# Patient Record
Sex: FEMALE | Race: WHITE | NOT HISPANIC OR LATINO | Employment: OTHER | URBAN - METROPOLITAN AREA
[De-identification: names, ages, dates, MRNs, and addresses within clinical notes are randomized per-mention and may not be internally consistent; named-entity substitution may affect disease eponyms.]

---

## 2017-05-12 ENCOUNTER — APPOINTMENT (OUTPATIENT)
Dept: GENERAL RADIOLOGY | Facility: CLINIC | Age: 82
End: 2017-05-12
Attending: EMERGENCY MEDICINE
Payer: MEDICARE

## 2017-05-12 ENCOUNTER — HOSPITAL ENCOUNTER (EMERGENCY)
Facility: CLINIC | Age: 82
Discharge: HOME | End: 2017-05-12
Attending: EMERGENCY MEDICINE | Admitting: EMERGENCY MEDICINE
Payer: MEDICARE

## 2017-05-12 VITALS
RESPIRATION RATE: 16 BRPM | HEART RATE: 89 BPM | WEIGHT: 162 LBS | DIASTOLIC BLOOD PRESSURE: 76 MMHG | TEMPERATURE: 98 F | BODY MASS INDEX: 27.81 KG/M2 | SYSTOLIC BLOOD PRESSURE: 167 MMHG | OXYGEN SATURATION: 71 %
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
## 2017-05-12 DIAGNOSIS — S80.00XA CONTUSION OF KNEE, UNSPECIFIED LATERALITY, INITIAL ENCOUNTER: ICD-10-CM

## 2017-05-12 PROCEDURE — A9270 NON-COVERED ITEM OR SERVICE: HCPCS | Mod: GY | Performed by: EMERGENCY MEDICINE

## 2017-05-12 PROCEDURE — 99284 EMERGENCY DEPT VISIT MOD MDM: CPT | Performed by: EMERGENCY MEDICINE

## 2017-05-12 PROCEDURE — 25000132 ZZH RX MED GY IP 250 OP 250 PS 637: Mod: GY | Performed by: EMERGENCY MEDICINE

## 2017-05-12 PROCEDURE — 99283 EMERGENCY DEPT VISIT LOW MDM: CPT

## 2017-05-12 PROCEDURE — 73560 X-RAY EXAM OF KNEE 1 OR 2: CPT | Mod: 50

## 2017-05-12 RX ORDER — TRAMADOL HYDROCHLORIDE 50 MG/1
50 TABLET ORAL ONCE
Status: COMPLETED | OUTPATIENT
Start: 2017-05-12 | End: 2017-05-12

## 2017-05-12 RX ORDER — MAGNESIUM 200 MG
1 TABLET ORAL EVERY EVENING
COMMUNITY
End: 2023-06-14

## 2017-05-12 RX ADMIN — TRAMADOL HYDROCHLORIDE 50 MG: 50 TABLET, COATED ORAL at 14:21

## 2017-05-12 ASSESSMENT — ENCOUNTER SYMPTOMS
NEUROLOGICAL NEGATIVE: 1
RESPIRATORY NEGATIVE: 1
GASTROINTESTINAL NEGATIVE: 1
CONSTITUTIONAL NEGATIVE: 1
ENDOCRINE NEGATIVE: 1
PSYCHIATRIC NEGATIVE: 1
ALLERGIC/IMMUNOLOGIC NEGATIVE: 1
CARDIOVASCULAR NEGATIVE: 1
HEMATOLOGIC/LYMPHATIC NEGATIVE: 1
EYES NEGATIVE: 1

## 2017-05-12 NOTE — ED PROVIDER NOTES
History     Chief Complaint   Patient presents with     Knee Pain     fall     HPI  Daniela Brown is a 84 year old female with a history of restless leg syndrome, depression, esophageal reflux, recurrent urinary tract infection, hypertension, B12 deficiency anemia who presents for evaluation for knee pain after a fall.  Patient tells me she was working in her yard at home in her garage when she tripped on a block and fell onto both her knees.  She initially had trouble bearing weight and standing but was able to hernan shimmer into her home to call for help. patient tells me she has a history of right knee replacement in 2014.  She normally takes tramadol for chronic leg pain and back pain.  She did not hit her head she does not report any back pain or hip pain.  Because of knee pain and discomfort she comes in by EMS to be checked out particular because she has pain with weightbearing and ambulation.  She is on a baby aspirin, atorvastatin, losartan, budesonide, and metformin.    Social history: Lives in [place], [place]. Here in ED alone by EMS.    Past Medical history:  Patient Active Problem List   Diagnosis     Degeneration of lumbar or lumbosacral intervertebral disc     Esophageal reflux     Memory loss     Irritable bowel syndrome     Disorder of bone and cartilage     Depressive disorder, not elsewhere classified     RESTLESS LEG SYNDROME     Generalized osteoarthrosis, unspecified site     Allergic rhinitis     Diverticulosis of large intestine     SENSONRL HEAR LOSS,BILAT     Urticaria     Subjective tinnitus     Vitamin D deficiency     Right foot pain     Frequent UTI     Urge incontinence     Obesity     Hyperlipidemia LDL goal <100     HTN (hypertension)     Type 2 diabetes, HbA1c goal < 7% (H)     Allergic rhinitis     Angina pectoris (H)     Pronation of foot     Advanced directives, counseling/discussion     Leukocytosis     S/P total knee replacement     Anemia due to blood loss, acute      "Colitis, Clostridium difficile     B12 deficiency anemia     Medications:  No current facility-administered medications for this encounter.      Current Outpatient Prescriptions   Medication     ClonazePAM (KLONOPIN PO)     Cyanocobalamin (B-12) 1000 MCG SUBL     LOSARTAN POTASSIUM PO     Atorvastatin Calcium (LIPITOR PO)     acetaminophen (TYLENOL) 325 MG tablet     Glucose Blood (ONE TOUCH ULTRA TEST) strip     traMADol (ULTRAM) 50 MG tablet     MetFORmin (GLUCOPHAGE) 500 MG tablet     Multiple Vitamins-Minerals (THERAPEUTIC MULTIVIT/MINERAL) TABS     aspirin 81 MG tablet     nitroglycerin (NITROSTAT) 0.4 MG SL tablet     ORDER FOR DME     ** PATIENT ALERT **     Allergies:     Allergies   Allergen Reactions     Actonel [Bisphosphonates] Itching     Celebrex [Celecoxib] Rash     Cipro [Ciprofloxacin] Rash     Erythromycin Rash     Fosamax Itching and Rash     Keflex [Cephalexin Monohydrate] Rash     Nitrofurantoin Other (See Comments)     \"dizzyness'     Tetanus-Diphtheria Toxoids Swelling     Vicodin [Acetaminophen] Itching     Patient reported - only when used scheduled in high doses.        Vioxx Rash     Sulfa Drugs Itching and Rash     I have reviewed the Medications, Allergies, Past Medical and Surgical History, and Social History in the Epic system.    Review of Systems   Constitutional: Negative.    HENT: Negative.    Eyes: Negative.    Respiratory: Negative.    Cardiovascular: Negative.    Gastrointestinal: Negative.    Endocrine: Negative.    Genitourinary: Negative.    Musculoskeletal:        Bilateral knee pain.    Skin: Negative.    Allergic/Immunologic: Negative.    Neurological: Negative.    Hematological: Negative.    Psychiatric/Behavioral: Negative.        Physical Exam   BP: (!) 193/99  Pulse: 89  Temp: 98  F (36.7  C)  Resp: 16  Weight: 73.5 kg (162 lb)  SpO2: 95 %  Physical Exam   Constitutional: She is oriented to person, place, and time. No distress.   HENT:   Head: Normocephalic and " atraumatic.   Eyes: Conjunctivae and EOM are normal. Pupils are equal, round, and reactive to light. Right eye exhibits no discharge. Left eye exhibits no discharge. No scleral icterus.   Neck: Normal range of motion. Neck supple. No JVD present. No tracheal deviation present. No thyromegaly present.   Cardiovascular: Normal rate.  Exam reveals no gallop and no friction rub.    No murmur heard.  Pulmonary/Chest: Effort normal.   Abdominal: Soft. Bowel sounds are normal.   Musculoskeletal:        Legs:  Lymphadenopathy:     She has no cervical adenopathy.   Neurological: She is alert and oriented to person, place, and time.   Skin: She is not diaphoretic.   Psychiatric: She has a normal mood and affect. Her behavior is normal. Judgment and thought content normal.       ED Course     ED Course     Procedures             Critical Care time:  none               Labs Ordered and Resulted from Time of ED Arrival Up to the Time of Departure from the ED - No data to display    ED medications:  Medications   traMADol (ULTRAM) tablet 50 mg (50 mg Oral Given 5/12/17 1421)        ED labs and imaging:  Results for orders placed or performed during the hospital encounter of 05/12/17 (from the past 24 hour(s))   Knee XR, 2 views, bilateral    Narrative    XR KNEE BILATERAL 1/2 VW 5/12/2017 2:55 PM    HISTORY: Fall. Pain.    COMPARISON: 3/25/2013.    TWO-VIEW RIGHT KNEE: No acute osseous finding is demonstrated. The  right knee arthroplasty is stable in appearance.    TWO-VIEW LEFT KNEE: No acute osseous finding is demonstrated. A small  knee joint effusion is not excluded. Moderate degenerative medial  compartmental joint space narrowing is present and similar to the  prior study. Degenerative change is also present in the patellofemoral  compartmental joint space.    PATY SUTHERLAND MD       ED Vitals:  Vitals:    05/12/17 1355 05/12/17 1400 05/12/17 1415 05/12/17 1428   BP:  (!) 172/91 167/76    Pulse:       Resp:       Temp:    98   F (36.7  C)   TempSrc:    Oral   SpO2: (!) 71%      Weight:         Assessments & Plan (with Medical Decision Making)   Clinical impression:  pleasant 84-year-old female who presents by EMS for bilateral knee pain and discomfort after a mechanical fall that was unwitnessed.  Patient tells me she was working in her backyard when she tripped on a wooden block.  She fell onto both her knees.  She arrived to be assessed due to pain with ambulation and weightbearing on both knees and left worse than the right.  She does not have any hip pain and no back pain. On my exam, she  reports tenderness to palpation about the left knee with some swelling but no bruising or ecchymosis.  Ice is applied to her knee.  She has no ankle pain, no pelvic pain      ED course and Plan:  She requested something for pain.  She tells me she takes tramadol for acute leg and back pain.  She is given a dose of oral tramadol in the ED.  An x-ray of both her knees were obtained to exclude bony abnormality given fall onto her knee,and her concern about her right knee prosthesis.  XR was negative for acute osseous abnormality.  There is no traumatic bony abnormality.  Please see radiologist interpretation in detailed report above.  Patient was reevaluated after x-rays imaging oral tramadol.  Patient was comfortable going home.  She was picked up by her son.  She tells me she has tramadol to use for pain control at home.  We discussed and reviewed reasons to return to the emergency department for care.  Patient did ambulate independently and was observed by nursing staff and had no difficulty with weightbearing and ambulation.  I reviewed care for knee contusion post-mechanical fall from standing height.  Ice to the knee, rest elevate and use tramadol which she have at home for pain and discomfort as needed.  Recheck in clinic in 3-5 days.         Disclaimer: This note consists of symbols derived from keyboarding, dictation and/or voice recognition  software. As a result, there may be errors in the script that have gone undetected. Please consider this when interpreting information found in this chart.  I have reviewed the nursing notes.    I have reviewed the findings, diagnosis, plan and need for follow up with the patient.    New Prescriptions    No medications on file       Final diagnoses:   Contusion of knee, unspecified laterality, initial encounter - bilateral (L>R)   Fall, initial encounter - from standing height onto both knees       5/12/2017   [facility] EMERGENCY DEPARTMENT     Epifanio Vo MD  05/12/17 4210

## 2017-05-12 NOTE — ED AVS SNAPSHOT
[facility] Emergency Department    [address] [address]    Phone:  [phone]    Fax:  [phone]                                       Daniela Brown   MRN: [id number]    Department:  [facility] Emergency Department   Date of Visit:  5/12/2017           Patient Information     Date Of Birth          1/26/1933        Your diagnoses for this visit were:     Contusion of knee, unspecified laterality, initial encounter bilateral (L>R)    Fall, initial encounter from standing height onto both knees       You were seen by Epifanio Vo MD.      Follow-up Information     Follow up with Clinic, [facility] [place]. Schedule an appointment as soon as possible for a visit in 3 days.    Why:  for recheck and follow-up    Contact information:    [facility] [id number] [phone]          Follow up with [facility] Emergency Department.    Specialty:  EMERGENCY MEDICINE    Why:  As needed, If symptoms worsen    Contact information:    [address] [place] [address] [phone]    Additional information:    The medical center is located at   [address] (between [address] in [place], [place] miles north   of [place]).      Discharge References/Attachments     FALL, MECHANICAL (ENGLISH)    BRUISES (CONTUSIONS) (ENGLISH)    FALLS, PREVENTING, MOVING SAFELY OUTSIDE (ENGLISH)      24 Hour Appointment Hotline       To make an appointment at any [facility], call [phone] ([phone]). If you don't have a family doctor or clinic, we will help you find one. [facility] are conveniently located to serve the needs of you and your family.             Review of your medicines      Our records show that you are taking the medicines listed below. If these are incorrect, please call your family doctor or clinic.        Dose / Directions Last dose taken    * ** PATIENT ALERT **        Warning: DO NOT EXCEED  4,000 MG OF ACETAMINOPHEN FROM ALL SOURCES IN 24 HOURS   Refills:  0        acetaminophen 325 MG tablet   Commonly known as:  TYLENOL   Dose:  325 mg   Quantity:  30 tablet        Take 1 tablet by mouth every 6 hours as needed for pain.   Refills:  0        aspirin 81 MG tablet   Dose:  81 mg        Take 81 mg by mouth daily   Refills:  0        B-12 1000 MCG Subl        Place under the tongue daily   Refills:  0        KLONOPIN PO   Dose:  0.5 mg        Take 0.5 mg by mouth 2 times daily as needed for anxiety   Refills:  0        LIPITOR PO   Dose:  40 mg        Take 40 mg by mouth daily   Refills:  0        LOSARTAN POTASSIUM PO   Dose:  50 mg        Take 50 mg by mouth daily   Refills:  0        metFORMIN 500 MG tablet   Commonly known as:  GLUCOPHAGE   Dose:  1 tablet   Quantity:  180 tablet        Take 1 tablet by mouth 2 times daily (with meals).   Refills:  1        nitroglycerin 0.4 MG sublingual tablet   Commonly known as:  NITROSTAT   Dose:  0.4 mg   Quantity:  30 tablet        Place 1 tablet under the tongue every 5 minutes as needed.   Refills:  4        ONE TOUCH ULTRA test strip   Dose:  1 strip   Quantity:  1 Box   Generic drug:  blood glucose monitoring        1 strip daily.   Refills:  6        * order for DME   Quantity:  1 Device        Thigh length teds.   Refills:  2        THERAPEUTIC MULTIVIT/MINERAL Tabs   Dose:  1 tablet        Take 1 tablet by mouth daily.   Refills:  0        traMADol 50 MG tablet   Commonly known as:  ULTRAM   Dose:  50 mg   Quantity:  60 tablet        Take 1 tablet by mouth every 6 hours as needed for pain.   Refills:  5        * Notice:  This list has 2 medication(s) that are the same as other medications prescribed for you. Read the directions carefully, and ask your doctor or other care provider to review them with you.            Procedures and tests performed during your visit     Knee XR, 2 views, bilateral      Orders Needing Specimen Collection     None     "  Pending Results     No orders found from 5/10/2017 to 5/13/2017.            Pending Culture Results     No orders found from 5/10/2017 to 5/13/2017.            Pending Results Instructions     If you had any lab results that were not finalized at the time of your Discharge, you can call the ED Lab Result RN at [phone]. You will be contacted by this team for any positive Lab results or changes in treatment. The nurses are available 7 days a week from 10A to 6:30P.  You can leave a message 24 hours per day and they will return your call.        Test Results From Your Hospital Stay        5/12/2017  3:30 PM      Narrative     XR KNEE BILATERAL 1/2 VW 5/12/2017 2:55 PM    HISTORY: Fall. Pain.    COMPARISON: 3/25/2013.    TWO-VIEW RIGHT KNEE: No acute osseous finding is demonstrated. The  right knee arthroplasty is stable in appearance.    TWO-VIEW LEFT KNEE: No acute osseous finding is demonstrated. A small  knee joint effusion is not excluded. Moderate degenerative medial  compartmental joint space narrowing is present and similar to the  prior study. Degenerative change is also present in the patellofemoral  compartmental joint space.    PATY SUTHERLAND MD                Thank you for choosing [facility]       Thank you for choosing [facility] for your care. Our goal is always to provide you with excellent care. Hearing back from our patients is one way we can continue to improve our services. Please take a few minutes to complete the written survey that you may receive in the mail after you visit with us. Thank you!        [email or web address] Information     [facility] lets you send messages to your doctor, view your test results, renew your prescriptions, schedule appointments and more. To sign up, go to [email or web address] . Click on \"Log in\" on the left side of the screen, which will take you to the Welcome page. Then click on \"Sign up Now\" on the right side of the page.     You will be asked to enter the access code listed " below, as well as some personal information. Please follow the directions to create your username and password.     Your access code is: [email or web address]  Expires: 8/10/2017  4:00 PM     Your access code will  in 90 days. If you need help or a new code, please call your [facility] clinic or [phone].        Care EveryWhere ID     This is your Care EveryWhere ID. This could be used by other organizations to access your [facility] medical records  [id number]        After Visit Summary       This is your record. Keep this with you and show to your community pharmacist(s) and doctor(s) at your next visit.

## 2017-05-12 NOTE — ED AVS SNAPSHOT
[facility] Emergency Department    [address] [address]    Phone:  [phone]    Fax:  [phone]                                       Daniela Brown   MRN: [id number]    Department:  [facility] Emergency Department   Date of Visit:  5/12/2017           After Visit Summary Signature Page     I have received my discharge instructions, and my questions have been answered. I have discussed any challenges I see with this plan with the nurse or doctor.    ..........................................................................................................................................  Patient/Patient Representative Signature      ..........................................................................................................................................  Patient Representative Print Name and Relationship to Patient    ..................................................               ................................................  Date                                            Time    ..........................................................................................................................................  Reviewed by Signature/Title    ...................................................              ..............................................  Date                                                            Time

## 2017-06-17 ENCOUNTER — APPOINTMENT (OUTPATIENT)
Dept: PHYSICAL THERAPY | Facility: CLINIC | Age: 82
End: 2017-06-17
Payer: MEDICARE

## 2017-06-17 ENCOUNTER — HOSPITAL ENCOUNTER (OUTPATIENT)
Facility: CLINIC | Age: 82
Setting detail: OBSERVATION
Discharge: HOME | End: 2017-06-17
Attending: FAMILY MEDICINE | Admitting: FAMILY MEDICINE
Payer: MEDICARE

## 2017-06-17 ENCOUNTER — TELEPHONE (OUTPATIENT)
Dept: FAMILY MEDICINE | Facility: CLINIC | Age: 82
End: 2017-06-17

## 2017-06-17 VITALS
WEIGHT: 161.82 LBS | HEART RATE: 84 BPM | BODY MASS INDEX: 27.63 KG/M2 | HEIGHT: 64 IN | SYSTOLIC BLOOD PRESSURE: 161 MMHG | TEMPERATURE: 98 F | OXYGEN SATURATION: 95 % | RESPIRATION RATE: 18 BRPM | DIASTOLIC BLOOD PRESSURE: 87 MMHG

## 2017-06-17 DIAGNOSIS — K52.9 GASTROENTERITIS: ICD-10-CM

## 2017-06-17 DIAGNOSIS — E86.0 DEHYDRATION: ICD-10-CM

## 2017-06-17 DIAGNOSIS — R55 SYNCOPE, UNSPECIFIED SYNCOPE TYPE: ICD-10-CM

## 2017-06-17 DIAGNOSIS — E11.9 TYPE 2 DIABETES MELLITUS WITHOUT COMPLICATION, WITHOUT LONG-TERM CURRENT USE OF INSULIN (H): Primary | ICD-10-CM

## 2017-06-17 DIAGNOSIS — M25.561 ACUTE PAIN OF RIGHT KNEE: Primary | ICD-10-CM

## 2017-06-17 PROBLEM — E86.1 HYPOVOLEMIA DEHYDRATION: Status: ACTIVE | Noted: 2017-06-17

## 2017-06-17 LAB
ANION GAP SERPL CALCULATED.3IONS-SCNC: 6 MMOL/L (ref 3–14)
ANION GAP SERPL CALCULATED.3IONS-SCNC: 9 MMOL/L (ref 3–14)
BASOPHILS # BLD AUTO: 0 10E9/L (ref 0–0.2)
BASOPHILS NFR BLD AUTO: 0.1 %
BUN SERPL-MCNC: 11 MG/DL (ref 7–30)
BUN SERPL-MCNC: 15 MG/DL (ref 7–30)
CALCIUM SERPL-MCNC: 8.6 MG/DL (ref 8.5–10.1)
CALCIUM SERPL-MCNC: 9.2 MG/DL (ref 8.5–10.1)
CHLORIDE SERPL-SCNC: 106 MMOL/L (ref 94–109)
CHLORIDE SERPL-SCNC: 116 MMOL/L (ref 94–109)
CO2 SERPL-SCNC: 23 MMOL/L (ref 20–32)
CO2 SERPL-SCNC: 24 MMOL/L (ref 20–32)
CREAT SERPL-MCNC: 0.77 MG/DL (ref 0.52–1.04)
CREAT SERPL-MCNC: 0.91 MG/DL (ref 0.52–1.04)
DIFFERENTIAL METHOD BLD: ABNORMAL
EOSINOPHIL # BLD AUTO: 0.1 10E9/L (ref 0–0.7)
EOSINOPHIL NFR BLD AUTO: 0.8 %
ERYTHROCYTE [DISTWIDTH] IN BLOOD BY AUTOMATED COUNT: 12.5 % (ref 10–15)
ERYTHROCYTE [DISTWIDTH] IN BLOOD BY AUTOMATED COUNT: 12.7 % (ref 10–15)
GFR SERPL CREATININE-BSD FRML MDRD: 59 ML/MIN/1.7M2
GFR SERPL CREATININE-BSD FRML MDRD: 71 ML/MIN/1.7M2
GLUCOSE BLDC GLUCOMTR-MCNC: 131 MG/DL (ref 70–99)
GLUCOSE SERPL-MCNC: 122 MG/DL (ref 70–99)
GLUCOSE SERPL-MCNC: 176 MG/DL (ref 70–99)
HCT VFR BLD AUTO: 36.8 % (ref 35–47)
HCT VFR BLD AUTO: 40.1 % (ref 35–47)
HGB BLD-MCNC: 12.4 G/DL (ref 11.7–15.7)
HGB BLD-MCNC: 13.6 G/DL (ref 11.7–15.7)
IMM GRANULOCYTES # BLD: 0 10E9/L (ref 0–0.4)
IMM GRANULOCYTES NFR BLD: 0.2 %
LYMPHOCYTES # BLD AUTO: 1.5 10E9/L (ref 0.8–5.3)
LYMPHOCYTES NFR BLD AUTO: 8.1 %
MCH RBC QN AUTO: 28.3 PG (ref 26.5–33)
MCH RBC QN AUTO: 28.5 PG (ref 26.5–33)
MCHC RBC AUTO-ENTMCNC: 33.7 G/DL (ref 31.5–36.5)
MCHC RBC AUTO-ENTMCNC: 33.9 G/DL (ref 31.5–36.5)
MCV RBC AUTO: 84 FL (ref 78–100)
MCV RBC AUTO: 84 FL (ref 78–100)
MONOCYTES # BLD AUTO: 1.2 10E9/L (ref 0–1.3)
MONOCYTES NFR BLD AUTO: 6.4 %
MRSA DNA SPEC QL NAA+PROBE: NORMAL
NEUTROPHILS # BLD AUTO: 15.3 10E9/L (ref 1.6–8.3)
NEUTROPHILS NFR BLD AUTO: 84.4 %
PLATELET # BLD AUTO: 233 10E9/L (ref 150–450)
PLATELET # BLD AUTO: 267 10E9/L (ref 150–450)
POTASSIUM SERPL-SCNC: 3.4 MMOL/L (ref 3.4–5.3)
POTASSIUM SERPL-SCNC: 3.8 MMOL/L (ref 3.4–5.3)
RBC # BLD AUTO: 4.38 10E12/L (ref 3.8–5.2)
RBC # BLD AUTO: 4.77 10E12/L (ref 3.8–5.2)
SODIUM SERPL-SCNC: 139 MMOL/L (ref 133–144)
SODIUM SERPL-SCNC: 145 MMOL/L (ref 133–144)
SPECIMEN SOURCE: NORMAL
WBC # BLD AUTO: 13.7 10E9/L (ref 4–11)
WBC # BLD AUTO: 18.1 10E9/L (ref 4–11)

## 2017-06-17 PROCEDURE — A9270 NON-COVERED ITEM OR SERVICE: HCPCS | Mod: GY | Performed by: FAMILY MEDICINE

## 2017-06-17 PROCEDURE — 85025 COMPLETE CBC W/AUTO DIFF WBC: CPT | Performed by: FAMILY MEDICINE

## 2017-06-17 PROCEDURE — 99284 EMERGENCY DEPT VISIT MOD MDM: CPT | Performed by: FAMILY MEDICINE

## 2017-06-17 PROCEDURE — 00000146 ZZHCL STATISTIC GLUCOSE BY METER IP

## 2017-06-17 PROCEDURE — 25000132 ZZH RX MED GY IP 250 OP 250 PS 637: Mod: GY | Performed by: FAMILY MEDICINE

## 2017-06-17 PROCEDURE — 99285 EMERGENCY DEPT VISIT HI MDM: CPT | Mod: 25

## 2017-06-17 PROCEDURE — 87640 STAPH A DNA AMP PROBE: CPT | Mod: XU | Performed by: INTERNAL MEDICINE

## 2017-06-17 PROCEDURE — 80048 BASIC METABOLIC PNL TOTAL CA: CPT | Performed by: FAMILY MEDICINE

## 2017-06-17 PROCEDURE — 99219 ZZC INITIAL OBSERVATION CARE,LEVL II: CPT | Performed by: FAMILY MEDICINE

## 2017-06-17 PROCEDURE — 40000193 ZZH STATISTIC PT WARD VISIT: Performed by: PHYSICAL THERAPIST

## 2017-06-17 PROCEDURE — G0378 HOSPITAL OBSERVATION PER HR: HCPCS

## 2017-06-17 PROCEDURE — 96361 HYDRATE IV INFUSION ADD-ON: CPT

## 2017-06-17 PROCEDURE — 97161 PT EVAL LOW COMPLEX 20 MIN: CPT | Mod: GP | Performed by: PHYSICAL THERAPIST

## 2017-06-17 PROCEDURE — A9270 NON-COVERED ITEM OR SERVICE: HCPCS | Mod: GY | Performed by: INTERNAL MEDICINE

## 2017-06-17 PROCEDURE — 25000128 H RX IP 250 OP 636: Performed by: FAMILY MEDICINE

## 2017-06-17 PROCEDURE — 87641 MR-STAPH DNA AMP PROBE: CPT | Performed by: INTERNAL MEDICINE

## 2017-06-17 PROCEDURE — 96360 HYDRATION IV INFUSION INIT: CPT

## 2017-06-17 PROCEDURE — 25000132 ZZH RX MED GY IP 250 OP 250 PS 637: Mod: GY | Performed by: INTERNAL MEDICINE

## 2017-06-17 PROCEDURE — 85027 COMPLETE CBC AUTOMATED: CPT | Performed by: FAMILY MEDICINE

## 2017-06-17 PROCEDURE — 36415 COLL VENOUS BLD VENIPUNCTURE: CPT | Performed by: FAMILY MEDICINE

## 2017-06-17 RX ORDER — ONDANSETRON 2 MG/ML
4 INJECTION INTRAMUSCULAR; INTRAVENOUS EVERY 6 HOURS PRN
Status: DISCONTINUED | OUTPATIENT
Start: 2017-06-17 | End: 2017-06-17 | Stop reason: HOSPADM

## 2017-06-17 RX ORDER — TRAMADOL HYDROCHLORIDE 50 MG/1
50 TABLET ORAL EVERY 6 HOURS PRN
Status: DISCONTINUED | OUTPATIENT
Start: 2017-06-17 | End: 2017-06-17 | Stop reason: HOSPADM

## 2017-06-17 RX ORDER — NITROGLYCERIN 0.4 MG/1
0.4 TABLET SUBLINGUAL EVERY 5 MIN PRN
Status: DISCONTINUED | OUTPATIENT
Start: 2017-06-17 | End: 2017-06-17 | Stop reason: HOSPADM

## 2017-06-17 RX ORDER — NALOXONE HYDROCHLORIDE 0.4 MG/ML
.1-.4 INJECTION, SOLUTION INTRAMUSCULAR; INTRAVENOUS; SUBCUTANEOUS
Status: DISCONTINUED | OUTPATIENT
Start: 2017-06-17 | End: 2017-06-17 | Stop reason: HOSPADM

## 2017-06-17 RX ORDER — ACETAMINOPHEN 325 MG/1
650 TABLET ORAL EVERY 4 HOURS PRN
Status: DISCONTINUED | OUTPATIENT
Start: 2017-06-17 | End: 2017-06-17 | Stop reason: HOSPADM

## 2017-06-17 RX ORDER — METFORMIN HCL 500 MG
500 TABLET, EXTENDED RELEASE 24 HR ORAL
Status: DISCONTINUED | OUTPATIENT
Start: 2017-06-17 | End: 2017-06-17 | Stop reason: HOSPADM

## 2017-06-17 RX ORDER — TRAMADOL HYDROCHLORIDE 50 MG/1
50 TABLET ORAL ONCE
Status: COMPLETED | OUTPATIENT
Start: 2017-06-17 | End: 2017-06-17

## 2017-06-17 RX ORDER — METFORMIN HCL 500 MG
500 TABLET, EXTENDED RELEASE 24 HR ORAL
Qty: 30 TABLET | Refills: 0 | Status: SHIPPED | OUTPATIENT
Start: 2017-06-17 | End: 2018-07-19

## 2017-06-17 RX ORDER — CLONAZEPAM 0.5 MG/1
0.5 TABLET ORAL 2 TIMES DAILY PRN
Status: DISCONTINUED | OUTPATIENT
Start: 2017-06-17 | End: 2017-06-17 | Stop reason: HOSPADM

## 2017-06-17 RX ORDER — SODIUM CHLORIDE 9 MG/ML
1000 INJECTION, SOLUTION INTRAVENOUS CONTINUOUS
Status: DISCONTINUED | OUTPATIENT
Start: 2017-06-17 | End: 2017-06-17 | Stop reason: HOSPADM

## 2017-06-17 RX ORDER — LOSARTAN POTASSIUM 50 MG/1
50 TABLET ORAL DAILY
Status: DISCONTINUED | OUTPATIENT
Start: 2017-06-17 | End: 2017-06-17 | Stop reason: HOSPADM

## 2017-06-17 RX ORDER — ONDANSETRON 4 MG/1
4 TABLET, ORALLY DISINTEGRATING ORAL EVERY 6 HOURS PRN
Status: DISCONTINUED | OUTPATIENT
Start: 2017-06-17 | End: 2017-06-17 | Stop reason: HOSPADM

## 2017-06-17 RX ORDER — ONDANSETRON 2 MG/ML
4 INJECTION INTRAMUSCULAR; INTRAVENOUS
Status: DISCONTINUED | OUTPATIENT
Start: 2017-06-17 | End: 2017-06-17 | Stop reason: DRUGHIGH

## 2017-06-17 RX ADMIN — SODIUM CHLORIDE 1000 ML: 9 INJECTION, SOLUTION INTRAVENOUS at 02:19

## 2017-06-17 RX ADMIN — SODIUM CHLORIDE 1000 ML: 9 INJECTION, SOLUTION INTRAVENOUS at 03:58

## 2017-06-17 RX ADMIN — TRAMADOL HYDROCHLORIDE 50 MG: 50 TABLET, COATED ORAL at 07:12

## 2017-06-17 RX ADMIN — LOSARTAN POTASSIUM 50 MG: 50 TABLET ORAL at 09:10

## 2017-06-17 ASSESSMENT — ACTIVITIES OF DAILY LIVING (ADL)
BATHING: 0-->INDEPENDENT
FALL_HISTORY_WITHIN_LAST_SIX_MONTHS: YES
DRESS: 0-->INDEPENDENT
RETIRED_COMMUNICATION: 0-->UNDERSTANDS/COMMUNICATES WITHOUT DIFFICULTY
SWALLOWING: 0-->SWALLOWS FOODS/LIQUIDS WITHOUT DIFFICULTY
TRANSFERRING: 0-->INDEPENDENT
AMBULATION: 0-->INDEPENDENT
NUMBER_OF_TIMES_PATIENT_HAS_FALLEN_WITHIN_LAST_SIX_MONTHS: 2
TOILETING: 0-->INDEPENDENT
RETIRED_EATING: 0-->INDEPENDENT
COGNITION: 0 - NO COGNITION ISSUES REPORTED

## 2017-06-17 NOTE — LETTER
"Transition Communication Hand-off for Care Transitions to Next Level of Care Provider    Name: Daniela Wetzel  MRN #: [id number]  Primary Care Provider: [facility]     Primary Clinic: [address] [place] [address]  Primary Care Clinic Name:  ([facility])  Reason for Hospitalization:  Dehydration [E86.0]  Gastroenteritis [K52.9]  Syncope, unspecified syncope type [R55]  Admit Date/Time: 6/17/2017  1:50 AM  Discharge Date: ***  Payor Source: Payor: MEDICA / Plan: MEDICA PRIME SOLUTION / Product Type: Indemnity /     Readmission Assessment Measure (ALLI) Risk Score/category: ***    Plan of Care Goals/Milestone Events:   Patient Concerns: ***   Patient Goals:   Short-term ***   Long-term ***   Medical Goals   Short-term ***   Long-term ***         Reason for Communication Hand-off Referral: {CCREASONCTNHANDOFFREFERRALCTS:08940}    Discharge Plan:       Concern for non-adherence with plan of care:   Y/N ***  Discharge Needs Assessment:      Already enrolled in Tele-monitoring program and name of program:  ***  Follow-up specialty is recommended: {YES / NO:24623::\"Yes\"}    Follow-up plan:  Future Appointments  Date Time Provider Department Center   6/17/2017 4:00 PM Ruth Restrepo PT [facility]       Any outstanding tests or procedures:        Referrals     Future Labs/Procedures    HOME CARE NURSING REFERRAL     Comments:    Your provider has referred you to: [facility]: [facility] [phone]   [email or web address]    Extended Emergency Contact Information  Primary Emergency Contact: JOANNA WETZEL  Address: [address] [place] [address] [facility]  Home Phone: [phone]  Work Phone: [phone]  Mobile Phone: [phone]  Relation: Son  Secondary Emergency Contact: npp   [place]  Relation: None    Patient Anticipated Discharge Date: 6/18/17   RN, PT, HHA to begin 24 - " 48 hours after discharge.  PLEASE EVALUATE AND TREAT (Evaluation timeline is 24 - 48 hrs. Please call if there is need for a variance to this timeline).    REASON FOR REFERRAL: Assessment & Treatment: ST    ADDITIONAL SERVICES NEEDED: none    OTHER PERTINENT INFORMATION: Patient was last seen by provider on 6/17/17 for gastroenteritis.    No current outpatient prescriptions on file.    Patient Active Problem List:     Degeneration of lumbar or lumbosacral intervertebral disc     Esophageal reflux     Memory loss     Irritable bowel syndrome     Disorder of bone and cartilage     Depressive disorder, not elsewhere classified     RESTLESS LEG SYNDROME     Generalized osteoarthrosis, unspecified site     Allergic rhinitis     Diverticulosis of large intestine     SENSONRL HEAR LOSS,BILAT     Urticaria     Subjective tinnitus     Vitamin D deficiency     Right foot pain     Frequent UTI     Urge incontinence     Obesity     Hyperlipidemia LDL goal <100     HTN (hypertension)     Type 2 diabetes, HbA1c goal < 7% (H)     Allergic rhinitis     Angina pectoris (H)     Pronation of foot     Advanced directives, counseling/discussion     Leukocytosis     S/P total knee replacement     Anemia due to blood loss, acute     Colitis, Clostridium difficile     B12 deficiency anemia     Gastroenteritis     Hypovolemia dehydration      Documentation of Face to Face and Certification for Home Health Services    I certify that patient, Daniela Brown is under my care and that I, or a Nurse Practitioner or Physician's Assistant working with me, had a face-to-face encounter that meets the physician face-to-face encounter requirements with this patient on: 6/17/2017.    This encounter with the patient was in whole, or in part, for the following medical condition, which is the primary reason for Home Health Care: weakness.    I certify that, based on my findings, the following services are medically necessary Home Health  Services: Nursing    My clinical findings support the need for the above services because: Nurse is needed: To provide assessment and oversight required in the home to assure adherence to the medical plan due to: gastroenteritis..    Further, I certify that my clinical findings support that this patient is homebound (i.e. absences from home require considerable and taxing effort and are for medical reasons or [employer] services or infrequently or of short duration when for other reasons) because: Requires assistance of another person or specialized equipment to access medical services because patient: Is unable to exit home safely on own due to: weakness..    Based on the above findings, I certify that this patient is confined to the home and needs intermittent skilled nursing care, physical therapy and/or speech therapy.  The patient is under my care, and I have initiated the establishment of the plan of care.  This patient will be followed by a physician who will periodically review the plan of care.    Physician/Provider to provide follow up care: Clinic, [facility]    Isaura GRANADO certified Physician at time of discharge: Axel De La Rosa MD    Please be aware that coverage of these services is subject to the terms and limitations of your health insurance plan.  Call member services at your health plan with any benefit or coverage questions.            Key Recommendations:      Angelica Rivera    AVS/Discharge Summary is the source of truth; this is a helpful guide for improved communication of patient story

## 2017-06-17 NOTE — PROGRESS NOTES
"Pt continues to verbalize some knee discomfort after falling on it \"twice\" per pt. States it has \"improved\" since given PRN tramadol this AM and ice applied. Pt sat on the side of the bed for breakfast, stated she didn't have much of an appetite but that this is normal for her. No diarrhea noted this morning. Pt resting in bed, call light within reach. Alarms activated and audible.   "

## 2017-06-17 NOTE — PLAN OF CARE
Problem: Goal Outcome Summary  Goal: Goal Outcome Summary  Outcome: Improving  Pt's diarrhea has improved since admission to the floor, only 1 episode. Pt has voided x2 since admission. Alert & oriented x4. Continues to be slightly dizzy when up to the bathroom. Pt needs to be encouraged to slow down when moving. Remote tele on patient. Call light within reach, bed alarm activated.

## 2017-06-17 NOTE — H&P
"(K52.9) Gastroenteritis  Comment: most likley multifactorial.  Se has \"spastic colon\" for years  Worse with stress. Has lots of stress getting ready for garage sale.  She forgot her metformin so took it in the evening on empty stomach.   bwiull start bowel rest. Iv fluids  Supportive adelaida  Plan: Glucose by meter, HOME CARE NURSING REFERRAL     (E86.0) Dehydration  Comment: 2ndary to acute gi losses.    Has william given fluid bolus in er  With continue high iv rate.   Plan: HOME CARE NURSING REFERRAL         (R55) Syncope, unspecified syncope type  Comment: most likely related to hypovolemic hypotension.  Will obsev=rve over night.  Plan: HOME CARE NURSING REFERRAL          (E11.9) Type 2 diabetes mellitus without complication, without long-term current use of insulin (H)  (primary encounter diagnosis)  Comment: good control she usuallly does not forget her meds, has a pill box.  But she does only take metformin once a day  With that in mind she will switch to er formula, which h may reduce symptoms of \"spastic colon\" int eh future  Plan: metFORMIN (GLUCOPHAGE-XR) 500 MG 24 hr tablet            HPI:    Pt was at home, stressed out getting readu for a grage sale.  Forgot tot take her metformin with supper, and took it a couple of hours later on an empty stomach.  shortly after that she has sudden onset of profuse watery diarrhea  Without fever , chills or cramping.  After 10_ stools she started to feel light headed with getting up,  She then fainted when getting up to stool again and fell on her knees.  911 was called.,      No current facility-administered medications on file prior to encounter.   Current Outpatient Prescriptions on File Prior to Encounter:  ClonazePAM (KLONOPIN PO) Take 0.5 mg by mouth 2 times daily as needed for anxiety   Cyanocobalamin (B-12) 1000 MCG SUBL Place under the tongue daily   LOSARTAN POTASSIUM PO Take 50 mg by mouth daily    Atorvastatin Calcium (LIPITOR PO) Take 40 mg by mouth At " "Bedtime    Glucose Blood (ONE TOUCH ULTRA TEST) strip 1 strip daily.   traMADol (ULTRAM) 50 MG tablet Take 1 tablet by mouth every 6 hours as needed for pain.   Multiple Vitamins-Minerals (THERAPEUTIC MULTIVIT/MINERAL) TABS Take 1 tablet by mouth daily.   acetaminophen (TYLENOL) 325 MG tablet Take 1 tablet by mouth every 6 hours as needed for pain.   nitroglycerin (NITROSTAT) 0.4 MG SL tablet Place 1 tablet under the tongue every 5 minutes as needed.   ORDER FOR DME Thigh length teds.   [DISCONTINUED] MetFORmin (GLUCOPHAGE) 500 MG tablet Take 1 tablet by mouth 2 times daily (with meals). (Patient taking differently: Take 1 tablet by mouth daily )   ** PATIENT ALERT ** Warning: DO NOT EXCEED 4,000 MG OF ACETAMINOPHEN FROM ALL SOURCES IN 24 HOURS       Patient Active Problem List    Diagnosis Date Noted     Gastroenteritis 06/17/2017     Priority: Medium     Hypovolemia dehydration 06/17/2017     Priority: Medium     B12 deficiency anemia 06/20/2012     Priority: Medium     Colitis, Clostridium difficile 04/18/2012     Priority: Medium     Anemia due to blood loss, acute 04/02/2012     Priority: Medium     S/P total knee replacement 03/28/2012     Priority: Medium     Leukocytosis 09/23/2011     Priority: Medium     Advanced directives, counseling/discussion 06/02/2011     Priority: Medium     Patient does not have an Advance/Health Care Directive (HCD), given \"What is Advance Care Planning?\" flyer.    Isamar Suarez  June 2, 2011         Pronation of foot 05/05/2011     Priority: Medium     Bilateral defomity       Angina pectoris (H) 03/17/2011     Priority: Medium     (Problem list name updated by automated process. Provider to review and confirm.)       Allergic rhinitis 01/19/2011     Priority: Medium     Type 2 diabetes, HbA1c goal < 7% (H) 01/05/2011     Priority: Medium     HTN (hypertension) 11/12/2010     Priority: Medium     Hyperlipidemia LDL goal <100 10/31/2010     Priority: Medium     Obesity 07/21/2010 "     Priority: Medium     Frequent UTI 10/20/2009     Priority: Medium     Cysto negative 7/10.        Urge incontinence 10/20/2009     Priority: Medium     Right foot pain 10/16/2009     Priority: Medium     Xray showed djd -follows with podiatry. -arch supports placed may need cam walker        Vitamin D deficiency 09/09/2008     Priority: Medium     Subjective tinnitus 04/22/2008     Priority: Medium     Urticaria 08/22/2006     Priority: Medium     Problem list name updated by automated process. Provider to review       SENSONRL HEAR LOSS,BILAT 05/03/2006     Priority: Medium     Esophageal reflux      Priority: Medium     Memory loss      Priority: Medium     Early cognitive decline. MMSE 27/30, Neno 4.7/ 6.0 2004. B12, TSH, RPR normal 3914-1183.       Depressive disorder, not elsewhere classified      Priority: Medium     Degeneration of lumbar or lumbosacral intervertebral disc      Priority: Medium     MRI 5/04- DDD, L2-3 annular bulge with protrusion into left caudal infra-foraminal area  Uses vicodin sparingly   Pain Agreement signed.        Irritable bowel syndrome      Priority: Low     diahrrea predominant       Disorder of bone and cartilage      Priority: Low     DEXA 2007 -1.4 hip. Dexa 2004 -1 hip and -1 spine  Problem list name updated by automated process. Provider to review       RESTLESS LEG SYNDROME      Priority: Low     Generalized osteoarthrosis, unspecified site      Priority: Low     C-spine, left hip       Allergic rhinitis      Priority: Low     Problem list name updated by automated process. Provider to review       Diverticulosis of large intestine      Priority: Low     flexible sigmoidoscopy with diverticulosis otherwise normal 2001, admit diverticulitis 2009  Problem list name updated by automated process. Provider to review         Past Surgical History:   Procedure Laterality Date     ARTHROPLASTY KNEE  3/26/2012    Procedure:ARTHROPLASTY KNEE; Right Total Knee Arthroplasty;  "Surgeon:BERNADINE ROSAS; Location:[facility] OR     C APPENDECTOMY  1953     C REMOVAL GALLBLADDER       HYSTERECTOMY, PAP NO LONGER INDICATED  1983    TVH/BSO     SURGICAL HISTORY OF -       Hernia Repair     SURGICAL HISTORY OF -   10/08    A & P Repair, Dr. Hannah       Social History   Substance Use Topics     Smoking status: Never Smoker     Smokeless tobacco: Never Used     Alcohol use No          Allergies   Allergen Reactions     Actonel [Bisphosphonates] Itching     Azithromycin Hives     Celebrex [Celecoxib] Rash     Cipro [Ciprofloxacin] Rash     Erythromycin Rash     Escitalopram Diarrhea     Gets very sick and mad feelings     Fosamax Itching and Rash     Keflex [Cephalexin Monohydrate] Rash     Lisinopril Cough     Nitrofurantoin Other (See Comments)     \"dizzyness'     Risedronate Itching     Shellfish-Derived Products Hives     Tetanus-Diphtheria Toxoids Swelling     Vicodin [Acetaminophen] Itching     Patient reported - only when used scheduled in high doses.        Vioxx Rash     Penicillins Rash     Sulfa Drugs Itching and Rash     Sulfasalazine Itching and Rash       Family History   Problem Relation Age of Onset     C.A.D. Mother      DIABETES Mother      Cancer - colorectal Mother      Arthritis Mother      HEART DISEASE Mother      Lipids Brother      Obesity Brother      Hypertension Brother      Allergies Son      Eye Disorder Son      cataract     Thyroid Disease Sister      graves dz     Eye Disorder Son      Alcohol/Drug Father        ROS    CONSTITUTIONAL:Negative  HEENT: NEGATIVE  RESP: NEGATIVE  CV: NEGATIVE  GI: NEGATIVE  : NEGATIVE  MUSCULOSKELATAL: NEGATIVE  INTEGUMENTARY/SKIN: NEGATIVE  NEURO: NEGATIVE  ENDOCRINE: NEGATIVE  PSYCHIATRIC: NEGATIVE     Physical exam     /87 (BP Location: Right arm)  Pulse 84  Temp 98  F (36.7  C)  Resp 18  Ht 5' 4\" (1.626 m)  Wt 161 lb 13.1 oz (73.4 kg)  SpO2 95%  BMI 27.78 kg/m2  General: healthy,alert,no distress  HENT: Normocephalic. " TM's grossly normal, oropharynx without significant findings.  Neck: supple,without thyromegaly or thyroid nodularity,without cervical or jugular adenopathy  Lungs: clear of wheezes or rales  Heart:RRR. No murmurs, clicks gallops or rub}  Abdomen: non tender nondistended, active bowel sounds no organomegally  Extremities:extremities normal- no gross deformities noted, normal range of motion, no edema  Neuro:moves all extremities equally, nonfocal       Results for orders placed or performed during the hospital encounter of 06/17/17 (from the past 24 hour(s))   CBC with platelets, differential   Result Value Ref Range    WBC 18.1 (H) 4.0 - 11.0 10e9/L    RBC Count 4.77 3.8 - 5.2 10e12/L    Hemoglobin 13.6 11.7 - 15.7 g/dL    Hematocrit 40.1 35.0 - 47.0 %    MCV 84 78 - 100 fl    MCH 28.5 26.5 - 33.0 pg    MCHC 33.9 31.5 - 36.5 g/dL    RDW 12.7 10.0 - 15.0 %    Platelet Count 267 150 - 450 10e9/L    Diff Method Automated Method     % Neutrophils 84.4 %    % Lymphocytes 8.1 %    % Monocytes 6.4 %    % Eosinophils 0.8 %    % Basophils 0.1 %    % Immature Granulocytes 0.2 %    Absolute Neutrophil 15.3 (H) 1.6 - 8.3 10e9/L    Absolute Lymphocytes 1.5 0.8 - 5.3 10e9/L    Absolute Monocytes 1.2 0.0 - 1.3 10e9/L    Absolute Eosinophils 0.1 0.0 - 0.7 10e9/L    Absolute Basophils 0.0 0.0 - 0.2 10e9/L    Abs Immature Granulocytes 0.0 0 - 0.4 10e9/L   Basic metabolic panel   Result Value Ref Range    Sodium 139 133 - 144 mmol/L    Potassium 3.4 3.4 - 5.3 mmol/L    Chloride 106 94 - 109 mmol/L    Carbon Dioxide 24 20 - 32 mmol/L    Anion Gap 9 3 - 14 mmol/L    Glucose 176 (H) 70 - 99 mg/dL    Urea Nitrogen 15 7 - 30 mg/dL    Creatinine 0.91 0.52 - 1.04 mg/dL    GFR Estimate 59 (L) >60 mL/min/1.7m2    GFR Estimate If Black 71 >60 mL/min/1.7m2    Calcium 9.2 8.5 - 10.1 mg/dL   Glucose by meter   Result Value Ref Range    Glucose 131 (H) 70 - 99 mg/dL   Basic metabolic panel   Result Value Ref Range    Sodium 145 (H) 133 - 144  mmol/L    Potassium 3.8 3.4 - 5.3 mmol/L    Chloride 116 (H) 94 - 109 mmol/L    Carbon Dioxide 23 20 - 32 mmol/L    Anion Gap 6 3 - 14 mmol/L    Glucose 122 (H) 70 - 99 mg/dL    Urea Nitrogen 11 7 - 30 mg/dL    Creatinine 0.77 0.52 - 1.04 mg/dL    GFR Estimate 71 >60 mL/min/1.7m2    GFR Estimate If Black 86 >60 mL/min/1.7m2    Calcium 8.6 8.5 - 10.1 mg/dL   CBC with platelets   Result Value Ref Range    WBC 13.7 (H) 4.0 - 11.0 10e9/L    RBC Count 4.38 3.8 - 5.2 10e12/L    Hemoglobin 12.4 11.7 - 15.7 g/dL    Hematocrit 36.8 35.0 - 47.0 %    MCV 84 78 - 100 fl    MCH 28.3 26.5 - 33.0 pg    MCHC 33.7 31.5 - 36.5 g/dL    RDW 12.5 10.0 - 15.0 %    Platelet Count 233 150 - 450 10e9/L       Patient Active Problem List    Diagnosis     Gastroenteritis     Hypovolemia dehydration     B12 deficiency anemia     Colitis, Clostridium difficile     Anemia due to blood loss, acute     S/P total knee replacement     Leukocytosis     Advanced directives, counseling/discussion     Pronation of foot     Angina pectoris (H)     Allergic rhinitis     Type 2 diabetes, HbA1c goal < 7% (H)     HTN (hypertension)     Hyperlipidemia LDL goal <100     Obesity     Frequent UTI     Urge incontinence     Right foot pain     Vitamin D deficiency     Subjective tinnitus     Urticaria     SENSONRL HEAR LOSS,BILAT     Esophageal reflux     Memory loss     Depressive disorder, not elsewhere classified     Degeneration of lumbar or lumbosacral intervertebral disc     Irritable bowel syndrome     Disorder of bone and cartilage     RESTLESS LEG SYNDROME     Generalized osteoarthrosis, unspecified site     Allergic rhinitis     Diverticulosis of large intestine

## 2017-06-17 NOTE — ED NOTES
Pt developed sudden onset diarrhea at approximately 2130 last night. Pt reports at least 10 episodes since. Pt was ambulating to bathroom, fell and has no recollection of fall. Pt's son was unable to get pt off of the floor so called EMS.     EMS assisted pt to stretcher. Pt able to bear weight and ambulate. Pt c/o mild left knee pain from fall. Ice pack proved by EMS. 12 EKG, IV and  mg/dl done enroute.

## 2017-06-17 NOTE — DISCHARGE SUMMARY
"Discharge Summary    Daniela Brown MRN# [id number]   YOB: 1933 Age: 84 year old     Date of Admission:  6/17/2017  Date of Discharge:  6/17/2017  1:19 PM  Admitting Physician:  Axel De La Rosa MD  Discharge Physician:  No att. providers found  Discharging Service:  Hospitalist     Primary Provider: [facility], [facility]          Admission Diagnoses:   Gastroenteritis  Dehydration  Syncope  Hypovolumic hypotension   Type 2 diabetes mellitus without complication, without long-term current use of insulin          Discharge Diagnosis:   Gastroenteritis - resolved  Dehydration - improved  Syncope - resolved  Hypovolumic hypotension - improved  Type 2 diabetes mellitus without complication, without long-term current use of insulin         Discharge Disposition:   Discharged to home           Condition on Discharge:   Discharge condition: Stable   Discharge vitals: Blood pressure 161/87, pulse 84, temperature 98  F (36.7  C), resp. rate 18, height 5' 4\" (1.626 m), weight 161 lb 13.1 oz (73.4 kg), SpO2 95 %.     Code status on discharge: Full Code           Procedures / Labs / Imaging:   No procedures performed during this admission          Medications Prior to Admission:     Metformin  Losartan  lipitor           Discharge Medications:     Discharge Medication List as of 6/17/2017 12:58 PM      START taking these medications    Details   metFORMIN (GLUCOPHAGE-XR) 500 MG 24 hr tablet Take 1 tablet (500 mg) by mouth daily (with dinner), Disp-30 tablet, R-0, Local Print      !! order for DME 1 walkerDisp-1 Device, R-0, Local Print       !! - Potential duplicate medications found. Please discuss with provider.      CONTINUE these medications which have NOT CHANGED    Details   ClonazePAM (KLONOPIN PO) Take 0.5 mg by mouth 2 times daily as needed for anxiety, Historical      Cyanocobalamin (B-12) 1000 MCG SUBL Place under the tongue daily, Historical      LOSARTAN POTASSIUM PO Take 50 mg by mouth " "daily , Historical      Atorvastatin Calcium (LIPITOR PO) Take 40 mg by mouth At Bedtime , Historical      acetaminophen (TYLENOL) 325 MG tablet Take 1 tablet by mouth every 6 hours as needed for pain., Disp-30 tablet, R-0, E-Prescribe      nitroglycerin (NITROSTAT) 0.4 MG SL tablet Place 1 tablet under the tongue every 5 minutes as needed., Disp-30 tablet, R-4, E-Prescribe      Glucose Blood (ONE TOUCH ULTRA TEST) strip 1 strip daily., Disp-1 Box, R-6, Local PrintPLEASE SIGN RX AND FAX TO PHARMACY [phone]! THANKS      traMADol (ULTRAM) 50 MG tablet Take 1 tablet by mouth every 6 hours as needed for pain., Disp-60 tablet, R-5, E-Prescribe      !! ORDER FOR DME Thigh length teds.Disp-1 Device, R-2, Normal      Multiple Vitamins-Minerals (THERAPEUTIC MULTIVIT/MINERAL) TABS Take 1 tablet by mouth daily., Historical      ** PATIENT ALERT **        !! - Potential duplicate medications found. Please discuss with provider.      STOP taking these medications       MetFORmin (GLUCOPHAGE) 500 MG tablet Comments:   Reason for Stopping:                     Consultations:   No consultations were requested during this admission             Brief History of Illness:     Pt was at home, stressed out getting readu for a grage sale.  Forgot tot take her metformin with supper, and took it a couple of hours later on an empty stomach.  shortly after that she has sudden onset of profuse watery diarrhea  Without fever , chills or cramping.  After 10_ stools she started to feel light headed with getting up,  She then fainted when getting up to stool again and fell on her knees.  911 was called.,     Gastroenteritis  Comment: most likley multifactorial.  Se has \"spastic colon\" for years  Worse with stress. Has lots of stress getting ready for garage sale.  She forgot her metformin so took it in the evening on empty stomach.   bwiull start bowel rest. Iv fluids  Supportive adelaida  Plan: Glucose by meter, HOME CARE NURSING " "REFERRAL      (E86.0) Dehydration  Comment: 2ndary to acute gi losses.    Has william given fluid bolus in er  With continue high iv rate.   Plan: HOME CARE NURSING REFERRAL      (R55) Syncope, unspecified syncope type  Comment: most likely related to hypovolemic hypotension.  Will obsev=rve over night.  Plan: HOME CARE NURSING REFERRAL      (E11.9) Type 2 diabetes mellitus without complication, without long-term current use of insulin (H)  (primary encounter diagnosis)  Comment: good control she usuallly does not forget her meds, has a pill box.  But she does only take metformin once a day  With that in mind she will switch to er formula, which h may reduce symptoms of \"spastic colon\" int eh future  Plan: metFORMIN (GLUCOPHAGE-XR) 500 MG 24 hr tablet         Hospital Course:   Pt had rapid improvement in symptoms with bowel rest, iv fluid rehydration.  She was able to tolrate diabetic dioet prior to d/c sh had no further diarrhea nor dizzyness.  She had knee pain from landing on her knees whe nausea she fainted.  Exam show no instability  She was evaluated by physical therepy and felt she was ambulating adequalty  She was prescribe a walker to increase stability               Pending Results:   None           Discharge Instructions and Follow-Up:   Discharge diet: Diabetic (1800 ADA)   Discharge activity: Activity as tolerated   Discharge follow-up: Follow up with primary care provider within 1 week   Outpatient therapy: None                            "

## 2017-06-17 NOTE — DISCHARGE INSTRUCTIONS
[facility] Discharge Instructions     Discharge disposition:  Discharged to home       Diet:  Diabetic (1800 ADA)       Activity Activity as tolerated, ambulate withwalker       Follow-up: Follow up with primary care provider within 1 week

## 2017-06-17 NOTE — IP AVS SNAPSHOT
"    [facility] INTENSIVE CARE: [phone]                                              INTERAGENCY TRANSFER FORM - PHYSICIAN ORDERS   2017                    Hospital Admission Date: 2017  RAQUEL WETZEL   : 1933  Sex: Female        Attending Provider: (none)    Allergies:  Actonel [Bisphosphonates], Azithromycin, Celebrex [Celecoxib], Cipro [Ciprofloxacin], Erythromycin, Escitalopram, Fosamax, Keflex [Cephalexin Monohydrate], Lisinopril, Nitrofurantoin, Risedronate, Shellfish-derived Products, Tetanus-diphtheria Toxoids, Vicodin [Acetaminophen], Vioxx, Penicillins, Sulfa Drugs, Sulfasalazine    Infection:  None   Service:  HOSPITALIST    Ht:  1.626 m (5' 4\")   Wt:  73.4 kg (161 lb 13.1 oz)   Admission Wt:  73.5 kg (162 lb)    BMI:  27.78 kg/m 2   BSA:  1.82 m 2            Patient PCP Information     Provider PCP Type    [facility]      ED Clinical Impression     Diagnosis Description Comment Added By Time Added    Gastroenteritis [K52.9] Gastroenteritis [K52.9]  Aly Tucker MD 2017  3:48 AM    Syncope, unspecified syncope type [R55] Syncope, unspecified syncope type [R55]  Aly Tucker MD 2017  3:49 AM    Dehydration [E86.0] Dehydration [E86.0]  Aly Tucker MD 2017  3:49 AM      Hospital Problems as of 2017              Priority Class Noted POA    Gastroenteritis   2017 Yes    Hypovolemia dehydration   2017 Yes      Non-Hospital Problems as of 2017              Priority Class Noted    Degeneration of lumbar or lumbosacral intervertebral disc   Unknown    Esophageal reflux   Unknown    Memory loss   Unknown    Depressive disorder, not elsewhere classified   Unknown    Irritable bowel syndrome   Unknown    Disorder of bone and cartilage   Unknown    RESTLESS LEG SYNDROME   Unknown    Generalized osteoarthrosis, unspecified site   Unknown    Allergic rhinitis   Unknown    Diverticulosis of large intestine   " Unknown    SENSONRL HEAR LOSS,BILAT   5/3/2006    Urticaria   8/22/2006    Subjective tinnitus   4/22/2008    Vitamin D deficiency   9/9/2008    Right foot pain   10/16/2009    Urge incontinence   10/20/2009    Frequent UTI   10/20/2009    Obesity   7/21/2010    Hyperlipidemia LDL goal <100   10/31/2010    HTN (hypertension)   11/12/2010    Type 2 diabetes, HbA1c goal < 7% (H)   1/5/2011    Allergic rhinitis   1/19/2011    Angina pectoris (H)   3/17/2011    Pronation of foot   5/5/2011    Advanced directives, counseling/discussion   6/2/2011    Leukocytosis   9/23/2011    S/P total knee replacement   3/28/2012    Anemia due to blood loss, acute   4/2/2012    Colitis, Clostridium difficile   4/18/2012    B12 deficiency anemia   6/20/2012      Code Status History     Date Active Date Inactive Code Status Order ID Comments User Context    3/26/2012  6:11 PM 3/29/2012  5:31 PM Full Code [id number]  Kaya Hannah RN Inpatient    11/11/2010  9:29 PM 11/12/2010  8:54 PM Full Code [id number]  Tho Sanchez Inpatient         Medication Review      START taking        Dose / Directions Comments    metFORMIN 500 MG 24 hr tablet   Commonly known as:  GLUCOPHAGE-XR   Used for:  Type 2 diabetes mellitus without complication, without long-term current use of insulin (H)   Replaces:  metFORMIN 500 MG tablet        Dose:  500 mg   Take 1 tablet (500 mg) by mouth daily (with dinner)   Quantity:  30 tablet   Refills:  0        order for DME   Used for:  Acute pain of right knee        1 walker   Quantity:  1 Device   Refills:  0          CONTINUE these medications which have NOT CHANGED        Dose / Directions Comments    * ** PATIENT ALERT **        Warning: DO NOT EXCEED 4,000 MG OF ACETAMINOPHEN FROM ALL SOURCES IN 24 HOURS   Refills:  0        acetaminophen 325 MG tablet   Commonly known as:  TYLENOL   Used for:  Degeneration of lumbar or lumbosacral intervertebral disc        Dose:  325 mg   Take 1 tablet by mouth every 6 hours  as needed for pain.   Quantity:  30 tablet   Refills:  0        B-12 1000 MCG Subl        Place under the tongue daily   Refills:  0        KLONOPIN PO        Dose:  0.5 mg   Take 0.5 mg by mouth 2 times daily as needed for anxiety   Refills:  0        LIPITOR PO        Dose:  40 mg   Take 40 mg by mouth At Bedtime   Refills:  0        LOSARTAN POTASSIUM PO        Dose:  50 mg   Take 50 mg by mouth daily   Refills:  0        nitroglycerin 0.4 MG sublingual tablet   Commonly known as:  NITROSTAT   Used for:  Disorder of bone and cartilage, unspecified, Diarrhea, Irritable bowel syndrome, HTN (hypertension)        Dose:  0.4 mg   Place 1 tablet under the tongue every 5 minutes as needed.   Quantity:  30 tablet   Refills:  4        ONE TOUCH ULTRA test strip   Used for:  Type II or unspecified type diabetes mellitus without mention of complication, not stated as uncontrolled   Generic drug:  blood glucose monitoring        Dose:  1 strip   1 strip daily.   Quantity:  1 Box   Refills:  6    PLEASE SIGN RX AND FAX TO PHARMACY [phone]! THANKS       * order for DME   Used for:  Venous insufficiency        Thigh length teds.   Quantity:  1 Device   Refills:  2        THERAPEUTIC MULTIVIT/MINERAL Tabs        Dose:  1 tablet   Take 1 tablet by mouth daily.   Refills:  0        traMADol 50 MG tablet   Commonly known as:  ULTRAM   Used for:  Knee pain        Dose:  50 mg   Take 1 tablet by mouth every 6 hours as needed for pain.   Quantity:  60 tablet   Refills:  5        * Notice:  This list has 2 medication(s) that are the same as other medications prescribed for you. Read the directions carefully, and ask your doctor or other care provider to review them with you.      STOP taking     metFORMIN 500 MG tablet   Commonly known as:  GLUCOPHAGE   Replaced by:  metFORMIN 500 MG 24 hr tablet                     Further instructions from your care team       [facility] Discharge Instructions     Discharge  disposition:  Discharged to home       Diet:  Diabetic (1800 ADA)       Activity Activity as tolerated, ambulate withwalker       Follow-up: Follow up with primary care provider within 1 week                 Referrals     HOME CARE NURSING REFERRAL       Your provider has referred you to: [facility]: [facility] [phone]   [email or web address]    Extended Emergency Contact Information  Primary Emergency Contact: JOANNA WETZEL  Address: [address] [place] [address] [facility]  Home Phone: [phone]  Work Phone: [phone]  Mobile Phone: [phone]  Relation: Son  Secondary Emergency Contact: npp   [place]  Relation: None    Patient Anticipated Discharge Date: 6/18/17   RN, PT, HHA to begin 24 - 48 hours after discharge.  PLEASE EVALUATE AND TREAT (Evaluation timeline is 24 - 48 hrs. Please call if there is need for a variance to this timeline).    REASON FOR REFERRAL: Assessment & Treatment: ST    ADDITIONAL SERVICES NEEDED: none    OTHER PERTINENT INFORMATION: Patient was last seen by provider on 6/17/17 for gastroenteritis.    No current outpatient prescriptions on file.    Patient Active Problem List:     Degeneration of lumbar or lumbosacral intervertebral disc     Esophageal reflux     Memory loss     Irritable bowel syndrome     Disorder of bone and cartilage     Depressive disorder, not elsewhere classified     RESTLESS LEG SYNDROME     Generalized osteoarthrosis, unspecified site     Allergic rhinitis     Diverticulosis of large intestine     SENSONRL HEAR LOSS,BILAT     Urticaria     Subjective tinnitus     Vitamin D deficiency     Right foot pain     Frequent UTI     Urge incontinence     Obesity     Hyperlipidemia LDL goal <100     HTN (hypertension)     Type 2 diabetes, HbA1c goal < 7% (H)     Allergic rhinitis     Angina pectoris (H)     Pronation of foot     Advanced directives,  counseling/discussion     Leukocytosis     S/P total knee replacement     Anemia due to blood loss, acute     Colitis, Clostridium difficile     B12 deficiency anemia     Gastroenteritis     Hypovolemia dehydration      Documentation of Face to Face and Certification for Home Health Services    I certify that patient, Daniela Brown is under my care and that I, or a Nurse Practitioner or Physician's Assistant working with me, had a face-to-face encounter that meets the physician face-to-face encounter requirements with this patient on: 6/17/2017.    This encounter with the patient was in whole, or in part, for the following medical condition, which is the primary reason for Home Health Care: weakness.    I certify that, based on my findings, the following services are medically necessary Home Health Services: Nursing    My clinical findings support the need for the above services because: Nurse is needed: To provide assessment and oversight required in the home to assure adherence to the medical plan due to: gastroenteritis..    Further, I certify that my clinical findings support that this patient is homebound (i.e. absences from home require considerable and taxing effort and are for medical reasons or Mandae[facility] services or infrequently or of short duration when for other reasons) because: Requires assistance of another person or specialized equipment to access medical services because patient: Is unable to exit home safely on own due to: weakness..    Based on the above findings, I certify that this patient is confined to the home and needs intermittent skilled nursing care, physical therapy and/or speech therapy.  The patient is under my care, and I have initiated the establishment of the plan of care.  This patient will be followed by a physician who will periodically review the plan of care.    Physician/Provider to provide follow up care: Talat [facility]    Responsible PECOS certified  Physician at time of discharge: Axel De La Rosa MD    Please be aware that coverage of these services is subject to the terms and limitations of your health insurance plan.  Call member services at your health plan with any benefit or coverage questions.             Statement of Approval     Ordered          06/17/17 1216  I have reviewed and agree with all the recommendations and orders detailed in this document.  EFFECTIVE NOW     Approved and electronically signed by:  Axel De La Rosa MD

## 2017-06-17 NOTE — ED NOTES
"Pt assisted to/from bathroom for episode of diarrhea without incident. Pt reports she feels \"a little bit better\" after the fluids.   "

## 2017-06-17 NOTE — ED PROVIDER NOTES
HPI  Patient is an 84-year-old female presenting by ambulance transport after she fell at home.  She has a known history of hypertension, diabetes, and irritable bowel syndrome.  She's had an appendectomy, hysterectomy, and cholecystectomy.  Notable medications include clonazepam, losartan, metformin, and tramadol.  Not a smoker.  No drugs.  No alcohol.  She lives at home with her son.  She takes care of her son who has cancer.    The patient has had about 10 episodes of diarrhea since 9:30 PM tonight.  She's had nausea but no vomiting.  No abdominal pain reported.  She has been chilled but no objective fever.  No other known sick contacts.  No recent travel.  No recent antibiotics.  She was up to the restroom at home and she fainted.  She was not able to get up on her own.  EMS was called by the patient's son.  They arrived and the patient was on the ground.  They were able to get her up and she was able to bear weight and ambulate at home but had great difficulty.  Here, she complains of generalized weakness and lightheadedness.  No abdominal pain.  She denies hematochezia or melena.  No urinary symptoms.  She denies trauma or injury with her fall.  She denies headache or neck pain currently.  No chest pain.  No shortness of breath.  No extremity pain pain or pelvis pain.    ROS: All other review of systems are negative other than that noted above.   PMH: Reviewed.  SH: Reviewed.  FH: Reviewed.      PHYSICAL  /68  Temp 98.1  F (36.7  C) (Oral)  Resp 16  Wt 73.5 kg (162 lb)  SpO2 96%  BMI 27.81 kg/m2  General: Patient is alert and in moderate distress.  Cooperative.  Neurological: Alert.  Moving upper and lower extremities equally, bilaterally.  Head / Neck: Atraumatic.  Ears: Not done.  Eyes: Pupils are equal, round, and reactive.  Normal conjunctiva.  Nose: Midline.  No epistaxis.  Mouth / Throat: No ulcerations or lesions.  Upper pharynx is not erythematous.  Dry.  Respiratory: No respiratory  distress. CTA B.  Cardiovascular: Regular rhythm.  Peripheral extremities are warm.  No edema.  No calf tenderness.  Abdomen / Pelvis: Not tender.  No distention.  Soft throughout.  Genitalia: Not done.  Musculoskeletal: No tenderness over major muscles and joints.  Skin: No evidence of rash or trauma.        PHYSICIAN  Patient presents here by ambulance with diarrhea and syncope.  Her pulse and blood pressure are unremarkable.  She continues to feel weak and tired and lightheaded after fluid given here in the ED.  Her kidney function is unremarkable.  Electrolytes are unremarkable.  Blood cell count is elevated likely related to an acute reactive phenomena.  She feels quite concerned about going home as she is alone for the most part - she takes care of her son who has cancer.  She would like to stay here the rest of the night and hopefully go home later today.  Fluid will be continued.  Maryjo ordered.  Dr. Cloud has accepted the patient to her service.    Labs Ordered and Resulted from Time of ED Arrival Up to the Time of Departure from the ED   CBC WITH PLATELETS DIFFERENTIAL - Abnormal; Notable for the following:        Result Value    WBC 18.1 (*)     Absolute Neutrophil 15.3 (*)     All other components within normal limits   BASIC METABOLIC PANEL - Abnormal; Notable for the following:     Glucose 176 (*)     GFR Estimate 59 (*)     All other components within normal limits       IMPRESSION    ICD-10-CM    1. Gastroenteritis K52.9    2. Syncope, unspecified syncope type R55    3. Dehydration E86.0            Critical Care time:  none                      Aly Tucker MD  06/17/17 2316

## 2017-06-17 NOTE — ED NOTES
"Patient has  [place] to Observation  order. Patient has been given the Observation brochure -  What does Observation mean to me.\"  Patient has been given the opportunity to ask questions about observation status and their plan of care.      HANNY PEDRAZA    "

## 2017-06-17 NOTE — PROVIDER NOTIFICATION
Dr. Cloud called about pt's right knee pain. Pt admitted with a fall. Pt states she probably landed on her knees because her right knee is painful. Ice applied. Telephone order obtained for tramadol once.

## 2017-06-17 NOTE — PROGRESS NOTES
CARE TRANSITION SOCIAL WORK INITIAL ASSESSMENT:      Met with: Patient.    DATA  Active Problems:    Gastroenteritis    Hypovolemia dehydration       Primary Care Clinic Name:  ([facility])     Contact information and PCP information verified: Yes      ASSESSMENT  Cognitive Status: awake, alert and oriented.       Resources List: Home Care     Lives With: child(tim), adult  Living Arrangements: house     Description of Support System: Supportive   Who is your support system?: Children       Insurance Concerns: No Insurance issues identified                  This writer met with pt introduced self and role. Discussed discharge planning and medicare guidelines in regards to home care, TCU and LTC. Pt reports that she lives at home with her son, her son has cancer so she provides care for him. Discussed home care, pt is in agreement. A referral was made to [facility] ([phone] Fax: [phone]).       PLAN    Home with home care    Discharge Planner   Discharge Plans in progress: home care  Barriers to discharge plan: none  Follow up plan: home care       Entered by: Angelica Rivera 06/17/2017 9:25 AM             Angelica ESQUIVEL, LICSW, [facility] [phone]

## 2017-06-17 NOTE — PROGRESS NOTES
Goal out come summary-    Evaluation completed. Mobility sufficient for return home. Walker  and walker safety handout issued.  Pt able to ambulate  household distances w/ use of walker.     06/17/17 1100   Quick Adds   Type of Visit Initial PT Evaluation   Living Environment   Lives With child(tim), adult  (son)   Living Arrangements house   Home Accessibility stairs to enter home;stairs within home   Number of Stairs to Enter Home 1   Number of Stairs Within Home (Chair lift to the basement)   Functional Level Prior   Ambulation 1-->assistive equipment   Transferring 0-->independent   Toileting 0-->independent   Bathing 0-->independent   Dressing 0-->independent   Eating 0-->independent   Communication 0-->understands/communicates without difficulty   Swallowing 0-->swallows foods/liquids without difficulty   Cognition 0 - no cognition issues reported   Fall history within last six months yes   Number of times patient has fallen within last six months 2   Prior Functional Level Comment PLOF- Pt indep. w/ ambulation  using a SEC prn for longer distances.     General Information   Onset of Illness/Injury or Date of Surgery - Date 06/16/17   Referring Physician Rj   Patient/Family Goals Statement Pt w/ goal of Dc to  Home; feels she can manage at home   Pertinent History of Current Problem (include personal factors and/or comorbidities that impact the POC) 83 yo  With hx of hypertension, diabetes, and irritable bowel syndrome; admitted w/ gastroenteritis, dehydration; s/p fall w/ R knee pain    Precautions/Limitations fall precautions   General Observations Pt reporting bilateral knee pan R> Left, but improved.   xrays negative   General Info Comments Hx of R TKA   3 years ago per pt report    Cognitive Status Examination   Orientation orientation to person, place and time   Level of Consciousness alert   Follows Commands and Answers Questions 100% of the time   Personal Safety and Judgment intact   Pain  "Assessment   Patient Currently in Pain Yes, see Vital Sign flowsheet   Integumentary/Edema   Integumentary/Edema Comments Ecchymosis R knee   Range of Motion (ROM)   ROM Comment R knee AROM 0-9*, L knee AROM 0-110    MMT: Knee, Rehab Eval   Knee Flexion - Left Side (4/5) good, left   Knee Extension - Left Side (4/5) good, left   Knee Flexion - Right Side (4/5) good, right   Knee Extension - Right Side (4/5) good, right   Bed Mobility   Bed Mobility Comments Indep    Transfer Skills   Transfer Comments Indep    Gait   Gait Comments Pt ambulating short distances in her room;  reaching for support and mildly favoring her  right knee.  Pt then amb. 80 feet x1 wit hRW, SBA to indep. Steady gait  pattern. Walker and walker safety handout issued . One step to enter home, reviewed sequence and anticiapte pt will be able to perform w/o difficulty   Balance   Balance Comments good dynamic standing  balance w/ walker use   Sensory Examination   Sensory Perception no deficits were identified   Clinical Impression   Criteria for Skilled Therapeutic Intervention evaluation only   Anticipated Discharge Disposition Home   Risk & Benefits of therapy have been explained Yes   Patient, Family & other staff in agreement with plan of care Yes   Clinical Impression Comments Evaluation completed. Mobility sufficient for return home. Walker  and walker safety handout issued.  Pt able to ambulate  household distances w/ use of walker.  Pt familiar w/ exercises form previous TKA surgery, verbally reviewed ex for home    [facility] AM-PAC  \"6 Clicks\" V.2 Basic Mobility Inpatient Short Form   1. Turning from your back to your side while in a flat bed without using bedrails? 4 - None   2. Moving from lying on your back to sitting on the side of a flat bed without using bedrails? 4 - None   3. Moving to and from a bed to a chair (including a wheelchair)? 4 - None   4. Standing up from a chair using your arms (e.g., wheelchair, or bedside " chair)? 4 - None   5. To walk in hospital room? 4 - None   6. Climbing 3-5 steps with a railing? 3 - A Little   Basic Mobility Raw Score (Score out of 24.Lower scores equate to lower levels of function) 23   Total Evaluation Time   Total Evaluation Time (Minutes) 20

## 2017-06-17 NOTE — IP AVS SNAPSHOT
[facility] Intensive [facility]    [address] [address]    Phone:  [phone]    Fax:  [phone]                                       After Visit Summary   6/17/2017    Daniela Brown    MRN: [id number]           After Visit Summary Signature Page     I have received my discharge instructions, and my questions have been answered. I have discussed any challenges I see with this plan with the nurse or doctor.    ..........................................................................................................................................  Patient/Patient Representative Signature      ..........................................................................................................................................  Patient Representative Print Name and Relationship to Patient    ..................................................               ................................................  Date                                            Time    ..........................................................................................................................................  Reviewed by Signature/Title    ...................................................              ..............................................  Date                                                            Time

## 2017-06-17 NOTE — PROGRESS NOTES
[facility] ADMISSION NOTE    Patient admitted to room 1002 at approximately 0435 via cart from emergency room. Patient was accompanied by transport tech.     Verbal SBAR report received from Scarlett PEARSON prior to patient arrival.     Patient ambulated to bed with stand-by assist. Patient alert and oriented X 4. The patient is not having any pain.  . Admission vital signs: Blood pressure 142/72, temperature 98  F (36.7  C), temperature source Oral, resp. rate 18, weight 73.5 kg (162 lb), SpO2 98 %. Patient was oriented to plan of care, call light, bed controls, tv, telephone, bathroom and visiting hours.     The following safety risks were identified during admission: fall. Yellow risk band applied: YES.     Sandy Jolly RN

## 2017-06-17 NOTE — PROGRESS NOTES
[facility] DISCHARGE NOTE    Patient discharged to home at 1:18 PM via wheel chair. Accompanied by daughter and staff. Discharge instructions reviewed with patient and daughter, opportunity offered to ask questions. Prescriptions - None ordered for discharge. All belongings sent with patient.    Nichole C. Bushweiler

## 2017-06-17 NOTE — ED NOTES
Pt. Report from Hannah PEARSON and assume care of pt.  PT is A&OX4, pink, warm, dry.  Pt. Has even and unlabored respirations.  Pt. Has no complaints at this time.  Side rails X2 up and call light at bedside.

## 2017-06-17 NOTE — IP AVS SNAPSHOT
MRN:[id number]                      After Visit Summary   6/17/2017    Daniela Wetzel    MRN: [id number]           Thank you!     Thank you for choosing [facility] for your care. Our goal is always to provide you with excellent care. Hearing back from our patients is one way we can continue to improve our services. Please take a few minutes to complete the written survey that you may receive in the mail after you visit with us. Thank you!        Patient Information     Date Of Birth          1/26/1933        Designated Caregiver       Most Recent Value    Caregiver    Will someone help with your care after discharge? no      About your hospital stay     You were admitted on:  June 17, 2017 You last received care in the:  [facility]    You were discharged on:  June 17, 2017       Who to Call     For medical emergencies, please call 911.  For non-urgent questions about your medical care, please call your primary care provider or clinic, [phone]          Attending Provider     Provider Specialty    Aly Tucker MD Emergency Medicine    Dashawn Cloud MD Internal Medicine    KnoxvilleAxel MD Family Practice       Primary Care Provider Office Phone # Fax #    [facility] [phone]      Additional Services     HOME CARE NURSING REFERRAL       Your provider has referred you to: [facility]: [facility] [phone]   [email or web address]    Extended Emergency Contact Information  Primary Emergency Contact: JOANNA WETZEL  Address: [id number] [place] [id number] [facility]  Home Phone: [phone]  Work Phone: [phone]  Mobile Phone: [phone]  Relation: Son  Secondary Emergency Contact: npp   [place]  Relation: None    Patient Anticipated Discharge Date: 6/18/17   RN, PT, HHA to begin 24 - 48 hours after discharge.  PLEASE  EVALUATE AND TREAT (Evaluation timeline is 24 - 48 hrs. Please call if there is need for a variance to this timeline).    REASON FOR REFERRAL: Assessment & Treatment: ST    ADDITIONAL SERVICES NEEDED: none    OTHER PERTINENT INFORMATION: Patient was last seen by provider on 6/17/17 for gastroenteritis.    No current outpatient prescriptions on file.    Patient Active Problem List:     Degeneration of lumbar or lumbosacral intervertebral disc     Esophageal reflux     Memory loss     Irritable bowel syndrome     Disorder of bone and cartilage     Depressive disorder, not elsewhere classified     RESTLESS LEG SYNDROME     Generalized osteoarthrosis, unspecified site     Allergic rhinitis     Diverticulosis of large intestine     SENSONRL HEAR LOSS,BILAT     Urticaria     Subjective tinnitus     Vitamin D deficiency     Right foot pain     Frequent UTI     Urge incontinence     Obesity     Hyperlipidemia LDL goal <100     HTN (hypertension)     Type 2 diabetes, HbA1c goal < 7% (H)     Allergic rhinitis     Angina pectoris (H)     Pronation of foot     Advanced directives, counseling/discussion     Leukocytosis     S/P total knee replacement     Anemia due to blood loss, acute     Colitis, Clostridium difficile     B12 deficiency anemia     Gastroenteritis     Hypovolemia dehydration      Documentation of Face to Face and Certification for Home Health Services    I certify that patient, Daniela Brown is under my care and that I, or a Nurse Practitioner or Physician's Assistant working with me, had a face-to-face encounter that meets the physician face-to-face encounter requirements with this patient on: 6/17/2017.    This encounter with the patient was in whole, or in part, for the following medical condition, which is the primary reason for Home Health Care: weakness.    I certify that, based on my findings, the following services are medically necessary Home Health Services: Nursing    My clinical findings  support the need for the above services because: Nurse is needed: To provide assessment and oversight required in the home to assure adherence to the medical plan due to: gastroenteritis..    Further, I certify that my clinical findings support that this patient is homebound (i.e. absences from home require considerable and taxing effort and are for medical reasons or [facility] or infrequently or of short duration when for other reasons) because: Requires assistance of another person or specialized equipment to access medical services because patient: Is unable to exit home safely on own due to: weakness..    Based on the above findings, I certify that this patient is confined to the home and needs intermittent skilled nursing care, physical therapy and/or speech therapy.  The patient is under my care, and I have initiated the establishment of the plan of care.  This patient will be followed by a physician who will periodically review the plan of care.    Physician/Provider to provide follow up care: [facility], [facility] LOY certified Physician at time of discharge: Axel De La Rosa MD    Please be aware that coverage of these services is subject to the terms and limitations of your health insurance plan.  Call member services at your health plan with any benefit or coverage questions.                  Further instructions from your care team       [facility] Discharge Instructions     Discharge disposition:  Discharged to home       Diet:  Diabetic (1800 ADA)       Activity Activity as tolerated, ambulate withwalker       Follow-up: Follow up with primary care provider within 1 week                 Pending Results     Date and Time Order Name Status Description    6/17/2017 0456 Methicillin Resistant Staph Aureus PCR In process             Statement of Approval     Ordered          06/17/17 1216  I have reviewed and agree with all the recommendations and orders  "detailed in this document.  EFFECTIVE NOW     Approved and electronically signed by:  Axel De La Rosa MD             Admission Information     Date & Time Provider Department Dept. Phone    2017 Axel De La Rosa MD [facility] Intensive [facility] [phone]      Your Vitals Were     Blood Pressure Pulse Temperature Respirations Height Weight    161/87 (BP Location: Right arm) 84 98  F (36.7  C) 18 1.626 m (5' 4\") 73.4 kg (161 lb 13.1 oz)    Pulse Oximetry BMI (Body Mass Index)                95% 27.78 kg/m2          MyChart Information     [facility] lets you send messages to your doctor, view your test results, renew your prescriptions, schedule appointments and more. To sign up, go to [email or web address] . Click on \"Log in\" on the left side of the screen, which will take you to the Welcome page. Then click on \"Sign up Now\" on the right side of the page.     You will be asked to enter the access code listed below, as well as some personal information. Please follow the directions to create your username and password.     Your access code is: [email or web address]  Expires: 8/10/2017  4:00 PM     Your access code will  in 90 days. If you need help or a new code, please call your [facility] clinic or [phone].        Care EveryWhere ID     This is your Care EveryWhere ID. This could be used by other organizations to access your [facility] medical records  [id number]           Review of your medicines      START taking        Dose / Directions    metFORMIN 500 MG 24 hr tablet   Commonly known as:  GLUCOPHAGE-XR   Used for:  Type 2 diabetes mellitus without complication, without long-term current use of insulin (H)   Replaces:  metFORMIN 500 MG tablet        Dose:  500 mg   Take 1 tablet (500 mg) by mouth daily (with dinner)   Quantity:  30 tablet   Refills:  0       order for DME   Used for:  Acute pain of right knee        1 walker   Quantity:  1 Device   Refills:  0         CONTINUE these medicines which have " NOT CHANGED        Dose / Directions    * ** PATIENT ALERT **        Warning: DO NOT EXCEED 4,000 MG OF ACETAMINOPHEN FROM ALL SOURCES IN 24 HOURS   Refills:  0       acetaminophen 325 MG tablet   Commonly known as:  TYLENOL   Used for:  Degeneration of lumbar or lumbosacral intervertebral disc        Dose:  325 mg   Take 1 tablet by mouth every 6 hours as needed for pain.   Quantity:  30 tablet   Refills:  0       B-12 1000 MCG Subl        Place under the tongue daily   Refills:  0       KLONOPIN PO        Dose:  0.5 mg   Take 0.5 mg by mouth 2 times daily as needed for anxiety   Refills:  0       LIPITOR PO        Dose:  40 mg   Take 40 mg by mouth At Bedtime   Refills:  0       LOSARTAN POTASSIUM PO        Dose:  50 mg   Take 50 mg by mouth daily   Refills:  0       nitroglycerin 0.4 MG sublingual tablet   Commonly known as:  NITROSTAT   Used for:  Disorder of bone and cartilage, unspecified, Diarrhea, Irritable bowel syndrome, HTN (hypertension)        Dose:  0.4 mg   Place 1 tablet under the tongue every 5 minutes as needed.   Quantity:  30 tablet   Refills:  4       ONE TOUCH ULTRA test strip   Used for:  Type II or unspecified type diabetes mellitus without mention of complication, not stated as uncontrolled   Generic drug:  blood glucose monitoring        Dose:  1 strip   1 strip daily.   Quantity:  1 Box   Refills:  6       * order for DME   Used for:  Venous insufficiency        Thigh length teds.   Quantity:  1 Device   Refills:  2       THERAPEUTIC MULTIVIT/MINERAL Tabs        Dose:  1 tablet   Take 1 tablet by mouth daily.   Refills:  0       traMADol 50 MG tablet   Commonly known as:  ULTRAM   Used for:  Knee pain        Dose:  50 mg   Take 1 tablet by mouth every 6 hours as needed for pain.   Quantity:  60 tablet   Refills:  5       * Notice:  This list has 2 medication(s) that are the same as other medications prescribed for you. Read the directions carefully, and ask your doctor or other care  provider to review them with you.      STOP taking     metFORMIN 500 MG tablet   Commonly known as:  GLUCOPHAGE   Replaced by:  metFORMIN 500 MG 24 hr tablet                Where to get your medicines      Some of these will need a paper prescription and others can be bought over the counter. Ask your nurse if you have questions.     Bring a paper prescription for each of these medications     metFORMIN 500 MG 24 hr tablet    order for DME                Protect others around you: Learn how to safely use, store and throw away your medicines at www.disposemymeds.org.             Medication List: This is a list of all your medications and when to take them. Check marks below indicate your daily home schedule. Keep this list as a reference.      Medications           Morning Afternoon Evening Bedtime As Needed    * ** PATIENT ALERT **   Warning: DO NOT EXCEED 4,000 MG OF ACETAMINOPHEN FROM ALL SOURCES IN 24 HOURS                                acetaminophen 325 MG tablet   Commonly known as:  TYLENOL   Take 1 tablet by mouth every 6 hours as needed for pain.                                   B-12 1000 MCG Subl   Place under the tongue daily            Next dose due  tomorrow                       KLONOPIN PO   Take 0.5 mg by mouth 2 times daily as needed for anxiety                                   LIPITOR PO   Take 40 mg by mouth At Bedtime                        Next dose due  tonight           LOSARTAN POTASSIUM PO   Take 50 mg by mouth daily   Last time this was given:  50 mg on 6/17/2017  9:10 AM            Next dose due  tomorrow                       metFORMIN 500 MG 24 hr tablet   Commonly known as:  GLUCOPHAGE-XR   Take 1 tablet (500 mg) by mouth daily (with dinner)                    Next dose due  tonight               nitroglycerin 0.4 MG sublingual tablet   Commonly known as:  NITROSTAT   Place 1 tablet under the tongue every 5 minutes as needed.                                   ONE TOUCH ULTRA test  strip   1 strip daily.   Generic drug:  blood glucose monitoring                                * order for DME   Thigh length teds.                                order for DME   1 walker                                THERAPEUTIC MULTIVIT/MINERAL Tabs   Take 1 tablet by mouth daily.                                traMADol 50 MG tablet   Commonly known as:  ULTRAM   Take 1 tablet by mouth every 6 hours as needed for pain.   Last time this was given:  50 mg on 6/17/2017  7:12 AM                                   * Notice:  This list has 2 medication(s) that are the same as other medications prescribed for you. Read the directions carefully, and ask your doctor or other care provider to review them with you.

## 2017-06-17 NOTE — ED NOTES
Pt assisted to bathroom in w/c for diarrhea episode. Pt returned bed, labs drawn and fluid bolus initiated. Pt denies nausea presently so Zofran deferred. Rails up x 2. Call light on lap. Lights lowered and blankets provided for comfort.

## 2017-06-21 ENCOUNTER — TELEPHONE (OUTPATIENT)
Dept: FAMILY MEDICINE | Facility: CLINIC | Age: 82
End: 2017-06-21

## 2017-06-21 NOTE — TELEPHONE ENCOUNTER
"S-(situation): The patient declined home care and [facility] notified the PCP.    B-(background): The patient was referred to [facility] [place] [facility].    A-(assessment): This SN called the patient and spoke with her about home care services 6/21/17. The patient did not want home care services, stating \"I don't need them.\" This SN educated her about dietary choices/hydration with diarrhea (reason for hospitalization). She denies any falls since discharge, verbalizes that she uses a cane or walker, states she can safely meet her needs, and declined having a home care assessment. She stated she is taking her medications as ordered but is awaiting a new prescription from the discharging provider. This SN told the patient that [facility] will notify the provider and request a follow up regarding the medication.    R-(recommendations): Please follow up with the patient regarding her Metformin refill. Thank you for this referral!  "

## 2017-06-21 NOTE — TELEPHONE ENCOUNTER
Daniela LEFT MESSAGE:    She states that you saw her in the hospital and were going to change her diabetic medication.  She has gone to the pharmacy x 3 and they still haven't received anything from you.  Please review and advise.  Thank you..Mirian Maradiaga

## 2017-07-27 ENCOUNTER — RECORDS - HEALTHEAST (OUTPATIENT)
Dept: ADMINISTRATIVE | Facility: OTHER | Age: 82
End: 2017-07-27

## 2017-08-28 DIAGNOSIS — R06.02 SHORTNESS OF BREATH: Primary | ICD-10-CM

## 2018-01-15 ENCOUNTER — ALLIED HEALTH/NURSE VISIT (OUTPATIENT)
Dept: FAMILY MEDICINE | Facility: CLINIC | Age: 83
End: 2018-01-15
Payer: COMMERCIAL

## 2018-01-15 DIAGNOSIS — Z23 NEED FOR PROPHYLACTIC VACCINATION AND INOCULATION AGAINST INFLUENZA: Primary | ICD-10-CM

## 2018-01-15 PROCEDURE — G0008 ADMIN INFLUENZA VIRUS VAC: HCPCS

## 2018-01-15 PROCEDURE — 90662 IIV NO PRSV INCREASED AG IM: CPT

## 2018-01-15 PROCEDURE — 99207 ZZC NO CHARGE NURSE ONLY: CPT

## 2018-01-15 NOTE — MR AVS SNAPSHOT
"              After Visit Summary   1/15/2018    Daniela Brown    MRN: [id number]           Patient Information     Date Of Birth          1933        Visit Information        Provider Department      1/15/2018 1:15 PM [facility]        Today's Diagnoses     Need for prophylactic vaccination and inoculation against influenza    -  1       Follow-ups after your visit        Who to contact     If you have questions or need follow up information about today's clinic visit or your schedule please contact [facility] directly at [phone].  Normal or non-critical lab and imaging results will be communicated to you by [employer], letter or phone within 4 business days after the clinic has received the results. If you do not hear from us within 7 days, please contact the clinic through [employer] or phone. If you have a critical or abnormal lab result, we will notify you by phone as soon as possible.  Submit refill requests through [employer] or call your pharmacy and they will forward the refill request to us. Please allow 3 business days for your refill to be completed.          Additional Information About Your Visit        MyChart Information     [facility] lets you send messages to your doctor, view your test results, renew your prescriptions, schedule appointments and more. To sign up, go to [email or web address] . Click on \"Log in\" on the left side of the screen, which will take you to the Welcome page. Then click on \"Sign up Now\" on the right side of the page.     You will be asked to enter the access code listed below, as well as some personal information. Please follow the directions to create your username and password.     Your access code is: [email or web address]  Expires: 4/15/2018  1:30 PM     Your access code will  in 90 days. If you need help or a new code, please call your [facility] or [phone].        Care EveryWhere ID     This is your " Care EveryWhere ID. This could be used by other organizations to access your [facility] medical records  [id number]         Blood Pressure from Last 3 Encounters:   06/17/17 161/87   05/12/17 167/76   07/24/15 148/61    Weight from Last 3 Encounters:   06/17/17 161 lb 13.1 oz (73.4 kg)   05/12/17 162 lb (73.5 kg)   03/21/13 171 lb (77.6 kg)              We Performed the Following     ADMIN INFLUENZA (For MEDICARE Patients ONLY) []     FLU VACCINE, INCREASED ANTIGEN, PRESV FREE, AGE 65+ [11914]        Primary Care Provider Office Phone # Fax #    [facility] [phone]       [facility] [facility] [address]        Equal Access to Services     MANFRED REYES : Trace solorzano Sobrody, waaxda luqadaha, qaybta kaalmada adeegyacandelaria, rasheed ramirez . So [facility] [phone].    ATENCIÓN: Si habla español, tiene a cam disposición servicios gratuitos de asistencia lingüística. Llame al [phone].    We comply with applicable federal civil rights laws and [place] laws. We do not discriminate on the basis of race, color, national origin, age, disability, sex, sexual orientation, or gender identity.            Thank you!     Thank you for choosing [facility]  for your care. Our goal is always to provide you with excellent care. Hearing back from our patients is one way we can continue to improve our services. Please take a few minutes to complete the written survey that you may receive in the mail after your visit with us. Thank you!             Your Updated Medication List - Protect others around you: Learn how to safely use, store and throw away your medicines at www.disposemymeds.org.          This list is accurate as of: 1/15/18  1:30 PM.  Always use your most recent med list.                   Brand Name Dispense Instructions for use Diagnosis    * ** PATIENT ALERT **      Warning: DO NOT EXCEED 4,000 MG OF ACETAMINOPHEN FROM ALL SOURCES IN  24 HOURS        acetaminophen 325 MG tablet    TYLENOL    30 tablet    Take 1 tablet by mouth every 6 hours as needed for pain.    Degeneration of lumbar or lumbosacral intervertebral disc       B-12 1000 MCG Subl      Place under the tongue daily        KLONOPIN PO      Take 0.5 mg by mouth 2 times daily as needed for anxiety        LIPITOR PO      Take 40 mg by mouth At Bedtime        LOSARTAN POTASSIUM PO      Take 50 mg by mouth daily        metFORMIN 500 MG 24 hr tablet    GLUCOPHAGE-XR    30 tablet    Take 1 tablet (500 mg) by mouth daily (with dinner)    Type 2 diabetes mellitus without complication, without long-term current use of insulin (H)       nitroGLYcerin 0.4 MG sublingual tablet    NITROSTAT    30 tablet    Place 1 tablet under the tongue every 5 minutes as needed.    Disorder of bone and cartilage, unspecified, Diarrhea, Irritable bowel syndrome, HTN (hypertension)       ONETOUCH ULTRA test strip   Generic drug:  blood glucose monitoring     1 Box    1 strip daily.    Type II or unspecified type diabetes mellitus without mention of complication, not stated as uncontrolled       * order for DME     1 Device    Thigh length teds.    Venous insufficiency       order for DME     1 Device    1 walker    Acute pain of right knee       THERAPEUTIC MULTIVIT/MINERAL Tabs      Take 1 tablet by mouth daily.        traMADol 50 MG tablet    ULTRAM    60 tablet    Take 1 tablet by mouth every 6 hours as needed for pain.    Knee pain       * Notice:  This list has 2 medication(s) that are the same as other medications prescribed for you. Read the directions carefully, and ask your doctor or other care provider to review them with you.

## 2018-01-15 NOTE — PROGRESS NOTES
Injectable Influenza Immunization Documentation    1.  Is the person to be vaccinated sick today?   No    2. Does the person to be vaccinated have an allergy to a component   of the vaccine?   No  Egg Allergy Algorithm Link    3. Has the person to be vaccinated ever had a serious reaction   to influenza vaccine in the past?   No    4. Has the person to be vaccinated ever had Guillain-Barré syndrome?   No    Form completed by Tahira Villegas CMA ([facility]) 1:29 PM 1/15/2018

## 2018-03-23 ENCOUNTER — ALLIED HEALTH/NURSE VISIT (OUTPATIENT)
Dept: FAMILY MEDICINE | Facility: CLINIC | Age: 83
End: 2018-03-23
Payer: COMMERCIAL

## 2018-03-23 PROCEDURE — 99207 ZZC NO CHARGE NURSE ONLY: CPT

## 2018-03-23 NOTE — MR AVS SNAPSHOT
"              After Visit Summary   3/23/2018    Daniela Brown    MRN: [id number]           Patient Information     Date Of Birth          1933        Visit Information        Provider Department      3/23/2018 2:45 PM ИВАН CÁRDENAS/YUSUF PEARSON [facility]        Today's Diagnoses     Reason for consultation    -  1       Follow-ups after your visit        Who to contact     If you have questions or need follow up information about today's clinic visit or your schedule please contact [facility] directly at [phone].  Normal or non-critical lab and imaging results will be communicated to you by [employer]odo[employer] TV[employer], letter or phone within 4 business days after the clinic has received the results. If you do not hear from us within 7 days, please contact the clinic through [employer] TV[employer] or phone. If you have a critical or abnormal lab result, we will notify you by phone as soon as possible.  Submit refill requests through [email or web address] or call your pharmacy and they will forward the refill request to us. Please allow 3 business days for your refill to be completed.          Additional Information About Your Visit        MyChart Information     [email or web address] lets you send messages to your doctor, view your test results, renew your prescriptions, schedule appointments and more. To sign up, go to [email or web address] . Click on \"Log in\" on the left side of the screen, which will take you to the Welcome page. Then click on \"Sign up Now\" on the right side of the page.     You will be asked to enter the access code listed below, as well as some personal information. Please follow the directions to create your username and password.     Your access code is: [email or web address]  Expires: 4/15/2018  2:30 PM     Your access code will  in 90 days. If you need help or a new code, please call your [facility] or [phone].        Care EveryWhere ID     This is your Care EveryWhere ID. This could be used by other " organizations to access your [facility] medical records  [id number]         Blood Pressure from Last 3 Encounters:   06/17/17 161/87   05/12/17 167/76   07/24/15 148/61    Weight from Last 3 Encounters:   06/17/17 161 lb 13.1 oz (73.4 kg)   05/12/17 162 lb (73.5 kg)   03/21/13 171 lb (77.6 kg)              Today, you had the following     No orders found for display       Primary Care Provider Office Phone # Fax #    [facility] [phone] [address] [facility] [address]        Equal Access to Services     MANFRED REYES : Hadii nish Burden, wadiane masterson, karlie kaalmada sree, rasheed meade. So [facility] [phone].    ATENCIÓN: Si habla español, tiene a cam disposición servicios gratuitos de asistencia lingüística. [facility] [phone].    We comply with applicable federal civil rights laws and [place] laws. We do not discriminate on the basis of race, color, national origin, age, disability, sex, sexual orientation, or gender identity.            Thank you!     Thank you for choosing [facility]  for your care. Our goal is always to provide you with excellent care. Hearing back from our patients is one way we can continue to improve our services. Please take a few minutes to complete the written survey that you may receive in the mail after your visit with us. Thank you!             Your Updated Medication List - Protect others around you: Learn how to safely use, store and throw away your medicines at www.disposemymeds.org.          This list is accurate as of 3/23/18  2:48 PM.  Always use your most recent med list.                   Brand Name Dispense Instructions for use Diagnosis    * ** PATIENT ALERT **      Warning: DO NOT EXCEED 4,000 MG OF ACETAMINOPHEN FROM ALL SOURCES IN 24 HOURS        acetaminophen 325 MG tablet    TYLENOL    30 tablet    Take 1 tablet by mouth every 6 hours as needed for pain.     Degeneration of lumbar or lumbosacral intervertebral disc       B-12 1000 MCG Subl      Place under the tongue daily        KLONOPIN PO      Take 0.5 mg by mouth 2 times daily as needed for anxiety        LIPITOR PO      Take 40 mg by mouth At Bedtime        LOSARTAN POTASSIUM PO      Take 50 mg by mouth daily        metFORMIN 500 MG 24 hr tablet    GLUCOPHAGE-XR    30 tablet    Take 1 tablet (500 mg) by mouth daily (with dinner)    Type 2 diabetes mellitus without complication, without long-term current use of insulin (H)       nitroGLYcerin 0.4 MG sublingual tablet    NITROSTAT    30 tablet    Place 1 tablet under the tongue every 5 minutes as needed.    Disorder of bone and cartilage, unspecified, Diarrhea, Irritable bowel syndrome, HTN (hypertension)       ONETOUCH ULTRA test strip   Generic drug:  blood glucose monitoring     1 Box    1 strip daily.    Type II or unspecified type diabetes mellitus without mention of complication, not stated as uncontrolled       * order for DME     1 Device    Thigh length teds.    Venous insufficiency       order for DME     1 Device    1 walker    Acute pain of right knee       THERAPEUTIC MULTIVIT/MINERAL Tabs      Take 1 tablet by mouth daily.        traMADol 50 MG tablet    ULTRAM    60 tablet    Take 1 tablet by mouth every 6 hours as needed for pain.    Knee pain       * Notice:  This list has 2 medication(s) that are the same as other medications prescribed for you. Read the directions carefully, and ask your doctor or other care provider to review them with you.

## 2018-03-23 NOTE — NURSING NOTE
Mother of pt, Javier Brown, Consent to share PHI in pt's chart, here with questions regarding a rash, restless legs and smoking cessation for pt.  Scheduled an appointment for pt to come see PCP to discuss these concerns next week when he is on-site for a blood transfusion.    Flaquita KAHN RN

## 2018-04-11 ENCOUNTER — OFFICE VISIT - HEALTHEAST (OUTPATIENT)
Dept: RHEUMATOLOGY | Facility: CLINIC | Age: 83
End: 2018-04-11

## 2018-04-11 DIAGNOSIS — M15.0 PRIMARY OSTEOARTHRITIS INVOLVING MULTIPLE JOINTS: ICD-10-CM

## 2018-04-11 DIAGNOSIS — M25.532 BILATERAL WRIST PAIN: ICD-10-CM

## 2018-04-11 DIAGNOSIS — M51.9 LUMBAR DISC DISEASE: ICD-10-CM

## 2018-04-11 DIAGNOSIS — M25.531 BILATERAL WRIST PAIN: ICD-10-CM

## 2018-04-11 ASSESSMENT — MIFFLIN-ST. JEOR: SCORE: 1169.1

## 2018-07-19 ENCOUNTER — APPOINTMENT (OUTPATIENT)
Dept: GENERAL RADIOLOGY | Facility: CLINIC | Age: 83
End: 2018-07-19
Attending: EMERGENCY MEDICINE
Payer: MEDICARE

## 2018-07-19 ENCOUNTER — HOSPITAL ENCOUNTER (EMERGENCY)
Facility: CLINIC | Age: 83
Discharge: HOME | End: 2018-07-19
Attending: EMERGENCY MEDICINE | Admitting: EMERGENCY MEDICINE
Payer: MEDICARE

## 2018-07-19 VITALS
TEMPERATURE: 98.6 F | OXYGEN SATURATION: 96 % | HEIGHT: 64 IN | SYSTOLIC BLOOD PRESSURE: 157 MMHG | BODY MASS INDEX: 26.98 KG/M2 | DIASTOLIC BLOOD PRESSURE: 72 MMHG | RESPIRATION RATE: 15 BRPM | WEIGHT: 158 LBS

## 2018-07-19 DIAGNOSIS — R07.9 ACUTE CHEST PAIN: ICD-10-CM

## 2018-07-19 LAB
ALBUMIN SERPL-MCNC: 3.5 G/DL (ref 3.4–5)
ALP SERPL-CCNC: 87 U/L (ref 40–150)
ALT SERPL W P-5'-P-CCNC: 39 U/L (ref 0–50)
ANION GAP SERPL CALCULATED.3IONS-SCNC: 4 MMOL/L (ref 3–14)
APTT PPP: 28 SEC (ref 22–37)
AST SERPL W P-5'-P-CCNC: 31 U/L (ref 0–45)
BASOPHILS # BLD AUTO: 0 10E9/L (ref 0–0.2)
BASOPHILS NFR BLD AUTO: 0.4 %
BILIRUB SERPL-MCNC: 0.6 MG/DL (ref 0.2–1.3)
BUN SERPL-MCNC: 14 MG/DL (ref 7–30)
CALCIUM SERPL-MCNC: 9 MG/DL (ref 8.5–10.1)
CHLORIDE SERPL-SCNC: 108 MMOL/L (ref 94–109)
CO2 SERPL-SCNC: 30 MMOL/L (ref 20–32)
CREAT SERPL-MCNC: 0.85 MG/DL (ref 0.52–1.04)
DIFFERENTIAL METHOD BLD: NORMAL
EOSINOPHIL # BLD AUTO: 0.2 10E9/L (ref 0–0.7)
EOSINOPHIL NFR BLD AUTO: 1.9 %
ERYTHROCYTE [DISTWIDTH] IN BLOOD BY AUTOMATED COUNT: 12.7 % (ref 10–15)
GFR SERPL CREATININE-BSD FRML MDRD: 63 ML/MIN/1.7M2
GLUCOSE SERPL-MCNC: 153 MG/DL (ref 70–99)
HCT VFR BLD AUTO: 37.7 % (ref 35–47)
HGB BLD-MCNC: 12.4 G/DL (ref 11.7–15.7)
IMM GRANULOCYTES # BLD: 0 10E9/L (ref 0–0.4)
IMM GRANULOCYTES NFR BLD: 0.4 %
INR PPP: 1.08 (ref 0.86–1.14)
LYMPHOCYTES # BLD AUTO: 2.1 10E9/L (ref 0.8–5.3)
LYMPHOCYTES NFR BLD AUTO: 25.1 %
MCH RBC QN AUTO: 28.2 PG (ref 26.5–33)
MCHC RBC AUTO-ENTMCNC: 32.9 G/DL (ref 31.5–36.5)
MCV RBC AUTO: 86 FL (ref 78–100)
MONOCYTES # BLD AUTO: 0.6 10E9/L (ref 0–1.3)
MONOCYTES NFR BLD AUTO: 6.5 %
NEUTROPHILS # BLD AUTO: 5.5 10E9/L (ref 1.6–8.3)
NEUTROPHILS NFR BLD AUTO: 65.7 %
NRBC # BLD AUTO: 0 10*3/UL
NRBC BLD AUTO-RTO: 0 /100
NT-PROBNP SERPL-MCNC: 150 PG/ML (ref 0–1800)
PLATELET # BLD AUTO: 229 10E9/L (ref 150–450)
POTASSIUM SERPL-SCNC: 3.9 MMOL/L (ref 3.4–5.3)
PROT SERPL-MCNC: 7.1 G/DL (ref 6.8–8.8)
RBC # BLD AUTO: 4.4 10E12/L (ref 3.8–5.2)
SODIUM SERPL-SCNC: 142 MMOL/L (ref 133–144)
TROPONIN I SERPL-MCNC: <0.015 UG/L (ref 0–0.04)
TROPONIN I SERPL-MCNC: <0.015 UG/L (ref 0–0.04)
WBC # BLD AUTO: 8.4 10E9/L (ref 4–11)

## 2018-07-19 PROCEDURE — 99285 EMERGENCY DEPT VISIT HI MDM: CPT | Mod: 25 | Performed by: EMERGENCY MEDICINE

## 2018-07-19 PROCEDURE — 85730 THROMBOPLASTIN TIME PARTIAL: CPT | Performed by: EMERGENCY MEDICINE

## 2018-07-19 PROCEDURE — 85025 COMPLETE CBC W/AUTO DIFF WBC: CPT | Performed by: EMERGENCY MEDICINE

## 2018-07-19 PROCEDURE — 93005 ELECTROCARDIOGRAM TRACING: CPT | Performed by: EMERGENCY MEDICINE

## 2018-07-19 PROCEDURE — 80053 COMPREHEN METABOLIC PANEL: CPT | Performed by: EMERGENCY MEDICINE

## 2018-07-19 PROCEDURE — 85610 PROTHROMBIN TIME: CPT | Performed by: EMERGENCY MEDICINE

## 2018-07-19 PROCEDURE — 83880 ASSAY OF NATRIURETIC PEPTIDE: CPT | Performed by: EMERGENCY MEDICINE

## 2018-07-19 PROCEDURE — 71046 X-RAY EXAM CHEST 2 VIEWS: CPT

## 2018-07-19 PROCEDURE — 25000132 ZZH RX MED GY IP 250 OP 250 PS 637: Mod: GY | Performed by: EMERGENCY MEDICINE

## 2018-07-19 PROCEDURE — 93010 ELECTROCARDIOGRAM REPORT: CPT | Mod: Z6 | Performed by: EMERGENCY MEDICINE

## 2018-07-19 PROCEDURE — 84484 ASSAY OF TROPONIN QUANT: CPT | Performed by: EMERGENCY MEDICINE

## 2018-07-19 PROCEDURE — A9270 NON-COVERED ITEM OR SERVICE: HCPCS | Mod: GY | Performed by: EMERGENCY MEDICINE

## 2018-07-19 RX ORDER — GABAPENTIN 100 MG/1
200 CAPSULE ORAL AT BEDTIME
COMMUNITY
Start: 2018-04-06 | End: 2018-11-14

## 2018-07-19 RX ORDER — NITROFURANTOIN MACROCRYSTALS 50 MG/1
50 CAPSULE ORAL DAILY
COMMUNITY
Start: 2018-06-24 | End: 2018-09-10 | Stop reason: SINTOL

## 2018-07-19 RX ORDER — CITALOPRAM HYDROBROMIDE 20 MG/1
20 TABLET ORAL DAILY
COMMUNITY
Start: 2018-06-21 | End: 2018-11-14

## 2018-07-19 RX ORDER — ASPIRIN 81 MG/1
324 TABLET, CHEWABLE ORAL ONCE
Status: COMPLETED | OUTPATIENT
Start: 2018-07-19 | End: 2018-07-19

## 2018-07-19 RX ADMIN — ASPIRIN 81 MG 324 MG: 81 TABLET ORAL at 13:03

## 2018-07-19 ASSESSMENT — ENCOUNTER SYMPTOMS
NUMBNESS: 1
SHORTNESS OF BREATH: 0
COUGH: 0

## 2018-07-19 NOTE — DISCHARGE INSTRUCTIONS
Return if symptoms worsen or new symptoms develop.  Follow-up with primary care physician tomorrow to set up stress test..  Drink plenty of fluids.  If any return of chest pain shortness of breath or other symptoms present please return to the emergency department for recheck.  You were offered admission but there are no beds here and you did not want to be transferred.   You understood this was the safest method of monitoring your pain.  Please return to the emergency department if any complications or possibly in the morning to try to set up a stress test if any concerns.  *CHEST PAIN, UNCERTAIN CAUSE    Based on your exam today, the exact cause of your chest pain is not certain. Your condition does not seem serious at this time, and your pain does not appear to be coming from your heart. However, sometimes the signs of a serious problem take more time to appear. Therefore, watch for the warning signs listed below.  HOME CARE:    1. Rest today and avoid strenuous activity.  2. Take any prescribed medicine as directed.  FOLLOW UP with your doctor in 1-3 days.   GET PROMPT MEDICAL ATTENTION if any of the following occur:    A change in the type of pain: if it feels different, becomes more severe, lasts longer, or begins to spread into your shoulder, arm, neck, jaw or back    Shortness of breath or increased pain with breathing    Weakness, dizziness, or fainting    Cough with blood or dark colored sputum (phlegm)    Fever over 101  F (38.3  C)    Swelling, pain or redness in one leg    2013-5551 [employer]. [address] [place] [address], [place] [id number]. All rights reserved. This information is not intended as a substitute for professional medical care. Always follow your healthcare professional's instructions.  This information has been modified by your health care provider with permission from the publisher.

## 2018-07-19 NOTE — ED AVS SNAPSHOT
[facility] Emergency Department    [address] [address]    Phone:  [phone]    Fax:  [phone]                                       Daniela Brown   MRN: [id number]    Department:  [facility] Emergency Department   Date of Visit:  7/19/2018           After Visit Summary Signature Page     I have received my discharge instructions, and my questions have been answered. I have discussed any challenges I see with this plan with the nurse or doctor.    ..........................................................................................................................................  Patient/Patient Representative Signature      ..........................................................................................................................................  Patient Representative Print Name and Relationship to Patient    ..................................................               ................................................  Date                                            Time    ..........................................................................................................................................  Reviewed by Signature/Title    ...................................................              ..............................................  Date                                                            Time

## 2018-07-19 NOTE — ED AVS SNAPSHOT
[facility] Emergency Department    [address] [address]    Phone:  [phone]    Fax:  [phone]                                       Daniela Brown   MRN: [id number]    Department:  [facility] Emergency Department   Date of Visit:  7/19/2018           Patient Information     Date Of Birth          1/26/1933        Your diagnoses for this visit were:     Acute chest pain        You were seen by Abdulaziz Brewer MD.      Follow-up Information     Follow up with Reynaldo Duenas DO.    Specialty:  Family Practice    Why:  Tomorrow to set up a stress test    Contact information:    [facility]  [address] [address]  [phone]          Follow up with [facility] Emergency Department.    Specialty:  EMERGENCY MEDICINE    Why:  If any further chest pain    Contact information:    [address] [place] [id number] [phone]    Additional information:    The medical center is located at   [address] [place] [address]. (between [address]   [address] in [place], [place] north   of [employer]).        Discharge Instructions       Return if symptoms worsen or new symptoms develop.  Follow-up with primary care physician tomorrow to set up stress test..  Drink plenty of fluids.  If any return of chest pain shortness of breath or other symptoms present please return to the emergency department for recheck.  You were offered admission but there are no beds here and you did not want to be transferred.   You understood this was the safest method of monitoring your pain.  Please return to the emergency department if any complications or possibly in the morning to try to set up a stress test if any concerns.  *CHEST PAIN, UNCERTAIN CAUSE    Based on your exam today, the exact cause of your chest pain is not certain. Your condition does not seem serious at this time, and your pain does not appear to be coming from your  heart. However, sometimes the signs of a serious problem take more time to appear. Therefore, watch for the warning signs listed below.  HOME CARE:    1. Rest today and avoid strenuous activity.  2. Take any prescribed medicine as directed.  FOLLOW UP with your doctor in 1-3 days.   GET PROMPT MEDICAL ATTENTION if any of the following occur:    A change in the type of pain: if it feels different, becomes more severe, lasts longer, or begins to spread into your shoulder, arm, neck, jaw or back    Shortness of breath or increased pain with breathing    Weakness, dizziness, or fainting    Cough with blood or dark colored sputum (phlegm)    Fever over 101  F (38.3  C)    Swelling, pain or redness in one leg    1594-8431 [employer]. [address] [place] [address], [place] [address]. All rights reserved. This information is not intended as a substitute for professional medical care. Always follow your healthcare professional's instructions.  This information has been modified by your health care provider with permission from the publisher.      24 Hour Appointment Hotline       To make an appointment at any [facility], call [phone] ([phone]). If you don't have a family doctor or clinic, we will help you find one. [facility] clinics are conveniently located to serve the needs of you and your family.             Review of your medicines      Our records show that you are taking the medicines listed below. If these are incorrect, please call your family doctor or clinic.        Dose / Directions Last dose taken    * ** PATIENT ALERT **        Warning: DO NOT EXCEED 4,000 MG OF ACETAMINOPHEN FROM ALL SOURCES IN 24 HOURS   Refills:  0        acetaminophen 325 MG tablet   Commonly known as:  TYLENOL   Dose:  325 mg   Quantity:  30 tablet        Take 1 tablet by mouth every 6 hours as needed for pain.   Refills:  0        B-12 1000 MCG Subl        Place under the tongue daily   Refills:  0        KLONOPIN  PO   Dose:  0.5 mg        Take 0.5 mg by mouth 2 times daily as needed for anxiety   Refills:  0        LIPITOR PO   Dose:  40 mg        Take 40 mg by mouth At Bedtime   Refills:  0        LOSARTAN POTASSIUM PO   Dose:  50 mg        Take 50 mg by mouth daily   Refills:  0        metFORMIN 500 MG 24 hr tablet   Commonly known as:  GLUCOPHAGE-XR   Dose:  500 mg   Quantity:  30 tablet        Take 1 tablet (500 mg) by mouth daily (with dinner)   Refills:  0        nitroGLYcerin 0.4 MG sublingual tablet   Commonly known as:  NITROSTAT   Dose:  0.4 mg   Quantity:  30 tablet        Place 1 tablet under the tongue every 5 minutes as needed.   Refills:  4        ONETOUCH ULTRA test strip   Dose:  1 strip   Quantity:  1 Box   Generic drug:  blood glucose monitoring        1 strip daily.   Refills:  6        * order for DME   Quantity:  1 Device        Thigh length teds.   Refills:  2        order for DME   Quantity:  1 Device        1 walker   Refills:  0        THERAPEUTIC MULTIVIT/MINERAL Tabs   Dose:  1 tablet        Take 1 tablet by mouth daily.   Refills:  0        traMADol 50 MG tablet   Commonly known as:  ULTRAM   Dose:  50 mg   Quantity:  60 tablet        Take 1 tablet by mouth every 6 hours as needed for pain.   Refills:  5        * Notice:  This list has 2 medication(s) that are the same as other medications prescribed for you. Read the directions carefully, and ask your doctor or other care provider to review them with you.            Procedures and tests performed during your visit     Procedure/Test Number of Times Performed    CBC with platelets differential 1    Chest XR,  PA & LAT 1    Comprehensive metabolic panel 1    EKG 12-lead, tracing only 1    INR 1    NT pro BNP 1    PTT 1    Peripheral IV catheter 1    Troponin I 2      Orders Needing Specimen Collection     None      Pending Results     No orders found from 7/17/2018 to 7/20/2018.            Pending Culture Results     No orders found from 7/17/2018  to 7/20/2018.            Pending Results Instructions     If you had any lab results that were not finalized at the time of your Discharge, you can call the ED Lab Result RN at [phone]. You will be contacted by this team for any positive Lab results or changes in treatment. The nurses are available 7 days a week from 10A to 6:30P.  You can leave a message 24 hours per day and they will return your call.        Test Results From Your Hospital Stay        7/19/2018 12:30 PM      Component Results     Component Value Ref Range & Units Status    WBC 8.4 4.0 - 11.0 10e9/L Final    RBC Count 4.40 3.8 - 5.2 10e12/L Final    Hemoglobin 12.4 11.7 - 15.7 g/dL Final    Hematocrit 37.7 35.0 - 47.0 % Final    MCV 86 78 - 100 fl Final    MCH 28.2 26.5 - 33.0 pg Final    MCHC 32.9 31.5 - 36.5 g/dL Final    RDW 12.7 10.0 - 15.0 % Final    Platelet Count 229 150 - 450 10e9/L Final    Diff Method Automated Method  Final    % Neutrophils 65.7 % Final    % Lymphocytes 25.1 % Final    % Monocytes 6.5 % Final    % Eosinophils 1.9 % Final    % Basophils 0.4 % Final    % Immature Granulocytes 0.4 % Final    Nucleated RBCs 0 0 /100 Final    Absolute Neutrophil 5.5 1.6 - 8.3 10e9/L Final    Absolute Lymphocytes 2.1 0.8 - 5.3 10e9/L Final    Absolute Monocytes 0.6 0.0 - 1.3 10e9/L Final    Absolute Eosinophils 0.2 0.0 - 0.7 10e9/L Final    Absolute Basophils 0.0 0.0 - 0.2 10e9/L Final    Abs Immature Granulocytes 0.0 0 - 0.4 10e9/L Final    Absolute Nucleated RBC 0.0  Final         7/19/2018 12:51 PM      Component Results     Component Value Ref Range & Units Status    Sodium 142 133 - 144 mmol/L Final    Potassium 3.9 3.4 - 5.3 mmol/L Final    Chloride 108 94 - 109 mmol/L Final    Carbon Dioxide 30 20 - 32 mmol/L Final    Anion Gap 4 3 - 14 mmol/L Final    Glucose 153 (H) 70 - 99 mg/dL Final    Urea Nitrogen 14 7 - 30 mg/dL Final    Creatinine 0.85 0.52 - 1.04 mg/dL Final    GFR Estimate 63 >60 mL/min/1.7m2 Final    Non   American GFR Calc    GFR Estimate If Black 77 >60 mL/min/1.7m2 Final    African American GFR Calc    Calcium 9.0 8.5 - 10.1 mg/dL Final    Bilirubin Total 0.6 0.2 - 1.3 mg/dL Final    Albumin 3.5 3.4 - 5.0 g/dL Final    Protein Total 7.1 6.8 - 8.8 g/dL Final    Alkaline Phosphatase 87 40 - 150 U/L Final    ALT 39 0 - 50 U/L Final    AST 31 0 - 45 U/L Final         7/19/2018 12:51 PM      Component Results     Component Value Ref Range & Units Status    Troponin I ES <0.015 0.000 - 0.045 ug/L Final    The 99th percentile for upper reference range is 0.045 ug/L.  Troponin values   in the range of 0.045 - 0.120 ug/L may be associated with risks of adverse   clinical events.           7/19/2018  1:11 PM      Component Results     Component Value Ref Range & Units Status    N-Terminal Pro BNP Inpatient 150 0 - 1800 pg/mL Final       Reference range shown and results flagged as abnormal are suggested inpatient   cut points for confirming diagnosis if CHF in an acute setting. Establishing a   baseline value for each individual patient is useful for follow-up. An   inpatient or emergency department NT-proPBNP <300 pg/mL effectively rules out   acute CHF, with 99% negative predictive value.  The outpatient non-acute reference range for ruling out CHF is:   0-125 pg/mL (age 18 to less than 75)   0-450 pg/mL (age 75 yrs and older)           7/19/2018  1:02 PM      Component Results     Component Value Ref Range & Units Status    PTT 28 22 - 37 sec Final         7/19/2018  1:02 PM      Component Results     Component Value Ref Range & Units Status    INR 1.08 0.86 - 1.14 Final         7/19/2018  2:04 PM      Narrative     CHEST TWO VIEWS  7/19/2018 1:34 PM     HISTORY: Shortness of breath.    COMPARISON: 5/2/2013        Impression     IMPRESSION: The lungs are well inflated and clear without  consolidation, edema, effusion, or pneumothorax. Heart size is normal.    SHARIF YAN MD         7/19/2018  3:49 PM      Component  "Results     Component Value Ref Range & Units Status    Troponin I ES <0.015 0.000 - 0.045 ug/L Final    The 99th percentile for upper reference range is 0.045 ug/L.  Troponin values   in the range of 0.045 - 0.120 ug/L may be associated with risks of adverse   clinical events.                  Thank you for choosing [facility]       Thank you for choosing [facility] for your care. Our goal is always to provide you with excellent care. Hearing back from our patients is one way we can continue to improve our services. Please take a few minutes to complete the written survey that you may receive in the mail after you visit with us. Thank you!        [employer] Information     [employer] lets you send messages to your doctor, view your test results, renew your prescriptions, schedule appointments and more. To sign up, go to [email or web address] . Click on \"Log in\" on the left side of the screen, which will take you to the Welcome page. Then click on \"Sign up Now\" on the right side of the page.     You will be asked to enter the access code listed below, as well as some personal information. Please follow the directions to create your username and password.     Your access code is: [email or web address]  Expires: 10/17/2018  4:28 PM     Your access code will  in 90 days. If you need help or a new code, please call your [facility] clinic or [phone].        Care EveryWhere ID     This is your Care EveryWhere ID. This could be used by other organizations to access your [facility] medical records  [id number]        Equal Access to Services     [facility] Coron[facility] HospitalWALKER : Hadii aad ku hadasho Soomaali, waaxda luqadaha, qaybta kaalmada adeegyada, rasheed ramirez . So [facility] [phone].    ATENCIÓN: Si habla español, tiene a cam disposición servicios gratuitos de asistencia lingüística. Llame al [phone].    We comply with applicable federal civil rights laws and [place] laws. We do not discriminate on the basis of " race, color, national origin, age, disability, sex, sexual orientation, or gender identity.            After Visit Summary       This is your record. Keep this with you and show to your community pharmacist(s) and doctor(s) at your next visit.

## 2018-07-19 NOTE — ED PROVIDER NOTES
"  History     Chief Complaint   Patient presents with     Chest Pain     was seen today at the [facility] was sent her to see ER doc for referral to cardiologist, EKG done in clinic      HPI  Daniela Brown is a 85 year old female with a history of hyperlipidemia, hypertension, and type II diabetes who presents to the emergency department for evaluation of chest pain.  Patient had chest pain that began a couple hours after eating her dinner.  She states it felt like her heart \"flipped over\".  She developed pain down her left arm.  She took nitroglycerin and found relief about 30 minutes afterwards.   She awoke this morning with no chest symptoms but had persistent pain in her left arm.  She went to the [facility] who sent her to the emergency department for evaluation.  In the emergency department she states her arm feels more numb but has no pain.  She  Started taking Tramadol for her back and leg pain recently.  She denies history of heart disease.  She reports she has been under more stress since her sons passing a couple weeks ago.  She denies cough and shortness of breath.    Problem List:    Patient Active Problem List    Diagnosis Date Noted     Gastroenteritis 06/17/2017     Priority: Medium     Hypovolemia dehydration 06/17/2017     Priority: Medium     B12 deficiency anemia 06/20/2012     Priority: Medium     Colitis, Clostridium difficile 04/18/2012     Priority: Medium     Anemia due to blood loss, acute 04/02/2012     Priority: Medium     S/P total knee replacement 03/28/2012     Priority: Medium     Leukocytosis 09/23/2011     Priority: Medium     Advanced directives, counseling/discussion 06/02/2011     Priority: Medium     Patient does not have an Advance/Health Care Directive (HCD), given \"What is Advance Care Planning?\" flyer.    Isamar Suarez  June 2, 2011         Pronation of foot 05/05/2011     Priority: Medium     Bilateral defomity       Angina pectoris (H) 03/17/2011 "     Priority: Medium     (Problem list name updated by automated process. Provider to review and confirm.)       Allergic rhinitis 01/19/2011     Priority: Medium     Type 2 diabetes, HbA1c goal < 7% (H) 01/05/2011     Priority: Medium     HTN (hypertension) 11/12/2010     Priority: Medium     Hyperlipidemia LDL goal <100 10/31/2010     Priority: Medium     Obesity 07/21/2010     Priority: Medium     Frequent UTI 10/20/2009     Priority: Medium     Cysto negative 7/10.        Urge incontinence 10/20/2009     Priority: Medium     Right foot pain 10/16/2009     Priority: Medium     Xray showed djd -follows with podiatry. -arch supports placed may need cam walker        Vitamin D deficiency 09/09/2008     Priority: Medium     Subjective tinnitus 04/22/2008     Priority: Medium     Urticaria 08/22/2006     Priority: Medium     Problem list name updated by automated process. Provider to review       SENSONRL HEAR LOSS,BILAT 05/03/2006     Priority: Medium     Esophageal reflux      Priority: Medium     Memory loss      Priority: Medium     Early cognitive decline. MMSE 27/30, Neno 4.7/ 6.0 2004. B12, TSH, RPR normal 2432-5035.       Depressive disorder, not elsewhere classified      Priority: Medium     Degeneration of lumbar or lumbosacral intervertebral disc      Priority: Medium     MRI 5/04- DDD, L2-3 annular bulge with protrusion into left caudal infra-foraminal area  Uses vicodin sparingly   Pain Agreement signed.        Irritable bowel syndrome      Priority: Low     diahrrea predominant       Disorder of bone and cartilage      Priority: Low     DEXA 2007 -1.4 hip. Dexa 2004 -1 hip and -1 spine  Problem list name updated by automated process. Provider to review       RESTLESS LEG SYNDROME      Priority: Low     Generalized osteoarthrosis, unspecified site      Priority: Low     C-spine, left hip       Allergic rhinitis      Priority: Low     Problem list name updated by automated process. Provider to review        Diverticulosis of large intestine      Priority: Low     flexible sigmoidoscopy with diverticulosis otherwise normal 2001, admit diverticulitis 2009  Problem list name updated by automated process. Provider to review          Past Medical History:    Past Medical History:   Diagnosis Date     Adhesive capsulitis of shoulder      Allergic rhinitis, cause unspecified      Carpal tunnel syndrome      Chest pain, unspecified      Cystocele, midline      Degeneration of lumbar or lumbosacral intervertebral disc      Depressive disorder, not elsewhere classified      Diabetes mellitus (H)      Diverticulosis of colon (without mention of hemorrhage)      Esophageal reflux      Essential hypertension, benign      Generalized osteoarthrosis, unspecified site      Headache(784.0)      Impaired fasting glucose      Irritable bowel syndrome      Memory loss      Mixed hyperlipidemia      OA (OSTEOARTHRITIS) GENERALIZED( Multiple Sites)      OSTEOPENIA      PERS HX ALLERGY OTHER FOODS 8/22/2006     PERS HX ALLERGY TO MILK PRODUCTS 8/22/2006     RESTLESS LEG SYNDROME      Unspecified vitamin D deficiency        Past Surgical History:    Past Surgical History:   Procedure Laterality Date     ARTHROPLASTY KNEE  3/26/2012    Procedure:ARTHROPLASTY KNEE; Right Total Knee Arthroplasty; Surgeon:BERNADINE ROSAS; Location:[place] OR     C APPENDECTOMY  1953     HC REMOVAL GALLBLADDER       HYSTERECTOMY, PAP NO LONGER INDICATED  1983    TVH/BSO     SURGICAL HISTORY OF -       Hernia Repair     SURGICAL HISTORY OF -   10/08    A & P Repair, Dr. Hannah       Family History:    Family History   Problem Relation Age of Onset     C.A.D. Mother      Diabetes Mother      Cancer - colorectal Mother      Arthritis Mother      HEART DISEASE Mother      Lipids Brother      Obesity Brother      Hypertension Brother      Allergies Son      Eye Disorder Son      cataract     Thyroid Disease Sister      graves dz     Eye Disorder Son       "Alcohol/Drug Father        Social History:  Marital Status:   [5]  Social History   Substance Use Topics     Smoking status: Never Smoker     Smokeless tobacco: Never Used     Alcohol use No        Medications:      ** PATIENT ALERT **   acetaminophen (TYLENOL) 325 MG tablet   Atorvastatin Calcium (LIPITOR PO)   ClonazePAM (KLONOPIN PO)   Cyanocobalamin (B-12) 1000 MCG SUBL   Glucose Blood (ONE TOUCH ULTRA TEST) strip   LOSARTAN POTASSIUM PO   metFORMIN (GLUCOPHAGE-XR) 500 MG 24 hr tablet   Multiple Vitamins-Minerals (THERAPEUTIC MULTIVIT/MINERAL) TABS   nitroglycerin (NITROSTAT) 0.4 MG SL tablet   order for DME   ORDER FOR DME   traMADol (ULTRAM) 50 MG tablet         Review of Systems   Constitutional: Negative for appetite change and fever.   HENT: Negative for congestion.    Eyes: Negative for visual disturbance.   Respiratory: Positive for chest tightness. Negative for cough and shortness of breath.    Cardiovascular: Positive for chest pain. Negative for leg swelling.   Gastrointestinal: Negative for abdominal pain, nausea and vomiting.   Genitourinary: Negative for decreased urine volume and dysuria.   Musculoskeletal: Negative for back pain.   Skin: Negative for rash.   Neurological: Positive for numbness. Negative for dizziness, syncope, speech difficulty and weakness.   Hematological: Does not bruise/bleed easily.   Psychiatric/Behavioral: Negative for confusion.   All other systems reviewed and are negative.      Physical Exam   BP: 175/81  Heart Rate: 77  Temp: 98.6  F (37  C)  Resp: 17  Height: 162.6 cm (5' 4\")  Weight: 71.7 kg (158 lb)  SpO2: 98 %      Physical Exam   Constitutional: She appears well-developed and well-nourished. No distress.   HENT:   Head: Normocephalic.   Mouth/Throat: Oropharynx is clear and moist.   Eyes: Conjunctivae are normal.   Neck: Normal range of motion. Neck supple. No JVD present.   Cardiovascular: Normal rate, regular rhythm, normal heart sounds and intact distal " pulses.    No murmur heard.  Pulmonary/Chest: Effort normal and breath sounds normal. She has no wheezes. She has no rales.   Mild anterior left chest wall tenderness to palpation which somewhat reproduces the pain.   Abdominal: Soft. Bowel sounds are normal. There is no tenderness. There is no rebound.   Musculoskeletal: Normal range of motion. She exhibits no tenderness.   Neurological: She is alert. She exhibits normal muscle tone.   Skin: Skin is warm and dry. No rash noted.   Psychiatric: She has a normal mood and affect.   Nursing note and vitals reviewed.    ED Course     ED Course     Procedures               EKG Interpretation:      Interpreted by Abdulaziz Brewer  Rhythm: normal sinus   Rate: Normal  Axis: Normal  Ectopy: none  Conduction: nonspecific interventricular conduction block  ST Segments/ T Waves: Non-specific ST-T wave changes  Q Waves: none  Comparison to prior: Unchanged from 723/15    Clinical Impression: NSR with non-specific st- twave abnormality.                Critical Care time:  none        Results for orders placed or performed during the hospital encounter of 07/19/18   Chest XR,  PA & LAT    Narrative    CHEST TWO VIEWS  7/19/2018 1:34 PM     HISTORY: Shortness of breath.    COMPARISON: 5/2/2013      Impression    IMPRESSION: The lungs are well inflated and clear without  consolidation, edema, effusion, or pneumothorax. Heart size is normal.    SHARIF YAN MD          Labs Ordered and Resulted from Time of ED Arrival Up to the Time of Departure from the ED   COMPREHENSIVE METABOLIC PANEL - Abnormal; Notable for the following:        Result Value    Glucose 153 (*)     All other components within normal limits   CBC WITH PLATELETS DIFFERENTIAL   TROPONIN I   NT PROBNP INPATIENT   PARTIAL THROMBOPLASTIN TIME   INR   TROPONIN I   PERIPHERAL IV CATHETER       Medications   aspirin chewable tablet 324 mg (324 mg Oral Given 7/19/18 1303)     12:37 PM Patient assessed.  Assessments &  Plan (with Medical Decision Making) records were reviewed labs were obtained.  EKG revealed no obvious acute abnormality.  Patient was given an aspirin.  She denies any chest pain at this time.  Patient's white count was 8.4 hemoglobin 12.4 platelet count was 229.  Comprehensive metabolic panel significant for glucose 153.  Troponin was less than 0.015.  BNP was 150 PTT 28 INR 1.08.  Chest x-ray revealed no acute abnormality.  Lungs were clear well-inflated with normal heart size without fluid or pneumothorax.  Patient was observed for some time without any chest pain throughout this time.  A repeat troponin III hours from previous troponin was also negative.  Findings were discussed with patient.  She does have occasional angina and does have nitro at home.  She does have some cardiac risk factors and I discussed admission with the patient.  I felt this would be the safest is I would like to possibly set up a stress test after ruling her out.  Unfortunately we do not have beds at our hospital at this time and informed her of this and planned to transfer to a hospital of her choice but patient felt that this chest pain was more than likely due to her anxiety over her her son dying recently.  She does not want to be transferred to another hospital.  I gave her the option of checking back in on Friday and possibly sending the stress test then or having her follow-up with her primary care to set up a stress test.  She understands the safest thing to do would be admit but she does not want to do this at this time.  She also understands that if she has any further chest pain she should immediately return for further assessment and care.     I have reviewed the nursing notes.    I have reviewed the findings, diagnosis, plan and need for follow up with the patient.       Discharge Medication List as of 7/19/2018  4:28 PM          Final diagnoses:   Acute chest pain     This document serves as a record of the services and  decisions personally performed and made by Abdulaziz Brewer MD. It was created on HIS/HER behalf by Rose Painter, a trained medical scribe. The creation of this document is based the provider's statements to the medical scribe.  Rose Painter 12:09 PM 7/19/2018    Provider:   The information in this document, created by the medical scribe for me, accurately reflects the services I personally performed and the decisions made by me. I have reviewed and approved this document for accuracy prior to leaving the patient care area.  Abdulaziz Brewer MD 12:09 PM 7/19/2018 7/19/2018   [facility] EMERGENCY DEPARTMENT     Abdulaziz Brewer MD  07/21/18 0720

## 2018-07-19 NOTE — ED NOTES
Pt sent from [facility] for a cardiac workup for chest pain. Pt had pain last night that radiated down her left arm she took a nitroglycerin and the pain improved. Pt also had chest pain this morning did not take nitroglycerin because she had to drive to the clinic and the pain was not as bad, just a feeling down her left arm.

## 2018-07-21 ASSESSMENT — ENCOUNTER SYMPTOMS
VOMITING: 0
CONFUSION: 0
SPEECH DIFFICULTY: 0
DYSURIA: 0
ABDOMINAL PAIN: 0
DIZZINESS: 0
APPETITE CHANGE: 0
FEVER: 0
WEAKNESS: 0
CHEST TIGHTNESS: 1
BACK PAIN: 0
BRUISES/BLEEDS EASILY: 0
NAUSEA: 0

## 2018-08-16 ENCOUNTER — TELEPHONE (OUTPATIENT)
Dept: PALLIATIVE MEDICINE | Facility: CLINIC | Age: 83
End: 2018-08-16

## 2018-09-10 ENCOUNTER — HOSPITAL ENCOUNTER (OUTPATIENT)
Facility: CLINIC | Age: 83
Setting detail: OBSERVATION
Discharge: HOME | End: 2018-09-11
Attending: STUDENT IN AN ORGANIZED HEALTH CARE EDUCATION/TRAINING PROGRAM | Admitting: FAMILY MEDICINE
Payer: MEDICARE

## 2018-09-10 ENCOUNTER — APPOINTMENT (OUTPATIENT)
Dept: CT IMAGING | Facility: CLINIC | Age: 83
End: 2018-09-10
Attending: STUDENT IN AN ORGANIZED HEALTH CARE EDUCATION/TRAINING PROGRAM
Payer: MEDICARE

## 2018-09-10 ENCOUNTER — HOSPITAL ENCOUNTER (OUTPATIENT)
Dept: NUCLEAR MEDICINE | Facility: CLINIC | Age: 83
Setting detail: NUCLEAR MEDICINE
Discharge: HOME | End: 2018-09-10
Attending: FAMILY MEDICINE | Admitting: FAMILY MEDICINE
Payer: MEDICARE

## 2018-09-10 DIAGNOSIS — R55 SYNCOPE, UNSPECIFIED SYNCOPE TYPE: ICD-10-CM

## 2018-09-10 DIAGNOSIS — S80.01XA CONTUSION OF RIGHT KNEE, INITIAL ENCOUNTER: ICD-10-CM

## 2018-09-10 DIAGNOSIS — S09.90XA INJURY OF HEAD, INITIAL ENCOUNTER: ICD-10-CM

## 2018-09-10 DIAGNOSIS — R07.9 CHEST PAIN ON EXERTION: ICD-10-CM
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
## 2018-09-10 DIAGNOSIS — S09.90XA CLOSED HEAD INJURY, INITIAL ENCOUNTER: ICD-10-CM

## 2018-09-10 PROBLEM — K52.9 GASTROENTERITIS: Status: RESOLVED | Noted: 2017-06-17 | Resolved: 2018-09-10

## 2018-09-10 PROBLEM — E86.1 HYPOVOLEMIA DEHYDRATION: Status: RESOLVED | Noted: 2017-06-17 | Resolved: 2018-09-10

## 2018-09-10 LAB
ALBUMIN UR-MCNC: NEGATIVE MG/DL
ANION GAP SERPL CALCULATED.3IONS-SCNC: 7 MMOL/L (ref 3–14)
APPEARANCE UR: CLEAR
BASOPHILS # BLD AUTO: 0 10E9/L (ref 0–0.2)
BASOPHILS NFR BLD AUTO: 0.4 %
BILIRUB UR QL STRIP: NEGATIVE
BUN SERPL-MCNC: 14 MG/DL (ref 7–30)
CALCIUM SERPL-MCNC: 9.1 MG/DL (ref 8.5–10.1)
CHLORIDE SERPL-SCNC: 109 MMOL/L (ref 94–109)
CO2 SERPL-SCNC: 26 MMOL/L (ref 20–32)
COLOR UR AUTO: ABNORMAL
CREAT SERPL-MCNC: 0.85 MG/DL (ref 0.52–1.04)
DIFFERENTIAL METHOD BLD: NORMAL
EOSINOPHIL # BLD AUTO: 0.2 10E9/L (ref 0–0.7)
EOSINOPHIL NFR BLD AUTO: 1.6 %
ERYTHROCYTE [DISTWIDTH] IN BLOOD BY AUTOMATED COUNT: 12.8 % (ref 10–15)
GFR SERPL CREATININE-BSD FRML MDRD: 63 ML/MIN/1.7M2
GLUCOSE BLDC GLUCOMTR-MCNC: 116 MG/DL (ref 70–99)
GLUCOSE SERPL-MCNC: 164 MG/DL (ref 70–99)
GLUCOSE UR STRIP-MCNC: NEGATIVE MG/DL
HBA1C MFR BLD: 6.5 % (ref 0–5.6)
HCT VFR BLD AUTO: 38.1 % (ref 35–47)
HGB BLD-MCNC: 12.7 G/DL (ref 11.7–15.7)
HGB UR QL STRIP: ABNORMAL
IMM GRANULOCYTES # BLD: 0 10E9/L (ref 0–0.4)
IMM GRANULOCYTES NFR BLD: 0.4 %
KETONES UR STRIP-MCNC: NEGATIVE MG/DL
LEUKOCYTE ESTERASE UR QL STRIP: NEGATIVE
LYMPHOCYTES # BLD AUTO: 2.5 10E9/L (ref 0.8–5.3)
LYMPHOCYTES NFR BLD AUTO: 24.6 %
MCH RBC QN AUTO: 28.7 PG (ref 26.5–33)
MCHC RBC AUTO-ENTMCNC: 33.3 G/DL (ref 31.5–36.5)
MCV RBC AUTO: 86 FL (ref 78–100)
MONOCYTES # BLD AUTO: 0.6 10E9/L (ref 0–1.3)
MONOCYTES NFR BLD AUTO: 6.2 %
NEUTROPHILS # BLD AUTO: 6.8 10E9/L (ref 1.6–8.3)
NEUTROPHILS NFR BLD AUTO: 66.8 %
NITRATE UR QL: NEGATIVE
NRBC # BLD AUTO: 0 10*3/UL
NRBC BLD AUTO-RTO: 0 /100
PH UR STRIP: 7 PH (ref 5–7)
PLATELET # BLD AUTO: 235 10E9/L (ref 150–450)
POTASSIUM SERPL-SCNC: 3.4 MMOL/L (ref 3.4–5.3)
RBC # BLD AUTO: 4.43 10E12/L (ref 3.8–5.2)
RBC #/AREA URNS AUTO: <1 /HPF (ref 0–2)
SODIUM SERPL-SCNC: 142 MMOL/L (ref 133–144)
SOURCE: ABNORMAL
SP GR UR STRIP: 1 (ref 1–1.03)
TROPONIN I SERPL-MCNC: <0.015 UG/L (ref 0–0.04)
TROPONIN I SERPL-MCNC: <0.015 UG/L (ref 0–0.04)
UROBILINOGEN UR STRIP-MCNC: 0 MG/DL (ref 0–2)
WBC # BLD AUTO: 10.2 10E9/L (ref 4–11)
WBC #/AREA URNS AUTO: <1 /HPF (ref 0–5)

## 2018-09-10 PROCEDURE — 00000146 ZZHCL STATISTIC GLUCOSE BY METER IP

## 2018-09-10 PROCEDURE — 36415 COLL VENOUS BLD VENIPUNCTURE: CPT | Performed by: PHYSICIAN ASSISTANT

## 2018-09-10 PROCEDURE — 34300033 ZZH RX 343: Performed by: INTERNAL MEDICINE

## 2018-09-10 PROCEDURE — 85379 FIBRIN DEGRADATION QUANT: CPT | Performed by: PHYSICIAN ASSISTANT

## 2018-09-10 PROCEDURE — 78452 HT MUSCLE IMAGE SPECT MULT: CPT | Mod: 26 | Performed by: INTERNAL MEDICINE

## 2018-09-10 PROCEDURE — 78452 HT MUSCLE IMAGE SPECT MULT: CPT

## 2018-09-10 PROCEDURE — 84484 ASSAY OF TROPONIN QUANT: CPT | Performed by: PHYSICIAN ASSISTANT

## 2018-09-10 PROCEDURE — 72125 CT NECK SPINE W/O DYE: CPT

## 2018-09-10 PROCEDURE — A9502 TC99M TETROFOSMIN: HCPCS | Performed by: INTERNAL MEDICINE

## 2018-09-10 PROCEDURE — 93010 ELECTROCARDIOGRAM REPORT: CPT | Mod: Z6 | Performed by: STUDENT IN AN ORGANIZED HEALTH CARE EDUCATION/TRAINING PROGRAM

## 2018-09-10 PROCEDURE — 93016 CV STRESS TEST SUPVJ ONLY: CPT | Performed by: INTERNAL MEDICINE

## 2018-09-10 PROCEDURE — G0378 HOSPITAL OBSERVATION PER HR: HCPCS

## 2018-09-10 PROCEDURE — 93005 ELECTROCARDIOGRAM TRACING: CPT | Mod: XU | Performed by: STUDENT IN AN ORGANIZED HEALTH CARE EDUCATION/TRAINING PROGRAM

## 2018-09-10 PROCEDURE — A9270 NON-COVERED ITEM OR SERVICE: HCPCS | Mod: GY | Performed by: PHYSICIAN ASSISTANT

## 2018-09-10 PROCEDURE — 93017 CV STRESS TEST TRACING ONLY: CPT

## 2018-09-10 PROCEDURE — 81001 URINALYSIS AUTO W/SCOPE: CPT | Performed by: STUDENT IN AN ORGANIZED HEALTH CARE EDUCATION/TRAINING PROGRAM

## 2018-09-10 PROCEDURE — 99220 ZZC INITIAL OBSERVATION CARE,LEVL III: CPT | Performed by: PHYSICIAN ASSISTANT

## 2018-09-10 PROCEDURE — 84484 ASSAY OF TROPONIN QUANT: CPT | Performed by: STUDENT IN AN ORGANIZED HEALTH CARE EDUCATION/TRAINING PROGRAM

## 2018-09-10 PROCEDURE — 99285 EMERGENCY DEPT VISIT HI MDM: CPT | Mod: 25 | Performed by: STUDENT IN AN ORGANIZED HEALTH CARE EDUCATION/TRAINING PROGRAM

## 2018-09-10 PROCEDURE — 70450 CT HEAD/BRAIN W/O DYE: CPT

## 2018-09-10 PROCEDURE — 80048 BASIC METABOLIC PNL TOTAL CA: CPT | Performed by: STUDENT IN AN ORGANIZED HEALTH CARE EDUCATION/TRAINING PROGRAM

## 2018-09-10 PROCEDURE — 85025 COMPLETE CBC W/AUTO DIFF WBC: CPT | Performed by: STUDENT IN AN ORGANIZED HEALTH CARE EDUCATION/TRAINING PROGRAM

## 2018-09-10 PROCEDURE — 25000132 ZZH RX MED GY IP 250 OP 250 PS 637: Mod: GY | Performed by: PHYSICIAN ASSISTANT

## 2018-09-10 PROCEDURE — 93018 CV STRESS TEST I&R ONLY: CPT | Performed by: INTERNAL MEDICINE

## 2018-09-10 PROCEDURE — 99207 ZZC CDG-CODE CATEGORY CHANGED: CPT | Performed by: PHYSICIAN ASSISTANT

## 2018-09-10 PROCEDURE — 83036 HEMOGLOBIN GLYCOSYLATED A1C: CPT | Performed by: PHYSICIAN ASSISTANT

## 2018-09-10 RX ORDER — CITALOPRAM HYDROBROMIDE 20 MG/1
20 TABLET ORAL AT BEDTIME
Status: DISCONTINUED | OUTPATIENT
Start: 2018-09-10 | End: 2018-09-11 | Stop reason: HOSPADM

## 2018-09-10 RX ORDER — LIDOCAINE 40 MG/G
CREAM TOPICAL
Status: DISCONTINUED | OUTPATIENT
Start: 2018-09-10 | End: 2018-09-11 | Stop reason: HOSPADM

## 2018-09-10 RX ORDER — NALOXONE HYDROCHLORIDE 0.4 MG/ML
.1-.4 INJECTION, SOLUTION INTRAMUSCULAR; INTRAVENOUS; SUBCUTANEOUS
Status: DISCONTINUED | OUTPATIENT
Start: 2018-09-10 | End: 2018-09-11 | Stop reason: HOSPADM

## 2018-09-10 RX ORDER — ONDANSETRON 4 MG/1
4 TABLET, ORALLY DISINTEGRATING ORAL EVERY 6 HOURS PRN
Status: DISCONTINUED | OUTPATIENT
Start: 2018-09-10 | End: 2018-09-11 | Stop reason: HOSPADM

## 2018-09-10 RX ORDER — NITROGLYCERIN 0.4 MG/1
0.4 TABLET SUBLINGUAL EVERY 5 MIN PRN
Status: DISCONTINUED | OUTPATIENT
Start: 2018-09-10 | End: 2018-09-11 | Stop reason: HOSPADM

## 2018-09-10 RX ORDER — DEXTROSE MONOHYDRATE 25 G/50ML
25-50 INJECTION, SOLUTION INTRAVENOUS
Status: DISCONTINUED | OUTPATIENT
Start: 2018-09-10 | End: 2018-09-11 | Stop reason: HOSPADM

## 2018-09-10 RX ORDER — REGADENOSON 0.08 MG/ML
0.4 INJECTION, SOLUTION INTRAVENOUS ONCE
Status: COMPLETED | OUTPATIENT
Start: 2018-09-10 | End: 2018-09-10

## 2018-09-10 RX ORDER — AMLODIPINE BESYLATE 5 MG/1
1 TABLET ORAL EVERY EVENING
Refills: 1 | COMMUNITY
Start: 2018-07-27 | End: 2018-11-14

## 2018-09-10 RX ORDER — LOSARTAN POTASSIUM 50 MG/1
50 TABLET ORAL DAILY
Status: DISCONTINUED | OUTPATIENT
Start: 2018-09-11 | End: 2018-09-11 | Stop reason: HOSPADM

## 2018-09-10 RX ORDER — AMLODIPINE BESYLATE 5 MG/1
5 TABLET ORAL DAILY
Status: DISCONTINUED | OUTPATIENT
Start: 2018-09-11 | End: 2018-09-11 | Stop reason: HOSPADM

## 2018-09-10 RX ORDER — NICOTINE POLACRILEX 4 MG
15-30 LOZENGE BUCCAL
Status: DISCONTINUED | OUTPATIENT
Start: 2018-09-10 | End: 2018-09-11 | Stop reason: HOSPADM

## 2018-09-10 RX ORDER — TRIMETHOPRIM 100 MG/1
100 TABLET ORAL EVERY EVENING
Status: DISCONTINUED | OUTPATIENT
Start: 2018-09-10 | End: 2018-09-11 | Stop reason: HOSPADM

## 2018-09-10 RX ORDER — ACETAMINOPHEN 650 MG/1
650 SUPPOSITORY RECTAL EVERY 4 HOURS PRN
Status: DISCONTINUED | OUTPATIENT
Start: 2018-09-10 | End: 2018-09-11 | Stop reason: HOSPADM

## 2018-09-10 RX ORDER — ACETAMINOPHEN 325 MG/1
650 TABLET ORAL EVERY 4 HOURS PRN
Status: DISCONTINUED | OUTPATIENT
Start: 2018-09-10 | End: 2018-09-11 | Stop reason: HOSPADM

## 2018-09-10 RX ORDER — PROCHLORPERAZINE MALEATE 5 MG
5 TABLET ORAL EVERY 6 HOURS PRN
Status: DISCONTINUED | OUTPATIENT
Start: 2018-09-10 | End: 2018-09-11 | Stop reason: HOSPADM

## 2018-09-10 RX ORDER — GABAPENTIN 100 MG/1
200 CAPSULE ORAL AT BEDTIME
Status: DISCONTINUED | OUTPATIENT
Start: 2018-09-10 | End: 2018-09-11 | Stop reason: HOSPADM

## 2018-09-10 RX ORDER — ATORVASTATIN CALCIUM 20 MG/1
40 TABLET, FILM COATED ORAL AT BEDTIME
Status: DISCONTINUED | OUTPATIENT
Start: 2018-09-10 | End: 2018-09-11 | Stop reason: HOSPADM

## 2018-09-10 RX ORDER — TRAMADOL HYDROCHLORIDE 50 MG/1
50 TABLET ORAL EVERY 6 HOURS PRN
Status: DISCONTINUED | OUTPATIENT
Start: 2018-09-10 | End: 2018-09-11 | Stop reason: HOSPADM

## 2018-09-10 RX ORDER — ACETAMINOPHEN 325 MG/1
325 TABLET ORAL EVERY 6 HOURS PRN
Status: DISCONTINUED | OUTPATIENT
Start: 2018-09-10 | End: 2018-09-11 | Stop reason: HOSPADM

## 2018-09-10 RX ORDER — PROCHLORPERAZINE 25 MG
12.5 SUPPOSITORY, RECTAL RECTAL EVERY 12 HOURS PRN
Status: DISCONTINUED | OUTPATIENT
Start: 2018-09-10 | End: 2018-09-11 | Stop reason: HOSPADM

## 2018-09-10 RX ORDER — HYDRALAZINE HYDROCHLORIDE 20 MG/ML
10 INJECTION INTRAMUSCULAR; INTRAVENOUS EVERY 6 HOURS PRN
Status: DISCONTINUED | OUTPATIENT
Start: 2018-09-10 | End: 2018-09-11 | Stop reason: HOSPADM

## 2018-09-10 RX ORDER — TRIMETHOPRIM 100 MG/1
100 TABLET ORAL EVERY EVENING
COMMUNITY
Start: 2018-09-06 | End: 2018-11-14

## 2018-09-10 RX ORDER — ONDANSETRON 2 MG/ML
4 INJECTION INTRAMUSCULAR; INTRAVENOUS EVERY 6 HOURS PRN
Status: DISCONTINUED | OUTPATIENT
Start: 2018-09-10 | End: 2018-09-11 | Stop reason: HOSPADM

## 2018-09-10 RX ADMIN — CITALOPRAM HYDROBROMIDE 20 MG: 20 TABLET ORAL at 21:20

## 2018-09-10 RX ADMIN — REGADENOSON 0.4 MG: 0.08 INJECTION, SOLUTION INTRAVENOUS at 14:13

## 2018-09-10 RX ADMIN — GABAPENTIN 200 MG: 100 CAPSULE ORAL at 21:20

## 2018-09-10 RX ADMIN — TETROFOSMIN 31.6 MCI.: 1.38 INJECTION, POWDER, LYOPHILIZED, FOR SOLUTION INTRAVENOUS at 14:19

## 2018-09-10 RX ADMIN — TRIMETHOPRIM 100 MG: 100 TABLET ORAL at 21:19

## 2018-09-10 RX ADMIN — ATORVASTATIN CALCIUM 40 MG: 20 TABLET, FILM COATED ORAL at 21:20

## 2018-09-10 RX ADMIN — TETROFOSMIN 9.7 MCI.: 1.38 INJECTION, POWDER, LYOPHILIZED, FOR SOLUTION INTRAVENOUS at 13:30

## 2018-09-10 NOTE — ED NOTES
Pt was having exercise stress test today and just finished with testing and walking out to go home,  Pt fell hitting right side of head on ground with right knee pain.   Pt has unknown loc and does not recall event.   Pt was found by a tech on the ground and brought to ER.  Pt alert and oriented x 4 upon arrival to ER.

## 2018-09-10 NOTE — PROGRESS NOTES
"[place] [facility] ADMISSION NOTE    Patient admitted to room 2213 at approximately 1815 via wheel chair from emergency room. Patient was accompanied by transport tech.     Verbal SBAR report received from [employer] prior to patient arrival.     Patient ambulated to bed with stand-by assist. Patient alert and oriented X 3. The patient is not having any pain. 0-10 Pain Scale: 5. Admission vital signs: Blood pressure 183/70, pulse 88, temperature 97.2  F (36.2  C), temperature source Oral, resp. rate 18, height 1.626 m (5' 4\"), weight 72 kg (158 lb 11.7 oz), SpO2 98 %. Patient was oriented to plan of care, call light, bed controls, tv, telephone, bathroom and visiting hours.     Risk Assessment    The following safety risks were identified during admission: fall. Yellow risk band applied: YES.     Skin Initial Assessment    This writer admitted this patient and completed a full skin assessment and Serge score in the Adult PCS flowsheet. Appropriate interventions initiated as needed.     Secondary skin check completed by Toyin Hannah    "

## 2018-09-10 NOTE — ED PROVIDER NOTES
History     Chief Complaint   Patient presents with     Fall     coming from cardiac clinic and fell in mixon-struck head and rt knee   rt side of face and nose hurts-states that she does not remember falling-brought to triage by ERT     MICHAEL  Daniela Brown is a 85 year old female with past medical history which includes GERD, anxiety, depression, hypertension, hyperlipidemia, and type 2 diabetes mellitus who presents for evaluation after unwitnessed loss of consciousness.  Patient explains that she arrived at the hospital shortly after 12 PM today for a scheduled cardiac stress test.  After she performed the stress test she was walking out of the department and into the hospital hallway when she apparently lost consciousness.  By nursing report, another patient walking in front of her heard her fall to the ground and she was apparently unconscious for only a few seconds before awakening.  Patient complains of mild right-sided headache and right knee pain from the fall.  She does not remember specifics of the fall but does remember the stress test which she believes went well.  In the department she has no active complaints.  Patient denies recent fever, chills, chest pain, palpitations, cough, shortness of breath, abdominal pain, nausea/vomiting, or other associated symptoms.  No known exacerbating or alleviating factors.  Cardiac stress test staff was contacted and they confirm that patient had a Fabiana scan performed at 14:25 today which she seemed to tolerate well complaining only of fatigue.    Problem List:    Patient Active Problem List    Diagnosis Date Noted     Gastroenteritis 06/17/2017     Priority: Medium     Hypovolemia dehydration 06/17/2017     Priority: Medium     B12 deficiency anemia 06/20/2012     Priority: Medium     Colitis, Clostridium difficile 04/18/2012     Priority: Medium     Anemia due to blood loss, acute 04/02/2012     Priority: Medium     S/P total knee replacement 03/28/2012  "    Priority: Medium     Leukocytosis 09/23/2011     Priority: Medium     Advanced directives, counseling/discussion 06/02/2011     Priority: Medium     Patient does not have an Advance/Health Care Directive (HCD), given \"What is Advance Care Planning?\" flyer.    Isamar Suarez  June 2, 2011         Pronation of foot 05/05/2011     Priority: Medium     Bilateral defomity       Angina pectoris (H) 03/17/2011     Priority: Medium     (Problem list name updated by automated process. Provider to review and confirm.)       Allergic rhinitis 01/19/2011     Priority: Medium     Type 2 diabetes, HbA1c goal < 7% (H) 01/05/2011     Priority: Medium     HTN (hypertension) 11/12/2010     Priority: Medium     Hyperlipidemia LDL goal <100 10/31/2010     Priority: Medium     Obesity 07/21/2010     Priority: Medium     Frequent UTI 10/20/2009     Priority: Medium     Cysto negative 7/10.        Urge incontinence 10/20/2009     Priority: Medium     Right foot pain 10/16/2009     Priority: Medium     Xray showed djd -follows with podiatry. -arch supports placed may need cam walker        Vitamin D deficiency 09/09/2008     Priority: Medium     Subjective tinnitus 04/22/2008     Priority: Medium     Urticaria 08/22/2006     Priority: Medium     Problem list name updated by automated process. Provider to review       SENSONRL HEAR LOSS,BILAT 05/03/2006     Priority: Medium     Esophageal reflux      Priority: Medium     Memory loss      Priority: Medium     Early cognitive decline. MMSE 27/30, Neno 4.7/ 6.0 2004. B12, TSH, RPR normal 1940-0393.       Depressive disorder, not elsewhere classified      Priority: Medium     Degeneration of lumbar or lumbosacral intervertebral disc      Priority: Medium     MRI 5/04- DDD, L2-3 annular bulge with protrusion into left caudal infra-foraminal area  Uses vicodin sparingly   Pain Agreement signed.        Irritable bowel syndrome      Priority: Low     diahrrea predominant       Disorder of bone " and cartilage      Priority: Low     DEXA 2007 -1.4 hip. Dexa 2004 -1 hip and -1 spine  Problem list name updated by automated process. Provider to review       RESTLESS LEG SYNDROME      Priority: Low     Generalized osteoarthrosis, unspecified site      Priority: Low     C-spine, left hip       Allergic rhinitis      Priority: Low     Problem list name updated by automated process. Provider to review       Diverticulosis of large intestine      Priority: Low     flexible sigmoidoscopy with diverticulosis otherwise normal 2001, admit diverticulitis 2009  Problem list name updated by automated process. Provider to review          Past Medical History:    Past Medical History:   Diagnosis Date     Adhesive capsulitis of shoulder      Allergic rhinitis, cause unspecified      Carpal tunnel syndrome      Chest pain, unspecified      Cystocele, midline      Degeneration of lumbar or lumbosacral intervertebral disc      Depressive disorder, not elsewhere classified      Diabetes mellitus (H)      Diverticulosis of colon (without mention of hemorrhage)      Esophageal reflux      Essential hypertension, benign      Generalized osteoarthrosis, unspecified site      Headache(784.0)      Impaired fasting glucose      Irritable bowel syndrome      Memory loss      Mixed hyperlipidemia      OA (OSTEOARTHRITIS) GENERALIZED( Multiple Sites)      OSTEOPENIA      PERS HX ALLERGY OTHER FOODS 8/22/2006     PERS HX ALLERGY TO MILK PRODUCTS 8/22/2006     RESTLESS LEG SYNDROME      Unspecified vitamin D deficiency        Past Surgical History:    Past Surgical History:   Procedure Laterality Date     ARTHROPLASTY KNEE  3/26/2012    Procedure:ARTHROPLASTY KNEE; Right Total Knee Arthroplasty; Surgeon:BERNADINE ROSAS; Location:[facility] OR     C APPENDECTOMY  1953     HC REMOVAL GALLBLADDER       HYSTERECTOMY, PAP NO LONGER INDICATED  1983    TVH/BSO     SURGICAL HISTORY OF -       Hernia Repair     SURGICAL HISTORY OF -   10/08    [facility]  Dr. Camden Joe       Family History:    Family History   Problem Relation Age of Onset     C.A.D. Mother      Diabetes Mother      Cancer - colorectal Mother      Arthritis Mother      HEART DISEASE Mother      Lipids Brother      Obesity Brother      Hypertension Brother      Allergies Son      Eye Disorder Son      cataract     Thyroid Disease Sister      graves dz     Eye Disorder Son      Alcohol/Drug Father        Social History:  Marital Status:   [5]  Social History   Substance Use Topics     Smoking status: Never Smoker     Smokeless tobacco: Never Used     Alcohol use No        Medications:      acetaminophen (TYLENOL) 325 MG tablet   Atorvastatin Calcium (LIPITOR PO)   citalopram (CELEXA) 20 MG tablet   Cyanocobalamin (B-12) 1000 MCG SUBL   LOSARTAN POTASSIUM PO   Multiple Vitamins-Minerals (THERAPEUTIC MULTIVIT/MINERAL) TABS   nitroglycerin (NITROSTAT) 0.4 MG SL tablet   OMEPRAZOLE PO   traMADol (ULTRAM) 50 MG tablet   trimethoprim (TRIMPEX) 100 MG tablet   VITAMIN D, CHOLECALCIFEROL, PO   ** PATIENT ALERT **   ClonazePAM (KLONOPIN PO)   gabapentin (NEURONTIN) 100 MG capsule   Glucose Blood (ONE TOUCH ULTRA TEST) strip   order for DME   ORDER FOR DME         Review of Systems  Constitutional:  Negative for fever or recent illness.  HENT:  Negative oral or dental pain.  Cardiovascular:  Negative for chest pain or palpitations.  Respiratory:  Negative for cough or shortness of breath.  Gastrointestinal:  Negative for abdominal pain, nausea or vomiting.  Denies blood with bowel movements.   Genitourinary:  Negative for dysuria.  Musculoskeletal: Positive for right knee pain.  Negative for neck pain, back pain, hip pain, or other extremity injuries.  Neurological: Positive for report of loss of consciousness.  Denies headache.  Negative for weakness or sensory deficits.  Skin:  Negative for bleeding skin tissue wound.    All others reviewed and are negative.      Physical Exam   BP: 195/82  Heart  Rate: 99  Resp: 16  SpO2: 97 %      Physical Exam  Constitutional:  Well developed, well nourished.  Appears stable and in no acute distress.  Head:  Atraumatic appearance of face.  Negative for Raccoon eyes and Osborn sign.  No tenderness to palpation of facial bones or skull circumferentially.  Eye:  No obvious proptosis or subconjunctival hemorrhage.  Eyelids appear symmetrical.  PERRLA.  Ears:  Typical appearance of the external auditory canal bilaterally, tympanic membranes visualized and without hemotympanum.  No mastoid region tenderness.  Neck:  No tenderness to palpation of midline cervical vertebra.  Ranging freely and without pain.  Not held in self-protecting position.    Cardiovascular:  No cyanosis.  RRR.  No audible murmurs noted.  2/4 bilateral radial pulses and dorsalis pedis pulses.  Respiratory/chest:  Effort normal, no respiratory distress.  CTAB without diminished regions bilaterally.  Gastrointestinal:  Soft, nondistended abdomen.  Nontender and without guarding.  No rigidity or rebound tenderness.  Negative Mathew's sign.  Negative McBurney's point.  No palpable pulsatile mass.   Genitourinary:  Noncontributory.   Musculoskeletal:  Moves extremities spontaneously.  Neurological:  Patient is alert and oriented in the department.  Skin:  Skin is warm and dry.  Psychiatric:  Normal mood and affect.    Christofer Coma Scale - GCS (calculator)    Motor 6=Obeys commands   Verbal 5=Oriented   Eye Opening 4=Spontaneous   Total: 15       ED Course     ED Course     Procedures                 EKG Interpretation:      Interpreted by: Prabhu Hansen  Time reviewed: Upon arrival     Symptoms at time of EKG: Asymptomatic, previous syncopal episode  Rhythm: Sinus  Rate: Normal  Axis: Left  Conduction: None atypical   ST Segments/ T Waves: No pathologic ST-elevations or T-wave abnormalities.  Q Waves: None  Comparison to prior: Similar morphology to previous     Clinical Impression: No sign of ischemia or  arrhythmia        Critical Care time:  none               Results for orders placed or performed during the hospital encounter of 09/10/18 (from the past 24 hour(s))   CBC with platelets, differential   Result Value Ref Range    WBC 10.2 4.0 - 11.0 10e9/L    RBC Count 4.43 3.8 - 5.2 10e12/L    Hemoglobin 12.7 11.7 - 15.7 g/dL    Hematocrit 38.1 35.0 - 47.0 %    MCV 86 78 - 100 fl    MCH 28.7 26.5 - 33.0 pg    MCHC 33.3 31.5 - 36.5 g/dL    RDW 12.8 10.0 - 15.0 %    Platelet Count 235 150 - 450 10e9/L    Diff Method Automated Method     % Neutrophils 66.8 %    % Lymphocytes 24.6 %    % Monocytes 6.2 %    % Eosinophils 1.6 %    % Basophils 0.4 %    % Immature Granulocytes 0.4 %    Nucleated RBCs 0 0 /100    Absolute Neutrophil 6.8 1.6 - 8.3 10e9/L    Absolute Lymphocytes 2.5 0.8 - 5.3 10e9/L    Absolute Monocytes 0.6 0.0 - 1.3 10e9/L    Absolute Eosinophils 0.2 0.0 - 0.7 10e9/L    Absolute Basophils 0.0 0.0 - 0.2 10e9/L    Abs Immature Granulocytes 0.0 0 - 0.4 10e9/L    Absolute Nucleated RBC 0.0    Basic metabolic panel   Result Value Ref Range    Sodium 142 133 - 144 mmol/L    Potassium 3.4 3.4 - 5.3 mmol/L    Chloride 109 94 - 109 mmol/L    Carbon Dioxide 26 20 - 32 mmol/L    Anion Gap 7 3 - 14 mmol/L    Glucose 164 (H) 70 - 99 mg/dL    Urea Nitrogen 14 7 - 30 mg/dL    Creatinine 0.85 0.52 - 1.04 mg/dL    GFR Estimate 63 >60 mL/min/1.7m2    GFR Estimate If Black 76 >60 mL/min/1.7m2    Calcium 9.1 8.5 - 10.1 mg/dL   Troponin I   Result Value Ref Range    Troponin I ES <0.015 0.000 - 0.045 ug/L   UA reflex to Microscopic   Result Value Ref Range    Color Urine Straw     Appearance Urine Clear     Glucose Urine Negative NEG^Negative mg/dL    Bilirubin Urine Negative NEG^Negative    Ketones Urine Negative NEG^Negative mg/dL    Specific Gravity Urine 1.005 1.003 - 1.035    Blood Urine Small (A) NEG^Negative    pH Urine 7.0 5.0 - 7.0 pH    Protein Albumin Urine Negative NEG^Negative mg/dL    Urobilinogen mg/dL 0.0 0.0 -  2.0 mg/dL    Nitrite Urine Negative NEG^Negative    Leukocyte Esterase Urine Negative NEG^Negative    Source Midstream Urine     RBC Urine <1 0 - 2 /HPF    WBC Urine <1 0 - 5 /HPF   Head CT w/o contrast    Narrative    CT SCAN OF THE HEAD WITHOUT CONTRAST   9/10/2018 4:11 PM     HISTORY: unwitnessed fall;     TECHNIQUE:  Axial images of the head and coronal reformations without  IV contrast material. Radiation dose for this scan was reduced using  automated exposure control, adjustment of the mA and/or kV according  to patient size, or iterative reconstruction technique.    COMPARISON: None.    FINDINGS:  There is generalized atrophy of the brain.  White matter  changes are present in the cerebral hemispheres that are consistent  with small vessel ischemic disease in this age patient. There is no  evidence of intracranial hemorrhage, mass, acute infarct or anomaly.  The visualized portions of the sinuses and mastoids appear normal.  There is no evidence of trauma. Severe degenerative changes seen in  the temporomandibular joints.      Impression    IMPRESSION: No acute pathology. No bleed. No fractures.      KRISTIAN SAUNDERS MD   Cervical spine CT w/o contrast    Narrative    CT CERVICAL SPINE WITHOUT CONTRAST   9/10/2018 4:12 PM     HISTORY: unwitnessed fall, .;      TECHNIQUE: Axial images of the cervical spine were obtained without  intravenous contrast. Multiplanar reformations were performed.   Radiation dose for this scan was reduced using automated exposure  control, adjustment of the mA and/or kV according to patient size, or  iterative reconstruction technique.    COMPARISON: None.    FINDINGS: There is no evidence of fracture. There is curvature of the  spine convex towards the left.      Craniocervical junction: Normal.     C1-C2:  Normal.     C2-C3:  Normal disc, facet joints, spinal canal and neural foramina.     C3-C4:  There are severe degenerative changes in the right facet  joint. The central canal and  neural foramen are normal.     C4-C5:  Mild annular disc bulge. Moderate-severe degenerative change  in the right facet joint.     C5-C6:  Mild annular bulge. Central canal and neural foramen are  patent.      C6-C7:  Loss of disc space height. Mild annular bulge. Central canal  and neural foramen are patent.      C7-T1:   Central canal and neural foramen are patent.      Impression    IMPRESSION:    1. No fractures are identified.  2. Multilevel degenerative change.    KRISTIAN SAUNDERS MD       Medications - No data to display    Assessments & Plan (with Medical Decision Making)   Daniela Brown is a 85 year old female who presented to the department for evaluation after report of unwitnessed syncopal episode resulting in right-sided head injury.  The injury occurred as the patient was leaving the hospital after having had a Lexiscan cardiac stress test performed.  She says that she has felt relatively well today, takes medications at night and did not miss any of her evening medications last night.  She had no chest pain or significant discomfort during the stress test, only describes feeling tired and fatigued.  Differential diagnosis included cardiac arrhythmia and vasovagal syncope.  Low suspicion for SAH or PE.  CT imaging of head/brain independently reviewed, radiologist notes no acute pathology.  CBC is without leukocytosis or anemia.  Troponin within reference range and EKG ischemia.  She remained comfortable during her stay in the department without recurring symptoms or identifiable arrhythmia on telemetry.  Consulted [facility] cardiologist Dr. Loving who believes the stress test read is reassuring but recommends either observation admission for telemetry monitoring or outpatient Holter monitoring.  Patient lives alone and remains concerned since the abrupt syncopal episode, would prefer observation admission which seems reasonable.  Hospitalist MARY Hitchcock has accepted ongoing care for the  patient at this time.  No further recommendations.  Temporary transition orders were placed per hospital protocol to prevent any potential delay in patient care.    Patient has been informed of her results and the recommendation for admission.  They have verbalized an understanding, all questions answered, and they are in agreement with the plan at this time.      Disclaimer:  This note consists of symbols derived from keyboarding, dictation, and/or voice recognition software.  As a result, there may be errors in the script that have gone undetected.  Please consider this when interpreting information found in the chart.        I have reviewed the nursing notes.    I have reviewed the findings, diagnosis, plan and need for follow up with the patient.       New Prescriptions    No medications on file       Final diagnoses:   Syncope, unspecified syncope type   Closed head injury, initial encounter   Contusion of right knee, initial encounter       9/10/2018   [facility] EMERGENCY DEPARTMENT     Prabhu Hansen,   09/10/18 1724

## 2018-09-10 NOTE — IP AVS SNAPSHOT
[facility]    [address] [address]    Phone:  [phone]    Fax:  [phone]                                       After Visit Summary   9/10/2018    Dnaiela Brown    MRN: [id number]           After Visit Summary Signature Page     I have received my discharge instructions, and my questions have been answered. I have discussed any challenges I see with this plan with the nurse or doctor.    ..........................................................................................................................................  Patient/Patient Representative Signature      ..........................................................................................................................................  Patient Representative Print Name and Relationship to Patient    ..................................................               ................................................  Date                                   Time    ..........................................................................................................................................  Reviewed by Signature/Title    ...................................................              ..............................................  Date                                               Time          22EPIC Rev 08/18

## 2018-09-10 NOTE — ED NOTES
"Patient has  [place] to Observation  order. Patient has been given the Observation brochure -  What does Observation mean to me.\"  Patient has been given the opportunity to ask questions about observation status and their plan of care.      Dixie Gallegos  "

## 2018-09-10 NOTE — ED NOTES
Walked with patient in hallway, patient did very well. Patient when asked stated that legs felt a little weak other wise was fine, she also stated that having a little weakness in legs in her normal, and having back issues.  Patient did very well at turning at end of mixon as well. Suggestion was made to patient that maybe a walker would be safer for her with the leg weakness, and patient stated does have one at home from her son and maybe that would be good.  Reported info to

## 2018-09-10 NOTE — IP AVS SNAPSHOT
MRN:[id number]                      After Visit Summary   9/10/2018    Daniela Brown    MRN: [id number]           Thank you!     Thank you for choosing [facility] for your care. Our goal is always to provide you with excellent care. Hearing back from our patients is one way we can continue to improve our services. Please take a few minutes to complete the written survey that you may receive in the mail after you visit with us. Thank you!        Patient Information     Date Of Birth          1/26/1933        About your hospital stay     You were admitted on:  September 10, 2018 You last received care in the:  [facility]    You were discharged on:  September 11, 2018       Who to Call     For medical emergencies, please call 911.  For non-urgent questions about your medical care, please call your primary care provider or clinic, [phone]          Attending Provider     Provider Specialty    Prabhu Hansen DO Emergency Medicine    Leandro Malcolm MD [facility] Internal Medicine       Primary Care Provider Office Phone # Fax #    Reynaldo Duenas [phone]      After Care Instructions     Activity       Your activity upon discharge: activity as tolerated            Diet       Follow this diet upon discharge: Orders Placed This Encounter      Consistent Carbohydrate Diet 6912-3766 Calories: Moderate Consistent CHO (4-6 CHO units/meal); No Caffeine for 24 hours (once tests completed, may have caffeine)                  Follow-up Appointments     Follow-up and recommended labs and tests        Follow up with primary care provider, Reynaldo Duenas as scheduled for hospital follow- up and to follow up on results.  Needs to f/u with cards for abnormal stress test.                  Your next 10 appointments already scheduled     Sep 11, 2018  4:00 PM CDT   ZIOPATCH MONITOR with [facility]  "  [facility] ([facility])    [address] [address]   [phone]              Further instructions from your care team       You are scheduled to follow up with your Primary Care Provider, Dr. Duenas on Thursday, Sept 13th at 9:15am.     Follow directions for your ziopatch as instructed by cards RN. Return the monitor to the cardiology department here as instructed.    Pending Results     No orders found for last 3 day(s).            Statement of Approval     Ordered          09/11/18 1510  I have reviewed and agree with all the recommendations and orders detailed in this document.  EFFECTIVE NOW     Approved and electronically signed by:  Dashawn Cloud MD             Admission Information     Date & Time Provider Department Dept. Phone    9/10/2018 Dashawn Cloud MD [facility] [phone]      Your Vitals Were     Blood Pressure Pulse Temperature Respirations Height Weight    121/51 90 98.3  F (36.8  C) (Oral) 16 1.626 m (5' 4\") 72 kg (158 lb 11.7 oz)    Pulse Oximetry BMI (Body Mass Index)                96% 27.25 kg/m2          Care EveryWhere ID     This is your Care EveryWhere ID. This could be used by other organizations to access your [facility] medical records  [id number]        Equal Access to Services     MANFRED REYES AH: Hadii nish ku hadasho Soomaali, waaxda luqadaha, qaybta kaalmada adeegyada, waxay nasirin romina meade. So [facility] [phone].    ATENCIÓN: Si habla español, tiene a cam disposición servicios gratuitos de asistencia lingüística. Llame al [phone].    We comply with applicable federal civil rights laws and [place] laws. We do not discriminate on the basis of race, color, national origin, age, disability, sex, sexual orientation, or gender identity.               Review of your medicines      CONTINUE these medicines which have NOT CHANGED        Dose / Directions    * ** PATIENT ALERT **        " Warning: DO NOT EXCEED 4,000 MG OF ACETAMINOPHEN FROM ALL SOURCES IN 24 HOURS   Refills:  0       acetaminophen 325 MG tablet   Commonly known as:  TYLENOL   Used for:  Degeneration of lumbar or lumbosacral intervertebral disc   Notes to Patient:  This is as needed for pain.        Dose:  325 mg   Take 1 tablet by mouth every 6 hours as needed for pain.   Quantity:  30 tablet   Refills:  0       amLODIPine 5 MG tablet   Commonly known as:  NORVASC        Dose:  1 tablet   Take 1 tablet by mouth every evening   Refills:  1       B-12 1000 MCG Subl   Notes to Patient:  You did not receive this here during you stay, Resume as per your home routine.        Dose:  1 tablet   Place 1 tablet under the tongue daily   Refills:  0       citalopram 20 MG tablet   Commonly known as:  celeXA   Notes to Patient:  Your next dose is due this evening at bedtime.        Dose:  20 mg   Take 20 mg by mouth daily   Refills:  0       gabapentin 100 MG capsule   Commonly known as:  NEURONTIN   Indication:  Neuropathic Pain   Notes to Patient:  Your next dose is due this evening at bedtime.        Dose:  200 mg   Take 200 mg by mouth At Bedtime   Refills:  0       KLONOPIN PO   Notes to Patient:  You did not receive any here during your stay. This is as needed for anxiety.        Dose:  0.5 mg   Take 0.5 mg by mouth 2 times daily as needed for anxiety   Refills:  0       LIPITOR PO   Notes to Patient:  Your next dose is due this evening at bedtime.        Dose:  40 mg   Take 40 mg by mouth At Bedtime   Refills:  0       LOSARTAN POTASSIUM PO   Notes to Patient:  You have received a dose today.Your next dose is due tomorrow evening.        Dose:  50 mg   Take 50 mg by mouth daily   Refills:  0       nitroGLYcerin 0.4 MG sublingual tablet   Commonly known as:  NITROSTAT   Used for:  Disorder of bone and cartilage, unspecified, Diarrhea, Irritable bowel syndrome, HTN (hypertension)   Notes to Patient:  You did not receive any of this during  your stay. This is as needed for chest pain.          Dose:  0.4 mg   Place 1 tablet under the tongue every 5 minutes as needed.   Quantity:  30 tablet   Refills:  4       OMEPRAZOLE PO        Dose:  20 mg   Take 20 mg by mouth daily as needed   Refills:  0       ONETOUCH ULTRA test strip   Used for:  Type II or unspecified type diabetes mellitus without mention of complication, not stated as uncontrolled   Generic drug:  blood glucose monitoring        Dose:  1 strip   1 strip daily.   Quantity:  1 Box   Refills:  6       * order for DME   Used for:  Venous insufficiency        Thigh length teds.   Quantity:  1 Device   Refills:  2       order for DME   Used for:  Acute pain of right knee        1 walker   Quantity:  1 Device   Refills:  0       THERAPEUTIC MULTIVIT/MINERAL Tabs   Notes to Patient:  You did not receive any during this stay. Resume as per your home routine.        Dose:  1 tablet   Take 1 tablet by mouth daily.   Refills:  0       traMADol 50 MG tablet   Commonly known as:  ULTRAM   Used for:  Knee pain   Notes to Patient:  This is as needed for pain. You have not received any of this medication today.        Dose:  50 mg   Take 1 tablet by mouth every 6 hours as needed for pain.   Quantity:  60 tablet   Refills:  5       trimethoprim 100 MG tablet   Commonly known as:  TRIMPEX   Notes to Patient:  Your next dose is due this evening at bedtime.        Dose:  100 mg   Take 100 mg by mouth every evening   Refills:  0       VITAMIN D (CHOLECALCIFEROL) PO   Notes to Patient:  You did not receive any of this during your stay. Resume this as per your home routine        Dose:  1000 Units   Take 1,000 Units by mouth daily   Refills:  0       * Notice:  This list has 2 medication(s) that are the same as other medications prescribed for you. Read the directions carefully, and ask your doctor or other care provider to review them with you.             Protect others around you: Learn how to safely use, store  and throw away your medicines at www.disposemymeds.org.        ANTIBIOTIC INSTRUCTION     You've Been Prescribed an Antibiotic - Now What?  Your healthcare team thinks that you or your loved one might have an infection. Some infections can be treated with antibiotics, which are powerful, life-saving drugs. Like all medications, antibiotics have side effects and should only be used when necessary. There are some important things you should know about your antibiotic treatment.      Your healthcare team may run tests before you start taking an antibiotic.    Your team may take samples (e.g., from your blood, urine or other areas) to run tests to look for bacteria. These test can be important to determine if you need an antibiotic at all and, if you do, which antibiotic will work best.      Within a few days, your healthcare team might change or even stop your antibiotic.    Your team may start you on an antibiotic while they are working to find out what is making you sick.    Your team might change your antibiotic because test results show that a different antibiotic would be better to treat your infection.    In some cases, once your team has more information, they learn that you do not need an antibiotic at all. They may find out that you don't have an infection, or that the antibiotic you're taking won't work against your infection. For example, an infection caused by a virus can't be treated with antibiotics. Staying on an antibiotic when you don't need it is more likely to be harmful than helpful.      You may experience side effects from your antibiotic.    Like all medications, antibiotics have side effects. Some of these can be serious.    Let you healthcare team know if you have any known allergies when you are admitted to the hospital.    One significant side effect of nearly all antibiotics is the risk of severe and sometimes deadly diarrhea caused by Clostridium difficile (C. Difficile). This occurs when a  person takes antibiotics because some good germs are destroyed. Antibiotic use allows C. diificile to take over, putting patients at high risk for this serious infection.    As a patient or caregiver, it is important to understand your or your loved one's antibiotic treatment. It is especially important for caregivers to speak up when patients can't speak for themselves. Here are some important questions to ask your healthcare team.    What infection is this antibiotic treating and how do you know I have that infection?    What side effects might occur from this antibiotic?    How long will I need to take this antibiotic?    Is it safe to take this antibiotic with other medications or supplements (e.g., vitamins) that I am taking?     Are there any special directions I need to know about taking this antibiotic? For example, should I take it with food?    How will I be monitored to know whether my infection is responding to the antibiotic?    What tests may help to make sure the right antibiotic is prescribed for me?      Information provided by:  www.cdc.gov/getsmart  U.S. Department of Health and Human Services  Centers for disease Control and Prevention  National Center for Emerging and Zoonotic Infectious Diseases  Division of Healthcare Quality Promotion             Medication List: This is a list of all your medications and when to take them. Check marks below indicate your daily home schedule. Keep this list as a reference.      Medications           Morning Afternoon Evening Bedtime As Needed    * ** PATIENT ALERT **   Warning: DO NOT EXCEED 4,000 MG OF ACETAMINOPHEN FROM ALL SOURCES IN 24 HOURS                                   acetaminophen 325 MG tablet   Commonly known as:  TYLENOL   Take 1 tablet by mouth every 6 hours as needed for pain.   Notes to Patient:  This is as needed for pain.                                   amLODIPine 5 MG tablet   Commonly known as:  NORVASC   Take 1 tablet by mouth every  evening   Last time this was given:  5 mg on 9/11/2018  8:26 AM   Next Dose Due:  You have received a  dose today. Your next dose is due tomorrow evening.                                   B-12 1000 MCG Subl   Place 1 tablet under the tongue daily   Notes to Patient:  You did not receive this here during you stay, Resume as per your home routine.                                citalopram 20 MG tablet   Commonly known as:  celeXA   Take 20 mg by mouth daily   Last time this was given:  20 mg on 9/10/2018  9:20 PM   Notes to Patient:  Your next dose is due this evening at bedtime.                                   gabapentin 100 MG capsule   Commonly known as:  NEURONTIN   Take 200 mg by mouth At Bedtime   Last time this was given:  200 mg on 9/10/2018  9:20 PM   Notes to Patient:  Your next dose is due this evening at bedtime.                                   KLONOPIN PO   Take 0.5 mg by mouth 2 times daily as needed for anxiety   Notes to Patient:  You did not receive any here during your stay. This is as needed for anxiety.                                   LIPITOR PO   Take 40 mg by mouth At Bedtime   Last time this was given:  40 mg on 9/10/2018  9:20 PM   Notes to Patient:  Your next dose is due this evening at bedtime.                                   LOSARTAN POTASSIUM PO   Take 50 mg by mouth daily   Last time this was given:  50 mg on 9/11/2018  8:26 AM   Notes to Patient:  You have received a dose today.Your next dose is due tomorrow evening.                                   nitroGLYcerin 0.4 MG sublingual tablet   Commonly known as:  NITROSTAT   Place 1 tablet under the tongue every 5 minutes as needed.   Notes to Patient:  You did not receive any of this during your stay. This is as needed for chest pain.                                     OMEPRAZOLE PO   Take 20 mg by mouth daily as needed                                   ONETOUCH ULTRA test strip   1 strip daily.   Generic drug:  blood glucose  monitoring                                * order for DME   Thigh length teds.                                order for DME   1 walker                                THERAPEUTIC MULTIVIT/MINERAL Tabs   Take 1 tablet by mouth daily.   Notes to Patient:  You did not receive any during this stay. Resume as per your home routine.                                   traMADol 50 MG tablet   Commonly known as:  ULTRAM   Take 1 tablet by mouth every 6 hours as needed for pain.   Notes to Patient:  This is as needed for pain. You have not received any of this medication today.                                   trimethoprim 100 MG tablet   Commonly known as:  TRIMPEX   Take 100 mg by mouth every evening   Last time this was given:  100 mg on 9/10/2018  9:19 PM   Notes to Patient:  Your next dose is due this evening at bedtime.                                   VITAMIN D (CHOLECALCIFEROL) PO   Take 1,000 Units by mouth daily   Notes to Patient:  You did not receive any of this during your stay. Resume this as per your home routine                                * Notice:  This list has 2 medication(s) that are the same as other medications prescribed for you. Read the directions carefully, and ask your doctor or other care provider to review them with you.

## 2018-09-11 ENCOUNTER — HOSPITAL ENCOUNTER (OUTPATIENT)
Dept: CARDIOLOGY | Facility: CLINIC | Age: 83
End: 2018-09-11
Attending: INTERNAL MEDICINE
Payer: MEDICARE

## 2018-09-11 ENCOUNTER — APPOINTMENT (OUTPATIENT)
Dept: CARDIOLOGY | Facility: CLINIC | Age: 83
End: 2018-09-11
Attending: PHYSICIAN ASSISTANT
Payer: MEDICARE

## 2018-09-11 ENCOUNTER — APPOINTMENT (OUTPATIENT)
Dept: PHYSICAL THERAPY | Facility: CLINIC | Age: 83
End: 2018-09-11
Attending: INTERNAL MEDICINE
Payer: MEDICARE

## 2018-09-11 VITALS
HEIGHT: 64 IN | OXYGEN SATURATION: 96 % | RESPIRATION RATE: 16 BRPM | TEMPERATURE: 98.3 F | HEART RATE: 90 BPM | SYSTOLIC BLOOD PRESSURE: 121 MMHG | WEIGHT: 158.73 LBS | DIASTOLIC BLOOD PRESSURE: 51 MMHG | BODY MASS INDEX: 27.1 KG/M2

## 2018-09-11 DIAGNOSIS — R55 SYNCOPE, UNSPECIFIED SYNCOPE TYPE: ICD-10-CM

## 2018-09-11 LAB
ANION GAP SERPL CALCULATED.3IONS-SCNC: 5 MMOL/L (ref 3–14)
BUN SERPL-MCNC: 14 MG/DL (ref 7–30)
CALCIUM SERPL-MCNC: 9 MG/DL (ref 8.5–10.1)
CHLORIDE SERPL-SCNC: 111 MMOL/L (ref 94–109)
CO2 SERPL-SCNC: 28 MMOL/L (ref 20–32)
CREAT SERPL-MCNC: 0.9 MG/DL (ref 0.52–1.04)
D DIMER PPP FEU-MCNC: 1.2 UG/ML FEU (ref 0–0.5)
ERYTHROCYTE [DISTWIDTH] IN BLOOD BY AUTOMATED COUNT: 12.9 % (ref 10–15)
GFR SERPL CREATININE-BSD FRML MDRD: 59 ML/MIN/1.7M2
GLUCOSE BLDC GLUCOMTR-MCNC: 116 MG/DL (ref 70–99)
GLUCOSE BLDC GLUCOMTR-MCNC: 122 MG/DL (ref 70–99)
GLUCOSE BLDC GLUCOMTR-MCNC: 130 MG/DL (ref 70–99)
GLUCOSE SERPL-MCNC: 124 MG/DL (ref 70–99)
HCT VFR BLD AUTO: 35.4 % (ref 35–47)
HGB BLD-MCNC: 12 G/DL (ref 11.7–15.7)
MCH RBC QN AUTO: 28.9 PG (ref 26.5–33)
MCHC RBC AUTO-ENTMCNC: 33.9 G/DL (ref 31.5–36.5)
MCV RBC AUTO: 85 FL (ref 78–100)
PLATELET # BLD AUTO: 253 10E9/L (ref 150–450)
POTASSIUM SERPL-SCNC: 3.8 MMOL/L (ref 3.4–5.3)
RBC # BLD AUTO: 4.15 10E12/L (ref 3.8–5.2)
SODIUM SERPL-SCNC: 144 MMOL/L (ref 133–144)
TROPONIN I SERPL-MCNC: <0.015 UG/L (ref 0–0.04)
WBC # BLD AUTO: 9.9 10E9/L (ref 4–11)

## 2018-09-11 PROCEDURE — 93306 TTE W/DOPPLER COMPLETE: CPT

## 2018-09-11 PROCEDURE — 36415 COLL VENOUS BLD VENIPUNCTURE: CPT | Performed by: PHYSICIAN ASSISTANT

## 2018-09-11 PROCEDURE — 25500064 ZZH RX 255 OP 636: Performed by: INTERNAL MEDICINE

## 2018-09-11 PROCEDURE — 0298T ZZC EXT ECG > 48HR TO 21 DAY REVIEW AND INTERPRETATN: CPT | Performed by: INTERNAL MEDICINE

## 2018-09-11 PROCEDURE — 99217 ZZC OBSERVATION CARE DISCHARGE: CPT | Performed by: INTERNAL MEDICINE

## 2018-09-11 PROCEDURE — G0378 HOSPITAL OBSERVATION PER HR: HCPCS

## 2018-09-11 PROCEDURE — 85027 COMPLETE CBC AUTOMATED: CPT | Performed by: PHYSICIAN ASSISTANT

## 2018-09-11 PROCEDURE — 99207 ZZC CDG-CODE CATEGORY CHANGED: CPT | Performed by: INTERNAL MEDICINE

## 2018-09-11 PROCEDURE — A9270 NON-COVERED ITEM OR SERVICE: HCPCS | Mod: GY | Performed by: PHYSICIAN ASSISTANT

## 2018-09-11 PROCEDURE — 25000132 ZZH RX MED GY IP 250 OP 250 PS 637: Mod: GY | Performed by: PHYSICIAN ASSISTANT

## 2018-09-11 PROCEDURE — 25000128 H RX IP 250 OP 636: Performed by: PHYSICIAN ASSISTANT

## 2018-09-11 PROCEDURE — 96374 THER/PROPH/DIAG INJ IV PUSH: CPT | Mod: 59

## 2018-09-11 PROCEDURE — 00000146 ZZHCL STATISTIC GLUCOSE BY METER IP

## 2018-09-11 PROCEDURE — 80048 BASIC METABOLIC PNL TOTAL CA: CPT | Performed by: PHYSICIAN ASSISTANT

## 2018-09-11 PROCEDURE — 97161 PT EVAL LOW COMPLEX 20 MIN: CPT | Mod: GP | Performed by: PHYSICAL THERAPIST

## 2018-09-11 PROCEDURE — 93306 TTE W/DOPPLER COMPLETE: CPT | Mod: 26 | Performed by: INTERNAL MEDICINE

## 2018-09-11 PROCEDURE — 0296T ZIO PATCH HOLTER: CPT | Performed by: INTERNAL MEDICINE

## 2018-09-11 PROCEDURE — 96375 TX/PRO/DX INJ NEW DRUG ADDON: CPT

## 2018-09-11 PROCEDURE — 40000193 ZZH STATISTIC PT WARD VISIT: Performed by: PHYSICAL THERAPIST

## 2018-09-11 PROCEDURE — 84484 ASSAY OF TROPONIN QUANT: CPT | Performed by: PHYSICIAN ASSISTANT

## 2018-09-11 RX ADMIN — AMLODIPINE BESYLATE 5 MG: 5 TABLET ORAL at 08:26

## 2018-09-11 RX ADMIN — HUMAN ALBUMIN MICROSPHERES AND PERFLUTREN 2 ML: 10; .22 INJECTION, SOLUTION INTRAVENOUS at 10:38

## 2018-09-11 RX ADMIN — ONDANSETRON 4 MG: 2 INJECTION INTRAMUSCULAR; INTRAVENOUS at 01:21

## 2018-09-11 RX ADMIN — HYDRALAZINE HYDROCHLORIDE 10 MG: 20 INJECTION INTRAMUSCULAR; INTRAVENOUS at 01:30

## 2018-09-11 RX ADMIN — LOSARTAN POTASSIUM 50 MG: 50 TABLET ORAL at 08:26

## 2018-09-11 NOTE — PROGRESS NOTES
09/11/18 1200   Quick Adds   Type of Visit Initial PT Evaluation   Living Environment   Lives With alone   Living Arrangements house   Home Accessibility stairs to enter home   Number of Stairs to Enter Home 1   Number of Stairs Within Home (chair lift to the basement)   Stair Railings at Home outside, present on right side   Self-Care   Activity/Exercise/Self-Care Comment Pt reports unable to stand if sustains a fall, has LifeLine at home   Functional Level Prior   Ambulation 1-->assistive equipment   Transferring 1-->assistive equipment   Toileting 0-->independent   Bathing 0-->independent   Dressing 0-->independent   Eating 0-->independent   Communication 0-->understands/communicates without difficulty   Swallowing 0-->swallows foods/liquids without difficulty   Cognition 0 - no cognition issues reported   Fall history within last six months yes   Number of times patient has fallen within last six months 1   Prior Functional Level Comment PLOF= Pt indep. with ambulation using a SEC at all   times, able to ambulate  household distances and short community distances   Fall due to syncopal episode   General Information   Onset of Illness/Injury or Date of Surgery - Date 09/10/18   Referring Physician Ai   Patient/Family Goals Statement Pt unsure re- DC plans, may Dc to her son's home for a few days    Pertinent History of Current Problem (include personal factors and/or comorbidities that impact the POC) 85 year old female with a past medical history significant for hypertension, dyslipidemia, type 2 diabetes mellitus, angina pectoris, GERD, degenerative disc disease, anxiety and depression, peripheral neuropathy, and IBS who presents on 9/10/2018 after a syncopal episode with collapse  Hxof R ght TKA 2014   Precautions/Limitations fall precautions   General Observations Pt alert, very pleasant. Unsure re- mobility as she sustained a R knee injury, also c/o cervical pain - no increase in Right knee pain w/ WB  "  Cognitive Status Examination   Orientation orientation to person, place and time   Level of Consciousness alert   Follows Commands and Answers Questions 100% of the time   Personal Safety and Judgment intact   Pain Assessment   Patient Currently in Pain Yes, see Vital Sign flowsheet   Integumentary/Edema   Integumentary/Edema Comments small area of bruisng Right lateral  knee   Posture    Posture Kyphosis   Range of Motion (ROM)   ROM Comment AROM/ PROM R knee WFL    Strength   Manual Muscle Testing Quick Adds MMT: Knee   MMT: Knee, Rehab Eval   Knee Flexion - Right Side (4/5) good, right   Knee Extension - Right Side (4/5) good, right   Bed Mobility   Bed Mobility Comments Nt   Transfer Skills   Transfer Comments SBA to indep.    Gait   Gait Comments Pt ambulated 50 feet x1 with RW, SBA, able to amb.up/ down steps w/ use of railing. SEC and SBA.  Pt then amb 75 feet x1 with SEC, SBA. Steady w/ SEC use., cane adjusted to correct ht    Balance   Balance Comments good dynamic standing balance w/ RW use   Sensory Examination   Sensory Perception no deficits were identified   Clinical Impression   Criteria for Skilled Therapeutic Intervention evaluation only   Anticipated Equipment Needs at Discharge (Pt has a SEC, RW for home use)   Anticipated Discharge Disposition Home   Risk & Benefits of therapy have been explained Yes   Patient, Family & other staff in agreement with plan of care Yes   Clinical Impression Comments One time session. Mobility appears sufficient for return  Home.  Pt able to ambulate household distances w/ use of SEC, including up/ down steps    [facility] AM-PAC  \"6 Clicks\" V.2 Basic Mobility Inpatient Short Form   1. Turning from your back to your side while in a flat bed without using bedrails? 4 - None   2. Moving from lying on your back to sitting on the side of a flat bed without using bedrails? 4 - None   3. Moving to and from a bed to a chair (including a wheelchair)? 4 - None   4. " Standing up from a chair using your arms (e.g., wheelchair, or bedside chair)? 4 - None   5. To walk in hospital room? 4 - None   6. Climbing 3-5 steps with a railing? 4 - None   Basic Mobility Raw Score (Score out of 24.Lower scores equate to lower levels of function) 24

## 2018-09-11 NOTE — CONSULTS
Care Transition Initial Assessment - RN  Reason For Consult: discharge planning   Met with: Patient.    DATA   Principal Problem:    Syncope and collapse  Active Problems:    Degeneration of lumbar or lumbosacral intervertebral disc    Esophageal reflux    Irritable bowel syndrome    Anxiety and depression    Frequent UTI    Hyperlipidemia LDL goal <100    HTN (hypertension)    Type 2 diabetes, HbA1c goal < 7% (H)    Angina pectoris (H)    S/P total knee replacement    Peripheral neuropathy       [facility]: [facility]  Primary Care MD Name: Sarbjit Duenas DO  Contact information and PCP information verified: Yes    ASSESSMENT  Cognitive Status: awake and alert.       Resources List: Home Care     Lives With: alone  Living Arrangements: house     Description of Support System: Supportive, Involved   Who is your support system?: Children, Sibling(s)   Support Assessment: Adequate family and caregiver support   Insurance Concerns: No Insurance issues identified    This writer met with pt, introduced self and role.  This writer discussed discharge planning and Medicare guidelines in regards to home care, TCU and LTC. Pt lives independantly in the community in a house. Pt has no services at home and does have family support. Patients goal is to return home upon discharge, patient stated that her sister is coming to stay with her on 9/13/2018. Patient is in agreement with receiving home care in the event that these services are needed.  Pt was provided with Medicare certified home care list. Pt chooses to use [facility] Phone: [phone] if needed. Patient stated that she and her family are in the process of planning where she will live, either with her son or her sister. Patient was provided with the senior linkage brochure. Patient declined clinical care coordination because she has an [facility] PCP. Per patient Transportation will be  provided by her family upon discharge.     PLAN  Home with family assist vs [facility]    Discharge Planner   Discharge Plans in progress: Home with family assist vs [facility]  Barriers to discharge plan: Medical stability   Follow up plan: CTS to follow for Home care needs       Entered by: Kristina Farrar 09/11/2018 12:55 PM         Kristina Farrar RN Care Coordinator  [facility] [phone]  [facility] [phone]

## 2018-09-11 NOTE — PLAN OF CARE
"Problem: Patient Care Overview  Goal: Plan of Care/Patient Progress Review  [facility] [facility] DISCHARGE NOTE    Patient discharged to home at 4:15 PM via wheel chair. Accompanied by other:daughter-in-law and staff. Discharge instructions reviewed with patient, opportunity offered to ask questions. Prescriptions - None ordered for discharge. All belongings sent with patient. Ziopatch placed prior to dc and pt and family state understanding of received instructions.  Planning to stay with son at his home for \"a few days\".    Patsy Wallace      "

## 2018-09-11 NOTE — DISCHARGE INSTRUCTIONS
You are scheduled to follow up with your Primary Care Provider, Dr. Duenas on Thursday, Sept 13th at 9:15am.     Follow directions for your ziopatch as instructed by cards RN. Return the monitor to the cardiology department here as instructed.

## 2018-09-11 NOTE — H&P
"[facility]    History and Physical  Hospital Medicine       Date of Admission:  9/10/2018  Date of Service: 9/10/2018     CC: syncope and collapse    Assessment & Plan   Daniela Brown is a 85 year old female with a past medical history significant for hypertension, dyslipidemia, type 2 diabetes mellitus, angina pectoris, GERD, degenerative disc disease, anxiety and depression, peripheral neuropathy, and IBS who presents on 9/10/2018 after a syncopal episode with collapse.      Syncope and collapse  Occurred shortly after completing a Lexiscan stress test (relatively normal results). No preceding or prodromal symptoms. Admit EKG showing normal sinus rhythm without ST changes. Admit UA negative. No hypotension or hypoglycemia upon presentation, VSS other than hypertension. Head CT without acute abnormality. Possible heart murmur heard on exam. Unclear cause. Case discussed between emergency department provider and on-call cardiologist; recommending monitoring on telemetry and Holter monitoring upon discharge.  - Monitor on telemetry  - Will need Holter upon discharge  - Echo in morning  - D-dimer to evaluate for PE      Fall / collapse  Patient landed on her right side, hit her right knee and face. Not on anticoagulation. CT head without any evidence of acute bleed or fracture. Cervical CT negative for fracture, but with evidence of degenerative changes. Denies pain other than knee and face. No other obvious traumatic injury.  - No further imaging at this time unless patient develops specific pain  - Prn acetaminophen and home tramadol for pain      Heart murmur  Possible 2/6 pansystolic heart murmur on exam with radiation to carotids. No known history of murmur. Most recent echo from 2013 without evidence for valvular abnormality.  - Echo tomorrow      Angina pectoris (H)  Denies any current chest pain. Patient reports prior diagnosis of angina \"many years ago,\" records " "unavailable. No history of PCI or CABG per report and chart review. Had Lexiscan stress test just prior to syncopal episode with \"small perfusion defect of mild severity involving the basal inferior wall which is mostly reversible and may be consistent with mild ischemia in the RCA distribution. In addition, transient ischemic dilatations is noted with a TID ratio of 1.3.\" Admit EKG with normal sinus rhythm without ST changes. Initial troponin negative.  - Takes prn nitroglycerin prior to admission, continue  - Echo tomorrow  - Trend troponins      Shortness of breath  Patient had stress test on day of admission due to complaints of shortness of breath / dyspnea on exertion. Outpatient stress test ordered by patient's PCP. Denies any current or recent prior chest pain. No tachycardia or hypoxia to suggest PE. However, in light of syncope and collapse, will get chest CT to rule out PE.  - D-dimer as above      HTN (hypertension)  Pressures reviewed, elevated during emergency department course. Denies headache. No evidence of end organ damage. Manages with amlodipine and losartan prior to admission.  - Continue prior to admission amlodipine and losartan  - Prn hydralazine for SBP > 180      Type 2 diabetes, HbA1c goal < 7% (H)  Diet controlled, no longer on any antihyperglycemic medications prior to admission. Recent HgbA1c = 6.8. Admit glucose = 164.   - Consistent carbohydrate diet  - Medium dose sliding scale insulin available  - Hyper/hypoglycemia protocol  - Repeat HgbA1c pending      Hyperlipidemia LDL goal <100  Taking Lipitor prior to admission, continue.      Frequent UTI  Per patient report, gets a UTI about every 2 months. Was formerly on Macrobid for prophylaxis, developed hives. Was recently changed to trimethoprim for prophylaxis treatment, has only had one dose thus far. Admit UA unremarkable. Afebrile, no leukocytosis upon admission.  - Continue prior to admission trimethoprim      Degeneration of " lumbar or lumbosacral intervertebral disc  Chronic back pain  Chronic problem, denies any change or worsening. Gets occasional epidural injections. Manages pain with prn acetaminophen and Tramadol.  - Continue prior to admission acetaminophen and Tramadol.      Esophageal reflux  Stable. Manages with prn omeprazole prior to admission, continue.      Anxiety and depression  Worse in the past few months due to recent death of her son from leukemia. Manages with daily Celexa and prn Klonopin, continue.      Peripheral neuropathy  Takes gabapentin prior to admission, continue.      Irritable bowel syndrome  Stable. Takes prn Imodium, not mentioned on med list. Denies recent diarrhea, Imodium not ordered.      Fluids: Oral  Electrolytes: Monitor  Nutrition: consistent carbohydrate diet    DVT Prophylaxis: Low Risk/Ambulatory with no VTE prophylaxis indicated  Code Status: DNR / DNI - discussed directly with patient    Lines: Peripheral  Collazo catheter: Not indicated    Disposition: Anticipate discharge in 1-2 day(s). Appropriate for observation care.    Discussion: Assessment & plan discussed with Dr. Mc Valdes. Emergency department sign out discussed with Dr. Hansen.    Senia Hitchcock PA-C  [facility]  Pager: [phone]          Primary Care Physician   Reynaldo Duenas [phone]    History is obtained from the patient and review of old records via the EMR.    Past Medical History      Past Medical History:   Diagnosis Date     Adhesive capsulitis of shoulder     left      Allergic rhinitis, cause unspecified      Carpal tunnel syndrome     right>left by emg 2004     Chest pain, unspecified     Chronic right sided/axillary discomfort (musculoskeletal) Atypical substernal (possibly GERD). Negative cardiolite 2004.     Cystocele, midline     grade III     Degeneration of lumbar or lumbosacral intervertebral disc     MRI 5/04- DDD, L2-3 annular bulge with protrusion into left  caudal infra-foraminal area     Depressive disorder, not elsewhere classified      Diabetes mellitus (H)      Diverticulosis of colon (without mention of hemorrhage)     flexible sigmoidoscopy otherwise normal 2001     Esophageal reflux      Essential hypertension, benign      Generalized osteoarthrosis, unspecified site     C-spine, left hip     Headache(784.0)     Tension type. MRI 2001 normal.     Impaired fasting glucose     GTT 2/05 115-209     Irritable bowel syndrome     diahrrea predominant     Memory loss     Early cognitive decline. MMSE 27/30, Neno 4.7/ 6.0 2004. B12, TSH, RPR normal 9188-3511.     Mixed hyperlipidemia      OA (OSTEOARTHRITIS) GENERALIZED( Multiple Sites)     Facets, left hip, c-spine. 09/14/06 - bone loss, stable.     OSTEOPENIA     DEXA 2001 -1.4 hip. Dexa 2004 -0.2 hip and -1.5 spine     PERS HX ALLERGY OTHER FOODS 8/22/2006     PERS HX ALLERGY TO MILK PRODUCTS 8/22/2006     RESTLESS LEG SYNDROME      Unspecified vitamin D deficiency          Diagnosis Date Noted     Peripheral neuropathy 09/10/2018     Priority: Medium     B12 deficiency anemia 06/20/2012     Priority: Medium     Colitis, Clostridium difficile 04/18/2012     Priority: Medium     S/P total knee replacement 03/28/2012     Priority: Medium     Pronation of foot 05/05/2011     Priority: Medium     Bilateral defomity       Angina pectoris (H) 03/17/2011     Priority: Medium     Allergic rhinitis 01/19/2011     Priority: Medium     Type 2 diabetes, HbA1c goal < 7% (H) 01/05/2011     Priority: Medium     HTN (hypertension) 11/12/2010     Priority: Medium     Hyperlipidemia LDL goal <100 10/31/2010     Priority: Medium     Obesity 07/21/2010     Priority: Medium     Frequent UTI 10/20/2009     Priority: Medium     Cysto negative 7/10.        Urge incontinence 10/20/2009     Priority: Medium     Right foot pain 10/16/2009     Priority: Medium     Xray showed djd -follows with podiatry. -arch supports placed may need cam  walker        Vitamin D deficiency 09/09/2008     Priority: Medium     Subjective tinnitus 04/22/2008     Priority: Medium     Urticaria 08/22/2006     Priority: Medium     Problem list name updated by automated process. Provider to review       SENSONRL HEAR LOSS,BILAT 05/03/2006     Priority: Medium     Esophageal reflux      Priority: Medium     Memory loss      Priority: Medium     Early cognitive decline. MMSE 27/30, Neno 4.7/ 6.0 2004. B12, TSH, RPR normal [id number].       Anxiety and depression      Priority: Medium     Degeneration of lumbar or lumbosacral intervertebral disc      Priority: Medium     MRI 5/04- DDD, L2-3 annular bulge with protrusion into left caudal infra-foraminal area  Uses vicodin sparingly   Pain Agreement signed.        Irritable bowel syndrome      Priority: Low     diahrrea predominant       Disorder of bone and cartilage      Priority: Low     DEXA 2007 -1.4 hip. Dexa 2004 -1 hip and -1 spine  Problem list name updated by automated process. Provider to review       RESTLESS LEG SYNDROME      Priority: Low     Generalized osteoarthrosis, unspecified site      Priority: Low     C-spine, left hip       Allergic rhinitis      Priority: Low     Problem list name updated by automated process. Provider to review       Diverticulosis of large intestine      Priority: Low     flexible sigmoidoscopy with diverticulosis otherwise normal 2001, admit diverticulitis 2009  Problem list name updated by automated process. Provider to review          Past Surgical History     Past Surgical History:   Procedure Laterality Date     ARTHROPLASTY KNEE  3/26/2012    Procedure:ARTHROPLASTY KNEE; Right Total Knee Arthroplasty; Surgeon:BERNADINE ROSAS; Location:[facility] OR     C APPENDECTOMY  1953     HC REMOVAL GALLBLADDER       HYSTERECTOMY, PAP NO LONGER INDICATED  1983    TVH/BSO     SURGICAL HISTORY OF -       Hernia Repair     SURGICAL HISTORY OF -   10/08    A & P Repair, Dr. Hannah        History  "of Present Illness   Daniela Brown is a 85 year old female with the above past medical history now presents on 9/10/2018 after an syncope and collapse episode.     Patient had been having recent ongoing dyspnea on exertion and shortness of breath, but denies chest pain. Her PCP referred her for an outpatient stress test. She underwent a successful Lexiscan stress test on day of admission. She denies any development of chest pain during or after the stress test. She did develop a headache during the test which resolved with rest.    She was feeling well after the test and was dismissed to return home. As she was walking out of the hospital building she had a syncopal episode and collapsed to the ground. Patient denies any prodromal or preceding symptoms including dizziness, lightheadedness, vision changes, weakness, chest pain, palpitations, shortness of breath, or other unusual symptoms. States \"I was walking and the next think I knew, I was on the ground with people around me.\"    Witnesses state she was out briefly for a few seconds. No apparent traumatic injury other than a right knee abrasion and bruise, but she is not sure if she hit her head. Denies headache or vision changes. Had minor facial pain and right knee pain after fall, otherwise denies pain other than her chronic low back pain which is unchanged from baseline.    On review of systems she mentions having a general loss of appetite over the past few months since her son  of leukemia. She has noticed a decreased ability to taste foods over the past few months as well. She has alternating constipation and diarrhea and occasionally takes Imodium. She has noticed increased gas and bloating recently which is worse than usual. The remainder review of systems is negative.      Prior to Admission Medications   Prior to Admission Medications   Prescriptions Last Dose Informant Patient Reported? Taking?   ** PATIENT ALERT **  Self Yes No   Sig: " Warning: DO NOT EXCEED 4,000 MG OF ACETAMINOPHEN FROM ALL SOURCES IN 24 HOURS   Atorvastatin Calcium (LIPITOR PO) 2018 at hs Self Yes Yes   Sig: Take 40 mg by mouth At Bedtime    ClonazePAM (KLONOPIN PO) More than a month at Unknown time Self Yes No   Sig: Take 0.5 mg by mouth 2 times daily as needed for anxiety   Cyanocobalamin (B-12) 1000 MCG SUBL 2018 at pm Self Yes Yes   Sig: Place 1 tablet under the tongue daily    Glucose Blood (ONE TOUCH ULTRA TEST) strip  Self No No   Si strip daily.   LOSARTAN POTASSIUM PO 2018 at pm Self Yes Yes   Sig: Take 50 mg by mouth daily    Multiple Vitamins-Minerals (THERAPEUTIC MULTIVIT/MINERAL) TABS 2018 at pm Self Yes Yes   Sig: Take 1 tablet by mouth daily.   OMEPRAZOLE PO Past Week at pm Self Yes Yes   Sig: Take 20 mg by mouth daily as needed   ORDER FOR DME  Self No No   Sig: Thigh length teds.   VITAMIN D, CHOLECALCIFEROL, PO 2018 at pm Self Yes Yes   Sig: Take 1,000 Units by mouth daily   acetaminophen (TYLENOL) 325 MG tablet 9/10/2018 at am Self No Yes   Sig: Take 1 tablet by mouth every 6 hours as needed for pain.   amLODIPine (NORVASC) 5 MG tablet 2018 at pm Self Yes Yes   Sig: Take 1 tablet by mouth every evening   citalopram (CELEXA) 20 MG tablet 2018 at hs Self Yes Yes   Sig: Take 20 mg by mouth daily   gabapentin (NEURONTIN) 100 MG capsule More than a month at Unknown time Self Yes No   Sig: Take 200 mg by mouth At Bedtime    nitroglycerin (NITROSTAT) 0.4 MG SL tablet 2018 at pm Self No Yes   Sig: Place 1 tablet under the tongue every 5 minutes as needed.   order for DME  Self No No   Si walker   traMADol (ULTRAM) 50 MG tablet 2018 at pm Self No Yes   Sig: Take 1 tablet by mouth every 6 hours as needed for pain.   trimethoprim (TRIMPEX) 100 MG tablet 2018 at pm Self Yes Yes   Sig: Take 100 mg by mouth every evening      Facility-Administered Medications: None     Allergies   Allergies   Allergen Reactions     Actonel  "[Bisphosphonates] Itching     Azithromycin Hives     Celebrex [Celecoxib] Rash     Cipro [Ciprofloxacin] Rash     Erythromycin Rash     Escitalopram Diarrhea     Gets very sick and mad feelings     Fosamax Itching and Rash     Keflex [Cephalexin Monohydrate] Rash     Lisinopril Cough     Nitrofurantoin Other (See Comments)     \"dizzyness'     Risedronate Itching     Shellfish-Derived Products Hives     Tetanus-Diphtheria Toxoids Swelling     Vicodin [Acetaminophen] Itching     Patient reported - only when used scheduled in high doses.        Vioxx Rash     Penicillins Rash     Sulfa Drugs Itching and Rash     Sulfasalazine Itching and Rash       Family History    Family History   Problem Relation Age of Onset     C.A.D. Mother      Diabetes Mother      Cancer - colorectal Mother      Arthritis Mother      HEART DISEASE Mother      Lipids Brother      Obesity Brother      Hypertension Brother      Allergies Son      Eye Disorder Son      cataract     Thyroid Disease Sister      graves dz     Eye Disorder Son      Leukemia Son      Alcohol/Drug Father        Social History   Social History     Social History     Marital status:      Spouse name: N/A     Number of children: N/A     Years of education: N/A     Occupational History     Not on file.     Social History Main Topics     Smoking status: Never Smoker     Smokeless tobacco: Never Used     Alcohol use No     Drug use: No     Sexual activity: No     Other Topics Concern     Parent/Sibling W/ Cabg, Mi Or Angioplasty Before 65f 55m? No     Social History Narrative       Review of Systems     A complete 10 point review of systems was negative except for items noted in the HPI/subjective.      Physical Exam   /70  Pulse 88  Temp 97.2  F (36.2  C) (Oral)  Resp 18  Ht 1.626 m (5' 4\")  Wt 72 kg (158 lb 11.7 oz)  SpO2 98%  BMI 27.25 kg/m2     Weight: 158 lbs 11.7 oz Body mass index is 27.25 kg/(m^2).     Constitutional: Alert, oriented, cooperative, " no apparent distress, appears nontoxic, speaking in full sentences.     Eyes: Eyes are clear, pupils are reactive. No scleral icterus. Extroccular movements intact.     HEENT: Oropharynx is clear and moist, no lesions. Normocephalic, no evidence of cranial trauma.      Cardiovascular: Regular rhythm and rate, normal S1 and S2. Faint 2/6 pansystolic murmur noted with radiation into the carotids. No rubs or gallops. Peripheral pulses in tact bilaterally. No lower extremity edema.    Respiratory: Lung sounds are clear to auscultation bilaterally without wheezes, rhonchi, or crackles.    GI: Soft, mildly distended. Non-tender, no rebound or guarding. No hepatosplenomegaly or masses appreciated. Normal bowel sounds.     Musculoskeletal: Without obvious deformity, normal range of motion. Normal muscle bulk and tone. Right knee slightly tender to palpation with mild effusion.    Skin: Warm and dry, no rashes. No mottling of skin. Mild ecchymoses present over right knee.     Neurologic: Patient moves all extremities. Cranial nerves are grossly intact.  is symmetric. Gross strength and sensation are equal bilaterally.    Genitourinary: Deferred    Data   Data reviewed today:     Recent Labs  Lab 09/10/18  1545   WBC 10.2   HGB 12.7   MCV 86         POTASSIUM 3.4   CHLORIDE 109   CO2 26   BUN 14   CR 0.85   ANIONGAP 7   NARESH 9.1   *   TROPI <0.015       Recent Results (from the past 24 hour(s))   NM Lexiscan stress test    Narrative    GATED MYOCARDIAL PERFUSION SCINTIGRAPHY WITH INTRAVENOUS PHARMACOLOGIC  VASODILATATION LEXISCAN -ONE DAY STUDY     9/10/2018 2:51 PM  RAQUEL UBALDO WETZEL  85 years  Female   1/26/1933.    Indication/Clinical History: Chest pain    Impression  1.  Myocardial perfusion imaging using single isotope technique  demonstrated a small perfusion defect of mild severity involving the  basal inferior wall which is mostly reversible and may be consistent  with mild ischemia in  the right coronary artery distribution. In  addition, transient ischemic dilatation is noted with a TID ratio of  1.3.   2. Gated images demonstrated normal left ventricular wall motion.  The  left ventricular systolic function is 80% at rest and 82% on the post  stress images.  3. Compared to the prior study from 10/5/2009, there is no evidence of  possible mild ischemia in the right coronary artery distribution..    Procedure  Pharmacologic stress testing was performed with Lexiscan at a rate of  0.08 mg/ml rapid bolus injection, for 15 seconds, 0.4 mg/5ml  intravenously. Low-level exercise was performed along with the  vasodilator infusion.  The heart rate was 99 at baseline and margarita to  136 beats per minute during the Lexiscan infusion. The rest blood  pressure was 172/78 mmHg and was 170/68 mm Hg during Lexiscan  infusion. The patient experienced weakness  during the test.    Myocardial perfusion imaging was performed at rest, approximately 45  minutes after the injection intravenously of 9.70 mCi of Tc-99m  Myoview. At peak pharmacologic effect, 10-20 seconds after Lexiscan,   the patient was injected intravenously with 31.6 mCi of  Tc-99m  Myoview. The post-stress tomographic imaging was performed  approximately 60 minutes after stress.    EKG Findings  The resting EKG demonstrated normal sinus rhythm. The stress EKG  demonstrated no significant ST-T wave changes from baseline.    Tomographic Findings  Overall, the study quality is good . On the stress images, a small  perfusion defect of mild severity involving the basal inferior wall is  noted. On the rest images, perfusion is within normal limits . Gated  images demonstrated normal left ventricular wall motion. The left  ventricular ejection fraction was calculated to be 88% at rest and 82%  on the post stress images. TID was present with a ratio of 1.3.    BOBBY PEREZ MD   Head CT w/o contrast    Narrative    CT SCAN OF THE HEAD WITHOUT CONTRAST    9/10/2018 4:11 PM     HISTORY: unwitnessed fall;     TECHNIQUE:  Axial images of the head and coronal reformations without  IV contrast material. Radiation dose for this scan was reduced using  automated exposure control, adjustment of the mA and/or kV according  to patient size, or iterative reconstruction technique.    COMPARISON: None.    FINDINGS:  There is generalized atrophy of the brain.  White matter  changes are present in the cerebral hemispheres that are consistent  with small vessel ischemic disease in this age patient. There is no  evidence of intracranial hemorrhage, mass, acute infarct or anomaly.  The visualized portions of the sinuses and mastoids appear normal.  There is no evidence of trauma. Severe degenerative changes seen in  the temporomandibular joints.      Impression    IMPRESSION: No acute pathology. No bleed. No fractures.      KRISTIAN SAUNDERS MD   Cervical spine CT w/o contrast    Narrative    CT CERVICAL SPINE WITHOUT CONTRAST   9/10/2018 4:12 PM     HISTORY: unwitnessed fall, .;      TECHNIQUE: Axial images of the cervical spine were obtained without  intravenous contrast. Multiplanar reformations were performed.   Radiation dose for this scan was reduced using automated exposure  control, adjustment of the mA and/or kV according to patient size, or  iterative reconstruction technique.    COMPARISON: None.    FINDINGS: There is no evidence of fracture. There is curvature of the  spine convex towards the left.      Craniocervical junction: Normal.     C1-C2:  Normal.     C2-C3:  Normal disc, facet joints, spinal canal and neural foramina.     C3-C4:  There are severe degenerative changes in the right facet  joint. The central canal and neural foramen are normal.     C4-C5:  Mild annular disc bulge. Moderate-severe degenerative change  in the right facet joint.     C5-C6:  Mild annular bulge. Central canal and neural foramen are  patent.      C6-C7:  Loss of disc space height. Mild annular  bulge. Central canal  and neural foramen are patent.      C7-T1:   Central canal and neural foramen are patent.      Impression    IMPRESSION:    1. No fractures are identified.  2. Multilevel degenerative change.    KRISTIAN SAUNDERS MD       I personally reviewed the EKG tracing showing normal sinus rhythm without ST changes. Head CT without acute intracranial abnormality.    Senia Hitchcock PA-C  [facility]  Pager: [phone]

## 2018-09-11 NOTE — DISCHARGE SUMMARY
"[facility]ist Discharge Summary    Daniela Brown MRN# [id number]   Age: 85 year old YOB: 1933     Date of Admission:  9/10/2018  Date of Discharge::  9/11/2018  Admitting Physician:  Leandro Malcolm MD  Discharge Physician:  Dashawn Cloud MD  Primary Physician: Reynaldo Duenas  Transferring Facility: N/A     Home clinic: [facility]          Admission Diagnoses:   Contusion of right knee, initial encounter [S80.01XA]  Closed head injury, initial encounter [S09.90XA]  Syncope, unspecified syncope type [R55]          Discharge Diagnosis:   Principle diagnosis: syncope  Secondary diagnoses:  Principal Problem:    Syncope and collapse  Active Problems:    Degeneration of lumbar or lumbosacral intervertebral disc    Esophageal reflux    Anxiety and depression    Frequent UTI    Hyperlipidemia LDL goal <100    HTN (hypertension)    Type 2 diabetes, HbA1c goal < 7% (H)    Angina pectoris (H)    S/P total knee replacement    Peripheral neuropathy    Irritable bowel syndrome         Brief History of Presenting Illness:   As per admit hx  Patient had been having recent ongoing dyspnea on exertion and shortness of breath, but denies chest pain. Her PCP referred her for an outpatient stress test. She underwent a successful Lexiscan stress test on day of admission. She denies any development of chest pain during or after the stress test. She did develop a headache during the test which resolved with rest.     She was feeling well after the test and was dismissed to return home. As she was walking out of the hospital building she had a syncopal episode and collapsed to the ground. Patient denies any prodromal or preceding symptoms including dizziness, lightheadedness, vision changes, weakness, chest pain, palpitations, shortness of breath, or other unusual symptoms. States \"I was walking and the next think I knew, I was on the ground with people around me.\"     Witnesses state she was out " briefly for a few seconds. No apparent traumatic injury other than a right knee abrasion and bruise, but she is not sure if she hit her head. Denies headache or vision changes. Had minor facial pain and right knee pain after fall, otherwise denies pain other than her chronic low back pain which is unchanged from baseline    Recent Results (from the past 24 hour(s))   NM Lexiscan stress test    Narrative    GATED MYOCARDIAL PERFUSION SCINTIGRAPHY WITH INTRAVENOUS PHARMACOLOGIC  VASODILATATION LEXISCAN -ONE DAY STUDY     9/10/2018 2:51 PM  RAQUEL WETZEL  85 years  Female   1/26/1933.    Indication/Clinical History: Chest pain    Impression  1.  Myocardial perfusion imaging using single isotope technique  demonstrated a small perfusion defect of mild severity involving the  basal inferior wall which is mostly reversible and may be consistent  with mild ischemia in the right coronary artery distribution. In  addition, transient ischemic dilatation is noted with a TID ratio of  1.3.   2. Gated images demonstrated normal left ventricular wall motion.  The  left ventricular systolic function is 80% at rest and 82% on the post  stress images.  3. Compared to the prior study from 10/5/2009, there is no evidence of  possible mild ischemia in the right coronary artery distribution..    Procedure  Pharmacologic stress testing was performed with Lexiscan at a rate of  0.08 mg/ml rapid bolus injection, for 15 seconds, 0.4 mg/5ml  intravenously. Low-level exercise was performed along with the  vasodilator infusion.  The heart rate was 99 at baseline and margarita to  136 beats per minute during the Lexiscan infusion. The rest blood  pressure was 172/78 mmHg and was 170/68 mm Hg during Lexiscan  infusion. The patient experienced weakness  during the test.    Myocardial perfusion imaging was performed at rest, approximately 45  minutes after the injection intravenously of 9.70 mCi of Tc-99m  Myoview. At peak pharmacologic  effect, 10-20 seconds after Lexiscan,   the patient was injected intravenously with 31.6 mCi of  Tc-99m  Myoview. The post-stress tomographic imaging was performed  approximately 60 minutes after stress.    EKG Findings  The resting EKG demonstrated normal sinus rhythm. The stress EKG  demonstrated no significant ST-T wave changes from baseline.    Tomographic Findings  Overall, the study quality is good . On the stress images, a small  perfusion defect of mild severity involving the basal inferior wall is  noted. On the rest images, perfusion is within normal limits . Gated  images demonstrated normal left ventricular wall motion. The left  ventricular ejection fraction was calculated to be 88% at rest and 82%  on the post stress images. TID was present with a ratio of 1.3.    BOBBY PEREZ MD   Head CT w/o contrast    Narrative    CT SCAN OF THE HEAD WITHOUT CONTRAST   9/10/2018 4:11 PM     HISTORY: unwitnessed fall;     TECHNIQUE:  Axial images of the head and coronal reformations without  IV contrast material. Radiation dose for this scan was reduced using  automated exposure control, adjustment of the mA and/or kV according  to patient size, or iterative reconstruction technique.    COMPARISON: None.    FINDINGS:  There is generalized atrophy of the brain.  White matter  changes are present in the cerebral hemispheres that are consistent  with small vessel ischemic disease in this age patient. There is no  evidence of intracranial hemorrhage, mass, acute infarct or anomaly.  The visualized portions of the sinuses and mastoids appear normal.  There is no evidence of trauma. Severe degenerative changes seen in  the temporomandibular joints.      Impression    IMPRESSION: No acute pathology. No bleed. No fractures.      KRISTIAN SAUNDERS MD   Cervical spine CT w/o contrast    Narrative    CT CERVICAL SPINE WITHOUT CONTRAST   9/10/2018 4:12 PM     HISTORY: unwitnessed fall, .;      TECHNIQUE: Axial images of the cervical  "spine were obtained without  intravenous contrast. Multiplanar reformations were performed.   Radiation dose for this scan was reduced using automated exposure  control, adjustment of the mA and/or kV according to patient size, or  iterative reconstruction technique.    COMPARISON: None.    FINDINGS: There is no evidence of fracture. There is curvature of the  spine convex towards the left.      Craniocervical junction: Normal.     C1-C2:  Normal.     C2-C3:  Normal disc, facet joints, spinal canal and neural foramina.     C3-C4:  There are severe degenerative changes in the right facet  joint. The central canal and neural foramen are normal.     C4-C5:  Mild annular disc bulge. Moderate-severe degenerative change  in the right facet joint.     C5-C6:  Mild annular bulge. Central canal and neural foramen are  patent.      C6-C7:  Loss of disc space height. Mild annular bulge. Central canal  and neural foramen are patent.      C7-T1:   Central canal and neural foramen are patent.      Impression    IMPRESSION:    1. No fractures are identified.  2. Multilevel degenerative change.    KRISTIAN SAUNDERS MD            Hospital Course:   Syncope/ fall  Occurred shortly after completing a Lexiscan stress test (relatively normal results). No preceding or prodromal symptoms. Admit EKG showing normal sinus rhythm without ST changes. Admit UA negative. No hypotension or hypoglycemia upon presentation, VSS other than hypertension. Head CT without acute abnormality. Possible heart murmur heard on exam. Unclear cause. Case discussed between emergency department provider and on-call cardiologist; recommending monitoring on telemetry and Holter monitoring upon discharge.  Was stable on tele overnight. ECHO as above. Will discharge on zio patch holter. Scheduled apmnt with PMD/ cards on 9/13 at [facility].    CAD  Denies any current chest pain. Patient reports prior diagnosis of angina \"many years ago,\" records unavailable. No " "history of PCI or CABG per report and chart review. Had Lexiscan stress test just prior to syncopal episode with \"small perfusion defect of mild severity involving the basal inferior wall which is mostly reversible and may be consistent with mild ischemia in the RCA distribution  Has had no complaints of CP in hospital. Remained stable.   -has apmnt to f/u PMD 9/13 and will see cards same Friday if possible. Will be discharged on zio patch     HTN (hypertension)  - Continue prior to admission amlodipine and losartan      Type 2 diabetes, HbA1c goal < 7% (H)  Diet controlled, no longer on any antihyperglycemic medications prior to admission. Recent HgbA1c = 6.8. Admit glucose = 164.     Hyperlipidemia LDL goal <100  Taking Lipitor prior to admission, continue.     Frequent UTI  Per patient report, gets a UTI about every 2 months. Was formerly on Macrobid for prophylaxis, developed hives. Was recently changed to trimethoprim for prophylaxis treatment, has only had one dose thus far. Admit UA unremarkable. Afebrile, no leukocytosis upon admission.  - Continue prior to admission trimethoprim      Degeneration of lumbar or lumbosacral intervertebral disc  Chronic back pain  Chronic problem, denies any change or worsening. Gets occasional epidural injections. Manages pain with prn acetaminophen and Tramadol.  - Continue prior to admission acetaminophen and Tramadol.      Esophageal reflux  Stable. Manages with prn omeprazole prior to admission, continue.        Anxiety and depression  Worse in the past few months due to recent death of her son from leukemia. Manages with daily Celexa and prn Klonopin, continue.        Peripheral neuropathy  Takes gabapentin prior to admission, continue.        Irritable bowel syndrome  Stable. Takes prn Imodium, not mentioned on med list. Denies recent diarrhea, Imodium not ordered.            Procedures:   No procedures performed during this admission         Allergies:      Allergies " "  Allergen Reactions     Actonel [Bisphosphonates] Itching     Azithromycin Hives     Celebrex [Celecoxib] Rash     Cipro [Ciprofloxacin] Rash     Erythromycin Rash     Escitalopram Diarrhea     Gets very sick and mad feelings     Fosamax Itching and Rash     Keflex [Cephalexin Monohydrate] Rash     Lisinopril Cough     Nitrofurantoin Other (See Comments)     \"dizzyness'     Risedronate Itching     Shellfish-Derived Products Hives     Tetanus-Diphtheria Toxoids Swelling     Vicodin [Acetaminophen] Itching     Patient reported - only when used scheduled in high doses.        Vioxx Rash     Penicillins Rash     Sulfa Drugs Itching and Rash     Sulfasalazine Itching and Rash             Medications Prior to Admission:     Prescriptions Prior to Admission   Medication Sig Dispense Refill Last Dose     acetaminophen (TYLENOL) 325 MG tablet Take 1 tablet by mouth every 6 hours as needed for pain. 30 tablet 0 9/10/2018 at am     amLODIPine (NORVASC) 5 MG tablet Take 1 tablet by mouth every evening  1 9/9/2018 at pm     Atorvastatin Calcium (LIPITOR PO) Take 40 mg by mouth At Bedtime    9/9/2018 at hs     citalopram (CELEXA) 20 MG tablet Take 20 mg by mouth daily   9/9/2018 at hs     Cyanocobalamin (B-12) 1000 MCG SUBL Place 1 tablet under the tongue daily    9/9/2018 at pm     LOSARTAN POTASSIUM PO Take 50 mg by mouth daily    9/9/2018 at pm     Multiple Vitamins-Minerals (THERAPEUTIC MULTIVIT/MINERAL) TABS Take 1 tablet by mouth daily.   9/9/2018 at pm     nitroglycerin (NITROSTAT) 0.4 MG SL tablet Place 1 tablet under the tongue every 5 minutes as needed. 30 tablet 4 9/6/2018 at pm     OMEPRAZOLE PO Take 20 mg by mouth daily as needed   Past Week at pm     traMADol (ULTRAM) 50 MG tablet Take 1 tablet by mouth every 6 hours as needed for pain. 60 tablet 5 9/8/2018 at pm     trimethoprim (TRIMPEX) 100 MG tablet Take 100 mg by mouth every evening   9/9/2018 at pm     VITAMIN D, CHOLECALCIFEROL, PO Take 1,000 Units by mouth " daily   9/9/2018 at pm     ** PATIENT ALERT ** Warning: DO NOT EXCEED 4,000 MG OF ACETAMINOPHEN FROM ALL SOURCES IN 24 HOURS        ClonazePAM (KLONOPIN PO) Take 0.5 mg by mouth 2 times daily as needed for anxiety   More than a month at Unknown time     gabapentin (NEURONTIN) 100 MG capsule Take 200 mg by mouth At Bedtime    More than a month at Unknown time     Glucose Blood (ONE TOUCH ULTRA TEST) strip 1 strip daily. 1 Box 6 6/16/2017 at am #131     order for DME 1 walker 1 Device 0      ORDER FOR DME Thigh length teds. 1 Device 2 4/2/2012 at Unknown             Discharge Medications:     Current Discharge Medication List      CONTINUE these medications which have NOT CHANGED    Details   acetaminophen (TYLENOL) 325 MG tablet Take 1 tablet by mouth every 6 hours as needed for pain.  Qty: 30 tablet, Refills: 0    Associated Diagnoses: Degeneration of lumbar or lumbosacral intervertebral disc      amLODIPine (NORVASC) 5 MG tablet Take 1 tablet by mouth every evening  Refills: 1      Atorvastatin Calcium (LIPITOR PO) Take 40 mg by mouth At Bedtime       citalopram (CELEXA) 20 MG tablet Take 20 mg by mouth daily      Cyanocobalamin (B-12) 1000 MCG SUBL Place 1 tablet under the tongue daily       LOSARTAN POTASSIUM PO Take 50 mg by mouth daily       Multiple Vitamins-Minerals (THERAPEUTIC MULTIVIT/MINERAL) TABS Take 1 tablet by mouth daily.      nitroglycerin (NITROSTAT) 0.4 MG SL tablet Place 1 tablet under the tongue every 5 minutes as needed.  Qty: 30 tablet, Refills: 4    Associated Diagnoses: Disorder of bone and cartilage, unspecified; Diarrhea; Irritable bowel syndrome; HTN (hypertension)      OMEPRAZOLE PO Take 20 mg by mouth daily as needed      traMADol (ULTRAM) 50 MG tablet Take 1 tablet by mouth every 6 hours as needed for pain.  Qty: 60 tablet, Refills: 5    Associated Diagnoses: Knee pain      trimethoprim (TRIMPEX) 100 MG tablet Take 100 mg by mouth every evening      VITAMIN D, CHOLECALCIFEROL, PO Take  "1,000 Units by mouth daily      ** PATIENT ALERT ** Warning: DO NOT EXCEED 4,000 MG OF ACETAMINOPHEN FROM ALL SOURCES IN 24 HOURS      ClonazePAM (KLONOPIN PO) Take 0.5 mg by mouth 2 times daily as needed for anxiety      gabapentin (NEURONTIN) 100 MG capsule Take 200 mg by mouth At Bedtime       Glucose Blood (ONE TOUCH ULTRA TEST) strip 1 strip daily.  Qty: 1 Box, Refills: 6    Comments: PLEASE SIGN RX AND FAX TO PHARMACY [phone]! THANKS  Associated Diagnoses: Type II or unspecified type diabetes mellitus without mention of complication, not stated as uncontrolled      !! order for DME 1 walker  Qty: 1 Device, Refills: 0    Associated Diagnoses: Acute pain of right knee      !! ORDER FOR DME Thigh length teds.  Qty: 1 Device, Refills: 2    Associated Diagnoses: Venous insufficiency       !! - Potential duplicate medications found. Please discuss with provider.                Consultations:   No consultations were requested during this admission            Discharge Exam:   Blood pressure 121/51, pulse 90, temperature 98.3  F (36.8  C), temperature source Oral, resp. rate 16, height 1.626 m (5' 4\"), weight 72 kg (158 lb 11.7 oz), SpO2 96 %.  GENERAL APPEARANCE: healthy, alert and no distress  EYES: conjunctiva clear, eyes grossly normal  HENT: external ears and nose normal   NECK: supple, no masses or adenopathy  RESP: lungs clear to auscultation - no rales, rhonchi or wheezes  CV: regular rate and rhythm, normal S1 S2, no S3 or S4 and no murmur, click or rub   ABDOMEN: soft, nontender, no HSM or masses and bowel sounds normal  MS: no clubbing, cyanosis; no edema  SKIN: clear without significant rashes or lesions  NEURO: Normal strength and tone, sensory exam grossly normal, mentation intact and speech normal    Unresulted Labs Ordered in the Past 30 Days of this Admission     No orders found for last 61 day(s).          Recent Results (from the past 24 hour(s))   NM Lexiscan stress test    Narrative    " GATED MYOCARDIAL PERFUSION SCINTIGRAPHY WITH INTRAVENOUS PHARMACOLOGIC  VASODILATATION LEXISCAN -ONE DAY STUDY     9/10/2018 2:51 PM  RAQUEL WETZEL  85 years  Female   1/26/1933.    Indication/Clinical History: Chest pain    Impression  1.  Myocardial perfusion imaging using single isotope technique  demonstrated a small perfusion defect of mild severity involving the  basal inferior wall which is mostly reversible and may be consistent  with mild ischemia in the right coronary artery distribution. In  addition, transient ischemic dilatation is noted with a TID ratio of  1.3.   2. Gated images demonstrated normal left ventricular wall motion.  The  left ventricular systolic function is 80% at rest and 82% on the post  stress images.  3. Compared to the prior study from 10/5/2009, there is no evidence of  possible mild ischemia in the right coronary artery distribution..    Procedure  Pharmacologic stress testing was performed with Lexiscan at a rate of  0.08 mg/ml rapid bolus injection, for 15 seconds, 0.4 mg/5ml  intravenously. Low-level exercise was performed along with the  vasodilator infusion.  The heart rate was 99 at baseline and margarita to  136 beats per minute during the Lexiscan infusion. The rest blood  pressure was 172/78 mmHg and was 170/68 mm Hg during Lexiscan  infusion. The patient experienced weakness  during the test.    Myocardial perfusion imaging was performed at rest, approximately 45  minutes after the injection intravenously of 9.70 mCi of Tc-99m  Myoview. At peak pharmacologic effect, 10-20 seconds after Lexiscan,   the patient was injected intravenously with 31.6 mCi of  Tc-99m  Myoview. The post-stress tomographic imaging was performed  approximately 60 minutes after stress.    EKG Findings  The resting EKG demonstrated normal sinus rhythm. The stress EKG  demonstrated no significant ST-T wave changes from baseline.    Tomographic Findings  Overall, the study quality is good . On  the stress images, a small  perfusion defect of mild severity involving the basal inferior wall is  noted. On the rest images, perfusion is within normal limits . Gated  images demonstrated normal left ventricular wall motion. The left  ventricular ejection fraction was calculated to be 88% at rest and 82%  on the post stress images. TID was present with a ratio of 1.3.    BOBBY PEREZ MD   Head CT w/o contrast    Narrative    CT SCAN OF THE HEAD WITHOUT CONTRAST   9/10/2018 4:11 PM     HISTORY: unwitnessed fall;     TECHNIQUE:  Axial images of the head and coronal reformations without  IV contrast material. Radiation dose for this scan was reduced using  automated exposure control, adjustment of the mA and/or kV according  to patient size, or iterative reconstruction technique.    COMPARISON: None.    FINDINGS:  There is generalized atrophy of the brain.  White matter  changes are present in the cerebral hemispheres that are consistent  with small vessel ischemic disease in this age patient. There is no  evidence of intracranial hemorrhage, mass, acute infarct or anomaly.  The visualized portions of the sinuses and mastoids appear normal.  There is no evidence of trauma. Severe degenerative changes seen in  the temporomandibular joints.      Impression    IMPRESSION: No acute pathology. No bleed. No fractures.      KRISTIAN SAUNDERS MD   Cervical spine CT w/o contrast    Narrative    CT CERVICAL SPINE WITHOUT CONTRAST   9/10/2018 4:12 PM     HISTORY: unwitnessed fall, .;      TECHNIQUE: Axial images of the cervical spine were obtained without  intravenous contrast. Multiplanar reformations were performed.   Radiation dose for this scan was reduced using automated exposure  control, adjustment of the mA and/or kV according to patient size, or  iterative reconstruction technique.    COMPARISON: None.    FINDINGS: There is no evidence of fracture. There is curvature of the  spine convex towards the left.      Craniocervical  junction: Normal.     C1-C2:  Normal.     C2-C3:  Normal disc, facet joints, spinal canal and neural foramina.     C3-C4:  There are severe degenerative changes in the right facet  joint. The central canal and neural foramen are normal.     C4-C5:  Mild annular disc bulge. Moderate-severe degenerative change  in the right facet joint.     C5-C6:  Mild annular bulge. Central canal and neural foramen are  patent.      C6-C7:  Loss of disc space height. Mild annular bulge. Central canal  and neural foramen are patent.      C7-T1:   Central canal and neural foramen are patent.      Impression    IMPRESSION:    1. No fractures are identified.  2. Multilevel degenerative change.    KRISTIAN SAUNDERS MD            Pending Tests at Discharge:   None         Discharge Instructions and Follow-Up:   Discharge diet: Regular   Discharge activity: Activity as tolerated   Discharge follow-up: Follow up with primary care provider on 9/13 as scheduled. F/u cards same Friday as discussed with MICHEL Abdullahi           Discharge Disposition:   Discharged to home      Attestation:  I have reviewed today's vital signs, notes, medications, labs and imaging.    Time Spent on this Encounter   I, Dashawn Cloud, personally saw the patient today and spent greater than 30 minutes discharging this patient.    Dashawn Cloud MD

## 2018-09-11 NOTE — PLAN OF CARE
Problem: Patient Care Overview  Goal: Plan of Care/Patient Progress Review  Outcome: Improving  Denies pain or light headedness, instructed r/t no caffeine for Echo today. BA on for safety. Alert and oriented.

## 2018-10-09 ENCOUNTER — RADIANT APPOINTMENT (OUTPATIENT)
Dept: GENERAL RADIOLOGY | Facility: CLINIC | Age: 83
End: 2018-10-09
Attending: INTERNAL MEDICINE
Payer: COMMERCIAL

## 2018-10-09 ENCOUNTER — OFFICE VISIT (OUTPATIENT)
Dept: FAMILY MEDICINE | Facility: CLINIC | Age: 83
End: 2018-10-09
Payer: COMMERCIAL

## 2018-10-09 VITALS
BODY MASS INDEX: 27.98 KG/M2 | DIASTOLIC BLOOD PRESSURE: 58 MMHG | HEART RATE: 87 BPM | SYSTOLIC BLOOD PRESSURE: 130 MMHG | RESPIRATION RATE: 14 BRPM | TEMPERATURE: 98.2 F | OXYGEN SATURATION: 97 % | WEIGHT: 163 LBS

## 2018-10-09 DIAGNOSIS — J20.9 ACUTE BRONCHITIS, UNSPECIFIED ORGANISM: Primary | ICD-10-CM

## 2018-10-09 LAB
ANION GAP SERPL CALCULATED.3IONS-SCNC: 6 MMOL/L (ref 3–14)
BASOPHILS # BLD AUTO: 0 10E9/L (ref 0–0.2)
BASOPHILS NFR BLD AUTO: 0.4 %
BUN SERPL-MCNC: 15 MG/DL (ref 7–30)
CALCIUM SERPL-MCNC: 9 MG/DL (ref 8.5–10.1)
CHLORIDE SERPL-SCNC: 106 MMOL/L (ref 94–109)
CO2 SERPL-SCNC: 27 MMOL/L (ref 20–32)
CREAT SERPL-MCNC: 0.87 MG/DL (ref 0.52–1.04)
DIFFERENTIAL METHOD BLD: NORMAL
EOSINOPHIL # BLD AUTO: 0.3 10E9/L (ref 0–0.7)
EOSINOPHIL NFR BLD AUTO: 2.9 %
ERYTHROCYTE [DISTWIDTH] IN BLOOD BY AUTOMATED COUNT: 13.3 % (ref 10–15)
GFR SERPL CREATININE-BSD FRML MDRD: 62 ML/MIN/1.7M2
GLUCOSE SERPL-MCNC: 120 MG/DL (ref 70–99)
HCT VFR BLD AUTO: 38.4 % (ref 35–47)
HGB BLD-MCNC: 12.9 G/DL (ref 11.7–15.7)
LYMPHOCYTES # BLD AUTO: 2.9 10E9/L (ref 0.8–5.3)
LYMPHOCYTES NFR BLD AUTO: 28.8 %
MCH RBC QN AUTO: 29 PG (ref 26.5–33)
MCHC RBC AUTO-ENTMCNC: 33.6 G/DL (ref 31.5–36.5)
MCV RBC AUTO: 86 FL (ref 78–100)
MONOCYTES # BLD AUTO: 1 10E9/L (ref 0–1.3)
MONOCYTES NFR BLD AUTO: 10.1 %
NEUTROPHILS # BLD AUTO: 5.8 10E9/L (ref 1.6–8.3)
NEUTROPHILS NFR BLD AUTO: 57.8 %
PLATELET # BLD AUTO: 280 10E9/L (ref 150–450)
POTASSIUM SERPL-SCNC: 3.9 MMOL/L (ref 3.4–5.3)
PROCALCITONIN SERPL-MCNC: <0.05 NG/ML
RBC # BLD AUTO: 4.45 10E12/L (ref 3.8–5.2)
SODIUM SERPL-SCNC: 139 MMOL/L (ref 133–144)
WBC # BLD AUTO: 10 10E9/L (ref 4–11)

## 2018-10-09 PROCEDURE — 85025 COMPLETE CBC W/AUTO DIFF WBC: CPT | Performed by: INTERNAL MEDICINE

## 2018-10-09 PROCEDURE — 99213 OFFICE O/P EST LOW 20 MIN: CPT | Performed by: INTERNAL MEDICINE

## 2018-10-09 PROCEDURE — 84145 PROCALCITONIN (PCT): CPT | Performed by: INTERNAL MEDICINE

## 2018-10-09 PROCEDURE — 36415 COLL VENOUS BLD VENIPUNCTURE: CPT | Performed by: INTERNAL MEDICINE

## 2018-10-09 PROCEDURE — 71046 X-RAY EXAM CHEST 2 VIEWS: CPT | Mod: FY

## 2018-10-09 PROCEDURE — 80048 BASIC METABOLIC PNL TOTAL CA: CPT | Performed by: INTERNAL MEDICINE

## 2018-10-09 RX ORDER — BENZONATATE 200 MG/1
200 CAPSULE ORAL 3 TIMES DAILY PRN
Qty: 21 CAPSULE | Refills: 1 | Status: SHIPPED | OUTPATIENT
Start: 2018-10-09 | End: 2018-11-14

## 2018-10-09 RX ORDER — BACLOFEN 10 MG/1
10 TABLET ORAL 3 TIMES DAILY
COMMUNITY
End: 2018-11-14

## 2018-10-09 RX ORDER — BLOOD-GLUCOSE METER
EACH MISCELLANEOUS
Refills: 0 | COMMUNITY
Start: 2018-09-13 | End: 2018-11-14

## 2018-10-09 RX ORDER — DICYCLOMINE HCL 20 MG
20 TABLET ORAL EVERY 6 HOURS
COMMUNITY
End: 2018-11-14

## 2018-10-09 NOTE — PROGRESS NOTES
SUBJECTIVE:   Daniela Brown is a 85 year old female who presents to clinic today for the following health issues:    New to .  She is here today because she calls her clinic and no one answers.  Dm-2, hypertension, diastolic dysfunction (HFpEF), chronic pain, dep/anxiety, IBS    She is here with daughter in law    Acute Illness   Acute illness concerns: cough  Onset: 6 days    Fever: no    Chills/Sweats: YES- Chills    Headache (location?): YES    Sinus Pressure:no    Conjunctivitis:  no    Ear Pain: YES: right    Rhinorrhea: no     Congestion: no    Sore Throat: no     Cough: YES    Wheeze: no    Decreased Appetite: YES    Nausea: YES    Vomiting: no    Diarrhea:  YES- today    Dysuria/Freq.: no    Fatigue/Achiness: YES    Sick/Strep Exposure: no     Therapies Tried and outcome: na    Reports feeling very weak and has very low appetite.  The low appetite has been ongoing for 3 months per chart review.  Weight was 162 in mid-Sept  Has post-nasal drip and coughing fits.  Her family is offered that she can stay with him while she is feeling ill for a few days, but she is declined, worried she will get everyone sick.  She had her daughter-in-law drive her to the appointment today.    She is up to date on flu shot    Current Outpatient Prescriptions   Medication Sig Dispense Refill     ** PATIENT ALERT ** Warning: DO NOT EXCEED 4,000 MG OF ACETAMINOPHEN FROM ALL SOURCES IN 24 HOURS       acetaminophen (TYLENOL) 325 MG tablet Take 1 tablet by mouth every 6 hours as needed for pain. 30 tablet 0     Atorvastatin Calcium (LIPITOR PO) Take 40 mg by mouth At Bedtime        baclofen (LIORESAL) 10 MG tablet Take 10 mg by mouth 3 times daily       citalopram (CELEXA) 20 MG tablet Take 20 mg by mouth daily       ClonazePAM (KLONOPIN PO) Take 0.5 mg by mouth 2 times daily as needed for anxiety       CONTOUR NEXT EZ (CONTOUR NEXT EZ W/DEVICE KIT) w/Device KIT Dispense meter, test strips, lancets covered by pt ins.  E11.9 NIDDM type II - Test 1 time/day  0     Cyanocobalamin (B-12) 1000 MCG SUBL Place 1 tablet under the tongue daily        dicyclomine (BENTYL) 20 MG tablet Take 20 mg by mouth every 6 hours       Fluticasone Propionate (FLONASE NA)        gabapentin (NEURONTIN) 100 MG capsule Take 200 mg by mouth At Bedtime        Glucose Blood (ONE TOUCH ULTRA TEST) strip 1 strip daily. 1 Box 6     LOSARTAN POTASSIUM PO Take 50 mg by mouth daily        metFORMIN (GLUCOPHAGE) 500 MG tablet Take 500 mg by mouth 2 times daily (with meals)       Multiple Vitamins-Minerals (THERAPEUTIC MULTIVIT/MINERAL) TABS Take 1 tablet by mouth daily.       OMEPRAZOLE PO Take 20 mg by mouth daily as needed       order for DME 1 walker 1 Device 0     ORDER FOR DME Thigh length teds. 1 Device 2     traMADol (ULTRAM) 50 MG tablet Take 1 tablet by mouth every 6 hours as needed for pain. 60 tablet 5     VITAMIN D, CHOLECALCIFEROL, PO Take 1,000 Units by mouth daily       amLODIPine (NORVASC) 5 MG tablet Take 1 tablet by mouth every evening  1     nitroglycerin (NITROSTAT) 0.4 MG SL tablet Place 1 tablet under the tongue every 5 minutes as needed. (Patient not taking: Reported on 10/9/2018) 30 tablet 4     trimethoprim (TRIMPEX) 100 MG tablet Take 100 mg by mouth every evening         Reviewed and updated as needed this visit by clinical staff  Tobacco  Allergies  Meds  Med Hx  Surg Hx  Fam Hx  Soc Hx      Reviewed and updated as needed this visit by Provider  Tobacco  Med Hx  Surg Hx  Fam Hx  Soc Hx        ROS:   ROS: 10 point ROS neg other than the symptoms noted above in the HPI.      OBJECTIVE:     /58  Pulse 87  Temp 98.2  F (36.8  C) (Tympanic)  Resp 14  Wt 163 lb (73.9 kg)  SpO2 97%  Breastfeeding? No  BMI 27.98 kg/m2  Body mass index is 27.98 kg/(m^2).  GENERAL APPEARANCE: alert, no distress and fatigued  EYES: Eyes grossly normal to inspection, PERRL and conjunctivae and sclerae normal  HENT: ear canals and TM's normal  and nose and mouth without ulcers or lesions  NECK: no adenopathy, no asymmetry, masses, or scars and thyroid normal to palpation  RESP: lungs clear to auscultation - no rales, rhonchi or wheezes  CV: regular rates and rhythm, normal S1 S2, no S3 or S4 and no murmur, click or rub  LYMPHATICS: 1+ pitting edema bilaterally  MS: 4/5 strength bilateral arms/legs  SKIN: no suspicious lesions or rashes  PSYCH: mentation appears normal and affect normal/bright    Diagnostic Test Results:  Results for orders placed or performed in visit on 10/09/18 (from the past 24 hour(s))   CBC with platelets and differential   Result Value Ref Range    WBC 10.0 4.0 - 11.0 10e9/L    RBC Count 4.45 3.8 - 5.2 10e12/L    Hemoglobin 12.9 11.7 - 15.7 g/dL    Hematocrit 38.4 35.0 - 47.0 %    MCV 86 78 - 100 fl    MCH 29.0 26.5 - 33.0 pg    MCHC 33.6 31.5 - 36.5 g/dL    RDW 13.3 10.0 - 15.0 %    Platelet Count 280 150 - 450 10e9/L    % Neutrophils 57.8 %    % Lymphocytes 28.8 %    % Monocytes 10.1 %    % Eosinophils 2.9 %    % Basophils 0.4 %    Absolute Neutrophil 5.8 1.6 - 8.3 10e9/L    Absolute Lymphocytes 2.9 0.8 - 5.3 10e9/L    Absolute Monocytes 1.0 0.0 - 1.3 10e9/L    Absolute Eosinophils 0.3 0.0 - 0.7 10e9/L    Absolute Basophils 0.0 0.0 - 0.2 10e9/L    Diff Method Automated Method        ASSESSMENT/PLAN:     1. Acute bronchitis, unspecified organism -likely viral.  She does feel weak, but is still able to perform self cares.  No indication for antibiotics at this time.  Tessalon has worked well for her in the past.  - CBC with platelets and differential  - Basic metabolic panel  - XR Chest 2 Views  - Procalcitonin  - benzonatate (TESSALON) 200 MG capsule; Take 1 capsule (200 mg) by mouth 3 times daily as needed for cough  Dispense: 21 capsule; Refill: 1    Patient Instructions   1. Labs and xray today  2. Stay with family.  Work on staying hydrated and eating small frequent meals  3. Rest, and don't drive until you feel better  4. Try  Tessalon dru      You have bronchitis which is a virus. Antibiotics treat bacterial infections and not viral infections.  They will not cure or shorten a viral illness.  The main way to feel better is rest, hydration, good nutrition    What is bronchitis? -- Bronchitis is an infection that causes a cough. It happens when the tubes that carry air into the lungs, called the  bronchi,  get infected.  Usually, bronchitis happens when a person gets a cold or the flu. The viruses that cause the cold or flu infect the bronchi and irritate them.   What are the symptoms of bronchitis? -- The most common symptoms of bronchitis are:  ?A nagging cough that can last up to a few weeks. Average duration of cough with bronchitis is 24 days  ?Coughing up mucus that is clear, yellow, or green  ?People with bronchitis do not usually get a fever or may have a low grade fever.  Is there a test for bronchitis? -- People do not usually need a test. But we might do a test, such as a chest X-ray, if the cause of your cough isn t clear.   How is bronchitis treated? -- Doctors do not usually treat bronchitis with antibiotic medicines. That s because bronchitis is usually caused by a virus, and antibiotics kill bacteria--not viruses.   To feel better, you can treat your cold and flu symptoms. Different treatments you can try include:  ?Taking a pain-relieving medicine  ?Taking over-the-counter cough and cold medicines  --For cough - try Robitussin DM or Mucinex DM (Acitve ingredients Guaifenesin and dextromethorphan)  --For nasal congestion, try saline nasal spray (Ocean brand or similar.  NOT Afrin)  --For sore throat and cough, try Chloraseptic spray, cough drops, warm salt water gargles or tea with honey.  ?Breathing in warm, moist air, such as in the shower, over a kettle, or from a humidifier  How can I keep from getting bronchitis again? -- You can reduce your chance of getting bronchitis again by keeping the germs that cause  bronchitis out of your body. One of the best ways to do this is to wash your hands often with soap and water. If there is no sink nearby, you can use a hand gel with alcohol in it to clean your hands.   How can I keep from spreading my germs? -- In addition to washing your hands often, you should cover your mouth with your elbow when you sneeze or cough. Using your elbow keeps you from getting germs on your hands. If you use a tissue, throw the tissue away and wash your hands.   Return to clinic if:  ?A fever higher than 100.4 F (38 C)  ?Chest pain when you cough, trouble breathing, or coughing up blood  ?A barking cough that makes it hard to talk  ?A cough and weight loss that you cannot explain                Cynthia Maddox, DO  [facility]

## 2018-10-09 NOTE — PATIENT INSTRUCTIONS
1. Labs and xray today  2. Stay with family.  Work on staying hydrated and eating small frequent meals  3. Rest, and don't drive until you feel better  4. Try Tessalon dru      You have bronchitis which is a virus. Antibiotics treat bacterial infections and not viral infections.  They will not cure or shorten a viral illness.  The main way to feel better is rest, hydration, good nutrition    What is bronchitis? -- Bronchitis is an infection that causes a cough. It happens when the tubes that carry air into the lungs, called the  bronchi,  get infected.  Usually, bronchitis happens when a person gets a cold or the flu. The viruses that cause the cold or flu infect the bronchi and irritate them.   What are the symptoms of bronchitis? -- The most common symptoms of bronchitis are:  ?A nagging cough that can last up to a few weeks. Average duration of cough with bronchitis is 24 days  ?Coughing up mucus that is clear, yellow, or green  ?People with bronchitis do not usually get a fever or may have a low grade fever.  Is there a test for bronchitis? -- People do not usually need a test. But we might do a test, such as a chest X-ray, if the cause of your cough isn t clear.   How is bronchitis treated? -- Doctors do not usually treat bronchitis with antibiotic medicines. That s because bronchitis is usually caused by a virus, and antibiotics kill bacteria--not viruses.   To feel better, you can treat your cold and flu symptoms. Different treatments you can try include:  ?Taking a pain-relieving medicine  ?Taking over-the-counter cough and cold medicines  --For cough - try Robitussin DM or Mucinex DM (Acitve ingredients Guaifenesin and dextromethorphan)  --For nasal congestion, try saline nasal spray (Ocean brand or similar.  NOT Afrin)  --For sore throat and cough, try Chloraseptic spray, cough drops, warm salt water gargles or tea with honey.  ?Breathing in warm, moist air, such as in the shower, over a kettle, or from  a humidifier  How can I keep from getting bronchitis again? -- You can reduce your chance of getting bronchitis again by keeping the germs that cause bronchitis out of your body. One of the best ways to do this is to wash your hands often with soap and water. If there is no sink nearby, you can use a hand gel with alcohol in it to clean your hands.   How can I keep from spreading my germs? -- In addition to washing your hands often, you should cover your mouth with your elbow when you sneeze or cough. Using your elbow keeps you from getting germs on your hands. If you use a tissue, throw the tissue away and wash your hands.   Return to clinic if:  ?A fever higher than 100.4 F (38 C)  ?Chest pain when you cough, trouble breathing, or coughing up blood  ?A barking cough that makes it hard to talk  ?A cough and weight loss that you cannot explain

## 2018-10-09 NOTE — MR AVS SNAPSHOT
After Visit Summary   10/9/2018    Daniela Brown    MRN: [id number]           Patient Information     Date Of Birth          1/26/1933        Visit Information        Provider Department      10/9/2018 2:20 PM Cynthia Maddox, DO [facility]        Today's Diagnoses     Acute bronchitis, unspecified organism    -  1      Care Instructions    1. Labs and xray today  2. Stay with family.  Work on staying hydrated and eating small frequent meals  3. Rest, and don't drive until you feel better  4. Try Tessalon pear      You have bronchitis which is a virus. Antibiotics treat bacterial infections and not viral infections.  They will not cure or shorten a viral illness.  The main way to feel better is rest, hydration, good nutrition    What is bronchitis? -- Bronchitis is an infection that causes a cough. It happens when the tubes that carry air into the lungs, called the  bronchi,  get infected.  Usually, bronchitis happens when a person gets a cold or the flu. The viruses that cause the cold or flu infect the bronchi and irritate them.   What are the symptoms of bronchitis? -- The most common symptoms of bronchitis are:  ?A nagging cough that can last up to a few weeks. Average duration of cough with bronchitis is 24 days  ?Coughing up mucus that is clear, yellow, or green  ?People with bronchitis do not usually get a fever or may have a low grade fever.  Is there a test for bronchitis? -- People do not usually need a test. But we might do a test, such as a chest X-ray, if the cause of your cough isn t clear.   How is bronchitis treated? -- Doctors do not usually treat bronchitis with antibiotic medicines. That s because bronchitis is usually caused by a virus, and antibiotics kill bacteria--not viruses.   To feel better, you can treat your cold and flu symptoms. Different treatments you can try include:  ?Taking a pain-relieving medicine  ?Taking over-the-counter cough and  cold medicines  --For cough - try Robitussin DM or Mucinex DM (Acitve ingredients Guaifenesin and dextromethorphan)  --For nasal congestion, try saline nasal spray (Ocean brand or similar.  NOT Afrin)  --For sore throat and cough, try Chloraseptic spray, cough drops, warm salt water gargles or tea with honey.  ?Breathing in warm, moist air, such as in the shower, over a kettle, or from a humidifier  How can I keep from getting bronchitis again? -- You can reduce your chance of getting bronchitis again by keeping the germs that cause bronchitis out of your body. One of the best ways to do this is to wash your hands often with soap and water. If there is no sink nearby, you can use a hand gel with alcohol in it to clean your hands.   How can I keep from spreading my germs? -- In addition to washing your hands often, you should cover your mouth with your elbow when you sneeze or cough. Using your elbow keeps you from getting germs on your hands. If you use a tissue, throw the tissue away and wash your hands.   Return to clinic if:  ?A fever higher than 100.4 F (38 C)  ?Chest pain when you cough, trouble breathing, or coughing up blood  ?A barking cough that makes it hard to talk  ?A cough and weight loss that you cannot explain                    Follow-ups after your visit        Who to contact     If you have questions or need follow up information about today's clinic visit or your schedule please contact [facility] directly at [phone].  Normal or non-critical lab and imaging results will be communicated to you by [employer], letter or phone within 4 business days after the clinic has received the results. If you do not hear from us within 7 days, please contact the clinic through [employer] or phone. If you have a critical or abnormal lab result, we will notify you by phone as soon as possible.  Submit refill requests through [employer] or call your pharmacy and they will forward the refill request to us.  Please allow 3 business days for your refill to be completed.          Additional Information About Your Visit        Care EveryWhere ID     This is your Care EveryWhere ID. This could be used by other organizations to access your [facility] medical records  [id number]        Your Vitals Were     Pulse Temperature Respirations Pulse Oximetry Breastfeeding? BMI (Body Mass Index)    87 98.2  F (36.8  C) (Tympanic) 14 97% No 27.98 kg/m2       Blood Pressure from Last 3 Encounters:   10/09/18 130/58   09/11/18 121/51   07/19/18 157/72    Weight from Last 3 Encounters:   10/09/18 163 lb (73.9 kg)   09/10/18 158 lb 11.7 oz (72 kg)   07/19/18 158 lb (71.7 kg)              We Performed the Following     Basic metabolic panel     CBC with platelets and differential     Procalcitonin     XR Chest 2 Views          Today's Medication Changes          These changes are accurate as of 10/9/18  2:52 PM.  If you have any questions, ask your nurse or doctor.               Start taking these medicines.        Dose/Directions    benzonatate 200 MG capsule   Commonly known as:  TESSALON   Used for:  Acute bronchitis, unspecified organism   Started by:  Cynthia Maddox DO        Dose:  200 mg   Take 1 capsule (200 mg) by mouth 3 times daily as needed for cough   Quantity:  21 capsule   Refills:  1            Where to get your medicines      These medications were sent to [facility] [place], [place] - [address]  [address] [address]     Phone:  [phone]     benzonatate 200 MG capsule                Primary Care Provider Office Phone # Fax #    Reynaldo Caro Duenas [phone]       [facility] [address] [address]        Equal Access to Services     MANFRED REYES : Trace Burden, lacie masterson, rasheed reza. So [facility] [place] [facility] [phone].    ATENCIÓN: Makenna monreal,  tiene a cam disposición servicios gratuitos de asistencia lingüística. Romana olivares [phone].    We comply with applicable federal civil rights laws and [place] laws. We do not discriminate on the basis of race, color, national origin, age, disability, sex, sexual orientation, or gender identity.            Thank you!     Thank you for choosing [facility]  for your care. Our goal is always to provide you with excellent care. Hearing back from our patients is one way we can continue to improve our services. Please take a few minutes to complete the written survey that you may receive in the mail after your visit with us. Thank you!             Your Updated Medication List - Protect others around you: Learn how to safely use, store and throw away your medicines at www.disposemymeds.org.          This list is accurate as of 10/9/18  2:52 PM.  Always use your most recent med list.                   Brand Name Dispense Instructions for use Diagnosis    * ** PATIENT ALERT **      Warning: DO NOT EXCEED 4,000 MG OF ACETAMINOPHEN FROM ALL SOURCES IN 24 HOURS        acetaminophen 325 MG tablet    TYLENOL    30 tablet    Take 1 tablet by mouth every 6 hours as needed for pain.    Degeneration of lumbar or lumbosacral intervertebral disc       amLODIPine 5 MG tablet    NORVASC     Take 1 tablet by mouth every evening        B-12 1000 MCG Subl      Place 1 tablet under the tongue daily        baclofen 10 MG tablet    LIORESAL     Take 10 mg by mouth 3 times daily        benzonatate 200 MG capsule    TESSALON    21 capsule    Take 1 capsule (200 mg) by mouth 3 times daily as needed for cough    Acute bronchitis, unspecified organism       citalopram 20 MG tablet    celeXA     Take 20 mg by mouth daily        CONTOUR NEXT EZ w/Device Kit      Dispense meter, test strips, lancets covered by pt ins. E11.9 NIDDM type II - Test 1 time/day        dicyclomine 20 MG tablet    BENTYL     Take 20 mg by mouth every 6 hours         FLONASE NA           gabapentin 100 MG capsule    NEURONTIN     Take 200 mg by mouth At Bedtime        KLONOPIN PO      Take 0.5 mg by mouth 2 times daily as needed for anxiety        LIPITOR PO      Take 40 mg by mouth At Bedtime        LOSARTAN POTASSIUM PO      Take 50 mg by mouth daily        metFORMIN 500 MG tablet    GLUCOPHAGE     Take 500 mg by mouth 2 times daily (with meals)        nitroGLYcerin 0.4 MG sublingual tablet    NITROSTAT    30 tablet    Place 1 tablet under the tongue every 5 minutes as needed.    Disorder of bone and cartilage, unspecified, Diarrhea, Irritable bowel syndrome, HTN (hypertension)       OMEPRAZOLE PO      Take 20 mg by mouth daily as needed        ONETOUCH ULTRA test strip   Generic drug:  blood glucose monitoring     1 Box    1 strip daily.    Type II or unspecified type diabetes mellitus without mention of complication, not stated as uncontrolled       * order for DME     1 Device    Thigh length teds.    Venous insufficiency       order for DME     1 Device    1 walker    Acute pain of right knee       THERAPEUTIC MULTIVIT/MINERAL Tabs      Take 1 tablet by mouth daily.        traMADol 50 MG tablet    ULTRAM    60 tablet    Take 1 tablet by mouth every 6 hours as needed for pain.    Knee pain       trimethoprim 100 MG tablet    TRIMPEX     Take 100 mg by mouth every evening        VITAMIN D (CHOLECALCIFEROL) PO      Take 1,000 Units by mouth daily        * Notice:  This list has 2 medication(s) that are the same as other medications prescribed for you. Read the directions carefully, and ask your doctor or other care provider to review them with you.

## 2018-10-09 NOTE — LETTER
October 12, 2018      Daniela Betty Brown  [address] [id number]        Dear ,    We are writing to inform you of your test results.    The marker for bacterial infection is low which is good.  Kidney function and electrolytes are normal.  Blood counts are normal.  Chest x-ray does not show any evidence of pneumonia or fluid in the lungs    Resulted Orders   XR Chest 2 Views    Narrative    CHEST TWO VIEWS 10/9/2018 3:04 PM     HISTORY: Evaluate for pulmonary congestion or pneumonia. Cough and  weakness. Acute bronchitis, unspecified organism.    COMPARISON: July 19, 2018     FINDINGS: There are no acute infiltrates. The cardiac silhouette is  not enlarged. Pulmonary vasculature is unremarkable.      Impression    IMPRESSION: No acute disease.    ABHIJEET LUX MD       If you have any questions or concerns, please call the clinic at the number listed above.       Sincerely,        Cynthia Maddox, DO

## 2018-10-10 ASSESSMENT — PATIENT HEALTH QUESTIONNAIRE - PHQ9: SUM OF ALL RESPONSES TO PHQ QUESTIONS 1-9: 3

## 2018-10-10 NOTE — PROGRESS NOTES
The marker for bacterial infection is low which is good.  Kidney function and electrolytes are normal.  Blood counts are normal.  Chest x-ray does not show any evidence of pneumonia or fluid in the lungs

## 2018-10-19 ENCOUNTER — TELEPHONE (OUTPATIENT)
Dept: PALLIATIVE MEDICINE | Facility: CLINIC | Age: 83
End: 2018-10-19

## 2018-10-19 NOTE — TELEPHONE ENCOUNTER
Received 10-    [facility] L4-5 interlaminar LASHAE for acute low back pain to be scheduled at [facility].  Routing to scheduling coordinators.    Ifrah Tracy  Patient Representative  [facility]

## 2018-10-20 DIAGNOSIS — M54.50 CHRONIC BILATERAL LOW BACK PAIN WITHOUT SCIATICA: Primary | ICD-10-CM

## 2018-10-20 DIAGNOSIS — G89.29 CHRONIC BILATERAL LOW BACK PAIN WITHOUT SCIATICA: Primary | ICD-10-CM

## 2018-10-20 DIAGNOSIS — M43.10 DEGENERATIVE SPONDYLOLISTHESIS: ICD-10-CM

## 2018-10-22 NOTE — TELEPHONE ENCOUNTER
Pt wants to go to [facility]. She will contact the Dr. Edgardo WALLACE    [facility] [place]

## 2018-10-26 ENCOUNTER — TELEPHONE (OUTPATIENT)
Dept: PALLIATIVE MEDICINE | Facility: CLINIC | Age: 83
End: 2018-10-26

## 2018-10-26 NOTE — TELEPHONE ENCOUNTER
Referral received from Dr. Carter for evaluation and management. Our clinic is open to internal referrals only from [facility] or [facility]  primary care clinics. Patient's PCP is outside of [facility] and Dr. Carter is with [facility]. Unable to accept referral.      Bharti May    [facility]

## 2018-11-14 ENCOUNTER — OFFICE VISIT (OUTPATIENT)
Dept: FAMILY MEDICINE | Facility: CLINIC | Age: 83
End: 2018-11-14
Payer: COMMERCIAL

## 2018-11-14 VITALS
SYSTOLIC BLOOD PRESSURE: 120 MMHG | HEART RATE: 78 BPM | BODY MASS INDEX: 28.61 KG/M2 | WEIGHT: 167.6 LBS | DIASTOLIC BLOOD PRESSURE: 62 MMHG | OXYGEN SATURATION: 98 % | TEMPERATURE: 98.7 F | HEIGHT: 64 IN

## 2018-11-14 DIAGNOSIS — M51.379 DEGENERATION OF LUMBAR OR LUMBOSACRAL INTERVERTEBRAL DISC: ICD-10-CM

## 2018-11-14 DIAGNOSIS — I10 BENIGN ESSENTIAL HYPERTENSION: ICD-10-CM

## 2018-11-14 DIAGNOSIS — E11.42 TYPE 2 DIABETES MELLITUS WITH DIABETIC POLYNEUROPATHY, WITHOUT LONG-TERM CURRENT USE OF INSULIN (H): ICD-10-CM

## 2018-11-14 DIAGNOSIS — F43.23 ADJUSTMENT DISORDER WITH MIXED ANXIETY AND DEPRESSED MOOD: ICD-10-CM

## 2018-11-14 DIAGNOSIS — Z87.440 HISTORY OF RECURRENT URINARY TRACT INFECTION: ICD-10-CM

## 2018-11-14 DIAGNOSIS — N18.30 CKD (CHRONIC KIDNEY DISEASE) STAGE 3, GFR 30-59 ML/MIN (H): ICD-10-CM

## 2018-11-14 DIAGNOSIS — I20.9 ANGINA PECTORIS (H): ICD-10-CM

## 2018-11-14 DIAGNOSIS — K58.0 IRRITABLE BOWEL SYNDROME WITH DIARRHEA: ICD-10-CM

## 2018-11-14 DIAGNOSIS — G89.4 CHRONIC PAIN SYNDROME: Primary | ICD-10-CM

## 2018-11-14 PROBLEM — R55 SYNCOPE AND COLLAPSE: Status: RESOLVED | Noted: 2018-09-10 | Resolved: 2018-11-14

## 2018-11-14 PROCEDURE — 99214 OFFICE O/P EST MOD 30 MIN: CPT | Performed by: INTERNAL MEDICINE

## 2018-11-14 RX ORDER — NITROGLYCERIN 0.4 MG/1
0.4 TABLET SUBLINGUAL EVERY 5 MIN PRN
Qty: 30 TABLET | Refills: 4 | Status: SHIPPED | OUTPATIENT
Start: 2018-11-14 | End: 2020-11-11

## 2018-11-14 RX ORDER — CARVEDILOL 3.12 MG/1
3.12 TABLET ORAL 2 TIMES DAILY WITH MEALS
Qty: 180 TABLET | Refills: 3 | Status: SHIPPED | OUTPATIENT
Start: 2018-11-14 | End: 2019-02-07

## 2018-11-14 RX ORDER — CARVEDILOL 3.12 MG/1
3.12 TABLET ORAL 2 TIMES DAILY WITH MEALS
COMMUNITY
End: 2018-11-14

## 2018-11-14 RX ORDER — GABAPENTIN 100 MG/1
200 CAPSULE ORAL AT BEDTIME
Qty: 180 CAPSULE | Refills: 3 | Status: SHIPPED | OUTPATIENT
Start: 2018-11-14 | End: 2018-12-14

## 2018-11-14 RX ORDER — AMLODIPINE BESYLATE 5 MG/1
5 TABLET ORAL EVERY EVENING
Qty: 90 TABLET | Refills: 3 | Status: SHIPPED | OUTPATIENT
Start: 2018-11-14 | End: 2019-11-25

## 2018-11-14 RX ORDER — DICYCLOMINE HCL 20 MG
20 TABLET ORAL 4 TIMES DAILY PRN
Qty: 60 TABLET | Refills: 11 | Status: ON HOLD | OUTPATIENT
Start: 2018-11-14 | End: 2019-10-11

## 2018-11-14 RX ORDER — CITALOPRAM HYDROBROMIDE 20 MG/1
20 TABLET ORAL DAILY
Qty: 90 TABLET | Refills: 3 | Status: SHIPPED | OUTPATIENT
Start: 2018-11-14 | End: 2019-05-30 | Stop reason: ALTCHOICE

## 2018-11-14 RX ORDER — LOSARTAN POTASSIUM 50 MG/1
50 TABLET ORAL DAILY
Qty: 90 TABLET | Refills: 3 | Status: ON HOLD | OUTPATIENT
Start: 2018-11-14 | End: 2019-10-11

## 2018-11-14 RX ORDER — ATORVASTATIN CALCIUM 40 MG/1
40 TABLET, FILM COATED ORAL AT BEDTIME
Qty: 90 TABLET | Refills: 3 | Status: SHIPPED | OUTPATIENT
Start: 2018-11-14 | End: 2019-12-26

## 2018-11-14 NOTE — PROGRESS NOTES
SUBJECTIVE:   Daniela Brown is a 85 year old female who presents to clinic today for the following health issues:    Chief Complaint   Patient presents with     Referral     Would like a referral to pain clinic     Establish Care     Has been going to [facility] in [place]      She has [employer] insurance and isn't sure if [facility] is in her network. She would like to proceed to transfer her care      Chronic pain:  --checked [place] , [place] tramadol 50 mg #90 on , has 2 refills remaining.  --she estimates she uses tramadol 0-2 x day.  She would like to be off this.  It is very helpful, improves her functionality and is tolerating well w/out side effects.    MRI 2018   L1-L2: No significant spinal canal or neural foraminal stenosis.  L2-L3: Moderately severe left lateral recess stenosis due to small disc protrusion in combination with facet hypertrophy.  L3-L4: No significant spinal canal or neural foraminal stenosis.  L4-L5: Moderately severe bilateral lateral recess stenosis due to a combination of 5 mm degenerative anterior subluxation of L4 on L5 with facet hypertrophy and disc bulge. Moderate, probably insignificant, bilateral neural foraminal stenosis.  L5-S1: No significant spinal canal or neural foraminal stenosis.    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  She has guilt over his death because he fell while in her care and broke his hip, then  a few days later at the [facility]CU.    --she reports intermittent symptoms, but improved from several months ago.  Anxiety is worse if she doesn't use the clonazepam.    --she does have psychologist she was following with.  Would like to establish w/provider here.    Angina:  She saw cardiology in Sept, through .  Had stress test then syncopal episde shortly after.  She has carvedilol as anti-anginal therapy, and nitroglycerin.  Uses a ~1 x week.   She is due to follow-up with cards in Dec.  Their note is not available.    Recurrent UTI:  On trimethoprim to help suppress infections.  Was getting diarrhea due to this and the diarrhea has improved.   Has many drug allergies or intolerances.  Reports infection Q 2-3 months.      IBS: with diarrhea.  Dicyclomine helps.    Dm-2:  She is no longer on medications, was previously on metformin.  She is unsure why it was stopped.  Has neuropathy and CKD-3.    HCM: -   Influenza Virus, Unspecified 03/27/2012, 12/02/2010, 11/11/2010, 11/18/2009, 12/05/2007, 12/14/2006, 10/05/1999, 12/16/1997, 10/01/1996     Influenza, High-dose Inactivated 01/15/2018, 09/01/2016, 01/28/2016     Influenza, IIV3 (Age >=3 years) 10/30/2012, 11/01/2001, 11/20/2000     Influenza, Inactivated IIV3 (Age 65+ Years) Preserv Free 09/13/2018     Pneumococcal Poly,23-Valent (Pneumovax) 11/17/2008, 10/01/1996     Td (Age >=7 Years) 01/01/1988      Had Urine drug screen 11/5/18      Current Outpatient Prescriptions   Medication Sig Dispense Refill     acetaminophen (TYLENOL) 325 MG tablet Take 1 tablet by mouth every 6 hours as needed for pain. 30 tablet 0     amLODIPine (NORVASC) 5 MG tablet Take 1 tablet by mouth every evening  1     aspirin 81 MG tablet Take 81 mg by mouth daily       Atorvastatin Calcium (LIPITOR PO) Take 40 mg by mouth At Bedtime        carvedilol (COREG) 3.125 MG tablet Take 3.125 mg by mouth 2 times daily (with meals)       citalopram (CELEXA) 20 MG tablet Take 20 mg by mouth daily       ClonazePAM (KLONOPIN PO) Take 0.5 mg by mouth 2 times daily as needed for anxiety       Cyanocobalamin (B-12) 1000 MCG SUBL Place 1 tablet under the tongue daily        dicyclomine (BENTYL) 20 MG tablet Take 20 mg by mouth every 6 hours       gabapentin (NEURONTIN) 100 MG capsule Take 200 mg by mouth At Bedtime        LOSARTAN POTASSIUM PO Take 50 mg by mouth daily        Multiple Vitamins-Minerals (THERAPEUTIC MULTIVIT/MINERAL) TABS Take 1  "tablet by mouth daily.       nitroglycerin (NITROSTAT) 0.4 MG SL tablet Place 1 tablet under the tongue every 5 minutes as needed. 30 tablet 4     traMADol (ULTRAM) 50 MG tablet Take 1 tablet by mouth every 6 hours as needed for pain. 60 tablet 5     VITAMIN D, CHOLECALCIFEROL, PO Take 1,000 Units by mouth daily       order for DME 1 walker 1 Device 0     ORDER FOR DME Thigh length teds. 1 Device 2       Reviewed and updated as needed this visit by clinical staff  Tobacco  Allergies  Meds  Problems  Med Hx  Surg Hx  Fam Hx  Soc Hx        Reviewed and updated as needed this visit by Provider  Allergies  Meds  Problems         ROS:  Constitutional, HEENT, cardiovascular, pulmonary, GI, , musculoskeletal, neuro, skin, endocrine and psych systems are negative, except as otherwise noted.    OBJECTIVE:     /62  Pulse 78  Temp 98.7  F (37.1  C) (Tympanic)  Ht 5' 4\" (1.626 m)  Wt 167 lb 9.6 oz (76 kg)  SpO2 98%  BMI 28.77 kg/m2  Body mass index is 28.77 kg/(m^2).  GENERAL APPEARANCE: alert and no distress      ASSESSMENT/PLAN:     1. Chronic pain syndrome -referral to  Dr. Catia Pascual from Dr. Carter, for consideration of injections.  Patient should be seen when she is due for her next refill of tramadol.  - PAIN MANAGEMENT REFERRAL  - gabapentin (NEURONTIN) 100 MG capsule; Take 2 capsules (200 mg) by mouth At Bedtime  Dispense: 180 capsule; Refill: 3    2. Degeneration of lumbar or lumbosacral intervertebral disc    3. Benign essential hypertension -stable, refill provided  - amLODIPine (NORVASC) 5 MG tablet; Take 1 tablet (5 mg) by mouth every evening  Dispense: 90 tablet; Refill: 3  - carvedilol (COREG) 3.125 MG tablet; Take 1 tablet (3.125 mg) by mouth 2 times daily (with meals)  Dispense: 180 tablet; Refill: 3  - losartan (COZAAR) 50 MG tablet; Take 1 tablet (50 mg) by mouth daily  Dispense: 90 tablet; Refill: 3    4. Type 2 diabetes mellitus with diabetic polyneuropathy, without long-term " current use of insulin (H) -taken off metformin for unclear reason, but suspect due to very well controlled A1c.  Recheck diabetes in 3 months.  She reports her glucometer is not working well, advised her to meet with clinic RN to help troubleshoot.  If it is beyond salvage, I can order new glucometer.  - Basic metabolic panel; Future  - Hemoglobin A1c; Future  - Lipid panel reflex to direct LDL Fasting; Future  - Albumin Random Urine Quantitative with Creat Ratio; Future  - **TSH with free T4 reflex FUTURE anytime; Future  - atorvastatin (LIPITOR) 40 MG tablet; Take 1 tablet (40 mg) by mouth At Bedtime  Dispense: 90 tablet; Refill: 3  - gabapentin (NEURONTIN) 100 MG capsule; Take 2 capsules (200 mg) by mouth At Bedtime  Dispense: 180 capsule; Refill: 3    5. History of recurrent urinary tract infection trimethoprim potentially cause diarrhea.-She has allergies or intolerances to many antibiotics    6. CKD (chronic kidney disease) stage 3, GFR 30-59 ml/min (H) -chronic and stable    7. Angina pectoris (H) -saw cardiology through [facility] in September, however note is not available.  She also had an abnormal stress test, which I am not able to see.  She reports occasional use of nitroglycerin.  She is due to follow-up with cardiology in December, would like to stick with the [facility]  - CARDIOLOGY EVAL ADULT REFERRAL  - atorvastatin (LIPITOR) 40 MG tablet; Take 1 tablet (40 mg) by mouth At Bedtime  Dispense: 90 tablet; Refill: 3  - carvedilol (COREG) 3.125 MG tablet; Take 1 tablet (3.125 mg) by mouth 2 times daily (with meals)  Dispense: 180 tablet; Refill: 3  - nitroGLYcerin (NITROSTAT) 0.4 MG sublingual tablet; Place 1 tablet (0.4 mg) under the tongue every 5 minutes as needed for chest pain  Dispense: 30 tablet; Refill: 4    8. Adjustment disorder with mixed anxiety and depressed mood -improved after recent stressors.  Patient would like to establish with a counselor in the [facility]  - MENTAL HEALTH  REFERRAL  - Adult; Outpatient Treatment; Individual/Couples/Family/Group Therapy/Health Psychology; [facility]: [facility] [phone]; We will contact you to schedule the appointment or please call with any questions  - citalopram (CELEXA) 20 MG tablet; Take 1 tablet (20 mg) by mouth daily  Dispense: 90 tablet; Refill: 3    9. Irritable bowel syndrome with diarrhea chronic and stable-   - dicyclomine (BENTYL) 20 MG tablet; Take 1 tablet (20 mg) by mouth 4 times daily as needed (diarrhea)  Dispense: 60 tablet; Refill: 11      Patient Instructions   1. Return to clinic 3 months for diabetes check, labs prior to visit.  Bring glucometer.  2. Referral to Cardiology and Psychologist.   3. Medications renewed, contact pharmacy to fill  4. Return to clinic for fill on tramadol and clonazepam - these are controlled substances and require a face to face office visit  5. Double check our medication list with yours and contact dr. Maddox's nurse with any errors.  6. Dr. Maddox is not in on Mondays.      Cynthia Maddox, DO  [facility]

## 2018-11-14 NOTE — PATIENT INSTRUCTIONS
1. Return to clinic 3 months for diabetes check, labs prior to visit.  Bring glucometer.  2. Referral to Cardiology and Psychologist.   3. Medications renewed, contact pharmacy to fill  4. Return to clinic for fill on tramadol and clonazepam - these are controlled substances and require a face to face office visit  5. Double check our medication list with yours and contact dr. Maddox's nurse with any errors.  6. Dr. Maddox is not in on Mondays.

## 2018-11-14 NOTE — MR AVS SNAPSHOT
After Visit Summary   11/14/2018    Daniela Brown    MRN: [id number]           Patient Information     Date Of Birth          1/26/1933        Visit Information        Provider Department      11/14/2018 1:00 PM Cynthia Maddox,  [facility]        Today's Diagnoses     Chronic pain syndrome    -  1    Benign essential hypertension        Type 2 diabetes mellitus with diabetic polyneuropathy, without long-term current use of insulin (H)        History of recurrent urinary tract infection        CKD (chronic kidney disease) stage 3, GFR 30-59 ml/min (H)        Degeneration of lumbar or lumbosacral intervertebral disc        Angina pectoris (H)        Adjustment disorder with mixed anxiety and depressed mood        Irritable bowel syndrome with diarrhea          Care Instructions    1. Return to clinic 3 months for diabetes check, labs prior to visit.  Bring glucometer.  2. Referral to Cardiology and Psychologist.   3. Medications renewed, contact pharmacy to fill  4. Return to clinic for fill on tramadol and clonazepam - these are controlled substances and require a face to face office visit  5. Double check our medication list with yours and contact dr. Maddox's nurse with any errors.  6. Dr. Maddox is not in on Mondays.          Follow-ups after your visit        Additional Services     CARDIOLOGY EVAL ADULT REFERRAL       Preferred location:  [facility] [phone]   [email or web address]    Please be aware that coverage of these services is subject to the terms and limitations of your health insurance plan.  Call member services at your health plan with any benefit or coverage questions.      Please bring the following to your appointment:  Any x-rays, CTs or MRIs which have been performed. Contact the facility where they were done to arrange for  prior to your scheduled appointment.     List of current medications  This referral request   Any documents/labs given to you for this referral            MENTAL HEALTH REFERRAL  - Adult; Outpatient Treatment; Individual/Couples/Family/Group Therapy/Health Psychology; [facility]: [facility] [phone]; We will contact you to schedule the appointment or please call with any questions       All scheduling is subject to the client's specific insurance plan & benefits, provider/location availability, and provider clinical specialities.  Please arrive 15 minutes early for your first appointment and bring your completed paperwork.    Please be aware that coverage of these services is subject to the terms and limitations of your health insurance plan.  Call member services at your health plan with any benefit or coverage questions.                      PAIN MANAGEMENT REFERRAL       Your provider has referred you to: [facility]: [facility] -    Reason for Referral: Evaluation for procedure- clinic evaluation to determine if procedure is appropriate.  Procedure would be done at later date.     Please complete the following questions:    Do you have any specific questions for the pain specialist? No    Are there any red flags that may impact the assessment or management of the patient? None      What is your diagnosis for the patient's pain? Low back pain      For any questions, contact the [facility] at [phone].     **ANY DIAGNOSTIC TESTS THAT ARE NOT IN EPIC SHOULD BE SENT TO THE PAIN CENTER**    REGARDING OPIOID MEDICATIONS:  The discussion of opioids management, appropriateness of therapy, and dosing will be discussed in patients being seen for evaluation.  The pain management clinics are not long-term prescribing clinics, with transition of prescribing of medications ultimately going back to the referring provider/PCP.  If prescribing is taken over at the pain clinic, it is in actively involved patients  whom are appropriate for opioids, urine drug screening is completed, and long-term prescribing plan has been determined.  Therefore, we will not be automatically taking over prescribing at the patient's first visit.  Is this agreeable to you? agrees.     Please be aware that coverage of these services is subject to the terms and limitations of your health insurance plan.  Call member services at your health plan with any benefit or coverage questions.      Please bring the following with you to your appointment:    (1) Any X-Rays, CTs or MRIs which have been performed.  Contact the facility where they were done to arrange for  prior to your scheduled appointment.    (2) List of current medications   (3) This referral request   (4) Any documents/labs given to you for this referral                  Follow-up notes from your care team     Return in about 3 months (around 2/14/2019) for Diabetes Check with lab prior.      Future tests that were ordered for you today     Open Future Orders        Priority Expected Expires Ordered    Basic metabolic panel Routine 2/13/2019 5/15/2019 11/14/2018    Hemoglobin A1c Routine 2/13/2019 5/15/2019 11/14/2018    Lipid panel reflex to direct LDL Fasting Routine 2/13/2019 5/15/2019 11/14/2018    Albumin Random Urine Quantitative with Creat Ratio Routine 1/13/2019 3/14/2019 11/14/2018    **TSH with free T4 reflex FUTURE anytime Routine 11/14/2018 11/14/2019 11/14/2018            Who to contact     If you have questions or need follow up information about today's clinic visit or your schedule please contact [facility] directly at [phone].  Normal or non-critical lab and imaging results will be communicated to you by MyChart, letter or phone within 4 business days after the clinic has received the results. If you do not hear from us within 7 days, please contact the clinic through MyChart or phone. If you have a critical or abnormal lab result, we will notify  "you by phone as soon as possible.  Submit refill requests through [employer] or call your pharmacy and they will forward the refill request to us. Please allow 3 business days for your refill to be completed.          Additional Information About Your Visit        Care EveryWhere ID     This is your Care EveryWhere ID. This could be used by other organizations to access your [facility] medical records  [id number]        Your Vitals Were     Pulse Temperature Height Pulse Oximetry BMI (Body Mass Index)       78 98.7  F (37.1  C) (Tympanic) 5' 4\" (1.626 m) 98% 28.77 kg/m2        Blood Pressure from Last 3 Encounters:   11/14/18 120/62   10/09/18 130/58   09/11/18 121/51    Weight from Last 3 Encounters:   11/14/18 167 lb 9.6 oz (76 kg)   10/09/18 163 lb (73.9 kg)   09/10/18 158 lb 11.7 oz (72 kg)              We Performed the Following     CARDIOLOGY EVAL ADULT REFERRAL     MENTAL HEALTH REFERRAL  - Adult; Outpatient Treatment; Individual/Couples/Family/Group Therapy/Health Psychology; [facility]: [facility] [phone]; We will contact you to schedule the appointment or please call with any questions     PAIN MANAGEMENT REFERRAL          Today's Medication Changes          These changes are accurate as of 11/14/18  1:44 PM.  If you have any questions, ask your nurse or doctor.               These medicines have changed or have updated prescriptions.        Dose/Directions    atorvastatin 40 MG tablet   Commonly known as:  LIPITOR   This may have changed:  medication strength   Used for:  Angina pectoris (H), Type 2 diabetes mellitus with diabetic polyneuropathy, without long-term current use of insulin (H)   Changed by:  Cynthia Maddox DO        Dose:  40 mg   Take 1 tablet (40 mg) by mouth At Bedtime   Quantity:  90 tablet   Refills:  3       dicyclomine 20 MG tablet   Commonly known as:  BENTYL   This may have changed:    - when to take this  - reasons to take this   Used for:  Irritable bowel " syndrome with diarrhea   Changed by:  Cynthia Maddox DO        Dose:  20 mg   Take 1 tablet (20 mg) by mouth 4 times daily as needed (diarrhea)   Quantity:  60 tablet   Refills:  11       losartan 50 MG tablet   Commonly known as:  COZAAR   This may have changed:  medication strength   Used for:  Benign essential hypertension   Changed by:  Cynthia Maddox DO        Dose:  50 mg   Take 1 tablet (50 mg) by mouth daily   Quantity:  90 tablet   Refills:  3       nitroGLYcerin 0.4 MG sublingual tablet   Commonly known as:  NITROSTAT   This may have changed:  reasons to take this   Used for:  Irritable bowel syndrome with diarrhea   Changed by:  Cynthia Maddox DO        Dose:  0.4 mg   Place 1 tablet (0.4 mg) under the tongue every 5 minutes as needed for chest pain   Quantity:  30 tablet   Refills:  4       order for DME   This may have changed:  Another medication with the same name was removed. Continue taking this medication, and follow the directions you see here.   Used for:  Venous insufficiency   Changed by:  Cynthia Maddox DO        Thigh length teds.   Quantity:  1 Device   Refills:  2         Stop taking these medicines if you haven't already. Please contact your care team if you have questions.     baclofen 10 MG tablet   Commonly known as:  LIORESAL   Stopped by:  Cynthia Maddox DO           benzonatate 200 MG capsule   Commonly known as:  TESSALON   Stopped by:  Cynthia Maddox DO           CONTOUR NEXT EZ w/Device Kit   Stopped by:  Cynthia Maddox DO           FLONASE NA   Stopped by:  Cynthia Maddox DO           metFORMIN 500 MG tablet   Commonly known as:  GLUCOPHAGE   Stopped by:  Cynthia Maddox DO           OMEPRAZOLE PO   Stopped by:  Cynthia Maddox DO           ONETOUCH ULTRA test strip   Generic drug:  blood glucose monitoring   Stopped by:  Cynthia Maddox DO           trimethoprim 100 MG tablet   Commonly known as:  TRIMPEX    Stopped by:  Cynthia Maddox,                 Where to get your medicines      These medications were sent to [facility] [place] - [address] [facility]  [address] [facility] [place] [facility] [address]     Phone:  [phone]     amLODIPine 5 MG tablet    atorvastatin 40 MG tablet    carvedilol 3.125 MG tablet    citalopram 20 MG tablet    dicyclomine 20 MG tablet    gabapentin 100 MG capsule    losartan 50 MG tablet    nitroGLYcerin 0.4 MG sublingual tablet                Primary Care Provider Office Phone # Fax #    Cynthia Maddox [phone] [address] [facility] [address]        Equal Access to Services     MANFRED REYES : Hadii nish lazo hadasho Soomaali, waaxda luqadaha, qaybta kaalmada adeegyada, rasheed ramirez . So [facility] [phone].    ATENCIÓN: Si habla español, tiene a cam disposición servicios gratuitos de asistencia lingüística. Llame al [phone].    We comply with applicable federal civil rights laws and [place] laws. We do not discriminate on the basis of race, color, national origin, age, disability, sex, sexual orientation, or gender identity.            Thank you!     Thank you for choosing [facility]  for your care. Our goal is always to provide you with excellent care. Hearing back from our patients is one way we can continue to improve our services. Please take a few minutes to complete the written survey that you may receive in the mail after your visit with us. Thank you!             Your Updated Medication List - Protect others around you: Learn how to safely use, store and throw away your medicines at www.disposemymeds.org.          This list is accurate as of 11/14/18  1:44 PM.  Always use your most recent med list.                   Brand Name Dispense Instructions for use Diagnosis    acetaminophen 325 MG tablet    TYLENOL    30 tablet    Take 1 tablet by mouth every 6 hours as needed for pain.     Degeneration of lumbar or lumbosacral intervertebral disc       amLODIPine 5 MG tablet    NORVASC    90 tablet    Take 1 tablet (5 mg) by mouth every evening    Benign essential hypertension       aspirin 81 MG tablet      Take 81 mg by mouth daily        atorvastatin 40 MG tablet    LIPITOR    90 tablet    Take 1 tablet (40 mg) by mouth At Bedtime    Angina pectoris (H), Type 2 diabetes mellitus with diabetic polyneuropathy, without long-term current use of insulin (H)       B-12 1000 MCG Subl      Place 1 tablet under the tongue daily        carvedilol 3.125 MG tablet    COREG    180 tablet    Take 1 tablet (3.125 mg) by mouth 2 times daily (with meals)    Benign essential hypertension, Angina pectoris (H)       citalopram 20 MG tablet    celeXA    90 tablet    Take 1 tablet (20 mg) by mouth daily    Adjustment disorder with mixed anxiety and depressed mood       dicyclomine 20 MG tablet    BENTYL    60 tablet    Take 1 tablet (20 mg) by mouth 4 times daily as needed (diarrhea)    Irritable bowel syndrome with diarrhea       gabapentin 100 MG capsule    NEURONTIN    180 capsule    Take 2 capsules (200 mg) by mouth At Bedtime    Type 2 diabetes mellitus with diabetic polyneuropathy, without long-term current use of insulin (H), Chronic pain syndrome       KLONOPIN PO      Take 0.5 mg by mouth 2 times daily as needed for anxiety        losartan 50 MG tablet    COZAAR    90 tablet    Take 1 tablet (50 mg) by mouth daily    Benign essential hypertension       nitroGLYcerin 0.4 MG sublingual tablet    NITROSTAT    30 tablet    Place 1 tablet (0.4 mg) under the tongue every 5 minutes as needed for chest pain    Irritable bowel syndrome with diarrhea       order for DME     1 Device    Thigh length teds.    Venous insufficiency       order for DME     1 Device    1 walker    Acute pain of right knee       THERAPEUTIC MULTIVIT/MINERAL Tabs      Take 1 tablet by mouth daily.        traMADol 50 MG tablet    ULTRAM    60  tablet    Take 1 tablet by mouth every 6 hours as needed for pain.    Knee pain       VITAMIN D (CHOLECALCIFEROL) PO      Take 1,000 Units by mouth daily

## 2018-11-15 ENCOUNTER — TELEPHONE (OUTPATIENT)
Dept: PALLIATIVE MEDICINE | Facility: CLINIC | Age: 83
End: 2018-11-15

## 2018-11-15 NOTE — TELEPHONE ENCOUNTER
LM for patient to schedule Interventional Eval        Bharti May    [facility]

## 2018-11-20 ENCOUNTER — OFFICE VISIT (OUTPATIENT)
Dept: FAMILY MEDICINE | Facility: CLINIC | Age: 83
End: 2018-11-20
Payer: COMMERCIAL

## 2018-11-20 DIAGNOSIS — E11.42 TYPE 2 DIABETES MELLITUS WITH DIABETIC POLYNEUROPATHY, WITHOUT LONG-TERM CURRENT USE OF INSULIN (H): Primary | ICD-10-CM

## 2018-11-20 PROCEDURE — 99207 ZZC NO CHARGE NURSE ONLY: CPT

## 2018-11-20 RX ORDER — LANCETS
EACH MISCELLANEOUS
Qty: 102 EACH | Refills: 3 | Status: SHIPPED | OUTPATIENT
Start: 2018-11-20 | End: 2024-04-11

## 2018-11-20 NOTE — MR AVS SNAPSHOT
After Visit Summary   11/20/2018    Daniela Brown    MRN: [id number]           Patient Information     Date Of Birth          1/26/1933        Visit Information        Provider Department      11/20/2018 1:30 PM ИВАН VANN FP/IM RN [facility]        Today's Diagnoses     Type 2 diabetes mellitus with diabetic polyneuropathy, without long-term current use of insulin (H)    -  1       Follow-ups after your visit        Your next 10 appointments already scheduled     Nov 20, 2018  1:30 PM CST   [facility]    [address] [address]   [phone]            Jan 03, 2019  2:00 PM CST   PROCEDURE with Keo Pascual MD   [facility] ([facility] [place])    [address]  [address]  [place] [address]   [phone]            Jan 16, 2019  2:00 PM CST   New Visit with Viv Lyon MD   [facility] ([facility])    [address] [address]   [phone]              Who to contact     If you have questions or need follow up information about today's clinic visit or your schedule please contact [facility] directly at [phone].  Normal or non-critical lab and imaging results will be communicated to you by MyChart, letter or phone within 4 business days after the clinic has received the results. If you do not hear from us within 7 days, please contact the clinic through MyChart or phone. If you have a critical or abnormal lab result, we will notify you by phone as soon as possible.  Submit refill requests through [employer] or call your pharmacy and they will forward the refill request to us. Please allow 3 business days for your refill to be completed.          Additional Information About Your Visit        Care EveryWhere ID     This is your Care  EveryWhere ID. This could be used by other organizations to access your [place] medical records  [id number]         Blood Pressure from Last 3 Encounters:   11/14/18 120/62   10/09/18 130/58   09/11/18 121/51    Weight from Last 3 Encounters:   11/14/18 167 lb 9.6 oz (76 kg)   10/09/18 163 lb (73.9 kg)   09/10/18 158 lb 11.7 oz (72 kg)              Today, you had the following     No orders found for display         Today's Medication Changes          These changes are accurate as of 11/20/18 11:55 AM.  If you have any questions, ask your nurse or doctor.               Start taking these medicines.        Dose/Directions    blood glucose monitoring lancets   Used for:  Type 2 diabetes mellitus with diabetic polyneuropathy, without long-term current use of insulin (H)        Use to test blood sugar one times daily or as directed.   Quantity:  102 each   Refills:  3       blood glucose monitoring test strip   Commonly known as:  ACCU-CHEK GUIDE   Used for:  Type 2 diabetes mellitus with diabetic polyneuropathy, without long-term current use of insulin (H)        Use to test blood sugar one times daily or as directed.   Quantity:  100 each   Refills:  3            Where to get your medicines      These medications were sent to [facility] [place] - [address] [place] [address]     Phone:  [phone]     blood glucose monitoring lancets    blood glucose monitoring test strip                Primary Care Provider Office Phone # Fax #    Cynthia Swati Maddox [phone] [address] [facility] [address]        Equal Access to Services     MANFRED REYES : Hadii aad ku hadasho Sobrody, waaxda luqadaha, qaybta kaalmada adedavina, rasheed meade. So [facility] [phone].    ATENCIÓN: Si habla español, tiene a cam disposición servicios gratuitos de asistencia lingüística. Llame al [phone].    We comply with applicable  federal civil rights laws and [place] laws. We do not discriminate on the basis of race, color, national origin, age, disability, sex, sexual orientation, or gender identity.            Thank you!     Thank you for choosing [facility]  for your care. Our goal is always to provide you with excellent care. Hearing back from our patients is one way we can continue to improve our services. Please take a few minutes to complete the written survey that you may receive in the mail after your visit with us. Thank you!             Your Updated Medication List - Protect others around you: Learn how to safely use, store and throw away your medicines at www.disposemymeds.org.          This list is accurate as of 11/20/18 11:55 AM.  Always use your most recent med list.                   Brand Name Dispense Instructions for use Diagnosis    acetaminophen 325 MG tablet    TYLENOL    30 tablet    Take 1 tablet by mouth every 6 hours as needed for pain.    Degeneration of lumbar or lumbosacral intervertebral disc       amLODIPine 5 MG tablet    NORVASC    90 tablet    Take 1 tablet (5 mg) by mouth every evening    Benign essential hypertension       aspirin 81 MG tablet      Take 81 mg by mouth daily        atorvastatin 40 MG tablet    LIPITOR    90 tablet    Take 1 tablet (40 mg) by mouth At Bedtime    Angina pectoris (H), Type 2 diabetes mellitus with diabetic polyneuropathy, without long-term current use of insulin (H)       B-12 1000 MCG Subl      Place 1 tablet under the tongue daily        blood glucose monitoring lancets     102 each    Use to test blood sugar one times daily or as directed.    Type 2 diabetes mellitus with diabetic polyneuropathy, without long-term current use of insulin (H)       blood glucose monitoring test strip    ACCU-CHEK GUIDE    100 each    Use to test blood sugar one times daily or as directed.    Type 2 diabetes mellitus with diabetic polyneuropathy, without long-term current use  of insulin (H)       carvedilol 3.125 MG tablet    COREG    180 tablet    Take 1 tablet (3.125 mg) by mouth 2 times daily (with meals)    Benign essential hypertension, Angina pectoris (H)       citalopram 20 MG tablet    celeXA    90 tablet    Take 1 tablet (20 mg) by mouth daily    Adjustment disorder with mixed anxiety and depressed mood       dicyclomine 20 MG tablet    BENTYL    60 tablet    Take 1 tablet (20 mg) by mouth 4 times daily as needed (diarrhea)    Irritable bowel syndrome with diarrhea       gabapentin 100 MG capsule    NEURONTIN    180 capsule    Take 2 capsules (200 mg) by mouth At Bedtime    Type 2 diabetes mellitus with diabetic polyneuropathy, without long-term current use of insulin (H), Chronic pain syndrome       KLONOPIN PO      Take 0.5 mg by mouth 2 times daily as needed for anxiety        losartan 50 MG tablet    COZAAR    90 tablet    Take 1 tablet (50 mg) by mouth daily    Benign essential hypertension       nitroGLYcerin 0.4 MG sublingual tablet    NITROSTAT    30 tablet    Place 1 tablet (0.4 mg) under the tongue every 5 minutes as needed for chest pain    Angina pectoris (H)       order for DME     1 Device    Thigh length teds.    Venous insufficiency       order for DME     1 Device    1 walker    Acute pain of right knee       THERAPEUTIC MULTIVIT/MINERAL tablet      Take 1 tablet by mouth daily.        traMADol 50 MG tablet    ULTRAM    60 tablet    Take 1 tablet by mouth every 6 hours as needed for pain.    Knee pain       VITAMIN D (CHOLECALCIFEROL) PO      Take 1,000 Units by mouth daily

## 2018-11-20 NOTE — NURSING NOTE
Patient came in with her old glucometer and lancing device.   The test strips were  and the lancing device was old and difficult.     I contacted Leah Nixon, our diab educator who ran right over with an AccuChek Guide device and lancet device and we were able to teach her with the new and sent in lancets and strips to [facility] for her.   Leah suggested she keep the old one for back up as needed and to let us know if insurance won't cover these, although they should per Leah.     Patient did her glucose here in clinic and it was 121.     She was happy and feels she will be able to do this new device ok.     Nilsa Jenkins RNC

## 2018-11-27 ENCOUNTER — OFFICE VISIT (OUTPATIENT)
Dept: FAMILY MEDICINE | Facility: CLINIC | Age: 83
End: 2018-11-27
Payer: COMMERCIAL

## 2018-11-27 VITALS
RESPIRATION RATE: 16 BRPM | DIASTOLIC BLOOD PRESSURE: 76 MMHG | SYSTOLIC BLOOD PRESSURE: 136 MMHG | OXYGEN SATURATION: 98 % | TEMPERATURE: 98 F | HEART RATE: 98 BPM | BODY MASS INDEX: 29.01 KG/M2 | WEIGHT: 169 LBS

## 2018-11-27 DIAGNOSIS — L85.3 DRY SKIN: ICD-10-CM

## 2018-11-27 DIAGNOSIS — L30.9 DERMATITIS: Primary | ICD-10-CM

## 2018-11-27 PROCEDURE — 99213 OFFICE O/P EST LOW 20 MIN: CPT | Performed by: INTERNAL MEDICINE

## 2018-11-27 NOTE — MR AVS SNAPSHOT
After Visit Summary   11/27/2018    Daniela Brown    MRN: [id number]           Patient Information     Date Of Birth          1/26/1933        Visit Information        Provider Department      11/27/2018 3:40 PM Cynthia Maddox,  [facility]        Today's Diagnoses     Dermatitis    -  1      Care Instructions    1. For good skin care - use good Emolliant Lotion like Phytoplex lotion or Gold Bond or Eucerin or CeraVe.  Use this regularly on your skin daily.   2. Use Cortizone (or similar Generic) cream if the itching comes back.  3. Stop the coreg.  Restart in 1 week  4. If the itchy rash comes back after restarting Coreg, then let Dr. Maddox know.          Follow-ups after your visit        Your next 10 appointments already scheduled     Jan 03, 2019  2:00 PM CST   PROCEDURE with Keo Pascual MD   [facility] ([facility] [place])    [address]  [address]  [place] [address] [phone]            Jan 16, 2019  2:00 PM CST   New Visit with Viv Lyon MD   [facility] ([facility])    [address] [address] [phone]              Who to contact     If you have questions or need follow up information about today's clinic visit or your schedule please contact [facility] directly at [phone].  Normal or non-critical lab and imaging results will be communicated to you by MyChart, letter or phone within 4 business days after the clinic has received the results. If you do not hear from us within 7 days, please contact the clinic through MyChart or phone. If you have a critical or abnormal lab result, we will notify you by phone as soon as possible.  Submit refill requests through [employer] or call your pharmacy and they will forward the refill request to us. Please allow 3 business days for your refill to be  completed.          Additional Information About Your Visit        Care EveryWhere ID     This is your Care EveryWhere ID. This could be used by other organizations to access your [place] medical records  [id number]        Your Vitals Were     Pulse Temperature Respirations Pulse Oximetry BMI (Body Mass Index)       98 98  F (36.7  C) (Tympanic) 16 98% 29.01 kg/m2        Blood Pressure from Last 3 Encounters:   11/27/18 136/76   11/14/18 120/62   10/09/18 130/58    Weight from Last 3 Encounters:   11/27/18 169 lb (76.7 kg)   11/14/18 167 lb 9.6 oz (76 kg)   10/09/18 163 lb (73.9 kg)              Today, you had the following     No orders found for display       Primary Care Provider Office Phone # Fax #    Cynthia MaddoxDO [phone] [address] [address]        Equal Access to Services     MANFRED REYES : Hadii nish eastmano Sobrody, waaxda luqadaha, qaybta kaalmada adeegyada, rasheed ramirez . So [facility] [phone].    ATENCIÓN: Si habla español, tiene a cam disposición servicios gratuitos de asistencia lingüística. Llame al [phone].    We comply with applicable federal civil rights laws and [place] laws. We do not discriminate on the basis of race, color, national origin, age, disability, sex, sexual orientation, or gender identity.            Thank you!     Thank you for choosing [facility]  for your care. Our goal is always to provide you with excellent care. Hearing back from our patients is one way we can continue to improve our services. Please take a few minutes to complete the written survey that you may receive in the mail after your visit with us. Thank you!             Your Updated Medication List - Protect others around you: Learn how to safely use, store and throw away your medicines at www.disposemymeds.org.          This list is accurate as of 11/27/18  4:16 PM.  Always use your most recent med list.                    Brand Name Dispense Instructions for use Diagnosis    acetaminophen 325 MG tablet    TYLENOL    30 tablet    Take 1 tablet by mouth every 6 hours as needed for pain.    Degeneration of lumbar or lumbosacral intervertebral disc       amLODIPine 5 MG tablet    NORVASC    90 tablet    Take 1 tablet (5 mg) by mouth every evening    Benign essential hypertension       aspirin 81 MG tablet    ASA     Take 81 mg by mouth daily        atorvastatin 40 MG tablet    LIPITOR    90 tablet    Take 1 tablet (40 mg) by mouth At Bedtime    Angina pectoris (H), Type 2 diabetes mellitus with diabetic polyneuropathy, without long-term current use of insulin (H)       B-12 1000 MCG Subl      Place 1 tablet under the tongue daily        blood glucose monitoring lancets     102 each    Use to test blood sugar one times daily or as directed.    Type 2 diabetes mellitus with diabetic polyneuropathy, without long-term current use of insulin (H)       blood glucose monitoring test strip    ACCU-CHEK GUIDE    100 each    Use to test blood sugar one times daily or as directed.    Type 2 diabetes mellitus with diabetic polyneuropathy, without long-term current use of insulin (H)       carvedilol 3.125 MG tablet    COREG    180 tablet    Take 1 tablet (3.125 mg) by mouth 2 times daily (with meals)    Benign essential hypertension, Angina pectoris (H)       citalopram 20 MG tablet    celeXA    90 tablet    Take 1 tablet (20 mg) by mouth daily    Adjustment disorder with mixed anxiety and depressed mood       dicyclomine 20 MG tablet    BENTYL    60 tablet    Take 1 tablet (20 mg) by mouth 4 times daily as needed (diarrhea)    Irritable bowel syndrome with diarrhea       gabapentin 100 MG capsule    NEURONTIN    180 capsule    Take 2 capsules (200 mg) by mouth At Bedtime    Type 2 diabetes mellitus with diabetic polyneuropathy, without long-term current use of insulin (H), Chronic pain syndrome       KLONOPIN PO      Take 0.5 mg by mouth 2  times daily as needed for anxiety        losartan 50 MG tablet    COZAAR    90 tablet    Take 1 tablet (50 mg) by mouth daily    Benign essential hypertension       nitroGLYcerin 0.4 MG sublingual tablet    NITROSTAT    30 tablet    Place 1 tablet (0.4 mg) under the tongue every 5 minutes as needed for chest pain    Angina pectoris (H)       order for DME     1 Device    Thigh length teds.    Venous insufficiency       order for DME     1 Device    1 walker    Acute pain of right knee       THERAPEUTIC MULTIVIT/MINERAL tablet      Take 1 tablet by mouth daily.        traMADol 50 MG tablet    ULTRAM    60 tablet    Take 1 tablet by mouth every 6 hours as needed for pain.    Knee pain       VITAMIN D (CHOLECALCIFEROL) PO      Take 1,000 Units by mouth daily

## 2018-11-27 NOTE — PATIENT INSTRUCTIONS
1. For good skin care - use good Emolliant Lotion like Phytoplex lotion or Gold Bond or Eucerin or CeraVe.  Use this regularly on your skin daily.   2. Use Cortizone (or similar Generic) cream if the itching comes back.  3. Stop the coreg.  Restart in 1 week  4. If the itchy rash comes back after restarting Coreg, then let Dr. Maddox know.

## 2018-11-27 NOTE — PROGRESS NOTES
SUBJECTIVE:   Daniela Brown is a 85 year old female who presents to clinic today for the following health issues:    Chief Complaint   Patient presents with     Derm Problem     red rash on right side of neck and on middle of chest for about 4 days now, extremely itchy, was up all night last night due to itching, she is questionioning a possible allergy to the coreg       She has allergy to metoprolol - itchy rash.  Coreg was started by [facility].  She stopped coreg x 1 night and rash was not as itchy.  She did switch laundry soap recently as well    Dental issue:  Dentist told her she has tooth infection and needs zpak.      Current Outpatient Prescriptions   Medication Sig Dispense Refill     acetaminophen (TYLENOL) 325 MG tablet Take 1 tablet by mouth every 6 hours as needed for pain. 30 tablet 0     amLODIPine (NORVASC) 5 MG tablet Take 1 tablet (5 mg) by mouth every evening 90 tablet 3     aspirin 81 MG tablet Take 81 mg by mouth daily       atorvastatin (LIPITOR) 40 MG tablet Take 1 tablet (40 mg) by mouth At Bedtime 90 tablet 3     carvedilol (COREG) 3.125 MG tablet Take 1 tablet (3.125 mg) by mouth 2 times daily (with meals) 180 tablet 3     citalopram (CELEXA) 20 MG tablet Take 1 tablet (20 mg) by mouth daily 90 tablet 3     ClonazePAM (KLONOPIN PO) Take 0.5 mg by mouth 2 times daily as needed for anxiety       Cyanocobalamin (B-12) 1000 MCG SUBL Place 1 tablet under the tongue daily        dicyclomine (BENTYL) 20 MG tablet Take 1 tablet (20 mg) by mouth 4 times daily as needed (diarrhea) 60 tablet 11     gabapentin (NEURONTIN) 100 MG capsule Take 2 capsules (200 mg) by mouth At Bedtime 180 capsule 3     losartan (COZAAR) 50 MG tablet Take 1 tablet (50 mg) by mouth daily 90 tablet 3     Multiple Vitamins-Minerals (THERAPEUTIC MULTIVIT/MINERAL) TABS Take 1 tablet by mouth daily.       nitroGLYcerin (NITROSTAT) 0.4 MG sublingual tablet Place 1 tablet (0.4 mg) under the tongue every 5  minutes as needed for chest pain 30 tablet 4     traMADol (ULTRAM) 50 MG tablet Take 1 tablet by mouth every 6 hours as needed for pain. 60 tablet 5     VITAMIN D, CHOLECALCIFEROL, PO Take 1,000 Units by mouth daily       blood glucose monitoring (ACCU-CHEK FASTCLIX) lancets Use to test blood sugar one times daily or as directed. 102 each 3     blood glucose monitoring (ACCU-CHEK GUIDE) test strip Use to test blood sugar one times daily or as directed. 100 each 3     order for DME 1 walker 1 Device 0     ORDER FOR DME Thigh length teds. 1 Device 2       Reviewed and updated as needed this visit by clinical staff  Tobacco  Allergies  Meds  Problems  Med Hx  Surg Hx  Fam Hx  Soc Hx        Reviewed and updated as needed this visit by Provider  Allergies  Meds  Problems         ROS:  Constitutional, HEENT, cardiovascular, pulmonary, gi and gu systems are negative, except as otherwise noted.    OBJECTIVE:     /76 (BP Location: Right arm, Patient Position: Sitting, Cuff Size: Adult Regular)  Pulse 98  Temp 98  F (36.7  C) (Tympanic)  Resp 16  Wt 169 lb (76.7 kg)  SpO2 98%  BMI 29.01 kg/m2  Body mass index is 29.01 kg/(m^2).  GENERAL APPEARANCE: healthy, alert and no distress  SKIN: The anterior chest wall from sternal notch to xiphoid and right more than left side there is a very faint maculopapular sandpaper type rash that extends up to the right trap area.      ASSESSMENT/PLAN:       ICD-10-CM    1. Dermatitis L30.9    2. Dry skin L85.3      Rash is nearly resolved today.  It does not appear to be shingles.  Differential includes contact dermatitis (did have a new laundry detergent) versus dry skin versus drug reaction from the Coreg.  Discussed skin hygiene.  Discussed cortisone use for itching.  Stay off the Coreg = for 1 week and then restart.  If the rash returns, then the Coreg is the definite culprit.      Cynthia Maddox,   [facility]

## 2018-12-11 ENCOUNTER — TELEPHONE (OUTPATIENT)
Dept: FAMILY MEDICINE | Facility: CLINIC | Age: 83
End: 2018-12-11

## 2018-12-11 NOTE — TELEPHONE ENCOUNTER
Left non-detailed message for patient to return a call to the clinic RN.     I secured an appt on Friday, 12/14/18, at 3:40 for back pain exacerbation.  MARK Turner RN

## 2018-12-11 NOTE — TELEPHONE ENCOUNTER
Patient informed of appointment, patient would like to come to that appointment . Advised patient if new or worsening symptoms to seek emergency care. Patient verbalizes understanding, agrees to plan of care, and has no further questions.

## 2018-12-11 NOTE — TELEPHONE ENCOUNTER
Reason for call:  Patient reporting a symptom    Symptom or request: Pt's back has been really bad recently and pt wants to see Dr. aMddox this week.  Please call patient and advise.      Duration (how long have symptoms been present): ongoing    Have you been treated for this before? Yes    Additional comments:     Phone Number patient can be reached at:  Home number on file [phone] (home)    Best Time:  any    Can we leave a detailed message on this number:  YES    Call taken on 12/11/2018 at 9:08 AM by Betty Solomon

## 2018-12-14 ENCOUNTER — OFFICE VISIT (OUTPATIENT)
Dept: FAMILY MEDICINE | Facility: CLINIC | Age: 83
End: 2018-12-14
Payer: COMMERCIAL

## 2018-12-14 VITALS
TEMPERATURE: 99.6 F | WEIGHT: 169.2 LBS | RESPIRATION RATE: 10 BRPM | OXYGEN SATURATION: 98 % | BODY MASS INDEX: 28.89 KG/M2 | HEART RATE: 90 BPM | HEIGHT: 64 IN | DIASTOLIC BLOOD PRESSURE: 68 MMHG | SYSTOLIC BLOOD PRESSURE: 138 MMHG

## 2018-12-14 DIAGNOSIS — G89.4 CHRONIC PAIN SYNDROME: Primary | ICD-10-CM

## 2018-12-14 DIAGNOSIS — E11.42 TYPE 2 DIABETES MELLITUS WITH DIABETIC POLYNEUROPATHY, WITHOUT LONG-TERM CURRENT USE OF INSULIN (H): ICD-10-CM

## 2018-12-14 PROCEDURE — 99214 OFFICE O/P EST MOD 30 MIN: CPT | Performed by: INTERNAL MEDICINE

## 2018-12-14 RX ORDER — GABAPENTIN 300 MG/1
300 CAPSULE ORAL AT BEDTIME
Qty: 90 CAPSULE | Refills: 3 | Status: SHIPPED | OUTPATIENT
Start: 2018-12-14 | End: 2019-01-08

## 2018-12-14 RX ORDER — TRAMADOL HYDROCHLORIDE 50 MG/1
50 TABLET ORAL 3 TIMES DAILY PRN
Qty: 90 TABLET | Refills: 5 | Status: SHIPPED | OUTPATIENT
Start: 2018-12-14 | End: 2019-06-20

## 2018-12-14 ASSESSMENT — MIFFLIN-ST. JEOR: SCORE: 1197.49

## 2018-12-14 NOTE — PATIENT INSTRUCTIONS
1. Try taking the tramadol more frequently - up to 3 x day.  Watch for constipation, drowsiness  2. Try taking Tylenol 500-1000 mg up to 3 x day.  Can space apart from tramadol or can try to take with the Tramadol  3. Increase gabapentin to 300 mg at bedtime.  Once you run out of 100 mg pills, then call pharmacy to fill 300 mg pills  4. Keep appointment with Dr. Catia Pascual

## 2018-12-14 NOTE — NURSING NOTE
"Chief Complaint   Patient presents with     Back Pain     worsening back pain. [facility]. Little to no relief with tramadol or tylenol.        Initial /68 (BP Location: Right arm, Patient Position: Chair, Cuff Size: Adult Large)   Pulse 90   Temp 99.6  F (37.6  C) (Tympanic)   Resp 10   Ht 1.626 m (5' 4\")   Wt 76.7 kg (169 lb 3.2 oz)   SpO2 98%   BMI 29.04 kg/m   Estimated body mass index is 29.04 kg/m  as calculated from the following:    Height as of this encounter: 1.626 m (5' 4\").    Weight as of this encounter: 76.7 kg (169 lb 3.2 oz).    Medication Reconciliation: complete  Rylee Flaherty MA    "

## 2018-12-14 NOTE — PROGRESS NOTES
SUBJECTIVE:   Daniela Brown is a 85 year old female who presents to clinic today for the following health issues:    Chief Complaint   Patient presents with     Back Pain     worsening back pain. Hx of injections through [facility]. Little to no relief with tramadol or tylenol.      Back Pain Follow Up    Description:   Location of pain:  left  Character of pain: dull ache and deep constant.  Pain radiation: Does not radiate  Since last visit, pain is:  worsened  New numbness or weakness in legs, not attributed to pain:  YES- weakness in legs. No pain in legs    Intensity: Currently 9/10    History:   Pain interferes with job: Not applicable  Therapies tried without relief: injections, activity, cold, heat and massage, icy hot      --she reports she called DR. Carter about her back pain and the office recommended she see her PCP for her back pain. She then reports she has never actually seen Dr. Carter.  She then states she saw a different provider at [facility] in [place] but he could no longer see her.  Patient is unsure why  --she wonders about 'laser surgery' for her back.  --pain is severe, interfering with shopping, walking  --had LASHAE in Sept which was helped.  [facility] in [place].  --she has appointment with Dr. Catia Pascual 1/3.  --using tramadol 1 BID. Wonders if she can take 2 due to severity of pain.  Using Tylenol 500-1000 mg AM, Aspirin 325 once daily PRN.  The tramadol lasts 2-4 hours.  When she combined the tramadol with the Tylenol she felt dizzy.   --gabapentin 200 mg HS  --tried ice, heat, icy hot, other topicals  --pain is left lower back, just above buttock, no radiation. Pain is constant.  --has leg weakness that improves with tramadol and Tylenol.  --denies numbness/tingling  --occ constipation, stool softener helps some, but sometimes causes diarrhea.  No other side effects of tramadol  --she heard if fish oil might help her pain.      MRI 7/2018 -  Findings: No fracture.  Unremarkable alignment.    L1-L2: No significant spinal canal or neural foraminal stenosis.    L2-L3: Moderately severe left lateral recess stenosis due to small   disc protrusion in combination with facet hypertrophy.    L3-L4: No significant spinal canal or neural foraminal stenosis.    L4-L5: Moderately severe bilateral lateral recess stenosis due to a   combination of 5 mm degenerative anterior subluxation of L4 on L5   with facet hypertrophy and disc bulge. Moderate, probably   insignificant, bilateral neural foraminal stenosis.    L5-S1: No significant spinal canal or neural foraminal stenosis.        Fatigue:  Reports she feels tired all the time and doesn't sleep well due to pain.  Was on b12 injection which she thinks helps her energy more than oral.      Current Outpatient Medications   Medication Sig Dispense Refill     acetaminophen (TYLENOL) 325 MG tablet Take 1 tablet by mouth every 6 hours as needed for pain. 30 tablet 0     amLODIPine (NORVASC) 5 MG tablet Take 1 tablet (5 mg) by mouth every evening 90 tablet 3     aspirin 81 MG tablet Take 81 mg by mouth daily       atorvastatin (LIPITOR) 40 MG tablet Take 1 tablet (40 mg) by mouth At Bedtime 90 tablet 3     blood glucose monitoring (ACCU-CHEK FASTCLIX) lancets Use to test blood sugar one times daily or as directed. 102 each 3     blood glucose monitoring (ACCU-CHEK GUIDE) test strip Use to test blood sugar one times daily or as directed. 100 each 3     carvedilol (COREG) 3.125 MG tablet Take 1 tablet (3.125 mg) by mouth 2 times daily (with meals) 180 tablet 3     citalopram (CELEXA) 20 MG tablet Take 1 tablet (20 mg) by mouth daily 90 tablet 3     ClonazePAM (KLONOPIN PO) Take 0.5 mg by mouth 2 times daily as needed for anxiety       Cyanocobalamin (B-12) 1000 MCG SUBL Place 1 tablet under the tongue daily        dicyclomine (BENTYL) 20 MG tablet Take 1 tablet (20 mg) by mouth 4 times daily as needed (diarrhea) 60 tablet 11  "    gabapentin (NEURONTIN) 100 MG capsule Take 2 capsules (200 mg) by mouth At Bedtime 180 capsule 3     losartan (COZAAR) 50 MG tablet Take 1 tablet (50 mg) by mouth daily 90 tablet 3     Multiple Vitamins-Minerals (THERAPEUTIC MULTIVIT/MINERAL) TABS Take 1 tablet by mouth daily.       order for DME 1 walker 1 Device 0     ORDER FOR DME Thigh length teds. 1 Device 2     traMADol (ULTRAM) 50 MG tablet Take 1 tablet by mouth every 6 hours as needed for pain. 60 tablet 5     VITAMIN D, CHOLECALCIFEROL, PO Take 1,000 Units by mouth daily       nitroGLYcerin (NITROSTAT) 0.4 MG sublingual tablet Place 1 tablet (0.4 mg) under the tongue every 5 minutes as needed for chest pain (Patient not taking: Reported on 12/14/2018) 30 tablet 4       Reviewed and updated as needed this visit by clinical staff  Tobacco  Allergies  Med Hx  Surg Hx  Fam Hx  Soc Hx      Reviewed and updated as needed this visit by Provider         ROS:  Constitutional, HEENT, cardiovascular, pulmonary, gi and gu systems are negative, except as otherwise noted.    OBJECTIVE:     /68 (BP Location: Right arm, Patient Position: Chair, Cuff Size: Adult Large)   Pulse 90   Temp 99.6  F (37.6  C) (Tympanic)   Resp 10   Ht 1.626 m (5' 4\")   Wt 76.7 kg (169 lb 3.2 oz)   SpO2 98%   BMI 29.04 kg/m    Body mass index is 29.04 kg/m .  GENERAL APPEARANCE: healthy, alert and no distress  Comprehensive back pain exam:  Tenderness of left paraspinal muscles, midline L3-5, Pain limits the following motions: getting on/off exam table, laying flat, lumb ext/flex, Lower extremity strength functional and equal on both sides, absent knee and ankle reflexes and Lower extremity sensation normal and equal on both sides      ASSESSMENT/PLAN:   1. Chronic pain syndrome - increase tramadol from 1-2 x day to TID.  Increase Tylenol and gabapentin as below.  Keep appointment with Dr. Catia Pascual.   - traMADol (ULTRAM) 50 MG tablet; Take 1 tablet (50 mg) by mouth 3 " times daily as needed for pain Max 3 per day  Dispense: 90 tablet; Refill: 5  - gabapentin (NEURONTIN) 300 MG capsule; Take 1 capsule (300 mg) by mouth At Bedtime  Dispense: 90 capsule; Refill: 3    2. Type 2 diabetes mellitus with diabetic polyneuropathy, without long-term current use of insulin (H)  - gabapentin (NEURONTIN) 300 MG capsule; Take 1 capsule (300 mg) by mouth At Bedtime  Dispense: 90 capsule; Refill: 3    Patient Instructions   1. Try taking the tramadol more frequently - up to 3 x day.  Watch for constipation, drowsiness  2. Try taking Tylenol 500-1000 mg up to 3 x day.  Can space apart from tramadol or can try to take with the Tramadol  3. Increase gabapentin to 300 mg at bedtime.  Once you run out of 100 mg pills, then call pharmacy to fill 300 mg pills  4. Keep appointment with Dr. Catia Maddox, [facility]

## 2018-12-19 ENCOUNTER — TELEPHONE (OUTPATIENT)
Dept: FAMILY MEDICINE | Facility: CLINIC | Age: 83
End: 2018-12-19

## 2018-12-19 DIAGNOSIS — G89.4 CHRONIC PAIN SYNDROME: Primary | ICD-10-CM

## 2018-12-19 RX ORDER — OXYCODONE HYDROCHLORIDE 5 MG/1
2.5-5 TABLET ORAL 3 TIMES DAILY PRN
Qty: 20 TABLET | Refills: 0 | Status: ON HOLD | OUTPATIENT
Start: 2018-12-19 | End: 2019-02-03

## 2018-12-19 NOTE — TELEPHONE ENCOUNTER
Patient sent me a handwritten note stating that she is still having severe back pain, request that I call Dr. Carter [facility] so that he can give her an injection.  I did contact [facility] group, and Dr. Carter does not perform injections.  Patient has an appointment with [place] interventional pain doctor in early January for consideration of an injection.  I called and spoke with patient.  She reports she is using the tramadol 3 times a day as I directed her last visit, but it is not helpful at all.  She reports she is having severe disabling pain.  She plans to keep her appointment with the pain doctor in January.  Advised to start oxycodone 2.5-5 mg 3 times daily as needed.  Warned about drowsiness, constipation.  Advised her to stop the tramadol.  Advised okay to continue Tylenol if needed.  Patient in agreement.  She would like the prescription sent to the [facility] pharmacy.

## 2019-01-03 ENCOUNTER — OFFICE VISIT (OUTPATIENT)
Dept: PALLIATIVE MEDICINE | Facility: CLINIC | Age: 84
End: 2019-01-03
Attending: INTERNAL MEDICINE
Payer: MEDICARE

## 2019-01-03 VITALS — SYSTOLIC BLOOD PRESSURE: 157 MMHG | DIASTOLIC BLOOD PRESSURE: 72 MMHG | HEART RATE: 85 BPM

## 2019-01-03 DIAGNOSIS — M70.62 TROCHANTERIC BURSITIS OF BOTH HIPS: ICD-10-CM

## 2019-01-03 DIAGNOSIS — M53.3 SI (SACROILIAC) JOINT DYSFUNCTION: ICD-10-CM

## 2019-01-03 DIAGNOSIS — M70.61 TROCHANTERIC BURSITIS OF BOTH HIPS: ICD-10-CM

## 2019-01-03 DIAGNOSIS — M51.369 DDD (DEGENERATIVE DISC DISEASE), LUMBAR: ICD-10-CM

## 2019-01-03 DIAGNOSIS — M47.817 LUMBOSACRAL SPONDYLOSIS WITHOUT MYELOPATHY: Primary | ICD-10-CM

## 2019-01-03 DIAGNOSIS — M54.16 LUMBAR RADICULOPATHY: ICD-10-CM

## 2019-01-03 DIAGNOSIS — M43.16 SPONDYLOLISTHESIS OF LUMBAR REGION: ICD-10-CM

## 2019-01-03 DIAGNOSIS — M47.817 LUMBOSACRAL FACET JOINT SYNDROME: ICD-10-CM

## 2019-01-03 PROCEDURE — 99205 OFFICE O/P NEW HI 60 MIN: CPT | Performed by: ANESTHESIOLOGY

## 2019-01-03 ASSESSMENT — PAIN SCALES - GENERAL: PAINLEVEL: WORST PAIN (10)

## 2019-01-03 NOTE — PROGRESS NOTES
[facility] Consultation    Date of visit: 1/3/2019    Reason for consultation:    Daniela Brown is a 85 year old female who is seen in consultation today at the request of her provider, Cynthia Maddox DO for evaluation for appropriate procedures for chronic lower back pain.    Primary Care Provider is Cynthai Maddox.  Pain medications are being prescribed by:  PCP.    Please see the [facility] health questionnaire which the patient completed and reviewed with me in detail.    Chief Complaint:    Chief Complaint   Patient presents with     Pain       Pain history:  Daniela Brown is a 85 year old female who first started having problems with pain in the lower back, worse on the left, many years ago without known injury.     She has been receiving injections for her back pain at [facility] for many years that she states help with her pain for a few months each time.  She tells me there has been changes in how the procedures are being ordered.    Her back pain is worse on the left.  She states that she has recently started having pain radiating down the legs as well that comes and goes.  Her leg pain is usually there in the morning on first rising.  Her lower back pain is worse in the morning as well.  Her back pain is worse that her lower extremity pain and is more limiting.  Her pain is constant, sharp and miserable in nature.    She complains of pain on the back of the legs to the ankles, worse on the left.  She complains of numbness in her feet but she has diabetic neuropathy.  She complains that her legs feel weak and they tire easily with walking.  She has problems with edema in her legs and states that compression stockings help with her leg discomfort.    Pain rating: intensity ranges from 7/10 to 10/10, and Averages 9/10 on a 0-10 scale.  Aggravating factors include: bending, twisting, lifting activities, vacuuming, initial  rising  Relieving factors include: pain medications, injections, heat, ice,   Any bowel or bladder incontinence: urinary frequency, frequent UTI's, urgency    Current treatments include:  Oxycodone 5 mg - recent Rx  Tramadol 50 mg TID  Gabapentin 300 mg QHS  Celexa 20 mg daily  Tylenol 325 mg Q6H  Clonazepam 0.5 mg BID    [place] Board of Pharmacy Data Base Reviewed:    YES; compliant    Previous medication treatments included:  Tylenol #3  Fentanyl patches  Oxycodone  Tramadol  Gabapentin  Celexa  tylenol    Other treatments have included:  Daniela Brown has not been seen at a pain clinic in the past.  Multiple injections at [facility]  PT: denies PT for lower back  Chiropractic: denies  Acupuncture: denies  TENs Unit: denies    Injections:   - 10/25/18 LESI L4-5 ([facility])  - 8/9/18 LESI L4-5  - 7/17/18 LESI L4-5  - 2/4/18 LESI L4-5  - 11/22/17 LESI L4-5  - 8/11/17 LESI L4-5  - multiple previous lumbar epidural steroid injections at [facility]    Past Medical History:  Past Medical History:   Diagnosis Date     Adhesive capsulitis of shoulder     left      Allergic rhinitis, cause unspecified      Carpal tunnel syndrome     right>left by emg 2004     Chest pain, unspecified     Chronic right sided/axillary discomfort (musculoskeletal) Atypical substernal (possibly GERD). Negative cardiolite 2004.     Colitis, Clostridium difficile 4/18/2012     Cystocele, midline     grade III     Degeneration of lumbar or lumbosacral intervertebral disc     MRI 5/04- DDD, L2-3 annular bulge with protrusion into left caudal infra-foraminal area     Depressive disorder, not elsewhere classified      Diabetes mellitus (H)      Diverticulosis of colon (without mention of hemorrhage)     flexible sigmoidoscopy otherwise normal 2001     Esophageal reflux      Essential hypertension, benign      Generalized osteoarthrosis, unspecified site     C-spine, left hip     Headache(784.0)     Tension type. MRI  2001 normal.     Impaired fasting glucose     GTT 2/05 115-209     Irritable bowel syndrome     diahrrea predominant     Memory loss     Early cognitive decline. MMSE 27/30, [place] 4.7/ 6.0 2004. B12, TSH, RPR normal [id number].     Mixed hyperlipidemia      OA (OSTEOARTHRITIS) GENERALIZED( Multiple Sites)     Facets, left hip, c-spine. 09/14/06 - bone loss, stable.     OSTEOPENIA     DEXA 2001 -1.4 hip. Dexa 2004 -0.2 hip and -1.5 spine     PERS HX ALLERGY OTHER FOODS 8/22/2006     PERS HX ALLERGY TO MILK PRODUCTS 8/22/2006     RESTLESS LEG SYNDROME      Syncope and collapse 9/10/2018     Unspecified vitamin D deficiency      Past Surgical History:  Past Surgical History:   Procedure Laterality Date     ARTHROPLASTY KNEE  3/26/2012    Procedure:ARTHROPLASTY KNEE; Right Total Knee Arthroplasty; Surgeon:BERNADINE ROSAS; Location:[facility] OR     C APPENDECTOMY  1953     HC REMOVAL GALLBLADDER       HYSTERECTOMY, PAP NO LONGER INDICATED  1983    TVH/BSO     SURGICAL HISTORY OF -       Hernia Repair     SURGICAL HISTORY OF -   10/08    A & P Repair, Dr. Hannah     Medications:  Current Outpatient Medications   Medication Sig Dispense Refill     acetaminophen (TYLENOL) 325 MG tablet Take 1 tablet by mouth every 6 hours as needed for pain. 30 tablet 0     amLODIPine (NORVASC) 5 MG tablet Take 1 tablet (5 mg) by mouth every evening 90 tablet 3     aspirin 81 MG tablet Take 81 mg by mouth daily       atorvastatin (LIPITOR) 40 MG tablet Take 1 tablet (40 mg) by mouth At Bedtime 90 tablet 3     blood glucose monitoring (ACCU-CHEK FASTCLIX) lancets Use to test blood sugar one times daily or as directed. 102 each 3     blood glucose monitoring (ACCU-CHEK GUIDE) test strip Use to test blood sugar one times daily or as directed. 100 each 3     carvedilol (COREG) 3.125 MG tablet Take 1 tablet (3.125 mg) by mouth 2 times daily (with meals) 180 tablet 3     citalopram (CELEXA) 20 MG tablet Take 1 tablet (20 mg) by mouth daily 90  "tablet 3     ClonazePAM (KLONOPIN PO) Take 0.5 mg by mouth 2 times daily as needed for anxiety       Cyanocobalamin (B-12) 1000 MCG SUBL Place 1 tablet under the tongue daily        dicyclomine (BENTYL) 20 MG tablet Take 1 tablet (20 mg) by mouth 4 times daily as needed (diarrhea) 60 tablet 11     gabapentin (NEURONTIN) 300 MG capsule Take 1 capsule (300 mg) by mouth At Bedtime 90 capsule 3     losartan (COZAAR) 50 MG tablet Take 1 tablet (50 mg) by mouth daily 90 tablet 3     Multiple Vitamins-Minerals (THERAPEUTIC MULTIVIT/MINERAL) TABS Take 1 tablet by mouth daily.       nitroGLYcerin (NITROSTAT) 0.4 MG sublingual tablet Place 1 tablet (0.4 mg) under the tongue every 5 minutes as needed for chest pain (Patient not taking: Reported on 12/14/2018) 30 tablet 4     order for DME 1 walker 1 Device 0     ORDER FOR DME Thigh length teds. 1 Device 2     traMADol (ULTRAM) 50 MG tablet Take 1 tablet (50 mg) by mouth 3 times daily as needed for pain Max 3 per day 90 tablet 5     VITAMIN D, CHOLECALCIFEROL, PO Take 1,000 Units by mouth daily       Allergies:     Allergies   Allergen Reactions     Metoprolol Itching and Rash     Cephalexin Rash     Erythromycin Rash     Actonel [Bisphosphonates] Itching     Azithromycin Hives     Celebrex [Celecoxib] Rash     Cipro [Ciprofloxacin] Rash     Erythromycin Rash     Escitalopram Diarrhea     Gets very sick and mad feelings     Fosamax Itching and Rash     Keflex [Cephalexin Monohydrate] Rash     Lisinopril Cough     Nitrofurantoin Other (See Comments)     \"dizzyness'     No Clinical Screening - See Comments Hives     Other reaction(s): Edema     Risedronate Itching     Shellfish-Derived Products Hives     Tetanus-Diphtheria Toxoids Swelling     Vicodin [Acetaminophen] Itching     Patient reported - only when used scheduled in high doses.        Vioxx Rash     Alendronic Acid Rash     Celecoxib Rash     Penicillins Rash     Rofecoxib Rash     Sulfa Drugs Itching and Rash     " Sulfasalazine Itching and Rash     Social History:  Home situation: lives alone,   Occupation/Schooling: retired  Tobacco use: denies  Alcohol use: denies  Drug use: denies  History of chemical dependency treatment: denies    Family history:  Family History   Problem Relation Age of Onset     C.A.D. Mother      Diabetes Mother      Cancer - colorectal Mother      Arthritis Mother      Heart Disease Mother      Lipids Brother      Obesity Brother      Hypertension Brother      Allergies Son      Eye Disorder Son         cataract     Thyroid Disease Sister         graves dz     Eye Disorder Son      Leukemia Son      Alcohol/Drug Father      Family history of headaches: n/a    Review of Systems:  Skin: itching  Eyes: negative  Ears/Nose/Throat: tinnitus  Respiratory: No shortness of breath, dyspnea on exertion, cough, or hemoptysis  Cardiovascular: HTN and lower extremity edema  Gastrointestinal: negative  Genitourinary: frequency, urgency, incontinence and urinary tract infection  Musculoskeletal: back pain and arthritis  Neurologic: local weakness and numbness or tingling of feet  Psychiatric: anxiety  Hematologic/Lymphatic/Immunologic: allergies  Endocrine: diabetes    Physical Exam:  Vitals:    01/03/19 1400   BP: 157/72   Pulse: 85     Exam:  Constitutional: healthy, alert, active, no distress, cooperative, smiling and over weight  Head: normocephalic. Atraumatic.   Eyes: no redness or jaundice noted   ENT: oropharnx normal.  MMM.  Neck supple.    Cardiovascular: bilateral lower extremity edema,  No JVD appreciated, palpable pulses  Respiratory: non-labored, equal expansion, no audible wheezing, speaking in full sentences  Gastrointestinal: deferred  : deferred  Skin: no suspicious lesions or rashes  Psychiatric: mentation appears normal and affect normal/bright    Musculoskeletal exam:  Gait/Station/Posture: mildly forward flexed, cautious, short steps, able to toe and heel walk with assistance for  stability  Thoracic spine:  Non-tender, increased kyphosis    Lumbar spine: tender left lower paraspinals and SI joint, flexes about 90 degrees with pulling pain in back, lateral bending and extension and lateral rotation is mildly positive for facet loading pain, left greater than right,     Myofascial tenderness:  Lumbar paraspinal muscles  Straight leg exam: mildly positive on the right  Danilo's maneuver: mildly positive left    Neurologic exam:  CN:  Cranial nerves 2-12 are grossly intact  Motor:  5/5 LE strength  Reflexes:  Absent bilaterally  Other reflexes:  Plantar response downgoing bilaterally, no clonus   Sensory:  (upper and lower extremities):   Light touch: grossly intact   Vibration: not assessed   Allodynia: absent    Dysethesia: absent    Hyperalgesia: absent     Diagnostic tests:  MRI of the lumbar spine was [facility] 7[facility]17/18 [facility] showing:  Findings: No fracture. Unremarkable alignment.   L1-L2: No significant spinal canal or neural foraminal stenosis.   L2-L3: Moderately severe left lateral recess stenosis due to small disc protrusion in combination with facet hypertrophy.   L3-L4: No significant spinal canal or neural foraminal stenosis.   L4-L5: Moderately severe bilateral lateral recess stenosis due to a combination of 5 mm degenerative anterior subluxation of L4 on L5 with facet hypertrophy and disc bulge. Moderate, probably insignificant, bilateral neural foraminal stenosis.   L5-S1: No significant spinal canal or neural foraminal stenosis.   Impression: Lateral recess and neural foraminal stenosis at multiple levels as detailed above.    Other testing (labs, diagnostics):  Component Value Flag Ref Range Units Status Collected Lab   Sodium 139   133 - 144 mmol/L Final 10/09/2018  3:04 PM 59   Potassium 3.9   3.4 - 5.3 mmol/L Final 10/09/2018  3:04 PM 59   Chloride 106   94 - 109 mmol/L Final 10/09/2018  3:04 PM 59   Carbon Dioxide 27   20 - 32 mmol/L Final 10/09/2018  3:04  PM 59   Anion Gap 6   3 - 14 mmol/L Final 10/09/2018  3:04 PM 59   Glucose 120 Abnormally high   H  70 - 99 mg/dL Final 10/09/2018  3:04 PM 59   Urea Nitrogen 15   7 - 30 mg/dL Final 10/09/2018  3:04 PM 59   Creatinine 0.87   0.52 - 1.04 mg/dL Final 10/09/2018  3:04 PM 59   GFR Estimate 62   >60 mL/min/1.7m2 Final 10/09/2018  3:04 PM 59   Comment:   Non  GFR Calc   GFR Estimate If Black 75   >60 mL/min/1.7m2 Final 10/09/2018  3:04 PM 59   Comment:   African American GFR Calc   Calcium 9.0   8.5 - 10.1 mg/dL Final 10/09/2018  3:04 PM 59     Component Value Flag Ref Range Units Status Collected Lab   WBC 10.0   4.0 - 11.0 10e9/L Final 10/09/2018  3:04    RBC Count 4.45   3.8 - 5.2 10e12/L Final 10/09/2018  3:04    Hemoglobin 12.9   11.7 - 15.7 g/dL Final 10/09/2018  3:04    Hematocrit 38.4   35.0 - 47.0 % Final 10/09/2018  3:04    MCV 86   78 - 100 fl Final 10/09/2018  3:04    MCH 29.0   26.5 - 33.0 pg Final 10/09/2018  3:04    MCHC 33.6   31.5 - 36.5 g/dL Final 10/09/2018  3:04    RDW 13.3   10.0 - 15.0 % Final 10/09/2018  3:04    Platelet Count 280   150 - 450 10e9/L Final 10/09/2018  3:04      Assessment:  1.  Chronic pain syndrome  2.  Chronic lower back pain  3.  Lumbar degenerative disc disease  4.  Lumbosacral facet joint pain  5.  Sacroiliac joint pain  6.  Bilateral hip bursitis  7.  Lumbar radiculopathy  8.  Lumbar stenosis    Daniela Brown is a 85 year old female who presents with the complaints of chronic lower back pain with left greater than right lower extremity pain. She has been receiving lumbar epidural steroid injections at [facility] for many years with pretty good response.  She has not had injections for facet joint pain or SI joint pain from what I see on review.  My recommendations are as outlined below.    Recommendations:  Diagnosis reviewed, treatment option addressed, and risk/benefits discussed.   Self-care instructions given.  I am recommending a multidisciplinary treatment plan to help this patient better manage her pain.      1. Physical Therapy: Physical therapy eval and treat would be beneficial since patient has not had these treatments in the past - may be ordered by primary care provider  2. Clinical Health Psychologist to address issues of relaxation, behavioral change, coping style, and other factors important to improvement: deferred  3. Diagnostic Studies: reviewed diagnostic imaging  4. Medication Management:   1. Continue current medications  2. Consider increasing Gabapentin 300 mg as tolerated to 2 to 3 times per day for nerve pain  5. Further procedures recommended:   1. Bilateral Sacroiliac joint injections would likely be helpful for back and buttock pain  2. Lumbar facet joint procedures such as medial branch block with subsequent radiofrequency ablation would help with mechanical lower back pain  3. Hip bursa injections may be beneficial  4. Lumbar epidural steroid injections may be repeated to help with radiating pain  6. Acupuncture: may be helpful  7. Urine toxicology screen today: deferred   8. Recommendations/follow-up for PCP:  Continue current treatment - refer to recommendations above  9. Release of information: n/a  10. Follow up: with PCP - injection orders may be entered by primary care provider    Total time spent was 60 minutes, and more than 50% of face to face time was spent in counseling and/or coordination of care regarding diagnosis, principles of multidisciplinary care, medication management, and interventional procedures.    Keo Pascual MD  [facility]

## 2019-01-03 NOTE — PATIENT INSTRUCTIONS
Assessment:  1.  Chronic pain syndrome  2.  Chronic lower back pain  3.  Lumbar degenerative disc disease  4.  Lumbosacral facet joint pain  5.  Sacroiliac joint pain  6.  Bilateral hip bursitis  7.  Lumbar radiculopathy  8.  Lumbar stenosis    Plan:  Diagnosis reviewed, treatment option addressed, and risk/benefits discussed.  Self-care instructions given.  I am recommending a multidisciplinary treatment plan to help this patient better manage her pain.      1. Physical Therapy: Physical therapy eval and treat would be beneficial since patient has not had these treatments in the past - may be ordered by primary care provider  2. Clinical Health Psychologist to address issues of relaxation, behavioral change, coping style, and other factors important to improvement: deferred  3. Diagnostic Studies: reviewed diagnostic imaging  4. Medication Management:   1. Continue current medications  2. Consider increasing Gabapentin 300 mg as tolerated to 2 to 3 times per day for nerve pain  5. Further procedures recommended:   1. Bilateral Sacroiliac joint injections would likely be helpful for back and buttock pain  2. Lumbar facet joint procedures such as medial branch block with subsequent radiofrequency ablation would help with mechanical lower back pain  3. Hip bursa injections may be beneficial  4. Lumbar epidural steroid injections may be repeated to help with radiating pain  6. Acupuncture: may be helpful  7. Urine toxicology screen today: deferred   8. Recommendations/follow-up for PCP:  Continue current treatment  9. Release of information: n/a  10. Follow up: with PCP - injection orders may be entered by primary care provider      ----------------------------------------------------------------  Clinic Number:  [phone]   Call this number with any questions about your care and for scheduling assistance. Calls are returned Monday through Friday between 8 AM and 4:30 PM. We usually get back to you within 2 business  days depending on the issue/request.       Medication refills:    For non-narcotic medications, call your pharmacy directly to request a refill. The pharmacy will contact the [facility] for authorization. Please allow 3-4 days for these refills to be processed.     For narcotic refills, call the clinic number or send a Mgv message. Please contact us 7-10 days before your refill is due. The message MUST include the name of the specific medication(s) requested and how you would like to receive the prescription(s). The options are as follows:    Pain Clinic staff can mail the prescription to your pharmacy. Please tell us the name of the pharmacy.    You may pick the prescription up at the Pain Clinic (tell us the location) or during a clinic visit with your pain provider    Pain Clinic staff can deliver the prescription to the [facility] in the clinic building. Please tell us the location.      We believe regular attendance is key to your success in our program.    Any time you are unable to keep your appointment we ask that you call us at least 24 hours in advance to let us know. This will allow us to offer the appointment time to another patient.

## 2019-01-08 ENCOUNTER — TELEPHONE (OUTPATIENT)
Dept: PALLIATIVE MEDICINE | Facility: CLINIC | Age: 84
End: 2019-01-08

## 2019-01-08 ENCOUNTER — TELEPHONE (OUTPATIENT)
Dept: FAMILY MEDICINE | Facility: CLINIC | Age: 84
End: 2019-01-08

## 2019-01-08 DIAGNOSIS — E11.42 TYPE 2 DIABETES MELLITUS WITH DIABETIC POLYNEUROPATHY, WITHOUT LONG-TERM CURRENT USE OF INSULIN (H): ICD-10-CM

## 2019-01-08 DIAGNOSIS — G89.4 CHRONIC PAIN SYNDROME: ICD-10-CM

## 2019-01-08 RX ORDER — GABAPENTIN 300 MG/1
300 CAPSULE ORAL 2 TIMES DAILY
Qty: 180 CAPSULE | Refills: 3 | Status: SHIPPED | OUTPATIENT
Start: 2019-01-08 | End: 2019-02-20

## 2019-01-08 NOTE — TELEPHONE ENCOUNTER
Increase gabapentin to 300 mg BID.  Order placed for lumbar epidural.    I assumed that once evaluated by the pain provider, they would be responsible for ordering injections and follow-up evaluation (IE next step injection, etc).  I have placed another referral for this, perhaps the initial referral was only ordered for the one time consult (pt is new to me).  I am happy to do med management, but would prefer a pain provider to order/manage interventions

## 2019-01-08 NOTE — TELEPHONE ENCOUNTER
Patient reports that she had an OV with Dr. Catia Pascual and she was given a lot of options to help control pain  She is open all recommendations and is looking for PCP advise as to the best plan from the options given to patient for the next steps  Patient is interested in the injections    1/3/19 notes:  Recommendations:  Diagnosis reviewed, treatment option addressed, and risk/benefits discussed.  Self-care instructions given.  I am recommending a multidisciplinary treatment plan to help this patient better manage her pain.       1. Physical Therapy: Physical therapy eval and treat would be beneficial since patient has not had these treatments in the past - may be ordered by primary care provider  2. Clinical Health Psychologist to address issues of relaxation, behavioral change, coping style, and other factors important to improvement: deferred  3. Diagnostic Studies: reviewed diagnostic imaging  4. Medication Management:   1. Continue current medications  2. Consider increasing Gabapentin 300 mg as tolerated to 2 to 3 times per day for nerve pain  5. Further procedures recommended:   1. Bilateral Sacroiliac joint injections would likely be helpful for back and buttock pain  2. Lumbar facet joint procedures such as medial branch block with subsequent radiofrequency ablation would help with mechanical lower back pain  3. Hip bursa injections may be beneficial  4. Lumbar epidural steroid injections may be repeated to help with radiating pain  6. Acupuncture: may be helpful  7. Urine toxicology screen today: deferred   8. Recommendations/follow-up for PCP:  Continue current treatment - refer to recommendations above  9. Release of information: n/a  10. Follow up: with PCP - injection orders may be entered by primary care provider      Please advise    Routing to provider.    Ruthie SALAZAR Rn

## 2019-01-08 NOTE — TELEPHONE ENCOUNTER
Reason for call:  Symptom   Symptom or request: patient called with concerns of back pain see was able to meet with pain specialist on 01/03/2019 who gave her options, she wants to know what is next step.       Additional comments:     Phone number to reach patient:  Home number on file [phone] (home)    Best Time:  This afternoon-- has dental appt with this AM     Can we leave a detailed message on this number?  YES

## 2019-01-08 NOTE — TELEPHONE ENCOUNTER
Patient notified of recommendations from provider regarding next steps for pain  Patient aware of medication sent to pharmacy an epidural order  Patient given phone number for [facility] at [phone].  PCP will manage medications for pain, but any other interventions needs to be followed by pain management  Patient verbalized understanding and reports thank you to PCP.    Ruthie SALAZAR Rn

## 2019-01-10 ENCOUNTER — TELEPHONE (OUTPATIENT)
Dept: FAMILY MEDICINE | Facility: CLINIC | Age: 84
End: 2019-01-10

## 2019-01-10 DIAGNOSIS — G89.29 CHRONIC LOW BACK PAIN: Primary | ICD-10-CM

## 2019-01-10 DIAGNOSIS — M54.50 CHRONIC LOW BACK PAIN: Primary | ICD-10-CM

## 2019-01-10 NOTE — TELEPHONE ENCOUNTER
Reason for Call:  Other referral    Detailed comments: Patient would like a referral to [facility] for an LASHAE.  She states she cannot wait for Dr. Catia Pascual.    Phone Number Patient can be reached at: Home number on file [phone] (home)    Best Time: any    Can we leave a detailed message on this number? YES    Call taken on 1/10/2019 at 9:05 AM by Kimmie Leonard

## 2019-01-10 NOTE — TELEPHONE ENCOUNTER
Attempted to contact patient by home number, however fast busy signal. Other, mobile, number is for her son who states she is not at that number.    Need fax # to [facility] unless she is going to p/u the referral.    Korina MARTIN RN

## 2019-01-10 NOTE — TELEPHONE ENCOUNTER
New order for [facility] printed.    Need fax number to [facility] or is patient picking up?    Korina MARTIN RN

## 2019-01-30 ENCOUNTER — TELEPHONE (OUTPATIENT)
Dept: PALLIATIVE MEDICINE | Facility: CLINIC | Age: 84
End: 2019-01-30

## 2019-02-03 ENCOUNTER — HOSPITAL ENCOUNTER (OUTPATIENT)
Facility: CLINIC | Age: 84
Setting detail: OBSERVATION
Discharge: HOME | End: 2019-02-04
Attending: FAMILY MEDICINE | Admitting: FAMILY MEDICINE
Payer: MEDICARE

## 2019-02-03 ENCOUNTER — APPOINTMENT (OUTPATIENT)
Dept: GENERAL RADIOLOGY | Facility: CLINIC | Age: 84
End: 2019-02-03
Payer: MEDICARE

## 2019-02-03 DIAGNOSIS — E11.42 TYPE 2 DIABETES MELLITUS WITH DIABETIC POLYNEUROPATHY, WITHOUT LONG-TERM CURRENT USE OF INSULIN (H): ICD-10-CM

## 2019-02-03 DIAGNOSIS — E11.65 UNCONTROLLED TYPE 2 DIABETES MELLITUS WITH HYPERGLYCEMIA (H): ICD-10-CM

## 2019-02-03 DIAGNOSIS — R07.9 CHEST PAIN, UNSPECIFIED TYPE: ICD-10-CM

## 2019-02-03 DIAGNOSIS — N18.30 CKD (CHRONIC KIDNEY DISEASE) STAGE 3, GFR 30-59 ML/MIN (H): ICD-10-CM

## 2019-02-03 DIAGNOSIS — I51.89 DIASTOLIC DYSFUNCTION: Primary | ICD-10-CM

## 2019-02-03 DIAGNOSIS — R07.9 CHEST PAIN: ICD-10-CM

## 2019-02-03 DIAGNOSIS — R94.39 ABNORMAL CARDIOVASCULAR STRESS TEST: ICD-10-CM

## 2019-02-03 DIAGNOSIS — I10 BENIGN ESSENTIAL HYPERTENSION: ICD-10-CM

## 2019-02-03 PROBLEM — M51.9 LUMBAR DISC DISEASE: Status: ACTIVE | Noted: 2018-04-11

## 2019-02-03 PROBLEM — G62.9 PERIPHERAL NEUROPATHY: Status: ACTIVE | Noted: 2018-09-10

## 2019-02-03 PROBLEM — M25.531 BILATERAL WRIST PAIN: Status: ACTIVE | Noted: 2018-04-11

## 2019-02-03 PROBLEM — M25.532 BILATERAL WRIST PAIN: Status: ACTIVE | Noted: 2018-04-11

## 2019-02-03 LAB
ALBUMIN SERPL-MCNC: 3.1 G/DL (ref 3.4–5)
ALBUMIN UR-MCNC: 30 MG/DL
ALP SERPL-CCNC: 71 U/L (ref 40–150)
ALT SERPL W P-5'-P-CCNC: 24 U/L (ref 0–50)
ANION GAP SERPL CALCULATED.3IONS-SCNC: 9 MMOL/L (ref 3–14)
APPEARANCE UR: ABNORMAL
AST SERPL W P-5'-P-CCNC: 24 U/L (ref 0–45)
BACTERIA #/AREA URNS HPF: ABNORMAL /HPF
BASOPHILS # BLD AUTO: 0.1 10E9/L (ref 0–0.2)
BASOPHILS NFR BLD AUTO: 0.6 %
BILIRUB DIRECT SERPL-MCNC: 0.2 MG/DL (ref 0–0.2)
BILIRUB SERPL-MCNC: 0.7 MG/DL (ref 0.2–1.3)
BILIRUB UR QL STRIP: NEGATIVE
BUN SERPL-MCNC: 15 MG/DL (ref 7–30)
CALCIUM SERPL-MCNC: 7.9 MG/DL (ref 8.5–10.1)
CHLORIDE SERPL-SCNC: 109 MMOL/L (ref 94–109)
CO2 SERPL-SCNC: 24 MMOL/L (ref 20–32)
COLOR UR AUTO: YELLOW
CREAT SERPL-MCNC: 0.97 MG/DL (ref 0.52–1.04)
DIFFERENTIAL METHOD BLD: ABNORMAL
EOSINOPHIL # BLD AUTO: 0.3 10E9/L (ref 0–0.7)
EOSINOPHIL NFR BLD AUTO: 3.7 %
ERYTHROCYTE [DISTWIDTH] IN BLOOD BY AUTOMATED COUNT: 12.2 % (ref 10–15)
GFR SERPL CREATININE-BSD FRML MDRD: 53 ML/MIN/{1.73_M2}
GLUCOSE BLDC GLUCOMTR-MCNC: 110 MG/DL (ref 70–99)
GLUCOSE BLDC GLUCOMTR-MCNC: 129 MG/DL (ref 70–99)
GLUCOSE SERPL-MCNC: 242 MG/DL (ref 70–99)
GLUCOSE UR STRIP-MCNC: NEGATIVE MG/DL
HCT VFR BLD AUTO: 34.5 % (ref 35–47)
HGB BLD-MCNC: 12 G/DL (ref 11.7–15.7)
HGB UR QL STRIP: NEGATIVE
IMM GRANULOCYTES # BLD: 0 10E9/L (ref 0–0.4)
IMM GRANULOCYTES NFR BLD: 0.3 %
KETONES UR STRIP-MCNC: NEGATIVE MG/DL
LEUKOCYTE ESTERASE UR QL STRIP: ABNORMAL
LIPASE SERPL-CCNC: 179 U/L (ref 73–393)
LYMPHOCYTES # BLD AUTO: 2.3 10E9/L (ref 0.8–5.3)
LYMPHOCYTES NFR BLD AUTO: 25.9 %
MCH RBC QN AUTO: 29.9 PG (ref 26.5–33)
MCHC RBC AUTO-ENTMCNC: 34.8 G/DL (ref 31.5–36.5)
MCV RBC AUTO: 86 FL (ref 78–100)
MONOCYTES # BLD AUTO: 0.6 10E9/L (ref 0–1.3)
MONOCYTES NFR BLD AUTO: 6.7 %
MUCOUS THREADS #/AREA URNS LPF: PRESENT /LPF
NEUTROPHILS # BLD AUTO: 5.7 10E9/L (ref 1.6–8.3)
NEUTROPHILS NFR BLD AUTO: 62.8 %
NITRATE UR QL: NEGATIVE
NRBC # BLD AUTO: 0 10*3/UL
NRBC BLD AUTO-RTO: 0 /100
PH UR STRIP: 5 PH (ref 5–7)
PLATELET # BLD AUTO: 223 10E9/L (ref 150–450)
POTASSIUM SERPL-SCNC: 3.5 MMOL/L (ref 3.4–5.3)
PROT SERPL-MCNC: 6.4 G/DL (ref 6.8–8.8)
RBC # BLD AUTO: 4.01 10E12/L (ref 3.8–5.2)
RBC #/AREA URNS AUTO: 0 /HPF (ref 0–2)
SODIUM SERPL-SCNC: 142 MMOL/L (ref 133–144)
SOURCE: ABNORMAL
SP GR UR STRIP: 1.02 (ref 1–1.03)
TROPONIN I SERPL-MCNC: 0.01 UG/L (ref 0–0.04)
TROPONIN I SERPL-MCNC: <0.015 UG/L (ref 0–0.04)
UROBILINOGEN UR STRIP-MCNC: 2 MG/DL (ref 0–2)
WBC # BLD AUTO: 9 10E9/L (ref 4–11)
WBC #/AREA URNS AUTO: 1 /HPF (ref 0–5)

## 2019-02-03 PROCEDURE — 93010 ELECTROCARDIOGRAM REPORT: CPT | Mod: Z6 | Performed by: FAMILY MEDICINE

## 2019-02-03 PROCEDURE — 36415 COLL VENOUS BLD VENIPUNCTURE: CPT | Performed by: FAMILY MEDICINE

## 2019-02-03 PROCEDURE — 25000128 H RX IP 250 OP 636: Performed by: FAMILY MEDICINE

## 2019-02-03 PROCEDURE — G0378 HOSPITAL OBSERVATION PER HR: HCPCS

## 2019-02-03 PROCEDURE — A9270 NON-COVERED ITEM OR SERVICE: HCPCS | Mod: GY | Performed by: PHYSICIAN ASSISTANT

## 2019-02-03 PROCEDURE — 80076 HEPATIC FUNCTION PANEL: CPT | Performed by: FAMILY MEDICINE

## 2019-02-03 PROCEDURE — 84484 ASSAY OF TROPONIN QUANT: CPT | Performed by: FAMILY MEDICINE

## 2019-02-03 PROCEDURE — 85025 COMPLETE CBC W/AUTO DIFF WBC: CPT | Performed by: FAMILY MEDICINE

## 2019-02-03 PROCEDURE — A9270 NON-COVERED ITEM OR SERVICE: HCPCS | Mod: GY | Performed by: FAMILY MEDICINE

## 2019-02-03 PROCEDURE — 99285 EMERGENCY DEPT VISIT HI MDM: CPT | Mod: 25 | Performed by: FAMILY MEDICINE

## 2019-02-03 PROCEDURE — 99220 ZZC INITIAL OBSERVATION CARE,LEVL III: CPT | Performed by: PHYSICIAN ASSISTANT

## 2019-02-03 PROCEDURE — 00000146 ZZHCL STATISTIC GLUCOSE BY METER IP

## 2019-02-03 PROCEDURE — 83690 ASSAY OF LIPASE: CPT | Performed by: FAMILY MEDICINE

## 2019-02-03 PROCEDURE — 25000132 ZZH RX MED GY IP 250 OP 250 PS 637: Mod: GY | Performed by: PHYSICIAN ASSISTANT

## 2019-02-03 PROCEDURE — 81001 URINALYSIS AUTO W/SCOPE: CPT | Performed by: FAMILY MEDICINE

## 2019-02-03 PROCEDURE — 71046 X-RAY EXAM CHEST 2 VIEWS: CPT

## 2019-02-03 PROCEDURE — 93005 ELECTROCARDIOGRAM TRACING: CPT | Performed by: FAMILY MEDICINE

## 2019-02-03 PROCEDURE — 25000132 ZZH RX MED GY IP 250 OP 250 PS 637: Mod: GY | Performed by: FAMILY MEDICINE

## 2019-02-03 PROCEDURE — 80048 BASIC METABOLIC PNL TOTAL CA: CPT | Performed by: FAMILY MEDICINE

## 2019-02-03 RX ORDER — SODIUM CHLORIDE 9 MG/ML
1000 INJECTION, SOLUTION INTRAVENOUS CONTINUOUS
Status: DISCONTINUED | OUTPATIENT
Start: 2019-02-03 | End: 2019-02-03

## 2019-02-03 RX ORDER — DEXTROSE MONOHYDRATE 25 G/50ML
25-50 INJECTION, SOLUTION INTRAVENOUS
Status: DISCONTINUED | OUTPATIENT
Start: 2019-02-03 | End: 2019-02-04 | Stop reason: HOSPADM

## 2019-02-03 RX ORDER — ACETAMINOPHEN 325 MG/1
650 TABLET ORAL ONCE
Status: COMPLETED | OUTPATIENT
Start: 2019-02-03 | End: 2019-02-03

## 2019-02-03 RX ORDER — ACETAMINOPHEN 325 MG/1
650 TABLET ORAL EVERY 4 HOURS PRN
Status: DISCONTINUED | OUTPATIENT
Start: 2019-02-03 | End: 2019-02-04 | Stop reason: HOSPADM

## 2019-02-03 RX ORDER — GABAPENTIN 300 MG/1
300 CAPSULE ORAL 2 TIMES DAILY
Status: DISCONTINUED | OUTPATIENT
Start: 2019-02-03 | End: 2019-02-04 | Stop reason: HOSPADM

## 2019-02-03 RX ORDER — ASPIRIN 81 MG/1
81 TABLET ORAL DAILY
Status: DISCONTINUED | OUTPATIENT
Start: 2019-02-04 | End: 2019-02-04 | Stop reason: HOSPADM

## 2019-02-03 RX ORDER — NICOTINE POLACRILEX 4 MG
15-30 LOZENGE BUCCAL
Status: DISCONTINUED | OUTPATIENT
Start: 2019-02-03 | End: 2019-02-04 | Stop reason: HOSPADM

## 2019-02-03 RX ORDER — ASPIRIN 81 MG/1
81 TABLET, CHEWABLE ORAL DAILY
Status: DISCONTINUED | OUTPATIENT
Start: 2019-02-04 | End: 2019-02-03 | Stop reason: ALTCHOICE

## 2019-02-03 RX ORDER — CLONAZEPAM 0.5 MG/1
0.5 TABLET ORAL 2 TIMES DAILY PRN
Status: DISCONTINUED | OUTPATIENT
Start: 2019-02-03 | End: 2019-02-04 | Stop reason: HOSPADM

## 2019-02-03 RX ORDER — NITROGLYCERIN 0.4 MG/1
0.4 TABLET SUBLINGUAL EVERY 5 MIN PRN
Status: DISCONTINUED | OUTPATIENT
Start: 2019-02-03 | End: 2019-02-04 | Stop reason: HOSPADM

## 2019-02-03 RX ORDER — ACETAMINOPHEN 650 MG/1
650 SUPPOSITORY RECTAL EVERY 4 HOURS PRN
Status: DISCONTINUED | OUTPATIENT
Start: 2019-02-03 | End: 2019-02-04 | Stop reason: HOSPADM

## 2019-02-03 RX ORDER — LIDOCAINE 40 MG/G
CREAM TOPICAL
Status: DISCONTINUED | OUTPATIENT
Start: 2019-02-03 | End: 2019-02-04 | Stop reason: HOSPADM

## 2019-02-03 RX ORDER — DICYCLOMINE HCL 20 MG
20 TABLET ORAL 4 TIMES DAILY PRN
Status: DISCONTINUED | OUTPATIENT
Start: 2019-02-03 | End: 2019-02-04 | Stop reason: HOSPADM

## 2019-02-03 RX ORDER — NALOXONE HYDROCHLORIDE 0.4 MG/ML
.1-.4 INJECTION, SOLUTION INTRAMUSCULAR; INTRAVENOUS; SUBCUTANEOUS
Status: DISCONTINUED | OUTPATIENT
Start: 2019-02-03 | End: 2019-02-03

## 2019-02-03 RX ORDER — ASPIRIN 81 MG/1
162 TABLET, CHEWABLE ORAL ONCE
Status: DISCONTINUED | OUTPATIENT
Start: 2019-02-03 | End: 2019-02-03

## 2019-02-03 RX ORDER — LOSARTAN POTASSIUM 50 MG/1
50 TABLET ORAL
Status: DISCONTINUED | OUTPATIENT
Start: 2019-02-03 | End: 2019-02-04 | Stop reason: HOSPADM

## 2019-02-03 RX ORDER — NALOXONE HYDROCHLORIDE 0.4 MG/ML
.1-.4 INJECTION, SOLUTION INTRAMUSCULAR; INTRAVENOUS; SUBCUTANEOUS
Status: DISCONTINUED | OUTPATIENT
Start: 2019-02-03 | End: 2019-02-04 | Stop reason: HOSPADM

## 2019-02-03 RX ORDER — CARVEDILOL 3.12 MG/1
3.12 TABLET ORAL 2 TIMES DAILY WITH MEALS
Status: DISCONTINUED | OUTPATIENT
Start: 2019-02-03 | End: 2019-02-04 | Stop reason: HOSPADM

## 2019-02-03 RX ORDER — ATORVASTATIN CALCIUM 20 MG/1
40 TABLET, FILM COATED ORAL AT BEDTIME
Status: DISCONTINUED | OUTPATIENT
Start: 2019-02-03 | End: 2019-02-04 | Stop reason: HOSPADM

## 2019-02-03 RX ORDER — AMLODIPINE BESYLATE 5 MG/1
5 TABLET ORAL
Status: DISCONTINUED | OUTPATIENT
Start: 2019-02-03 | End: 2019-02-04 | Stop reason: HOSPADM

## 2019-02-03 RX ORDER — TRAMADOL HYDROCHLORIDE 50 MG/1
50 TABLET ORAL 3 TIMES DAILY PRN
Status: DISCONTINUED | OUTPATIENT
Start: 2019-02-03 | End: 2019-02-04 | Stop reason: HOSPADM

## 2019-02-03 RX ADMIN — ACETAMINOPHEN 650 MG: 325 TABLET, FILM COATED ORAL at 13:11

## 2019-02-03 RX ADMIN — LOSARTAN POTASSIUM 50 MG: 50 TABLET ORAL at 20:41

## 2019-02-03 RX ADMIN — SODIUM CHLORIDE: 9 INJECTION, SOLUTION INTRAVENOUS at 13:11

## 2019-02-03 RX ADMIN — ATORVASTATIN CALCIUM 40 MG: 20 TABLET, FILM COATED ORAL at 22:01

## 2019-02-03 RX ADMIN — ACETAMINOPHEN 650 MG: 325 TABLET, FILM COATED ORAL at 22:01

## 2019-02-03 RX ADMIN — AMLODIPINE BESYLATE 5 MG: 5 TABLET ORAL at 20:41

## 2019-02-03 RX ADMIN — GABAPENTIN 300 MG: 300 CAPSULE ORAL at 20:41

## 2019-02-03 RX ADMIN — CARVEDILOL 3.12 MG: 3.12 TABLET, FILM COATED ORAL at 20:41

## 2019-02-03 ASSESSMENT — ENCOUNTER SYMPTOMS
FEVER: 0
DIARRHEA: 1
SINUS PRESSURE: 0
CONSTIPATION: 0
WEAKNESS: 1
NAUSEA: 0
VOMITING: 0
LIGHT-HEADEDNESS: 0
CHILLS: 0
DYSURIA: 0
APPETITE CHANGE: 1
SORE THROAT: 0
DIAPHORESIS: 0
FREQUENCY: 0
HEADACHES: 0
WHEEZING: 0
SHORTNESS OF BREATH: 1
COUGH: 0
BLOOD IN STOOL: 0
ABDOMINAL PAIN: 0
PALPITATIONS: 0

## 2019-02-03 ASSESSMENT — MIFFLIN-ST. JEOR
SCORE: 1192
SCORE: 1150.76

## 2019-02-03 NOTE — ED TRIAGE NOTES
"Patient in via EMS.   Started having chest pain at 1130 today while watching tv. EMS reports did take home 'nitro tablet' without relief, medication  in 2016. EMS gave 3 doses of 'nitro spray' with relief prior to arrival. Patient currently rating chest discomfort at '2/10.' Breathing is \"better\" at this time. During chest pain was having a lot of SOB, patient reports \"that's why I called 911, it was really bad.\"  Patient denies recent illness, normal appetite, normal bowel and bladder.   "

## 2019-02-03 NOTE — ED PROVIDER NOTES
History     Chief Complaint   Patient presents with     Chest Pain     started at 1130, here via EMS     HPI  Daniela Brown is a 86 year old female with a history of esophageal reflux, syncopal episode in the fall , stress test at that time demonstrating likely mild ischemic change, type 2 diabetes, diastolic dysfunction and chronic kidney disease   who presents to the emergency department today for evaluation of chest pain. Patient was sitting watching TV and around 10:30 she had sudden onset chest pain. She localizes her pain to her lower chest/ upper abdomen and is non-radiating. pain was not affected by food.  Pressure sensation central chest without radiation and was severe.  Nitroglycerin at home was from 2016 and had no effect.  She called 911. When the paramedics showed up she rated her pain at 8/10. They administered 3 doses of nitroglycerin which has brought her pain down to 3/10. Patient began having a headache after the nitroglycerin and mild anterior chest pressure and dyspnea,   Patient lives at home alone.      She notes that she has had decreased appetite since her son  last September from cancer.     Patient Active Problem List   Diagnosis     Degeneration of lumbar or lumbosacral intervertebral disc     Esophageal reflux     Memory loss     Irritable bowel syndrome with diarrhea     Disorder of bone and cartilage     Anxiety and depression     RESTLESS LEG SYNDROME     Generalized osteoarthrosis, unspecified site     Diverticulosis of large intestine     SENSONRL HEAR LOSS,BILAT     Urticaria     Subjective tinnitus     Vitamin D deficiency     Right foot pain     Urge incontinence     Hyperlipidemia LDL goal <100     Allergic rhinitis     Angina pectoris (H)     Pronation of foot     Advanced directives, counseling/discussion     S/P total knee replacement     B12 deficiency anemia     Peripheral neuropathy     Benign essential hypertension     Carpal tunnel syndrome of left  wrist     Diastolic dysfunction     Type 2 diabetes mellitus with diabetic polyneuropathy, without long-term current use of insulin (H)     History of recurrent urinary tract infection     CKD (chronic kidney disease) stage 3, GFR 30-59 ml/min (H)     Current Outpatient Medications   Medication Sig Dispense Refill     acetaminophen (TYLENOL) 325 MG tablet Take 1 tablet by mouth every 6 hours as needed for pain. 30 tablet 0     amLODIPine (NORVASC) 5 MG tablet Take 1 tablet (5 mg) by mouth every evening 90 tablet 3     aspirin 81 MG tablet Take 81 mg by mouth daily       atorvastatin (LIPITOR) 40 MG tablet Take 1 tablet (40 mg) by mouth At Bedtime 90 tablet 3     blood glucose monitoring (ACCU-CHEK FASTCLIX) lancets Use to test blood sugar one times daily or as directed. 102 each 3     blood glucose monitoring (ACCU-CHEK GUIDE) test strip Use to test blood sugar one times daily or as directed. 100 each 3     carvedilol (COREG) 3.125 MG tablet Take 1 tablet (3.125 mg) by mouth 2 times daily (with meals) 180 tablet 3     citalopram (CELEXA) 20 MG tablet Take 1 tablet (20 mg) by mouth daily 90 tablet 3     ClonazePAM (KLONOPIN PO) Take 0.5 mg by mouth 2 times daily as needed for anxiety       Cyanocobalamin (B-12) 1000 MCG SUBL Place 1 tablet under the tongue daily        dicyclomine (BENTYL) 20 MG tablet Take 1 tablet (20 mg) by mouth 4 times daily as needed (diarrhea) 60 tablet 11     gabapentin (NEURONTIN) 300 MG capsule Take 1 capsule (300 mg) by mouth 2 times daily 180 capsule 3     losartan (COZAAR) 50 MG tablet Take 1 tablet (50 mg) by mouth daily 90 tablet 3     Multiple Vitamins-Minerals (THERAPEUTIC MULTIVIT/MINERAL) TABS Take 1 tablet by mouth daily.       nitroGLYcerin (NITROSTAT) 0.4 MG sublingual tablet Place 1 tablet (0.4 mg) under the tongue every 5 minutes as needed for chest pain 30 tablet 4     order for DME 1 walker 1 Device 0     ORDER FOR DME Thigh length teds. 1 Device 2     traMADol (ULTRAM) 50  "MG tablet Take 1 tablet (50 mg) by mouth 3 times daily as needed for pain Max 3 per day 90 tablet 5     VITAMIN D, CHOLECALCIFEROL, PO Take 1,000 Units by mouth daily       Allergies   Allergen Reactions     Metoprolol Itching and Rash     Cephalexin Rash     Erythromycin Rash     Actonel [Bisphosphonates] Itching     Azithromycin Hives     Celebrex [Celecoxib] Rash     Cipro [Ciprofloxacin] Rash     Erythromycin Rash     Escitalopram Diarrhea     Gets very sick and mad feelings     Fosamax Itching and Rash     Keflex [Cephalexin Monohydrate] Rash     Lisinopril Cough     Nitrofurantoin Other (See Comments)     \"dizzyness'     No Clinical Screening - See Comments Hives     Other reaction(s): Edema     Risedronate Itching     Shellfish-Derived Products Hives     Tetanus-Diphtheria Toxoids Swelling     Vicodin [Acetaminophen] Itching     Patient reported - only when used scheduled in high doses.        Vioxx Rash     Alendronic Acid Rash     Celecoxib Rash     Penicillins Rash     Rofecoxib Rash     Sulfa Drugs Itching and Rash     Sulfasalazine Itching and Rash       Allergies:  Allergies   Allergen Reactions     Metoprolol Itching and Rash     Cephalexin Rash     Erythromycin Rash     Actonel [Bisphosphonates] Itching     Azithromycin Hives     Celebrex [Celecoxib] Rash     Cipro [Ciprofloxacin] Rash     Erythromycin Rash     Escitalopram Diarrhea     Gets very sick and mad feelings     Fosamax Itching and Rash     Keflex [Cephalexin Monohydrate] Rash     Lisinopril Cough     Nitrofurantoin Other (See Comments)     \"dizzyness'     No Clinical Screening - See Comments Hives     Other reaction(s): Edema     Risedronate Itching     Shellfish-Derived Products Hives     Tetanus-Diphtheria Toxoids Swelling     Vicodin [Acetaminophen] Itching     Patient reported - only when used scheduled in high doses.        Vioxx Rash     Alendronic Acid Rash     Celecoxib Rash     Penicillins Rash     Rofecoxib Rash     Sulfa Drugs " "Itching and Rash     Sulfasalazine Itching and Rash       Problem List:    Patient Active Problem List    Diagnosis Date Noted     Benign essential hypertension 11/14/2018     Priority: Medium     Type 2 diabetes mellitus with diabetic polyneuropathy, without long-term current use of insulin (H) 11/14/2018     Priority: Medium     History of recurrent urinary tract infection 11/14/2018     Priority: Medium     On daily trimethoprim       CKD (chronic kidney disease) stage 3, GFR 30-59 ml/min (H) 11/14/2018     Priority: Medium     Peripheral neuropathy 09/10/2018     Priority: Medium     Carpal tunnel syndrome of left wrist 10/21/2014     Priority: Medium     Diastolic dysfunction 03/31/2014     Priority: Medium     B12 deficiency anemia 06/20/2012     Priority: Medium     S/P total knee replacement 03/28/2012     Priority: Medium     Advanced directives, counseling/discussion 06/02/2011     Priority: Medium     Patient does not have an Advance/Health Care Directive (HCD), given \"What is Advance Care Planning?\" flyer.    Isamar Suarez  June 2, 2011         Pronation of foot 05/05/2011     Priority: Medium     Bilateral defomity       Angina pectoris (H) 03/17/2011     Priority: Medium     Allergic rhinitis 01/19/2011     Priority: Medium     Hyperlipidemia LDL goal <100 10/31/2010     Priority: Medium     Urge incontinence 10/20/2009     Priority: Medium     Right foot pain 10/16/2009     Priority: Medium     Xray showed djd -follows with podiatry. -arch supports placed may need cam walker        Vitamin D deficiency 09/09/2008     Priority: Medium     Subjective tinnitus 04/22/2008     Priority: Medium     Urticaria 08/22/2006     Priority: Medium     Problem list name updated by automated process. Provider to review       SENSONRL HEAR LOSS,BILAT 05/03/2006     Priority: Medium     Esophageal reflux      Priority: Medium     Memory loss      Priority: Medium     Early cognitive decline. MMSE 27/30, Neno 4.7/ 6.0 " 2004. B12, TSH, RPR normal 9622-1626.       Anxiety and depression      Priority: Medium     Degeneration of lumbar or lumbosacral intervertebral disc      Priority: Medium     MRI 5/04- DDD, L2-3 annular bulge with protrusion into left caudal infra-foraminal area  MRI 2017 with L4-5 loss disc height with spondylolisthesis.       Irritable bowel syndrome with diarrhea      Priority: Low     diahrrea predominant       Disorder of bone and cartilage      Priority: Low     DEXA 2007 -1.4 hip. Dexa 2004 -1 hip and -1 spine  Problem list name updated by automated process. Provider to review       RESTLESS LEG SYNDROME      Priority: Low     Generalized osteoarthrosis, unspecified site      Priority: Low     C-spine, left hip       Diverticulosis of large intestine      Priority: Low     flexible sigmoidoscopy with diverticulosis otherwise normal 2001, admit diverticulitis 2009  Problem list name updated by automated process. Provider to review          Past Medical History:    Past Medical History:   Diagnosis Date     Adhesive capsulitis of shoulder      Allergic rhinitis, cause unspecified      Carpal tunnel syndrome      Chest pain, unspecified      Colitis, Clostridium difficile 4/18/2012     Cystocele, midline      Degeneration of lumbar or lumbosacral intervertebral disc      Depressive disorder, not elsewhere classified      Diabetes mellitus (H)      Diverticulosis of colon (without mention of hemorrhage)      Esophageal reflux      Essential hypertension, benign      Generalized osteoarthrosis, unspecified site      Headache(784.0)      Impaired fasting glucose      Irritable bowel syndrome      Memory loss      Mixed hyperlipidemia      OA (OSTEOARTHRITIS) GENERALIZED( Multiple Sites)      OSTEOPENIA      PERS HX ALLERGY OTHER FOODS 8/22/2006     PERS HX ALLERGY TO MILK PRODUCTS 8/22/2006     RESTLESS LEG SYNDROME      Syncope and collapse 9/10/2018     Unspecified vitamin D deficiency        Past Surgical  History:    Past Surgical History:   Procedure Laterality Date     ARTHROPLASTY KNEE  3/26/2012    Procedure:ARTHROPLASTY KNEE; Right Total Knee Arthroplasty; Surgeon:BERNADINE ROASS; Location:[facility] OR     C APPENDECTOMY  1953     HC REMOVAL GALLBLADDER       HYSTERECTOMY, PAP NO LONGER INDICATED  1983    TVH/BSO     SURGICAL HISTORY OF -       Hernia Repair     SURGICAL HISTORY OF -   10/08    A & P Repair, Dr. Hannah       Family History:    Family History   Problem Relation Age of Onset     C.A.D. Mother      Diabetes Mother      Cancer - colorectal Mother      Arthritis Mother      Heart Disease Mother      Lipids Brother      Obesity Brother      Hypertension Brother      Allergies Son      Eye Disorder Son         cataract     Thyroid Disease Sister         graves dz     Eye Disorder Son      Leukemia Son      Alcohol/Drug Father        Social History:  Marital Status:   [5]  Social History     Tobacco Use     Smoking status: Never Smoker     Smokeless tobacco: Never Used   Substance Use Topics     Alcohol use: No     Drug use: No        Medications:      acetaminophen (TYLENOL) 325 MG tablet   amLODIPine (NORVASC) 5 MG tablet   aspirin 81 MG tablet   atorvastatin (LIPITOR) 40 MG tablet   blood glucose monitoring (ACCU-CHEK FASTCLIX) lancets   blood glucose monitoring (ACCU-CHEK GUIDE) test strip   carvedilol (COREG) 3.125 MG tablet   citalopram (CELEXA) 20 MG tablet   ClonazePAM (KLONOPIN PO)   Cyanocobalamin (B-12) 1000 MCG SUBL   dicyclomine (BENTYL) 20 MG tablet   gabapentin (NEURONTIN) 300 MG capsule   losartan (COZAAR) 50 MG tablet   Multiple Vitamins-Minerals (THERAPEUTIC MULTIVIT/MINERAL) TABS   nitroGLYcerin (NITROSTAT) 0.4 MG sublingual tablet   order for DME   ORDER FOR DME   traMADol (ULTRAM) 50 MG tablet   VITAMIN D, CHOLECALCIFEROL, PO         Review of Systems   Constitutional: Positive for appetite change. Negative for chills, diaphoresis and fever.   HENT: Negative for congestion,  "ear pain, sinus pressure and sore throat.    Eyes: Negative for visual disturbance.   Respiratory: Positive for shortness of breath. Negative for cough and wheezing.    Cardiovascular: Positive for chest pain. Negative for palpitations.   Gastrointestinal: Positive for diarrhea. Negative for abdominal pain, blood in stool, constipation, nausea and vomiting.   Genitourinary: Negative for dysuria, frequency and urgency.   Skin: Negative for rash.   Neurological: Positive for weakness. Negative for light-headedness and headaches.   All other systems reviewed and are negative.      Physical Exam   BP: 116/61  Heart Rate: 96  Temp: 98.5  F (36.9  C)  Resp: 18  Height: 162.6 cm (5' 4\")  Weight: 72.6 kg (160 lb)  SpO2: 95 %      Physical Exam   Constitutional: She appears distressed.   HENT:   Mouth/Throat: Oropharynx is clear and moist.   Eyes: Conjunctivae are normal.   Neck: Neck supple.   Cardiovascular: Normal rate and regular rhythm. Exam reveals no friction rub.   Murmur heard.   Systolic murmur is present with a grade of 4/6.  Pulmonary/Chest: Breath sounds normal. No stridor. No respiratory distress. She has no wheezes. She has no rales.   Abdominal: Bowel sounds are normal. She exhibits no distension. There is no tenderness. There is no guarding.   Musculoskeletal: She exhibits no edema.   Neurological: She exhibits normal muscle tone.   Skin: No rash noted. She is not diaphoretic.       ED Course   12:54 PM Patient assessed.        Procedures                    EKG Interpretation:      Interpreted by Tho Sanchez MD  EKG done at 1228 hrs. demonstrates a sinus rhythm at 86 bpm with a leftward axis and no ST change.  No T wave changes.  Normal R progression and no Q waves.  Normal intervals.  Normal conduction.  No ectopy.  Impression sinus rhythm 86 bpm and no significant change since EKG done 9/10/2018.         Critical Care time:  none               Results for orders placed or performed during the hospital " encounter of 02/03/19 (from the past 24 hour(s))   CBC with platelets differential   Result Value Ref Range    WBC 9.0 4.0 - 11.0 10e9/L    RBC Count 4.01 3.8 - 5.2 10e12/L    Hemoglobin 12.0 11.7 - 15.7 g/dL    Hematocrit 34.5 (L) 35.0 - 47.0 %    MCV 86 78 - 100 fl    MCH 29.9 26.5 - 33.0 pg    MCHC 34.8 31.5 - 36.5 g/dL    RDW 12.2 10.0 - 15.0 %    Platelet Count 223 150 - 450 10e9/L    Diff Method Automated Method     % Neutrophils 62.8 %    % Lymphocytes 25.9 %    % Monocytes 6.7 %    % Eosinophils 3.7 %    % Basophils 0.6 %    % Immature Granulocytes 0.3 %    Nucleated RBCs 0 0 /100    Absolute Neutrophil 5.7 1.6 - 8.3 10e9/L    Absolute Lymphocytes 2.3 0.8 - 5.3 10e9/L    Absolute Monocytes 0.6 0.0 - 1.3 10e9/L    Absolute Eosinophils 0.3 0.0 - 0.7 10e9/L    Absolute Basophils 0.1 0.0 - 0.2 10e9/L    Abs Immature Granulocytes 0.0 0 - 0.4 10e9/L    Absolute Nucleated RBC 0.0    Basic metabolic panel   Result Value Ref Range    Sodium 142 133 - 144 mmol/L    Potassium 3.5 3.4 - 5.3 mmol/L    Chloride 109 94 - 109 mmol/L    Carbon Dioxide 24 20 - 32 mmol/L    Anion Gap 9 3 - 14 mmol/L    Glucose 242 (H) 70 - 99 mg/dL    Urea Nitrogen 15 7 - 30 mg/dL    Creatinine 0.97 0.52 - 1.04 mg/dL    GFR Estimate 53 (L) >60 mL/min/[1.73_m2]    GFR Estimate If Black 61 >60 mL/min/[1.73_m2]    Calcium 7.9 (L) 8.5 - 10.1 mg/dL   Troponin I   Result Value Ref Range    Troponin I ES <0.015 0.000 - 0.045 ug/L   Hepatic panel   Result Value Ref Range    Bilirubin Direct 0.2 0.0 - 0.2 mg/dL    Bilirubin Total 0.7 0.2 - 1.3 mg/dL    Albumin 3.1 (L) 3.4 - 5.0 g/dL    Protein Total 6.4 (L) 6.8 - 8.8 g/dL    Alkaline Phosphatase 71 40 - 150 U/L    ALT 24 0 - 50 U/L    AST 24 0 - 45 U/L   Lipase   Result Value Ref Range    Lipase 179 73 - 393 U/L   UA with Microscopic   Result Value Ref Range    Color Urine Yellow     Appearance Urine Slightly Cloudy     Glucose Urine Negative NEG^Negative mg/dL    Bilirubin Urine Negative NEG^Negative     Ketones Urine Negative NEG^Negative mg/dL    Specific Gravity Urine 1.017 1.003 - 1.035    Blood Urine Negative NEG^Negative    pH Urine 5.0 5.0 - 7.0 pH    Protein Albumin Urine 30 (A) NEG^Negative mg/dL    Urobilinogen mg/dL 2.0 0.0 - 2.0 mg/dL    Nitrite Urine Negative NEG^Negative    Leukocyte Esterase Urine Trace (A) NEG^Negative    Source Midstream Urine     WBC Urine 1 0 - 5 /HPF    RBC Urine 0 0 - 2 /HPF    Bacteria Urine Few (A) NEG^Negative /HPF    Mucous Urine Present (A) NEG^Negative /LPF   Chest XR,  PA & LAT    Narrative    CHEST TWO VIEWS   2/3/2019 1:25 PM     HISTORY: Chest pain.    COMPARISON: 10/9/2018 chest x-ray.      Impression    IMPRESSION: Heart size is normal. Pulmonary vasculature is normal.  Lungs are clear. No pleural fluid. No acute disease.    BIJU GALICIA MD   Glucose by meter   Result Value Ref Range    Glucose 129 (H) 70 - 99 mg/dL       Medications - No data to display    Assessments & Plan (with Medical Decision Making)     MDM: Daniela Brown is a 86 year old female who presented with a history of  Acid reflux, abnormal stress test, type 2 diabetes, diastolic dysfunction who had onset of chest pain at 10:30 AM and was brought to the emergency department by ambulance after receiving 3 nitroglycerin that brought her pain from an 8 out of 10 down to a 2 out of 10.  This is been anterior chest pain associated with shortness of breath.  She felt improved on her arrival.   She is already on aspirin.  Her EKG demonstrated no ischemia and her troponin was normal.  Initially her systolic blood pressure was low from the nitroglycerin that have been given in the ambulance, and IV fluids were used to bring pressure up.  At this point her chest pain had resolved and no further nitroglycerin was required.  Other testing including chest x-ray is negative.  No VT E risk factors.  No concern for other causes of chest pain such as dissection or pneumonia.     She does have an abnormal  prior stress test and this may have represented an ischemic event.  The patient lives alone at home and is elderly and has multiple comorbidities.  We discussed staying on observation with serial troponins.  May consider repeat low level stress testing for risk stratification.  The patient agrees to stay.      I have reviewed the nursing notes.    I have reviewed the findings, diagnosis, plan and need for follow up with the patient.       ED to Inpatient Handoff:    Discussed with Ora Foster  Patient accepted for Observation Stay  Pending studies include none  Code Status: Not Addressed               Final diagnoses:   Chest pain   Abnormal cardiovascular stress test   Benign essential hypertension   Type 2 diabetes mellitus with diabetic polyneuropathy, without long-term current use of insulin (H)   CKD (chronic kidney disease) stage 3, GFR 30-59 ml/min (H)     This document serves as a record of the services and decisions personally performed and made by Tho Sanchez MD. It was created on HIS/HER behalf by Gianna Sanon, a trained medical scribe. The creation of this document is based on the provider's statements to the medical scribe.  Gianna Sanon 1:06 PM 2/3/2019    Provider:  The information in this document, created by the medical scribe for me, accurately reflects the services I personally performed and the decisions made by me. I have reviewed and approved this document for accuracy prior to leaving the patient care area.  Tho Sanchez MD 1:06 PM 2/3/2019    2/3/2019   [facility] EMERGENCY DEPARTMENT     Tho Sanchez MD  02/03/19 2257

## 2019-02-03 NOTE — H&P
"[facility]    History and Physical - Hospitalist Service       Date of Admission:  2/3/2019    Assessment & Plan   Daniela Brown is a 86 year old female admitted on 2/3/2019. She has history of hypertension, hyperlipidemia, anxiety/depression, CKD, diastolic dysfunction on echo, abnormal stress test and type 2 diabetes. She presents from home with chest pain.    Acute Atypical Chest Pain  Lower anterior chest pain began at 10:30 AM, associated SOB. Resolved partially with nitroglycerin, then gradually resolved in ED without further intervention. History of abnormal stress test as below. Vital signs stable.  Troponin 2 hours following normal, EKG non-ischemic, CXR normal.  Received ASA and nitroglycerin by EMS. Cardiac ischemia possible, though not typical chest pain and now resolved so low suspicion currently for ACS. Differential includes GERD vs esophageal spasm vs musculoskeletal vs other. Plan for admission with serial troponins and monitor for recurrence. Given recent abnormal stress test 9/2018 will not repeat stress testing, would likely need cardiac cath if further work up is needed.  - continuous tele monitoring  - EKG prn chest pain   - serial troponins  - nitroglycerine PRN for pain    - continue home beta blocker, ASA, statin  - patient would like to establish with cardiology at [place], plan to arrange tomorrow    Abnormal Stress Test, likely Coronary Artery Disease  Lexiscan 9/2018 showed \"small perfusion defect of mild severity... May be consistent with mild ischemia in the RCA\". She was seen by cardiology through [facility] following an abnormal stress test, Dr. Deras on 9/2018, and was started on medical management with carvedilol which improved her symptoms of chest pain and shortness of breath. Follow planned for 6 months following, consider cardiac cath if ongoing symptoms.   - continue home ASA, statin, beta blocker, ARB  - plan to help establish with [place] " cardiology for follow up    Hypertension  Chronic. Blood pressures reviewed, stable. Mildly low (SBP high 90s) after nitroglycerin.  - continue home carvedilol, amlodidpine and losartan    Hyperlipidemia  Chronic.  - continue home atorvastatin    Diabetes Mellitus, Type II  Diabetic polyneuropathy  Chronic.  Last a1C 6.5% on 9/2018. Managed at home with diet and lifestyle currently.  -moderate SSI  -hypo/hyperglycemia protocol with blood glucose monitoring  - continue home gabapentin    Chronic Lower Back Pain  Has been seen by pain clinic, Dr. Catia Pascual, managed with PT, oxycodone, tramadol, acetaminophen, gabapentin and injections.   - continue home pain regimen    Anxiety and Depression  Chronic. Appears she is no longer taking citalopram.  - continue home clonazepam    CKD  Creatinine 0.97. GFR 53. Recent baseline 0.8-0.9. Stable.  - AM BMP    Diastolic Dysfunction  Echo 9/10/2018 showed normal EF 65-70%, early diastolic dysfunction and no regional wall motion abnormalities.     Diet: Moderate consistent carbohydrate  DVT Prophylaxis: Low Risk/Ambulatory with no VTE prophylaxis indicated  Collazo Catheter: not present  Code Status: DNR, intubation ok if reversible cause per patient    Disposition Plan   Expected discharge: Tomorrow, recommended to prior living arrangement once chest pain stable with normal troponins and adequate discharge plan established.  Entered: Cynthia Foster PA-C 02/03/2019, 3:25 PM     The patient's care was discussed with the Attending Physician, Dr. Leandro Malcolm and Patient.    Cynthia Foster PA-C  [facility]    ______________________________________________________________________    Chief Complaint   Chest pain    History is obtained from the patient    History of Present Illness   Daniela Brown is a 86 year old female who presents with chest pain.    Around 10:30 AM today she developed a sudden onset of chest pain/pressure. It was  located across the lower portion of her anterior chest. No radiation. She described it as a dull pain. Rated a 9/10. Associated shortness of breath and generalized weakness with this.  She tried a nitroglycerin at home, though it was . Improved from 9/10 to 5/10 after nitroglycerin in the ambulance. The pain eventually resolved in the ED without any further intervention. She has not had pain like this in the past. Pain was not associated with eating, position or palpation.    Patient denies fever, chills, myalgias, lightheadedness, dizziness, cough, congestion, rhinorrhea, sore throat, palpitations, abdominal pain, nausea, vomiting, diarrhea, changes in urination, rash or wounds.    Review of Systems    The 10 point Review of Systems is negative other than noted in the HPI or here.     Past Medical History    I have reviewed this patient's medical history and updated it with pertinent information if needed.   Past Medical History:   Diagnosis Date     Adhesive capsulitis of shoulder     left      Allergic rhinitis, cause unspecified      Carpal tunnel syndrome     right>left by emg      Chest pain, unspecified     Chronic right sided/axillary discomfort (musculoskeletal) Atypical substernal (possibly GERD). Negative cardiolite .     Colitis, Clostridium difficile 2012     Cystocele, midline     grade III     Degeneration of lumbar or lumbosacral intervertebral disc     MRI - DDD, L2-3 annular bulge with protrusion into left caudal infra-foraminal area     Depressive disorder, not elsewhere classified      Diabetes mellitus (H)      Diverticulosis of colon (without mention of hemorrhage)     flexible sigmoidoscopy otherwise normal      Esophageal reflux      Essential hypertension, benign      Generalized osteoarthrosis, unspecified site     C-spine, left hip     Headache(784.0)     Tension type. MRI  normal.     Impaired fasting glucose     GTT  115-209     Irritable bowel  syndrome     diahrrea predominant     Memory loss     Early cognitive decline. MMSE 27/30, [place] 4.7/ 6.0 2004. B12, TSH, RPR normal 5252-8008.     Mixed hyperlipidemia      OA (OSTEOARTHRITIS) GENERALIZED( Multiple Sites)     Facets, left hip, c-spine. 09/14/06 - bone loss, stable.     OSTEOPENIA     DEXA 2001 -1.4 hip. Dexa 2004 -0.2 hip and -1.5 spine     PERS HX ALLERGY OTHER FOODS 8/22/2006     PERS HX ALLERGY TO MILK PRODUCTS 8/22/2006     RESTLESS LEG SYNDROME      Syncope and collapse 9/10/2018     Unspecified vitamin D deficiency      Past Surgical History   I have reviewed this patient's surgical history and updated it with pertinent information if needed.  Past Surgical History:   Procedure Laterality Date     ARTHROPLASTY KNEE  3/26/2012    Procedure:ARTHROPLASTY KNEE; Right Total Knee Arthroplasty; Surgeon:BERNADINE ROSAS; Location:[place] OR     C APPENDECTOMY  1953     HC REMOVAL GALLBLADDER       HYSTERECTOMY, PAP NO LONGER INDICATED  1983    TVH/BSO     SURGICAL HISTORY OF -       Hernia Repair     SURGICAL HISTORY OF -   10/08    A & P Repair, Dr. Hannah     Social History   I have reviewed this patient's social history and updated it with pertinent information if needed.  Social History     Tobacco Use     Smoking status: Never Smoker     Smokeless tobacco: Never Used   Substance Use Topics     Alcohol use: No     Drug use: No     Family History   I have reviewed this patient's family history and updated it with pertinent information if needed.   Family History   Problem Relation Age of Onset     C.A.D. Mother      Diabetes Mother      Cancer - colorectal Mother      Arthritis Mother      Heart Disease Mother      Lipids Brother      Obesity Brother      Hypertension Brother      Allergies Son      Eye Disorder Son         cataract     Thyroid Disease Sister         graves dz     Eye Disorder Son      Leukemia Son      Alcohol/Drug Father      Prior to Admission Medications   Prior to Admission  Medications   Prescriptions Last Dose Informant Patient Reported? Taking?   ClonazePAM (KLONOPIN PO)  Self Yes No   Sig: Take 0.5 mg by mouth 2 times daily as needed for anxiety   Cyanocobalamin (B-12) 1000 MCG SUBL  Self Yes No   Sig: Place 1 tablet under the tongue daily    Multiple Vitamins-Minerals (THERAPEUTIC MULTIVIT/MINERAL) TABS  Self Yes No   Sig: Take 1 tablet by mouth daily.   ORDER FOR DME  Self No No   Sig: Thigh length teds.   VITAMIN D, CHOLECALCIFEROL, PO  Self Yes No   Sig: Take 1,000 Units by mouth daily   acetaminophen (TYLENOL) 325 MG tablet  Self No No   Sig: Take 1 tablet by mouth every 6 hours as needed for pain.   amLODIPine (NORVASC) 5 MG tablet   No No   Sig: Take 1 tablet (5 mg) by mouth every evening   aspirin 81 MG tablet   Yes No   Sig: Take 81 mg by mouth daily   atorvastatin (LIPITOR) 40 MG tablet   No No   Sig: Take 1 tablet (40 mg) by mouth At Bedtime   blood glucose monitoring (ACCU-CHEK FASTCLIX) lancets   No No   Sig: Use to test blood sugar one times daily or as directed.   blood glucose monitoring (ACCU-CHEK GUIDE) test strip   No No   Sig: Use to test blood sugar one times daily or as directed.   carvedilol (COREG) 3.125 MG tablet   No No   Sig: Take 1 tablet (3.125 mg) by mouth 2 times daily (with meals)   citalopram (CELEXA) 20 MG tablet   No No   Sig: Take 1 tablet (20 mg) by mouth daily   dicyclomine (BENTYL) 20 MG tablet   No No   Sig: Take 1 tablet (20 mg) by mouth 4 times daily as needed (diarrhea)   gabapentin (NEURONTIN) 300 MG capsule   No No   Sig: Take 1 capsule (300 mg) by mouth 2 times daily   losartan (COZAAR) 50 MG tablet   No No   Sig: Take 1 tablet (50 mg) by mouth daily   nitroGLYcerin (NITROSTAT) 0.4 MG sublingual tablet   No No   Sig: Place 1 tablet (0.4 mg) under the tongue every 5 minutes as needed for chest pain   order for DME  Self No No   Si walker   oxyCODONE (ROXICODONE) 5 MG tablet   No No   Sig: Take 0.5-1 tablets (2.5-5 mg) by mouth 3 times  "daily as needed for pain   traMADol (ULTRAM) 50 MG tablet   No No   Sig: Take 1 tablet (50 mg) by mouth 3 times daily as needed for pain Max 3 per day      Facility-Administered Medications: None     Allergies   Allergies   Allergen Reactions     Metoprolol Itching and Rash     Cephalexin Rash     Erythromycin Rash     Actonel [Bisphosphonates] Itching     Azithromycin Hives     Celebrex [Celecoxib] Rash     Cipro [Ciprofloxacin] Rash     Erythromycin Rash     Escitalopram Diarrhea     Gets very sick and mad feelings     Fosamax Itching and Rash     Keflex [Cephalexin Monohydrate] Rash     Lisinopril Cough     Nitrofurantoin Other (See Comments)     \"dizzyness'     No Clinical Screening - See Comments Hives     Other reaction(s): Edema     Risedronate Itching     Shellfish-Derived Products Hives     Tetanus-Diphtheria Toxoids Swelling     Vicodin [Acetaminophen] Itching     Patient reported - only when used scheduled in high doses.        Vioxx Rash     Alendronic Acid Rash     Celecoxib Rash     Penicillins Rash     Rofecoxib Rash     Sulfa Drugs Itching and Rash     Sulfasalazine Itching and Rash     Physical Exam   Vital Signs: Temp: 98.5  F (36.9  C) Temp src: Oral BP: 99/77 Pulse: 81 Heart Rate: 84 Resp: 21 SpO2: 95 % O2 Device: None (Room air)    Weight: 160 lbs 0 oz    Constitutional: lying down comfortably in ED, awake, alert, cooperative, no apparent distress, and appears stated age  Eyes: Lids and lashes normal, extra ocular muscles intact, sclera clear, conjunctiva normal  ENT: Normocephalic, without obvious abnormality, atraumatic, external ears without lesions, oral pharynx with moist mucous membranes, tonsils without erythema or exudates, gums normal and good dentition.  Hematologic / Lymphatic: no cervical lymphadenopathy and no supraclavicular lymphadenopathy  Respiratory: No increased work of breathing, good air exchange, clear to auscultation bilaterally, no crackles or wheezing  Cardiovascular: " Normal apical impulse, regular rate and rhythm, normal S1 and S2, no S3 or S4, and 2/6 systolic murmur  GI:  normal bowel sounds, soft, non-distended, non-tender  Genitounirinary: Deferred  Skin: normal skin color, texture, turgor, no rashes and no lesions  Musculoskeletal: Moves all 4 extremities appropriately. Normal bulk and tone.  Neurologic: Awake, alert, oriented to name, place and time.   Neuropsychiatric: normal, calm and normal eye contact    Data   Data reviewed today: I reviewed all medications, new labs and imaging results over the last 24 hours. I personally reviewed the EKG tracing showing NSR without acute ST or T wave changes, similar to prior and the chest x-ray image(s) showing no acute infiltrate.    Recent Labs   Lab 02/03/19  1240   WBC 9.0   HGB 12.0   MCV 86         POTASSIUM 3.5   CHLORIDE 109   CO2 24   BUN 15   CR 0.97   ANIONGAP 9   NARESH 7.9*   *   ALBUMIN 3.1*   PROTTOTAL 6.4*   BILITOTAL 0.7   ALKPHOS 71   ALT 24   AST 24   LIPASE 179   TROPI <0.015     Recent Results (from the past 24 hour(s))   Chest XR,  PA & LAT    Narrative    CHEST TWO VIEWS   2/3/2019 1:25 PM     HISTORY: Chest pain.    COMPARISON: 10/9/2018 chest x-ray.      Impression    IMPRESSION: Heart size is normal. Pulmonary vasculature is normal.  Lungs are clear. No pleural fluid. No acute disease.

## 2019-02-03 NOTE — PROGRESS NOTES
Skin check and Serge scale completed with primary RN Naye.  I agree with all assessments and findings

## 2019-02-03 NOTE — ED NOTES
"Patient has  [place] to Observation  order. Patient has been given the Observation brochure -  What does Observation mean to me.\"  Patient has been given the opportunity to ask questions about observation status and their plan of care.      Dixie Gallegos  "

## 2019-02-04 VITALS
SYSTOLIC BLOOD PRESSURE: 138 MMHG | DIASTOLIC BLOOD PRESSURE: 77 MMHG | HEIGHT: 64 IN | HEART RATE: 78 BPM | BODY MASS INDEX: 28.87 KG/M2 | WEIGHT: 169.09 LBS | OXYGEN SATURATION: 96 % | TEMPERATURE: 98.9 F | RESPIRATION RATE: 18 BRPM

## 2019-02-04 LAB
GLUCOSE BLDC GLUCOMTR-MCNC: 113 MG/DL (ref 70–99)
GLUCOSE BLDC GLUCOMTR-MCNC: 122 MG/DL (ref 70–99)
GLUCOSE BLDC GLUCOMTR-MCNC: 147 MG/DL (ref 70–99)
TROPONIN I SERPL-MCNC: 0.02 UG/L (ref 0–0.04)

## 2019-02-04 PROCEDURE — 00000146 ZZHCL STATISTIC GLUCOSE BY METER IP

## 2019-02-04 PROCEDURE — 99217 ZZC OBSERVATION CARE DISCHARGE: CPT | Performed by: FAMILY MEDICINE

## 2019-02-04 PROCEDURE — G0378 HOSPITAL OBSERVATION PER HR: HCPCS

## 2019-02-04 PROCEDURE — 25000132 ZZH RX MED GY IP 250 OP 250 PS 637: Mod: GY | Performed by: PHYSICIAN ASSISTANT

## 2019-02-04 PROCEDURE — A9270 NON-COVERED ITEM OR SERVICE: HCPCS | Mod: GY | Performed by: PHYSICIAN ASSISTANT

## 2019-02-04 RX ADMIN — ACETAMINOPHEN 650 MG: 325 TABLET, FILM COATED ORAL at 03:14

## 2019-02-04 RX ADMIN — GABAPENTIN 300 MG: 300 CAPSULE ORAL at 09:54

## 2019-02-04 RX ADMIN — CARVEDILOL 3.12 MG: 3.12 TABLET, FILM COATED ORAL at 09:54

## 2019-02-04 RX ADMIN — ASPIRIN 81 MG: 81 TABLET, COATED ORAL at 09:54

## 2019-02-04 NOTE — PLAN OF CARE
"[facility] [place]    Patient admitted to room 2302 at approximately 1630 via cart from emergency room. Patient was accompanied by transport tech.     Verbal SBAR report received from Hannah Almeida RN prior to patient arrival.     Patient ambulated to bed with stand-by assist. Patient alert and oriented X 3. The patient is not having any pain. 0-10 Pain Scale: 2. Admission vital signs: Blood pressure 142/53, pulse 70, temperature 98.1  F (36.7  C), temperature source Oral, resp. rate 16, height 1.626 m (5' 4\"), weight 76.7 kg (169 lb 1.5 oz), SpO2 96 %, not currently breastfeeding. Patient was oriented to plan of care, call light, bed controls, tv, telephone, bathroom and visiting hours.     Risk Assessment    The following safety risks were identified during admission: none. Yellow risk band applied: NO.     Skin Initial Assessment    This writer admitted this patient and completed a full skin assessment and Serge score in the Adult PCS flowsheet. Appropriate interventions initiated as needed.     Secondary skin check completed by Rosario Sandhu RN.    Patient has a pink open crack in crease under left breast.  Also has dry, pink patch behind left ear, upper chest and right neck.     Serge Risk Assessment  Sensory Perception: 4-->no impairment  Moisture: 4-->rarely moist  Activity: 4-->walks frequently  Mobility: 4-->no limitation  Nutrition: 3-->adequate  Friction and Shear: 3-->no apparent problem  Serge Score: 22  Bed Support Surface: Atmos Air mattress  Reassessed using Bed Algorithm: Verito Lin      "

## 2019-02-04 NOTE — PROGRESS NOTES
[facility] DISCHARGE NOTE    Patient discharged to home at 1:00 PM via wheel chair. Accompanied by son and staff. Discharge instructions reviewed with patient and son, opportunity offered to ask questions. Prescriptions - None ordered for discharge. All belongings sent with patient.    Bailey Soares

## 2019-02-04 NOTE — PLAN OF CARE
"Patient denies chest pain. Complains of pain in lower back, Tylenol 650 mg given PRN with some relief. Up with stand by assist. Uses call light appropriately. /56 (BP Location: Right arm)   Pulse 70   Temp 97.8  F (36.6  C) (Oral)   Resp 18   Ht 1.626 m (5' 4\")   Wt 76.7 kg (169 lb 1.5 oz)   SpO2 96%   BMI 29.02 kg/m      "

## 2019-02-04 NOTE — DISCHARGE SUMMARY
[facility]  Hospitalist Discharge Summary       Date of Admission:  2/3/2019  Date of Discharge:  2/4/2019  Discharging Provider: Deepti Hannah MD      Discharge Diagnoses   Acute Atypical Chest Pain  Abnormal Stress test, likely CAD  Hypertension  Hyperlipidemia  Type 2 DM  Chronic lower back pain  Anxiety and depression  CKD  Diastolic Dysfunction    Follow-ups Needed After Discharge   Follow-up Appointments     Follow-up and recommended labs and tests       Follow up with primary care provider, Cynthia Maddox, within 7 days for hospital follow- up.                Unresulted Labs Ordered in the Past 30 Days of this Admission     No orders found for last 61 day(s).      These results will be followed up by PCP    Hospital Course    Acute Atypical Chest Pain  Lower anterior chest pain began at 10:30 AM, associated SOB. Resolved partially with nitroglycerin, then gradually resolved in ED without further intervention. History of abnormal stress test as below. Vital signs stable.  Troponin 2 hours following normal, EKG non-ischemic, CXR normal.  Received ASA and nitroglycerin by EMS. Cardiac ischemia possible, though not typical chest pain and now resolved so low suspicion currently for ACS. Differential includes GERD vs esophageal spasm vs musculoskeletal vs other. Plan for admission with serial troponins and monitor for recurrence. Given recent abnormal stress test 9/2018 will not repeat stress testing, would likely need cardiac cath if further work up is needed.  - continuous tele monitoring  - EKG prn chest pain   - serial troponins  - nitroglycerine PRN for pain    - continue home beta blocker, ASA, statin  - patient would like to establish with cardiology at [facility], plan to arrange tomorrow  2/4/2019 troponins negative overnight. Pain has resolved.   Will refer to cardiology as outpatient.      Abnormal Stress Test, likely Coronary Artery Disease  Lexiscan 9/2018 showed  "\"small perfusion defect of mild severity... May be consistent with mild ischemia in the RCA\". She was seen by cardiology through [facility] following an abnormal stress test, Dr. Deras on 9/2018, and was started on medical management with carvedilol which improved her symptoms of chest pain and shortness of breath. Follow planned for 6 months following, consider cardiac cath if ongoing symptoms.   - continue home ASA, statin, beta blocker, ARB  - plan to help establish with [place] cardiology for follow up     Hypertension  Chronic. Blood pressures reviewed, stable. Mildly low (SBP high 90s) after nitroglycerin.  - continue home carvedilol, amlodidpine and losartan     Hyperlipidemia  Chronic.  - continue home atorvastatin     Diabetes Mellitus, Type II  Diabetic polyneuropathy  Chronic.  Last a1C 6.5% on 9/2018. Managed at home with diet and lifestyle currently.  -moderate SSI  -hypo/hyperglycemia protocol with blood glucose monitoring  - continue home gabapentin     Chronic Lower Back Pain  Has been seen by pain clinic, Dr. Catia Pascual, managed with PT, oxycodone, tramadol, acetaminophen, gabapentin and injections.   - continue home pain regimen     Anxiety and Depression  Chronic. Appears she is no longer taking citalopram.  - continue home clonazepam     CKD  Creatinine 0.97. GFR 53. Recent baseline 0.8-0.9. Stable.  - AM BMP     Diastolic Dysfunction  Echo 9/10/2018 showed normal EF 65-70%, early diastolic dysfunction and no regional wall motion abnormalities.       Diet: Moderate consistent carbohydrate  DVT Prophylaxis: Low Risk/Ambulatory with no VTE prophylaxis indicated  Collazo Catheter: not present  Code Status: DNR, intubation ok if reversible cause per patient             Consultations This Hospital Stay   None    Code Status   DNR    Time Spent on this Encounter   I, Deepti Hannah, personally saw the patient today and spent less than or equal to 30 minutes discharging this patient.       Deepti Hannah, " MD  [facility]  ______________________________________________________________________    ROS: 5 point ROS negative except as noted above in HPI, including Gen., Resp., CV, GI &  system review.      Physical Exam   Vital Signs: Temp: 98.9  F (37.2  C) Temp src: Oral BP: 138/77 Pulse: 78 Heart Rate: 75 Resp: 18 SpO2: 96 % O2 Device: None (Room air)    Weight: 169 lbs 1.49 oz  Constitutional: awake, alert, cooperative, no apparent distress, and appears stated age  Eyes: sclera non-icteric  Respiratory: No increased work of breathing, good air exchange, clear to auscultation bilaterally, no crackles or wheezing  Cardiovascular: normal apical pulses , normal S1 and S2 and murmurs include systolic murmur II/VI located at right upper sternal border without radiation  GI: soft/nt/nd  Skin: no rashes  Musculoskeletal: no lower extremity pitting edema present  Neuropsychiatric: General: normal, calm and normal eye contact  Level of consciousness: alert / normal  Affect: normal       Primary Care Physician   Cynthia Maddox    Discharge Disposition   Discharged to home  Condition at discharge: Stable    Significant Results and Procedures   Most Recent 3 CBC's:  Recent Labs   Lab Test 02/03/19  1240 10/09/18  1504 09/11/18  0742   WBC 9.0 10.0 9.9   HGB 12.0 12.9 12.0   MCV 86 86 85    280 253     Most Recent 3 BMP's:  Recent Labs   Lab Test 02/03/19  1240 10/09/18  1504 09/11/18  0742    139 144   POTASSIUM 3.5 3.9 3.8   CHLORIDE 109 106 111*   CO2 24 27 28   BUN 15 15 14   CR 0.97 0.87 0.90   ANIONGAP 9 6 5   NARESH 7.9* 9.0 9.0   * 120* 124*   ,   Results for orders placed or performed during the hospital encounter of 02/03/19   Chest XR,  PA & LAT    Narrative    CHEST TWO VIEWS   2/3/2019 1:25 PM     HISTORY: Chest pain.    COMPARISON: 10/9/2018 chest x-ray.      Impression    IMPRESSION: Heart size is normal. Pulmonary vasculature is normal.  Lungs are clear. No pleural  fluid. No acute disease.    BIJU GALICIA MD       Discharge Orders      Reason for your hospital stay    Chest pain which has resolved     Follow-up and recommended labs and tests     Follow up with primary care provider, Cynthia Maddox, within 7 days for hospital follow- up.     Activity    Your activity upon discharge: activity as tolerated     DNR (Do Not Resuscitate)     Diet    Follow this diet upon discharge: Orders Placed This Encounter      Consistent Carbohydrate Diet 0733-9069 Calories: Moderate Consistent CHO (4-6 CHO units/meal)     Discharge Medications   Current Discharge Medication List      CONTINUE these medications which have NOT CHANGED    Details   acetaminophen (TYLENOL) 325 MG tablet Take 1 tablet by mouth every 6 hours as needed for pain.  Qty: 30 tablet, Refills: 0    Associated Diagnoses: Degeneration of lumbar or lumbosacral intervertebral disc      amLODIPine (NORVASC) 5 MG tablet Take 1 tablet (5 mg) by mouth every evening  Qty: 90 tablet, Refills: 3    Comments: Patient will call to fill  Associated Diagnoses: Benign essential hypertension      aspirin 81 MG tablet Take 81 mg by mouth daily      atorvastatin (LIPITOR) 40 MG tablet Take 1 tablet (40 mg) by mouth At Bedtime  Qty: 90 tablet, Refills: 3    Comments: Patient will call to fill  Associated Diagnoses: Angina pectoris (H); Type 2 diabetes mellitus with diabetic polyneuropathy, without long-term current use of insulin (H)      carvedilol (COREG) 3.125 MG tablet Take 1 tablet (3.125 mg) by mouth 2 times daily (with meals)  Qty: 180 tablet, Refills: 3    Comments: Patient will call to fill  Associated Diagnoses: Benign essential hypertension; Angina pectoris (H)      citalopram (CELEXA) 20 MG tablet Take 1 tablet (20 mg) by mouth daily  Qty: 90 tablet, Refills: 3    Comments: Patient will call to fill  Associated Diagnoses: Adjustment disorder with mixed anxiety and depressed mood      ClonazePAM (KLONOPIN PO) Take 0.5 mg by  mouth 2 times daily as needed for anxiety      Cyanocobalamin (B-12) 1000 MCG SUBL Place 1 tablet under the tongue daily       dicyclomine (BENTYL) 20 MG tablet Take 1 tablet (20 mg) by mouth 4 times daily as needed (diarrhea)  Qty: 60 tablet, Refills: 11    Comments: Patient will call to fill  Associated Diagnoses: Irritable bowel syndrome with diarrhea      gabapentin (NEURONTIN) 300 MG capsule Take 1 capsule (300 mg) by mouth 2 times daily  Qty: 180 capsule, Refills: 3    Comments: Patient will call to fill  Associated Diagnoses: Type 2 diabetes mellitus with diabetic polyneuropathy, without long-term current use of insulin (H); Chronic pain syndrome      losartan (COZAAR) 50 MG tablet Take 1 tablet (50 mg) by mouth daily  Qty: 90 tablet, Refills: 3    Comments: Patient will call to fill  Associated Diagnoses: Benign essential hypertension      Multiple Vitamins-Minerals (THERAPEUTIC MULTIVIT/MINERAL) TABS Take 1 tablet by mouth daily.      nitroGLYcerin (NITROSTAT) 0.4 MG sublingual tablet Place 1 tablet (0.4 mg) under the tongue every 5 minutes as needed for chest pain  Qty: 30 tablet, Refills: 4    Comments: Patient will call to fill  Associated Diagnoses: Angina pectoris (H)      traMADol (ULTRAM) 50 MG tablet Take 1 tablet (50 mg) by mouth 3 times daily as needed for pain Max 3 per day  Qty: 90 tablet, Refills: 5    Associated Diagnoses: Chronic pain syndrome      VITAMIN D, CHOLECALCIFEROL, PO Take 1,000 Units by mouth daily      blood glucose monitoring (ACCU-CHEK FASTCLIX) lancets Use to test blood sugar one times daily or as directed.  Qty: 102 each, Refills: 3    Associated Diagnoses: Type 2 diabetes mellitus with diabetic polyneuropathy, without long-term current use of insulin (H)      blood glucose monitoring (ACCU-CHEK GUIDE) test strip Use to test blood sugar one times daily or as directed.  Qty: 100 each, Refills: 3    Associated Diagnoses: Type 2 diabetes mellitus with diabetic polyneuropathy,  "without long-term current use of insulin (H)      !! order for DME 1 walker  Qty: 1 Device, Refills: 0    Associated Diagnoses: Acute pain of right knee      !! ORDER FOR DME Thigh length teds.  Qty: 1 Device, Refills: 2    Associated Diagnoses: Venous insufficiency       !! - Potential duplicate medications found. Please discuss with provider.      STOP taking these medications       oxyCODONE (ROXICODONE) 5 MG tablet Comments:   Reason for Stopping:             Allergies   Allergies   Allergen Reactions     Metoprolol Itching and Rash     Cephalexin Rash     Erythromycin Rash     Actonel [Bisphosphonates] Itching     Azithromycin Hives     Celebrex [Celecoxib] Rash     Cipro [Ciprofloxacin] Rash     Erythromycin Rash     Escitalopram Diarrhea     Gets very sick and mad feelings     Fosamax Itching and Rash     Keflex [Cephalexin Monohydrate] Rash     Lisinopril Cough     Nitrofurantoin Other (See Comments)     \"dizzyness'     No Clinical Screening - See Comments Hives     Other reaction(s): Edema     Risedronate Itching     Shellfish-Derived Products Hives     Tetanus-Diphtheria Toxoids Swelling     Vicodin [Acetaminophen] Itching     Patient reported - only when used scheduled in high doses.        Vioxx Rash     Alendronic Acid Rash     Celecoxib Rash     Penicillins Rash     Rofecoxib Rash     Sulfa Drugs Itching and Rash     Sulfasalazine Itching and Rash     "

## 2019-02-05 ENCOUNTER — TELEPHONE (OUTPATIENT)
Dept: FAMILY MEDICINE | Facility: CLINIC | Age: 84
End: 2019-02-05

## 2019-02-05 NOTE — TELEPHONE ENCOUNTER
ED/UC/IP follow up from [facility] phone call: Diastolic Dysfunction, Chest Pain    RN please call to follow up.    Number of ED visits in past 12 months = 0  Aimee Oneill on 2/5/2019 at 8:47 AM

## 2019-02-05 NOTE — TELEPHONE ENCOUNTER
"Hospital/TCU/ED for chronic condition Discharge Protocol    \"Hi, my name is Marisela Waggoner, a registered nurse, and I am calling from [facility].  I am calling to follow up and see how things are going for you after your recent emergency visit/hospital/TCU stay.\"    Tell me how you are doing now that you are home?\" feels ok      Discharge Instructions    \"Let's review your discharge instructions.  What is/are the follow-up recommendations?  Pt. Response: yes    \"Has an appointment with your primary care provider been scheduled?\"   Yes. (confirm)    \"When you see the provider, I would recommend that you bring your medications with you.\"    Medications    \"Tell me what changed about your medicines when you discharged?\"    Changes to chronic meds?    0-1    \"What questions do you have about your medications?\"    None     New diagnoses of heart failure, COPD, diabetes, or MI?    No              Medication reconciliation completed? No, due to   Was MTM referral placed (*Make sure to put transitions as reason for referral)?   No    Call Summary    \"What questions or concerns do you have about your recent visit and your follow-up care?\"     none    \"If you have questions or things don't continue to improve, we encourage you contact us through the main clinic number (give number).  Even if the clinic is not open, triage nurses are available 24/7 to help you.     We would like you to know that our clinic has extended hours (provide information).  We also have urgent care (provide details on closest location and hours/contact info)\"      \"Thank you for your time and take care!\"             "

## 2019-02-06 NOTE — PROGRESS NOTES
SUBJECTIVE:   Daniela Brown is a 86 year old female who presents to clinic today for the following health issues:    Here with son Wally    [facility] Follow-up Visit:  Hospital/Nursing Home/IP Rehab Facility: [facility]  Date of Admission: 2/3/2019  Date of Discharge: 2/4/19  Reason(s) for Admission: Acute Atypical Chest Pain, Abnormal Stress test, likely CAD, Hypertension, Hyperlipidemia,Type 2 DM, Chronic lower back pain, Anxiety and depression, CKD, Diastolic Dysfunction.       Current status: SOB last night -took nitroglycerin which improved symptoms. Denies any SOB/chest pain today. Worsening back pain, she had an appointment with the pain clinic but canceled due to the weather.        Problems taking medications regularly:  None       Medication changes since discharge: None       Problems adhering to non-medication therapy:  None    Summary of hospitalization:  [facility] discharge summary reviewed  Diagnostic Tests/Treatments reviewed.  Follow up needed: cardiology 2/18  Other Healthcare Providers Involved in Patient s Care:         None  Update since discharge: fluctuating course.     Post Discharge Medication Reconciliation: discharge medications reconciled, continue medications without change.  Plan of care communicated with patient and family     Coding guidelines for this visit:  Type of Medical   Decision Making Face-to-Face Visit       within 7 Days of discharge Face-to-Face Visit        within 14 days of discharge   Moderate Complexity [id number]   High Complexity 85165 [id number]          Last night took another nitroglycerin due to shortness of breath.  It did help w/the shortness of breath. The first episode occurred while at rest.  The episode last night also occurred at rest.  The shortness of breath lasted 30 min after taking nitroglycerin.  It cause headache but no dizziness.  She is not very active due to chronic back pain.      9/2018 - 1.  Myocardial perfusion  "imaging using single isotope technique  demonstrated a small perfusion defect of mild severity involving the  basal inferior wall which is mostly reversible and may be consistent  with mild ischemia in the right coronary artery distribution. In  addition, transient ischemic dilatation is noted with a TID ratio of  1.3.   2. Gated images demonstrated normal left ventricular wall motion.  The  left ventricular systolic function is 80% at rest and 82% on the post  stress images.  3. Compared to the prior study from 10/5/2009, there is no evidence of  possible mild ischemia in the right coronary artery distribution..    [facility] [place] which resolved her angina.  Last walked to the mailbox about 3 weeks ago and had to stop [facility] due to breathlessness.    [employer] \"She has a history of an abnormal stress test, heart failure with preserved ejection fraction, reflex syncope after a stress test, hypertension, hyperlipidemia, and an abnormal stress test. Because of her advanced age and the lack of very high risk findings on her stress test, we initiated medical treatment. I initially tried to use metoprolol, but it upset her stomach. We then switched her to carvedilol. Her chest pain has resolved. She has not been using nitroglycerin at all since starting a beta blocker. Her breathlessness has improved significantly. She used to have trouble walking to the mailbox. She is not having that anymore. She denies any cardiopulmonary limitations\"      Chronic pain: she had injection at [facility] in mid-Jan.  They recommend follow-up in 2 weeks if the injection did not help.  She doesn't feel the injection helped.  She would prefer [facility] for the nextinjections but she would like to see pain clinic doctor here at [place]  I don't have records from [facility] imaging.        Current Outpatient Medications   Medication Sig Dispense Refill     acetaminophen " (TYLENOL) 325 MG tablet Take 1 tablet by mouth every 6 hours as needed for pain. 30 tablet 0     amLODIPine (NORVASC) 5 MG tablet Take 1 tablet (5 mg) by mouth every evening 90 tablet 3     aspirin 81 MG tablet Take 81 mg by mouth daily       atorvastatin (LIPITOR) 40 MG tablet Take 1 tablet (40 mg) by mouth At Bedtime 90 tablet 3     blood glucose monitoring (ACCU-CHEK FASTCLIX) lancets Use to test blood sugar one times daily or as directed. 102 each 3     blood glucose monitoring (ACCU-CHEK GUIDE) test strip Use to test blood sugar one times daily or as directed. 100 each 3     carvedilol (COREG) 3.125 MG tablet Take 1 tablet (3.125 mg) by mouth 2 times daily (with meals) 180 tablet 3     citalopram (CELEXA) 20 MG tablet Take 1 tablet (20 mg) by mouth daily 90 tablet 3     ClonazePAM (KLONOPIN PO) Take 0.5 mg by mouth 2 times daily as needed for anxiety       Cyanocobalamin (B-12) 1000 MCG SUBL Place 1 tablet under the tongue daily        dicyclomine (BENTYL) 20 MG tablet Take 1 tablet (20 mg) by mouth 4 times daily as needed (diarrhea) 60 tablet 11     gabapentin (NEURONTIN) 300 MG capsule Take 1 capsule (300 mg) by mouth 2 times daily 180 capsule 3     losartan (COZAAR) 50 MG tablet Take 1 tablet (50 mg) by mouth daily 90 tablet 3     Multiple Vitamins-Minerals (THERAPEUTIC MULTIVIT/MINERAL) TABS Take 1 tablet by mouth daily.       nitroGLYcerin (NITROSTAT) 0.4 MG sublingual tablet Place 1 tablet (0.4 mg) under the tongue every 5 minutes as needed for chest pain 30 tablet 4     order for DME 1 walker 1 Device 0     ORDER FOR DME Thigh length teds. 1 Device 2     traMADol (ULTRAM) 50 MG tablet Take 1 tablet (50 mg) by mouth 3 times daily as needed for pain Max 3 per day 90 tablet 5     VITAMIN D, CHOLECALCIFEROL, PO Take 1,000 Units by mouth daily         Reviewed and updated as needed this visit by clinical staff  Tobacco  Allergies  Meds       Reviewed and updated as needed this visit by Provider      "    ROS:  Constitutional, HEENT, cardiovascular, pulmonary, GI, , musculoskeletal, neuro, skin, endocrine and psych systems are negative, except as otherwise noted.    OBJECTIVE:     /74 (BP Location: Right arm, Patient Position: Chair, Cuff Size: Adult Regular)   Pulse 83   Resp 16   Ht 1.626 m (5' 4\")   Wt 76.2 kg (168 lb)   SpO2 97%   BMI 28.84 kg/m    Body mass index is 28.84 kg/m .  GENERAL APPEARANCE: healthy, alert and no distress  RESP: lungs clear to auscultation - no rales, rhonchi or wheezes  CV: regular rates and rhythm, normal S1 S2, no S3 or S4 and no murmur, click or rub      ASSESSMENT/PLAN:     1. Angina pectoris (H) -increase her beta-blocker for better angina control.  Advised to use nitro as she has been doing.  Advised to go to ER if symptoms do not improve with nitro.  Keep appointment with cardiology.  - carvedilol (COREG) 6.25 MG tablet; Take 1 tablet (6.25 mg) by mouth 2 times daily (with meals)  Dispense: 180 tablet; Refill: 3    2. Benign essential hypertension  - carvedilol (COREG) 6.25 MG tablet; Take 1 tablet (6.25 mg) by mouth 2 times daily (with meals)  Dispense: 180 tablet; Refill: 3    3. Chronic low back pain, unspecified back pain laterality, with sciatica presence unspecified -patient would like one further injection with [facility], and then all her pain follow-up with the [facility] comprehensive  services.  - PAIN MANAGEMENT REFERRAL    Patient Instructions   1. Increase carvedilol to 6.25 twice daily.  Ok to take TWO of 3.125 mg pills twice daily.  2. Keep appointment with Cardiology  3. Call [facility] if you can have the 2nd injection. If they need a referral from , ask what specific injection (location) of the injection, so I can place a referral  4. Referral for comprehensive pain management with Dr. Catia Pascual here at [place] to consolidate your care.      Cynthia Maddox, DO  [facility]    "

## 2019-02-07 ENCOUNTER — OFFICE VISIT (OUTPATIENT)
Dept: FAMILY MEDICINE | Facility: CLINIC | Age: 84
End: 2019-02-07
Payer: MEDICARE

## 2019-02-07 VITALS
RESPIRATION RATE: 16 BRPM | DIASTOLIC BLOOD PRESSURE: 70 MMHG | OXYGEN SATURATION: 97 % | HEIGHT: 64 IN | HEART RATE: 83 BPM | SYSTOLIC BLOOD PRESSURE: 136 MMHG | WEIGHT: 168 LBS | BODY MASS INDEX: 28.68 KG/M2

## 2019-02-07 DIAGNOSIS — M54.5 CHRONIC LOW BACK PAIN, UNSPECIFIED BACK PAIN LATERALITY, WITH SCIATICA PRESENCE UNSPECIFIED: ICD-10-CM

## 2019-02-07 DIAGNOSIS — I10 BENIGN ESSENTIAL HYPERTENSION: ICD-10-CM

## 2019-02-07 DIAGNOSIS — I20.9 ANGINA PECTORIS (H): Primary | ICD-10-CM

## 2019-02-07 DIAGNOSIS — G89.29 CHRONIC LOW BACK PAIN, UNSPECIFIED BACK PAIN LATERALITY, WITH SCIATICA PRESENCE UNSPECIFIED: ICD-10-CM

## 2019-02-07 PROCEDURE — 99495 TRANSJ CARE MGMT MOD F2F 14D: CPT | Performed by: INTERNAL MEDICINE

## 2019-02-07 RX ORDER — CARVEDILOL 6.25 MG/1
6.25 TABLET ORAL 2 TIMES DAILY WITH MEALS
Qty: 180 TABLET | Refills: 3 | Status: SHIPPED | OUTPATIENT
Start: 2019-02-07 | End: 2019-02-18 | Stop reason: ALTCHOICE

## 2019-02-07 ASSESSMENT — PAIN SCALES - GENERAL: PAINLEVEL: NO PAIN (0)

## 2019-02-07 ASSESSMENT — MIFFLIN-ST. JEOR: SCORE: 1187.04

## 2019-02-07 NOTE — PATIENT INSTRUCTIONS
1. Increase carvedilol to 6.25 twice daily.  Ok to take TWO of 3.125 mg pills twice daily.  2. Keep appointment with Cardiology  3. [facility] if you can have the 2nd injection. If they need a referral from , ask what specific injection (location) of the injection, so I can place a referral  4. Referral for comprehensive pain management with Dr. Catia Pascual here at [place] to consolidate your care.

## 2019-02-08 ENCOUNTER — TELEPHONE (OUTPATIENT)
Dept: PALLIATIVE MEDICINE | Facility: CLINIC | Age: 84
End: 2019-02-08

## 2019-02-08 NOTE — TELEPHONE ENCOUNTER
Left msg on vm for pt to schedule New Eval.      Ida WALLACE    [facility] [place]

## 2019-02-08 NOTE — TELEPHONE ENCOUNTER

## 2019-02-08 NOTE — LETTER
February 8, 2019    Daniela Brown  [address]  [facility] [address]    Dear Daniela,                                                                 Welcome to the [facility].  We are located in the [facility], located at [address] [place] [address] Your appointment at the [facility] has been scheduled on Wednesday MAY 1, 2019 at 3:00PM with Keo Pascual MD.    At your first visit, you will meet your team of caregivers who will help you to develop pain management strategies that will last a lifetime. You will meet with our support staff to review your insurance information, and collect your co-payment if required by your insurance company. You will also meet with a medical pain specialist and care coordinator who will assess your pain and develop a plan of care for your successful pain rehabilitation. You should expect to spend 1-2 hours at your first visit with us. Usually, patients work with us for a period of 6-12 months, and eventually return to their primary doctor once their pain management has stabilized.      To help us make your visit go as smoothly as possible, please bring the following items with you on your visit:     Completed Pain Questionnaire enclosed in this packet.  If you do not bring the completed questionnaire, we may have to reschedule your appointment.  List of any medicines that you are currently taking or have been prescribed  Important NON-[place] medical information such as medical records or tests results (X-rays, or laboratory tests)  Your health insurance card  Financial resources to cover your co-payment or balance due at the time of service (cash, personal check, Visa, and MasterCard are acceptable methods of payment)     Due to the demand for new patient evaluations, you must notify the scheduling department 48 hours in advance if you are not able to keep this appointment.  Failure to do so could affect your ability to reschedule with our clinic. Please be aware that we will not prescribe any medications at your first visit.     Please call [phone] with any questions regarding your appointment. We look forward to meeting you and working to address your health care needs.     Sincerely,    [facility]

## 2019-02-11 ENCOUNTER — TELEPHONE (OUTPATIENT)
Dept: FAMILY MEDICINE | Facility: CLINIC | Age: 84
End: 2019-02-11

## 2019-02-11 DIAGNOSIS — M47.819 ARTHRITIS OF SPINE: Primary | ICD-10-CM

## 2019-02-11 DIAGNOSIS — M51.9 LUMBAR DISC DISEASE: ICD-10-CM

## 2019-02-11 NOTE — TELEPHONE ENCOUNTER
"Reason for Call: Request for an order or referral:    Order or referral being requested: Pt's son Leandro calling with info for Dr. Maddox's RN regarding referral for [facility].  He is asking for a referral asap for:  \"L5-S1 - Transforaminal.\"  Please call Leandro @ [phone] and advise.       Date needed: at your convenience    Has the patient been seen by the PCP for this problem? YES    Additional comments:     Phone number Patient's son Leandro can be reached at:  Home number on file [phone]    Best Time:  any    Can we leave a detailed message on this number?  YES    Call taken on 2/11/2019 at 11:45 AM by Betty Solomon    "

## 2019-02-11 NOTE — TELEPHONE ENCOUNTER
Left message for the patient's son to call the clinic.  Looks like she has a referral to the pain clinic. Marisela NICHOLSON RN

## 2019-02-11 NOTE — TELEPHONE ENCOUNTER
Dr. Maddox,    Son of the patient called with the location of the injection L5-S1 transforaminal inj. At [facility].  Fax number [phone]. Marisela NICHOLSON RN

## 2019-02-12 NOTE — TELEPHONE ENCOUNTER
Patient's son is notified of us faxing  Order to [facility] for her. Marisela NICHOLSON RN

## 2019-02-15 ENCOUNTER — TRANSFERRED RECORDS (OUTPATIENT)
Dept: HEALTH INFORMATION MANAGEMENT | Facility: CLINIC | Age: 84
End: 2019-02-15

## 2019-02-18 ENCOUNTER — OFFICE VISIT (OUTPATIENT)
Dept: CARDIOLOGY | Facility: CLINIC | Age: 84
End: 2019-02-18
Payer: MEDICARE

## 2019-02-18 ENCOUNTER — HOSPITAL ENCOUNTER (OUTPATIENT)
Facility: CLINIC | Age: 84
End: 2019-02-18
Payer: MEDICARE

## 2019-02-18 VITALS
BODY MASS INDEX: 28.49 KG/M2 | OXYGEN SATURATION: 97 % | WEIGHT: 166 LBS | SYSTOLIC BLOOD PRESSURE: 138 MMHG | HEART RATE: 95 BPM | DIASTOLIC BLOOD PRESSURE: 84 MMHG

## 2019-02-18 DIAGNOSIS — R09.89 BRUIT OF RIGHT CAROTID ARTERY: ICD-10-CM

## 2019-02-18 DIAGNOSIS — R94.39 ABNORMAL STRESS TEST: ICD-10-CM

## 2019-02-18 DIAGNOSIS — R94.39 ABNORMAL STRESS TEST: Primary | ICD-10-CM

## 2019-02-18 DIAGNOSIS — R07.9 CHEST PAIN ON EXERTION: ICD-10-CM

## 2019-02-18 DIAGNOSIS — E78.5 HYPERLIPIDEMIA LDL GOAL <100: ICD-10-CM

## 2019-02-18 DIAGNOSIS — M51.379 DEGENERATION OF LUMBAR OR LUMBOSACRAL INTERVERTEBRAL DISC: ICD-10-CM

## 2019-02-18 PROCEDURE — 99205 OFFICE O/P NEW HI 60 MIN: CPT | Performed by: INTERNAL MEDICINE

## 2019-02-18 RX ORDER — SODIUM CHLORIDE 9 MG/ML
INJECTION, SOLUTION INTRAVENOUS CONTINUOUS
Status: CANCELLED | OUTPATIENT
Start: 2019-02-18

## 2019-02-18 RX ORDER — LIDOCAINE 40 MG/G
CREAM TOPICAL
Status: CANCELLED | OUTPATIENT
Start: 2019-02-18

## 2019-02-18 RX ORDER — ISOSORBIDE MONONITRATE 30 MG/1
30 TABLET, EXTENDED RELEASE ORAL DAILY
Qty: 90 TABLET | Refills: 3 | Status: SHIPPED | OUTPATIENT
Start: 2019-02-18 | End: 2019-03-20

## 2019-02-18 RX ORDER — ASPIRIN 81 MG/1
81 TABLET ORAL DAILY
Status: CANCELLED | OUTPATIENT
Start: 2019-02-18

## 2019-02-18 RX ORDER — METOPROLOL SUCCINATE 25 MG/1
25 TABLET, EXTENDED RELEASE ORAL DAILY
Qty: 90 TABLET | Refills: 3 | Status: SHIPPED | OUTPATIENT
Start: 2019-02-18 | End: 2019-03-20

## 2019-02-18 NOTE — LETTER
2/18/2019    Cynthia Maddox, DO  [address] [facility] [address]    RE: Daniela Hernández Kevin       Dear Colleague,    I had the pleasure of seeing Daniela Brown in the [facility].        Thank you for HPI and Plan:   Today I had the pleasure of seeing Daniela Brown at [facility] in [place].  She is accompanied by her son.  She is a pleasant 86 year old patient with a past medical history of hypertension, diabetes, hyperlipidemia, degenerative disc disease and lower back pain who presents to the clinic after being referred by her primary care physician for dyspnea on exertion and chest pain.    The patient was seen in cardiology at [facility] by Dr. Justin Deras.  The patient underwent a stress test, and echocardiogram for dyspnea on exertion and chest pain.  The Fabiana scan showed small reversible perfusion defect of mild severity involving the basal inferior wall.  It also showed TID with a ratio of 1.3.  However, as per the note it was decided to start her on medical management which the patient tolerated very well with almost complete resolution of her symptoms.  Of note the patient also had a reflex syncope right after the stress test.  She was seen in the primary care clinic when Coreg was increased from 3.125-6.25.  However, the patient reports that she became extremely lethargic and weak after taking the increased dose of Coreg and hence decided to go back to the previous dose.    Today, the patient reports that she has multiple episodes of  chest tightness throughout the day.  This is mostly at rest but does get worse with exertion.  She also reports severe shortness of breath on walking in the house.  She says she has a long driveway and is extremely short of breath when walking from her door to the mailbox or when walking to the end of the driveway to take the garbage out.  She says this dyspnea on exertion  is bad enough for her to hire someone to take the garbage out.  She says she wants everything done in order for her to feel better.  She just wishes that her shortness of breath improves so that she can go back to living her life in a normal way.  Her son agrees with her and says he has noticed a deterioration in her mother's functional status over the last several months.  He wonders why a catheterization/coronary angiogram has not been performed and why it has not been recommended.      The patient denies lower extremity swelling, orthopnea, PND, presyncope/syncope.     Assessment and plan:     Coronary artery disease with persistent angina  The patient only had evidence of mild inferior wall ischemia  on the Lexiscan.  However given the severity of her symptoms and that the stress test also showed TID I think she may have worse disease than shown by the stress test (balanced ischemia).  Furthermore, She requests a coronary angiogram to further workup her symptoms and fix any blockages in the heart so that she can go back to living her life in a normal way. Her son completely agrees with her.  I explained to them the risks and benefits of performing a coronary angiogram.  I also discussed other noninvasive modalities of assessing coronary artery disease.  However, the patient would like to undergo coronary angiogram for its therapeutic benefits.  The patient has back pain and routinely undergoes lumbar injections for pain relief.  Her last set of injections was   3 days ago.  However, I believe she says she  will be able to lie flat for cardiac catheterization if given pain medications prior to the procedure.  I told her that cardiac catheterization is performed under moderate sedation and includes administration of opioid analgesic (Fentanyl).     Hyperlipidemia  She is on a moderate intensity statin and I will continue that for now.  Her last LDL cholesterol was 58 in 2017.  I will repeat fasting lipid profile  prior to next visit.    Hypertension  Her blood pressure today is 138/84  I would switch her from Coreg which she is unable to tolerate to metoprolol 25 mg p.o. daily.  In addition, I will start her on 30 mg of imdur.she should be able to tolerate both these medications as she has good blood pressure room.  However I discussed with her and her son that in case she becomes hypotensive or develops any other side effects from the medication she can decrease the dose of metoprolol XL to 12.5 and indoor to 15 mg once a day.     Rt Carotid bruit  Will perform angiogram of the carotids at the time of cardiac catheterization  If not, will perform a carotid Doppler ultrasound    Thank you for allowing me to participate in the care of Daniela Hernández Kevin    Gregg Combs MD  Cardiology      Zio Patch 9/25/2018:  7 episodes of SVT maximum 6 beats.    Lexiscan 9/10/2018:   1.  Myocardial perfusion imaging using single isotope technique  demonstrated a small perfusion defect of mild severity involving the  basal inferior wall which is mostly reversible and may be consistent  with mild ischemia in the right coronary artery distribution. In  addition, transient ischemic dilatation is noted with a TID ratio of 1.3.   2. Gated images demonstrated normal left ventricular wall motion.  The  left ventricular systolic function is 80% at rest and 82% on the post  stress images.  3. Compared to the prior study from 10/5/2009, there is no evidence of  possible mild ischemia in the right coronary artery distribution..    TTE 9/11/2018:   The left ventricle is normal in size.  The visual ejection fraction is estimated at 65-70%.  Grade I or early diastolic dysfunction.  No regional wall motion abnormalities noted.  The right ventricle is normal in size and function.  No significant valvular heart disease.    Orders Placed This Encounter   Procedures     US carotid bilateral       Orders Placed This Encounter   Medications      metoprolol succinate ER (TOPROL-XL) 25 MG 24 hr tablet     Sig: Take 1 tablet (25 mg) by mouth daily     Dispense:  90 tablet     Refill:  3     isosorbide mononitrate (IMDUR) 30 MG 24 hr tablet     Sig: Take 1 tablet (30 mg) by mouth daily     Dispense:  90 tablet     Refill:  3       Medications Discontinued During This Encounter   Medication Reason     carvedilol (COREG) 6.25 MG tablet Alternate therapy       Encounter Diagnoses   Name Primary?     Abnormal stress test Yes     Carotid bruit        CURRENT MEDICATIONS:  Current Outpatient Medications   Medication Sig Dispense Refill     acetaminophen (TYLENOL) 325 MG tablet Take 1 tablet by mouth every 6 hours as needed for pain. 30 tablet 0     amLODIPine (NORVASC) 5 MG tablet Take 1 tablet (5 mg) by mouth every evening 90 tablet 3     aspirin 81 MG tablet Take 81 mg by mouth daily       atorvastatin (LIPITOR) 40 MG tablet Take 1 tablet (40 mg) by mouth At Bedtime 90 tablet 3     blood glucose monitoring (ACCU-CHEK FASTCLIX) lancets Use to test blood sugar one times daily or as directed. 102 each 3     blood glucose monitoring (ACCU-CHEK GUIDE) test strip Use to test blood sugar one times daily or as directed. 100 each 3     citalopram (CELEXA) 20 MG tablet Take 1 tablet (20 mg) by mouth daily 90 tablet 3     ClonazePAM (KLONOPIN PO) Take 0.5 mg by mouth 2 times daily as needed for anxiety       Cyanocobalamin (B-12) 1000 MCG SUBL Place 1 tablet under the tongue daily        dicyclomine (BENTYL) 20 MG tablet Take 1 tablet (20 mg) by mouth 4 times daily as needed (diarrhea) 60 tablet 11     gabapentin (NEURONTIN) 300 MG capsule Take 1 capsule (300 mg) by mouth 2 times daily 180 capsule 3     isosorbide mononitrate (IMDUR) 30 MG 24 hr tablet Take 1 tablet (30 mg) by mouth daily 90 tablet 3     losartan (COZAAR) 50 MG tablet Take 1 tablet (50 mg) by mouth daily 90 tablet 3     metoprolol succinate ER (TOPROL-XL) 25 MG 24 hr tablet Take 1 tablet (25 mg) by mouth  "daily 90 tablet 3     Multiple Vitamins-Minerals (THERAPEUTIC MULTIVIT/MINERAL) TABS Take 1 tablet by mouth daily.       nitroGLYcerin (NITROSTAT) 0.4 MG sublingual tablet Place 1 tablet (0.4 mg) under the tongue every 5 minutes as needed for chest pain 30 tablet 4     order for DME 1 walker 1 Device 0     ORDER FOR DME Thigh length teds. 1 Device 2     traMADol (ULTRAM) 50 MG tablet Take 1 tablet (50 mg) by mouth 3 times daily as needed for pain Max 3 per day 90 tablet 5     VITAMIN D, CHOLECALCIFEROL, PO Take 1,000 Units by mouth daily         ALLERGIES     Allergies   Allergen Reactions     Metoprolol Itching and Rash     Cephalexin Rash     Erythromycin Rash     Actonel [Bisphosphonates] Itching     Azithromycin Hives     Celebrex [Celecoxib] Rash     Cipro [Ciprofloxacin] Rash     Erythromycin Rash     Escitalopram Diarrhea     Gets very sick and mad feelings     Fosamax Itching and Rash     Keflex [Cephalexin Monohydrate] Rash     Lisinopril Cough     Nitrofurantoin Other (See Comments)     \"dizzyness'     No Clinical Screening - See Comments Hives     Other reaction(s): Edema     Risedronate Itching     Shellfish-Derived Products Hives     Tetanus-Diphtheria Toxoids Swelling     Vicodin [Acetaminophen] Itching     Patient reported - only when used scheduled in high doses.        Vioxx Rash     Alendronic Acid Rash     Celecoxib Rash     Penicillins Rash     Rofecoxib Rash     Sulfa Drugs Itching and Rash     Sulfasalazine Itching and Rash       PAST MEDICAL HISTORY:  Past Medical History:   Diagnosis Date     Adhesive capsulitis of shoulder     left      Allergic rhinitis, cause unspecified      Carpal tunnel syndrome     right>left by emg 2004     Chest pain, unspecified     Chronic right sided/axillary discomfort (musculoskeletal) Atypical substernal (possibly GERD). Negative cardiolite 2004.     Colitis, Clostridium difficile 4/18/2012     Cystocele, midline     grade III     Degeneration of lumbar or " lumbosacral intervertebral disc     MRI 5/04- DDD, L2-3 annular bulge with protrusion into left caudal infra-foraminal area     Depressive disorder, not elsewhere classified      Diabetes mellitus (H)      Diverticulosis of colon (without mention of hemorrhage)     flexible sigmoidoscopy otherwise normal 2001     Esophageal reflux      Essential hypertension, benign      Generalized osteoarthrosis, unspecified site     C-spine, left hip     Headache(784.0)     Tension type. MRI 2001 normal.     Impaired fasting glucose     GTT 2/05 115-209     Irritable bowel syndrome     diahrrea predominant     Memory loss     Early cognitive decline. MMSE 27/30, Neno 4.7/ 6.0 2004. B12, TSH, RPR normal 0326-2482.     Mixed hyperlipidemia      OA (OSTEOARTHRITIS) GENERALIZED( Multiple Sites)     Facets, left hip, c-spine. 09/14/06 - bone loss, stable.     OSTEOPENIA     DEXA 2001 -1.4 hip. Dexa 2004 -0.2 hip and -1.5 spine     PERS HX ALLERGY OTHER FOODS 8/22/2006     PERS HX ALLERGY TO MILK PRODUCTS 8/22/2006     RESTLESS LEG SYNDROME      Syncope and collapse 9/10/2018     Unspecified vitamin D deficiency        PAST SURGICAL HISTORY:  Past Surgical History:   Procedure Laterality Date     ARTHROPLASTY KNEE  3/26/2012    Procedure:ARTHROPLASTY KNEE; Right Total Knee Arthroplasty; Surgeon:BERNADINE ROSAS; Location:[place] OR     C APPENDECTOMY  1953     HC REMOVAL GALLBLADDER       HYSTERECTOMY, PAP NO LONGER INDICATED  1983    TVH/BSO     SURGICAL HISTORY OF -       Hernia Repair     SURGICAL HISTORY OF -   10/08    A & P Repair, Dr. Hannah       FAMILY HISTORY:  Family History   Problem Relation Age of Onset     C.A.D. Mother      Diabetes Mother      Cancer - colorectal Mother      Arthritis Mother      Heart Disease Mother      Lipids Brother      Obesity Brother      Hypertension Brother      Allergies Son      Eye Disorder Son         cataract     Thyroid Disease Sister         graves dz     Eye Disorder Son       Leukemia Son      Alcohol/Drug Father        SOCIAL HISTORY:  Social History     Socioeconomic History     Marital status:      Spouse name: None     Number of children: None     Years of education: None     Highest education level: None   Social Needs     Financial resource strain: None     Food insecurity - worry: None     Food insecurity - inability: None     Transportation needs - medical: None     Transportation needs - non-medical: None   Occupational History     None   Tobacco Use     Smoking status: Never Smoker     Smokeless tobacco: Never Used   Substance and Sexual Activity     Alcohol use: No     Drug use: No     Sexual activity: No   Other Topics Concern     Parent/sibling w/ CABG, MI or angioplasty before 65F 55M? No   Social History Narrative    Lives in [place] independently. She has son who lives nearby in [place]. Her oldest son passed away in September 2018.        Review of Systems:  Skin:  Negative       Eyes:  Positive for glasses    ENT:  Positive for hearing loss wears hearing aids  Respiratory:  Positive for cough     Cardiovascular:    edema;Positive for;fatigue    Gastroenterology: Negative      Genitourinary:  Positive for   CKD - patient does not see nephrology  Musculoskeletal:  Positive for back pain    Neurologic:  Positive for numbness or tingling of feet;headaches    Psychiatric:  Positive for anxiety;depression    Heme/Lymph/Imm:  Negative      Endocrine:  Positive for diabetes      Physical Exam:  Vitals: /80   Pulse 95   Wt 75.3 kg (166 lb)   SpO2 97%   BMI 28.49 kg/m       Constitutional:       Well-developed well-nourished    Skin:         No ulcers or excoriations    Head:       Normocephalic atraumatic    Eyes:       Normal conjunctiva and sclera    Lymph:  No cervical lymphadenopathy    ENT:       Normal external ears    Neck:       No JVD, right carotid bruit +nt    Respiratory:        Good air entry bilaterally, no accessory muscle use, bronchial  vesicular breath sounds on all auscultated areas, no crackles or wheezes    Cardiac:                Regular rate and rhythm, normal S1-S2, soft 1 out of 6 systolic ejection murmur best heard at the right upper sternal border.                                           GI:       Soft, non-distended    Extremities and Muscular Skeletal:             Normal range of motion of all 4 extremities    Neurological:       No sensory deficits    Psych:     Normal mood and affect    Recent Lab Results:  LIPID RESULTS:  Lab Results   Component Value Date    CHOL 254 (H) 12/06/2012    HDL 69 12/06/2012     (H) 12/06/2012    TRIG 119 12/06/2012    CHOLHDLRATIO 4.0 12/06/2012       LIVER ENZYME RESULTS:  Lab Results   Component Value Date    AST 24 02/03/2019    ALT 24 02/03/2019       CBC RESULTS:  Lab Results   Component Value Date    WBC 9.0 02/03/2019    RBC 4.01 02/03/2019    HGB 12.0 02/03/2019    HCT 34.5 (L) 02/03/2019    MCV 86 02/03/2019    MCH 29.9 02/03/2019    MCHC 34.8 02/03/2019    RDW 12.2 02/03/2019     02/03/2019       BMP RESULTS:  Lab Results   Component Value Date     02/03/2019    POTASSIUM 3.5 02/03/2019    CHLORIDE 109 02/03/2019    CO2 24 02/03/2019    ANIONGAP 9 02/03/2019     (H) 02/03/2019    BUN 15 02/03/2019    CR 0.97 02/03/2019    GFRESTIMATED 53 (L) 02/03/2019    GFRESTBLACK 61 02/03/2019    NARESH 7.9 (L) 02/03/2019        A1C RESULTS:  Lab Results   Component Value Date    A1C 6.5 (H) 09/10/2018       INR RESULTS:  Lab Results   Component Value Date    INR 1.08 07/19/2018    INR 0.94 11/07/2014       CC  Deepti Hannah MD  [address] [place] [address]      All medical records were reviewed in detail and discussed with the patient. Greater than 60 mins were spent with the patient.  50% of this time was spent on counseling and coordination of care.  After visit summary was printed and given to the patient.  allowing me to participate in the care of your  patient.      Sincerely,     Gregg Combs MD     [facility]    cc:   Deepti Hannah MD  [address] [place] [address]

## 2019-02-18 NOTE — PROGRESS NOTES
HPI and Plan:   Today I had the pleasure of seeing Daniela Brown at [facility] in [place].  She is accompanied by her son.  She is a pleasant 86 year old patient with a past medical history of hypertension, diabetes, hyperlipidemia, degenerative disc disease and lower back pain who presents to the clinic after being referred by her primary care physician for dyspnea on exertion and chest pain.    The patient was seen in cardiology at [facility] by Dr. Justin Deras.  The patient underwent a stress test, and echocardiogram for dyspnea on exertion and chest pain.  The Fabiana scan showed small reversible perfusion defect of mild severity involving the basal inferior wall.  It also showed TID with a ratio of 1.3.  However, as per the note it was decided to start her on medical management which the patient tolerated very well with almost complete resolution of her symptoms.  Of note the patient also had a reflex syncope right after the stress test.  She was seen in the primary care clinic when Coreg was increased from 3.125-6.25.  However, the patient reports that she became extremely lethargic and weak after taking the increased dose of Coreg and hence decided to go back to the previous dose.    Today, the patient reports that she has multiple episodes of  chest tightness throughout the day.  This is mostly at rest but does get worse with exertion.  She also reports severe shortness of breath on walking in the house.  She says she has a long driveway and is extremely short of breath when walking from her door to the mailbox or when walking to the end of the driveway to take the garbage out.  She says this dyspnea on exertion is bad enough for her to hire someone to take the garbage out.  She says she wants everything done in order for her to feel better.  She just wishes that her shortness of breath improves so that she can go back to living her life in a normal way.  Her son  agrees with her and says he has noticed a deterioration in her mother's functional status over the last several months.  He wonders why a catheterization/coronary angiogram has not been performed and why it has not been recommended.      The patient denies lower extremity swelling, orthopnea, PND, presyncope/syncope.     Assessment and plan:     Coronary artery disease with persistent angina  The patient only had evidence of mild inferior wall ischemia  on the Lexiscan.  However given the severity of her symptoms and that the stress test also showed TID I think she may have worse disease than shown by the stress test (balanced ischemia).  Furthermore, She requests a coronary angiogram to further workup her symptoms and fix any blockages in the heart so that she can go back to living her life in a normal way. Her son completely agrees with her.  I explained to them the risks and benefits of performing a coronary angiogram.  I also discussed other noninvasive modalities of assessing coronary artery disease.  However, the patient would like to undergo coronary angiogram for its therapeutic benefits.  The patient has back pain and routinely undergoes lumbar injections for pain relief.  Her last set of injections was   3 days ago.  However, I believe she says she  will be able to lie flat for cardiac catheterization if given pain medications prior to the procedure.  I told her that cardiac catheterization is performed under moderate sedation and includes administration of opioid analgesic (Fentanyl).     Hyperlipidemia  She is on a moderate intensity statin and I will continue that for now.  Her last LDL cholesterol was 58 in 2017.  I will repeat fasting lipid profile prior to next visit.    Hypertension  Her blood pressure today is 138/84  I would switch her from Coreg which she is unable to tolerate to metoprolol 25 mg p.o. daily.  In addition, I will start her on 30 mg of imdur.she should be able to tolerate both  these medications as she has good blood pressure room.  However I discussed with her and her son that in case she becomes hypotensive or develops any other side effects from the medication she can decrease the dose of metoprolol XL to 12.5 and indoor to 15 mg once a day.     Rt Carotid bruit  Will perform angiogram of the carotids at the time of cardiac catheterization  If not, will perform a carotid Doppler ultrasound    Thank you for allowing me to participate in the care of Daniela Brown    Gregg Combs MD  Cardiology      Zio Patch 9/25/2018:  7 episodes of SVT maximum 6 beats.    Lexiscan 9/10/2018:   1.  Myocardial perfusion imaging using single isotope technique  demonstrated a small perfusion defect of mild severity involving the  basal inferior wall which is mostly reversible and may be consistent  with mild ischemia in the right coronary artery distribution. In  addition, transient ischemic dilatation is noted with a TID ratio of 1.3.   2. Gated images demonstrated normal left ventricular wall motion.  The  left ventricular systolic function is 80% at rest and 82% on the post  stress images.  3. Compared to the prior study from 10/5/2009, there is no evidence of  possible mild ischemia in the right coronary artery distribution..    TTE 9/11/2018:   The left ventricle is normal in size.  The visual ejection fraction is estimated at 65-70%.  Grade I or early diastolic dysfunction.  No regional wall motion abnormalities noted.  The right ventricle is normal in size and function.  No significant valvular heart disease.    Orders Placed This Encounter   Procedures     US carotid bilateral       Orders Placed This Encounter   Medications     metoprolol succinate ER (TOPROL-XL) 25 MG 24 hr tablet     Sig: Take 1 tablet (25 mg) by mouth daily     Dispense:  90 tablet     Refill:  3     isosorbide mononitrate (IMDUR) 30 MG 24 hr tablet     Sig: Take 1 tablet (30 mg) by mouth daily     Dispense:  90  tablet     Refill:  3       Medications Discontinued During This Encounter   Medication Reason     carvedilol (COREG) 6.25 MG tablet Alternate therapy       Encounter Diagnoses   Name Primary?     Abnormal stress test Yes     Carotid bruit        CURRENT MEDICATIONS:  Current Outpatient Medications   Medication Sig Dispense Refill     acetaminophen (TYLENOL) 325 MG tablet Take 1 tablet by mouth every 6 hours as needed for pain. 30 tablet 0     amLODIPine (NORVASC) 5 MG tablet Take 1 tablet (5 mg) by mouth every evening 90 tablet 3     aspirin 81 MG tablet Take 81 mg by mouth daily       atorvastatin (LIPITOR) 40 MG tablet Take 1 tablet (40 mg) by mouth At Bedtime 90 tablet 3     blood glucose monitoring (ACCU-CHEK FASTCLIX) lancets Use to test blood sugar one times daily or as directed. 102 each 3     blood glucose monitoring (ACCU-CHEK GUIDE) test strip Use to test blood sugar one times daily or as directed. 100 each 3     citalopram (CELEXA) 20 MG tablet Take 1 tablet (20 mg) by mouth daily 90 tablet 3     ClonazePAM (KLONOPIN PO) Take 0.5 mg by mouth 2 times daily as needed for anxiety       Cyanocobalamin (B-12) 1000 MCG SUBL Place 1 tablet under the tongue daily        dicyclomine (BENTYL) 20 MG tablet Take 1 tablet (20 mg) by mouth 4 times daily as needed (diarrhea) 60 tablet 11     gabapentin (NEURONTIN) 300 MG capsule Take 1 capsule (300 mg) by mouth 2 times daily 180 capsule 3     isosorbide mononitrate (IMDUR) 30 MG 24 hr tablet Take 1 tablet (30 mg) by mouth daily 90 tablet 3     losartan (COZAAR) 50 MG tablet Take 1 tablet (50 mg) by mouth daily 90 tablet 3     metoprolol succinate ER (TOPROL-XL) 25 MG 24 hr tablet Take 1 tablet (25 mg) by mouth daily 90 tablet 3     Multiple Vitamins-Minerals (THERAPEUTIC MULTIVIT/MINERAL) TABS Take 1 tablet by mouth daily.       nitroGLYcerin (NITROSTAT) 0.4 MG sublingual tablet Place 1 tablet (0.4 mg) under the tongue every 5 minutes as needed for chest pain 30 tablet  "4     order for DME 1 walker 1 Device 0     ORDER FOR DME Thigh length teds. 1 Device 2     traMADol (ULTRAM) 50 MG tablet Take 1 tablet (50 mg) by mouth 3 times daily as needed for pain Max 3 per day 90 tablet 5     VITAMIN D, CHOLECALCIFEROL, PO Take 1,000 Units by mouth daily         ALLERGIES     Allergies   Allergen Reactions     Metoprolol Itching and Rash     Cephalexin Rash     Erythromycin Rash     Actonel [Bisphosphonates] Itching     Azithromycin Hives     Celebrex [Celecoxib] Rash     Cipro [Ciprofloxacin] Rash     Erythromycin Rash     Escitalopram Diarrhea     Gets very sick and mad feelings     Fosamax Itching and Rash     Keflex [Cephalexin Monohydrate] Rash     Lisinopril Cough     Nitrofurantoin Other (See Comments)     \"dizzyness'     No Clinical Screening - See Comments Hives     Other reaction(s): Edema     Risedronate Itching     Shellfish-Derived Products Hives     Tetanus-Diphtheria Toxoids Swelling     Vicodin [Acetaminophen] Itching     Patient reported - only when used scheduled in high doses.        Vioxx Rash     Alendronic Acid Rash     Celecoxib Rash     Penicillins Rash     Rofecoxib Rash     Sulfa Drugs Itching and Rash     Sulfasalazine Itching and Rash       PAST MEDICAL HISTORY:  Past Medical History:   Diagnosis Date     Adhesive capsulitis of shoulder     left      Allergic rhinitis, cause unspecified      Carpal tunnel syndrome     right>left by emg 2004     Chest pain, unspecified     Chronic right sided/axillary discomfort (musculoskeletal) Atypical substernal (possibly GERD). Negative cardiolite 2004.     Colitis, Clostridium difficile 4/18/2012     Cystocele, midline     grade III     Degeneration of lumbar or lumbosacral intervertebral disc     MRI 5/04- DDD, L2-3 annular bulge with protrusion into left caudal infra-foraminal area     Depressive disorder, not elsewhere classified      Diabetes mellitus (H)      Diverticulosis of colon (without mention of hemorrhage)     " flexible sigmoidoscopy otherwise normal 2001     Esophageal reflux      Essential hypertension, benign      Generalized osteoarthrosis, unspecified site     C-spine, left hip     Headache(784.0)     Tension type. MRI 2001 normal.     Impaired fasting glucose     GTT 2/05 115-209     Irritable bowel syndrome     diahrrea predominant     Memory loss     Early cognitive decline. MMSE 27/30, Neno 4.7/ 6.0 2004. B12, TSH, RPR normal 5289-5275.     Mixed hyperlipidemia      OA (OSTEOARTHRITIS) GENERALIZED( Multiple Sites)     Facets, left hip, c-spine. 09/14/06 - bone loss, stable.     OSTEOPENIA     DEXA 2001 -1.4 hip. Dexa 2004 -0.2 hip and -1.5 spine     PERS HX ALLERGY OTHER FOODS 8/22/2006     PERS HX ALLERGY TO MILK PRODUCTS 8/22/2006     RESTLESS LEG SYNDROME      Syncope and collapse 9/10/2018     Unspecified vitamin D deficiency        PAST SURGICAL HISTORY:  Past Surgical History:   Procedure Laterality Date     ARTHROPLASTY KNEE  3/26/2012    Procedure:ARTHROPLASTY KNEE; Right Total Knee Arthroplasty; Surgeon:BERNADINE ROSAS; Location:[facility] OR     C APPENDECTOMY  1953     HC REMOVAL GALLBLADDER       HYSTERECTOMY, PAP NO LONGER INDICATED  1983    TVH/BSO     SURGICAL HISTORY OF -       Hernia Repair     SURGICAL HISTORY OF -   10/08    A & P Repair, Dr. Hannah       FAMILY HISTORY:  Family History   Problem Relation Age of Onset     C.A.D. Mother      Diabetes Mother      Cancer - colorectal Mother      Arthritis Mother      Heart Disease Mother      Lipids Brother      Obesity Brother      Hypertension Brother      Allergies Son      Eye Disorder Son         cataract     Thyroid Disease Sister         graves dz     Eye Disorder Son      Leukemia Son      Alcohol/Drug Father        SOCIAL HISTORY:  Social History     Socioeconomic History     Marital status:      Spouse name: None     Number of children: None     Years of education: None     Highest education level: None   Social Needs      Financial resource strain: None     Food insecurity - worry: None     Food insecurity - inability: None     Transportation needs - medical: None     Transportation needs - non-medical: None   Occupational History     None   Tobacco Use     Smoking status: Never Smoker     Smokeless tobacco: Never Used   Substance and Sexual Activity     Alcohol use: No     Drug use: No     Sexual activity: No   Other Topics Concern     Parent/sibling w/ CABG, MI or angioplasty before 65F 55M? No   Social History Narrative    Lives in [place] independently. She has son who lives nearby in [place]. Her oldest son passed away in September 2018.        Review of Systems:  Skin:  Negative       Eyes:  Positive for glasses    ENT:  Positive for hearing loss wears hearing aids  Respiratory:  Positive for cough     Cardiovascular:    edema;Positive for;fatigue    Gastroenterology: Negative      Genitourinary:  Positive for   CKD - patient does not see nephrology  Musculoskeletal:  Positive for back pain    Neurologic:  Positive for numbness or tingling of feet;headaches    Psychiatric:  Positive for anxiety;depression    Heme/Lymph/Imm:  Negative      Endocrine:  Positive for diabetes      Physical Exam:  Vitals: /80   Pulse 95   Wt 75.3 kg (166 lb)   SpO2 97%   BMI 28.49 kg/m      Constitutional:       Well-developed well-nourished    Skin:         No ulcers or excoriations    Head:       Normocephalic atraumatic    Eyes:       Normal conjunctiva and sclera    Lymph:  No cervical lymphadenopathy    ENT:       Normal external ears    Neck:       No JVD, right carotid bruit +nt    Respiratory:        Good air entry bilaterally, no accessory muscle use, bronchial vesicular breath sounds on all auscultated areas, no crackles or wheezes    Cardiac:                Regular rate and rhythm, normal S1-S2, soft 1 out of 6 systolic ejection murmur best heard at the right upper sternal border.                                            GI:       Soft, non-distended    Extremities and Muscular Skeletal:             Normal range of motion of all 4 extremities    Neurological:       No sensory deficits    Psych:     Normal mood and affect    Recent Lab Results:  LIPID RESULTS:  Lab Results   Component Value Date    CHOL 254 (H) 12/06/2012    HDL 69 12/06/2012     (H) 12/06/2012    TRIG 119 12/06/2012    CHOLHDLRATIO 4.0 12/06/2012       LIVER ENZYME RESULTS:  Lab Results   Component Value Date    AST 24 02/03/2019    ALT 24 02/03/2019       CBC RESULTS:  Lab Results   Component Value Date    WBC 9.0 02/03/2019    RBC 4.01 02/03/2019    HGB 12.0 02/03/2019    HCT 34.5 (L) 02/03/2019    MCV 86 02/03/2019    MCH 29.9 02/03/2019    MCHC 34.8 02/03/2019    RDW 12.2 02/03/2019     02/03/2019       BMP RESULTS:  Lab Results   Component Value Date     02/03/2019    POTASSIUM 3.5 02/03/2019    CHLORIDE 109 02/03/2019    CO2 24 02/03/2019    ANIONGAP 9 02/03/2019     (H) 02/03/2019    BUN 15 02/03/2019    CR 0.97 02/03/2019    GFRESTIMATED 53 (L) 02/03/2019    GFRESTBLACK 61 02/03/2019    NARESH 7.9 (L) 02/03/2019        A1C RESULTS:  Lab Results   Component Value Date    A1C 6.5 (H) 09/10/2018       INR RESULTS:  Lab Results   Component Value Date    INR 1.08 07/19/2018    INR 0.94 11/07/2014       CC  Deepti Hannah MD  [id number] [place] [address]      All medical records were reviewed in detail and discussed with the patient. Greater than 60 mins were spent with the patient.  50% of this time was spent on counseling and coordination of care.  After visit summary was printed and given to the patient.

## 2019-02-18 NOTE — LETTER
2/18/2019    Cynthia Maddox, DO  [address] [facility] [address]    RE: Daniela Martinezzabeth Kevin       Dear Colleague,    I had the pleasure of seeing Daniela Brown in the [facility].    HPI and Plan:   Today I had the pleasure of seeing Daniela Brown at [facility] in [place].  She is accompanied by her son.  She is a pleasant 86 year old patient with a past medical history of hypertension, diabetes, hyperlipidemia, degenerative disc disease and lower back pain who presents to the clinic after being referred by her primary care physician for dyspnea on exertion and chest pain.    The patient was seen in cardiology at [facility] by Dr. Justin Deras.  The patient underwent a stress test, and echocardiogram for dyspnea on exertion and chest pain.  The Fabiana scan showed small reversible perfusion defect of mild severity involving the basal inferior wall.  It also showed TID with a ratio of 1.3.  However, as per the note it was decided to start her on medical management which the patient tolerated very well with almost complete resolution of her symptoms.  Of note the patient also had a reflex syncope right after the stress test.  She was seen in the primary care clinic when Coreg was increased from 3.125-6.25.  However, the patient reports that she became extremely lethargic and weak after taking the increased dose of Coreg and hence decided to go back to the previous dose.    Today, the patient reports that she has multiple episodes of  chest tightness throughout the day.  This is mostly at rest but does get worse with exertion.  She also reports severe shortness of breath on walking in the house.  She says she has a long driveway and is extremely short of breath when walking from her door to the mailbox or when walking to the end of the driveway to take the garbage out.  She says this dyspnea on exertion is bad enough for  her to hire someone to take the garbage out.  She says she wants everything done in order for her to feel better.  She just wishes that her shortness of breath improves so that she can go back to living her life in a normal way.  Her son agrees with her and says he has noticed a deterioration in her mother's functional status over the last several months.  He wonders why a catheterization/coronary angiogram has not been performed and why it has not been recommended.      The patient denies lower extremity swelling, orthopnea, PND, presyncope/syncope.     Assessment and plan:     Coronary artery disease with persistent angina  The patient only had evidence of mild inferior wall ischemia  on the Lexiscan.  However given the severity of her symptoms and that the stress test also showed TID I think she may have worse disease than shown by the stress test (balanced ischemia).  Furthermore, She requests a coronary angiogram to further workup her symptoms and fix any blockages in the heart so that she can go back to living her life in a normal way. Her son completely agrees with her.  I explained to them the risks and benefits of performing a coronary angiogram.  I also discussed other noninvasive modalities of assessing coronary artery disease.  However, the patient would like to undergo coronary angiogram for its therapeutic benefits.  The patient has back pain and routinely undergoes lumbar injections for pain relief.  Her last set of injections was   3 days ago.  However, I believe she says she  will be able to lie flat for cardiac catheterization if given pain medications prior to the procedure.  I told her that cardiac catheterization is performed under moderate sedation and includes administration of opioid analgesic (Fentanyl).     Hyperlipidemia  She is on a moderate intensity statin and I will continue that for now.  Her last LDL cholesterol was 58 in 2017.  I will repeat fasting lipid profile prior to next  visit.    Hypertension  Her blood pressure today is 138/84  I would switch her from Coreg which she is unable to tolerate to metoprolol 25 mg p.o. daily.  In addition, I will start her on 30 mg of imdur.she should be able to tolerate both these medications as she has good blood pressure room.  However I discussed with her and her son that in case she becomes hypotensive or develops any other side effects from the medication she can decrease the dose of metoprolol XL to 12.5 and indoor to 15 mg once a day.     Rt Carotid bruit  Will perform angiogram of the carotids at the time of cardiac catheterization  If not, will perform a carotid Doppler ultrasound    Thank you for allowing me to participate in the care of Daniela Betty Wood    Gregg Combs MD  Cardiology      Zio Patch 9/25/2018:  7 episodes of SVT maximum 6 beats.    Lexiscan 9/10/2018:   1.  Myocardial perfusion imaging using single isotope technique  demonstrated a small perfusion defect of mild severity involving the  basal inferior wall which is mostly reversible and may be consistent  with mild ischemia in the right coronary artery distribution. In  addition, transient ischemic dilatation is noted with a TID ratio of 1.3.   2. Gated images demonstrated normal left ventricular wall motion.  The  left ventricular systolic function is 80% at rest and 82% on the post  stress images.  3. Compared to the prior study from 10/5/2009, there is no evidence of  possible mild ischemia in the right coronary artery distribution..    TTE 9/11/2018:   The left ventricle is normal in size.  The visual ejection fraction is estimated at 65-70%.  Grade I or early diastolic dysfunction.  No regional wall motion abnormalities noted.  The right ventricle is normal in size and function.  No significant valvular heart disease.    Orders Placed This Encounter   Procedures     US carotid bilateral       Orders Placed This Encounter   Medications     metoprolol succinate ER  (TOPROL-XL) 25 MG 24 hr tablet     Sig: Take 1 tablet (25 mg) by mouth daily     Dispense:  90 tablet     Refill:  3     isosorbide mononitrate (IMDUR) 30 MG 24 hr tablet     Sig: Take 1 tablet (30 mg) by mouth daily     Dispense:  90 tablet     Refill:  3       Medications Discontinued During This Encounter   Medication Reason     carvedilol (COREG) 6.25 MG tablet Alternate therapy       Encounter Diagnoses   Name Primary?     Abnormal stress test Yes     Carotid bruit        CURRENT MEDICATIONS:  Current Outpatient Medications   Medication Sig Dispense Refill     acetaminophen (TYLENOL) 325 MG tablet Take 1 tablet by mouth every 6 hours as needed for pain. 30 tablet 0     amLODIPine (NORVASC) 5 MG tablet Take 1 tablet (5 mg) by mouth every evening 90 tablet 3     aspirin 81 MG tablet Take 81 mg by mouth daily       atorvastatin (LIPITOR) 40 MG tablet Take 1 tablet (40 mg) by mouth At Bedtime 90 tablet 3     blood glucose monitoring (ACCU-CHEK FASTCLIX) lancets Use to test blood sugar one times daily or as directed. 102 each 3     blood glucose monitoring (ACCU-CHEK GUIDE) test strip Use to test blood sugar one times daily or as directed. 100 each 3     citalopram (CELEXA) 20 MG tablet Take 1 tablet (20 mg) by mouth daily 90 tablet 3     ClonazePAM (KLONOPIN PO) Take 0.5 mg by mouth 2 times daily as needed for anxiety       Cyanocobalamin (B-12) 1000 MCG SUBL Place 1 tablet under the tongue daily        dicyclomine (BENTYL) 20 MG tablet Take 1 tablet (20 mg) by mouth 4 times daily as needed (diarrhea) 60 tablet 11     gabapentin (NEURONTIN) 300 MG capsule Take 1 capsule (300 mg) by mouth 2 times daily 180 capsule 3     isosorbide mononitrate (IMDUR) 30 MG 24 hr tablet Take 1 tablet (30 mg) by mouth daily 90 tablet 3     losartan (COZAAR) 50 MG tablet Take 1 tablet (50 mg) by mouth daily 90 tablet 3     metoprolol succinate ER (TOPROL-XL) 25 MG 24 hr tablet Take 1 tablet (25 mg) by mouth daily 90 tablet 3      "Multiple Vitamins-Minerals (THERAPEUTIC MULTIVIT/MINERAL) TABS Take 1 tablet by mouth daily.       nitroGLYcerin (NITROSTAT) 0.4 MG sublingual tablet Place 1 tablet (0.4 mg) under the tongue every 5 minutes as needed for chest pain 30 tablet 4     order for DME 1 walker 1 Device 0     ORDER FOR DME Thigh length teds. 1 Device 2     traMADol (ULTRAM) 50 MG tablet Take 1 tablet (50 mg) by mouth 3 times daily as needed for pain Max 3 per day 90 tablet 5     VITAMIN D, CHOLECALCIFEROL, PO Take 1,000 Units by mouth daily         ALLERGIES     Allergies   Allergen Reactions     Metoprolol Itching and Rash     Cephalexin Rash     Erythromycin Rash     Actonel [Bisphosphonates] Itching     Azithromycin Hives     Celebrex [Celecoxib] Rash     Cipro [Ciprofloxacin] Rash     Erythromycin Rash     Escitalopram Diarrhea     Gets very sick and mad feelings     Fosamax Itching and Rash     Keflex [Cephalexin Monohydrate] Rash     Lisinopril Cough     Nitrofurantoin Other (See Comments)     \"dizzyness'     No Clinical Screening - See Comments Hives     Other reaction(s): Edema     Risedronate Itching     Shellfish-Derived Products Hives     Tetanus-Diphtheria Toxoids Swelling     Vicodin [Acetaminophen] Itching     Patient reported - only when used scheduled in high doses.        Vioxx Rash     Alendronic Acid Rash     Celecoxib Rash     Penicillins Rash     Rofecoxib Rash     Sulfa Drugs Itching and Rash     Sulfasalazine Itching and Rash       PAST MEDICAL HISTORY:  Past Medical History:   Diagnosis Date     Adhesive capsulitis of shoulder     left      Allergic rhinitis, cause unspecified      Carpal tunnel syndrome     right>left by emg 2004     Chest pain, unspecified     Chronic right sided/axillary discomfort (musculoskeletal) Atypical substernal (possibly GERD). Negative cardiolite 2004.     Colitis, Clostridium difficile 4/18/2012     Cystocele, midline     grade III     Degeneration of lumbar or lumbosacral intervertebral " disc     MRI 5/04- DDD, L2-3 annular bulge with protrusion into left caudal infra-foraminal area     Depressive disorder, not elsewhere classified      Diabetes mellitus (H)      Diverticulosis of colon (without mention of hemorrhage)     flexible sigmoidoscopy otherwise normal 2001     Esophageal reflux      Essential hypertension, benign      Generalized osteoarthrosis, unspecified site     C-spine, left hip     Headache(784.0)     Tension type. MRI 2001 normal.     Impaired fasting glucose     GTT 2/05 115-209     Irritable bowel syndrome     diahrrea predominant     Memory loss     Early cognitive decline. MMSE 27/30, Neno 4.7/ 6.0 2004. B12, TSH, RPR normal 7844-3738.     Mixed hyperlipidemia      OA (OSTEOARTHRITIS) GENERALIZED( Multiple Sites)     Facets, left hip, c-spine. 09/14/06 - bone loss, stable.     OSTEOPENIA     DEXA 2001 -1.4 hip. Dexa 2004 -0.2 hip and -1.5 spine     PERS HX ALLERGY OTHER FOODS 8/22/2006     PERS HX ALLERGY TO MILK PRODUCTS 8/22/2006     RESTLESS LEG SYNDROME      Syncope and collapse 9/10/2018     Unspecified vitamin D deficiency        PAST SURGICAL HISTORY:  Past Surgical History:   Procedure Laterality Date     ARTHROPLASTY KNEE  3/26/2012    Procedure:ARTHROPLASTY KNEE; Right Total Knee Arthroplasty; Surgeon:BERNADINE ROSAS; Location:[place] OR     C APPENDECTOMY  1953     HC REMOVAL GALLBLADDER       HYSTERECTOMY, PAP NO LONGER INDICATED  1983    TVH/BSO     SURGICAL HISTORY OF -       Hernia Repair     SURGICAL HISTORY OF -   10/08    A & P Repair, Dr. Hannah       FAMILY HISTORY:  Family History   Problem Relation Age of Onset     C.A.D. Mother      Diabetes Mother      Cancer - colorectal Mother      Arthritis Mother      Heart Disease Mother      Lipids Brother      Obesity Brother      Hypertension Brother      Allergies Son      Eye Disorder Son         cataract     Thyroid Disease Sister         graves dz     Eye Disorder Son      Leukemia Son      Alcohol/Drug  Father        SOCIAL HISTORY:  Social History     Socioeconomic History     Marital status:      Spouse name: None     Number of children: None     Years of education: None     Highest education level: None   Social Needs     Financial resource strain: None     Food insecurity - worry: None     Food insecurity - inability: None     Transportation needs - medical: None     Transportation needs - non-medical: None   Occupational History     None   Tobacco Use     Smoking status: Never Smoker     Smokeless tobacco: Never Used   Substance and Sexual Activity     Alcohol use: No     Drug use: No     Sexual activity: No   Other Topics Concern     Parent/sibling w/ CABG, MI or angioplasty before 65F 55M? No   Social History Narrative    Lives in [place] independently. She has son who lives nearby in [place]. Her oldest son passed away in September 2018.        Review of Systems:  Skin:  Negative       Eyes:  Positive for glasses    ENT:  Positive for hearing loss wears hearing aids  Respiratory:  Positive for cough     Cardiovascular:    edema;Positive for;fatigue    Gastroenterology: Negative      Genitourinary:  Positive for   CKD - patient does not see nephrology  Musculoskeletal:  Positive for back pain    Neurologic:  Positive for numbness or tingling of feet;headaches    Psychiatric:  Positive for anxiety;depression    Heme/Lymph/Imm:  Negative      Endocrine:  Positive for diabetes      Physical Exam:  Vitals: /80   Pulse 95   Wt 75.3 kg (166 lb)   SpO2 97%   BMI 28.49 kg/m       Constitutional:       Well-developed well-nourished    Skin:         No ulcers or excoriations    Head:       Normocephalic atraumatic    Eyes:       Normal conjunctiva and sclera    Lymph:  No cervical lymphadenopathy    ENT:       Normal external ears    Neck:       No JVD, right carotid bruit +nt    Respiratory:        Good air entry bilaterally, no accessory muscle use, bronchial vesicular breath sounds on all  auscultated areas, no crackles or wheezes    Cardiac:                Regular rate and rhythm, normal S1-S2, soft 1 out of 6 systolic ejection murmur best heard at the right upper sternal border.                                           GI:       Soft, non-distended    Extremities and Muscular Skeletal:             Normal range of motion of all 4 extremities    Neurological:       No sensory deficits    Psych:     Normal mood and affect    Recent Lab Results:  LIPID RESULTS:  Lab Results   Component Value Date    CHOL 254 (H) 12/06/2012    HDL 69 12/06/2012     (H) 12/06/2012    TRIG 119 12/06/2012    CHOLHDLRATIO 4.0 12/06/2012       LIVER ENZYME RESULTS:  Lab Results   Component Value Date    AST 24 02/03/2019    ALT 24 02/03/2019       CBC RESULTS:  Lab Results   Component Value Date    WBC 9.0 02/03/2019    RBC 4.01 02/03/2019    HGB 12.0 02/03/2019    HCT 34.5 (L) 02/03/2019    MCV 86 02/03/2019    MCH 29.9 02/03/2019    MCHC 34.8 02/03/2019    RDW 12.2 02/03/2019     02/03/2019       BMP RESULTS:  Lab Results   Component Value Date     02/03/2019    POTASSIUM 3.5 02/03/2019    CHLORIDE 109 02/03/2019    CO2 24 02/03/2019    ANIONGAP 9 02/03/2019     (H) 02/03/2019    BUN 15 02/03/2019    CR 0.97 02/03/2019    GFRESTIMATED 53 (L) 02/03/2019    GFRESTBLACK 61 02/03/2019    NARESH 7.9 (L) 02/03/2019        A1C RESULTS:  Lab Results   Component Value Date    A1C 6.5 (H) 09/10/2018       INR RESULTS:  Lab Results   Component Value Date    INR 1.08 07/19/2018    INR 0.94 11/07/2014       CC  Deepti Hannah MD  [address] [place] [address]      All medical records were reviewed in detail and discussed with the patient. Greater than 60 mins were spent with the patient.  50% of this time was spent on counseling and coordination of care.  After visit summary was printed and given to the patient.          Thank you for allowing me to participate in the care of your  patient.      Sincerely,     Gregg Combs MD     [facility]

## 2019-02-18 NOTE — PATIENT INSTRUCTIONS
"[facility]  [facility]~[address]~[place], [place]~[address]  Thank you for your [facility] visit today. If you have questions regarding your visit, please contact your cardiology RN's, Jalyn Akhtar or Annamarie Newby, at [phone]. Your provider has recommended the following:  Medication Changes:  1. STOP carvedilol  2. START metoprolol XL 25 mg daily  3. START isosorbide 30 mg daily  Recommendations:  1. Coronary angiogram to be done at [facility] on Wednesday February 27, 2019. Please arrive at 8:00am. If you need to contact [facility] for any reason, please call [phone] option #2  2. Carotid ultrasound  Follow-up:  See Dr Combs for cardiology follow up in [place] on Wednesday March 20, 2019 at 3:30pm    To schedule a future appointment, we kindly ask that you call cardiology scheduling at [phone] three months prior to requested revisit date.  [facility] cardiology clinic is staffed with \"Advance Practice Providers\". These are our cardiology Physician Assistants and Nurse Practitioners. Please call cardiology scheduling if you feel you need clinical evaluation with them at any time for any cardiac reason.                 Reminder:    For your safety, we ask that you bring in your current medication(s) or an updated list of your medications with you to EACH office visit. Include the medication name, dose of pill on bottle and how you are taking it. Include over-the-counter medications or supplements. Your provider will review this at each visit and plan your care based on your current information.     ANGIOGRAM  [facility]   [place], [place]   Contact [facility] scheduling if needed at [phone].      1. In preparation for your procedure, we require that you do the following:  a. Nothing to eat or drink after midnight if your procedure is before 12 noon.  b. Take your usual " "morning medications with a small sip of water immediately upon arising on the morning of your procedure unless outlined below:    Aspirin:  o If you currently take Aspirin, continue taking your same dose.  2. You will be unable to drive after your procedure; please arrange to have someone drive you home. Make sure that there is a responsible adult with you for 24 hours after your procedure. Your procedure will be cancelled if you do not have transportation home or someone staying with you for 24 hrs.   3.  Your procedure will be done at [facility]. Please park in the  [address]  on the west side of [facility] on [address]. Take the skyway over [facility] to the hospital. Please check in on the first floor, \"Skyway Welcome Desk\" which is one floor down from the skyway level.   4. If you have any questions about your upcoming procedure, please contact nursing at [facility] at [phone] or at [facility] at [phone].    "

## 2019-02-19 ENCOUNTER — TELEPHONE (OUTPATIENT)
Dept: FAMILY MEDICINE | Facility: CLINIC | Age: 84
End: 2019-02-19

## 2019-02-19 DIAGNOSIS — E11.42 TYPE 2 DIABETES MELLITUS WITH DIABETIC POLYNEUROPATHY, WITHOUT LONG-TERM CURRENT USE OF INSULIN (H): ICD-10-CM

## 2019-02-19 DIAGNOSIS — G89.4 CHRONIC PAIN SYNDROME: ICD-10-CM

## 2019-02-19 DIAGNOSIS — I25.119 CORONARY ARTERY DISEASE INVOLVING NATIVE CORONARY ARTERY OF NATIVE HEART WITH ANGINA PECTORIS (H): Primary | ICD-10-CM

## 2019-02-19 NOTE — TELEPHONE ENCOUNTER
Reason for Call:  Other prescription    Detailed comments: Pt calling stating back pain is terrible, she cannot get into pain clinic until May and does not feel the injection she got helped at all, requesting pain medication, please advise    Phone Number Patient can be reached at: Home number on file [phone] (home)    Best Time: any    Can we leave a detailed message on this number? YES    Call taken on 2/19/2019 at 3:16 PM by Kaya Parish

## 2019-02-19 NOTE — TELEPHONE ENCOUNTER
"S:  Patient is requesting pain medication for her back.    B:  Last office visit 2/7/19:  \"SOB last night -took nitroglycerin which improved symptoms. Denies any SOB/chest pain today. Worsening back pain, she had an appointment with the pain clinic but canceled due to the weather.\"     \"3. [facility] if [facility] can have the 2nd injection. If they need a referral from , ask what specific injection (location) of the injection, so I can place a referral  4. Referral for comprehensive pain management with Dr. Catia Pascual here at [place] to consolidate your care.\"    On 2/11/19 order stating location was faxed to [facility].      Patient reports the shot did not help at all.    Patient reports she will not be able to get in to pain clinic until May.    A:  Patient is looking for pain coverage until May appointment with pain clinic.    R:  Routed to provider for consideration.    Basil Mcelroy RN           "

## 2019-02-20 RX ORDER — GABAPENTIN 300 MG/1
600 CAPSULE ORAL 2 TIMES DAILY
Qty: 360 CAPSULE | Refills: 3 | Status: SHIPPED | OUTPATIENT
Start: 2019-02-20 | End: 2019-04-15

## 2019-02-20 NOTE — TELEPHONE ENCOUNTER
Increase gabapentin to 600 mg BID. Watch for drowsiness, weight gain or leg swelling. Updated Rx.  Continue to use the Tramadol I wrote Rx for on 12/14.  She needs to see me in clinic to discuss alternate medication

## 2019-02-21 ENCOUNTER — HOSPITAL ENCOUNTER (OUTPATIENT)
Dept: ULTRASOUND IMAGING | Facility: CLINIC | Age: 84
Discharge: HOME | End: 2019-02-21
Attending: INTERNAL MEDICINE | Admitting: INTERNAL MEDICINE
Payer: MEDICARE

## 2019-02-21 DIAGNOSIS — R09.89 BRUIT OF RIGHT CAROTID ARTERY: ICD-10-CM

## 2019-02-21 PROCEDURE — 93880 EXTRACRANIAL BILAT STUDY: CPT

## 2019-02-21 NOTE — RESULT ENCOUNTER NOTE
Results noted: <50% stenosis bilateral ICAs. Pt notified of results. To be discussed at OV with Dr Combs on 3/20/19

## 2019-02-21 NOTE — TELEPHONE ENCOUNTER
Pt called back.  Advised below.      Additionally, pt reports that she feels like she may have a UTI.  She reports frequency and that she is urinating every 5-10 minutes.    No other complaints.    Advised appt today.  Appt made.    Sandy Turner RN

## 2019-02-27 ENCOUNTER — HOSPITAL ENCOUNTER (OUTPATIENT)
Facility: CLINIC | Age: 84
Discharge: HOME | End: 2019-02-27
Payer: MEDICARE

## 2019-02-27 ENCOUNTER — SURGERY (OUTPATIENT)
Age: 84
End: 2019-02-27
Payer: MEDICARE

## 2019-02-27 VITALS
HEIGHT: 64 IN | HEART RATE: 74 BPM | TEMPERATURE: 97 F | DIASTOLIC BLOOD PRESSURE: 59 MMHG | BODY MASS INDEX: 28.34 KG/M2 | OXYGEN SATURATION: 94 % | RESPIRATION RATE: 16 BRPM | SYSTOLIC BLOOD PRESSURE: 126 MMHG | WEIGHT: 166 LBS

## 2019-02-27 DIAGNOSIS — I25.119 CORONARY ARTERY DISEASE INVOLVING NATIVE CORONARY ARTERY OF NATIVE HEART WITH ANGINA PECTORIS (H): ICD-10-CM

## 2019-02-27 PROBLEM — Z98.890 STATUS POST CORONARY ANGIOGRAM: Status: ACTIVE | Noted: 2019-02-27

## 2019-02-27 LAB
ANION GAP SERPL CALCULATED.3IONS-SCNC: 8 MMOL/L (ref 3–14)
APTT PPP: 28 SEC (ref 22–37)
BUN SERPL-MCNC: 21 MG/DL (ref 7–30)
CALCIUM SERPL-MCNC: 9.1 MG/DL (ref 8.5–10.1)
CHLORIDE SERPL-SCNC: 109 MMOL/L (ref 94–109)
CO2 SERPL-SCNC: 24 MMOL/L (ref 20–32)
CREAT SERPL-MCNC: 0.9 MG/DL (ref 0.52–1.04)
ERYTHROCYTE [DISTWIDTH] IN BLOOD BY AUTOMATED COUNT: 13 % (ref 10–15)
GFR SERPL CREATININE-BSD FRML MDRD: 58 ML/MIN/{1.73_M2}
GLUCOSE SERPL-MCNC: 137 MG/DL (ref 70–99)
HCT VFR BLD AUTO: 38.5 % (ref 35–47)
HGB BLD-MCNC: 13.5 G/DL (ref 11.7–15.7)
INR PPP: 1.03 (ref 0.86–1.14)
MCH RBC QN AUTO: 29.7 PG (ref 26.5–33)
MCHC RBC AUTO-ENTMCNC: 35.1 G/DL (ref 31.5–36.5)
MCV RBC AUTO: 85 FL (ref 78–100)
PLATELET # BLD AUTO: 294 10E9/L (ref 150–450)
POTASSIUM SERPL-SCNC: 3.9 MMOL/L (ref 3.4–5.3)
RBC # BLD AUTO: 4.55 10E12/L (ref 3.8–5.2)
SODIUM SERPL-SCNC: 141 MMOL/L (ref 133–144)
WBC # BLD AUTO: 15.3 10E9/L (ref 4–11)

## 2019-02-27 PROCEDURE — 27210794 ZZH OR GENERAL SUPPLY STERILE: Performed by: INTERNAL MEDICINE

## 2019-02-27 PROCEDURE — 80048 BASIC METABOLIC PNL TOTAL CA: CPT | Performed by: INTERNAL MEDICINE

## 2019-02-27 PROCEDURE — C1887 CATHETER, GUIDING: HCPCS | Performed by: INTERNAL MEDICINE

## 2019-02-27 PROCEDURE — 99152 MOD SED SAME PHYS/QHP 5/>YRS: CPT | Mod: GC | Performed by: INTERNAL MEDICINE

## 2019-02-27 PROCEDURE — 93458 L HRT ARTERY/VENTRICLE ANGIO: CPT | Mod: 26 | Performed by: INTERNAL MEDICINE

## 2019-02-27 PROCEDURE — 40000235 ZZH STATISTIC TELEMETRY

## 2019-02-27 PROCEDURE — 36415 COLL VENOUS BLD VENIPUNCTURE: CPT | Performed by: INTERNAL MEDICINE

## 2019-02-27 PROCEDURE — 93005 ELECTROCARDIOGRAM TRACING: CPT

## 2019-02-27 PROCEDURE — 25000125 ZZHC RX 250: Performed by: INTERNAL MEDICINE

## 2019-02-27 PROCEDURE — C1894 INTRO/SHEATH, NON-LASER: HCPCS | Performed by: INTERNAL MEDICINE

## 2019-02-27 PROCEDURE — 93010 ELECTROCARDIOGRAM REPORT: CPT | Mod: 59 | Performed by: INTERNAL MEDICINE

## 2019-02-27 PROCEDURE — 93458 L HRT ARTERY/VENTRICLE ANGIO: CPT | Performed by: INTERNAL MEDICINE

## 2019-02-27 PROCEDURE — 85730 THROMBOPLASTIN TIME PARTIAL: CPT | Performed by: INTERNAL MEDICINE

## 2019-02-27 PROCEDURE — 40000852 ZZH STATISTIC HEART CATH LAB OR EP LAB

## 2019-02-27 PROCEDURE — 25000128 H RX IP 250 OP 636: Performed by: INTERNAL MEDICINE

## 2019-02-27 PROCEDURE — C1769 GUIDE WIRE: HCPCS | Performed by: INTERNAL MEDICINE

## 2019-02-27 PROCEDURE — 85610 PROTHROMBIN TIME: CPT | Performed by: INTERNAL MEDICINE

## 2019-02-27 PROCEDURE — 40000065 ZZH STATISTIC EKG NON-CHARGEABLE

## 2019-02-27 PROCEDURE — 85027 COMPLETE CBC AUTOMATED: CPT | Performed by: INTERNAL MEDICINE

## 2019-02-27 PROCEDURE — 25800030 ZZH RX IP 258 OP 636: Performed by: INTERNAL MEDICINE

## 2019-02-27 PROCEDURE — 99152 MOD SED SAME PHYS/QHP 5/>YRS: CPT | Performed by: INTERNAL MEDICINE

## 2019-02-27 RX ORDER — ASPIRIN 81 MG/1
81 TABLET ORAL DAILY
Status: DISCONTINUED | OUTPATIENT
Start: 2019-02-27 | End: 2019-02-27 | Stop reason: HOSPADM

## 2019-02-27 RX ORDER — FENTANYL CITRATE 50 UG/ML
INJECTION, SOLUTION INTRAMUSCULAR; INTRAVENOUS
Status: DISCONTINUED | OUTPATIENT
Start: 2019-02-27 | End: 2019-02-27 | Stop reason: HOSPADM

## 2019-02-27 RX ORDER — EPTIFIBATIDE 2 MG/ML
180 INJECTION, SOLUTION INTRAVENOUS EVERY 10 MIN PRN
Status: DISCONTINUED | OUTPATIENT
Start: 2019-02-27 | End: 2019-02-27 | Stop reason: HOSPADM

## 2019-02-27 RX ORDER — ARGATROBAN 1 MG/ML
150 INJECTION, SOLUTION INTRAVENOUS
Status: DISCONTINUED | OUTPATIENT
Start: 2019-02-27 | End: 2019-02-27 | Stop reason: HOSPADM

## 2019-02-27 RX ORDER — EPTIFIBATIDE 2 MG/ML
2 INJECTION, SOLUTION INTRAVENOUS CONTINUOUS PRN
Status: DISCONTINUED | OUTPATIENT
Start: 2019-02-27 | End: 2019-02-27 | Stop reason: HOSPADM

## 2019-02-27 RX ORDER — DOBUTAMINE HYDROCHLORIDE 200 MG/100ML
2-20 INJECTION INTRAVENOUS CONTINUOUS PRN
Status: DISCONTINUED | OUTPATIENT
Start: 2019-02-27 | End: 2019-02-27 | Stop reason: HOSPADM

## 2019-02-27 RX ORDER — ARGATROBAN 1 MG/ML
350 INJECTION, SOLUTION INTRAVENOUS
Status: DISCONTINUED | OUTPATIENT
Start: 2019-02-27 | End: 2019-02-27 | Stop reason: HOSPADM

## 2019-02-27 RX ORDER — EPTIFIBATIDE 2 MG/ML
1 INJECTION, SOLUTION INTRAVENOUS CONTINUOUS PRN
Status: DISCONTINUED | OUTPATIENT
Start: 2019-02-27 | End: 2019-02-27 | Stop reason: HOSPADM

## 2019-02-27 RX ORDER — NALOXONE HYDROCHLORIDE 0.4 MG/ML
.2-.4 INJECTION, SOLUTION INTRAMUSCULAR; INTRAVENOUS; SUBCUTANEOUS
Status: DISCONTINUED | OUTPATIENT
Start: 2019-02-27 | End: 2019-02-27 | Stop reason: HOSPADM

## 2019-02-27 RX ORDER — LIDOCAINE 40 MG/G
CREAM TOPICAL
Status: DISCONTINUED | OUTPATIENT
Start: 2019-02-27 | End: 2019-02-27 | Stop reason: HOSPADM

## 2019-02-27 RX ORDER — NITROGLYCERIN 5 MG/ML
VIAL (ML) INTRAVENOUS
Status: DISCONTINUED | OUTPATIENT
Start: 2019-02-27 | End: 2019-02-27 | Stop reason: HOSPADM

## 2019-02-27 RX ORDER — DOPAMINE HYDROCHLORIDE 160 MG/100ML
2-20 INJECTION, SOLUTION INTRAVENOUS CONTINUOUS PRN
Status: DISCONTINUED | OUTPATIENT
Start: 2019-02-27 | End: 2019-02-27 | Stop reason: HOSPADM

## 2019-02-27 RX ORDER — NOREPINEPHRINE BITARTRATE/D5W 16MG/250ML
.03-.4 PLASTIC BAG, INJECTION (ML) INTRAVENOUS CONTINUOUS PRN
Status: DISCONTINUED | OUTPATIENT
Start: 2019-02-27 | End: 2019-02-27 | Stop reason: HOSPADM

## 2019-02-27 RX ORDER — DOBUTAMINE HYDROCHLORIDE 200 MG/100ML
5-40 INJECTION INTRAVENOUS CONTINUOUS PRN
Status: DISCONTINUED | OUTPATIENT
Start: 2019-02-27 | End: 2019-02-27 | Stop reason: HOSPADM

## 2019-02-27 RX ORDER — IOPAMIDOL 755 MG/ML
INJECTION, SOLUTION INTRAVASCULAR
Status: DISCONTINUED | OUTPATIENT
Start: 2019-02-27 | End: 2019-02-27 | Stop reason: HOSPADM

## 2019-02-27 RX ORDER — NITROGLYCERIN 20 MG/100ML
.07-2 INJECTION INTRAVENOUS CONTINUOUS PRN
Status: DISCONTINUED | OUTPATIENT
Start: 2019-02-27 | End: 2019-02-27 | Stop reason: HOSPADM

## 2019-02-27 RX ORDER — SODIUM CHLORIDE 9 MG/ML
INJECTION, SOLUTION INTRAVENOUS CONTINUOUS
Status: DISCONTINUED | OUTPATIENT
Start: 2019-02-27 | End: 2019-02-27 | Stop reason: HOSPADM

## 2019-02-27 RX ORDER — FLUMAZENIL 0.1 MG/ML
0.2 INJECTION, SOLUTION INTRAVENOUS
Status: DISCONTINUED | OUTPATIENT
Start: 2019-02-27 | End: 2019-02-27 | Stop reason: HOSPADM

## 2019-02-27 RX ORDER — HEPARIN SODIUM 1000 [USP'U]/ML
INJECTION, SOLUTION INTRAVENOUS; SUBCUTANEOUS
Status: DISCONTINUED | OUTPATIENT
Start: 2019-02-27 | End: 2019-02-27 | Stop reason: HOSPADM

## 2019-02-27 RX ORDER — NALOXONE HYDROCHLORIDE 0.4 MG/ML
.1-.4 INJECTION, SOLUTION INTRAMUSCULAR; INTRAVENOUS; SUBCUTANEOUS
Status: DISCONTINUED | OUTPATIENT
Start: 2019-02-27 | End: 2019-02-27 | Stop reason: HOSPADM

## 2019-02-27 RX ORDER — ACETAMINOPHEN 325 MG/1
325-650 TABLET ORAL EVERY 4 HOURS PRN
Status: DISCONTINUED | OUTPATIENT
Start: 2019-02-27 | End: 2019-02-27 | Stop reason: HOSPADM

## 2019-02-27 RX ORDER — ATROPINE SULFATE 0.1 MG/ML
0.5 INJECTION INTRAVENOUS EVERY 5 MIN PRN
Status: DISCONTINUED | OUTPATIENT
Start: 2019-02-27 | End: 2019-02-27 | Stop reason: HOSPADM

## 2019-02-27 RX ORDER — VERAPAMIL HYDROCHLORIDE 2.5 MG/ML
INJECTION, SOLUTION INTRAVENOUS
Status: DISCONTINUED | OUTPATIENT
Start: 2019-02-27 | End: 2019-02-27 | Stop reason: HOSPADM

## 2019-02-27 RX ORDER — HYDROCODONE BITARTRATE AND ACETAMINOPHEN 5; 325 MG/1; MG/1
1-2 TABLET ORAL EVERY 4 HOURS PRN
Status: DISCONTINUED | OUTPATIENT
Start: 2019-02-27 | End: 2019-02-27 | Stop reason: HOSPADM

## 2019-02-27 RX ORDER — FENTANYL CITRATE 50 UG/ML
25-50 INJECTION, SOLUTION INTRAMUSCULAR; INTRAVENOUS
Status: DISCONTINUED | OUTPATIENT
Start: 2019-02-27 | End: 2019-02-27 | Stop reason: HOSPADM

## 2019-02-27 RX ADMIN — MIDAZOLAM 1 MG: 1 INJECTION INTRAMUSCULAR; INTRAVENOUS at 12:01

## 2019-02-27 RX ADMIN — VERAPAMIL HYDROCHLORIDE 2.5 MG: 2.5 INJECTION, SOLUTION INTRAVENOUS at 12:04

## 2019-02-27 RX ADMIN — LIDOCAINE HYDROCHLORIDE 1 ML: 10 INJECTION, SOLUTION EPIDURAL; INFILTRATION; INTRACAUDAL; PERINEURAL at 12:03

## 2019-02-27 RX ADMIN — HEPARIN SODIUM 5000 UNITS: 1000 INJECTION, SOLUTION INTRAVENOUS; SUBCUTANEOUS at 12:06

## 2019-02-27 RX ADMIN — FENTANYL CITRATE 50 MCG: 50 INJECTION, SOLUTION INTRAMUSCULAR; INTRAVENOUS at 12:02

## 2019-02-27 RX ADMIN — IOPAMIDOL 50 ML: 755 INJECTION, SOLUTION INTRAVENOUS at 12:18

## 2019-02-27 RX ADMIN — NITROGLYCERIN 400 MCG: 5 INJECTION, SOLUTION INTRAVENOUS at 12:05

## 2019-02-27 RX ADMIN — SODIUM CHLORIDE: 9 INJECTION, SOLUTION INTRAVENOUS at 10:23

## 2019-02-27 RX ADMIN — SODIUM CHLORIDE: 9 INJECTION, SOLUTION INTRAVENOUS at 10:35

## 2019-02-27 ASSESSMENT — MIFFLIN-ST. JEOR: SCORE: 1178.22

## 2019-02-27 NOTE — DISCHARGE SUMMARY
Patient discharged to home by wheelchair at 4:03 PM 02/27/19.  Medication regimen discussed with patient and patient verbalizes understanding.  Diet and activity and wound care discussed with patient.  Upcoming appointments reviewed.  No questions at this time. Right wrist dressing CDI, no swelling or hematoma.  Denies chest pain.  Tolerating regular diet.  Voided using bathroom.

## 2019-02-27 NOTE — PROGRESS NOTES
"Care Suites Arrival 1015    Reason for Visit: Heart Cath    A/O. Denies pain. Labs WNL. IV flds infusing. Has a documented allergy to shellfish.  Patient states \"I can eat shrimp now without any problems.\"  Contrast D/C instructions reviewed with pt with verbal understanding received. Copy given to Wally (son). All questions & concerns addressed.     Pre-procedure POC reviewed with patient and son. Verbalized understanding.    Pt resting in bed, denies additional needs at this time, call light within reach. Family at bedside.  Wally (son) will stay with pt overnight.      1058  Surg Grdamienryevich, fellow notified of potassium and WBC results.  No new orders.    1225  Back from cath lab.  No intervention.  Right radial site with TR band at 15 ml pressure.  No hematoma, C/D/I.      1415  AVS/Discharge instructions given and reviewed.  All questions and concerns addressed.  Client verbalizes understanding.        1525  Hand off report given to Rah NICHOLSON RN      "

## 2019-02-27 NOTE — DISCHARGE INSTRUCTIONS
Cardiac Angiogram Discharge Instructions - Right Radial (wrist)    After you go home:      Have an adult stay with you until tomorrow.    Drink extra fluids for 2 days.    You may resume your normal diet.    No smoking       For 24 hours - due to the sedation you received:    Relax and take it easy.    Do NOT make any important or legal decisions.    Do NOT drive or operate machines at home or at work.    Do NOT drink alcohol.    Care of Wrist Puncture Site:      For the first 24 hrs - check the puncture site every 1-2 hours while awake.    It is normal to have soreness at the puncture site and mild tingling in your hand for up to 3 days.    Remove the bandaid after 24 hours. If there is minor oozing, apply another bandaid and remove it after 12 hours.    You may shower tomorrow.  Do NOT take a bath, or use a hot tub or pool for at least 3 days. Do NOT scrub the site. Do not use lotion or powder near the puncture site.           Activity:        For 2 days:     do not use your hand or arm to support your weight (such as rising from a chair)     do not bend your wrist (such as lifting a garage door).    do not lift more than 5 pounds or exercise your arm (such as tennis, golf or bowling).    Do NOT do any heavy activity such as exercise, lifting, or straining.     Bleeding:      If you start bleeding from the site in your wrist, sit down and press firmly on/above the site for 10 minutes.     Once bleeding stops, keep arm still for 2 hours.     Call [facility] as soon as you can.       Call 911 right away if you have heavy bleeding or bleeding that does not stop.      Medicines:        Per Dr. Carpio stop taking Isosorbide Mononitrate (Imdur) and Metoprolol Succinate ER (Toprol XL)    If you have stopped any medicines, check with your provider about when to restart them.    Follow Up Appointments:      Follow up with [facility] Nurse Practitioner at [facility] of patient preference in 7-10 days.    Call the  clinic if:      You have a large or growing hard lump around the site.    The site is red, swollen, hot or tender.    Blood or fluid is draining from the site.    You have chills or a fever greater than 101 F (38 C).    Your arm feels numb, cool or changes color.    You have hives, a rash or unusual itching.    Any questions or concerns.          [facility] [place]:    [phone] UMP (7 days a week)

## 2019-02-27 NOTE — IP AVS SNAPSHOT
MRN:[id number]                      After Visit Summary   2/27/2019    Daniela Brown    MRN: [id number]           Visit Information        Department      2/27/2019 10:02 AM [facility]          Review of your medicines      UNREVIEWED medicines. Ask your doctor about these medicines       Dose / Directions   acetaminophen 325 MG tablet  Commonly known as:  TYLENOL  Used for:  Degeneration of lumbar or lumbosacral intervertebral disc      Dose:  325 mg  Take 1 tablet by mouth every 6 hours as needed for pain.  Quantity:  30 tablet  Refills:  0     amLODIPine 5 MG tablet  Commonly known as:  NORVASC  Used for:  Benign essential hypertension      Dose:  5 mg  Take 1 tablet (5 mg) by mouth every evening  Quantity:  90 tablet  Refills:  3     aspirin 81 MG tablet  Commonly known as:  ASA      Dose:  81 mg  Take 81 mg by mouth daily  Refills:  0     atorvastatin 40 MG tablet  Commonly known as:  LIPITOR  Used for:  Angina pectoris (H), Type 2 diabetes mellitus with diabetic polyneuropathy, without long-term current use of insulin (H)      Dose:  40 mg  Take 1 tablet (40 mg) by mouth At Bedtime  Quantity:  90 tablet  Refills:  3     B-12 1000 MCG Subl      Dose:  1 tablet  Place 1 tablet under the tongue daily  Refills:  0     citalopram 20 MG tablet  Commonly known as:  celeXA  Used for:  Adjustment disorder with mixed anxiety and depressed mood      Dose:  20 mg  Take 1 tablet (20 mg) by mouth daily  Quantity:  90 tablet  Refills:  3     dicyclomine 20 MG tablet  Commonly known as:  BENTYL  Used for:  Irritable bowel syndrome with diarrhea      Dose:  20 mg  Take 1 tablet (20 mg) by mouth 4 times daily as needed (diarrhea)  Quantity:  60 tablet  Refills:  11     gabapentin 300 MG capsule  Commonly known as:  NEURONTIN  Indication:  Neuropathic Pain  Used for:  Type 2 diabetes mellitus with diabetic polyneuropathy, without long-term current use of insulin (H), Chronic pain  syndrome      Dose:  600 mg  Take 2 capsules (600 mg) by mouth 2 times daily  Quantity:  360 capsule  Refills:  3     isosorbide mononitrate 30 MG 24 hr tablet  Commonly known as:  IMDUR  Used for:  Abnormal stress test      Dose:  30 mg  Take 1 tablet (30 mg) by mouth daily  Quantity:  90 tablet  Refills:  3     KLONOPIN PO      Dose:  0.5 mg  Take 0.5 mg by mouth 2 times daily as needed for anxiety  Refills:  0     losartan 50 MG tablet  Commonly known as:  COZAAR  Used for:  Benign essential hypertension      Dose:  50 mg  Take 1 tablet (50 mg) by mouth daily  Quantity:  90 tablet  Refills:  3     metoprolol succinate ER 25 MG 24 hr tablet  Commonly known as:  TOPROL-XL  Used for:  Abnormal stress test      Dose:  25 mg  Take 1 tablet (25 mg) by mouth daily  Quantity:  90 tablet  Refills:  3     nitroGLYcerin 0.4 MG sublingual tablet  Commonly known as:  NITROSTAT  Used for:  Angina pectoris (H)      Dose:  0.4 mg  Place 1 tablet (0.4 mg) under the tongue every 5 minutes as needed for chest pain  Quantity:  30 tablet  Refills:  4     THERAPEUTIC MULTIVIT/MINERAL tablet      Dose:  1 tablet  Take 1 tablet by mouth daily.  Refills:  0     traMADol 50 MG tablet  Commonly known as:  ULTRAM  Used for:  Chronic pain syndrome      Dose:  50 mg  Take 1 tablet (50 mg) by mouth 3 times daily as needed for pain Max 3 per day  Quantity:  90 tablet  Refills:  5     VITAMIN D (CHOLECALCIFEROL) PO      Dose:  1000 Units  Take 1,000 Units by mouth daily  Refills:  0        CONTINUE these medicines which have NOT CHANGED       Dose / Directions   blood glucose monitoring lancets  Used for:  Type 2 diabetes mellitus with diabetic polyneuropathy, without long-term current use of insulin (H)      Use to test blood sugar one times daily or as directed.  Quantity:  102 each  Refills:  3     blood glucose test strip  Commonly known as:  ACCU-CHEK GUIDE  Used for:  Type 2 diabetes mellitus with diabetic polyneuropathy, without long-term  current use of insulin (H)      Use to test blood sugar one times daily or as directed.  Quantity:  100 each  Refills:  3     order for DME  Used for:  Venous insufficiency      Thigh length teds.  Quantity:  1 Device  Refills:  2     order for DME  Used for:  Acute pain of right knee      1 walker  Quantity:  1 Device  Refills:  0              Protect others around you: Learn how to safely use, store and throw away your medicines at www.disposemymeds.org.       Follow-ups after your visit       Your next 10 appointments already scheduled    Mar 20, 2019  3:30 PM CDT  Return Visit with Gregg Combs MD  [facility] ([facility]) [address] [address]  [phone]   May 01, 2019  3:00 PM CDT  New Visit with Keo Pascual MD  [facility] ([facility]) [address]  [address]  [place] [facility] [place] [address]  [phone]      Care Instructions       After Care Instructions     Discharge Instructions - IF on Metformin (Glucophage or Glucovance) or Metformin containing medications      IF on Metformin (Glucophage or Glucovance) or Metformin containing medications , schedule a Basic Metabolic Panel at [facility] in 48 - 72 hours post procedure and PRIOR TO resuming the Metformin or Metformin containing medications.  Hold Metformin (Glucophage or Glucovance) or Metformin containing medications until after the Basic Metabolic Panel on the 2nd or 3rd day following the procedure.  May resume after blood draw is complete.           Further instructions from your care team       Cardiac Angiogram Discharge Instructions - Right Radial (wrist)    After you go home:      Have an adult stay with you until tomorrow.    Drink extra fluids for 2 days.    You may resume your normal diet.    No smoking       For 24 hours - due to the sedation you received:    Relax and take it  easy.    Do NOT make any important or legal decisions.    Do NOT drive or operate machines at home or at work.    Do NOT drink alcohol.    Care of Wrist Puncture Site:      For the first 24 hrs - check the puncture site every 1-2 hours while awake.    It is normal to have soreness at the puncture site and mild tingling in your hand for up to 3 days.    Remove the bandaid after 24 hours. If there is minor oozing, apply another bandaid and remove it after 12 hours.    You may shower tomorrow.  Do NOT take a bath, or use a hot tub or pool for at least 3 days. Do NOT scrub the site. Do not use lotion or powder near the puncture site.           Activity:        For 2 days:     do not use your hand or arm to support your weight (such as rising from a chair)     do not bend your wrist (such as lifting a garage door).    do not lift more than 5 pounds or exercise your arm (such as tennis, golf or bowling).    Do NOT do any heavy activity such as exercise, lifting, or straining.     Bleeding:      If you start bleeding from the site in your wrist, sit down and press firmly on/above the site for 10 minutes.     Once bleeding stops, keep arm still for 2 hours.     Call [facility] as soon as you can.       Call 911 right away if you have heavy bleeding or bleeding that does not stop.      Medicines:        Per Dr. Carpio stop taking Isosorbide Mononitrate (Imdur) and Metoprolol Succinate ER (Toprol XL)    If you have stopped any medicines, check with your provider about when to restart them.    Follow Up Appointments:      Follow up with [facility] Nurse Practitioner at [facility] of patient preference in 7-10 days.    Call the clinic if:      You have a large or growing hard lump around the site.    The site is red, swollen, hot or tender.    Blood or fluid is draining from the site.    You have chills or a fever greater than 101 F (38 C).    Your arm feels numb, cool or changes color.    You have hives, a rash or  "unusual itching.    Any questions or concerns.          [facility] [place]:    [phone] [facility]MP (7 days a week)            Additional Information About Your Visit       Care EveryWhere ID    This is your Care EveryWhere ID. This could be used by other organizations to access your [facility] medical records  [id number]       Your Vitals Were     Blood Pressure   122/95            Pulse   95          Temperature   97  F (36.1  C) (Oral)          Respirations   16          Height   1.626 m (5' 4.02\")             Weight   75.3 kg (166 lb)    Pulse Oximetry   96%    BMI (Body Mass Index)   28.48 kg/m           Primary Care Provider Office Phone # Fax #    Cynthia Maddox, [phone]      Equal Access to Services    MANFRED REYES : Hadii nish lazo hadasho Soomaali, waaxda luqadaha, qaybta kaalmada adeegyada, rasheed bestin romina ramirez . So [facility] [phone].    ATENCIÓN: Si habla español, tiene a cam disposición servicios gratuitos de asistencia lingüística. Llame al [phone].    We comply with applicable federal civil rights laws and [place] laws. We do not discriminate on the basis of race, color, national origin, age, disability, sex, sexual orientation, or gender identity.           Thank you!    Thank you for choosing [facility] for your care. Our goal is always to provide you with excellent care. Hearing back from our patients is one way we can continue to improve our services. Please take a few minutes to complete the written survey that you may receive in the mail after you visit with us. Thank you!            Medication List      Medications          Morning Afternoon Evening Bedtime As Needed    blood glucose monitoring lancets  INSTRUCTIONS:  Use to test blood sugar one times daily or as directed.                     blood glucose test strip  Also known as:  ACCU-CHEK GUIDE  INSTRUCTIONS:  Use to test blood sugar one times daily or as " directed.                     order for DME  INSTRUCTIONS:  Thigh length teds.                     order for DME  INSTRUCTIONS:  1 walker                       ASK your doctor about these medications          Morning Afternoon Evening Bedtime As Needed    acetaminophen 325 MG tablet  Also known as:  TYLENOL  INSTRUCTIONS:  Take 1 tablet by mouth every 6 hours as needed for pain.                     amLODIPine 5 MG tablet  Also known as:  NORVASC  INSTRUCTIONS:  Take 1 tablet (5 mg) by mouth every evening  Doctor's comments:  Patient will call to fill                     aspirin 81 MG tablet  Also known as:  ASA  INSTRUCTIONS:  Take 81 mg by mouth daily                     atorvastatin 40 MG tablet  Also known as:  LIPITOR  INSTRUCTIONS:  Take 1 tablet (40 mg) by mouth At Bedtime  Doctor's comments:  Patient will call to fill                     B-12 1000 MCG Subl  INSTRUCTIONS:  Place 1 tablet under the tongue daily                     citalopram 20 MG tablet  Also known as:  celeXA  INSTRUCTIONS:  Take 1 tablet (20 mg) by mouth daily  Doctor's comments:  Patient will call to fill                     dicyclomine 20 MG tablet  Also known as:  BENTYL  INSTRUCTIONS:  Take 1 tablet (20 mg) by mouth 4 times daily as needed (diarrhea)  Doctor's comments:  Patient will call to fill                     gabapentin 300 MG capsule  Also known as:  NEURONTIN  INSTRUCTIONS:  Take 2 capsules (600 mg) by mouth 2 times daily  Doctor's comments:  Patient will call to fill  Reason for med:  Neuropathic Pain                     isosorbide mononitrate 30 MG 24 hr tablet  Also known as:  IMDUR  INSTRUCTIONS:  Take 1 tablet (30 mg) by mouth daily                     KLONOPIN PO  INSTRUCTIONS:  Take 0.5 mg by mouth 2 times daily as needed for anxiety                     losartan 50 MG tablet  Also known as:  COZAAR  INSTRUCTIONS:  Take 1 tablet (50 mg) by mouth daily  Doctor's comments:  Patient will call to fill                      metoprolol succinate ER 25 MG 24 hr tablet  Also known as:  TOPROL-XL  INSTRUCTIONS:  Take 1 tablet (25 mg) by mouth daily                     nitroGLYcerin 0.4 MG sublingual tablet  Also known as:  NITROSTAT  INSTRUCTIONS:  Place 1 tablet (0.4 mg) under the tongue every 5 minutes as needed for chest pain  Doctor's comments:  Patient will call to fill  LAST TAKEN:  Ask your nurse or doctor                     THERAPEUTIC MULTIVIT/MINERAL tablet  INSTRUCTIONS:  Take 1 tablet by mouth daily.                     traMADol 50 MG tablet  Also known as:  ULTRAM  INSTRUCTIONS:  Take 1 tablet (50 mg) by mouth 3 times daily as needed for pain Max 3 per day                     VITAMIN D (CHOLECALCIFEROL) PO  INSTRUCTIONS:  Take 1,000 Units by mouth daily

## 2019-02-27 NOTE — PRE-PROCEDURE
I have examined the patient, reviewed the history, medications and pre procedural tests. She has disabling recurrent chest tightness in the setting of an abnormal nuclear stress test.  I have explained to the patient the risks of death, MI, stroke, hematoma, possible urgent bypass surgery for failed PCI, use of stents, thienopyridine agents, possible peripheral vascular complications, arrhythmia, the use of FFR in clinical decision-making and alternative of medical therapy alone in regards to left heart catheterization, left ventriculography, coronary angiography, and possible percutaneous coronary intervention. The patient voiced understanding and wishes to proceed. The patient has a good right radial pulse, normal ulnar pulse and a normal Neno's sign.

## 2019-03-02 LAB — INTERPRETATION ECG - MUSE: NORMAL

## 2019-03-20 ENCOUNTER — OFFICE VISIT (OUTPATIENT)
Dept: CARDIOLOGY | Facility: CLINIC | Age: 84
End: 2019-03-20
Payer: MEDICARE

## 2019-03-20 VITALS
DIASTOLIC BLOOD PRESSURE: 65 MMHG | BODY MASS INDEX: 28.48 KG/M2 | SYSTOLIC BLOOD PRESSURE: 120 MMHG | OXYGEN SATURATION: 97 % | HEART RATE: 74 BPM | WEIGHT: 166 LBS

## 2019-03-20 DIAGNOSIS — R09.89 BRUIT OF RIGHT CAROTID ARTERY: ICD-10-CM

## 2019-03-20 DIAGNOSIS — E78.5 HYPERLIPIDEMIA LDL GOAL <100: Primary | ICD-10-CM

## 2019-03-20 DIAGNOSIS — R94.39 ABNORMAL STRESS TEST: ICD-10-CM

## 2019-03-20 PROCEDURE — 99215 OFFICE O/P EST HI 40 MIN: CPT | Performed by: INTERNAL MEDICINE

## 2019-03-20 NOTE — PROGRESS NOTES
HPI and Plan:   Today I had the pleasure of seeing Daniela Brown at [facility] in [place].  She is accompanied by her son.  She is a pleasant 86 year old patient with a past medical history of hypertension, diabetes, hyperlipidemia, degenerative disc disease and lower back pain who presents to the clinic for a follow-up visit.  I saw her last on 2/18/2019.  At that visit I had ordered a coronary angiogram for a positive stress test and anginal symptoms.  The patient underwent coronary angiogram which only showed mild coronary artery disease.  The most significant of these was the 40% lesion in the mid RCA.  Dr. lilly who recommended discontinuing Imdur and metoprolol.  In addition the patient also discontinued aspirin.    The patient reports feeling well.  She still gets some shortness of breath but understands that it is unlikely to be  cardiac in origin.  The patient denies lower extremity swelling, orthopnea, PND, presyncope/syncope.     Assessment and plan:     Chest pain  No significant coronary disease on coronary angiogram.  Currently off beta-blocker, nitrate and aspirin  I discussed with her and her son that her symptoms are most likely due to deconditioning and not cardiac in origin.  I told the patient to increase physical activity slowly and as tolerated.    Hyperlipidemia  She is on a moderate intensity statin and I will continue that for now.    Her last LDL cholesterol was 58 in 2017.     Hypertension  Blood pressure well controlled.  Only on 50 mg of losartan.  We will continue that for now.    Rt Carotid bruit  1. Less than 50% diameter stenosis of the right internal carotid artery relative to the distal internal carotid artery diameter.  2. Less than 50% diameter stenosis of the left internal carotid artery relative to the distal internal carotid artery diameter.   We will monitor for now    Thank you for allowing me to participate in the care of Daniela  Betty Combs MD  Cardiology      Zio Patch 9/25/2018:  7 episodes of SVT maximum 6 beats.    Lexiscan 9/10/2018:   1.  Myocardial perfusion imaging using single isotope technique  demonstrated a small perfusion defect of mild severity involving the  basal inferior wall which is mostly reversible and may be consistent  with mild ischemia in the right coronary artery distribution. In  addition, transient ischemic dilatation is noted with a TID ratio of 1.3.   2. Gated images demonstrated normal left ventricular wall motion.  The  left ventricular systolic function is 80% at rest and 82% on the post  stress images.  3. Compared to the prior study from 10/5/2009, there is no evidence of  possible mild ischemia in the right coronary artery distribution..    TTE 9/11/2018:   The left ventricle is normal in size.  The visual ejection fraction is estimated at 65-70%.  Grade I or early diastolic dysfunction.  No regional wall motion abnormalities noted.  The right ventricle is normal in size and function.  No significant valvular heart disease.  \    Medications Discontinued During This Encounter   Medication Reason     aspirin 81 MG tablet      isosorbide mononitrate (IMDUR) 30 MG 24 hr tablet      metoprolol succinate ER (TOPROL-XL) 25 MG 24 hr tablet      No diagnosis found.    CURRENT MEDICATIONS:  Current Outpatient Medications   Medication Sig Dispense Refill     acetaminophen (TYLENOL) 325 MG tablet Take 1 tablet by mouth every 6 hours as needed for pain. 30 tablet 0     amLODIPine (NORVASC) 5 MG tablet Take 1 tablet (5 mg) by mouth every evening 90 tablet 3     atorvastatin (LIPITOR) 40 MG tablet Take 1 tablet (40 mg) by mouth At Bedtime 90 tablet 3     blood glucose monitoring (ACCU-CHEK FASTCLIX) lancets Use to test blood sugar one times daily or as directed. 102 each 3     blood glucose monitoring (ACCU-CHEK GUIDE) test strip Use to test blood sugar one times daily or as directed. 100 each 3  "    citalopram (CELEXA) 20 MG tablet Take 1 tablet (20 mg) by mouth daily 90 tablet 3     ClonazePAM (KLONOPIN PO) Take 0.5 mg by mouth 2 times daily as needed for anxiety       Cyanocobalamin (B-12) 1000 MCG SUBL Place 1 tablet under the tongue daily        dicyclomine (BENTYL) 20 MG tablet Take 1 tablet (20 mg) by mouth 4 times daily as needed (diarrhea) 60 tablet 11     gabapentin (NEURONTIN) 300 MG capsule Take 2 capsules (600 mg) by mouth 2 times daily 360 capsule 3     losartan (COZAAR) 50 MG tablet Take 1 tablet (50 mg) by mouth daily 90 tablet 3     Multiple Vitamins-Minerals (THERAPEUTIC MULTIVIT/MINERAL) TABS Take 1 tablet by mouth daily.       nitroGLYcerin (NITROSTAT) 0.4 MG sublingual tablet Place 1 tablet (0.4 mg) under the tongue every 5 minutes as needed for chest pain 30 tablet 4     order for DME 1 walker 1 Device 0     ORDER FOR DME Thigh length teds. 1 Device 2     traMADol (ULTRAM) 50 MG tablet Take 1 tablet (50 mg) by mouth 3 times daily as needed for pain Max 3 per day 90 tablet 5     VITAMIN D, CHOLECALCIFEROL, PO Take 1,000 Units by mouth daily         ALLERGIES     Allergies   Allergen Reactions     Metoprolol Itching and Rash     Cephalexin Rash     Erythromycin Rash     Actonel [Bisphosphonates] Itching     Azithromycin Hives     Celebrex [Celecoxib] Rash     Cipro [Ciprofloxacin] Rash     Erythromycin Rash     Escitalopram Diarrhea     Gets very sick and mad feelings     Fosamax Itching and Rash     Keflex [Cephalexin Monohydrate] Rash     Lisinopril Cough     Nitrofurantoin Other (See Comments)     \"dizzyness'     No Clinical Screening - See Comments Hives     Other reaction(s): Edema     Risedronate Itching     Shellfish-Derived Products Hives     Tetanus-Diphtheria Toxoids Swelling     Vicodin [Acetaminophen] Itching     Patient reported - only when used scheduled in high doses.        Vioxx Rash     Alendronic Acid Rash     Celecoxib Rash     Penicillins Rash     Rofecoxib Rash     " Sulfa Drugs Itching and Rash     Sulfasalazine Itching and Rash       PAST MEDICAL HISTORY:  Past Medical History:   Diagnosis Date     Adhesive capsulitis of shoulder     left      Allergic rhinitis, cause unspecified      Carpal tunnel syndrome     right>left by emg 2004     Chest pain, unspecified     Chronic right sided/axillary discomfort (musculoskeletal) Atypical substernal (possibly GERD). Negative cardiolite 2004.     Colitis, Clostridium difficile 4/18/2012     Cystocele, midline     grade III     Degeneration of lumbar or lumbosacral intervertebral disc     MRI 5/04- DDD, L2-3 annular bulge with protrusion into left caudal infra-foraminal area     Depressive disorder, not elsewhere classified      Diabetes mellitus (H)      Diverticulosis of colon (without mention of hemorrhage)     flexible sigmoidoscopy otherwise normal 2001     Esophageal reflux      Essential hypertension, benign      Generalized osteoarthrosis, unspecified site     C-spine, left hip     Headache(784.0)     Tension type. MRI 2001 normal.     Impaired fasting glucose     GTT 2/05 115-209     Irritable bowel syndrome     diahrrea predominant     Memory loss     Early cognitive decline. MMSE 27/30, Neno 4.7/ 6.0 2004. B12, TSH, RPR normal 1526-0145.     Mixed hyperlipidemia      OA (OSTEOARTHRITIS) GENERALIZED( Multiple Sites)     Facets, left hip, c-spine. 09/14/06 - bone loss, stable.     OSTEOPENIA     DEXA 2001 -1.4 hip. Dexa 2004 -0.2 hip and -1.5 spine     PERS HX ALLERGY OTHER FOODS 8/22/2006     PERS HX ALLERGY TO MILK PRODUCTS 8/22/2006     RESTLESS LEG SYNDROME      Syncope and collapse 9/10/2018     Unspecified vitamin D deficiency        PAST SURGICAL HISTORY:  Past Surgical History:   Procedure Laterality Date     ARTHROPLASTY KNEE  3/26/2012    Procedure:ARTHROPLASTY KNEE; Right Total Knee Arthroplasty; Surgeon:BERNADINE ROSAS; Location:[facility]     C APPENDECTOMY  1953     CV HEART CATHETERIZATION WITH POSSIBLE  INTERVENTION N/A 2/27/2019    Procedure: Coronary Angiogram with Left ventriculogram;  Surgeon: Leandro Carpio MD;  Location:  [facility]     HC REMOVAL GALLBLADDER       HYSTERECTOMY, PAP NO LONGER INDICATED  1983    TVH/BSO     SURGICAL HISTORY OF -       Hernia Repair     SURGICAL HISTORY OF -   10/08    A & P Repair, Dr. Hannah       FAMILY HISTORY:  Family History   Problem Relation Age of Onset     C.A.D. Mother      Diabetes Mother      Cancer - colorectal Mother      Arthritis Mother      Heart Disease Mother      Lipids Brother      Obesity Brother      Hypertension Brother      Allergies Son      Eye Disorder Son         cataract     Thyroid Disease Sister         graves dz     Eye Disorder Son      Leukemia Son      Alcohol/Drug Father        SOCIAL HISTORY:  Social History     Socioeconomic History     Marital status:      Spouse name: None     Number of children: None     Years of education: None     Highest education level: None   Occupational History     None   Social Needs     Financial resource strain: None     Food insecurity:     Worry: None     Inability: None     Transportation needs:     Medical: None     Non-medical: None   Tobacco Use     Smoking status: Never Smoker     Smokeless tobacco: Never Used   Substance and Sexual Activity     Alcohol use: No     Drug use: No     Sexual activity: No   Lifestyle     Physical activity:     Days per week: None     Minutes per session: None     Stress: None   Relationships     Social connections:     Talks on phone: None     Gets together: None     Attends [employer] service: None     Active member of club or organization: None     Attends meetings of clubs or organizations: None     Relationship status: None     Intimate partner violence:     Fear of current or ex partner: None     Emotionally abused: None     Physically abused: None     Forced sexual activity: None   Other Topics Concern     Parent/sibling w/ CABG, MI or  angioplasty before 65F 55M? No   Social History Narrative    Lives in [place] independently. She has son who lives nearby in [place]. Her oldest son passed away in September 2018.        Review of Systems:  Skin:  Negative       Eyes:  Positive for glasses    ENT:  Positive for hearing loss wears hearing aids  Respiratory:  Negative       Cardiovascular:    edema;Positive for    Gastroenterology: Negative      Genitourinary:  Positive for   CKD - patient does not see nephrology  Musculoskeletal:  Positive for back pain    Neurologic:  Positive for numbness or tingling of feet;headaches    Psychiatric:  Positive for anxiety;depression    Heme/Lymph/Imm:  Negative      Endocrine:  Positive for diabetes      Physical Exam:  Vitals: /65   Pulse 74   Wt 75.3 kg (166 lb)   SpO2 97%   BMI 28.48 kg/m      Constitutional:       Well-developed well-nourished    Skin:         No ulcers or excoriations    Head:       Normocephalic atraumatic    Eyes:       Normal conjunctiva and sclera    Lymph:  No cervical lymphadenopathy    ENT:       Normal external ears    Neck:       No JVD, right carotid bruit +nt    Respiratory:        Good air entry bilaterally, no accessory muscle use, bronchial vesicular breath sounds on all auscultated areas, no crackles or wheezes    Cardiac:                Regular rate and rhythm, normal S1-S2, soft 1 out of 6 systolic ejection murmur best heard at the right upper sternal border.                                           GI:       Soft, non-distended    Extremities and Muscular Skeletal:             Normal range of motion of all 4 extremities    Neurological:       No sensory deficits    Psych:     Normal mood and affect    Recent Lab Results:  LIPID RESULTS:  Lab Results   Component Value Date    CHOL 254 (H) 12/06/2012    HDL 69 12/06/2012     (H) 12/06/2012    TRIG 119 12/06/2012    CHOLHDLRATIO 4.0 12/06/2012       LIVER ENZYME RESULTS:  Lab Results   Component Value Date     AST 24 02/03/2019    ALT 24 02/03/2019       CBC RESULTS:  Lab Results   Component Value Date    WBC 15.3 (H) 02/27/2019    RBC 4.55 02/27/2019    HGB 13.5 02/27/2019    HCT 38.5 02/27/2019    MCV 85 02/27/2019    MCH 29.7 02/27/2019    MCHC 35.1 02/27/2019    RDW 13.0 02/27/2019     02/27/2019       BMP RESULTS:  Lab Results   Component Value Date     02/27/2019    POTASSIUM 3.9 02/27/2019    CHLORIDE 109 02/27/2019    CO2 24 02/27/2019    ANIONGAP 8 02/27/2019     (H) 02/27/2019    BUN 21 02/27/2019    CR 0.90 02/27/2019    GFRESTIMATED 58 (L) 02/27/2019    GFRESTBLACK 67 02/27/2019    NARESH 9.1 02/27/2019        A1C RESULTS:  Lab Results   Component Value Date    A1C 6.5 (H) 09/10/2018       INR RESULTS:  Lab Results   Component Value Date    INR 1.03 02/27/2019    INR 1.08 07/19/2018       CC  Deepti Hannah MD  [id number] [place] [address]      All medical records were reviewed in detail and discussed with the patient. Greater than 60 mins were spent with the patient.  50% of this time was spent on counseling and coordination of care.  After visit summary was printed and given to the patient.

## 2019-03-20 NOTE — LETTER
3/20/2019    Cynthia Swati Coradoon, DO  [address] [facility] [address]    RE: Daniela Martinezzabeth Kevin       Dear Colleague,    I had the pleasure of seeing Daniela Brown in the [facility].    HPI and Plan:   Today I had the pleasure of seeing Daniela Brown at [facility] in [place].  She is accompanied by her son.  She is a pleasant 86 year old patient with a past medical history of hypertension, diabetes, hyperlipidemia, degenerative disc disease and lower back pain who presents to the clinic for a follow-up visit.  I saw her last on 2/18/2019.  At that visit I had ordered a coronary angiogram for a positive stress test and anginal symptoms.  The patient underwent coronary angiogram which only showed mild coronary artery disease.  The most significant of these was the 40% lesion in the mid RCA.  Dr. lilly who recommended discontinuing Imdur and metoprolol.  In addition the patient also discontinued aspirin.    The patient reports feeling well.  She still gets some shortness of breath but understands that it is unlikely to be  cardiac in origin.  The patient denies lower extremity swelling, orthopnea, PND, presyncope/syncope.     Assessment and plan:     Chest pain  No significant coronary disease on coronary angiogram.  Currently off beta-blocker, nitrate and aspirin  I discussed with her and her son that her symptoms are most likely due to deconditioning and not cardiac in origin.  I told the patient to increase physical activity slowly and as tolerated.    Hyperlipidemia  She is on a moderate intensity statin and I will continue that for now.    Her last LDL cholesterol was 58 in 2017.     Hypertension  Blood pressure well controlled.  Only on 50 mg of losartan.  We will continue that for now.    Rt Carotid bruit  1. Less than 50% diameter stenosis of the right internal carotid artery relative to the distal internal carotid  artery diameter.  2. Less than 50% diameter stenosis of the left internal carotid artery relative to the distal internal carotid artery diameter.   We will monitor for now    Thank you for allowing me to participate in the care of Daniela Betty Wood    Gregg Comsb MD  Cardiology      Zio Patch 9/25/2018:  7 episodes of SVT maximum 6 beats.    Lexiscan 9/10/2018:   1.  Myocardial perfusion imaging using single isotope technique  demonstrated a small perfusion defect of mild severity involving the  basal inferior wall which is mostly reversible and may be consistent  with mild ischemia in the right coronary artery distribution. In  addition, transient ischemic dilatation is noted with a TID ratio of 1.3.   2. Gated images demonstrated normal left ventricular wall motion.  The  left ventricular systolic function is 80% at rest and 82% on the post  stress images.  3. Compared to the prior study from 10/5/2009, there is no evidence of  possible mild ischemia in the right coronary artery distribution..    TTE 9/11/2018:   The left ventricle is normal in size.  The visual ejection fraction is estimated at 65-70%.  Grade I or early diastolic dysfunction.  No regional wall motion abnormalities noted.  The right ventricle is normal in size and function.  No significant valvular heart disease.  \    Medications Discontinued During This Encounter   Medication Reason     aspirin 81 MG tablet      isosorbide mononitrate (IMDUR) 30 MG 24 hr tablet      metoprolol succinate ER (TOPROL-XL) 25 MG 24 hr tablet      No diagnosis found.    CURRENT MEDICATIONS:  Current Outpatient Medications   Medication Sig Dispense Refill     acetaminophen (TYLENOL) 325 MG tablet Take 1 tablet by mouth every 6 hours as needed for pain. 30 tablet 0     amLODIPine (NORVASC) 5 MG tablet Take 1 tablet (5 mg) by mouth every evening 90 tablet 3     atorvastatin (LIPITOR) 40 MG tablet Take 1 tablet (40 mg) by mouth At Bedtime 90 tablet 3     blood  "glucose monitoring (ACCU-CHEK FASTCLIX) lancets Use to test blood sugar one times daily or as directed. 102 each 3     blood glucose monitoring (ACCU-CHEK GUIDE) test strip Use to test blood sugar one times daily or as directed. 100 each 3     citalopram (CELEXA) 20 MG tablet Take 1 tablet (20 mg) by mouth daily 90 tablet 3     ClonazePAM (KLONOPIN PO) Take 0.5 mg by mouth 2 times daily as needed for anxiety       Cyanocobalamin (B-12) 1000 MCG SUBL Place 1 tablet under the tongue daily        dicyclomine (BENTYL) 20 MG tablet Take 1 tablet (20 mg) by mouth 4 times daily as needed (diarrhea) 60 tablet 11     gabapentin (NEURONTIN) 300 MG capsule Take 2 capsules (600 mg) by mouth 2 times daily 360 capsule 3     losartan (COZAAR) 50 MG tablet Take 1 tablet (50 mg) by mouth daily 90 tablet 3     Multiple Vitamins-Minerals (THERAPEUTIC MULTIVIT/MINERAL) TABS Take 1 tablet by mouth daily.       nitroGLYcerin (NITROSTAT) 0.4 MG sublingual tablet Place 1 tablet (0.4 mg) under the tongue every 5 minutes as needed for chest pain 30 tablet 4     order for DME 1 walker 1 Device 0     ORDER FOR DME Thigh length teds. 1 Device 2     traMADol (ULTRAM) 50 MG tablet Take 1 tablet (50 mg) by mouth 3 times daily as needed for pain Max 3 per day 90 tablet 5     VITAMIN D, CHOLECALCIFEROL, PO Take 1,000 Units by mouth daily         ALLERGIES     Allergies   Allergen Reactions     Metoprolol Itching and Rash     Cephalexin Rash     Erythromycin Rash     Actonel [Bisphosphonates] Itching     Azithromycin Hives     Celebrex [Celecoxib] Rash     Cipro [Ciprofloxacin] Rash     Erythromycin Rash     Escitalopram Diarrhea     Gets very sick and mad feelings     Fosamax Itching and Rash     Keflex [Cephalexin Monohydrate] Rash     Lisinopril Cough     Nitrofurantoin Other (See Comments)     \"dizzyness'     No Clinical Screening - See Comments Hives     Other reaction(s): Edema     Risedronate Itching     Shellfish-Derived Products Hives     " Tetanus-Diphtheria Toxoids Swelling     Vicodin [Acetaminophen] Itching     Patient reported - only when used scheduled in high doses.        Vioxx Rash     Alendronic Acid Rash     Celecoxib Rash     Penicillins Rash     Rofecoxib Rash     Sulfa Drugs Itching and Rash     Sulfasalazine Itching and Rash       PAST MEDICAL HISTORY:  Past Medical History:   Diagnosis Date     Adhesive capsulitis of shoulder     left      Allergic rhinitis, cause unspecified      Carpal tunnel syndrome     right>left by emg 2004     Chest pain, unspecified     Chronic right sided/axillary discomfort (musculoskeletal) Atypical substernal (possibly GERD). Negative cardiolite 2004.     Colitis, Clostridium difficile 4/18/2012     Cystocele, midline     grade III     Degeneration of lumbar or lumbosacral intervertebral disc     MRI 5/04- DDD, L2-3 annular bulge with protrusion into left caudal infra-foraminal area     Depressive disorder, not elsewhere classified      Diabetes mellitus (H)      Diverticulosis of colon (without mention of hemorrhage)     flexible sigmoidoscopy otherwise normal 2001     Esophageal reflux      Essential hypertension, benign      Generalized osteoarthrosis, unspecified site     C-spine, left hip     Headache(784.0)     Tension type. MRI 2001 normal.     Impaired fasting glucose     GTT 2/05 115-209     Irritable bowel syndrome     diahrrea predominant     Memory loss     Early cognitive decline. MMSE 27/30, Neno 4.7/ 6.0 2004. B12, TSH, RPR normal 1346-2748.     Mixed hyperlipidemia      OA (OSTEOARTHRITIS) GENERALIZED( Multiple Sites)     Facets, left hip, c-spine. 09/14/06 - bone loss, stable.     OSTEOPENIA     DEXA 2001 -1.4 hip. Dexa 2004 -0.2 hip and -1.5 spine     PERS HX ALLERGY OTHER FOODS 8/22/2006     PERS HX ALLERGY TO MILK PRODUCTS 8/22/2006     RESTLESS LEG SYNDROME      Syncope and collapse 9/10/2018     Unspecified vitamin D deficiency        PAST SURGICAL HISTORY:  Past Surgical History:    Procedure Laterality Date     ARTHROPLASTY KNEE  3/26/2012    Procedure:ARTHROPLASTY KNEE; Right Total Knee Arthroplasty; Surgeon:BERNADINE ROSAS; Location:[place] OR     C APPENDECTOMY  1953     CV HEART CATHETERIZATION WITH POSSIBLE INTERVENTION N/A 2/27/2019    Procedure: Coronary Angiogram with Left ventriculogram;  Surgeon: Leandro aCrpio MD;  Location:  HEART CARDIAC CATH LAB     HC REMOVAL GALLBLADDER       HYSTERECTOMY, PAP NO LONGER INDICATED  1983    TVH/BSO     SURGICAL HISTORY OF -       Hernia Repair     SURGICAL HISTORY OF -   10/08    A & P Repair, Dr. Hannah       FAMILY HISTORY:  Family History   Problem Relation Age of Onset     C.A.D. Mother      Diabetes Mother      Cancer - colorectal Mother      Arthritis Mother      Heart Disease Mother      Lipids Brother      Obesity Brother      Hypertension Brother      Allergies Son      Eye Disorder Son         cataract     Thyroid Disease Sister         graves dz     Eye Disorder Son      Leukemia Son      Alcohol/Drug Father        SOCIAL HISTORY:  Social History     Socioeconomic History     Marital status:      Spouse name: None     Number of children: None     Years of education: None     Highest education level: None   Occupational History     None   Social Needs     Financial resource strain: None     Food insecurity:     Worry: None     Inability: None     Transportation needs:     Medical: None     Non-medical: None   Tobacco Use     Smoking status: Never Smoker     Smokeless tobacco: Never Used   Substance and Sexual Activity     Alcohol use: No     Drug use: No     Sexual activity: No   Lifestyle     Physical activity:     Days per week: None     Minutes per session: None     Stress: None   Relationships     Social connections:     Talks on phone: None     Gets together: None     Attends Yarsanism service: None     Active member of club or organization: None     Attends meetings of clubs or organizations: None      Relationship status: None     Intimate partner violence:     Fear of current or ex partner: None     Emotionally abused: None     Physically abused: None     Forced sexual activity: None   Other Topics Concern     Parent/sibling w/ CABG, MI or angioplasty before 65F 55M? No   Social History Narrative    Lives in [place] independently. She has son who lives nearby in [place]. Her oldest son passed away in September 2018.        Review of Systems:  Skin:  Negative       Eyes:  Positive for glasses    ENT:  Positive for hearing loss wears hearing aids  Respiratory:  Negative       Cardiovascular:    edema;Positive for    Gastroenterology: Negative      Genitourinary:  Positive for   CKD - patient does not see nephrology  Musculoskeletal:  Positive for back pain    Neurologic:  Positive for numbness or tingling of feet;headaches    Psychiatric:  Positive for anxiety;depression    Heme/Lymph/Imm:  Negative      Endocrine:  Positive for diabetes      Physical Exam:  Vitals: /65   Pulse 74   Wt 75.3 kg (166 lb)   SpO2 97%   BMI 28.48 kg/m       Constitutional:       Well-developed well-nourished    Skin:         No ulcers or excoriations    Head:       Normocephalic atraumatic    Eyes:       Normal conjunctiva and sclera    Lymph:  No cervical lymphadenopathy    ENT:       Normal external ears    Neck:       No JVD, right carotid bruit +nt    Respiratory:        Good air entry bilaterally, no accessory muscle use, bronchial vesicular breath sounds on all auscultated areas, no crackles or wheezes    Cardiac:                Regular rate and rhythm, normal S1-S2, soft 1 out of 6 systolic ejection murmur best heard at the right upper sternal border.                                           GI:       Soft, non-distended    Extremities and Muscular Skeletal:             Normal range of motion of all 4 extremities    Neurological:       No sensory deficits    Psych:     Normal mood and affect    Recent Lab  Results:  LIPID RESULTS:  Lab Results   Component Value Date    CHOL 254 (H) 12/06/2012    HDL 69 12/06/2012     (H) 12/06/2012    TRIG 119 12/06/2012    CHOLHDLRATIO 4.0 12/06/2012       LIVER ENZYME RESULTS:  Lab Results   Component Value Date    AST 24 02/03/2019    ALT 24 02/03/2019       CBC RESULTS:  Lab Results   Component Value Date    WBC 15.3 (H) 02/27/2019    RBC 4.55 02/27/2019    HGB 13.5 02/27/2019    HCT 38.5 02/27/2019    MCV 85 02/27/2019    MCH 29.7 02/27/2019    MCHC 35.1 02/27/2019    RDW 13.0 02/27/2019     02/27/2019       BMP RESULTS:  Lab Results   Component Value Date     02/27/2019    POTASSIUM 3.9 02/27/2019    CHLORIDE 109 02/27/2019    CO2 24 02/27/2019    ANIONGAP 8 02/27/2019     (H) 02/27/2019    BUN 21 02/27/2019    CR 0.90 02/27/2019    GFRESTIMATED 58 (L) 02/27/2019    GFRESTBLACK 67 02/27/2019    NARESH 9.1 02/27/2019        A1C RESULTS:  Lab Results   Component Value Date    A1C 6.5 (H) 09/10/2018       INR RESULTS:  Lab Results   Component Value Date    INR 1.03 02/27/2019    INR 1.08 07/19/2018       CC  Deepti Hannah MD  [address] [place] [address]      All medical records were reviewed in detail and discussed with the patient. Greater than 60 mins were spent with the patient.  50% of this time was spent on counseling and coordination of care.  After visit summary was printed and given to the patient.        Thank you for allowing me to participate in the care of your patient.    Sincerely,     Gregg Combs MD     [facility]

## 2019-03-20 NOTE — LETTER
3/20/2019    Cynthia Swati Coradoon, DO  [address] [facility] [address]    RE: Daniela Martinezzabeth Kevin       Dear Colleague,    I had the pleasure of seeing Daniela Brown in the [facility].    HPI and Plan:   Today I had the pleasure of seeing Daniela Brown at [facility] in [place].  She is accompanied by her son.  She is a pleasant 86 year old patient with a past medical history of hypertension, diabetes, hyperlipidemia, degenerative disc disease and lower back pain who presents to the clinic for a follow-up visit.  I saw her last on 2/18/2019.  At that visit I had ordered a coronary angiogram for a positive stress test and anginal symptoms.  The patient underwent coronary angiogram which only showed mild coronary artery disease.  The most significant of these was the 40% lesion in the mid RCA.  Dr. lilly who recommended discontinuing Imdur and metoprolol.  In addition the patient also discontinued aspirin.    The patient reports feeling well.  She still gets some shortness of breath but understands that it is unlikely to be  cardiac in origin.  The patient denies lower extremity swelling, orthopnea, PND, presyncope/syncope.     Assessment and plan:     Chest pain  No significant coronary disease on coronary angiogram.  Currently off beta-blocker, nitrate and aspirin  I discussed with her and her son that her symptoms are most likely due to deconditioning and not cardiac in origin.  I told the patient to increase physical activity slowly and as tolerated.    Hyperlipidemia  She is on a moderate intensity statin and I will continue that for now.    Her last LDL cholesterol was 58 in 2017.     Hypertension  Blood pressure well controlled.  Only on 50 mg of losartan.  We will continue that for now.    Rt Carotid bruit  1. Less than 50% diameter stenosis of the right internal carotid artery relative to the distal internal carotid  artery diameter.  2. Less than 50% diameter stenosis of the left internal carotid artery relative to the distal internal carotid artery diameter.   We will monitor for now    Thank you for allowing me to participate in the care of Daniela Betty Wood    Gregg Combs MD  Cardiology      Zio Patch 9/25/2018:  7 episodes of SVT maximum 6 beats.    Lexiscan 9/10/2018:   1.  Myocardial perfusion imaging using single isotope technique  demonstrated a small perfusion defect of mild severity involving the  basal inferior wall which is mostly reversible and may be consistent  with mild ischemia in the right coronary artery distribution. In  addition, transient ischemic dilatation is noted with a TID ratio of 1.3.   2. Gated images demonstrated normal left ventricular wall motion.  The  left ventricular systolic function is 80% at rest and 82% on the post  stress images.  3. Compared to the prior study from 10/5/2009, there is no evidence of  possible mild ischemia in the right coronary artery distribution..    TTE 9/11/2018:   The left ventricle is normal in size.  The visual ejection fraction is estimated at 65-70%.  Grade I or early diastolic dysfunction.  No regional wall motion abnormalities noted.  The right ventricle is normal in size and function.  No significant valvular heart disease.  \    Medications Discontinued During This Encounter   Medication Reason     aspirin 81 MG tablet      isosorbide mononitrate (IMDUR) 30 MG 24 hr tablet      metoprolol succinate ER (TOPROL-XL) 25 MG 24 hr tablet      No diagnosis found.    CURRENT MEDICATIONS:  Current Outpatient Medications   Medication Sig Dispense Refill     acetaminophen (TYLENOL) 325 MG tablet Take 1 tablet by mouth every 6 hours as needed for pain. 30 tablet 0     amLODIPine (NORVASC) 5 MG tablet Take 1 tablet (5 mg) by mouth every evening 90 tablet 3     atorvastatin (LIPITOR) 40 MG tablet Take 1 tablet (40 mg) by mouth At Bedtime 90 tablet 3     blood  "glucose monitoring (ACCU-CHEK FASTCLIX) lancets Use to test blood sugar one times daily or as directed. 102 each 3     blood glucose monitoring (ACCU-CHEK GUIDE) test strip Use to test blood sugar one times daily or as directed. 100 each 3     citalopram (CELEXA) 20 MG tablet Take 1 tablet (20 mg) by mouth daily 90 tablet 3     ClonazePAM (KLONOPIN PO) Take 0.5 mg by mouth 2 times daily as needed for anxiety       Cyanocobalamin (B-12) 1000 MCG SUBL Place 1 tablet under the tongue daily        dicyclomine (BENTYL) 20 MG tablet Take 1 tablet (20 mg) by mouth 4 times daily as needed (diarrhea) 60 tablet 11     gabapentin (NEURONTIN) 300 MG capsule Take 2 capsules (600 mg) by mouth 2 times daily 360 capsule 3     losartan (COZAAR) 50 MG tablet Take 1 tablet (50 mg) by mouth daily 90 tablet 3     Multiple Vitamins-Minerals (THERAPEUTIC MULTIVIT/MINERAL) TABS Take 1 tablet by mouth daily.       nitroGLYcerin (NITROSTAT) 0.4 MG sublingual tablet Place 1 tablet (0.4 mg) under the tongue every 5 minutes as needed for chest pain 30 tablet 4     order for DME 1 walker 1 Device 0     ORDER FOR DME Thigh length teds. 1 Device 2     traMADol (ULTRAM) 50 MG tablet Take 1 tablet (50 mg) by mouth 3 times daily as needed for pain Max 3 per day 90 tablet 5     VITAMIN D, CHOLECALCIFEROL, PO Take 1,000 Units by mouth daily         ALLERGIES     Allergies   Allergen Reactions     Metoprolol Itching and Rash     Cephalexin Rash     Erythromycin Rash     Actonel [Bisphosphonates] Itching     Azithromycin Hives     Celebrex [Celecoxib] Rash     Cipro [Ciprofloxacin] Rash     Erythromycin Rash     Escitalopram Diarrhea     Gets very sick and mad feelings     Fosamax Itching and Rash     Keflex [Cephalexin Monohydrate] Rash     Lisinopril Cough     Nitrofurantoin Other (See Comments)     \"dizzyness'     No Clinical Screening - See Comments Hives     Other reaction(s): Edema     Risedronate Itching     Shellfish-Derived Products Hives     " Tetanus-Diphtheria Toxoids Swelling     Vicodin [Acetaminophen] Itching     Patient reported - only when used scheduled in high doses.        Vioxx Rash     Alendronic Acid Rash     Celecoxib Rash     Penicillins Rash     Rofecoxib Rash     Sulfa Drugs Itching and Rash     Sulfasalazine Itching and Rash       PAST MEDICAL HISTORY:  Past Medical History:   Diagnosis Date     Adhesive capsulitis of shoulder     left      Allergic rhinitis, cause unspecified      Carpal tunnel syndrome     right>left by emg 2004     Chest pain, unspecified     Chronic right sided/axillary discomfort (musculoskeletal) Atypical substernal (possibly GERD). Negative cardiolite 2004.     Colitis, Clostridium difficile 4/18/2012     Cystocele, midline     grade III     Degeneration of lumbar or lumbosacral intervertebral disc     MRI 5/04- DDD, L2-3 annular bulge with protrusion into left caudal infra-foraminal area     Depressive disorder, not elsewhere classified      Diabetes mellitus (H)      Diverticulosis of colon (without mention of hemorrhage)     flexible sigmoidoscopy otherwise normal 2001     Esophageal reflux      Essential hypertension, benign      Generalized osteoarthrosis, unspecified site     C-spine, left hip     Headache(784.0)     Tension type. MRI 2001 normal.     Impaired fasting glucose     GTT 2/05 115-209     Irritable bowel syndrome     diahrrea predominant     Memory loss     Early cognitive decline. MMSE 27/30, Neno 4.7/ 6.0 2004. B12, TSH, RPR normal [id number].     Mixed hyperlipidemia      OA (OSTEOARTHRITIS) GENERALIZED( Multiple Sites)     Facets, left hip, c-spine. 09/14/06 - bone loss, stable.     OSTEOPENIA     DEXA 2001 -1.4 hip. Dexa 2004 -0.2 hip and -1.5 spine     PERS HX ALLERGY OTHER FOODS 8/22/2006     PERS HX ALLERGY TO MILK PRODUCTS 8/22/2006     RESTLESS LEG SYNDROME      Syncope and collapse 9/10/2018     Unspecified vitamin D deficiency        PAST SURGICAL HISTORY:  Past Surgical History:    Procedure Laterality Date     ARTHROPLASTY KNEE  3/26/2012    Procedure:ARTHROPLASTY KNEE; Right Total Knee Arthroplasty; Surgeon:BERNADINE ROSAS; Location:[place] OR     C APPENDECTOMY  1953     CV HEART CATHETERIZATION WITH POSSIBLE INTERVENTION N/A 2/27/2019    Procedure: Coronary Angiogram with Left ventriculogram;  Surgeon: Leandro Carpio MD;  Location:  HEART CARDIAC CATH LAB     HC REMOVAL GALLBLADDER       HYSTERECTOMY, PAP NO LONGER INDICATED  1983    TVH/BSO     SURGICAL HISTORY OF -       Hernia Repair     SURGICAL HISTORY OF -   10/08    A & P Repair, Dr. Hannah       FAMILY HISTORY:  Family History   Problem Relation Age of Onset     C.A.D. Mother      Diabetes Mother      Cancer - colorectal Mother      Arthritis Mother      Heart Disease Mother      Lipids Brother      Obesity Brother      Hypertension Brother      Allergies Son      Eye Disorder Son         cataract     Thyroid Disease Sister         graves dz     Eye Disorder Son      Leukemia Son      Alcohol/Drug Father        SOCIAL HISTORY:  Social History     Socioeconomic History     Marital status:      Spouse name: None     Number of children: None     Years of education: None     Highest education level: None   Occupational History     None   Social Needs     Financial resource strain: None     Food insecurity:     Worry: None     Inability: None     Transportation needs:     Medical: None     Non-medical: None   Tobacco Use     Smoking status: Never Smoker     Smokeless tobacco: Never Used   Substance and Sexual Activity     Alcohol use: No     Drug use: No     Sexual activity: No   Lifestyle     Physical activity:     Days per week: None     Minutes per session: None     Stress: None   Relationships     Social connections:     Talks on phone: None     Gets together: None     Attends Restorationism service: None     Active member of club or organization: None     Attends meetings of clubs or organizations: None      Relationship status: None     Intimate partner violence:     Fear of current or ex partner: None     Emotionally abused: None     Physically abused: None     Forced sexual activity: None   Other Topics Concern     Parent/sibling w/ CABG, MI or angioplasty before 65F 55M? No   Social History Narrative    Lives in [place] independently. She has son who lives nearby in [place]. Her oldest son passed away in September 2018.        Review of Systems:  Skin:  Negative       Eyes:  Positive for glasses    ENT:  Positive for hearing loss wears hearing aids  Respiratory:  Negative       Cardiovascular:    edema;Positive for    Gastroenterology: Negative      Genitourinary:  Positive for   CKD - patient does not see nephrology  Musculoskeletal:  Positive for back pain    Neurologic:  Positive for numbness or tingling of feet;headaches    Psychiatric:  Positive for anxiety;depression    Heme/Lymph/Imm:  Negative      Endocrine:  Positive for diabetes      Physical Exam:  Vitals: /65   Pulse 74   Wt 75.3 kg (166 lb)   SpO2 97%   BMI 28.48 kg/m       Constitutional:       Well-developed well-nourished    Skin:         No ulcers or excoriations    Head:       Normocephalic atraumatic    Eyes:       Normal conjunctiva and sclera    Lymph:  No cervical lymphadenopathy    ENT:       Normal external ears    Neck:       No JVD, right carotid bruit +nt    Respiratory:        Good air entry bilaterally, no accessory muscle use, bronchial vesicular breath sounds on all auscultated areas, no crackles or wheezes    Cardiac:                Regular rate and rhythm, normal S1-S2, soft 1 out of 6 systolic ejection murmur best heard at the right upper sternal border.                                           GI:       Soft, non-distended    Extremities and Muscular Skeletal:             Normal range of motion of all 4 extremities    Neurological:       No sensory deficits    Psych:     Normal mood and affect    Recent Lab  Results:  LIPID RESULTS:  Lab Results   Component Value Date    CHOL 254 (H) 12/06/2012    HDL 69 12/06/2012     (H) 12/06/2012    TRIG 119 12/06/2012    CHOLHDLRATIO 4.0 12/06/2012       LIVER ENZYME RESULTS:  Lab Results   Component Value Date    AST 24 02/03/2019    ALT 24 02/03/2019       CBC RESULTS:  Lab Results   Component Value Date    WBC 15.3 (H) 02/27/2019    RBC 4.55 02/27/2019    HGB 13.5 02/27/2019    HCT 38.5 02/27/2019    MCV 85 02/27/2019    MCH 29.7 02/27/2019    MCHC 35.1 02/27/2019    RDW 13.0 02/27/2019     02/27/2019       BMP RESULTS:  Lab Results   Component Value Date     02/27/2019    POTASSIUM 3.9 02/27/2019    CHLORIDE 109 02/27/2019    CO2 24 02/27/2019    ANIONGAP 8 02/27/2019     (H) 02/27/2019    BUN 21 02/27/2019    CR 0.90 02/27/2019    GFRESTIMATED 58 (L) 02/27/2019    GFRESTBLACK 67 02/27/2019    NARESH 9.1 02/27/2019        A1C RESULTS:  Lab Results   Component Value Date    A1C 6.5 (H) 09/10/2018       INR RESULTS:  Lab Results   Component Value Date    INR 1.03 02/27/2019    INR 1.08 07/19/2018       CC  Deepti Hannah MD  [address] [place] [address]      All medical records were reviewed in detail and discussed with the patient. Greater than 60 mins were spent with the patient.  50% of this time was spent on counseling and coordination of care.  After visit summary was printed and given to the patient.        Thank you for allowing me to participate in the care of your patient.      Sincerely,     Gregg Combs MD     [facility]    cc:   No referring provider defined for this encounter.

## 2019-04-15 ENCOUNTER — OFFICE VISIT (OUTPATIENT)
Dept: FAMILY MEDICINE | Facility: CLINIC | Age: 84
End: 2019-04-15
Payer: MEDICARE

## 2019-04-15 VITALS
SYSTOLIC BLOOD PRESSURE: 144 MMHG | RESPIRATION RATE: 16 BRPM | HEART RATE: 100 BPM | HEIGHT: 64 IN | TEMPERATURE: 99.3 F | WEIGHT: 169.6 LBS | BODY MASS INDEX: 28.95 KG/M2 | DIASTOLIC BLOOD PRESSURE: 68 MMHG | OXYGEN SATURATION: 98 %

## 2019-04-15 DIAGNOSIS — M54.42 CHRONIC LEFT-SIDED LOW BACK PAIN WITH LEFT-SIDED SCIATICA: Primary | ICD-10-CM

## 2019-04-15 DIAGNOSIS — M51.26 PROTRUSION OF LUMBAR INTERVERTEBRAL DISC: ICD-10-CM

## 2019-04-15 DIAGNOSIS — G89.29 CHRONIC LEFT-SIDED LOW BACK PAIN WITH LEFT-SIDED SCIATICA: Primary | ICD-10-CM

## 2019-04-15 PROCEDURE — 99214 OFFICE O/P EST MOD 30 MIN: CPT | Performed by: FAMILY MEDICINE

## 2019-04-15 RX ORDER — GABAPENTIN 300 MG/1
CAPSULE ORAL
Qty: 360 CAPSULE | Refills: 3 | Status: ON HOLD
Start: 2019-04-15 | End: 2019-10-11

## 2019-04-15 ASSESSMENT — MIFFLIN-ST. JEOR: SCORE: 1194.3

## 2019-04-15 NOTE — PATIENT INSTRUCTIONS
"Take gabapentin 300 mg 2 capsules two times a day as preciously prescribed.    In addition, take gabapentin 1 capsule midday for the next week. If sitl no relief but tolerating medication well with no side effects, may increase midday dose to 2 capsules per dose.    Tramadol as previously prescribed as needed for severe pain.    See pain clinic as scheduled 5/1/2019.    Possible repeat MRI if pain continues to worsen after next 2-3 weeks.  Patient Education       * Sciatica    Sciatica (\"Lumbar Radiculopathy\") causes a pain that spreads from the lower back down into the buttock, hip and leg. Sometimes leg pain can occur without any back pain. Sciatica is due to irritation or pressure on a spinal nerve as it comes out of the spinal canal. This is most often due to pressure on the nerve from a nearby spinal disk (the cartilage cushion between each spinal bone). Other causes include spinal stenosis (narrowing of the spinal canal) and spasm of the piriformis muscle (a muscle in the buttocks that the sciatic nerve passes through).  Sciatica may begin after a sudden twisting/bending force (such as in a car accident), or sometimes after a simple awkward movement. In either case, muscle spasm is commonly present and can add to the pain.  The diagnosis of sciatica is made from the symptoms and physical exam. Unless you had a physical injury (such as a car accident or fall), X-rays are usually not ordered for the initial evaluation of sciatica because the nerves and disks cannot be seen on an X-ray. Most sciatica (80-90%) gets better with time.  What can I do about my low back pain?  There are three main things you can do to ease low back pain and help it go away.    Stay active! Use positions, movements and exercises. Too much rest can make your symptoms worse.    Use heat or cold packs.    Take medicine as directed.  Using positions, movements and exercises  Research tells us that moving your joints and muscles can help you " recover from back pain. Such activity should be simple and gentle.  Use walking to help relieve your discomfort. Try taking a short walk every 3 to 4 hours during the day. Walk for a few minutes inside your home or take longer walks outside, on a treadmill or at a mall. Slowly increase the amount of time you walk. Expect discomfort when you begin, but it should lessen as your back starts to recover.  Finding a position that is comfortable  When your back pain is new, you may find that certain positions will ease your pain. Gently try each of the following positions until you find one that eases your pain. Once you find a position of comfort, use it as often as you like while you recover. Return to your daily routine as soon as possible.     Lie on your back with your legs bent. You can do this by placing a pillow under your knees or lie on the floor and rest your lower legs on the seat of a chair.    Lie on your side with your knees bent and place a pillow between your knees.    Lie on your stomach over pillows.  Using heat or cold packs  Try cold packs or gentle heat to ease your pain. Use whichever gives you the most relief. Apply the cold pack or heat for 15 minutes at a time, as often as needed.  Taking medicine  If taking over-the-counter medicine:    Take ibuprofen (Advil, Motrin) 600 mg. three times a day as needed for pain.  OR    Take Aleve (naproxen sodium) 220 to 440 mg. two times a day as needed for pain.  If your doctor prescribed medicine, follow the instructions. Stop taking the medicine as soon as you can.  When should I call my doctor?  Your back pain should improve over the first couple of weeks. As it improves, you should be able to return to your normal activities. But call your doctor if:    You have a sudden change in your ability to control? your bladder or bowels.    You begin to feel tingling in your groin or legs.    The pain spreads down your leg and into your foot.    Your toes, feet or  leg muscles begin to feel weak.    You feel generally unwell or sick.    Your pain gets worse.    7211-8458 [employer]. [address] [place] [address], [place] [address]. All rights reserved. This information is not intended as a substitute for professional medical care. Always follow your healthcare professional's instructions.  This information has been modified by your health care provider with permission from the publisher.

## 2019-04-15 NOTE — PROGRESS NOTES
SUBJECTIVE:   Daniela Brown is a 86 year old female who presents to clinic today for the following   health issues:  Chief Complaint   Patient presents with     Back Pain     Pt here for left sciatica pain.         Back Pain Follow Up      Description:   Location of pain:  left  Character of pain: sharp and constant  Pain radiation: through buttocks, down le and into foot  Since last visit, pain is:  worsened  New numbness or weakness in legs, not attributed to pain:  YES- leg and foot feel numb    Intensity: Currently 10/10, At its worst 10/10    History:   Pain interferes with job: Not applicable  Therapies tried without relief: tylenol and tramadol yesterday, cold, heat and steroid injection 1 month ago  Therapies tried with relief: nothing is helping   Patient takes gabapentin 600 mg BID. Denies side effects from it. It has been given for diabetic neuropathy.    [facility] MRI in 7/2018:    Findings: No fracture.  Unremarkable alignment.    L1-L2: No significant spinal canal or neural foraminal stenosis.    L2-L3: Moderately severe left lateral recess stenosis due to small   disc protrusion in combination with facet hypertrophy.    L3-L4: No significant spinal canal or neural foraminal stenosis.    L4-L5: Moderately severe bilateral lateral recess stenosis due to a   combination of 5 mm degenerative anterior subluxation of L4 on L5   with facet hypertrophy and disc bulge. Moderate, probably   insignificant, bilateral neural foraminal stenosis.    L5-S1: No significant spinal canal or neural foraminal stenosis.          Accompanying Signs & Symptoms:  Risk of Fracture:  Age >64  Risk of Cauda Equina:  None  Risk of Infection:  None  Risk of Cancer:  None    Patient asked about physical therapy.    Additional history: as documented    Reviewed  and updated as needed this visit by clinical staff  Tobacco  Allergies  Meds  Problems  Med Hx  Surg Hx  Fam Hx  Soc Hx          Reviewed and updated as  needed this visit by Provider  Tobacco  Allergies  Meds  Problems  Med Hx  Surg Hx  Fam Hx         Patient Active Problem List   Diagnosis     Degeneration of lumbar or lumbosacral intervertebral disc     Esophageal reflux     Memory loss     Irritable bowel syndrome with diarrhea     Disorder of bone and cartilage     Anxiety and depression     RESTLESS LEG SYNDROME     Generalized osteoarthrosis, unspecified site     Diverticulosis of large intestine     SENSONRL HEAR LOSS,BILAT     Urticaria     Subjective tinnitus     Vitamin D deficiency     Right foot pain     Urge incontinence     Hyperlipidemia LDL goal <100     Chest pain     Allergic rhinitis     Angina pectoris (H)     Pronation of foot     Advanced directives, counseling/discussion     S/P total knee replacement     B12 deficiency anemia     Peripheral neuropathy     Benign essential hypertension     Carpal tunnel syndrome of left wrist     Diastolic dysfunction     Type 2 diabetes mellitus with diabetic polyneuropathy, without long-term current use of insulin (H)     History of recurrent urinary tract infection     CKD (chronic kidney disease) stage 3, GFR 30-59 ml/min (H)     Abnormal cardiovascular stress test     Adjustment disorder with anxiety     Arthritis of spine     Bilateral wrist pain     Protrusion of lumbar intervertebral disc     Abnormal stress test     Coronary artery disease involving native coronary artery of native heart with angina pectoris (H)     Status post coronary angiogram     Chronic left-sided low back pain with left-sided sciatica     Past Surgical History:   Procedure Laterality Date     ARTHROPLASTY KNEE  3/26/2012    Procedure:ARTHROPLASTY KNEE; Right Total Knee Arthroplasty; Surgeon:BERNADINE ROSAS; Location:[place] [facility]     C APPENDECTOMY  1953     CV HEART CATHETERIZATION WITH POSSIBLE INTERVENTION N/A 2/27/2019    Procedure: Coronary Angiogram with Left ventriculogram;  Surgeon: Leandro Carpio MD;   Location:  [facility]     HC REMOVAL GALLBLADDER       HYSTERECTOMY, PAP NO LONGER INDICATED  1983    TVH/BSO     SURGICAL HISTORY OF -       Hernia Repair     SURGICAL HISTORY OF -   10/08    A & P Repair, Dr. Hannah       Social History     Tobacco Use     Smoking status: Never Smoker     Smokeless tobacco: Never Used   Substance Use Topics     Alcohol use: No     Family History   Problem Relation Age of Onset     C.A.D. Mother      Diabetes Mother      Cancer - colorectal Mother      Arthritis Mother      Heart Disease Mother      Lipids Brother      Obesity Brother      Hypertension Brother      Allergies Son      Eye Disorder Son         cataract     Thyroid Disease Sister         graves dz     Eye Disorder Son      Leukemia Son      Alcohol/Drug Father          Current Outpatient Medications   Medication Sig Dispense Refill     acetaminophen (TYLENOL) 325 MG tablet Take 1 tablet by mouth every 6 hours as needed for pain. 30 tablet 0     amLODIPine (NORVASC) 5 MG tablet Take 1 tablet (5 mg) by mouth every evening 90 tablet 3     atorvastatin (LIPITOR) 40 MG tablet Take 1 tablet (40 mg) by mouth At Bedtime 90 tablet 3     citalopram (CELEXA) 20 MG tablet Take 1 tablet (20 mg) by mouth daily 90 tablet 3     Cyanocobalamin (B-12) 1000 MCG SUBL Place 1 tablet under the tongue daily        dicyclomine (BENTYL) 20 MG tablet Take 1 tablet (20 mg) by mouth 4 times daily as needed (diarrhea) 60 tablet 11     gabapentin (NEURONTIN) 300 MG capsule Take 2 capsules (600 mg) by mouth 2 times daily 360 capsule 3     losartan (COZAAR) 50 MG tablet Take 1 tablet (50 mg) by mouth daily 90 tablet 3     Multiple Vitamins-Minerals (THERAPEUTIC MULTIVIT/MINERAL) TABS Take 1 tablet by mouth daily.       traMADol (ULTRAM) 50 MG tablet Take 1 tablet (50 mg) by mouth 3 times daily as needed for pain Max 3 per day 90 tablet 5     VITAMIN D, CHOLECALCIFEROL, PO Take 1,000 Units by mouth daily       blood glucose monitoring  "(ACCU-CHEK FASTCLIX) lancets Use to test blood sugar one times daily or as directed. 102 each 3     blood glucose monitoring (ACCU-CHEK GUIDE) test strip Use to test blood sugar one times daily or as directed. 100 each 3     ClonazePAM (KLONOPIN PO) Take 0.5 mg by mouth 2 times daily as needed for anxiety       nitroGLYcerin (NITROSTAT) 0.4 MG sublingual tablet Place 1 tablet (0.4 mg) under the tongue every 5 minutes as needed for chest pain 30 tablet 4     order for DME 1 walker 1 Device 0     ORDER FOR DME Thigh length teds. 1 Device 2     Allergies   Allergen Reactions     Metoprolol Itching and Rash     Cephalexin Rash     Erythromycin Rash     Actonel [Bisphosphonates] Itching     Azithromycin Hives     Celebrex [Celecoxib] Rash     Cipro [Ciprofloxacin] Rash     Erythromycin Rash     Escitalopram Diarrhea     Gets very sick and mad feelings     Fosamax Itching and Rash     Keflex [Cephalexin Monohydrate] Rash     Lisinopril Cough     Nitrofurantoin Other (See Comments)     \"dizzyness'     No Clinical Screening - See Comments Hives     Other reaction(s): Edema     Risedronate Itching     Shellfish-Derived Products Hives     Tetanus-Diphtheria Toxoids Swelling     Vicodin [Acetaminophen] Itching     Patient reported - only when used scheduled in high doses.        Vioxx Rash     Alendronic Acid Rash     Celecoxib Rash     Penicillins Rash     Rofecoxib Rash     Sulfa Drugs Itching and Rash     Sulfasalazine Itching and Rash       ROS:  C: NEGATIVE for fever, chills, change in weight  I: NEGATIVE for worrisome rashes, moles or lesions  GI: NEGATIVE for nausea, abdominal pain, heartburn, or change in bowel habits  : NEGATIVE for frequency, dysuria, or hematuria  MUSCULOSKELETAL: see above  N: NEGATIVE for weakness, dizziness or paresthesias  H: NEGATIVE for bleeding problems    OBJECTIVE:                                                    /68   Pulse 100   Temp 99.3  F (37.4  C) (Tympanic)   Resp 16   " " 1.626 m (5' 4\")   Wt 76.9 kg (169 lb 9.6 oz)   SpO2 98%   BMI 29.11 kg/m    Body mass index is 29.11 kg/m .  GENERAL:  alert and no distress  NECK: no tenderness, no adenopathy, no asymmetry, no masses, no stiffness  MS: extremities- no gross deformities noted, no edema  SKIN: no suspicious lesions, no rashes  NEURO: strength and tone- normal, sensory exam- grossly normal, reflexes- symmetric  BACK: normal curvature, no deformity, no skin changes, no tendernes on palpation, range of motion slightly limited by stiffness, SLR positive on left to the lateral ankle    Diagnostic test results:  Diagnostic Test Results:  none      ASSESSMENT/PLAN:                                                        ICD-10-CM    1. Chronic left-sided low back pain with left-sided sciatica M54.42     G89.29    2. Protrusion of lumbar intervertebral disc M51.26      Patient was advised of her options for further managing her back pain.   Discussed with her previous findings on MRI. No red flags but the foraminal stenosis can cause her symptoms.   Advised safe use of tramadol.  Discussed gradually increasing gabapentin to 3x a day over next 2 weeks. She concurred.  May consider LASHAE with pain clinic.  Consider MRI if continues to have uncontrolled pain in spite of increasing gabapentin or if with neuro concerns.  Reasons to go to ER discussed in detail with patient.        Follow up with Provider - 2 weeks with pain clinic   Patient Instructions   Take gabapentin 300 mg 2 capsules two times a day as preciously prescribed.    In addition, take gabapentin 1 capsule midday for the next week. If sitl no relief but tolerating medication well with no side effects, may increase midday dose to 2 capsules per dose.    Tramadol as previously prescribed as needed for severe pain.    See pain clinic as scheduled 5/1/2019.    Possible repeat MRI if pain continues to worsen after next 2-3 weeks.  Patient Education       * Sciatica    Sciatica " "(\"Lumbar Radiculopathy\") causes a pain that spreads from the lower back down into the buttock, hip and leg. Sometimes leg pain can occur without any back pain. Sciatica is due to irritation or pressure on a spinal nerve as it comes out of the spinal canal. This is most often due to pressure on the nerve from a nearby spinal disk (the cartilage cushion between each spinal bone). Other causes include spinal stenosis (narrowing of the spinal canal) and spasm of the piriformis muscle (a muscle in the buttocks that the sciatic nerve passes through).  Sciatica may begin after a sudden twisting/bending force (such as in a car accident), or sometimes after a simple awkward movement. In either case, muscle spasm is commonly present and can add to the pain.  The diagnosis of sciatica is made from the symptoms and physical exam. Unless you had a physical injury (such as a car accident or fall), X-rays are usually not ordered for the initial evaluation of sciatica because the nerves and disks cannot be seen on an X-ray. Most sciatica (80-90%) gets better with time.  What can I do about my low back pain?  There are three main things you can do to ease low back pain and help it go away.    Stay active! Use positions, movements and exercises. Too much rest can make your symptoms worse.    Use heat or cold packs.    Take medicine as directed.  Using positions, movements and exercises  Research tells us that moving your joints and muscles can help you recover from back pain. Such activity should be simple and gentle.  Use walking to help relieve your discomfort. Try taking a short walk every 3 to 4 hours during the day. Walk for a few minutes inside your home or take longer walks outside, on a treadmill or at a mall. Slowly increase the amount of time you walk. Expect discomfort when you begin, but it should lessen as your back starts to recover.  Finding a position that is comfortable  When your back pain is new, you may find that " certain positions will ease your pain. Gently try each of the following positions until you find one that eases your pain. Once you find a position of comfort, use it as often as you like while you recover. Return to your daily routine as soon as possible.     Lie on your back with your legs bent. You can do this by placing a pillow under your knees or lie on the floor and rest your lower legs on the seat of a chair.    Lie on your side with your knees bent and place a pillow between your knees.    Lie on your stomach over pillows.  Using heat or cold packs  Try cold packs or gentle heat to ease your pain. Use whichever gives you the most relief. Apply the cold pack or heat for 15 minutes at a time, as often as needed.  Taking medicine  If taking over-the-counter medicine:    Take ibuprofen (Advil, Motrin) 600 mg. three times a day as needed for pain.  OR    Take Aleve (naproxen sodium) 220 to 440 mg. two times a day as needed for pain.  If your doctor prescribed medicine, follow the instructions. Stop taking the medicine as soon as you can.  When should I call my doctor?  Your back pain should improve over the first couple of weeks. As it improves, you should be able to return to your normal activities. But call your doctor if:    You have a sudden change in your ability to control? your bladder or bowels.    You begin to feel tingling in your groin or legs.    The pain spreads down your leg and into your foot.    Your toes, feet or leg muscles begin to feel weak.    You feel generally unwell or sick.    Your pain gets worse.    5610-6034 [employer]. [address] [place] [address], [place] [address]. All rights reserved. This information is not intended as a substitute for professional medical care. Always follow your healthcare professional's instructions.  This information has been modified by your health care provider with permission from the publisher.               Holden Murry MD  [place]  [facility]

## 2019-04-18 ENCOUNTER — HOSPITAL ENCOUNTER (EMERGENCY)
Facility: CLINIC | Age: 84
Discharge: HOME | End: 2019-04-18
Attending: NURSE PRACTITIONER | Admitting: NURSE PRACTITIONER
Payer: MEDICARE

## 2019-04-18 VITALS
RESPIRATION RATE: 16 BRPM | BODY MASS INDEX: 28.17 KG/M2 | HEIGHT: 64 IN | OXYGEN SATURATION: 98 % | SYSTOLIC BLOOD PRESSURE: 165 MMHG | WEIGHT: 165 LBS | DIASTOLIC BLOOD PRESSURE: 82 MMHG | TEMPERATURE: 97.5 F

## 2019-04-18 DIAGNOSIS — M54.42 CHRONIC LEFT-SIDED LOW BACK PAIN WITH LEFT-SIDED SCIATICA: ICD-10-CM

## 2019-04-18 DIAGNOSIS — G89.29 CHRONIC LEFT-SIDED LOW BACK PAIN WITH LEFT-SIDED SCIATICA: ICD-10-CM

## 2019-04-18 PROCEDURE — 99284 EMERGENCY DEPT VISIT MOD MDM: CPT | Mod: Z6 | Performed by: NURSE PRACTITIONER

## 2019-04-18 PROCEDURE — 25000132 ZZH RX MED GY IP 250 OP 250 PS 637: Mod: GY | Performed by: NURSE PRACTITIONER

## 2019-04-18 PROCEDURE — A9270 NON-COVERED ITEM OR SERVICE: HCPCS | Mod: GY | Performed by: NURSE PRACTITIONER

## 2019-04-18 PROCEDURE — 99283 EMERGENCY DEPT VISIT LOW MDM: CPT | Performed by: NURSE PRACTITIONER

## 2019-04-18 RX ORDER — ACETAMINOPHEN 325 MG/1
975 TABLET ORAL ONCE
Status: COMPLETED | OUTPATIENT
Start: 2019-04-18 | End: 2019-04-18

## 2019-04-18 RX ORDER — LIDOCAINE 50 MG/G
PATCH TOPICAL
Qty: 10 PATCH | Refills: 0 | Status: SHIPPED | OUTPATIENT
Start: 2019-04-18 | End: 2020-05-12

## 2019-04-18 RX ADMIN — ACETAMINOPHEN 975 MG: 325 TABLET, FILM COATED ORAL at 11:11

## 2019-04-18 ASSESSMENT — ENCOUNTER SYMPTOMS
NUMBNESS: 1
FEVER: 0
ACTIVITY CHANGE: 0
COUGH: 0
ABDOMINAL PAIN: 0
BACK PAIN: 1
CHILLS: 0
HEADACHES: 0
WEAKNESS: 0

## 2019-04-18 ASSESSMENT — MIFFLIN-ST. JEOR: SCORE: 1173.44

## 2019-04-18 NOTE — DISCHARGE INSTRUCTIONS
Gabapentin (300 mg capsules)   -----2 capsules at 10:00am,  2 capsules at 3:00pm, and 1 capsule at 9:00pm.  Tylenol (extra strength tablets)  -----2 tablets at 11:00am, 2 tablets at 4:00pm, 2 tablets at 8:00pm.  Tramadol (50 mg tablets)  -----1 tablet every 8 hours, only as needed for severe pain.  Lidoderm patch, place on at bedtime, and remove in the morning.  Use walker at home.  Heating pad to low back 2-3x/day. (do not sleep on heating pad, this may cause a burn).  Follow-up on Monday with Dr. Murry at 3:00pm

## 2019-04-18 NOTE — ED AVS SNAPSHOT
[facility] Emergency Department  [address] [address]  Phone:  [phone]  Fax:  [phone]                                    Daniela Brown   MRN: [id number]    Department:  [facility] Emergency Department   Date of Visit:  4/18/2019           After Visit Summary Signature Page    I have received my discharge instructions, and my questions have been answered. I have discussed any challenges I see with this plan with the nurse or doctor.    ..........................................................................................................................................  Patient/Patient Representative Signature      ..........................................................................................................................................  Patient Representative Print Name and Relationship to Patient    ..................................................               ................................................  Date                                   Time    ..........................................................................................................................................  Reviewed by Signature/Title    ...................................................              ..............................................  Date                                               Time          22EPIC Rev 08/18

## 2019-04-18 NOTE — ED PROVIDER NOTES
"  History     Chief Complaint   Patient presents with     Leg Pain     has had backpain for lang time, now  pain radiating into left leg     Back Pain     HPI  Daniela Brown is a 86 year old female who presents to the emergency permit for evaluation of chronic left low back pain with left sciatica.  Patient reports pain worse over the last 24 hours.  Difficulty sleeping.  She is able to ambulate with a cane and uses a walker at home.  Denies any recent fall or injury.  Patient was evaluated in clinic for this 3 days ago and was instructed to increase her gabapentin.  Patient is not taking the gabapentin as she was instructed, and appears to have misunderstood the instructions.  Patient is taking gabapentin 300 mg twice a day.  Patient was supposed to increase her gabapentin to 600 mg a.m., 600 mg in the afternoon and 300 mg at bedtime.  Patient has not taken any of her medications here today.  She is also taking extra strength Tylenol and tramadol for pain.  Patient has an appointment on 5/1/19 with pain in palliative clinic for her chronic back pain.    Allergies:  Allergies   Allergen Reactions     Metoprolol Itching and Rash     Cephalexin Rash     Erythromycin Rash     Actonel [Bisphosphonates] Itching     Azithromycin Hives     Celebrex [Celecoxib] Rash     Cipro [Ciprofloxacin] Rash     Erythromycin Rash     Escitalopram Diarrhea     Gets very sick and mad feelings     Fosamax Itching and Rash     Keflex [Cephalexin Monohydrate] Rash     Lisinopril Cough     Nitrofurantoin Other (See Comments)     \"dizzyness'     No Clinical Screening - See Comments Hives     Other reaction(s): Edema     Risedronate Itching     Shellfish-Derived Products Hives     Tetanus-Diphtheria Toxoids Swelling     Vicodin [Acetaminophen] Itching     Patient reported - only when used scheduled in high doses.        Vioxx Rash     Alendronic Acid Rash     Celecoxib Rash     Penicillins Rash     Rofecoxib Rash     Sulfa Drugs " "Itching and Rash     Sulfasalazine Itching and Rash       Problem List:    Patient Active Problem List    Diagnosis Date Noted     Chronic left-sided low back pain with left-sided sciatica 04/15/2019     Priority: Medium     Status post coronary angiogram 02/27/2019     Priority: Medium     Coronary artery disease involving native coronary artery of native heart with angina pectoris (H) 02/19/2019     Priority: Medium     Added automatically from request for surgery [id number]       Abnormal stress test 02/18/2019     Priority: Medium     Added automatically from request for surgery [id number]       Abnormal cardiovascular stress test 02/03/2019     Priority: Medium     9/10/2018 Lexiscan: \"Myocardial perfusion imaging using single isotope technique  demonstrated a small perfusion defect of mild severity involving the  basal inferior wall which is mostly reversible and may be consistent  with mild ischemia in the right coronary artery distribution. In  addition, transient ischemic dilatation is noted with a TID ratio of  1.3. \"       Benign essential hypertension 11/14/2018     Priority: Medium     Type 2 diabetes mellitus with diabetic polyneuropathy, without long-term current use of insulin (H) 11/14/2018     Priority: Medium     History of recurrent urinary tract infection 11/14/2018     Priority: Medium     On daily trimethoprim       CKD (chronic kidney disease) stage 3, GFR 30-59 ml/min (H) 11/14/2018     Priority: Medium     Peripheral neuropathy 09/10/2018     Priority: Medium     Bilateral wrist pain 04/11/2018     Priority: Medium     Protrusion of lumbar intervertebral disc 04/11/2018     Priority: Medium     Adjustment disorder with anxiety 03/18/2016     Priority: Medium     Carpal tunnel syndrome of left wrist 10/21/2014     Priority: Medium     Diastolic dysfunction 03/31/2014     Priority: Medium     Arthritis of spine 01/22/2013     Priority: Medium     B12 deficiency anemia 06/20/2012     Priority: Medium " "    S/P total knee replacement 03/28/2012     Priority: Medium     Advanced directives, counseling/discussion 06/02/2011     Priority: Medium     Patient does not have an Advance/Health Care Directive (HCD), given \"What is Advance Care Planning?\" flyer.    Isamar Sanchezmilvia  June 2, 2011         Pronation of foot 05/05/2011     Priority: Medium     Bilateral defomity       Angina pectoris (H) 03/17/2011     Priority: Medium     Allergic rhinitis 01/19/2011     Priority: Medium     Chest pain 11/12/2010     Priority: Medium     Hyperlipidemia LDL goal <100 10/31/2010     Priority: Medium     Urge incontinence 10/20/2009     Priority: Medium     Right foot pain 10/16/2009     Priority: Medium     Xray showed djd -follows with podiatry. -arch supports placed may need cam walker        Vitamin D deficiency 09/09/2008     Priority: Medium     Subjective tinnitus 04/22/2008     Priority: Medium     Urticaria 08/22/2006     Priority: Medium     Problem list name updated by automated process. Provider to review       SENSONRL HEAR LOSS,BILAT 05/03/2006     Priority: Medium     Esophageal reflux      Priority: Medium     Memory loss      Priority: Medium     Early cognitive decline. MMSE 27/30, Neno 4.7/ 6.0 2004. B12, TSH, RPR normal 4579-8600.       Anxiety and depression      Priority: Medium     Degeneration of lumbar or lumbosacral intervertebral disc      Priority: Medium     MRI 5/04- [facility], L2-3 annular bulge with protrusion into left caudal infra-foraminal area  MRI 2017 with L4-5 loss disc height with spondylolisthesis.       Irritable bowel syndrome with diarrhea      Priority: Low     diahrrea predominant       Disorder of bone and cartilage      Priority: Low     DEXA 2007 -1.4 hip. Dexa 2004 -1 hip and -1 spine  Problem list name updated by automated process. Provider to review       RESTLESS LEG SYNDROME      Priority: Low     Generalized osteoarthrosis, unspecified site      Priority: Low     C-spine, left hip   "     Diverticulosis of large intestine      Priority: Low     flexible sigmoidoscopy with diverticulosis otherwise normal 2001, admit diverticulitis 2009  Problem list name updated by automated process. Provider to review          Past Medical History:    Past Medical History:   Diagnosis Date     Adhesive capsulitis of shoulder      Allergic rhinitis, cause unspecified      Carpal tunnel syndrome      Chest pain, unspecified      Colitis, Clostridium difficile 4/18/2012     Cystocele, midline      Degeneration of lumbar or lumbosacral intervertebral disc      Depressive disorder, not elsewhere classified      Diabetes mellitus (H)      Diverticulosis of colon (without mention of hemorrhage)      Esophageal reflux      Essential hypertension, benign      Generalized osteoarthrosis, unspecified site      Headache(784.0)      Impaired fasting glucose      Irritable bowel syndrome      Memory loss      Mixed hyperlipidemia      OA (OSTEOARTHRITIS) GENERALIZED( Multiple Sites)      OSTEOPENIA      PERS HX ALLERGY OTHER FOODS 8/22/2006     PERS HX ALLERGY TO MILK PRODUCTS 8/22/2006     RESTLESS LEG SYNDROME      Syncope and collapse 9/10/2018     Unspecified vitamin D deficiency        Past Surgical History:    Past Surgical History:   Procedure Laterality Date     ARTHROPLASTY KNEE  3/26/2012    Procedure:ARTHROPLASTY KNEE; Right Total Knee Arthroplasty; Surgeon:BERNADINE ROSAS; Location:[place] OR     C APPENDECTOMY  1953     CV HEART CATHETERIZATION WITH POSSIBLE INTERVENTION N/A 2/27/2019    Procedure: Coronary Angiogram with Left ventriculogram;  Surgeon: Leandro Carpio MD;  Location:  HEART CARDIAC CATH LAB     HC REMOVAL GALLBLADDER       HYSTERECTOMY, PAP NO LONGER INDICATED  1983    TVH/BSO     SURGICAL HISTORY OF -       Hernia Repair     SURGICAL HISTORY OF -   10/08    A & P Repair, Dr. Hannah       Family History:    Family History   Problem Relation Age of Onset     C.A.D. Mother      Diabetes  "Mother      Cancer - colorectal Mother      Arthritis Mother      Heart Disease Mother      Lipids Brother      Obesity Brother      Hypertension Brother      Allergies Son      Eye Disorder Son         cataract     Thyroid Disease Sister         graves dz     Eye Disorder Son      Leukemia Son      Alcohol/Drug Father        Social History:  Marital Status:   [5]  Social History     Tobacco Use     Smoking status: Never Smoker     Smokeless tobacco: Never Used   Substance Use Topics     Alcohol use: No     Drug use: No        Medications:      lidocaine (LIDODERM) 5 % patch   acetaminophen (TYLENOL) 325 MG tablet   amLODIPine (NORVASC) 5 MG tablet   atorvastatin (LIPITOR) 40 MG tablet   blood glucose monitoring (ACCU-CHEK FASTCLIX) lancets   blood glucose monitoring (ACCU-CHEK GUIDE) test strip   citalopram (CELEXA) 20 MG tablet   ClonazePAM (KLONOPIN PO)   Cyanocobalamin (B-12) 1000 MCG SUBL   dicyclomine (BENTYL) 20 MG tablet   gabapentin (NEURONTIN) 300 MG capsule   losartan (COZAAR) 50 MG tablet   Multiple Vitamins-Minerals (THERAPEUTIC MULTIVIT/MINERAL) TABS   nitroGLYcerin (NITROSTAT) 0.4 MG sublingual tablet   order for DME   ORDER FOR DME   traMADol (ULTRAM) 50 MG tablet   VITAMIN D, CHOLECALCIFEROL, PO         Review of Systems   Constitutional: Negative for activity change, chills and fever.   HENT: Negative for congestion.    Respiratory: Negative for cough.    Cardiovascular: Negative for chest pain.   Gastrointestinal: Negative for abdominal pain.   Musculoskeletal: Positive for back pain.   Neurological: Positive for numbness. Negative for weakness and headaches.   All other systems reviewed and are negative.      Physical Exam   BP: 165/82  Heart Rate: 93  Temp: 97.5  F (36.4  C)  Resp: 16  Height: 162.6 cm (5' 4\")  Weight: 74.8 kg (165 lb)  SpO2: 98 %      Physical Exam   Constitutional: She is oriented to person, place, and time. She appears well-developed and well-nourished. She appears " distressed.   HENT:   Head: Normocephalic and atraumatic.   Cardiovascular: Normal rate and regular rhythm.   Pulmonary/Chest: Effort normal and breath sounds normal.   Abdominal: Soft. Bowel sounds are normal. There is no tenderness.   Musculoskeletal: Normal range of motion. She exhibits edema (bilateral nonpitting edema).   Tenderness with palpation to the left low back. No rash or overlying skin changes noted to this area. Pt able to lift each leg up off the bed against resistance. Normal pedal pulses. normal sensation.   Neurological: She is alert and oriented to person, place, and time.   No loss of bowel or bladder control.        ED Course        Procedures               No results found for this or any previous visit (from the past 24 hour(s)).    Medications   acetaminophen (TYLENOL) tablet 975 mg (975 mg Oral Given 4/18/19 1111)       Assessments & Plan (with Medical Decision Making)   86-year-old female with chronic low back pain with left-sided sciatica.  Sciatic pain has been ongoing for the last month and worse over the last 24 hours.  No new injury or fall.  Patient was evaluated in clinic 3 days ago with instructions to titrate her gabapentin.  Patient was unclear on the instructions to do that and has not increase her gabapentin dosing.  No worrisome red flags or exam findings to warrant emergent MRI imaging at this time.  I recommend patient proceed to increase her gabapentin and also she was given a prescription for Lidoderm patch.  Patient will have close follow-up an appointment was made for recheck for next week.  Patient was given Tylenol 975 mg p.o. here in the emergency department.  Plan as follows and was written out and highlighted for the patient:  Gabapentin (300 mg capsules)   -----2 capsules at 10:00am,  2 capsules at 3:00pm, and 1 capsule at 9:00pm.  Tylenol (extra strength tablets)  -----2 tablets at 11:00am, 2 tablets at 4:00pm, 2 tablets at 8:00pm.  Tramadol (50 mg  tablets)  -----1 tablet every 8 hours, only as needed for severe pain.  Lidoderm patch, place on at bedtime, and remove in the morning.  Use walker at home.  Heating pad to low back 2-3x/day. (do not sleep on heating pad, this may cause a burn).  Follow-up on Monday with Dr. Murry at 3:00pm    I have reviewed the nursing notes.    I have reviewed the findings, diagnosis, plan and need for follow up with the patient.       Medication List      Started    lidocaine 5 % patch  Commonly known as:  LIDODERM  Apply patch to left low back at bedtime. Remove patch in the morning.            Final diagnoses:   Chronic left-sided low back pain with left-sided sciatica       4/18/2019   [facility] EMERGENCY DEPARTMENT     Jason, CYN Sanchez CNP  04/18/19 9515

## 2019-04-19 ENCOUNTER — PATIENT OUTREACH (OUTPATIENT)
Dept: CARE COORDINATION | Facility: CLINIC | Age: 84
End: 2019-04-19

## 2019-04-19 NOTE — PROGRESS NOTES
Clinic Care Coordination Contact  [facility]/Voicemail    Referral Source: ED Follow-Up  Patient with chronic left low back pain presented to ED on 4/18/19 with report of worsening pain over past 24 hours. Discovered patient had misunderstood gabapentin instructions from clinic 3 days prior. Education was performed and medication regimen was carefully outlined prior to patient discharging home. PCP follow up scheduled for 4/22/19.  Patient also has new Pain clinic consult on 5/1/19.    Clinical Data: Care Coordinator Outreach    Outreach attempted x 1.  Left message on voiceVibrow with call back information and requested return call.    Plan: Care Coordinator will mail out care coordination introduction letter with care coordinator contact information and explanation of care coordination services. Care Coordinator will try to see patient in conjunction with her PCP appt on 4/22 or telephone outreach again in 1-2 business days.    COLETTE Cortez, RN   [facility] - RN Care Coordinator  Phone: [phone]

## 2019-04-19 NOTE — LETTER
Health Care Home - Access Care Plan    About Me:    Patient Name:  Daniela Brown    YOB: 1933  Age:                      86 year old   [place] MRN:     [id number] Telephone Information:   Home Phone [phone]   Mobile [phone]       Address:  [address]  [place] [facility] [place] [facility] [address] Email address:  No e-mail address on record      Emergency Contact(s)   Name Relationship Lgl Grd Work Phone Home Phone Mobile Phone   MYLES JOSEPH Son   [phone]                My Access Plan  Medical Emergency 912   Questions or concerns during clinic hours Primary Clinic Line, I will call the clinic directly: [facility] - [phone]   24 Hour Appointment Line [phone] or  [phone]) (toll free)   24 Hour Nurse Line [phone] (toll free)   Questions or concerns outside clinic hours 24 Hour Appointment Line, I will call the after-hours on-call line:   [facility] [phone] or [phone]) (toll-free)   Preferred Urgent Care [facility] [phone]   Preferred Hospital [place], [place]  [phone]   Preferred Pharmacy [facility] [address]     Behavioral Health Crisis Line The National Suicide Prevention Lifeline at 1-816.906.9556 or 449

## 2019-04-19 NOTE — LETTER
[facility]  Complex Care Plan  About Me:    Patient Name:  Daniela Brown    YOB: 1933  Age:         86 year old   [place] MRN:    [id number] Telephone Information:  Home Phone [phone]   Mobile [phone]       Address:  [address]  [place] [address] Email address:  No e-mail address on record      Emergency Contact(s)    Name Relationship Lgl Grd Work Phone Home Phone Mobile Phone   MYLES JOSEPH Son   [phone]            Primary language:  English     needed? No   [facility]:  [phone] op. 1  Other communication barriers: [place]  Preferred Method of Communication:  Mail  Current living arrangement: I live in a private home  Mobility Status/ Medical Equipment: Independent w/Device      My Access Plan  Medical Emergency 911   Primary Clinic Line [facility] - [phone]   24 Hour Appointment Line [phone] or  [phone]) (toll-free)   24 Hour Nurse Line [phone] (toll-free)   Preferred Urgent Care [facility], [phone]   Preferred Hospital [place], [place]  [phone]   Preferred Pharmacy [facility] [place] - [address]     Behavioral Health Crisis Line The National Suicide Prevention Lifeline at 1-112.387.9691 or 911             My Care Team Members  Patient Care Team       Relationship Specialty Notifications Start End    Cynthia Maddox DO PCP - General Internal Medicine Admissions 11/14/18     Phone: [phone] Pager: [phone] Fax: [phone]        [address] [facility] [address]    Cynthia Maddox DO Assigned PCP   10/21/18     Phone: [phone] Pager: [phone] Fax: [phone] [address] [address]    Vivi Ruggiero, RN Lead Care Coordinator Primary Care - [facility] Admissions 4/19/19     Phone: [phone] Fax:  [phone]                My Care Plans  Self Management and Treatment Plan  Goals and (Comments)  Goals        General    #1 Pain Management (pt-stated)     Notes - Note created  4/19/2019  1:45 PM by Vivi Ruggiero, RN    Goal Statement: I will find pain control regimen that provides adequate relief to my chronic back pain.  Measure of Success: Patient will report stable pain control regimen  Supportive Steps to Achieve: PCP; Care Coordination; Pain clinic referral (appt in May)  Barriers: acute on chronic back pain  Strengths: motivated and engaged in plan of care  Date to Achieve By: 6/1/19  Patient expressed understanding of goal: Yes                 Action Plans on File:                       Advance Care Plans/Directives Type:        My Medical and Care Information  Problem List   Patient Active Problem List   Diagnosis     Degeneration of lumbar or lumbosacral intervertebral disc     Esophageal reflux     Memory loss     Irritable bowel syndrome with diarrhea     Disorder of bone and cartilage     Anxiety and depression     RESTLESS LEG SYNDROME     Generalized osteoarthrosis, unspecified site     Diverticulosis of large intestine     SENSONRL HEAR LOSS,BILAT     Urticaria     Subjective tinnitus     Vitamin D deficiency     Right foot pain     Urge incontinence     Hyperlipidemia LDL goal <100     Chest pain     Allergic rhinitis     Angina pectoris (H)     Pronation of foot     Advanced directives, counseling/discussion     S/P total knee replacement     B12 deficiency anemia     Peripheral neuropathy     Benign essential hypertension     Carpal tunnel syndrome of left wrist     Diastolic dysfunction     Type 2 diabetes mellitus with diabetic polyneuropathy, without long-term current use of insulin (H)     History of recurrent urinary tract infection     CKD (chronic kidney disease) stage 3, GFR 30-59 ml/min (H)     Abnormal cardiovascular stress test     Adjustment disorder with anxiety      Arthritis of spine     Bilateral wrist pain     Protrusion of lumbar intervertebral disc     Abnormal stress test     Coronary 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    Care Coordination Start Date: 4/19/2019   Frequency of Care Coordination: weekly   Form Last Updated: 04/19/2019

## 2019-04-19 NOTE — LETTER
[facility]  [address] [place] [address]      April 19, 2019      Daniela Brown  [address]  [place] [address]        Dear Daniela,    I am a clinic care coordinator who works with Dr. Cynthia Maddox at [facility]. I wanted to introduce myself and provide you with my contact information so that you can call me with questions or concerns about your health care. Below is a description of clinic care coordination and how I can further assist you.     The clinic care coordinator is a registered nurse and/or  who understand the health care system. The goal of clinic care coordination is to help you manage your health and improve access to the [facility] in the most efficient manner. The registered nurse can assist you in meeting your health care goals by providing education, coordinating services, and strengthening the communication among your providers. The  can assist you with financial, behavioral, psychosocial, chemical dependency, counseling, and/or psychiatric resources.    Please feel free to contact me at [phone], with any questions or concerns. We at [facility] are focused on providing you with the highest-quality healthcare experience possible and that all starts with you.     Sincerely,     Vivi Ruggiero, COLETTE, RN   [facility] - RN Care Coordinator     Enclosed: I have enclosed a copy of a 24 Hour Access Plan. This has helpful phone numbers for you to call when needed. Please keep this in an easy to access place to use as needed.

## 2019-04-22 ENCOUNTER — OFFICE VISIT (OUTPATIENT)
Dept: FAMILY MEDICINE | Facility: CLINIC | Age: 84
End: 2019-04-22
Payer: MEDICARE

## 2019-04-22 VITALS
RESPIRATION RATE: 16 BRPM | HEIGHT: 64 IN | HEART RATE: 86 BPM | WEIGHT: 165 LBS | BODY MASS INDEX: 28.17 KG/M2 | OXYGEN SATURATION: 95 % | SYSTOLIC BLOOD PRESSURE: 124 MMHG | DIASTOLIC BLOOD PRESSURE: 60 MMHG | TEMPERATURE: 99.7 F

## 2019-04-22 DIAGNOSIS — G89.29 CHRONIC LEFT-SIDED LOW BACK PAIN WITH LEFT-SIDED SCIATICA: Primary | ICD-10-CM

## 2019-04-22 DIAGNOSIS — M54.42 CHRONIC LEFT-SIDED LOW BACK PAIN WITH LEFT-SIDED SCIATICA: Primary | ICD-10-CM

## 2019-04-22 PROCEDURE — 99213 OFFICE O/P EST LOW 20 MIN: CPT | Performed by: FAMILY MEDICINE

## 2019-04-22 ASSESSMENT — MIFFLIN-ST. JEOR: SCORE: 1173.44

## 2019-04-22 NOTE — PROGRESS NOTES
SUBJECTIVE:   Daniela Brown is a 86 year old female who presents to clinic today for the following   health issues:  Chief Complaint   Patient presents with     ER F/U     Pt here for post e/r f/u.         ED/UC Followup:    Facility:  [facility]  Date of visit: 4/18/19  Reason for visit: left sciatica pain  Current Status: Pt feels her legs are better, got dizzy today after taking gabapentin.     Patient states pain more tolerable since starting lidoderm patch.    Patient is on gabapentin 600 morning and afternoon, and 300 at bedtime.  Also taking tramadol prn - questionable if really relieving pain acutely.    Patient has pain clinic appointment in a week.    Additional history: as documented    Reviewed  and updated as needed this visit by clinical staff  Tobacco  Allergies  Meds  Med Hx  Surg Hx  Fam Hx  Soc Hx        Reviewed and updated as needed this visit by Provider         Patient Active Problem List   Diagnosis     Degeneration of lumbar or lumbosacral intervertebral disc     Esophageal reflux     Memory loss     Irritable bowel syndrome with diarrhea     Disorder of bone and cartilage     Anxiety and depression     RESTLESS LEG SYNDROME     Generalized osteoarthrosis, unspecified site     Diverticulosis of large intestine     SENSONRL HEAR LOSS,BILAT     Urticaria     Subjective tinnitus     Vitamin D deficiency     Right foot pain     Urge incontinence     Hyperlipidemia LDL goal <100     Chest pain     Allergic rhinitis     Angina pectoris (H)     Pronation of foot     Advanced directives, counseling/discussion     S/P total knee replacement     B12 deficiency anemia     Peripheral neuropathy     Benign essential hypertension     Carpal tunnel syndrome of left wrist     Diastolic dysfunction     Type 2 diabetes mellitus with diabetic polyneuropathy, without long-term current use of insulin (H)     History of recurrent urinary tract infection     CKD (chronic kidney disease) stage 3, GFR  30-59 ml/min (H)     Abnormal cardiovascular stress test     Adjustment disorder with anxiety     Arthritis of spine     Bilateral wrist pain     Protrusion of lumbar intervertebral disc     Abnormal stress test     Coronary artery disease involving native coronary artery of native heart with angina pectoris (H)     Status post coronary angiogram     Chronic left-sided low back pain with left-sided sciatica     Past Surgical History:   Procedure Laterality Date     ARTHROPLASTY KNEE  3/26/2012    Procedure:ARTHROPLASTY KNEE; Right Total Knee Arthroplasty; Surgeon:BERNADINE ROSAS; Location:[place] OR     C APPENDECTOMY  1953     CV HEART CATHETERIZATION WITH POSSIBLE INTERVENTION N/A 2/27/2019    Procedure: Coronary Angiogram with Left ventriculogram;  Surgeon: Leandro Carpio MD;  Location:  [facility]     HC REMOVAL GALLBLADDER       HYSTERECTOMY, PAP NO LONGER INDICATED  1983    TVH/BSO     SURGICAL HISTORY OF -       Hernia Repair     SURGICAL HISTORY OF -   10/08    A & P Repair, Dr. Hannah       Social History     Tobacco Use     Smoking status: Never Smoker     Smokeless tobacco: Never Used   Substance Use Topics     Alcohol use: No     Family History   Problem Relation Age of Onset     C.A.D. Mother      Diabetes Mother      Cancer - colorectal Mother      Arthritis Mother      Heart Disease Mother      Lipids Brother      Obesity Brother      Hypertension Brother      Allergies Son      Eye Disorder Son         cataract     Thyroid Disease Sister         graves dz     Eye Disorder Son      Leukemia Son      Alcohol/Drug Father          Current Outpatient Medications   Medication Sig Dispense Refill     acetaminophen (TYLENOL) 325 MG tablet Take 1 tablet by mouth every 6 hours as needed for pain. 30 tablet 0     amLODIPine (NORVASC) 5 MG tablet Take 1 tablet (5 mg) by mouth every evening 90 tablet 3     atorvastatin (LIPITOR) 40 MG tablet Take 1 tablet (40 mg) by mouth At Bedtime 90  tablet 3     citalopram (CELEXA) 20 MG tablet Take 1 tablet (20 mg) by mouth daily 90 tablet 3     ClonazePAM (KLONOPIN PO) Take 0.5 mg by mouth 2 times daily as needed for anxiety       Cyanocobalamin (B-12) 1000 MCG SUBL Place 1 tablet under the tongue daily        dicyclomine (BENTYL) 20 MG tablet Take 1 tablet (20 mg) by mouth 4 times daily as needed (diarrhea) 60 tablet 11     gabapentin (NEURONTIN) 300 MG capsule Take by mouth eight hours apart: 2 capsules in morning, 1-2 capsules midday, 2 capsules at night 360 capsule 3     lidocaine (LIDODERM) 5 % patch Apply patch to left low back at bedtime. Remove patch in the morning. 10 patch 0     losartan (COZAAR) 50 MG tablet Take 1 tablet (50 mg) by mouth daily 90 tablet 3     Multiple Vitamins-Minerals (THERAPEUTIC MULTIVIT/MINERAL) TABS Take 1 tablet by mouth daily.       traMADol (ULTRAM) 50 MG tablet Take 1 tablet (50 mg) by mouth 3 times daily as needed for pain Max 3 per day 90 tablet 5     VITAMIN D, CHOLECALCIFEROL, PO Take 1,000 Units by mouth daily       blood glucose monitoring (ACCU-CHEK FASTCLIX) lancets Use to test blood sugar one times daily or as directed. 102 each 3     blood glucose monitoring (ACCU-CHEK GUIDE) test strip Use to test blood sugar one times daily or as directed. 100 each 3     nitroGLYcerin (NITROSTAT) 0.4 MG sublingual tablet Place 1 tablet (0.4 mg) under the tongue every 5 minutes as needed for chest pain 30 tablet 4     order for DME 1 walker 1 Device 0     ORDER FOR DME Thigh length teds. 1 Device 2     Allergies   Allergen Reactions     Metoprolol Itching and Rash     Cephalexin Rash     Erythromycin Rash     Actonel [Bisphosphonates] Itching     Azithromycin Hives     Celebrex [Celecoxib] Rash     Cipro [Ciprofloxacin] Rash     Erythromycin Rash     Escitalopram Diarrhea     Gets very sick and mad feelings     Fosamax Itching and Rash     Keflex [Cephalexin Monohydrate] Rash     Lisinopril Cough     Nitrofurantoin Other (See  "Comments)     \"dizzyness'     No Clinical Screening - See Comments Hives     Other reaction(s): Edema     Risedronate Itching     Shellfish-Derived Products Hives     Tetanus-Diphtheria Toxoids Swelling     Vicodin [Acetaminophen] Itching     Patient reported - only when used scheduled in high doses.        Vioxx Rash     Alendronic Acid Rash     Celecoxib Rash     Penicillins Rash     Rofecoxib Rash     Sulfa Drugs Itching and Rash     Sulfasalazine Itching and Rash       ROS:  C: NEGATIVE for fever, chills, change in weight  I: NEGATIVE for worrisome rashes, moles or lesions  GI: NEGATIVE for nausea, abdominal pain, heartburn, or change in bowel habits  : NEGATIVE for frequency, dysuria, or hematuria  MUSCULOSKELETAL: see above  N: NEGATIVE for weakness, dizziness or paresthesias  H: NEGATIVE for bleeding problems      OBJECTIVE:                                                    /60   Pulse 86   Temp 99.7  F (37.6  C) (Tympanic)   Resp 16   Ht 1.626 m (5' 4\")   Wt 74.8 kg (165 lb)   SpO2 95%   BMI 28.32 kg/m    Body mass index is 28.32 kg/m .  GEN: alert, oriented x 3, ambulatory with a cane with antalgia  EYES: no icterus; EOMI, PERRL  SKIN: no rash    Diagnostic test results:  Diagnostic Test Results:  none      ASSESSMENT/PLAN:                                                        ICD-10-CM    1. Chronic left-sided low back pain with left-sided sciatica M54.42     G89.29      Improved enough by adding lidocaine patch. Will continue this.  Reinforced use of gabapentin - advised to watch if with more dizziness or headaches.  Safe tramadol use discussed with patient.  Ambulate as tolerated, rest as needed.    See pain clinic next week as scheduled.    Return precautions discussed and given to patient.    Follow up with Provider - 1 week with pain clinic   There are no Patient Instructions on file for this visit.    Holden Murry MD  [facility] [place]      "

## 2019-04-25 ENCOUNTER — PATIENT OUTREACH (OUTPATIENT)
Dept: CARE COORDINATION | Facility: CLINIC | Age: 84
End: 2019-04-25

## 2019-04-25 NOTE — PROGRESS NOTES
Clinic Care Coordination Contact  [facility]       Clinical Data: Care Coordinator Outreach    Outreach attempted x 1. Unable to leave message as no inbox available.     Per chart review, patient had follow up with PCP clinic on 4/22. Reported to provider that pain was improving to tolerable level with use of lidocaine patches. Cautioned to monitor dizziness with use of gabapentin and contact clinic if symptoms worsen.  No telephone encounters since OV noted to date.    Plan: Patient has new patent appt with pain clinic next week on 5/1/19. Care Coordinator will try to reach patient again in 5-7 business days.    COLETTE Cortez, RN   [facility] - RN Care Coordinator  Phone: [phone]

## 2019-05-01 ENCOUNTER — OFFICE VISIT (OUTPATIENT)
Dept: PALLIATIVE MEDICINE | Facility: CLINIC | Age: 84
End: 2019-05-01
Payer: MEDICARE

## 2019-05-01 VITALS — SYSTOLIC BLOOD PRESSURE: 143 MMHG | HEART RATE: 91 BPM | DIASTOLIC BLOOD PRESSURE: 69 MMHG

## 2019-05-01 DIAGNOSIS — G60.9 IDIOPATHIC PERIPHERAL NEUROPATHY: ICD-10-CM

## 2019-05-01 DIAGNOSIS — M54.42 CHRONIC LEFT-SIDED LOW BACK PAIN WITH LEFT-SIDED SCIATICA: Primary | ICD-10-CM

## 2019-05-01 DIAGNOSIS — M47.817 LUMBOSACRAL FACET JOINT SYNDROME: ICD-10-CM

## 2019-05-01 DIAGNOSIS — M99.73 CONNECTIVE TISSUE AND DISC STENOSIS OF INTERVERTEBRAL FORAMINA OF LUMBAR REGION: ICD-10-CM

## 2019-05-01 DIAGNOSIS — M47.817 LUMBOSACRAL SPONDYLOSIS WITHOUT MYELOPATHY: ICD-10-CM

## 2019-05-01 DIAGNOSIS — M51.379 DEGENERATION OF LUMBAR OR LUMBOSACRAL INTERVERTEBRAL DISC: ICD-10-CM

## 2019-05-01 DIAGNOSIS — M70.62 TROCHANTERIC BURSITIS OF BOTH HIPS: ICD-10-CM

## 2019-05-01 DIAGNOSIS — M51.26 PROTRUSION OF LUMBAR INTERVERTEBRAL DISC: ICD-10-CM

## 2019-05-01 DIAGNOSIS — M70.61 TROCHANTERIC BURSITIS OF BOTH HIPS: ICD-10-CM

## 2019-05-01 DIAGNOSIS — F43.22 ADJUSTMENT DISORDER WITH ANXIETY: ICD-10-CM

## 2019-05-01 DIAGNOSIS — E11.42 TYPE 2 DIABETES MELLITUS WITH DIABETIC POLYNEUROPATHY, WITHOUT LONG-TERM CURRENT USE OF INSULIN (H): ICD-10-CM

## 2019-05-01 DIAGNOSIS — G89.29 CHRONIC LEFT-SIDED LOW BACK PAIN WITH LEFT-SIDED SCIATICA: Primary | ICD-10-CM

## 2019-05-01 DIAGNOSIS — M53.3 SI (SACROILIAC) JOINT DYSFUNCTION: ICD-10-CM

## 2019-05-01 PROCEDURE — 99214 OFFICE O/P EST MOD 30 MIN: CPT | Performed by: ANESTHESIOLOGY

## 2019-05-01 PROCEDURE — 99207 ZZC CONSULT E&M CHANGED TO SUBSEQUENT LEVEL: CPT | Performed by: ANESTHESIOLOGY

## 2019-05-01 ASSESSMENT — PAIN SCALES - GENERAL: PAINLEVEL: EXTREME PAIN (8)

## 2019-05-01 NOTE — PROGRESS NOTES
[facility] Consultation    Date of visit: 5/1/2019    Reason for consultation:    Daniela Brown is a 86 year old female who is seen in consultation today at the request of her provider, Cynthia Maddox DO, for evaluation of chronic lower back pain.  She was just evaluated by me on 1/3/19 for evaluation for lumbar procedures.    Primary Care Provider is Cynthia Maddox.  Pain medications are being prescribed by PCP.    Please see the [facility] health questionnaire which the patient completed and reviewed with me in detail.    Chief Complaint:    Chief Complaint   Patient presents with     Pain       Pain history:  Daniela Brown is a 86 year old female who first started having problems with pain in her back years ago without history of injury.     Her pain is worse on the left lower back and buttock with pain that radiates down the left lower extremity posterolaterally and into the side of the left foot.  This pain has been better since she has been using CBD topicals.  She complains of numbness on the bottom of the left foot that she states is from her diabetic peripheral neuropathy.  She denies weakness in the left lower extremity - the leg feels that it gets tired after prolonged activity.    Her pain is constant, aching, and sharp in nature.  She has had multiple lumbar procedures in the past with varying results.    Pain rating: intensity ranges from 6/10 to 10/10, and Averages 8/10 on a 0-10 scale.  Aggravating factors include: prolonged walking, standing, lifting activities  Relieving factors include: Hemp topicals, Gabapentin, ice, pain medications, rest, sitting, lying down, chiropractic treatments  Any bowel or bladder incontinence: urinary frequency    Current treatments include:  Tramadol 50 mg TID prn - states she is not taking currently  Gabapentin 300 mg two capsules TID  Tylenol ES usually one tab daily  Celexa 20 mg daily  Hemp CBD  topical balm - helps a lot    [place] Board of Pharmacy Data Base Reviewed:    YES; compliant    Previous medication treatments included:  Tylenol #3  Fentanyl patches  Oxycodone  Tramadol  Gabapentin  Celexa  Tylenol  Lidocaine patches - no help  Hemp CBD balm - helps a lot    Other treatments have included:  Daniela Brown has been seen at a pain clinic in the past.  For evaluation for procedures and multiple lumbar procedures at [place] [facility]  PT: years ago  Chiropractic: years ago - helped then  Acupuncture: denies  TENs Unit: denies    Injections:   - 2/15/19 lumbar TFESI left L5-S1 at [place] [facility] - helped for a couple of days  - 1/14/19 LESI L5-S1 - may have helped for about a month ([facility])  - 10/25/18 LESI L4-5 at [facility]  - 8/9/18 LESI L4-5  - 2/14/18 LESI L4-5  - 11/22/17 LESI L4-5  - 8/11/17 LESI L4-5  - 1/19/16 LESI L4-5  - multiple other lumbar epidural steroid injections at [place] [facility] with varying responses    Past Medical History:  Past Medical History:   Diagnosis Date     Adhesive capsulitis of shoulder     left      Allergic rhinitis, cause unspecified      Carpal tunnel syndrome     right>left by emg 2004     Chest pain, unspecified     Chronic right sided/axillary discomfort (musculoskeletal) Atypical substernal (possibly GERD). Negative cardiolite 2004.     Colitis, Clostridium difficile 4/18/2012     Cystocele, midline     grade III     Degeneration of lumbar or lumbosacral intervertebral disc     MRI 5/04- [facility], L2-3 annular bulge with protrusion into left caudal infra-foraminal area     Depressive disorder, not elsewhere classified      Diabetes mellitus (H)      Diverticulosis of colon (without mention of hemorrhage)     flexible sigmoidoscopy otherwise normal 2001     Esophageal reflux      Essential hypertension, benign      Generalized osteoarthrosis, unspecified site     C-spine, left hip     Headache(784.0)     Tension type. MRI 2001 normal.      Impaired fasting glucose     GTT 2/05 115-209     Irritable bowel syndrome     diahrrea predominant     Memory loss     Early cognitive decline. MMSE 27/30, Neno 4.7/ 6.0 2004. B12, TSH, RPR normal 1752-4276.     Mixed hyperlipidemia      OA (OSTEOARTHRITIS) GENERALIZED( Multiple Sites)     Facets, left hip, c-spine. 09/14/06 - bone loss, stable.     OSTEOPENIA     DEXA 2001 -1.4 hip. Dexa 2004 -0.2 hip and -1.5 spine     PERS HX ALLERGY OTHER FOODS 8/22/2006     PERS HX ALLERGY TO MILK PRODUCTS 8/22/2006     RESTLESS LEG SYNDROME      Syncope and collapse 9/10/2018     Unspecified vitamin D deficiency      Past Surgical History:  Past Surgical History:   Procedure Laterality Date     ARTHROPLASTY KNEE  3/26/2012    Procedure:ARTHROPLASTY KNEE; Right Total Knee Arthroplasty; Surgeon:BERNADINE ROSAS; Location:[place] OR      APPENDECTOMY  1953     CV HEART CATHETERIZATION WITH POSSIBLE INTERVENTION N/A 2/27/2019    Procedure: Coronary Angiogram with Left ventriculogram;  Surgeon: Leandro Carpio MD;  Location:  HEART CARDIAC CATH LAB     HC REMOVAL GALLBLADDER       HYSTERECTOMY, PAP NO LONGER INDICATED  1983    TVH/BSO     SURGICAL HISTORY OF -       Hernia Repair     SURGICAL HISTORY OF -   10/08    A & P Repair, Dr. Hannah     Medications:  Current Outpatient Medications   Medication Sig Dispense Refill     acetaminophen (TYLENOL) 325 MG tablet Take 1 tablet by mouth every 6 hours as needed for pain. 30 tablet 0     amLODIPine (NORVASC) 5 MG tablet Take 1 tablet (5 mg) by mouth every evening 90 tablet 3     atorvastatin (LIPITOR) 40 MG tablet Take 1 tablet (40 mg) by mouth At Bedtime 90 tablet 3     blood glucose monitoring (ACCU-CHEK FASTCLIX) lancets Use to test blood sugar one times daily or as directed. 102 each 3     blood glucose monitoring (ACCU-CHEK GUIDE) test strip Use to test blood sugar one times daily or as directed. 100 each 3     citalopram (CELEXA) 20 MG tablet Take 1 tablet (20 mg) by  "mouth daily 90 tablet 3     ClonazePAM (KLONOPIN PO) Take 0.5 mg by mouth 2 times daily as needed for anxiety       Cyanocobalamin (B-12) 1000 MCG SUBL Place 1 tablet under the tongue daily        dicyclomine (BENTYL) 20 MG tablet Take 1 tablet (20 mg) by mouth 4 times daily as needed (diarrhea) 60 tablet 11     gabapentin (NEURONTIN) 300 MG capsule Take by mouth eight hours apart: 2 capsules in morning, 1-2 capsules midday, 2 capsules at night 360 capsule 3     lidocaine (LIDODERM) 5 % patch Apply patch to left low back at bedtime. Remove patch in the morning. 10 patch 0     losartan (COZAAR) 50 MG tablet Take 1 tablet (50 mg) by mouth daily 90 tablet 3     Multiple Vitamins-Minerals (THERAPEUTIC MULTIVIT/MINERAL) TABS Take 1 tablet by mouth daily.       traMADol (ULTRAM) 50 MG tablet Take 1 tablet (50 mg) by mouth 3 times daily as needed for pain Max 3 per day 90 tablet 5     VITAMIN D, CHOLECALCIFEROL, PO Take 1,000 Units by mouth daily       nitroGLYcerin (NITROSTAT) 0.4 MG sublingual tablet Place 1 tablet (0.4 mg) under the tongue every 5 minutes as needed for chest pain (Patient not taking: Reported on 5/1/2019) 30 tablet 4     order for DME 1 walker 1 Device 0     ORDER FOR DME Thigh length teds. 1 Device 2     Allergies:     Allergies   Allergen Reactions     Metoprolol Itching and Rash     Cephalexin Rash     Erythromycin Rash     Actonel [Bisphosphonates] Itching     Azithromycin Hives     Celebrex [Celecoxib] Rash     Cipro [Ciprofloxacin] Rash     Erythromycin Rash     Escitalopram Diarrhea     Gets very sick and mad feelings     Fosamax Itching and Rash     Keflex [Cephalexin Monohydrate] Rash     Lisinopril Cough     Nitrofurantoin Other (See Comments)     \"dizzyness'     No Clinical Screening - See Comments Hives     Other reaction(s): Edema     Risedronate Itching     Shellfish-Derived Products Hives     Tetanus-Diphtheria Toxoids Swelling     Vicodin [Acetaminophen] Itching     Patient reported - " only when used scheduled in high doses.        Vioxx Rash     Alendronic Acid Rash     Celecoxib Rash     Penicillins Rash     Rofecoxib Rash     Sulfa Drugs Itching and Rash     Sulfasalazine Itching and Rash     Social History:  Home situation: , lives alone  Occupation/Schooling: retired  Tobacco use: denies  Alcohol use: denies  Drug use: denies  History of chemical dependency treatment: denies    Family history:  Family History   Problem Relation Age of Onset     C.A.D. Mother      Diabetes Mother      Cancer - colorectal Mother      Arthritis Mother      Heart Disease Mother      Lipids Brother      Obesity Brother      Hypertension Brother      Allergies Son      Eye Disorder Son         cataract     Thyroid Disease Sister         graves dz     Eye Disorder Son      Leukemia Son      Alcohol/Drug Father      Family history of headaches: n/a    Review of Systems:  Skin: negative  Eyes: visual blurring  Ears/Nose/Throat: tinnitus  Respiratory: shortness of breath, no dyspnea on exertion, cough, or hemoptysis  Cardiovascular: HTN and lower extremity edema  Gastrointestinal: constipation and diarrhea  Genitourinary: frequency, urgency and incontinence  Musculoskeletal: back pain, neck pain, arthritis and muscular weakness  Neurologic: headaches and numbness or tingling of feet  Psychiatric: excessive stress, anxiety and depression  Hematologic/Lymphatic/Immunologic: negative  Endocrine: diabetes    Physical Exam:  Vitals:    05/01/19 1504   BP: 143/69   Pulse: 91     Exam:  Constitutional: healthy, alert, no distress, cooperative, smiling and obese  Head: normocephalic. Atraumatic.   Eyes: no redness or jaundice noted   ENT: oropharnx normal.  MMM.  Neck supple.    Cardiovascular: mild bilateral lower extremity edema, no JVD appreciated, palpable pulses  Respiratory: non-labored, equal expansion, no audible wheezing, speaking in full sentences  Gastrointestinal: soft, obese  : deferred  Skin: no  suspicious lesions or rashes  Psychiatric: mentation appears normal and affect normal/bright    Musculoskeletal exam:  Gait/Station/Posture: mildly unsteady, intact, toe and heel walk intact  Cervical spine: good ROM, exam limited    Thoracic spine:  Non-tender    Lumbar spine: tender left lower lumbar paraspinals and SI joint, right SI joint mildly tender, flexes minimally with pulling in lower back, lateral bending and extension and lateral rotation is positive for facet loading pain on the left    Myofascial tenderness:  Lumbar paraspinal muscles  Straight leg exam: positive on the left, hamstrings tight bilaterally  Danilo's maneuver: positive on the left    Hip greater trochanteric tenderness bilaterally, hips with full ROM without pain    Neurologic exam:  CN:  Cranial nerves 2-12 are grossly intact  Motor:  5/5 LE strength   Reflexes:  Absent throughout  Other reflexes:  no clonus   Sensory:  (upper and lower extremities):   Light touch: grossly intact   Vibration: not assessed    Diagnostic tests:  MRI of the lumbar spine was completed on 7/17/18 at [facility] showing:  Findings: No fracture.  Unremarkable alignment.    L1-L2: No significant spinal canal or neural foraminal stenosis.    L2-L3: Moderately severe left lateral recess stenosis due to small   disc protrusion in combination with facet hypertrophy.    L3-L4: No significant spinal canal or neural foraminal stenosis.    L4-L5: Moderately severe bilateral lateral recess stenosis due to a   combination of 5 mm degenerative anterior subluxation of L4 on L5   with facet hypertrophy and disc bulge. Moderate, probably   insignificant, bilateral neural foraminal stenosis.    L5-S1: No significant spinal canal or neural foraminal stenosis    [facility]  XR SPINE LUMBAR 3 VIEWS  10/29/2018 11:19 AM    INDICATION: Chronic Left-sided Low Back Pain With Left-sided Sciatica,  Accidental Fall, Initial Encounter  COMPARISON: Plain films  11/22/2016.    FINDINGS: 5 lumbar type vertebral bodies. Minimal right lumbar curvature  unchanged. Generalized osteopenia. Vertebral body heights with stable and  preserved. Stable low-grade degenerative anterior subluxation L4 on L5. Lumbar  disc heights relatively preserved. Mild narrowing of the L5-S1 intervertebral  disc. Advanced facet arthropathy in the lower lumbar facets. Prominent aortic  calcifications. Sacrum and visualized bony pelvis grossly intact. Surgical clips  right upper quadrant again seen.    Other testing (labs, diagnostics):  Component Value Flag Ref Range Units Status Collected Lab   PTT 28   22 - 37 sec Final 02/27/2019 10:30 AM [facility]     Component Value Flag Ref Range Units Status Collected Lab   INR 1.03   0.86 - 1.14  Final 02/27/2019 10:30 AM [facility]     Component Value Flag Ref Range Units Status Collected Lab   WBC 15.3  High   4.0 - 11.0 10e9/L Final 02/27/2019 10:30 AM [facility]   RBC Count 4.55   3.8 - 5.2 10e12/L Final 02/27/2019 10:30 AM [facility]   Hemoglobin 13.5   11.7 - 15.7 g/dL Final 02/27/2019 10:30 AM [facility]   Hematocrit 38.5   35.0 - 47.0 % Final 02/27/2019 10:30 AM [facility]   MCV 85   78 - 100 fl Final 02/27/2019 10:30 AM [facility]   MCH 29.7   26.5 - 33.0 pg Final 02/27/2019 10:30 AM [facility]   MCHC 35.1   31.5 - 36.5 g/dL Final 02/27/2019 10:30 AM [facility]   RDW 13.0   10.0 - 15.0 % Final 02/27/2019 10:30 AM [facility]   Platelet Count 294   150 - 450 10e9/L Final 02/27/2019 10:30 AM [facility]     Component Value Flag Ref Range Units Status Collected Lab   Sodium 141   133 - 144 mmol/L Final 02/27/2019 10:30 AM [facility]   Potassium 3.9   3.4 - 5.3 mmol/L Final 02/27/2019 10:30 AM [facility]   Chloride 109   94 - 109 mmol/L Final 02/27/2019 10:30 AM [facility]   Carbon Dioxide 24   20 - 32 mmol/L Final 02/27/2019 10:30 AM [facility]   Anion Gap 8   3 - 14 mmol/L Final 02/27/2019 10:30 AM [facility]   Glucose 137  High   70 - 99 mg/dL Final 02/27/2019 10:30 AM [facility]   Urea  Nitrogen 21   7 - 30 mg/dL Final 02/27/2019 10:30 AM [facility]   Creatinine 0.90   0.52 - 1.04 mg/dL Final 02/27/2019 10:30 AM [facility]   GFR Estimate 58  Low   >60 mL/min/ Final 02/27/2019 10:30 AM [facility]   Comment:   Non  GFR Calc   Starting 12/18/2018, serum creatinine based estimated GFR (eGFR) will be   calculated using the Chronic Kidney Disease Epidemiology Collaboration   (CKD-EPI) equation.    GFR Estimate If Black 67   >60 mL/min/ Final 02/27/2019 10:30 AM [facility]   Comment:    GFR Calc   Starting 12/18/2018, serum creatinine based estimated GFR (eGFR) will be   calculated using the Chronic Kidney Disease Epidemiology Collaboration   (CKD-EPI) equation.    Calcium 9.1   8.5 - 10.1 mg/dL Final 02/27/2019 10:30 AM [facility]     Screening tools:  DIRE Score for ongoing opioid management is calculated as follows:    Diagnosis = 2    Intractability = 2    Risk: Psych = 2  Chem Hlth = 2  Reliability = 2  Social = 2    Efficacy = 2    Total DIRE Score = 14 (14 or higher predicts good candidate for ongoing opioid management; 13 or lower predicts poor candidate for opioid management)     Assessment:  1.  Chronic pain syndrome  2.  Chronic lower back pain  3.  Lumbar radiculopathy (radiating pain)  4.  Lumbosacral facet joint pain  5.  Lumbosacral spondylosis (arthrititic changes)  6.  Lumbar stenosis (narrowing of the central canal)  7.  Lumbar degenerative disc disease  8.  Left sacroiliac joint pain, left greater than right  9.  Bilateral hip bursitis  10.  deconditioning    Daniela Brown is a 86 year old female who presents with the complaints of chronic lower back pain with pain radiating down the left lower extremity. She has had multiple epidural steroid injections at [facility], most recently this past February, with varied response.  She has lumbar degenerative disc disease, a spondylolisthesis at L4-5, and facet joint arthritis that causes some central  canal stenosis but more severe foraminal stenosis.  It does not appear that facet joint procedure or SI joint injections have been performed in the past and she has had pretty recent lumbar LASHAE without benefit.  She is getting relief with a CBD oil balm and Gabapentin.  Our treatment plan is as outlined below.    Plan:  Diagnosis reviewed, treatment option addressed, and risk/benefits discussed.  Self-care instructions given.  I am recommending a multidisciplinary treatment plan to help this patient better manage her pain.      1. Physical Therapy: [facility] physical therapy eval and treat ordered today  2. Clinical Health Psychologist to address issues of relaxation, behavioral change, coping style, and other factors important to improvement: deferred  3. Diagnostic Studies: reviewed diagnostic imaging  4. Medication Management:   1. Continue Gabapentin 600 mg TID  2. Trial of Ibuprofen (advil) 200 mg 2 tabs three times a day  3. Continue Hemp oils and or topicals as directed  4. Continue Celexa 20 mg daily  5. Continue Tylenol in safe doses  5. Further procedures recommended:   1. Lumbar medial branch blocks with subsequent radiofrequency ablation left L3, L4, L5, sacral ala - order placed today  2. Sacroiliac joint may be helpful as well  3. Hip bursa injections may help with hip pain  4. Repeat lumbar epidural steroid injection when needed (would hold off on steroids for a time due to recent steroid injections) likely target left L4-5 and L5-S1  6. Acupuncture: deferred  7. Urine toxicology screen today: deferred   8. Recommendations/follow-up for PCP:  Continue current treatment  9. Patient to follow up with Primary Care provider regarding elevated blood pressure.  10. Release of information: n/a  11. Follow up: for injection and in 3 months in follow up    Total time spent was 60 minutes, and more than 50% of face to face time was spent in counseling and/or coordination of care regarding diagnosis, principles of  multidisciplinary care, medication management, and interventional procedures.    Keo Pascual MD  [facility]

## 2019-05-01 NOTE — PATIENT INSTRUCTIONS
Assessment:  1.  Chronic pain syndrome  2.  Chronic lower back pain  3.  Lumbar radiculopathy (radiating pain)  4.  Lumbosacral facet joint pain  5.  Lumbosacral spondylosis (arthrititic changes)  6.  Lumbar stenosis (narrowing of the central canal)  7.  Lumbar degenerative disc disease  8.  Left sacroiliac joint pain, left greater than right  9.  Bilateral hip bursitis    Plan:  Diagnosis reviewed, treatment option addressed, and risk/benefits discussed.  Self-care instructions given.  I am recommending a multidisciplinary treatment plan to help this patient better manage her pain.      1. Physical Therapy: [facility] physical therapy eval and treat ordered today  2. Clinical Health Psychologist to address issues of relaxation, behavioral change, coping style, and other factors important to improvement: deferred  3. Diagnostic Studies: reviewed diagnostic imaging  4. Medication Management:   1. Continue Gabapentin 600 mg TID  2. Trial of Ibuprofen (advil) 200 mg 2 tabs three times a day  3. Continue Hemp oils and or topicals as directed  4. Continue Celexa 20 mg daily  5. Continue Tylenol in safe doses  5. Further procedures recommended:   1. Lumbar medial branch blocks with subsequent radiofrequency ablation left L3, L4, L5, sacral ala  2. Sacroiliac joint may be helpful as well  3. Hip bursa injections may help with hip pain  4. Repeat lumbar epidural steroid injection when needed (would hold of on steroids for a time due to recent steroid injections) likely target left L4-5 and L5-S1  6. Acupuncture: deferred  7. Urine toxicology screen today: deferred   8. Recommendations/follow-up for PCP:  Continue current treatment  9. Patient to follow up with Primary Care provider regarding elevated blood pressure.  10. Release of information: n/a  11. Follow up: for injection and in 3 months in follow up      ----------------------------------------------------------------  Clinic Number:  [phone]   Call this number with  any questions about your care and for scheduling assistance. Calls are returned Monday through Friday between 8 AM and 4:30 PM. We usually get back to you within 2 business days depending on the issue/request.       Medication refills:    For non-opioid medications, call your pharmacy directly to request a refill. The pharmacy will contact the [facility] Management Center for authorization. Please allow 3-4 days for these refills to be processed.     For opioid refills, call the clinic number or send a [employer] message. Please contact us 7-10 days before your refill is due. The message MUST include the name of the specific medication(s) requested and how you would like to receive the prescription(s). The options are as follows:    Pain Clinic staff can mail the prescription to your pharmacy. Please tell us the name of the pharmacy.    You may pick the prescription up at the Pain Clinic (tell us the location) or during a clinic visit with your pain provider    Pain Clinic staff can deliver the prescription to the [place] pharmacy in the clinic building. Please tell us the location.      We believe regular attendance is key to your success in our program.    Any time you are unable to keep your appointment we ask that you call us at least 24 hours in advance to let us know. This will allow us to offer the appointment time to another patient.

## 2019-05-02 ENCOUNTER — TELEPHONE (OUTPATIENT)
Dept: PALLIATIVE MEDICINE | Facility: CLINIC | Age: 84
End: 2019-05-02

## 2019-05-02 NOTE — TELEPHONE ENCOUNTER
PT will c/b after checking with Insurance.     Pre-screening questions for MBB Injections:    Injection to be done at which interventional clinic site? [facility]     Instruct patient to arrive as directed prior to the scheduled appointment time:    [place]: 30 minutes before        Procedure ordered by Dr. Catia Pascual    Procedure ordered? Lumbar Medial Branch Block    What insurance would patient like us to bill for this procedure? Medicare/ [employer]      Worker's comp- Any injection DO NOT SCHEDULE and route to Marcella Babar.      [employer] insurance - If scheduling an SI joint injection DO NOT SCHEDULE and route to Marcella Babar.          MBB's must be scheduled at LEAST two weeks apart for insurance purposes       [employer] - Any injection besides hip/shoulder/knee joint DO NOT SCHEDULE and route to Marcella Babar. She will obtain PA and call pt back to schedule procedure or notify pt of denial.       [employer]- PA required for all MBB's    Any chance of pregnancy? NO   If YES, do NOT schedule and route to RN pool    Is an  needed? No     Patient has a drive home? (mandatory) Yes     Is patient taking any blood thinners (plavix, coumadin, jantoven, warfarin, heparin, pradaxa or dabigatran )? No    If hold needed, do NOT schedule, route to RN pool     Is patient taking any aspirin products? No     If more than 325mg/day do NOT schedule; route to RN pool     For CERVICAL procedures, hold all aspirin products for 6 days.      Does the patient have a bleeding or clotting disorder? No    If YES, okay to schedule AND route to RN nurse pool    **For any patients with platelet count <100, must be forwarded to provider**    Is patient diabetic? YES If YES, have them bring their glucometer.    Does patient have an active infection or treated for one within the past week? No    Is patient currently taking any antibiotics?  No    For patients on chronic, preventative, or prophylacti antibiotics,  procedures can be scheduled.     For patients on antibiotics for active or recent infection:    Anu Nath [place], [place], [place]-antibiotic course must have been completed for 4 days     Is patient currently taking any steroid medications? (i.e. Prednisone, Medrol)  No     For patients on steroid medications:    Fany Nath [place], [place]-steroid course must have been completed for 4 days    Review with patient:  If you are started on any steroids or antibiotics between now and your appointment, you must contact us because it may affect our ability to perform your procedure YES    Is patient actively being treated for cancer or immunocompromised? No   If YES, do NOT schedule and route to RN    Are you able to get on and off an exam table with minimal or no assistance? Yes   If NO, do NOT schedule and route to RN    Are you able to roll over and lay on your stomach with minimal or no assistance? Yes   If NO, do NOT schedule and route to RN    Any allergies to contrast dye, iodine, shellfish, or numbing and steroid medications? YES- Shellfish  (If so, inform nursing and note in scheduling comments.)    Allergies: Metoprolol; Cephalexin; Erythromycin; Actonel [bisphosphonates]; Azithromycin; Celebrex [celecoxib]; Cipro [ciprofloxacin]; Erythromycin; Escitalopram; Fosamax; Keflex [cephalexin monohydrate]; Lisinopril; Nitrofurantoin; No clinical screening - see comments; Risedronate; Shellfish-derived products; Tetanus-diphtheria toxoids; Vicodin [acetaminophen]; Vioxx; Alendronic acid; Celecoxib; Penicillins; Rofecoxib; Sulfa drugs; and Sulfasalazine     Has the patient had a flu shot or any other vaccinations within 7 days before or after the procedure.  No     Does patient have an MRI/CT?  Not Applicable  (SI joint, hip injections, lumbar sympathetic blocks, and stellate ganglion blocks do not require an MRI)    Was the MRI done w/in the last 3 years?  NA    Was MRI done at [place]? No       If not, where was it done? N/A       If MRI was not done at [place], [facility] or [facility] do NOT schedule and route to nursing.  If pt has an imaging disc, the injection may be scheduled but pt has to bring disc to appt. If they show up w/out disc the injection cannot be done    **Must be scheduled with elapsed time interval of at least 2 weeks and not more than 6 months between the First MBB and the Second MBB**       Medial Branch Block Pre-Procedure Instructions    It is okay to take long acting pain medications (if you are on them) the day of the procedure but try not to take any short acting medications unless absolutely necessary. ok        Long acting meds would include: Gabapentin (Neurontin), MS Contin, Oxycontin        Short acting meds would include:  Percocet, Oxycodone, Vicodin, Ibuprofen     The day of the procedure, you should try to do things that provoke your pain, since the injection is being done to see if it will relieve your pain . ok    If your pain level is a 4 out of 10 or less on the day of the procedure, please call [phone] to reschedule.  ok  Reminders (please tell patient if applicable):      Instructed pt to arrive 30 minutes early for IV start if this is for a cervical procedure, ALL sympathetic (stellate ganglion, hypogastric, or lumbar sympathetic block) and all sedation procedures (RFA, spinal cord stimulation trials).         -IVs are not routinely placed for Dr. Tejeda cervical cases        -Dr. Mendiola: no IV needed for CMBB       If NPO for sedation, it is okay to take medications with sips of water (except if they are to hold blood thinners).    *DO take blood pressure medication if it is prescribed*      If this is for a cervical MBB aspirin needs to be held for 6 days.           Do not schedule procedures requiring IV placement in the first appointment of the day or first appointment after lunch. Do NOT schedule at 0745, 0815 or 1245.            For patients 85 or  older we recommend having an adult stay w/ them for the remainder of the day.      Does the patient have any questions? YES

## 2019-05-03 ENCOUNTER — PATIENT OUTREACH (OUTPATIENT)
Dept: CARE COORDINATION | Facility: CLINIC | Age: 84
End: 2019-05-03

## 2019-05-03 NOTE — PROGRESS NOTES
Clinic Care Coordination Contact  [facility]/Voicemail    Referral Source: [employer] Outreach    Clinical Data: Care Coordinator Outreach    Outreach attempted x 1.  Left message on voicemail with call back information and requested return call.    Per chart review, patient had office with Dr. Catia Pascual, Pain Clinic, on 5/1/19 and is now scheduled for Lumbar medial branch blocks with Dr. Catia Pascual on 5/15/19.    Plan: Care Coordinator will outreach in 2-3 weeks to review plan of care and patient centered goal(s).    COLETTE Cortez, RN   [facility] - RN Care Coordinator  Phone: [phone]

## 2019-05-08 ENCOUNTER — TELEPHONE (OUTPATIENT)
Dept: PALLIATIVE MEDICINE | Facility: CLINIC | Age: 84
End: 2019-05-08

## 2019-05-13 NOTE — TELEPHONE ENCOUNTER
The injection has been scheduled for 5/15/19.    Called pt left message to call the appointment line and ask to be transferred to a nurse is she has further questions.    Oxana García, RN-BSN  [facility]

## 2019-05-13 NOTE — TELEPHONE ENCOUNTER
Pt called.   Her questions were answered about her upcoming appointment.    Oxana García RN-BSN  [facility]

## 2019-05-15 ENCOUNTER — HOSPITAL ENCOUNTER (OUTPATIENT)
Dept: RADIOLOGY | Facility: CLINIC | Age: 84
Discharge: HOME | End: 2019-05-15
Attending: ANESTHESIOLOGY | Admitting: ANESTHESIOLOGY
Payer: MEDICARE

## 2019-05-15 ENCOUNTER — RADIOLOGY INJECTION OFFICE VISIT (OUTPATIENT)
Dept: PALLIATIVE MEDICINE | Facility: CLINIC | Age: 84
End: 2019-05-15
Payer: MEDICARE

## 2019-05-15 VITALS — RESPIRATION RATE: 16 BRPM | HEART RATE: 90 BPM | DIASTOLIC BLOOD PRESSURE: 82 MMHG | SYSTOLIC BLOOD PRESSURE: 182 MMHG

## 2019-05-15 DIAGNOSIS — M47.817 LUMBOSACRAL SPONDYLOSIS WITHOUT MYELOPATHY: Primary | ICD-10-CM

## 2019-05-15 DIAGNOSIS — M47.816 SPONDYLOSIS WITHOUT MYELOPATHY OR RADICULOPATHY, LUMBAR REGION: ICD-10-CM

## 2019-05-15 DIAGNOSIS — M47.817 LUMBOSACRAL FACET JOINT SYNDROME: ICD-10-CM

## 2019-05-15 PROCEDURE — 64493 INJ PARAVERT F JNT L/S 1 LEV: CPT | Mod: LT | Performed by: ANESTHESIOLOGY

## 2019-05-15 PROCEDURE — 64494 INJ PARAVERT F JNT L/S 2 LEV: CPT | Mod: TC,LT

## 2019-05-15 PROCEDURE — 64494 INJ PARAVERT F JNT L/S 2 LEV: CPT | Mod: LT | Performed by: ANESTHESIOLOGY

## 2019-05-15 PROCEDURE — 25000128 H RX IP 250 OP 636: Performed by: ANESTHESIOLOGY

## 2019-05-15 RX ORDER — BUPIVACAINE HYDROCHLORIDE 5 MG/ML
10 INJECTION, SOLUTION PERINEURAL ONCE
Status: COMPLETED | OUTPATIENT
Start: 2019-05-15 | End: 2019-05-15

## 2019-05-15 RX ORDER — IOPAMIDOL 612 MG/ML
15 INJECTION, SOLUTION INTRATHECAL ONCE
Status: COMPLETED | OUTPATIENT
Start: 2019-05-15 | End: 2019-05-15

## 2019-05-15 RX ORDER — LIDOCAINE HYDROCHLORIDE 10 MG/ML
5 INJECTION, SOLUTION EPIDURAL; INFILTRATION; INTRACAUDAL; PERINEURAL ONCE
Status: COMPLETED | OUTPATIENT
Start: 2019-05-15 | End: 2019-05-15

## 2019-05-15 RX ADMIN — IOPAMIDOL 1 ML: 612 INJECTION, SOLUTION INTRATHECAL at 11:06

## 2019-05-15 RX ADMIN — LIDOCAINE HYDROCHLORIDE 1 ML: 10 INJECTION, SOLUTION EPIDURAL; INFILTRATION; INTRACAUDAL; PERINEURAL at 11:05

## 2019-05-15 RX ADMIN — BUPIVACAINE HYDROCHLORIDE 2 ML: 5 INJECTION, SOLUTION EPIDURAL; INTRACAUDAL; PERINEURAL at 11:07

## 2019-05-15 ASSESSMENT — PAIN SCALES - GENERAL
PAINLEVEL: NO PAIN (0)
PAINLEVEL: MODERATE PAIN (4)
PAINLEVEL: MODERATE PAIN (5)

## 2019-05-15 NOTE — IP AVS SNAPSHOT
MRN:[id number]                      After Visit Summary   5/15/2019    Daniela Brown    MRN: [id number]           Visit Information        Provider Department      5/15/2019 10:45 AM [facility] Pain Managment           Review of your medicines      UNREVIEWED medicines. Ask your doctor about these medicines       Dose / Directions   acetaminophen 325 MG tablet  Commonly known as:  TYLENOL  Used for:  Degeneration of lumbar or lumbosacral intervertebral disc      Dose:  325 mg  Take 1 tablet by mouth every 6 hours as needed for pain.  Quantity:  30 tablet  Refills:  0     amLODIPine 5 MG tablet  Commonly known as:  NORVASC  Used for:  Benign essential hypertension      Dose:  5 mg  Take 1 tablet (5 mg) by mouth every evening  Quantity:  90 tablet  Refills:  3     atorvastatin 40 MG tablet  Commonly known as:  LIPITOR  Used for:  Angina pectoris (H), Type 2 diabetes mellitus with diabetic polyneuropathy, without long-term current use of insulin (H)      Dose:  40 mg  Take 1 tablet (40 mg) by mouth At Bedtime  Quantity:  90 tablet  Refills:  3     B-12 1000 MCG Subl      Dose:  1 tablet  Place 1 tablet under the tongue daily  Refills:  0     citalopram 20 MG tablet  Commonly known as:  celeXA  Used for:  Adjustment disorder with mixed anxiety and depressed mood      Dose:  20 mg  Take 1 tablet (20 mg) by mouth daily  Quantity:  90 tablet  Refills:  3     dicyclomine 20 MG tablet  Commonly known as:  BENTYL  Used for:  Irritable bowel syndrome with diarrhea      Dose:  20 mg  Take 1 tablet (20 mg) by mouth 4 times daily as needed (diarrhea)  Quantity:  60 tablet  Refills:  11     gabapentin 300 MG capsule  Commonly known as:  NEURONTIN  Indication:  Neuropathic Pain  Used for:  Chronic left-sided low back pain with left-sided sciatica      Take by mouth eight hours apart: 2 capsules in morning, 1-2 capsules midday, 2 capsules at night  Quantity:  360 capsule  Refills:  3      KLONOPIN PO      Dose:  0.5 mg  Take 0.5 mg by mouth 2 times daily as needed for anxiety  Refills:  0     lidocaine 5 % patch  Commonly known as:  LIDODERM      Apply patch to left low back at bedtime. Remove patch in the morning.  Quantity:  10 patch  Refills:  0     losartan 50 MG tablet  Commonly known as:  COZAAR  Used for:  Benign essential hypertension      Dose:  50 mg  Take 1 tablet (50 mg) by mouth daily  Quantity:  90 tablet  Refills:  3     nitroGLYcerin 0.4 MG sublingual tablet  Commonly known as:  NITROSTAT  Used for:  Angina pectoris (H)      Dose:  0.4 mg  Place 1 tablet (0.4 mg) under the tongue every 5 minutes as needed for chest pain  Quantity:  30 tablet  Refills:  4     THERAPEUTIC MULTIVIT/MINERAL tablet      Dose:  1 tablet  Take 1 tablet by mouth daily.  Refills:  0     traMADol 50 MG tablet  Commonly known as:  ULTRAM  Used for:  Chronic pain syndrome      Dose:  50 mg  Take 1 tablet (50 mg) by mouth 3 times daily as needed for pain Max 3 per day  Quantity:  90 tablet  Refills:  5     VITAMIN D (CHOLECALCIFEROL) PO      Dose:  1000 Units  Take 1,000 Units by mouth daily  Refills:  0        CONTINUE these medicines which have NOT CHANGED       Dose / Directions   blood glucose monitoring lancets  Used for:  Type 2 diabetes mellitus with diabetic polyneuropathy, without long-term current use of insulin (H)      Use to test blood sugar one times daily or as directed.  Quantity:  102 each  Refills:  3     blood glucose test strip  Commonly known as:  ACCU-CHEK GUIDE  Used for:  Type 2 diabetes mellitus with diabetic polyneuropathy, without long-term current use of insulin (H)      Use to test blood sugar one times daily or as directed.  Quantity:  100 each  Refills:  3     order for DME  Used for:  Venous insufficiency      Thigh length teds.  Quantity:  1 Device  Refills:  2     order for DME  Used for:  Acute pain of right knee      1 walker  Quantity:  1 Device  Refills:  0               Protect others around you: Learn how to safely use, store and throw away your medicines at www.disposemymeds.org.       Follow-ups after your visit       Care Instructions       Further instructions from your care team       [facility]   Medial Branch Block Discharge Instructions      Your procedure was performed by:  Dr. Keo Pascual     Medications used include:  Lidocaine  Bupivicaine  IsoVue    You will need to complete the Pain Scale Log form and return it to us as soon as possible.  Once we have received the form, we will review it and call you to determine the next steps.     The form can be faxed to [phone] or mailed to:   [facility]   [address]   [place] [address]      You may resume your regular medications    You may resume your regular activities    Be cautious with walking as numbness and/or weakness in the lower extremities may occur for up to 6-8 hours due to the effects of the anesthetic.    Avoid driving for 6 hours. The local anesthetic could slow your reflexes.     You may shower, however no swimming or tub baths or hot tubs for 24 hours following your procedure.    Your pain will return after the numbing medications have worn off.  You may use your current pain medications as needed.    Unless you have been directed to avoid the use of anti-inflammatory medications (NSAIDS), you may use medications such as ibuprofen, Aleve or Tylenol for pain control if needed.  Some people find it helpful to alternate ibuprofen and Tylenol every 3 hours for a couple of days.    You may use ice packs 10-15 minutes three to four times a day at the injection site for comfort.     Do not use heat to painful areas for 6 to 8 hours. This will give the local anesthetic time to wear off and prevent you from accidentally burning your skin.     If you experience any of the following, call the Pain Clinic during work hours at [phone] or the  Provider Line after hours at [phone]:  -Fever over 100 degree F  -Swelling, bleeding, redness, drainage, warmth at the injection site  -Progressive weakness or numbness in your legs or arms  -If lumbar, call if you have a loss of bowel or bladder function  -If cervical, call if you have any unusual headache that is not relieved by Tylenol  -Unusual new onset of pain that is not improving      Additional Information About Your Visit       Care EveryWhere ID    This is your Care EveryWhere ID. This could be used by other organizations to access your [facility] medical records  [id number]        Primary Care Provider Office Phone # Fax #    Cynthia Maddox, [phone]      Equal Access to Services    MANFRED REYES : Trace Burden, wadiane masterson, qaybta kaalmada sree, rasheed meade. So [facility] [phone].    ATENCIÓN: Si habla español, tiene a cam disposición servicios gratuitos de asistencia lingüística. Llame al [phone].    We comply with applicable federal civil rights laws and [place] laws. We do not discriminate on the basis of race, color, national origin, age, disability, sex, sexual orientation, or gender identity.           Thank you!    Thank you for choosing [facility] for your care. Our goal is always to provide you with excellent care. Hearing back from our patients is one way we can continue to improve our services. Please take a few minutes to complete the written survey that you may receive in the mail after you visit with us. Thank you!            Medication List      Medications          Morning Afternoon Evening Bedtime As Needed    blood glucose monitoring lancets  INSTRUCTIONS:  Use to test blood sugar one times daily or as directed.                     blood glucose test strip  Also known as:  ACCU-CHEK GUIDE  INSTRUCTIONS:  Use to test blood sugar one times daily or as directed.                     order for  DME  INSTRUCTIONS:  Thigh length teds.                     order for DME  INSTRUCTIONS:  1 walker                       ASK your doctor about these medications          Morning Afternoon Evening Bedtime As Needed    acetaminophen 325 MG tablet  Also known as:  TYLENOL  INSTRUCTIONS:  Take 1 tablet by mouth every 6 hours as needed for pain.                     amLODIPine 5 MG tablet  Also known as:  NORVASC  INSTRUCTIONS:  Take 1 tablet (5 mg) by mouth every evening  Doctor's comments:  Patient will call to fill                     atorvastatin 40 MG tablet  Also known as:  LIPITOR  INSTRUCTIONS:  Take 1 tablet (40 mg) by mouth At Bedtime  Doctor's comments:  Patient will call to fill                     B-12 1000 MCG Subl  INSTRUCTIONS:  Place 1 tablet under the tongue daily                     citalopram 20 MG tablet  Also known as:  celeXA  INSTRUCTIONS:  Take 1 tablet (20 mg) by mouth daily  Doctor's comments:  Patient will call to fill                     dicyclomine 20 MG tablet  Also known as:  BENTYL  INSTRUCTIONS:  Take 1 tablet (20 mg) by mouth 4 times daily as needed (diarrhea)  Doctor's comments:  Patient will call to fill                     gabapentin 300 MG capsule  Also known as:  NEURONTIN  INSTRUCTIONS:  Take by mouth eight hours apart: 2 capsules in morning, 1-2 capsules midday, 2 capsules at night  Reason for med:  Neuropathic Pain                     KLONOPIN PO  INSTRUCTIONS:  Take 0.5 mg by mouth 2 times daily as needed for anxiety                     lidocaine 5 % patch  Also known as:  LIDODERM  INSTRUCTIONS:  Apply patch to left low back at bedtime. Remove patch in the morning.                     losartan 50 MG tablet  Also known as:  COZAAR  INSTRUCTIONS:  Take 1 tablet (50 mg) by mouth daily  Doctor's comments:  Patient will call to fill                     nitroGLYcerin 0.4 MG sublingual tablet  Also known as:  NITROSTAT  INSTRUCTIONS:  Place 1 tablet (0.4 mg) under the tongue every 5  minutes as needed for chest pain  Doctor's comments:  Patient will call to fill                     THERAPEUTIC MULTIVIT/MINERAL tablet  INSTRUCTIONS:  Take 1 tablet by mouth daily.                     traMADol 50 MG tablet  Also known as:  ULTRAM  INSTRUCTIONS:  Take 1 tablet (50 mg) by mouth 3 times daily as needed for pain Max 3 per day                     VITAMIN D (CHOLECALCIFEROL) PO  INSTRUCTIONS:  Take 1,000 Units by mouth daily

## 2019-05-15 NOTE — PROGRESS NOTES
Pre procedure Diagnosis: lumbosacral facet arthropathy, lumbosacral spondylosis   Post procedure Diagnosis: Same  Procedure performed: lumbosacral medial branch block #1 at L3, L4, L5, sacral ala on the left, fluoroscopically guided, contrast controlled  Indication:  Diagnostic   Anesthesia: none  Complications: none  Operators: Keo Pascual MD     Indications:   Daniela Brown is a 86 year old female who is known to me that presents today for lumbar facet procedures.  The patient has a history of chronic left lower back and buttock pain without radiation.  Exam shows lumbar tenderness and increased pain with extension and lateral rotation of the lumbar spine and they have tried conservative treatment including physical therapy, medications, and interventional procedures.    MRI of the lumbar spine was completed on 7/17/18 at [facility] showing:  Findings: No fracture.  Unremarkable alignment.    L1-L2: No significant spinal canal or neural foraminal stenosis.    L2-L3: Moderately severe left lateral recess stenosis due to small   disc protrusion in combination with facet hypertrophy.    L3-L4: No significant spinal canal or neural foraminal stenosis.    L4-L5: Moderately severe bilateral lateral recess stenosis due to a   combination of 5 mm degenerative anterior subluxation of L4 on L5   with facet hypertrophy and disc bulge. Moderate, probably   insignificant, bilateral neural foraminal stenosis.    L5-S1: No significant spinal canal or neural foraminal stenosis    Options/alternatives, benefits and risks were discussed with the patient including but not limited to bleeding, infection, tissue trauma, exposure to radiation, reaction to medications, spinal cord injury, weakness, numbness and paralysis.  Questions were answered to her satisfaction and she agrees to proceed. Voluntary informed consent was obtained and signed.     Vitals were reviewed: Yes  /82   Pulse 90   Resp 16    Allergies were reviewed:  Yes   Medications were reviewed:  Yes   Pre-procedure pain score: 8/10    Procedure:  After obtaining signed informed consent, the patient was brought into the procedure suite and was placed in a prone position on the procedure table.   A Pause for the Cause was performed.  The patient was prepped and draped in the usual sterile fashion.     Under AP fluoroscopic guidance the L3, L4, L5 vertebral bodies were identified. The C-arm was rotated to the oblique view to afford optimal visualization the pedicles.  Lidocaine 1% was used to anesthetize the skin at each level.  Under intermittent fluoroscopy, 25 G 5 inch spinal needles were positioned inferior and lateral to the intersection of the transverse process and pedicle at the Left L3, L4, & L5 levels, as well as the corresponding sacral alar notch on the left. The needle positions were verified and optimized with AP and lateral views.    The anatomic targets for the L2, L3 & L4 medial branch nerves and L5 dorsal ramus (which functionally incorporates the medial branch) were the  L3, L4 & L5 transverse processes and sacral alar notch, with laterality as described above, resulting in blockade of the L3/4, L4/5 and L5/S1 facet joints.    Isovue-300 was injected at each site, and appropriate spread of contrast was noted without vascular uptake.  A total of 1 ml of Isovue was used.  14 ml was wasted. Bupivacaine 0.5% 0.5 ml was injected at each location.  The needles were removed.      Hemostasis was achieved, the area was cleaned, and bandaids were placed when appropriate.  The patient tolerated the procedure well, and was taken to the recovery room.    Images were saved to PACS.     The patient will continue to monitor progress, and they were given a pain diary to complete at home.  They will either fax or mail this back to us or bring it to their next appointment. We will determine the treatment plan after we review the diary.       Post-procedure pain score: 0/10 - equalling a 100% reduction in pain at the time of discharge  Follow-up includes:   -f/u phone call in one week  -will await diary for further planning for LMBB #2.    Keo Pascual MD  [facility]

## 2019-05-15 NOTE — DISCHARGE INSTRUCTIONS
[facility]   Medial Branch Block Discharge Instructions      Your procedure was performed by:  Dr. Keo Pascual     Medications used include:  Lidocaine  Bupivicaine  IsoVue    You will need to complete the Pain Scale Log form and return it to us as soon as possible.  Once we have received the form, we will review it and call you to determine the next steps.     The form can be faxed to [phone] or mailed to:   [facility]   [address]   [place] [address]      You may resume your regular medications    You may resume your regular activities    Be cautious with walking as numbness and/or weakness in the lower extremities may occur for up to 6-8 hours due to the effects of the anesthetic.    Avoid driving for 6 hours. The local anesthetic could slow your reflexes.     You may shower, however no swimming or tub baths or hot tubs for 24 hours following your procedure.    Your pain will return after the numbing medications have worn off.  You may use your current pain medications as needed.    Unless you have been directed to avoid the use of anti-inflammatory medications (NSAIDS), you may use medications such as ibuprofen, Aleve or Tylenol for pain control if needed.  Some people find it helpful to alternate ibuprofen and Tylenol every 3 hours for a couple of days.    You may use ice packs 10-15 minutes three to four times a day at the injection site for comfort.     Do not use heat to painful areas for 6 to 8 hours. This will give the local anesthetic time to wear off and prevent you from accidentally burning your skin.     If you experience any of the following, call the Pain Clinic during work hours at [phone] or the Provider Line after hours at [phone]:  -Fever over 100 degree F  -Swelling, bleeding, redness, drainage, warmth at the injection site  -Progressive weakness or numbness in your legs or arms  -If lumbar, call if you have a loss  of bowel or bladder function  -If cervical, call if you have any unusual headache that is not relieved by Tylenol  -Unusual new onset of pain that is not improving

## 2019-05-16 ENCOUNTER — PATIENT OUTREACH (OUTPATIENT)
Dept: CARE COORDINATION | Facility: CLINIC | Age: 84
End: 2019-05-16

## 2019-05-16 ASSESSMENT — ACTIVITIES OF DAILY LIVING (ADL): DEPENDENT_IADLS:: TRANSPORTATION;SHOPPING

## 2019-05-16 NOTE — PROGRESS NOTES
Clinic Care Coordination Contact    Clinic Care Coordination Contact  OUTREACH    Referral Information:  Referral Source: Pro-Active Outreach    Primary Diagnosis: Chronic Pain    Chief Complaint   Patient presents with     Clinic Care Coordination - Follow-up     RN      [facility] Utilization:   Clinic Utilization  Difficulty keeping appointments:: No  Compliance Concerns: No  No-Show Concerns: No  No PCP office visit in Past Year: No  Utilization    Last refreshed: 5/15/2019  1:13 PM:  Hospital Admissions 2           Last refreshed: 5/15/2019  1:13 PM:  ED Visits 2           Last refreshed: 5/15/2019  1:13 PM:  No Show Count (past year) 0              Current as of: 5/15/2019  1:13 PM            Clinical Concerns:  Current Medical Concerns: Last successful patient outreach 4/25/19. Since that date, patient had pain clinic appt as well as lumbosacral medical branch block injections for chronic pain.  Patient reports her pain has improved significantly since the injection. She is keeping a diary of her pain for at least 1 week after injection and then will return this diary to pain clinic per instructions given.    Patient states having one concern she would like to discuss with PCP. She has noticed slow decrease in her appetite in past 2 months. Denies significant change in weight, nausea or vomiting. Denies abdominal pain. Generally eats 1-2 meals per day. States her son and daughter in law have expressed concern over her appetite and think she should see PCP about it. Patient reports she has not discussed this with her PCP or other medical provider to date.     Current Behavioral Concerns: None reported    Education Provided to patient: reviewed plan of care; strongly encouraged patient to schedule PCP appt regarding concern over reduced appetite - patient would like to check with her son first regarding his schedule/availability to provide a ride before she schedules. We reviewed clinic scheduling  number.    Pain  Pain (GOAL):: Yes  Type: Chronic (>3mo)  Location of chronic pain:: left lower back  Radiating: No  Progression: Improving  Chronic pain severity:: 2  Limitation of routine activities due to chronic pain:: Yes  Description: (depends on day)  Alleviating Factors: Rest, Heat, Pain Medication, Procedures or Injections  Aggravating Factors: Positioning, Activity  Health Maintenance Reviewed: Due/Overdue   Health Maintenance Due   Topic Date Due     URINE DRUG SCREEN Q1 YR  01/26/1948     DTAP/TDAP/TD IMMUNIZATION (1 - Tdap) 01/26/1958     ZOSTER IMMUNIZATION (1 of 2) 01/26/1983     MEDICARE ANNUAL WELLNESS VISIT  01/26/1998     PNEUMOCOCCAL IMMUNIZATION 65+ LOW/MEDIUM RISK (2 of 2 - PCV13) 11/17/2009     EYE EXAM Q1 YEAR  12/08/2012     MICROALBUMIN Q1 YEAR  06/20/2013     ARABELLA QUESTIONNAIRE 1 YEAR  07/17/2013     FOOT EXAM Q1 YEAR  10/30/2013     LIPID MONITORING Q1 YEAR  12/06/2013     TSH W/ FREE T4 REFLEX Q2 YEAR  01/03/2014     ADVANCE DIRECTIVE PLANNING Q5 YRS  06/02/2016     A1C Q6 MO  03/10/2019     PHQ-9 Q6 MONTHS  04/09/2019     Clinical Pathway: None    Medication Management:  Patient is knowledgeable on medications and is adherent.  No financial concerns reported at this time.    Functional Status:  Dependent ADLs:: Ambulation-walker  Dependent IADLs:: Transportation, Shopping  Bed or wheelchair confined:: No  Mobility Status: Independent w/Device  Fallen 2 or more times in the past year?: No  Any fall with injury in the past year?: No    Living Situation:  Current living arrangement:: I live in a private home  Type of residence:: Private home - no stairs    Diet/Exercise/Sleep:  Diet:: Regular  Inadequate nutrition (GOAL):: No  Food Insecurity: No  Tube Feeding: No  Exercise:: Yes  Days per week of moderate to strenuous exercise (like a brisk walk): 5  On average, minutes per day of exercise at this level: 20  How intense was your typical exercise? : Light (like stretching or slow  walking)  Exercise Minutes per Week: 100  Inadequate activity/exercise (GOAL):: No  Significant changes in sleep pattern (GOAL): No    Transportation:  Transportation concerns (GOAL):: No  Transportation means:: Regular car     Psychosocial:  Caodaism or spiritual beliefs that impact treatment:: No  Mental health DX:: No  Mental health management concern (GOAL):: No  Informal Support system:: Family     Financial/Insurance:   Financial/Insurance concerns (GOAL):: No     Resources and Interventions:  Current Resources:   Community Resources: DME  Supplies used at home:: None  Equipment Currently Used at Home: walker, rolling, cane, straight    Advance Care Plan/Directive  Advanced Care Plans/Directives on file:: No  Advanced Care Plan/Directive Status: Considering Options    Referrals Placed: Specialty Providers(Has new patient appt at Pain Clinic in May)     Goals:   Goals        General    #1 Pain Management (pt-stated)     Notes - Note edited  5/16/2019  1:30 PM by Vivi Ruggiero RN    Goal Statement: I will find pain control regimen that provides adequate relief to my chronic back pain.  Measure of Success: Patient will report stable pain control regimen  Supportive Steps to Achieve: PCP; Care Coordination; Pain clinic   Barriers: acute on chronic back pain  Strengths: motivated and engaged in plan of care  Date to Achieve By: 7/1/19  Patient expressed understanding of goal: Yes              Patient/Caregiver understanding: Patient verbalized good understanding of current plan of care    Outreach Frequency: monthly    Plan: 1) Patient will maintain pain diary as advised by her pain specialist. She will call pain clinic with any questions or concerns related to recent injection  2) Patient will consider scheduling PCP appt to further evaluate concern of decreased appetite  3) RN Care Coordinator will continue to follow and outreach monthly. Patient encouraged to call with any questions or concerns as  needed.    JOHNATHON CortezN, RN   [facility] - RN Care Coordinator  Phone: [phone]

## 2019-05-22 ENCOUNTER — TELEPHONE (OUTPATIENT)
Dept: PALLIATIVE MEDICINE | Facility: CLINIC | Age: 84
End: 2019-05-22

## 2019-05-22 NOTE — TELEPHONE ENCOUNTER
Patient had a lumbar  medial branch block # 1 on 5/15/19.   Called patient for an update. Pt reported the following details:  Reports the injection was very helpful until that evening. Does note some increased pain due to the weather today.  If needed, remind pt that this procedure is not supposed to give them long term pain relief, only a few hours.     Has patient sent back the post injection form?Yes, states Daughter in law dropped it off at [place]. Will check with Daughter in law to make sure it was dropped off. Will call back and leave a message.    If this is medial branch block #1 does patient want to schedule medial branch block #2? Yes    If this is medial branch block #2 does patient want to proceed w/ the radiofrequency ablation procedure? .     If yes, inform pt that we will have this information sent to our support staff to  check insurance coverage.  Once we hear from insurance pt will be called.  This can take anywhere from 1 week to 30 days.

## 2019-05-22 NOTE — TELEPHONE ENCOUNTER
Information noted - proceed with MBB #2 when approved if significant improvement from MBB #1.    Keo Pascual MD  [facility] [place]

## 2019-05-22 NOTE — TELEPHONE ENCOUNTER
[phone] Tamika Brown daughter in law stated she dropped off the pain Diary for her mothers MBB #1.       Ida WALLACE    [facility] [place]

## 2019-05-24 NOTE — TELEPHONE ENCOUNTER
Pt called wondering if anyone found her pain diary. She wants to know if she can get the second set on injections.    Lianne Fulton    [facility]

## 2019-05-28 NOTE — TELEPHONE ENCOUNTER
Pt had a leftLumbar  medial branch block # 1 on 5/15/19.  The post medial branch block form was received.    According to pain diary pt would like to proceed with medial branch block #2.    Max relief from block is:    At rest:                 100%  Left fact load:       100%  Right facet load:  NA  Normal activity:    100%    Insurance:  Medicare      Does pt have adequate relief insurance-wise to proceed to medial branch block #2?  Yes:      Physical therapy:      Last done in?:  In process at [place].     How many sessions?:  -.      Where was it  done?  -.      If outside location does a release of information need to be signed? -     If pt has not done PT does it needs to be ordered/started in order to have the radiofrequency ablation? -.    Routed to scheduling coordinators to call pt to schedule Lumbar medial branch block #2 with Dr. Catia Pascual .    Tho Simmons, RN  Care Coordinator   [facility]

## 2019-05-29 NOTE — TELEPHONE ENCOUNTER
Pre-screening questions for MBB Injections:    Injection to be done at which interventional clinic site? [facility]     Instruct patient to arrive as directed prior to the scheduled appointment time:    [place] and [place]: 30 minutes before        Procedure ordered by Dr. Catia Pascual    Procedure ordered? Lumbar Medial Branch Block    What insurance would patient like us to bill for this procedure? Medicare/[employer]      Worker's comp- Any injection DO NOT SCHEDULE and route to Marcella Eckert.      [employer] insurance - If scheduling an SI joint injection DO NOT SCHEDULE and route to Marcella Eckert.          MBB's must be scheduled at LEAST two weeks apart for insurance purposes       [employer] - Any injection besides hip/shoulder/knee joint DO NOT SCHEDULE and route to Marcella Eckert. She will obtain PA and call pt back to schedule procedure or notify pt of denial.       [employer]- PA required for all MBB's    Any chance of pregnancy? NO   If YES, do NOT schedule and route to RN pool    Is an  needed? No     Patient has a drive home? (mandatory) Yes     Is patient taking any blood thinners (plavix, coumadin, jantoven, warfarin, heparin, pradaxa or dabigatran )? No    If hold needed, do NOT schedule, route to RN pool     Is patient taking any aspirin products? No     If more than 325mg/day do NOT schedule; route to RN pool     For CERVICAL procedures, hold all aspirin products for 6 days.      Does the patient have a bleeding or clotting disorder? No    If YES, okay to schedule AND route to RN nurse pool    **For any patients with platelet count <100, must be forwarded to provider**    Is patient diabetic? YES If YES, have them bring their glucometer.    Does patient have an active infection or treated for one within the past week? No    Is patient currently taking any antibiotics?  No    For patients on chronic, preventative, or prophylacti antibiotics, procedures can be scheduled.     For patients on  antibiotics for active or recent infection:    Anu Nath [place], [place], [place]-antibiotic course must have been completed for 4 days     Is patient currently taking any steroid medications? (i.e. Prednisone, Medrol)  No     For patients on steroid medications:    Fany Nath [place], [place]-steroid course must have been completed for 4 days    Review with patient:  If you are started on any steroids or antibiotics between now and your appointment, you must contact us because it may affect our ability to perform your procedure YES    Is patient actively being treated for cancer or immunocompromised? No   If YES, do NOT schedule and route to RN    Are you able to get on and off an exam table with minimal or no assistance? Yes   If NO, do NOT schedule and route to RN    Are you able to roll over and lay on your stomach with minimal or no assistance? Yes   If NO, do NOT schedule and route to RN    Any allergies to contrast dye, iodine, shellfish, or numbing and steroid medications? Yes - OK  (If so, inform nursing and note in scheduling comments.)    Allergies: Metoprolol; Cephalexin; Erythromycin; Actonel [bisphosphonates]; Azithromycin; Celebrex [celecoxib]; Cipro [ciprofloxacin]; Erythromycin; Escitalopram; Fosamax; Keflex [cephalexin monohydrate]; Lisinopril; Nitrofurantoin; No clinical screening - see comments; Risedronate; Shellfish-derived products; Tetanus-diphtheria toxoids; Vicodin [acetaminophen]; Vioxx; Alendronic acid; Celecoxib; Penicillins; Rofecoxib; Sulfa drugs; and Sulfasalazine     Has the patient had a flu shot or any other vaccinations within 7 days before or after the procedure.  No     Does patient have an MRI/CT?  Not Applicable  (SI joint, hip injections, lumbar sympathetic blocks, and stellate ganglion blocks do not require an MRI)    Was the MRI done w/in the last 3 years?  NA    Was MRI done at [place]? No      If not, where was it done? N/A       If MRI was not  done at [place], [facility] or [facility] do NOT schedule and route to nursing.  If pt has an imaging disc, the injection may be scheduled but pt has to bring disc to appt. If they show up w/out disc the injection cannot be done    **Must be scheduled with elapsed time interval of at least 2 weeks and not more than 6 months between the First MBB and the Second MBB**       Medial Branch Block Pre-Procedure Instructions    It is okay to take long acting pain medications (if you are on them) the day of the procedure but try not to take any short acting medications unless absolutely necessary. ok        Long acting meds would include: Gabapentin (Neurontin), MS Contin, Oxycontin        Short acting meds would include:  Percocet, Oxycodone, Vicodin, Ibuprofen     The day of the procedure, you should try to do things that provoke your pain, since the injection is being done to see if it will relieve your pain . ok    If your pain level is a 4 out of 10 or less on the day of the procedure, please call [phone] to reschedule.  ok  Reminders (please tell patient if applicable):      Instructed pt to arrive 30 minutes early for IV start if this is for a cervical procedure, ALL sympathetic (stellate ganglion, hypogastric, or lumbar sympathetic block) and all sedation procedures (RFA, spinal cord stimulation trials).         -IVs are not routinely placed for Dr. Tejeda cervical cases        -Dr. Mendiola: no IV needed for CMBB       If NPO for sedation, it is okay to take medications with sips of water (except if they are to hold blood thinners).    *DO take blood pressure medication if it is prescribed*      If this is for a cervical MBB aspirin needs to be held for 6 days.           Do not schedule procedures requiring IV placement in the first appointment of the day or first appointment after lunch. Do NOT schedule at 0745, 0815 or 1245.            For patients 85 or older we recommend having an adult stay w/ them for the  remainder of the day.      Does the patient have any questions? no

## 2019-05-30 ENCOUNTER — OFFICE VISIT (OUTPATIENT)
Dept: FAMILY MEDICINE | Facility: CLINIC | Age: 84
End: 2019-05-30
Payer: MEDICARE

## 2019-05-30 VITALS
TEMPERATURE: 96.9 F | HEART RATE: 91 BPM | DIASTOLIC BLOOD PRESSURE: 64 MMHG | OXYGEN SATURATION: 96 % | RESPIRATION RATE: 12 BRPM | BODY MASS INDEX: 29.7 KG/M2 | SYSTOLIC BLOOD PRESSURE: 136 MMHG | WEIGHT: 173 LBS

## 2019-05-30 DIAGNOSIS — E11.42 TYPE 2 DIABETES MELLITUS WITH DIABETIC POLYNEUROPATHY, WITHOUT LONG-TERM CURRENT USE OF INSULIN (H): ICD-10-CM

## 2019-05-30 DIAGNOSIS — R43.0 ANOSMIA: ICD-10-CM

## 2019-05-30 DIAGNOSIS — F33.1 MODERATE EPISODE OF RECURRENT MAJOR DEPRESSIVE DISORDER (H): ICD-10-CM

## 2019-05-30 DIAGNOSIS — R63.0 DECREASED APPETITE: Primary | ICD-10-CM

## 2019-05-30 DIAGNOSIS — F41.1 GAD (GENERALIZED ANXIETY DISORDER): ICD-10-CM

## 2019-05-30 LAB
ALBUMIN SERPL-MCNC: 3.7 G/DL (ref 3.4–5)
ALP SERPL-CCNC: 86 U/L (ref 40–150)
ALT SERPL W P-5'-P-CCNC: 29 U/L (ref 0–50)
ANION GAP SERPL CALCULATED.3IONS-SCNC: 5 MMOL/L (ref 3–14)
AST SERPL W P-5'-P-CCNC: 27 U/L (ref 0–45)
BASOPHILS # BLD AUTO: 0 10E9/L (ref 0–0.2)
BASOPHILS NFR BLD AUTO: 0.4 %
BILIRUB SERPL-MCNC: 0.8 MG/DL (ref 0.2–1.3)
BUN SERPL-MCNC: 14 MG/DL (ref 7–30)
CALCIUM SERPL-MCNC: 9.3 MG/DL (ref 8.5–10.1)
CHLORIDE SERPL-SCNC: 108 MMOL/L (ref 94–109)
CO2 SERPL-SCNC: 27 MMOL/L (ref 20–32)
CREAT SERPL-MCNC: 0.87 MG/DL (ref 0.52–1.04)
DIFFERENTIAL METHOD BLD: NORMAL
EOSINOPHIL # BLD AUTO: 0.3 10E9/L (ref 0–0.7)
EOSINOPHIL NFR BLD AUTO: 3 %
ERYTHROCYTE [DISTWIDTH] IN BLOOD BY AUTOMATED COUNT: 12.3 % (ref 10–15)
GFR SERPL CREATININE-BSD FRML MDRD: 60 ML/MIN/{1.73_M2}
GLUCOSE SERPL-MCNC: 161 MG/DL (ref 70–99)
HBA1C MFR BLD: 6.4 % (ref 0–5.6)
HCT VFR BLD AUTO: 37.7 % (ref 35–47)
HGB BLD-MCNC: 12.8 G/DL (ref 11.7–15.7)
LYMPHOCYTES # BLD AUTO: 2.5 10E9/L (ref 0.8–5.3)
LYMPHOCYTES NFR BLD AUTO: 24.9 %
MCH RBC QN AUTO: 29.8 PG (ref 26.5–33)
MCHC RBC AUTO-ENTMCNC: 34 G/DL (ref 31.5–36.5)
MCV RBC AUTO: 88 FL (ref 78–100)
MONOCYTES # BLD AUTO: 0.8 10E9/L (ref 0–1.3)
MONOCYTES NFR BLD AUTO: 8.1 %
NEUTROPHILS # BLD AUTO: 6.4 10E9/L (ref 1.6–8.3)
NEUTROPHILS NFR BLD AUTO: 63.6 %
PLATELET # BLD AUTO: 283 10E9/L (ref 150–450)
POTASSIUM SERPL-SCNC: 3.6 MMOL/L (ref 3.4–5.3)
PROT SERPL-MCNC: 7.3 G/DL (ref 6.8–8.8)
RBC # BLD AUTO: 4.3 10E12/L (ref 3.8–5.2)
SODIUM SERPL-SCNC: 140 MMOL/L (ref 133–144)
T4 FREE SERPL-MCNC: 0.94 NG/DL (ref 0.76–1.46)
TSH SERPL DL<=0.005 MIU/L-ACNC: 5.5 MU/L (ref 0.4–4)
WBC # BLD AUTO: 10.1 10E9/L (ref 4–11)

## 2019-05-30 PROCEDURE — 36415 COLL VENOUS BLD VENIPUNCTURE: CPT | Performed by: INTERNAL MEDICINE

## 2019-05-30 PROCEDURE — 83036 HEMOGLOBIN GLYCOSYLATED A1C: CPT | Performed by: INTERNAL MEDICINE

## 2019-05-30 PROCEDURE — 80053 COMPREHEN METABOLIC PANEL: CPT | Performed by: INTERNAL MEDICINE

## 2019-05-30 PROCEDURE — 99214 OFFICE O/P EST MOD 30 MIN: CPT | Performed by: INTERNAL MEDICINE

## 2019-05-30 PROCEDURE — 85025 COMPLETE CBC W/AUTO DIFF WBC: CPT | Performed by: INTERNAL MEDICINE

## 2019-05-30 PROCEDURE — 84443 ASSAY THYROID STIM HORMONE: CPT | Performed by: INTERNAL MEDICINE

## 2019-05-30 PROCEDURE — 84439 ASSAY OF FREE THYROXINE: CPT | Performed by: INTERNAL MEDICINE

## 2019-05-30 RX ORDER — DULOXETIN HYDROCHLORIDE 30 MG/1
30 CAPSULE, DELAYED RELEASE ORAL DAILY
Qty: 90 CAPSULE | Refills: 3 | Status: SHIPPED | OUTPATIENT
Start: 2019-05-30 | End: 2019-06-28 | Stop reason: SINTOL

## 2019-05-30 ASSESSMENT — ANXIETY QUESTIONNAIRES
2. NOT BEING ABLE TO STOP OR CONTROL WORRYING: NOT AT ALL
3. WORRYING TOO MUCH ABOUT DIFFERENT THINGS: NOT AT ALL
6. BECOMING EASILY ANNOYED OR IRRITABLE: NOT AT ALL
5. BEING SO RESTLESS THAT IT IS HARD TO SIT STILL: NOT AT ALL
7. FEELING AFRAID AS IF SOMETHING AWFUL MIGHT HAPPEN: NOT AT ALL
GAD7 TOTAL SCORE: 0
1. FEELING NERVOUS, ANXIOUS, OR ON EDGE: NOT AT ALL

## 2019-05-30 ASSESSMENT — PATIENT HEALTH QUESTIONNAIRE - PHQ9
SUM OF ALL RESPONSES TO PHQ QUESTIONS 1-9: 15
5. POOR APPETITE OR OVEREATING: NOT AT ALL

## 2019-05-30 NOTE — PROGRESS NOTES
Subjective     Daniela Brown is a 86 year old female who presents to clinic today for the following health issues:    HPI   Rx for seasonal allergies: pt was rx some eye drops and would like refill on that.      Concern - Eating concerns  Onset: 3 to 4 months    Description:   PT verbalize that in the last conversation with an RN,pt was informed to see the PCP because her eating habits. Pt doesn't have an appetitive for the past 3 to 4 months. She doesn't know that because she is alone now and doesn't feel like cooking.   Pt verbalize that she is drinking enough water.   In the morning: never ate breakfast  11 am will have 1 piece of toast and soup  At night (btw 6 to 8pm) half sandwich and milk  If doesn't have a meal will have some ice cream   Pt verbalize that she is gain weight but not sure how or why.  --she reports her sense of smell and taste is abnormal.  --she also thinks her symptoms are due to depression - feeling down about her medical problems.    --favorite food is BBQ ribs which she made 3 days ago, but did not eat much  --used to like to go out to eat but this doesn't interest her anymore.   --she reports history of depression when   16 years ago.  Her son  2018 and she notes her mood worsened around this time.   --she doesn't recall taking daily anti-depressant.  Has never seen counselor in the past.   Celexa is on her medication list.  In [facility] in 2018 she saw Arnoldo Mcmanus. She does note problems with memory - wonders if she can take Prevagen.  --sets her meds up in a pill box.  --hobbies - Bingo, casino.  Has not done these activities in 7+ years because of chronic back pain and leg weakness.  --she visits with son but not as often as she would like because he works FT. She is fearful of driving long distances.  --she has 1 friend but she lives in [place] - too far distance to drive.   --she used to go to [employer] but not since son .    --denies night  sweats, fevers, chest pain, shortness of breath, dysphagia, GERD, abdominal pain, N/V, urinary symptoms, skin changes, new arthralgias/myalgias, headache.  --endorses post-nasal drip, intermittent/alternating diarrhea/constipation from IBS    Wt Readings from Last 2 Encounters:   05/30/19 78.5 kg (173 lb)   04/22/19 74.8 kg (165 lb)         Current Outpatient Medications   Medication Sig Dispense Refill     acetaminophen (TYLENOL) 325 MG tablet Take 1 tablet by mouth every 6 hours as needed for pain. 30 tablet 0     amLODIPine (NORVASC) 5 MG tablet Take 1 tablet (5 mg) by mouth every evening 90 tablet 3     atorvastatin (LIPITOR) 40 MG tablet Take 1 tablet (40 mg) by mouth At Bedtime 90 tablet 3     blood glucose monitoring (ACCU-CHEK FASTCLIX) lancets Use to test blood sugar one times daily or as directed. 102 each 3     blood glucose monitoring (ACCU-CHEK GUIDE) test strip Use to test blood sugar one times daily or as directed. 100 each 3     citalopram (CELEXA) 20 MG tablet Take 1 tablet (20 mg) by mouth daily 90 tablet 3     ClonazePAM (KLONOPIN PO) Take 0.5 mg by mouth 2 times daily as needed for anxiety       Cyanocobalamin (B-12) 1000 MCG SUBL Place 1 tablet under the tongue daily        dicyclomine (BENTYL) 20 MG tablet Take 1 tablet (20 mg) by mouth 4 times daily as needed (diarrhea) 60 tablet 11     gabapentin (NEURONTIN) 300 MG capsule Take by mouth eight hours apart: 2 capsules in morning, 1-2 capsules midday, 2 capsules at night 360 capsule 3     lidocaine (LIDODERM) 5 % patch Apply patch to left low back at bedtime. Remove patch in the morning. 10 patch 0     losartan (COZAAR) 50 MG tablet Take 1 tablet (50 mg) by mouth daily 90 tablet 3     Multiple Vitamins-Minerals (THERAPEUTIC MULTIVIT/MINERAL) TABS Take 1 tablet by mouth daily.       order for DME 1 walker 1 Device 0     ORDER FOR DME Thigh length teds. 1 Device 2     traMADol (ULTRAM) 50 MG tablet Take 1 tablet (50 mg) by mouth 3 times daily as  needed for pain Max 3 per day 90 tablet 5     VITAMIN D, CHOLECALCIFEROL, PO Take 1,000 Units by mouth daily       nitroGLYcerin (NITROSTAT) 0.4 MG sublingual tablet Place 1 tablet (0.4 mg) under the tongue every 5 minutes as needed for chest pain (Patient not taking: Reported on 5/30/2019) 30 tablet 4         Reviewed and updated as needed this visit by Provider         Review of Systems   ROS COMP: Constitutional, HEENT, cardiovascular, pulmonary, GI, , musculoskeletal, neuro, skin, endocrine and psych systems are negative, except as otherwise noted.      Objective    /64   Pulse 91   Temp 96.9  F (36.1  C) (Tympanic)   Resp 12   Wt 78.5 kg (173 lb)   SpO2 96%   Breastfeeding? No   BMI 29.70 kg/m    Body mass index is 29.7 kg/m .  Physical Exam   GENERAL APPEARANCE: healthy, alert and no distress  HENT: ear canals and TM's normal and nose and mouth without ulcers or lesions  NECK: no adenopathy, no asymmetry, masses, or scars and thyroid normal to palpation  RESP: lungs clear to auscultation - no rales, rhonchi or wheezes  CV: regular rates and rhythm, normal S1 S2, no S3 or S4 and systolic murmur.  LYMPHATICS: no cervical adenopathy  No leg edema  ABDOMEN: soft, nontender, without hepatosplenomegaly or masses and bowel sounds normal  MS: extremities normal- no gross deformities noted  PSYCH: mentation appears normal and affect flat    Diagnostic Test Results:  Labs reviewed in Epic  Results for orders placed or performed in visit on 05/30/19 (from the past 24 hour(s))   CBC with platelets and differential   Result Value Ref Range    WBC 10.1 4.0 - 11.0 10e9/L    RBC Count 4.30 3.8 - 5.2 10e12/L    Hemoglobin 12.8 11.7 - 15.7 g/dL    Hematocrit 37.7 35.0 - 47.0 %    MCV 88 78 - 100 fl    MCH 29.8 26.5 - 33.0 pg    MCHC 34.0 31.5 - 36.5 g/dL    RDW 12.3 10.0 - 15.0 %    Platelet Count 283 150 - 450 10e9/L    % Neutrophils 63.6 %    % Lymphocytes 24.9 %    % Monocytes 8.1 %    % Eosinophils 3.0 %    %  Basophils 0.4 %    Absolute Neutrophil 6.4 1.6 - 8.3 10e9/L    Absolute Lymphocytes 2.5 0.8 - 5.3 10e9/L    Absolute Monocytes 0.8 0.0 - 1.3 10e9/L    Absolute Eosinophils 0.3 0.0 - 0.7 10e9/L    Absolute Basophils 0.0 0.0 - 0.2 10e9/L    Diff Method Automated Method    Hemoglobin A1c   Result Value Ref Range    Hemoglobin A1C 6.4 (H) 0 - 5.6 %           Assessment & Plan     1. Decreased appetite -likely related to depression/anxiety and grief reaction.  Check basic labs as below.  Discussed adding more protein and high fat foods to her diet.  - CBC with platelets and differential  - TSH with free T4 reflex  - Comprehensive metabolic panel    2. Anosmia  - CBC with platelets and differential  - TSH with free T4 reflex  - Comprehensive metabolic panel    3. Moderate episode of recurrent major depressive disorder (H) -stop citalopram, start Cymbalta.  Recheck in 1 month.  If the mood is not improved, would increase the dose to 60 mg.  If the appetite and sense of smell or not improved may also consider increasing.  We will monitor the weight closely.  - DULoxetine (CYMBALTA) 30 MG capsule; Take 1 capsule (30 mg) by mouth daily  Dispense: 90 capsule; Refill: 3    4. ARABELLA (generalized anxiety disorder)  - DULoxetine (CYMBALTA) 30 MG capsule; Take 1 capsule (30 mg) by mouth daily  Dispense: 90 capsule; Refill: 3    5. Type 2 diabetes mellitus with diabetic polyneuropathy, without long-term current use of insulin (H)  - Hemoglobin A1c         Patient Instructions   1. Call Dr. Maddox with name of allergy eye drop.  2. Does not appear to be sinus infection - may be virus or allergies or non-allergic post - nasal drip.  3. Stop the Citalopram (Celexa) - this is for depression and anxiety and does not appear to be working well.  4. In place of Citalopram, try Duloxetine (Cymbalta).  5. I am referring you to [facility] at [place] for depression; please make your initial appointment for 1 hour.   The phone number is [phone] or you can schedule at the  on your way out.  6. See DAWSON Maddox in 1 month to follow-up on the Cymbalta.          Ok to try Probiotic. Recommend against the Prevagen      Return in about 1 month (around 6/30/2019).    Dr. Cynthia Maddox, DO  [facility]

## 2019-05-30 NOTE — LETTER
May 31, 2019      Daniela Betty Wood  [address] [facility] [address]        Dear ,    We are writing to inform you of your test results.    The TSH is very mildly elevated, but when adjusted for age is in the normal range.  This does not require treatment with a thyroid medication at this time.  The blood sugar is mildly elevated, but the A1c is stable.  No changes to the diabetes treatment plan at this time.  Electrolytes, kidney function, liver function, blood count are normal.     Resulted Orders   CBC with platelets and differential   Result Value Ref Range    WBC 10.1 4.0 - 11.0 10e9/L    RBC Count 4.30 3.8 - 5.2 10e12/L    Hemoglobin 12.8 11.7 - 15.7 g/dL    Hematocrit 37.7 35.0 - 47.0 %    MCV 88 78 - 100 fl    MCH 29.8 26.5 - 33.0 pg    MCHC 34.0 31.5 - 36.5 g/dL    RDW 12.3 10.0 - 15.0 %    Platelet Count 283 150 - 450 10e9/L    % Neutrophils 63.6 %    % Lymphocytes 24.9 %    % Monocytes 8.1 %    % Eosinophils 3.0 %    % Basophils 0.4 %    Absolute Neutrophil 6.4 1.6 - 8.3 10e9/L    Absolute Lymphocytes 2.5 0.8 - 5.3 10e9/L    Absolute Monocytes 0.8 0.0 - 1.3 10e9/L    Absolute Eosinophils 0.3 0.0 - 0.7 10e9/L    Absolute Basophils 0.0 0.0 - 0.2 10e9/L    Diff Method Automated Method    TSH with free T4 reflex   Result Value Ref Range    TSH 5.50 (H) 0.40 - 4.00 mU/L   Hemoglobin A1c   Result Value Ref Range    Hemoglobin A1C 6.4 (H) 0 - 5.6 %      Comment:      Normal <5.7% Prediabetes 5.7-6.4%  Diabetes 6.5% or higher - adopted from ADA   consensus guidelines.     Comprehensive metabolic panel   Result Value Ref Range    Sodium 140 133 - 144 mmol/L    Potassium 3.6 3.4 - 5.3 mmol/L    Chloride 108 94 - 109 mmol/L    Carbon Dioxide 27 20 - 32 mmol/L    Anion Gap 5 3 - 14 mmol/L    Glucose 161 (H) 70 - 99 mg/dL      Comment:      Non Fasting    Urea Nitrogen 14 7 - 30 mg/dL    Creatinine 0.87 0.52 - 1.04 mg/dL    GFR Estimate 60 (L) >60 mL/min/[1.73_m2]      Comment:      Non   American GFR Calc  Starting 12/18/2018, serum creatinine based estimated GFR (eGFR) will be   calculated using the Chronic Kidney Disease Epidemiology Collaboration   (CKD-EPI) equation.      GFR Estimate If Black 70 >60 mL/min/[1.73_m2]      Comment:       GFR Calc  Starting 12/18/2018, serum creatinine based estimated GFR (eGFR) will be   calculated using the Chronic Kidney Disease Epidemiology Collaboration   (CKD-EPI) equation.      Calcium 9.3 8.5 - 10.1 mg/dL    Bilirubin Total 0.8 0.2 - 1.3 mg/dL    Albumin 3.7 3.4 - 5.0 g/dL    Protein Total 7.3 6.8 - 8.8 g/dL    Alkaline Phosphatase 86 40 - 150 U/L    ALT 29 0 - 50 U/L    AST 27 0 - 45 U/L   T4 free   Result Value Ref Range    T4 Free 0.94 0.76 - 1.46 ng/dL       If you have any questions or concerns, please call the clinic at the number listed above.       Sincerely,      Cynthia Maddox, DO

## 2019-05-30 NOTE — PATIENT INSTRUCTIONS
1. Call Dr. Maddox with name of allergy eye drop.  2. Does not appear to be sinus infection - may be virus or allergies or non-allergic post - nasal drip.  3. Stop the Citalopram (Celexa) - this is for depression and anxiety and does not appear to be working well.  4. In place of Citalopram, try Duloxetine (Cymbalta).  5. I am referring you to [facility] [place] for depression; please make your initial appointment for 1 hour.  The phone number is [phone] or you can schedule at the  on your way out.  6. See DAWSON Maddox in 1 month to follow-up on the Cymbalta.          Ok to try Probiotic. Recommend against the Prevagen

## 2019-05-31 ASSESSMENT — ANXIETY QUESTIONNAIRES: GAD7 TOTAL SCORE: 0

## 2019-06-04 ENCOUNTER — TELEPHONE (OUTPATIENT)
Dept: PALLIATIVE MEDICINE | Facility: CLINIC | Age: 84
End: 2019-06-04

## 2019-06-04 NOTE — TELEPHONE ENCOUNTER
Pt asking for a call back regarding her Therapy and Injection appt's.       Ida WALLACE    [facility]

## 2019-06-04 NOTE — TELEPHONE ENCOUNTER
Pt is scheduled for lumbar medial branch block #2 on 6/11/19.    Called pt. She wondered if it was okay to proceed w/ the upcoming appointment b/c she was sick and had to reschedule her PT eval.  Informed her that she can do both PT and have the injection but that 4 PT visits will need to be done before we check insurance coverage for the radiofrequency ablation.    Oxana García RN-BSN  [facility]

## 2019-06-11 ENCOUNTER — HOSPITAL ENCOUNTER (OUTPATIENT)
Dept: RADIOLOGY | Facility: CLINIC | Age: 84
Discharge: HOME | End: 2019-06-11
Attending: ANESTHESIOLOGY | Admitting: ANESTHESIOLOGY
Payer: MEDICARE

## 2019-06-11 ENCOUNTER — RADIOLOGY INJECTION OFFICE VISIT (OUTPATIENT)
Dept: PALLIATIVE MEDICINE | Facility: CLINIC | Age: 84
End: 2019-06-11
Payer: MEDICARE

## 2019-06-11 VITALS — DIASTOLIC BLOOD PRESSURE: 74 MMHG | HEART RATE: 89 BPM | RESPIRATION RATE: 16 BRPM | SYSTOLIC BLOOD PRESSURE: 154 MMHG

## 2019-06-11 DIAGNOSIS — M47.816 SPONDYLOSIS OF LUMBAR REGION WITHOUT MYELOPATHY OR RADICULOPATHY: ICD-10-CM

## 2019-06-11 DIAGNOSIS — M47.817 LUMBOSACRAL SPONDYLOSIS WITHOUT MYELOPATHY: Primary | ICD-10-CM

## 2019-06-11 DIAGNOSIS — M47.817 LUMBOSACRAL FACET JOINT SYNDROME: ICD-10-CM

## 2019-06-11 PROCEDURE — 64495 INJ PARAVERT F JNT L/S 3 LEV: CPT | Mod: LT | Performed by: ANESTHESIOLOGY

## 2019-06-11 PROCEDURE — 64493 INJ PARAVERT F JNT L/S 1 LEV: CPT | Mod: LT | Performed by: ANESTHESIOLOGY

## 2019-06-11 PROCEDURE — 64494 INJ PARAVERT F JNT L/S 2 LEV: CPT | Mod: LT | Performed by: ANESTHESIOLOGY

## 2019-06-11 PROCEDURE — 25500064 ZZH RX 255 OP 636: Performed by: ANESTHESIOLOGY

## 2019-06-11 PROCEDURE — 27210202 XR MEDIAL BRANCH BLOCK LUMBAR BILATERAL: Mod: TC

## 2019-06-11 PROCEDURE — 25000128 H RX IP 250 OP 636: Performed by: ANESTHESIOLOGY

## 2019-06-11 RX ORDER — BUPIVACAINE HYDROCHLORIDE 5 MG/ML
10 INJECTION, SOLUTION PERINEURAL ONCE
Status: COMPLETED | OUTPATIENT
Start: 2019-06-11 | End: 2019-06-11

## 2019-06-11 RX ADMIN — BUPIVACAINE HYDROCHLORIDE 2 ML: 5 INJECTION, SOLUTION EPIDURAL; INTRACAUDAL; PERINEURAL at 10:45

## 2019-06-11 RX ADMIN — IOHEXOL 1 ML: 300 INJECTION, SOLUTION INTRAVENOUS at 10:45

## 2019-06-11 ASSESSMENT — PAIN SCALES - GENERAL
PAINLEVEL: MODERATE PAIN (5)
PAINLEVEL: MILD PAIN (3)
PAINLEVEL: EXTREME PAIN (9)

## 2019-06-11 NOTE — IP AVS SNAPSHOT
[facility]  [address]  [facility] [address]  Phone:  [phone]  Fax:  [phone]                                    After Visit Summary   6/11/2019    Daniela Brown    MRN: [id number]           After Visit Summary Signature Page    I have received my discharge instructions, and my questions have been answered. I have discussed any challenges I see with this plan with the nurse or doctor.    ..........................................................................................................................................  Patient/Patient Representative Signature      ..........................................................................................................................................  Patient Representative Print Name and Relationship to Patient    ..................................................               ................................................  Date                                   Time    ..........................................................................................................................................  Reviewed by Signature/Title    ...................................................              ..............................................  Date                                               Time          22EPIC Rev 08/18

## 2019-06-11 NOTE — DISCHARGE INSTRUCTIONS
[facility]   Medial Branch Block Discharge Instructions      Your procedure was performed by:  Dr. Keo Pascual      Medications used include:  Lidocaine  Bupivicaine  IsoVue    You will need to complete the Pain Scale Log form and return it to us as soon as possible.  Once we have received the form, we will review it and call you to determine the next steps.     The form can be faxed to [phone] or mailed to:   [facility]   [address] [address]   [place] [address]      You may resume your regular medications    You may resume your regular activities    Be cautious with walking as numbness and/or weakness in the lower extremities may occur for up to 6-8 hours due to the effects of the anesthetic.    Avoid driving for 6 hours. The local anesthetic could slow your reflexes.     You may shower, however no swimming or tub baths or hot tubs for 24 hours following your procedure.    Your pain will return after the numbing medications have worn off.  You may use your current pain medications as needed.    Unless you have been directed to avoid the use of anti-inflammatory medications (NSAIDS), you may use medications such as ibuprofen, Aleve or Tylenol for pain control if needed.  Some people find it helpful to alternate ibuprofen and Tylenol every 3 hours for a couple of days.    You may use ice packs 10-15 minutes three to four times a day at the injection site for comfort.     Do not use heat to painful areas for 6 to 8 hours. This will give the local anesthetic time to wear off and prevent you from accidentally burning your skin.     If you experience any of the following, call the Pain Clinic during work hours at [phone] or the Provider Line after hours at [phone]:  -Fever over 100 degree F  -Swelling, bleeding, redness, drainage, warmth at the injection site  -Progressive weakness or numbness in your legs or arms  -If lumbar, call if you have a loss  of bowel or bladder function  -If cervical, call if you have any unusual headache that is not relieved by Tylenol  -Unusual new onset of pain that is not improving

## 2019-06-11 NOTE — PROGRESS NOTES
Pre procedure Diagnosis: lumbosacral facet arthropathy, lumbosacral spondylosis   Post procedure Diagnosis: Same  Procedure performed: lumbosacral medial branch block #2 at L3, L4, L5, sacral ala on the left, fluoroscopically guided, contrast controlled  Indication:  Diagnostic   Anesthesia: none  Complications: none  Operators: Keo Pascual MD      Indications:   Daniela Brown is a 86 year old female who is known to me that presents today for lumbar facet procedures.  The patient has a history of chronic left lower back and buttock pain without radiation.  Exam shows lumbar tenderness and increased pain with extension and lateral rotation of the lumbar spine and they have tried conservative treatment including physical therapy, medications, and interventional procedures.     MRI of the lumbar spine was completed on 7/17/18 at [facility] showing:  Findings: No fracture.  Unremarkable alignment.    L1-L2: No significant spinal canal or neural foraminal stenosis.    L2-L3: Moderately severe left lateral recess stenosis due to small   disc protrusion in combination with facet hypertrophy.    L3-L4: No significant spinal canal or neural foraminal stenosis.    L4-L5: Moderately severe bilateral lateral recess stenosis due to a   combination of 5 mm degenerative anterior subluxation of L4 on L5   with facet hypertrophy and disc bulge. Moderate, probably   insignificant, bilateral neural foraminal stenosis.    L5-S1: No significant spinal canal or neural foraminal stenosis     Options/alternatives, benefits and risks were discussed with the patient including but not limited to bleeding, infection, tissue trauma, exposure to radiation, reaction to medications, spinal cord injury, weakness, numbness and paralysis.  Questions were answered to her satisfaction and she agrees to proceed. Voluntary informed consent was obtained and signed.      Vitals were reviewed: Yes  /74   Pulse 89   Resp 16    Allergies were reviewed:  Yes   Medications were reviewed:  Yes   Pre-procedure pain score: 9/10     Procedure:  After obtaining signed informed consent, the patient was brought into the procedure suite and was placed in a prone position on the procedure table.   A Pause for the Cause was performed.  The patient was prepped and draped in the usual sterile fashion.      Under AP fluoroscopic guidance the L3, L4, L5 vertebral bodies were identified. The C-arm was rotated to the oblique view to afford optimal visualization the pedicles.  Lidocaine 1% was used to anesthetize the skin at each level.  Under intermittent fluoroscopy, 25 G 5 inch spinal needles were positioned inferior and lateral to the intersection of the transverse process and pedicle at the Left L3, L4, & L5 levels, as well as the corresponding sacral alar notch on the left. The needle positions were verified and optimized with AP and lateral views.     The anatomic targets for the L2, L3 & L4 medial branch nerves and L5 dorsal ramus (which functionally incorporates the medial branch) were the  L3, L4 & L5 transverse processes and sacral alar notch, with laterality as described above, resulting in blockade of the L3/4, L4/5 and L5/S1 facet joints.     Isovue-300 was injected at each site, and appropriate spread of contrast was noted without vascular uptake.  A total of 1 ml of Omnipaque was used.  9 ml was wasted. Bupivacaine 0.5% 0.5 ml was injected at each location.  The needles were removed.       Hemostasis was achieved, the area was cleaned, and bandaids were placed when appropriate.  The patient tolerated the procedure well, and was taken to the recovery room.    Images were saved to PACS.      The patient will continue to monitor progress, and they were given a pain diary to complete at home.  They will either fax or mail this back to us or bring it to their next appointment. We will determine the treatment plan after we review the diary.        Post-procedure pain score: 3/10 - equalling a 67% reduction in pain at the time of discharge  Follow-up includes:   -f/u phone call in one week  -will await diary for further planning for Lumbar MB RFA.     Keo Pascual MD  [facility]

## 2019-06-11 NOTE — IP AVS SNAPSHOT
MRN:[id number]                      After Visit Summary   6/11/2019    Daniela Brown    MRN: [id number]           Visit Information        Provider Department      6/11/2019 10:45 AM [facility]           Review of your medicines      UNREVIEWED medicines. Ask your doctor about these medicines       Dose / Directions   acetaminophen 325 MG tablet  Commonly known as:  TYLENOL  Used for:  Degeneration of lumbar or lumbosacral intervertebral disc      Dose:  325 mg  Take 1 tablet by mouth every 6 hours as needed for pain.  Quantity:  30 tablet  Refills:  0     amLODIPine 5 MG tablet  Commonly known as:  NORVASC  Used for:  Benign essential hypertension      Dose:  5 mg  Take 1 tablet (5 mg) by mouth every evening  Quantity:  90 tablet  Refills:  3     atorvastatin 40 MG tablet  Commonly known as:  LIPITOR  Used for:  Angina pectoris (H), Type 2 diabetes mellitus with diabetic polyneuropathy, without long-term current use of insulin (H)      Dose:  40 mg  Take 1 tablet (40 mg) by mouth At Bedtime  Quantity:  90 tablet  Refills:  3     B-12 1000 MCG Subl      Dose:  1 tablet  Place 1 tablet under the tongue daily  Refills:  0     dicyclomine 20 MG tablet  Commonly known as:  BENTYL  Used for:  Irritable bowel syndrome with diarrhea      Dose:  20 mg  Take 1 tablet (20 mg) by mouth 4 times daily as needed (diarrhea)  Quantity:  60 tablet  Refills:  11     DULoxetine 30 MG capsule  Commonly known as:  CYMBALTA  Used for:  Moderate episode of recurrent major depressive disorder (H), ARABELLA (generalized anxiety disorder)      Dose:  30 mg  Take 1 capsule (30 mg) by mouth daily  Quantity:  90 capsule  Refills:  3     gabapentin 300 MG capsule  Commonly known as:  NEURONTIN  Indication:  Neuropathic Pain  Used for:  Chronic left-sided low back pain with left-sided sciatica      Take by mouth eight hours apart: 2 capsules in morning, 1-2 capsules midday, 2 capsules at  night  Quantity:  360 capsule  Refills:  3     KLONOPIN PO      Dose:  0.5 mg  Take 0.5 mg by mouth 2 times daily as needed for anxiety  Refills:  0     lidocaine 5 % patch  Commonly known as:  LIDODERM      Apply patch to left low back at bedtime. Remove patch in the morning.  Quantity:  10 patch  Refills:  0     losartan 50 MG tablet  Commonly known as:  COZAAR  Used for:  Benign essential hypertension      Dose:  50 mg  Take 1 tablet (50 mg) by mouth daily  Quantity:  90 tablet  Refills:  3     nitroGLYcerin 0.4 MG sublingual tablet  Commonly known as:  NITROSTAT  Used for:  Angina pectoris (H)      Dose:  0.4 mg  Place 1 tablet (0.4 mg) under the tongue every 5 minutes as needed for chest pain  Quantity:  30 tablet  Refills:  4     THERAPEUTIC MULTIVIT/MINERAL tablet      Dose:  1 tablet  Take 1 tablet by mouth daily.  Refills:  0     traMADol 50 MG tablet  Commonly known as:  ULTRAM  Used for:  Chronic pain syndrome      Dose:  50 mg  Take 1 tablet (50 mg) by mouth 3 times daily as needed for pain Max 3 per day  Quantity:  90 tablet  Refills:  5     VITAMIN D (CHOLECALCIFEROL) PO      Dose:  1000 Units  Take 1,000 Units by mouth daily  Refills:  0        CONTINUE these medicines which have NOT CHANGED       Dose / Directions   blood glucose monitoring lancets  Used for:  Type 2 diabetes mellitus with diabetic polyneuropathy, without long-term current use of insulin (H)      Use to test blood sugar one times daily or as directed.  Quantity:  102 each  Refills:  3     blood glucose test strip  Commonly known as:  ACCU-CHEK GUIDE  Used for:  Type 2 diabetes mellitus with diabetic polyneuropathy, without long-term current use of insulin (H)      Use to test blood sugar one times daily or as directed.  Quantity:  100 each  Refills:  3     order for DME  Used for:  Venous insufficiency      Thigh length teds.  Quantity:  1 Device  Refills:  2     order for DME  Used for:  Acute pain of right knee      1  walker  Quantity:  1 Device  Refills:  0              Protect others around you: Learn how to safely use, store and throw away your medicines at www.disposemymeds.org.       Follow-ups after your visit       Your next 10 appointments already scheduled    Jun 28, 2019  1:20 PM SHAHZAD AIKEN with Cynthia Maddox DO  [facility] ([facility] [address] [address] [phone]         Care Instructions       Further instructions from your care team       [facility]   Medial Branch Block Discharge Instructions      Your procedure was performed by:  Dr. Keo Pascual      Medications used include:  Lidocaine  Bupivicaine  IsoVue    You will need to complete the Pain Scale Log form and return it to us as soon as possible.  Once we have received the form, we will review it and call you to determine the next steps.     The form can be faxed to [phone] or mailed to:   [facility]   [id number] [place] [address] [address]   [place] [address]      You may resume your regular medications    You may resume your regular activities    Be cautious with walking as numbness and/or weakness in the lower extremities may occur for up to 6-8 hours due to the effects of the anesthetic.    Avoid driving for 6 hours. The local anesthetic could slow your reflexes.     You may shower, however no swimming or tub baths or hot tubs for 24 hours following your procedure.    Your pain will return after the numbing medications have worn off.  You may use your current pain medications as needed.    Unless you have been directed to avoid the use of anti-inflammatory medications (NSAIDS), you may use medications such as ibuprofen, Aleve or Tylenol for pain control if needed.  Some people find it helpful to alternate ibuprofen and Tylenol every 3 hours for a couple of days.    You may use ice packs 10-15 minutes three to four times a day at  the injection site for comfort.     Do not use heat to painful areas for 6 to 8 hours. This will give the local anesthetic time to wear off and prevent you from accidentally burning your skin.     If you experience any of the following, call the Pain Clinic during work hours at [phone] or the Provider Line after hours at [phone]:  -Fever over 100 degree F  -Swelling, bleeding, redness, drainage, warmth at the injection site  -Progressive weakness or numbness in your legs or arms  -If lumbar, call if you have a loss of bowel or bladder function  -If cervical, call if you have any unusual headache that is not relieved by Tylenol  -Unusual new onset of pain that is not improving      Additional Information About Your Visit       Care EveryWhere ID    This is your Care EveryWhere ID. This could be used by other organizations to access your [facility] medical records  [id number]        Primary Care Provider Office Phone # Fax #    Cynthia Maddox,  [phone]      Equal Access to Services    MANFRED REYES : Hadii aad ku hadasho Sokanuali, waaxda luqadaha, qaybta kaalmada adeegyacandelaria, rasheed ramirez . So [facility] [phone].    ATENCIÓN: Si habla español, tiene a cam disposición servicios gratuitos de asistencia lingüística. Llame al [phone].    We comply with applicable federal civil rights laws and [place] laws. We do not discriminate on the basis of race, color, national origin, age, disability, sex, sexual orientation, or gender identity.           Thank you!    Thank you for choosing [facility] for your care. Our goal is always to provide you with excellent care. Hearing back from our patients is one way we can continue to improve our services. Please take a few minutes to complete the written survey that you may receive in the mail after you visit with us. Thank you!            Medication List      Medications          Morning Afternoon Evening Bedtime As Needed     blood glucose monitoring lancets  INSTRUCTIONS:  Use to test blood sugar one times daily or as directed.                     blood glucose test strip  Also known as:  ACCU-CHEK GUIDE  INSTRUCTIONS:  Use to test blood sugar one times daily or as directed.                     order for DME  INSTRUCTIONS:  Thigh length teds.                     order for DME  INSTRUCTIONS:  1 walker                       ASK your doctor about these medications          Morning Afternoon Evening Bedtime As Needed    acetaminophen 325 MG tablet  Also known as:  TYLENOL  INSTRUCTIONS:  Take 1 tablet by mouth every 6 hours as needed for pain.                     amLODIPine 5 MG tablet  Also known as:  NORVASC  INSTRUCTIONS:  Take 1 tablet (5 mg) by mouth every evening  Doctor's comments:  Patient will call to fill                     atorvastatin 40 MG tablet  Also known as:  LIPITOR  INSTRUCTIONS:  Take 1 tablet (40 mg) by mouth At Bedtime  Doctor's comments:  Patient will call to fill                     B-12 1000 MCG Subl  INSTRUCTIONS:  Place 1 tablet under the tongue daily                     dicyclomine 20 MG tablet  Also known as:  BENTYL  INSTRUCTIONS:  Take 1 tablet (20 mg) by mouth 4 times daily as needed (diarrhea)  Doctor's comments:  Patient will call to fill                     DULoxetine 30 MG capsule  Also known as:  CYMBALTA  INSTRUCTIONS:  Take 1 capsule (30 mg) by mouth daily                     gabapentin 300 MG capsule  Also known as:  NEURONTIN  INSTRUCTIONS:  Take by mouth eight hours apart: 2 capsules in morning, 1-2 capsules midday, 2 capsules at night  Reason for med:  Neuropathic Pain                     KLONOPIN PO  INSTRUCTIONS:  Take 0.5 mg by mouth 2 times daily as needed for anxiety                     lidocaine 5 % patch  Also known as:  LIDODERM  INSTRUCTIONS:  Apply patch to left low back at bedtime. Remove patch in the morning.                     losartan 50 MG tablet  Also known as:   COZAAR  INSTRUCTIONS:  Take 1 tablet (50 mg) by mouth daily  Doctor's comments:  Patient will call to fill                     nitroGLYcerin 0.4 MG sublingual tablet  Also known as:  NITROSTAT  INSTRUCTIONS:  Place 1 tablet (0.4 mg) under the tongue every 5 minutes as needed for chest pain  Doctor's comments:  Patient will call to fill                     THERAPEUTIC MULTIVIT/MINERAL tablet  INSTRUCTIONS:  Take 1 tablet by mouth daily.                     traMADol 50 MG tablet  Also known as:  ULTRAM  INSTRUCTIONS:  Take 1 tablet (50 mg) by mouth 3 times daily as needed for pain Max 3 per day                     VITAMIN D (CHOLECALCIFEROL) PO  INSTRUCTIONS:  Take 1,000 Units by mouth daily

## 2019-06-13 ENCOUNTER — PATIENT OUTREACH (OUTPATIENT)
Dept: CARE COORDINATION | Facility: CLINIC | Age: 84
End: 2019-06-13

## 2019-06-13 ASSESSMENT — ACTIVITIES OF DAILY LIVING (ADL): DEPENDENT_IADLS:: TRANSPORTATION;SHOPPING

## 2019-06-13 NOTE — PROGRESS NOTES
Clinic Care Coordination Contact  [facility]/Voicemail    Referral Source: [employer] Outreach    Clinical Data: Care Coordinator Outreach    Outreach attempted x 1.  Left message on voicemail with call back information and requested return call.    Plan: Patient actively enrolled in clinic care coordination. Care Coordinator will try to reach patient again in 5-7 business days.    COLETTE Cortez, RN   [facility] - RN Care Coordinator  Phone: [phone]

## 2019-06-14 ENCOUNTER — HOSPITAL ENCOUNTER (OUTPATIENT)
Dept: PHYSICAL THERAPY | Facility: CLINIC | Age: 84
Setting detail: THERAPIES SERIES
End: 2019-06-14
Attending: ANESTHESIOLOGY
Payer: MEDICARE

## 2019-06-14 DIAGNOSIS — M54.42 CHRONIC LEFT-SIDED LOW BACK PAIN WITH LEFT-SIDED SCIATICA: ICD-10-CM

## 2019-06-14 DIAGNOSIS — G89.29 CHRONIC LEFT-SIDED LOW BACK PAIN WITH LEFT-SIDED SCIATICA: ICD-10-CM

## 2019-06-14 PROCEDURE — 97110 THERAPEUTIC EXERCISES: CPT | Mod: GP

## 2019-06-14 PROCEDURE — 97161 PT EVAL LOW COMPLEX 20 MIN: CPT | Mod: GP

## 2019-06-14 NOTE — PROGRESS NOTES
Initial PT Evaluation   06/14/19 1100   General Information   Type of Visit Initial OP Ortho PT Evaluation   Start of Care Date 06/14/19   Referring Physician Keo Ferrara MD   Patient/Family Goals Statement To get stronger   Orders Evaluate and Treat   Date of Order 05/01/19   Certification Required? Yes   Medical Diagnosis Chronic left-sided low back pain with left-sided sciatica    Surgical/Medical history reviewed Yes   Precautions/Limitations fall precautions   General Information Comments Deconditioning   Body Part(s)   Body Part(s) Lumbar Spine/SI   Presentation and Etiology   Pertinent history of current problem (include personal factors and/or comorbidities that impact the POC) Patient had injection in L2-L5 on 6/11 to reduce pain, helpped a lot. Patient reporting has had back pain for ~1 year. Patient reporting pain is worst when walking, can only tolerate ~10 minutes at grocery store walking. Patient had fall on 6/13/2019 with some scrapes but no major injuries. Patient had 3-4 falls last year resulting in broken wrists. Patient reports sleeps well at night now since starting on gabapentin. Has to get up several times per night to use the restroom but this has gotten easier since the injection. Patient lives alone but her son lives in the next town over and comes over quite frequently. Has stairs within her home but has stair chair.    Impairments A. Pain;F. Decreased strength and endurance;P. Bowel or bladder problems   Functional Limitations perform activities of daily living;perform desired leisure / sports activities   Symptom Location Low back pain on L-side and extends into L buttock   How/Where did it occur Other;With a fall  (Over time with age )   Onset date of current episode/exacerbation 01/01/18   Chronicity Chronic   Pain rating (0-10 point scale) Best (/10);Worst (/10)   Best (/10) 4   Worst (/10) 9   Pain quality A. Sharp;C. Aching   Frequency of pain/symptoms C. With  activity   Pain/symptoms are: Worse during the day   Pain/symptoms exacerbated by B. Walking;C. Lifting;D. Carrying;H. Overhead reach;I. Bending;J. ADL;K. Home tasks   Pain/symptoms eased by A. Sitting;C. Rest;I. OTC medication(s)   Progression of symptoms since onset: Improved  (Improved since injection on 6/11)   Current / Previous Interventions   Diagnostic Tests: X-ray   X-ray Results Results   X-ray results L4/5: Moderately severe bilateral lateral recess stenosis due to a combination of 5 mm degenerative anterior subluxation of L4 on L5 with facet hypertrophy and disc bulge. Moderate, probably insignificant, bilateral neural foraminal stenosis. L2/L3: Moderately severe left lateral recess stenosis due to small disc protrusion in combination with facet hypertrophy.   Prior Level of Function   Prior Level of Function-Mobility Using SPC today, however has FWW and uses that for longer distances   Prior Level of Function-ADLs Independent   Functional Level Prior Comment Patient needing increasingly more help for household tasks and errands s/t decreased strength, LBP and balance difficulties   Current Level of Function   Current Community Support Family/friend caregiver   Patient role/employment history F. Retired   Living environment [place]   Home/community accessibility Stair chair to basement   Current equipment-Gait/Locomotion Walker;Standard cane   Fall Risk Screen   Fall screen completed by PT   Have you fallen 2 or more times in the past year? Yes   Have you fallen and had an injury in the past year? Yes   Is patient a fall risk? Yes;Department fall risk interventions implemented   Fall screen comments Patient fell on 6/13 with external injuries only. But has h/o falls with broken bones.   Functional Scales   Functional Scales Other   Other Scales  MICHEAL: 51.11%, Ban:    Lumbar Spine/SI Objective Findings   Observation Patient does not have pain today since injection, however when she has pain it is  in her L-spine on the L side near the spine and extends into her L buttock   Posture Khyphotic with decreased lumbar lordosis. Forward flexed.   Gait/Locomotion Slight limp, increased lateral sway bilaterally, usnig a SPC today but has FWW.   Balance/Proprioception (Single Leg Stance) Cannot stand on one leg. Mild sway with NBOS standing, significant sway in NBOS with EC   Flexion ROM ~45 degrees   Extension ROM Can get to neutral   Right Side Bending ROM ~20 degrees   Left Side Bending ROM ~15 degrees, painful   Hip Screen Scour(+) bilaterally for LBP, FADIR- limited IR (hard stop) so unable to fully assume position, DANNA : limited ER bilaterally, unable to fully assume position   Hip Flexion (L2) Strength R: 3/5 ;L: 3+/5   Knee Flexion Strength R: 3+/5, L: 4/5   Knee Extension (L3) Strength R: 3+/5, L:4/5   Ankle Dorsiflexion (L4) Strength 4-/5 bilaterally   Ankle Plantar Flexion (S1) Strength 3-/5 bilaterally   Hamstring Flexibility ~10 degrees passively on L ; ~65 degrees passively on L.   Hip Flexor Flexibility Tight bilaterally   Obers Flexibility Tenderness on R 2/2 bruising from recent fall   Piriformis Flexibility Tight   Lumbar/SI Flexibility Comments IR x<5 degrees bilatearlly, ER ~20 degrees bilaterally   SLR Unable 2/2 weakness   Segmental Mobility Stiff, especially L2-S1.   Palpation Patient denied pain with soft tissue palpation around paraspinals in L-spine, piriformis or SI joints. Tense musculature in L2-L4 region with decreased lordosis.   Planned Therapy Interventions   Planned Therapy Interventions ADL retraining;bed mobility training;gait training;joint mobilization;manual therapy;ROM;strengthening;stretching   Planned Modality Interventions   Planned Modality Interventions Cryotherapy;Hot packs   Planned Modality Interventions Comments PT: Modalities as needed.   Clinical Impression   Criteria for Skilled Therapeutic Interventions Met yes, treatment indicated   PT Diagnosis Low back pain    Influenced by the following impairments Decrease spinal ROM, Decreased LE flexibility, decreased strength globally, but worse in LEs, balance impariments, pain   Functional limitations due to impairments ADLs, transfers, gait, household tasks   Clinical Presentation Stable/Uncomplicated   Clinical Presentation Rationale Chronic (going on ~1 year), pain reduced greatly since injections, clinical judgment   Clinical Decision Making (Complexity) Low complexity   Therapy Frequency 1 time/week   Predicted Duration of Therapy Intervention (days/wks) 6-8 weeks   Risk & Benefits of therapy have been explained Yes   Patient, Family & other staff in agreement with plan of care Yes   Clinical Impression Comments Sivan presents to OP PT with ~1 year of LBP, recently go an injection on 6/11 that greatly reduced her LBP. She also has a h.o. falls, most recently on 6/13 without major injury. Exam revealed decreased lumbar ROM, pain, decreased flexibility and globally decreased strength ,balance deficits. She would greatly benefit from skilled PT to improve strength, flexibility, balance, activity tolerance and ROM.   Education Assessment   Preferred Learning Style Listening;Demonstration;Pictures/video   Barriers to Learning Cognitive  (Appears somewhat forgetful)   ORTHO GOALS   PT Ortho Eval Goals 1;2;3   Ortho Goal 1   Goal Identifier 1   Goal Description Patient will be able to ambulate community distances with less than 2/10 pain in order to demonstrate improvements in function.   Target Date 07/26/19   Ortho Goal 2   Goal Identifier 2   Goal Description Patient will score x>19 on DGI or x<13 seconds on TUGG to demonstrate reduction in falls risk.   Target Date 07/26/19   Ortho Goal 3   Goal Identifier 3   Goal Description Patient will be IND with HEP in order to decrease risk for future disability and injury   Target Date 07/26/19   Total Evaluation Time   PT Eval, Low Complexity Minutes (51069) 15   Therapy Certification    Certification date from 06/14/19   Certification date to 08/09/19   Medical Diagnosis Chronic left-sided low back pain with left-sided sciatica      Scarlett HeardPT, DTP  [employer]  [email or web address]  Pager: [phone]

## 2019-06-14 NOTE — PROGRESS NOTES
[facility]          OUTPATIENT PHYSICAL THERAPY ORTHOPEDIC EVALUATION  PLAN OF TREATMENT FOR OUTPATIENT REHABILITATION  (COMPLETE FOR INITIAL CLAIMS ONLY)  Patient's Last Name, First Name, M.I.  YOB: 1933  Daniela Brown    Provider s Name:  [facility]   Medical Record No.  [id number]   Start of Care Date:  06/14/19   Onset Date:  01/01/18   Type:     _X__PT   ___OT   ___SLP Medical Diagnosis:  Chronic left-sided low back pain with left-sided sciatica      PT Diagnosis:  Low back pain   Visits from SOC:  1      _________________________________________________________________________________  Plan of Treatment/Functional Goals:  ADL retraining, bed mobility training, gait training, joint mobilization, manual therapy, ROM, strengthening, stretching     Cryotherapy, Hot packs  PT: Modalities as needed.  Goals  Goal Identifier: 1  Goal Description: Patient will be able to ambulate community distances with less than 2/10 pain in order to demonstrate improvements in function.  Target Date: 07/26/19    Goal Identifier: 2  Goal Description: Patient will score x>19 on DGI or x<13 seconds on TUGG to demonstrate reduction in falls risk.  Target Date: 07/26/19    Goal Identifier: 3  Goal Description: Patient will be IND with HEP in order to decrease risk for future disability and injury  Target Date: 07/26/19                                                           Therapy Frequency:  1 time/week  Predicted Duration of Therapy Intervention:  6-8 weeks    Scarlett Heard, PT                 I CERTIFY THE NEED FOR THESE SERVICES FURNISHED UNDER        THIS PLAN OF TREATMENT AND WHILE UNDER MY CARE     (Physician co-signature of this document indicates review and certification of the therapy plan).                       Certification Date From:  06/14/19   Certification Date To:  08/09/19    Referring Provider:  Keo Ferrara MD    Initial  Assessment        See Epic Evaluation Start of Care Date: 06/14/19

## 2019-06-18 ENCOUNTER — TELEPHONE (OUTPATIENT)
Dept: PALLIATIVE MEDICINE | Facility: CLINIC | Age: 84
End: 2019-06-18

## 2019-06-18 NOTE — TELEPHONE ENCOUNTER
Patient had a lumbosacral medial branch block #2 at L3, L4, L5, sacral ala on the left on 6/11/19.   Attempted to speak with the patient, phone continuously disconnected x 2. Attempted to reach her at the cell phone number and her son stated he would relay the message.

## 2019-06-20 DIAGNOSIS — G89.4 CHRONIC PAIN SYNDROME: ICD-10-CM

## 2019-06-20 NOTE — TELEPHONE ENCOUNTER
Requested Prescriptions   Pending Prescriptions Disp Refills     traMADol (ULTRAM) 50 MG tablet [Pharmacy Med Name: TRAMADOL HCL 50 MG TABLET] 90 tablet      Sig: Take 1 Tablet BY MOUTH THREE TIMES DAILY AS NEEDED FOR PAIN       There is no refill protocol information for this order        Last Written Prescription Date:  12/14/18  Last Fill Quantity: 90,  # refills: 5   Last office visit: 5/30/2019 with prescribing provider:  Tan   Future Office Visit:   Next 5 appointments (look out 90 days)    Jun 28, 2019  1:20 PM CDT  SHORT with Cynthia Maddox,   [facility] ([facility]) [address] [address] [phone]

## 2019-06-21 ENCOUNTER — HOSPITAL ENCOUNTER (OUTPATIENT)
Dept: PHYSICAL THERAPY | Facility: CLINIC | Age: 84
Setting detail: THERAPIES SERIES
End: 2019-06-21
Attending: ANESTHESIOLOGY
Payer: MEDICARE

## 2019-06-21 PROCEDURE — 97110 THERAPEUTIC EXERCISES: CPT | Mod: GP | Performed by: PHYSICAL THERAPIST

## 2019-06-21 RX ORDER — TRAMADOL HYDROCHLORIDE 50 MG/1
TABLET ORAL
Qty: 90 TABLET | Refills: 0 | Status: SHIPPED | OUTPATIENT
Start: 2019-06-21 | End: 2020-01-03

## 2019-06-21 NOTE — TELEPHONE ENCOUNTER
Routing refill request to provider for review/approval because:  Drug not on the [facility] refill protocol   Associated Diagnoses   Chronic pain syndrome [G89.4]  - Primary         Last Written Prescription Date:  12/14/18  Last Fill Quantity: 90,  # refills: 5   Last office visit: 5/30/2019 with prescribing provider:  Tan   Future Office Visit:   Next 5 appointments (look out 90 days)    Jun 28, 2019  1:20 PM CDT  SHORT with Cynthia Maddox DO  [facility] ([facility]) [address] [place] [address]  [phone]      JOHNATHON WhitmanN, RN

## 2019-06-21 NOTE — PROGRESS NOTES
Clinic Care Coordination Contact  [facility]/Voicemail    Referral Source: [employer] Outreach    Clinical Data: Care Coordinator Outreach    Outreach attempted x 2.  Left message on voicemail with call back information and requested return call.    Plan: Patient has upcoming PCP appt on 6/28/19.  Care Coordinator will try to reach patient in conjunction with PCP appt on 6/28 if no return call prior to this timeframe.    COLETTE Cortez, RN   [facility] - RN Care Coordinator  Phone: [phone]

## 2019-06-22 DIAGNOSIS — G89.4 CHRONIC PAIN SYNDROME: ICD-10-CM

## 2019-06-24 RX ORDER — TRAMADOL HYDROCHLORIDE 50 MG/1
TABLET ORAL
Qty: 90 TABLET | OUTPATIENT
Start: 2019-06-24

## 2019-06-24 NOTE — TELEPHONE ENCOUNTER
Duplicate  Outpatient Medication Detail      Disp Refills Start End FLORY   traMADol (ULTRAM) 50 MG tablet 90 tablet 0 6/21/2019  No   Sig: Take 1 Tablet BY MOUTH THREE TIMES DAILY AS NEEDED FOR PAIN   Class: Local Print   Order: [id number]     Karen LINARES RN

## 2019-06-24 NOTE — TELEPHONE ENCOUNTER
Requested Prescriptions   Pending Prescriptions Disp Refills     traMADol (ULTRAM) 50 MG tablet [Pharmacy Med Name: TRAMADOL HCL 50 MG TABLET] 90 tablet      Sig: Take 1 Tablet BY MOUTH THREE TIMES DAILY AS NEEDED FOR PAIN   Last Written Prescription Date:  6/21/19  Last Fill Quantity: 90 tab,  # refills: 0   Last office visit: 5/30/2019 with prescribing provider:  Cynthia Maddox     Future Office Visit:   Next 5 appointments (look out 90 days)    Jun 28, 2019  1:20 PM CDT  SHORT with Cynthia Maddox DO  [facility] ([facility]) [address] [address]  [phone]             There is no refill protocol information for this order

## 2019-06-24 NOTE — TELEPHONE ENCOUNTER
Received fax from [employer] regarding this. Did not receive faxed order. Verbal order given.      Vida JONES BSN, RN

## 2019-06-28 ENCOUNTER — PATIENT OUTREACH (OUTPATIENT)
Dept: CARE COORDINATION | Facility: CLINIC | Age: 84
End: 2019-06-28

## 2019-06-28 ENCOUNTER — OFFICE VISIT (OUTPATIENT)
Dept: FAMILY MEDICINE | Facility: CLINIC | Age: 84
End: 2019-06-28
Payer: MEDICARE

## 2019-06-28 ENCOUNTER — HOSPITAL ENCOUNTER (OUTPATIENT)
Dept: PHYSICAL THERAPY | Facility: CLINIC | Age: 84
Setting detail: THERAPIES SERIES
End: 2019-06-28
Attending: ANESTHESIOLOGY
Payer: MEDICARE

## 2019-06-28 VITALS
SYSTOLIC BLOOD PRESSURE: 138 MMHG | HEART RATE: 89 BPM | BODY MASS INDEX: 29.35 KG/M2 | WEIGHT: 171 LBS | DIASTOLIC BLOOD PRESSURE: 60 MMHG | RESPIRATION RATE: 12 BRPM | TEMPERATURE: 97.8 F | OXYGEN SATURATION: 97 %

## 2019-06-28 DIAGNOSIS — M54.42 CHRONIC LEFT-SIDED LOW BACK PAIN WITH LEFT-SIDED SCIATICA: Primary | ICD-10-CM

## 2019-06-28 DIAGNOSIS — T88.7XXA MEDICATION SIDE EFFECTS: ICD-10-CM

## 2019-06-28 DIAGNOSIS — F33.1 MODERATE EPISODE OF RECURRENT MAJOR DEPRESSIVE DISORDER (H): ICD-10-CM
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
## 2019-06-28 DIAGNOSIS — G89.29 CHRONIC LEFT-SIDED LOW BACK PAIN WITH LEFT-SIDED SCIATICA: Primary | ICD-10-CM

## 2019-06-28 PROCEDURE — 97110 THERAPEUTIC EXERCISES: CPT | Mod: GP

## 2019-06-28 PROCEDURE — 99214 OFFICE O/P EST MOD 30 MIN: CPT | Performed by: INTERNAL MEDICINE

## 2019-06-28 ASSESSMENT — ACTIVITIES OF DAILY LIVING (ADL): DEPENDENT_IADLS:: TRANSPORTATION;SHOPPING

## 2019-06-28 NOTE — PATIENT INSTRUCTIONS
1. The metformin was stopped because your blood sugar was very well controlled, and to reduce the number of pills you take.   2. It appears the cymbalta was the cause of the dizziness and the fall.  Agree with stopping the cymbalta.  No need to replace it with anything for now.  Continue to stay active and social since this seems to be helping your depression.    3. Drop unused medications at police station.

## 2019-06-28 NOTE — PROGRESS NOTES
Clinic Care Coordination Contact    Clinic Care Coordination Contact  OUTREACH    Referral Information:  Referral Source: [employer] Outreach    Primary Diagnosis: Chronic Pain    Chief Complaint   Patient presents with     Clinic Care Coordination - Face To Face     RN     Clinic Care Coordination - Follow-up     RN        Universal Utilization: follows with Dr. Catia Pascual, Pain Clinic  Clinic Utilization  Difficulty keeping appointments:: No  Compliance Concerns: No  No-Show Concerns: No  No PCP office visit in Past Year: No  Utilization    Last refreshed: 6/28/2019  2:03 AM:  Hospital Admissions 2           Last refreshed: 6/28/2019  2:03 AM:  ED Visits 2           Last refreshed: 6/28/2019  2:03 AM:  No Show Count (past year) 0              Current as of: 6/28/2019  2:03 AM            Clinical Concerns:  RN CC met with patient face to face in conjunction with PCP OV.   Patient had fall one week ago at home; hit her right elbow, right knee, bruises in the right hip. Fall precipitated by dizziness and thought to be caused by Cymbalta which had been prescribed on 5/30 for depression. Patient no longer taking and PCP supportive of this.      Education Provided to patient: reviewed clinic care coordination. Patient had not return most recent outreach calls but states she does want to continue with support. RN CC will happily follow with goal to support patient in improving chronic pain.    Pain  Pain (GOAL):: Yes  Type: Chronic (>3mo)  Location of chronic pain:: left lower back  Radiating: No  Progression: Improving  Chronic pain severity:: 2  Limitation of routine activities due to chronic pain:: Yes  Description: (depends on day)  Alleviating Factors: Rest, Heat, Pain Medication, Procedures or Injections  Aggravating Factors: Positioning, Activity  Health Maintenance Reviewed: Due/Overdue  Health Maintenance Due   Topic Date Due     URINE DRUG SCREEN  01/26/1933     ZOSTER IMMUNIZATION (1 of 2) 01/26/1983      DTAP/TDAP/TD IMMUNIZATION (1 - Tdap) 01/02/1988     MEDICARE ANNUAL WELLNESS VISIT  01/26/1998     PNEUMOCOCCAL IMMUNIZATION 65+ LOW/MEDIUM RISK (2 of 2 - PCV13) 11/17/2009     EYE EXAM  12/08/2012     MICROALBUMIN  06/20/2013     DIABETIC FOOT EXAM  10/30/2013     LIPID  07/27/2019       Clinical Pathway: None    Medication Management:  Patient is knowledgeable on medications and is adherent.  No financial concerns reported at this time.     Functional Status:  Dependent ADLs:: Ambulation-walker  Dependent IADLs:: Transportation, Shopping  Bed or wheelchair confined:: No  Mobility Status: Independent w/Device  Fallen 2 or more times in the past year?: No  Any fall with injury in the past year?: Yes    Living Situation:  Current living arrangement:: I live in a private home  Type of residence:: Private home - no stairs    Diet/Exercise/Sleep:  Diet:: Regular  Inadequate nutrition (GOAL):: No  Food Insecurity: No  Tube Feeding: No  Exercise:: Yes  Inadequate activity/exercise (GOAL):: No  Significant changes in sleep pattern (GOAL): No    Transportation:  Transportation concerns (GOAL):: No  Transportation means:: Regular car     Psychosocial:  Episcopal or spiritual beliefs that impact treatment:: No  Mental health DX:: No  Mental health management concern (GOAL):: No  Informal Support system:: Family     Financial/Insurance:   Financial/Insurance concerns (GOAL):: No     Resources and Interventions:  Current Resources:   Community Resources: DME  Supplies used at home:: None  Equipment Currently Used at Home: walker, rolling, cane, straight    Advance Care Plan/Directive  Advanced Care Plans/Directives on file:: No  Advanced Care Plan/Directive Status: Considering Options    Referrals Placed: Specialty Providers(Has new patient appt at Pain Clinic in May)     Goals:   Goals        General    #1 Pain Management (pt-stated)     Notes - Note edited  6/28/2019  2:14 PM by Vivi Ruggiero, RN    Goal Statement: I  will find pain control regimen that provides adequate relief to my chronic back pain.  Measure of Success: Patient will report stable pain control regimen  Supportive Steps to Achieve: PCP; Care Coordination; Pain clinic   Barriers: acute on chronic back pain  Strengths: motivated and engaged in plan of care  Date to Achieve By: 9/1/19  Patient expressed understanding of goal: Yes              Patient/Caregiver understanding: Patient demonstrates understanding of current plan of care.    Outreach Frequency: monthly    Plan: 1) Patient will continue working with OP PT for chronic low back pain  2) Patient will follow plan of care and contact clinic care team as needed with questions, concerns or to discuss any new symptoms.  3) RN Care Coordinator will outreach monthly and as needed.    COLETTE Cortez, RN   [facility] - RN Care Coordinator  Phone: [phone]

## 2019-06-28 NOTE — LETTER
formerly [facility]  Complex Care Plan  About Me:    Patient Name:  Daniela Brown    YOB: 1933  Age:         86 year old   [facility] MRN:    [id number] Telephone Information:  Home Phone [phone]   Mobile [phone]       Address:  [address]  [facility] [place] [address] Email address:  No e-mail address on record      Emergency Contact(s)    Name Relationship Lgl Grd Work Phone Home Phone Mobile Phone   MYLES JOSEPH Son   [phone]            Primary language:  English     needed? No   [facility] [employer] [facility]:  [phone] op. 1  Other communication barriers: [place] (Hard of hearing)  Preferred Method of Communication:  Mail  Current living arrangement: I live in a private home  Mobility Status/ Medical Equipment: Independent w/Device    My Access Plan  Medical Emergency 911   Primary Clinic Line [facility] - [phone]   24 Hour Appointment Line [phone] or  [phone]) (toll-free)   24 Hour Nurse Line [phone] (toll-free)   Preferred Urgent Care [facility], [phone]   Preferred Hospital [place], [place]  [phone]   [facility] [facility] [place] [address]     Behavioral Health Crisis Line The National Suicide Prevention Lifeline at 1-111.843.4411 or 911             My Care Team Members  Patient Care Team       Relationship Specialty Notifications Start End    Cynthia Maddox DO PCP - General Internal Medicine Admissions 11/14/18     Phone: [phone] Pager: [phone] Fax: [phone] [address] [address]    Vivi Ruggiero, RN Lead Care Coordinator Primary Care - CC Admissions 4/19/19     Phone: [phone] Fax: [phone]        Cynthia Maddox DO Assigned PCP   4/26/19     Phone: [phone] Pager: [phone] Fax: [phone] [address]  [address]            My Care Plans  Self Management and Treatment Plan  Goals and (Comments)  Goals        General    #1 Pain Management (pt-stated)     Notes - Note edited  6/28/2019  2:14 PM by Vivi Ruggiero, RN    Goal Statement: I will find pain control regimen that provides adequate relief to my chronic back pain.  Measure of Success: Patient will report stable pain control regimen  Supportive Steps to Achieve: PCP; Care Coordination; Pain clinic   Barriers: acute on chronic back pain  Strengths: motivated and engaged in plan of care  Date to Achieve By: 9/1/19  Patient expressed understanding of goal: Yes                 Action Plans on File:                       Advance Care Plans/Directives Type:        My Medical and Care Information  Problem List   Patient Active Problem List   Diagnosis     Degeneration of lumbar or lumbosacral intervertebral disc     Esophageal reflux     Memory loss     Irritable bowel syndrome with diarrhea     Disorder of bone and cartilage     Anxiety and depression     RESTLESS LEG SYNDROME     Generalized osteoarthrosis, unspecified site     Diverticulosis of large intestine     SENSONRL HEAR LOSS,BILAT     Urticaria     Subjective tinnitus     Vitamin D deficiency     Right foot pain     Urge incontinence     Hyperlipidemia LDL goal <100     Chest pain     Allergic rhinitis     Angina pectoris (H)     Pronation of foot     Advanced directives, counseling/discussion     S/P total knee replacement     B12 deficiency anemia     Peripheral neuropathy     Benign essential hypertension     Carpal tunnel syndrome of left wrist     Diastolic dysfunction     Type 2 diabetes mellitus with diabetic polyneuropathy, without long-term current use of insulin (H)     History of recurrent urinary tract infection     CKD (chronic kidney disease) stage 3, GFR 30-59 ml/min (H)     Abnormal cardiovascular stress test     Adjustment disorder with anxiety     Arthritis of spine     Bilateral wrist  pain     Protrusion of lumbar intervertebral disc     Abnormal stress test     Coronary 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    Care Coordination Start Date: 4/19/2019   Frequency of Care Coordination: monthly   Form Last Updated: 06/28/2019

## 2019-06-28 NOTE — PROGRESS NOTES
Subjective     Daniela Brown is a 86 year old female who presents to clinic today for the following health issues:    She is here alone.    HPI     Chief Complaint   Patient presents with     Medication Problem     Fall     Pt verbalize that the medications prescribed for depression (but she is unsure the name) caused her to feel dizzy and end up having a fall a week ago.  Feels the fall is due to leg weakness. Patient hit her right elbow, right knee, bruises in the right hip. This pain has resolved.  She did not hit her head. She thinks it is the cymbalta that caused the dizziness because 1 hour after she took it, she felt very dizzy.  cymbalta was started 5/30.      FYI: No depression on problem list.    Joint Pain/Fall    Onset: a week ago    Description:   Location: Right Elbow, knee, hip  Character: Sharp and Dull ache    Intensity: moderate    Progression of Symptoms: same    Accompanying Signs & Symptoms:  Other symptoms: weakness of right leg and discoloration of right hip    History:   Previous similar pain: no       Precipitating factors:   Trauma or overuse: YES- fall    Alleviating factors:Improved by: nothing    Therapies Tried and outcome: na    She pushed her life alert button and EMS came.     She stopped the cymbalta after the fall.      No further episodes of dizziness since she stopped the cymbalta.    She was not having prior dizziness.    She feels like her appetite has improved.  She went shopping w/a friend, and has been getting out of the house more, feels this has improved her mood.    She is doing physical therapy for the back pain and leg weakness    Takes bentyl regularly but this doesn't make her dizzy.  Very rare use of klonopin.          Current Outpatient Medications   Medication Sig Dispense Refill     acetaminophen (TYLENOL) 325 MG tablet Take 1 tablet by mouth every 6 hours as needed for pain. 30 tablet 0     amLODIPine (NORVASC) 5 MG tablet Take 1 tablet (5 mg) by mouth  every evening 90 tablet 3     atorvastatin (LIPITOR) 40 MG tablet Take 1 tablet (40 mg) by mouth At Bedtime 90 tablet 3     blood glucose monitoring (ACCU-CHEK FASTCLIX) lancets Use to test blood sugar one times daily or as directed. 102 each 3     blood glucose monitoring (ACCU-CHEK GUIDE) test strip Use to test blood sugar one times daily or as directed. 100 each 3     ClonazePAM (KLONOPIN PO) Take 0.5 mg by mouth 2 times daily as needed for anxiety       Cyanocobalamin (B-12) 1000 MCG SUBL Place 1 tablet under the tongue daily        dicyclomine (BENTYL) 20 MG tablet Take 1 tablet (20 mg) by mouth 4 times daily as needed (diarrhea) 60 tablet 11     DULoxetine (CYMBALTA) 30 MG capsule Take 1 capsule (30 mg) by mouth daily 90 capsule 3     gabapentin (NEURONTIN) 300 MG capsule Take by mouth eight hours apart: 2 capsules in morning, 1-2 capsules midday, 2 capsules at night 360 capsule 3     lidocaine (LIDODERM) 5 % patch Apply patch to left low back at bedtime. Remove patch in the morning. 10 patch 0     losartan (COZAAR) 50 MG tablet Take 1 tablet (50 mg) by mouth daily 90 tablet 3     Multiple Vitamins-Minerals (THERAPEUTIC MULTIVIT/MINERAL) TABS Take 1 tablet by mouth daily.       nitroGLYcerin (NITROSTAT) 0.4 MG sublingual tablet Place 1 tablet (0.4 mg) under the tongue every 5 minutes as needed for chest pain 30 tablet 4     order for DME 1 walker 1 Device 0     ORDER FOR DME Thigh length teds. 1 Device 2     traMADol (ULTRAM) 50 MG tablet Take 1 Tablet BY MOUTH THREE TIMES DAILY AS NEEDED FOR PAIN 90 tablet 0     VITAMIN D, CHOLECALCIFEROL, PO Take 1,000 Units by mouth daily           Reviewed and updated as needed this visit by Provider         Review of Systems   ROS COMP: Constitutional, HEENT, cardiovascular, pulmonary, gi and gu systems are negative, except as otherwise noted.      Objective    /64   Pulse 89   Temp 97.8  F (36.6  C) (Tympanic)   Resp 12   Wt 77.6 kg (171 lb)   SpO2 97%    Breastfeeding? No   BMI 29.35 kg/m    Body mass index is 29.35 kg/m .  Physical Exam   GENERAL APPEARANCE: alert and no distress  RESP: lungs clear to auscultation - no rales, rhonchi or wheezes  CV: regular rates and rhythm, normal S1 S2, no S3 or S4 and systolic murmur  LYMPHATICS: Compression stockings in place, 1+ pitting edema bilaterally  NEURO: Normal strength and tone, mentation intact, speech normal, cranial nerves 2-12 intact and rapid alternating movements normal  PSYCH: mentation appears normal and affect normal/bright          Assessment & Plan     1. Chronic left-sided low back pain with left-sided sciatica -working with physical therapy and the pain clinic.  Using tramadol which is helpful.  Contributes to her leg weakness.    2. Moderate episode of recurrent major depressive disorder (H) -she feels her mood is improving with getting out of the house more and socializing more.  The citalopram was not much benefit.  She apparently had some dizziness will not replace the Cymbalta for now.  Advised patient to return to clinic if she feels like her mood is worsening, we will consider other alternatives    3. Fall, sequela -she has no further pain from the fall, so did not do any imaging.  Advised that if she has further dizziness and/or fall while off the Cymbalta that she should return to clinic for further evaluation.    4. Medication side effects -        Patient Instructions   1. The metformin was stopped because your blood sugar was very well controlled, and to reduce the number of pills you take.   2. It appears the cymbalta was the cause of the dizziness and the fall.  Agree with stopping the cymbalta.  No need to replace it with anything for now.  Continue to stay active and social since this seems to be helping your depression.    3. Drop unused medications at police station.          Return in about 1 month (around 7/28/2019) for Pain Medication Refill.     Greater than 25 minutes was spent  face-to-face with the patient by the provider.  Greater than 50% was spent in counseling or coordinating care for this patient.      Dr. Cynthia Maddox,   [facility]

## 2019-07-05 ENCOUNTER — HOSPITAL ENCOUNTER (OUTPATIENT)
Dept: PHYSICAL THERAPY | Facility: CLINIC | Age: 84
Setting detail: THERAPIES SERIES
End: 2019-07-05
Attending: ANESTHESIOLOGY
Payer: MEDICARE

## 2019-07-05 PROCEDURE — 97110 THERAPEUTIC EXERCISES: CPT | Mod: GP

## 2019-07-12 ENCOUNTER — HOSPITAL ENCOUNTER (OUTPATIENT)
Dept: PHYSICAL THERAPY | Facility: CLINIC | Age: 84
Setting detail: THERAPIES SERIES
End: 2019-07-12
Attending: ANESTHESIOLOGY
Payer: MEDICARE

## 2019-07-12 PROCEDURE — 97112 NEUROMUSCULAR REEDUCATION: CPT | Mod: GP

## 2019-07-12 PROCEDURE — 97110 THERAPEUTIC EXERCISES: CPT | Mod: GP

## 2019-07-17 NOTE — PROGRESS NOTES
Subjective     Daniela Brown is a 86 year old female who presents to clinic today for the following health issues:    HPI   URINARY TRACT SYMPTOMS  Onset: a month    Description:   Painful urination (Dysuria): YES  Blood in urine (Hematuria): no   Delay in urine (Hesitency): YES    Intensity: moderate    Progression of Symptoms:  worsening and constant    Accompanying Signs & Symptoms:  Fever/chills: no   Flank pain YES- both sides  Nausea and vomiting: no   Any vaginal symptoms: none  Abdominal/Pelvic Pain: no     History:   History of frequent UTI's: YES  History of kidney stones: no   Sexually Active: no   Possibility of pregnancy: No    Precipitating factors: na    Therapies Tried and outcome: na    She has allergies to most antibiotics -rash or itching listed as allergic reaction.  She is definite she had an itchy rash with PCN and Sulfa    Dizziness is listed as a side effect of Macrobid          Current Outpatient Medications   Medication Sig Dispense Refill     acetaminophen (TYLENOL) 325 MG tablet Take 1 tablet by mouth every 6 hours as needed for pain. 30 tablet 0     amLODIPine (NORVASC) 5 MG tablet Take 1 tablet (5 mg) by mouth every evening 90 tablet 3     atorvastatin (LIPITOR) 40 MG tablet Take 1 tablet (40 mg) by mouth At Bedtime 90 tablet 3     blood glucose monitoring (ACCU-CHEK FASTCLIX) lancets Use to test blood sugar one times daily or as directed. 102 each 3     blood glucose monitoring (ACCU-CHEK GUIDE) test strip Use to test blood sugar one times daily or as directed. 100 each 3     ClonazePAM (KLONOPIN PO) Take 0.5 mg by mouth 2 times daily as needed for anxiety       Cyanocobalamin (B-12) 1000 MCG SUBL Place 1 tablet under the tongue daily        dicyclomine (BENTYL) 20 MG tablet Take 1 tablet (20 mg) by mouth 4 times daily as needed (diarrhea) 60 tablet 11     gabapentin (NEURONTIN) 300 MG capsule Take by mouth eight hours apart: 2 capsules in morning, 1-2 capsules midday, 2  capsules at night 360 capsule 3     lidocaine (LIDODERM) 5 % patch Apply patch to left low back at bedtime. Remove patch in the morning. 10 patch 0     losartan (COZAAR) 50 MG tablet Take 1 tablet (50 mg) by mouth daily 90 tablet 3     Multiple Vitamins-Minerals (THERAPEUTIC MULTIVIT/MINERAL) TABS Take 1 tablet by mouth daily.       nitroGLYcerin (NITROSTAT) 0.4 MG sublingual tablet Place 1 tablet (0.4 mg) under the tongue every 5 minutes as needed for chest pain 30 tablet 4     order for DME 1 walker 1 Device 0     ORDER FOR DME Thigh length teds. 1 Device 2     traMADol (ULTRAM) 50 MG tablet Take 1 Tablet BY MOUTH THREE TIMES DAILY AS NEEDED FOR PAIN 90 tablet 0     VITAMIN D, CHOLECALCIFEROL, PO Take 1,000 Units by mouth daily           Reviewed and updated as needed this visit by Provider         Review of Systems   ROS COMP: Constitutional, HEENT, cardiovascular, pulmonary, gi and gu systems are negative, except as otherwise noted.      Objective    /64   Pulse 66   Temp 99.4  F (37.4  C) (Tympanic)   Resp 12   Wt 76.7 kg (169 lb)   SpO2 96%   Breastfeeding? No   BMI 29.01 kg/m    Body mass index is 29.01 kg/m .  Physical Exam   GENERAL APPEARANCE: healthy, alert and no distress    Diagnostic Test Results:  Labs reviewed in Epic  Results for orders placed or performed in visit on 07/18/19 (from the past 24 hour(s))   UA reflex to Microscopic and Culture   Result Value Ref Range    Color Urine Yellow     Appearance Urine Cloudy     Glucose Urine Negative NEG^Negative mg/dL    Bilirubin Urine Negative NEG^Negative    Ketones Urine Negative NEG^Negative mg/dL    Specific Gravity Urine 1.020 1.003 - 1.035    Blood Urine Moderate (A) NEG^Negative    pH Urine 5.5 5.0 - 7.0 pH    Protein Albumin Urine Negative NEG^Negative mg/dL    Urobilinogen Urine 0.2 0.2 - 1.0 EU/dL    Nitrite Urine Negative NEG^Negative    Leukocyte Esterase Urine Large (A) NEG^Negative    Source Midstream Urine    Urine Microscopic    Result Value Ref Range    WBC Urine >100 (A) OTO5^0 - 5 /HPF    RBC Urine 5-10 (A) OTO2^O - 2 /HPF    Bacteria Urine Few (A) NEG^Negative /HPF           Assessment & Plan     1. Acute cystitis without hematuria -with allergies to virtually all antibiotics that we could use to treat a bladder infection.  Clindamycin is typically a poor antibiotic for bladder infections.  Fosfomycin might be another alternative, but is often very expensive.  Your kidney function is borderline to use the Macrobid and she is now absorbed systemically, so doubt that the dizziness was a true side effect.  Feel the benefits of treating with Macrobid outweigh the risks, so will start.  If she has side effects, would then order fosfomycin.  - nitroFURantoin macrocrystal-monohydrate (MACROBID) 100 MG capsule; Take 1 capsule (100 mg) by mouth 2 times daily  Dispense: 14 capsule; Refill: 0    2. Penicillin allergy -because of her allergies to virtually all classes of antibiotics, I recommended she see allergy to consider penicillin desensitization  - ALLERGY/ASTHMA ADULT REFERRAL    3. Dysuria  - UA reflex to Microscopic and Culture  - Urine Microscopic    4. Nonspecific finding on examination of urine  - Urine Culture Aerobic Bacterial     Dr. Cynthia Maddox,   [facility]

## 2019-07-18 ENCOUNTER — OFFICE VISIT (OUTPATIENT)
Dept: FAMILY MEDICINE | Facility: CLINIC | Age: 84
End: 2019-07-18
Payer: MEDICARE

## 2019-07-18 ENCOUNTER — HOSPITAL ENCOUNTER (OUTPATIENT)
Dept: PHYSICAL THERAPY | Facility: CLINIC | Age: 84
Setting detail: THERAPIES SERIES
End: 2019-07-18
Attending: ANESTHESIOLOGY
Payer: MEDICARE

## 2019-07-18 VITALS
BODY MASS INDEX: 29.01 KG/M2 | SYSTOLIC BLOOD PRESSURE: 128 MMHG | WEIGHT: 169 LBS | TEMPERATURE: 99.4 F | OXYGEN SATURATION: 96 % | HEART RATE: 66 BPM | RESPIRATION RATE: 12 BRPM | DIASTOLIC BLOOD PRESSURE: 64 MMHG

## 2019-07-18 DIAGNOSIS — Z88.0 PENICILLIN ALLERGY: ICD-10-CM

## 2019-07-18 DIAGNOSIS — N30.00 ACUTE CYSTITIS WITHOUT HEMATURIA: Primary | ICD-10-CM

## 2019-07-18 DIAGNOSIS — R82.90 NONSPECIFIC FINDING ON EXAMINATION OF URINE: ICD-10-CM

## 2019-07-18 DIAGNOSIS — R30.0 DYSURIA: ICD-10-CM

## 2019-07-18 LAB
ALBUMIN UR-MCNC: NEGATIVE MG/DL
APPEARANCE UR: ABNORMAL
BACTERIA #/AREA URNS HPF: ABNORMAL /HPF
BILIRUB UR QL STRIP: NEGATIVE
COLOR UR AUTO: YELLOW
GLUCOSE UR STRIP-MCNC: NEGATIVE MG/DL
HGB UR QL STRIP: ABNORMAL
KETONES UR STRIP-MCNC: NEGATIVE MG/DL
LEUKOCYTE ESTERASE UR QL STRIP: ABNORMAL
NITRATE UR QL: NEGATIVE
PH UR STRIP: 5.5 PH (ref 5–7)
RBC #/AREA URNS AUTO: ABNORMAL /HPF
SOURCE: ABNORMAL
SP GR UR STRIP: 1.02 (ref 1–1.03)
UROBILINOGEN UR STRIP-ACNC: 0.2 EU/DL (ref 0.2–1)
WBC #/AREA URNS AUTO: >100 /HPF

## 2019-07-18 PROCEDURE — 87088 URINE BACTERIA CULTURE: CPT | Performed by: INTERNAL MEDICINE

## 2019-07-18 PROCEDURE — 87186 SC STD MICRODIL/AGAR DIL: CPT | Performed by: INTERNAL MEDICINE

## 2019-07-18 PROCEDURE — 81001 URINALYSIS AUTO W/SCOPE: CPT | Performed by: INTERNAL MEDICINE

## 2019-07-18 PROCEDURE — 99213 OFFICE O/P EST LOW 20 MIN: CPT | Performed by: INTERNAL MEDICINE

## 2019-07-18 PROCEDURE — 97140 MANUAL THERAPY 1/> REGIONS: CPT | Mod: GP

## 2019-07-18 PROCEDURE — 97110 THERAPEUTIC EXERCISES: CPT | Mod: GP

## 2019-07-18 PROCEDURE — 87086 URINE CULTURE/COLONY COUNT: CPT | Performed by: INTERNAL MEDICINE

## 2019-07-18 RX ORDER — NITROFURANTOIN 25; 75 MG/1; MG/1
100 CAPSULE ORAL 2 TIMES DAILY
Qty: 14 CAPSULE | Refills: 0 | Status: SHIPPED | OUTPATIENT
Start: 2019-07-18 | End: 2019-08-06

## 2019-07-18 NOTE — PATIENT INSTRUCTIONS
1. Start macrobid twice daily x 1 week.  Let dr. Maddox know if side effects  2. Consider seeing Allergist for De-sensitization to penicillin antibiotics

## 2019-07-20 LAB
BACTERIA SPEC CULT: ABNORMAL
Lab: ABNORMAL
SPECIMEN SOURCE: ABNORMAL

## 2019-07-22 ENCOUNTER — TELEPHONE (OUTPATIENT)
Dept: INTERNAL MEDICINE | Facility: CLINIC | Age: 84
End: 2019-07-22

## 2019-07-25 ENCOUNTER — HOSPITAL ENCOUNTER (OUTPATIENT)
Dept: PHYSICAL THERAPY | Facility: CLINIC | Age: 84
Setting detail: THERAPIES SERIES
End: 2019-07-25
Attending: ANESTHESIOLOGY
Payer: MEDICARE

## 2019-07-25 ENCOUNTER — TELEPHONE (OUTPATIENT)
Dept: PALLIATIVE MEDICINE | Facility: CLINIC | Age: 84
End: 2019-07-25

## 2019-07-25 PROCEDURE — 97110 THERAPEUTIC EXERCISES: CPT | Mod: GP

## 2019-07-25 NOTE — TELEPHONE ENCOUNTER
Patient was at the clinic today waiting to be seen for physical therapy and was wondering how many more physical therapy treatments she needed to complete before she can get an injection?    Jaycee Keen MA on 7/25/2019 at 2:06 PM

## 2019-07-25 NOTE — PROGRESS NOTES
Outpatient Physical Therapy Progress Note     Patient: Daniela Brown  : 1933    Beginning/End Dates of Reporting Period:  2019 to 2019    Referring Provider: Keo Ferrara MD    Therapy Diagnosis: Chronic left-sided low back pain with left-sided sciatica      Client Self Report: Patient reporting that her back has been really hurting since  (~5 days) and that this pain has really been limiting her ability to do her HEP.    Objective Measurements:  Objective Measure: TUGG  Details: 18 seconds on 2019  **LARGE FALLS RISK**      Goals:  Goal Identifier 1   Goal Description Patient will be able to ambulate community distances with less than 2/10 pain in order to demonstrate improvements in function.   Target Date 19   Date Met   2019   Progress:     Goal Identifier 2   Goal Description Patient will score x>19 on DGI or x<13 seconds on TUGG to demonstrate reduction in falls risk.   Target Date 19   Date Met   Scored 19 seconds    Progress:     Goal Identifier 3   Goal Description Patient will be IND with HEP in order to decrease risk for future disability and injury   Target Date 19   Date Met  19   Progress:       Progress Toward Goals:   Progress this reporting period: Making good progress toward goals. Needs frequent re-evaluation of exercise performance.    Plan:  Continue therapy per current plan of care.    Discharge:  No    RECERTIFICATION    Daniela Brown  1933    Session Number: 7 Medicare (6-8 visits total), likely will ask for 4 more of r 12 total since start of care.    Reasons for Continuing Treatment:   Patient continuing to have low back pain and presents as a significant falls risk    Frequency/Duration  1 times per week for 4 weeks for a total of 4 visits.    Recertification Period  2019 - 2019    Physician Signature:    Date:    X_______________________________________________________    Physician  Name: Keo Ferrara MD    I certify the need for these services furnished under this plan of treatment and while under my care. Physician co-signature of this document indicates review and certification of the therapy plan.  This signature may be written on paper, or electronically signed within EPIC.

## 2019-07-25 NOTE — TELEPHONE ENCOUNTER
Patient states that she has completed 4 PT visits and is ready to proceed with RFA treatment.  She can be scheduled for lumbar MB RFA left L3, L4, L5, SA at the next available date with me or other provider if her pain diaries show appropriate response to the MBB.    Keo Pascual MD  [facility]

## 2019-07-26 ENCOUNTER — TELEPHONE (OUTPATIENT)
Dept: INTERNAL MEDICINE | Facility: CLINIC | Age: 84
End: 2019-07-26

## 2019-07-26 ENCOUNTER — MEDICAL CORRESPONDENCE (OUTPATIENT)
Dept: HEALTH INFORMATION MANAGEMENT | Facility: CLINIC | Age: 84
End: 2019-07-26

## 2019-07-26 ENCOUNTER — PATIENT OUTREACH (OUTPATIENT)
Dept: CARE COORDINATION | Facility: CLINIC | Age: 84
End: 2019-07-26

## 2019-07-26 NOTE — PROGRESS NOTES
Clinic Care Coordination Contact  [facility]/Voicemail       Clinical Data: Care Coordinator Outreach  Proactive outreach attempt. Per chart review, patient remains in close contact with her pain clinic for treatment of chronic back pain. Patient actively attending outpatient PT related to back pain as well.  Most recent PCP OV 7/18/19 for symptoms of UTI.     Outreach attempted x 1.  Left message on voicemail with call back information and requested return call.    Plan: Patient enrolled in clinic care coordination. Last successful outreach 6/28/19.  Care Coordinator will try to reach patient again within 2 weeks if no return call prior to this timeframe.    COLETTE Cortez, RN   [facility] - RN Care Coordinator  Phone: [phone]

## 2019-07-26 NOTE — TELEPHONE ENCOUNTER
The pain diary for medial branch block #2 is in media.  It was processed by nursing yet.    Oxana García RN-BSN  [facility]

## 2019-07-29 NOTE — TELEPHONE ENCOUNTER
Please see below, patient did not receive enough relief per Medicare guidelines.       Marcella VILLASENOR    [facility]

## 2019-07-29 NOTE — TELEPHONE ENCOUNTER
Not sure if it was or was not processed by nursing, will wait to schedule. If okay to schedule, please route to     Per Medicare medical policy-No PA required        RFA  o Dx of arthropathy, spondylosis, or sprain; failed conservative treatment including 4 weeks of PT or an unfavorable evaluation; 2 successful workups resulting in >80-90% pain relief  o Indications:   - Patient must have history of at least 3 months of moderate to severe pain with functional impairment and pain is inadequately responsive to conservative care such as NSAIDs, acetaminophen, physical therapy (as tolerated).  - Pain is predominantly axial and, with the possible exception of facet joint cysts, not associated with radiculopathy or neurogenic claudication.  - There is no non-facet pathology that could explain the source of the patient s pain, such as fracture, tumor, infection, or significant deformity.Clinical assessment implicates the facet joint as the putative source of pain    Marcella VILLASENOR    [facility]

## 2019-07-29 NOTE — TELEPHONE ENCOUNTER
Pt had a left Lumbar  medial branch block # 2 on 5/15/19.  The post medial branch block form was  received.    Max % of pain relief from blocks:    left Lumbar medial branch block #1:    At rest:                 100%  Left fact load:       100%  Right facet load:  NA  Normal activity:    100%    Max relief from block #2 is:    At rest:                 50%  Left fact load:       63%  Right facet load:  na%  Normal activity:    67%    Insurance:  Medicare      Does pt have adequate relief insurance-wise to proceed to radiofrequency ablation?  TBD     Physical therapy:                  Last done in?:  6/2019.     How many sessions?:  6.      Where was it done?  [facility]     Called pt and informed him/her that insurance would be checked and will be  called once we get a response.  Not Done    The pain diary was faxed to Marcella for insurance review.    Routed to [place] to check insurance coverage.      Oxana García, RN-BSN  [facility]

## 2019-07-29 NOTE — PROGRESS NOTES
Clinic Care Coordination Contact    Follow Up Progress Note      Assessment: Patient returned call to RN CC and reports she is still struggling to achieve adequate relief from back pain. She is actively participating in PT and following closely with her pain provider, Dr. Catia Pascual.   Patient does not have any questions or concerns for PCP or any new questions or acute medical issues to discuss.     Goals addressed this encounter:   Goals Addressed                 This Visit's Progress      #1 Pain Management (pt-stated)   On track     Goal Statement: I will find pain control regimen that provides adequate relief to my chronic back pain.  Measure of Success: Patient will report stable pain control regimen  Supportive Steps to Achieve: PCP; Care Coordination; Pain clinic   Barriers: acute on chronic back pain  Strengths: motivated and engaged in plan of care  Date to Achieve By: 10/1/19  Patient expressed understanding of goal: Yes                 Intervention/Education provided during outreach: reviewed clinic care team contact information     Outreach Frequency: monthly    Plan:   Patient will remain in contact with pain clinic care team with hope of improving chronic back pain.  Care Coordinator will follow up in 1 month.    COLETTE Cortez, RN   [facility] - RN Care Coordinator  Phone: [phone]

## 2019-07-30 ENCOUNTER — HOSPITAL ENCOUNTER (OUTPATIENT)
Dept: PHYSICAL THERAPY | Facility: CLINIC | Age: 84
Setting detail: THERAPIES SERIES
End: 2019-07-30
Attending: ANESTHESIOLOGY
Payer: MEDICARE

## 2019-07-30 PROCEDURE — 97110 THERAPEUTIC EXERCISES: CPT | Mod: GP

## 2019-07-30 NOTE — TELEPHONE ENCOUNTER
Considering the results of MBB #1, I feel that repeating MBB #2 would be appropriate.    Keo Pascual MD  [facility]

## 2019-07-30 NOTE — TELEPHONE ENCOUNTER
Writer called Pt.  Discussed results and insurance requirements.      Pt would like to repeat MBB #2 if it's ok with Dr. Catia Pascual.    Tho Simmons, RN  Care Coordinator   [facility] [place]

## 2019-07-31 ENCOUNTER — TELEPHONE (OUTPATIENT)
Dept: INTERNAL MEDICINE | Facility: CLINIC | Age: 84
End: 2019-07-31

## 2019-07-31 NOTE — TELEPHONE ENCOUNTER
Will forward to  to call Pt and schedule repeat MBB #2.      Tho Simmons, RN  Care Coordinator   [facility] [place]

## 2019-07-31 NOTE — TELEPHONE ENCOUNTER
Pt has appointment for diabetes check on 8/6/19. Please fax forms and signed office note to [phone]    Signed forms placed in Cherri Chancellors basket until appt

## 2019-07-31 NOTE — TELEPHONE ENCOUNTER
Pre-screening questions for MBB Injections:    Injection to be done at which interventional clinic site? [facility]     Instruct patient to arrive as directed prior to the scheduled appointment time:    [place]: 30 minutes before        Procedure ordered by Dr. GARVIN    Procedure ordered? Lumbar Medial Branch Block    What insurance would patient like us to bill for this procedure? MEDICARE      [employer]- Any injection DO NOT SCHEDULE and route to Marcella Eckert.      [employer] insurance - If scheduling an SI joint injection DO NOT SCHEDULE and route to Marcella Eckert.          MBB's must be scheduled at LEAST two weeks apart for insurance purposes       [employer] - Any injection besides hip/shoulder/knee joint DO NOT SCHEDULE and route to Marcella Eckert. She will obtain PA and call pt back to schedule procedure or notify pt of denial.       [employer]- PA required for all MBB's    Any chance of pregnancy? NO   If YES, do NOT schedule and route to RN pool    Is an  needed? No     Patient has a drive home? (mandatory) Yes     Is patient taking any blood thinners (plavix, coumadin, jantoven, warfarin, heparin, pradaxa or dabigatran )? No    If hold needed, do NOT schedule, route to RN pool     Is patient taking any aspirin products? No     If more than 325mg/day do NOT schedule; route to RN pool     For CERVICAL procedures, hold all aspirin products for 6 days.      Does the patient have a bleeding or clotting disorder? No    If YES, okay to schedule AND route to RN nurse pool    **For any patients with platelet count <100, must be forwarded to provider**    Is patient diabetic? YES If YES, have them bring their glucometer.    Does patient have an active infection or treated for one within the past week? No    Is patient currently taking any antibiotics?  No    For patients on chronic, preventative, or prophylacti antibiotics, procedures can be scheduled.     For patients on antibiotics  for active or recent infection:    Anu Nath [place], [place], [place]-antibiotic course must have been completed for 4 days     Is patient currently taking any steroid medications? (i.e. Prednisone, Medrol)  No     For patients on steroid medications:    Fany Nath [place], [place]-steroid course must have been completed for 4 days    Review with patient:  If you are started on any steroids or antibiotics between now and your appointment, you must contact us because it may affect our ability to perform your procedure     Is patient actively being treated for cancer or immunocompromised? No   If YES, do NOT schedule and route to RN    Are you able to get on and off an exam table with minimal or no assistance? Yes   If NO, do NOT schedule and route to RN    Are you able to roll over and lay on your stomach with minimal or no assistance? Yes   If NO, do NOT schedule and route to RN    Any allergies to contrast dye, iodine, shellfish, or numbing and steroid medications? Yes - SHELLFISH  (If so, inform nursing and note in scheduling comments.)    Allergies: Metoprolol; Cephalexin; Erythromycin; Actonel [bisphosphonates]; Azithromycin; Celebrex [celecoxib]; Cipro [ciprofloxacin]; Diatrizoate; Erythromycin; Escitalopram; Fosamax; Keflex [cephalexin monohydrate]; Lisinopril; Nitrofurantoin; No clinical screening - see comments; Risedronate; Seasonal allergies; Shellfish-derived products; Tetanus toxoid, adsorbed; Tetanus-diphtheria toxoids; Vicodin [acetaminophen]; Vioxx; Acetaminophen; Alendronic acid; Celecoxib; Penicillins; Rofecoxib; Sulfa drugs; and Sulfasalazine     Has the patient had a flu shot or any other vaccinations within 7 days before or after the procedure.  No     Does patient have an MRI/CT?  Not Applicable  (SI joint, hip injections, lumbar sympathetic blocks, and stellate ganglion blocks do not require an MRI)    Was the MRI done w/in the last 3 years?  NA    Was MRI done at  [facility]? No      If not, where was it done? N/A       If MRI was not done at [facility], [facility] or [facility] do NOT schedule and route to nursing.  If pt has an imaging disc, the injection may be scheduled but pt has to bring disc to appt. If they show up w/out disc the injection cannot be done    **Must be scheduled with elapsed time interval of at least 2 weeks and not more than 6 months between the First MBB and the Second MBB**       Medial Branch Block Pre-Procedure Instructions    It is okay to take long acting pain medications (if you are on them) the day of the procedure but try not to take any short acting medications unless absolutely necessary. INFORM        Long acting meds would include: Gabapentin (Neurontin), MS Contin, Oxycontin        Short acting meds would include:  Percocet, Oxycodone, Vicodin, Ibuprofen     The day of the procedure, you should try to do things that provoke your pain, since the injection is being done to see if it will relieve your pain . INFOMED    If your pain level is a 4 out of 10 or less on the day of the procedure, please call [phone] to reschedule.  INFORMED  Reminders (please tell patient if applicable):      Instructed pt to arrive 30 minutes early for IV start if this is for a cervical procedure, ALL sympathetic (stellate ganglion, hypogastric, or lumbar sympathetic block) and all sedation procedures (RFA, spinal cord stimulation trials).         -IVs are not routinely placed for Dr. Tejeda cervical cases        -Dr. Mendiola: no IV needed for CMBB       If NPO for sedation, it is okay to take medications with sips of water (except if they are to hold blood thinners).    *DO take blood pressure medication if it is prescribed*      If this is for a cervical MBB aspirin needs to be held for 6 days.           Do not schedule procedures requiring IV placement in the first appointment of the day or first appointment after lunch. Do NOT schedule at 0745, 0815 or 1245.             For patients 85 or older we recommend having an adult stay w/ them for the remainder of the day.      Does the patient have any questions?

## 2019-08-01 DIAGNOSIS — E11.9 DIABETES MELLITUS (H): Primary | ICD-10-CM

## 2019-08-01 NOTE — TELEPHONE ENCOUNTER
Will hold form until office visit completed on 8/6/19 and then route chart note to Dr. Maddox for MD signature.

## 2019-08-06 ENCOUNTER — OFFICE VISIT (OUTPATIENT)
Dept: FAMILY MEDICINE | Facility: CLINIC | Age: 84
End: 2019-08-06
Payer: MEDICARE

## 2019-08-06 VITALS
OXYGEN SATURATION: 97 % | HEIGHT: 64 IN | BODY MASS INDEX: 29.09 KG/M2 | HEART RATE: 91 BPM | WEIGHT: 170.38 LBS | TEMPERATURE: 98.3 F | RESPIRATION RATE: 18 BRPM | SYSTOLIC BLOOD PRESSURE: 128 MMHG | DIASTOLIC BLOOD PRESSURE: 70 MMHG

## 2019-08-06 DIAGNOSIS — E11.42 TYPE 2 DIABETES MELLITUS WITH DIABETIC POLYNEUROPATHY, WITHOUT LONG-TERM CURRENT USE OF INSULIN (H): Primary | ICD-10-CM

## 2019-08-06 DIAGNOSIS — I10 BENIGN ESSENTIAL HYPERTENSION: ICD-10-CM

## 2019-08-06 DIAGNOSIS — E78.5 HYPERLIPIDEMIA LDL GOAL <100: ICD-10-CM

## 2019-08-06 DIAGNOSIS — E11.9 DIABETES MELLITUS (H): ICD-10-CM

## 2019-08-06 DIAGNOSIS — Z23 NEED FOR VACCINATION: ICD-10-CM

## 2019-08-06 LAB
CREAT UR-MCNC: 215 MG/DL
HBA1C MFR BLD: 6.4 % (ref 0–5.6)
MICROALBUMIN UR-MCNC: 20 MG/L
MICROALBUMIN/CREAT UR: 9.3 MG/G CR (ref 0–25)

## 2019-08-06 PROCEDURE — 83036 HEMOGLOBIN GLYCOSYLATED A1C: CPT | Performed by: INTERNAL MEDICINE

## 2019-08-06 PROCEDURE — 36415 COLL VENOUS BLD VENIPUNCTURE: CPT | Performed by: INTERNAL MEDICINE

## 2019-08-06 PROCEDURE — G0009 ADMIN PNEUMOCOCCAL VACCINE: HCPCS | Performed by: INTERNAL MEDICINE

## 2019-08-06 PROCEDURE — 90670 PCV13 VACCINE IM: CPT | Performed by: INTERNAL MEDICINE

## 2019-08-06 PROCEDURE — 82043 UR ALBUMIN QUANTITATIVE: CPT | Performed by: INTERNAL MEDICINE

## 2019-08-06 PROCEDURE — 99214 OFFICE O/P EST MOD 30 MIN: CPT | Mod: 25 | Performed by: INTERNAL MEDICINE

## 2019-08-06 ASSESSMENT — MIFFLIN-ST. JEOR: SCORE: 1197.82

## 2019-08-06 NOTE — PROGRESS NOTES
Subjective     Daniela Brown is a 86 year old female who presents to clinic today for the following health issues:    HPI   Diabetes Follow-up      How often are you checking your blood sugar? One time daily in AM    What time of day are you checking your blood sugars (select all that apply)?  Before meals    Have you had any blood sugars above 200?  No    Have you had any blood sugars below 70?  No    What symptoms do you notice when your blood sugar is low?  None    What concerns do you have today about your diabetes? None     Do you have any of these symptoms? (Select all that apply)  Numbness in feet and Burning in feet     Have you had a diabetic eye exam in the last 12 months? Yes- Date of last eye exam: April 2019     Is diet controlled.      Diabetes Management Resources    Hyperlipidemia Follow-Up      Are you having any of the following symptoms? (Select all that apply)  No complaints of shortness of breath, chest pain or pressure.  No increased sweating or nausea with activity.  No left-sided neck or arm pain.  No complaints of pain in calves when walking 1-2 blocks.    Are you regularly taking any medication or supplement to lower your cholesterol?   No    Are you having muscle aches or other side effects that you think could be caused by your cholesterol lowering medication?  No    Hypertension Follow-up      Do you check your blood pressure regularly outside of the clinic? No     Are you following a low salt diet? Yes    Are your blood pressures ever more than 140 on the top number (systolic) OR more   than 90 on the bottom number (diastolic), for example 140/90? No    BP Readings from Last 2 Encounters:   08/06/19 128/70   07/18/19 128/64     Hemoglobin A1C (%)   Date Value   08/06/2019 6.4 (H)   05/30/2019 6.4 (H)     LDL Cholesterol Calculated (mg/dL)   Date Value   12/06/2012 161 (H)   11/11/2011 139 (H)       Amount of exercise or physical activity: does walk down her driveway with  walker to get mail every day.  Uses cane in home.    Problems taking medications regularly: No    Medication side effects: none    Diet: regular (no restrictions) and low salt.  Patient states she usually only eats once a day. States her appetite has decreased.          Current Outpatient Medications   Medication Sig Dispense Refill     acetaminophen (TYLENOL) 325 MG tablet Take 1 tablet by mouth every 6 hours as needed for pain. 30 tablet 0     amLODIPine (NORVASC) 5 MG tablet Take 1 tablet (5 mg) by mouth every evening 90 tablet 3     atorvastatin (LIPITOR) 40 MG tablet Take 1 tablet (40 mg) by mouth At Bedtime 90 tablet 3     blood glucose monitoring (ACCU-CHEK FASTCLIX) lancets Use to test blood sugar one times daily or as directed. 102 each 3     blood glucose monitoring (ACCU-CHEK GUIDE) test strip Use to test blood sugar one times daily or as directed. 100 each 3     ClonazePAM (KLONOPIN PO) Take 0.5 mg by mouth 2 times daily as needed for anxiety       Cyanocobalamin (B-12) 1000 MCG SUBL Place 1 tablet under the tongue daily        gabapentin (NEURONTIN) 300 MG capsule Take by mouth eight hours apart: 2 capsules in morning, 1-2 capsules midday, 2 capsules at night 360 capsule 3     losartan (COZAAR) 50 MG tablet Take 1 tablet (50 mg) by mouth daily 90 tablet 3     Multiple Vitamins-Minerals (THERAPEUTIC MULTIVIT/MINERAL) TABS Take 1 tablet by mouth daily.       order for DME 1 walker 1 Device 0     ORDER FOR DME Thigh length teds. 1 Device 2     traMADol (ULTRAM) 50 MG tablet Take 1 Tablet BY MOUTH THREE TIMES DAILY AS NEEDED FOR PAIN 90 tablet 0     VITAMIN D, CHOLECALCIFEROL, PO Take 1,000 Units by mouth daily       dicyclomine (BENTYL) 20 MG tablet Take 1 tablet (20 mg) by mouth 4 times daily as needed (diarrhea) (Patient not taking: Reported on 8/6/2019) 60 tablet 11     lidocaine (LIDODERM) 5 % patch Apply patch to left low back at bedtime. Remove patch in the morning. (Patient not taking: Reported on  "8/6/2019) 10 patch 0     nitroGLYcerin (NITROSTAT) 0.4 MG sublingual tablet Place 1 tablet (0.4 mg) under the tongue every 5 minutes as needed for chest pain (Patient not taking: Reported on 8/6/2019) 30 tablet 4         Reviewed and updated as needed this visit by Provider         Review of Systems   ROS COMP: Constitutional, HEENT, cardiovascular, pulmonary, gi and gu systems are negative, except as otherwise noted.      Objective    /70   Pulse 91   Temp 98.3  F (36.8  C) (Tympanic)   Resp 18   Ht 1.626 m (5' 4\")   Wt 77.3 kg (170 lb 6 oz)   SpO2 97%   BMI 29.24 kg/m    Body mass index is 29.24 kg/m .  Physical Exam   GENERAL APPEARANCE: healthy, alert and no distress  DIABETIC FOOT EXAM: normal DP and PT pulses, no trophic changes or ulcerative lesions and reduced sensation at 6/10 spots bilateral.  Evidence of callus formation bilateral     Diagnostic Test Results:  Labs reviewed in Epic  Results for orders placed or performed in visit on 08/06/19 (from the past 24 hour(s))   **A1C FUTURE 1yr   Result Value Ref Range    Hemoglobin A1C 6.4 (H) 0 - 5.6 %           Assessment & Plan     1. Type 2 diabetes mellitus with diabetic polyneuropathy, without long-term current use of insulin (H) - well controlled, diet controlled.  Follows with a podiatrist and gets diabetic shoes regularly yearly.  Discussed checking blood sugar a few times a week, and very the times of checking blood sugar.  Advised to monitor a little closer after steroid shots.  Recheck every 6 to 12 months.  - Lipid panel reflex to direct LDL Non-fasting; Future  - Hemoglobin A1c; Future    2. Benign essential hypertension -well-controlled    3. Hyperlipidemia LDL goal <100 = on statin    4. Need for vaccination  - Pneumococcal vaccine 13 valent PCV13 IM (Prevnar) [83067]  - ADMIN MEDICARE: Pneumococcal Vaccine ()     BMI:   Estimated body mass index is 29.24 kg/m  as calculated from the following:    Height as of this encounter: " "1.626 m (5' 4\").    Weight as of this encounter: 77.3 kg (170 lb 6 oz).   Weight management plan: Discussed healthy diet and exercise guidelines        Patient Instructions   1. Ok to check blood sugar just a few times per week.  Check fasting and also a few hours after a meal.  2. You have neuropathy in the feet.  Check your feet regularly.  3. We should do a diabetes check-up every 6-12 months, sooner if you start seeing blood sugar consistently > 200 for more than 2 weeks      1. Increase dietary fiber. Goal of 25 grams per day for women, 35 grams per day for men. If unable to consume 25-35 grams through diet alone consider OTC supplements. Metamucil is the best choice. It requires the use of plenty of water to be effective. Can take days to weeks to take effect.   2. . Prunes can be just as effective as Metamucil. 10 prunes = 6 grams of fiber.             Dr. Maddox's Tips for a Healthy Diet    1. Add more fresh fruits and vegetables to your diet.  The more colorful with the fruit or vegetable (think berries, spinach, carrots, peppers) the healthier it tends to be.  The least healthy fruits and vegetables are bananas, corn, potatoes -they had a very high sugar/starch content with low fiber.  Juice is not a fruit.  Prepare the vegetables in a healthy way - steam, bake.  Avoid batter/breading, butter/oil to cook.  2. Add more fiber to your diet.  Swap out white bread, white rice, white pasta for whole grain versions.  Reduce the portion size and frequency of carbohydrates/starches.  3. Choose healthier fats such as nuts, olive oil, avocado, etc. Stay away from lard, butter.  4. Decrease the frequency and portion size of 'junk food' -pizza, candy, cookies, potato chips, etc.  5. Watch liquid calories such as coffee drinks, juice, soda, teas.  There tends to be excessive sugar in these beverages.  6. Increase protein in your diet.  Eggs, cheese, yogurt, nuts, lentils, chicken, fish are good healthy choices.  Protein " keeps you hernandez longer, and you are less likely to have blood sugar spikes  7. Eat healthy 80% of the time.  It is ok for a special treat (Mom's spaghetti dinner, cake dessert on your birthday) every once in a while, just not every day.  When are you going to indulge (think [place] time), be sure you are eating extra healthy the day before and after.         Glucose Log    Patient Education     Your Diabetes Foot Care Program    Every day you depend on your feet to keep you moving. But when you have diabetes, your feet need special care. Even a small foot problem can become very serious. So don t take your feet for granted. By working with your diabetes healthcare team, you can learn how to protect your feet and keep them healthy.  Evaluating your feet  An evaluation helps your healthcare provider check the condition of your feet. The evaluation includes a review of your diabetes history and overall health. It may also include a foot exam, X-rays, or other tests. These can help show problems beneath the skin that you can t see or feel.  Medical history  You will be asked about your overall health and any history of foot problems. You ll also discuss your diabetes history, such as whether your blood sugar level has changed over time. It also includes questions about sensations of pain, tingling, pins and needles, or numbness. Your healthcare provider will also want to know if you have high blood pressure and heart disease, or if you smoke. Be sure to mention any medicines (including over-the-counter), supplements, or herbal remedies you take.  Foot exam  A foot exam checks the condition of different parts of your foot. First, your skin and nails are examined for any signs of infection. Blood flow is checked by feeling for the pulses in each foot. You may also have tests to study the nerves in the foot. These include using a small filament (wire) to see how sensitive your feet are. In certain cases, you will be  asked to walk a short distance to check for bone, joint, and muscle problems.  Diagnostic tests  If needed, your healthcare provider will suggest certain tests to learn more about your feet. These include:    Doppler tests to measure blood flow in the feet and lower leg.    X-rays, which can show bone or joint problems.    Other imaging tests, such as an MRI (magnetic resonance imaging), bone scan, and CT (computed tomography) scan. These can help show bone infections.    Other tests, such as vascular tests, which study the blood flow in your feet and legs. You may also have nerve studies to learn how sensitive your feet are.  Creating a foot care program  Based on the evaluation, your healthcare provider will create a foot care program for you. Your program may be as simple as starting a daily self-care routine and changing the types of shoes your wear. It may also involve treating minor foot problems, such as a corn or blister. In some cases, surgery will be needed to treat an infection or mechanical problems, such as hammer toes.  Preventing problems  When you have diabetes, it s easier to prevent problems than to treat them later on. So see your healthcare team for regular checkups and foot care. Your healthcare team can also help you learn more about caring for your feet at home. For example, you may be told to avoid walking barefoot. Or you may be told that special footwear is needed to protect your feet.  Have regular checkups  Foot problems can develop quickly. So be sure to follow your healthcare team s schedule for regular checkups. During office visits, take off your shoes and socks as soon as you get in the exam room. Ask your healthcare provider to examine your feet for problems. This will make it easier to find and treat small skin irritations before they get worse. Regular checkups can also help keep track of the blood flow and feeling in your feet. If you have neuropathy (lack of feeling in your  feet), you will need to have checkups more often.  Learn about self-care  The more you know about diabetes and your feet, the easier it will be to prevent problems. Members of your healthcare team can teach you how to inspect your feet and teach you to look for warning signs. They can also give you other foot care tips. During office visits, be sure to ask any questions you have.  Date Last Reviewed: 7/1/2016 2000-2018 [employer] [address] [place] [address]. All rights reserved. This information is not intended as a substitute for professional medical care. Always follow your healthcare professional's instructions.               No follow-ups on file.    Dr. Cynthia Maddox,   [facility]

## 2019-08-06 NOTE — PATIENT INSTRUCTIONS
1. Ok to check blood sugar just a few times per week.  Check fasting and also a few hours after a meal.  2. You have neuropathy in the feet.  Check your feet regularly.  3. We should do a diabetes check-up every 6-12 months, sooner if you start seeing blood sugar consistently > 200 for more than 2 weeks      1. Increase dietary fiber. Goal of 25 grams per day for women, 35 grams per day for men. If unable to consume 25-35 grams through diet alone consider OTC supplements. Metamucil is the best choice. It requires the use of plenty of water to be effective. Can take days to weeks to take effect.   2. . Prunes can be just as effective as Metamucil. 10 prunes = 6 grams of fiber.             Dr. Maddox's Tips for a Healthy Diet    1. Add more fresh fruits and vegetables to your diet.  The more colorful with the fruit or vegetable (think berries, spinach, carrots, peppers) the healthier it tends to be.  The least healthy fruits and vegetables are bananas, corn, potatoes -they had a very high sugar/starch content with low fiber.  Juice is not a fruit.  Prepare the vegetables in a healthy way - steam, bake.  Avoid batter/breading, butter/oil to cook.  2. Add more fiber to your diet.  Swap out white bread, white rice, white pasta for whole grain versions.  Reduce the portion size and frequency of carbohydrates/starches.  3. Choose healthier fats such as nuts, olive oil, avocado, etc. Stay away from lard, butter.  4. Decrease the frequency and portion size of 'junk food' -pizza, candy, cookies, potato chips, etc.  5. Watch liquid calories such as coffee drinks, juice, soda, teas.  There tends to be excessive sugar in these beverages.  6. Increase protein in your diet.  Eggs, cheese, yogurt, nuts, lentils, chicken, fish are good healthy choices.  Protein keeps you hernandez longer, and you are less likely to have blood sugar spikes  7. Eat healthy 80% of the time.  It is ok for a special treat (Mom's spaghetti dinner, cake  dessert on your birthday) every once in a while, just not every day.  When are you going to indulge (think [place] time), be sure you are eating extra healthy the day before and after.         Glucose Log    Patient Education     Your Diabetes Foot Care Program    Every day you depend on your feet to keep you moving. But when you have diabetes, your feet need special care. Even a small foot problem can become very serious. So don t take your feet for granted. By working with your diabetes healthcare team, you can learn how to protect your feet and keep them healthy.  Evaluating your feet  An evaluation helps your healthcare provider check the condition of your feet. The evaluation includes a review of your diabetes history and overall health. It may also include a foot exam, X-rays, or other tests. These can help show problems beneath the skin that you can t see or feel.  Medical history  You will be asked about your overall health and any history of foot problems. You ll also discuss your diabetes history, such as whether your blood sugar level has changed over time. It also includes questions about sensations of pain, tingling, pins and needles, or numbness. Your healthcare provider will also want to know if you have high blood pressure and heart disease, or if you smoke. Be sure to mention any medicines (including over-the-counter), supplements, or herbal remedies you take.  Foot exam  A foot exam checks the condition of different parts of your foot. First, your skin and nails are examined for any signs of infection. Blood flow is checked by feeling for the pulses in each foot. You may also have tests to study the nerves in the foot. These include using a small filament (wire) to see how sensitive your feet are. In certain cases, you will be asked to walk a short distance to check for bone, joint, and muscle problems.  Diagnostic tests  If needed, your healthcare provider will suggest certain tests to learn  more about your feet. These include:    Doppler tests to measure blood flow in the feet and lower leg.    X-rays, which can show bone or joint problems.    Other imaging tests, such as an MRI (magnetic resonance imaging), bone scan, and CT (computed tomography) scan. These can help show bone infections.    Other tests, such as vascular tests, which study the blood flow in your feet and legs. You may also have nerve studies to learn how sensitive your feet are.  Creating a foot care program  Based on the evaluation, your healthcare provider will create a foot care program for you. Your program may be as simple as starting a daily self-care routine and changing the types of shoes your wear. It may also involve treating minor foot problems, such as a corn or blister. In some cases, surgery will be needed to treat an infection or mechanical problems, such as hammer toes.  Preventing problems  When you have diabetes, it s easier to prevent problems than to treat them later on. So see your healthcare team for regular checkups and foot care. Your healthcare team can also help you learn more about caring for your feet at home. For example, you may be told to avoid walking barefoot. Or you may be told that special footwear is needed to protect your feet.  Have regular checkups  Foot problems can develop quickly. So be sure to follow your healthcare team s schedule for regular checkups. During office visits, take off your shoes and socks as soon as you get in the exam room. Ask your healthcare provider to examine your feet for problems. This will make it easier to find and treat small skin irritations before they get worse. Regular checkups can also help keep track of the blood flow and feeling in your feet. If you have neuropathy (lack of feeling in your feet), you will need to have checkups more often.  Learn about self-care  The more you know about diabetes and your feet, the easier it will be to prevent problems. Members  of your healthcare team can teach you how to inspect your feet and teach you to look for warning signs. They can also give you other foot care tips. During office visits, be sure to ask any questions you have.  Date Last Reviewed: 7/1/2016 2000-2018 [employer]. [address] [place] [address]. All rights reserved. This information is not intended as a substitute for professional medical care. Always follow your healthcare professional's instructions.

## 2019-08-08 ENCOUNTER — RADIOLOGY INJECTION OFFICE VISIT (OUTPATIENT)
Dept: PALLIATIVE MEDICINE | Facility: CLINIC | Age: 84
End: 2019-08-08
Payer: MEDICARE

## 2019-08-08 ENCOUNTER — HOSPITAL ENCOUNTER (OUTPATIENT)
Dept: RADIOLOGY | Facility: CLINIC | Age: 84
Discharge: HOME | End: 2019-08-08
Attending: ANESTHESIOLOGY | Admitting: ANESTHESIOLOGY
Payer: MEDICARE

## 2019-08-08 VITALS — DIASTOLIC BLOOD PRESSURE: 90 MMHG | RESPIRATION RATE: 15 BRPM | SYSTOLIC BLOOD PRESSURE: 165 MMHG | HEART RATE: 99 BPM

## 2019-08-08 DIAGNOSIS — M47.817 LUMBOSACRAL SPONDYLOSIS WITHOUT MYELOPATHY: Primary | ICD-10-CM

## 2019-08-08 DIAGNOSIS — M47.816 SPONDYLOSIS WITHOUT MYELOPATHY OR RADICULOPATHY, LUMBAR REGION: ICD-10-CM

## 2019-08-08 DIAGNOSIS — M47.817 LUMBOSACRAL FACET JOINT SYNDROME: ICD-10-CM

## 2019-08-08 PROCEDURE — 64495 INJ PARAVERT F JNT L/S 3 LEV: CPT | Mod: LT | Performed by: ANESTHESIOLOGY

## 2019-08-08 PROCEDURE — 25500064 ZZH RX 255 OP 636: Performed by: ANESTHESIOLOGY

## 2019-08-08 PROCEDURE — 64493 INJ PARAVERT F JNT L/S 1 LEV: CPT | Mod: LT | Performed by: ANESTHESIOLOGY

## 2019-08-08 PROCEDURE — 27210202 XR MEDIAL BRANCH BLOCK LUMBAR LEFT: Mod: TC

## 2019-08-08 PROCEDURE — 25000128 H RX IP 250 OP 636: Performed by: ANESTHESIOLOGY

## 2019-08-08 PROCEDURE — 64494 INJ PARAVERT F JNT L/S 2 LEV: CPT | Mod: LT | Performed by: ANESTHESIOLOGY

## 2019-08-08 RX ORDER — LIDOCAINE HYDROCHLORIDE 10 MG/ML
5 INJECTION, SOLUTION EPIDURAL; INFILTRATION; INTRACAUDAL; PERINEURAL ONCE
Status: COMPLETED | OUTPATIENT
Start: 2019-08-08 | End: 2019-08-08

## 2019-08-08 RX ORDER — BUPIVACAINE HYDROCHLORIDE 5 MG/ML
10 INJECTION, SOLUTION PERINEURAL ONCE
Status: COMPLETED | OUTPATIENT
Start: 2019-08-08 | End: 2019-08-08

## 2019-08-08 RX ADMIN — LIDOCAINE HYDROCHLORIDE 1 ML: 10 INJECTION, SOLUTION EPIDURAL; INFILTRATION; INTRACAUDAL; PERINEURAL at 11:22

## 2019-08-08 RX ADMIN — BUPIVACAINE HYDROCHLORIDE 2 ML: 5 INJECTION, SOLUTION EPIDURAL; INTRACAUDAL; PERINEURAL at 11:21

## 2019-08-08 RX ADMIN — IOHEXOL 1 ML: 300 INJECTION, SOLUTION INTRAVENOUS at 11:21

## 2019-08-08 ASSESSMENT — PAIN SCALES - GENERAL
PAINLEVEL: EXTREME PAIN (9)
PAINLEVEL: MILD PAIN (3)

## 2019-08-08 NOTE — DISCHARGE INSTRUCTIONS
[facility]   Medial Branch Block Discharge Instructions      Your procedure was performed by:  Dr. Keo Pascual     Medications used include:  Lidocaine  Bupivicaine  Omnipaque      You will need to complete the Pain Scale Log form and return it to us as soon as possible.  Once we have received the form, we will review it and call you to determine the next steps.     The form can be faxed to [phone] or mailed to:   [facility]   [address]   [place] [address]      You may resume your regular medications    You may resume your regular activities    Be cautious; Weakness in the upper extremities may occur for up to 6-8 hours due to the effects of the anesthetic.    Avoid driving for 6 hours. The local anesthetic could slow your reflexes.     You may shower, however no swimming or tub baths or hot tubs for 24 hours following your procedure.    Your pain will return after the numbing medications have worn off.  You may use your current pain medications as needed.    Unless you have been directed to avoid the use of anti-inflammatory medications (NSAIDS), you may use medications such as ibuprofen, Aleve or Tylenol for pain control if needed.  Some people find it helpful to alternate ibuprofen and Tylenol every 3 hours for a couple of days.    You may use ice packs 10-15 minutes three to four times a day at the injection site for comfort.     Do not use heat to painful areas for 6 to 8 hours. This will give the local anesthetic time to wear off and prevent you from accidentally burning your skin.     If you experience any of the following, call the Pain Clinic during work hours at [phone] or the Provider Line after hours at [phone]:  -Fever over 100 degree F  -Swelling, bleeding, redness, drainage, warmth at the injection site  -Progressive weakness or numbness in your legs   -If lumbar, call if you have a loss of bowel or bladder function  -If  cervical, call if you have any unusual headache that is not relieved by Tylenol  -Unusual new onset of pain that is not improving

## 2019-08-08 NOTE — PROGRESS NOTES
Pre procedure Diagnosis: lumbosacral facet arthropathy, lumbosacral spondylosis   Post procedure Diagnosis: Same  Procedure performed: repeat lumbosacral medial branch block #2 at L3, L4, L5, sacral ala on the left, fluoroscopically guided, contrast controlled  Indication:  Diagnostic   Anesthesia: none  Complications: none  Operators: Keo Pascual MD      Indications:   Daniela Brown is a 86 year old female who is known to me that presents today for lumbar facet procedures.  The patient has a history of chronic left lower back and buttock pain without radiation.  Exam shows lumbar tenderness and increased pain on the left with extension and lateral rotation of the lumbar spine and they have tried conservative treatment including physical therapy, medications, and interventional procedures.     MRI of the lumbar spine was completed on 7/17/18 at [facility] showing:  Findings: No fracture.  Unremarkable alignment.    L1-L2: No significant spinal canal or neural foraminal stenosis.    L2-L3: Moderately severe left lateral recess stenosis due to small   disc protrusion in combination with facet hypertrophy.    L3-L4: No significant spinal canal or neural foraminal stenosis.    L4-L5: Moderately severe bilateral lateral recess stenosis due to a   combination of 5 mm degenerative anterior subluxation of L4 on L5   with facet hypertrophy and disc bulge. Moderate, probably   insignificant, bilateral neural foraminal stenosis.    L5-S1: No significant spinal canal or neural foraminal stenosis     Options/alternatives, benefits and risks were discussed with the patient including but not limited to bleeding, infection, tissue trauma, exposure to radiation, reaction to medications, spinal cord injury, weakness, numbness and paralysis.  Questions were answered to her satisfaction and she agrees to proceed. Voluntary informed consent was obtained and signed.      Vitals were reviewed: Yes  BP (!) 165/90    Pulse 99   Resp 15   Allergies were reviewed:  Yes   Medications were reviewed:  Yes   Pre-procedure pain score: 10/10     Procedure:  After obtaining signed informed consent, the patient was brought into the procedure suite and was placed in a prone position on the procedure table.   A Pause for the Cause was performed.  The patient was prepped and draped in the usual sterile fashion.      Under AP fluoroscopic guidance the L3, L4, L5 vertebral bodies were identified. The C-arm was rotated to the oblique view to afford optimal visualization the pedicles.  Lidocaine 1% was used to anesthetize the skin at each level.  Under intermittent fluoroscopy, 25 G 5 inch spinal needles were positioned inferior and lateral to the intersection of the transverse process and pedicle at the Left L3, L4, & L5 levels, as well as the corresponding sacral alar notch on the left. The needle positions were verified and optimized with AP and lateral views.     The anatomic targets for the L2, L3 & L4 medial branch nerves and L5 dorsal ramus (which functionally incorporates the medial branch) were the  L3, L4 & L5 transverse processes and sacral alar notch, with laterality as described above, resulting in blockade of the L3/4, L4/5 and L5/S1 facet joints.     Omnipaque-300 was injected at each site, and appropriate spread of contrast was noted without vascular uptake.  A total of 1 ml of Omnipaque was used.  9 ml was wasted. Bupivacaine 0.5% 0.5 ml was injected at each location.  The needles were removed.       Hemostasis was achieved, the area was cleaned, and bandaids were placed when appropriate.  The patient tolerated the procedure well, and was taken to the recovery room.    Images were saved to PACS.      The patient will continue to monitor progress, and they were given a pain diary to complete at home.  They will either fax or mail this back to us or bring it to their next appointment. We will determine the treatment plan after  we review the diary.       Post-procedure pain score: 1/10 - equalling a 90% reduction in pain at the time of discharge  Follow-up includes:   -f/u phone call in one week  -will await diary for further planning for Lumbar MB RFA.     Keo Pascual MD  [facility]

## 2019-08-08 NOTE — IP AVS SNAPSHOT
MRN:[id number]                      After Visit Summary   8/8/2019    Daniela Brown    MRN: [id number]           Visit Information        Provider Department      8/8/2019 10:45 AM [facility]           Review of your medicines      UNREVIEWED medicines. Ask your doctor about these medicines       Dose / Directions   acetaminophen 325 MG tablet  Commonly known as:  TYLENOL  Used for:  Degeneration of lumbar or lumbosacral intervertebral disc      Dose:  325 mg  Take 1 tablet by mouth every 6 hours as needed for pain.  Quantity:  30 tablet  Refills:  0     amLODIPine 5 MG tablet  Commonly known as:  NORVASC  Used for:  Benign essential hypertension      Dose:  5 mg  Take 1 tablet (5 mg) by mouth every evening  Quantity:  90 tablet  Refills:  3     atorvastatin 40 MG tablet  Commonly known as:  LIPITOR  Used for:  Angina pectoris (H), Type 2 diabetes mellitus with diabetic polyneuropathy, without long-term current use of insulin (H)      Dose:  40 mg  Take 1 tablet (40 mg) by mouth At Bedtime  Quantity:  90 tablet  Refills:  3     B-12 1000 MCG Subl      Dose:  1 tablet  Place 1 tablet under the tongue daily  Refills:  0     dicyclomine 20 MG tablet  Commonly known as:  BENTYL  Used for:  Irritable bowel syndrome with diarrhea      Dose:  20 mg  Take 1 tablet (20 mg) by mouth 4 times daily as needed (diarrhea)  Quantity:  60 tablet  Refills:  11     gabapentin 300 MG capsule  Commonly known as:  NEURONTIN  Indication:  Neuropathic Pain  Used for:  Chronic left-sided low back pain with left-sided sciatica      Take by mouth eight hours apart: 2 capsules in morning, 1-2 capsules midday, 2 capsules at night  Quantity:  360 capsule  Refills:  3     KLONOPIN PO      Dose:  0.5 mg  Take 0.5 mg by mouth 2 times daily as needed for anxiety  Refills:  0     lidocaine 5 % patch  Commonly known as:  LIDODERM      Apply patch to left low back at bedtime. Remove patch in the  morning.  Quantity:  10 patch  Refills:  0     losartan 50 MG tablet  Commonly known as:  COZAAR  Used for:  Benign essential hypertension      Dose:  50 mg  Take 1 tablet (50 mg) by mouth daily  Quantity:  90 tablet  Refills:  3     nitroGLYcerin 0.4 MG sublingual tablet  Commonly known as:  NITROSTAT  Used for:  Angina pectoris (H)      Dose:  0.4 mg  Place 1 tablet (0.4 mg) under the tongue every 5 minutes as needed for chest pain  Quantity:  30 tablet  Refills:  4     THERAPEUTIC MULTIVIT/MINERAL tablet      Dose:  1 tablet  Take 1 tablet by mouth daily.  Refills:  0     traMADol 50 MG tablet  Commonly known as:  ULTRAM  Used for:  Chronic pain syndrome      Take 1 Tablet BY MOUTH THREE TIMES DAILY AS NEEDED FOR PAIN  Quantity:  90 tablet  Refills:  0     VITAMIN D (CHOLECALCIFEROL) PO      Dose:  1000 Units  Take 1,000 Units by mouth daily  Refills:  0        CONTINUE these medicines which have NOT CHANGED       Dose / Directions   blood glucose monitoring lancets  Used for:  Type 2 diabetes mellitus with diabetic polyneuropathy, without long-term current use of insulin (H)      Use to test blood sugar one times daily or as directed.  Quantity:  102 each  Refills:  3     blood glucose test strip  Commonly known as:  ACCU-CHEK GUIDE  Used for:  Type 2 diabetes mellitus with diabetic polyneuropathy, without long-term current use of insulin (H)      Use to test blood sugar one times daily or as directed.  Quantity:  100 each  Refills:  3     order for DME  Used for:  Venous insufficiency      Thigh length teds.  Quantity:  1 Device  Refills:  2     order for DME  Used for:  Acute pain of right knee      1 walker  Quantity:  1 Device  Refills:  0              Protect others around you: Learn how to safely use, store and throw away your medicines at www.disposemymeds.org.       Follow-ups after your visit       Care Instructions       Further instructions from your care team       [facility]    Medial Branch Block Discharge Instructions      Your procedure was performed by:  Dr. Keo Pascual     Medications used include:  Lidocaine  Bupivicaine  Omnipaque      You will need to complete the Pain Scale Log form and return it to us as soon as possible.  Once we have received the form, we will review it and call you to determine the next steps.     The form can be faxed to [phone] or mailed to:   [facility]   [address]   [place] [address]      You may resume your regular medications    You may resume your regular activities    Be cautious; Weakness in the upper extremities may occur for up to 6-8 hours due to the effects of the anesthetic.    Avoid driving for 6 hours. The local anesthetic could slow your reflexes.     You may shower, however no swimming or tub baths or hot tubs for 24 hours following your procedure.    Your pain will return after the numbing medications have worn off.  You may use your current pain medications as needed.    Unless you have been directed to avoid the use of anti-inflammatory medications (NSAIDS), you may use medications such as ibuprofen, Aleve or Tylenol for pain control if needed.  Some people find it helpful to alternate ibuprofen and Tylenol every 3 hours for a couple of days.    You may use ice packs 10-15 minutes three to four times a day at the injection site for comfort.     Do not use heat to painful areas for 6 to 8 hours. This will give the local anesthetic time to wear off and prevent you from accidentally burning your skin.     If you experience any of the following, call the Pain Clinic during work hours at [phone] or the Provider Line after hours at [phone]:  -Fever over 100 degree F  -Swelling, bleeding, redness, drainage, warmth at the injection site  -Progressive weakness or numbness in your legs   -If lumbar, call if you have a loss of bowel or bladder function  -If cervical, call if you have any  unusual headache that is not relieved by Tylenol  -Unusual new onset of pain that is not improving      Additional Information About Your Visit       Care EveryWhere ID    This is your Care EveryWhere ID. This could be used by other organizations to access your [facility] medical records  [id number]        Primary Care Provider Office Phone # Fax #    Cynthia Maddox [phone]      Equal Access to Services    MANFRED REYES : Hadii aad ku hadasho Soomaali, waaxda luqadaha, qaybta kaalmada adeegyada, waxay idiin hayaan adeeg khbalajish laBrockiona . So [facility] [phone].    ATENCIÓN: Si lg monreal, tiene a cam disposición servicios gratuitos de asistencia lingüística. Llame al [phone].    We comply with applicable federal civil rights laws and [place] laws. We do not discriminate on the basis of race, color, national origin, age, disability, sex, sexual orientation, or gender identity.           Thank you!    Thank you for choosing [facility] for your care. Our goal is always to provide you with excellent care. Hearing back from our patients is one way we can continue to improve our services. Please take a few minutes to complete the written survey that you may receive in the mail after you visit with us. Thank you!            Medication List      Medications          Morning Afternoon Evening Bedtime As Needed    blood glucose monitoring lancets  INSTRUCTIONS:  Use to test blood sugar one times daily or as directed.                     blood glucose test strip  Also known as:  ACCU-CHEK GUIDE  INSTRUCTIONS:  Use to test blood sugar one times daily or as directed.                     order for DME  INSTRUCTIONS:  Thigh length teds.                     order for DME  INSTRUCTIONS:  1 walker                       ASK your doctor about these medications          Morning Afternoon Evening Bedtime As Needed    acetaminophen 325 MG tablet  Also known as:  TYLENOL  INSTRUCTIONS:  Take 1 tablet by mouth  every 6 hours as needed for pain.                     amLODIPine 5 MG tablet  Also known as:  NORVASC  INSTRUCTIONS:  Take 1 tablet (5 mg) by mouth every evening  Doctor's comments:  Patient will call to fill                     atorvastatin 40 MG tablet  Also known as:  LIPITOR  INSTRUCTIONS:  Take 1 tablet (40 mg) by mouth At Bedtime  Doctor's comments:  Patient will call to fill                     B-12 1000 MCG Subl  INSTRUCTIONS:  Place 1 tablet under the tongue daily                     dicyclomine 20 MG tablet  Also known as:  BENTYL  INSTRUCTIONS:  Take 1 tablet (20 mg) by mouth 4 times daily as needed (diarrhea)  Doctor's comments:  Patient will call to fill                     gabapentin 300 MG capsule  Also known as:  NEURONTIN  INSTRUCTIONS:  Take by mouth eight hours apart: 2 capsules in morning, 1-2 capsules midday, 2 capsules at night  Reason for med:  Neuropathic Pain                     KLONOPIN PO  INSTRUCTIONS:  Take 0.5 mg by mouth 2 times daily as needed for anxiety                     lidocaine 5 % patch  Also known as:  LIDODERM  INSTRUCTIONS:  Apply patch to left low back at bedtime. Remove patch in the morning.                     losartan 50 MG tablet  Also known as:  COZAAR  INSTRUCTIONS:  Take 1 tablet (50 mg) by mouth daily  Doctor's comments:  Patient will call to fill                     nitroGLYcerin 0.4 MG sublingual tablet  Also known as:  NITROSTAT  INSTRUCTIONS:  Place 1 tablet (0.4 mg) under the tongue every 5 minutes as needed for chest pain  Doctor's comments:  Patient will call to fill                     THERAPEUTIC MULTIVIT/MINERAL tablet  INSTRUCTIONS:  Take 1 tablet by mouth daily.                     traMADol 50 MG tablet  Also known as:  ULTRAM  INSTRUCTIONS:  Take 1 Tablet BY MOUTH THREE TIMES DAILY AS NEEDED FOR PAIN                     VITAMIN D (CHOLECALCIFEROL) PO  INSTRUCTIONS:  Take 1,000 Units by mouth daily

## 2019-08-08 NOTE — IP AVS SNAPSHOT
[facility]  [address]  [place] [place] [address]  Phone:  [phone]  Fax:  [phone]                                    After Visit Summary   8/8/2019    Daniela Brown    MRN: [id number]           After Visit Summary Signature Page    I have received my discharge instructions, and my questions have been answered. I have discussed any challenges I see with this plan with the nurse or doctor.    ..........................................................................................................................................  Patient/Patient Representative Signature      ..........................................................................................................................................  Patient Representative Print Name and Relationship to Patient    ..................................................               ................................................  Date                                   Time    ..........................................................................................................................................  Reviewed by Signature/Title    ...................................................              ..............................................  Date                                               Time          22EPIC Rev 08/18

## 2019-08-13 ENCOUNTER — TELEPHONE (OUTPATIENT)
Dept: PALLIATIVE MEDICINE | Facility: CLINIC | Age: 84
End: 2019-08-13

## 2019-08-13 NOTE — TELEPHONE ENCOUNTER
Pt had a repeat left Lumbar  medial branch block # 2 on 8/7/19.  The post medial branch block form was  received.    Max % of pain relief from blocks:    left Lumbar medial branch block #1:    At rest:                 100%  Left fact load:       100%  Right facet load:  NA  Normal activity:    100%    Max relief from block #2 is:    At rest:                 50%  Left fact load:       63%  Right facet load:  na%  Normal activity:    67%    Max relief from repeat block #2 is:    At rest:                 100%  Left fact load:       90%  Right facet load:  na%  Normal activity:    100%    Insurance:  Medicare  Does pt have adequate relief insurance-wise to proceed to radiofrequency ablation?  Yes:      Physical therapy:                  Last done in?:  6/2019.     How many sessions?:  6.      Where was it done?  [facility]     Called pt and informed him/her that insurance would be checked and will be  called once we get a response.  Not Done    The pain diary was faxed to Mar[facility] for insurance review.    Routed to [facility] to check insurance coverage.      Oxana García RN-BSN  [facility]

## 2019-08-13 NOTE — IP AVS SNAPSHOT
[facility]  [address]  [place] [address]  Phone:  [phone]  Fax:  [phone]                                    After Visit Summary   5/15/2019    Daniela Brown    MRN: [id number]           After Visit Summary Signature Page    I have received my discharge instructions, and my questions have been answered. I have discussed any challenges I see with this plan with the nurse or doctor.    ..........................................................................................................................................  Patient/Patient Representative Signature      ..........................................................................................................................................  Patient Representative Print Name and Relationship to Patient    ..................................................               ................................................  Date                                   Time    ..........................................................................................................................................  Reviewed by Signature/Title    ...................................................              ..............................................  Date                                               Time          22EPIC Rev 08/18        Subjective:   Patient ID: Venus Caldwell is a 69 y.o. female.  Chief Complaint:  Follow-up (6 month)      Patient presents for six-month follow-up on multiple chronic medical conditions.    Medical history for:   Hypertension.  Last visit uncontrolled.  Enrolled in digital hypertension program.  Presently on amlodipine 5 mg daily, atacand 60 mg daily, and Toprol-XL 50 mg twice a day.  Reports compliance.  Denies side effects.  Review of record shows at goal less than 130/80 46% time.  Average blood pressures less than 140/90 and well controlled.  The   Prediabetes.  Metformin 500 mg daily.  Last A1c 6.3%.  Stop metformin due to GI side effects.  Denies symptoms hypo or hyperglycemia.  Needs repeat A1c.  Hyperlipidemia with known coronary artery disease and aortic atherosclerosis.  On Lipitor 40 mg daily.  Last LDL at goal for treatment.  Needs repeat lipid panel and CMP.  GERD.  Stable on low-dose daily PPI.  Chronic allergic rhinitis.  Stable on Zyrtec 10 mg daily  Vitamin-D insufficiency.  Was taking 1000 units daily.  Recently started 5000 units daily.  Needs repeat vitamin-D level.  Osteoarthritis multiple sites with chronic low back pain.  Stable.  Sees pain management.  States overall doing well.  Previous CBC, TSH, and CMP greater than 1 year ago stable.    New complaints of sleep difficulty, positive [place] screening questionnaire.  Recently saw Cardiology for palpitations and some shortness of breath.  Referred to Sleep Medicine. Patient did cancel appointment due to 's illness.    Routine health maintenance shows need for mammogram and shingles vaccine 2.    Review of Systems   Constitutional: Positive for fatigue. Negative for chills, diaphoresis and fever.   Eyes: Negative for visual disturbance.   Respiratory: Negative for cough, chest tightness, shortness of breath and wheezing.    Cardiovascular: Negative for chest pain, palpitations and leg swelling.   Gastrointestinal: Negative for  "abdominal distention, abdominal pain, constipation, diarrhea, nausea and vomiting.   Endocrine: Negative for polydipsia, polyphagia and polyuria.   Genitourinary: Negative for difficulty urinating, dysuria, flank pain, frequency, hematuria and urgency.   Musculoskeletal: Positive for arthralgias, back pain and gait problem. Negative for myalgias.   Skin: Negative for rash.   Neurological: Negative for dizziness, syncope, weakness, light-headedness, numbness and headaches.   Psychiatric/Behavioral: Positive for sleep disturbance. Negative for agitation, behavioral problems, confusion, decreased concentration, dysphoric mood, hallucinations, self-injury and suicidal ideas. The patient is not nervous/anxious and is not hyperactive.      Objective:   /78 (BP Location: Right arm, Patient Position: Sitting, BP Method: Medium (Manual))   Pulse 86   Temp 98.8 °F (37.1 °C) (Tympanic)   Ht 5' 3" (1.6 m)   Wt 79.6 kg (175 lb 7.8 oz)   SpO2 (!) 94%   BMI 31.09 kg/m²     Physical Exam   Constitutional: Vital signs are normal. She appears well-developed and well-nourished. No distress.   Eyes: No scleral icterus.   Neck: No JVD present. Carotid bruit is not present. No thyroid mass and no thyromegaly present.   Cardiovascular: Normal rate, regular rhythm and normal heart sounds. Exam reveals no gallop and no friction rub.   No murmur heard.  Pulmonary/Chest: Effort normal and breath sounds normal. She has no wheezes. She has no rhonchi. She has no rales.   Abdominal: Soft. She exhibits no distension. There is no hepatosplenomegaly. There is no tenderness. There is no rebound and no guarding.   Musculoskeletal: She exhibits no edema.   Neurological: She displays a negative Romberg sign. Gait abnormal. Coordination normal.   Skin: Skin is warm, dry and intact. No rash noted.   Psychiatric: She has a normal mood and affect.   Nursing note and vitals reviewed.    Assessment:       ICD-10-CM ICD-9-CM   1. Chronic fatigue " R53.82 780.79   2. Prediabetes R73.03 790.29   3. Essential hypertension I10 401.9   4. Mixed hyperlipidemia E78.2 272.2   5. (HFpEF) heart failure with preserved ejection fraction I50.30 428.9   6. Coronary artery disease involving native coronary artery of native heart without angina pectoris I25.10 414.01   7. Abdominal aortic atherosclerosis I70.0 440.0   8. Paroxysmal atrial tachycardia I47.1 427.0   9. Chronic non-seasonal allergic rhinitis J30.89 477.9   10. Gastroesophageal reflux disease without esophagitis K21.9 530.81   11. High risk medication use Z79.899 V58.69   12. Primary osteoarthritis involving multiple joints M15.0 715.09   13. Sciatica of right side M54.31 724.3   14. Screening for breast cancer Z12.31 V76.10     Plan:   Chronic fatigue  -     TSH; Future; Expected date: 08/13/2019  -     CBC Without Differential; Future; Expected date: 08/13/2019  Rescheduled previously ordered sleep medicine evaluation.      Prediabetes  -     Hemoglobin A1c; Future; Expected date: 08/13/2019  If greater than 7%, treat for diabetes   If less than 7%, okay to remain off medication or attempt to restart low-dose metformin.     Essential hypertension  (HFpEF) heart failure with preserved ejection fraction    -     Comprehensive metabolic panel; Future; Expected date: 08/13/2019  Control.  BP at goal.    Continue amlodipine 5 mg daily  Continue atacand 16 mg daily  Continue Toprol-XL 50 mg twice a day.         Mixed hyperlipidemia  Coronary artery disease involving native coronary artery of native heart without angina pectoris  Abdominal aortic atherosclerosis  Paroxysmal atrial tachycardia        -     aspirin (ECOTRIN) 81 MG EC tablet; Take 1 tablet (81 mg total)  by mouth once daily.; Refill: 0  -     Lipid panel; Future; Expected date: 08/13/2019  Check lipid panel.    Adjust Lipitor 40 mg daily if indicated.    Follow up Cardiology as scheduled.    Chronic non-seasonal allergic rhinitis  Stable.  Symptoms  controlled.    Continue Zyrtec 10 mg daily.      Gastroesophageal reflux disease without esophagitis  High risk medication use  -     Vitamin D; Future; Expected date: 08/13/2019  Stable.  Symptoms controlled.    Continue daily low-dose PPI.     adjust vitamin-D supplement as indicated.      Primary osteoarthritis involving multiple joints  Sciatica of right side  Continue all present medications.    Follow-up pain management as scheduled.      Screening for breast cancer  -     Mammo Digital Screening Bilat; Future; Expected date: 08/13/2019    Return to clinic 6 months or sooner as needed

## 2019-08-14 NOTE — TELEPHONE ENCOUNTER
Will forward to  to schedule.    Pt meets criteria below.    Tho Simmons, RN  Care Coordinator   [facility] [place]

## 2019-08-14 NOTE — TELEPHONE ENCOUNTER
Per Medicare medical policy-No PA required        RFA  o Dx of arthropathy, spondylosis, or sprain; failed conservative treatment including 4 weeks of PT or an unfavorable evaluation; 2 successful workups resulting in >80-90% pain relief  o Indications:   - Patient must have history of at least 3 months of moderate to severe pain with functional impairment and pain is inadequately responsive to conservative care such as NSAIDs, acetaminophen, physical therapy (as tolerated).  - Pain is predominantly axial and, with the possible exception of facet joint cysts, not associated with radiculopathy or neurogenic claudication.  - There is no non-facet pathology that could explain the source of the patient s pain, such as fracture, tumor, infection, or significant deformity.Clinical assessment implicates the facet joint as the putative source of pain  Routing to nursing to review      Marcella VILLASENOR    [facility]

## 2019-08-19 NOTE — TELEPHONE ENCOUNTER
Pt will have some call back to schedule her LRFA.      Ida WALLACE    [facility] [place]

## 2019-08-20 NOTE — TELEPHONE ENCOUNTER
Pre-screening Questions for RFA Procedure      Procedure ordered? Lumbar RFA    What insurance are we billing for this procedure?  [employer]    Has patient had this injection before? No  Any chance of pregnancy? NO   If YES, do NOT schedule and route to RN pool    Is  Needed?: No  Will patient have a ?  Yes   If pt is given sedation meds, no driving for 24 hours.  Is pt taking a cab or transportation service? NO        If so will need to be accompanied by an adult too (friend/family member) in order for IV sedation to be given.      Per [facility] Policy:  Outpatients are to have responsible adult or family member to accompany them at discharge and drive them home. A service providing medically trained drivers or attendants would be acceptable. Public transportation would not be acceptable unless the patient is accompanied by a responsible adult or family member.    Is patient taking any blood thinners (plavix, coumadin, jantoven, warfarin, heparin, pradaxa or dabigatran )? No   If YES, do NOT schedule, and route to RN pool    Is patient taking any aspirin products? No     If more than 325mg/day do NOT schedule and route to RN pool     For CERVICAL procedures, hold all aspirin products for 6 days.     Does the patient have a bleeding or clotting disorder? No     If YES, it it OKAY to schedule AND route to RN pool    **For any patients with platelet count <100, must be forwarded to provider**    Does patient have an active infection or treated for one within the past week? No   If YES, do NOT schedule and route to RN nurse pool     Is patient currently taking any antibiotics?  No    For patients on chronic, preventative, or prophylactic antibiotics, procedures may be scheduled.     For patients on antibiotics for active or recent infection:    Fany Nath Burton, Snitzer-antibiotic course must have been completed for 4 days    Is patient currently taking any steroid medications? (i.e.  Prednisone, Medrol)  No     For patients on steroid medications:    Joanna Pascual, Fany, Marlena Tejeda-steroid course must have been completed for 4 days    Reviewed with patient:  If you are started on any steroids or antibiotics between now and your appointment, you must contact us because it may affect our ability to perform your procedure.  Yes    Is patient actively being treated for cancer or immunocompromised, including the spleen having been removed? No  If YES, do NOT schedule and route to RN pool     Any history of complications with sedation medications?  NO   If YES, OK to schedule AND route to RN pool     Any history of sleep apnea?  NO   If YES, OK to schedule AND route to RN pool     Any cardiac history?  NO   If YES, OK to schedule AND route to RN pool     Do you have an implanted pacemaker, ICD (implanted cardiac device) or AICD (automatic implanted cardiac device)?  NO    If YES, do NOT schedule AND route to RN pool.     Obtain name of device : N/A      Obtain name of cardiologist: N/A      Do you have an implanted stimulator?  NO    If YES, OK to schedule AND route to nursing.     Instruct patient to bring in the remote to the appointment and it will need to be turned off.  reviewed and not discussed      Does patient have an allergy to contrast dye, iodine or shellfish?  No   If YES, OK to schedule. Route to RN pool AND add allergy information to appointment notes    Are you able to get on and off an exam table with minimal or no assistance? Yes   If NO, do NOT schedule and route to RN pool    Are you able to roll over and lay on your stomach with minimal or no assistance? Yes   If NO, do NOT schedule and route to RN pool    Informed patient that s/he needs to be NPO for 6 hours before procedure?  YES   Informed patient that it is OK to take normal medications with sips of water, especially blood pressure medications, before the procedure and must hold blood thinners as  instructed.  Yes  Informed patient to arrive 30 minutes before procedure time to have an IV inserted.  reviewed   Do NOT schedule at 0745, 0815 or 1245.  reviewed   All radiofrequency ablations are in a 90 minute time slot except genicular radiofrequency ablation those are 60 minute time slot.  Reviewed    Bharti May    [facility]

## 2019-08-27 ENCOUNTER — PATIENT OUTREACH (OUTPATIENT)
Dept: CARE COORDINATION | Facility: CLINIC | Age: 84
End: 2019-08-27

## 2019-08-27 NOTE — PROGRESS NOTES
Clinic Care Coordination Contact  [facility]/Voicemail       Clinical Data: Care Coordinator Outreach  Proactive follow up outreach     Outreach attempted x 1.  Left message on voicemail with call back information and requested return call.    Plan:  Care Coordinator will try to reach patient again in about 2 weeks if no return call prior to this timeframe.    COLETTE Cortez, RN   [facility] - RN Care Coordinator  Phone: [phone]

## 2019-09-03 ENCOUNTER — RADIOLOGY INJECTION OFFICE VISIT (OUTPATIENT)
Dept: PALLIATIVE MEDICINE | Facility: CLINIC | Age: 84
End: 2019-09-03
Payer: MEDICARE

## 2019-09-03 ENCOUNTER — HOSPITAL ENCOUNTER (OUTPATIENT)
Dept: RADIOLOGY | Facility: CLINIC | Age: 84
Discharge: HOME | End: 2019-09-03
Attending: ANESTHESIOLOGY | Admitting: ANESTHESIOLOGY
Payer: MEDICARE

## 2019-09-03 ENCOUNTER — TELEPHONE (OUTPATIENT)
Dept: PALLIATIVE MEDICINE | Facility: CLINIC | Age: 84
End: 2019-09-03

## 2019-09-03 VITALS
OXYGEN SATURATION: 97 % | HEART RATE: 92 BPM | DIASTOLIC BLOOD PRESSURE: 71 MMHG | SYSTOLIC BLOOD PRESSURE: 130 MMHG | RESPIRATION RATE: 16 BRPM

## 2019-09-03 VITALS — SYSTOLIC BLOOD PRESSURE: 157 MMHG | HEART RATE: 92 BPM | DIASTOLIC BLOOD PRESSURE: 76 MMHG | RESPIRATION RATE: 16 BRPM

## 2019-09-03 DIAGNOSIS — M47.816 SPONDYLOSIS WITHOUT MYELOPATHY OR RADICULOPATHY, LUMBAR REGION: ICD-10-CM

## 2019-09-03 DIAGNOSIS — M47.817 LUMBOSACRAL SPONDYLOSIS WITHOUT MYELOPATHY: ICD-10-CM

## 2019-09-03 DIAGNOSIS — G89.18 PAIN FOLLOWING SURGERY OR PROCEDURE: Primary | ICD-10-CM

## 2019-09-03 DIAGNOSIS — M47.817 LUMBOSACRAL FACET JOINT SYNDROME: ICD-10-CM

## 2019-09-03 PROCEDURE — 27210202 XR LUMBAR RADIOFREQ ABLATION BILATERAL: Mod: TC

## 2019-09-03 PROCEDURE — 25000128 H RX IP 250 OP 636: Performed by: ANESTHESIOLOGY

## 2019-09-03 PROCEDURE — 64636 DESTROY L/S FACET JNT ADDL: CPT | Mod: LT | Performed by: ANESTHESIOLOGY

## 2019-09-03 PROCEDURE — 64635 DESTROY LUMB/SAC FACET JNT: CPT | Mod: LT | Performed by: ANESTHESIOLOGY

## 2019-09-03 RX ORDER — OXYCODONE HYDROCHLORIDE 5 MG/1
5 TABLET ORAL EVERY 6 HOURS PRN
Qty: 25 TABLET | Refills: 0 | Status: ON HOLD | OUTPATIENT
Start: 2019-09-03 | End: 2019-10-10

## 2019-09-03 RX ORDER — BUPIVACAINE HYDROCHLORIDE 5 MG/ML
10 INJECTION, SOLUTION PERINEURAL ONCE
Status: COMPLETED | OUTPATIENT
Start: 2019-09-03 | End: 2019-09-03

## 2019-09-03 RX ORDER — FENTANYL CITRATE 50 UG/ML
50 INJECTION, SOLUTION INTRAMUSCULAR; INTRAVENOUS
Status: DISCONTINUED | OUTPATIENT
Start: 2019-09-03 | End: 2019-09-04 | Stop reason: HOSPADM

## 2019-09-03 RX ORDER — FENTANYL CITRATE 50 UG/ML
25 INJECTION, SOLUTION INTRAMUSCULAR; INTRAVENOUS EVERY 5 MIN PRN
Status: DISCONTINUED | OUTPATIENT
Start: 2019-09-03 | End: 2019-09-04 | Stop reason: HOSPADM

## 2019-09-03 RX ADMIN — BUPIVACAINE HYDROCHLORIDE 50 MG: 5 INJECTION, SOLUTION EPIDURAL; INTRACAUDAL; PERINEURAL at 10:53

## 2019-09-03 ASSESSMENT — PAIN SCALES - GENERAL: PAINLEVEL: MILD PAIN (2)

## 2019-09-03 NOTE — IP AVS SNAPSHOT
[facility]  [address]  [place] [address]  Phone:  [phone]  Fax:  [phone]                                    After Visit Summary   9/3/2019    Daniela Brown    MRN: [id number]           After Visit Summary Signature Page    I have received my discharge instructions, and my questions have been answered. I have discussed any challenges I see with this plan with the nurse or doctor.    ..........................................................................................................................................  Patient/Patient Representative Signature      ..........................................................................................................................................  Patient Representative Print Name and Relationship to Patient    ..................................................               ................................................  Date                                   Time    ..........................................................................................................................................  Reviewed by Signature/Title    ...................................................              ..............................................  Date                                               Time          22EPIC Rev 08/18

## 2019-09-03 NOTE — PROGRESS NOTES
Pre-procedure Intake    Have you been fasting? Yes    If yes, for how long? Since last evening    Are you taking a prescribed blood thinner such as coumadin, Plavix, Xarelto?    No    If yes, when did you take your last dose?     Do you take aspirin?  No    If cervical procedure, have you held aspirin for 6 days?   NA    Do you have any allergies to contrast dye, iodine, steroid and/or numbing medications?  NO    Are you currently taking antibiotics or have an active infection?  NO    Have you had a fever/elevated temperature within the past week? NO    Are you currently taking oral steroids? NO    Do you have a ? Yes       Are you pregnant or breastfeeding?  Not Applicable    Are the vital signs normal?  Yes

## 2019-09-03 NOTE — TELEPHONE ENCOUNTER
Pt is to be there today at 0945 for an radiofrequency ablation.   Tried calling mobile. This # is for her son Wally Brown per voicemail.    Will have to have her questions answered at her appointment.    Oxana Garcaí RN-BSN  [facility]

## 2019-09-03 NOTE — IP AVS SNAPSHOT
MRN:[id number]                      After Visit Summary   9/3/2019    Daniela Brown    MRN: [id number]           Visit Information        Provider Department      9/3/2019 10:15 AM [facility]           Review of your medicines      UNREVIEWED medicines. Ask your doctor about these medicines       Dose / Directions   acetaminophen 325 MG tablet  Commonly known as:  TYLENOL  Used for:  Degeneration of lumbar or lumbosacral intervertebral disc      Dose:  325 mg  Take 1 tablet by mouth every 6 hours as needed for pain.  Quantity:  30 tablet  Refills:  0     amLODIPine 5 MG tablet  Commonly known as:  NORVASC  Used for:  Benign essential hypertension      Dose:  5 mg  Take 1 tablet (5 mg) by mouth every evening  Quantity:  90 tablet  Refills:  3     atorvastatin 40 MG tablet  Commonly known as:  LIPITOR  Used for:  Angina pectoris (H), Type 2 diabetes mellitus with diabetic polyneuropathy, without long-term current use of insulin (H)      Dose:  40 mg  Take 1 tablet (40 mg) by mouth At Bedtime  Quantity:  90 tablet  Refills:  3     B-12 1000 MCG Subl      Dose:  1 tablet  Place 1 tablet under the tongue daily  Refills:  0     dicyclomine 20 MG tablet  Commonly known as:  BENTYL  Used for:  Irritable bowel syndrome with diarrhea      Dose:  20 mg  Take 1 tablet (20 mg) by mouth 4 times daily as needed (diarrhea)  Quantity:  60 tablet  Refills:  11     gabapentin 300 MG capsule  Commonly known as:  NEURONTIN  Indication:  Neuropathic Pain  Used for:  Chronic left-sided low back pain with left-sided sciatica      Take by mouth eight hours apart: 2 capsules in morning, 1-2 capsules midday, 2 capsules at night  Quantity:  360 capsule  Refills:  3     KLONOPIN PO      Dose:  0.5 mg  Take 0.5 mg by mouth 2 times daily as needed for anxiety  Refills:  0     lidocaine 5 % patch  Commonly known as:  LIDODERM      Apply patch to left low back at bedtime. Remove patch in the  morning.  Quantity:  10 patch  Refills:  0     losartan 50 MG tablet  Commonly known as:  COZAAR  Used for:  Benign essential hypertension      Dose:  50 mg  Take 1 tablet (50 mg) by mouth daily  Quantity:  90 tablet  Refills:  3     nitroGLYcerin 0.4 MG sublingual tablet  Commonly known as:  NITROSTAT  Used for:  Angina pectoris (H)      Dose:  0.4 mg  Place 1 tablet (0.4 mg) under the tongue every 5 minutes as needed for chest pain  Quantity:  30 tablet  Refills:  4     oxyCODONE 5 MG tablet  Commonly known as:  ROXICODONE  Used for:  Pain following surgery or procedure      Dose:  5 mg  Take 1 tablet (5 mg) by mouth every 6 hours as needed (post procedure pain)  Quantity:  25 tablet  Refills:  0     THERAPEUTIC MULTIVIT/MINERAL tablet      Dose:  1 tablet  Take 1 tablet by mouth daily.  Refills:  0     traMADol 50 MG tablet  Commonly known as:  ULTRAM  Used for:  Chronic pain syndrome      Take 1 Tablet BY MOUTH THREE TIMES DAILY AS NEEDED FOR PAIN  Quantity:  90 tablet  Refills:  0     VITAMIN D (CHOLECALCIFEROL) PO      Dose:  1000 Units  Take 1,000 Units by mouth daily  Refills:  0        CONTINUE these medicines which have NOT CHANGED       Dose / Directions   blood glucose monitoring lancets  Used for:  Type 2 diabetes mellitus with diabetic polyneuropathy, without long-term current use of insulin (H)      Use to test blood sugar one times daily or as directed.  Quantity:  102 each  Refills:  3     blood glucose test strip  Commonly known as:  ACCU-CHEK GUIDE  Used for:  Type 2 diabetes mellitus with diabetic polyneuropathy, without long-term current use of insulin (H)      Use to test blood sugar one times daily or as directed.  Quantity:  100 each  Refills:  3     order for DME  Used for:  Venous insufficiency      Thigh length teds.  Quantity:  1 Device  Refills:  2     order for DME  Used for:  Acute pain of right knee      1 walker  Quantity:  1 Device  Refills:  0           Where to get your medicines       These medications were sent to [facility] - [place] - [address] [facility]  [address] [facility] [place] [address]    Phone:  [phone]   oxyCODONE 5 MG tablet           Protect others around you: Learn how to safely use, store and throw away your medicines at www.disposemymeds.org.       Follow-ups after your visit       Care Instructions       Further instructions from your care team       [facility]   Radiofrequency Ablation (RFA) Discharge Instructions     Today you saw:    Dr. Keo Pascual        You should anticipate procedural pain for up to 2 weeks.     You may receive a prescription for pain medication and you should take this as directed.     It may take up to 8 weeks to receive relief from the RFA     Follow up with your provider in 4 weeks.     If you received sedation before, during or after your procedure, for the next 24 hours you shall NOT:    -Drive    -Operate machinery    -Drink alcohol    -Sign any legal documents     You may resume your normal diet     You may resume your regular medications after the procedure     Be cautious with walking. Numbness and/or weakness in the lower extremities may occur for up to 6-8 hours due to effect of local anesthetic    Avoid strenuous activity for the first 24 hours     You may resume your regular activities after 24 hours     You may shower, however no swimming, tub baths or hot tubs for 24 hours following your procedure     You may use ice packs 10-15 minutes three to four times a day at the injection site for comfort     Do not use heat to painful areas for 6 to 8 hours. This will give the local anesthetic time to wear off and prevent you from accidentally burning your skin.     Unless you have been directed to avoid the use of anti-inflammatory medications (NSAIDS), you may use medications such as ibuprofen, Aleve or Tylenol for pain control if needed.     If you experience any of the following, call the  Pain Clinic during work hours at [phone] or the Provider Line after hours at [phone]:   -Fever over 100 degree F    -Swelling, bleeding, redness, drainage, warmth at the injection site    -Progressive weakness or numbness on your legs or arms    -Loss of bowel or bladder function    -Unusual headache that is not relieved by Tylenol    -Unusual new onset of pain that is not improving        Additional Information About Your Visit       Care EveryWhere ID    This is your Care EveryWhere ID. This could be used by other organizations to access your [facility] medical records  [id number]       Your Vitals Were     Blood Pressure   130/71    Pulse   92    Respirations   16    Pulse Oximetry   97%           Primary Care Provider Office Phone # Fax #    Cynthia Maddox,  [phone]      Equal Access to Services    MANFRED REYES : Hadii aad ku hadasho Sobrody, waaxda luqadaha, qaybta kaalmada adeegyacandelaria, rasheed ramirez . So [facility] [phone].    ATENCIÓN: Si habla español, tiene a cam disposición servicios gratuitos de asistencia lingüística. Llame al [phone].    We comply with applicable federal civil rights laws and [place] laws. We do not discriminate on the basis of race, color, national origin, age, disability, sex, sexual orientation, or gender identity.           Thank you!    Thank you for choosing [facility] for your care. Our goal is always to provide you with excellent care. Hearing back from our patients is one way we can continue to improve our services. Please take a few minutes to complete the written survey that you may receive in the mail after you visit with us. Thank you!            Medication List      Medications          Morning Afternoon Evening Bedtime As Needed    blood glucose monitoring lancets  INSTRUCTIONS:  Use to test blood sugar one times daily or as directed.                     blood glucose test strip  Also known as:  ACCU-CHEK  GUIDE  INSTRUCTIONS:  Use to test blood sugar one times daily or as directed.                     order for DME  INSTRUCTIONS:  Thigh length teds.                     order for DME  INSTRUCTIONS:  1 walker                       ASK your doctor about these medications          Morning Afternoon Evening Bedtime As Needed    acetaminophen 325 MG tablet  Also known as:  TYLENOL  INSTRUCTIONS:  Take 1 tablet by mouth every 6 hours as needed for pain.                     amLODIPine 5 MG tablet  Also known as:  NORVASC  INSTRUCTIONS:  Take 1 tablet (5 mg) by mouth every evening  Doctor's comments:  Patient will call to fill                     atorvastatin 40 MG tablet  Also known as:  LIPITOR  INSTRUCTIONS:  Take 1 tablet (40 mg) by mouth At Bedtime  Doctor's comments:  Patient will call to fill                     B-12 1000 MCG Subl  INSTRUCTIONS:  Place 1 tablet under the tongue daily                     dicyclomine 20 MG tablet  Also known as:  BENTYL  INSTRUCTIONS:  Take 1 tablet (20 mg) by mouth 4 times daily as needed (diarrhea)  Doctor's comments:  Patient will call to fill                     gabapentin 300 MG capsule  Also known as:  NEURONTIN  INSTRUCTIONS:  Take by mouth eight hours apart: 2 capsules in morning, 1-2 capsules midday, 2 capsules at night  Reason for med:  Neuropathic Pain                     KLONOPIN PO  INSTRUCTIONS:  Take 0.5 mg by mouth 2 times daily as needed for anxiety                     lidocaine 5 % patch  Also known as:  LIDODERM  INSTRUCTIONS:  Apply patch to left low back at bedtime. Remove patch in the morning.  LAST TAKEN:  Ask your nurse or doctor                     losartan 50 MG tablet  Also known as:  COZAAR  INSTRUCTIONS:  Take 1 tablet (50 mg) by mouth daily  Doctor's comments:  Patient will call to fill                     nitroGLYcerin 0.4 MG sublingual tablet  Also known as:  NITROSTAT  INSTRUCTIONS:  Place 1 tablet (0.4 mg) under the tongue every 5 minutes as needed for  chest pain  Doctor's comments:  Patient will call to fill                     oxyCODONE 5 MG tablet  Also known as:  ROXICODONE  INSTRUCTIONS:  Take 1 tablet (5 mg) by mouth every 6 hours as needed (post procedure pain)                     THERAPEUTIC MULTIVIT/MINERAL tablet  INSTRUCTIONS:  Take 1 tablet by mouth daily.                     traMADol 50 MG tablet  Also known as:  ULTRAM  INSTRUCTIONS:  Take 1 Tablet BY MOUTH THREE TIMES DAILY AS NEEDED FOR PAIN                     VITAMIN D (CHOLECALCIFEROL) PO  INSTRUCTIONS:  Take 1,000 Units by mouth daily

## 2019-09-03 NOTE — OP NOTE
Pre procedure Diagnosis: lumbosacral facet arthropathy, lumbosacral spondylosis  Post procedure Diagnosis: Same  Procedure performed: lumbosacral radiofrequency ablation at L3, L4, L5, sacral ala on the left, fluoroscopically guided  Anesthesia: local anesthetic  Complications: none  Operators: Keo Pascual MD     Indications:   Daniela Brown is a 86 year old female who is known to me that presents today for lumbar MB RFA.  They have a history of chronic left lower back pain without radiation that is worse with bending and twisting activities.  Exam shows lumbar tenderness and positive facet loading pain on the left and they have tried conservative treatment including physical therapy, medications, and interventional procedures.    Daniela Brown had two diagnostic medial branch blocks showing appropriate pain relief, therefore radiofrequency ablation will be done.     Options/alternatives, benefits and risks were discussed with the patient including bleeding, infection, no pain relief, tissue trauma, exposure to radiation, reaction to medications including seizure, spinal cord injury,increased pain after the procedure, weakness, numbness or sensory changes and headache.   We also discussed risks of sedation, including reaction to medications and cardiovascular collapse.    Questions were answered to her satisfaction and she agrees to proceed. Voluntary informed consent was obtained and signed.     Vitals were reviewed: Yes  Allergies were reviewed:  Yes   Medications were reviewed:  Yes   Pre-procedure pain score: 2/10    Procedure:  After obtaining signed, informed consent, the patient was brought into the procedure suite and was placed in a prone position on the procedure table.   A Pause for the Cause was performed.  Patient was prepped and draped in sterile fashion.     Daniela Brown had an IV line placed prior the procedure.  The C-arm was positioned in the left oblique  view to afford optimal view of the L3, L4, and L5 vertebral bodies. Lidocaine 1% was used to anesthetize the skin at each level.  At each level, a Wallace Venom 10cm, 20G MedPro Venom curved radiofrequency cannula with a 10mm active tip was positioned, overlying the intersection of the transverse process and pedicle at L3, L4, & L5, and was advanced under intermittent fluoroscopy until it contacted the transverse process and notch, and the tip slightly overran that process, just lateral to the mamillary process.  The position of each cannula was verified and optimized in the oblique view and AP views.    In the AP view, another cannula was placed at the sacral alar notch on the left.      Each needle position was tested for motor and sensory stimulation, and was positioned so that stimulation was negative for stimuli outside the immediate area of the desired lesion.  Sensory stimulation was completed at 50 Hz, with max stimulation up to 0.4V.  Motor stimulation was completed at 2Hz, up to 1.5V.   Bupivacaine 0.5 % 2 ml was injected at each level.  Fluoroscopic images were taken to ensure deployment of the electrode forming a V with the Venom needles.  After allowing the local anesthetic to set up, a 90 second, 80 degree Celsius lesion was generated at all three levels.    The needles were then rotated within the pathway of the medial branch, and locations were evaluated with repeat imaging.  A second lesion was then generated at each location.    The needles were flushed with the local anesthetic as they were withdrawn.        Hemostasis was achieved, the area was cleaned, and bandaids were placed when appropriate.  The patient tolerated the procedure well, and was taken to the recovery room.    Images were saved to PACS.    MD time in:  1035  Start sedation time: n/a  MD time out: 1111    Post-procedure pain score: 0/10  Follow-up includes:   -f/u phone call in one week  -post-procedure pain medications: Oxycodone  5 mg one tab Q6H prn post procedure pain, #25  -f/u with PCP and in the pain clinic as scheduled    Keo Pascual MD  [facility]

## 2019-09-03 NOTE — TELEPHONE ENCOUNTER
Patient called, she stated she has questions about her injection today (09/03). Please call      Lianne Fulton    [facility]

## 2019-09-03 NOTE — PROGRESS NOTES
Left Side Sensory Threshold:  Site  L3: 0.3  Site L4: 0.4  Site L5: 0.3  SA Notch: 0.3    Left Side Motor Threshold  Site L3: 1.5  Site L4: 1.5  Site L5: 1.5  SA Notch: 1.5      MD Time IN: 1035    Sedation start time:  none  MD Time OUT:  1111    Medications given: none  Intravenous fluids were administered, none

## 2019-09-03 NOTE — DISCHARGE INSTRUCTIONS
[facility]   Radiofrequency Ablation (RFA) Discharge Instructions     Today you saw:    Dr. Keo Pascual        You should anticipate procedural pain for up to 2 weeks.     You may receive a prescription for pain medication and you should take this as directed.     It may take up to 8 weeks to receive relief from the RFA     Follow up with your provider in 4 weeks.     You may resume your normal diet     You may resume your regular medications after the procedure     Be cautious with walking. Numbness and/or weakness in the lower extremities may occur for up to 6-8 hours due to effect of local anesthetic    Avoid strenuous activity for the first 24 hours     You may resume your regular activities after 24 hours     You may shower, however no swimming, tub baths or hot tubs for 24 hours following your procedure     You may use ice packs 10-15 minutes three to four times a day at the injection site for comfort     Do not use heat to painful areas for 6 to 8 hours. This will give the local anesthetic time to wear off and prevent you from accidentally burning your skin.     Unless you have been directed to avoid the use of anti-inflammatory medications (NSAIDS), you may use medications such as ibuprofen, Aleve or Tylenol for pain control if needed.     If you experience any of the following, call the Pain Clinic during work hours at [phone] or the Provider Line after hours at [phone]:   -Fever over 100 degree F    -Swelling, bleeding, redness, drainage, warmth at the injection site    -Progressive weakness or numbness on your legs or arms    -Loss of bowel or bladder function    -Unusual headache that is not relieved by Tylenol    -Unusual new onset of pain that is not improving

## 2019-09-12 ENCOUNTER — PATIENT OUTREACH (OUTPATIENT)
Dept: CARE COORDINATION | Facility: CLINIC | Age: 84
End: 2019-09-12

## 2019-09-12 NOTE — PROGRESS NOTES
Clinic Care Coordination Contact    Follow Up Progress Note      Assessment: RN CC received return call w/voicemail from patient after proactive follow up outreach. Patient reports that she had pain injections for her chronic back pain last week and they have improved her pain so far. Denies any new questions or concerns. Confirms schedule of PCP follow up on Wed 9/18.     Goals addressed this encounter:   Goals Addressed                 This Visit's Progress      #1 Pain Management (pt-stated)   On track     Goal Statement: I will find pain control regimen that provides adequate relief to my chronic back pain.  Measure of Success: Patient will report stable pain control regimen  Supportive Steps to Achieve: PCP; Care Coordination; Pain clinic   Barriers: acute on chronic back pain  Strengths: motivated and engaged in plan of care  Date to Achieve By: 11/1/19  Patient expressed understanding of goal: Yes                 Intervention/Education provided during outreach: PCP appt scheduled for 9/18; contact information provided on voicemail      Outreach Frequency: monthly    Plan:   Patient will maintain PCP follow up as scheduled and continue with pain management plan with support of Pain Clinic.  Care Coordinator will follow up in 1 month.    COLETTE Cortez, RN   [facility] - RN Care Coordinator  Phone: [phone]

## 2019-09-18 ENCOUNTER — OFFICE VISIT (OUTPATIENT)
Dept: FAMILY MEDICINE | Facility: CLINIC | Age: 84
End: 2019-09-18
Payer: MEDICARE

## 2019-09-18 VITALS
OXYGEN SATURATION: 97 % | WEIGHT: 169 LBS | SYSTOLIC BLOOD PRESSURE: 130 MMHG | TEMPERATURE: 98.6 F | RESPIRATION RATE: 12 BRPM | BODY MASS INDEX: 29.01 KG/M2 | DIASTOLIC BLOOD PRESSURE: 66 MMHG | HEART RATE: 76 BPM

## 2019-09-18 DIAGNOSIS — G89.29 CHRONIC LEFT-SIDED LOW BACK PAIN WITH LEFT-SIDED SCIATICA: ICD-10-CM

## 2019-09-18 DIAGNOSIS — Z23 NEED FOR PROPHYLACTIC VACCINATION AND INOCULATION AGAINST INFLUENZA: Primary | ICD-10-CM

## 2019-09-18 DIAGNOSIS — M54.42 CHRONIC LEFT-SIDED LOW BACK PAIN WITH LEFT-SIDED SCIATICA: ICD-10-CM

## 2019-09-18 DIAGNOSIS — E11.42 TYPE 2 DIABETES MELLITUS WITH DIABETIC POLYNEUROPATHY, WITHOUT LONG-TERM CURRENT USE OF INSULIN (H): ICD-10-CM

## 2019-09-18 DIAGNOSIS — D48.5 NEOPLASM OF UNCERTAIN BEHAVIOR OF SKIN: ICD-10-CM

## 2019-09-18 DIAGNOSIS — Z13.6 CARDIOVASCULAR SCREENING; LDL GOAL LESS THAN 130: ICD-10-CM

## 2019-09-18 PROCEDURE — 99213 OFFICE O/P EST LOW 20 MIN: CPT | Mod: 25 | Performed by: INTERNAL MEDICINE

## 2019-09-18 PROCEDURE — 90662 IIV NO PRSV INCREASED AG IM: CPT | Performed by: INTERNAL MEDICINE

## 2019-09-18 PROCEDURE — G0008 ADMIN INFLUENZA VIRUS VAC: HCPCS | Performed by: INTERNAL MEDICINE

## 2019-09-18 NOTE — PROGRESS NOTES
Subjective     Daniela Brown is a 86 year old female who presents to clinic today for the following health issues:    HPI   Chronic/Recurring Back Pain Follow Up      Where is your back pain located? (Select all that apply) low back both    How would you describe your back pain?  dull ache and sharp    Where does your back pain spread? nowhere    Since your last clinic visit for back pain, how has your pain changed? unchanged    Does your back pain interfere with your job? Not applicable    Since your last visit, have you tried any new treatment? Yes -  Physical Therapy and steroid injection     Overall back pain is better.    Worsens with heavy lifting.    Is not using any oxycodone or tramadol since the injection because the pain has improved.        Concern - Derm problem  Onset: 5 to 6 months    Description:   Pt noticed 5 to 6 months ago a new dark spot in her left side of the face, pt concern about cancer skin.    Intensity: mild    Progression of Symptoms:  same and constant    Accompanying Signs & Symptoms:  See above    Previous history of similar problem:   na    Precipitating factors:   Worsened by: na    Alleviating factors:Improved by: na    Therapies Tried and outcome: na    Strong family history of skin cancer    Current Outpatient Medications   Medication Sig Dispense Refill     acetaminophen (TYLENOL) 325 MG tablet Take 1 tablet by mouth every 6 hours as needed for pain. 30 tablet 0     amLODIPine (NORVASC) 5 MG tablet Take 1 tablet (5 mg) by mouth every evening 90 tablet 3     atorvastatin (LIPITOR) 40 MG tablet Take 1 tablet (40 mg) by mouth At Bedtime 90 tablet 3     blood glucose monitoring (ACCU-CHEK FASTCLIX) lancets Use to test blood sugar one times daily or as directed. 102 each 3     blood glucose monitoring (ACCU-CHEK GUIDE) test strip Use to test blood sugar one times daily or as directed. 100 each 3     ClonazePAM (KLONOPIN PO) Take 0.5 mg by mouth 2 times daily as needed for  anxiety       Cyanocobalamin (B-12) 1000 MCG SUBL Place 1 tablet under the tongue daily        dicyclomine (BENTYL) 20 MG tablet Take 1 tablet (20 mg) by mouth 4 times daily as needed (diarrhea) 60 tablet 11     gabapentin (NEURONTIN) 300 MG capsule Take by mouth eight hours apart: 2 capsules in morning, 1-2 capsules midday, 2 capsules at night 360 capsule 3     lidocaine (LIDODERM) 5 % patch Apply patch to left low back at bedtime. Remove patch in the morning. 10 patch 0     losartan (COZAAR) 50 MG tablet Take 1 tablet (50 mg) by mouth daily 90 tablet 3     Multiple Vitamins-Minerals (THERAPEUTIC MULTIVIT/MINERAL) TABS Take 1 tablet by mouth daily.       nitroGLYcerin (NITROSTAT) 0.4 MG sublingual tablet Place 1 tablet (0.4 mg) under the tongue every 5 minutes as needed for chest pain 30 tablet 4     order for DME 1 walker 1 Device 0     ORDER FOR DME Thigh length teds. 1 Device 2     VITAMIN D, CHOLECALCIFEROL, PO Take 1,000 Units by mouth daily       oxyCODONE (ROXICODONE) 5 MG tablet Take 1 tablet (5 mg) by mouth every 6 hours as needed (post procedure pain) 25 tablet 0     traMADol (ULTRAM) 50 MG tablet Take 1 Tablet BY MOUTH THREE TIMES DAILY AS NEEDED FOR PAIN 90 tablet 0         Reviewed and updated as needed this visit by Provider         Review of Systems   ROS COMP: Constitutional, HEENT, cardiovascular, pulmonary, gi and gu systems are negative, except as otherwise noted.      Objective    /66   Pulse 76   Temp 98.6  F (37  C) (Tympanic)   Resp 12   Wt 76.7 kg (169 lb)   SpO2 97%   Breastfeeding? No   BMI 29.01 kg/m    Body mass index is 29.01 kg/m .  Physical Exam   GENERAL APPEARANCE: alert and no distress  Skin: on the left maxilla area there is an irregular ~ 1 cm hyperpigmented macule with central dense hyperpigmented papule        Assessment & Plan     1. Neoplasm of uncertain behavior of skin - see Derm  - DERMATOLOGY REFERRAL    2. Chronic left-sided low back pain with left-sided  sciatica - improved with recent injections.  Not needing opiates.  Continue physical therapy exercises at home    3. CARDIOVASCULAR SCREENING; LDL GOAL LESS THAN 130    4. Type 2 diabetes mellitus with diabetic polyneuropathy, without long-term current use of insulin (H) fur 6-9 months for diabetes checkup  - Lipid panel reflex to direct LDL Fasting; Future  - Hemoglobin A1c; Future    5. Need for prophylactic vaccination and inoculation against influenza  - INFLUENZA (HIGH DOSE) 3 VALENT VACCINE [22553]  - ADMIN INFLUENZA (For MEDICARE Patients ONLY) []           Patient Instructions   1. [facility] or another [facility] clinic of your choice  2. Referral to Dermatology  3. Keep staying busy and active, but don't overdo it.  Keep doing exercises with physical therapy       Return in about 6 months (around 3/18/2020) for Diabetes Check with lab prior.    Cynthia Maddox, DO  [facility]

## 2019-09-18 NOTE — PATIENT INSTRUCTIONS
1. Options for follow-up pain doctor - [facility] or another pain clinic of your choice  2. Referral to Dermatology  3. Keep staying busy and active, but don't overdo it.  Keep doing exercises with physical therapy

## 2019-10-01 ENCOUNTER — OFFICE VISIT (OUTPATIENT)
Dept: DERMATOLOGY | Facility: CLINIC | Age: 84
End: 2019-10-01
Attending: INTERNAL MEDICINE
Payer: MEDICARE

## 2019-10-01 VITALS
RESPIRATION RATE: 20 BRPM | TEMPERATURE: 99.2 F | DIASTOLIC BLOOD PRESSURE: 71 MMHG | SYSTOLIC BLOOD PRESSURE: 146 MMHG | HEART RATE: 88 BPM

## 2019-10-01 DIAGNOSIS — D48.5 NEOPLASM OF UNCERTAIN BEHAVIOR OF SKIN: Primary | ICD-10-CM

## 2019-10-01 DIAGNOSIS — L82.1 SEBORRHEIC KERATOSIS: ICD-10-CM

## 2019-10-01 PROCEDURE — 11102 TANGNTL BX SKIN SINGLE LES: CPT | Performed by: PHYSICIAN ASSISTANT

## 2019-10-01 PROCEDURE — 88305 TISSUE EXAM BY PATHOLOGIST: CPT | Mod: TC | Performed by: PHYSICIAN ASSISTANT

## 2019-10-01 PROCEDURE — 99214 OFFICE O/P EST MOD 30 MIN: CPT | Mod: 25 | Performed by: PHYSICIAN ASSISTANT

## 2019-10-01 NOTE — PROGRESS NOTES
Chief Complaint   Patient presents with     Skin Discoloration     left cheek       Vitals:    10/01/19 1459   BP: (!) 146/71   BP Location: Left arm   Patient Position: Sitting   Cuff Size: Adult Regular   Pulse: 88   Resp: 20   Temp: 99.2  F (37.3  C)   TempSrc: Tympanic     Wt Readings from Last 1 Encounters:   09/18/19 76.7 kg (169 lb)     Jessika LAMBERT RN   Specialty Clinics

## 2019-10-01 NOTE — LETTER
10/1/2019         RE: Daniela Brown  [address] [facility] [place] [address]        Dear Colleague,    Thank you for referring your patient, Daniela Brown, to the [facility]. Please see a copy of my visit note below.    Chief Complaint   Patient presents with     Skin Discoloration     left cheek       Vitals:    10/01/19 1459   BP: (!) 146/71   BP Location: Left arm   Patient Position: Sitting   Cuff Size: Adult Regular   Pulse: 88   Resp: 20   Temp: 99.2  F (37.3  C)   TempSrc: Tympanic     Wt Readings from Last 1 Encounters:   09/18/19 76.7 kg (169 lb)     Jessika LAMBERT RN   Specialty Clinics       Daniela Brown is a 86 year old year old female patient I was asked to see by Dr. Maddox for spot on left cheek. Believes it has been present for a year. No pain or bleeding. Remainder of the HPI, Meds, PMH, Allergies, FH, and SH was reviewed in chart.    Pertinent Hx:   No personal history of skin cancer.   Past Medical History:   Diagnosis Date     Adhesive capsulitis of shoulder     left      Allergic rhinitis, cause unspecified      Carpal tunnel syndrome     right>left by emg 2004     Chest pain, unspecified     Chronic right sided/axillary discomfort (musculoskeletal) Atypical substernal (possibly GERD). Negative cardiolite 2004.     Colitis, Clostridium difficile 4/18/2012     Cystocele, midline     grade III     Degeneration of lumbar or lumbosacral intervertebral disc     MRI 5/04- DDD, L2-3 annular bulge with protrusion into left caudal infra-foraminal area     Depressive disorder, not elsewhere classified      Diabetes mellitus (H)      Diverticulosis of colon (without mention of hemorrhage)     flexible sigmoidoscopy otherwise normal 2001     Esophageal reflux      Essential hypertension, benign      Generalized osteoarthrosis, unspecified site     C-spine, left hip     Headache(784.0)     Tension type. MRI 2001 normal.     Impaired fasting glucose     GTT 2/05  115-209     Irritable bowel syndrome     diahrrea predominant     Memory loss     Early cognitive decline. MMSE 27/30, Neno 4.7/ 6.0 2004. B12, TSH, RPR normal 6825-4037.     Mixed hyperlipidemia      OA (OSTEOARTHRITIS) GENERALIZED( Multiple Sites)     Facets, left hip, c-spine. 09/14/06 - bone loss, stable.     OSTEOPENIA     DEXA 2001 -1.4 hip. Dexa 2004 -0.2 hip and -1.5 spine     PERS HX ALLERGY OTHER FOODS 8/22/2006     PERS HX ALLERGY TO MILK PRODUCTS 8/22/2006     RESTLESS LEG SYNDROME      Syncope and collapse 9/10/2018     Unspecified vitamin D deficiency        Past Surgical History:   Procedure Laterality Date     ARTHROPLASTY KNEE  3/26/2012    Procedure:ARTHROPLASTY KNEE; Right Total Knee Arthroplasty; Surgeon:BERNADINE ROSAS; Location:[place] OR     C APPENDECTOMY  1953     CV HEART CATHETERIZATION WITH POSSIBLE INTERVENTION N/A 2/27/2019    Procedure: Coronary Angiogram with Left ventriculogram;  Surgeon: Leandro Carpio MD;  Location:  HEART CARDIAC CATH LAB     HC REMOVAL GALLBLADDER       HYSTERECTOMY, PAP NO LONGER INDICATED  1983    TVH/BSO     SURGICAL HISTORY OF -       Hernia Repair     SURGICAL HISTORY OF -   10/08    A & P Repair, Dr. Hannah        Family History   Problem Relation Age of Onset     C.A.D. Mother      Diabetes Mother      Cancer - colorectal Mother      Arthritis Mother      Heart Disease Mother      Lipids Brother      Obesity Brother      Hypertension Brother      Allergies Son      Eye Disorder Son         cataract     Thyroid Disease Sister         graves dz     Eye Disorder Son      Leukemia Son      Alcohol/Drug Father        Social History     Socioeconomic History     Marital status:      Spouse name: Not on file     Number of children: Not on file     Years of education: Not on file     Highest education level: Not on file   Occupational History     Not on file   Social Needs     Financial resource strain: Not on file     Food insecurity:      Worry: Not on file     Inability: Not on file     Transportation needs:     Medical: Not on file     Non-medical: Not on file   Tobacco Use     Smoking status: Never Smoker     Smokeless tobacco: Never Used   Substance and Sexual Activity     Alcohol use: No     Drug use: No     Sexual activity: Never   Lifestyle     Physical activity:     Days per week: Not on file     Minutes per session: Not on file     Stress: Not on file   Relationships     Social connections:     Talks on phone: Not on file     Gets together: Not on file     Attends Evangelical service: Not on file     Active member of club or organization: Not on file     Attends meetings of clubs or organizations: Not on file     Relationship status: Not on file     Intimate partner violence:     Fear of current or ex partner: Not on file     Emotionally abused: Not on file     Physically abused: Not on file     Forced sexual activity: Not on file   Other Topics Concern     Parent/sibling w/ CABG, MI or angioplasty before 65F 55M? No   Social History Narrative    Lives in [place] independently. She has son who lives nearby in [place]. Her oldest son passed away in September 2018.        Outpatient Encounter Medications as of 10/1/2019   Medication Sig Dispense Refill     acetaminophen (TYLENOL) 325 MG tablet Take 1 tablet by mouth every 6 hours as needed for pain. 30 tablet 0     amLODIPine (NORVASC) 5 MG tablet Take 1 tablet (5 mg) by mouth every evening 90 tablet 3     atorvastatin (LIPITOR) 40 MG tablet Take 1 tablet (40 mg) by mouth At Bedtime 90 tablet 3     blood glucose monitoring (ACCU-CHEK FASTCLIX) lancets Use to test blood sugar one times daily or as directed. 102 each 3     blood glucose monitoring (ACCU-CHEK GUIDE) test strip Use to test blood sugar one times daily or as directed. 100 each 3     ClonazePAM (KLONOPIN PO) Take 0.5 mg by mouth 2 times daily as needed for anxiety       Cyanocobalamin (B-12) 1000 MCG SUBL Place 1 tablet under the  tongue daily        dicyclomine (BENTYL) 20 MG tablet Take 1 tablet (20 mg) by mouth 4 times daily as needed (diarrhea) 60 tablet 11     gabapentin (NEURONTIN) 300 MG capsule Take by mouth eight hours apart: 2 capsules in morning, 1-2 capsules midday, 2 capsules at night 360 capsule 3     lidocaine (LIDODERM) 5 % patch Apply patch to left low back at bedtime. Remove patch in the morning. 10 patch 0     losartan (COZAAR) 50 MG tablet Take 1 tablet (50 mg) by mouth daily 90 tablet 3     Multiple Vitamins-Minerals (THERAPEUTIC MULTIVIT/MINERAL) TABS Take 1 tablet by mouth daily.       nitroGLYcerin (NITROSTAT) 0.4 MG sublingual tablet Place 1 tablet (0.4 mg) under the tongue every 5 minutes as needed for chest pain 30 tablet 4     order for DME 1 walker 1 Device 0     ORDER FOR DME Thigh length teds. 1 Device 2     oxyCODONE (ROXICODONE) 5 MG tablet Take 1 tablet (5 mg) by mouth every 6 hours as needed (post procedure pain) 25 tablet 0     traMADol (ULTRAM) 50 MG tablet Take 1 Tablet BY MOUTH THREE TIMES DAILY AS NEEDED FOR PAIN 90 tablet 0     VITAMIN D, CHOLECALCIFEROL, PO Take 1,000 Units by mouth daily       No facility-administered encounter medications on file as of 10/1/2019.              Review Of Systems  Skin: As above  Eyes: negative  Ears/Nose/Throat: negative  Respiratory: No shortness of breath, dyspnea on exertion, cough, or hemoptysis  Cardiovascular: negative  Gastrointestinal: negative  Genitourinary: negative  Musculoskeletal: negative  Neurologic: negative  Psychiatric: negative  Hematologic/Lymphatic/Immunologic: negative  Endocrine: negative      O:   NAD, WDWN, Alert & Oriented, Mood & Affect wnl, Vitals stable   Here today alone   BP (!) 146/71 (BP Location: Left arm, Patient Position: Sitting, Cuff Size: Adult Regular)   Pulse 88   Temp 99.2  F (37.3  C) (Tympanic)   Resp 20    General appearance normal   Vitals stable   Alert, oriented and in no acute distress     0.9 cm brown macule with  darker papule on left medial cheek   Brown stuck on papules on face      Eyes: Conjunctivae/lids:Normal     ENT: Lips: normal    MSK:Normal    Pulm: Breathing Normal    Neuro/Psych: Orientation:Normal; Mood/Affect:Normal  A/P:  1. R/O pigmented sk vs melanoma on left medial cheek  TANGENTIAL BIOPSY SENT OUT:  After consent, anesthesia with LEC and prep, tangential excision performed and specimen sent out for permanent section histology.  No complications and routine wound care. Patient told to call our office in 1-2 weeks for result.      2. Seborrheic keratosis  BENIGN LESIONS DISCUSSED WITH PATIENT:  I discussed the specifics of tumor, prognosis, and genetics of benign lesions.  I explained that treatment of these lesions would be purely cosmetic and not medically neccessary.  I discussed with patient different removal options including excision, cautery and /or laser.      Nature and genetics of benign skin lesions dicussed with patient.  Signs and Symptoms of skin cancer discussed with patient.  ABCDEs of melanoma reviewed with patient.  Patient encouraged to perform monthly skin exams.  UV precautions reviewed with patient.  Cetaphil or cerave discussed with patient.    Risks of non-melanoma skin cancer discussed with patient   Return to clinic pending biopsy results.   Sivan to follow up with Primary Care provider regarding elevated blood pressure.        Again, thank you for allowing me to participate in the care of your patient.        Sincerely,        Rosario Jackson PA-C

## 2019-10-01 NOTE — PROGRESS NOTES
Daniela Brown is a 86 year old year old female patient I was asked to see by Dr. Maddox for spot on left cheek. Believes it has been present for a year. No pain or bleeding. Remainder of the HPI, Meds, PMH, Allergies, FH, and SH was reviewed in chart.    Pertinent Hx:   No personal history of skin cancer.   Past Medical History:   Diagnosis Date     Adhesive capsulitis of shoulder     left      Allergic rhinitis, cause unspecified      Carpal tunnel syndrome     right>left by emg 2004     Chest pain, unspecified     Chronic right sided/axillary discomfort (musculoskeletal) Atypical substernal (possibly GERD). Negative cardiolite 2004.     Colitis, Clostridium difficile 4/18/2012     Cystocele, midline     grade III     Degeneration of lumbar or lumbosacral intervertebral disc     MRI 5/04- DDD, L2-3 annular bulge with protrusion into left caudal infra-foraminal area     Depressive disorder, not elsewhere classified      Diabetes mellitus (H)      Diverticulosis of colon (without mention of hemorrhage)     flexible sigmoidoscopy otherwise normal 2001     Esophageal reflux      Essential hypertension, benign      Generalized osteoarthrosis, unspecified site     C-spine, left hip     Headache(784.0)     Tension type. MRI 2001 normal.     Impaired fasting glucose     GTT 2/05 115-209     Irritable bowel syndrome     diahrrea predominant     Memory loss     Early cognitive decline. MMSE 27/30, Neno 4.7/ 6.0 2004. B12, TSH, RPR normal 2769-5445.     Mixed hyperlipidemia      OA (OSTEOARTHRITIS) GENERALIZED( Multiple Sites)     Facets, left hip, c-spine. 09/14/06 - bone loss, stable.     OSTEOPENIA     DEXA 2001 -1.4 hip. Dexa 2004 -0.2 hip and -1.5 spine     PERS HX ALLERGY OTHER FOODS 8/22/2006     PERS HX ALLERGY TO MILK PRODUCTS 8/22/2006     RESTLESS LEG SYNDROME      Syncope and collapse 9/10/2018     Unspecified vitamin D deficiency        Past Surgical History:   Procedure Laterality Date      ARTHROPLASTY KNEE  3/26/2012    Procedure:ARTHROPLASTY KNEE; Right Total Knee Arthroplasty; Surgeon:BERNADINE ROSAS; Location:[place] OR     C APPENDECTOMY  1953     CV HEART CATHETERIZATION WITH POSSIBLE INTERVENTION N/A 2/27/2019    Procedure: Coronary Angiogram with Left ventriculogram;  Surgeon: Leandro Carpio MD;  Location:  HEART CARDIAC CATH LAB     HC REMOVAL GALLBLADDER       HYSTERECTOMY, PAP NO LONGER INDICATED  1983    TVH/BSO     SURGICAL HISTORY OF -       Hernia Repair     SURGICAL HISTORY OF -   10/08    A & P Repair, Dr. Hannah        Family History   Problem Relation Age of Onset     C.A.D. Mother      Diabetes Mother      Cancer - colorectal Mother      Arthritis Mother      Heart Disease Mother      Lipids Brother      Obesity Brother      Hypertension Brother      Allergies Son      Eye Disorder Son         cataract     Thyroid Disease Sister         graves dz     Eye Disorder Son      Leukemia Son      Alcohol/Drug Father        Social History     Socioeconomic History     Marital status:      Spouse name: Not on file     Number of children: Not on file     Years of education: Not on file     Highest education level: Not on file   Occupational History     Not on file   Social Needs     Financial resource strain: Not on file     Food insecurity:     Worry: Not on file     Inability: Not on file     Transportation needs:     Medical: Not on file     Non-medical: Not on file   Tobacco Use     Smoking status: Never Smoker     Smokeless tobacco: Never Used   Substance and Sexual Activity     Alcohol use: No     Drug use: No     Sexual activity: Never   Lifestyle     Physical activity:     Days per week: Not on file     Minutes per session: Not on file     Stress: Not on file   Relationships     Social connections:     Talks on phone: Not on file     Gets together: Not on file     Attends Holiness service: Not on file     Active member of club or organization: Not on file      Attends meetings of clubs or organizations: Not on file     Relationship status: Not on file     Intimate partner violence:     Fear of current or ex partner: Not on file     Emotionally abused: Not on file     Physically abused: Not on file     Forced sexual activity: Not on file   Other Topics Concern     Parent/sibling w/ CABG, MI or angioplasty before 65F 55M? No   Social History Narrative    Lives in [place] independently. She has son who lives nearby in [place]. Her oldest son passed away in September 2018.        Outpatient Encounter Medications as of 10/1/2019   Medication Sig Dispense Refill     acetaminophen (TYLENOL) 325 MG tablet Take 1 tablet by mouth every 6 hours as needed for pain. 30 tablet 0     amLODIPine (NORVASC) 5 MG tablet Take 1 tablet (5 mg) by mouth every evening 90 tablet 3     atorvastatin (LIPITOR) 40 MG tablet Take 1 tablet (40 mg) by mouth At Bedtime 90 tablet 3     blood glucose monitoring (ACCU-CHEK FASTCLIX) lancets Use to test blood sugar one times daily or as directed. 102 each 3     blood glucose monitoring (ACCU-CHEK GUIDE) test strip Use to test blood sugar one times daily or as directed. 100 each 3     ClonazePAM (KLONOPIN PO) Take 0.5 mg by mouth 2 times daily as needed for anxiety       Cyanocobalamin (B-12) 1000 MCG SUBL Place 1 tablet under the tongue daily        dicyclomine (BENTYL) 20 MG tablet Take 1 tablet (20 mg) by mouth 4 times daily as needed (diarrhea) 60 tablet 11     gabapentin (NEURONTIN) 300 MG capsule Take by mouth eight hours apart: 2 capsules in morning, 1-2 capsules midday, 2 capsules at night 360 capsule 3     lidocaine (LIDODERM) 5 % patch Apply patch to left low back at bedtime. Remove patch in the morning. 10 patch 0     losartan (COZAAR) 50 MG tablet Take 1 tablet (50 mg) by mouth daily 90 tablet 3     Multiple Vitamins-Minerals (THERAPEUTIC MULTIVIT/MINERAL) TABS Take 1 tablet by mouth daily.       nitroGLYcerin (NITROSTAT) 0.4 MG sublingual  tablet Place 1 tablet (0.4 mg) under the tongue every 5 minutes as needed for chest pain 30 tablet 4     order for DME 1 walker 1 Device 0     ORDER FOR DME Thigh length teds. 1 Device 2     oxyCODONE (ROXICODONE) 5 MG tablet Take 1 tablet (5 mg) by mouth every 6 hours as needed (post procedure pain) 25 tablet 0     traMADol (ULTRAM) 50 MG tablet Take 1 Tablet BY MOUTH THREE TIMES DAILY AS NEEDED FOR PAIN 90 tablet 0     VITAMIN D, CHOLECALCIFEROL, PO Take 1,000 Units by mouth daily       No facility-administered encounter medications on file as of 10/1/2019.              Review Of Systems  Skin: As above  Eyes: negative  Ears/Nose/Throat: negative  Respiratory: No shortness of breath, dyspnea on exertion, cough, or hemoptysis  Cardiovascular: negative  Gastrointestinal: negative  Genitourinary: negative  Musculoskeletal: negative  Neurologic: negative  Psychiatric: negative  Hematologic/Lymphatic/Immunologic: negative  Endocrine: negative      O:   NAD, WDWN, Alert & Oriented, Mood & Affect wnl, Vitals stable   Here today alone   BP (!) 146/71 (BP Location: Left arm, Patient Position: Sitting, Cuff Size: Adult Regular)   Pulse 88   Temp 99.2  F (37.3  C) (Tympanic)   Resp 20    General appearance normal   Vitals stable   Alert, oriented and in no acute distress     0.9 cm brown macule with darker papule on left medial cheek   Brown stuck on papules on face      Eyes: Conjunctivae/lids:Normal     ENT: Lips: normal    MSK:Normal    Pulm: Breathing Normal    Neuro/Psych: Orientation:Normal; Mood/Affect:Normal  A/P:  1. R/O pigmented sk vs melanoma on left medial cheek  TANGENTIAL BIOPSY SENT OUT:  After consent, anesthesia with LEC and prep, tangential excision performed and specimen sent out for permanent section histology.  No complications and routine wound care. Patient told to call our office in 1-2 weeks for result.      2. Seborrheic keratosis  BENIGN LESIONS DISCUSSED WITH PATIENT:  I discussed the  specifics of tumor, prognosis, and genetics of benign lesions.  I explained that treatment of these lesions would be purely cosmetic and not medically neccessary.  I discussed with patient different removal options including excision, cautery and /or laser.      Nature and genetics of benign skin lesions dicussed with patient.  Signs and Symptoms of skin cancer discussed with patient.  ABCDEs of melanoma reviewed with patient.  Patient encouraged to perform monthly skin exams.  UV precautions reviewed with patient.  Cetaphil or cerave discussed with patient.    Risks of non-melanoma skin cancer discussed with patient   Return to clinic pending biopsy results.   Sivan to follow up with Primary Care provider regarding elevated blood pressure.

## 2019-10-01 NOTE — PATIENT INSTRUCTIONS
Wound Care Instructions     FOR SUPERFICIAL WOUNDS     AFTER 24 HOURS YOU SHOULD REMOVE THE BANDAGE AND BEGIN DAILY DRESSING CHANGES AS FOLLOWS:     1) Remove Dressing.     2) Clean and dry the area with tap water using a Q-tip or sterile gauze pad.     3) Apply Polysporin ointment or Bacitracin ointment over entire wound.  Do NOT use Neosporin ointment.     4) Cover the wound with a band-aid, or a sterile non-stick gauze pad and micropore paper tape      REPEAT THESE INSTRUCTIONS AT LEAST ONCE A DAY UNTIL THE WOUND HAS COMPLETELY HEALED.    It is an old wives tale that a wound heals better when it is exposed to air and allowed to dry out. The wound will heal faster with a better cosmetic result if it is kept moist with ointment and covered with a bandage.    **Do not let the wound dry out.**      Supplies Needed:      *Cotton tipped applicators (Q-tips)    *Polysporin Ointment or Bacitracin Ointment (NOT NEOSPORIN)    *Band-aids or non-stick gauze pads and micropore paper tape.    PATIENT INFORMATION:    During the healing process you will notice a number of changes. All wounds develop a small halo of redness surrounding the wound.  This means healing is occurring. Severe itching with extensive redness usually indicates sensitivity to the ointment or bandage tape used to dress the wound.  You should call our office if this develops.      Swelling  and/or discoloration around your surgical site is common, particularly when performed around the eye.    All wounds normally drain.  The larger the wound the more drainage there will be.  After 7-10 days, you will notice the wound beginning to shrink and new skin will begin to grow.  The wound is healed when you can see skin has formed over the entire area.  A healed wound has a healthy, shiny look to the surface and is red to dark pink in color to normalize.  Wounds may take approximately 4-6 weeks to heal.  Larger wounds may take 6-8 weeks.  After the wound is healed  you may discontinue dressing changes.    You may experience a sensation of tightness as your wound heals. This is normal and will gradually subside.    Your healed wound may be sensitive to temperature changes. This sensitivity improves with time, but if you re having a lot of discomfort, try to avoid temperature extremes.    Patients frequently experience itching after their wound appears to have healed because of the continue healing under the skin.  Plain Vaseline will help relieve the itching.    BLEEDIN. Leave the bandage in place.  2. Use tightly rolled up gauze or a cloth to apply direct pressure over the bandage for 20 minutes.  3. Reapply pressure for an additional 20 minutes if necessary  4. Call the office or go to the nearest emergency room if pressure fails to stop the bleeding.  5. Use additional gauze and tape to maintain pressure once the bleeding has stopped.  6. If pressure alone does not stop the bleeding, call the Dermatology Clinic at [phone] or go to the nearest Emergency room.

## 2019-10-05 LAB — COPATH REPORT: NORMAL

## 2019-10-09 ENCOUNTER — NURSE TRIAGE (OUTPATIENT)
Dept: INTERNAL MEDICINE | Facility: CLINIC | Age: 84
End: 2019-10-09

## 2019-10-09 ENCOUNTER — APPOINTMENT (OUTPATIENT)
Dept: CT IMAGING | Facility: CLINIC | Age: 84
End: 2019-10-09
Attending: EMERGENCY MEDICINE
Payer: MEDICARE

## 2019-10-09 ENCOUNTER — HOSPITAL ENCOUNTER (OUTPATIENT)
Facility: CLINIC | Age: 84
Setting detail: OBSERVATION
Discharge: HOME | End: 2019-10-11
Attending: EMERGENCY MEDICINE | Admitting: FAMILY MEDICINE
Payer: MEDICARE

## 2019-10-09 DIAGNOSIS — G89.29 CHRONIC LEFT-SIDED LOW BACK PAIN WITH LEFT-SIDED SCIATICA: ICD-10-CM

## 2019-10-09 DIAGNOSIS — K58.0 IRRITABLE BOWEL SYNDROME WITH DIARRHEA: ICD-10-CM

## 2019-10-09 DIAGNOSIS — M62.81 GENERALIZED MUSCLE WEAKNESS: ICD-10-CM

## 2019-10-09 DIAGNOSIS — K86.2 PANCREATIC CYST: ICD-10-CM

## 2019-10-09 DIAGNOSIS — R19.7 DIARRHEA, UNSPECIFIED TYPE: ICD-10-CM

## 2019-10-09 DIAGNOSIS — R00.0 TACHYCARDIA, UNSPECIFIED: ICD-10-CM

## 2019-10-09 DIAGNOSIS — M54.42 CHRONIC LEFT-SIDED LOW BACK PAIN WITH LEFT-SIDED SCIATICA: ICD-10-CM

## 2019-10-09 DIAGNOSIS — I10 BENIGN ESSENTIAL HYPERTENSION: ICD-10-CM

## 2019-10-09 PROBLEM — R53.1 GENERALIZED WEAKNESS: Status: ACTIVE | Noted: 2019-10-09

## 2019-10-09 LAB
ALBUMIN SERPL-MCNC: 3.9 G/DL (ref 3.4–5)
ALBUMIN UR-MCNC: NEGATIVE MG/DL
ALP SERPL-CCNC: 94 U/L (ref 40–150)
ALT SERPL W P-5'-P-CCNC: 35 U/L (ref 0–50)
ANION GAP SERPL CALCULATED.3IONS-SCNC: 9 MMOL/L (ref 3–14)
APPEARANCE UR: CLEAR
AST SERPL W P-5'-P-CCNC: 28 U/L (ref 0–45)
BASOPHILS # BLD AUTO: 0 10E9/L (ref 0–0.2)
BASOPHILS NFR BLD AUTO: 0.4 %
BILIRUB SERPL-MCNC: 0.9 MG/DL (ref 0.2–1.3)
BILIRUB UR QL STRIP: NEGATIVE
BUN SERPL-MCNC: 12 MG/DL (ref 7–30)
CALCIUM SERPL-MCNC: 9.6 MG/DL (ref 8.5–10.1)
CHLORIDE SERPL-SCNC: 108 MMOL/L (ref 94–109)
CO2 SERPL-SCNC: 24 MMOL/L (ref 20–32)
COLOR UR AUTO: YELLOW
CREAT SERPL-MCNC: 0.93 MG/DL (ref 0.52–1.04)
DIFFERENTIAL METHOD BLD: NORMAL
EOSINOPHIL # BLD AUTO: 0.1 10E9/L (ref 0–0.7)
EOSINOPHIL NFR BLD AUTO: 1.2 %
ERYTHROCYTE [DISTWIDTH] IN BLOOD BY AUTOMATED COUNT: 12.9 % (ref 10–15)
GFR SERPL CREATININE-BSD FRML MDRD: 55 ML/MIN/{1.73_M2}
GLUCOSE BLDC GLUCOMTR-MCNC: 126 MG/DL (ref 70–99)
GLUCOSE SERPL-MCNC: 126 MG/DL (ref 70–99)
GLUCOSE UR STRIP-MCNC: NEGATIVE MG/DL
HCT VFR BLD AUTO: 40.4 % (ref 35–47)
HGB BLD-MCNC: 13.5 G/DL (ref 11.7–15.7)
HGB UR QL STRIP: ABNORMAL
HYALINE CASTS #/AREA URNS LPF: 1 /LPF (ref 0–2)
IMM GRANULOCYTES # BLD: 0 10E9/L (ref 0–0.4)
IMM GRANULOCYTES NFR BLD: 0.3 %
KETONES UR STRIP-MCNC: 5 MG/DL
LACTATE BLD-SCNC: 1.5 MMOL/L (ref 0.7–2)
LEUKOCYTE ESTERASE UR QL STRIP: NEGATIVE
LIPASE SERPL-CCNC: 163 U/L (ref 73–393)
LYMPHOCYTES # BLD AUTO: 3.4 10E9/L (ref 0.8–5.3)
LYMPHOCYTES NFR BLD AUTO: 31 %
MCH RBC QN AUTO: 28 PG (ref 26.5–33)
MCHC RBC AUTO-ENTMCNC: 33.4 G/DL (ref 31.5–36.5)
MCV RBC AUTO: 84 FL (ref 78–100)
MONOCYTES # BLD AUTO: 0.7 10E9/L (ref 0–1.3)
MONOCYTES NFR BLD AUTO: 6.3 %
MUCOUS THREADS #/AREA URNS LPF: PRESENT /LPF
NEUTROPHILS # BLD AUTO: 6.7 10E9/L (ref 1.6–8.3)
NEUTROPHILS NFR BLD AUTO: 60.8 %
NITRATE UR QL: NEGATIVE
NRBC # BLD AUTO: 0 10*3/UL
NRBC BLD AUTO-RTO: 0 /100
PH UR STRIP: 6 PH (ref 5–7)
PLATELET # BLD AUTO: 302 10E9/L (ref 150–450)
POTASSIUM SERPL-SCNC: 3.6 MMOL/L (ref 3.4–5.3)
PROT SERPL-MCNC: 8.3 G/DL (ref 6.8–8.8)
RBC # BLD AUTO: 4.83 10E12/L (ref 3.8–5.2)
RBC #/AREA URNS AUTO: 2 /HPF (ref 0–2)
SODIUM SERPL-SCNC: 141 MMOL/L (ref 133–144)
SOURCE: ABNORMAL
SP GR UR STRIP: 1.01 (ref 1–1.03)
TROPONIN I SERPL-MCNC: <0.015 UG/L (ref 0–0.04)
UROBILINOGEN UR STRIP-MCNC: 0 MG/DL (ref 0–2)
WBC # BLD AUTO: 11 10E9/L (ref 4–11)
WBC #/AREA URNS AUTO: 1 /HPF (ref 0–5)

## 2019-10-09 PROCEDURE — 96361 HYDRATE IV INFUSION ADD-ON: CPT | Performed by: EMERGENCY MEDICINE

## 2019-10-09 PROCEDURE — 99207 ZZC CDG-CODE CATEGORY CHANGED: CPT | Performed by: FAMILY MEDICINE

## 2019-10-09 PROCEDURE — 25800030 ZZH RX IP 258 OP 636: Performed by: FAMILY MEDICINE

## 2019-10-09 PROCEDURE — 25000128 H RX IP 250 OP 636: Performed by: EMERGENCY MEDICINE

## 2019-10-09 PROCEDURE — 25000132 ZZH RX MED GY IP 250 OP 250 PS 637: Mod: GY | Performed by: FAMILY MEDICINE

## 2019-10-09 PROCEDURE — G0378 HOSPITAL OBSERVATION PER HR: HCPCS

## 2019-10-09 PROCEDURE — 00000146 ZZHCL STATISTIC GLUCOSE BY METER IP

## 2019-10-09 PROCEDURE — 85025 COMPLETE CBC W/AUTO DIFF WBC: CPT | Performed by: EMERGENCY MEDICINE

## 2019-10-09 PROCEDURE — 96360 HYDRATION IV INFUSION INIT: CPT | Performed by: EMERGENCY MEDICINE

## 2019-10-09 PROCEDURE — 99220 ZZC INITIAL OBSERVATION CARE,LEVL III: CPT | Performed by: FAMILY MEDICINE

## 2019-10-09 PROCEDURE — 25800030 ZZH RX IP 258 OP 636: Performed by: EMERGENCY MEDICINE

## 2019-10-09 PROCEDURE — 81001 URINALYSIS AUTO W/SCOPE: CPT | Performed by: EMERGENCY MEDICINE

## 2019-10-09 PROCEDURE — 83605 ASSAY OF LACTIC ACID: CPT | Performed by: EMERGENCY MEDICINE

## 2019-10-09 PROCEDURE — 84484 ASSAY OF TROPONIN QUANT: CPT | Performed by: EMERGENCY MEDICINE

## 2019-10-09 PROCEDURE — 74176 CT ABD & PELVIS W/O CONTRAST: CPT

## 2019-10-09 PROCEDURE — 80053 COMPREHEN METABOLIC PANEL: CPT | Performed by: EMERGENCY MEDICINE

## 2019-10-09 PROCEDURE — 99285 EMERGENCY DEPT VISIT HI MDM: CPT | Mod: Z6 | Performed by: EMERGENCY MEDICINE

## 2019-10-09 PROCEDURE — 99285 EMERGENCY DEPT VISIT HI MDM: CPT | Mod: 25 | Performed by: EMERGENCY MEDICINE

## 2019-10-09 PROCEDURE — 87506 IADNA-DNA/RNA PROBE TQ 6-11: CPT | Performed by: EMERGENCY MEDICINE

## 2019-10-09 PROCEDURE — 83690 ASSAY OF LIPASE: CPT | Performed by: EMERGENCY MEDICINE

## 2019-10-09 RX ORDER — UREA 10 %
1000 LOTION (ML) TOPICAL DAILY
Status: DISCONTINUED | OUTPATIENT
Start: 2019-10-10 | End: 2019-10-11 | Stop reason: HOSPADM

## 2019-10-09 RX ORDER — NALOXONE HYDROCHLORIDE 0.4 MG/ML
.1-.4 INJECTION, SOLUTION INTRAMUSCULAR; INTRAVENOUS; SUBCUTANEOUS
Status: DISCONTINUED | OUTPATIENT
Start: 2019-10-09 | End: 2019-10-09

## 2019-10-09 RX ORDER — AMLODIPINE BESYLATE 5 MG/1
5 TABLET ORAL EVERY EVENING
Status: DISCONTINUED | OUTPATIENT
Start: 2019-10-09 | End: 2019-10-11 | Stop reason: HOSPADM

## 2019-10-09 RX ORDER — ACETAMINOPHEN 325 MG/1
650 TABLET ORAL EVERY 6 HOURS PRN
Status: DISCONTINUED | OUTPATIENT
Start: 2019-10-09 | End: 2019-10-09

## 2019-10-09 RX ORDER — OXYCODONE HYDROCHLORIDE 5 MG/1
5 TABLET ORAL EVERY 6 HOURS PRN
Status: DISCONTINUED | OUTPATIENT
Start: 2019-10-09 | End: 2019-10-09

## 2019-10-09 RX ORDER — NITROGLYCERIN 0.4 MG/1
0.4 TABLET SUBLINGUAL EVERY 5 MIN PRN
Status: DISCONTINUED | OUTPATIENT
Start: 2019-10-09 | End: 2019-10-11 | Stop reason: HOSPADM

## 2019-10-09 RX ORDER — DEXTROSE MONOHYDRATE 25 G/50ML
25-50 INJECTION, SOLUTION INTRAVENOUS
Status: DISCONTINUED | OUTPATIENT
Start: 2019-10-09 | End: 2019-10-11 | Stop reason: HOSPADM

## 2019-10-09 RX ORDER — SODIUM CHLORIDE 9 MG/ML
INJECTION, SOLUTION INTRAVENOUS CONTINUOUS
Status: DISCONTINUED | OUTPATIENT
Start: 2019-10-09 | End: 2019-10-10

## 2019-10-09 RX ORDER — ACETAMINOPHEN 650 MG/1
650 SUPPOSITORY RECTAL EVERY 4 HOURS PRN
Status: DISCONTINUED | OUTPATIENT
Start: 2019-10-09 | End: 2019-10-11 | Stop reason: HOSPADM

## 2019-10-09 RX ORDER — GABAPENTIN 300 MG/1
600 CAPSULE ORAL 3 TIMES DAILY
Status: DISCONTINUED | OUTPATIENT
Start: 2019-10-09 | End: 2019-10-11 | Stop reason: HOSPADM

## 2019-10-09 RX ORDER — ACETAMINOPHEN 325 MG/1
325 TABLET ORAL EVERY 6 HOURS PRN
Status: DISCONTINUED | OUTPATIENT
Start: 2019-10-09 | End: 2019-10-09

## 2019-10-09 RX ORDER — CLONAZEPAM 0.5 MG/1
0.5 TABLET ORAL 2 TIMES DAILY PRN
Status: DISCONTINUED | OUTPATIENT
Start: 2019-10-09 | End: 2019-10-11 | Stop reason: HOSPADM

## 2019-10-09 RX ORDER — NICOTINE POLACRILEX 4 MG
15-30 LOZENGE BUCCAL
Status: DISCONTINUED | OUTPATIENT
Start: 2019-10-09 | End: 2019-10-11 | Stop reason: HOSPADM

## 2019-10-09 RX ORDER — ONDANSETRON 2 MG/ML
4 INJECTION INTRAMUSCULAR; INTRAVENOUS EVERY 6 HOURS PRN
Status: DISCONTINUED | OUTPATIENT
Start: 2019-10-09 | End: 2019-10-09

## 2019-10-09 RX ORDER — ONDANSETRON 4 MG/1
4 TABLET, ORALLY DISINTEGRATING ORAL EVERY 6 HOURS PRN
Status: DISCONTINUED | OUTPATIENT
Start: 2019-10-09 | End: 2019-10-09

## 2019-10-09 RX ORDER — ACETAMINOPHEN 325 MG/1
650 TABLET ORAL EVERY 4 HOURS PRN
Status: DISCONTINUED | OUTPATIENT
Start: 2019-10-09 | End: 2019-10-11 | Stop reason: HOSPADM

## 2019-10-09 RX ORDER — SODIUM CHLORIDE 9 MG/ML
INJECTION, SOLUTION INTRAVENOUS CONTINUOUS
Status: DISCONTINUED | OUTPATIENT
Start: 2019-10-09 | End: 2019-10-11 | Stop reason: HOSPADM

## 2019-10-09 RX ORDER — ONDANSETRON 2 MG/ML
4 INJECTION INTRAMUSCULAR; INTRAVENOUS EVERY 6 HOURS PRN
Status: DISCONTINUED | OUTPATIENT
Start: 2019-10-09 | End: 2019-10-11 | Stop reason: HOSPADM

## 2019-10-09 RX ORDER — NALOXONE HYDROCHLORIDE 0.4 MG/ML
.1-.4 INJECTION, SOLUTION INTRAMUSCULAR; INTRAVENOUS; SUBCUTANEOUS
Status: DISCONTINUED | OUTPATIENT
Start: 2019-10-09 | End: 2019-10-11 | Stop reason: HOSPADM

## 2019-10-09 RX ORDER — ONDANSETRON 4 MG/1
4 TABLET, ORALLY DISINTEGRATING ORAL EVERY 6 HOURS PRN
Status: DISCONTINUED | OUTPATIENT
Start: 2019-10-09 | End: 2019-10-11 | Stop reason: HOSPADM

## 2019-10-09 RX ORDER — ATORVASTATIN CALCIUM 20 MG/1
40 TABLET, FILM COATED ORAL AT BEDTIME
Status: DISCONTINUED | OUTPATIENT
Start: 2019-10-09 | End: 2019-10-11 | Stop reason: HOSPADM

## 2019-10-09 RX ORDER — OXYCODONE HYDROCHLORIDE 5 MG/1
5 TABLET ORAL EVERY 6 HOURS PRN
Status: DISCONTINUED | OUTPATIENT
Start: 2019-10-09 | End: 2019-10-11 | Stop reason: HOSPADM

## 2019-10-09 RX ORDER — LOSARTAN POTASSIUM 50 MG/1
50 TABLET ORAL EVERY EVENING
Status: DISCONTINUED | OUTPATIENT
Start: 2019-10-10 | End: 2019-10-11 | Stop reason: HOSPADM

## 2019-10-09 RX ORDER — LIDOCAINE 40 MG/G
CREAM TOPICAL
Status: DISCONTINUED | OUTPATIENT
Start: 2019-10-09 | End: 2019-10-11 | Stop reason: HOSPADM

## 2019-10-09 RX ADMIN — SODIUM CHLORIDE: 9 INJECTION, SOLUTION INTRAVENOUS at 20:29

## 2019-10-09 RX ADMIN — ATORVASTATIN CALCIUM 40 MG: 20 TABLET, FILM COATED ORAL at 22:04

## 2019-10-09 RX ADMIN — SODIUM CHLORIDE 500 ML: 9 INJECTION, SOLUTION INTRAVENOUS at 14:49

## 2019-10-09 RX ADMIN — AMLODIPINE BESYLATE 5 MG: 5 TABLET ORAL at 22:04

## 2019-10-09 RX ADMIN — GABAPENTIN 600 MG: 300 CAPSULE ORAL at 22:04

## 2019-10-09 ASSESSMENT — ENCOUNTER SYMPTOMS
DIARRHEA: 1
WEAKNESS: 1
SHORTNESS OF BREATH: 0
HEADACHES: 0
CHILLS: 0
FREQUENCY: 1
SORE THROAT: 0
FEVER: 0
ABDOMINAL PAIN: 0
COUGH: 0
RHINORRHEA: 0
NAUSEA: 0
VOMITING: 0
DYSURIA: 0
MYALGIAS: 0

## 2019-10-09 ASSESSMENT — MIFFLIN-ST. JEOR
SCORE: 1150.76
SCORE: 1190

## 2019-10-09 NOTE — LETTER
Transition Communication Hand-off for Care Transitions to Next Level of Care Provider    Name: Daniela Brown  : 1933  MRN #: [id number]  Primary Care Provider: Cynthia Maddox  Primary Care MD Name: (Tan)  Primary Clinic: [address] [facility] [place] [address]  Primary Care Clinic Name: ([facility])  Reason for Hospitalization:  Pancreatic cyst [K86.2]  Generalized muscle weakness [M62.81]  Diarrhea, unspecified type [R19.7]  Admit Date/Time: 10/9/2019  2:12 PM  Discharge Date: 10/10/19  Payor Source: Payor: MEDICARE / Plan: MEDICARE / Product Type: Medicare /     Readmission Assessment Measure (ALLI) Risk Score/category: none         Reason for Communication Hand-off Referral: Fragility    Discharge Plan:home care       Concern for non-adherence with plan of care:   Y/N no  Follow-up specialty is recommended: No    Follow-up plan:  No future appointments.    Key Recommendations:  Pt lives at home alone, reports that she has support from children. Discharging home with [facility] ([phone] Fax: [phone]).     Angelica Rivera MSW, LICSW, [phone]      AVS/Discharge Summary is the source of truth; this is a helpful guide for improved communication of patient story

## 2019-10-09 NOTE — TELEPHONE ENCOUNTER
Reason for call:  Patient reporting a symptom    Symptom or request: Pt has had diarrhea since last night and she is feeling weak. She wants to know if it's from her flu shot she go 2 weeks ago?  Please call patient and advise.      Duration (how long have symptoms been present): 2 days    Have you been treated for this before? No    Additional comments:     Phone Number patient can be reached at:  Home number on file [phone] (home)    Best Time:  any    Can we leave a detailed message on this number:  YES    Call taken on 10/9/2019 at 12:37 PM by Betty Solomon

## 2019-10-09 NOTE — ED PROVIDER NOTES
"  History     Chief Complaint   Patient presents with     Generalized Weakness     with lower extremity pain     Diarrhea     frequent diarrhea last night and this morning     HPI  Daniela Brown is a 86 year old female with a history of CAD, chronic low back pain, IBS with diarrhea, restless leg syndrome, CKD stage III, type II diabetes, hypertension and hyperlipidemia who presents to the Emergency Department with concern for diarrhea. Symptoms began last night around 1900 and persisted this morning. Patient had multiple episodes prior to midnight, slept for several hours and then had multiple episodes this morning. She estimates 10-12 loose, watery, non-bloody stools. She denies abdominal pain, nausea or vomiting. She notes decreased appetite over the last several months. States she has had little to eat or drink in the last few days as food does not feel good. She has urinary frequency and history of urinary tract infections. She reports lower extremity weakness for months, notably worse today. No leg pain or swelling. Denies URI symptoms. No recent medication changes. No concern for spoiled food intake. Patient lives alone in her own home.       Allergies:  Allergies   Allergen Reactions     Metoprolol Itching and Rash     Cephalexin Rash     Erythromycin Rash     Actonel [Bisphosphonates] Itching     Azithromycin Hives     Celebrex [Celecoxib] Rash     Cipro [Ciprofloxacin] Rash     Contrast Dye Hives     Diatrizoate Hives     Erythromycin Rash     Escitalopram Diarrhea     Gets very sick and mad feelings     Fosamax Itching and Rash     Keflex [Cephalexin Monohydrate] Rash     Lisinopril Cough     Nitrofurantoin Other (See Comments)     \"dizzyness'     No Clinical Screening - See Comments Hives     Other reaction(s): Edema     Risedronate Itching     Seasonal Allergies      Shellfish-Derived Products Hives     Tetanus Toxoid, Adsorbed Swelling     Tetanus-Diphtheria Toxoids Swelling     Vicodin " "[Acetaminophen] Itching     Patient reported - only when used scheduled in high doses.        Vioxx Rash     Acetaminophen Itching     In high doses  Patient reported - only when used scheduled in high doses.        Alendronic Acid Rash     Celecoxib Rash     Penicillins Rash     Rofecoxib Rash     Sulfa Drugs Itching and Rash     Sulfasalazine Itching and Rash       Problem List:    Patient Active Problem List    Diagnosis Date Noted     Chronic left-sided low back pain with left-sided sciatica 04/15/2019     Priority: Medium     Status post coronary angiogram 02/27/2019     Priority: Medium     Coronary artery disease involving native coronary artery of native heart with angina pectoris (H) 02/19/2019     Priority: Medium     Added automatically from request for surgery [id number]       Abnormal stress test 02/18/2019     Priority: Medium     Added automatically from request for surgery [id number]       Abnormal cardiovascular stress test 02/03/2019     Priority: Medium     9/10/2018 Lexiscan: \"Myocardial perfusion imaging using single isotope technique  demonstrated a small perfusion defect of mild severity involving the  basal inferior wall which is mostly reversible and may be consistent  with mild ischemia in the right coronary artery distribution. In  addition, transient ischemic dilatation is noted with a TID ratio of  1.3. \"       Benign essential hypertension 11/14/2018     Priority: Medium     Type 2 diabetes mellitus with diabetic polyneuropathy, without long-term current use of insulin (H) 11/14/2018     Priority: Medium     Diet controlled.  Diagnosed 6/2016, has never been on medications.       History of recurrent urinary tract infection 11/14/2018     Priority: Medium     On daily trimethoprim       CKD (chronic kidney disease) stage 3, GFR 30-59 ml/min (H) 11/14/2018     Priority: Medium     Peripheral neuropathy 09/10/2018     Priority: Medium     Bilateral wrist pain 04/11/2018     Priority: Medium     " "Protrusion of lumbar intervertebral disc 04/11/2018     Priority: Medium     Adjustment disorder with anxiety 03/18/2016     Priority: Medium     Carpal tunnel syndrome of left wrist 10/21/2014     Priority: Medium     Diastolic dysfunction 03/31/2014     Priority: Medium     Arthritis of spine 01/22/2013     Priority: Medium     B12 deficiency anemia 06/20/2012     Priority: Medium     S/P total knee replacement 03/28/2012     Priority: Medium     Advanced directives, counseling/discussion 06/02/2011     Priority: Medium     Patient does not have an Advance/Health Care Directive (HCD), given \"What is Advance Care Planning?\" flyer.    Isamar Sjmilvia  June 2, 2011         Pronation of foot 05/05/2011     Priority: Medium     Bilateral defomity       Angina pectoris (H) 03/17/2011     Priority: Medium     Allergic rhinitis 01/19/2011     Priority: Medium     Chest pain 11/12/2010     Priority: Medium     Hyperlipidemia LDL goal <100 10/31/2010     Priority: Medium     Urge incontinence 10/20/2009     Priority: Medium     Right foot pain 10/16/2009     Priority: Medium     Xray showed djd -follows with podiatry. -arch supports placed may need cam walker        Vitamin D deficiency 09/09/2008     Priority: Medium     Subjective tinnitus 04/22/2008     Priority: Medium     Urticaria 08/22/2006     Priority: Medium     Problem list name updated by automated process. Provider to review       SENSONRL HEAR LOSS,BILAT 05/03/2006     Priority: Medium     Esophageal reflux      Priority: Medium     Memory loss      Priority: Medium     Early cognitive decline. MMSE 27/30, Neno 4.7/ 6.0 2004. B12, TSH, RPR normal [id number].       Anxiety and depression      Priority: Medium     Degeneration of lumbar or lumbosacral intervertebral disc      Priority: Medium     MRI 5/04- DDD, L2-3 annular bulge with protrusion into left caudal infra-foraminal area  MRI 2017 with L4-5 loss disc height with spondylolisthesis.       Irritable bowel " syndrome with diarrhea      Priority: Low     diahrrea predominant       Disorder of bone and cartilage      Priority: Low     DEXA 2007 -1.4 hip. Dexa 2004 -1 hip and -1 spine  Problem list name updated by automated process. Provider to review       RESTLESS LEG SYNDROME      Priority: Low     Generalized osteoarthrosis, unspecified site      Priority: Low     C-spine, left hip       Diverticulosis of large intestine      Priority: Low     flexible sigmoidoscopy with diverticulosis otherwise normal 2001, admit diverticulitis 2009  Problem list name updated by automated process. Provider to review          Past Medical History:    Past Medical History:   Diagnosis Date     Adhesive capsulitis of shoulder      Allergic rhinitis, cause unspecified      Carpal tunnel syndrome      Chest pain, unspecified      Colitis, Clostridium difficile 4/18/2012     Cystocele, midline      Degeneration of lumbar or lumbosacral intervertebral disc      Depressive disorder, not elsewhere classified      Diabetes mellitus (H)      Diverticulosis of colon (without mention of hemorrhage)      Esophageal reflux      Essential hypertension, benign      Generalized osteoarthrosis, unspecified site      Headache(784.0)      Impaired fasting glucose      Irritable bowel syndrome      Memory loss      Mixed hyperlipidemia      OA (OSTEOARTHRITIS) GENERALIZED( Multiple Sites)      OSTEOPENIA      PERS HX ALLERGY OTHER FOODS 8/22/2006     PERS HX ALLERGY TO MILK PRODUCTS 8/22/2006     RESTLESS LEG SYNDROME      Syncope and collapse 9/10/2018     Unspecified vitamin D deficiency        Past Surgical History:    Past Surgical History:   Procedure Laterality Date     ARTHROPLASTY KNEE  3/26/2012    Procedure:ARTHROPLASTY KNEE; Right Total Knee Arthroplasty; Surgeon:BERNADINE ROSAS; Location:[facility]      APPENDECTOMY  1953     CV HEART CATHETERIZATION WITH POSSIBLE INTERVENTION N/A 2/27/2019    Procedure: Coronary Angiogram with Left  ventriculogram;  Surgeon: Leandro Carpio MD;  Location:  [facility]     HC REMOVAL GALLBLADDER       HYSTERECTOMY, PAP NO LONGER INDICATED  1983    TVH/BSO     SURGICAL HISTORY OF -       Hernia Repair     SURGICAL HISTORY OF -   10/08    A & P Repair, Dr. Hannah       Family History:    Family History   Problem Relation Age of Onset     C.A.D. Mother      Diabetes Mother      Cancer - colorectal Mother      Arthritis Mother      Heart Disease Mother      Lipids Brother      Obesity Brother      Hypertension Brother      Allergies Son      Eye Disorder Son         cataract     Thyroid Disease Sister         graves dz     Eye Disorder Son      Leukemia Son      Alcohol/Drug Father        Social History:  Marital Status:   [5]  Social History     Tobacco Use     Smoking status: Never Smoker     Smokeless tobacco: Never Used   Substance Use Topics     Alcohol use: No     Drug use: No        Medications:    acetaminophen (TYLENOL) 325 MG tablet  amLODIPine (NORVASC) 5 MG tablet  atorvastatin (LIPITOR) 40 MG tablet  blood glucose monitoring (ACCU-CHEK FASTCLIX) lancets  blood glucose monitoring (ACCU-CHEK GUIDE) test strip  ClonazePAM (KLONOPIN PO)  Cyanocobalamin (B-12) 1000 MCG SUBL  dicyclomine (BENTYL) 20 MG tablet  gabapentin (NEURONTIN) 300 MG capsule  lidocaine (LIDODERM) 5 % patch  losartan (COZAAR) 50 MG tablet  Multiple Vitamins-Minerals (THERAPEUTIC MULTIVIT/MINERAL) TABS  nitroGLYcerin (NITROSTAT) 0.4 MG sublingual tablet  order for DME  ORDER FOR DME  oxyCODONE (ROXICODONE) 5 MG tablet  traMADol (ULTRAM) 50 MG tablet  VITAMIN D, CHOLECALCIFEROL, PO      Review of Systems   Constitutional: Negative for chills and fever.   HENT: Negative for congestion, rhinorrhea and sore throat.    Respiratory: Negative for cough and shortness of breath.    Cardiovascular: Negative for chest pain.   Gastrointestinal: Positive for diarrhea. Negative for abdominal pain, nausea and vomiting.  "  Genitourinary: Positive for frequency. Negative for dysuria and urgency.   Musculoskeletal: Negative for myalgias.   Skin: Negative for rash.   Neurological: Positive for weakness. Negative for headaches.     Physical Exam   BP: (!) 145/94  Heart Rate: 109  Temp: 98.5  F (36.9  C)  Resp: 18  Height: 162.6 cm (5' 4\")  Weight: 72.6 kg (160 lb)  SpO2: 99 %      Physical Exam  Vitals signs and nursing note reviewed.   Constitutional:       General: She is not in acute distress.     Appearance: Normal appearance. She is obese. She is not ill-appearing, toxic-appearing or diaphoretic.   Eyes:      Conjunctiva/sclera: Conjunctivae normal.   Psychiatric:         Mood and Affect: Mood normal.       HENT: Oral mucosa moist. No lesions.  Neck: Supple  Pulmonary/Chest: Lungs are clear to auscultation bilaterally.slightly decreased at bases.  Cardiovascular: Heart is regular rate and rhythm. No murmur.  Abdomen: Soft, non-distended, minimal tenderness to palpation around the umbilicus.  No guarding no rebound bowel sounds slightly hyperactive.  Musculoskeletal: Moving all extremities well. No peripheral edema.  No calf tenderness.  Neurological: Alert. No focal neurologic deficit.   Skin: No rash.    ED Course        Procedures               EKG Interpretation:      Interpreted by Abdulaziz Brewer MD  Rhythm: sinus tachycardia  Rate: 100-110  Axis: Normal  Ectopy: none  Conduction: normal  ST Segments/ T Waves: Non-specific ST-T wave changes  Q Waves: none  Comparison to prior: Unchanged from 2/27/19    Clinical Impression: sinus tachycardia      Critical Care time:  none               Results for orders placed or performed during the hospital encounter of 10/09/19 (from the past 24 hour(s))   UA reflex to Microscopic   Result Value Ref Range    Color Urine Yellow     Appearance Urine Clear     Glucose Urine Negative NEG^Negative mg/dL    Bilirubin Urine Negative NEG^Negative    Ketones Urine 5 (A) NEG^Negative mg/dL    " Specific Gravity Urine 1.015 1.003 - 1.035    Blood Urine Small (A) NEG^Negative    pH Urine 6.0 5.0 - 7.0 pH    Protein Albumin Urine Negative NEG^Negative mg/dL    Urobilinogen mg/dL 0.0 0.0 - 2.0 mg/dL    Nitrite Urine Negative NEG^Negative    Leukocyte Esterase Urine Negative NEG^Negative    Source Catheterized Urine     RBC Urine 2 0 - 2 /HPF    WBC Urine 1 0 - 5 /HPF    Mucous Urine Present (A) NEG^Negative /LPF    Hyaline Casts 1 0 - 2 /LPF   CBC with platelets differential   Result Value Ref Range    WBC 11.0 4.0 - 11.0 10e9/L    RBC Count 4.83 3.8 - 5.2 10e12/L    Hemoglobin 13.5 11.7 - 15.7 g/dL    Hematocrit 40.4 35.0 - 47.0 %    MCV 84 78 - 100 fl    MCH 28.0 26.5 - 33.0 pg    MCHC 33.4 31.5 - 36.5 g/dL    RDW 12.9 10.0 - 15.0 %    Platelet Count 302 150 - 450 10e9/L    Diff Method Automated Method     % Neutrophils 60.8 %    % Lymphocytes 31.0 %    % Monocytes 6.3 %    % Eosinophils 1.2 %    % Basophils 0.4 %    % Immature Granulocytes 0.3 %    Nucleated RBCs 0 0 /100    Absolute Neutrophil 6.7 1.6 - 8.3 10e9/L    Absolute Lymphocytes 3.4 0.8 - 5.3 10e9/L    Absolute Monocytes 0.7 0.0 - 1.3 10e9/L    Absolute Eosinophils 0.1 0.0 - 0.7 10e9/L    Absolute Basophils 0.0 0.0 - 0.2 10e9/L    Abs Immature Granulocytes 0.0 0 - 0.4 10e9/L    Absolute Nucleated RBC 0.0    Comprehensive metabolic panel   Result Value Ref Range    Sodium 141 133 - 144 mmol/L    Potassium 3.6 3.4 - 5.3 mmol/L    Chloride 108 94 - 109 mmol/L    Carbon Dioxide 24 20 - 32 mmol/L    Anion Gap 9 3 - 14 mmol/L    Glucose 126 (H) 70 - 99 mg/dL    Urea Nitrogen 12 7 - 30 mg/dL    Creatinine 0.93 0.52 - 1.04 mg/dL    GFR Estimate 55 (L) >60 mL/min/[1.73_m2]    GFR Estimate If Black 64 >60 mL/min/[1.73_m2]    Calcium 9.6 8.5 - 10.1 mg/dL    Bilirubin Total 0.9 0.2 - 1.3 mg/dL    Albumin 3.9 3.4 - 5.0 g/dL    Protein Total 8.3 6.8 - 8.8 g/dL    Alkaline Phosphatase 94 40 - 150 U/L    ALT 35 0 - 50 U/L    AST 28 0 - 45 U/L   Troponin I    Result Value Ref Range    Troponin I ES <0.015 0.000 - 0.045 ug/L   Lipase   Result Value Ref Range    Lipase 163 73 - 393 U/L   Lactic acid whole blood   Result Value Ref Range    Lactic Acid 1.5 0.7 - 2.0 mmol/L   Abd/pelvis CT - no contrast - [facility] Protocol    Narrative    CT ABDOMEN AND PELVIS WITHOUT CONTRAST   10/9/2019 4:06 PM     HISTORY: Abdominal cramping. Diarrhea.    TECHNIQUE: Volumetric helical sections were acquired from the lung  bases through the ischial tuberosities without IV contrast. Coronal  images were also reconstructed. Radiation dose for this scan was  reduced using automated exposure control, adjustment of the mA and/or  kV according to patient size, or iterative reconstruction technique.    COMPARISON: CT of the abdomen and pelvis performed 9/20/2011.    FINDINGS:  No bowel obstruction. Colonic diverticulosis, without  convincing evidence for diverticulitis. The appendix is not  visualized, however, there is no significant inflammatory change in  the expected region of the appendix. No free fluid in the pelvis. The  uterus is not seen, and is surgically absent by history. Moderate  atherosclerotic aortoiliac calcification. Prior cholecystectomy. A  cystic lesion along the anterior aspect of the pancreatic body/tail  (series 2 image 27) measures 2 cm, and is new since the previous exam.  The liver, spleen, adrenal glands, pancreas, and kidneys have  otherwise unremarkable noncontrast appearances. No hydronephrosis.  Coronary artery calcifications. The visualized lung bases are clear.       Impression    IMPRESSION:   1. No acute abnormality in the abdomen or pelvis. No cause for  abdominal cramping and diarrhea is identified.  2. Colonic diverticulosis, without convincing evidence for  diverticulitis.  3. A 2 cm pancreatic cystic lesion is new since the previous exam. A  pancreatic MRI with MRCP should be considered for further evaluation.                Medications   0.9% sodium  chloride BOLUS (has no administration in time range)       2:38 PM Patient assessed.     Assessments & Plan (with Medical Decision Making) records were reviewed labs were obtained.  EKG revealed no acute abnormality.  She was given IV fluid bolus.  She is not nauseated at this time.  White count was 11.0.  Hemoglobin 13.5.  Platelet count 302.  Comprehensive metabolic panel significant for glucose 126.  No liver function abnormalities.  Urine analysis with 5 ketones a small blood.  Lipase was within normal limits.  Troponin was within normal limits and lactic acid was 1.5.  Due to patient's diarrhea a stool culture was sent off.  She has some vague abdominal pain which is minimal in nature and it was decided to obtain a CT scan of the abdomen pelvis secondary to this.  This revealed no obvious acute abnormality except a new 2 cm pancreatic cyst.  An MRI with MRCP was recommended for follow-up.  On reexamination patient is not having any.  She still with getting up and moving around feels weak.  She lives on her own at home and states she does not think she will do well.  Family is present but is unable to assist the patient this evening and therefore due to her weakness and diarrhea I felt admission for observation was warranted.  Patient was discussed with Dr. Axel Meza's with hospitalist service and he is agree with admission of the patient.     I have reviewed the nursing notes.    I have reviewed the findings, diagnosis, plan and need for follow up with the patient.       New Prescriptions    No medications on file       Final diagnoses:   Generalized muscle weakness   Diarrhea, unspecified type   Pancreatic cyst     This document serves as a record of the services and decisions personally performed and made by Abdulaziz Brewer MD. It was created on his behalf by Yun Judd, a trained medical scribe. The creation of this document is based the provider's statements to the medical  jessica.  Yun Judd 2:38 PM 10/9/2019    Provider:   The information in this document, created by the medical scribe for me, accurately reflects the services I personally performed and the decisions made by me. I have reviewed and approved this document for accuracy prior to leaving the patient care area.  Abdulaziz Brewer MD 2:38 PM 10/9/2019    10/9/2019   [facility] EMERGENCY DEPARTMENT     Abdulaziz Brewer MD  10/11/19 0911

## 2019-10-09 NOTE — ED NOTES
Pt presents to ED via ambulance from home for complaint of generalized weakness and diarrhea. Pt reports she developed diarrhea last night continuing until this morning and reports she was up to use the bathroom every 10 minutes. She reports she was so weak this afternoon she was unable to stand. She also complains of some discomfort in her lower leg. Has not been drinking fluids very well today.

## 2019-10-09 NOTE — TELEPHONE ENCOUNTER
"S:  Patient is calling with status update    B:  Patient has been having diarrhea since last night  Patient had flu shot two weeks ago    A:  Writer called patient:    Additional Information    Negative: Shock suspected (e.g., cold/pale/clammy skin, too weak to stand, low BP, rapid pulse)    Negative: Difficult to awaken or acting confused (e.g., disoriented, slurred speech)    Negative: Sounds like a life-threatening emergency to the triager    Negative: Vomiting also present and worse than the diarrhea    Negative: Blood in stool and without diarrhea    Negative: SEVERE abdominal pain (e.g., excruciating) and present > 1 hour    Negative: SEVERE abdominal pain and age > 60    Negative: Bloody, black, or tarry bowel movements    SEVERE diarrhea (e.g., 7 or more times / day more than normal) and age > 60 years    Answer Assessment - Initial Assessment Questions  1. DIARRHEA SEVERITY: \"How bad is the diarrhea?\" \"How many extra stools have you had in the past 24 hours than normal?\"     - MILD: Few loose or mushy BMs; increase of 1-3 stools over normal daily number of stools; mild increase in ostomy output.    - MODERATE: Increase of 4-6 stools daily over normal; moderate increase in ostomy output.    - SEVERE (or Worst Possible): Increase of 7 or more stools daily over normal; moderate increase in ostomy output; incontinence.      Severe, 10 or 12 times per pateint since last night at 7pm  2. ONSET: \"When did the diarrhea begin?\"       Began last night 7pm  3. BM CONSISTENCY: \"How loose or watery is the diarrhea?\"       Very watery  4. VOMITING: \"Are you also vomiting?\" If so, ask: \"How many times in the past 24 hours?\"       No  5. ABDOMINAL PAIN: \"Are you having any abdominal pain?\" If yes: \"What does it feel like?\" (e.g., crampy, dull, intermittent, constant)       No abdominal pain  6. ABDOMINAL PAIN SEVERITY: If present, ask: \"How bad is the pain?\"  (e.g., Scale 1-10; mild, moderate, or severe)     - MILD (1-3): " "doesn't interfere with normal activities, abdomen soft and not tender to touch      - MODERATE (4-7): interferes with normal activities or awakens from sleep, tender to touch      - SEVERE (8-10): excruciating pain, doubled over, unable to do any normal activities        NA  7. ORAL INTAKE: If vomiting, \"Have you been able to drink liquids?\" \"How much fluids have you had in the past 24 hours?\"      Patient has not been drinking anything states she does not want to drink anything  8. HYDRATION: \"Any signs of dehydration?\" (e.g., dry mouth [not just dry lips], too weak to stand, dizziness, new weight loss) \"When did you last urinate?\"      Dry mouth, weakness in legs, patient is using cane to stand and walk to bathroom stating she is having more trouble walking today due to weakness  9. EXPOSURE: \"Have you traveled to a foreign country recently?\" \"Have you been exposed to anyone with diarrhea?\" \"Could you have eaten any food that was spoiled?\"      No travel, no spoiled food  10. ANTIBIOTIC USE: \"Are you taking antibiotics now or have you taken antibiotics in the past 2 months?\"        No  11. OTHER SYMPTOMS: \"Do you have any other symptoms?\" (e.g., fever, blood in stool)        No blood in stool, temp was 97.5 11am today  12. PREGNANCY: \"Is there any chance you are pregnant?\" \"When was your last menstrual period?\"        NA    Protocols used: DIARRHEA-A-OH    R:  Due to s/sx of dehydration and severity of diarrhea occurrences and lack of fluid intake writer instructed patient to got o ER.  Patient stated she did not have a ride to ER for hours.  Writer instructed patient to call 911 and ask for a non emergency transport top the ED.  Patient stated that she would do this.    No further action necessary.  Encounter closed.    Basil Mcelroy RN    "

## 2019-10-10 ENCOUNTER — APPOINTMENT (OUTPATIENT)
Dept: PHYSICAL THERAPY | Facility: CLINIC | Age: 84
End: 2019-10-10
Payer: MEDICARE

## 2019-10-10 ENCOUNTER — APPOINTMENT (OUTPATIENT)
Dept: MRI IMAGING | Facility: CLINIC | Age: 84
End: 2019-10-10
Attending: INTERNAL MEDICINE
Payer: MEDICARE

## 2019-10-10 ENCOUNTER — TELEPHONE (OUTPATIENT)
Dept: INTERNAL MEDICINE | Facility: CLINIC | Age: 84
End: 2019-10-10

## 2019-10-10 PROBLEM — I10 BENIGN ESSENTIAL HYPERTENSION: Chronic | Status: ACTIVE | Noted: 2018-11-14

## 2019-10-10 PROBLEM — R93.5 ABNORMAL CT OF THE ABDOMEN: Status: ACTIVE | Noted: 2019-10-10

## 2019-10-10 PROBLEM — I25.119 CORONARY ARTERY DISEASE INVOLVING NATIVE CORONARY ARTERY OF NATIVE HEART WITH ANGINA PECTORIS (H): Chronic | Status: ACTIVE | Noted: 2019-02-19

## 2019-10-10 PROBLEM — N18.30 CKD (CHRONIC KIDNEY DISEASE) STAGE 3, GFR 30-59 ML/MIN (H): Chronic | Status: ACTIVE | Noted: 2018-11-14

## 2019-10-10 PROBLEM — R94.39 ABNORMAL CARDIOVASCULAR STRESS TEST: Status: RESOLVED | Noted: 2019-02-03 | Resolved: 2019-10-10

## 2019-10-10 PROBLEM — E53.8 B12 DEFICIENCY: Status: ACTIVE | Noted: 2019-10-10

## 2019-10-10 PROBLEM — M54.42 CHRONIC LEFT-SIDED LOW BACK PAIN WITH LEFT-SIDED SCIATICA: Chronic | Status: ACTIVE | Noted: 2019-04-15

## 2019-10-10 PROBLEM — G89.29 CHRONIC LEFT-SIDED LOW BACK PAIN WITH LEFT-SIDED SCIATICA: Chronic | Status: ACTIVE | Noted: 2019-04-15

## 2019-10-10 PROBLEM — Z98.890 STATUS POST CORONARY ANGIOGRAM: Status: RESOLVED | Noted: 2019-02-27 | Resolved: 2019-10-10

## 2019-10-10 PROBLEM — E11.42 TYPE 2 DIABETES MELLITUS WITH DIABETIC POLYNEUROPATHY, WITHOUT LONG-TERM CURRENT USE OF INSULIN (H): Chronic | Status: ACTIVE | Noted: 2018-11-14

## 2019-10-10 LAB
ANION GAP SERPL CALCULATED.3IONS-SCNC: 6 MMOL/L (ref 3–14)
BASOPHILS # BLD AUTO: 0.1 10E9/L (ref 0–0.2)
BASOPHILS NFR BLD AUTO: 0.5 %
BUN SERPL-MCNC: 11 MG/DL (ref 7–30)
C COLI+JEJUNI+LARI FUSA STL QL NAA+PROBE: NOT DETECTED
C DIFF TOX B STL QL: NEGATIVE
CALCIUM SERPL-MCNC: 9.1 MG/DL (ref 8.5–10.1)
CHLORIDE SERPL-SCNC: 111 MMOL/L (ref 94–109)
CO2 SERPL-SCNC: 26 MMOL/L (ref 20–32)
CREAT SERPL-MCNC: 0.8 MG/DL (ref 0.52–1.04)
DIFFERENTIAL METHOD BLD: NORMAL
EC STX1 GENE STL QL NAA+PROBE: NOT DETECTED
EC STX2 GENE STL QL NAA+PROBE: NOT DETECTED
ENTERIC PATHOGEN COMMENT: NORMAL
EOSINOPHIL # BLD AUTO: 0.2 10E9/L (ref 0–0.7)
EOSINOPHIL NFR BLD AUTO: 1.8 %
ERYTHROCYTE [DISTWIDTH] IN BLOOD BY AUTOMATED COUNT: 13 % (ref 10–15)
GFR SERPL CREATININE-BSD FRML MDRD: 67 ML/MIN/{1.73_M2}
GLUCOSE BLDC GLUCOMTR-MCNC: 113 MG/DL (ref 70–99)
GLUCOSE BLDC GLUCOMTR-MCNC: 115 MG/DL (ref 70–99)
GLUCOSE BLDC GLUCOMTR-MCNC: 124 MG/DL (ref 70–99)
GLUCOSE BLDC GLUCOMTR-MCNC: 175 MG/DL (ref 70–99)
GLUCOSE SERPL-MCNC: 122 MG/DL (ref 70–99)
HCT VFR BLD AUTO: 38.2 % (ref 35–47)
HGB BLD-MCNC: 12.6 G/DL (ref 11.7–15.7)
IMM GRANULOCYTES # BLD: 0 10E9/L (ref 0–0.4)
IMM GRANULOCYTES NFR BLD: 0.3 %
LYMPHOCYTES # BLD AUTO: 2.3 10E9/L (ref 0.8–5.3)
LYMPHOCYTES NFR BLD AUTO: 22.5 %
MCH RBC QN AUTO: 28.1 PG (ref 26.5–33)
MCHC RBC AUTO-ENTMCNC: 33 G/DL (ref 31.5–36.5)
MCV RBC AUTO: 85 FL (ref 78–100)
MONOCYTES # BLD AUTO: 0.8 10E9/L (ref 0–1.3)
MONOCYTES NFR BLD AUTO: 7.5 %
NEUTROPHILS # BLD AUTO: 6.9 10E9/L (ref 1.6–8.3)
NEUTROPHILS NFR BLD AUTO: 67.4 %
NOROV GI+II ORF1-ORF2 JNC STL QL NAA+PR: NOT DETECTED
NRBC # BLD AUTO: 0 10*3/UL
NRBC BLD AUTO-RTO: 0 /100
PLATELET # BLD AUTO: 275 10E9/L (ref 150–450)
POTASSIUM SERPL-SCNC: 3.7 MMOL/L (ref 3.4–5.3)
RBC # BLD AUTO: 4.49 10E12/L (ref 3.8–5.2)
RVA NSP5 STL QL NAA+PROBE: NOT DETECTED
SALMONELLA SP RPOD STL QL NAA+PROBE: NOT DETECTED
SHIGELLA SP+EIEC IPAH STL QL NAA+PROBE: NOT DETECTED
SODIUM SERPL-SCNC: 143 MMOL/L (ref 133–144)
SPECIMEN SOURCE: NORMAL
V CHOL+PARA RFBL+TRKH+TNAA STL QL NAA+PR: NOT DETECTED
WBC # BLD AUTO: 10.2 10E9/L (ref 4–11)
Y ENTERO RECN STL QL NAA+PROBE: NOT DETECTED

## 2019-10-10 PROCEDURE — 74183 MRI ABD W/O CNTR FLWD CNTR: CPT

## 2019-10-10 PROCEDURE — 97161 PT EVAL LOW COMPLEX 20 MIN: CPT | Mod: GP | Performed by: PHYSICAL THERAPIST

## 2019-10-10 PROCEDURE — 25000132 ZZH RX MED GY IP 250 OP 250 PS 637: Mod: GY | Performed by: FAMILY MEDICINE

## 2019-10-10 PROCEDURE — 25800030 ZZH RX IP 258 OP 636: Performed by: EMERGENCY MEDICINE

## 2019-10-10 PROCEDURE — G0378 HOSPITAL OBSERVATION PER HR: HCPCS

## 2019-10-10 PROCEDURE — 36415 COLL VENOUS BLD VENIPUNCTURE: CPT | Performed by: EMERGENCY MEDICINE

## 2019-10-10 PROCEDURE — 87493 C DIFF AMPLIFIED PROBE: CPT | Performed by: FAMILY MEDICINE

## 2019-10-10 PROCEDURE — 40000894 ZZH STATISTIC OT IP EVAL DEFER

## 2019-10-10 PROCEDURE — 99225 ZZC SUBSEQUENT OBSERVATION CARE,LEVEL II: CPT | Performed by: INTERNAL MEDICINE

## 2019-10-10 PROCEDURE — 00000146 ZZHCL STATISTIC GLUCOSE BY METER IP

## 2019-10-10 PROCEDURE — 96372 THER/PROPH/DIAG INJ SC/IM: CPT

## 2019-10-10 PROCEDURE — 25500064 ZZH RX 255 OP 636: Performed by: INTERNAL MEDICINE

## 2019-10-10 PROCEDURE — 80048 BASIC METABOLIC PNL TOTAL CA: CPT | Performed by: EMERGENCY MEDICINE

## 2019-10-10 PROCEDURE — 85025 COMPLETE CBC W/AUTO DIFF WBC: CPT | Performed by: EMERGENCY MEDICINE

## 2019-10-10 PROCEDURE — A9585 GADOBUTROL INJECTION: HCPCS | Performed by: INTERNAL MEDICINE

## 2019-10-10 PROCEDURE — 25000131 ZZH RX MED GY IP 250 OP 636 PS 637: Mod: GY | Performed by: FAMILY MEDICINE

## 2019-10-10 RX ORDER — GADOBUTROL 604.72 MG/ML
7 INJECTION INTRAVENOUS ONCE
Status: COMPLETED | OUTPATIENT
Start: 2019-10-10 | End: 2019-10-10

## 2019-10-10 RX ORDER — SODIUM CHLORIDE 9 MG/ML
60 INJECTION, SOLUTION INTRAVENOUS ONCE
Status: DISCONTINUED | OUTPATIENT
Start: 2019-10-10 | End: 2019-10-10 | Stop reason: CLARIF

## 2019-10-10 RX ADMIN — GADOBUTROL 7 ML: 604.72 INJECTION INTRAVENOUS at 15:16

## 2019-10-10 RX ADMIN — INSULIN ASPART 1 UNITS: 100 INJECTION, SOLUTION INTRAVENOUS; SUBCUTANEOUS at 12:07

## 2019-10-10 RX ADMIN — LOSARTAN POTASSIUM 50 MG: 50 TABLET ORAL at 21:17

## 2019-10-10 RX ADMIN — SODIUM CHLORIDE: 9 INJECTION, SOLUTION INTRAVENOUS at 19:13

## 2019-10-10 RX ADMIN — GABAPENTIN 600 MG: 300 CAPSULE ORAL at 16:31

## 2019-10-10 RX ADMIN — GABAPENTIN 600 MG: 300 CAPSULE ORAL at 21:17

## 2019-10-10 RX ADMIN — ATORVASTATIN CALCIUM 40 MG: 20 TABLET, FILM COATED ORAL at 21:17

## 2019-10-10 RX ADMIN — Medication 1000 MCG: at 08:37

## 2019-10-10 RX ADMIN — SODIUM CHLORIDE: 9 INJECTION, SOLUTION INTRAVENOUS at 15:16

## 2019-10-10 RX ADMIN — MELATONIN 1000 UNITS: at 08:37

## 2019-10-10 RX ADMIN — GABAPENTIN 600 MG: 300 CAPSULE ORAL at 08:37

## 2019-10-10 RX ADMIN — AMLODIPINE BESYLATE 5 MG: 5 TABLET ORAL at 21:17

## 2019-10-10 ASSESSMENT — MIFFLIN-ST. JEOR: SCORE: 1186

## 2019-10-10 NOTE — PROGRESS NOTES
"[facility] [facility] ADMISSION NOTE    Patient admitted to room 2207 at approximately 2046 via cart from emergency room. Patient was accompanied by transport tech.     Verbal SBAR report received from [employer] prior to patient arrival.     Patient ambulated to bed with stand-by assist. Patient alert and oriented X 3. The patient is not having any pain.  . Admission vital signs: Blood pressure 137/57, pulse 94, temperature 98.3  F (36.8  C), temperature source Oral, resp. rate 16, height 1.626 m (5' 4\"), weight 76.5 kg (168 lb 10.4 oz), SpO2 97 %, not currently breastfeeding. Patient was oriented to plan of care, call light, bed controls, tv, telephone, bathroom and visiting hours.     Risk Assessment    The following safety risks were identified during admission: fall. Yellow risk band applied: YES.     Skin Initial Assessment    This writer admitted this patient and completed a full skin assessment and Serge score in the Adult PCS flowsheet. Appropriate interventions initiated as needed.     Secondary skin check completed by Freda.    Serge Risk Assessment  Sensory Perception: 3-->slightly limited  Moisture: 4-->rarely moist  Activity: 3-->walks occasionally  Mobility: 3-->slightly limited  Nutrition: 3-->adequate  Friction and Shear: 3-->no apparent problem  Serge Score: 19  Bed Support Surface: Atmos Air mattress  Reassessed using Bed Algorithm: Yes    Marcella Sexton RN    "

## 2019-10-10 NOTE — PLAN OF CARE
OT: Per discussion with physical therapy and patient no IP OT needs identified. Discussed ADLs with patient and patient has no concerns- states she is feeling much stronger and close to if not at baseline.

## 2019-10-10 NOTE — TELEPHONE ENCOUNTER
Panel Management Review      Patient has the following on her problem list:     Depression / Dysthymia review    Measure:  Needs PHQ-9 score of 4 or less during index window.  Administer PHQ-9 and if score is 5 or more, send encounter to provider for next steps.    5 - 7 month window range: 10/1/2019 TO 1/29/2020    PHQ-9 SCORE 7/17/2012 10/9/2018 5/30/2019   PHQ-9 Total Score 7 - -   PHQ-9 Total Score - 3 15       If PHQ-9 recheck is 5 or more, route to provider for next steps.    Patient is due for:  PHQ9      Action needed:   Patient needs to do PHQ9.    Type of outreach:    Phone, left message for patient to call back.     Questions for provider review:    None                                                                                                                                    Naomie Rios CMA (AAMA)   (aka: Bridgette Rios)       Chart routed to Care Team .

## 2019-10-10 NOTE — PROGRESS NOTES
10/10/19 1000   Quick Adds   Type of Visit Initial PT Evaluation   Living Environment   Lives With alone   Living Arrangements house   Home Accessibility   (chair lift / steps at home)   Functional Level Prior   Ambulation 1-->assistive equipment   Transferring 1-->assistive equipment   Toileting 1-->assistive equipment   Bathing 1-->assistive equipment   Communication 0-->understands/communicates without difficulty   Swallowing 0-->swallows foods/liquids without difficulty   Cognition 0 - no cognition issues reported   Fall history within last six months no   Prior Functional Level Comment PLOF- Pt reports indep. ambulation with a Quad cane- a RW for longer community distances   General Information   Onset of Illness/Injury or Date of Surgery - Date 10/09/19   Referring Physician Armaan   Patient/Family Goals Statement return home   Pertinent History of Current Problem (include personal factors and/or comorbidities that impact the POC)  86-year-old female with a history of coronary artery disease, low back pain, irritable bowel syndrome, restless leg syndrome, CKD stage III, type 2 diabetes (on no meds), hypertension and hyperlipidemia, and one episode of C. diff in 2012.; admitted w/ acute diarhea, LE weakness   General Observations Pt reports significant improvement- states yesterday her legs were so weak she had a difficult time amb   Cognitive Status Examination   Orientation orientation to person, place and time   Level of Consciousness alert   Follows Commands and Answers Questions able to follow multistep instructions   Personal Safety and Judgment intact   Pain Assessment   Patient Currently in Pain No  (reports recent LBP, struggling, but improved following injection, ablation per pt)   Range of Motion (ROM)   ROM Comment WFL   MMT: Hip, Rehab Eval   Hip Flexion - Left Side (4-/5) good minus, left   Hip Flexion - Right Side (4-/5) good minus, right   MMT: Knee, Rehab Eval   Knee Flexion - Left Side  "(4-/5) good minus, left   Knee Extension - Left Side (4/5) good, left   Knee Flexion - Right Side (4/5) good, right   Knee Extension - Right Side (4-/5) good minus, right   MMT: Ankle, Rehab Eval   Ankle Dorsiflexion - Left Side (4/5) good, left   Ankle Dorsiflexion - Right Side (4/5) good, left   Bed Mobility   Bed Mobility Comments NT- pt reports performing indep   Transfer Skills   Transfer Comments SB A sit> stand    Gait   Gait Comments Amb in room 15 feet x2 with Quad cane. SBA- steady; then an addtional 150 feet x1 with RW. SBA    Balance   Balance Comments Good in standing- stand/ turns w/o UE support, steady    Clinical Impression   Criteria for Skilled Therapeutic Intervention evaluation only   Clinical Presentation Stable/Uncomplicated   Clinical Presentation Rationale clinical judgement   Clinical Decision Making (Complexity) Low complexity   Therapy Frequency Daily   Anticipated Discharge Disposition Home;Home with Home Therapy   Risk & Benefits of therapy have been explained Yes   Patient, Family & other staff in agreement with plan of care Yes   [facility] AM-PAC  \"6 Clicks\" V.2 Basic Mobility Inpatient Short Form   1. Turning from your back to your side while in a flat bed without using bedrails? 3 - A Little   2. Moving from lying on your back to sitting on the side of a flat bed without using bedrails? 3 - A Little   3. Moving to and from a bed to a chair (including a wheelchair)? 3 - A Little   4. Standing up from a chair using your arms (e.g., wheelchair, or bedside chair)? 3 - A Little   5. To walk in hospital room? 3 - A Little   6. Climbing 3-5 steps with a railing? 3 - A Little   Basic Mobility Raw Score (Score out of 24.Lower scores equate to lower levels of function) 18     "

## 2019-10-10 NOTE — H&P
Admitted:     10/09/2019      CHIEF COMPLAINT:  Acute diarrhea since 1900 hours yesterday along with weakness of the legs.      HISTORY OF PRESENT ILLNESS:  Daniela Brown is an 86-year-old female with a history of coronary artery disease, low back pain, irritable bowel syndrome, restless leg syndrome, CKD stage III, type 2 diabetes (on no meds), hypertension and hyperlipidemia, and one episode of C. diff in 2012.  The patient tells me that she had been feeling relatively more normal until 1900 hours yesterday when she developed profuse nonbloody persistent diarrhea associated with some vague abdominal discomfort.  She perhaps has had a little bit of nausea, but has not thrown up.  She noted that she had urinary frequency overnight, but she had no dysuria.  She had no fever or chills.  This morning she felt diffusely weak.  Her legs were weak and she felt like she could not really stand up.      The patient was evaluated in the emergency room.  Her lab workup and CT scan were relatively unremarkable.  She did have a 2 cm pancreatic cyst noted on her CT scan that was new.  An MRCP was suggested.      When I saw the patient, she was feeling better.  She had had some hydration.  She noted that she had 10-12 stools prior to coming to the emergency room and about 2 stools since being in the emergency room.  She was not nauseated at the time of my examination.  She denied fever or chills.  She felt somewhat stronger.  She lives alone in her own home.  She had no one to care for her tonight.  She apparently has 1 son.  Dr. Brewer told me that family members would be available tomorrow, but would not be available tonight to assist her at home.  Review of the records reveals that she had C. diff in 2012.  She cannot give me any details of this.      PAST MEDICAL HISTORY:   1.  Restless legs syndrome.   2.  Osteoarthritis.   3.  Hearing loss.   4.  Tinnitus.   5.  Vitamin D deficiency.   6.  Rectocele and cystocele.   7.  Urge  incontinence.   8.  Diabetes mellitus type 2:  On no meds.  Previously on metformin, but this was discontinued.   9.  Granulation tissue with vaginal vault.     10.  Hypertension.   11.  Hyperlipidemia.   12.  Allergic rhinitis.   13.  Urinary tract infections.   14.  B12 deficiency.   15.  Urticaria.   16.  Abdominal hernia.   17.  Coronary artery disease:  Details somewhat unclear, but it does not appear that the patient has had MI or interventions.   18.  Diverticulosis.   19.  GERD.   20.  Obesity.   21.  Peripheral neuropathy.   22.  Diastolic dysfunction.      PAST SURGICAL HISTORY:  Includes hernia repair, ENT repair, total vaginal hysterectomy with BSO, cholecystectomy, coronary angiography, appendectomy, knee arthroplasty.      HABITS:  She never smoked.  No alcohol use.      SOCIAL HISTORY: She has 1 living son, another child  of leukemia in the last few years.  She is .  She lives locally.      ALLERGIES:  METOPROLOL, ITCHING; CEPHALEXIN; ERYTHROMYCIN; CELEBREX, CIPRO; FOSAMAX; VIOXX; PALINDROMIC ACID; CELEBREX; PENICILLINS; ROFECOXIB; SULFA AND SULFASALAZINE HAVE ALL PRODUCED RASH;  AZITHROMYCIN, HIVES; ACTONEL, ITCHING; CONTRAST DYE, HIVES; ATROZOATE, HIVES, ECITALOPRAM, DIARRHEA; LISINOPRIL, COUGH; NITROFURANTOIN, DIZZINESS; RISEDRONATE, ITCHING;  SHELLFISH, HIVES;  TETANUS TOXOID, SWELLING; TETANUS DIPHTHERIA, TOXOID SWELLING; VICODIN, ITCHING; ACETAMINOPHEN, ITCHING ONLY WHEN USED IN HIGH DOSES.      REVIEW OF SYSTEMS:  No fever or chills, no head or neck symptoms, no shortness of breath or chest pain.  Very, very mild vague abdominal pain, nonbloody diarrhea, very mild nausea.  She has had urinary frequency, no dysuria.  Her legs have been weak and she had a hard time standing this morning, is better now.  No other focal neurological symptoms.  The rest of the 10-point review of systems is negative.      MEDICATIONS:   1.  Acetaminophen 5/325 one every 6 hours p.r.n.   2.  Norvasc 5 mg  daily.   3.  Lipitor 40 mg daily.   4.  Klonopin 0.5 b.i.d. p.r.n.   5.  Vitamin B12 1000 mcg sublingual 1 daily.   6.  Bentyl 20, 4 times daily as needed.   7.  Neurontin 300, two in the morning, 1-2 midday, 2 capsules at night.   8.  Lidoderm to the back at bedtime.   9.  Cozaar 50 mg daily.   10.  Multivite daily.   11.  Nitroglycerin 0.4 p.r.n.   12.  Oxycodone 5 mg q. 6 hours p.r.n.   13.  Tramadol 50 t.i.d. as needed for pain.   14.  Vitamin D 1000 units daily.      PHYSICAL EXAMINATION:   GENERAL:  She is alert, nondistressed.   VITAL SIGNS:  Temperature is 98.5, pulse 92, blood pressure 144/69, respiratory rate 18, sat 98% on room air.   HEENT:  Ears negative.  Oral negative.   NECK:  Supple.   LUNGS:  Clear.   COR:  S1, S2 without click, rub or murmur.   ABDOMEN:  Soft, benign, nontender, without guarding, rebound, rigidity or mass.  Bowel sounds were normal.   GENITALIA:  Grossly normal.   EXTREMITIES:  Without edema.   NEUROLOGIC:  Cranial nerves, motor, reflexes grossly normal.  I did not get her up off the exam cart in the emergency room.  Note that she is feeling stronger than she did earlier.      LABORATORY DATA:  EKG  - sinus tachycardia, otherwise normal.  CT of the abdomen - colonic diverticulosis.  There is a 2 cm pancreatic cyst that was new.  MRCP recommended.  Urinalysis showed ketones, otherwise essentially unremarkable.  Lipase 163.  Troponin less than 0.015.  Comprehensive metabolic panel notable for glucose of 126,  GFR 55, otherwise normal.  CBC:  White count 11,000, hemoglobin 13.5, platelet count 302,000.  Lactic acid 1.5.  Enteric panel and C. diff pending.      ASSESSMENT:   1.  Persistent diarrhea since 1900 hours 10/08/2019.   2.  Lower extremity weakness:  Suspect related to diarrheal illness.   3.  Previous history of Clostridium difficile in 2012.   4.  Pancreatic cyst on imaging which likely needs MRCP followup.   5.  Diabetes mellitus type 2, on no meds.   6.  Hypertension.    7.  Hyperlipidemia.   8.  Gastroesophageal reflux disease.    9.  Coronary artery disease.   10.  Restless legs syndrome.   11.  Osteoarthritis.   12.  Chronic kidney disease, stage III.   13.  Lumbar spine disk disease.   14.  Irritable bowel syndrome.      PLAN:  We will continue to hydrate the patient tonight.  We will await enteric and C. diff panels.  She is admitted to Observation.  We will recheck labs tomorrow.  She will need further workup of the pancreatic cysts probably with MRCP.  This could perhaps be done as an outpatient.  I will have PT and OT see the patient tomorrow.  We will check her strength over time and make sure that this continues to improve.         ABHIJEET KIRAN MD             D: 10/09/2019   T: 10/09/2019   MT:       Name:     RAQUEL WETZEL   MRN:      [id number]        Account:      [id number]   :      1933        Admitted:     10/09/2019                   Document: [id number]       cc: Cynthia Maddox DO

## 2019-10-10 NOTE — PROGRESS NOTES
"PRIMARY DIAGNOSIS: \"GENERIC\" NURSING  OUTPATIENT/OBSERVATION GOALS TO BE MET BEFORE DISCHARGE:  1. ADLs back to baseline: No    2. Activity and level of assistance: Up with standby assistance.    3. Pain status: Pain free.    4. Return to near baseline physical activity: No     Discharge Planner Nurse   Safe discharge environment identified: No  Barriers to discharge: Yes       Entered by: Marcella Sexton 10/10/2019 4:53 AM     Please review provider order for any additional goals.   Nurse to notify provider when observation goals have been met and patient is ready for discharge.  "

## 2019-10-10 NOTE — PROGRESS NOTES
CARE TRANSITION SOCIAL WORK INITIAL ASSESSMENT:      Met with: Patient.    DATA  Principal Problem:    Diarrhea  Active Problems:    Type 2 diabetes mellitus with diabetic polyneuropathy, without long-term current use of insulin (H)    Generalized weakness    Abnormal CT of the abdomen       Primary Care Clinic Name: ([facility])  Primary Care MD Name: (Tan)  Contact information and PCP information verified: Yes      ASSESSMENT  Cognitive Status: awake, alert and oriented.       Resources List: Home Care              Description of Support System: Supportive, Involved   Who is your support system?: Children   Support Assessment: Adequate family and caregiver support, Adequate social supports   Insurance Concerns: No Insurance issues identified  Living Arrangements: house        This writer met with pt introduced self and role. Discussed discharge planning and medicare guidelines in regards to home care and SNF benefits. Pt reports that she lives at home alone and plans on returning there when stable. Discussed home care, pt in agreement. Pt was provided with Medicare certified home care list. Pt chooses to use [facility] ([phone] Fax: [phone]).  Referral placed for RN. Pt will return home when stable.     As a system [facility] wants to ensure that across the care continuum that you have support through care coordination services that include nurses and social workers in the outpatient setting.  Due to your diarrhea I feel it is important you have this support when you discharge.  They will be calling you within 24-48 hours of your discharge.   A brochure describing the services was provided.  If you have questions you can reach out to your clinic directly and ask for the Care Coordinator assigned to your care         PLAN    [facility]        Angelica ESQUIVEL, [facility], [facility] [phone]        HOME CARE HAND OFF  Patient Name: Daniela Brown    MRN: [id number]    : 1933    Patient  Zip Code: [address]    Admit Diagnosis: Pancreatic cyst [K86.2]  Generalized muscle weakness [M62.81]  Diarrhea, unspecified type [R19.7]      Services Pt Needs at Home: RN    Discharge Support: Family/Friend Support    Living Arrangements: Alone     or Address Other Than Pt: No    Wound Care: No    Anticipate DC Date: 10/10/2019

## 2019-10-10 NOTE — PLAN OF CARE
Discharge Planner PT   Patient plan for discharge: home    Current status: Ady completed- full note to follow. Pt reports significant improvement- ambulating in her room w/ Quad cane and SBA, using a RW for longer distances in the hallway- able to amb 150 feet x1 with RW. SBA. Steady gait.    Barriers to return to prior living situation: medical stability     Recommendations for discharge: home  Pt also to amb w/ nursing staff  3x/ daily in preparation for discharge to home    Rationale for recommendations: Above status        Entered by: Ruth Restrepo 10/10/2019 10:43 AM

## 2019-10-10 NOTE — PLAN OF CARE
Patient is alert and oriented.  Reports feeling stronger, ambulating in her room with sba and she is steady on her feet.  Denies pain.  Has had two loose stools and a sample was obtained and sent to lab.  Eating ok, reports she never has much of an appetite.  Vitals are stable, on room air, patient has been afebrile.

## 2019-10-10 NOTE — PROGRESS NOTES
"PRIMARY DIAGNOSIS: \"GENERIC\" NURSING  OUTPATIENT/OBSERVATION GOALS TO BE MET BEFORE DISCHARGE:  1. ADLs back to baseline: No    2. Activity and level of assistance: Up with standby assistance.    3. Pain status: Pain free.    4. Return to near baseline physical activity: No     Discharge Planner Nurse   Safe discharge environment identified: No  Barriers to discharge: Yes       Entered by: Marcella Sexton 10/10/2019 4:54 AM     Please review provider order for any additional goals.   Nurse to notify provider when observation goals have been met and patient is ready for discharge.  "

## 2019-10-10 NOTE — PROGRESS NOTES
[facility]    Medicine Progress Note - Hospitalist Service       Date of Admission:  10/9/2019  Assessment & Plan      Patient is a 86 year old female with a history of coronary artery disease, low back pain, irritable bowel syndrome, restless leg syndrome, CKD stage III, type 2 diabetes (on no meds), hypertension, hyperlipidemia, and one episode of C. diff in 2012 admitted 10/10/2019 with acute onset of multiple loose stools, weakness    Diarrhea   Onset 1900 hours 10/08/2019  Treated overnight with fluids, improved. Stool pathogens negative.   - Follow today given elderly, lives alone  - C diff not collected yet    Lower extremity weakness  Suspect related to diarrheal illness.   - Improved with fluids    Pancreatic cyst on imaging   - needs MRCP follow-up, will order     Diabetes mellitus type 2  On no meds. A1c 6.4 8/6/2019  - Medium insulin sliding scale while here    Hypertension   Bps are good    IV access: peripheral IV        Diet: Consistent Carbohydrate Diet 9056-3430 Calories: Moderate Consistent CHO (4-6 CHO units/meal)    DVT Prophylaxis: Low Risk/Ambulatory with no VTE prophylaxis indicated  Collazo Catheter: not present  Code Status: DNR/DNI      Disposition Plan   Expected discharge: Tomorrow, recommended to prior living arrangement once diarrhea improved/adequate PO established.  Entered: Jamal Aguilar MD 10/10/2019, 8:37 AM       The patient's care was discussed with the Bedside Nurse and Patient.    Jmaal Aguilar MD  Hospitalist Service  [facility]    ______________________________________________________________________    Interval History   Feels better, stronger  One loos stool overnight    Data reviewed today: I reviewed all medications, new labs and imaging results over the last 24 hours. I personally reviewed no images or EKG's today.    Physical Exam   Vital Signs: Temp: 97.9  F (36.6  C) Temp src: Oral BP: 130/54 Pulse: 87 Heart Rate:  86 Resp: 18 SpO2: 96 % O2 Device: None (Room air)    Weight: 167 lbs 12.32 oz  Constitutional: awake, alert, cooperative, no apparent distress, and appears stated age  Respiratory: No increased work of breathing, good air exchange, clear to auscultation bilaterally, no crackles or wheezing  Cardiovascular: regular rate and rhythm  GI: normal bowel sounds, soft, non-distended and non-tender  Musculoskeletal: no lower extremity pitting edema present    Data     CMP  Recent Labs   Lab 10/10/19  0523 10/09/19  1424    141   POTASSIUM 3.7 3.6   CHLORIDE 111* 108   CO2 26 24   ANIONGAP 6 9   * 126*   BUN 11 12   CR 0.80 0.93   GFRESTIMATED 67 55*   GFRESTBLACK 77 64   NARESH 9.1 9.6   PROTTOTAL  --  8.3   ALBUMIN  --  3.9   BILITOTAL  --  0.9   ALKPHOS  --  94   AST  --  28   ALT  --  35     CBC  Recent Labs   Lab 10/10/19  0523 10/09/19  1424   WBC 10.2 11.0   RBC 4.49 4.83   HGB 12.6 13.5   HCT 38.2 40.4   MCV 85 84   MCH 28.1 28.0   MCHC 33.0 33.4   RDW 13.0 12.9    302     INRNo lab results found in last 7 days.  Arterial Blood GasNo lab results found in last 7 days.    Recent Labs   Lab 10/10/19  0722 10/10/19  0523 10/10/19  0223 10/09/19  2208 10/09/19  1424   GLC  --  122*  --   --  126*   *  --  113* 126*  --

## 2019-10-10 NOTE — DISCHARGE INSTRUCTIONS
As a system [facility] wants to ensure that across the care continuum that you have support through care coordination services that include nurses and social workers in the outpatient setting.  Due to your diarrhea I feel it is important you have this support when you discharge.  They will be calling you within 24-48 hours of your discharge.   A brochure describing the services was provided.  If you have questions you can reach out to your clinic directly and ask for the Care Coordinator assigned to your care

## 2019-10-10 NOTE — PROGRESS NOTES
Skin affirmation note    Admitting nurse completed full skin assessment, Serge score and Serge interventions. This writer agrees with the initial skin assessment findings.

## 2019-10-10 NOTE — PROGRESS NOTES
Patient is alert and oriented. Up w/SBA and cane to bathroom. Has frequent urination, and 1 episode of diarrhea last night. NS 50 hr. Generalized weakness. Lives at home alone. VSS, RA 95-97%. Diabetic diet. Had a recent biopsy on left cheek. No c/o pain, nausea, abdominal discomfort. Enteric precautions in place.

## 2019-10-11 ENCOUNTER — PATIENT OUTREACH (OUTPATIENT)
Dept: CARE COORDINATION | Facility: CLINIC | Age: 84
End: 2019-10-11

## 2019-10-11 VITALS
TEMPERATURE: 98 F | DIASTOLIC BLOOD PRESSURE: 60 MMHG | HEIGHT: 64 IN | WEIGHT: 169.09 LBS | SYSTOLIC BLOOD PRESSURE: 132 MMHG | BODY MASS INDEX: 28.87 KG/M2 | OXYGEN SATURATION: 97 % | HEART RATE: 83 BPM | RESPIRATION RATE: 18 BRPM

## 2019-10-11 LAB
ALBUMIN SERPL-MCNC: 3.1 G/DL (ref 3.4–5)
ALP SERPL-CCNC: 69 U/L (ref 40–150)
ALT SERPL W P-5'-P-CCNC: 24 U/L (ref 0–50)
ANION GAP SERPL CALCULATED.3IONS-SCNC: 6 MMOL/L (ref 3–14)
AST SERPL W P-5'-P-CCNC: 22 U/L (ref 0–45)
BASOPHILS # BLD AUTO: 0 10E9/L (ref 0–0.2)
BASOPHILS NFR BLD AUTO: 0.4 %
BILIRUB SERPL-MCNC: 0.5 MG/DL (ref 0.2–1.3)
BUN SERPL-MCNC: 20 MG/DL (ref 7–30)
CALCIUM SERPL-MCNC: 8.5 MG/DL (ref 8.5–10.1)
CHLORIDE SERPL-SCNC: 110 MMOL/L (ref 94–109)
CO2 SERPL-SCNC: 23 MMOL/L (ref 20–32)
CREAT SERPL-MCNC: 0.94 MG/DL (ref 0.52–1.04)
DIFFERENTIAL METHOD BLD: ABNORMAL
EOSINOPHIL # BLD AUTO: 0.3 10E9/L (ref 0–0.7)
EOSINOPHIL NFR BLD AUTO: 2.7 %
ERYTHROCYTE [DISTWIDTH] IN BLOOD BY AUTOMATED COUNT: 13 % (ref 10–15)
GFR SERPL CREATININE-BSD FRML MDRD: 54 ML/MIN/{1.73_M2}
GLUCOSE BLDC GLUCOMTR-MCNC: 118 MG/DL (ref 70–99)
GLUCOSE BLDC GLUCOMTR-MCNC: 122 MG/DL (ref 70–99)
GLUCOSE BLDC GLUCOMTR-MCNC: 156 MG/DL (ref 70–99)
GLUCOSE SERPL-MCNC: 118 MG/DL (ref 70–99)
HCT VFR BLD AUTO: 34 % (ref 35–47)
HGB BLD-MCNC: 11.3 G/DL (ref 11.7–15.7)
IMM GRANULOCYTES # BLD: 0 10E9/L (ref 0–0.4)
IMM GRANULOCYTES NFR BLD: 0.3 %
LYMPHOCYTES # BLD AUTO: 2.6 10E9/L (ref 0.8–5.3)
LYMPHOCYTES NFR BLD AUTO: 26.7 %
MCH RBC QN AUTO: 28.5 PG (ref 26.5–33)
MCHC RBC AUTO-ENTMCNC: 33.2 G/DL (ref 31.5–36.5)
MCV RBC AUTO: 86 FL (ref 78–100)
MONOCYTES # BLD AUTO: 0.7 10E9/L (ref 0–1.3)
MONOCYTES NFR BLD AUTO: 6.9 %
NEUTROPHILS # BLD AUTO: 6.2 10E9/L (ref 1.6–8.3)
NEUTROPHILS NFR BLD AUTO: 63 %
NRBC # BLD AUTO: 0 10*3/UL
NRBC BLD AUTO-RTO: 0 /100
PLATELET # BLD AUTO: 234 10E9/L (ref 150–450)
POTASSIUM SERPL-SCNC: 3.9 MMOL/L (ref 3.4–5.3)
PROT SERPL-MCNC: 6.3 G/DL (ref 6.8–8.8)
RBC # BLD AUTO: 3.97 10E12/L (ref 3.8–5.2)
SODIUM SERPL-SCNC: 139 MMOL/L (ref 133–144)
WBC # BLD AUTO: 9.9 10E9/L (ref 4–11)

## 2019-10-11 PROCEDURE — G0378 HOSPITAL OBSERVATION PER HR: HCPCS

## 2019-10-11 PROCEDURE — 00000146 ZZHCL STATISTIC GLUCOSE BY METER IP

## 2019-10-11 PROCEDURE — 80053 COMPREHEN METABOLIC PANEL: CPT | Performed by: INTERNAL MEDICINE

## 2019-10-11 PROCEDURE — 99217 ZZC OBSERVATION CARE DISCHARGE: CPT | Performed by: INTERNAL MEDICINE

## 2019-10-11 PROCEDURE — 85025 COMPLETE CBC W/AUTO DIFF WBC: CPT | Performed by: INTERNAL MEDICINE

## 2019-10-11 PROCEDURE — 36415 COLL VENOUS BLD VENIPUNCTURE: CPT | Performed by: INTERNAL MEDICINE

## 2019-10-11 PROCEDURE — 25000132 ZZH RX MED GY IP 250 OP 250 PS 637: Mod: GY | Performed by: FAMILY MEDICINE

## 2019-10-11 RX ORDER — DICYCLOMINE HCL 20 MG
20 TABLET ORAL 4 TIMES DAILY PRN
Qty: 60 TABLET | Refills: 0 | Status: SHIPPED | OUTPATIENT
Start: 2019-10-11 | End: 2020-12-29

## 2019-10-11 RX ORDER — LOSARTAN POTASSIUM 50 MG/1
50 TABLET ORAL EVERY EVENING
COMMUNITY
Start: 2019-10-11 | End: 2019-12-19

## 2019-10-11 RX ORDER — GABAPENTIN 300 MG/1
600 CAPSULE ORAL EVERY EVENING
COMMUNITY
Start: 2019-10-11 | End: 2020-03-04

## 2019-10-11 RX ADMIN — GABAPENTIN 600 MG: 300 CAPSULE ORAL at 08:13

## 2019-10-11 RX ADMIN — MELATONIN 1000 UNITS: at 08:13

## 2019-10-11 RX ADMIN — Medication 1000 MCG: at 08:13

## 2019-10-11 ASSESSMENT — MIFFLIN-ST. JEOR: SCORE: 1192

## 2019-10-11 ASSESSMENT — ACTIVITIES OF DAILY LIVING (ADL): DEPENDENT_IADLS:: TRANSPORTATION;SHOPPING

## 2019-10-11 NOTE — PLAN OF CARE
SOO NUÑEZG DISCHARGE NOTE    Patient discharged to home at 12:10 PM via wheel chair. Accompanied by daughter and staff. Discharge instructions reviewed with patient and daughter, opportunity offered to ask questions. Prescriptions sent to patients preferred pharmacy. All belongings sent with patient.    Rosario Sandhu RN

## 2019-10-11 NOTE — PLAN OF CARE
"Pt alert, oriented, SBA. No stools overnight. Denies pain, nausea, SOB. On RA. LS clear. NaCl running at 50 ml/hr. Blood glucose monitoring. /54 (BP Location: Right arm)   Pulse 80   Temp 98.1  F (36.7  C) (Oral)   Resp 18   Ht 1.626 m (5' 4\")   Wt 76.7 kg (169 lb 1.5 oz)   SpO2 95%   BMI 29.02 kg/m      "

## 2019-10-11 NOTE — PROGRESS NOTES
Clinic Care Coordination Contact  [facility]/Voicemail    Referral Source: IP Handoff  Patient was admitted at [facility] from 10/9 to 10/11 for diagnosis of diarrhea. Hospital team initiated referral to [facility] for RN assessment. Patient has PCP clinic follow up scheduled for 10/16/19.    Clinical Data: Care Coordinator Outreach    Outreach attempted x 1.  Left message on patient's voicemail with call back information and requested return call.    Plan: Care Coordinator will try to reach patient again in 2 business days.    COLETTE Cortez, RN   [facility] - RN Care Coordinator  Phone: [phone]

## 2019-10-11 NOTE — DISCHARGE SUMMARY
Discharge Summary    Daniela Brown MRN# [id number]   YOB: 1933 Age: 86 year old     Date of Admission:  10/9/2019  Date of Discharge:  10/11/2019  Admitting Physician:  Axel Crook MD  Discharge Physician:  Jamal Aguilar MD  Discharging Service:  Hospitalist       Primary Provider: Cynthia Maddox            Discharge Diagnosis:     Principal Problem:    Diarrhea  Active Problems:    Type 2 diabetes mellitus with diabetic polyneuropathy, without long-term current use of insulin (H)    Generalized weakness    Abnormal CT of the abdomen               Discharge Disposition:     Discharged to home           Condition on Discharge:     Discharge condition:   Good   Discharge vitals: Most recent weight   10/11/19 76.7 kg (169 lb 1.5 oz)      Code status on discharge: DNR / DNI           Procedures / Labs / Imaging:     Results for orders placed or performed during the hospital encounter of 10/09/19   Abd/pelvis CT - no contrast - [facility] Protocol    Narrative    CT ABDOMEN AND PELVIS WITHOUT CONTRAST   10/9/2019 4:06 PM     HISTORY: Abdominal cramping. Diarrhea.    TECHNIQUE: Volumetric helical sections were acquired from the lung  bases through the ischial tuberosities without IV contrast. Coronal  images were also reconstructed. Radiation dose for this scan was  reduced using automated exposure control, adjustment of the mA and/or  kV according to patient size, or iterative reconstruction technique.    COMPARISON: CT of the abdomen and pelvis performed 9/20/2011.    FINDINGS:  No bowel obstruction. Colonic diverticulosis, without  convincing evidence for diverticulitis. The appendix is not  visualized, however, there is no significant inflammatory change in  the expected region of the appendix. No free fluid in the pelvis. The  uterus is not seen, and is surgically absent by history. Moderate  atherosclerotic aortoiliac calcification. Prior cholecystectomy. A  cystic lesion along  the anterior aspect of the pancreatic body/tail  (series 2 image 27) measures 2 cm, and is new since the previous exam.  The liver, spleen, adrenal glands, pancreas, and kidneys have  otherwise unremarkable noncontrast appearances. No hydronephrosis.  Coronary artery calcifications. The visualized lung bases are clear.       Impression    IMPRESSION:   1. No acute abnormality in the abdomen or pelvis. No cause for  abdominal cramping and diarrhea is identified.  2. Colonic diverticulosis, without convincing evidence for  diverticulitis.  3. A 2 cm pancreatic cystic lesion is new since the previous exam. A  pancreatic MRI with MRCP should be considered for further evaluation.           MYLES MEREDITH MD       MR Abdomen MRCP w/o & w Contrast    Narrative    MR ABDOMEN MRCP WITHOUT AND WITH CONTRAST  10/10/2019 3:50 PM     HISTORY: Pancreatic cystic lesion noted on CT.    TECHNIQUE: Multisequence, multiplanar imaging of the abdomen was  performed without IV gadolinium contrast. A total of 7 mL Gadavist  gadolinium contrast was then administered intravenously. Additional  dynamic postcontrast T1 fat-sat sequences were performed. MRCP imaging  was also performed. Three-dimensional MRCP was performed using maximum  intensity projection reconstruction on the same workstation.     COMPARISON: CT of the abdomen and pelvis performed 10/9/2019.    FINDINGS: Multiple sequences are limited related to patient motion  artifact. There are 2 adjacent cystic lesions in the pancreatic  body/tail, measuring 2 cm and 1.1 cm, respectively. No enhancing  septations or masslike components are identified, although evaluation  of these lesions is limited. These lesions appear to abut the  pancreatic duct, and may represent IPMN.  No other pancreatic  abnormalities are identified. No evidence for pancreatic or biliary  ductal dilatation.    No evidence for fatty infiltration of the liver. Prior  cholecystectomy. The liver, spleen, adrenal  glands, and kidneys are  unremarkable. No hydronephrosis. Visualized loops of small bowel and  colon are of normal caliber. No enlarged lymph nodes are identified in  the abdomen.      Impression    IMPRESSION: There are 2 cystic lesions in the pancreatic body/tail,  with the largest measuring 2 cm. No enhancing septations or solid  masslike components are identified, although evaluation is limited  related to patient motion artifact. These lesions have appearances  suggestive of IPMN, but remain technically indeterminate. A follow-up  MRI in one year should be considered to confirm stability.    MYLES MEREDITH MD             Discharge Medications:     Current Discharge Medication List      CONTINUE these medications which have CHANGED    Details   dicyclomine (BENTYL) 20 MG tablet Take 1 tablet (20 mg) by mouth 4 times daily as needed (diarrhea)  Qty: 60 tablet, Refills: 0    Associated Diagnoses: Irritable bowel syndrome with diarrhea      gabapentin (NEURONTIN) 300 MG capsule Take 2 capsules (600 mg) by mouth every evening    Associated Diagnoses: Chronic left-sided low back pain with left-sided sciatica      losartan (COZAAR) 50 MG tablet Take 1 tablet (50 mg) by mouth every evening    Associated Diagnoses: Benign essential hypertension         CONTINUE these medications which have NOT CHANGED    Details   acetaminophen (TYLENOL) 325 MG tablet Take 1 tablet by mouth every 6 hours as needed for pain.  Qty: 30 tablet, Refills: 0    Associated Diagnoses: Degeneration of lumbar or lumbosacral intervertebral disc      amLODIPine (NORVASC) 5 MG tablet Take 1 tablet (5 mg) by mouth every evening  Qty: 90 tablet, Refills: 3    Comments: Patient will call to fill  Associated Diagnoses: Benign essential hypertension      atorvastatin (LIPITOR) 40 MG tablet Take 1 tablet (40 mg) by mouth At Bedtime  Qty: 90 tablet, Refills: 3    Comments: Patient will call to fill  Associated Diagnoses: Angina pectoris (H); Type 2  diabetes mellitus with diabetic polyneuropathy, without long-term current use of insulin (H)      blood glucose monitoring (ACCU-CHEK FASTCLIX) lancets Use to test blood sugar one times daily or as directed.  Qty: 102 each, Refills: 3    Associated Diagnoses: Type 2 diabetes mellitus with diabetic polyneuropathy, without long-term current use of insulin (H)      ClonazePAM (KLONOPIN PO) Take 0.5 mg by mouth 2 times daily as needed for anxiety      Cyanocobalamin (B-12) 1000 MCG SUBL Place 1 tablet under the tongue every evening       Multiple Vitamins-Minerals (THERAPEUTIC MULTIVIT/MINERAL) TABS Take 1 tablet by mouth every evening       traMADol (ULTRAM) 50 MG tablet Take 1 Tablet BY MOUTH THREE TIMES DAILY AS NEEDED FOR PAIN  Qty: 90 tablet, Refills: 0    Associated Diagnoses: Chronic pain syndrome      VITAMIN D, CHOLECALCIFEROL, PO Take 1,000 Units by mouth daily      blood glucose monitoring (ACCU-CHEK GUIDE) test strip Use to test blood sugar one times daily or as directed.  Qty: 100 each, Refills: 3    Associated Diagnoses: Type 2 diabetes mellitus with diabetic polyneuropathy, without long-term current use of insulin (H)      lidocaine (LIDODERM) 5 % patch Apply patch to left low back at bedtime. Remove patch in the morning.  Qty: 10 patch, Refills: 0      nitroGLYcerin (NITROSTAT) 0.4 MG sublingual tablet Place 1 tablet (0.4 mg) under the tongue every 5 minutes as needed for chest pain  Qty: 30 tablet, Refills: 4    Comments: Patient will call to fill  Associated Diagnoses: Angina pectoris (H)      !! order for DME 1 walker  Qty: 1 Device, Refills: 0    Associated Diagnoses: Acute pain of right knee      !! ORDER FOR DME Thigh length teds.  Qty: 1 Device, Refills: 2    Associated Diagnoses: Venous insufficiency       !! - Potential duplicate medications found. Please discuss with provider.                Consultations:     No consultations were requested during this admission             Brief History of  Illness:     HISTORY OF PRESENT ILLNESS:  Daniela Brown is an 86-year-old female with a history of coronary artery disease, low back pain, irritable bowel syndrome, restless leg syndrome, CKD stage III, type 2 diabetes (on no meds), hypertension and hyperlipidemia, and one episode of C. diff in 2012.  The patient tells me that she had been feeling relatively more normal until 1900 hours yesterday when she developed profuse nonbloody persistent diarrhea associated with some vague abdominal discomfort.  She perhaps has had a little bit of nausea, but has not thrown up.  She noted that she had urinary frequency overnight, but she had no dysuria.  She had no fever or chills.  This morning she felt diffusely weak.  Her legs were weak and she felt like she could not really stand up.      The patient was evaluated in the emergency room.  Her lab workup and CT scan were relatively unremarkable.  She did have a 2 cm pancreatic cyst noted on her CT scan that was new.  An MRCP was suggested.      When I saw the patient, she was feeling better.  She had had some hydration.  She noted that she had 10-12 stools prior to coming to the emergency room and about 2 stools since being in the emergency room.  She was not nauseated at the time of my examination.  She denied fever or chills.  She felt somewhat stronger.  She lives alone in her own home.  She had no one to care for her tonight.  She apparently has 1 son.  Dr. Brewer told me that family members would be available tomorrow, but would not be available tonight to assist her at home.  Review of the records reveals that she had C. diff in 2012.  She cannot give me any details of this.             Hospital Course:     irritable bowel syndrome, restless leg syndrome, CKD stage III, type 2 diabetes (on no meds), hypertension, hyperlipidemia, and one episode of C. diff in 2012 admitted 10/10/2019 with acute onset of multiple loose stools, weakness     Diarrhea   Onset 1900 hours  10/08/2019. Treated overnight with fluids, improved. Stool pathogens negative. C diff negative.   - Has longstanding history of diarrhea predominant irritable bowel syndrome, but had never had a colonoscopy. Consider colonoscopy with random biopsy, stool lactoferrin, GI consultation     Lower extremity weakness  Suspect related to diarrheal illness.   - Improved with fluids     Pancreatic cyst on CT imaging   -MRI/MCP reassuring, consider follow-up MRI in 1 year per radiology     Diabetes mellitus type 2  On no meds. A1c 6.4 8/6/2019  - Medium insulin sliding scale while here     Hypertension   Bps were goodgood               Final Day of Progress before Discharge:       Assessment and Plan:  Stable, as above, discharge          Interval History:  No further dirrhea, strength is good          Physical Exam:  Vitals were reviewed  Constitutional:   awake, alert, cooperative, no apparent distress, and appears stated age          Data:    CMP  Recent Labs   Lab 10/11/19  0524 10/10/19  0523 10/09/19  1424    143 141   POTASSIUM 3.9 3.7 3.6   CHLORIDE 110* 111* 108   CO2 23 26 24   ANIONGAP 6 6 9   * 122* 126*   BUN 20 11 12   CR 0.94 0.80 0.93   GFRESTIMATED 54* 67 55*   GFRESTBLACK 63 77 64   NARESH 8.5 9.1 9.6   PROTTOTAL 6.3*  --  8.3   ALBUMIN 3.1*  --  3.9   BILITOTAL 0.5  --  0.9   ALKPHOS 69  --  94   AST 22  --  28   ALT 24  --  35     CBC  Recent Labs   Lab 10/11/19  0524 10/10/19  0523 10/09/19  1424   WBC 9.9 10.2 11.0   RBC 3.97 4.49 4.83   HGB 11.3* 12.6 13.5   HCT 34.0* 38.2 40.4   MCV 86 85 84   MCH 28.5 28.1 28.0   MCHC 33.2 33.0 33.4   RDW 13.0 13.0 12.9    275 302               Pending Results:       Unresulted Labs Ordered in the Past 30 Days of this Admission     No orders found from 9/9/2019 to 10/10/2019.                 Discharge Instructions and Follow-Up:     Discharge diet:   Diabetic (1800 ADA)   Discharge activity: Activity as tolerated   Discharge follow-up: Follow up with  primary care provider in 7 days  Consider colonoscopy with random bipsies, discuss with Cynthia Maddox   Consider repeat MRI/MRCP in 1 year

## 2019-10-13 ENCOUNTER — TELEPHONE (OUTPATIENT)
Dept: INTERNAL MEDICINE | Facility: CLINIC | Age: 84
End: 2019-10-13

## 2019-10-14 NOTE — TELEPHONE ENCOUNTER
[facility] now requests orders and shares plan of care/discharge summaries for some patients through [employer].  Please REPLY TO THIS MESSAGE OR ROUTE BACK TO THE AUTHOR in order to give authorization for orders when needed.  This is considered a verbal order, you will still receive a faxed copy of orders for signature.  Thank you for your assistance in improving collaboration for our patients.    ORDER    Patient admitted to homecare services 10/13/19, certification period 10/13/19 through 12/11/19. Requesting orders for SN visits 1xwkx1, 2xwkx3, 1xwkx5 and 2 prn for education on dehydration, education on medication management d/t medication changes during hospitalization and education DM2 management. PT eval and treat for strength, endurance, gait and DME. MSW assessment for community resources, support and home delivered meals. Please call with any questions or concerns [phone].    Thank you,  Betty PEARSON  [facility]

## 2019-10-15 NOTE — PROGRESS NOTES
Clinic Care Coordination Contact  [facility]/Voicemail    Referral Source: ED Follow-Up  See initial note below    Clinical Data: Care Coordinator Outreach    Outreach attempted x 2.  Left message on patient's voicemail with call back information and requested return call.    Plan: Patient has PCP clinic follow up scheduled for tomorrow at 9AM. RN CC will try to reach patient again after clinic follow up.    COLETTE Cortez, RN   [facility] - RN Care Coordinator  Phone: [phone]

## 2019-10-15 NOTE — PROGRESS NOTES
Subjective     Daniela Brown is a 86 year old female who presents to clinic today for the following health issues:86 yr old female here for a hospital follow up. Was seen for dehydration , weakness and diarrhea. Diarrhea has slowed down though she mentioned that she had one loose stool today. Her stool studies were negative. C/O weakness  Asked to pace herself .        Hospital Follow-up Visit:    Hospital/Nursing Home/IP Rehab Facility: [facility]  Date of Admission: 10/9/19  Date of Discharge: 10/11  Reason(s) for Admission: generalized weakness diarrhea             Problems taking medications regularly:  None       Medication changes since discharge: None       Problems adhering to non-medication therapy:  None  - still having weakness in her legs. Did have one episode of diarrhea this morning, - loose diarrhea.   Summary of hospitalization:  [facility] discharge summary reviewed  Diagnostic Tests/Treatments reviewed.  Follow up needed: none  Other Healthcare Providers Involved in Patient s Care:         None  Update since discharge: improved.     Post Discharge Medication Reconciliation: discharge medications reconciled, continue medications without change.  Plan of care communicated with patient     Coding guidelines for this visit:  Type of Medical   Decision Making Face-to-Face Visit       within 7 Days of discharge Face-to-Face Visit        within 14 days of discharge   Moderate Complexity [id number]   High Complexity [id number]                Patient Active Problem List   Diagnosis     Degeneration of lumbar or lumbosacral intervertebral disc     Esophageal reflux     Memory loss     Irritable bowel syndrome with diarrhea     Osteopenia of multiple sites     Anxiety and depression     RESTLESS LEG SYNDROME     Generalized osteoarthrosis, unspecified site     Diverticulosis of large intestine     SENSONRL HEAR LOSS,BILAT     Urticaria     Subjective tinnitus     Vitamin D  deficiency     Right foot pain     Urge incontinence     Hyperlipidemia LDL goal <100     Allergic rhinitis     Pronation of foot     Advanced directives, counseling/discussion     Peripheral neuropathy     Benign essential hypertension     Carpal tunnel syndrome of left wrist     Diastolic dysfunction     Type 2 diabetes mellitus with diabetic polyneuropathy, without long-term current use of insulin (H)     History of recurrent urinary tract infection     CKD (chronic kidney disease) stage 3, GFR 30-59 ml/min (H)     Arthritis of spine     Bilateral wrist pain     Protrusion of lumbar intervertebral disc     Coronary artery disease involving native coronary artery of native heart with angina pectoris (H)     Chronic left-sided low back pain with left-sided sciatica     Generalized weakness     Diarrhea     Abnormal CT of the abdomen     Cystocele, midline     B12 deficiency     Past Surgical History:   Procedure Laterality Date     ARTHROPLASTY KNEE  3/26/2012    Procedure:ARTHROPLASTY KNEE; Right Total Knee Arthroplasty; Surgeon:BERNADINE ROSAS; Location:[place] OR     C APPENDECTOMY  1953     CHOLECYSTECTOMY       CV HEART CATHETERIZATION WITH POSSIBLE INTERVENTION N/A 2/27/2019    Procedure: Coronary Angiogram with Left ventriculogram;  Surgeon: Leandro Carpio MD;  Location:  HEART CARDIAC CATH LAB     HERNIA REPAIR      Hernia Repair     HYSTERECTOMY, PAP NO LONGER INDICATED  1983    TVH/BSO     SURGICAL HISTORY OF -   10/08    A & P Repair, Dr. Hannah       Social History     Tobacco Use     Smoking status: Never Smoker     Smokeless tobacco: Never Used   Substance Use Topics     Alcohol use: No     Family History   Problem Relation Age of Onset     C.A.D. Mother      Diabetes Mother      Cancer - colorectal Mother      Arthritis Mother      Heart Disease Mother      Lipids Brother      Obesity Brother      Hypertension Brother      Allergies Son      Eye Disorder Son         cataract     Thyroid  Disease Sister         graves dz     Eye Disorder Son      Leukemia Son      Alcohol/Drug Father          Current Outpatient Medications   Medication Sig Dispense Refill     acetaminophen (TYLENOL) 325 MG tablet Take 1 tablet by mouth every 6 hours as needed for pain. 30 tablet 0     amLODIPine (NORVASC) 5 MG tablet Take 1 tablet (5 mg) by mouth every evening 90 tablet 3     atorvastatin (LIPITOR) 40 MG tablet Take 1 tablet (40 mg) by mouth At Bedtime 90 tablet 3     blood glucose monitoring (ACCU-CHEK FASTCLIX) lancets Use to test blood sugar one times daily or as directed. 102 each 3     blood glucose monitoring (ACCU-CHEK GUIDE) test strip Use to test blood sugar one times daily or as directed. 100 each 3     ClonazePAM (KLONOPIN PO) Take 0.5 mg by mouth 2 times daily as needed for anxiety       Cyanocobalamin (B-12) 1000 MCG SUBL Place 1 tablet under the tongue every evening        dicyclomine (BENTYL) 20 MG tablet Take 1 tablet (20 mg) by mouth 4 times daily as needed (diarrhea) 60 tablet 0     gabapentin (NEURONTIN) 300 MG capsule Take 2 capsules (600 mg) by mouth every evening       lidocaine (LIDODERM) 5 % patch Apply patch to left low back at bedtime. Remove patch in the morning. 10 patch 0     losartan (COZAAR) 50 MG tablet Take 1 tablet (50 mg) by mouth every evening       Multiple Vitamins-Minerals (THERAPEUTIC MULTIVIT/MINERAL) TABS Take 1 tablet by mouth every evening        nitroGLYcerin (NITROSTAT) 0.4 MG sublingual tablet Place 1 tablet (0.4 mg) under the tongue every 5 minutes as needed for chest pain 30 tablet 4     order for DME 1 walker 1 Device 0     ORDER FOR DME Thigh length teds. 1 Device 2     traMADol (ULTRAM) 50 MG tablet Take 1 Tablet BY MOUTH THREE TIMES DAILY AS NEEDED FOR PAIN 90 tablet 0     VITAMIN D, CHOLECALCIFEROL, PO Take 1,000 Units by mouth daily       Allergies   Allergen Reactions     Metoprolol Itching and Rash     Cephalexin Rash     Erythromycin Rash     Actonel  "[Bisphosphonates] Itching     Azithromycin Hives     Celebrex [Celecoxib] Rash     Cipro [Ciprofloxacin] Rash     Contrast Dye Hives     Diatrizoate Hives     Erythromycin Rash     Escitalopram Diarrhea     Gets very sick and mad feelings     Fosamax Itching and Rash     Keflex [Cephalexin Monohydrate] Rash     Lisinopril Cough     Nitrofurantoin Other (See Comments)     \"dizzyness'     No Clinical Screening - See Comments Hives     Other reaction(s): Edema     Risedronate Itching     Seasonal Allergies      Shellfish-Derived Products Hives     Tetanus Toxoid, Adsorbed Swelling     Tetanus-Diphtheria Toxoids Swelling     Vicodin [Acetaminophen] Itching     Patient reported - only when used scheduled in high doses.        Vioxx Rash     Acetaminophen Itching     In high doses  Patient reported - only when used scheduled in high doses.        Alendronic Acid Rash     Celecoxib Rash     Penicillins Rash     Rofecoxib Rash     Sulfa Drugs Itching and Rash     Sulfasalazine Itching and Rash     BP Readings from Last 3 Encounters:   10/16/19 (!) 164/62   10/11/19 132/60   10/01/19 (!) 146/71    Wt Readings from Last 3 Encounters:   10/16/19 75.4 kg (166 lb 3.2 oz)   10/11/19 76.7 kg (169 lb 1.5 oz)   09/18/19 76.7 kg (169 lb)                      Reviewed and updated as needed this visit by Provider  Tobacco  Allergies  Meds  Problems  Med Hx  Surg Hx  Fam Hx         Review of Systems   ROS COMP: Constitutional, HEENT, cardiovascular, pulmonary, gi and gu systems are negative, except as otherwise noted.      Objective    BP (!) 164/62   Pulse 82   Temp 98.8  F (37.1  C) (Tympanic)   Resp 20   Ht 1.626 m (5' 4\")   Wt 75.4 kg (166 lb 3.2 oz)   SpO2 96%   Breastfeeding? No   BMI 28.53 kg/m    Body mass index is 28.53 kg/m .  Physical Exam   GENERAL: healthy, alert and no distress  EYES: Eyes grossly normal to inspection, PERRL and conjunctivae and sclerae normal  HENT: normal cephalic/atraumatic, right ear: " occluded with wax, left ear: normal: no effusions, no erythema, normal landmarks and normal TM mobility on insufflation, nose and mouth without ulcers or lesions, oropharynx clear and oral mucous membranes moist  NECK: no adenopathy, no asymmetry, masses, or scars and thyroid normal to palpation  RESP: lungs clear to auscultation - no rales, rhonchi or wheezes  CV: regular rate and rhythm, normal S1 S2, no S3 or S4, no murmur, click or rub, no peripheral edema and peripheral pulses strong  ABDOMEN: soft, nontender, no hepatosplenomegaly, no masses and bowel sounds normal  MS: no gross musculoskeletal defects noted, no edema    Diagnostic Test Results:  none         Assessment & Plan       ICD-10-CM    1. Hospital discharge follow-up Z09    2. Weakness R53.1    3. Diarrhea, unspecified type R19.7    4. Excessive ear wax, right H61.21 REMOVE IMPACTED CERUMEN   86 yr old female here for hospital follow up. She was admitted for weakness and dehydration from diarrhea. She is doing much better though she still says she is quite weak. Recommended increase in fluids.   Ear wax was removed without incidence. Blood pressure was slightly elevated, recommend rechecking with a nurse only visit in a few weeks.           FUTURE APPOINTMENTS:       - Follow-up visit in two weeks for BP recheck.     Return in about 2 weeks (around 10/30/2019) for BP Recheck.    Vania Roper MD  [facility]

## 2019-10-16 ENCOUNTER — OFFICE VISIT (OUTPATIENT)
Dept: FAMILY MEDICINE | Facility: CLINIC | Age: 84
End: 2019-10-16
Payer: MEDICARE

## 2019-10-16 VITALS
RESPIRATION RATE: 20 BRPM | HEIGHT: 64 IN | WEIGHT: 166.2 LBS | TEMPERATURE: 98.8 F | SYSTOLIC BLOOD PRESSURE: 164 MMHG | DIASTOLIC BLOOD PRESSURE: 62 MMHG | HEART RATE: 82 BPM | BODY MASS INDEX: 28.38 KG/M2 | OXYGEN SATURATION: 96 %

## 2019-10-16 DIAGNOSIS — H61.21 EXCESSIVE EAR WAX, RIGHT: ICD-10-CM

## 2019-10-16 DIAGNOSIS — R19.7 DIARRHEA, UNSPECIFIED TYPE: ICD-10-CM

## 2019-10-16 DIAGNOSIS — Z09 HOSPITAL DISCHARGE FOLLOW-UP: Primary | ICD-10-CM

## 2019-10-16 DIAGNOSIS — R53.1 WEAKNESS: ICD-10-CM

## 2019-10-16 PROCEDURE — 99213 OFFICE O/P EST LOW 20 MIN: CPT | Mod: 25 | Performed by: FAMILY MEDICINE

## 2019-10-16 ASSESSMENT — MIFFLIN-ST. JEOR: SCORE: 1178.88

## 2019-10-16 NOTE — PATIENT INSTRUCTIONS
Thank you for choosing [facility].  You may be receiving an email and/or telephone survey request from [facility] Customer Experience regarding your visit today.  Please take a few minutes to respond to the survey to let us know how we are doing.      If you have questions or concerns, please contact us via [employer] or you can contact your care team at [phone].    Our Clinic hours are:  Monday 6:40 am  to 7:00 pm  Tuesday -Friday 6:40 am to 5:00 pm    The [place] outpatient lab hours are:  Monday - Friday 6:10 am to 4:45 pm  Saturdays 7:00 am to 11:00 am  Appointments are required, call [phone]    If you have clinical questions after hours or would like to schedule an appointment,  call the clinic at [phone].

## 2019-10-17 ENCOUNTER — TELEPHONE (OUTPATIENT)
Dept: INTERNAL MEDICINE | Facility: CLINIC | Age: 84
End: 2019-10-17

## 2019-10-17 NOTE — TELEPHONE ENCOUNTER
[facility] now requests orders and shares plan of care/discharge summaries for some patients through [employer].  Please REPLY TO THIS MESSAGE OR ROUTE BACK TO THE AUTHOR in order to give authorization for orders when needed.  This is considered a verbal order, you will still receive a faxed copy of orders for signature.  Thank you for your assistance in improving collaboration for our patients.    ORDER    MD SUMMARY/PLAN OF CARE    PT 2w2 for exercise.    Christy Mera MPT

## 2019-10-21 ENCOUNTER — PATIENT OUTREACH (OUTPATIENT)
Dept: CARE COORDINATION | Facility: CLINIC | Age: 84
End: 2019-10-21

## 2019-10-21 NOTE — LETTER
[place] CARE COORDINATION  [facility]  [address] [place] [address]      October 21, 2019      Daniela Betty Kevin  [address]  [facility] [address]        Dear Sivan,    I have been attempting to reach you since our last contact. I would like to continue to work with you and provide any additional support you may need on achieving your health care related goals. I would appreciate if you would give me a call at [phone] to let me know if you would like to continue working together. I know that there are many things that can affect our ability to communicate and I hope we can continue to work together.    All of us at the [facility] are invested in your health and are here to assist you in meeting your goals.     Sincerely,    JOHNATHON CortezN, RN   [facility] - RN Care Coordinator  Phone: [phone]

## 2019-10-21 NOTE — PROGRESS NOTES
Clinic Care Coordination Contact  [facility]/Voicemail       Clinical Data: Care Coordinator Outreach  Proactive follow up outreach    Outreach attempted x 3.  Left message on patient's voicemail with call back information and requested return call.    Plan: Care Coordinator will send unable to contact letter with care coordinator contact information via mail. Care Coordinator will try to reach patient again in 1 month.    COLETTE Cortez, RN   [facility] - RN Care Coordinator  Phone: [phone]

## 2019-10-22 ENCOUNTER — TELEPHONE (OUTPATIENT)
Dept: INTERNAL MEDICINE | Facility: CLINIC | Age: 84
End: 2019-10-22

## 2019-10-22 NOTE — TELEPHONE ENCOUNTER
[facility] now requests orders and shares plan of care/discharge summaries for some patients through [employer].  Please REPLY TO THIS MESSAGE OR ROUTE BACK TO THE AUTHOR in order to give authorization for orders when needed.  This is considered a verbal order, you will still receive a faxed copy of orders for signature.  Thank you for your assistance in improving collaboration for our patients.    ORDER    MD SUMMARY/PLAN OF CARE    OT eval and treat for cognititve testing and eval and treat.    Christy Colon

## 2019-10-23 NOTE — PROGRESS NOTES
Subjective     Daniela Brown is a 86 year old female who presents to clinic today for the following health issues:      Here with daughter in law Tamika.  On homecare.  Recovering from recent hospitalization.  Doing home exercises, getting stronger.     HPI     URINARY TRACT SYMPTOMS  Onset: A Couple weeks     Description:   Painful urination (Dysuria): no            Frequency: YES  Blood in urine (Hematuria): no   Delay in urine (Hesitency): Yes, sometimes    Intensity: moderate    Progression of Symptoms:  improving    Accompanying Signs & Symptoms:  Fever/chills: YES- gets hot at night   Flank pain no   Nausea and vomiting: no   Any vaginal symptoms: none  Abdominal/Pelvic Pain: no     History:   History of frequent UTI's: YES  History of kidney stones: no   Sexually Active: no   Possibility of pregnancy: No    Precipitating factors: No     Therapies Tried and outcome: Increase fluid intake and OTC advil or tylenol   --subjective fevers, diffuse generalized abdominal pain. Had flank pain last week but this has resolved.  Having frequency, daron at night, with incontinence.  --home health RN recommended clinic visit.        Current Outpatient Medications   Medication Sig Dispense Refill     acetaminophen (TYLENOL) 325 MG tablet Take 1 tablet by mouth every 6 hours as needed for pain. 30 tablet 0     amLODIPine (NORVASC) 5 MG tablet Take 1 tablet (5 mg) by mouth every evening 90 tablet 3     atorvastatin (LIPITOR) 40 MG tablet Take 1 tablet (40 mg) by mouth At Bedtime 90 tablet 3     ClonazePAM (KLONOPIN PO) Take 0.5 mg by mouth 2 times daily as needed for anxiety       Cyanocobalamin (B-12) 1000 MCG SUBL Place 1 tablet under the tongue every evening        dicyclomine (BENTYL) 20 MG tablet Take 1 tablet (20 mg) by mouth 4 times daily as needed (diarrhea) 60 tablet 0     gabapentin (NEURONTIN) 300 MG capsule Take 2 capsules (600 mg) by mouth every evening       losartan (COZAAR) 50 MG tablet Take 1 tablet  "(50 mg) by mouth every evening       Multiple Vitamins-Minerals (THERAPEUTIC MULTIVIT/MINERAL) TABS Take 1 tablet by mouth every evening        VITAMIN D, CHOLECALCIFEROL, PO Take 1,000 Units by mouth daily       blood glucose monitoring (ACCU-CHEK FASTCLIX) lancets Use to test blood sugar one times daily or as directed. 102 each 3     blood glucose monitoring (ACCU-CHEK GUIDE) test strip Use to test blood sugar one times daily or as directed. 100 each 3     lidocaine (LIDODERM) 5 % patch Apply patch to left low back at bedtime. Remove patch in the morning. (Patient not taking: Reported on 10/25/2019) 10 patch 0     nitroGLYcerin (NITROSTAT) 0.4 MG sublingual tablet Place 1 tablet (0.4 mg) under the tongue every 5 minutes as needed for chest pain (Patient not taking: Reported on 10/25/2019) 30 tablet 4     order for DME 1 walker 1 Device 0     ORDER FOR DME Thigh length teds. 1 Device 2     traMADol (ULTRAM) 50 MG tablet Take 1 Tablet BY MOUTH THREE TIMES DAILY AS NEEDED FOR PAIN (Patient not taking: Reported on 10/25/2019) 90 tablet 0         Reviewed and updated as needed this visit by Provider         Review of Systems   ROS COMP: Constitutional, HEENT, cardiovascular, pulmonary, gi and gu systems are negative, except as otherwise noted.      Objective    BP (!) 150/66   Pulse 86   Temp 98.3  F (36.8  C) (Tympanic)   Resp 16   Ht 1.626 m (5' 4\")   Wt 78.5 kg (173 lb)   SpO2 99%   BMI 29.70 kg/m    Body mass index is 29.7 kg/m .  Physical Exam   GENERAL APPEARANCE: healthy and no distress  ABDOMEN: soft, nontender, without hepatosplenomegaly or masses and bowel sounds normal  MS:  No CVA tenderness    Diagnostic Test Results:  Labs reviewed in Epic  No results found for this or any previous visit (from the past 24 hour(s)).       Color Urine (no units)   Date Value   10/25/2019 Yellow     Appearance Urine (no units)   Date Value   10/25/2019 Slightly Cloudy     Glucose Urine (mg/dL)   Date Value "   10/25/2019 Negative     Bilirubin Urine (no units)   Date Value   10/25/2019 Negative     Ketones Urine (mg/dL)   Date Value   10/25/2019 Negative     Specific Gravity Urine (no units)   Date Value   10/25/2019 1.010     pH Urine (pH)   Date Value   10/25/2019 5.5     Protein Albumin Urine (mg/dL)   Date Value   10/25/2019 Negative     Urobilinogen Urine (EU/dL)   Date Value   10/25/2019 0.2     Nitrite Urine (no units)   Date Value   10/25/2019 Negative     Leukocyte Esterase Urine (no units)   Date Value   10/25/2019 Moderate (A)               Assessment & Plan       ICD-10-CM    1. Acute cystitis without hematuria N30.00 nitroFURantoin macrocrystal-monohydrate (MACROBID) 100 MG capsule   2. Dysuria R30.0 UA reflex to Microscopic and Culture     Urine Microscopic   3. Nonspecific finding on examination of urine R82.90 Urine Culture Aerobic Bacterial      after visit, culture back back resistant to macrobid.  Patient has taken sulfa drugs in the past, although listed as allergy.  Urology note from 2012 - saw an allergist and determined she did not have true allergy to sulfa.  Will Rx for this    Cynthia Maddox,   [facility]

## 2019-10-25 ENCOUNTER — OFFICE VISIT (OUTPATIENT)
Dept: FAMILY MEDICINE | Facility: CLINIC | Age: 84
End: 2019-10-25
Payer: MEDICARE

## 2019-10-25 VITALS
SYSTOLIC BLOOD PRESSURE: 150 MMHG | BODY MASS INDEX: 29.53 KG/M2 | DIASTOLIC BLOOD PRESSURE: 66 MMHG | WEIGHT: 173 LBS | TEMPERATURE: 98.3 F | RESPIRATION RATE: 16 BRPM | OXYGEN SATURATION: 99 % | HEIGHT: 64 IN | HEART RATE: 86 BPM

## 2019-10-25 DIAGNOSIS — N30.00 ACUTE CYSTITIS WITHOUT HEMATURIA: Primary | ICD-10-CM

## 2019-10-25 DIAGNOSIS — R30.0 DYSURIA: ICD-10-CM

## 2019-10-25 DIAGNOSIS — R82.90 NONSPECIFIC FINDING ON EXAMINATION OF URINE: ICD-10-CM

## 2019-10-25 LAB
ALBUMIN UR-MCNC: NEGATIVE MG/DL
APPEARANCE UR: ABNORMAL
BACTERIA #/AREA URNS HPF: ABNORMAL /HPF
BILIRUB UR QL STRIP: NEGATIVE
COLOR UR AUTO: YELLOW
GLUCOSE UR STRIP-MCNC: NEGATIVE MG/DL
HGB UR QL STRIP: ABNORMAL
KETONES UR STRIP-MCNC: NEGATIVE MG/DL
LEUKOCYTE ESTERASE UR QL STRIP: ABNORMAL
NITRATE UR QL: NEGATIVE
NON-SQ EPI CELLS #/AREA URNS LPF: ABNORMAL /LPF
PH UR STRIP: 5.5 PH (ref 5–7)
RBC #/AREA URNS AUTO: ABNORMAL /HPF
SOURCE: ABNORMAL
SP GR UR STRIP: 1.01 (ref 1–1.03)
UROBILINOGEN UR STRIP-ACNC: 0.2 EU/DL (ref 0.2–1)
WBC #/AREA URNS AUTO: ABNORMAL /HPF

## 2019-10-25 PROCEDURE — 99213 OFFICE O/P EST LOW 20 MIN: CPT | Performed by: INTERNAL MEDICINE

## 2019-10-25 PROCEDURE — 87186 SC STD MICRODIL/AGAR DIL: CPT | Performed by: INTERNAL MEDICINE

## 2019-10-25 PROCEDURE — 87086 URINE CULTURE/COLONY COUNT: CPT | Performed by: INTERNAL MEDICINE

## 2019-10-25 PROCEDURE — 87088 URINE BACTERIA CULTURE: CPT | Performed by: INTERNAL MEDICINE

## 2019-10-25 PROCEDURE — 81001 URINALYSIS AUTO W/SCOPE: CPT | Performed by: INTERNAL MEDICINE

## 2019-10-25 RX ORDER — NITROFURANTOIN 25; 75 MG/1; MG/1
100 CAPSULE ORAL 2 TIMES DAILY
Qty: 14 CAPSULE | Refills: 0 | Status: SHIPPED | OUTPATIENT
Start: 2019-10-25 | End: 2019-11-01

## 2019-10-25 ASSESSMENT — MIFFLIN-ST. JEOR: SCORE: 1209.72

## 2019-10-25 ASSESSMENT — ANXIETY QUESTIONNAIRES
5. BEING SO RESTLESS THAT IT IS HARD TO SIT STILL: NOT AT ALL
3. WORRYING TOO MUCH ABOUT DIFFERENT THINGS: NOT AT ALL
6. BECOMING EASILY ANNOYED OR IRRITABLE: NOT AT ALL
2. NOT BEING ABLE TO STOP OR CONTROL WORRYING: NOT AT ALL
7. FEELING AFRAID AS IF SOMETHING AWFUL MIGHT HAPPEN: NOT AT ALL
GAD7 TOTAL SCORE: 0
1. FEELING NERVOUS, ANXIOUS, OR ON EDGE: NOT AT ALL
IF YOU CHECKED OFF ANY PROBLEMS ON THIS QUESTIONNAIRE, HOW DIFFICULT HAVE THESE PROBLEMS MADE IT FOR YOU TO DO YOUR WORK, TAKE CARE OF THINGS AT HOME, OR GET ALONG WITH OTHER PEOPLE: NOT DIFFICULT AT ALL

## 2019-10-25 ASSESSMENT — PATIENT HEALTH QUESTIONNAIRE - PHQ9
SUM OF ALL RESPONSES TO PHQ QUESTIONS 1-9: 1
5. POOR APPETITE OR OVEREATING: NOT AT ALL

## 2019-10-25 NOTE — PATIENT INSTRUCTIONS
1. Start Nitrofurantoin -macrobid twice daily x 1 week  2. Increase water intake  3. Be seen if symptoms worsening.

## 2019-10-26 ASSESSMENT — ANXIETY QUESTIONNAIRES: GAD7 TOTAL SCORE: 0

## 2019-10-27 LAB
BACTERIA SPEC CULT: ABNORMAL
Lab: ABNORMAL
SPECIMEN SOURCE: ABNORMAL

## 2019-10-28 RX ORDER — SULFAMETHOXAZOLE/TRIMETHOPRIM 800-160 MG
1 TABLET ORAL 2 TIMES DAILY
Qty: 10 TABLET | Refills: 0 | Status: SHIPPED | OUTPATIENT
Start: 2019-10-28 | End: 2019-11-02

## 2019-10-28 NOTE — RESULT ENCOUNTER NOTE
Please call to let patient know the antibiotic given at the visit will not work.  Stop the Macrobid (nitrofurantoin)  I sent in Bactrim twice daily x 5 days.  She saw urology in 2012 who had previously prescribed this.  Sulfa drugs are listed as an allergy, but she saw an allergist years ago and it was determined she did not have a true allergy to bactrim.

## 2019-10-29 ENCOUNTER — TELEPHONE (OUTPATIENT)
Dept: INTERNAL MEDICINE | Facility: CLINIC | Age: 84
End: 2019-10-29

## 2019-10-29 NOTE — TELEPHONE ENCOUNTER
Reason for Call:  Medication Allergy Question    Detailed comments: Patient  is calling asking if medication that was prescribed is ok with patients Sulfa allergy.  sulfamethoxazole-trimethoprim (BACTRIM DS/SEPTRA DS) 800-160 MG tablet    Phone Number Patient can be reached at: Please call patient and .  Home number on file [phone] (home)  [place] -  [phone]    Best Time: Any    Can we leave a detailed message on this number? YES    Call taken on 10/29/2019 at 12:27 PM by Nevaeh Cazares

## 2019-10-30 DIAGNOSIS — Z53.9 DIAGNOSIS NOT YET DEFINED: Primary | ICD-10-CM

## 2019-10-30 PROCEDURE — G0180 MD CERTIFICATION HHA PATIENT: HCPCS | Performed by: INTERNAL MEDICINE

## 2019-11-12 ENCOUNTER — AMBULATORY - HEALTHEAST (OUTPATIENT)
Dept: OTHER | Facility: CLINIC | Age: 84
End: 2019-11-12

## 2019-11-12 ENCOUNTER — DOCUMENTATION ONLY (OUTPATIENT)
Dept: OTHER | Facility: CLINIC | Age: 84
End: 2019-11-12

## 2019-11-15 ENCOUNTER — TELEPHONE (OUTPATIENT)
Dept: INTERNAL MEDICINE | Facility: CLINIC | Age: 84
End: 2019-11-15

## 2019-11-15 NOTE — TELEPHONE ENCOUNTER
Patient reports:  She has taken the Macrobid everyday for 5 days.  She is having symptoms of burning with urination and urinary frequency.  He urine is orange in color.  Patient denies fever, abdominal pain, foul smelling urine, urinary urgency, unable to empty bladder.  Patient would like to schedule appt with PCP  Scheduled appt 11/19/19.  Advised patient to drink more water, 8, 8oz glasses a day and see if the color of her urine improves.  If symptoms of fever, abdominal pain, foul smelling urine, urinary urgency, unable empty bladder present patient should go to UC/ED right away.    Patient verbalized understanding and agreed with this plan.

## 2019-11-15 NOTE — TELEPHONE ENCOUNTER
Reason for Call:  Other prescription    Detailed comments: pt is calling and still has an infection (cystitis) and prescribed Bactrim on 10/25.  Wondering if she can get a refill for this.  Pharm. [facility]    Phone Number Patient can be reached at: Home number on file [phone] (home)    Best Time: any    Can we leave a detailed message on this number? YES    Call taken on 11/15/2019 at 2:24 PM by Marisela Peña

## 2019-11-20 ENCOUNTER — TELEPHONE (OUTPATIENT)
Dept: PALLIATIVE MEDICINE | Facility: CLINIC | Age: 84
End: 2019-11-20

## 2019-11-21 ENCOUNTER — PATIENT OUTREACH (OUTPATIENT)
Dept: CARE COORDINATION | Facility: CLINIC | Age: 84
End: 2019-11-21

## 2019-11-21 NOTE — LETTER
[place] CARE COORDINATION  [facility]  [address] [place] [address]      November 21, 2019    Daniela Brown  [address] [address]      Dear Sivan,    I have been unsuccessful in reaching you since our last contact. At this time I will make no further attempts to reach you, however this does not change your ability to continue receiving care from your providers at [facility]. If you are needing additional support from a care coordinator in the future please contact me at [phone].    All of us at [facility] are invested in your health and are here to assist you in meeting your goals.    Sincerely,    COLETTE Cortez, RN   [facility]  - [facility] Care Coordinator  Phone: [phone]

## 2019-11-21 NOTE — PROGRESS NOTES
Clinic Care Coordination Contact  [facility]/Voicemail       Clinical Data: Care Coordinator Outreach  Proactive follow up outreaches    Outreach attempted x 3.  Left message on patient's voicemail with call back information and requested return call.    Plan: Care Coordinator will send disenrollment letter with care coordinator contact information via mail. Care Coordinator will do no further outreaches at this time.    COLETTE Cortez, RN   [facility]  - Clinic Care Coordinator  Phone: [phone]

## 2019-11-25 DIAGNOSIS — I10 BENIGN ESSENTIAL HYPERTENSION: ICD-10-CM

## 2019-11-25 RX ORDER — AMLODIPINE BESYLATE 5 MG/1
5 TABLET ORAL EVERY EVENING
Qty: 90 TABLET | Refills: 3 | Status: SHIPPED | OUTPATIENT
Start: 2019-11-25 | End: 2020-05-12

## 2019-11-25 NOTE — TELEPHONE ENCOUNTER
Please advise on refill of amlodipine for more than one month.  Patient had future lab ordered by PCP, only BMP was completed by ED/hopital provider  Patient has been seen twice since ED/hospital admit.    Please advise on this refill    Routing to provider.    Ruthie SALAZAR Rn

## 2019-12-03 NOTE — TELEPHONE ENCOUNTER
Reviewed chart.  Has not been seen in clinic since 5/1/19  The patient may need follow up down the road- if they call in, please send to nursing to review.  It may be that only repeat procedures are needed, which can be ordered by PCP.    Mickie Soares MD  [facility]

## 2019-12-12 DIAGNOSIS — Z53.9 DIAGNOSIS NOT YET DEFINED: Primary | ICD-10-CM

## 2019-12-12 PROCEDURE — G0180 MD CERTIFICATION HHA PATIENT: HCPCS | Performed by: INTERNAL MEDICINE

## 2019-12-13 DIAGNOSIS — I10 BENIGN ESSENTIAL HYPERTENSION: ICD-10-CM

## 2019-12-13 NOTE — TELEPHONE ENCOUNTER
"Requested Prescriptions   Pending Prescriptions Disp Refills     losartan (COZAAR) 50 MG tablet [Pharmacy Med Name: LOSARTAN POTASSIUM 50 MG TABLET] 90 tablet 3     Sig: Take 1 tablet (50 mg) by mouth daily       Angiotensin-II Receptors Failed - 12/13/2019  8:00 AM        Failed - Last blood pressure under 140/90 in past 12 months     BP Readings from Last 3 Encounters:   10/25/19 (!) 150/66   10/16/19 (!) 164/62   10/11/19 132/60                 Passed - Recent (12 mo) or future (30 days) visit within the authorizing provider's specialty     Patient has had an office visit with the authorizing provider or a provider within the authorizing providers department within the previous 12 mos or has a future within next 30 days. See \"Patient Info\" tab in inbasket, or \"Choose Columns\" in Meds & Orders section of the refill encounter.              Passed - Medication is active on med list        Passed - Patient is age 18 or older        Passed - No active pregnancy on record        Passed - Normal serum creatinine on file in past 12 months     Recent Labs   Lab Test 10/11/19  0524   CR 0.94             Passed - Normal serum potassium on file in past 12 months     Recent Labs   Lab Test 10/11/19  0524   POTASSIUM 3.9                    Passed - No positive pregnancy test in past 12 months        Last Written Prescription Date:  10/11/19  Last Fill Quantity: Historical,  # refills: Historical   Last office visit: 10/25/2019 with prescribing provider:  Tan   Future Office Visit:       "

## 2019-12-13 NOTE — TELEPHONE ENCOUNTER
Routing refill request to provider for review/approval because:  Last sent by   Jamal Aguilar MD [facility]     LOV 10/25/19 with PCP.      Vida JONES BSN, RN

## 2019-12-15 RX ORDER — LOSARTAN POTASSIUM 50 MG/1
50 TABLET ORAL DAILY
Qty: 90 TABLET | Refills: 3 | Status: SHIPPED | OUTPATIENT
Start: 2019-12-15 | End: 2020-01-03

## 2019-12-19 ENCOUNTER — TELEPHONE (OUTPATIENT)
Dept: INTERNAL MEDICINE | Facility: CLINIC | Age: 84
End: 2019-12-19

## 2019-12-19 ENCOUNTER — ALLIED HEALTH/NURSE VISIT (OUTPATIENT)
Dept: FAMILY MEDICINE | Facility: CLINIC | Age: 84
End: 2019-12-19
Payer: MEDICARE

## 2019-12-19 VITALS — HEART RATE: 84 BPM | RESPIRATION RATE: 12 BRPM | SYSTOLIC BLOOD PRESSURE: 138 MMHG | DIASTOLIC BLOOD PRESSURE: 58 MMHG

## 2019-12-19 DIAGNOSIS — I25.119 CORONARY ARTERY DISEASE INVOLVING NATIVE CORONARY ARTERY OF NATIVE HEART WITH ANGINA PECTORIS (H): Primary | Chronic | ICD-10-CM

## 2019-12-19 PROCEDURE — 99207 ZZC NO CHARGE NURSE ONLY: CPT

## 2019-12-19 NOTE — TELEPHONE ENCOUNTER
Daniela Brown is a 86 year old year old patient who comes in today for a Blood Pressure check because of ongoing blood pressure monitoring.  Vital Signs as repeated by RN Twice  Patient is taking medication as prescribed  Patient is tolerating medications well.  Patient is monitoring Blood Pressure at home.    Current complaints: bilateral leg pain about 3 months. Has 1+ non-pitting edema present to left lower extremity today, was not wearing compression stockings today but reports normally wears them about half of the day.   Patient had concerns about lack of desire to do much or anything, go any where, clean the house or eat since the death of her son.   Scheduled Office visit with PCP 1/3/20 at 2:40pm to further discuss.     Vital Signs 12/19/2019 12/19/2019   Systolic 142 138   Diastolic 64 58   Pulse 84 Apical    Temperature     Respirations 12      Disposition:  patient to continue with the same medication and routed to provider.

## 2019-12-19 NOTE — NURSING NOTE
Daniela Brown is a 86 year old year old patient who comes in today for a Blood Pressure check because of ongoing blood pressure monitoring.  Vital Signs as repeated by RN Twice  Patient is taking medication as prescribed  Patient is tolerating medications well.  Patient is monitoring Blood Pressure at home.    Current complaints: bilateral leg pain about 3 months. Has 1+ non-pitting edema present to left lower extremity today, was not wearing compression stockings today but reports normally wears them about half of the day.   Patient had concerns about lack of desire to do much or anything, go any where, clean the house or eat since the death of her son.   Scheduled Office visit with PCP 1/3/20 at 2:40pm to further discuss.     Vital Signs 12/19/2019 12/19/2019   Systolic 142 138   Diastolic 64 58   Pulse 84 Apical    Temperature     Respirations 12      Disposition:  patient to continue with the same medication and routed to provider in telephone encounter. .

## 2019-12-22 DIAGNOSIS — I20.9 ANGINA PECTORIS (H): ICD-10-CM

## 2019-12-22 DIAGNOSIS — E11.42 TYPE 2 DIABETES MELLITUS WITH DIABETIC POLYNEUROPATHY, WITHOUT LONG-TERM CURRENT USE OF INSULIN (H): ICD-10-CM

## 2019-12-23 NOTE — TELEPHONE ENCOUNTER
"Requested Prescriptions   Pending Prescriptions Disp Refills     atorvastatin (LIPITOR) 40 MG tablet [Pharmacy Med Name: ATORVASTATIN CALCIUM 40 MG TABLET] 90 tablet 3     Sig: Take 1 tablet (40 mg) by mouth At Bedtime   Last Written Prescription Date:  11/14/18  Last Fill Quantity: 90 tab,  # refills: 3   Last office visit: 12/19/2019 with prescribing provider:  Nurse   Future Office Visit:   Next 5 appointments (look out 90 days)    Jan 03, 2020  2:40 PM CST  SHORT with Cynthia Maddox DO  [facility]  Arrive at: Clinic [facility] [address] [address]  [phone]             Statins Protocol Failed - 12/22/2019  8:00 AM        Failed - LDL on file in past 12 months     Recent Labs   Lab Test 12/06/12  1141   *             Passed - No abnormal creatine kinase in past 12 months     Recent Labs   Lab Test 11/30/11  1650                   Passed - Recent (12 mo) or future (30 days) visit within the authorizing provider's specialty     Patient has had an office visit with the authorizing provider or a provider within the authorizing providers department within the previous 12 mos or has a future within next 30 days. See \"Patient Info\" tab in inbasket, or \"Choose Columns\" in Meds & Orders section of the refill encounter.              Passed - Medication is active on med list        Passed - Patient is age 18 or older        Passed - No active pregnancy on record        Passed - No positive pregnancy test in past 12 months          "

## 2019-12-26 RX ORDER — ATORVASTATIN CALCIUM 40 MG/1
40 TABLET, FILM COATED ORAL AT BEDTIME
Qty: 30 TABLET | Refills: 0 | Status: SHIPPED | OUTPATIENT
Start: 2019-12-26 | End: 2020-01-03

## 2019-12-26 NOTE — TELEPHONE ENCOUNTER
Routing refill request to provider for review/approval because:  Labs not current:  Last Lipids completed 2012, CKT last completed 2011  Pended 30 days  Pended lipids.

## 2020-01-03 ENCOUNTER — OFFICE VISIT (OUTPATIENT)
Dept: FAMILY MEDICINE | Facility: CLINIC | Age: 85
End: 2020-01-03
Payer: MEDICARE

## 2020-01-03 ENCOUNTER — ANCILLARY PROCEDURE (OUTPATIENT)
Dept: GENERAL RADIOLOGY | Facility: CLINIC | Age: 85
End: 2020-01-03
Attending: INTERNAL MEDICINE
Payer: MEDICARE

## 2020-01-03 VITALS
HEIGHT: 64 IN | SYSTOLIC BLOOD PRESSURE: 130 MMHG | OXYGEN SATURATION: 97 % | BODY MASS INDEX: 29.88 KG/M2 | TEMPERATURE: 97.6 F | RESPIRATION RATE: 12 BRPM | WEIGHT: 175 LBS | DIASTOLIC BLOOD PRESSURE: 62 MMHG | HEART RATE: 90 BPM

## 2020-01-03 DIAGNOSIS — I20.9 ANGINA PECTORIS (H): ICD-10-CM

## 2020-01-03 DIAGNOSIS — E11.42 TYPE 2 DIABETES MELLITUS WITH DIABETIC POLYNEUROPATHY, WITHOUT LONG-TERM CURRENT USE OF INSULIN (H): ICD-10-CM

## 2020-01-03 DIAGNOSIS — M17.12 ARTHRITIS OF LEFT KNEE: ICD-10-CM

## 2020-01-03 DIAGNOSIS — I10 BENIGN ESSENTIAL HYPERTENSION: ICD-10-CM

## 2020-01-03 DIAGNOSIS — M79.672 LEFT FOOT PAIN: Primary | ICD-10-CM

## 2020-01-03 LAB
CHOLEST SERPL-MCNC: 141 MG/DL
HBA1C MFR BLD: 6.4 % (ref 0–5.6)
HDLC SERPL-MCNC: 66 MG/DL
LDLC SERPL CALC-MCNC: 43 MG/DL
NONHDLC SERPL-MCNC: 75 MG/DL
TRIGL SERPL-MCNC: 159 MG/DL

## 2020-01-03 PROCEDURE — 80061 LIPID PANEL: CPT | Performed by: INTERNAL MEDICINE

## 2020-01-03 PROCEDURE — 36415 COLL VENOUS BLD VENIPUNCTURE: CPT | Performed by: INTERNAL MEDICINE

## 2020-01-03 PROCEDURE — 83036 HEMOGLOBIN GLYCOSYLATED A1C: CPT | Performed by: INTERNAL MEDICINE

## 2020-01-03 PROCEDURE — 73630 X-RAY EXAM OF FOOT: CPT | Mod: LT

## 2020-01-03 PROCEDURE — 99214 OFFICE O/P EST MOD 30 MIN: CPT | Performed by: INTERNAL MEDICINE

## 2020-01-03 RX ORDER — ATORVASTATIN CALCIUM 40 MG/1
40 TABLET, FILM COATED ORAL AT BEDTIME
Qty: 90 TABLET | Refills: 3 | Status: SHIPPED | OUTPATIENT
Start: 2020-01-03 | End: 2020-12-29

## 2020-01-03 RX ORDER — LOSARTAN POTASSIUM 50 MG/1
50 TABLET ORAL DAILY
Qty: 90 TABLET | Refills: 3 | Status: SHIPPED | OUTPATIENT
Start: 2020-01-03 | End: 2020-09-04

## 2020-01-03 ASSESSMENT — PATIENT HEALTH QUESTIONNAIRE - PHQ9: SUM OF ALL RESPONSES TO PHQ QUESTIONS 1-9: 3

## 2020-01-03 ASSESSMENT — MIFFLIN-ST. JEOR: SCORE: 1218.79

## 2020-01-03 NOTE — RESULT ENCOUNTER NOTE
The hemoglobin A1c remains very well controlled.  Please hold final result letter for cholesterol results

## 2020-01-03 NOTE — PROGRESS NOTES
Subjective     Daniela Brown is a 86 year old female who presents to clinic today for the following health issues:    HPI     Hyperlipidemia Follow-Up      Are you regularly taking any medication or supplement to lower your cholesterol?   Yes- atorvastatin (LIPITOR) 40 MG tablet    Are you having muscle aches or other side effects that you think could be caused by your cholesterol lowering medication?  Yes- Left leg    Hypertension Follow-up      Do you check your blood pressure regularly outside of the clinic? No     Are you following a low salt diet? Yes    Are your blood pressures ever more than 140 on the top number (systolic) OR more   than 90 on the bottom number (diastolic), for example 140/90? No    Joint Pain    Onset: A month or so    Description:   Location: Left lower leg  Character: Sharp and Dull ache    Intensity: moderate    Progression of Symptoms: worse    Accompanying Signs & Symptoms:  Other symptoms: numbness, tingling and weakness of left leg    History:   Previous similar pain: no       Precipitating factors:   Trauma or overuse: no     Alleviating factors:Improved by: nothing    Therapies Tried and outcome: Tylenol, heat, ice, elevation.    Pain is constant, but significantly worsens with walking.  Pain is mostly in the anterior ankle area    Notes mild swelling of leg    Known arthritis in multiple joints.    Has end stage knee arthritis - recommended to get knee replacement but she declines due to advanced age    Finished physical therapy in July    Depression Followup    How are you doing with your depression since your last visit? Improved     Are you having other symptoms that might be associated with depression? No    Have you had a significant life event?  No     Are you feeling anxious or having panic attacks?   Yes:  na    Do you have any concerns with your use of alcohol or other drugs? No    Social History     Tobacco Use     Smoking status: Never Smoker     Smokeless  tobacco: Never Used   Substance Use Topics     Alcohol use: No     Drug use: No     PHQ 10/9/2018 5/30/2019 10/25/2019   PHQ-9 Total Score 3 15 1   Q9: Thoughts of better off dead/self-harm past 2 weeks Not at all Not at all Not at all     ARABELLA-7 SCORE 7/17/2012 5/30/2019 10/25/2019   Total Score 5 - -   Total Score - 0 0     Last PHQ-9 10/25/2019   1.  Little interest or pleasure in doing things 0   2.  Feeling down, depressed, or hopeless 0   3.  Trouble falling or staying asleep, or sleeping too much 0   4.  Feeling tired or having little energy 0   5.  Poor appetite or overeating 1   6.  Feeling bad about yourself 0   7.  Trouble concentrating 0   8.  Moving slowly or restless 0   Q9: Thoughts of better off dead/self-harm past 2 weeks 0   PHQ-9 Total Score 1   Difficulty at work, home, or with people Not difficult at all     ARABELLA-7  10/25/2019   1. Feeling nervous, anxious, or on edge 0   2. Not being able to stop or control worrying 0   3. Worrying too much about different things 0   4. Trouble relaxing 0   5. Being so restless that it is hard to sit still 0   6. Becoming easily annoyed or irritable 0   7. Feeling afraid, as if something awful might happen 0   ARABELLA-7 Total Score 0   If you checked any problems, how difficult have they made it for you to do your work, take care of things at home, or get along with other people? Not difficult at all         Current Outpatient Medications   Medication Sig Dispense Refill     acetaminophen (TYLENOL) 325 MG tablet Take 1 tablet by mouth every 6 hours as needed for pain. 30 tablet 0     amLODIPine (NORVASC) 5 MG tablet Take 1 tablet (5 mg) by mouth every evening 90 tablet 3     atorvastatin (LIPITOR) 40 MG tablet Take 1 tablet (40 mg) by mouth At Bedtime 90 tablet 3     blood glucose monitoring (ACCU-CHEK FASTCLIX) lancets Use to test blood sugar one times daily or as directed. 102 each 3     blood glucose monitoring (ACCU-CHEK GUIDE) test strip Use to test blood sugar  "one times daily or as directed. 100 each 3     ClonazePAM (KLONOPIN PO) Take 0.5 mg by mouth 2 times daily as needed for anxiety       Cyanocobalamin (B-12) 1000 MCG SUBL Place 1 tablet under the tongue every evening        dicyclomine (BENTYL) 20 MG tablet Take 1 tablet (20 mg) by mouth 4 times daily as needed (diarrhea) 60 tablet 0     gabapentin (NEURONTIN) 300 MG capsule Take 2 capsules (600 mg) by mouth every evening       lidocaine (LIDODERM) 5 % patch Apply patch to left low back at bedtime. Remove patch in the morning. 10 patch 0     losartan (COZAAR) 50 MG tablet Take 1 tablet (50 mg) by mouth daily 90 tablet 3     Multiple Vitamins-Minerals (THERAPEUTIC MULTIVIT/MINERAL) TABS Take 1 tablet by mouth every evening        nitroGLYcerin (NITROSTAT) 0.4 MG sublingual tablet Place 1 tablet (0.4 mg) under the tongue every 5 minutes as needed for chest pain 30 tablet 4     order for DME 1 walker 1 Device 0     ORDER FOR DME Thigh length teds. 1 Device 2     VITAMIN D, CHOLECALCIFEROL, PO Take 1,000 Units by mouth daily         Reviewed and updated as needed this visit by Provider         Review of Systems   ROS COMP: Constitutional, HEENT, cardiovascular, pulmonary, gi and gu systems are negative, except as otherwise noted.      Objective    /62   Pulse 90   Temp 97.6  F (36.4  C) (Tympanic)   Resp 12   Ht 1.626 m (5' 4\")   Wt 79.4 kg (175 lb)   SpO2 97%   Breastfeeding No   BMI 30.04 kg/m    Body mass index is 30.04 kg/m .  Physical Exam   GENERAL APPEARANCE: healthy, alert and no distress  LYMPHATICS: trace bilateral edema  MS: no pain with palpation of bilateral feet, ankles, lower legs, knees.  Full ROM of bilateral ankles/kness  SKIN: no suspicious lesions or rashes  PSYCH: mentation appears normal and affect normal/bright    Diagnostic Test Results:  Labs reviewed in Epic  No results found for this or any previous visit (from the past 24 hour(s)).  Results for orders placed or performed in " visit on 01/03/20   Lipid panel reflex to direct LDL Non-fasting     Status: Abnormal   Result Value Ref Range    Cholesterol 141 <200 mg/dL    Triglycerides 159 (H) <150 mg/dL    HDL Cholesterol 66 >49 mg/dL    LDL Cholesterol Calculated 43 <100 mg/dL    Non HDL Cholesterol 75 <130 mg/dL   Hemoglobin A1c     Status: Abnormal   Result Value Ref Range    Hemoglobin A1C 6.4 (H) 0 - 5.6 %           Assessment & Plan     1. Benign essential hypertension  - stable, refill provided  - losartan (COZAAR) 50 MG tablet; Take 1 tablet (50 mg) by mouth daily  Dispense: 90 tablet; Refill: 3    2. Angina pectoris (H) - no symptoms recently  - atorvastatin (LIPITOR) 40 MG tablet; Take 1 tablet (40 mg) by mouth At Bedtime  Dispense: 90 tablet; Refill: 3    3. Type 2 diabetes mellitus with diabetic polyneuropathy, without long-term current use of insulin (H) - well controlled  - atorvastatin (LIPITOR) 40 MG tablet; Take 1 tablet (40 mg) by mouth At Bedtime  Dispense: 90 tablet; Refill: 3  - Lipid panel reflex to direct LDL Non-fasting  - Hemoglobin A1c    4. Left foot pain probably compensatory from known knee OA.  - XR Foot Left G/E 3 Views  - PHYSICAL THERAPY REFERRAL; Future    5. Arthritis of left knee - start physical therapy, consider injection  - ORTHO  REFERRAL  - PHYSICAL THERAPY REFERRAL; Future       Patient Instructions   1. Cause of the foot/leg pain is likely multi-factorial - chronic knee pain compensation, arthritis, swelling from varicose veins  2. Continue the over the counter treatment you have been doing.  3. See Orthopedics for consideration of injection or knee replacement  4. Labs today.    Treatments for arthritis:  1. Over the counter pain medications   A. Tylenol (Acetaminophen) 500-1000 mg 3 x day as needed   B. Ibuprofen (or other NSAIDs such as Aleve, Aspirin, Advil) 400-600 mg 3 x day as needed - can alternate with Tylenol    C. Supplement such as Glucosamine with Chondroitin   2. Over the  counter topical   A. Aspercream with Lidocaine, Capsaicin, Icy Hot, Biofreeze, Salon Pas, Blue Emu  3. Prescription pain medications (NSAIDS)   A. Celebrex 100-200 mg 2 x day as needed.  Similar to Ibuprofen, cannot take along with Celebrex   B. Prescription Ibuprofen  4. Prescription topical NSAID   A. Voltaren gel (topical anti-inflammatory)  5. Physical therapy   6. Heat, ice, stretching, braces, modified activity  7. Sports Medicine or Orthopedics for Injections (steroids, 'rooster comb')  8. Joint replacement          No follow-ups on file.    Cynthia Maddox, [facility]

## 2020-01-03 NOTE — PATIENT INSTRUCTIONS
1. Cause of the foot/leg pain is likely multi-factorial - chronic knee pain compensation, arthritis, swelling from varicose veins  2. Continue the over the counter treatment you have been doing.  3. See Orthopedics for consideration of injection or knee replacement  4. Labs today.    Treatments for arthritis:  1. Over the counter pain medications   A. Tylenol (Acetaminophen) 500-1000 mg 3 x day as needed   B. Ibuprofen (or other NSAIDs such as Aleve, Aspirin, Advil) 400-600 mg 3 x day as needed - can alternate with Tylenol    C. Supplement such as Glucosamine with Chondroitin   2. Over the counter topical   A. Aspercream with Lidocaine, Capsaicin, Icy Hot, Biofreeze, Salon Pas, Blue Emu  3. Prescription pain medications (NSAIDS)   A. Celebrex 100-200 mg 2 x day as needed.  Similar to Ibuprofen, cannot take along with Celebrex   B. Prescription Ibuprofen  4. Prescription topical NSAID   A. Voltaren gel (topical anti-inflammatory)  5. Physical therapy   6. Heat, ice, stretching, braces, modified activity  7. Sports Medicine or Orthopedics for Injections (steroids, 'rooster comb')  8. Joint replacement

## 2020-01-03 NOTE — LETTER
January 6, 2020      Daniela Betty Brown  [address] [place] [address]        Dear ,    We are writing to inform you of your test results.    The hemoglobin A1c remains very well controlled. Cholesterol is very well controlled   There is mild to moderate arthritis seen in many joints of the foot.  This likely contributes to the leg/foot pain.  Continue with plan of care discussed during office visit     FOOT THREE VIEWS LEFT  1/3/2020 3:32 PM      HISTORY: Left foot pain     COMPARISON: 10/7/2009                                                                      IMPRESSION: No acute fracture or malalignment. Mild to moderate  multifocal degenerative changes throughout the foot. Osteopenia. Small  plantar and Achilles calcaneal enthesophytes. Soft tissue  calcification near the fifth MTP joint.     CHERYL CABRERA MD  Component      Latest Ref Rng & Units 1/3/2020   Cholesterol      <200 mg/dL 141   Triglycerides      <150 mg/dL 159 (H)   HDL Cholesterol      >49 mg/dL 66   LDL Cholesterol Calculated      <100 mg/dL 43   Non HDL Cholesterol      <130 mg/dL 75   Hemoglobin A1C      0 - 5.6 % 6.4 (H)     If you have any questions or concerns, please call the clinic at the number listed above.       Sincerely,        Cynthia Maddox, DO

## 2020-01-06 ENCOUNTER — TELEPHONE (OUTPATIENT)
Dept: INTERNAL MEDICINE | Facility: CLINIC | Age: 85
End: 2020-01-06

## 2020-01-06 DIAGNOSIS — E11.42 TYPE 2 DIABETES MELLITUS WITH DIABETIC POLYNEUROPATHY, WITHOUT LONG-TERM CURRENT USE OF INSULIN (H): ICD-10-CM

## 2020-01-06 NOTE — RESULT ENCOUNTER NOTE
There is mild to moderate arthritis seen in many joints of the foot.  This likely contributes to the leg/foot pain.  Continue with plan of care discussed during office visit

## 2020-03-04 DIAGNOSIS — G89.29 CHRONIC LEFT-SIDED LOW BACK PAIN WITH LEFT-SIDED SCIATICA: ICD-10-CM

## 2020-03-04 DIAGNOSIS — M54.42 CHRONIC LEFT-SIDED LOW BACK PAIN WITH LEFT-SIDED SCIATICA: ICD-10-CM

## 2020-03-04 RX ORDER — GABAPENTIN 300 MG/1
600 CAPSULE ORAL EVERY EVENING
Qty: 60 CAPSULE | Refills: 11 | Status: SHIPPED | OUTPATIENT
Start: 2020-03-04 | End: 2020-08-31

## 2020-03-04 NOTE — TELEPHONE ENCOUNTER
Routing refill request to provider for review/approval because:  Drug not on the [facility] refill protocol   Last noted historically by   Jamal Aguilar MD [facility]   Associated Diagnoses     Chronic left-sided low back pain with left-sided sciatica [M54.42, G89.29]

## 2020-03-04 NOTE — TELEPHONE ENCOUNTER
Reason for Call:  Medication or medication refill:    Do you use a [facility]?  Name of the pharmacy and phone number for the current request:  [facility] [phone]    Name of the medication requested: Gabapentin    Can we leave a detailed message on this number? YES    Phone number patient can be reached at: Home number on file [phone] (home)    Best Time: Any    Call taken on 3/4/2020 at 9:33 AM by Nasrin Encarnacion      Last Written Prescription Date:  10/11/2019  Last Fill Quantity: ?,  # refills: 0   Last office visit: 1/3/2020 with prescribing provider:  Tan Bermeo Office Visit:

## 2020-03-05 ENCOUNTER — TELEPHONE (OUTPATIENT)
Dept: INTERNAL MEDICINE | Facility: CLINIC | Age: 85
End: 2020-03-05

## 2020-03-05 NOTE — TELEPHONE ENCOUNTER
Reason for Call:  itchy rash on arms and also fatigued    Detailed comments: patient is calling and stating that for about a week she has been fatigued and has a very itchy red rash on her arms. Would like to be seen. Please advise.    Phone Number Patient can be reached at: Home number on file [phone] (home)    Best Time: any    Can we leave a detailed message on this number? YES   Nasrin Rosario  [facility] [place]       Call taken on 3/5/2020 at 11:20 AM by Nasrin Clifton

## 2020-03-06 ENCOUNTER — OFFICE VISIT (OUTPATIENT)
Dept: FAMILY MEDICINE | Facility: CLINIC | Age: 85
End: 2020-03-06
Payer: MEDICARE

## 2020-03-06 VITALS
SYSTOLIC BLOOD PRESSURE: 150 MMHG | WEIGHT: 166.2 LBS | HEIGHT: 64 IN | BODY MASS INDEX: 28.38 KG/M2 | OXYGEN SATURATION: 98 % | HEART RATE: 117 BPM | RESPIRATION RATE: 14 BRPM | TEMPERATURE: 98.8 F | DIASTOLIC BLOOD PRESSURE: 90 MMHG

## 2020-03-06 DIAGNOSIS — E11.42 TYPE 2 DIABETES MELLITUS WITH DIABETIC POLYNEUROPATHY, WITHOUT LONG-TERM CURRENT USE OF INSULIN (H): ICD-10-CM

## 2020-03-06 DIAGNOSIS — L29.9 ITCHING: Primary | ICD-10-CM

## 2020-03-06 LAB — HBA1C MFR BLD: 6.8 % (ref 0–5.6)

## 2020-03-06 PROCEDURE — 99214 OFFICE O/P EST MOD 30 MIN: CPT | Performed by: NURSE PRACTITIONER

## 2020-03-06 PROCEDURE — 36415 COLL VENOUS BLD VENIPUNCTURE: CPT | Performed by: INTERNAL MEDICINE

## 2020-03-06 PROCEDURE — 83036 HEMOGLOBIN GLYCOSYLATED A1C: CPT | Performed by: INTERNAL MEDICINE

## 2020-03-06 ASSESSMENT — MIFFLIN-ST. JEOR: SCORE: 1173.88

## 2020-03-06 NOTE — PATIENT INSTRUCTIONS
Can use the allergy medicine if it is helping.  Can keep using the cream if it is itchy.  If it comes back take a picture so I can see it.  Start back your medicines one at a time and let me know if the rash comes back from that.

## 2020-03-06 NOTE — PROGRESS NOTES
Subjective     Daniela Brown is a 87 year old female who presents to clinic today for the following health issues:    HPI   Rash  Onset: 1 week     Description:   Location: Arms   Character: Red   Itching (Pruritis): YES    Progression of Symptoms:  Better today but she did not take certain medications Amlodipine, Atorvastatin, and Losartan    Accompanying Signs & Symptoms:  Fever: no   Body aches or joint pain: no   Sore throat symptoms: no   Recent cold symptoms: no     History:   Previous similar rash: no     Precipitating factors:   Exposure to similar rash: no   New exposures: None   Recent travel: no     Alleviating factors:  Na     Therapies Tried and outcome: Stopped taking 3 medications listed above and itching stopped and rash is lighter     Above HPI reviewed. Additionally, the rash is gone today.  It resolved shortly after she took an allergy medication she purchased at [facility] and applying Cortisone cream. She is wondering what the rash was. She denies the use of any new soaps, detergents, lotions or other products.        Patient Active Problem List   Diagnosis     Degeneration of lumbar or lumbosacral intervertebral disc     Esophageal reflux     Memory loss     Irritable bowel syndrome with diarrhea     Osteopenia of multiple sites     Anxiety and depression     RESTLESS LEG SYNDROME     Generalized osteoarthrosis, unspecified site     Diverticulosis of large intestine     SENSONRL HEAR LOSS,BILAT     Urticaria     Subjective tinnitus     Vitamin D deficiency     Right foot pain     Urge incontinence     Hyperlipidemia LDL goal <100     Allergic rhinitis     Pronation of foot     Peripheral neuropathy     Benign essential hypertension     Carpal tunnel syndrome of left wrist     Diastolic dysfunction     Type 2 diabetes mellitus with diabetic polyneuropathy, without long-term current use of insulin (H)     History of recurrent urinary tract infection     CKD (chronic kidney disease) stage  3, GFR 30-59 ml/min (H)     Arthritis of spine     Bilateral wrist pain     Protrusion of lumbar intervertebral disc     Coronary artery disease involving native coronary artery of native heart with angina pectoris (H)     Chronic left-sided low back pain with left-sided sciatica     Generalized weakness     Diarrhea     Abnormal CT of the abdomen     Cystocele, midline     B12 deficiency     Past Surgical History:   Procedure Laterality Date     ARTHROPLASTY KNEE  3/26/2012    Procedure:ARTHROPLASTY KNEE; Right Total Knee Arthroplasty; Surgeon:BERNADINE ROSAS; Location:[facility]     C APPENDECTOMY  1953     CHOLECYSTECTOMY       CV HEART CATHETERIZATION WITH POSSIBLE INTERVENTION N/A 2/27/2019    Procedure: Coronary Angiogram with Left ventriculogram;  Surgeon: Leandro Carpio MD;  Location:  [facility]ARD[facility] CATH LAB     HERNIA REPAIR      Hernia Repair     HYSTERECTOMY, PAP NO LONGER INDICATED  1983    TVH/BSO     SURGICAL HISTORY OF -   10/08    A & P Repair, Dr. Hannah       Social History     Tobacco Use     Smoking status: Never Smoker     Smokeless tobacco: Never Used   Substance Use Topics     Alcohol use: No     Family History   Problem Relation Age of Onset     C.A.D. Mother      Diabetes Mother      Cancer - colorectal Mother      Arthritis Mother      Heart Disease Mother      Lipids Brother      Obesity Brother      Hypertension Brother      Allergies Son      Eye Disorder Son         cataract     Thyroid Disease Sister         graves dz     Eye Disorder Son      Leukemia Son      Alcohol/Drug Father          Current Outpatient Medications   Medication Sig Dispense Refill     acetaminophen (TYLENOL) 325 MG tablet Take 1 tablet by mouth every 6 hours as needed for pain. 30 tablet 0     amLODIPine (NORVASC) 5 MG tablet Take 1 tablet (5 mg) by mouth every evening 90 tablet 3     atorvastatin (LIPITOR) 40 MG tablet Take 1 tablet (40 mg) by mouth At Bedtime 90 tablet 3     blood glucose (ACCU-CHEK  "GUIDE) test strip Use to test blood sugar one times daily or as directed. 100 each 1     blood glucose monitoring (ACCU-CHEK FASTCLIX) lancets Use to test blood sugar one times daily or as directed. 102 each 3     ClonazePAM (KLONOPIN PO) Take 0.5 mg by mouth 2 times daily as needed for anxiety       Cyanocobalamin (B-12) 1000 MCG SUBL Place 1 tablet under the tongue every evening        dicyclomine (BENTYL) 20 MG tablet Take 1 tablet (20 mg) by mouth 4 times daily as needed (diarrhea) 60 tablet 0     gabapentin (NEURONTIN) 300 MG capsule Take 2 capsules (600 mg) by mouth every evening 60 capsule 11     lidocaine (LIDODERM) 5 % patch Apply patch to left low back at bedtime. Remove patch in the morning. 10 patch 0     losartan (COZAAR) 50 MG tablet Take 1 tablet (50 mg) by mouth daily 90 tablet 3     Multiple Vitamins-Minerals (THERAPEUTIC MULTIVIT/MINERAL) TABS Take 1 tablet by mouth every evening        nitroGLYcerin (NITROSTAT) 0.4 MG sublingual tablet Place 1 tablet (0.4 mg) under the tongue every 5 minutes as needed for chest pain 30 tablet 4     order for DME 1 walker 1 Device 0     ORDER FOR DME Thigh length teds. 1 Device 2     VITAMIN D, CHOLECALCIFEROL, PO Take 1,000 Units by mouth daily         Reviewed and updated as needed this visit by Provider  Tobacco  Allergies  Meds  Problems  Med Hx  Surg Hx  Fam Hx         Review of Systems   ROS COMP: Constitutional, HEENT, cardiovascular, pulmonary, gi and gu systems are negative, except as otherwise noted.      Objective    BP (!) 150/90 (BP Location: Left arm, Patient Position: Sitting, Cuff Size: Adult Large)   Pulse 117   Temp 98.8  F (37.1  C) (Tympanic)   Resp 14   Ht 1.626 m (5' 4\")   Wt 75.4 kg (166 lb 3.2 oz)   SpO2 98%   BMI 28.53 kg/m    Body mass index is 28.53 kg/m .  Physical Exam  Vitals signs and nursing note reviewed.   Constitutional:       Appearance: Normal appearance.   HENT:      Head: Normocephalic and atraumatic.      " "Mouth/Throat:      Mouth: Mucous membranes are moist.   Eyes:      Comments: Non-icteric   Neck:      Musculoskeletal: Neck supple.   Cardiovascular:      Rate and Rhythm: Normal rate and regular rhythm.      Pulses: Normal pulses.      Heart sounds: Normal heart sounds.   Pulmonary:      Effort: Pulmonary effort is normal.      Breath sounds: Normal breath sounds.   Abdominal:      General: Abdomen is flat. Bowel sounds are normal.      Palpations: Abdomen is soft.   Neurological:      General: No focal deficit present.      Mental Status: She is alert and oriented to person, place, and time.   Psychiatric:         Mood and Affect: Mood normal.         Behavior: Behavior normal.         Thought Content: Thought content normal.         Judgment: Judgment normal.            Diagnostic Test Results:  Labs reviewed in Epic  none         Assessment & Plan     1. Itching  Unclear etiology as rash is not present now. Patient has been on losartan, amlodipine and atorvastatin long term, but fears one of these medications is causing the rash. Encouraged to restart the medications one at a time to see if symptoms return. Can continue topical steroid and allergy medication as it was effective.    2. Type 2 diabetes mellitus with diabetic polyneuropathy, without long-term current use of insulin (H)  Last A1C in August, patient would like to have this drawn. Previously ordered by her PCP. She will get this done today as well.  - Hemoglobin A1c     BMI:   Estimated body mass index is 28.53 kg/m  as calculated from the following:    Height as of this encounter: 1.626 m (5' 4\").    Weight as of this encounter: 75.4 kg (166 lb 3.2 oz).         Patient Instructions   Can use the allergy medicine if it is helping.  Can keep using the cream if it is itchy.  If it comes back take a picture so I can see it.  Start back your medicines one at a time and let me know if the rash comes back from that.      Return in about 1 week (around " 3/13/2020) for worsening or continued symptoms.    CYN [facility]

## 2020-03-24 ENCOUNTER — TELEPHONE (OUTPATIENT)
Dept: INTERNAL MEDICINE | Facility: CLINIC | Age: 85
End: 2020-03-24

## 2020-03-24 NOTE — TELEPHONE ENCOUNTER
Patient is called and she only went off her atorvastatin medication and she still itches.  Encouraged her to follow the plan from the CNP she did see and go off all the meds then start back one at a time to see if the rash and itching starts back.  Let us know what it is.  Can try benadryl lotion. Marisela NICHOLSON RN

## 2020-03-24 NOTE — TELEPHONE ENCOUNTER
Reason for call:  Patient reporting a symptom    Symptom or request: Pt has had an itchy shoulder and neck rash x 2 weeks.  OTC cortisone not helping.  Please call patient and advise.      Duration (how long have symptoms been present): 2 weeks    Have you been treated for this before? Yes    Additional comments:     Phone Number patient can be reached at:  Home number on file [phone] (home)    Best Time:  any    Can we leave a detailed message on this number:  YES    Call taken on 3/24/2020 at 9:32 AM by Betty Solomon

## 2020-03-25 NOTE — TELEPHONE ENCOUNTER
Pt has called and has taken benedryl as directed maybe three times and still has itching and not relieved.    Marisela Peña  [facility]

## 2020-03-25 NOTE — TELEPHONE ENCOUNTER
She stopped amlodipine yesterday . She said she has gotten worse since office visit. Left side, under chin, and around to the front. Advised on home care instructions for itching. She will try to get her daughter to take a picture tomorrow and send thru mychart. The allergy medicine isnt helping anymore. Kari Arreola RN

## 2020-03-26 NOTE — TELEPHONE ENCOUNTER
Please ask her to start Zyrtec 10mg daily to see if this helps with itching, can also use hydrocortisone cream. She did not have the rash when I saw her, so if it has reappeared she should be seen again.    Thanks,  Charissa Muñoz, CNP

## 2020-05-04 ENCOUNTER — NURSE TRIAGE (OUTPATIENT)
Dept: INTERNAL MEDICINE | Facility: CLINIC | Age: 85
End: 2020-05-04

## 2020-05-04 NOTE — TELEPHONE ENCOUNTER
Reason for Call:  weak    Detailed comments: patient is calling and stating she is feeling weak a lot in the past week or so. She is wondering if her Atorvistatin and Tylenol are the cause of this. I asked her if she took her BP at home and she said it is up and then down and all over the place. Please advise. No other symptoms.    Phone Number Patient can be reached at: Home number on file [phone] (home)    Best Time: any    Can we leave a detailed message on this number? YES   Nasrin Rosario  [facility]        Call taken on 5/4/2020 at 9:41 AM by Nasrin Clifton

## 2020-05-04 NOTE — TELEPHONE ENCOUNTER
S-(situation): Calling to report leg weakness past 1-2 months.     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 Last OV with PCP Dr. Maddox was 1/3/2020.    A-(assessment):     Additional Information    Negative: Severe difficulty breathing (e.g., struggling for each breath, speaks in single words)    Negative: Shock suspected (e.g., cold/pale/clammy skin, too weak to stand, low BP, rapid pulse)    Negative: Difficult to awaken or acting confused (e.g., disoriented, slurred speech)    Negative: Fainted > 15 minutes ago and still feels too weak or dizzy to stand    Negative: SEVERE weakness (i.e., unable to walk or barely able to walk, requires support) and new onset or worsening    Negative: Sounds like a life-threatening emergency to the triager    Negative: Weakness of the face, arm or leg on one side of the body    Negative: Has diabetes and weakness from low blood sugar (i.e., < 60 mg/dl or 3.5 mmol/l)    Negative: Recent heat exposure, suspected cause of weakness    Negative: Vomiting is the main symptom    Negative: Diarrhea is the main symptom    Negative: Difficulty breathing    Negative: Heart beating < 50 beats per minute OR > 140 beats per minute    Negative: Extra heartbeats OR irregular heart beating (i.e., 'palpitations')    Negative: Follows bleeding (e.g., from vomiting, rectum, vagina)    Negative: Bloody, black, or tarry bowel movements    Negative: MODERATE weakness from poor fluid intake with no improvement after 2 hours of rest and fluids    Negative: Drinking very little and dehydration suspected (e.g., no urine > 12 hours, very dry mouth, very lightheaded)    Negative: Patient sounds very sick or weak to the triager    MODERATE weakness (i.e., interferes with work, school, normal activities) and persists > 3 days    Negative: MODERATE weakness (i.e., interferes with work, school, normal activities) and cause unknown    Negative: Fever > 103 F (39.4 C) and not able to get the  "fever down using CARE ADVICE    Negative: Fever > 100.0 F (37.8 C) and bedridden (e.g., nursing home patient, stroke, chronic illness, recovering from surgery)    Negative: Fever > 101 F (38.3 C) and over 60 years of age    Negative: Fever > 100.0 F (37.8 C) and diabetes mellitus or weak immune system (e.g., HIV positive, cancer chemo, splenectomy, organ transplant, chronic steroids)    Negative: Pale skin (pallor)    Answer Assessment - Initial Assessment Questions  1. DESCRIPTION: \"Describe how you are feeling.\"      \"My legs are real weak and I don't know if I feel like I can move to much.\"  2. SEVERITY: \"How bad is it?\"  \"Can you stand and walk?\"    - MILD - Feels weak or tired, but does not interfere with work, school or normal activities    - MODERATE - Able to stand and walk; weakness interferes with work, school, or normal activities    - SEVERE - Unable to stand or walk      She can get up and walk.  3. ONSET:  \"When did the weakness begin?\"      \"A good month ago.\" Then she said \"a couple months ago.\"  4. CAUSE: \"What do you think is causing the weakness?\"      Pt thinks she thought it could be one of her medications, but she has been on them for 8 months or more per her report.   5. MEDICINES: \"Have you recently started a new medicine or had a change in the amount of a medicine?\"      No  6. OTHER SYMPTOMS: \"Do you have any other symptoms?\" (e.g., chest pain, fever, cough, SOB, vomiting, diarrhea, bleeding, other areas of pain)      Denies any other sx.   7. PREGNANCY: \"Is there any chance you are pregnant?\" \"When was your last menstrual period?\"      88 y/o    Protocols used: WEAKNESS (GENERALIZED) AND FATIGUE-A-OH    Her blood sugars have been in the 140's on average in the morning before meals. She did not check her BG this AM.  She last checked her BP this morning and it was 142/73 after her medications.   158/85 Friday  She takes losartan in the AM and amlodipine at HS  She is taking gabapentin, " atorvastatin and acetaminophen at HS.    R-(recommendations): Pt should have Virtual Visit with Dr. Maddox tomorrow when she is available. This could be due to many things and since she has had this for 1-2 months, it certainly can wait. Pt agreed to schedule a Telephone Visit with PCP; transferred to scheduling to arrange this for tomorrow.    Karen LINARES RN, BSN

## 2020-05-05 ENCOUNTER — VIRTUAL VISIT (OUTPATIENT)
Dept: FAMILY MEDICINE | Facility: CLINIC | Age: 85
End: 2020-05-05
Payer: MEDICARE

## 2020-05-05 DIAGNOSIS — I25.119 CORONARY ARTERY DISEASE INVOLVING NATIVE CORONARY ARTERY OF NATIVE HEART WITH ANGINA PECTORIS (H): Chronic | ICD-10-CM

## 2020-05-05 DIAGNOSIS — E78.5 HYPERLIPIDEMIA LDL GOAL <100: Chronic | ICD-10-CM

## 2020-05-05 DIAGNOSIS — M54.42 CHRONIC LEFT-SIDED LOW BACK PAIN WITH LEFT-SIDED SCIATICA: Chronic | ICD-10-CM

## 2020-05-05 DIAGNOSIS — G89.29 CHRONIC LEFT-SIDED LOW BACK PAIN WITH LEFT-SIDED SCIATICA: Chronic | ICD-10-CM

## 2020-05-05 DIAGNOSIS — R29.898 WEAKNESS OF BOTH LOWER EXTREMITIES: Primary | ICD-10-CM

## 2020-05-05 PROCEDURE — 99441 ZZC PHYSICIAN TELEPHONE EVALUATION 5-10 MIN: CPT | Performed by: INTERNAL MEDICINE

## 2020-05-05 NOTE — PROGRESS NOTES
"Daniela Brown is a 87 year old female who is being evaluated via a billable telephone visit.      The patient has been notified of following:     \"This telephone visit will be conducted via a call between you and your physician/provider. We have found that certain health care needs can be provided without the need for a physical exam.  This service lets us provide the care you need with a short phone conversation.  If a prescription is necessary we can send it directly to your pharmacy.  If lab work is needed we can place an order for that and you can then stop by our lab to have the test done at a later time.    Telephone visits are billed at different rates depending on your insurance coverage. During this emergency period, for some insurers they may be billed the same as an in-person visit.  Please reach out to your insurance provider with any questions.    If during the course of the call the physician/provider feels a telephone visit is not appropriate, you will not be charged for this service.\"    Patient has given verbal consent for Telephone visit?  Yes    What phone number would you like to be contacted at? [phone]    How would you like to obtain your AVS? Mail a copy    Subjective     Daniela Brown is a 87 year old female who presents to clinic today for the following health issues:  Chief Complaint   Patient presents with     Extremity Weakness     x 3 weeks, bilateral leg weakness, today is a little better       HPI  Concern - bilateral leg weakness   Onset: x 3 weeks     Description:   Patient reports having bilateral leg weakness, patient reports no redness, no swelling.  Using a cane to walk around the house. Patient reports symptoms are a bit better today.     Intensity: moderate    Progression of Symptoms:  Improving a bit today and waxing and waning    Accompanying Signs & Symptoms:  Occasional low back pain,      Previous history of similar problem:   Had similar " symptoms August 2019    Precipitating factors:   Worsened by: sitting too long     Alleviating factors:Improved by: walking around    Therapies Tried and outcome:  did not take  or acetaminophen last night and legs feel better today        Current Outpatient Medications   Medication Sig Dispense Refill     atorvastatin (LIPITOR) 40 MG tablet Take 1 tablet (40 mg) by mouth At Bedtime 90 tablet 3     ClonazePAM (KLONOPIN PO) Take 0.5 mg by mouth 2 times daily as needed for anxiety       Cyanocobalamin (B-12) 1000 MCG SUBL Place 1 tablet under the tongue every evening        gabapentin (NEURONTIN) 300 MG capsule Take 2 capsules (600 mg) by mouth every evening 60 capsule 11     losartan (COZAAR) 50 MG tablet Take 1 tablet (50 mg) by mouth daily 90 tablet 3     Multiple Vitamins-Minerals (THERAPEUTIC MULTIVIT/MINERAL) TABS Take 1 tablet by mouth every evening        VITAMIN D, CHOLECALCIFEROL, PO Take 1,000 Units by mouth daily       acetaminophen (TYLENOL) 325 MG tablet Take 1 tablet by mouth every 6 hours as needed for pain. 30 tablet 0     amLODIPine (NORVASC) 5 MG tablet Take 1 tablet (5 mg) by mouth every evening 90 tablet 3     blood glucose (ACCU-CHEK GUIDE) test strip Use to test blood sugar one times daily or as directed. 100 each 1     blood glucose monitoring (ACCU-CHEK FASTCLIX) lancets Use to test blood sugar one times daily or as directed. 102 each 3     dicyclomine (BENTYL) 20 MG tablet Take 1 tablet (20 mg) by mouth 4 times daily as needed (diarrhea) 60 tablet 0     lidocaine (LIDODERM) 5 % patch Apply patch to left low back at bedtime. Remove patch in the morning. 10 patch 0     nitroGLYcerin (NITROSTAT) 0.4 MG sublingual tablet Place 1 tablet (0.4 mg) under the tongue every 5 minutes as needed for chest pain 30 tablet 4     order for DME 1 walker 1 Device 0     ORDER FOR DME Thigh length teds. 1 Device 2       Reviewed and updated as needed this visit by Provider         Review of Systems   ROS COMP:  Constitutional, HEENT, cardiovascular, pulmonary, gi and gu systems are negative, except as otherwise noted.       Objective   Reported vitals:  There were no vitals taken for this visit.   healthy, alert and no distress  PSYCH: Alert and oriented times 3; coherent speech, normal   rate and volume, able to articulate logical thoughts, able   to abstract reason, no tangential thoughts, no hallucinations   or delusions  Her affect is normal  RESP: No cough, no audible wheezing, able to talk in full sentences  Remainder of exam unable to be completed due to telephone visits            Assessment/Plan:  1. Weakness of both lower extremities - multiple possible causes - med side effects vs flare up of back pain leading to subjective leg weakness, deconditioning.  Patient declined clinic evaluation at this time.  Stop statin and update in 2 weeks.  If no improvement, will need clinic visit.  If improved, will need to discuss alternate statin.  Due to advanced age, would be inclined not to restart statin    2. Hyperlipidemia LDL goal <100 - stop statin as above    3. Chronic left-sided low back pain with left-sided sciatica - flare up recently. Discussed physical therapy exercises, Tylenol, heat or ice.    4. Coronary artery disease involving native coronary artery of native heart with angina pectoris (H) - should be on statin, but if has side effects, would lean towards no additional trial of statin due to advanced age.      Return in about 2 weeks (around 5/19/2020) for If symptoms don't improve or if they worsen.      Phone call duration:  8 minutes    Cynthia Maddox DO

## 2020-05-11 ENCOUNTER — TELEPHONE (OUTPATIENT)
Dept: INTERNAL MEDICINE | Facility: CLINIC | Age: 85
End: 2020-05-11

## 2020-05-11 NOTE — TELEPHONE ENCOUNTER
Reason for Call:  Other prescription    Detailed comments: Patient is calling stating that about a week ago when she takes her BP medication that she gets short of breath after taking the medication. She has noticed her BP going up and down also.     Phone Number Patient can be reached at: Home number on file [phone] (home)    Best Time: any    Can we leave a detailed message on this number? YES    Call taken on 5/11/2020 at 2:10 PM by Aimee Oneill

## 2020-05-11 NOTE — TELEPHONE ENCOUNTER
She has been social distancing and staying indoors and has had more anxiety. She takes a half a klonopin every other day. She thinks she gets sob from losartan (klonopin helps) and her blood pressures have been 116/61 138/72 136/68 156/80 128/71  .Kari Arreola RN

## 2020-05-11 NOTE — TELEPHONE ENCOUNTER
Recommend virtual visit for further discussion.  Patient should have blood pressure log ready at time of visit.

## 2020-05-12 ENCOUNTER — TELEPHONE (OUTPATIENT)
Dept: INTERNAL MEDICINE | Facility: CLINIC | Age: 85
End: 2020-05-12

## 2020-05-12 ENCOUNTER — VIRTUAL VISIT (OUTPATIENT)
Dept: FAMILY MEDICINE | Facility: CLINIC | Age: 85
End: 2020-05-12
Payer: MEDICARE

## 2020-05-12 DIAGNOSIS — I10 BENIGN ESSENTIAL HYPERTENSION: Primary | Chronic | ICD-10-CM

## 2020-05-12 DIAGNOSIS — N18.30 CKD (CHRONIC KIDNEY DISEASE) STAGE 3, GFR 30-59 ML/MIN (H): ICD-10-CM

## 2020-05-12 DIAGNOSIS — I25.119 CORONARY ARTERY DISEASE INVOLVING NATIVE CORONARY ARTERY OF NATIVE HEART WITH ANGINA PECTORIS (H): Chronic | ICD-10-CM

## 2020-05-12 PROCEDURE — 99441 ZZC PHYSICIAN TELEPHONE EVALUATION 5-10 MIN GOVT: CPT | Performed by: INTERNAL MEDICINE

## 2020-05-12 RX ORDER — AMLODIPINE BESYLATE 5 MG/1
5 TABLET ORAL 2 TIMES DAILY
Qty: 180 TABLET | Refills: 3 | Status: SHIPPED | OUTPATIENT
Start: 2020-05-12 | End: 2020-12-13

## 2020-05-12 NOTE — PROGRESS NOTES
"Daniela Brown is a 87 year old female who is being evaluated via a billable telephone visit.      The patient has been notified of following:     \"This telephone visit will be conducted via a call between you and your physician/provider. We have found that certain health care needs can be provided without the need for a physical exam.  This service lets us provide the care you need with a short phone conversation.  If a prescription is necessary we can send it directly to your pharmacy.  If lab work is needed we can place an order for that and you can then stop by our lab to have the test done at a later time.    Telephone visits are billed at different rates depending on your insurance coverage. During this emergency period, for some insurers they may be billed the same as an in-person visit.  Please reach out to your insurance provider with any questions.    If during the course of the call the physician/provider feels a telephone visit is not appropriate, you will not be charged for this service.\"    Patient has given verbal consent for Telephone visit?  Yes    What phone number would you like to be contacted at? [phone]    How would you like to obtain your AVS? Mail a copy    Subjective     Daniela Brown is a 87 year old female who presents to clinic today for the following health issues:    HPI  Hypertension Follow-up      Do you check your blood pressure regularly outside of the clinic? Yes     Are you following a low salt diet? Yes    Are your blood pressures ever more than 140 on the top number (systolic) OR more   than 90 on the bottom number (diastolic), for example 140/90? Yes  This morning was 149/65, 168/85, has been elevated the last few days 190/88 last night  Pt notices she gets some sob and leg weakness after taking losartan, takes in the morning. Stopping the statin did not help the leg symptoms.  The shortness of breath and leg weakness lasts 1-2 hours.   --will " sometimes use 1/2 tablet of clonazepam and symptoms seem to go away after using  --noted shortness of breath x 3 weeks.  No cough, no fevers      How many servings of fruits and vegetables do you eat daily?  0-1    On average, how many sweetened beverages do you drink each day (Examples: soda, juice, sweet tea, etc.  Do NOT count diet or artificially sweetened beverages)?   1 tea in the morning    How many days per week do you exercise enough to make your heart beat faster? 3 or less, not very active recently,     How many minutes a day do you exercise enough to make your heart beat faster? 9 or less    How many days per week do you miss taking your medication? 0    Last week she did a virtual visit for 'leg weakness'.  Did not mention these symptoms at the time.    Amlodipine 5 mg daily (on since at least 2018), losartan 50 mg daily,     Previously on carvedilol (rash), lisinopril (cough)             Current Outpatient Medications   Medication Sig Dispense Refill     acetaminophen (TYLENOL) 325 MG tablet Take 1 tablet by mouth every 6 hours as needed for pain. 30 tablet 0     amLODIPine (NORVASC) 5 MG tablet Take 1 tablet (5 mg) by mouth every evening 90 tablet 3     atorvastatin (LIPITOR) 40 MG tablet Take 1 tablet (40 mg) by mouth At Bedtime 90 tablet 3     ClonazePAM (KLONOPIN PO) Take 0.5 mg by mouth 2 times daily as needed for anxiety       Cyanocobalamin (B-12) 1000 MCG SUBL Place 1 tablet under the tongue every evening        dicyclomine (BENTYL) 20 MG tablet Take 1 tablet (20 mg) by mouth 4 times daily as needed (diarrhea) 60 tablet 0     gabapentin (NEURONTIN) 300 MG capsule Take 2 capsules (600 mg) by mouth every evening 60 capsule 11     losartan (COZAAR) 50 MG tablet Take 1 tablet (50 mg) by mouth daily 90 tablet 3     Multiple Vitamins-Minerals (THERAPEUTIC MULTIVIT/MINERAL) TABS Take 1 tablet by mouth every evening        VITAMIN D, CHOLECALCIFEROL, PO Take 1,000 Units by mouth daily       blood  glucose (ACCU-CHEK GUIDE) test strip Use to test blood sugar one times daily or as directed. 100 each 1     blood glucose monitoring (ACCU-CHEK FASTCLIX) lancets Use to test blood sugar one times daily or as directed. 102 each 3     nitroGLYcerin (NITROSTAT) 0.4 MG sublingual tablet Place 1 tablet (0.4 mg) under the tongue every 5 minutes as needed for chest pain 30 tablet 4     order for DME 1 walker 1 Device 0     ORDER FOR DME Thigh length teds. 1 Device 2       Reviewed and updated as needed this visit by Provider         Review of Systems   Constitutional, HEENT, cardiovascular, pulmonary, gi and gu systems are negative, except as otherwise noted.       Objective   Reported vitals:  There were no vitals taken for this visit.   healthy, alert and no distress  PSYCH: Alert and oriented times 3; coherent speech, normal   rate and volume, able to articulate logical thoughts, able   to abstract reason, no tangential thoughts, no hallucinations   or delusions  Her affect is normal  RESP: No cough, no audible wheezing, able to talk in full sentences  Remainder of exam unable to be completed due to telephone visits            Assessment/Plan:  1. Benign essential hypertension - possible med side effects of losartan - shortness of breath and leg weakness.  Stop the losartan and increase amlodipine to 5 mg BID.  Follow-up in clinic for face to face visit if symptoms persist, ok to change to virtual follow-up if symptoms have rsolved  - amLODIPine (NORVASC) 5 MG tablet; Take 1 tablet (5 mg) by mouth 2 times daily  Dispense: 180 tablet; Refill: 3    2. CKD (chronic kidney disease) stage 3, GFR 30-59 ml/min (H) - nearly due for follow-up labs    3. Coronary artery disease involving native coronary artery of native heart with angina pectoris (H) - want to resume statin if able, will discuss at next follow-up visit.      1. Continue to stay off the atorvastatin    2. Stop the losartana  3. Increase amlodipine to 5 mg twice  daily  4. See Dr. Maddox next week.  If you are feeling better, you can cancel      Return in about 1 week (around 5/19/2020) for If symptoms don't improve or if they worsen.      Phone call duration:  10 minutes    Cynthia Maddox, DO

## 2020-05-12 NOTE — TELEPHONE ENCOUNTER
"Pt concerned her B/P is high, 190/68, 168/?.  Pt has Virtual appt in 30 minutes with MD Tyrone.  Pt has no chest pain, SOB is the \"usual\", feels weak in the legs.  Clarified meds and is taking as ordered.  Pt feels it is her med that is causing her weak legs.  Informed her to sit with feet flat on floor for 15-20 minutes prior to taking B/P.  Pt takes her B/P as soon as she sits down.  Recommended sitting @ table waiting for appt and take B/P after sitting for awhile.  Pt agreed.  KpavelRN  "

## 2020-05-12 NOTE — PATIENT INSTRUCTIONS
1. Continue to stay off the atorvastatin    2. Stop the losartana  3. Increase amlodipine to 5 mg twice daily  4. See Dr. Maddox next week.  If you are feeling better, you can cancel

## 2020-05-19 ENCOUNTER — ANCILLARY PROCEDURE (OUTPATIENT)
Dept: GENERAL RADIOLOGY | Facility: CLINIC | Age: 85
End: 2020-05-19
Attending: INTERNAL MEDICINE
Payer: MEDICARE

## 2020-05-19 ENCOUNTER — OFFICE VISIT (OUTPATIENT)
Dept: FAMILY MEDICINE | Facility: CLINIC | Age: 85
End: 2020-05-19
Payer: MEDICARE

## 2020-05-19 VITALS
DIASTOLIC BLOOD PRESSURE: 64 MMHG | OXYGEN SATURATION: 97 % | WEIGHT: 165.8 LBS | BODY MASS INDEX: 28.31 KG/M2 | RESPIRATION RATE: 16 BRPM | HEART RATE: 104 BPM | SYSTOLIC BLOOD PRESSURE: 138 MMHG | TEMPERATURE: 99.2 F | HEIGHT: 64 IN

## 2020-05-19 DIAGNOSIS — E11.42 TYPE 2 DIABETES MELLITUS WITH DIABETIC POLYNEUROPATHY, WITHOUT LONG-TERM CURRENT USE OF INSULIN (H): Chronic | ICD-10-CM

## 2020-05-19 DIAGNOSIS — R29.898 WEAKNESS OF BOTH LOWER EXTREMITIES: ICD-10-CM

## 2020-05-19 DIAGNOSIS — R06.02 SOB (SHORTNESS OF BREATH): Primary | ICD-10-CM

## 2020-05-19 DIAGNOSIS — N18.30 CKD (CHRONIC KIDNEY DISEASE) STAGE 3, GFR 30-59 ML/MIN (H): Chronic | ICD-10-CM

## 2020-05-19 DIAGNOSIS — R00.0 TACHYCARDIA: ICD-10-CM

## 2020-05-19 LAB
ALBUMIN SERPL-MCNC: 3.8 G/DL (ref 3.4–5)
ALP SERPL-CCNC: 78 U/L (ref 40–150)
ALT SERPL W P-5'-P-CCNC: 32 U/L (ref 0–50)
ANION GAP SERPL CALCULATED.3IONS-SCNC: 10 MMOL/L (ref 3–14)
AST SERPL W P-5'-P-CCNC: 23 U/L (ref 0–45)
BILIRUB SERPL-MCNC: 0.5 MG/DL (ref 0.2–1.3)
BUN SERPL-MCNC: 18 MG/DL (ref 7–30)
CALCIUM SERPL-MCNC: 9.1 MG/DL (ref 8.5–10.1)
CHLORIDE SERPL-SCNC: 106 MMOL/L (ref 94–109)
CO2 SERPL-SCNC: 27 MMOL/L (ref 20–32)
CREAT SERPL-MCNC: 1.09 MG/DL (ref 0.52–1.04)
ERYTHROCYTE [DISTWIDTH] IN BLOOD BY AUTOMATED COUNT: 13.1 % (ref 10–15)
GFR SERPL CREATININE-BSD FRML MDRD: 46 ML/MIN/{1.73_M2}
GLUCOSE SERPL-MCNC: 121 MG/DL (ref 70–99)
HCT VFR BLD AUTO: 42.4 % (ref 35–47)
HGB BLD-MCNC: 14.2 G/DL (ref 11.7–15.7)
MCH RBC QN AUTO: 29.2 PG (ref 26.5–33)
MCHC RBC AUTO-ENTMCNC: 33.5 G/DL (ref 31.5–36.5)
MCV RBC AUTO: 87 FL (ref 78–100)
NT-PROBNP SERPL-MCNC: 154 PG/ML (ref 0–450)
PLATELET # BLD AUTO: 331 10E9/L (ref 150–450)
POTASSIUM SERPL-SCNC: 3.4 MMOL/L (ref 3.4–5.3)
PROT SERPL-MCNC: 8.1 G/DL (ref 6.8–8.8)
RBC # BLD AUTO: 4.86 10E12/L (ref 3.8–5.2)
SODIUM SERPL-SCNC: 143 MMOL/L (ref 133–144)
TSH SERPL DL<=0.005 MIU/L-ACNC: 3.01 MU/L (ref 0.4–4)
WBC # BLD AUTO: 12.8 10E9/L (ref 4–11)

## 2020-05-19 PROCEDURE — 36415 COLL VENOUS BLD VENIPUNCTURE: CPT | Performed by: INTERNAL MEDICINE

## 2020-05-19 PROCEDURE — 85027 COMPLETE CBC AUTOMATED: CPT | Performed by: INTERNAL MEDICINE

## 2020-05-19 PROCEDURE — 93000 ELECTROCARDIOGRAM COMPLETE: CPT | Performed by: INTERNAL MEDICINE

## 2020-05-19 PROCEDURE — 71046 X-RAY EXAM CHEST 2 VIEWS: CPT

## 2020-05-19 PROCEDURE — 84443 ASSAY THYROID STIM HORMONE: CPT | Performed by: INTERNAL MEDICINE

## 2020-05-19 PROCEDURE — 80053 COMPREHEN METABOLIC PANEL: CPT | Performed by: INTERNAL MEDICINE

## 2020-05-19 PROCEDURE — 83880 ASSAY OF NATRIURETIC PEPTIDE: CPT | Performed by: INTERNAL MEDICINE

## 2020-05-19 PROCEDURE — 72100 X-RAY EXAM L-S SPINE 2/3 VWS: CPT

## 2020-05-19 PROCEDURE — 99214 OFFICE O/P EST MOD 30 MIN: CPT | Performed by: INTERNAL MEDICINE

## 2020-05-19 ASSESSMENT — MIFFLIN-ST. JEOR: SCORE: 1172.06

## 2020-05-19 NOTE — RESULT ENCOUNTER NOTE
Thyroid, heart failure enzyme, liver enzymes, electrolytes are normal    The kidney function is mildly reduced, Slightly worse than previous values but not significantly so.    The white blood cell count is very mildly elevated, but the remaining blood count is normal    There is no clear cause of the symptoms based on all of the testing.  Continue plan of care to get the heart monitor.

## 2020-05-19 NOTE — LETTER
May 20, 2020      Daniela Betty Brown  [address] [id number]        Dear ,    We are writing to inform you of your test results.  Thyroid, heart failure enzyme, liver enzymes, electrolytes are normal.  The kidney function is mildly reduced, Slightly worse than previous values but not significantly so.  The white blood cell count is very mildly elevated, but the remaining blood count is normal.    There is no clear cause of the symptoms based on all of the testing.  Continue plan of care to get the heart monitor.     Resulted Orders   Comprehensive metabolic panel (BMP + Alb, Alk Phos, ALT, AST, Total. Bili, TP)   Result Value Ref Range    Sodium 143 133 - 144 mmol/L    Potassium 3.4 3.4 - 5.3 mmol/L    Chloride 106 94 - 109 mmol/L    Carbon Dioxide 27 20 - 32 mmol/L    Anion Gap 10 3 - 14 mmol/L    Glucose 121 (H) 70 - 99 mg/dL    Urea Nitrogen 18 7 - 30 mg/dL    Creatinine 1.09 (H) 0.52 - 1.04 mg/dL    GFR Estimate 46 (L) >60 mL/min/[1.73_m2]      Comment:      Non  GFR Calc  Starting 12/18/2018, serum creatinine based estimated GFR (eGFR) will be   calculated using the Chronic Kidney Disease Epidemiology Collaboration   (CKD-EPI) equation.      GFR Estimate If Black 53 (L) >60 mL/min/[1.73_m2]      Comment:       GFR Calc  Starting 12/18/2018, serum creatinine based estimated GFR (eGFR) will be   calculated using the Chronic Kidney Disease Epidemiology Collaboration   (CKD-EPI) equation.      Calcium 9.1 8.5 - 10.1 mg/dL    Bilirubin Total 0.5 0.2 - 1.3 mg/dL    Albumin 3.8 3.4 - 5.0 g/dL    Protein Total 8.1 6.8 - 8.8 g/dL    Alkaline Phosphatase 78 40 - 150 U/L    ALT 32 0 - 50 U/L    AST 23 0 - 45 U/L   CBC with platelets   Result Value Ref Range    WBC 12.8 (H) 4.0 - 11.0 10e9/L    RBC Count 4.86 3.8 - 5.2 10e12/L    Hemoglobin 14.2 11.7 - 15.7 g/dL    Hematocrit 42.4 35.0 - 47.0 %    MCV 87 78 - 100 fl    MCH 29.2 26.5 - 33.0 pg    MCHC 33.5 31.5  - 36.5 g/dL    RDW 13.1 10.0 - 15.0 %    Platelet Count 331 150 - 450 10e9/L   TSH with free T4 reflex   Result Value Ref Range    TSH 3.01 0.40 - 4.00 mU/L   BNP-N terminal pro   Result Value Ref Range    N-Terminal Pro Bnp 154 0 - 450 pg/mL      Comment:         Reference range shown and results flagged as abnormal are for the outpatient,   non acute settings. Establishing a baseline value for each individual patient   is useful for follow-up.  Suggested inpatient cut points for confirming diagnosis of CHF in an acute   setting are:   >450 pg/mL (age 18 to less than 50)   >900 pg/mL (age 50 to less than 75)   >1800 pg/mL (75 yrs and older)  An inpatient or emergency department NT-proPBNP <300 pg/mL effectively rules   out acute CHF, with 99% negative predictive value.          If you have any questions or concerns, please call the clinic at the number listed above.       Sincerely,        Cynthia Maddox, /bmc

## 2020-05-19 NOTE — RESULT ENCOUNTER NOTE
The x-ray shows arthritis and degenerative changes but not much different than previous x-ray.  Please see other result notes [facility]

## 2020-05-19 NOTE — RESULT ENCOUNTER NOTE
The chest x-ray is normal which is good news.  No pneumonia or fluid retention as the cause of the shortness of breath.  Please see other result notes.

## 2020-05-19 NOTE — PROGRESS NOTES
"Subjective     Daniela Brown is a 87 year old female who presents to clinic today for the following health issues:    HPI   Hypertension Follow-up      Do you check your blood pressure regularly outside of the clinic? Yes     Are you following a low salt diet? Yes    Are your blood pressures ever more than 140 on the top number (systolic) OR more   than 90 on the bottom number (diastolic), for example 140/90? Yes      How many servings of fruits and vegetables do you eat daily?  0-1    On average, how many sweetened beverages do you drink each day (Examples: soda, juice, sweet tea, etc.  Do NOT count diet or artificially sweetened beverages)?   1    How many days per week do you exercise enough to make your heart beat faster? Pt. Is doing stretches daily    How many minutes a day do you exercise enough to make your heart beat faster? none    How many days per week do you miss taking your medication? 0    Brings blood pressure log, 190 is peak, most are 130s-140s.  She did call EMS when blood pressure was so high.  She did not go to ER.  They monitored her and blood pressure improved.  This high blood pressure was associated with shortness of breath.  She got very scared.  She had simialar feeling last night.  Called friend who told her to chew an aspirin.    Leg weakness: still ongoing despite changing medication.  She thinks stopping the losartan was somewhat helpful to improve the leg weakness and shortness of breath.  However, did have episode of shortness of breath last night that was not directly associated to taking losartan. Has chronic low back pain and this has been reasonably well controlled in the last few weeks/months.  --no fevers, palpitations, chest pain, abdominal pain, N/V/C, PND, orthopnea, leg swelling  --very rare cough to clear throat  --notes loose stools after every meal.  Uses bentyl and that helps.  Known IBS      From last visit 5/12 \"Pt notices she gets some sob and leg weakness " "after taking losartan, takes in the morning. Stopping the statin did not help the leg symptoms.  The shortness of breath and leg weakness lasts 1-2 hours. . Benign essential hypertension - possible med side effects of losartan - shortness of breath and leg weakness.  Stop the losartan and increase amlodipine to 5 mg BID.  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  Finds the shot gives her more energy.  Previous doctor did the shot.     Anxiety: feeling more isolated due to COVID.  Family is helping her out. When she develops the shortness of breath episodes the klonopin helps a bit.    Current Outpatient Medications   Medication Sig Dispense Refill     acetaminophen (TYLENOL) 325 MG tablet Take 1 tablet by mouth every 6 hours as needed for pain. 30 tablet 0     amLODIPine (NORVASC) 5 MG tablet Take 1 tablet (5 mg) by mouth 2 times daily 180 tablet 3     atorvastatin (LIPITOR) 40 MG tablet Take 1 tablet (40 mg) by mouth At Bedtime 90 tablet 3     ClonazePAM (KLONOPIN PO) Take 0.5 mg by mouth 2 times daily as needed for anxiety       Cyanocobalamin (B-12) 1000 MCG SUBL Place 1 tablet under the tongue every evening        dicyclomine (BENTYL) 20 MG tablet Take 1 tablet (20 mg) by mouth 4 times daily as needed (diarrhea) 60 tablet 0     gabapentin (NEURONTIN) 300 MG capsule Take 2 capsules (600 mg) by mouth every evening 60 capsule 11     Multiple Vitamins-Minerals (THERAPEUTIC MULTIVIT/MINERAL) TABS Take 1 tablet by mouth every evening        VITAMIN D, CHOLECALCIFEROL, PO Take 1,000 Units by mouth daily       blood glucose (ACCU-CHEK GUIDE) test strip Use to test blood sugar one times daily or as directed. 100 each 1     blood glucose monitoring (ACCU-CHEK FASTCLIX) lancets Use to test blood sugar one times daily or as directed. 102 each 3     losartan (COZAAR) 50 MG tablet Take 1 " "tablet (50 mg) by mouth daily (Patient not taking: Reported on 5/19/2020) 90 tablet 3     nitroGLYcerin (NITROSTAT) 0.4 MG sublingual tablet Place 1 tablet (0.4 mg) under the tongue every 5 minutes as needed for chest pain 30 tablet 4     order for DME 1 walker 1 Device 0     ORDER FOR DME Thigh length teds. 1 Device 2         Reviewed and updated as needed this visit by Provider         Review of Systems   Constitutional, HEENT, cardiovascular, pulmonary, gi and gu systems are negative, except as otherwise noted.      Objective    /64   Pulse 104   Temp 99.2  F (37.3  C) (Tympanic)   Resp 16   Ht 1.626 m (5' 4\")   Wt 75.2 kg (165 lb 12.8 oz)   SpO2 97%   BMI 28.46 kg/m    Body mass index is 28.46 kg/m .  Physical Exam   GENERAL APPEARANCE: alert, no distress, fatigued and elderly  HENT: MMM  NECK: no adenopathy, no asymmetry, masses, or scars, thyroid normal to palpation, no bruits and no JVD  RESP: lungs clear to auscultation - no rales, rhonchi or wheezes  CV: regular rates and rhythm, normal S1 S2, no S3 or S4 and systolic murmur heard best over the right sternal border  LYMPHATICS: trace bilateral non-pitting pedal edema  ABDOMEN: soft, nontender, without hepatosplenomegaly or masses and bowel sounds normal  MS: 4/5 strength bilateral LE   SKIN: no suspicious lesions or rashes  NEURO: mentation intact, speech normal, DTR symmetrically normal in lower extremities and gait antalgic, at baseline for her.             Assessment & Plan     1. SOB (shortness of breath) -long differential including anxiety, arrhythmia, inappropriate sinus tachycardia.  Start with work-up as below.  If monitor is negative, may consider more aggressive treatment of anxiety  - Comprehensive metabolic panel (BMP + Alb, Alk Phos, ALT, AST, Total. Bili, TP)  - CBC with platelets  - XR Chest 2 Views  - BNP-N terminal pro  - EKG 12-lead complete w/read - Clinics  - Zio Patch Holter Adult Pediatric Greater than 48 hrs; " "Future    2. Weakness of both lower extremities -differential includes all as above her shortness of breath, but also worsening back pathology.  Exam is nonfocal suspect that as her shortness of breath improves that the weakness in her legs will improve..  Does not seem to be related to either the losartan or the statin.  Okay to stay off both for now  - Comprehensive metabolic panel (BMP + Alb, Alk Phos, ALT, AST, Total. Bili, TP)  - CBC with platelets  - XR Lumbar Spine 2/3 Views    3. Tachycardia -intermittent on home blood pressure readings and today in the clinic.  She endorses that today's heart rate is high because she is anxious about wearing a mask  - Zio Patch Holter Adult Pediatric Greater than 48 hrs; Future    4. CKD (chronic kidney disease) stage 3, GFR 30-59 ml/min (H)  - TSH with free T4 reflex    5. Type 2 diabetes mellitus with diabetic polyneuropathy, without long-term current use of insulin (H)  - TSH with free T4 reflex  - STATIN NOT PRESCRIBED (INTENTIONAL); Please choose reason not prescribed, below     BMI:   Estimated body mass index is 28.46 kg/m  as calculated from the following:    Height as of this encounter: 1.626 m (5' 4\").    Weight as of this encounter: 75.2 kg (165 lb 12.8 oz).   Weight management plan: Discussed healthy diet and exercise guidelines        Patient Instructions   1. Blood work, xrays and EKG today  2. If all testing is negative, get Cardiac Monitor.  You need to have a cardiology test done.  Please call [phone] to schedule.   3. If symptoms get severe again, agree with plan to call 911.  4. Leg weakness is likely related to the shortness of breath       Return in about 2 weeks (around 6/2/2020) for If symptoms don't improve or if they worsen.    Cynthia Maddox, DO  [facility]      "

## 2020-05-19 NOTE — PATIENT INSTRUCTIONS
1. Blood work, xrays and EKG today  2. If all testing is negative, get Cardiac Monitor.  You need to have a cardiology test done.  Please call [phone] to schedule.   3. If symptoms get severe again, agree with plan to call 911.  4. Leg weakness is likely related to the shortness of breath

## 2020-05-27 ENCOUNTER — TELEPHONE (OUTPATIENT)
Dept: INTERNAL MEDICINE | Facility: CLINIC | Age: 85
End: 2020-05-27

## 2020-05-27 NOTE — TELEPHONE ENCOUNTER
Patient states she received a letter that told her to wear a heart monitor. She was notified she will need to contact Cardiac Services for holter placement. Transferred to scheduling.      JOHNATHON WhitmanN, RN

## 2020-05-27 NOTE — TELEPHONE ENCOUNTER
Reason for Call:  Other appointment    Detailed comments: Patient had testing for shortness of breath, told to wear a holter monitor. She doesn't know what she's supposed to do    Phone Number Patient can be reached at: Home number on file [phone] (home)    Best Time: Any    Can we leave a detailed message on this number? YES    Call taken on 5/27/2020 at 2:07 PM by Nasrin Encarnacion

## 2020-05-31 ENCOUNTER — APPOINTMENT (OUTPATIENT)
Dept: GENERAL RADIOLOGY | Facility: CLINIC | Age: 85
End: 2020-05-31
Attending: EMERGENCY MEDICINE
Payer: MEDICARE

## 2020-05-31 ENCOUNTER — HOSPITAL ENCOUNTER (EMERGENCY)
Facility: CLINIC | Age: 85
Discharge: HOME | End: 2020-06-01
Attending: EMERGENCY MEDICINE | Admitting: EMERGENCY MEDICINE
Payer: MEDICARE

## 2020-05-31 DIAGNOSIS — R06.02 SOB (SHORTNESS OF BREATH): ICD-10-CM

## 2020-05-31 DIAGNOSIS — I25.119 CORONARY ARTERY DISEASE INVOLVING NATIVE CORONARY ARTERY OF NATIVE HEART WITH ANGINA PECTORIS (H): Chronic | ICD-10-CM

## 2020-05-31 DIAGNOSIS — N18.30 CKD (CHRONIC KIDNEY DISEASE) STAGE 3, GFR 30-59 ML/MIN (H): Chronic | ICD-10-CM

## 2020-05-31 DIAGNOSIS — I10 BENIGN ESSENTIAL HYPERTENSION: Chronic | ICD-10-CM

## 2020-05-31 LAB
ANION GAP SERPL CALCULATED.3IONS-SCNC: 7 MMOL/L (ref 3–14)
BASOPHILS # BLD AUTO: 0.1 10E9/L (ref 0–0.2)
BASOPHILS NFR BLD AUTO: 0.4 %
BUN SERPL-MCNC: 16 MG/DL (ref 7–30)
CALCIUM SERPL-MCNC: 8.9 MG/DL (ref 8.5–10.1)
CHLORIDE SERPL-SCNC: 109 MMOL/L (ref 94–109)
CO2 SERPL-SCNC: 25 MMOL/L (ref 20–32)
CREAT SERPL-MCNC: 0.93 MG/DL (ref 0.52–1.04)
DIFFERENTIAL METHOD BLD: ABNORMAL
EOSINOPHIL # BLD AUTO: 0.3 10E9/L (ref 0–0.7)
EOSINOPHIL NFR BLD AUTO: 2 %
ERYTHROCYTE [DISTWIDTH] IN BLOOD BY AUTOMATED COUNT: 12.4 % (ref 10–15)
GFR SERPL CREATININE-BSD FRML MDRD: 55 ML/MIN/{1.73_M2}
GLUCOSE SERPL-MCNC: 166 MG/DL (ref 70–99)
HCT VFR BLD AUTO: 40.4 % (ref 35–47)
HGB BLD-MCNC: 13.6 G/DL (ref 11.7–15.7)
IMM GRANULOCYTES # BLD: 0 10E9/L (ref 0–0.4)
IMM GRANULOCYTES NFR BLD: 0.3 %
LYMPHOCYTES # BLD AUTO: 3.1 10E9/L (ref 0.8–5.3)
LYMPHOCYTES NFR BLD AUTO: 24 %
MCH RBC QN AUTO: 29.4 PG (ref 26.5–33)
MCHC RBC AUTO-ENTMCNC: 33.7 G/DL (ref 31.5–36.5)
MCV RBC AUTO: 87 FL (ref 78–100)
MONOCYTES # BLD AUTO: 0.9 10E9/L (ref 0–1.3)
MONOCYTES NFR BLD AUTO: 6.9 %
NEUTROPHILS # BLD AUTO: 8.5 10E9/L (ref 1.6–8.3)
NEUTROPHILS NFR BLD AUTO: 66.4 %
NRBC # BLD AUTO: 0 10*3/UL
NRBC BLD AUTO-RTO: 0 /100
NT-PROBNP SERPL-MCNC: 146 PG/ML (ref 0–1800)
PLATELET # BLD AUTO: 314 10E9/L (ref 150–450)
POTASSIUM SERPL-SCNC: 3.5 MMOL/L (ref 3.4–5.3)
RBC # BLD AUTO: 4.63 10E12/L (ref 3.8–5.2)
SODIUM SERPL-SCNC: 141 MMOL/L (ref 133–144)
TROPONIN I SERPL-MCNC: <0.015 UG/L (ref 0–0.04)
WBC # BLD AUTO: 12.8 10E9/L (ref 4–11)

## 2020-05-31 PROCEDURE — 85025 COMPLETE CBC W/AUTO DIFF WBC: CPT | Performed by: EMERGENCY MEDICINE

## 2020-05-31 PROCEDURE — 93005 ELECTROCARDIOGRAM TRACING: CPT | Performed by: EMERGENCY MEDICINE

## 2020-05-31 PROCEDURE — 93010 ELECTROCARDIOGRAM REPORT: CPT | Mod: Z6 | Performed by: EMERGENCY MEDICINE

## 2020-05-31 PROCEDURE — 83880 ASSAY OF NATRIURETIC PEPTIDE: CPT | Performed by: EMERGENCY MEDICINE

## 2020-05-31 PROCEDURE — 80048 BASIC METABOLIC PNL TOTAL CA: CPT | Performed by: EMERGENCY MEDICINE

## 2020-05-31 PROCEDURE — 99285 EMERGENCY DEPT VISIT HI MDM: CPT | Mod: 25 | Performed by: EMERGENCY MEDICINE

## 2020-05-31 PROCEDURE — 84484 ASSAY OF TROPONIN QUANT: CPT | Performed by: EMERGENCY MEDICINE

## 2020-05-31 PROCEDURE — 71046 X-RAY EXAM CHEST 2 VIEWS: CPT

## 2020-05-31 ASSESSMENT — ENCOUNTER SYMPTOMS
SHORTNESS OF BREATH: 1
ABDOMINAL PAIN: 0
FEVER: 0

## 2020-05-31 NOTE — ED AVS SNAPSHOT
[facility]  [address] [address]  Phone:  [phone]  Fax:  [phone]                                    Daniela Brown   MRN: [id number]    Department:  [facility]   Date of Visit:  5/31/2020           After Visit Summary Signature Page    I have received my discharge instructions, and my questions have been answered. I have discussed any challenges I see with this plan with the nurse or doctor.    ..........................................................................................................................................  Patient/Patient Representative Signature      ..........................................................................................................................................  Patient Representative Print Name and Relationship to Patient    ..................................................               ................................................  Date                                   Time    ..........................................................................................................................................  Reviewed by Signature/Title    ...................................................              ..............................................  Date                                               Time          22EPIC Rev 08/18

## 2020-06-01 VITALS
HEART RATE: 76 BPM | TEMPERATURE: 98.3 F | DIASTOLIC BLOOD PRESSURE: 75 MMHG | OXYGEN SATURATION: 96 % | RESPIRATION RATE: 10 BRPM | SYSTOLIC BLOOD PRESSURE: 117 MMHG

## 2020-06-01 NOTE — ED PROVIDER NOTES
History     Chief Complaint   Patient presents with     Chest Pain     HPI  Daniela Brown is a 87 year old female who has past medical history significant for coronary artery disease, hypertension, diabetes, CKD, presenting to the emergency department with concerns regarding shortness of breath.  Symptoms began approximately 1 to 2 hours prior to emergency department arrival.  Patient attempted to fall asleep when she was laying in bed, however did not feel well.  She subsequently moved to a recliner, where she was with increased amounts of shortness of breath.  She called and spoke to her son, who felt it was perhaps anxiety related.  However, patient's symptoms did not improve, and therefore 911 was called.   encouraged patient to breathe slower.  This helped with her symptoms.  EMS subsequently arrived, and brought patient to the emergency department.  Patient did not experience any chest pains.  No lightheadedness, or dizziness.  No visual changes.  No current symptoms.          I reviewed coronary catheterization results from February 27, 2019:  Coronary Angiogram:   -Both coronary arteries arise from their respective cusps.  -Dominance: Right  -LM is without angiographic evidence of disease.   -LAD: Type 3 [LAD supplies the entire apex]. The LAD gives rise to  septal perforators, D1 and D2. There is minimal 20% proximal and mild  30-40% mid LAD disease, distally there are only minimal luminal  irregularities. Both diagonals have ostial 30% disease and are small  vessels.  .  -LCX gives rise to OM1 and OM2, 2PL branches. The LCx has mild 30% mid  segment disease, and otherwise minimal luminal irregularities in the  rest of its body. The first and second OM have ostial 20% disease.   -RCA gives rise to PL branches and supplies PDA. RCA has a calcified  40% lesion in the mid RCA, the proximal and distal RCA have minimal  luminal irregularities.     Left Heart Catheterization:  LVEDP 5  mm Hg  No gradient across aortic pullback  EF 60-65%, normal wall motion.    Allergies:  Allergies   Allergen Reactions     Metoprolol Itching and Rash     Cephalexin Rash     Erythromycin Rash     Actonel [Bisphosphonates] Itching     Azithromycin Hives     Celebrex [Celecoxib] Rash     Cipro [Ciprofloxacin] Rash     Contrast Dye Hives     Diatrizoate Hives     Erythromycin Rash     Escitalopram Diarrhea     Gets very sick and mad feelings     Fosamax Itching and Rash     Keflex [Cephalexin Monohydrate] Rash     Lisinopril Cough     No Clinical Screening - See Comments Hives     Other reaction(s): Edema     Risedronate Itching     Seasonal Allergies      Shellfish-Derived Products Hives     Tetanus Toxoid, Adsorbed Swelling     Tetanus-Diphtheria Toxoids Swelling     Vicodin [Acetaminophen] Itching     Patient reported - only when used scheduled in high doses.        Vioxx Rash     Acetaminophen Itching     In high doses  Patient reported - only when used scheduled in high doses.        Alendronic Acid Rash     Celecoxib Rash     Penicillins Rash     Rofecoxib Rash     Sulfa Drugs Itching and Rash     Sulfasalazine Itching and Rash       Problem List:    Patient Active Problem List    Diagnosis Date Noted     Abnormal CT of the abdomen 10/10/2019     Priority: Medium     CT 10/9/2019- cystic lesion along the anterior aspect of the pancreatic body/tail (series 2 image 27) measures 2 cm, and is new since the previous exam 2011. Pancreatic MRI with MRCP should be considered for further evaluation.   MRI 10/10/2019- There are 2 cystic lesions in the pancreatic body/tail, with the largest measuring 2 cm. No enhancing septations or solid masslike components are identified, although evaluation is limited related to patient motion artifact. These lesions have appearances suggestive of IPMN, but remain technically indeterminate. A follow-up MRI in one year should be considered to confirm stability.       Cystocele, midline       Priority: Medium     grade III       Generalized weakness 10/09/2019     Priority: Medium     Diarrhea 10/09/2019     Priority: Medium     Chronic left-sided low back pain with left-sided sciatica 04/15/2019     Priority: Medium     Coronary artery disease involving native coronary artery of native heart with angina pectoris (H) 02/19/2019     Priority: Medium     coronary angiogram 2/2019 showed mild coronary artery disease.  The most significant of these was the 40% lesion in the mid RCA.       Benign essential hypertension 11/14/2018     Priority: Medium     Type 2 diabetes mellitus with diabetic polyneuropathy, without long-term current use of insulin (H) 11/14/2018     Priority: Medium     Diet controlled.  Diagnosed 6/2016, has never been on medications.       History of recurrent urinary tract infection 11/14/2018     Priority: Medium     On daily trimethoprim       CKD (chronic kidney disease) stage 3, GFR 30-59 ml/min (H) 11/14/2018     Priority: Medium     Peripheral neuropathy 09/10/2018     Priority: Medium     Bilateral wrist pain 04/11/2018     Priority: Medium     Protrusion of lumbar intervertebral disc 04/11/2018     Priority: Medium     Carpal tunnel syndrome of left wrist 10/21/2014     Priority: Medium     Diastolic dysfunction 03/31/2014     Priority: Medium     Arthritis of spine 01/22/2013     Priority: Medium     Pronation of foot 05/05/2011     Priority: Medium     Bilateral defomity       Allergic rhinitis 01/19/2011     Priority: Medium     Hyperlipidemia LDL goal <100 10/31/2010     Priority: Medium     Urge incontinence 10/20/2009     Priority: Medium     Right foot pain 10/16/2009     Priority: Medium     Xray showed djd -follows with podiatry. -arch supports placed may need cam walker        Vitamin D deficiency 09/09/2008     Priority: Medium     Subjective tinnitus 04/22/2008     Priority: Medium     Urticaria 08/22/2006     Priority: Medium     SENSONRL HEAR LOSS,BILAT  05/03/2006     Priority: Medium     Esophageal reflux      Priority: Medium     Memory loss      Priority: Medium     Early cognitive decline. MMSE 27/30, Neno 4.7/ 6.0 2004. B12, TSH, RPR normal 1508-1748.       Anxiety and depression      Priority: Medium     Degeneration of lumbar or lumbosacral intervertebral disc      Priority: Medium     MRI 5/04- DDD, L2-3 annular bulge with protrusion into left caudal infra-foraminal area  MRI 2017 with L4-5 loss disc height with spondylolisthesis.       B12 deficiency 10/10/2019     Priority: Low     Irritable bowel syndrome with diarrhea      Priority: Low     diahrrea predominant       Osteopenia of multiple sites      Priority: Low     DEXA 2007 -1.4 hip. Dexa 2004 -1 hip and -1 spine       RESTLESS LEG SYNDROME      Priority: Low     Generalized osteoarthrosis, unspecified site      Priority: Low     C-spine, left hip       Diverticulosis of large intestine      Priority: Low     flexible sigmoidoscopy with diverticulosis otherwise normal 2001, admit diverticulitis 2009          Past Medical History:    Past Medical History:   Diagnosis Date     Abnormal cardiovascular stress test 2/3/2019     Adhesive capsulitis of shoulder      Adjustment disorder with anxiety 3/18/2016     B12 deficiency anemia 6/20/2012     Chest pain 2004     Colitis, Clostridium difficile 4/18/2012     Diverticulitis 2009     Headache(784.0) 2001     Impaired fasting glucose 2005     PERS HX ALLERGY OTHER FOODS 8/22/2006     PERS HX ALLERGY TO MILK PRODUCTS 8/22/2006     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     Status post coronary angiogram 2/27/2019     Syncope and collapse 9/10/2018       Past Surgical History:    Past Surgical History:   Procedure Laterality Date     ARTHROPLASTY KNEE  3/26/2012    Procedure:ARTHROPLASTY KNEE; Right Total Knee Arthroplasty; Surgeon:BERNADINE ROSAS; Location:[facility] OR     C APPENDECTOMY  1953     CHOLECYSTECTOMY       CV HEART CATHETERIZATION WITH POSSIBLE  INTERVENTION N/A 2/27/2019    Procedure: Coronary Angiogram with Left ventriculogram;  Surgeon: Leandro Carpio MD;  Location:  [facility]     HERNIA REPAIR      Hernia Repair     HYSTERECTOMY, PAP NO LONGER INDICATED  1983    TVH/BSO     SURGICAL HISTORY OF -   10/08    A & P Repair, Dr. Hannah       Family History:    Family History   Problem Relation Age of Onset     C.A.D. Mother      Diabetes Mother      Cancer - colorectal Mother      Arthritis Mother      Heart Disease Mother      Lipids Brother      Obesity Brother      Hypertension Brother      Allergies Son      Eye Disorder Son         cataract     Thyroid Disease Sister         graves dz     Eye Disorder Son      Leukemia Son      Alcohol/Drug Father        Social History:  Marital Status:   [5]  Social History     Tobacco Use     Smoking status: Never Smoker     Smokeless tobacco: Never Used   Substance Use Topics     Alcohol use: No     Drug use: No        Medications:    acetaminophen (TYLENOL) 325 MG tablet  amLODIPine (NORVASC) 5 MG tablet  atorvastatin (LIPITOR) 40 MG tablet  blood glucose (ACCU-CHEK GUIDE) test strip  blood glucose monitoring (ACCU-CHEK FASTCLIX) lancets  ClonazePAM (KLONOPIN PO)  Cyanocobalamin (B-12) 1000 MCG SUBL  dicyclomine (BENTYL) 20 MG tablet  gabapentin (NEURONTIN) 300 MG capsule  losartan (COZAAR) 50 MG tablet  Multiple Vitamins-Minerals (THERAPEUTIC MULTIVIT/MINERAL) TABS  nitroGLYcerin (NITROSTAT) 0.4 MG sublingual tablet  order for DME  ORDER FOR DME  STATIN NOT PRESCRIBED (INTENTIONAL)  VITAMIN D, CHOLECALCIFEROL, PO          Review of Systems   Constitutional: Negative for fever.   Respiratory: Positive for shortness of breath.    Cardiovascular: Negative for chest pain.   Gastrointestinal: Negative for abdominal pain.   All other systems reviewed and are negative.      Physical Exam   BP: (!) 161/81  Pulse: 84  Heart Rate: 85  Temp: 98.3  F (36.8  C)  Resp: 18  SpO2: 100 %      Physical  Exam  BP (!) 168/82   Pulse 89   Temp 98.3  F (36.8  C) (Oral)   Resp 21   SpO2 96%   General: alert, interactive, in no apparent distress  Head: atraumatic  Nose: no rhinorrhea or epistaxis  Ears: no external auditory canal discharge or bleeding.    Eyes: Sclera nonicteric. Conjunctiva noninjected. PERRL, EOMI  Mouth: no tonsillar erythema, edema, or exudate  Neck: supple, no palp LAD  Lungs: CTAB  CV: RRR, S1/S2; peripheral pulses palpable and symmetric  Abdomen: soft, nt, nd, no guarding or rebound. Positive bowel sounds  Extremities: no cyanosis or edema  Skin: no rash or diaphoresis  Neuro: sensation intact to light touch in UE and LEs bilaterally;       ED Course        Procedures          EKG, reviewed by myself shows sinus rhythm.  Rate 81 bpm.  No acute ischemic appearing changes.  Normal intervals.    Critical Care time:  none               Results for orders placed or performed during the hospital encounter of 05/31/20 (from the past 24 hour(s))   CBC with platelets differential   Result Value Ref Range    WBC 12.8 (H) 4.0 - 11.0 10e9/L    RBC Count 4.63 3.8 - 5.2 10e12/L    Hemoglobin 13.6 11.7 - 15.7 g/dL    Hematocrit 40.4 35.0 - 47.0 %    MCV 87 78 - 100 fl    MCH 29.4 26.5 - 33.0 pg    MCHC 33.7 31.5 - 36.5 g/dL    RDW 12.4 10.0 - 15.0 %    Platelet Count 314 150 - 450 10e9/L    Diff Method Automated Method     % Neutrophils 66.4 %    % Lymphocytes 24.0 %    % Monocytes 6.9 %    % Eosinophils 2.0 %    % Basophils 0.4 %    % Immature Granulocytes 0.3 %    Nucleated RBCs 0 0 /100    Absolute Neutrophil 8.5 (H) 1.6 - 8.3 10e9/L    Absolute Lymphocytes 3.1 0.8 - 5.3 10e9/L    Absolute Monocytes 0.9 0.0 - 1.3 10e9/L    Absolute Eosinophils 0.3 0.0 - 0.7 10e9/L    Absolute Basophils 0.1 0.0 - 0.2 10e9/L    Abs Immature Granulocytes 0.0 0 - 0.4 10e9/L    Absolute Nucleated RBC 0.0    Basic metabolic panel   Result Value Ref Range    Sodium 141 133 - 144 mmol/L    Potassium 3.5 3.4 - 5.3 mmol/L     Chloride 109 94 - 109 mmol/L    Carbon Dioxide 25 20 - 32 mmol/L    Anion Gap 7 3 - 14 mmol/L    Glucose 166 (H) 70 - 99 mg/dL    Urea Nitrogen 16 7 - 30 mg/dL    Creatinine 0.93 0.52 - 1.04 mg/dL    GFR Estimate 55 (L) >60 mL/min/[1.73_m2]    GFR Estimate If Black 64 >60 mL/min/[1.73_m2]    Calcium 8.9 8.5 - 10.1 mg/dL   Troponin I   Result Value Ref Range    Troponin I ES <0.015 0.000 - 0.045 ug/L   Nt probnp inpatient   Result Value Ref Range    N-Terminal Pro BNP Inpatient 146 0 - 1,800 pg/mL   XR Chest 2 Views    Narrative    EXAM: XR CHEST 2 VW  LOCATION: [facility]  DATE/TIME: 5/31/2020 11:20 PM    INDICATION: Shortness of breath.  COMPARISON: 05/19/2020.    FINDINGS: The heart size is normal. Thoracic aorta is calcified. The lungs are clear. No pneumothorax or pleural effusion. Degenerative disease in the spine and left shoulder.      Impression    IMPRESSION: No acute abnormality.       Medications - No data to display    Assessments & Plan (with Medical Decision Making)  87 year old female with past medical history as reviewed above, presenting to the emergency department with concerns regarding shortness of breath.  Symptoms have resolved prior to arrival in the emergency department.  Patient states that she began feeling short of breath this evening as she was getting ready for bed.  Was not exertional in nature.  There was never any pains.  Patient arrives afebrile, slightly hypertensive, otherwise normal vitals.  Normal oxygen saturation.  Do not feel that this represents pulmonary embolism.  X-ray obtained, showing no evidence for pneumonia, pleural effusion, or other acute abnormality on the chest x-ray, which was also personally reviewed.    Laboratory work-up showing no obvious cause of patient's symptoms.  CBC, BMP, BNP, and troponin are all normal.  Patient did have tachypnea per report.  Her shortness of breath improved with deep, shallower inspirations, and therefore I do feel  that anxiety is likely contributing to a significant amount of patient's symptoms.    Plan is for discharge home.  Patient encouraged to follow-up with primary care provider.  Patient scheduled to have Holter monitor, and if that does not show significant abnormality contributing to patient's shortness of breath, would consider anxiety type medication.  I discussed this with patient, who is comfortable with plan.       I have reviewed the nursing notes.    I have reviewed the findings, diagnosis, plan and need for follow up with the patient.       New Prescriptions    No medications on file       Final diagnoses:   SOB (shortness of breath)   CKD (chronic kidney disease) stage 3, GFR 30-59 ml/min (H)   Benign essential hypertension   Coronary artery disease involving native coronary artery of native heart with angina pectoris (H)       5/31/2020   [facility] EMERGENCY DEPARTMENT     Abdulaziz Christian MD  05/31/20 0700

## 2020-06-01 NOTE — ED NOTES
Lives at home alone. Tonight started feeling sob and palpations. Denies pain or other sx. Sx have resolved. Pt tried to get a hold of her son but he could come at present.

## 2020-06-01 NOTE — ED NOTES
Attempting to arrange transport home for patient. Pt son and DIL unable to provide transport according to patient. Pt reports son didn't want her coming in for evaluation and declined to drive her here or back home. [facility] [place] but due to [place], resources are depleted. Will continue to work on plan.

## 2020-06-01 NOTE — ED NOTES
Pt up to BR with SBA, pt able to walk independently using quad cane. O2 sats 96%, RR 24 upon returning to room 7. Pt denies SOA, says she thinks she was having anxiety problem earlier, also says it happened another time within the last 2 weeks.

## 2020-06-02 ENCOUNTER — HOSPITAL ENCOUNTER (OUTPATIENT)
Dept: CARDIOLOGY | Facility: CLINIC | Age: 85
Discharge: HOME | End: 2020-06-02
Attending: INTERNAL MEDICINE | Admitting: INTERNAL MEDICINE
Payer: MEDICARE

## 2020-06-02 DIAGNOSIS — R06.02 SOB (SHORTNESS OF BREATH): ICD-10-CM

## 2020-06-02 DIAGNOSIS — R00.0 TACHYCARDIA: ICD-10-CM

## 2020-06-02 PROCEDURE — 0298T ZIO PATCH HOLTER ADULT PEDIATRIC GREATER THAN 48 HRS: CPT | Performed by: INTERNAL MEDICINE

## 2020-06-02 PROCEDURE — 0296T ZIO PATCH HOLTER ADULT PEDIATRIC GREATER THAN 48 HRS: CPT

## 2020-07-08 ENCOUNTER — TELEPHONE (OUTPATIENT)
Dept: INTERNAL MEDICINE | Facility: CLINIC | Age: 85
End: 2020-07-08

## 2020-07-08 NOTE — LETTER
July 9, 2020      Daniela Brown  [id number]  [facility] [address]          Dear Daniela Brown, [id number]        This is a friendly reminder that you are due for depression follow up with a simple, quick depression questionnaire called a PHQ-9. Please complete the attached questionnaire and return it to us at your earliest convenience.     This questionnaire has two main purposes:     1)  It helps your healthcare provider to determine your response to the care we have provided you and helps us guide further management of your mental well being.    2)  Reassure your healthcare provider that your symptoms are stable, if you are no longer experiencing depression symptoms.      If you have any questions, concerns or have trouble completing this questionnaire, please reach out to us through [employer] or by calling our clinic at [phone].      Sincerely,    Dr. Maddox's care Team/SONIA Akbar (AAMA)

## 2020-07-08 NOTE — TELEPHONE ENCOUNTER
Panel Management Review      Patient has the following on her problem list:     Depression / Dysthymia review    Measure:  Needs PHQ-9 score of 4 or less during index window.  Administer PHQ-9 and if score is 5 or more, send encounter to provider for next steps.    5 - 7 month window range: 3/1/20 to 7/9/20    PHQ-9 SCORE 5/30/2019 10/25/2019 1/3/2020   PHQ-9 Total Score - - -   PHQ-9 Total Score 15 1 3       If PHQ-9 recheck is 5 or more, route to provider for next steps.    Patient is due for:  PHQ9 and DAP    Action needed:   Patient needs to do PHQ9.    Type of outreach:    Sent letter.    Questions for provider review:    None                                                                                                                                    Naomie Rios CMA (AAMA)   (aka: Bridgette Rios)       Chart routed to Care Team .

## 2020-07-17 ASSESSMENT — ANXIETY QUESTIONNAIRES
5. BEING SO RESTLESS THAT IT IS HARD TO SIT STILL: NOT AT ALL
1. FEELING NERVOUS, ANXIOUS, OR ON EDGE: NOT AT ALL
IF YOU CHECKED OFF ANY PROBLEMS ON THIS QUESTIONNAIRE, HOW DIFFICULT HAVE THESE PROBLEMS MADE IT FOR YOU TO DO YOUR WORK, TAKE CARE OF THINGS AT HOME, OR GET ALONG WITH OTHER PEOPLE: NOT DIFFICULT AT ALL
6. BECOMING EASILY ANNOYED OR IRRITABLE: NOT AT ALL
7. FEELING AFRAID AS IF SOMETHING AWFUL MIGHT HAPPEN: NOT AT ALL
GAD7 TOTAL SCORE: 0
2. NOT BEING ABLE TO STOP OR CONTROL WORRYING: NOT AT ALL
3. WORRYING TOO MUCH ABOUT DIFFERENT THINGS: NOT AT ALL

## 2020-07-17 ASSESSMENT — PATIENT HEALTH QUESTIONNAIRE - PHQ9
5. POOR APPETITE OR OVEREATING: NOT AT ALL
SUM OF ALL RESPONSES TO PHQ QUESTIONS 1-9: 3

## 2020-07-17 NOTE — TELEPHONE ENCOUNTER
Pt answer the questionnaires and mail back to us, score PHQ9 = 3 with zero on quesiton 9, GAD7 scoreszero.  Naomie Rios CMA (ROSANA)   (aka: Bridgette Rios)

## 2020-07-18 ASSESSMENT — ANXIETY QUESTIONNAIRES: GAD7 TOTAL SCORE: 0

## 2020-08-31 ENCOUNTER — OFFICE VISIT (OUTPATIENT)
Dept: FAMILY MEDICINE | Facility: CLINIC | Age: 85
End: 2020-08-31
Payer: MEDICARE

## 2020-08-31 ENCOUNTER — TELEPHONE (OUTPATIENT)
Dept: INTERNAL MEDICINE | Facility: CLINIC | Age: 85
End: 2020-08-31

## 2020-08-31 VITALS
RESPIRATION RATE: 14 BRPM | SYSTOLIC BLOOD PRESSURE: 140 MMHG | HEIGHT: 64 IN | DIASTOLIC BLOOD PRESSURE: 68 MMHG | WEIGHT: 168.4 LBS | BODY MASS INDEX: 28.75 KG/M2 | TEMPERATURE: 97.9 F | HEART RATE: 95 BPM | OXYGEN SATURATION: 97 %

## 2020-08-31 DIAGNOSIS — G60.9 IDIOPATHIC PERIPHERAL NEUROPATHY: ICD-10-CM

## 2020-08-31 DIAGNOSIS — I10 BENIGN ESSENTIAL HYPERTENSION: Primary | ICD-10-CM

## 2020-08-31 PROCEDURE — 99214 OFFICE O/P EST MOD 30 MIN: CPT | Performed by: NURSE PRACTITIONER

## 2020-08-31 RX ORDER — GABAPENTIN 300 MG/1
300 CAPSULE ORAL 3 TIMES DAILY
Qty: 90 CAPSULE | Refills: 1 | Status: SHIPPED | OUTPATIENT
Start: 2020-08-31 | End: 2020-11-11

## 2020-08-31 ASSESSMENT — MIFFLIN-ST. JEOR: SCORE: 1183.86

## 2020-08-31 NOTE — PROGRESS NOTES
Subjective     Daniela Brown is a 87 year old female who presents to clinic today for the following health issues:    HPI       Hypertension Follow-up      Do you check your blood pressure regularly outside of the clinic? Yes     Are you following a low salt diet? No    Are your blood pressures ever more than 140 on the top number (systolic) OR more   than 90 on the bottom number (diastolic), for example 140/90? Yes,       How many servings of fruits and vegetables do you eat daily?  2-3    On average, how many sweetened beverages do you drink each day (Examples: soda, juice, sweet tea, etc.  Do NOT count diet or artificially sweetened beverages)?   0    How many days per week do you exercise enough to make your heart beat faster? 3 or less    How many minutes a day do you exercise enough to make your heart beat faster? 9 or less  How many days per week do you miss taking your medication? She currently does not take her atorvastatin and did not refill her gabapentin, last dose was last night.     What makes it hard for you to take your medications?  She does not feel the gabapentin is helping, and she is unsure if she is suppose to be taking the atorvastatin     Above HPI reviewed. Additionally, awoke with a HA this morning, took BP and it was 200 systolic. Did not write down diastolic. Took one ASA, HA resolved. Took BP again, 153/73 an hour after HA resolved then 140/79 an hour after that. Last took amlodipine at 2130 last night. BP in clinic 140/68. No current HA, no visual changes, dizziness, chest pain, palpitations at any point.    Wonders if she is supposed to be taking her atorvastatin. We looked back through OV notes, and it appears this was discontinued in May due to leg pain, and it was going to be revisited at a future appointment. She will stay off this for now until she follows up with her PCP.    Has been taking gabapentin for neuropathy of her bilateral feet. Has been taking 600mg at  bedtime. Not sure if this is helpful as it persists throughout the day. She denies any side effects of the medication.     Patient Active Problem List   Diagnosis     Degeneration of lumbar or lumbosacral intervertebral disc     Esophageal reflux     Memory loss     Irritable bowel syndrome with diarrhea     Osteopenia of multiple sites     Anxiety and depression     RESTLESS LEG SYNDROME     Generalized osteoarthrosis, unspecified site     Diverticulosis of large intestine     SENSONRL HEAR LOSS,BILAT     Urticaria     Subjective tinnitus     Vitamin D deficiency     Right foot pain     Urge incontinence     Hyperlipidemia LDL goal <100     Allergic rhinitis     Pronation of foot     Peripheral neuropathy     Benign essential hypertension     Carpal tunnel syndrome of left wrist     Diastolic dysfunction     Type 2 diabetes mellitus with diabetic polyneuropathy, without long-term current use of insulin (H)     History of recurrent urinary tract infection     CKD (chronic kidney disease) stage 3, GFR 30-59 ml/min (H)     Arthritis of spine     Bilateral wrist pain     Protrusion of lumbar intervertebral disc     Coronary artery disease involving native coronary artery of native heart with angina pectoris (H)     Chronic left-sided low back pain with left-sided sciatica     Generalized weakness     Diarrhea     Abnormal CT of the abdomen     Cystocele, midline     B12 deficiency     Current Outpatient Medications   Medication     acetaminophen (TYLENOL) 325 MG tablet     amLODIPine (NORVASC) 5 MG tablet     blood glucose (ACCU-CHEK GUIDE) test strip     blood glucose monitoring (ACCU-CHEK FASTCLIX) lancets     ClonazePAM (KLONOPIN PO)     Cyanocobalamin (B-12) 1000 MCG SUBL     dicyclomine (BENTYL) 20 MG tablet     gabapentin (NEURONTIN) 300 MG capsule     Multiple Vitamins-Minerals (THERAPEUTIC MULTIVIT/MINERAL) TABS     nitroGLYcerin (NITROSTAT) 0.4 MG sublingual tablet     order for DME     ORDER FOR DME      "STATIN NOT PRESCRIBED (INTENTIONAL)     VITAMIN D, CHOLECALCIFEROL, PO     atorvastatin (LIPITOR) 40 MG tablet     losartan (COZAAR) 50 MG tablet     No current facility-administered medications for this visit.          Review of Systems   Constitutional, HEENT, cardiovascular, pulmonary, gi and gu systems are negative, except as otherwise noted.      Objective    BP (!) 140/68 (BP Location: Left arm, Patient Position: Sitting, Cuff Size: Adult Regular)   Pulse 95   Temp 97.9  F (36.6  C) (Tympanic)   Resp 14   Ht 1.626 m (5' 4\")   Wt 76.4 kg (168 lb 6.4 oz)   SpO2 97%   BMI 28.91 kg/m    Body mass index is 28.91 kg/m .  Physical Exam  Vitals signs and nursing note reviewed.   Constitutional:       General: She is not in acute distress.     Appearance: Normal appearance.   HENT:      Head: Normocephalic and atraumatic.      Mouth/Throat:      Mouth: Mucous membranes are moist.   Neck:      Musculoskeletal: Neck supple.   Cardiovascular:      Rate and Rhythm: Normal rate.   Pulmonary:      Effort: Pulmonary effort is normal.   Feet:      Right foot:      Skin integrity: Skin integrity normal.      Left foot:      Skin integrity: Skin integrity normal.   Skin:     General: Skin is warm and dry.   Neurological:      General: No focal deficit present.      Mental Status: She is alert and oriented to person, place, and time.      Cranial Nerves: Cranial nerves are intact.      Sensory: Sensation is intact.      Coordination: Coordination is intact.   Psychiatric:         Mood and Affect: Mood normal.         Behavior: Behavior normal.                  Assessment & Plan     Benign essential hypertension  BP is nearly at goal during her follow up readings this morning and in the office. She takes her medication at bedtime, so has not had it since last night. I hesitate to adjust her medication without further readings as I don't want to cause hypotension. We will have her record BPs daily and call us with a week of " readings and then we can determine if we need to adjust her medications.    Idiopathic peripheral neuropathy  Instead of taking 600mg at night, we can try 300mg 3 times daily to see if this improves her symptoms. She will follow up with her PCP if her symptoms persist.  - gabapentin (NEURONTIN) 300 MG capsule; Take 1 capsule (300 mg) by mouth 3 times daily       Patient Instructions   Keep track of blood pressures once a day for the next few days, and let us know how they are. We may need to adjust your medicine.  Stay off of your atorvastatin.  Change gabapentin to one capsule 3 times daily.      Return in about 1 week (around 9/7/2020) for worsening or continued symptoms.    CYN [facility]

## 2020-08-31 NOTE — NURSING NOTE
"Initial BP (!) 140/68 (BP Location: Left arm, Patient Position: Sitting, Cuff Size: Adult Regular)   Pulse 95   Temp 97.9  F (36.6  C) (Tympanic)   Resp 14   Ht 1.626 m (5' 4\")   Wt 76.4 kg (168 lb 6.4 oz)   SpO2 97%   BMI 28.91 kg/m   Estimated body mass index is 28.91 kg/m  as calculated from the following:    Height as of this encounter: 1.626 m (5' 4\").    Weight as of this encounter: 76.4 kg (168 lb 6.4 oz). .      "

## 2020-08-31 NOTE — PATIENT INSTRUCTIONS
Keep track of blood pressures once a day for the next few days, and let us know how they are. We may need to adjust your medicine.  Stay off of your atorvastatin.  Change gabapentin to one capsule 3 times daily.

## 2020-08-31 NOTE — TELEPHONE ENCOUNTER
Patient states she has diabetes and the neuropathy in her feet is really bad and the Gabapentin is not working. She is also having left sided back pain that goes down into her foot. She then told me she started having a headache about an hour and a half ago and it is one of the worst headaches with sharpness that she has had. She took an aspirin and put a cold washcloth on her head but that has not helped. She took her blood pressure and it was 153/72. She has been taking her amlodipine as prescribed. She denies any chest pain, nausea, shortness of breath, or jaw/arm pain. Advised she needs to be seen at the ER for her headache. She states she does not have a ride and doesn't feel she should drive. Advised her to call 911 so they can help her get to the ER. She agrees with this plan.    Joaquina Castellanos RN

## 2020-08-31 NOTE — TELEPHONE ENCOUNTER
Reason for call:  Patient reporting a symptom    Symptom or request: **Pain in lower left side of back to leg and ankle; Gabapentin not helping neuropathy in feet.*    Duration (how long have symptoms been present): One month since 7/3-/2020    Have you been treated for this before? No    Additional comments: Gabapentin not helping neuropathy in feet. Would like different med.    Phone Number patient can be reached at:  Home number on file [phone] (home)    Best Time:  Any    Can we leave a detailed message on this number:  YES    Call taken on 8/31/2020 at 10:54 AM by Nasrin Encarnacion

## 2020-09-04 ENCOUNTER — TELEPHONE (OUTPATIENT)
Dept: INTERNAL MEDICINE | Facility: CLINIC | Age: 85
End: 2020-09-04

## 2020-09-04 DIAGNOSIS — I10 BENIGN ESSENTIAL HYPERTENSION: ICD-10-CM

## 2020-09-04 DIAGNOSIS — M54.42 ACUTE LEFT-SIDED LOW BACK PAIN WITH LEFT-SIDED SCIATICA: Primary | ICD-10-CM

## 2020-09-04 RX ORDER — LOSARTAN POTASSIUM 50 MG/1
50 TABLET ORAL 2 TIMES DAILY
Qty: 180 TABLET | Refills: 3 | Status: SHIPPED | OUTPATIENT
Start: 2020-09-04 | End: 2020-09-08 | Stop reason: SINTOL

## 2020-09-04 NOTE — TELEPHONE ENCOUNTER
Reason for Call:  Blood Pressure Readings    Detailed comments: patient is calling and stating that Dr. Maddox wanted her to call in her daily blood pressures today.   Tues: 153/75  Wed: 139/77  Thurs:  138/72   Friday : 142/60    Phone Number Patient can be reached at: Home number on file [phone] (home)    Best Time: any    Can we leave a detailed message on this number? YES   Nasrin Rosario  Clinic [facility] [place]       Call taken on 9/4/2020 at 2:10 PM by Nasrin Clifton

## 2020-09-04 NOTE — TELEPHONE ENCOUNTER
Patient informed of message below from provider.    Patient reports she is taking Only Amlodipine 5 mg ONCE daily and provider discontinued Losartan a while back.   Per Epic review andPer 5/12/20 dictation:  Assessment/Plan:  1. Benign essential hypertension - possible med side effects of losartan - shortness of breath and leg weakness.  Stop the losartan and increase amlodipine to 5 mg BID.  Follow-up in clinic for face to face visit if symptoms persist, ok to change to virtual follow-up if symptoms have rsolved  - amLODIPine (NORVASC) 5 MG tablet; Take 1 tablet (5 mg) by mouth 2 times daily  Dispense: 180 tablet; Refill: 3    What should patient taking?     Provider please review and advise. Thank you.

## 2020-09-08 NOTE — TELEPHONE ENCOUNTER
Dr. Maddox,    Patient is contacted about her amlodipine adjustment to two 5 mg tabs a day.  To stay off the Losartan.  Patient states she is having left lower back pain into her buttock and down her left leg and into her ankle.  She would like a LASHAE.  Rates her pain a 9/10.  Started about 3 weeks ago has been trying home remedies. Marisela NICHOLSON RN

## 2020-09-09 NOTE — TELEPHONE ENCOUNTER
Order placed for epidural.  MRI is required within the last 3 years and I could not find one on file for her, order placed for MRI

## 2020-09-10 ENCOUNTER — TELEPHONE (OUTPATIENT)
Dept: PALLIATIVE MEDICINE | Facility: CLINIC | Age: 85
End: 2020-09-10

## 2020-09-10 ENCOUNTER — TELEPHONE (OUTPATIENT)
Dept: INTERNAL MEDICINE | Facility: CLINIC | Age: 85
End: 2020-09-10

## 2020-09-10 DIAGNOSIS — M54.42 ACUTE LEFT-SIDED LOW BACK PAIN WITH LEFT-SIDED SCIATICA: Primary | ICD-10-CM

## 2020-09-10 NOTE — TELEPHONE ENCOUNTER
Reason for Call:  Severe back pain    Detailed comments: patients son Wally is calling and stating that his mother was suppose to have an MRI tomorrow, but she will not go in the tube. They would need open sided. Patients son does not want her to have it now, as she is in so much pain. She needs an injection in her back at [facility]. Please advise. Son states that he doesn't want his mother to go out with Covid.     Phone Number Patient can be reached at: Other phone number:  [phone]    Best Time: any    Can we leave a detailed message on this number? YES   Nasrin [facility]  Clinic [facility] [place]       Call taken on 9/10/2020 at 10:12 AM by Nasrin Clifton

## 2020-09-10 NOTE — TELEPHONE ENCOUNTER
Dr. Maddox,    I have spoken to the patient's son Wally.  He states his mother had a MRI of her back in 2018 with [facility] and they will do a LASHAE if we send a order.  Main number is [phone].  Our original order is for [facility]. Marisela NICHOLSON RN

## 2020-09-10 NOTE — TELEPHONE ENCOUNTER
Son notified.  He states he does not want MRI for patient, she wont sit still.  Send Pain management referral to [facility] - done by this RN.    Helen SALAZAR RN BSN

## 2020-10-06 ENCOUNTER — NURSE TRIAGE (OUTPATIENT)
Dept: INTERNAL MEDICINE | Facility: CLINIC | Age: 85
End: 2020-10-06

## 2020-10-06 NOTE — TELEPHONE ENCOUNTER
"  Additional Information    Negative: Difficult to awaken or acting confused (e.g., disoriented, slurred speech)    Negative: Sounds like a life-threatening emergency to the triager    Negative: Shingles rash of face and eye pain or blurred vision    Negative: Shingles rash on the eyelid or tip of the nose    Negative: Shingles rash and spots start appearing other places on body    Negative: Patient sounds very sick or weak to the triager    Negative: Localized rash and doesn't match the SYMPTOMS of shingles    Negative: Back pain and doesn't match the SYMPTOMS of shingles    Negative: Shingles Vaccine (Recombinant Zoster Vaccine; RZV; Shingrix), questions about    Negative: Shingles rash (matches SYMPTOMS) and weak immune system (e.g., HIV positive,  cancer chemotherapy, chronic steroid treatment, splenectomy) and NOT taking antiviral medication    Negative: Shingles rash of face and facial weakness    Negative: Shingles rash of face or ear and earache or ringing in the ear    Negative: Fever > 100.5 F (38.1 C)    Negative: SEVERE pain (e.g., excruciating)    Negative: Shingles rash (matches SYMPTOMS) and onset within past 72 hours    Negative: Looks infected (spreading redness, pus) and no fever    Negative: Patient wants to be seen    Answer Assessment - Initial Assessment Questions  1. APPEARANCE of RASH: \"Describe the rash.\"       Little red pimples and it itches like crazy.   2. LOCATION: \"Where is the rash located?\"       Left arm, just below my elbow. Front of my neck, underneath my chin. She said the L side itches but \"I didn't see anything.\"   3. ONSET: \"When did the rash start?\"       About a month ago, on the left side, on her scalp above ear.  4. ITCHING: \"Does the rash itch?\" If so, ask: \"How bad is the itch?\"  (Scale 1-10; or mild, moderate, severe)      Yes  5. PAIN: \"Does the rash hurt?\" If so, ask: \"How bad is the pain?\"  (Scale 1-10; or mild, moderate, severe)      \"It doesn't seem to be.\"   6. " "OTHER SYMPTOMS: \"Do you have any other symptoms?\" (e.g., fever)      No  7. PREGNANCY: \"Is there any chance you are pregnant?\" \"When was your last menstrual period?\"      86 y/o    Protocols used: SHINGLES-A-OH    Pt cannot do a Video Visit (no smart phone) or an E-Visit (no computer), so an Office Visit is needed. Transferred to scheduling to arrange an appt within the next 2-3 days. Discussed she can use Benadryl for itching but discussed that it may make her feel drowsy.       Karen LINARES RN, BSN      "

## 2020-10-06 NOTE — TELEPHONE ENCOUNTER
Reason for call:  Patient reporting a symptom    Symptom or request: Itchy rash on left side,   Duration (how long have symptoms been present): 3 weeks ago    Have you been treated for this before? No    Additional comments: Patient thinks she has Shingles, because her sister just had it and it seems like Shingles to her sister.      Phone Number patient can be reached at:  Home number on file [phone] (home)    Best Time:  Any    Can we leave a detailed message on this number:  YES    Call taken on 10/6/2020 at 10:48 AM by Nasrin Encarnacion

## 2020-10-08 ENCOUNTER — TELEPHONE (OUTPATIENT)
Dept: INTERNAL MEDICINE | Facility: CLINIC | Age: 85
End: 2020-10-08

## 2020-10-08 NOTE — TELEPHONE ENCOUNTER
I spoke with son Wally.    He is concerned that pt needs to be seen but he cannot transport her.  He asks for a nurse to come to the home and evaluate her.    I informed that we are unable to do this but that I would speak with pt directly regarding her concerns.    Pt has appt tomdeirdre at noon.    Sandy Turner RN

## 2020-10-08 NOTE — TELEPHONE ENCOUNTER
Reason for call:  Patient reporting a symptom    Symptom or request: Pt's son Wally calling - ELIZABETH on file. Pt is feeling weak and not well and thinks that she has shingles.  Wally wants a nurse to come to her home.  Please call patient's son and advise.      Duration (how long have symptoms been present): ongoing    Have you been treated for this before? Yes    Additional comments:     Phone Number patient's son can be reached at:  Other phone number:  [phone]    Best Time:  any    Can we leave a detailed message on this number:  YES    Call taken on 10/8/2020 at 1:56 PM by Betty Solomon

## 2020-10-08 NOTE — PROGRESS NOTES
"Subjective     Daniela Brown is a 87 year old female who presents to clinic today for the following health issues:        HPI         Genitourinary - Female  Onset/Duration: 3 weeks   Description:   Painful urination (Dysuria): no           Frequency: YES- has been going every 15 minutes. States her urine has been bright orange. She has been having incontinence at night.   Blood in urine (Hematuria): no  Delay in urine (Hesitency): no  Intensity: mild  Progression of Symptoms:  worsening  Accompanying Signs & Symptoms:  Fever/chills: no  Flank pain: no  Nausea and vomiting: no  Vaginal symptoms: none  Abdominal/Pelvic Pain: no  History:   History of frequent UTI s: YES  History of kidney stones: no  Sexually Active: no  Possibility of pregnancy: No  Precipitating or alleviating factors: None  Therapies tried and outcome:  none    Rash  Onset/Duration:   Description 3 weeks, symptoms started with a itch in her head, this is gone now.   Location: left arm and neck   Character: red bumps   Itching: moderate  Intensity:  moderate  Progression of Symptoms:  Improving   Accompanying signs and symptoms:   Fever: no  Body aches or joint pain: no  Sore throat symptoms: no  Recent cold symptoms: no  History:           Previous episodes of similar rash: None  New exposures:  None  Recent travel: no  Exposure to similar rash: no  Therapies tried and outcome: hydrocortisone cream -  Effective, Benadryl cream     LEG WEAKNESS: She has had weakness in her legs for the past week. States without her cane she cant walk.       Review of Systems   Constitutional, HEENT, cardiovascular, pulmonary, gi and gu systems are negative, except as otherwise noted.      Objective    /70 (BP Location: Right arm, Patient Position: Sitting, Cuff Size: Adult Large)   Pulse 95   Temp 98.3  F (36.8  C) (Tympanic)   Resp 16   Ht 1.626 m (5' 4\")   Wt 75.3 kg (166 lb)   SpO2 96%   BMI 28.49 kg/m    Body mass index is 28.49 " kg/m .  Physical Exam   GENERAL: healthy, alert and no distress  CV: regular rates and rhythm, normal S1 S2, no S3 or S4, no murmur, click or rub, peripheral pulses strong and slight bilateral lower extremity pitting edema to LE    ABDOMEN: soft, nontender  MS: no gross musculoskeletal defects noted, no edema  SKIN: mild erythematous rash with papules on the left side upper arm, alteral side   NEURO: Normal strength and tone, mentation intact and speech normal  PSYCH: mentation appears normal, affect normal/bright    Results for orders placed or performed in visit on 10/09/20   *UA reflex to Microscopic and Culture ([place] and [facility] (except [place] and [place])     Status: Abnormal    Specimen: Midstream Urine   Result Value Ref Range    Color Urine Yellow     Appearance Urine Clear     Glucose Urine Negative NEG^Negative mg/dL    Bilirubin Urine Negative NEG^Negative    Ketones Urine Negative NEG^Negative mg/dL    Specific Gravity Urine 1.015 1.003 - 1.035    Blood Urine Negative NEG^Negative    pH Urine 6.0 5.0 - 7.0 pH    Protein Albumin Urine Negative NEG^Negative mg/dL    Urobilinogen Urine 0.2 0.2 - 1.0 EU/dL    Nitrite Urine Negative NEG^Negative    Leukocyte Esterase Urine Trace (A) NEG^Negative    Source Midstream Urine    Urine Microscopic     Status: Abnormal   Result Value Ref Range    WBC Urine 0 - 5 OTO5^0 - 5 /HPF    RBC Urine O - 2 OTO2^O - 2 /HPF    Squamous Epithelial /LPF Urine Few FEW^Few /LPF    Bacteria Urine Few (A) NEG^Negative /HPF    Mucous Urine Present (A) NEG^Negative /LPF   Basic metabolic panel  (Ca, Cl, CO2, Creat, Gluc, K, Na, BUN)     Status: Abnormal   Result Value Ref Range    Sodium 134 133 - 144 mmol/L    Potassium 3.7 3.4 - 5.3 mmol/L    Chloride 102 94 - 109 mmol/L    Carbon Dioxide 26 20 - 32 mmol/L    Anion Gap 6 3 - 14 mmol/L    Glucose 115 (H) 70 - 99 mg/dL    Urea Nitrogen 21 7 - 30 mg/dL    Creatinine 1.12 (H) 0.52 - 1.04 mg/dL    GFR Estimate 44 (L) >60  mL/min/[1.73_m2]    GFR Estimate If Black 51 (L) >60 mL/min/[1.73_m2]    Calcium 9.1 8.5 - 10.1 mg/dL   CBC with platelets and differential     Status: Abnormal   Result Value Ref Range    WBC 12.1 (H) 4.0 - 11.0 10e9/L    RBC Count 4.39 3.8 - 5.2 10e12/L    Hemoglobin 12.2 11.7 - 15.7 g/dL    Hematocrit 37.2 35.0 - 47.0 %    MCV 85 78 - 100 fl    MCH 27.8 26.5 - 33.0 pg    MCHC 32.8 31.5 - 36.5 g/dL    RDW 13.1 10.0 - 15.0 %    Platelet Count 228 150 - 450 10e9/L    % Neutrophils 70.4 %    % Lymphocytes 20.2 %    % Monocytes 7.0 %    % Eosinophils 2.2 %    % Basophils 0.2 %    Absolute Neutrophil 8.5 (H) 1.6 - 8.3 10e9/L    Absolute Lymphocytes 2.4 0.8 - 5.3 10e9/L    Absolute Monocytes 0.8 0.0 - 1.3 10e9/L    Absolute Eosinophils 0.3 0.0 - 0.7 10e9/L    Absolute Basophils 0.0 0.0 - 0.2 10e9/L    Diff Method Automated Method    Urine Culture Aerobic Bacterial     Status: None    Specimen: Midstream Urine   Result Value Ref Range    Specimen Description Midstream Urine     Special Requests Specimen received in preservative     Culture Micro <10,000 colonies/mL  mixed urogenital melvi              Assessment & Plan     Urinary frequency    - UA reflex to Microscopic and Culture ([place] and [facility] (except [place] and [place])  - Urine Microscopic  - Urine Culture Aerobic Bacterial  - nitroFURantoin macrocrystal-monohydrate (MACROBID) 100 MG capsule; Take 1 capsule (100 mg) by mouth 2 times daily for 3 days  - Basic metabolic panel  (Ca, Cl, CO2, Creat, Gluc, K, Na, BUN)  - CBC with platelets and differential    Dermatitis    - triamcinolone (KENALOG) 0.1 % external cream; Apply topically 2 times daily    Weakness of both lower extremities  -unclear etiology, exam and labs normal, possibly due to mild UTI, if still no improvement will consider physical therapy   - Basic metabolic panel  (Ca, Cl, CO2, Creat, Gluc, K, Na, BUN)  - CBC with platelets and differential  - Vitamin B12 FUTURE 2mo; Future         See Patient Instructions      CYN Pandya [facility]

## 2020-10-08 NOTE — TELEPHONE ENCOUNTER
S-(situation): I spoke with pt.  Pt has 3 complaints:  1.  Pt complains that her legs have been weak for two weeks.  She insists that she feels safe and is not at risk of falling or fainting.  2. Pt reports that she has been experiencing increased urinary frequency and darker urine in the overnight hours for 2-3 months.  Pt says that she is experiencing urinary leakage in general and incontinence overnight.  She wakes up with pads saturated in urine.  Pt reminds that she has history of UTI's  3. Pt reports a skin rash that started 4-5 days ago.  It started on her left arm just above her elbow and has spread up her arm and into the left side of her neck.  Pt says that the rash is VERY itchy but not painful.  The rash is very persistent and bothersome.  Pt suspects it is shingles but states she has never had them before.   4.  Pt and son ask for home care referral?  They believe that the time has come that pt will benefit from some home services if she qualifies.    B-(background): per above    A-(assessment): leg weakness and urinary frequency for 2 + weeks.  Skin rash suspicious for shingles x 4-5 days.  Requests referral to home care.    R-(recommendations): Advised to keep appt tomorrow as planned.    If worsens in the meantime, will need ED eval.    Pt agrees.  Since son is not available to transport, pt is asking a neighbor or friend to give her a ride.  If she cannot get a ride, pt will call 911.    Sandy Turner RN

## 2020-10-09 ENCOUNTER — OFFICE VISIT (OUTPATIENT)
Dept: FAMILY MEDICINE | Facility: CLINIC | Age: 85
End: 2020-10-09
Payer: MEDICARE

## 2020-10-09 VITALS
TEMPERATURE: 98.3 F | SYSTOLIC BLOOD PRESSURE: 138 MMHG | RESPIRATION RATE: 16 BRPM | HEIGHT: 64 IN | BODY MASS INDEX: 28.34 KG/M2 | OXYGEN SATURATION: 96 % | HEART RATE: 95 BPM | WEIGHT: 166 LBS | DIASTOLIC BLOOD PRESSURE: 70 MMHG

## 2020-10-09 DIAGNOSIS — R29.898 WEAKNESS OF BOTH LOWER EXTREMITIES: ICD-10-CM

## 2020-10-09 DIAGNOSIS — L30.9 DERMATITIS: ICD-10-CM

## 2020-10-09 DIAGNOSIS — R35.0 URINARY FREQUENCY: Primary | ICD-10-CM

## 2020-10-09 LAB
ALBUMIN UR-MCNC: NEGATIVE MG/DL
ANION GAP SERPL CALCULATED.3IONS-SCNC: 6 MMOL/L (ref 3–14)
APPEARANCE UR: CLEAR
BACTERIA #/AREA URNS HPF: ABNORMAL /HPF
BASOPHILS # BLD AUTO: 0 10E9/L (ref 0–0.2)
BASOPHILS NFR BLD AUTO: 0.2 %
BILIRUB UR QL STRIP: NEGATIVE
BUN SERPL-MCNC: 21 MG/DL (ref 7–30)
CALCIUM SERPL-MCNC: 9.1 MG/DL (ref 8.5–10.1)
CHLORIDE SERPL-SCNC: 102 MMOL/L (ref 94–109)
CO2 SERPL-SCNC: 26 MMOL/L (ref 20–32)
COLOR UR AUTO: YELLOW
CREAT SERPL-MCNC: 1.12 MG/DL (ref 0.52–1.04)
DIFFERENTIAL METHOD BLD: ABNORMAL
EOSINOPHIL # BLD AUTO: 0.3 10E9/L (ref 0–0.7)
EOSINOPHIL NFR BLD AUTO: 2.2 %
ERYTHROCYTE [DISTWIDTH] IN BLOOD BY AUTOMATED COUNT: 13.1 % (ref 10–15)
GFR SERPL CREATININE-BSD FRML MDRD: 44 ML/MIN/{1.73_M2}
GLUCOSE SERPL-MCNC: 115 MG/DL (ref 70–99)
GLUCOSE UR STRIP-MCNC: NEGATIVE MG/DL
HCT VFR BLD AUTO: 37.2 % (ref 35–47)
HGB BLD-MCNC: 12.2 G/DL (ref 11.7–15.7)
HGB UR QL STRIP: NEGATIVE
KETONES UR STRIP-MCNC: NEGATIVE MG/DL
LEUKOCYTE ESTERASE UR QL STRIP: ABNORMAL
LYMPHOCYTES # BLD AUTO: 2.4 10E9/L (ref 0.8–5.3)
LYMPHOCYTES NFR BLD AUTO: 20.2 %
MCH RBC QN AUTO: 27.8 PG (ref 26.5–33)
MCHC RBC AUTO-ENTMCNC: 32.8 G/DL (ref 31.5–36.5)
MCV RBC AUTO: 85 FL (ref 78–100)
MONOCYTES # BLD AUTO: 0.8 10E9/L (ref 0–1.3)
MONOCYTES NFR BLD AUTO: 7 %
MUCOUS THREADS #/AREA URNS LPF: PRESENT /LPF
NEUTROPHILS # BLD AUTO: 8.5 10E9/L (ref 1.6–8.3)
NEUTROPHILS NFR BLD AUTO: 70.4 %
NITRATE UR QL: NEGATIVE
NON-SQ EPI CELLS #/AREA URNS LPF: ABNORMAL /LPF
PH UR STRIP: 6 PH (ref 5–7)
PLATELET # BLD AUTO: 228 10E9/L (ref 150–450)
POTASSIUM SERPL-SCNC: 3.7 MMOL/L (ref 3.4–5.3)
RBC # BLD AUTO: 4.39 10E12/L (ref 3.8–5.2)
RBC #/AREA URNS AUTO: ABNORMAL /HPF
SODIUM SERPL-SCNC: 134 MMOL/L (ref 133–144)
SOURCE: ABNORMAL
SP GR UR STRIP: 1.01 (ref 1–1.03)
UROBILINOGEN UR STRIP-ACNC: 0.2 EU/DL (ref 0.2–1)
WBC # BLD AUTO: 12.1 10E9/L (ref 4–11)
WBC #/AREA URNS AUTO: ABNORMAL /HPF

## 2020-10-09 PROCEDURE — 99214 OFFICE O/P EST MOD 30 MIN: CPT | Performed by: NURSE PRACTITIONER

## 2020-10-09 PROCEDURE — 85025 COMPLETE CBC W/AUTO DIFF WBC: CPT | Performed by: NURSE PRACTITIONER

## 2020-10-09 PROCEDURE — 87086 URINE CULTURE/COLONY COUNT: CPT | Performed by: NURSE PRACTITIONER

## 2020-10-09 PROCEDURE — 80048 BASIC METABOLIC PNL TOTAL CA: CPT | Performed by: NURSE PRACTITIONER

## 2020-10-09 PROCEDURE — 81001 URINALYSIS AUTO W/SCOPE: CPT | Performed by: NURSE PRACTITIONER

## 2020-10-09 PROCEDURE — 36415 COLL VENOUS BLD VENIPUNCTURE: CPT | Performed by: NURSE PRACTITIONER

## 2020-10-09 RX ORDER — TRIAMCINOLONE ACETONIDE 1 MG/G
CREAM TOPICAL 2 TIMES DAILY
Qty: 30 G | Refills: 0 | Status: SHIPPED | OUTPATIENT
Start: 2020-10-09 | End: 2020-12-29

## 2020-10-09 RX ORDER — NITROFURANTOIN 25; 75 MG/1; MG/1
100 CAPSULE ORAL 2 TIMES DAILY
Qty: 6 CAPSULE | Refills: 0 | Status: SHIPPED | OUTPATIENT
Start: 2020-10-09 | End: 2020-10-12

## 2020-10-09 ASSESSMENT — MIFFLIN-ST. JEOR: SCORE: 1172.97

## 2020-10-09 NOTE — PATIENT INSTRUCTIONS
Urine showed bacteria, but no other changes concerning for UTI    Recommend to start Macrobid 100 mg twice daily for 3 days and will wait for urine culture results.    Continue to drink enough fluids    Labs today    Apply Kenalog cream twice daily for up to 2 weeks on your left arm

## 2020-10-10 LAB
BACTERIA SPEC CULT: NORMAL
Lab: NORMAL
SPECIMEN SOURCE: NORMAL

## 2020-10-13 ENCOUNTER — TELEPHONE (OUTPATIENT)
Dept: INTERNAL MEDICINE | Facility: CLINIC | Age: 85
End: 2020-10-13

## 2020-10-13 DIAGNOSIS — E11.42 TYPE 2 DIABETES MELLITUS WITH DIABETIC POLYNEUROPATHY, WITHOUT LONG-TERM CURRENT USE OF INSULIN (H): Chronic | ICD-10-CM

## 2020-10-13 DIAGNOSIS — F41.9 ANXIETY AND DEPRESSION: ICD-10-CM

## 2020-10-13 DIAGNOSIS — E78.5 HYPERLIPIDEMIA LDL GOAL <100: Chronic | ICD-10-CM

## 2020-10-13 DIAGNOSIS — G60.9 IDIOPATHIC PERIPHERAL NEUROPATHY: ICD-10-CM

## 2020-10-13 DIAGNOSIS — R53.1 GENERALIZED WEAKNESS: ICD-10-CM

## 2020-10-13 DIAGNOSIS — F32.A ANXIETY AND DEPRESSION: ICD-10-CM

## 2020-10-13 DIAGNOSIS — G89.29 CHRONIC LEFT-SIDED LOW BACK PAIN WITH LEFT-SIDED SCIATICA: Chronic | ICD-10-CM

## 2020-10-13 DIAGNOSIS — R41.3 MEMORY LOSS: ICD-10-CM

## 2020-10-13 DIAGNOSIS — I10 BENIGN ESSENTIAL HYPERTENSION: Primary | Chronic | ICD-10-CM

## 2020-10-13 DIAGNOSIS — K58.0 IRRITABLE BOWEL SYNDROME WITH DIARRHEA: Chronic | ICD-10-CM

## 2020-10-13 DIAGNOSIS — M54.42 CHRONIC LEFT-SIDED LOW BACK PAIN WITH LEFT-SIDED SCIATICA: Chronic | ICD-10-CM

## 2020-10-13 DIAGNOSIS — I25.119 CORONARY ARTERY DISEASE INVOLVING NATIVE CORONARY ARTERY OF NATIVE HEART WITH ANGINA PECTORIS (H): Chronic | ICD-10-CM

## 2020-10-13 NOTE — TELEPHONE ENCOUNTER
Cell phone number is [facility] number.   Contacted patient, informed of message below from provider.     Patient states she wants to now if Dr. Maddox will prescribe something for her leg pain and leg weakness.  Advised patient home care would do an assessment to check if she needs Physical therapy for leg strengthening. Advised patient she would need to be seen, patient would like message sent to provider anyways.     Provider please review and advise. Thank you.

## 2020-10-13 NOTE — TELEPHONE ENCOUNTER
Reason for Call:  Other call back    Detailed comments: Patient is calling asking if PCP can put in Homecare orders she is asking for some help at home    Phone Number Patient can be reached at: Home number on file [phone] (home)    Best Time: any    Can we leave a detailed message on this number? YES    Call taken on 10/13/2020 at 9:11 AM by Aimee Oneill

## 2020-10-13 NOTE — TELEPHONE ENCOUNTER
Patient is homebound, her son does not allow her to drive anymore. He gets her groceries. She complains of legs feeling weak and this has gone on some time but worse lately. Had some diarrhea 2-3 times per day with recent antibiotic. She feels depressed at times and not doing exercises like she knows she should. Decreased appetite but trying to push fluids. She lives alone and wants someone to check on her occasionally. Can we get a home care referral? Kari Arreola RN

## 2020-10-15 ENCOUNTER — TELEPHONE (OUTPATIENT)
Dept: INTERNAL MEDICINE | Facility: CLINIC | Age: 85
End: 2020-10-15

## 2020-10-15 NOTE — TELEPHONE ENCOUNTER
[facility] now requests orders and shares plan of care/discharge summaries for some patients through [email or web address].  Please REPLY TO THIS MESSAGE OR ROUTE BACK TO THE AUTHOR in order to give authorization for orders when needed.  This is considered a verbal order, you will still receive a faxed copy of orders for signature.  Thank you for your assistance in improving collaboration for our patients.    OT/PT eval and treat, MSW for community services,  SN 2 week 2, 1 week 2, 2prn

## 2020-10-19 ENCOUNTER — TELEPHONE (OUTPATIENT)
Dept: INTERNAL MEDICINE | Facility: CLINIC | Age: 85
End: 2020-10-19

## 2020-10-19 NOTE — TELEPHONE ENCOUNTER
[facility] now requests orders and shares plan of care/discharge summaries for some patients through [email or web address].  Please REPLY TO THIS MESSAGE OR ROUTE BACK TO THE AUTHOR in order to give authorization for orders when needed.  This is considered a verbal order, you will still receive a faxed copy of orders for signature.  Thank you for your assistance in improving collaboration for our patients.      Pt had a prescription for anxiety from her previous doctor, who has since retired. She is all out of her medication and states this works really well for her and she used her last one on Saturday. She was wondering if you could call her in a prescription for Clonazepam 0.5 mg 1 pill 2 times a day prn.     Please call pt with any questions.     Thank you ,  Cinthya Salinas RN  [phone]  [email or web address]

## 2020-10-19 NOTE — TELEPHONE ENCOUNTER
[facility] now requests orders and shares plan of care/discharge summaries for some patients through [employer].  Please REPLY TO THIS MESSAGE OR ROUTE BACK TO THE AUTHOR in order to give authorization for orders when needed.  This is considered a verbal order, you will still receive a faxed copy of orders for signature.  Thank you for your assistance in improving collaboration for our patients.    FYI...patient reported at home care visit today she is only taking Amlodipine 5mg one time per day instead of 2 times per day as ordered. Pt reports if she takes this in the morning she gets too sleepy during the day. Pt reports she has a BP machine at home and has been monitoring her BP daily. BP at visit today was 140/72.    Thank you,  Cinthya Salinas RN  [phone]  [email or web address]

## 2020-10-19 NOTE — TELEPHONE ENCOUNTER
Covering for PCP (out of clinic today):  Since this is a controlled medication, she should schedule an appointment with her PCP to discuss.  A virtual appointment should be fine.    Thanks,  Long Kemp MD

## 2020-10-19 NOTE — TELEPHONE ENCOUNTER
Covering for PCP (out of clinic today):  If she gets sleepy from the amlodipine in the morning, it would be fine if she takes all 10mg before bedtime.    Thanks,  Long Kemp MD

## 2020-10-20 ENCOUNTER — TELEPHONE (OUTPATIENT)
Dept: INTERNAL MEDICINE | Facility: CLINIC | Age: 85
End: 2020-10-20

## 2020-10-20 NOTE — TELEPHONE ENCOUNTER
[facility] now requests orders and shares plan of care/discharge summaries for some patients through [employer].  Please REPLY TO THIS MESSAGE OR ROUTE BACK TO THE AUTHOR in order to give authorization for orders when needed.  This is considered a verbal order, you will still receive a faxed copy of orders for signature.  Thank you for your assistance in improving collaboration for our patients.    ORDER    PT 1w1, 2w1, 1w1 for balance and safety training.    Larisa France, PT, DPT  [email or web address]  [phone]

## 2020-10-27 ENCOUNTER — VIRTUAL VISIT (OUTPATIENT)
Dept: FAMILY MEDICINE | Facility: CLINIC | Age: 85
End: 2020-10-27
Payer: MEDICARE

## 2020-10-27 DIAGNOSIS — E11.42 TYPE 2 DIABETES MELLITUS WITH DIABETIC POLYNEUROPATHY, WITHOUT LONG-TERM CURRENT USE OF INSULIN (H): ICD-10-CM

## 2020-10-27 DIAGNOSIS — F41.9 ANXIETY AND DEPRESSION: Primary | ICD-10-CM

## 2020-10-27 DIAGNOSIS — F32.A ANXIETY AND DEPRESSION: Primary | ICD-10-CM

## 2020-10-27 DIAGNOSIS — Z53.9 DIAGNOSIS NOT YET DEFINED: Primary | ICD-10-CM

## 2020-10-27 PROCEDURE — G0180 MD CERTIFICATION HHA PATIENT: HCPCS | Performed by: INTERNAL MEDICINE

## 2020-10-27 PROCEDURE — 99442 PR PHYSICIAN TELEPHONE EVALUATION 11-20 MIN: CPT | Mod: 95 | Performed by: NURSE PRACTITIONER

## 2020-10-27 RX ORDER — CLONAZEPAM 0.5 MG/1
0.5 TABLET ORAL DAILY PRN
Qty: 30 TABLET | Refills: 0 | Status: SHIPPED | OUTPATIENT
Start: 2020-10-27 | End: 2020-12-14

## 2020-10-27 ASSESSMENT — ENCOUNTER SYMPTOMS: NERVOUS/ANXIOUS: 1

## 2020-10-27 ASSESSMENT — ANXIETY QUESTIONNAIRES
7. FEELING AFRAID AS IF SOMETHING AWFUL MIGHT HAPPEN: NEARLY EVERY DAY
GAD7 TOTAL SCORE: 14
6. BECOMING EASILY ANNOYED OR IRRITABLE: NOT AT ALL
5. BEING SO RESTLESS THAT IT IS HARD TO SIT STILL: SEVERAL DAYS
1. FEELING NERVOUS, ANXIOUS, OR ON EDGE: NEARLY EVERY DAY
2. NOT BEING ABLE TO STOP OR CONTROL WORRYING: NEARLY EVERY DAY
3. WORRYING TOO MUCH ABOUT DIFFERENT THINGS: NEARLY EVERY DAY
IF YOU CHECKED OFF ANY PROBLEMS ON THIS QUESTIONNAIRE, HOW DIFFICULT HAVE THESE PROBLEMS MADE IT FOR YOU TO DO YOUR WORK, TAKE CARE OF THINGS AT HOME, OR GET ALONG WITH OTHER PEOPLE: NOT DIFFICULT AT ALL

## 2020-10-27 ASSESSMENT — PATIENT HEALTH QUESTIONNAIRE - PHQ9
SUM OF ALL RESPONSES TO PHQ QUESTIONS 1-9: 15
5. POOR APPETITE OR OVEREATING: SEVERAL DAYS

## 2020-10-27 NOTE — PATIENT INSTRUCTIONS
Clonazepam refilled for 1 month. This is not a medication you should be on long term. Please make an appointment with your primary provider before next refill in 1 month to discuss other alternatives. This can be a virtual appointment.     Diabetic referral placed so they can help figure out which continuous meter your insurance will cover and then provide you with the education on how to use the meter. You will receive a call from them to go over that.

## 2020-10-27 NOTE — PROGRESS NOTES
"Daniela Brown is a 87 year old female who is being evaluated via a billable telephone visit.      The patient has been notified of following:     \"This telephone visit will be conducted via a call between you and your physician/provider. We have found that certain health care needs can be provided without the need for a physical exam.  This service lets us provide the care you need with a short phone conversation.  If a prescription is necessary we can send it directly to your pharmacy.  If lab work is needed we can place an order for that and you can then stop by our lab to have the test done at a later time.    Telephone visits are billed at different rates depending on your insurance coverage. During this emergency period, for some insurers they may be billed the same as an in-person visit.  Please reach out to your insurance provider with any questions.    If during the course of the call the physician/provider feels a telephone visit is not appropriate, you will not be charged for this service.\"    Patient has given verbal consent for Telephone visit?  Yes    What phone number would you like to be contacted at? [phone]    How would you like to obtain your AVS? Mail a copy    Subjective     Daniela Brown is a 87 year old female who presents via phone visit today for the following health issues:    HPI     Abnormal Mood Symptoms  Onset/Duration:   Description:   Depression (if yes, do PHQ-9): no  Anxiety (if yes, do ARABELLA-7): YES  Accompanying Signs & Symptoms:  Still participating in activities that you used to enjoy: no  Fatigue: no  Irritability: no  Difficulty concentrating: no  Changes in appetite: YES  Problems with sleep: YES  Heart racing/beating fast: no  Abnormally elevated, expansive, or irritable mood: no  Persistently increased activity or energy: no  Thoughts of hurting yourself or others: YES  History:  Recent stress or major life event: YES- covid, not drving, being home " all the time  Prior depression or anxiety: anxiety  Family history of depression or anxiety: no  Alcohol/drug use: no  Difficulty sleeping: no, a little  Precipitating or alleviating factors: None  Therapies tried and outcome: none  PHQ 1/3/2020 7/17/2020 10/27/2020   PHQ-9 Total Score 3 3 15   Q9: Thoughts of better off dead/self-harm past 2 weeks Not at all Not at all Not at all     ARABELLA-7 SCORE 10/25/2019 7/17/2020 10/27/2020   Total Score - - -   Total Score 0 0 14     Additional provider notes:     DM: Patient has been having hard time getting blood out of fingers lately with finger stick. Home nurse recommended she get a continuous glucose monitor.    Anxiety/depression: Since COVID her anxiety level has increased. She does not drive in the winter time. States she occasionally is having panic attacks. States she was given a script from an [facility] provider for Clonazepam a while back and forgot about it. She started taking it by the recommendation of her home nurse and states it helps immediately. States she has been using it once every other day. Requesting refill.     Review of Systems   Psychiatric/Behavioral: Positive for mood changes (depressed). Negative for suicidal ideas. The patient is nervous/anxious.    All other systems reviewed and are negative.            Objective          Vitals:  No vitals were obtained today due to virtual visit.    healthy, alert, no distress and cooperative  PSYCH: Alert and oriented times 3; coherent speech, normal   rate and volume, able to articulate logical thoughts, able   to abstract reason, no tangential thoughts, no hallucinations   or delusions  Her affect is normal and pleasant  RESP: No cough, no audible wheezing, able to talk in full sentences  Remainder of exam unable to be completed due to telephone visits            Assessment/Plan:    Assessment & Plan     Anxiety and depression  Anxiety increased; only relief has been clonazepam ~every other day. Helps with  panic attacks. Requesting refill. Clonazepam refilled x 1 month. Side effects, risks and benefits of medication were discussed with patient. Discussed how and when to take medication. Discussed risks with taking benzos and that medication is not intended to be taken long term. Given her extensive history and allergy list (including allergies to antidepressants), advised she follow-up with her PCP this month to discuss an alternative medication (long term medication) to start instead of continuing clonazepam. Patient in agreement with plan. Patient prefers a virtual appointment because her son quarantines for 2 weeks from her whenever she comes into the clinic which increases her anxiety/depression.    - clonazePAM (KLONOPIN) 0.5 MG tablet; Take 1 tablet (0.5 mg) by mouth daily as needed for anxiety    Type 2 diabetes mellitus with diabetic polyneuropathy, without long-term current use of insulin (H)  Diabetes educator referral placed to help patient with choosing continuous meter that her insurance will cover and to educate her on its use.     - AMBULATORY ADULT DIABETES EDUCATOR REFERRAL; Future        Patient Instructions   Clonazepam refilled for 1 month. This is not a medication you should be on long term. Please make an appointment with your primary provider before next refill in 1 month to discuss other alternatives. This can be a virtual appointment.     Diabetic referral placed so they can help figure out which continuous meter your insurance will cover and then provide you with the education on how to use the meter. You will receive a call from them to go over that.       Return if symptoms worsen or fail to improve.    CYN Ibarra [facility]    Phone call duration:  13 minutes

## 2020-10-28 ASSESSMENT — ANXIETY QUESTIONNAIRES: GAD7 TOTAL SCORE: 14

## 2020-10-29 ENCOUNTER — TELEPHONE (OUTPATIENT)
Dept: FAMILY MEDICINE | Facility: CLINIC | Age: 85
End: 2020-10-29

## 2020-10-29 NOTE — TELEPHONE ENCOUNTER
Diabetes Education Scheduling Outreach #1:    Call to patient to schedule. Left message with phone number to call to schedule.    Plan for 2nd outreach attempt within 1 week.    Bindu Alves  [facility]  Diabetes and Nutrition Scheduling

## 2020-11-10 ENCOUNTER — MEDICAL CORRESPONDENCE (OUTPATIENT)
Dept: HEALTH INFORMATION MANAGEMENT | Facility: CLINIC | Age: 85
End: 2020-11-10

## 2020-11-11 ENCOUNTER — PATIENT OUTREACH (OUTPATIENT)
Dept: EDUCATION SERVICES | Facility: CLINIC | Age: 85
End: 2020-11-11
Payer: MEDICARE

## 2020-11-11 DIAGNOSIS — E11.42 TYPE 2 DIABETES MELLITUS WITH DIABETIC POLYNEUROPATHY, WITHOUT LONG-TERM CURRENT USE OF INSULIN (H): Primary | Chronic | ICD-10-CM

## 2020-11-11 RX ORDER — FLASH GLUCOSE SENSOR
1 KIT MISCELLANEOUS
Qty: 2 EACH | Refills: 12 | Status: SHIPPED | OUTPATIENT
Start: 2020-11-11 | End: 2021-12-01

## 2020-11-11 RX ORDER — FLASH GLUCOSE SCANNING READER
1 EACH MISCELLANEOUS DAILY
Qty: 1 DEVICE | Refills: 2 | Status: SHIPPED | OUTPATIENT
Start: 2020-11-11 | End: 2021-12-01

## 2020-11-11 NOTE — PROGRESS NOTES
That sounds good. If she is able to get blood now, then continuing with finger sticks is fine.     Thanks!  Mckenna Dubois DNP, NP-C 11/11/2020 4:48 PM

## 2020-11-11 NOTE — PROGRESS NOTES
Diabetes Education     Referral is for helping patient with ordering a continuous glucose monitor.  Unsure if there will be coverage via part D and does not meet part B requirements.  Sent prescription to pharmacy to check coverage.  Patient notes that finger stick have been going fine this week, has been able to get more blood.  Reviewed techniques to get adequate blood.      Called the pharmacy and the [employer] ??? system is over 100$.  Patient only wanted to get it if it was no charge.  Will update patient.  Recommend re-running in 2021 incase insurance covers.     Betty Nixon RD, LD, CDE  Diabetes Education

## 2020-11-12 ENCOUNTER — TELEPHONE (OUTPATIENT)
Dept: INTERNAL MEDICINE | Facility: CLINIC | Age: 85
End: 2020-11-12

## 2020-11-13 NOTE — TELEPHONE ENCOUNTER
Prior Authorization Retail Medication Request    Medication/Dose: Freestyle kingsley sensor  ICD code (if different than what is on RX):    Previously Tried and Failed:    Rationale:      Insurance Name:  Not provided  Insurance ID:  Not provided  [id number]      Pharmacy Information (if different than what is on RX)  Name:    Phone:

## 2020-11-13 NOTE — TELEPHONE ENCOUNTER
Prior Authorization Retail Medication Request    Medication/Dose: Freestyle kingsley reader  ICD code (if different than what is on RX):    Previously Tried and Failed:    Rationale:      Insurance Name:  Not provided  Insurance ID:  Not provided  CMM Key: [id number]      Pharmacy Information (if different than what is on RX)  Name:    Phone:

## 2020-11-13 NOTE — TELEPHONE ENCOUNTER
Central Prior Authorization Team   Phone: [phone]    PA Initiation    Medication: Freestyle kingsley sensor  Insurance Company: [employer] - Phone [phone] Fax [phone]  Pharmacy Filling the Rx: [facility] [place] - [id number] [address]  Filling Pharmacy Phone: [phone]  Filling Pharmacy Fax: [phone]  Start Date: 11/13/2020

## 2020-11-13 NOTE — TELEPHONE ENCOUNTER
Spoke with pharmacy to advise them to process under Medicare part B.  The requirement for Medicare part B is for the patient to be on insulin and testing more than 4 times daily.  Patient does not meet the criteria.

## 2020-11-13 NOTE — TELEPHONE ENCOUNTER
PRIOR AUTHORIZATION DENIED    Medication: Freestyle kingsley reader-DENIED    Denial Date: 11/13/2020    Denial Rational: IF PATIENT HAS MEDICARE PART B, MEDICARE PART D DOES NOT COVER DIABETIC SUPPLIES.        Appeal Information: N/A

## 2020-11-13 NOTE — TELEPHONE ENCOUNTER
Central Prior Authorization Team   Phone: [phone]    PA Initiation    Medication: Freestyle kingsley reader  Insurance Company: [employer] - Phone [phone] Fax [phone]  Pharmacy Filling the Rx: [facility] [place] - [id number] [facility]  Filling Pharmacy Phone: [phone]  Filling Pharmacy Fax: [phone]  Start Date: 11/13/2020

## 2020-11-13 NOTE — TELEPHONE ENCOUNTER
PRIOR AUTHORIZATION DENIED    Medication: Freestyle kingsley sensor-DENIED    Denial Date: 11/13/2020    Denial Rational: IF PATIENT HAS MEDICARE PART B, MEDICARE PART D DOES NOT COVER DIABETIC SUPPLIES.        Appeal Information: N/A

## 2020-11-15 NOTE — TELEPHONE ENCOUNTER
MARY Sheppard for Freestyle Genet device and supplies has been denied.    Spoke with pharmacy to advise them to process under Medicare part B.  The requirement for Medicare part B is for the patient to be on insulin and testing more than 4 times daily.  Patient does not meet the criteria.

## 2020-11-16 NOTE — TELEPHONE ENCOUNTER
Please notify patient.  She can pay out of pocket if she still wants it - can get price from pharmacy directly   Labs  POCT Blood Glucose.: 120 mg/dL (10 Oct 2020 18:40)                      11.3   6.99  )-----------( 203      ( 11 Oct 2020 06:55 )             34.6       10-10    140  |  104  |  17  ----------------------------<  137<H>  4.5   |  22  |  1.5      Calcium, Total Serum: 8.9 mg/dL (10-10-20 @ 18:56)    LFTs:             6.6  | 0.4  | 25       ------------------[89      ( 10 Oct 2020 18:56 )  3.9  | x    | 15          Lipase:x      Amylase:x         Lactate, Blood: 1.8 mmol/L (10-11-20 @ 06:55)  Lactate, Blood: 3.5 mmol/L (10-10-20 @ 22:27)  Blood Gas Venous - Lactate: 4.5 mmoL/L (10-09-20 @ 19:13)      Coags:     16.10  ----< 1.40    ( 10 Oct 2020 19:00 )     29.6        CARDIAC MARKERS ( 11 Oct 2020 07:09 )  x     / <0.01 ng/mL / x     / x     / x      CARDIAC MARKERS ( 10 Oct 2020 18:56 )  x     / <0.01 ng/mL / x     / x     / x          Urinalysis Basic - ( 11 Oct 2020 04:13 )    Color: Light Yellow / Appearance: Clear / S.033 / pH: x  Gluc: x / Ketone: Negative  / Bili: Negative / Urobili: <2 mg/dL   Blood: x / Protein: Trace / Nitrite: Negative   Leuk Esterase: Negative / RBC: x / WBC x   Sq Epi: x / Non Sq Epi: x / Bacteria: x      < from: CT Angio Neck w/ IV Cont (10.11.20 @ 07:51) >    mpression:    1.  Unremarkable CTA of the neck.    2.  C2 fracture, better demonstrated on the CT scan of the cervical spine performed the same day.    ***Please see the addendum at the top of this report. It may contain additional important information or changes.****    < end of copied text >    < from: CT Abdomen and Pelvis w/ IV Cont (10.11.20 @ 01:48) >    IMPRESSION:    Mild vertebral body height loss at the superior endplate of T3, age indeterminate    Otherwise no definite CT evidence for acute trauma injury to the chest, abdomen or pelvis.    < end of copied text >  < from: CT Chest w/ IV Cont (10.11.20 @ 01:46) >  IMPRESSION:  Mild vertebral body height loss at the superior endplate of T3, age indeterminate  Otherwise no definite CT evidence for acute trauma injury to the chest, abdomen or pelvis.  < end of copied text >  < from: CT Cervical Spine No Cont (10.11.20 @ 01:44) >  mpression:    Suspect/questionable nondisplaced fracture through the left lateral aspect of the C2 vertebral body extending to the vertebral foramen. Recommend CTA neck to ensure no vascular injury.    ***Please see the addendum at the top of this report. It may contain additional important information or changes.****    `< from: CT Head No Cont (10.10.20 @ 18:25) >    IMPRESSION:  No evidence of acute intracranial pathology.  Partial opacification of the bilateral ethmoid sinuses. Labs             11.3   6.99  )-----------( 203      ( 11 Oct 2020 06:55 )             34.6       10-10    140  |  104  |  17  ----------------------------<  137<H>  4.5   |  22  |  1.5    Calcium, Total Serum: 8.9 mg/dL (10-10-20 @ 18:56)    LFTs:             6.6  | 0.4  | 25       ------------------[89      ( 10 Oct 2020 18:56 )  3.9  | x    | 15          Lipase:x      Amylase:x        Lactate, Blood: 1.8 mmol/L (10-11-20 @ 06:55)  Lactate, Blood: 3.5 mmol/L (10-10-20 @ 22:27)  Blood Gas Venous - Lactate: 4.5 mmoL/L (10-09-20 @ 19:13)    Coags:     16.10  ----< 1.40    ( 10 Oct 2020 19:00 )     29.6      CARDIAC MARKERS ( 11 Oct 2020 07:09 )  x     / <0.01 ng/mL / x     / x     / x      CARDIAC MARKERS ( 10 Oct 2020 18:56 )  x     / <0.01 ng/mL / x     / x     / x        Urinalysis Basic - ( 11 Oct 2020 04:13 )    Color: Light Yellow / Appearance: Clear / S.033 / pH: x  Gluc: x / Ketone: Negative  / Bili: Negative / Urobili: <2 mg/dL   Blood: x / Protein: Trace / Nitrite: Negative   Leuk Esterase: Negative / RBC: x / WBC x   Sq Epi: x / Non Sq Epi: x / Bacteria: x    CT Angio Neck w/ IV Cont (10.11.20 @ 07:51) >  impression:  1.  Unremarkable CTA of the neck.  2.  C2 fracture, better demonstrated on the CT scan of the cervical spine performed the same day.    ***Please see the addendum at the top of this report. It may contain additional important information or changes.****    CT Abdomen and Pelvis w/ IV Cont (10.11.20 @ 01:48) >    IMPRESSION:  Mild vertebral body height loss at the superior endplate of T3, age indeterminate  Otherwise no definite CT evidence for acute trauma injury to the chest, abdomen or pelvis.    CT Chest w/ IV Cont (10.11.20 @ 01:46) >  IMPRESSION:  Mild vertebral body height loss at the superior endplate of T3, age indeterminate  Otherwise no definite CT evidence for acute trauma injury to the chest, abdomen or pelvis.    CT Cervical Spine No Cont (10.11.20 @ 01:44) >  mpression:  Suspect/questionable nondisplaced fracture through the left lateral aspect of the C2 vertebral body extending to the vertebral foramen. Recommend CTA neck to ensure no vascular injury.    ***Please see the addendum at the top of this report. It may contain additional important information or changes.****    CT Head No Cont (10.10.20 @ 18:25) >  IMPRESSION:  No evidence of acute intracranial pathology.  Partial opacification of the bilateral ethmoid sinuses.

## 2020-11-16 NOTE — TELEPHONE ENCOUNTER
Patient is called and she does not want to pay for this.  She states she will continue to prick her finger. Marisela NICHOLSON RN

## 2020-11-25 DIAGNOSIS — R35.0 URINARY FREQUENCY: ICD-10-CM

## 2020-11-25 RX ORDER — NITROFURANTOIN 25; 75 MG/1; MG/1
100 CAPSULE ORAL 2 TIMES DAILY
Qty: 6 CAPSULE | Refills: 0 | Status: CANCELLED | OUTPATIENT
Start: 2020-11-25

## 2020-11-25 NOTE — TELEPHONE ENCOUNTER
Refill denied.  Her last visit with Iris in early Oct, the urine culture did NOT show infection.  The symptoms of leg weakness is not a typical symptom of UTI.  I would not recommend another round of antibiotic since UTI is not likely based on her symptoms and recent urine culture

## 2020-11-25 NOTE — TELEPHONE ENCOUNTER
Reason for Call:  Medication refill:    Do you use a [facility]?  Name of the pharmacy and phone number for the current request:  [facility] [phone]    Name of the medication requested:Nitrofurant Mono 100 mg capsules    Other request: Needs medication today for recurrance of UTI    Can we leave a detailed message on this number? YES    Phone number patient can be reached at: Home number on file [phone] (home)    Best Time: Any    Call taken on 11/25/2020 at 10:37 AM by Nasrin Encarnacion

## 2020-11-25 NOTE — TELEPHONE ENCOUNTER
"Patient reports she has increase in frequency and urgency with urination.  \"I always know when I have a UTI because I have back pain and my legs hurt and that is what is happening this time.\"  Denies pain with urination, odor, cloudy urine, blood in urine, vaginal discharge, fever or any other symptoms at this time.  Patient does not seem to be confused at all during conversation.  She was treated for UTI back in October with Macrobid, symptoms cleared.  She states she has no way to come to clinic for even a urine sample because she does not drive, son is out of town (her only ), wont have a ride until Monday.    Med and pharm ready (uses [facility] who [facility])    Routing to provider.  Helen SALAZAR MSN, RN          "

## 2020-12-01 ENCOUNTER — TELEPHONE (OUTPATIENT)
Dept: INTERNAL MEDICINE | Facility: CLINIC | Age: 85
End: 2020-12-01

## 2020-12-01 DIAGNOSIS — F32.A ANXIETY AND DEPRESSION: ICD-10-CM

## 2020-12-01 DIAGNOSIS — I10 BENIGN ESSENTIAL HYPERTENSION: Primary | Chronic | ICD-10-CM

## 2020-12-01 DIAGNOSIS — F41.9 ANXIETY AND DEPRESSION: ICD-10-CM

## 2020-12-01 NOTE — TELEPHONE ENCOUNTER
Pt was having body itching 3 days ago so she stopped taking Amlodipine, she felt it was the cause and the itching stopped.  Pt asking what med she can take instead of Amlodipine?  Change med?  Try it again?  Advise.  Anny

## 2020-12-01 NOTE — TELEPHONE ENCOUNTER
Reason for call:  Patient reporting a symptom    Symptom or request: Patient is stating the last week she has been itching. She said it is the Amlodipine but she has been taking this since May and the itching started a week ago. When she stopped it the itching went away. She itches all over her body     Duration (how long have symptoms been present): 1 week    Have you been treated for this before? No      Phone Number patient can be reached at:  Home number on file [phone] (home)    Best Time:  any    Can we leave a detailed message on this number:  YES    Call taken on 12/1/2020 at 9:25 AM by Aimee Oneill

## 2020-12-01 NOTE — TELEPHONE ENCOUNTER
It is hard to say for sure if it is the amlodipine that is because of the itching.  Typically if itching is the drug side effect, it happens within a few days or few weeks after starting the new medication.  Recommend to retry the amlodipine, as it could have been coincidental

## 2020-12-09 NOTE — TELEPHONE ENCOUNTER
She has been off of it for 3-4 days. Readings are 147/75,149/76,135/83. She requests we send in a substitute medication to replace amlodipine 5mg  [facility] and she has not been driving.  Kari Arreola RN

## 2020-12-09 NOTE — TELEPHONE ENCOUNTER
Patient has a lot of medication allergies and intolerances listed.  I recommend that she continue to monitor her blood pressure once daily and keep a log.  Patient should call back if her home BPs are greater than 160/90.  Dr Maddox will review when she returns.  Anh Oswald, CNP

## 2020-12-09 NOTE — TELEPHONE ENCOUNTER
Patient stopped taking Amlodipine and her itching stopped. She would like a nurse to call her back Nasrin Encarnacion on 12/9/2020 at 9:09 AM

## 2020-12-13 RX ORDER — HYDROCHLOROTHIAZIDE 12.5 MG/1
12.5 TABLET ORAL DAILY
Qty: 30 TABLET | Refills: 11 | Status: SHIPPED | OUTPATIENT
Start: 2020-12-13 | End: 2020-12-15

## 2020-12-14 RX ORDER — CLONAZEPAM 0.5 MG/1
0.5 TABLET ORAL DAILY PRN
Qty: 30 TABLET | Refills: 0 | Status: SHIPPED | OUTPATIENT
Start: 2020-12-14 | End: 2020-12-31

## 2020-12-14 NOTE — TELEPHONE ENCOUNTER
Dr. Maddox,    Patient would like a refill on clonazepam.  She has terrible anxiety with the pandemic.  Patient is told of starting hydrochlorothiazide and the need for lab and RN bp ck. Marisela NICHOLSON RN

## 2020-12-15 ENCOUNTER — TELEPHONE (OUTPATIENT)
Dept: INTERNAL MEDICINE | Facility: CLINIC | Age: 85
End: 2020-12-15

## 2020-12-15 DIAGNOSIS — G89.29 CHRONIC LEFT-SIDED LOW BACK PAIN WITH LEFT-SIDED SCIATICA: Primary | Chronic | ICD-10-CM

## 2020-12-15 DIAGNOSIS — M54.42 CHRONIC LEFT-SIDED LOW BACK PAIN WITH LEFT-SIDED SCIATICA: Primary | Chronic | ICD-10-CM

## 2020-12-15 NOTE — TELEPHONE ENCOUNTER
Dr. Maddox,    Patient asking for a order to be faxed to [facility] for her.  I have pended for your approval.  Patient thinks last injection was 3-4 months ago. Marisela NICHOLSON RN     Trilobed Flap Text: The defect edges were debeveled with a #15 scalpel blade.  Given the location of the defect and the proximity to free margins a trilobed flap was deemed most appropriate.  Using a sterile surgical marker, an appropriate trilobed flap drawn around the defect.    The area thus outlined was incised deep to adipose tissue with a #15 scalpel blade.  The skin margins were undermined to an appropriate distance in all directions utilizing iris scissors.

## 2020-12-15 NOTE — TELEPHONE ENCOUNTER
Patient is contacted and told not to start new med hydrochlorothiazide and to keep her upcoming appt. Marisela NICHOLSON RN

## 2020-12-15 NOTE — TELEPHONE ENCOUNTER
She has over 30 allergies listed, so this 1 did not immediately come up as a drug interaction with HCTZ.  Keep virtual visit, do not start the hydrochlorothiazide.

## 2020-12-15 NOTE — TELEPHONE ENCOUNTER
Reason for Call: Request for an order:    Order or referral being requested: Steroid Injection in back for [facility]    Date needed: as soon as possible    Has the patient been seen by the PCP for this problem? YES    Additional comments: Patient not taking her Hydrochlorothiazide because it has salt, patient wants Lisinopril because son takes it.    Phone number Patient can be reached at:  Home number on file [phone] (home)    Best Time:  Any    Can we leave a detailed message on this number?  YES    Call taken on 12/15/2020 at 9:28 AM by Nasrin Encarnacion

## 2020-12-15 NOTE — TELEPHONE ENCOUNTER
Order for epidural.  She has allergy listed to contrast dye.  Has she needed pre-medication before.  She has questions on blood pressure meds.  Recommend virtual visit for follow-up due to multiple questions

## 2020-12-15 NOTE — TELEPHONE ENCOUNTER
Dr. Maddox,    Patient is contacted.  Her question is that the new medication hydrochlorothiazide has sulfa in it and she is allergic to sulfa.  Can she take this hydrochlorothiazide.?  Patient has appt scheduled for 12/31/20. Marisela NICHOLSON RN

## 2020-12-29 ENCOUNTER — VIRTUAL VISIT (OUTPATIENT)
Dept: FAMILY MEDICINE | Facility: CLINIC | Age: 85
End: 2020-12-29
Payer: MEDICARE

## 2020-12-29 DIAGNOSIS — R53.1 GENERALIZED WEAKNESS: ICD-10-CM

## 2020-12-29 DIAGNOSIS — K59.04 CHRONIC IDIOPATHIC CONSTIPATION: Primary | ICD-10-CM

## 2020-12-29 DIAGNOSIS — F34.1 MILD LATE ONSET PERSISTENT DEPRESSIVE DISORDER IN PARTIAL REMISSION WITH ANXIOUS DISTRESS AND INTERMITTENT MAJOR DEPRESSIVE EPISODES WITHOUT CURRENT EPISODE: ICD-10-CM

## 2020-12-29 DIAGNOSIS — I10 BENIGN ESSENTIAL HYPERTENSION: Chronic | ICD-10-CM

## 2020-12-29 DIAGNOSIS — K58.0 IRRITABLE BOWEL SYNDROME WITH DIARRHEA: ICD-10-CM

## 2020-12-29 PROCEDURE — 99442 PR PHYSICIAN TELEPHONE EVALUATION 11-20 MIN: CPT | Mod: 95 | Performed by: FAMILY MEDICINE

## 2020-12-29 RX ORDER — DICYCLOMINE HCL 20 MG
20 TABLET ORAL 4 TIMES DAILY PRN
Qty: 60 TABLET | Refills: 0 | Status: SHIPPED | OUTPATIENT
Start: 2020-12-29 | End: 2022-08-07

## 2020-12-29 RX ORDER — LOSARTAN POTASSIUM 25 MG/1
25 TABLET ORAL DAILY
Qty: 90 TABLET | Refills: 3 | Status: SHIPPED | OUTPATIENT
Start: 2020-12-29 | End: 2021-01-21

## 2020-12-29 NOTE — PROGRESS NOTES
"Daniela Brown is a 87 year old female who is being evaluated via a billable telephone visit.      The patient has been notified of following:     \"This telephone visit will be conducted via a call between you and your physician/provider. We have found that certain health care needs can be provided without the need for a physical exam.  This service lets us provide the care you need with a short phone conversation.  If a prescription is necessary we can send it directly to your pharmacy.  If lab work is needed we can place an order for that and you can then stop by our lab to have the test done at a later time.    Telephone visits are billed at different rates depending on your insurance coverage. During this emergency period, for some insurers they may be billed the same as an in-person visit.  Please reach out to your insurance provider with any questions.    If during the course of the call the physician/provider feels a telephone visit is not appropriate, you will not be charged for this service.\"    Patient has given verbal consent for Telephone visit?  Yes    What phone number would you like to be contacted at? [phone]     How would you like to obtain your AVS? Mail a copy    Subjective     Daniela Brown is a 87 year old female who presents via phone visit today for the following health issues:    HPI        Chief Complaint   Patient presents with     Telephone     [phone]      Fatigue     Legs feel weak, no appetite since last week-declined RN triage       Has had the weakness in the legs for a long time.   Was getting injection in back, but wasn't feeling well and had to reschedule the recent visit.   Thought that the leg pain was due to the back issues, but wondering if anything else is going on.   Has been having issues with constipation, has been taking stool softener and that helps after a few days.   Son picked up some Miralax for her but would like discuss the back of the " "bottle says to to ask PCP if diabetic.   Forcing her self to eat the last 2 months.   Would also like a refill of Bentyl.  Any Oneil CMA    Was told to stop blood pressure medication but she has been taking half tablet of her Losartan 50mg at night.   \"Feels helps her sleep better and afraid if the BP goes up again it wouldn't be good\"  Home Blood Pressure readings  118/81  164/83  147/70  141/82  117/67  150/77    Blood sugar check 99 and 103 off all medications   Had been on metformin and it is now not on it blood sugar great off of this     She has been taking the 1/2 of losartan   These are her blood pressure on that dose  She has leg weakness was given leg exercises   She is going to reschedule this a [facility]  Takes stool softenere an dshe has diarrhea so she stopped . Has not tried the miralax yet    Has no appetite she has just not feel hungry ate toast this morning and she had some PBJ and milk  She has not lost weight   Her sister doesn't eat either   She is not tasting the food this is chronic and   She has not tried enhanced foods either   Has weight the same for the last several years.  She has been doing a lot less activity because of Covid she can only go anywhere or do anything that she thinks that is safe  Review of Systems   Constitutional, HEENT, cardiovascular, pulmonary, gi and gu systems are negative, except as otherwise noted.       Objective          Vitals:  No vitals were obtained today due to virtual visit.    healthy, alert and no distress  PSYCH: Alert and oriented times 3; coherent speech, normal   rate and volume, able to articulate logical thoughts, able   to abstract reason, no tangential thoughts, no hallucinations   or delusions  Her affect is normal  RESP: No cough, no audible wheezing, able to talk in full sentences  Remainder of exam unable to be completed due to telephone visits    Office Visit on 10/09/2020   Component Date Value Ref Range Status     Color Urine " 10/09/2020 Yellow   Final     Appearance Urine 10/09/2020 Clear   Final     Glucose Urine 10/09/2020 Negative  NEG^Negative mg/dL Final     Bilirubin Urine 10/09/2020 Negative  NEG^Negative Final     Ketones Urine 10/09/2020 Negative  NEG^Negative mg/dL Final     Specific Gravity Urine 10/09/2020 1.015  1.003 - 1.035 Final     Blood Urine 10/09/2020 Negative  NEG^Negative Final     pH Urine 10/09/2020 6.0  5.0 - 7.0 pH Final     Protein Albumin Urine 10/09/2020 Negative  NEG^Negative mg/dL Final     Urobilinogen Urine 10/09/2020 0.2  0.2 - 1.0 EU/dL Final     Nitrite Urine 10/09/2020 Negative  NEG^Negative Final     Leukocyte Esterase Urine 10/09/2020 Trace* NEG^Negative Final     Source 10/09/2020 Midstream Urine   Final     WBC Urine 10/09/2020 0 - 5  OTO5^0 - 5 /HPF Final     RBC Urine 10/09/2020 O - 2  OTO2^O - 2 /HPF Final     Squamous Epithelial /LPF Urine 10/09/2020 Few  FEW^Few /LPF Final     Bacteria Urine 10/09/2020 Few* NEG^Negative /HPF Final     Mucous Urine 10/09/2020 Present* NEG^Negative /LPF Final     Specimen Description 10/09/2020 Midstream Urine   Final     Special Requests 10/09/2020 Specimen received in preservative   Final     Culture Micro 10/09/2020    Final                    Value:<10,000 colonies/mL  mixed urogenital melvi       Sodium 10/09/2020 134  133 - 144 mmol/L Final     Potassium 10/09/2020 3.7  3.4 - 5.3 mmol/L Final     Chloride 10/09/2020 102  94 - 109 mmol/L Final     Carbon Dioxide 10/09/2020 26  20 - 32 mmol/L Final     Anion Gap 10/09/2020 6  3 - 14 mmol/L Final     Glucose 10/09/2020 115* 70 - 99 mg/dL Final     Urea Nitrogen 10/09/2020 21  7 - 30 mg/dL Final     Creatinine 10/09/2020 1.12* 0.52 - 1.04 mg/dL Final     GFR Estimate 10/09/2020 44* >60 mL/min/[1.73_m2] Final    Comment: Non  GFR Calc  Starting 12/18/2018, serum creatinine based estimated GFR (eGFR) will be   calculated using the Chronic Kidney Disease Epidemiology Collaboration   (CKD-EPI)  equation.       GFR Estimate If Black 10/09/2020 51* >60 mL/min/[1.73_m2] Final    Comment:  GFR Calc  Starting 12/18/2018, serum creatinine based estimated GFR (eGFR) will be   calculated using the Chronic Kidney Disease Epidemiology Collaboration   (CKD-EPI) equation.       Calcium 10/09/2020 9.1  8.5 - 10.1 mg/dL Final     WBC 10/09/2020 12.1* 4.0 - 11.0 10e9/L Final     RBC Count 10/09/2020 4.39  3.8 - 5.2 10e12/L Final     Hemoglobin 10/09/2020 12.2  11.7 - 15.7 g/dL Final     Hematocrit 10/09/2020 37.2  35.0 - 47.0 % Final     MCV 10/09/2020 85  78 - 100 fl Final     MCH 10/09/2020 27.8  26.5 - 33.0 pg Final     MCHC 10/09/2020 32.8  31.5 - 36.5 g/dL Final     RDW 10/09/2020 13.1  10.0 - 15.0 % Final     Platelet Count 10/09/2020 228  150 - 450 10e9/L Final     % Neutrophils 10/09/2020 70.4  % Final     % Lymphocytes 10/09/2020 20.2  % Final     % Monocytes 10/09/2020 7.0  % Final     % Eosinophils 10/09/2020 2.2  % Final     % Basophils 10/09/2020 0.2  % Final     Absolute Neutrophil 10/09/2020 8.5* 1.6 - 8.3 10e9/L Final     Absolute Lymphocytes 10/09/2020 2.4  0.8 - 5.3 10e9/L Final     Absolute Monocytes 10/09/2020 0.8  0.0 - 1.3 10e9/L Final     Absolute Eosinophils 10/09/2020 0.3  0.0 - 0.7 10e9/L Final     Absolute Basophils 10/09/2020 0.0  0.0 - 0.2 10e9/L Final     Diff Method 10/09/2020 Automated Method   Final           Assessment/Plan:    Assessment & Plan    1. Irritable bowel syndrome with diarrhea  Will refill   - dicyclomine (BENTYL) 20 MG tablet; Take 1 tablet (20 mg) by mouth 4 times daily as needed (diarrhea)  Dispense: 60 tablet; Refill: 0    2. Chronic idiopathic constipation  Try miralax and titrate to best dosage     3. 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 Benign essential hypertension  Well controlled on this dose  -  "losartan (COZAAR) 25 MG tablet; Take 1 tablet (25 mg) by mouth daily  Dispense: 90 tablet; Refill: 3          5. Generalized weakness  Follow up in the office if not improving     BMI:   Estimated body mass index is 28.49 kg/m  as calculated from the following:    Height as of 10/9/20: 1.626 m (5' 4\").    Weight as of 10/9/20: 75.3 kg (166 lb).            Patient Instructions   Stay on the losartan 25 mg every day.  Try taking the miralax. Start with 1/2 capful daily . If this makes your stools too loose then try taking it every other day or so. If it is not enough , take 1 whole capful daily . This is safe to take long term  Please reschedule the back injections it seems that these have helped you in the past so it is likely they will help you now.  Continue to do the leg exercises given to you by the physical therapist.  For the poor appetite I would recommend that you do all get some Mrs. Dash or something that has got some flavors and added to the foods that can you can add it to you to see if that helps makes things a little more tasty and then you may be able to eat more.  If you start losing weight especially very rapidly please make an appointment to be seen in person with Dr. Maddox.      Return in about 3 months (around 3/29/2021) for Lab Work, Routine Visit.    Kimmie Norton MD  [facility]    Phone call duration:  17 minutes              "

## 2020-12-29 NOTE — PATIENT INSTRUCTIONS
Stay on the losartan 25 mg every day.  Try taking the miralax. Start with 1/2 capful daily . If this makes your stools too loose then try taking it every other day or so. If it is not enough , take 1 whole capful daily . This is safe to take long term  Please reschedule the back injections it seems that these have helped you in the past so it is likely they will help you now.  Continue to do the leg exercises given to you by the physical therapist.  For the poor appetite I would recommend that you do all get some Mrs. Dash or something that has got some flavors and added to the foods that can you can add it to you to see if that helps makes things a little more tasty and then you may be able to eat more.  If you start losing weight especially very rapidly please make an appointment to be seen in person with Dr. Maddox.

## 2020-12-31 ENCOUNTER — TELEPHONE (OUTPATIENT)
Dept: INTERNAL MEDICINE | Facility: CLINIC | Age: 85
End: 2020-12-31

## 2020-12-31 ENCOUNTER — VIRTUAL VISIT (OUTPATIENT)
Dept: FAMILY MEDICINE | Facility: CLINIC | Age: 85
End: 2020-12-31
Payer: MEDICARE

## 2020-12-31 DIAGNOSIS — F41.9 ANXIETY AND DEPRESSION: ICD-10-CM

## 2020-12-31 DIAGNOSIS — F32.A ANXIETY AND DEPRESSION: ICD-10-CM

## 2020-12-31 DIAGNOSIS — F32.1 MODERATE MAJOR DEPRESSION (H): ICD-10-CM

## 2020-12-31 DIAGNOSIS — F41.1 GAD (GENERALIZED ANXIETY DISORDER): Primary | ICD-10-CM

## 2020-12-31 DIAGNOSIS — R93.5 ABNORMAL CT OF THE ABDOMEN: ICD-10-CM

## 2020-12-31 PROCEDURE — 99442 PR PHYSICIAN TELEPHONE EVALUATION 11-20 MIN: CPT | Mod: 95 | Performed by: INTERNAL MEDICINE

## 2020-12-31 RX ORDER — CLONAZEPAM 0.5 MG/1
.25-.5 TABLET ORAL DAILY PRN
Qty: 30 TABLET | Refills: 0 | Status: SHIPPED | OUTPATIENT
Start: 2020-12-31 | End: 2021-02-15

## 2020-12-31 ASSESSMENT — ANXIETY QUESTIONNAIRES
7. FEELING AFRAID AS IF SOMETHING AWFUL MIGHT HAPPEN: NOT AT ALL
GAD7 TOTAL SCORE: 3
IF YOU CHECKED OFF ANY PROBLEMS ON THIS QUESTIONNAIRE, HOW DIFFICULT HAVE THESE PROBLEMS MADE IT FOR YOU TO DO YOUR WORK, TAKE CARE OF THINGS AT HOME, OR GET ALONG WITH OTHER PEOPLE: NOT DIFFICULT AT ALL
3. WORRYING TOO MUCH ABOUT DIFFERENT THINGS: NOT AT ALL
1. FEELING NERVOUS, ANXIOUS, OR ON EDGE: NOT AT ALL
5. BEING SO RESTLESS THAT IT IS HARD TO SIT STILL: MORE THAN HALF THE DAYS
2. NOT BEING ABLE TO STOP OR CONTROL WORRYING: NOT AT ALL
6. BECOMING EASILY ANNOYED OR IRRITABLE: NOT AT ALL

## 2020-12-31 ASSESSMENT — PATIENT HEALTH QUESTIONNAIRE - PHQ9
SUM OF ALL RESPONSES TO PHQ QUESTIONS 1-9: 5
5. POOR APPETITE OR OVEREATING: SEVERAL DAYS

## 2020-12-31 NOTE — TELEPHONE ENCOUNTER
Reason for Call: Request for an order or referral:    Order or referral being requested: Son is calling asking  For a referral/ letter for [facility] to have a COVID vaccine he said that they have a Surplus supply of them and needs to be used in so many days. The son already got one from his doctor and she needs one from her doctor they can get one on Monday.    Date needed: as soon as possible    Has the patient been seen by the PCP for this problem? NO    Phone number Patient can be reached at:  Home number on file [place] [phone]    Best Time:  any    Can we leave a detailed message on this number?  YES    Call taken on 12/31/2020 at 1:56 PM by Aimee Oneill

## 2020-12-31 NOTE — PATIENT INSTRUCTIONS
Anxiety:   1. Ok to use the Clonazepam 2 x week, no more than 3 x week.  If you are using it more than 3 x week, we should consider trying a different daily medication to better manage anxiety.  Regular use of Clonazepam cause cause memory problems or increased risk of falls.        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 You are due for follow-up MRI.  They will call you to schedule.    Radiology test was ordered.  You can call [phone] to schedule.  You can use the clonazepam 30-60 min prior to MRI.    COVID Vaccine:   1. You should get the vaccine as soon as it is available.  As of right now, it is only being given to healthcare workers and nursing home residents.  You do not need an order or referral when your time comes to get the vaccine.

## 2020-12-31 NOTE — LETTER
[facility]  [address] [address]  [phone]    2020    Daniela Brown                                                                                                                     [address] [address] [address]    : 1933            To Whom It May Concern,    Patient is medically cleared and eligible to have the Coronavirus/ COVID-19 Vaccine at this time.         Sincerely,         Cynthia Maddox DO

## 2020-12-31 NOTE — TELEPHONE ENCOUNTER
S-(situation): Need referral for COVID-19 vaccine.     B-(background): virtual visit today.     A-(assessment): Form/ referral to obtain the COVID-19 vaccine. [facility] vaccine has been distributed to all appropriate workers, they now have a surplus of vaccine and must be used within a certain number of days but ONLY with a referral from your provider.     R-(recommendations): Not sure how to pend this type of referral.

## 2021-01-01 ASSESSMENT — ANXIETY QUESTIONNAIRES: GAD7 TOTAL SCORE: 3

## 2021-01-21 ENCOUNTER — TELEPHONE (OUTPATIENT)
Dept: INTERNAL MEDICINE | Facility: CLINIC | Age: 86
End: 2021-01-21

## 2021-01-21 DIAGNOSIS — I10 BENIGN ESSENTIAL HYPERTENSION: Chronic | ICD-10-CM

## 2021-01-21 RX ORDER — LOSARTAN POTASSIUM 50 MG/1
25 TABLET ORAL DAILY
Qty: 45 TABLET | Refills: 3 | Status: SHIPPED | OUTPATIENT
Start: 2021-01-21 | End: 2021-04-27 | Stop reason: SINTOL

## 2021-01-21 NOTE — TELEPHONE ENCOUNTER
Reason for call:  Patient reporting a symptom    Symptom or request: Patient said the second batch of the Losartan makes her itch she is asking for something else.     Duration (how long have symptoms been present): since it was changed    Have you been treated for this before? No    Phone Number patient can be reached at:  Home number on file [phone] (home)    Best Time:  any    Can we leave a detailed message on this number:  YES    Call taken on 1/21/2021 at 8:15 AM by Aimee Oneill

## 2021-01-21 NOTE — TELEPHONE ENCOUNTER
Covering for PCP (out of clinic today):  Prescription changed back to take 1/2 of a 50mg pill, which is what she reported she was doing in December.    Thanks,  Long Kemp MD

## 2021-01-21 NOTE — TELEPHONE ENCOUNTER
She thinks the new rx was a different  as the color changed. I called [facility]. He (pharmacist) said it was a different strength. She was on 50 mg previously. She did not take the med last night and the itching stopped. Her bp was 156/89 today. How would you like her to proceed?  Kari Arreola RN

## 2021-02-15 ENCOUNTER — TELEPHONE (OUTPATIENT)
Dept: INTERNAL MEDICINE | Facility: CLINIC | Age: 86
End: 2021-02-15

## 2021-02-15 DIAGNOSIS — F41.9 ANXIETY AND DEPRESSION: ICD-10-CM

## 2021-02-15 DIAGNOSIS — F32.A ANXIETY AND DEPRESSION: ICD-10-CM

## 2021-02-15 RX ORDER — CLONAZEPAM 0.5 MG/1
.25-.5 TABLET ORAL DAILY PRN
Qty: 10 TABLET | Refills: 0 | Status: SHIPPED | OUTPATIENT
Start: 2021-02-15 | End: 2021-02-16

## 2021-02-15 NOTE — TELEPHONE ENCOUNTER
"Forwarding refill request for clonazepam to PCP for review.  Last Rx was 12/31/20.  Virtual visit note from that date is pasted below.  Would you like another virtual visit to discuss alternative?    Gilma Mcelroy RN  [facility]         \"Patient Instructions   Anxiety:   1. Ok to use the Clonazepam 2 x week, no more than 3 x week.  If you are using it more than 3 x week, we should consider trying a different daily medication to better manage anxiety.  Regular use of Clonazepam cause cause memory problems or increased risk of falls.\"    "

## 2021-02-15 NOTE — TELEPHONE ENCOUNTER
Clonazepam   Last Written Prescription Date:  12/31/2020  Last Fill Quantity: 30,  # refills: 0   Last office visit: Visit date not found with prescribing provider:     Future Office Visit:        Patient takes medication, when she has troubles breathing due to anxiety. Takes 1/2 tablet and sob resolves within 30 mins--was seen in ED 2x recently  Reports this happens every other day, but only takes the med 2 x a week.       Lelo FLORES  [facility]

## 2021-02-15 NOTE — TELEPHONE ENCOUNTER
Patient notified and was scheduled for a telephone visit tomorrow afternoon.     Gilma Mcelroy RN  [facility]

## 2021-02-16 ENCOUNTER — VIRTUAL VISIT (OUTPATIENT)
Dept: FAMILY MEDICINE | Facility: CLINIC | Age: 86
End: 2021-02-16
Payer: MEDICARE

## 2021-02-16 DIAGNOSIS — F41.9 ANXIETY AND DEPRESSION: ICD-10-CM

## 2021-02-16 DIAGNOSIS — F32.A ANXIETY AND DEPRESSION: ICD-10-CM

## 2021-02-16 PROCEDURE — 99442 PR PHYSICIAN TELEPHONE EVALUATION 11-20 MIN: CPT | Mod: 95 | Performed by: INTERNAL MEDICINE

## 2021-02-16 RX ORDER — CLONAZEPAM 0.5 MG/1
.25-.5 TABLET ORAL DAILY PRN
Qty: 30 TABLET | Refills: 1 | Status: SHIPPED | OUTPATIENT
Start: 2021-02-16 | End: 2021-06-08

## 2021-02-16 ASSESSMENT — ANXIETY QUESTIONNAIRES
1. FEELING NERVOUS, ANXIOUS, OR ON EDGE: SEVERAL DAYS
7. FEELING AFRAID AS IF SOMETHING AWFUL MIGHT HAPPEN: NOT AT ALL
5. BEING SO RESTLESS THAT IT IS HARD TO SIT STILL: NOT AT ALL
IF YOU CHECKED OFF ANY PROBLEMS ON THIS QUESTIONNAIRE, HOW DIFFICULT HAVE THESE PROBLEMS MADE IT FOR YOU TO DO YOUR WORK, TAKE CARE OF THINGS AT HOME, OR GET ALONG WITH OTHER PEOPLE: SOMEWHAT DIFFICULT
GAD7 TOTAL SCORE: 2
2. NOT BEING ABLE TO STOP OR CONTROL WORRYING: NOT AT ALL
6. BECOMING EASILY ANNOYED OR IRRITABLE: NOT AT ALL
3. WORRYING TOO MUCH ABOUT DIFFERENT THINGS: NOT AT ALL

## 2021-02-16 ASSESSMENT — PATIENT HEALTH QUESTIONNAIRE - PHQ9: 5. POOR APPETITE OR OVEREATING: SEVERAL DAYS
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
## 2021-02-16 NOTE — PATIENT INSTRUCTIONS
1. Refill of clonazepam.  Take no more than 2-3 x week.  If you find you are needing it more often, follow-up with Dr Maddox  2. Daily use of the medication can cause memory problems, dizziness and increase your risk of falls

## 2021-02-17 ASSESSMENT — ANXIETY QUESTIONNAIRES: GAD7 TOTAL SCORE: 2

## 2021-02-22 ENCOUNTER — OFFICE VISIT (OUTPATIENT)
Dept: FAMILY MEDICINE | Facility: CLINIC | Age: 86
End: 2021-02-22
Payer: MEDICARE

## 2021-02-22 VITALS
TEMPERATURE: 99.5 F | BODY MASS INDEX: 25.61 KG/M2 | SYSTOLIC BLOOD PRESSURE: 134 MMHG | WEIGHT: 150 LBS | DIASTOLIC BLOOD PRESSURE: 68 MMHG | HEIGHT: 64 IN | OXYGEN SATURATION: 100 % | HEART RATE: 97 BPM | RESPIRATION RATE: 16 BRPM

## 2021-02-22 DIAGNOSIS — N30.01 ACUTE CYSTITIS WITH HEMATURIA: Primary | ICD-10-CM

## 2021-02-22 DIAGNOSIS — R30.0 DYSURIA: ICD-10-CM

## 2021-02-22 DIAGNOSIS — R82.90 NONSPECIFIC FINDING ON EXAMINATION OF URINE: ICD-10-CM

## 2021-02-22 DIAGNOSIS — E11.42 TYPE 2 DIABETES MELLITUS WITH DIABETIC POLYNEUROPATHY, WITHOUT LONG-TERM CURRENT USE OF INSULIN (H): ICD-10-CM

## 2021-02-22 LAB
ALBUMIN UR-MCNC: 100 MG/DL
APPEARANCE UR: CLEAR
BILIRUB UR QL STRIP: NEGATIVE
COLOR UR AUTO: YELLOW
GLUCOSE SERPL-MCNC: 115 MG/DL (ref 70–99)
GLUCOSE UR STRIP-MCNC: NEGATIVE MG/DL
HBA1C MFR BLD: 5.8 % (ref 0–5.6)
HGB UR QL STRIP: ABNORMAL
KETONES UR STRIP-MCNC: NEGATIVE MG/DL
LEUKOCYTE ESTERASE UR QL STRIP: ABNORMAL
NITRATE UR QL: NEGATIVE
PH UR STRIP: 5.5 PH (ref 5–7)
RBC #/AREA URNS AUTO: ABNORMAL /HPF
SOURCE: ABNORMAL
SP GR UR STRIP: 1.02 (ref 1–1.03)
UROBILINOGEN UR STRIP-ACNC: 0.2 EU/DL (ref 0.2–1)
WBC #/AREA URNS AUTO: ABNORMAL /HPF

## 2021-02-22 PROCEDURE — 87088 URINE BACTERIA CULTURE: CPT | Performed by: NURSE PRACTITIONER

## 2021-02-22 PROCEDURE — 82947 ASSAY GLUCOSE BLOOD QUANT: CPT | Performed by: NURSE PRACTITIONER

## 2021-02-22 PROCEDURE — 83036 HEMOGLOBIN GLYCOSYLATED A1C: CPT | Performed by: NURSE PRACTITIONER

## 2021-02-22 PROCEDURE — 36415 COLL VENOUS BLD VENIPUNCTURE: CPT | Performed by: NURSE PRACTITIONER

## 2021-02-22 PROCEDURE — 87086 URINE CULTURE/COLONY COUNT: CPT | Performed by: NURSE PRACTITIONER

## 2021-02-22 PROCEDURE — 87186 SC STD MICRODIL/AGAR DIL: CPT | Performed by: NURSE PRACTITIONER

## 2021-02-22 PROCEDURE — 99213 OFFICE O/P EST LOW 20 MIN: CPT | Performed by: NURSE PRACTITIONER

## 2021-02-22 PROCEDURE — 81001 URINALYSIS AUTO W/SCOPE: CPT | Performed by: NURSE PRACTITIONER

## 2021-02-22 RX ORDER — NITROFURANTOIN 25; 75 MG/1; MG/1
100 CAPSULE ORAL 2 TIMES DAILY
Qty: 14 CAPSULE | Refills: 0 | Status: SHIPPED | OUTPATIENT
Start: 2021-02-22 | End: 2021-03-01

## 2021-02-22 ASSESSMENT — MIFFLIN-ST. JEOR: SCORE: 1087.46

## 2021-02-22 ASSESSMENT — ENCOUNTER SYMPTOMS
FEVER: 0
FLANK PAIN: 0
FREQUENCY: 1
HEMATURIA: 0
DYSURIA: 1

## 2021-02-22 NOTE — PROGRESS NOTES
"    Assessment & Plan     Acute cystitis with hematuria  Symptoms and UA consistent with UTI. Due to multiple allergies (sulfa, cipro, cephalexin, Erythromycin, azithromycin) will treat with Macrobid. Side effects, risks and benefits of medication were discussed with patient. Discussed how and when to take medication. Advised to follow-up if symptoms do not improve or sooner if symptoms worsen.     - nitroFURantoin macrocrystal-monohydrate (MACROBID) 100 MG capsule; Take 1 capsule (100 mg) by mouth 2 times daily for 7 days    Type 2 diabetes mellitus with diabetic polyneuropathy, without long-term current use of insulin (H)  Labs ordered. Follow-up with PCP pending results.     - Hemoglobin A1c  - Glucose    Dysuria    - *UA reflex to Microscopic and Culture ([place] and [facility] (except [place] and [place])  - Urine Microscopic    Nonspecific finding on examination of urine  Culture sent off. Will contact patient with results. She gave verbal consent to leave detailed VM if she doesn't answer the phone.     - Urine Culture Aerobic Bacterial             BMI:   Estimated body mass index is 26.15 kg/m  as calculated from the following:    Height as of this encounter: 1.613 m (5' 3.5\").    Weight as of this encounter: 68 kg (150 lb).       Patient Instructions   You have a urinary tract infection.  1. Take antibiotics as prescribed.  2. Take a probiotic and/or eat yogurt daily while on antibiotics and 10 days after to prevent GI upset.  3. Increase fluid intake to 6-8 glasses of water a day.  4. To prevent infection avoid douching, avoid bubble baths and perineal sprays. Always urinate before and after having intercourse to flush out any harmful bacteria.  5. Wipe the perineum from front to back after urinating to prevent the spread of bacteria from the rectum.    Diabetic labs ordered.         Return if symptoms worsen or fail to improve.    [facility] " "ANA Finnegan is a 88 year old who presents for the following health issues     HPI       Genitourinary - Female  Onset/Duration: x last week  Description:   Painful urination (Dysuria): YES           Frequency: YES  Blood in urine (Hematuria): no  Delay in urine (Hesitency): YES  Intensity: moderate  Progression of Symptoms:  same and constant  Accompanying Signs & Symptoms:  Fever/chills: no - her legs feel weak when she gets uti's  Flank pain: no  Nausea and vomiting: no  Vaginal symptoms: none  Abdominal/Pelvic Pain: YES  History:   History of frequent UTI s: YES  History of kidney stones: no  Sexually Active: no  Possibility of pregnancy: Yes  Precipitating or alleviating factors: None  Therapies tried and outcome: Increase fluid intake      Additional provider notes: Gets frequent UTI's ~3 times per year. Now with dysuria, frequency, pelvic pain and leg weakness (states this happens with UTI's). Has multiple allergies: sulfa, cipro, cephalexin, Erythromycin, azithromycin. Son drove her here today, but is not in the room with her.     DM: BS ~130-145 in the morning; states she has been craving sweets frequently. Recently taken off metformin per patient. States she's not on any medication now. States she doesn't do blood sugars regularly as it is hard to get blood sometimes.     Review of Systems   Constitutional: Negative for fever.   Genitourinary: Positive for dysuria, frequency and pelvic pain. Negative for flank pain, hematuria, vaginal bleeding, vaginal discharge and vaginal pain.   Musculoskeletal:        Legs feel weak            Objective    /68 (BP Location: Right arm, Patient Position: Sitting, Cuff Size: Adult Large)   Pulse 97   Temp 99.5  F (37.5  C) (Tympanic)   Resp 16   Ht 1.613 m (5' 3.5\")   Wt 68 kg (150 lb)   SpO2 100%   Breastfeeding No   BMI 26.15 kg/m    Body mass index is 26.15 kg/m .  Physical Exam  Vitals signs and nursing note reviewed.   Constitutional:     "   General: She is not in acute distress.     Appearance: Normal appearance. She is not ill-appearing or toxic-appearing.   Cardiovascular:      Rate and Rhythm: Normal rate and regular rhythm.      Pulses: Normal pulses.      Heart sounds: Normal heart sounds.   Pulmonary:      Effort: Pulmonary effort is normal.      Breath sounds: Normal breath sounds.   Abdominal:      General: Bowel sounds are normal.      Palpations: Abdomen is soft.      Tenderness: There is no abdominal tenderness.   Neurological:      Mental Status: She is alert and oriented to person, place, and time.   Psychiatric:         Behavior: Behavior normal.            Results for orders placed or performed in visit on 02/22/21 (from the past 24 hour(s))   *UA reflex to Microscopic and Culture ([place] and [facility] (except [place] and [place])    Specimen: Midstream Urine   Result Value Ref Range    Color Urine Yellow     Appearance Urine Clear     Glucose Urine Negative NEG^Negative mg/dL    Bilirubin Urine Negative NEG^Negative    Ketones Urine Negative NEG^Negative mg/dL    Specific Gravity Urine 1.025 1.003 - 1.035    Blood Urine Large (A) NEG^Negative    pH Urine 5.5 5.0 - 7.0 pH    Protein Albumin Urine 100 (A) NEG^Negative mg/dL    Urobilinogen Urine 0.2 0.2 - 1.0 EU/dL    Nitrite Urine Negative NEG^Negative    Leukocyte Esterase Urine Large (A) NEG^Negative    Source Midstream Urine    Urine Microscopic   Result Value Ref Range    WBC Urine Quantity not sufficient (A) OTO5^0 - 5 /HPF    RBC Urine Quantity not sufficient (A) OTO2^O - 2 /HPF   Hemoglobin A1c   Result Value Ref Range    Hemoglobin A1C 5.8 (H) 0 - 5.6 %

## 2021-02-22 NOTE — PATIENT INSTRUCTIONS
You have a urinary tract infection.  1. Take antibiotics as prescribed.  2. Take a probiotic and/or eat yogurt daily while on antibiotics and 10 days after to prevent GI upset.  3. Increase fluid intake to 6-8 glasses of water a day.  4. To prevent infection avoid douching, avoid bubble baths and perineal sprays. Always urinate before and after having intercourse to flush out any harmful bacteria.  5. Wipe the perineum from front to back after urinating to prevent the spread of bacteria from the rectum.    Diabetic labs ordered.

## 2021-02-24 ENCOUNTER — TELEPHONE (OUTPATIENT)
Dept: FAMILY MEDICINE | Facility: CLINIC | Age: 86
End: 2021-02-24

## 2021-02-24 LAB
BACTERIA SPEC CULT: ABNORMAL
Lab: ABNORMAL
SPECIMEN SOURCE: ABNORMAL

## 2021-02-24 NOTE — TELEPHONE ENCOUNTER
Discussed lab results with patient. Macrobid provides coverage for bacteria. Patient reports her symptoms have improved significantly. Encouraged to continue taking antibiotics until they are gone. Discussed DM labs. A1C is improved since last year. Follow-up with PCP for yearly exam when it is due or sooner with concerns. No further concerns noted at this time.     Mckenna Dubois DNP, NP-C 2/24/2021 9:35 AM

## 2021-02-28 ENCOUNTER — APPOINTMENT (OUTPATIENT)
Dept: CT IMAGING | Facility: CLINIC | Age: 86
End: 2021-02-28
Attending: FAMILY MEDICINE
Payer: MEDICARE

## 2021-02-28 ENCOUNTER — HOSPITAL ENCOUNTER (EMERGENCY)
Facility: CLINIC | Age: 86
Discharge: HOME | End: 2021-02-28
Attending: FAMILY MEDICINE | Admitting: FAMILY MEDICINE
Payer: MEDICARE

## 2021-02-28 ENCOUNTER — ANCILLARY PROCEDURE (OUTPATIENT)
Dept: ULTRASOUND IMAGING | Facility: CLINIC | Age: 86
End: 2021-02-28
Attending: FAMILY MEDICINE
Payer: COMMERCIAL

## 2021-02-28 ENCOUNTER — APPOINTMENT (OUTPATIENT)
Dept: GENERAL RADIOLOGY | Facility: CLINIC | Age: 86
End: 2021-02-28
Attending: FAMILY MEDICINE
Payer: MEDICARE

## 2021-02-28 VITALS
TEMPERATURE: 98.7 F | BODY MASS INDEX: 26.15 KG/M2 | WEIGHT: 150 LBS | SYSTOLIC BLOOD PRESSURE: 174 MMHG | RESPIRATION RATE: 20 BRPM | OXYGEN SATURATION: 96 % | HEART RATE: 85 BPM | DIASTOLIC BLOOD PRESSURE: 84 MMHG

## 2021-02-28 DIAGNOSIS — R06.02 SOB (SHORTNESS OF BREATH): ICD-10-CM

## 2021-02-28 DIAGNOSIS — F41.9 ANXIETY: ICD-10-CM

## 2021-02-28 LAB
ALBUMIN SERPL-MCNC: 3.5 G/DL (ref 3.4–5)
ALP SERPL-CCNC: 79 U/L (ref 40–150)
ALT SERPL W P-5'-P-CCNC: 21 U/L (ref 0–50)
ANION GAP SERPL CALCULATED.3IONS-SCNC: 4 MMOL/L (ref 3–14)
AST SERPL W P-5'-P-CCNC: 21 U/L (ref 0–45)
BASOPHILS # BLD AUTO: 0.1 10E9/L (ref 0–0.2)
BASOPHILS NFR BLD AUTO: 0.6 %
BILIRUB SERPL-MCNC: 0.5 MG/DL (ref 0.2–1.3)
BUN SERPL-MCNC: 11 MG/DL (ref 7–30)
CALCIUM SERPL-MCNC: 8.9 MG/DL (ref 8.5–10.1)
CHLORIDE SERPL-SCNC: 111 MMOL/L (ref 94–109)
CO2 SERPL-SCNC: 26 MMOL/L (ref 20–32)
CREAT SERPL-MCNC: 0.82 MG/DL (ref 0.52–1.04)
DIFFERENTIAL METHOD BLD: NORMAL
EOSINOPHIL # BLD AUTO: 0.2 10E9/L (ref 0–0.7)
EOSINOPHIL NFR BLD AUTO: 2.5 %
ERYTHROCYTE [DISTWIDTH] IN BLOOD BY AUTOMATED COUNT: 12.5 % (ref 10–15)
FLUAV RNA RESP QL NAA+PROBE: NEGATIVE
FLUBV RNA RESP QL NAA+PROBE: NEGATIVE
GFR SERPL CREATININE-BSD FRML MDRD: 64 ML/MIN/{1.73_M2}
GLUCOSE SERPL-MCNC: 125 MG/DL (ref 70–99)
HCT VFR BLD AUTO: 39.4 % (ref 35–47)
HGB BLD-MCNC: 13.2 G/DL (ref 11.7–15.7)
IMM GRANULOCYTES # BLD: 0 10E9/L (ref 0–0.4)
IMM GRANULOCYTES NFR BLD: 0.3 %
LABORATORY COMMENT REPORT: NORMAL
LYMPHOCYTES # BLD AUTO: 1.7 10E9/L (ref 0.8–5.3)
LYMPHOCYTES NFR BLD AUTO: 18.8 %
MCH RBC QN AUTO: 28.6 PG (ref 26.5–33)
MCHC RBC AUTO-ENTMCNC: 33.5 G/DL (ref 31.5–36.5)
MCV RBC AUTO: 85 FL (ref 78–100)
MONOCYTES # BLD AUTO: 0.6 10E9/L (ref 0–1.3)
MONOCYTES NFR BLD AUTO: 6.4 %
NEUTROPHILS # BLD AUTO: 6.6 10E9/L (ref 1.6–8.3)
NEUTROPHILS NFR BLD AUTO: 71.4 %
NRBC # BLD AUTO: 0 10*3/UL
NRBC BLD AUTO-RTO: 0 /100
NT-PROBNP SERPL-MCNC: 230 PG/ML (ref 0–1800)
PLATELET # BLD AUTO: 252 10E9/L (ref 150–450)
POTASSIUM SERPL-SCNC: 3.7 MMOL/L (ref 3.4–5.3)
PROT SERPL-MCNC: 7.6 G/DL (ref 6.8–8.8)
RBC # BLD AUTO: 4.62 10E12/L (ref 3.8–5.2)
RSV RNA SPEC QL NAA+PROBE: NORMAL
SARS-COV-2 RNA RESP QL NAA+PROBE: NEGATIVE
SODIUM SERPL-SCNC: 141 MMOL/L (ref 133–144)
SPECIMEN SOURCE: NORMAL
TROPONIN I SERPL-MCNC: <0.015 UG/L (ref 0–0.04)
WBC # BLD AUTO: 9.3 10E9/L (ref 4–11)

## 2021-02-28 PROCEDURE — 93308 TTE F-UP OR LMTD: CPT | Performed by: FAMILY MEDICINE

## 2021-02-28 PROCEDURE — 93010 ELECTROCARDIOGRAM REPORT: CPT | Performed by: FAMILY MEDICINE

## 2021-02-28 PROCEDURE — 99285 EMERGENCY DEPT VISIT HI MDM: CPT | Mod: 25 | Performed by: FAMILY MEDICINE

## 2021-02-28 PROCEDURE — 250N000011 HC RX IP 250 OP 636: Performed by: FAMILY MEDICINE

## 2021-02-28 PROCEDURE — 96374 THER/PROPH/DIAG INJ IV PUSH: CPT | Mod: 59 | Performed by: FAMILY MEDICINE

## 2021-02-28 PROCEDURE — 71275 CT ANGIOGRAPHY CHEST: CPT | Mod: MG

## 2021-02-28 PROCEDURE — 83880 ASSAY OF NATRIURETIC PEPTIDE: CPT | Performed by: FAMILY MEDICINE

## 2021-02-28 PROCEDURE — 84484 ASSAY OF TROPONIN QUANT: CPT | Performed by: FAMILY MEDICINE

## 2021-02-28 PROCEDURE — 93005 ELECTROCARDIOGRAM TRACING: CPT | Performed by: FAMILY MEDICINE

## 2021-02-28 PROCEDURE — 250N000009 HC RX 250: Performed by: FAMILY MEDICINE

## 2021-02-28 PROCEDURE — 80053 COMPREHEN METABOLIC PANEL: CPT | Performed by: FAMILY MEDICINE

## 2021-02-28 PROCEDURE — C9803 HOPD COVID-19 SPEC COLLECT: HCPCS | Performed by: FAMILY MEDICINE

## 2021-02-28 PROCEDURE — 93308 TTE F-UP OR LMTD: CPT | Mod: 26 | Performed by: FAMILY MEDICINE

## 2021-02-28 PROCEDURE — 85025 COMPLETE CBC W/AUTO DIFF WBC: CPT | Performed by: FAMILY MEDICINE

## 2021-02-28 PROCEDURE — 71045 X-RAY EXAM CHEST 1 VIEW: CPT

## 2021-02-28 PROCEDURE — 87636 SARSCOV2 & INF A&B AMP PRB: CPT | Performed by: FAMILY MEDICINE

## 2021-02-28 RX ORDER — ALBUTEROL SULFATE 90 UG/1
2 AEROSOL, METERED RESPIRATORY (INHALATION) EVERY 4 HOURS PRN
Qty: 1 INHALER | Refills: 1 | Status: SHIPPED | OUTPATIENT
Start: 2021-02-28 | End: 2021-06-08

## 2021-02-28 RX ORDER — DIPHENHYDRAMINE HYDROCHLORIDE 50 MG/ML
12.5 INJECTION INTRAMUSCULAR; INTRAVENOUS ONCE
Status: COMPLETED | OUTPATIENT
Start: 2021-02-28 | End: 2021-02-28

## 2021-02-28 RX ORDER — IOPAMIDOL 755 MG/ML
69 INJECTION, SOLUTION INTRAVASCULAR ONCE
Status: COMPLETED | OUTPATIENT
Start: 2021-02-28 | End: 2021-02-28

## 2021-02-28 RX ADMIN — SODIUM CHLORIDE 96 ML: 9 INJECTION, SOLUTION INTRAVENOUS at 14:39

## 2021-02-28 RX ADMIN — DIPHENHYDRAMINE HYDROCHLORIDE 12.5 MG: 50 INJECTION, SOLUTION INTRAMUSCULAR; INTRAVENOUS at 13:43

## 2021-02-28 RX ADMIN — IOPAMIDOL 69 ML: 755 INJECTION, SOLUTION INTRAVENOUS at 14:38

## 2021-02-28 NOTE — ED NOTES
Pt reports worsening sob that worsened last night, pt reports difficulty sleeping, woke up this morning and felt weakness to bilateral LE. Pt reports she has had sob over the last couple months, has not been evaluated for this time. Pt reports sob is better at this time

## 2021-02-28 NOTE — DISCHARGE INSTRUCTIONS
Return to the Emergency Room if the following occurs:     Severely worsened breathing, new chest pain, fever >101, or for any concern at anytime.    Or, follow-up with the following provider as we discussed:     Return to your primary doctor next week for reevaluation.    Medications discussed:    Consider albuterol for trouble with breathing, as needed.    If you received pain-relieving or sedating medication during your time in the ER, avoid alcohol, driving automobiles, or working with machinery.  Also, a responsible adult must stay with you.        Call the Nurse Advice Line at [phone] or [phone] for any concern at anytime.

## 2021-02-28 NOTE — ED PROVIDER NOTES
HPI   The patient is an 88-year-old female presenting with shortness of breath.  She has a known history of coronary artery disease and diabetes.  She denies having had a myocardial infarction or coronary stenting or CABG.  She denies having history of heart failure.  She does not take a diuretic.  She was recently diagnosed with a urinary tract infection and is on Macrobid.    The patient tells me she has had progressive shortness of breath over the past 2 months.  Last night it worsened to the point that she could not sleep because whenever she lie down she would become dyspneic and work to breathe.  She has not recognized new lower extremity swelling but was told recently that her legs are in fact swollen.  She denies recent chest pain.  She does report a dry, hacking cough over the past week or so.  No fever.  No nausea or vomiting.  No diarrhea.  No headache.  No myalgia.  This morning when she got out of bed she says that both of her legs were so weak they could not support her and she was unable to walk in her apartment.  She denies upper extremity weakness.  She does not report palpitations, lightheadedness, or fainting.        Allergies:  Allergies   Allergen Reactions     Metoprolol Itching and Rash     Cephalexin Rash     Erythromycin Rash     Actonel [Bisphosphonates] Itching     Amlodipine Itching     Azithromycin Hives     Celebrex [Celecoxib] Rash     Cipro [Ciprofloxacin] Rash     Contrast Dye Hives     Diatrizoate Hives     Erythromycin Rash     Escitalopram Diarrhea     Gets very sick and mad feelings     Fosamax Itching and Rash     Keflex [Cephalexin Monohydrate] Rash     Lisinopril Cough     Risedronate Itching     Seasonal Allergies      Shellfish-Derived Products Hives     Tetanus Toxoid, Adsorbed Swelling     Tetanus-Diphtheria Toxoids Swelling     Vicodin [Acetaminophen] Itching     Patient reported - only when used scheduled in high doses.        Vioxx Rash     Acetaminophen Itching      "In high doses  Patient reported - only when used scheduled in high doses.        Alendronic Acid Rash     Celecoxib Rash     Penicillins Rash     Rofecoxib Rash     Sulfa Drugs Itching and Rash     Sulfasalazine Itching and Rash     Problem List:    Patient Active Problem List    Diagnosis Date Noted     Moderate major depression (H) 12/31/2020     Priority: Medium     ARABELLA (generalized anxiety disorder) 12/31/2020     Priority: Medium     Has been on celexa (not effective but no side effects), amitriptyline, cymbalta (dizziness), lexapro (listed as allergy - 'gets very sick\"), zoloft switched to lexapro)       Abnormal CT of the abdomen 10/10/2019     Priority: Medium     CT 10/9/2019- cystic lesion along the anterior aspect of the pancreatic body/tail (series 2 image 27) measures 2 cm, and is new since the previous exam 2011. Pancreatic MRI with MRCP should be considered for further evaluation.   MRI 10/10/2019- There are 2 cystic lesions in the pancreatic body/tail, with the largest measuring 2 cm. No enhancing septations or solid masslike components are identified, although evaluation is limited related to patient motion artifact. These lesions have appearances suggestive of IPMN, but remain technically indeterminate. A follow-up MRI in one year should be considered to confirm stability.       Cystocele, midline      Priority: Medium     grade III       Generalized weakness 10/09/2019     Priority: Medium     Diarrhea 10/09/2019     Priority: Medium     Chronic left-sided low back pain with left-sided sciatica 04/15/2019     Priority: Medium     Coronary artery disease involving native coronary artery of native heart with angina pectoris (H) 02/19/2019     Priority: Medium     coronary angiogram 2/2019 showed mild coronary artery disease.  The most significant of these was the 40% lesion in the mid RCA.       Benign essential hypertension 11/14/2018     Priority: Medium     Type 2 diabetes mellitus with diabetic " polyneuropathy, without long-term current use of insulin (H) 11/14/2018     Priority: Medium     Diet controlled.  Diagnosed 6/2016, has never been on medications.       History of recurrent urinary tract infection 11/14/2018     Priority: Medium     On daily trimethoprim       CKD (chronic kidney disease) stage 3, GFR 30-59 ml/min 11/14/2018     Priority: Medium     Peripheral neuropathy 09/10/2018     Priority: Medium     Bilateral wrist pain 04/11/2018     Priority: Medium     Protrusion of lumbar intervertebral disc 04/11/2018     Priority: Medium     Carpal tunnel syndrome of left wrist 10/21/2014     Priority: Medium     Diastolic dysfunction 03/31/2014     Priority: Medium     Pronation of foot 05/05/2011     Priority: Medium     Bilateral defomity       Allergic rhinitis 01/19/2011     Priority: Medium     Hyperlipidemia LDL goal <100 10/31/2010     Priority: Medium     Urge incontinence 10/20/2009     Priority: Medium     Right foot pain 10/16/2009     Priority: Medium     Xray showed djd -follows with podiatry. -arch supports placed may need cam walker        Vitamin D deficiency 09/09/2008     Priority: Medium     Subjective tinnitus 04/22/2008     Priority: Medium     Urticaria 08/22/2006     Priority: Medium     SENSONRL HEAR LOSS,BILAT 05/03/2006     Priority: Medium     Esophageal reflux      Priority: Medium     Memory loss      Priority: Medium     Early cognitive decline. MMSE 27/30, [place] 4.7/ 6.0 2004. B12, TSH, RPR normal [id number].       Degeneration of lumbar or lumbosacral intervertebral disc      Priority: Medium     MRI 5/04- DDD, L2-3 annular bulge with protrusion into left caudal infra-foraminal area  MRI 2017 with L4-5 loss disc height with spondylolisthesis.       B12 deficiency 10/10/2019     Priority: Low     Irritable bowel syndrome with diarrhea      Priority: Low     diahrrea predominant       Osteopenia of multiple sites      Priority: Low     DEXA 2007 -1.4 hip. Dexa 2004 -1 hip  and -1 spine       RESTLESS LEG SYNDROME      Priority: Low     Generalized osteoarthrosis, unspecified site      Priority: Low     C-spine, left hip       Diverticulosis of large intestine      Priority: Low     flexible sigmoidoscopy with diverticulosis otherwise normal 2001, admit diverticulitis 2009        Past Medical History:    Past Medical History:   Diagnosis Date     Abnormal cardiovascular stress test 2/3/2019     Adhesive capsulitis of shoulder      Adjustment disorder with anxiety 3/18/2016     B12 deficiency anemia 6/20/2012     Chest pain 2004     Colitis, Clostridium difficile 4/18/2012     Diverticulitis 2009     Headache(784.0) 2001     Impaired fasting glucose 2005     PERS HX ALLERGY OTHER FOODS 8/22/2006     PERS HX ALLERGY TO MILK PRODUCTS 8/22/2006     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     Status post coronary angiogram 2/27/2019     Syncope and collapse 9/10/2018     Past Surgical History:    Past Surgical History:   Procedure Laterality Date     ARTHROPLASTY KNEE  3/26/2012    Procedure:ARTHROPLASTY KNEE; Right Total Knee Arthroplasty; Surgeon:BERNADINE ROSAS; Location:[place] OR     C APPENDECTOMY  1953     CHOLECYSTECTOMY       CV HEART CATHETERIZATION WITH POSSIBLE INTERVENTION N/A 2/27/2019    Procedure: Coronary Angiogram with Left ventriculogram;  Surgeon: Leandro Carpio MD;  Location:  HEART CARDIAC CATH LAB     HERNIA REPAIR      Hernia Repair     HYSTERECTOMY, PAP NO LONGER INDICATED  1983    TVH/BSO     SURGICAL HISTORY OF -   10/08    A & P Repair, Dr. Hannah     Family History:    Family History   Problem Relation Age of Onset     C.A.D. Mother      Diabetes Mother      Cancer - colorectal Mother      Arthritis Mother      Heart Disease Mother      Lipids Brother      Obesity Brother      Hypertension Brother      Allergies Son      Eye Disorder Son         cataract     Thyroid Disease Sister         graves dz     Eye Disorder Son      Leukemia Son       Alcohol/Drug Father      Social History:  Marital Status:   [5]  Social History     Tobacco Use     Smoking status: Never Smoker     Smokeless tobacco: Never Used   Substance Use Topics     Alcohol use: No     Drug use: No      Medications:    albuterol (PROAIR HFA/PROVENTIL HFA/VENTOLIN HFA) 108 (90 Base) MCG/ACT inhaler  blood glucose (ACCU-CHEK GUIDE) test strip  blood glucose monitoring (ACCU-CHEK FASTCLIX) lancets  clonazePAM (KLONOPIN) 0.5 MG tablet  Continuous Blood Gluc  (FREESTYLE JERRY 14 DAY READER) PEGGY  Continuous Blood Gluc Sensor (FREESTYLE JERRY 14 DAY SENSOR) MISC  Cyanocobalamin (B-12) 1000 MCG SUBL  dicyclomine (BENTYL) 20 MG tablet  gabapentin (NEURONTIN) 300 MG capsule  losartan (COZAAR) 50 MG tablet  Multiple Vitamins-Minerals (THERAPEUTIC MULTIVIT/MINERAL) TABS  nitroFURantoin macrocrystal-monohydrate (MACROBID) 100 MG capsule  nitroGLYcerin (NITROSTAT) 0.4 MG sublingual tablet  order for DME  ORDER FOR DME  STATIN NOT PRESCRIBED (INTENTIONAL)  VITAMIN D, CHOLECALCIFEROL, PO      Review of Systems   All other systems reviewed and are negative.      PE   BP: (!) 176/81  Pulse: 83  Temp: 98.7  F (37.1  C)  Resp: 20  Weight: 68 kg (150 lb)  SpO2: 93 %  Physical Exam  Vitals signs reviewed.   Constitutional:       General: She is not in acute distress.     Appearance: She is well-developed.      Comments: Sitting up in bed, cooperative.  Conversational.   HENT:      Head: Normocephalic and atraumatic.      Right Ear: External ear normal.      Left Ear: External ear normal.      Nose: Nose normal.      Mouth/Throat:      Mouth: Mucous membranes are moist.      Pharynx: Oropharynx is clear.   Eyes:      Extraocular Movements: Extraocular movements intact.      Conjunctiva/sclera: Conjunctivae normal.      Pupils: Pupils are equal, round, and reactive to light.   Neck:      Musculoskeletal: Normal range of motion.   Cardiovascular:      Rate and Rhythm: Normal rate and regular rhythm.  "  Pulmonary:      Effort: Pulmonary effort is normal.      Comments: Mild crackles at the bases bilaterally.  Abdominal:      Tenderness: There is no abdominal tenderness.   Musculoskeletal: Normal range of motion.      Comments: 1+ edema in her lower extremities bilaterally.   Skin:     General: Skin is warm and dry.   Neurological:      Mental Status: She is alert and oriented to person, place, and time.   Psychiatric:         Behavior: Behavior normal.         ED COURSE and MDM   1040.  The patient has shortness of breath with lying flat.  She has bilateral leg swelling.  She has bilateral leg weakness this morning.  Lab values pending.  EKG pending.  Portable chest x-ray.  Covid swab.    1219.  Thus far, everything has returned is unremarkable.  There is not obvious heart failure supported by BNP results and chest x-ray results.  Clinical history is concerning for this though.  She is agreeable to obtain a CT scan of her chest to look for PE and missed effusion or congestion.  She has an allergy to contrast material, rash.  Benadryl be given.    1542.  The patient has an unremarkable work-up here.  CT scan negative.  No evidence of heart failure.  No evidence of PE.  No evidence of pneumonia or other intrapulmonary pathology.  The patient talks about having shortness of breath while at home which is what her son is fixing the basement.  There is a lot of new dust and sheet rock material.  Albuterol inhaler will be prescribed though she is not giving me a great story for bronchospasm and there certainly is not any here.  Also, the patient admits that she is very frustrated and probably anxious about being kept at her house by her son.  She says, \"he took away my car keys because he thinks if I go outside I will get Covid and die.\"  She says to me, \"I am 88 years old and if I get Covid and die that is okay.  I would like to get out every once in a while.\"  Anxiety is likely a big component of how she feels.  " Follow-up next week for reevaluation.    EKG  (1125)   Interpretation performed by me.  Rate: 70     Rhythm: sinus     Axis: nl  Intervals: WI (12-2) 142, QRS (<12) 100, QTc (>5) 447  P wave: nl     QRS complex: nl  ST segment / T-wave: nl  Conclusion: nl    LABS  Labs Ordered and Resulted from Time of ED Arrival Up to the Time of Departure from the ED   COMPREHENSIVE METABOLIC PANEL - Abnormal; Notable for the following components:       Result Value    Chloride 111 (*)     Glucose 125 (*)     All other components within normal limits   CBC WITH PLATELETS DIFFERENTIAL   TROPONIN I   NT PROBNP INPATIENT   INFLUENZA A/B & SARS-COV2 PCR MULTIPLEX   MAY SALINE LOCK IV       IMAGING  Images reviewed by me.  Radiology report also reviewed.  CT Chest Pulmonary Embolism w Contrast   Final Result   IMPRESSION:   1.  No evidence of pulmonary embolism. Thoracic aorta demonstrates   scattered calcified plaque without aneurysm or dissection. Moderate   coronary artery calcification is present.      2.  Lungs are clear. No enlarged lymph nodes.      3.  Partially imaged indeterminate pancreatic cystic lesion measuring   1.8 cm. Follow-up nonemergent MRI pancreas could be performed for   further assessment if clinically warranted.       KEITH ROY MD      XR Chest Port 1 View   Final Result   IMPRESSION: Portable chest. Lungs are clear. Heart is normal in size.   No pneumothorax. No definite pleural effusions.      KEITH ROY MD      POC US ECHO LIMITED   Final Result   [facility] Procedure Note        Limited Bedside ED Cardiac Ultrasound:      PROCEDURE: PERFORMED BY: Dr. Aly Tucker MD   INDICATIONS/SYMPTOM:  Shortness of Breath   PROBE: cardiac probe   BODY LOCATION: Chest   FINDINGS:    The ultrasound was performed utilizing the subcostal, parasternal long axis and parasternal short axis views.   Cardiac contractility:  Present   Gross estimation of cardiac kinesis: normal   Pericardial Effusion:  None   RV:LV  ratio: LV > RV   IVC:     Diameter:  IVC diameter expiration (IVCe) 2-3 cm                                                    IVC diameter inspiration (IVCi) 2-3 cm                                                        Collapsibility:  IVC collapses < 50% with inspiration   INTERPRETATION:    Chamber size and motion were grossly normal with LV > RV, normal cardiac kinesis.  No pericardial effusion was found.  IVC visualized and findings indicate normovolemia.   IMAGE DOCUMENTATION: Images were archived to hard drive.                  Procedures    Medications   diphenhydrAMINE (BENADRYL) injection 12.5 mg (12.5 mg Intravenous Given 2/28/21 1343)   iopamidol (ISOVUE-370) solution 69 mL (69 mLs Intravenous Given 2/28/21 1438)   sodium chloride 0.9 % bag 500mL for CT scan flush use (96 mLs Intravenous Given 2/28/21 1439)         IMPRESSION       ICD-10-CM    1. SOB (shortness of breath)  R06.02    2. Anxiety  F41.9             Medication List      Started    albuterol 108 (90 Base) MCG/ACT inhaler  Commonly known as: PROAIR HFA/PROVENTIL HFA/VENTOLIN HFA  2 puffs, Inhalation, EVERY 4 HOURS PRN                          Aly Tucker MD  02/28/21 [id number]

## 2021-03-02 ENCOUNTER — TELEPHONE (OUTPATIENT)
Dept: INTERNAL MEDICINE | Facility: CLINIC | Age: 86
End: 2021-03-02

## 2021-03-02 NOTE — TELEPHONE ENCOUNTER
Patient Quality Outreach Summary    From:2/26/21 to 6/26/21  Summary:    Patient is due/failing the following:   PHQ-9 Needed    Type of outreach:    Sent letter.    Questions for provider review:    None                                                                                                                    Naomie Rios CMA (ROSANA)   (aka: Bridgette Rios)       Chart routed to Care Team.

## 2021-03-09 ENCOUNTER — TELEPHONE (OUTPATIENT)
Dept: INTERNAL MEDICINE | Facility: CLINIC | Age: 86
End: 2021-03-09

## 2021-03-09 DIAGNOSIS — R41.3 MEMORY LOSS: Primary | ICD-10-CM

## 2021-03-09 NOTE — TELEPHONE ENCOUNTER
"S-(situation): Cold call came through to RN, no time to review chart, pt just on the line    B-(background): Memory loss on problem list, has anxiety     A-(assessment):   Son is giving her \"such a hard time about things.\"  Despite having an appt tomorrow with Dr. Maddox she is calling now \"to get something set up.\"  Either her daughter in law or her granddaughter will be bringing her to her appt.    R-(recommendations):   Order for Care Coordination to look in to this more in depth? This RN has no familiarity with this pt for any recommendations beyond this. Provider will need to decide next step.    Karen LINARES RN, BSN          "

## 2021-03-09 NOTE — TELEPHONE ENCOUNTER
We have discussed memory loss at previous visits.  I can address the issue more at our upcoming visit tomorrow

## 2021-03-10 ENCOUNTER — OFFICE VISIT (OUTPATIENT)
Dept: FAMILY MEDICINE | Facility: CLINIC | Age: 86
End: 2021-03-10
Payer: MEDICARE

## 2021-03-10 ENCOUNTER — PATIENT OUTREACH (OUTPATIENT)
Dept: CARE COORDINATION | Facility: CLINIC | Age: 86
End: 2021-03-10

## 2021-03-10 VITALS
BODY MASS INDEX: 27.9 KG/M2 | SYSTOLIC BLOOD PRESSURE: 130 MMHG | WEIGHT: 160 LBS | HEART RATE: 91 BPM | OXYGEN SATURATION: 98 % | DIASTOLIC BLOOD PRESSURE: 60 MMHG | RESPIRATION RATE: 16 BRPM | TEMPERATURE: 98.3 F

## 2021-03-10 DIAGNOSIS — R06.02 SOB (SHORTNESS OF BREATH): ICD-10-CM

## 2021-03-10 DIAGNOSIS — F41.1 GAD (GENERALIZED ANXIETY DISORDER): Primary | ICD-10-CM

## 2021-03-10 DIAGNOSIS — E11.42 TYPE 2 DIABETES MELLITUS WITH DIABETIC POLYNEUROPATHY, WITHOUT LONG-TERM CURRENT USE OF INSULIN (H): Chronic | ICD-10-CM

## 2021-03-10 DIAGNOSIS — R93.5 ABNORMAL CT OF THE ABDOMEN: ICD-10-CM

## 2021-03-10 PROCEDURE — 99214 OFFICE O/P EST MOD 30 MIN: CPT | Performed by: INTERNAL MEDICINE

## 2021-03-10 ASSESSMENT — ANXIETY QUESTIONNAIRES
3. WORRYING TOO MUCH ABOUT DIFFERENT THINGS: SEVERAL DAYS
IF YOU CHECKED OFF ANY PROBLEMS ON THIS QUESTIONNAIRE, HOW DIFFICULT HAVE THESE PROBLEMS MADE IT FOR YOU TO DO YOUR WORK, TAKE CARE OF THINGS AT HOME, OR GET ALONG WITH OTHER PEOPLE: SOMEWHAT DIFFICULT
5. BEING SO RESTLESS THAT IT IS HARD TO SIT STILL: SEVERAL DAYS
1. FEELING NERVOUS, ANXIOUS, OR ON EDGE: SEVERAL DAYS
6. BECOMING EASILY ANNOYED OR IRRITABLE: SEVERAL DAYS
GAD7 TOTAL SCORE: 6
7. FEELING AFRAID AS IF SOMETHING AWFUL MIGHT HAPPEN: NOT AT ALL
2. NOT BEING ABLE TO STOP OR CONTROL WORRYING: SEVERAL DAYS

## 2021-03-10 ASSESSMENT — PATIENT HEALTH QUESTIONNAIRE - PHQ9
SUM OF ALL RESPONSES TO PHQ QUESTIONS 1-9: 7
5. POOR APPETITE OR OVEREATING: SEVERAL DAYS

## 2021-03-10 NOTE — PROGRESS NOTES
Assessment & Plan     ARABELLA (generalized anxiety disorder) -not as well-controlled as it could be.  She continues on the clonazepam because she tolerates well, and has had intolerance to multiple medications listed below.  May consider trial of low-dose Prozac or Effexor.  She is hopeful now that she is fully vaccinated she is able to socialize and get out of her house more.  Advised following up with Cristina the behavioral health clinician.    SOB (shortness of breath) -may be related to losartan?  Stop losartan x2 weeks and will follow up with plan visit on 3/24     Abnormal CT of the abdomen -abnormality seen in the pancreas.  Needs further MRI.  - MR Pancreas wo & w Contrast; Future    Type 2 diabetes mellitus with diabetic polyneuropathy, without long-term current use of insulin (H) -we will check at upcoming visit on 3/24  - Albumin Random Urine Quantitative with Creat Ratio; Future  - Lipid panel reflex to direct LDL Non-fasting; Future        Patient Instructions   Shortness of breath:  1. Stop the losartan x 2 weeks. Can sometimes cause shortness of breath.  If no change in the shortness of breath, then we will consider further testing  2. We will follow-up on this in a few weeks.    Anxiety  1. Ok to continue clonazepam  2. May consider another medication like effexor or prozac if anxiety is still uncontrolled  3. Work to get out more, socialize more, now that you have been vaccinated for COVID  4. I am referring you to [facility] at [place] for anxiety; please make your initial appointment for 1 hour.  The phone number is [phone] or you can schedule at the  on your way out.      Pancreas abnormality  1. Consider follow-up MRI - order placed      No follow-ups on file.    Cynthia Maddox, DO  [facility]    Shruti Finnegan is a 88 year old who presents for the following health issues     HPI     On 2//13/2012 - PHI signed on file -  authorizing  Leandro Brown (son), Javier Brown (son), Edna Patel (daughter in law) [phone] - to share medical information.    Cov-19 vaccination: completed  Tdap: Medicare only pays Td -   Medicare Visit: next appointment with DM   Eder: Check insurances  Schedule DM follow up: Schedule for 2 weeks         Diabetic Foot exam      The Clonazepam has helped - specially when window is open     ED/UC Followup:    Facility:  [facility] ED  Date of visit: 2/28/21  Reason for visit: SOB (Shortness of Breath) + Anxiety  Current Status: The medication from the ER has helped   --had angiogram 2/2019 with non-obstructive CAD    Breathing improved with having window open or running a fan    There is remodeling of house.  Wonders if dust is contributing.    Is on losartan which can cause shortness of breath for some    No dyspnea on exertion, just at rest.  No palpitations or racing heart    No chest pain.  Has occasional dry cough    No PND, orthopnea    Albuterol inhaler helps some with shortness of breath.  Denies wheezing    Lifelong non-smoker    Last episode of shortness of breath was 2-3 days ago, lasting 1 hour.   Occurred at rest.       Anxiety Follow-Up    How are you doing with your anxiety since your last visit? Improved  With the medication    Are you having other symptoms that might be associated with anxiety? No    Have you had a significant life event? No     Are you feeling depressed? Yes:  its been in the house so much     Do you have any concerns with your use of alcohol or other drugs? No     Son thinks she is hypochondriac     Clonazepam helps with shortness of breath     Has tried cymbalta, celexa, lexapro, zoloft    She takes 1/2 clonazepam, nearly daily. It is very helpful    Social History     Tobacco Use     Smoking status: Never Smoker     Smokeless tobacco: Never Used   Substance Use Topics     Alcohol use: No     Drug use: No     ARABELLA-7 SCORE 12/31/2020 2/16/2021 3/10/2021    Total Score - - -   Total Score 3 2 6     PHQ 10/27/2020 12/31/2020 3/10/2021   PHQ-9 Total Score 15 5 7   Q9: Thoughts of better off dead/self-harm past 2 weeks Not at all Not at all Not at all     Last PHQ-9 3/10/2021   1.  Little interest or pleasure in doing things 1   2.  Feeling down, depressed, or hopeless 1   3.  Trouble falling or staying asleep, or sleeping too much 1   4.  Feeling tired or having little energy 1   5.  Poor appetite or overeating 1   6.  Feeling bad about yourself 0   7.  Trouble concentrating 1   8.  Moving slowly or restless 1   Q9: Thoughts of better off dead/self-harm past 2 weeks 0   PHQ-9 Total Score 7   Difficulty at work, home, or with people Somewhat difficult     ARABELLA-7  3/10/2021   1. Feeling nervous, anxious, or on edge 1   2. Not being able to stop or control worrying 1   3. Worrying too much about different things 1   4. Trouble relaxing 1   5. Being so restless that it is hard to sit still 1   6. Becoming easily annoyed or irritable 1   7. Feeling afraid, as if something awful might happen 0   ARABELLA-7 Total Score 6   If you checked any problems, how difficult have they made it for you to do your work, take care of things at home, or get along with other people? Somewhat difficult         Review of Systems   Constitutional, HEENT, cardiovascular, pulmonary, gi and gu systems are negative, except as otherwise noted.      Objective    /60   Pulse 91   Temp 98.3  F (36.8  C) (Tympanic)   Resp 16   Wt 72.6 kg (160 lb)   SpO2 98%   Breastfeeding No   BMI 27.90 kg/m    Body mass index is 27.9 kg/m .  Physical Exam   GENERAL APPEARANCE: alert and no distress  RESP: lungs clear to auscultation - no rales, rhonchi or wheezes  CV: regular rates and rhythm, normal S1 S2, no S3 or S4 and no murmur, click or rub  LYMPHATICS: 1+ bilateral pedal edema  ABDOMEN: soft, nontender, without hepatosplenomegaly or masses and bowel sounds normal  PSYCH: mentation appears normal and  worried

## 2021-03-10 NOTE — PATIENT INSTRUCTIONS
Shortness of breath:  1. Stop the losartan x 2 weeks. Can sometimes cause shortness of breath.  If no change in the shortness of breath, then we will consider further testing  2. We will follow-up on this in a few weeks.    Anxiety  1. Ok to continue clonazepam  2. May consider another medication like effexor or prozac if anxiety is still uncontrolled  3. Work to get out more, socialize more, now that you have been vaccinated for COVID  4. I am referring you to [facility] [place] for anxiety; please make your initial appointment for 1 hour.  The phone number is [phone] or you can schedule at the  on your way out.      Pancreas abnormality  1. Consider follow-up MRI - order placed

## 2021-03-10 NOTE — LETTER
[facility] CARE COORDINATION  [facility]  [address] [place] [address]    March 12, 2021    Daniela Brown  [address]  [facility] [address]      Dear Daniela,    I am a clinic community health worker who works with Cynthia Maddox DO at [facility]. I have been trying to reach you recently to introduce Clinic Care Coordination and to ask if there is anything I could provide you with.  Below is a description of clinic care coordination and how we can further assist you.      The clinic care coordination team is made up of a registered nurse,  and community health worker who understand the health care system. The goal of clinic care coordination is to help you manage your health and improve access to the health care system in the most efficient manner. The team can assist you in meeting your health care goals by providing education, coordinating services, strengthening the communication among your providers and supporting you with any resource needs.    Please feel free to contact me with any questions or concerns. We are focused on providing you with the highest-quality healthcare experience possible and that all starts with you.     Sincerely,       Padmini MILLER, Community Health Worker  [facility] Care Coordination  [facility]  Phone: [phone]

## 2021-03-11 ASSESSMENT — ANXIETY QUESTIONNAIRES: GAD7 TOTAL SCORE: 6

## 2021-03-11 NOTE — PROGRESS NOTES
Clinic Care Coordination Contact  [facility]/Voicemail    Clinical Data: Care Coordinator Outreach  Outreach attempted x 1.  Left message on patient's voicemail with call back information and requested return call.  Plan: CHW will try to reach patient again in 1-2 business days.    Padmini MILLER Community Health Worker  [facility] Care Coordination  [facility]  Phone: [phone]

## 2021-03-11 NOTE — TELEPHONE ENCOUNTER
Saw a provider on 3/10/21 and PHQ9/GAD7 done during office visit.  Naomie Rios CMA (ROSANA)   (aka: Bridgette Rios)

## 2021-03-13 NOTE — PROGRESS NOTES
Clinic Care Coordination Contact  [facility]/Voicemail    Clinical Data: Care Coordinator Outreach  Outreach attempted x 2.  Calls go straight to . CHW left message on patient's voicemail with call back information and requested return call.  Plan: CHW will send care coordination introduction letter with contact information and explanation of care coordination services via mail. CHW will do no further outreaches at this time.    Padmini MILLER Community Health Worker  [facility] Care Coordination  [facility]  Phone: [phone]

## 2021-04-20 ENCOUNTER — TELEPHONE (OUTPATIENT)
Dept: INTERNAL MEDICINE | Facility: CLINIC | Age: 86
End: 2021-04-20

## 2021-04-20 NOTE — TELEPHONE ENCOUNTER
"S-(situation): Patient calls us complains of weakness and \"no energy\"    B-(background): She had her 2nd covid vaccine over a month ago.       A-(assessment): Alert and oriented x3  No speech problems on phone.  Complains of all over weakness and feeling very tired.  Denies weakness specific to one extremity or the other  Denies fever  Denies chest pain  Denies shortness of breath  Denies cough.  She has been urinating  She hasn't been eating normally  She did not check her blood sugar this morning.      R-(recommendations): She will check her blood sugar and then have juice and a protein.  If no improvement she will go to the ED.   She will have her family member drive her.  Jennifer Driscoll RN      "

## 2021-04-26 ENCOUNTER — NURSE TRIAGE (OUTPATIENT)
Dept: INTERNAL MEDICINE | Facility: CLINIC | Age: 86
End: 2021-04-26

## 2021-04-26 NOTE — TELEPHONE ENCOUNTER
"S-(situation): The patient has weakness in the legs for the past 2 months.    B-(background): The patient had steroid injection for back pain.     A-(assessment): The patient had weakness in the both legs.  The patient wears the compression stockings.  Does not seem to help. The patient has pain when trying to walk.  The patient has some black and blue on the left leg. The patient has a cane for support but feels she may fall.    R-(recommendations): Recommend for patient to be seen in the ER. The patient does not have a ride. She will call her son.  The patient did not want me to cancel the appt in clinic, she may come to clinic visit instead of ER.  Advised patient again to be evaluated in the ER due to the severity of the weakness. The patient understands but does not want to go there.     Reason for Disposition    SEVERE weakness (i.e., unable to walk or barely able to walk, requires support) and new onset or worsening    Additional Information    Negative: Weakness of the face, arm or leg on one side of the body    Negative: Has diabetes and weakness from low blood sugar (i.e., < 60 mg/dL or 3.5 mmol/L)    Negative: Recent heat exposure, suspected cause of weakness    Negative: Vomiting is the main symptom    Negative: Diarrhea is the main symptom    Negative: MODERATE weakness (i.e., interferes with work, school, normal activities) and cause unknown    Negative: Fainted > 15 minutes ago and still feels too weak or dizzy to stand    Negative: Difficult to awaken or acting confused (e.g., disoriented, slurred speech)    Negative: Shock suspected (e.g., cold/pale/clammy skin, too weak to stand, low BP, rapid pulse)    Negative: Severe difficulty breathing (e.g., struggling for each breath, speaks in single words)    Answer Assessment - Initial Assessment Questions  1. DESCRIPTION: \"Describe how you are feeling.\"      Weakness in both legs  2. SEVERITY: \"How bad is it?\"  \"Can you stand and walk?\"    - MILD - Feels " "weak or tired, but does not interfere with work, school or normal activities    - MODERATE - Able to stand and walk; weakness interferes with work, school, or normal activities    - SEVERE - Unable to stand or walk      moderate  3. ONSET:  \"When did the weakness begin?\"      Started about 2 months   4. CAUSE: \"What do you think is causing the weakness?\"      covid vaccine  5. MEDICINES: \"Have you recently started a new medicine or had a change in the amount of a medicine?\"      Discontinue metformin  6. OTHER SYMPTOMS: \"Do you have any other symptoms?\" (e.g., chest pain, fever, cough, SOB, vomiting, diarrhea, bleeding, other areas of pain)      Diarrhea intermittent and SOB at times.  7. PREGNANCY: \"Is there any chance you are pregnant?\" \"When was your last menstrual period?\"      na    Protocols used: WEAKNESS (GENERALIZED) AND FATIGUE-A-OH      "

## 2021-04-26 NOTE — TELEPHONE ENCOUNTER
Reason for Call:  Other appointment    Detailed comments: Patient has been having weakness in her legs for 2 months. Patient states it is getting increasingly worse. Patient made an appointment today with Charissa Root and stated she thinks she is able to drive there for the appointment. Wanted a message put in to her provider. Please advise. Thank you     Phone Number Patient can be reached at: Home number on file [phone] (home)    Best Time: ASAP    Can we leave a detailed message on this number? YES    Call taken on 4/26/2021 at 9:10 AM by Zulay Talavera

## 2021-04-27 ENCOUNTER — OFFICE VISIT (OUTPATIENT)
Dept: FAMILY MEDICINE | Facility: CLINIC | Age: 86
End: 2021-04-27
Payer: MEDICARE

## 2021-04-27 VITALS
TEMPERATURE: 97.5 F | OXYGEN SATURATION: 97 % | DIASTOLIC BLOOD PRESSURE: 64 MMHG | RESPIRATION RATE: 14 BRPM | SYSTOLIC BLOOD PRESSURE: 136 MMHG | BODY MASS INDEX: 26.68 KG/M2 | WEIGHT: 153 LBS | HEART RATE: 91 BPM

## 2021-04-27 DIAGNOSIS — F41.1 GAD (GENERALIZED ANXIETY DISORDER): Primary | ICD-10-CM

## 2021-04-27 DIAGNOSIS — R35.0 URINARY FREQUENCY: ICD-10-CM

## 2021-04-27 DIAGNOSIS — G89.29 CHRONIC LEFT-SIDED LOW BACK PAIN WITH LEFT-SIDED SCIATICA: Chronic | ICD-10-CM

## 2021-04-27 DIAGNOSIS — M54.42 CHRONIC LEFT-SIDED LOW BACK PAIN WITH LEFT-SIDED SCIATICA: Chronic | ICD-10-CM

## 2021-04-27 DIAGNOSIS — I10 BENIGN ESSENTIAL HYPERTENSION: Chronic | ICD-10-CM

## 2021-04-27 DIAGNOSIS — R29.898 WEAKNESS OF BOTH LOWER EXTREMITIES: ICD-10-CM

## 2021-04-27 LAB

## 2021-04-27 PROCEDURE — 81001 URINALYSIS AUTO W/SCOPE: CPT | Performed by: INTERNAL MEDICINE

## 2021-04-27 PROCEDURE — 87086 URINE CULTURE/COLONY COUNT: CPT | Performed by: INTERNAL MEDICINE

## 2021-04-27 PROCEDURE — 99214 OFFICE O/P EST MOD 30 MIN: CPT | Performed by: INTERNAL MEDICINE

## 2021-04-27 RX ORDER — VENLAFAXINE 37.5 MG/1
37.5 TABLET ORAL 2 TIMES DAILY
Qty: 30 TABLET | Refills: 1 | Status: SHIPPED | OUTPATIENT
Start: 2021-04-27 | End: 2021-05-18 | Stop reason: SINTOL

## 2021-04-27 RX ORDER — AMLODIPINE BESYLATE 5 MG/1
5 TABLET ORAL DAILY
Qty: 90 TABLET | Refills: 3 | Status: SHIPPED | DISCHARGE
Start: 2021-04-27 | End: 2021-10-06

## 2021-04-27 NOTE — PATIENT INSTRUCTIONS
Stress:  1. I am referring you to [facility] [place] for stress; please make your initial appointment for 1 hour.  The phone number is [phone] or you can schedule at the  on your way out.  2. Work to increase socialization  3. Continue clonazepam as you are doing  4. Start Venlafaxine 37.5 mg once daily  5. Follow-up with Dr. Maddox in 1 month.    Urine:  1. No signs of infection  2. We will send urine for culture to evaluate for infection.    Low appetite   1. Work on smaller more frequent meals.    Leg weakness:  1. Appears related to your back.  Exam is normal.  No clear signs of other illness causing the weakness.

## 2021-04-27 NOTE — PROGRESS NOTES
Assessment & Plan     ARABELLA (generalized anxiety disorder) -virtually every visit in the last 12 months has been spent discussing this issue to some degree.  She uses clonazepam and has not tolerated several other medications as below.  However, her anxiety is severe and persisting so we will try another medication.  Start Effexor once daily.  Follow-up in 1 month, sooner if needed.  Have been unsuccessful at attempts to totally taper off the clonazepam  - venlafaxine (EFFEXOR) 37.5 MG tablet; Take 1 tablet (37.5 mg) by mouth 2 times daily    Urinary frequency -no signs of infection  - UA reflex to Microscopic and Culture  - Urine Microscopic  - Urine Culture Aerobic Bacterial    Benign essential hypertension -patient using the amlodipine in place of the losartan.  She felt that the losartan was causing itching.  There is a question if it was causing shortness of breath  - amLODIPine (NORVASC) 5 MG tablet; Take 1 tablet (5 mg) by mouth daily    Weakness of both lower extremities -unclear etiology.  She has episodes of severe leg weakness that seem to spontaneously resolve.  She seems to notice that the leg weakness is worse when her back pain is worse and she is due for an epidural injection.  She reports her spine doctor is aware of her episodic leg weakness.  Her neurologic and musculoskeletal exam is unchanged from previous    Chronic left-sided low back pain with left-sided sciatica -as above          Patient Instructions         Stress:  1. I am referring you to [facility] [place] for stress; please make your initial appointment for 1 hour.  The phone number is [phone] or you can schedule at the  on your way out.  2. Work to increase socialization  3. Continue clonazepam as you are doing  4. Start Venlafaxine 37.5 mg once daily  5. Follow-up with Dr. Maddox in 1 month.    Urine:  1. No signs of infection  2. We will send urine for culture to evaluate for  "infection.    Low appetite   1. Work on smaller more frequent meals.    Leg weakness:  1. Appears related to your back.  Exam is normal.  No clear signs of other illness causing the weakness.          No follow-ups on file.    Cynthia Maddox DO  [facility]    Shruti Finnegan is a 88 year old who presents for the following health issues     HPI     On 2//13/2012 - PHI signed on file - authorizing  Leandro Brown (son), Javier Brown (son), Edna Patel (daughter in law) [phone] - to share medical information.    Triaged yesterday : \" weakness legs for 2 months, had steroid injection for back pain, wearing compression stockings and not helping, pain occurs when pt tries to walk. Legs has some blacks and blue colors\"      URINARY TRACT SYMPTOMS  Onset: Couple weeks   Description:   Painful urination (Dysuria): YES           Frequency: YES- waking up at night 5 times - feels like unable to empty bladder  Blood in urine (Hematuria): no   Delay in urine (Hesitency): YES  Intensity: mild  Progression of Symptoms:  same and constant  Accompanying Signs & Symptoms:  Fever/chills: no   Flank pain YES- left  Nausea and vomiting: no   Any vaginal symptoms: none  Abdominal/Pelvic Pain: no   History:   History of frequent UTI's: YES  History of kidney stones: no   Sexually Active: no   Possibility of pregnancy: No  Precipitating factors: na    Therapies Tried and outcome: na     Worried she has UTI because urine is darker in color    Having unintentional nocturnal incontinence x 1+ month along with daytime frequency x 1 month.           Musculoskeletal problem/pain  Onset/Duration: Leg pain - 2 months - feels a little better today  Description  Location: leg - left and right  Joint Swelling: no  Redness: no  Pain: YES- Current 6/10, worst 8/10 (yesterday)  Warmth: no  Intensity:  moderate  Progression of Symptoms:  constant  Accompanying signs and symptoms:   Fevers: " no  Numbness/tingling/weakness: YES- weakness is bad  History  Trauma to the area: no  Recent illness:  no  Previous similar problem: no  Previous evaluation:  no  Precipitating or alleviating factors:  Aggravating factors include: none  Therapies tried and outcome: nothing  --she frequently has episodes of bilateral leg weakness  --she had shot in her back last week.    --leg weakness is a bit better today  --yesterday weakness was severe, did not feel safe driving to clinic, had to cancel appointment yesterday  --wonders if stress is causing her symptoms.  Son says she needs to find another place to live.  --follows with DR. Maddox at [facility].  The doc is aware of the leg weakness.  --no fevers.  Feels cold all the time  --has very low appetite    We did a visit for leg weakness in May 2020     Leg weakness: still ongoing despite changing medication.  She thinks stopping the losartan was somewhat helpful to improve the leg weakness and shortness of breath.  However, did have episode of shortness of breath last night that was not directly associated to taking losartan. Has chronic low back pain and this has been reasonably well controlled in the last few weeks/months.  --no fevers, palpitations, chest pain, abdominal pain, N/V/C, PND, orthopnea, leg swelling  --very rare cough to clear throat  --notes loose stools after every meal.  Uses bentyl and that helps.  Known IBS    Neuropathy:  --gabapentin is not helping with numbness in feet.  --no side effects to gabapentin  --the feet are not painful, just numb    Stress/anxiety:   --uses clonazepam 1/2 pill every 2-4 days  --Has tried cymbalta, celexa, lexapro, zoloft      Review of Systems   Constitutional, HEENT, cardiovascular, pulmonary, gi and gu systems are negative, except as otherwise noted.      Objective    /64   Pulse 91   Temp 97.5  F (36.4  C) (Tympanic)   Resp 14   Wt 69.4 kg (153 lb)   SpO2 97%   Breastfeeding No   BMI 26.68 kg/m     Body mass index is 26.68 kg/m .  Physical Exam   GENERAL APPEARANCE: alert and no distress  RESP: lungs clear to auscultation - no rales, rhonchi or wheezes  CV: regular rates and rhythm, normal S1 S2, no S3 or S4 and no murmur, click or rub  LYMPHATICS: Trace bilateral pedal edema  ABDOMEN: soft, nontender, without hepatosplenomegaly or masses and bowel sounds normal  MS: 4/5 strength bilateral upper and lower extremities  PSYCH: mentation appears normal, anxious and worried

## 2021-04-28 LAB
BACTERIA SPEC CULT: NORMAL
Lab: NORMAL
SPECIMEN SOURCE: NORMAL

## 2021-05-17 ENCOUNTER — PATIENT OUTREACH (OUTPATIENT)
Dept: NURSING | Facility: CLINIC | Age: 86
End: 2021-05-17
Payer: MEDICARE

## 2021-05-17 DIAGNOSIS — Z71.89 COUNSELING AND COORDINATION OF CARE: Primary | ICD-10-CM
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
## 2021-05-17 NOTE — LETTER
[facility] CARE COORDINATION  [facility]  [address] [place] [address]    May 17, 2021    Daniela Brown  [address] [address] [facility] [address] [address]      Dear Daniela,    I am a clinic care coordinator who works with Cynthia Maddox DO at [facility]. I wanted to thank you for spending the time to talk with me.  Below is a description of clinic care coordination and how I can further assist you.      The clinic care coordination team is made up of a registered nurse,  and community health worker who understand the health care system. The goal of clinic care coordination is to help you manage your health and improve access to the health care system in the most efficient manner. The team can assist you in meeting your health care goals by providing education, coordinating services, strengthening the communication among your providers and supporting you with any resource needs.    Please feel free to contact me at [phone] with any questions or concerns. We are focused on providing you with the highest-quality healthcare experience possible and that all starts with you.     Sincerely,     MANJIT Jackson   Social Work Clinic Care Coordinator   [facility]  [phone]  [email or web address]

## 2021-05-18 ENCOUNTER — TELEPHONE (OUTPATIENT)
Dept: INTERNAL MEDICINE | Facility: CLINIC | Age: 86
End: 2021-05-18

## 2021-05-18 DIAGNOSIS — F41.1 GAD (GENERALIZED ANXIETY DISORDER): ICD-10-CM

## 2021-05-18 DIAGNOSIS — R41.3 MEMORY LOSS: Primary | ICD-10-CM

## 2021-05-18 RX ORDER — DIVALPROEX SODIUM 250 MG/1
250 TABLET, DELAYED RELEASE ORAL 2 TIMES DAILY
Qty: 60 TABLET | Refills: 1 | Status: SHIPPED | OUTPATIENT
Start: 2021-05-18 | End: 2021-11-16

## 2021-05-18 NOTE — TELEPHONE ENCOUNTER
Order placed for Depakote 250 mg twice a day.  Keep upcoming visit with me; see if she can come 15-20 minutes earlier than her scheduled appointment time next week to ensure that we have adequate time of the visit

## 2021-05-18 NOTE — TELEPHONE ENCOUNTER
S-(situation): Pt feels that Effexor is making her sick. She doesn't want to take it.      B-(background): She has been taking 1/2 tablet only, for the last week.       A-(assessment): Sivan says she gets a headache right after taking Effexor.  She can't sleep at night.   Her stomach gets upset and she doesn't have much appetite.    R-(recommendations): Dr Maddox , she is open to trying another anti depressant.

## 2021-05-18 NOTE — TELEPHONE ENCOUNTER
Reason for Call:  Other call back    Detailed comments: Patient states her antidepressant makes her very ill and she cannot take it.     Phone Number Patient can be reached at: Home number on file [phone] (home)    Best Time: any    Can we leave a detailed message on this number? YES    Call taken on 5/18/2021 at 1:28 PM by Teri Viera

## 2021-05-21 RX ORDER — VENLAFAXINE 37.5 MG/1
TABLET ORAL
Qty: 30 TABLET | Refills: 1 | OUTPATIENT
Start: 2021-05-21

## 2021-05-21 NOTE — PROGRESS NOTES
Appears PCP will discuss community paramedic order at PCP OV 5/27/21. CC will chart review and follow up as needed after PCP OV.     MANJIT Jackson   Social Work Clinic Care Coordinator   [facility]  [phone]  [email or web address]

## 2021-05-21 NOTE — TELEPHONE ENCOUNTER
venlafaxine (EFFEXOR) 37.5 MG tablet (Discontinued)  This Order Has Been Discontinued    Order Status Reason By On   Discontinued Side effects Cynhtia Maddox,  5/18/21 5366     Karen LINARES RN, BSN

## 2021-05-27 ENCOUNTER — OFFICE VISIT (OUTPATIENT)
Dept: FAMILY MEDICINE | Facility: CLINIC | Age: 86
End: 2021-05-27
Payer: MEDICARE

## 2021-05-27 VITALS
DIASTOLIC BLOOD PRESSURE: 60 MMHG | WEIGHT: 155 LBS | HEIGHT: 63 IN | SYSTOLIC BLOOD PRESSURE: 126 MMHG | BODY MASS INDEX: 27.46 KG/M2 | TEMPERATURE: 96.8 F | HEART RATE: 86 BPM | RESPIRATION RATE: 14 BRPM | OXYGEN SATURATION: 98 %

## 2021-05-27 DIAGNOSIS — F41.1 GAD (GENERALIZED ANXIETY DISORDER): ICD-10-CM

## 2021-05-27 DIAGNOSIS — R41.3 MEMORY LOSS: Primary | ICD-10-CM

## 2021-05-27 PROCEDURE — 99215 OFFICE O/P EST HI 40 MIN: CPT | Performed by: INTERNAL MEDICINE

## 2021-05-27 ASSESSMENT — ANXIETY QUESTIONNAIRES
3. WORRYING TOO MUCH ABOUT DIFFERENT THINGS: NOT AT ALL
2. NOT BEING ABLE TO STOP OR CONTROL WORRYING: NOT AT ALL
1. FEELING NERVOUS, ANXIOUS, OR ON EDGE: SEVERAL DAYS
IF YOU CHECKED OFF ANY PROBLEMS ON THIS QUESTIONNAIRE, HOW DIFFICULT HAVE THESE PROBLEMS MADE IT FOR YOU TO DO YOUR WORK, TAKE CARE OF THINGS AT HOME, OR GET ALONG WITH OTHER PEOPLE: SOMEWHAT DIFFICULT
GAD7 TOTAL SCORE: 3
5. BEING SO RESTLESS THAT IT IS HARD TO SIT STILL: SEVERAL DAYS
6. BECOMING EASILY ANNOYED OR IRRITABLE: SEVERAL DAYS
7. FEELING AFRAID AS IF SOMETHING AWFUL MIGHT HAPPEN: NOT AT ALL

## 2021-05-27 ASSESSMENT — MIFFLIN-ST. JEOR: SCORE: 1102.21

## 2021-05-27 ASSESSMENT — PATIENT HEALTH QUESTIONNAIRE - PHQ9
SUM OF ALL RESPONSES TO PHQ QUESTIONS 1-9: 6
5. POOR APPETITE OR OVEREATING: NOT AT ALL
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
## 2021-05-27 NOTE — PATIENT INSTRUCTIONS
1. Recommend to start the divalproic acid (Depakote) for anxiety and depression  2. Recommend to get memory testing.  3. Please reach out to our     MANJIT Jackson   Social Work Clinic Care Coordinator   [facility]  [phone]  [email or web address]

## 2021-05-28 ENCOUNTER — PATIENT OUTREACH (OUTPATIENT)
Dept: OTHER | Facility: CLINIC | Age: 86
End: 2021-05-28

## 2021-05-28 ASSESSMENT — ANXIETY QUESTIONNAIRES: GAD7 TOTAL SCORE: 3

## 2021-05-28 NOTE — PROGRESS NOTES
Community Paramedic Program Contact  UTC/Voicemail    CP Community Health Worker Outreach  Outreach attempted x 1.  Left message on patient's voicemail with call back information and requested return call.  Plan: Community Health Worker will try to reach patient again in 1-2 business days.    Referral information:    Order Specific Question Answer Comment   Reason(s) for visit: Home/Safety Assessment     Goals for visit(s): Medication Compliance     How often should patient be seen: Weekly     Preference on when patient should be seen: 3-7 Days

## 2021-06-01 VITALS — WEIGHT: 168 LBS | HEIGHT: 64 IN | BODY MASS INDEX: 28.68 KG/M2

## 2021-06-01 NOTE — PROGRESS NOTES
"Community Paramedic Program Contact  [facility]/Voicemail    CP Community Health Worker Outreach  Outreach attempted x 2.  Left message on patient's voicemail with call back information and requested return call.  Plan: Community Health Worker will try to reach patient again in 1-2 business days.    Called and spoke briefly with pt's son Walyl. He offered to give me pt's cell phone number ([phone]), which I agreed to and wrote down. He said the number is \"my brother's cell who passed away a few years ago, but mom has been using it.\" He said that if she doesn't answer, there is a voicemail box but \"it's still my brother's message.\" He said he wasn't sure if his mom knows how to check the messages and encouraged me not to leave one there. Thanked him for the information and number.    Wally said \"my mom has been creating a lot of problems recently\" and that he's \"stepping back from doing everything from her.\" I asked if he prefers that I continue to reach out directly to pt and he said \"yes, that would be best.\" He noted that pt has been \"very picky about who she answers the phone for these days.\" I let him know that pt did return my call to the [facility] but that pt and I haven't talked yet. He said that \"she could be having telephone problems with the one at home\" and that he was considering going over to check tomorrow.     Thanked Wally for taking the time to speak with me.     "

## 2021-06-03 NOTE — PROGRESS NOTES
Comm paramedic team in process of scheduling with pt. Will wait for updated after scheduling/attempts.     MANJIT Jackson   Social Work Clinic Care Coordinator   [facility]  [phone]  [email or web address]

## 2021-06-03 NOTE — PROGRESS NOTES
Community Paramedic Program Contact  UTC/Voicemail    CP Community Health Worker Outreach  Outreach attempted x 3.  Left message on patient's voicemail with call back information and requested return call.  Plan: Community Health Worker will do no further outreaches at this time.    Routing to pt's PCP and CC BRUCE.

## 2021-06-08 ENCOUNTER — OFFICE VISIT (OUTPATIENT)
Dept: FAMILY MEDICINE | Facility: CLINIC | Age: 86
End: 2021-06-08
Payer: MEDICARE

## 2021-06-08 VITALS
DIASTOLIC BLOOD PRESSURE: 99 MMHG | OXYGEN SATURATION: 97 % | BODY MASS INDEX: 27.46 KG/M2 | TEMPERATURE: 98.9 F | HEART RATE: 85 BPM | SYSTOLIC BLOOD PRESSURE: 152 MMHG | HEIGHT: 63 IN

## 2021-06-08 DIAGNOSIS — R39.89 URINARY PROBLEM: Primary | ICD-10-CM

## 2021-06-08 DIAGNOSIS — R82.90 NONSPECIFIC FINDING ON EXAMINATION OF URINE: ICD-10-CM

## 2021-06-08 DIAGNOSIS — M17.12 PRIMARY OSTEOARTHRITIS OF LEFT KNEE: ICD-10-CM

## 2021-06-08 DIAGNOSIS — G60.9 IDIOPATHIC PERIPHERAL NEUROPATHY: ICD-10-CM

## 2021-06-08 DIAGNOSIS — R53.81 PHYSICAL DECONDITIONING: ICD-10-CM

## 2021-06-08 DIAGNOSIS — N30.00 ACUTE CYSTITIS WITHOUT HEMATURIA: ICD-10-CM

## 2021-06-08 LAB
ALBUMIN UR-MCNC: NEGATIVE MG/DL
APPEARANCE UR: CLEAR
BACTERIA #/AREA URNS HPF: ABNORMAL /HPF
BILIRUB UR QL STRIP: NEGATIVE
COLOR UR AUTO: YELLOW
GLUCOSE UR STRIP-MCNC: NEGATIVE MG/DL
HGB UR QL STRIP: ABNORMAL
KETONES UR STRIP-MCNC: NEGATIVE MG/DL
LEUKOCYTE ESTERASE UR QL STRIP: ABNORMAL
NITRATE UR QL: POSITIVE
NON-SQ EPI CELLS #/AREA URNS LPF: ABNORMAL /LPF
PH UR STRIP: 6 PH (ref 5–7)
RBC #/AREA URNS AUTO: ABNORMAL /HPF
SOURCE: ABNORMAL
SP GR UR STRIP: 1.01 (ref 1–1.03)
URNS CMNT MICRO: ABNORMAL
UROBILINOGEN UR STRIP-ACNC: 0.2 EU/DL (ref 0.2–1)
WBC #/AREA URNS AUTO: ABNORMAL /HPF

## 2021-06-08 PROCEDURE — 87086 URINE CULTURE/COLONY COUNT: CPT | Performed by: NURSE PRACTITIONER

## 2021-06-08 PROCEDURE — 87186 SC STD MICRODIL/AGAR DIL: CPT | Performed by: NURSE PRACTITIONER

## 2021-06-08 PROCEDURE — 87088 URINE BACTERIA CULTURE: CPT | Performed by: NURSE PRACTITIONER

## 2021-06-08 PROCEDURE — 99214 OFFICE O/P EST MOD 30 MIN: CPT | Performed by: NURSE PRACTITIONER

## 2021-06-08 PROCEDURE — 81001 URINALYSIS AUTO W/SCOPE: CPT | Performed by: NURSE PRACTITIONER

## 2021-06-08 RX ORDER — NITROFURANTOIN 25; 75 MG/1; MG/1
100 CAPSULE ORAL 2 TIMES DAILY
Qty: 10 CAPSULE | Refills: 0 | Status: SHIPPED | OUTPATIENT
Start: 2021-06-08 | End: 2021-06-13

## 2021-06-08 NOTE — LETTER
"June 14, 2021      Daniela Brown  [address] [address]        Dear ,        We are writing to inform you of your test results.    \"Your antibiotic will cover your infection.  Follow-up if any persistent symptoms.   Juju Patino NP on 6/11/2021 at 7:54 AM\"    Resulted Orders   UA reflex to Microscopic and Culture   Result Value Ref Range    Color Urine Yellow     Appearance Urine Clear     Glucose Urine Negative NEG^Negative mg/dL    Bilirubin Urine Negative NEG^Negative    Ketones Urine Negative NEG^Negative mg/dL    Specific Gravity Urine 1.015 1.003 - 1.035    Blood Urine Trace (A) NEG^Negative    pH Urine 6.0 5.0 - 7.0 pH    Protein Albumin Urine Negative NEG^Negative mg/dL    Urobilinogen Urine 0.2 0.2 - 1.0 EU/dL    Nitrite Urine Positive (A) NEG^Negative    Leukocyte Esterase Urine Moderate (A) NEG^Negative    Source Midstream Urine    Urine Microscopic   Result Value Ref Range    WBC Urine 10-25 (A) OTO5^0 - 5 /HPF    RBC Urine O - 2 OTO2^O - 2 /HPF    Squamous Epithelial /LPF Urine Few FEW^Few /LPF    Bacteria Urine Moderate (A) NEG^Negative /HPF    Comment Urine       Urine was tested unconcentrated because <10mL was received.   Urine Culture Aerobic Bacterial   Result Value Ref Range    Specimen Description Midstream Urine     Culture Micro >100,000 colonies/mL  Staphylococcus simulans   (A)     Culture Micro       <10,000 colonies/mL  urogenital melvi  Susceptibility testing not routinely done         If you have any questions or concerns, please call the clinic at the number listed above.       Sincerely,      Juju Patino NP/Sandra VAZ CMA            "

## 2021-06-08 NOTE — PATIENT INSTRUCTIONS
1.  Take macrobid antibiotic twice daily for 5 days.  2.  Push fluids daily in the amount of the best you can up to 64 oz.  3.  Take a fiber supplement daily for constipation.  Metamucil or citrucel are beneficial with fluids to keep bowels regular and reduce constipation.  4.  Make an appointment for Physical therapy for weakness in legs and left knee work to keep legs strengtened  5.  Follow-up in clinic if any persistent or worsening symptoms.      Patient Education     Understanding Urinary Tract Infections (UTIs)   Most UTIs are caused by bacteria, but they may also be caused by viruses or fungi. Bacteria from the bowel are the most common source of infection. The infection may start because of any of the following:     Sexual activity.  During sex, bacteria can travel from the penis, vagina, or rectum into the urethra.     Bacteria outside the rectum getting into the urethra.  Bacteria on the skin outside the rectum may travel into the urethra. This is more common in women since the rectum and urethra are closer to each other than in men. Wiping from front to back after using the toilet and keeping the area clean can help prevent germs from getting to the urethra.    Blocked urine flow through the urinary tract. If urine sits too long, germs may start to grow out of control.  Parts of the urinary tract  The infection can occur in any part of the urinary tract.       The kidneys. These organs collect and store urine.    The ureters. These tubes carry urine from the kidneys to the bladder.    The bladder. This holds urine until you are ready to let it out.    The urethra. This tube carries urine from the bladder out of the body. It is shorter in women, so bacteria can move through it more easily. The urethra is longer in men, so a UTI is less likely to reach the bladder or kidneys in men.  [employer] last reviewed this educational content on 1/1/2020 2000-2021 [employer]. All rights reserved.  This information is not intended as a substitute for professional medical care. Always follow your healthcare professional's instructions.           Patient Education     Treating Constipation  Constipation is a common and often uncomfortable problem. Constipation means you have bowel movements fewer than 3 times per week. Or that you strain to pass hard, dry stool. It can last a short time. Or it can be a problem that never seems to go away. The good news is that it can often be treated and controlled.   Eat more fiber  One of the best ways to help treat constipation is to increase your fiber intake. You can do this either through diet or by using fiber supplements. Fiber (in whole grains, fruits, and vegetables) adds bulk and absorbs water to soften the stool. This helps the stool pass through the colon more easily. When you increase your fiber intake, do it slowly to prevent side effects such as bloating. Also increase the amount of water that you drink. Eating more of these foods can add fiber to your diet:     High-fiber cereals    Whole grains, bran, and brown rice    Vegetables such as carrots, broccoli, and greens    Fresh fruits (especially apples, pears, and dried fruits such as raisins and apricots)    Nuts and legumes (especially beans such as lentils, kidney beans, and lima beans)  Get physically active  Exercise helps improve the working of your colon which helps ease constipation. Try to get some physical activity every day. If you haven t been active for a while, talk with your healthcare provider before starting again.     Laxatives  Your healthcare provider may suggest an over-the-counter product to help ease your constipation. He or she may suggest the use of bulk-forming agents or laxatives. Laxatives, if used as directed, are common and safe. Follow directions carefully when using them. See your provider for new-onset constipation, or long-term constipation, to rule out other causes such as  medicines or thyroid disease.   [employer] last reviewed this educational content on 6/1/2019 2000-2021 [employer]. All rights reserved. This information is not intended as a substitute for professional medical care. Always follow your healthcare professional's instructions.

## 2021-06-08 NOTE — LETTER
June 23, 2021      Daniela Betty Brown  [id number] [id number]        Dear ,    We are writing to inform you of your test results.    Your antibiotic will cover your infection. Follow-up if any persistent symptoms.     Resulted Orders   UA reflex to Microscopic and Culture   Result Value Ref Range    Color Urine Yellow     Appearance Urine Clear     Glucose Urine Negative NEG^Negative mg/dL    Bilirubin Urine Negative NEG^Negative    Ketones Urine Negative NEG^Negative mg/dL    Specific Gravity Urine 1.015 1.003 - 1.035    Blood Urine Trace (A) NEG^Negative    pH Urine 6.0 5.0 - 7.0 pH    Protein Albumin Urine Negative NEG^Negative mg/dL    Urobilinogen Urine 0.2 0.2 - 1.0 EU/dL    Nitrite Urine Positive (A) NEG^Negative    Leukocyte Esterase Urine Moderate (A) NEG^Negative    Source Midstream Urine    Urine Microscopic   Result Value Ref Range    WBC Urine 10-25 (A) OTO5^0 - 5 /HPF    RBC Urine O - 2 OTO2^O - 2 /HPF    Squamous Epithelial /LPF Urine Few FEW^Few /LPF    Bacteria Urine Moderate (A) NEG^Negative /HPF    Comment Urine       Urine was tested unconcentrated because <10mL was received.   Urine Culture Aerobic Bacterial   Result Value Ref Range    Specimen Description Midstream Urine     Culture Micro >100,000 colonies/mL  Staphylococcus simulans   (A)     Culture Micro       <10,000 colonies/mL  urogenital melvi  Susceptibility testing not routinely done         If you have any questions or concerns, please call the clinic at the number listed above.       Sincerely,      Juju Patino NP

## 2021-06-08 NOTE — PROGRESS NOTES
Assessment & Plan     Acute cystitis without hematuria  UA positive for UTI.  Culture is pending and patient will be notified of results if medicatino change is needed.  Treating with Macrobid since she has allergies to so many antibiotics.  Recommend pushing fluids and taking fiber for constipation as well daily.  Recommend follow-up in clinic if any persistent or worsening symptoms.  - nitroFURantoin macrocrystal-monohydrate (MACROBID) 100 MG capsule; Take 1 capsule (100 mg) by mouth 2 times daily for 5 days    Urinary problem  - UA reflex to Microscopic and Culture  - Urine Microscopic    Nonspecific finding on examination of urine  - Urine Culture Aerobic Bacterial    Physical deconditioning  Patient has weakness in bilateral legs due to deconditioning since she is not getting up and moving as much any more she also has DJD in left knee which makes it difficult as well.  Her pain in the left knee is not consistently but may contribute to less movement.  Physical Therapy ordered for the left knee strengthening and due to deconditioning with not getting up and moving around.  Recommend follow-up with PCP if symptoms worsen or do not improve with therapy.  - PHYSICAL THERAPY REFERRAL; Future    Primary osteoarthritis of left knee  - PHYSICAL THERAPY REFERRAL; Future    Idiopathic peripheral neuropathy  Pain in feet may be part of her neuropathy but only occurred one night and has not occurred since.  She continues to have numbness in bilateral feet.  Currently taking gabapentin for treatment.  Recommend to continue treatment and follow-up with PCP if symptoms recur or become more persistent.    See Patient Instructions    Return in about 1 week (around 6/15/2021), or if symptoms worsen or fail to improve.    Juju Patino NP  [facility]    Shruti Finnegan is a 88 year old who presents for the following health issues     HPI     Was getting up many times at night 4-5 times per night,  "decreased now to only twice per night. Denies any pain with urination.  No vaginal symptoms.  BM's were constipated for 4 days and she took a stool softener that did not work and yesterday she took a laxative and could not stay away from the bathroom after that and that is when she noticed that she was getting up less at night.    Legs are always weak all the time.  Has been off her feet for awhile and then started to get up more and it is improving a little.  She does have a TKA in right knee and needs one in the left knee but it does not bother her too much and she feels that she is getting too old for surgery.  She was getting injections in the knee and they were helpful.     BP Readings from Last 6 Encounters:   06/08/21 (!) 152/99   05/27/21 126/60   04/27/21 136/64   03/10/21 130/60   02/28/21 (!) 174/84   02/22/21 134/68     Review of Systems   CONSTITUTIONAL: NEGATIVE for fever, chills, change in weight  RESP: NEGATIVE for significant cough or SOB  CV: NEGATIVE for chest pain, palpitations or peripheral edema  GI: POSITIVE for constipation  : nocturia frequency x 4-5 per night with reduction now to 2 times per night since she took laxative for constipation  PSYCHIATRIC: NEGATIVE for changes in mood or affect  ROS otherwise negative      Objective    BP (!) 152/99   Pulse 85   Temp 98.9  F (37.2  C) (Tympanic)   Ht 1.6 m (5' 3\")   SpO2 97%   BMI 27.46 kg/m    Body mass index is 27.46 kg/m .  Physical Exam   GENERAL: healthy, alert and no distress  RESP: lungs clear to auscultation - no rales, rhonchi or wheezes  CV: regular rate and rhythm, normal S1 S2, no S3 or S4, no murmur, click or rub, no peripheral edema and peripheral pulses strong  ABDOMEN: soft, nontender, without hepatosplenomegaly or masses and bowel sounds normal  MS: no gross musculoskeletal defects noted, no edema  PSYCH: mentation appears normal, affect normal/bright    Results for orders placed or performed in visit on 06/08/21 (from " the past 24 hour(s))   UA reflex to Microscopic and Culture    Specimen: Midstream Urine   Result Value Ref Range    Color Urine Yellow     Appearance Urine Clear     Glucose Urine Negative NEG^Negative mg/dL    Bilirubin Urine Negative NEG^Negative    Ketones Urine Negative NEG^Negative mg/dL    Specific Gravity Urine 1.015 1.003 - 1.035    Blood Urine Trace (A) NEG^Negative    pH Urine 6.0 5.0 - 7.0 pH    Protein Albumin Urine Negative NEG^Negative mg/dL    Urobilinogen Urine 0.2 0.2 - 1.0 EU/dL    Nitrite Urine Positive (A) NEG^Negative    Leukocyte Esterase Urine Moderate (A) NEG^Negative    Source Midstream Urine    Urine Microscopic   Result Value Ref Range    WBC Urine 10-25 (A) OTO5^0 - 5 /HPF    RBC Urine O - 2 OTO2^O - 2 /HPF    Squamous Epithelial /LPF Urine Few FEW^Few /LPF    Bacteria Urine Moderate (A) NEG^Negative /HPF    Comment Urine       Urine was tested unconcentrated because <10mL was received.     *Note: Due to a large number of results and/or encounters for the requested time period, some results have not been displayed. A complete set of results can be found in Results Review.

## 2021-06-11 LAB
BACTERIA SPEC CULT: ABNORMAL
BACTERIA SPEC CULT: ABNORMAL
SPECIMEN SOURCE: ABNORMAL

## 2021-06-14 ENCOUNTER — PATIENT OUTREACH (OUTPATIENT)
Dept: NURSING | Facility: CLINIC | Age: 86
End: 2021-06-14
Payer: MEDICARE

## 2021-06-14 NOTE — PROGRESS NOTES
Clinic Care Coordination Contact    Follow Up Progress Note      Assessment: BRUCE MULLIGAN outreach to pt for follow up and check in.     Goals addressed this encounter:   Goals Addressed    None          Intervention/Education provided during outreach: Reviewed recent PCP OV. Pt completed course of medication and reports her symptoms have gone.     Declined community resources or additional needs. Encouraged pt to call back as needed.     Plan: No further outreaches will be made at this time unless a new referral is made or a change in the pt's status occurs.     Patient was provided with this writer's contact information and encouraged to call with any questions or concerns.     MANJIT Jackson   Social Work Clinic Care Coordinator   [facility]  [phone]  [email or web address]

## 2021-06-15 DIAGNOSIS — L30.9 DERMATITIS: ICD-10-CM

## 2021-06-16 RX ORDER — TRIAMCINOLONE ACETONIDE 1 MG/G
CREAM TOPICAL 2 TIMES DAILY
Qty: 80 G | Refills: 0 | OUTPATIENT
Start: 2021-06-16

## 2021-06-17 NOTE — PROGRESS NOTES
ASSESSMENT AND PLAN:  Daniela Brown 85 y.o. female is seen here on 04/11/18 for evaluation of pain in the lower back, with degenerative disc disease.  She has had corticosteroid injections elsewhere.  She is on gabapentin, tramadol, Lexapro.  It is conceivable that the gabapentin which was started only recently may begin to help her.  An option for her to consider is to go to the pain clinic.  She understands that she is not a candidate for surgery.  Given the above, lack of response to corticosteroid injection such as epidurals, she may be a candidate for pain clinic this is being arranged.  For the wrist pain which has features of osteoarthritis, x-rays of the wrist were recommended.  Once these data are reviewed for the course to be charted accordingly.  We will meet her in 3 months.      eDiagnoses and all orders for this visit:    Primary osteoarthritis involving multiple joints  -     XR Wrist Right 3 or More VWS; Future; Expected date: 4/11/18  -     XR Wrist Right 3 or More VWS  -     Cancel: Ambulatory referral to [facility]  -     Ambulatory referral to Pain Clinic    Lumbar disc disease  -     Cancel: Ambulatory referral to [facility]  -     Ambulatory referral to Pain Clinic    Bilateral wrist pain  -     XR Wrist Right 3 or More VWS; Future; Expected date: 4/11/18  -     XR Wrist Right 3 or More VWS      HISTORY OF PRESENTING ILLNESS:  Daniela Brown, 85 y.o., female is here for evaluation of back pain, bilateral wrist pain.  She has noted this going on for the past more than 12 months.  She reports the back pain is in the lumbar area.  This sometimes radiates down the right lower extremity.  Her daughter-in-law who is accompanying her noted that during the past few months she has been continued to be able to walk within her home for quite some time.  The patient describes the pain as moderately severe to severe.  She has been to the spine clinic elsewhere.  She has had local  corticosteroid injections which provided her with minimal relief. By which she means that the injections are helpful for about a week and that is back to the usual self.  She is also been on tramadol, gabapentin.  She was started on gabapentin 3 days ago.  Last year she had falls.  She did that resulted in bilateral distal forearm/wrist fractures.  She continues to hurt there.  She rates overall pain as 9.5/10.  She has difficulty performing a variety of day-to-day activities.  Right knee status post replacement.  There is no family or personal history of psoriasis colitis.  No family history of rheumatoid arthritis or ankylosing spondylitis.  An MRI of the lumbar spine done last summer shows degenerative disc disease most significant at L4-5, with spondylolisthesis.  He also was found to have moderate severity of spinal canal stenosis. Further historical information and ADL limitations as noted in the multidimensional health assessment questionnaire attached in the EMR. Rest of the 13 system ROS is negative.     ALLERGIES:Actonel [risedronate]; Azithromycin; Bisphosphonates; Iodinated contrast- oral and iv dye; Lexapro [escitalopram]; Shellfish containing products; Tetanus toxoid, adsorbed; Vioxx [rofecoxib]; Acetaminophen; Celebrex [celecoxib]; Ciprofloxacin; Erythromycin; Fosamax [alendronate]; Keflex [cephalexin]; Penicillins; Sulfa (sulfonamide antibiotics); and Sulfasalazine    PAST MEDICAL/ACTIVE PROBLEMS/MEDICATION/ FAMILY HISTORY/SOCIAL DATA:  The patient has a family history of  No past medical history on file.  History   Smoking Status     Never Smoker   Smokeless Tobacco     Never Used     Patient Active Problem List   Diagnosis     Adjustment disorder with anxiety     Anxiety     Arthritis of spine     Carpal tunnel syndrome of left wrist     Chronic UTI     Diabetes mellitus, type 2     Diastolic dysfunction     Disorder of bone and cartilage     Diverticulosis of large intestine     Frequent UTI      Generalized osteoarthrosis, unspecified site     HTN (hypertension)     Irritable bowel syndrome     Leukocytosis     Lumbar strain     Memory loss     Pronation of foot     Right foot pain     Vitamin D deficiency     Type 2 diabetes mellitus without complication     Current Outpatient Prescriptions   Medication Sig Dispense Refill     atorvastatin (LIPITOR) 40 MG tablet Take 40 mg by mouth.       baclofen (LIORESAL) 10 MG tablet Take 10 mg by mouth.       blood glucose meter (ONETOUCH ULTRA2)        blood glucose test strips Please dispense One touch ultra 2 test strips and what is covered by pt ins. Diagnosis: DIABETES Use once daily for fasting blood glucose       cholecalciferol, vitamin D3, 1,000 unit capsule Take 1,000 Units by mouth.       citalopram (CELEXA) 20 MG tablet Take 20 mg by mouth.       clonazePAM (KLONOPIN) 0.5 MG tablet Take 0.5 mg by mouth.       cyanocobalamin 500 MCG tablet Patient is unaware of the current dosage. Patient takes one pill a day.       dicyclomine (BENTYL) 20 mg tablet Take 20 mg by mouth.       fluticasone (FLONASE) 50 mcg/actuation nasal spray 1 spray.       gabapentin (NEURONTIN) 100 MG capsule Take 1 tablet (100mg) at bedtime daily. May increase to 2 tabs after 3 days.       gabapentin (NEURONTIN) 300 MG capsule Take 300 mg by mouth.       lancets (ONETOUCH DELICA LANCETS) 33 gauge Misc        losartan (COZAAR) 50 MG tablet Take 50 mg by mouth.       metFORMIN (GLUCOPHAGE-XR) 500 MG 24 hr tablet Take 500 mg by mouth.       multivitamin with minerals tablet Take 1 tablet by mouth.       nitroglycerin (NITROSTAT) 0.4 MG SL tablet Place 0.4 mg under the tongue.       traMADol (ULTRAM) 50 mg tablet Take 50 mg by mouth.       acetaminophen (TYLENOL) 500 MG tablet Take 500 mg by mouth.       omeprazole 20 mg TbEC Take 20 mg by mouth.       No current facility-administered medications for this visit.        COMPREHENSIVE EXAMINATION:  Vitals:    04/11/18 1358   BP: 138/70  "  Pulse: 84   Resp: 10   Weight: 168 lb (76.2 kg)   Height: 5' 3.5\" (1.613 m)     A well appearing alert oriented female. Vital data as noted above. Her eyes without inflammation/scleromalacia. ENTwithout oral mucositis, thrush, nasal deformity, external ear redness, deformity. Her neck is without lymphadenopathy and supple. Lungs normal sounds, no pleural rub. Heart auscultation normal rate, rhythm; no pericardial rub and murmurs. Abdomen soft, non tender, no organomegaly. Skin examined for heliotrope, malar area eruption, lupus pernio, periungual erythema, sclerodactyly, papules, erythema nodosum, purpura, nail pitting, onycholysis, and obvious psoriasis lesion. Neurological examination shows normal alertness, speech, facial symmetry, tone and power in upper and lower extremities, Tinel's and Phalen's at wrist and gait. The joint examination is performed for swelling, tenderness, warmth, erythema, and range of motion in the following joints: DIPs, PIPs, MCPs, wrists, first CMC's, elbows, shoulders, hips, knees, ankles, feet; spine for range of motion and paraspinal muscles for tenderness. The salient normal / abnormal findings are appended.  She has Heberden nodes, tenderness in the joint line of the knees bilaterally TKA on the right side, bilateral paraspinal tenderness, there is no definite synovitis in any of the palpable appendicular joints.  She does not have dactylitis.    LAB / IMAGING DATA:  No results found for: ALT, ALBUMIN, CREATININE    No results found for: WBC, HGB, PLT    No results found for: GERA, RF, SEDRATE       "

## 2021-06-18 ENCOUNTER — NURSE TRIAGE (OUTPATIENT)
Dept: FAMILY MEDICINE | Facility: CLINIC | Age: 86
End: 2021-06-18

## 2021-06-18 NOTE — TELEPHONE ENCOUNTER
"S-(situation): Cold call    B-(background): -    A-(assessment):     Reason for Disposition    Symptoms only or mainly in 1 ear (unilateral tinnitus)    MODERATE-SEVERE tinnitus (i.e., interferes with work, school, or sleep)    Additional Information    Negative: Followed an ear injury    Negative: Hearing loss is main symptom    Negative: Patient sounds very sick or weak to the triager    Negative: Taking aspirin and dosage sounds high (i.e., > 1500 mg/day)     Takes ASA \"once in awhile\"  Last time she took any was three weeks ago    Negative: Hearing loss in one or both ears and sudden onset and present now    Answer Assessment - Initial Assessment Questions  1. DESCRIPTION: \"Describe the sound you are hearing.\" (e.g., hissing, humming, pounding, ringing)      Just a real bad rigning  2. LOCATION: \"One or both ears?\" If one, ask: \"Which ear?\"      Right   3. SEVERITY: \"How bad is it?\"     - MILD - doesn't interfere with normal activities, only can hear in a quiet room     - MODERATE-SEVERE (Bothersome): interferes with work, school, sleep, or other activities       Severe - interrupted sleep last night  4. ONSET: \"When did this begin?\" \"Did it start suddenly or come on gradually?\"      Two days ago   5. PATTERN: \"Does this come and go, or has it been constant since it started?\"      Constant it never quits  6. HEARING LOSS: \"Is your hearing decreased?\" (e.g., normal, decreased)        \"I don't know.\" Wears hearing aids  7. OTHER SYMPTOMS: \"Do you have any other symptoms?\" (e.g., dizziness, earache)      Not dizzy, no pain or discharge  8. PREGNANCY: \"Is there any chance you are pregnant?\" \"When was your last menstrual period?\"      87 y/o    Protocols used: TINNITUS-A-OH    Takes ASA \"once in awhile.\" Last time she took any was three weeks ago.    R-(recommendations):   Triage protocol recommends pt be seen within 3 days. Transferred to scheduling to make an appt for today or Monday.  If she cannot wait for an " appt then she can go to Urgent Care. Pt expressed understanding and agreement with plan.    Karen LINARES RN, BSN

## 2021-06-23 ENCOUNTER — HOSPITAL ENCOUNTER (OUTPATIENT)
Dept: PHYSICAL THERAPY | Facility: CLINIC | Age: 86
Setting detail: THERAPIES SERIES
End: 2021-06-23
Attending: NURSE PRACTITIONER
Payer: MEDICARE

## 2021-06-23 DIAGNOSIS — M17.12 PRIMARY OSTEOARTHRITIS OF LEFT KNEE: ICD-10-CM

## 2021-06-23 DIAGNOSIS — R53.81 PHYSICAL DECONDITIONING: ICD-10-CM

## 2021-06-23 PROCEDURE — 97161 PT EVAL LOW COMPLEX 20 MIN: CPT | Mod: GP | Performed by: PHYSICAL THERAPIST

## 2021-06-23 PROCEDURE — 97110 THERAPEUTIC EXERCISES: CPT | Mod: GP | Performed by: PHYSICAL THERAPIST

## 2021-06-23 NOTE — PROGRESS NOTES
[facility]        OUTPATIENT PHYSICAL THERAPY FUNCTIONAL EVALUATION  PLAN OF TREATMENT FOR OUTPATIENT REHABILITATION  (COMPLETE FOR INITIAL CLAIMS ONLY)  Patient's Last Name, First Name, M.I.  YOB: 1933  Daniela Brown     Provider's Name   [facility]   Medical Record No.  [id number]     Start of Care Date:  06/23/21   Onset Date:  (P) 01/23/21   Type:     _X__PT   ____OT  ____SLP Medical Diagnosis:  (P) physical deconditioning and primary oA of left knee      PT Diagnosis:  (P) generalized weakness nad L knee OA  Visits from SOC:  1                              __________________________________________________________________________________  Plan of Treatment/Functional Goals:  (P) balance training, gait training, ROM, neuromuscular re-education, strengthening, stretching, transfer training, joint mobilization, manual therapy           GOALS  (P) 1  (P) Patient will be able to improve her TUG score to less than 13 seconds in order to show a decreased fall risk   (P) 07/21/21    (P) 2  (P) Patient will be independent in her HEP for strengthening outside of therapy.   (P) 07/21/21    (P) 3  (P) Patient will be able to complete 1 hour of household chores without needing to sit and rest.   (P) 09/01/21    (P) 4  (P) Patient will be able to walk for 20 minutes before needing to sit and rest.   (P) 09/01/21                Therapy Frequency:  (P) 2 times/Week   Predicted Duration of Therapy Intervention:  (P) 10 weeks    Laurie Smith, PT                                    I CERTIFY THE NEED FOR THESE SERVICES FURNISHED UNDER        THIS PLAN OF TREATMENT AND WHILE UNDER MY CARE     (Physician co-signature of this document indicates review and certification of the therapy plan).                Certification Date From:  (P) 06/23/21   Certification Date To:   (P) 09/01/21    Referring Provider:  Juju Patino    Initial Assessment  See Epic Evaluation- Start of Care Date: 06/23/21

## 2021-06-23 NOTE — PROGRESS NOTES
06/23/21 1300   Quick Adds   Quick Adds Certification   Type of Visit Initial OP PT Evaluation   General Information   Start of Care Date 06/23/21   Referring Physician Juju Patino   Orders Evaluate and Treat as Indicated   Order Date 06/08/21   Medical Diagnosis physical deconditioning and primary oA of left knee    Onset of illness/injury or Date of Surgery 01/23/21   Precautions/Limitations no known precautions/limitations   Surgical/Medical history reviewed Yes   Pertinent history of current problem (include personal factors and/or comorbidities that impact the POC) Has been having weakness in her legs for several months. It has been gradually getting worse. She wears compression socks. No buckling or giving out with the legs. Left knee has some OA but it can get pretty sore. Used to get injections in her knee but she hasn't had one since last year. Aching pain that can get up to a 8/10. Pain is made worse with sitting, rest, daily self cares, household task. Uses a quad cane for walking outside    Patient role/Employment history Retired   Living environment House/[facility]   Home/Community Accessibility Comments stairs but has chair lift, 1 step to enter house    Current Assistive Devices Quad Cane   Patient/Family Goals Statement improve strength in leg, improve the ease of walking and house work    General Information Comments PMH: depression, DM, HTN, bladder control, R TKA    Fall Risk Screen   Fall screen completed by PT   Have you fallen 2 or more times in the past year? Yes   Have you fallen and had an injury in the past year? No   Timed Up and Go score (seconds) 18   Is patient a fall risk? Yes   Abuse Screen (yes response referral indicated)   Feels Unsafe at Home or Work/School no   Feels Threatened by Someone no   Does Anyone Try to Keep You From Having Contact with Others or Doing Things Outside Your Home? no   Physical Signs of Abuse Present no   Functional Scales   Functional Scales and  Outcomes LEFS   Integumentary   Integumentary Comments wears compression socks    Palpation   Palpation ttp over medial and lateral knee joint line on th eleft    Range of Motion (ROM)   ROM Comment Left knee AROM: 5-92 , passive knee ROM to 99 flexion    Strength   Strength Comments hip flexion: right 4/5, left 3+,  knee flexion R 4/5, L 3+,  Knee extension R 4/5 L 3+,  ankle DF 4/5 B, hip adduction 4/5 B, hip abduction 3+ B    Musculoskeletal Special Tests   Musculoskeletal Special Tests Joint mobility: left: AP and PA tibiofemoral joint: moderate hypomobility    Bed Mobility   Bed Mobility Comments supine <> sit Independently    Transfer Skills   Transfer Comments sit <> stand can complete with B UE support    Balance   Balance Comments narrow ARJUN eyes open 30 sec, eyes closed 15 seconds with increased sway, modified tandem stance very unsteady    Balance Special Tests   Balance Special Tests Sit to stand reps   Balance Special Tests Sit to Stand Reps in 30 Seconds   Reps in 30 seconds 6   Sensory Examination   Sensory Perception Comments numbness B LE: neuropathy    Planned Therapy Interventions   Planned Therapy Interventions balance training;gait training;ROM;neuromuscular re-education;strengthening;stretching;transfer training;joint mobilization;manual therapy   Clinical Impression   Criteria for Skilled Therapeutic Interventions Met yes, treatment indicated   PT Diagnosis generalized weakness nad L knee OA    Influenced by the following impairments pain, decreased strength, decreased activity tolerance, decreased left knee ROM    Functional limitations due to impairments walking, daily chores, stairs, balance   Clinical Presentation Stable/Uncomplicated   Clinical Presentation Rationale clinical decision making and chart review    Clinical Decision Making (Complexity) Low complexity   Therapy Frequency 2 times/Week   Predicted Duration of Therapy Intervention (days/wks) 10 weeks   Risk & Benefits of therapy  have been explained Yes   Patient, Family & other staff in agreement with plan of care Yes   Clinical Impression Comments Sivan Brown is a 88 year old female who presents to PT with complaints of generalized weakness and left knee OA. Patient would benefit from skilled PT intervention to improve impairments listed above. PT prognosis is very good as patient seems motivated.    Education Assessment   Preferred Learning Style Listening;Demonstration   Barriers to Learning No barriers   GOALS   PT Eval Goals 1;2;3;4   Goal 1   Goal Identifier 1   Goal Description Patient will be able to improve her TUG score to less than 13 seconds in order to show a decreased fall risk    Target Date 07/21/21   Goal 2   Goal Identifier 2   Goal Description Patient will be independent in her HEP for strengthening outside of therapy.    Target Date 07/21/21   Goal 3   Goal Identifier 3   Goal Description Patient will be able to complete 1 hour of household chores without needing to sit and rest.    Target Date 09/01/21   Goal 4   Goal Identifier 4   Goal Description Patient will be able to walk for 20 minutes before needing to sit and rest.    Target Date 09/01/21   Total Evaluation Time   PT Eval, Low Complexity Minutes (36050) 16   Therapy Certification   Certification date from 06/23/21   Certification date to 09/01/21   Medical Diagnosis physical deconditioning and primary oA of left knee        Laurie Smith  PT, DPT       6/23/2021   [address]   [address]  [place] [address]  [email or web address]  Voicemail: [phone]

## 2021-07-06 ENCOUNTER — HOSPITAL ENCOUNTER (OUTPATIENT)
Dept: PHYSICAL THERAPY | Facility: CLINIC | Age: 86
Setting detail: THERAPIES SERIES
End: 2021-07-06
Attending: NURSE PRACTITIONER
Payer: MEDICARE

## 2021-07-06 PROCEDURE — 97110 THERAPEUTIC EXERCISES: CPT | Mod: GP | Performed by: PHYSICAL THERAPIST

## 2021-07-09 ENCOUNTER — HOSPITAL ENCOUNTER (OUTPATIENT)
Dept: PHYSICAL THERAPY | Facility: CLINIC | Age: 86
Setting detail: THERAPIES SERIES
End: 2021-07-09
Attending: NURSE PRACTITIONER
Payer: MEDICARE

## 2021-07-09 PROCEDURE — 97110 THERAPEUTIC EXERCISES: CPT | Mod: GP | Performed by: PHYSICAL THERAPIST

## 2021-07-13 ENCOUNTER — HOSPITAL ENCOUNTER (OUTPATIENT)
Dept: PHYSICAL THERAPY | Facility: CLINIC | Age: 86
Setting detail: THERAPIES SERIES
End: 2021-07-13
Attending: NURSE PRACTITIONER
Payer: MEDICARE

## 2021-07-13 PROCEDURE — 97110 THERAPEUTIC EXERCISES: CPT | Mod: GP | Performed by: PHYSICAL THERAPIST

## 2021-07-16 ENCOUNTER — HOSPITAL ENCOUNTER (OUTPATIENT)
Dept: PHYSICAL THERAPY | Facility: CLINIC | Age: 86
Setting detail: THERAPIES SERIES
End: 2021-07-16
Attending: NURSE PRACTITIONER
Payer: MEDICARE

## 2021-07-16 PROCEDURE — 97110 THERAPEUTIC EXERCISES: CPT | Mod: GP | Performed by: PHYSICAL THERAPIST

## 2021-07-19 ENCOUNTER — HOSPITAL ENCOUNTER (OUTPATIENT)
Dept: PHYSICAL THERAPY | Facility: CLINIC | Age: 86
Setting detail: THERAPIES SERIES
End: 2021-07-19
Attending: NURSE PRACTITIONER
Payer: MEDICARE

## 2021-07-19 PROCEDURE — 97110 THERAPEUTIC EXERCISES: CPT | Mod: GP | Performed by: PHYSICAL THERAPIST

## 2021-07-21 ENCOUNTER — HOSPITAL ENCOUNTER (OUTPATIENT)
Dept: PHYSICAL THERAPY | Facility: CLINIC | Age: 86
Setting detail: THERAPIES SERIES
End: 2021-07-21
Attending: NURSE PRACTITIONER
Payer: MEDICARE

## 2021-07-21 PROCEDURE — 97110 THERAPEUTIC EXERCISES: CPT | Mod: GP | Performed by: PHYSICAL THERAPIST

## 2021-07-21 NOTE — PROGRESS NOTES
Outpatient Physical Therapy Discharge Note     Patient: Daniela Brown  : 1933    Beginning/End Dates of Reporting Period:  21 to 21    Referring Provider: Juju Blake Diagnosis: generalized weakness and L Knee OA     Client Self Report: (P) Feels good about her exercises. Wishes she could keep coming to physical therapy as she enjoys it. Thinking about joining an exercise class or silver sneakers     Objective Measurements:  Objective Measure: (P) sit <> stand  Details: (P) holding 8# x 10 reps minimal fatigue   Objective Measure: (P) gait   Details: (P) walking with no AD and carrying cane mostly while moving, Decreased lateral sway while walking.       Goals:   Goal Identifier 1   Goal Description Patient will be able to improve her TUG score to less than 13 seconds in order to show a decreased fall risk    Target Date 21   Date Met      Progress (detail required for progress note): not formally assessed today but patient did improve in gait speed and improved confidence in walking without her cane.      Goal Identifier 2   Goal Description Patient will be independent in her HEP for strengthening outside of therapy.    Target Date 21   Date Met  21   Progress (detail required for progress note):     Goal Identifier 3   Goal Description Patient will be able to complete 1 hour of household chores without needing to sit and rest.    Target Date 21   Date Met  21   Progress (detail required for progress note):     Goal Identifier 4   Goal Description Patient will be able to walk for 20 minutes before needing to sit and rest.    Target Date 21   Date Met  21 (can walk to/from the mailbox twice )   Progress (detail required for progress note):     At this time, patient is independent with her strengthening program and her knee pain has improved. Recommending patient f/u with some kind of exercise class/silver sneakers to keep up her  motivation for working out and her strength. Patient in agreement that she will look into and consider it. No further therapy indicated as patient is independent in management.       Plan:  Discharge from therapy.    Discharge:    Reason for Discharge: Patient has met all goals.  Independent in self management     Equipment Issued: theraband yellow    Discharge Plan: Patient to continue home program.

## 2021-09-02 ENCOUNTER — TELEPHONE (OUTPATIENT)
Dept: FAMILY MEDICINE | Facility: CLINIC | Age: 86
End: 2021-09-02

## 2021-09-02 ENCOUNTER — OFFICE VISIT (OUTPATIENT)
Dept: FAMILY MEDICINE | Facility: CLINIC | Age: 86
End: 2021-09-02
Payer: MEDICARE

## 2021-09-02 DIAGNOSIS — B02.9 HERPES ZOSTER WITHOUT COMPLICATION: ICD-10-CM

## 2021-09-02 DIAGNOSIS — G60.9 IDIOPATHIC PERIPHERAL NEUROPATHY: ICD-10-CM

## 2021-09-02 DIAGNOSIS — E11.42 TYPE 2 DIABETES MELLITUS WITH DIABETIC POLYNEUROPATHY, WITHOUT LONG-TERM CURRENT USE OF INSULIN (H): Primary | Chronic | ICD-10-CM

## 2021-09-02 DIAGNOSIS — R01.1 HEART MURMUR: ICD-10-CM

## 2021-09-02 DIAGNOSIS — Z23 HIGH PRIORITY FOR 2019-NCOV VACCINE: ICD-10-CM

## 2021-09-02 LAB
ANION GAP SERPL CALCULATED.3IONS-SCNC: 2 MMOL/L (ref 3–14)
BUN SERPL-MCNC: 18 MG/DL (ref 7–30)
CALCIUM SERPL-MCNC: 8.4 MG/DL (ref 8.5–10.1)
CHLORIDE BLD-SCNC: 110 MMOL/L (ref 94–109)
CO2 SERPL-SCNC: 28 MMOL/L (ref 20–32)
CREAT SERPL-MCNC: 1.03 MG/DL (ref 0.52–1.04)
GFR SERPL CREATININE-BSD FRML MDRD: 49 ML/MIN/1.73M2
GLUCOSE BLD-MCNC: 103 MG/DL (ref 70–99)
HBA1C MFR BLD: 5.6 % (ref 0–5.6)
POTASSIUM BLD-SCNC: 3.7 MMOL/L (ref 3.4–5.3)
SODIUM SERPL-SCNC: 140 MMOL/L (ref 133–144)

## 2021-09-02 PROCEDURE — 83036 HEMOGLOBIN GLYCOSYLATED A1C: CPT | Performed by: NURSE PRACTITIONER

## 2021-09-02 PROCEDURE — 99214 OFFICE O/P EST MOD 30 MIN: CPT | Performed by: NURSE PRACTITIONER

## 2021-09-02 PROCEDURE — 36415 COLL VENOUS BLD VENIPUNCTURE: CPT | Performed by: NURSE PRACTITIONER

## 2021-09-02 PROCEDURE — 80048 BASIC METABOLIC PNL TOTAL CA: CPT | Performed by: NURSE PRACTITIONER

## 2021-09-02 RX ORDER — PREGABALIN 50 MG/1
50 CAPSULE ORAL 2 TIMES DAILY
Qty: 60 CAPSULE | Refills: 0 | Status: SHIPPED | OUTPATIENT
Start: 2021-09-02 | End: 2022-07-21

## 2021-09-02 RX ORDER — VALACYCLOVIR HYDROCHLORIDE 1 G/1
1000 TABLET, FILM COATED ORAL 2 TIMES DAILY
Qty: 14 TABLET | Refills: 0 | Status: SHIPPED | OUTPATIENT
Start: 2021-09-02 | End: 2021-11-16

## 2021-09-02 ASSESSMENT — MIFFLIN-ST. JEOR: SCORE: 1103.11

## 2021-09-02 NOTE — LETTER
September 3, 2021      Daniela Pompath Kevin  [address] [facility] [address]        Dear ,    We are writing to inform you of your test results.    Your test results fall within the expected range(s) or remain unchanged from previous results.  Please continue with current treatment plan.  1. Hemoglobin A1C normal, diabetes well controlled   2. Kidney function slightly reduced, but stable   3. Glucose level was normal   4. Electrolytes normal       Resulted Orders   Hemoglobin A1c   Result Value Ref Range    Hemoglobin A1C 5.6 0.0 - 5.6 %      Comment:      Normal <5.7%   Prediabetes 5.7-6.4%    Diabetes 6.5% or higher     Note: Adopted from ADA consensus guidelines.   Basic metabolic panel  (Ca, Cl, CO2, Creat, Gluc, K, Na, BUN)   Result Value Ref Range    Sodium 140 133 - 144 mmol/L    Potassium 3.7 3.4 - 5.3 mmol/L    Chloride 110 (H) 94 - 109 mmol/L    Carbon Dioxide (CO2) 28 20 - 32 mmol/L    Anion Gap 2 (L) 3 - 14 mmol/L    Urea Nitrogen 18 7 - 30 mg/dL    Creatinine 1.03 0.52 - 1.04 mg/dL    Calcium 8.4 (L) 8.5 - 10.1 mg/dL    Glucose 103 (H) 70 - 99 mg/dL    GFR Estimate 49 (L) >60 mL/min/1.73m2      Comment:      As of July 11, 2021, eGFR is calculated by the CKD-EPI creatinine equation, without race adjustment. eGFR can be influenced by muscle mass, exercise, and diet. The reported eGFR is an estimation only and is only applicable if the renal function is stable.       If you have any questions or concerns, please call the clinic at the number listed above.       Sincerely,      CYN Barrios CNP

## 2021-09-02 NOTE — TELEPHONE ENCOUNTER
Central Prior Authorization Team  Phone: [phone]    PA Initiation    Medication: pregabalin (LYRICA) 50 MG capsule  Insurance Company: [employer] - Phone [phone] Fax [phone]  Pharmacy Filling the Rx: [facility] [place] [facility] [id number] [facility]  Filling Pharmacy Phone: [phone]  Filling Pharmacy Fax:    Start Date: 9/2/2021

## 2021-09-02 NOTE — PROGRESS NOTES
Assessment & Plan     Type 2 diabetes mellitus with diabetic polyneuropathy, without long-term current use of insulin (H)  -well controlled   - Hemoglobin A1c; Future  - Albumin Random Urine Quantitative with Creat Ratio; Future  - Basic metabolic panel  (Ca, Cl, CO2, Creat, Gluc, K, Na, BUN); Future  - pregabalin (LYRICA) 50 MG capsule; Take 1 capsule (50 mg) by mouth 2 times daily  - Hemoglobin A1c  - Basic metabolic panel  (Ca, Cl, CO2, Creat, Gluc, K, Na, BUN)  - Albumin Random Urine Quantitative with Creat Ratio; Future    High priority for 2019-nCoV vaccine  -recommended follow up in 2 weeks for Covid vaccine, recommended to wait for 2 weeks since patient is currently having low grade fever and shingles     Idiopathic peripheral neuropathy  -complains of burning pain and tingling pain in her feet, worse at night time, states Gabapentin not effective, recommended to stop Gabapentin and try Lyrica   - pregabalin (LYRICA) 50 MG capsule; Take 1 capsule (50 mg) by mouth 2 times daily    Heart murmur  -mild systolic murmur, reports mild shortness of breath  With activities which patient states is new, denies chest pains and palpitations    - Echocardiogram Complete; Future    Herpes zoster without complication  -several blisters under the left breast area, patient unsure when symptoms started    - valACYclovir (VALTREX) 1000 mg tablet; Take 1 tablet (1,000 mg) by mouth 2 times daily for 7 days      CYN Pandya CNP  [facility]    Shruti Finnegan is a 88 year old who presents for the following health issues     HPI     Diabetes Follow-up    How often are you checking your blood sugar? One time daily in the AM. This morning was 112.   What time of day are you checking your blood sugars (select all that apply)?  Before meals  Have you had any blood sugars above 200?  No  Have you had any blood sugars below 70?  No    What symptoms do you notice when your blood sugar is low?  Not  "applicable    What concerns do you have today about your diabetes? Other: Numbness in her feet, Gabapentin has not been helping.      Do you have any of these symptoms? (Select all that apply)  Numbness in feet and Burning in feet    Have you had a diabetic eye exam in the last 12 months? No    Hyperlipidemia Follow-Up      Are you regularly taking any medication or supplement to lower your cholesterol?   No    Are you having muscle aches or other side effects that you think could be caused by your cholesterol lowering medication?  No    Hypertension Follow-up      Do you check your blood pressure regularly outside of the clinic? Yes     Are you following a low salt diet? Yes    Are your blood pressures ever more than 140 on the top number (systolic) OR more   than 90 on the bottom number (diastolic), for example 140/90? Yes    BP Readings from Last 2 Encounters:   09/02/21 126/78   06/08/21 (!) 152/99     Hemoglobin A1C (%)   Date Value   09/02/2021 5.6   02/22/2021 5.8 (H)   03/06/2020 6.8 (H)     LDL Cholesterol Calculated (mg/dL)   Date Value   01/03/2020 43   12/06/2012 161 (H)       Review of Systems   Constitutional, HEENT, cardiovascular, pulmonary, gi and gu systems are negative, except as otherwise noted.      Objective    /78 (BP Location: Right arm, Patient Position: Sitting, Cuff Size: Adult Large)   Pulse 86   Temp 98.5  F (36.9  C) (Tympanic)   Ht 1.6 m (5' 3\")   Wt 70.4 kg (155 lb 3.2 oz)   SpO2 97%   BMI 27.49 kg/m    Body mass index is 27.49 kg/m .  Physical Exam   GENERAL: healthy, alert and no distress  EYES: Eyes grossly normal to inspection, PERRL and conjunctivae and sclerae normal  NECK: no adenopathy, no asymmetry, masses, or scars and thyroid normal to palpation  RESP: lungs clear to auscultation - no rales, rhonchi or wheezes  CV: regular rates and rhythm, normal S1 S2, no S3 or S4, grade 3/6 systolic murmur, peripheral pulses strong and no peripheral edema  MS: no gross " musculoskeletal defects noted, no edema  SKIN: several clustered blisters with surrounding erythema under the left breast   NEURO: Normal strength and tone, mentation intact and speech normal  PSYCH: mentation appears normal, affect normal/bright  Diabetic foot exam: normal DP and PT pulses, no trophic changes or ulcerative lesions and normal sensory exam    Results for orders placed or performed in visit on 09/02/21   Hemoglobin A1c     Status: Normal   Result Value Ref Range    Hemoglobin A1C 5.6 0.0 - 5.6 %   Basic metabolic panel  (Ca, Cl, CO2, Creat, Gluc, K, Na, BUN)     Status: Abnormal   Result Value Ref Range    Sodium 140 133 - 144 mmol/L    Potassium 3.7 3.4 - 5.3 mmol/L    Chloride 110 (H) 94 - 109 mmol/L    Carbon Dioxide (CO2) 28 20 - 32 mmol/L    Anion Gap 2 (L) 3 - 14 mmol/L    Urea Nitrogen 18 7 - 30 mg/dL    Creatinine 1.03 0.52 - 1.04 mg/dL    Calcium 8.4 (L) 8.5 - 10.1 mg/dL    Glucose 103 (H) 70 - 99 mg/dL    GFR Estimate 49 (L) >60 mL/min/1.73m2

## 2021-09-02 NOTE — PATIENT INSTRUCTIONS
Instead of Gabapentin recommend to try Lyrica 50 mg twice daily     Stop Gabapentin     Start Valtrex 1000 mg twice daily for 7 days for shingles    Apply over the counter Cortisone cream twice daily as needed for itchiness    Follow up in 2-3 weeks for Covid vaccine    Labs today    Schedule heart US

## 2021-09-02 NOTE — TELEPHONE ENCOUNTER
Prior Authorization Retail Medication Request    Medication/Dose: pregabalin (LYRICA) 50 MG capsule  ICD code (if different than what is on RX):    Type 2 diabetes mellitus with diabetic polyneuropathy, without long-term current use of insulin (H) [E11.42]       Idiopathic peripheral neuropathy [G60.9]       Previously Tried and Failed:    Rationale:      Insurance Name:  MEDICARE   Insurance ID:  [id number]

## 2021-09-03 VITALS
SYSTOLIC BLOOD PRESSURE: 126 MMHG | DIASTOLIC BLOOD PRESSURE: 78 MMHG | TEMPERATURE: 99 F | BODY MASS INDEX: 27.5 KG/M2 | HEIGHT: 63 IN | WEIGHT: 155.2 LBS | HEART RATE: 86 BPM | OXYGEN SATURATION: 97 %

## 2021-09-07 ENCOUNTER — TELEPHONE (OUTPATIENT)
Dept: FAMILY MEDICINE | Facility: CLINIC | Age: 86
End: 2021-09-07

## 2021-09-07 DIAGNOSIS — R21 RASH: Primary | ICD-10-CM

## 2021-09-07 NOTE — TELEPHONE ENCOUNTER
Prior Authorization Approval    Authorization Effective Date: 6/15/2021  Authorization Expiration Date: 9/3/2022  Medication: pregabalin (LYRICA) 50 MG capsule- APPROVED   Approved Dose/Quantity:   Reference #:     Insurance Company: [employer] - Phone [phone] Fax [phone]  Expected CoPay:       CoPay Card Available:      Foundation Assistance Needed:    Which Pharmacy is filling the prescription (Not needed for infusion/clinic administered): [facility] - [place] - [address] [facility]  Pharmacy Notified: Yes  Patient Notified:  **Instructed pharmacy to notify patient when script is ready to /ship.**

## 2021-09-07 NOTE — TELEPHONE ENCOUNTER
Reason for call:  Patient reporting a symptom of shingles. Patient stated she was prescribed with Valacyclovir 1000mg and now she felt so weak.  And also she was told she has a heart murmur and thinking the medication might affect this also. She is asking for recommendation.     Symptom or request: Shingles    Duration (how long have symptoms been present): one month    Have you been treated for this before? Yes    Phone Number patient can be reached at:  Home number on file [phone] (home)    Best Time:  any    Can we leave a detailed message on this number:  YES    Call taken on 9/7/2021 at 9:06 AM by Trena Martinez

## 2021-09-08 RX ORDER — TRIAMCINOLONE ACETONIDE 1 MG/G
CREAM TOPICAL 2 TIMES DAILY
Qty: 30 G | Refills: 0 | Status: SHIPPED | OUTPATIENT
Start: 2021-09-08 | End: 2022-03-16

## 2021-09-08 NOTE — TELEPHONE ENCOUNTER
Iris,    Patient is calling as she became weak on Monday.  Says she thinks this is related to the Valtrex medication as she has not had this before and felt weak after taking the medication, says she could not stand yesterday but today she is weak but able to stand, last took the medication on Monday, is wondering if you would prescribe alternative as she is still itchy, patient denies any tingling or numbness, please advise.    MICHEL Robles

## 2021-09-09 NOTE — TELEPHONE ENCOUNTER
Message Results given to patient with good understanding.  Nasrin Encarnacion on 9/9/2021 at 8:43 AM

## 2021-09-24 NOTE — NURSING NOTE
"Chief Complaint   Patient presents with     Diabetes     Follow up.     Hypertension     Follow up.     Hyperlipidemia     Follow up.     Health Maintenance     Due for Prevnar 13 today.       Initial /70   Pulse 91   Temp 98.3  F (36.8  C) (Tympanic)   Resp 18   Ht 1.626 m (5' 4\")   Wt 77.3 kg (170 lb 6 oz)   SpO2 97%   BMI 29.24 kg/m   Estimated body mass index is 29.24 kg/m  as calculated from the following:    Height as of this encounter: 1.626 m (5' 4\").    Weight as of this encounter: 77.3 kg (170 lb 6 oz).    Medication Reconciliation:  complete    Tahira Villegas CMA ([facility])  Screening Questionnaire for Adult Immunization    Are you sick today?   No   Do you have allergies to medications, food, a vaccine component or latex?   No   Have you ever had a serious reaction after receiving a vaccination?   No   Do you have a long-term health problem with heart disease, lung disease, asthma, kidney disease, metabolic disease (e.g. diabetes), anemia, or other blood disorder?   No   Do you have cancer, leukemia, HIV/AIDS, or any other immune system problem?   No   In the past 3 months, have you taken medications that affect  your immune system, such as prednisone, other steroids, or anticancer drugs; drugs for the treatment of rheumatoid arthritis, Crohn s disease, or psoriasis; or have you had radiation treatments?   No   Have you had a seizure, or a brain or other nervous system problem?   No   During the past year, have you received a transfusion of blood or blood     products, or been given immune (gamma) globulin or antiviral drug?   No   For women: Are you pregnant or is there a chance you could become        pregnant during the next month?   No   Have you received any vaccinations in the past 4 weeks?   No     Immunization questionnaire answers were all negative.        Per orders of Dr. Maddox, injection of Prevnar 13 given by Tahira Villegas. Patient instructed to remain in clinic for 15 minutes " [facility]. ETA about 1 hour.    afterwards, and to report any adverse reaction to me immediately.       Screening performed by Tahira Villegas on 8/6/2019 at 2:18 PM.

## 2021-09-27 ENCOUNTER — TRANSFERRED RECORDS (OUTPATIENT)
Dept: HEALTH INFORMATION MANAGEMENT | Facility: CLINIC | Age: 86
End: 2021-09-27
Payer: MEDICARE

## 2021-10-06 ENCOUNTER — OFFICE VISIT (OUTPATIENT)
Dept: FAMILY MEDICINE | Facility: CLINIC | Age: 86
End: 2021-10-06
Payer: MEDICARE

## 2021-10-06 VITALS
HEART RATE: 81 BPM | WEIGHT: 151.8 LBS | OXYGEN SATURATION: 97 % | BODY MASS INDEX: 26.89 KG/M2 | DIASTOLIC BLOOD PRESSURE: 70 MMHG | SYSTOLIC BLOOD PRESSURE: 130 MMHG | TEMPERATURE: 98.6 F

## 2021-10-06 DIAGNOSIS — Z00.00 ANNUAL PHYSICAL EXAM: Primary | ICD-10-CM

## 2021-10-06 DIAGNOSIS — I10 BENIGN ESSENTIAL HYPERTENSION: Chronic | ICD-10-CM

## 2021-10-06 DIAGNOSIS — E11.9 TYPE 2 DIABETES MELLITUS WITHOUT COMPLICATION, WITHOUT LONG-TERM CURRENT USE OF INSULIN (H): ICD-10-CM

## 2021-10-06 DIAGNOSIS — R01.1 HEART MURMUR: ICD-10-CM

## 2021-10-06 LAB
ANION GAP SERPL CALCULATED.3IONS-SCNC: 7 MMOL/L (ref 3–14)
BUN SERPL-MCNC: 17 MG/DL (ref 7–30)
CALCIUM SERPL-MCNC: 8.6 MG/DL (ref 8.5–10.1)
CHLORIDE BLD-SCNC: 107 MMOL/L (ref 94–109)
CHOLEST SERPL-MCNC: 166 MG/DL
CO2 SERPL-SCNC: 26 MMOL/L (ref 20–32)
CREAT SERPL-MCNC: 0.96 MG/DL (ref 0.52–1.04)
CREAT UR-MCNC: 216 MG/DL
FASTING STATUS PATIENT QL REPORTED: NO
GFR SERPL CREATININE-BSD FRML MDRD: 53 ML/MIN/1.73M2
GLUCOSE BLD-MCNC: 116 MG/DL (ref 70–99)
HDLC SERPL-MCNC: 84 MG/DL
LDLC SERPL CALC-MCNC: 52 MG/DL
MICROALBUMIN UR-MCNC: 48 MG/L
MICROALBUMIN/CREAT UR: 22.22 MG/G CR (ref 0–25)
NONHDLC SERPL-MCNC: 82 MG/DL
POTASSIUM BLD-SCNC: 4 MMOL/L (ref 3.4–5.3)
SODIUM SERPL-SCNC: 140 MMOL/L (ref 133–144)
TRIGL SERPL-MCNC: 150 MG/DL

## 2021-10-06 PROCEDURE — 99214 OFFICE O/P EST MOD 30 MIN: CPT | Mod: 25 | Performed by: NURSE PRACTITIONER

## 2021-10-06 PROCEDURE — 80061 LIPID PANEL: CPT | Performed by: NURSE PRACTITIONER

## 2021-10-06 PROCEDURE — 80048 BASIC METABOLIC PNL TOTAL CA: CPT | Performed by: NURSE PRACTITIONER

## 2021-10-06 PROCEDURE — 82043 UR ALBUMIN QUANTITATIVE: CPT | Performed by: NURSE PRACTITIONER

## 2021-10-06 PROCEDURE — 36415 COLL VENOUS BLD VENIPUNCTURE: CPT | Performed by: NURSE PRACTITIONER

## 2021-10-06 PROCEDURE — G0438 PPPS, INITIAL VISIT: HCPCS | Performed by: NURSE PRACTITIONER

## 2021-10-06 RX ORDER — AMLODIPINE BESYLATE 2.5 MG/1
2.5 TABLET ORAL DAILY
Qty: 90 TABLET | Refills: 3 | Status: SHIPPED | OUTPATIENT
Start: 2021-10-06 | End: 2021-11-26

## 2021-10-06 ASSESSMENT — ACTIVITIES OF DAILY LIVING (ADL): CURRENT_FUNCTION: NO ASSISTANCE NEEDED

## 2021-10-06 NOTE — PROGRESS NOTES
"SUBJECTIVE:   Daniela Brown is a 88 year old female who presents for Preventive Visit.    Chief Complaint   Patient presents with     Physical     Diabetes     Refill Request     Amlodipine, has been out of this for two weeks        Patient has been advised of split billing requirements and indicates understanding: Yes   Are you in the first 12 months of your Medicare coverage?  No    Healthy Habits:    In general, how would you rate your overall health?  Fair    Frequency of exercise:  None    Duration of exercise:  Less than 15 minutes    Do you usually eat at least 4 servings of fruit and vegetables a day, include whole grains    & fiber and avoid regularly eating high fat or \"junk\" foods?  No    Taking medications regularly:  No (Ran out of her medication )    Barriers to taking medications:  Other    Medication side effects:  None    Ability to successfully perform activities of daily living:  No assistance needed    Home Safety:  No safety concerns identified    Hearing impairment symptoms: Has hearing aids     In the past 6 months, have you been bothered by leaking of urine? Yes    In general, how would you rate your overall mental or emotional health?  Good      PHQ-2 Total Score:    Additional concerns today:  Yes (She would like to make sure her shingles is gone and would like the covid booster shot today )    Do you feel safe in your environment? Yes    Have you ever done Advance Care Planning? (For example, a Health Directive, POLST, or a discussion with a medical provider or your loved ones about your wishes): Yes, advance care planning is on file.      Fall risk  Fallen 2 or more times in the past year?: No  Any fall with injury in the past year?: No  Mini-CogTM Jeramie Mary. Licensed by the author for use in [facility]; reprinted with permission ([email or web address]). All rights reserved.      Do you have sleep apnea, excessive snoring or daytime drowsiness?: no    Reviewed and " updated as needed this visit by clinical staff   Allergies  Meds              Reviewed and updated as needed this visit by Provider   Allergies              Social History     Tobacco Use     Smoking status: Never Smoker     Smokeless tobacco: Never Used   Substance Use Topics     Alcohol use: No     If you drink alcohol do you typically have >3 drinks per day or >7 drinks per week? Not applicable      Diabetes Follow-up      How often are you checking your blood sugar? Every other day. Has been around 105. What concerns do you have today about your diabetes? None     Do you have any of these symptoms? (Select all that apply)  She has neuropathy     Have you had a diabetic eye exam in the last 12 months? No    Hypertension Follow-up      Do you check your blood pressure regularly outside of the clinic? Yes at home.     Are you following a low salt diet? No    Are your blood pressures ever more than 140 on the top number (systolic) OR more   than 90 on the bottom number (diastolic), for example 140/90? No    BP Readings from Last 2 Encounters:   10/06/21 130/70   09/02/21 126/78     Hemoglobin A1C (%)   Date Value   09/02/2021 5.6   02/22/2021 5.8 (H)   03/06/2020 6.8 (H)     LDL Cholesterol Calculated (mg/dL)   Date Value   10/06/2021 52   01/03/2020 43   12/06/2012 161 (H)     Current providers sharing in care for this patient include:   Patient Care Team:  Cynthia Maddox DO as PCP - General (Internal Medicine)  Cynthia Maddox DO as Assigned PCP    The following health maintenance items are reviewed in Epic and correct as of today:  Health Maintenance Due   Topic Date Due     ZOSTER IMMUNIZATION (1 of 2) Never done     DTAP/TDAP/TD IMMUNIZATION (1 - Tdap) 01/02/1988     EYE EXAM  12/08/2012     INFLUENZA VACCINE (1) 09/01/2021         Mammogram Screening - Patient over age 75, has elected to discontinue screenings.  Pertinent mammograms are reviewed under the imaging tab.    Review of  "Systems  Constitutional, HEENT, cardiovascular, pulmonary, gi and gu systems are negative, except as otherwise noted.    OBJECTIVE:   /70 (BP Location: Right arm, Patient Position: Sitting, Cuff Size: Adult Large)   Pulse 81   Temp 98.6  F (37  C) (Tympanic)   Wt 68.9 kg (151 lb 12.8 oz)   SpO2 97%   BMI 26.89 kg/m   Estimated body mass index is 26.89 kg/m  as calculated from the following:    Height as of 9/2/21: 1.6 m (5' 3\").    Weight as of this encounter: 68.9 kg (151 lb 12.8 oz).  Physical Exam  GENERAL: healthy, alert and no distress  EYES: Eyes grossly normal to inspection, PERRL and conjunctivae and sclerae normal  HENT: ear canals and TM's normal, nose and mouth without ulcers or lesions  NECK: no adenopathy, no asymmetry, masses, or scars and thyroid normal to palpation  RESP: lungs clear to auscultation - no rales, rhonchi or wheezes  BREAST: normal without masses, tenderness or nipple discharge and no palpable axillary masses or adenopathy  CV: regular rates and rhythm, normal S1 S2, no S3 or S4, grade 3/6 systolic murmur, peripheral pulses strong and no peripheral edema  MS: no gross musculoskeletal defects noted, no edema  SKIN: no suspicious lesions or rashes  NEURO: Normal strength and tone, mentation intact and speech normal  PSYCH: mentation appears normal, affect normal/bright    Diagnostic Test Results:  Labs reviewed in Epic  Results for orders placed or performed in visit on 10/06/21   Lipid panel reflex to direct LDL Fasting     Status: Abnormal   Result Value Ref Range    Cholesterol 166 <200 mg/dL    Triglycerides 150 (H) <150 mg/dL    Direct Measure HDL 84 >=50 mg/dL    LDL Cholesterol Calculated 52 <=100 mg/dL    Non HDL Cholesterol 82 <130 mg/dL    Patient Fasting > 8hrs? No     Narrative    Cholesterol  Desirable:  <200 mg/dL    Triglycerides  Normal:  Less than 150 mg/dL  Borderline High:  150-199 mg/dL  High:  200-499 mg/dL  Very High:  Greater than or equal to 500 " mg/dL    Direct Measure HDL  Female:  Greater than or equal to 50 mg/dL   Male:  Greater than or equal to 40 mg/dL    LDL Cholesterol  Desirable:  <100mg/dL  Above Desirable:  100-129 mg/dL   Borderline High:  130-159 mg/dL   High:  160-189 mg/dL   Very High:  >= 190 mg/dL    Non HDL Cholesterol  Desirable:  130 mg/dL  Above Desirable:  130-159 mg/dL  Borderline High:  160-189 mg/dL  High:  190-219 mg/dL  Very High:  Greater than or equal to 220 mg/dL   Albumin Random Urine Quantitative with Creat Ratio     Status: None   Result Value Ref Range    Creatinine Urine mg/dL 216 mg/dL    Albumin Urine mg/L 48 mg/L    Albumin Urine mg/g Cr 22.22 0.00 - 25.00 mg/g Cr   Basic metabolic panel  (Ca, Cl, CO2, Creat, Gluc, K, Na, BUN)     Status: Abnormal   Result Value Ref Range    Sodium 140 133 - 144 mmol/L    Potassium 4.0 3.4 - 5.3 mmol/L    Chloride 107 94 - 109 mmol/L    Carbon Dioxide (CO2) 26 20 - 32 mmol/L    Anion Gap 7 3 - 14 mmol/L    Urea Nitrogen 17 7 - 30 mg/dL    Creatinine 0.96 0.52 - 1.04 mg/dL    Calcium 8.6 8.5 - 10.1 mg/dL    Glucose 116 (H) 70 - 99 mg/dL    GFR Estimate 53 (L) >60 mL/min/1.73m2       ASSESSMENT / PLAN:   (Z00.00) Annual physical exam  (primary encounter diagnosis)  Comment:   Plan: Lipid panel reflex to direct LDL Fasting, Basic        metabolic panel  (Ca, Cl, CO2, Creat, Gluc, K,         Na, BUN)            (I10) Benign essential hypertension  Comment: well controlled   Plan: continue amLODIPine (NORVASC) 2.5 MG tablet, Adult         Cardiology Eval Referral, Basic metabolic panel        (Ca, Cl, CO2, Creat, Gluc, K, Na, BUN)            (R01.1) Heart murmur  Comment: recommended to schedule heart ECHO and follow up with cardiology   Plan: Adult Cardiology Eval Referral            (E11.9) Type 2 diabetes mellitus without complication, without long-term current use of insulin (H)  Comment: well controlled   Plan: Albumin Random Urine Quantitative with Creat         Ratio         "      Patient has been advised of split billing requirements and indicates understanding: Yes  COUNSELING:  Reviewed preventive health counseling, as reflected in patient instructions       Regular exercise       Healthy diet/nutrition    Estimated body mass index is 26.89 kg/m  as calculated from the following:    Height as of 9/2/21: 1.6 m (5' 3\").    Weight as of this encounter: 68.9 kg (151 lb 12.8 oz).        She reports that she has never smoked. She has never used smokeless tobacco.      Appropriate preventive services were discussed with this patient, including applicable screening as appropriate for cardiovascular disease, diabetes, osteopenia/osteoporosis, and glaucoma.  As appropriate for age/gender, discussed screening for colorectal cancer, prostate cancer, breast cancer, and cervical cancer. Checklist reviewing preventive services available has been given to the patient.    Reviewed patients plan of care and provided an AVS. The Basic Care Plan (routine screening as documented in Health Maintenance) for Daniela meets the Care Plan requirement. This Care Plan has been established and reviewed with the Patient.    Counseling Resources:  ATP IV Guidelines  Pooled Cohorts Equation Calculator  Breast Cancer Risk Calculator  Breast Cancer: Medication to Reduce Risk  FRAX Risk Assessment  ICSI Preventive Guidelines  Dietary Guidelines for Americans, 2010  PEX Card's MyPlate  ASA Prophylaxis  Lung CA Screening    CYN Pandya [facility]    Identified Health Risks:  "

## 2021-10-06 NOTE — LETTER
October 7, 2021      Daniela [place]  [address] [address] [id number]        Dear ,    We are writing to inform you of your test results.    Labs results normal, normal cholesterol, kidney function slightly reduced but remains stable.     Iris Amaro, CYN CNP       Resulted Orders   Lipid panel reflex to direct LDL Fasting   Result Value Ref Range    Cholesterol 166 <200 mg/dL    Triglycerides 150 (H) <150 mg/dL    Direct Measure HDL 84 >=50 mg/dL    LDL Cholesterol Calculated 52 <=100 mg/dL    Non HDL Cholesterol 82 <130 mg/dL    Patient Fasting > 8hrs? No     Narrative    Cholesterol  Desirable:  <200 mg/dL    Triglycerides  Normal:  Less than 150 mg/dL  Borderline High:  150-199 mg/dL  High:  200-499 mg/dL  Very High:  Greater than or equal to 500 mg/dL    Direct Measure HDL  Female:  Greater than or equal to 50 mg/dL   Male:  Greater than or equal to 40 mg/dL    LDL Cholesterol  Desirable:  <100mg/dL  Above Desirable:  100-129 mg/dL   Borderline High:  130-159 mg/dL   High:  160-189 mg/dL   Very High:  >= 190 mg/dL    Non HDL Cholesterol  Desirable:  130 mg/dL  Above Desirable:  130-159 mg/dL  Borderline High:  160-189 mg/dL  High:  190-219 mg/dL  Very High:  Greater than or equal to 220 mg/dL   Albumin Random Urine Quantitative with Creat Ratio   Result Value Ref Range    Creatinine Urine mg/dL 216 mg/dL    Albumin Urine mg/L 48 mg/L    Albumin Urine mg/g Cr 22.22 0.00 - 25.00 mg/g Cr   Basic metabolic panel  (Ca, Cl, CO2, Creat, Gluc, K, Na, BUN)   Result Value Ref Range    Sodium 140 133 - 144 mmol/L    Potassium 4.0 3.4 - 5.3 mmol/L    Chloride 107 94 - 109 mmol/L    Carbon Dioxide (CO2) 26 20 - 32 mmol/L    Anion Gap 7 3 - 14 mmol/L    Urea Nitrogen 17 7 - 30 mg/dL    Creatinine 0.96 0.52 - 1.04 mg/dL    Calcium 8.6 8.5 - 10.1 mg/dL    Glucose 116 (H) 70 - 99 mg/dL    GFR Estimate 53 (L) >60 mL/min/1.73m2      Comment:      As of July 11, 2021, eGFR is calculated by the  CKD-EPI creatinine equation, without race adjustment. eGFR can be influenced by muscle mass, exercise, and diet. The reported eGFR is an estimation only and is only applicable if the renal function is stable.       If you have any questions or concerns, please call the clinic at the number listed above.       Sincerely,      CYN Barrios CNP

## 2021-10-19 PROBLEM — F32.9 MAJOR DEPRESSION: Status: ACTIVE | Noted: 2020-12-31

## 2021-11-16 ENCOUNTER — VIRTUAL VISIT (OUTPATIENT)
Dept: FAMILY MEDICINE | Facility: CLINIC | Age: 86
End: 2021-11-16
Payer: MEDICARE

## 2021-11-16 DIAGNOSIS — F41.9 ANXIETY: ICD-10-CM

## 2021-11-16 DIAGNOSIS — R06.00 DYSPNEA, UNSPECIFIED TYPE: Primary | ICD-10-CM

## 2021-11-16 PROCEDURE — 99441 PR PHYSICIAN TELEPHONE EVALUATION 5-10 MIN: CPT | Mod: 95 | Performed by: NURSE PRACTITIONER

## 2021-11-16 NOTE — PROGRESS NOTES
Sivan is a 88 year old who is being evaluated via a billable telephone visit.      What phone number would you like to be contacted at? [phone]  How would you like to obtain your AVS? Mail a copy    Assessment & Plan     Dyspnea, unspecified type  Patient reports a couple months of shortness of breath both at rest and with activity- she has a history of anxiety and depression- ? If shortness of breath is due to anxiety/depression vs respiratory vs cardiac- heart failure- recommend patient have a chest x-ray and echocardiogram that was previously ordered. If those are unremarkable/consider addressing anxiety/depression   - XR Chest 2 Views    Anxiety:  Patient reports increase anxiety due to family situation and loneliness due to covid precautions/isolation.   Uncontrolled- recommend /consider starting medication or therapy       No follow-ups on file.    CYN Alvarez [facility]   Sivan is a 88 year old who presents for the following health issues     HPI       Concern for COVID-19  About how many days ago did these symptoms start? 1-2 months  Is this your first visit for this illness? Yes  In the 14 days before your symptoms started, have you had close contact with someone with COVID-19 (Coronavirus)? No  Do you have a fever or chills? No  Are you having new or worsening difficulty breathing? Yes   Please describe what kind of difficulty you are having breathing:Mild dyspnea (able to do ADLs without difficulty, mild shortness of breath with activities such as climbing one or two flights of stairs or walking briskly)  Do you have new or worsening cough? Yes, it's a dry cough.   Have you had any new or unexplained body aches? No    Have you experienced any of the following NEW symptoms?    Headache: 1-2    Sore throat: No    Loss of taste or smell: reports no taste or smell for years    Chest pain: No    Diarrhea: YES    Rash: No  What treatments have you  "tried? Anti-diarrhea medication  Who do you live with? No one.  Are you, or a household member, a healthcare worker or a ? No  Do you live in a nursing home, group home, or shelter? No  Do you have a way to get food/medications if quarantined? Yes, I have a friend or family member who can help me.    Patient reports that she feels more shortness of breath just sitting or walking- started a few months ago. \" If she takes a regular aspirin it goes away\".Occasional dry cough but \" does not amount to anything\". No fevers. No history of asthma, heart failure or copd. No wheezing. Patient reports history of depression and anxiety- \" oh yeah I have a lot of anxiety; I don't get out much and I feel depressed from being inside so much and not seeing anybody due to covid\". \" I worry about my son moving away, I have a lot of depression\".   Noted when patient had her last physical exam on 10/6/21- recommended to have a ECHO for a history of heart murmur. Patient tells me that she has \" called and called to schedule the ECHO but no one calls her back\".         Review of Systems   Constitutional, HEENT, cardiovascular, pulmonary, GI, , musculoskeletal, neuro, skin, endocrine and psych systems are negative, except as otherwise noted.      Objective           Vitals:  No vitals were obtained today due to virtual visit.    Physical Exam   healthy, alert and no distress  PSYCH: Alert and oriented times 3; coherent speech, normal   rate and volume, able to articulate logical thoughts, able   to abstract reason, no tangential thoughts, no hallucinations   or delusions  Her affect is normal  RESP: No cough, no audible wheezing, able to talk in full sentences  Remainder of exam unable to be completed due to telephone visits                Phone call duration: 9 minutes    "

## 2021-11-26 ENCOUNTER — TELEPHONE (OUTPATIENT)
Dept: FAMILY MEDICINE | Facility: CLINIC | Age: 86
End: 2021-11-26
Payer: MEDICARE

## 2021-11-26 DIAGNOSIS — I10 BENIGN ESSENTIAL HYPERTENSION: Chronic | ICD-10-CM

## 2021-11-26 RX ORDER — AMLODIPINE BESYLATE 5 MG/1
5 TABLET ORAL DAILY
Qty: 90 TABLET | Refills: 0 | Status: SHIPPED | OUTPATIENT
Start: 2021-11-26 | End: 2022-03-23

## 2021-11-26 NOTE — TELEPHONE ENCOUNTER
Reason for call:  Patient reporting a symptom    Symptom or request: blood pressure high  Hard to breath.  Took a BP pill and it seemed to help  157/87     Duration (how long have symptoms been present): doesn't WANT TO BE A BOTHER    Have you been treated for this before? Yes    Additional comments:clare Number patient can be reached at:  Home number on file [phone] (home)    Best Time:  ANY    Can we leave a detailed message on this number:  YES    Call taken on 11/26/2021 at 9:42 AM by Sindy Baez

## 2021-11-26 NOTE — TELEPHONE ENCOUNTER
Patient notified with understanding voiced.  She may just take 2 of her amlodipine 2.5 mg tabs for now, but is aware of new Rx for 5 mg tabs that she'll only need to take 1 of.  She'll f/u next week with PCP as scheduled. Then echo is scheduled for 12/15/21.     Gilma Mcelroy RN  [facility]

## 2021-11-26 NOTE — TELEPHONE ENCOUNTER
She has had shortness of breath on and off for several months.  She has orders in for imaging and echo for this.    Okay to increase amlodipine from 2.5 mg daily to 5 mg daily and keep appointment next week as scheduled

## 2021-11-26 NOTE — TELEPHONE ENCOUNTER
"Patient reports for the past few weeks, she has been feeling dizzy, weak, has a slight headache, and shortness of breath on/off. She denies chest pain, nausea. She checks her BP at home and it has been running higher. This morning it was 153/87. Patient is currently on amlodipine 2.5 mg daily.   Patient did take an extra tablet of amlodipine and 2 nitroglycerin tablets yesterday and it seemed to help a bit. Patient is concerned and is wondering if she should have her amlodipine increased.   Advised patient she should be seen in clinic. There are no available openings in the clinics today. An appointment was scheduled for 12/1/21 in a \"same day\" slot. Would you like to see patient sooner, go to ED or ok to wait until appointment and adjust medication with parameters?  Also talked with patient about worsening symptoms and to be seen in ED if experiencing worsening symptoms such as chest pain/tightness, shortness of breath, worsening headache. Routed to provider.  Gogo Lacey RN    "

## 2021-12-01 ENCOUNTER — OFFICE VISIT (OUTPATIENT)
Dept: FAMILY MEDICINE | Facility: CLINIC | Age: 86
End: 2021-12-01
Payer: MEDICARE

## 2021-12-01 VITALS
SYSTOLIC BLOOD PRESSURE: 140 MMHG | HEART RATE: 82 BPM | DIASTOLIC BLOOD PRESSURE: 68 MMHG | BODY MASS INDEX: 27.28 KG/M2 | RESPIRATION RATE: 16 BRPM | TEMPERATURE: 97.8 F | WEIGHT: 154 LBS | OXYGEN SATURATION: 97 %

## 2021-12-01 DIAGNOSIS — N18.31 STAGE 3A CHRONIC KIDNEY DISEASE (H): ICD-10-CM

## 2021-12-01 DIAGNOSIS — I25.119 CORONARY ARTERY DISEASE INVOLVING NATIVE CORONARY ARTERY OF NATIVE HEART WITH ANGINA PECTORIS (H): ICD-10-CM

## 2021-12-01 DIAGNOSIS — F32.1 MODERATE MAJOR DEPRESSION (H): ICD-10-CM

## 2021-12-01 DIAGNOSIS — F41.1 GAD (GENERALIZED ANXIETY DISORDER): ICD-10-CM

## 2021-12-01 DIAGNOSIS — R06.02 SOB (SHORTNESS OF BREATH): ICD-10-CM

## 2021-12-01 DIAGNOSIS — I10 BENIGN ESSENTIAL HYPERTENSION: Chronic | ICD-10-CM

## 2021-12-01 DIAGNOSIS — M54.42 CHRONIC LEFT-SIDED LOW BACK PAIN WITH LEFT-SIDED SCIATICA: Primary | ICD-10-CM

## 2021-12-01 DIAGNOSIS — G89.29 CHRONIC LEFT-SIDED LOW BACK PAIN WITH LEFT-SIDED SCIATICA: Primary | ICD-10-CM

## 2021-12-01 DIAGNOSIS — Z23 NEED FOR PROPHYLACTIC VACCINATION AND INOCULATION AGAINST INFLUENZA: ICD-10-CM

## 2021-12-01 LAB — HGB BLD-MCNC: 13.8 G/DL (ref 11.7–15.7)

## 2021-12-01 PROCEDURE — 36415 COLL VENOUS BLD VENIPUNCTURE: CPT | Performed by: INTERNAL MEDICINE

## 2021-12-01 PROCEDURE — 99214 OFFICE O/P EST MOD 30 MIN: CPT | Mod: 25 | Performed by: INTERNAL MEDICINE

## 2021-12-01 PROCEDURE — 85018 HEMOGLOBIN: CPT | Performed by: INTERNAL MEDICINE

## 2021-12-01 PROCEDURE — G0008 ADMIN INFLUENZA VIRUS VAC: HCPCS | Performed by: INTERNAL MEDICINE

## 2021-12-01 PROCEDURE — 90662 IIV NO PRSV INCREASED AG IM: CPT | Performed by: INTERNAL MEDICINE

## 2021-12-01 PROCEDURE — 82607 VITAMIN B-12: CPT | Performed by: INTERNAL MEDICINE

## 2021-12-01 NOTE — PROGRESS NOTES
Assessment & Plan     Chronic left-sided low back pain with left-sided sciatica -patient would like another injection at [facility] which has helped significantly with her leg weakness.  Referral placed  - Pain Management Referral; Future    Benign essential hypertension -blood pressure is a bit too high.  She feels that anxiety is driving this.  She declines to escalate treatment at this time.  She will follow up if the blood pressure remains elevated    SOB (shortness of breath) -appears to be related to anxiety.  Less likely to be coronary artery disease since you had only minimal disease on an angiogram in 2019.  Symptoms are not typical angina.  Check hemoglobin  - Hemoglobin; Future    ARABELLA (generalized anxiety disorder) -anxiety is likely the main cause of the shortness of breath and chest tightness.  She has had side effects (or perceived side effects) to multiple medications as below.  Clonazepam seems to have worked the best, but obviously this has its own concerns with memory impairment.  Discussed counseling and she is very interested.  If she continues to have anxiety, would either refer to psychiatry or consider clonazepam #10 pills per month to use on just a sparing basis    Moderate major depression (H)  - Vitamin B12; Future    Coronary artery disease involving native coronary artery of native heart with angina pectoris (H)  - Hemoglobin; Future    Stage 3a chronic kidney disease (H) Known issue that I take into account for their medical decisions, no current exacerbations or new concerns               Patient Instructions   Hypertension:  1. We discussed Increasing amlodipine from 5mg daily to 10 mg daily.you want to monitor blood pressure for now w/out making any changes.    Back pain/Leg weakness  1. Keep doing physical therapy exercises at home  2. Referral for back injection    Shortness of breath:   1. Keep upcoming appointment for echo and cardiology evaluation  2. Blood work  today    Anxiety:   1. You have had side effects to many medications used  2. Recommend counselor because you have had too many side effects to medications.  3. I am referring you to [facility] [place] for anxiety;The phone number is [phone]             No follow-ups on file.    Cynthia Maddox DO  [facility]    Shruti Finnegan is a 88 year old who presents for the following health issues     HPI     Flu shot:  Not sure yet  Tdap: Medicare only pays for Td: Declined  Shingles: Check insurance first    11/20/21 - Saturday 9 am 166/88 - Pulse 87  11/21/21 - Sunday 10 pm - 139/69 - pulse 89  11/22/21 - Monday 11 am - 134/69 - Pulse 93          Monday - night   143/77 - Pulse 82  11/23/21 - Tuesday 131/75 - Pulse 83  11/24/21 - Wednesday - 135/80 - Pulse 86  11/25/21 - Thursday - 161/95 - Pulse 79    [address]    Having problem breathing in the house, wonder if could be something in the house that is causing that. Couple days ago son turn up the humidity and today she feels like she didn't had too much problem breathing.     Triaged yesterday BP was 153/87      BP Readings from Last 6 Encounters:   12/01/21 (!) 140/68   10/06/21 130/70   09/02/21 126/78   06/08/21 (!) 152/99   05/27/21 126/60   04/27/21 136/64     Took 1 nitroglycerin other day because was having hard time breathing, feels better after    Hypertension Follow-up      Do you check your blood pressure regularly outside of the clinic? Yes     Are you following a low salt diet? Yes    Are your blood pressures ever more than 140 on the top number (systolic) OR more   than 90 on the bottom number (diastolic), for example 140/90? Yes        Shortness of breath: had virtual visit on 11/16 for shortness of breath.  --cxr and echo was recommended.  --she reports a squeezing in chest that travels up to throat.  It is occurring daily. It can occur at rest or with exertion; symptoms last 30-60  min  --wonders if due to anxiety.  Son thinks she needs an anxiety medication  --taking aspirin or nitroglycerin seems to help  --has occ cough, non-productive.  --has appointment for echo on 12/15 and cards on 1/4  --she had angiogram 2/2019 for abnormal stress test - only showed mild non-obstructive CAD    Anxiety: has tried multiple medications which succuss - effexor, cymbalta, celexa, lexapro, amitritpyline, zoloft  --clonazepam helped a lot, but caused problems with memory;  Would only use 1/2 pill from time to time  --son is moving to [place]; he wants her to live w/him    Leg weakness: ongoing problem but feels worse.  Wonders if needs another shot in the back because this has helped leg weakness in the past.  Gets at [facility] imaging.  Doing physical therapy exercises at home.      Current Outpatient Medications   Medication Sig Dispense Refill     amLODIPine (NORVASC) 5 MG tablet Take 1 tablet (5 mg) by mouth daily 90 tablet 0     blood glucose (ACCU-CHEK GUIDE) test strip Use to test blood sugar one times daily or as directed. 100 each 1     blood glucose monitoring (ACCU-CHEK FASTCLIX) lancets Use to test blood sugar one times daily or as directed. 102 each 3     Multiple Vitamins-Minerals (THERAPEUTIC MULTIVIT/MINERAL) TABS Take 1 tablet by mouth every evening        nitroGLYcerin (NITROSTAT) 0.4 MG sublingual tablet Place 1 tablet (0.4 mg) under the tongue every 5 minutes as needed for chest pain 25 tablet 4     order for DME 1 walker 1 Device 0     ORDER FOR DME Thigh length teds. 1 Device 2     pregabalin (LYRICA) 50 MG capsule Take 1 capsule (50 mg) by mouth 2 times daily 60 capsule 0     STATIN NOT PRESCRIBED (INTENTIONAL) Please choose reason not prescribed, below       triamcinolone (KENALOG) 0.1 % external cream Apply topically 2 times daily 30 g 0     VITAMIN D, CHOLECALCIFEROL, PO Take 1,000 Units by mouth daily       Continuous Blood Gluc  (FREESTYLE JERRY 14 DAY READER) PEGGY 1 Device  daily (Patient not taking: Reported on 11/16/2021) 1 Device 2     Continuous Blood Gluc Sensor (FREESTYLE JERRY 14 DAY SENSOR) MISC 1 Device every 14 days (Patient not taking: Reported on 11/16/2021) 2 each 12     Cyanocobalamin (B-12) 1000 MCG SUBL Place 1 tablet under the tongue every evening  (Patient not taking: Reported on 11/16/2021)       dicyclomine (BENTYL) 20 MG tablet Take 1 tablet (20 mg) by mouth 4 times daily as needed (diarrhea) (Patient not taking: Reported on 12/1/2021) 60 tablet 0           Review of Systems   Constitutional, HEENT, cardiovascular, pulmonary, gi and gu systems are negative, except as otherwise noted.      Objective    BP (!) 140/68   Pulse 82   Temp 97.8  F (36.6  C) (Tympanic)   Resp 16   Wt 69.9 kg (154 lb)   SpO2 97%   Breastfeeding No   BMI 27.28 kg/m    Body mass index is 27.28 kg/m .  Physical Exam   GENERAL APPEARANCE: healthy, alert and no distress  RESP: lungs clear to auscultation - no rales, rhonchi or wheezes  CV: regular rates and rhythm, normal S1 S2, no S3 or S4, click or rub and systolic murmur  MS: 4/5 strength left leg, 5/5 strength right leg  PSYCH: mentation appears normal, affect normal/bright and worried

## 2021-12-01 NOTE — LETTER
December 2, 2021      Daniela Brown  [id number] [address] [id number]        Dear MsMyrnaKevin,    We are writing to inform you of your test results.    B12, hemoglobin normal.     Resulted Orders   Hemoglobin   Result Value Ref Range    Hemoglobin 13.8 11.7 - 15.7 g/dL   Vitamin B12   Result Value Ref Range    Vitamin B12 516 193 - 986 pg/mL       If you have any questions or concerns, please call the clinic at the number listed above.       Sincerely,      Cynthia Maddox, DO

## 2021-12-01 NOTE — PATIENT INSTRUCTIONS
Hypertension:  1. We discussed Increasing amlodipine from 5mg daily to 10 mg daily.you want to monitor blood pressure for now w/out making any changes.    Back pain/Leg weakness  1. Keep doing physical therapy exercises at home  2. Referral for back injection    Shortness of breath:   1. Keep upcoming appointment for echo and cardiology evaluation  2. Blood work today    Anxiety:   1. You have had side effects to many medications used  2. Recommend counselor because you have had too many side effects to medications.  3. I am referring you to [facility] [place] for anxiety;The phone number is [phone]

## 2021-12-02 ENCOUNTER — TELEPHONE (OUTPATIENT)
Dept: FAMILY MEDICINE | Facility: CLINIC | Age: 86
End: 2021-12-02
Payer: MEDICARE

## 2021-12-02 LAB — VIT B12 SERPL-MCNC: 516 PG/ML (ref 193–986)

## 2021-12-02 NOTE — TELEPHONE ENCOUNTER
Reason for Call: referral fax number     Order or referral being requested: FAX NUMBER FOR [facility] in [place]   [phone]    Date needed: as soon as possible    Has the patient been seen by the PCP for this problem? YES    Additional comments:patient calling back with the fax number to send referral.    Phone number Patient can be reached at:  Home number on file [phone] (home)    Best Time:  any    Can we leave a detailed message on this number?  YES    Call taken on 12/2/2021 at 10:45 AM by Sindy Baez

## 2021-12-09 ENCOUNTER — TRANSFERRED RECORDS (OUTPATIENT)
Dept: HEALTH INFORMATION MANAGEMENT | Facility: CLINIC | Age: 86
End: 2021-12-09
Payer: MEDICARE

## 2021-12-15 ENCOUNTER — HOSPITAL ENCOUNTER (OUTPATIENT)
Dept: CARDIOLOGY | Facility: CLINIC | Age: 86
Discharge: HOME | End: 2021-12-15
Attending: NURSE PRACTITIONER | Admitting: NURSE PRACTITIONER
Payer: MEDICARE

## 2021-12-15 DIAGNOSIS — R01.1 HEART MURMUR: ICD-10-CM

## 2021-12-15 LAB — LVEF ECHO: NORMAL

## 2021-12-15 PROCEDURE — 999N000208 ECHOCARDIOGRAM COMPLETE

## 2021-12-15 PROCEDURE — 255N000002 HC RX 255 OP 636: Performed by: NURSE PRACTITIONER

## 2021-12-15 PROCEDURE — 93306 TTE W/DOPPLER COMPLETE: CPT | Mod: 26 | Performed by: INTERNAL MEDICINE

## 2021-12-15 RX ADMIN — HUMAN ALBUMIN MICROSPHERES AND PERFLUTREN 2 ML: 10; .22 INJECTION, SOLUTION INTRAVENOUS at 14:31

## 2021-12-31 ENCOUNTER — NURSE TRIAGE (OUTPATIENT)
Dept: FAMILY MEDICINE | Facility: CLINIC | Age: 86
End: 2021-12-31

## 2021-12-31 NOTE — TELEPHONE ENCOUNTER
Appointment had also been made for Monday 1/3/22 in clinic, as patient didn't think she'd be able to get in to be seen before then; patient requested to keep that appointment for now, until after talking with [facility] provider. She reports she'll call back to cancel.    Leticia Ahuja RN  [facility]

## 2021-12-31 NOTE — TELEPHONE ENCOUNTER
S-(situation): Writer called patient, who reports that she's been having urinary frequency, incontinence, and low back pain for the last month and a half.     B-(background): Has had similar symptoms before with past UTIs.    A-(assessment): Patient reports frequency, incontinence, and low back pain. Denies fever, abdominal pain, or burning with urination. Reports bright orange, slightly cloudy urine; reports she's unable to tell if foul-smelling, since she's lost her sense of smell. Does report that she's been drinking enough fluids.     R-(recommendations): Per triage protocol, patient was advised to be seen today in office for further assessment; d/t no remaining clinic appointments today, patient was advised to be seen in UC or ED. Patient reports that her car is broke down, and she doesn't have any family or friends nearby that can give her a ride. Writer suggested doing a virtual urgent care visit, patient reports she doesn't have a smart phone or internet. Writer offered to arrange a virtual/telephone visit with [facility], patient agreeable and appointment scheduled for 5 p.m. Patient was instructed to be available and near her phone, she verbalized understanding.    Reason for Disposition    Side (flank) or lower back pain present    Can't control passage of urine (i.e., urinary incontinence) and new onset (< 2 weeks) or worsening    Urinating more frequently than usual (i.e., frequency)    Additional Information    Negative: Shock suspected (e.g., cold/pale/clammy skin, too weak to stand, low BP, rapid pulse)    Negative: Sounds like a life-threatening emergency to the triager    Negative: Followed a genital area injury    Negative: Followed a genital area injury (penis, scrotum)    Negative: Vaginal discharge    Negative: Pus (white, yellow) or bloody discharge from end of penis    Negative: Discomfort (pain, burning or stinging) when passing urine and pregnant    Negative: Discomfort (pain, burning or  "stinging) when passing urine and female    Negative: Discomfort (pain, burning or stinging) when passing urine and male    Negative: Pain or itching in the vulvar area    Negative: Pain in scrotum is main symptom    Negative: Blood in the urine is main symptom    Negative: Symptoms arising from use of a urinary catheter ([place] or Coude)    Negative: Unable to urinate (or only a few drops) > 4 hours and bladder feels very full (e.g., palpable bladder or strong urge to urinate)    Negative: Decreased urination and drinking very little and dehydration suspected (e.g., dark urine, no urine > 12 hours, very dry mouth, very lightheaded)    Negative: Patient sounds very sick or weak to the triager    Negative: Fever > 100.4 F (38.0 C)    Answer Assessment - Initial Assessment Questions  1. SYMPTOM: \"What's the main symptom you're concerned about?\" (e.g., frequency, incontinence)      Frequency and incontinence  2. ONSET: \"When did the symptoms start?\"      For about a month and a half now  3. PAIN: \"Is there any pain?\" If so, ask: \"How bad is it?\" (Scale: 1-10; mild, moderate, severe)      Lower back pain, has had similar pain in the past with UTIs  4. CAUSE: \"What do you think is causing the symptoms?\"      UTI  5. OTHER SYMPTOMS: \"Do you have any other symptoms?\" (e.g., fever, flank pain, blood in urine, pain with urination)      No. Urine is bright orange, a little cloudy. Patient says she lost her sense of smell, unable to tell if foul-smelling  6. PREGNANCY: \"Is there any chance you are pregnant?\" \"When was your last menstrual period?\"      No    Protocols used: URINARY SYMPTOMS-A-MARGUERITE Ahuja RN  [facility]  "

## 2021-12-31 NOTE — TELEPHONE ENCOUNTER
Reason for Call:  UTI symptoms.     Detailed comments: Patient is having symptoms of frequency, burning and orange urine. Has been seen for this several times.  Can she get Rx or advice?      Phone Number Patient can be reached at:   [phone]    Can we leave a detailed message on this number? Yes    Call taken on 12/31/2021 at 9:14 AM by Kimmie Heath

## 2022-01-03 ENCOUNTER — OFFICE VISIT (OUTPATIENT)
Dept: FAMILY MEDICINE | Facility: CLINIC | Age: 87
End: 2022-01-03
Payer: MEDICARE

## 2022-01-03 VITALS
SYSTOLIC BLOOD PRESSURE: 134 MMHG | TEMPERATURE: 97.6 F | RESPIRATION RATE: 16 BRPM | HEART RATE: 94 BPM | DIASTOLIC BLOOD PRESSURE: 72 MMHG | WEIGHT: 153 LBS | HEIGHT: 63 IN | BODY MASS INDEX: 27.11 KG/M2 | OXYGEN SATURATION: 98 %

## 2022-01-03 DIAGNOSIS — N39.46 MIXED INCONTINENCE: ICD-10-CM

## 2022-01-03 DIAGNOSIS — N30.00 ACUTE CYSTITIS WITHOUT HEMATURIA: Primary | ICD-10-CM

## 2022-01-03 LAB
ALBUMIN UR-MCNC: NEGATIVE MG/DL
APPEARANCE UR: ABNORMAL
BACTERIA #/AREA URNS HPF: ABNORMAL /HPF
BILIRUB UR QL STRIP: NEGATIVE
COLOR UR AUTO: YELLOW
GLUCOSE UR STRIP-MCNC: NEGATIVE MG/DL
HGB UR QL STRIP: ABNORMAL
KETONES UR STRIP-MCNC: NEGATIVE MG/DL
LEUKOCYTE ESTERASE UR QL STRIP: ABNORMAL
NITRATE UR QL: NEGATIVE
PH UR STRIP: 6 [PH] (ref 5–7)
RBC #/AREA URNS AUTO: ABNORMAL /HPF
SP GR UR STRIP: 1.02 (ref 1–1.03)
SQUAMOUS #/AREA URNS AUTO: ABNORMAL /LPF
UROBILINOGEN UR STRIP-ACNC: 0.2 E.U./DL
WBC #/AREA URNS AUTO: ABNORMAL /HPF

## 2022-01-03 PROCEDURE — 99213 OFFICE O/P EST LOW 20 MIN: CPT | Performed by: NURSE PRACTITIONER

## 2022-01-03 PROCEDURE — 81001 URINALYSIS AUTO W/SCOPE: CPT | Performed by: NURSE PRACTITIONER

## 2022-01-03 PROCEDURE — 87186 SC STD MICRODIL/AGAR DIL: CPT | Performed by: NURSE PRACTITIONER

## 2022-01-03 PROCEDURE — 87088 URINE BACTERIA CULTURE: CPT | Performed by: NURSE PRACTITIONER

## 2022-01-03 PROCEDURE — 87086 URINE CULTURE/COLONY COUNT: CPT | Performed by: NURSE PRACTITIONER

## 2022-01-03 RX ORDER — NITROFURANTOIN 25; 75 MG/1; MG/1
100 CAPSULE ORAL 2 TIMES DAILY
Qty: 14 CAPSULE | Refills: 0 | Status: SHIPPED | OUTPATIENT
Start: 2022-01-03 | End: 2022-01-10

## 2022-01-03 ASSESSMENT — MIFFLIN-ST. JEOR: SCORE: 1093.13

## 2022-01-03 ASSESSMENT — PAIN SCALES - GENERAL: PAINLEVEL: MODERATE PAIN (5)

## 2022-01-03 NOTE — PROGRESS NOTES
Assessment & Plan     Acute cystitis without hematuria  Micro does appear c/w UTI, culture pending, nothing to suggest urosepsis, pyelonephritis. Given multiple drug allergies, will use nitrofurantoin, last culture was sensitive to this. Last creatinine on 10/6/21 was 0.96.  - UA macro with reflex to Microscopic and Culture - [facility]inc Collect  - Urine Microscopic  - Urine Culture  - nitroFURantoin macrocrystal-monohydrate (MACROBID) 100 MG capsule; Take 1 capsule (100 mg) by mouth 2 times daily for 7 days    Mixed incontinence  May be related to UTI although ongoing for 2 months. Discussed that if not improving with treatment of UTI can let us know, could consider pelvic floor PT.  6}     Patient Instructions   Your urine test shows evidence of a urinary tract infection.    We will treat you with an antibiotic, nitrofurantoin.  I sent this to your pharmacy. Please take as directed for 7 days. Please take a probiotic or eat a daily Greek yogurt while you are on the antibiotic.    A urine culture is still in process, it usually takes about 2 days and identifies the bacteria causing the infection.  It also tests antibiotics to make sure what we use will be effective for your infection.  We will only call you if the results of this test show a need to change your treatment plan.    If developing high fevers, vomiting, abdominal pain, or any other new, concerning symptoms, come back immediately. If no improvement in symptoms by the end of your antibiotic treatment, follow up with your primary care doctor.    Otherwise, simply follow up as needed.        Urinary Tract Infections in Women  Urinary tract infections (UTIs) are most often caused by bacteria (germs). These bacteria enter the urinary tract. The bacteria may come from outside the body. Or they may travel from the skin outside the rectum or vagina into the urethra. Female anatomy makes it easier for bacteria from the bowel to enter a woman's urinary tract, which  is the most common source of UTI. This means women develop UTIs more often than men. Pain in or around the urinary tract is a common UTI symptom. But the only way to know for sure if you have a UTI for the health care provider to test your urine. The two tests that may be done are the urinalysis and urine culture.  Types of UTIs    Cystitis: A bladder infection (cystitis) is the most common UTI in women. You may have urgent or frequent urination. You may also have pain, burning when you urinate, and bloody urine.    Urethritis: This is an inflamed urethra, which is the tube that carries urine from the bladder to outside the body. You may have lower stomach or back pain. You may also have urgent or frequent urination.    Pyelonephritis: This is a kidney infection. If not treated, it can be serious and damage your kidneys. In severe cases, you may be hospitalized. You may have a fever and lower back pain.  Medications to treat a UTI  Most UTIs are treated with antibiotics. These kill the bacteria. The length of time you need to take them depends on the type of infection. It may be as short as 3 days. If you have repeated UTIs, a low-dose antibiotic may be needed for several months. Take antibiotics exactly as directed. Don't stop taking them until all of the medication is gone. If you stop taking the antibiotic too soon, the infection may not go away, and you may develop a resistance to the antibiotic. This can make it much harder to treat.  Lifestyle changes to treat and prevent UTIs  The lifestyle changes below will help get rid of your UTI. They may also help prevent future UTIs.    Drink plenty of fluids. This includes water, juice, or other caffeine-free drinks. Fluids help flush bacteria out of your body.    Empty your bladder. Always empty your bladder when you feel the urge to urinate. And always urinate before going to sleep. Urine that stays in your bladder can lead to infection. Try to urinate before and  after sex as well.    Practice good personal hygiene. Wipe yourself from front to back after using the toilet. This helps keep bacteria from getting into the urethra.    Use condoms during sex. These help prevent UTIs caused by sexually transmitted bacteria. Also, avoid using spermicides during sex. These can increase the risk of UTIs. Choose other forms of birth control instead. For women who tend to get UTIs after sex, a low-dose of a preventive antibiotic may be used. Be sure to discuss this option with your health care provider.    Follow up with your health care provider as directed. He or she may test to make sure the infection has cleared. If necessary, additional treatment may be started.    1348-3838 [employer]. [address] [place] [address], [place] [address]. All rights reserved. This information is not intended as a substitute for professional medical care. Always follow your healthcare professional's instructions.      Patient Education     Treating Incontinence in Women: Nonsurgical Methods     The best treatment for you will depend on the type of incontinence you have. Your symptoms, age, and any underlying problems that are found also affect your treatment. Some types of incontinence may over time require surgery. But nonsurgical treatments may be effective in many cases. Nonsurgical treatments include lifestyle changes, muscle-strengthening exercises, and medicines.   Nonsurgical treatments  Treatment for stress urinary incontinence includes:     Bladder training    Lifestyle changes such as weight loss and increased activity if incontinence is due to being overweight    Medicines, if bladder training has not helped    Pelvic floor muscle exercises  Lifestyle changes    Losing weight. Excess weight puts extra pressure on the pelvic floor muscles. Exercising and eating right can help you lose weight. This helps other treatments work better.    Making certain diet changes. Some foods may make  you need to urinate more, so it may be good not to have them. These include caffeinated drinks and alcohol. Ask your healthcare provider if these or other diet changes might be helpful.    Quitting smoking. Smoking can lead to a long-term (chronic) cough that strains pelvic floor muscles. Smoking may also damage the bladder and urethra.    Pelvic floor muscle exercises  There are exercises you can do to help strengthen your pelvic floor muscles. The pelvic floor muscles act as a sling to help hold the bladder and urethra in place. These muscles also help keep the urethra closed. Weak pelvic floor muscles may allow urine to leak. To strengthen the pelvic floor muscles, do the exercises daily. In a few months, the muscles will be stronger and tighter. This can help prevent urine leakage.   [employer] last reviewed this educational content on 9/1/2019 2000-2021 [employer]. All rights reserved. This information is not intended as a substitute for professional medical care. Always follow your healthcare professional's instructions.               Return in about 1 week (around 1/10/2022) for worsening or continued symptoms.    [facility]    Shruti Finnegan is a 88 year old who presents for the following health issues     HPI     Genitourinary - Female  Onset/Duration: 1 month   Description:   Painful urination (Dysuria): no           Frequency: YES  Blood in urine (Hematuria): no  Delay in urine (Hesitency): YES- some days   Intensity: moderate  Progression of Symptoms:  worsening  Accompanying Signs & Symptoms:  Fever/chills: no  Flank pain: no  Nausea and vomiting: no  Vaginal symptoms: none  Abdominal/Pelvic Pain: no  History:   History of frequent UTI s: YES  History of kidney stones: no  Sexually Active: no  Possibility of pregnancy: No  Precipitating or alleviating factors: None  Therapies tried and outcome: OTC advil or tylenol, increasing  "fluids     Above HPI reviewed. Additionally, no fevers or chills, no abdominal pain or back pain. No nausea or vomiting. Notes she had had incontinence both with urge and stress for the past 2 months, has to wear a pad all the time, has never had this in the past.       Review of Systems   Constitutional, HEENT, cardiovascular, pulmonary, gi and gu systems are negative, except as otherwise noted.      Objective    /72   Pulse 94   Temp 97.6  F (36.4  C) (Tympanic)   Resp 16   Ht 1.6 m (5' 3\")   Wt 69.4 kg (153 lb)   SpO2 98%   BMI 27.10 kg/m    Body mass index is 27.1 kg/m .  Physical Exam  Vitals and nursing note reviewed.   Constitutional:       Appearance: Normal appearance.   HENT:      Head: Normocephalic and atraumatic.      Mouth/Throat:      Mouth: Mucous membranes are moist.   Eyes:      Comments: Non-icteric   Cardiovascular:      Rate and Rhythm: Normal rate and regular rhythm.      Pulses: Normal pulses.      Heart sounds: Normal heart sounds, S1 normal and S2 normal. Heart sounds not distant. No murmur heard.  No friction rub. No gallop.    Pulmonary:      Effort: Pulmonary effort is normal.      Breath sounds: Normal breath sounds.   Abdominal:      General: Abdomen is flat. Bowel sounds are normal.      Palpations: Abdomen is soft.      Tenderness: There is no right CVA tenderness or left CVA tenderness.   Musculoskeletal:      Cervical back: Neck supple.      Right lower leg: No edema.      Left lower leg: No edema.   Skin:     General: Skin is warm and dry.      Capillary Refill: Capillary refill takes less than 2 seconds.   Neurological:      General: No focal deficit present.      Mental Status: She is alert and oriented to person, place, and time.   Psychiatric:         Mood and Affect: Mood normal.         Behavior: Behavior normal.         Thought Content: Thought content normal.         Judgment: Judgment normal.            Results for orders placed or performed in visit on " 01/03/22 (from the past 24 hour(s))   UA macro with reflex to Microscopic and Culture - Clin Collect    Specimen: Urine, Midstream   Result Value Ref Range    Color Urine Yellow Colorless, Straw, Light Yellow, Yellow    Appearance Urine Cloudy (A) Clear    Glucose Urine Negative Negative mg/dL    Bilirubin Urine Negative Negative    Ketones Urine Negative Negative mg/dL    Specific Gravity Urine 1.025 1.003 - 1.035    Blood Urine Trace (A) Negative    pH Urine 6.0 5.0 - 7.0    Protein Albumin Urine Negative Negative mg/dL    Urobilinogen Urine 0.2 0.2, 1.0 E.U./dL    Nitrite Urine Negative Negative    Leukocyte Esterase Urine Small (A) Negative   Urine Microscopic   Result Value Ref Range    Bacteria Urine None Seen None Seen /HPF    RBC Urine 2-5 (A) 0-2 /HPF /HPF    WBC Urine 25-50 (A) 0-5 /HPF /HPF    Squamous Epithelials Urine Few (A) None Seen /LPF     *Note: Due to a large number of results and/or encounters for the requested time period, some results have not been displayed. A complete set of results can be found in Results Review.

## 2022-01-03 NOTE — PATIENT INSTRUCTIONS
Your urine test shows evidence of a urinary tract infection.    We will treat you with an antibiotic, nitrofurantoin.  I sent this to your pharmacy. Please take as directed for 7 days. Please take a probiotic or eat a daily Greek yogurt while you are on the antibiotic.    A urine culture is still in process, it usually takes about 2 days and identifies the bacteria causing the infection.  It also tests antibiotics to make sure what we use will be effective for your infection.  We will only call you if the results of this test show a need to change your treatment plan.    If developing high fevers, vomiting, abdominal pain, or any other new, concerning symptoms, come back immediately. If no improvement in symptoms by the end of your antibiotic treatment, follow up with your primary care doctor.    Otherwise, simply follow up as needed.        Urinary Tract Infections in Women  Urinary tract infections (UTIs) are most often caused by bacteria (germs). These bacteria enter the urinary tract. The bacteria may come from outside the body. Or they may travel from the skin outside the rectum or vagina into the urethra. Female anatomy makes it easier for bacteria from the bowel to enter a woman's urinary tract, which is the most common source of UTI. This means women develop UTIs more often than men. Pain in or around the urinary tract is a common UTI symptom. But the only way to know for sure if you have a UTI for the health care provider to test your urine. The two tests that may be done are the urinalysis and urine culture.  Types of UTIs    Cystitis: A bladder infection (cystitis) is the most common UTI in women. You may have urgent or frequent urination. You may also have pain, burning when you urinate, and bloody urine.    Urethritis: This is an inflamed urethra, which is the tube that carries urine from the bladder to outside the body. You may have lower stomach or back pain. You may also have urgent or frequent  urination.    Pyelonephritis: This is a kidney infection. If not treated, it can be serious and damage your kidneys. In severe cases, you may be hospitalized. You may have a fever and lower back pain.  Medications to treat a UTI  Most UTIs are treated with antibiotics. These kill the bacteria. The length of time you need to take them depends on the type of infection. It may be as short as 3 days. If you have repeated UTIs, a low-dose antibiotic may be needed for several months. Take antibiotics exactly as directed. Don't stop taking them until all of the medication is gone. If you stop taking the antibiotic too soon, the infection may not go away, and you may develop a resistance to the antibiotic. This can make it much harder to treat.  Lifestyle changes to treat and prevent UTIs  The lifestyle changes below will help get rid of your UTI. They may also help prevent future UTIs.    Drink plenty of fluids. This includes water, juice, or other caffeine-free drinks. Fluids help flush bacteria out of your body.    Empty your bladder. Always empty your bladder when you feel the urge to urinate. And always urinate before going to sleep. Urine that stays in your bladder can lead to infection. Try to urinate before and after sex as well.    Practice good personal hygiene. Wipe yourself from front to back after using the toilet. This helps keep bacteria from getting into the urethra.    Use condoms during sex. These help prevent UTIs caused by sexually transmitted bacteria. Also, avoid using spermicides during sex. These can increase the risk of UTIs. Choose other forms of birth control instead. For women who tend to get UTIs after sex, a low-dose of a preventive antibiotic may be used. Be sure to discuss this option with your health care provider.    Follow up with your health care provider as directed. He or she may test to make sure the infection has cleared. If necessary, additional treatment may be started.    [id number]  [employer]. [address] [place] [address], [place] [address]. All rights reserved. This information is not intended as a substitute for professional medical care. Always follow your healthcare professional's instructions.      Patient Education     Treating Incontinence in Women: Nonsurgical Methods     The best treatment for you will depend on the type of incontinence you have. Your symptoms, age, and any underlying problems that are found also affect your treatment. Some types of incontinence may over time require surgery. But nonsurgical treatments may be effective in many cases. Nonsurgical treatments include lifestyle changes, muscle-strengthening exercises, and medicines.   Nonsurgical treatments  Treatment for stress urinary incontinence includes:     Bladder training    Lifestyle changes such as weight loss and increased activity if incontinence is due to being overweight    Medicines, if bladder training has not helped    Pelvic floor muscle exercises  Lifestyle changes    Losing weight. Excess weight puts extra pressure on the pelvic floor muscles. Exercising and eating right can help you lose weight. This helps other treatments work better.    Making certain diet changes. Some foods may make you need to urinate more, so it may be good not to have them. These include caffeinated drinks and alcohol. Ask your healthcare provider if these or other diet changes might be helpful.    Quitting smoking. Smoking can lead to a long-term (chronic) cough that strains pelvic floor muscles. Smoking may also damage the bladder and urethra.    Pelvic floor muscle exercises  There are exercises you can do to help strengthen your pelvic floor muscles. The pelvic floor muscles act as a sling to help hold the bladder and urethra in place. These muscles also help keep the urethra closed. Weak pelvic floor muscles may allow urine to leak. To strengthen the pelvic floor muscles, do the exercises daily. In a few months,  the muscles will be stronger and tighter. This can help prevent urine leakage.   [employer] last reviewed this educational content on 9/1/2019 2000-2021 [employer]. All rights reserved. This information is not intended as a substitute for professional medical care. Always follow your healthcare professional's instructions.

## 2022-01-05 LAB — BACTERIA UR CULT: ABNORMAL

## 2022-02-07 DIAGNOSIS — I25.119 CORONARY ARTERY DISEASE INVOLVING NATIVE CORONARY ARTERY OF NATIVE HEART WITH ANGINA PECTORIS (H): Primary | ICD-10-CM

## 2022-02-16 ENCOUNTER — OFFICE VISIT (OUTPATIENT)
Dept: ORTHOPEDICS | Facility: CLINIC | Age: 87
End: 2022-02-16
Payer: MEDICARE

## 2022-02-16 ENCOUNTER — ANCILLARY PROCEDURE (OUTPATIENT)
Dept: GENERAL RADIOLOGY | Facility: CLINIC | Age: 87
End: 2022-02-16
Attending: PEDIATRICS
Payer: MEDICARE

## 2022-02-16 VITALS
WEIGHT: 153 LBS | SYSTOLIC BLOOD PRESSURE: 154 MMHG | DIASTOLIC BLOOD PRESSURE: 69 MMHG | BODY MASS INDEX: 27.11 KG/M2 | HEIGHT: 63 IN

## 2022-02-16 DIAGNOSIS — M17.12 PRIMARY OSTEOARTHRITIS OF LEFT KNEE: Primary | ICD-10-CM

## 2022-02-16 DIAGNOSIS — M17.12 PRIMARY OSTEOARTHRITIS OF LEFT KNEE: ICD-10-CM

## 2022-02-16 PROCEDURE — 73562 X-RAY EXAM OF KNEE 3: CPT | Performed by: RADIOLOGY

## 2022-02-16 PROCEDURE — 99204 OFFICE O/P NEW MOD 45 MIN: CPT | Performed by: PEDIATRICS

## 2022-02-16 NOTE — LETTER
2/16/2022         RE: Daniela Brown  [id number] [facility] [id number]        Dear Colleague,    Thank you for referring your patient, Daniela Brown, to the [facility] [place]. Please see a copy of my visit note below.    ASSESSMENT & PLAN    Daniela was seen today for pain.    Diagnoses and all orders for this visit:    Primary osteoarthritis of left knee  -     XR Knee Standing AP Bilat [place] Bilat Lat Left; Future  -     (PRE-AUTH REQUEST) 48 mg hylan (SYNVISC ONE) injection 48 mg/6mL-ONCE  -     Orthopedic  Referral; Future      This issue is chronic and Unchanged.    Discussed nature of degenerative arthrosis of the knee. Discussed symptom treatment with over-the-counter medications, ice or heat, topical treatments, and rest if needed. Discussed use of sleeve or wrap for comfort. Discussed benefits of exercise and physical therapy. Discussed injection therapy. Also briefly discussed future consideration of referral to orthopedic surgery for further evaluation and discussion of arthroplasty.  - Given lack of improvement with past physical therapy, OTC medications, corticosteroid injections, patient would like to trial HA injections. Would not want knee replacement surgery.    Plan:  - Today's Plan of Care:  Referral for Hyaluronic Acid Injection under US guidance  Discussed activity considerations and other supportive care including Ice/Heat, OTC and other topical medications as needed. Voltaren Gel    -We also discussed other future treatment options:  Referral back to orthopedic surgery at [facility]  Referral to physical therapy    Follow Up: as needed    Concerning signs and symptoms were reviewed.  The patient expressed understanding of this management plan and all questions were answered at this time.    Bharti Stacy MD [facility]  Sports Medicine Physician  [facility]      -----  Chief Complaint   Patient presents with      "Left Knee - Pain       SUBJECTIVE  Daniela Brown is a/an 89 year old female who is seen as a self referral for evaluation of left knee.   Note; needs explanation for OTC pain control     The patient is seen by themselves.    Onset: many years(s) ago. Reports insidious onset without acute precipitating event.  Location of Pain: left knee pain; anterior knee pain  Worsened by: prolonged walking, sleeping,   Better with: heating, biofreeze, injections  Treatments tried: rest/activity avoidance, elevation, heat, Tylenol, previous imaging (xray 11/13/21) and corticosteroid injection (most recent date: November 2021) that provided  2 month(s) of relief, knee sleeve  Associated symptoms: warmth, redness, weakness of left knee (Better with cane) and feeling of instability    Orthopedic/Surgical history: YES - Date: 2020 right knee TKA  - Reviewed [facility] Notes 1/27/2020 and 9/27/2021 (left knee corticosteroid injections)  - Last injection only lasted ~ 1 month    Social History/Occupation: retired    No family history pertinent to patient's problem today.    REVIEW OF SYSTEMS:  Review of Systems  Skin: no bruising, no swelling  Musculoskeletal: as above  Neurologic: no numbness, paresthesias  Remainder of review of systems is negative including constitutional, CV, pulmonary, GI, except as noted in HPI or medical history.    OBJECTIVE:  BP (!) 154/69   Ht 1.6 m (5' 3\")   Wt 69.4 kg (153 lb)   BMI 27.10 kg/m     General: healthy, alert and in no distress  HEENT: no scleral icterus or conjunctival erythema  Skin: no suspicious lesions or rash. No jaundice.  CV: distal perfusion intact  Resp: normal respiratory effort without conversational dyspnea   Psych: normal mood and affect  Gait: antalgic  Neuro: Normal light sensory exam of lower extremity    Bilateral Knee exam  Inspection:      mild swelling left  - well healed scar right    Patella:      Crepitus noted in the patellofemoral joint bilateral    Tender:      " medial joint line left       lateral joint line left    Non Tender:      remainder of knee area left    Knee ROM:      Range of motion limited in full flexion and full extension left    Strength:      5-/5 with knee extension left    Special Tests:     neg (-) anterior drawer left       neg (-) posterior drawer left       neg (-) varus at 0 deg and 30 deg left       neg (-) valgus at 0 deg and 30 deg left    Gait:     Antalgic with cane    Neurovascular:      2+ peripheral pulses bilaterally and brisk capillary refill       sensation grossly intact    RADIOLOGY:  I independently ordered, visualized and reviewed these images with the patient  AP and sunrise bilateral and left lateral XR views of knees reviewed: right knee arthoplasty, no acute bony abnormality, significant left knee degenerative change  - will follow official read      Review of prior external note(s) from - [facility]  Review of the result(s) of each unique test - XRs             Again, thank you for allowing me to participate in the care of your patient.        Sincerely,        Bharti Stacy MD

## 2022-02-16 NOTE — PROGRESS NOTES
ASSESSMENT & PLAN    Daniela was seen today for pain.    Diagnoses and all orders for this visit:    Primary osteoarthritis of left knee  -     XR Knee Standing AP Bilat [place] Bilat Lat Left; Future  -     (PRE-AUTH REQUEST) 48 mg hylan (SYNVISC ONE) injection 48 mg/6mL-ONCE  -     Orthopedic  Referral; Future      This issue is chronic and Unchanged.    Discussed nature of degenerative arthrosis of the knee. Discussed symptom treatment with over-the-counter medications, ice or heat, topical treatments, and rest if needed. Discussed use of sleeve or wrap for comfort. Discussed benefits of exercise and physical therapy. Discussed injection therapy. Also briefly discussed future consideration of referral to orthopedic surgery for further evaluation and discussion of arthroplasty.  - Given lack of improvement with past physical therapy, OTC medications, corticosteroid injections, patient would like to trial HA injections. Would not want knee replacement surgery.    Plan:  - Today's Plan of Care:  Referral for Hyaluronic Acid Injection under US guidance  Discussed activity considerations and other supportive care including Ice/Heat, OTC and other topical medications as needed. Voltaren Gel    -We also discussed other future treatment options:  Referral back to orthopedic surgery at [facility]  Referral to physical therapy    Follow Up: as needed    Concerning signs and symptoms were reviewed.  The patient expressed understanding of this management plan and all questions were answered at this time.    Bharti Stacy MD [facility]  Sports Medicine Physician  [facility]      -----  Chief Complaint   Patient presents with     Left Knee - Pain       SUBJECTIVE  Daniela Brown is a/an 89 year old female who is seen as a self referral for evaluation of left knee.   Note; needs explanation for OTC pain control     The patient is seen by themselves.    Onset: many years(s) ago. Reports insidious onset  "without acute precipitating event.  Location of Pain: left knee pain; anterior knee pain  Worsened by: prolonged walking, sleeping,   Better with: heating, biofreeze, injections  Treatments tried: rest/activity avoidance, elevation, heat, Tylenol, previous imaging (xray 11/13/21) and corticosteroid injection (most recent date: November 2021) that provided  2 month(s) of relief, knee sleeve  Associated symptoms: warmth, redness, weakness of left knee (Better with cane) and feeling of instability    Orthopedic/Surgical history: YES - Date: 2020 right knee TKA  - Reviewed [facility] Notes 1/27/2020 and 9/27/2021 (left knee corticosteroid injections)  - Last injection only lasted ~ 1 month    Social History/Occupation: retired    No family history pertinent to patient's problem today.    REVIEW OF SYSTEMS:  Review of Systems  Skin: no bruising, no swelling  Musculoskeletal: as above  Neurologic: no numbness, paresthesias  Remainder of review of systems is negative including constitutional, CV, pulmonary, GI, except as noted in HPI or medical history.    OBJECTIVE:  BP (!) 154/69   Ht 1.6 m (5' 3\")   Wt 69.4 kg (153 lb)   BMI 27.10 kg/m     General: healthy, alert and in no distress  HEENT: no scleral icterus or conjunctival erythema  Skin: no suspicious lesions or rash. No jaundice.  CV: distal perfusion intact  Resp: normal respiratory effort without conversational dyspnea   Psych: normal mood and affect  Gait: antalgic  Neuro: Normal light sensory exam of lower extremity    Bilateral Knee exam  Inspection:      mild swelling left  - well healed scar right    Patella:      Crepitus noted in the patellofemoral joint bilateral    Tender:      medial joint line left       lateral joint line left    Non Tender:      remainder of knee area left    Knee ROM:      Range of motion limited in full flexion and full extension left    Strength:      5-/5 with knee extension left    Special Tests:     neg (-) anterior drawer left       " neg (-) posterior drawer left       neg (-) varus at 0 deg and 30 deg left       neg (-) valgus at 0 deg and 30 deg left    Gait:     Antalgic with cane    Neurovascular:      2+ peripheral pulses bilaterally and brisk capillary refill       sensation grossly intact    RADIOLOGY:  I independently ordered, visualized and reviewed these images with the patient  AP and sunrise bilateral and left lateral XR views of knees reviewed: right knee arthoplasty, no acute bony abnormality, significant left knee degenerative change  - will follow official read      Review of prior external note(s) from - [facility]  Review of the result(s) of each unique test - XRs

## 2022-02-16 NOTE — PATIENT INSTRUCTIONS
Discussed nature of degenerative arthrosis of the knee. Discussed symptom treatment with over-the-counter medications, ice or heat, topical treatments, and rest if needed. Discussed use of sleeve or wrap for comfort. Discussed benefits of exercise and physical therapy. Discussed injection therapy. Also briefly discussed future consideration of referral to orthopedic surgery for further evaluation and discussion of arthroplasty.  - Given lack of improvement with past physical therapy, OTC medications, corticosteroid injections, patient would like to trial HA injections. Would not want knee replacement surgery.    Plan:  - Today's Plan of Care:  Referral for Hyaluronic Acid Injection under US guidance  Discussed activity considerations and other supportive care including Ice/Heat, OTC and other topical medications as needed. Voltaren Gel    -We also discussed other future treatment options:  Referral back to orthopedic surgery at [facility]  Referral to physical therapy    Follow Up: as needed    If you have any further questions for your physician or physician s care team you can call [phone] and use option 3 to leave a voice message. Calls received during business hours will be returned same day.

## 2022-03-10 ENCOUNTER — OFFICE VISIT (OUTPATIENT)
Dept: ORTHOPEDICS | Facility: CLINIC | Age: 87
End: 2022-03-10
Payer: MEDICARE

## 2022-03-10 VITALS — WEIGHT: 153 LBS | BODY MASS INDEX: 27.11 KG/M2 | HEIGHT: 63 IN

## 2022-03-10 DIAGNOSIS — M17.12 PRIMARY OSTEOARTHRITIS OF LEFT KNEE: Primary | ICD-10-CM

## 2022-03-10 PROCEDURE — 20611 DRAIN/INJ JOINT/BURSA W/US: CPT | Mod: LT | Performed by: FAMILY MEDICINE

## 2022-03-10 NOTE — PROGRESS NOTES
Daniela Brown  :  1933  DOS: 3/10/2022  MRN: [id number]    Sports Medicine Clinic Procedure    Ultrasound Guided Left Intra-Articular Knee SynviscOne Injection, +/- Aspiration    Clinical History: Patient presents with chronic left knee pain over the past several years.  She has completed multiple steroid injections with decreasing relief.  Patient does reports falling and landing on left knee on 22 with mild increased pain and swelling after consulting with Dr Stacy.    Diagnosis:   1. Primary osteoarthritis of left knee      Referring Physician: Bharti Stacy MD  Large Joint Injection/Arthocentesis: L knee joint    Date/Time: 3/10/2022 2:00 PM  Performed by: Tho Newman DO  Authorized by: Tho Newman DO     Indications:  Osteoarthritis  Needle Size:  21 G  Guidance: ultrasound    Approach:  Superolateral  Location:  Knee      Medications:  48 mg hylan 48 MG/6ML  Aspirate amount (mL):  5  Aspirate:  Serous and yellow  Outcome:  Tolerated well, no immediate complications  Procedure discussed: discussed risks, benefits, and alternatives    Consent Given by:  Patient  Timeout: timeout called immediately prior to procedure    Prep: patient was prepped and draped in usual sterile fashion     Ultrasound images of procedure were permanently stored.        Impression:  Successful Left intra-articular knee SynviscOne injection and aspiration.    Plan:  Follow up as directed by Dr Stacy  Expectations and limitations of synvisc were reviewed in detail  Often 4-6 weeks before full effect may be noticed  Usually covered up to every 6 months by insurance, but does not need to be repeated unless pain returns, at which point we would re-evaluate  Potential use of CSI in future for flares of pain reviewed in detail  Encouraged modified progressive pain-free activity as tolerated  HEP and Supportive care reviewed  All questions were answered today  Contact us with additional  questions or concerns  Signs and sx of concern reviewed    Tho Newman DO, OZZIE  Primary Care Sports Medicine  [facility]

## 2022-03-10 NOTE — LETTER
3/10/2022         RE: Daniela Brown  [id number] [address]        Dear Colleague,    Thank you for referring your patient, Daniela Brown, to the [facility] [place]. Please see a copy of my visit note below.    Daniela Brown  :  1933  DOS: 3/10/2022  MRN: [id number]    Sports Medicine Clinic Procedure    Ultrasound Guided Left Intra-Articular Knee SynviscOne Injection, +/- Aspiration    Clinical History: Patient presents with chronic left knee pain over the past several years.  She has completed multiple steroid injections with decreasing relief.  Patient does reports falling and landing on left knee on 22 with mild increased pain and swelling after consulting with Dr Stacy.    Diagnosis:   1. Primary osteoarthritis of left knee      Referring Physician: Bharti Stacy MD  Large Joint Injection/Arthocentesis: L knee joint    Date/Time: 3/10/2022 2:00 PM  Performed by: Tho Newman DO  Authorized by: Tho Newman DO     Indications:  Osteoarthritis  Needle Size:  21 G  Guidance: ultrasound    Approach:  Superolateral  Location:  Knee      Medications:  48 mg hylan 48 MG/6ML  Aspirate amount (mL):  5  Aspirate:  Serous and yellow  Outcome:  Tolerated well, no immediate complications  Procedure discussed: discussed risks, benefits, and alternatives    Consent Given by:  Patient  Timeout: timeout called immediately prior to procedure    Prep: patient was prepped and draped in usual sterile fashion     Ultrasound images of procedure were permanently stored.        Impression:  Successful Left intra-articular knee SynviscOne injection and aspiration.    Plan:  Follow up as directed by Dr Stacy  Expectations and limitations of synvisc were reviewed in detail  Often 4-6 weeks before full effect may be noticed  Usually covered up to every 6 months by insurance, but does not need to be repeated  unless pain returns, at which point we would re-evaluate  Potential use of CSI in future for flares of pain reviewed in detail  Encouraged modified progressive pain-free activity as tolerated  HEP and Supportive care reviewed  All questions were answered today  Contact us with additional questions or concerns  Signs and sx of concern reviewed    Tho Newman DO, CAQ  Primary Care Sports Medicine  [facility]           Again, thank you for allowing me to participate in the care of your patient.        Sincerely,        Tho Newman DO

## 2022-03-16 ENCOUNTER — OFFICE VISIT (OUTPATIENT)
Dept: FAMILY MEDICINE | Facility: CLINIC | Age: 87
End: 2022-03-16
Payer: MEDICARE

## 2022-03-16 ENCOUNTER — TELEPHONE (OUTPATIENT)
Dept: FAMILY MEDICINE | Facility: CLINIC | Age: 87
End: 2022-03-16
Payer: MEDICARE

## 2022-03-16 VITALS
HEART RATE: 95 BPM | SYSTOLIC BLOOD PRESSURE: 148 MMHG | BODY MASS INDEX: 27 KG/M2 | DIASTOLIC BLOOD PRESSURE: 68 MMHG | HEIGHT: 64 IN | TEMPERATURE: 99 F | WEIGHT: 158.13 LBS | OXYGEN SATURATION: 98 % | RESPIRATION RATE: 16 BRPM

## 2022-03-16 DIAGNOSIS — F32.1 MODERATE MAJOR DEPRESSION (H): ICD-10-CM

## 2022-03-16 DIAGNOSIS — H61.23 BILATERAL IMPACTED CERUMEN: ICD-10-CM

## 2022-03-16 DIAGNOSIS — N18.31 STAGE 3A CHRONIC KIDNEY DISEASE (H): ICD-10-CM

## 2022-03-16 DIAGNOSIS — R39.9 SYMPTOMS INVOLVING URINARY SYSTEM: Primary | ICD-10-CM

## 2022-03-16 DIAGNOSIS — I25.119 CORONARY ARTERY DISEASE INVOLVING NATIVE CORONARY ARTERY OF NATIVE HEART WITH ANGINA PECTORIS (H): ICD-10-CM

## 2022-03-16 DIAGNOSIS — E11.42 TYPE 2 DIABETES MELLITUS WITH DIABETIC POLYNEUROPATHY, WITHOUT LONG-TERM CURRENT USE OF INSULIN (H): ICD-10-CM

## 2022-03-16 LAB
ALBUMIN UR-MCNC: NEGATIVE MG/DL
APPEARANCE UR: CLEAR
BILIRUB UR QL STRIP: NEGATIVE
COLOR UR AUTO: YELLOW
GLUCOSE UR STRIP-MCNC: NEGATIVE MG/DL
HGB UR QL STRIP: NEGATIVE
KETONES UR STRIP-MCNC: NEGATIVE MG/DL
LEUKOCYTE ESTERASE UR QL STRIP: ABNORMAL
NITRATE UR QL: NEGATIVE
PH UR STRIP: 5.5 [PH] (ref 5–7)
RBC #/AREA URNS AUTO: ABNORMAL /HPF
SP GR UR STRIP: 1.02 (ref 1–1.03)
SQUAMOUS #/AREA URNS AUTO: ABNORMAL /LPF
URATE CRY #/AREA URNS HPF: ABNORMAL /HPF
UROBILINOGEN UR STRIP-ACNC: 0.2 E.U./DL
WBC #/AREA URNS AUTO: ABNORMAL /HPF

## 2022-03-16 PROCEDURE — 87086 URINE CULTURE/COLONY COUNT: CPT | Performed by: FAMILY MEDICINE

## 2022-03-16 PROCEDURE — 81001 URINALYSIS AUTO W/SCOPE: CPT | Performed by: FAMILY MEDICINE

## 2022-03-16 PROCEDURE — 99214 OFFICE O/P EST MOD 30 MIN: CPT | Performed by: FAMILY MEDICINE

## 2022-03-16 RX ORDER — PREDNISOLONE ACETATE 10 MG/ML
SUSPENSION/ DROPS OPHTHALMIC
COMMUNITY
Start: 2021-06-21 | End: 2022-08-07

## 2022-03-16 ASSESSMENT — ENCOUNTER SYMPTOMS
ENDOCRINE NEGATIVE: 1
HEMATOLOGIC/LYMPHATIC NEGATIVE: 1
MUSCULOSKELETAL NEGATIVE: 1
ALLERGIC/IMMUNOLOGIC NEGATIVE: 1
EYES NEGATIVE: 1
NEUROLOGICAL NEGATIVE: 1
GASTROINTESTINAL NEGATIVE: 1
CARDIOVASCULAR NEGATIVE: 1
RESPIRATORY NEGATIVE: 1
FREQUENCY: 1
DYSURIA: 1
PSYCHIATRIC NEGATIVE: 1
CONSTITUTIONAL NEGATIVE: 1
DIFFICULTY URINATING: 1

## 2022-03-16 ASSESSMENT — ANXIETY QUESTIONNAIRES
5. BEING SO RESTLESS THAT IT IS HARD TO SIT STILL: SEVERAL DAYS
3. WORRYING TOO MUCH ABOUT DIFFERENT THINGS: NOT AT ALL
1. FEELING NERVOUS, ANXIOUS, OR ON EDGE: SEVERAL DAYS
7. FEELING AFRAID AS IF SOMETHING AWFUL MIGHT HAPPEN: NOT AT ALL
6. BECOMING EASILY ANNOYED OR IRRITABLE: NOT AT ALL
IF YOU CHECKED OFF ANY PROBLEMS ON THIS QUESTIONNAIRE, HOW DIFFICULT HAVE THESE PROBLEMS MADE IT FOR YOU TO DO YOUR WORK, TAKE CARE OF THINGS AT HOME, OR GET ALONG WITH OTHER PEOPLE: NOT DIFFICULT AT ALL
2. NOT BEING ABLE TO STOP OR CONTROL WORRYING: NOT AT ALL
GAD7 TOTAL SCORE: 2

## 2022-03-16 ASSESSMENT — PATIENT HEALTH QUESTIONNAIRE - PHQ9
5. POOR APPETITE OR OVEREATING: NOT AT ALL
SUM OF ALL RESPONSES TO PHQ QUESTIONS 1-9: 8

## 2022-03-16 ASSESSMENT — PAIN SCALES - GENERAL: PAINLEVEL: SEVERE PAIN (7)

## 2022-03-16 NOTE — TELEPHONE ENCOUNTER
Pt calling and would like appt for ear wax removal.  She's having trouble hearing.  She suspects she might have a UTI  NO fever.  In office appt set up for today.

## 2022-03-16 NOTE — PATIENT INSTRUCTIONS
Patient Education     Impacted Earwax     Inner ear structures including ear canal and eardrum.   Impacted earwax is a buildup of the natural wax in the ear. Impacted earwax is very common. It can cause symptoms such as hearing loss. It can also make it hard for a healthcare provider to check your ear.   Understanding earwax  Tiny glands in your ear make substances that combine with dead skin cells to form earwax. Earwax helps protect your ear canal from water, dirt, infection, and injury. Over time, earwax travels from the inner part of your ear canal to the entrance of the canal. Then it falls away naturally. But in some cases, it can t travel to the entrance of the canal. This may be because of a health condition or objects put in the ear. With age, earwax tends to become harder and less fluid. Older adults are more likely to have problems with earwax buildup.   What causes impacted earwax?  Earwax can build up because of many health conditions. Some cause a physical blockage. Others cause too much earwax to be made. Health conditions that can cause earwax buildup include:     Bony blockage in the ear (osteoma or exostoses)    Infections, such as an outer ear infection (external otitis)    Skin disease, such as eczema    Autoimmune diseases, such as lupus    A narrowed ear canal from birth, chronic inflammation, or injury    Too much earwax because of injury    Too much earwax because of  water in the ear canal  Putting objects in the ear again and again can also cause impacted earwax. For example, putting cotton swabs in the ear may push the wax deeper into the ear. Over time, this may cause blockage. Hearing aids, swimming plugs, and swim molds can also cause this problem when used again and again.   In some cases, the cause of impacted earwax is not known.  Symptoms of impacted earwax  Excess earwax often does not cause any symptoms, unless there is a large amount of buildup. Then it may cause symptoms such  as:     Hearing loss    Earache    Sense of ear fullness    Itching in the ear    Odor from the ear    Ear drainage    Dizziness    Ringing in the ears    Cough  Treatment for impacted earwax  If you don t have symptoms, you may not need treatment. Often the earwax goes away on its own with time. If you have symptoms, you may have 1 or more treatments such as:     Ear drops to soften the earwax. This helps it leave the ear over time.    Rinsing the ear canal with water. This is done in a healthcare provider s office.    Removing the earwax with small tools. This is also done in a provider s office.  In rare cases, some treatments for earwax removal may cause complications such as:    Outer ear infection    Earache    Short-term hearing loss    Dizziness    Water trapped in the ear canal    Hole in the eardrum    Ringing in the ears    Bleeding from the ear  Talk with your healthcare provider about which risks apply most to you.  Healthcare providers don't advise using ear candles or ear vacuum kits. These methods are not shown to work and may cause problems.   Preventing impacted earwax  You may not be able to prevent impacted earwax if you have a health condition that causes it, such as eczema. In other cases, you may be able to prevent earwax buildup by:     Using ear drops once a week    Having a regular ear cleaning about every 6 months    Not using cotton swabs in the ear  When to call the healthcare provider  Call your healthcare provider right away if you have:     Symptoms of impacted earwax    Severe symptoms after earwax removal, such as bleeding or severe ear pain    C[employer]on last reviewed this educational content on 9/1/2019 2000-2021 [employer]. All rights reserved. This information is not intended as a substitute for professional medical care. Always follow your healthcare professional's instructions.

## 2022-03-16 NOTE — PROGRESS NOTES
Assessment & Plan     Symptoms involving urinary system  UA suggestive of infection. Will culture before proceeding to treat. Patient will be notified of culture reports.   - UA macro with reflex to Microscopic and Culture - Clinc Collect  - Urine Microscopic  - Urine Culture Aerobic Bacterial - lab collect    Bilateral impacted cerumen  Ear irrigation was done. Partially successful, recommend using debrox over the counter and also coming in for an RN visit for wax removal.    Type 2 diabetes mellitus with diabetic polyneuropathy, without long-term current use of insulin (H)  Well controlled. Last A1C was 5.6.    Coronary artery disease involving native coronary artery of native heart with angina pectoris (H)  Stable. No new events.    Stage 3a chronic kidney disease (H)  Stable.    Moderate major depression (H)  Stable.         FUTURE APPOINTMENTS:       - Follow-up visit in one month or sooner as needed.    Return in about 4 weeks (around 4/13/2022) for Follow up.    Vania Roper MD  [facility]    Shruti Finnegan is a 89 year old who presents for the following health issues     HPI     Patient is a 89-year-old female who presents to the clinic today for urinary symptoms she says this has been ongoing for about a month.  She reports that she gets UTIs but it is often had about 8 episodes last year.  Has never seen a specialist for this.  Reports pain while voiding and frequency.  No blood in her urine.  She reports no fever or chills. her last UTI was January of this year.    Patient also wants her ears checked she says she was seen at audiology clinic a week ago and was told that she had some wax.    Genitourinary - Female  Onset/Duration: one month, approximately  Description:   Painful urination (Dysuria): YES           Frequency: YES  Blood in urine (Hematuria): no  Delay in urine (Hesitency): YES  Intensity: moderate  Progression of Symptoms:  worsening  Accompanying  "Signs & Symptoms:  Fever/chills: slight temperature today - 99  Flank pain: no  Nausea and vomiting: no  Vaginal symptoms: none  Abdominal/Pelvic Pain: no  History:   History of frequent UTI s: YES, at least eight UTI's last year  History of kidney stones: no  Sexually Active: no  Possibility of pregnancy: No  Precipitating or alleviating factors: None  Therapies tried and outcome: Increase fluid intake; drinks about a liter of water daily (flavored)     Chief Complaint   Patient presents with     UTI     Symptoms for about a month; frequency at night (5-7x)     Cerumen Impaction     Saw ENT about three weeks ago and told patient her ears have wax build up and needs cleaning.     Eye Problem     Patient is wondering if provider would prescribe an allergy eye drop for her itchy eyes. Have been this way for about two months.       Review of Systems   Constitutional: Negative.    HENT: Positive for ear pain.    Eyes: Negative.    Respiratory: Negative.    Cardiovascular: Negative.    Gastrointestinal: Negative.    Endocrine: Negative.    Breasts:  negative.    Genitourinary: Positive for difficulty urinating, dysuria, frequency and urgency.   Musculoskeletal: Negative.    Skin: Negative.    Allergic/Immunologic: Negative.    Neurological: Negative.    Hematological: Negative.    Psychiatric/Behavioral: Negative.             Objective    BP (!) 148/68   Pulse 95   Temp 99  F (37.2  C) (Tympanic)   Resp 16   Ht 1.626 m (5' 4\")   Wt 71.7 kg (158 lb 2 oz)   SpO2 98%   Breastfeeding No   BMI 27.14 kg/m    Body mass index is 27.14 kg/m .  Physical Exam   GENERAL: healthy, alert and no distress  HENT: normal cephalic/atraumatic, both ears: occluded with wax, nose and mouth without ulcers or lesions, oropharynx clear and oral mucous membranes moist  NECK: no adenopathy, no asymmetry, masses, or scars and thyroid normal to palpation  RESP: lungs clear to auscultation - no rales, rhonchi or wheezes  CV: regular rate and " rhythm, normal S1 S2, no S3 or S4, no murmur, click or rub, no peripheral edema and peripheral pulses strong  ABDOMEN: soft, nontender, no hepatosplenomegaly, no masses and bowel sounds normal  MS: no gross musculoskeletal defects noted, no edema    Results for orders placed or performed in visit on 03/16/22 (from the past 24 hour(s))   UA macro with reflex to Microscopic and Culture - Clinc Collect    Specimen: Renal Pelvis, Right; Urine   Result Value Ref Range    Color Urine Yellow Colorless, Straw, Light Yellow, Yellow    Appearance Urine Clear Clear    Glucose Urine Negative Negative mg/dL    Bilirubin Urine Negative Negative    Ketones Urine Negative Negative mg/dL    Specific Gravity Urine 1.025 1.003 - 1.035    Blood Urine Negative Negative    pH Urine 5.5 5.0 - 7.0    Protein Albumin Urine Negative Negative mg/dL    Urobilinogen Urine 0.2 0.2, 1.0 E.U./dL    Nitrite Urine Negative Negative    Leukocyte Esterase Urine Trace (A) Negative   Urine Microscopic   Result Value Ref Range    RBC Urine None Seen 0-2 /HPF /HPF    WBC Urine 0-5 0-5 /HPF /HPF    Squamous Epithelials Urine Few (A) None Seen /LPF    Uric Acid Crystals Urine Few (A) None Seen /HPF    Narrative    Urine Culture not indicated     *Note: Due to a large number of results and/or encounters for the requested time period, some results have not been displayed. A complete set of results can be found in Results Review.

## 2022-03-17 ASSESSMENT — ANXIETY QUESTIONNAIRES: GAD7 TOTAL SCORE: 2

## 2022-03-18 LAB — BACTERIA UR CULT: NORMAL

## 2022-03-19 NOTE — RESULT ENCOUNTER NOTE
Please inform patient that test result was within normal parameters. Urine culture was negative.  Thank you.     Vania Roper M.D.

## 2022-03-22 ENCOUNTER — ALLIED HEALTH/NURSE VISIT (OUTPATIENT)
Dept: FAMILY MEDICINE | Facility: CLINIC | Age: 87
End: 2022-03-22
Payer: COMMERCIAL

## 2022-03-22 ENCOUNTER — TELEPHONE (OUTPATIENT)
Dept: FAMILY MEDICINE | Facility: CLINIC | Age: 87
End: 2022-03-22

## 2022-03-22 VITALS — HEART RATE: 80 BPM | SYSTOLIC BLOOD PRESSURE: 130 MMHG | DIASTOLIC BLOOD PRESSURE: 62 MMHG

## 2022-03-22 DIAGNOSIS — H61.23 BILATERAL IMPACTED CERUMEN: Primary | ICD-10-CM

## 2022-03-22 DIAGNOSIS — I10 BENIGN ESSENTIAL HYPERTENSION: Chronic | ICD-10-CM

## 2022-03-22 PROCEDURE — 99207 PR NO CHARGE NURSE ONLY: CPT

## 2022-03-22 NOTE — TELEPHONE ENCOUNTER
"Daniela Brown is a 89 year old year old patient who comes in today for a Blood Pressure check because of ongoing blood pressure monitoring. Patient says that her BP was high at her last appointment, and she just wanted to follow up and see how it is doing now. Currently taking amlodipine 5mg daily. Patient says that she only has 8 pills left, and will need a refill. Preferred pharmacy- [facility].    Vital Signs as repeated by /58, P- 84. Rechecked 5 minutes later: 130/62, P- 80.  Patient is taking medication as prescribed  Patient is tolerating medications well.  Patient is not monitoring Blood Pressure at home. Says that she did check it this morning, and was 145/61.  Current complaints: none    Disposition: Patient is also scheduled for ear irrigation today. Was seen in clinic on 3/16/22 for ear irrigation and office visit, see encounter notes. Returns today for RN irrigation after using debrox ear drops as recommended at office visit. Patient says that she's noticed her hearing has improved in the left ear since irrigation last week. Writer visualized cerumen in both ears, with reddened sore area noted in right ear canal. Patient denies pain to the area, but does report that it's been \"itchy\" in her right ear and says that she thought it was due to the ear drops. Patient does report history of using a toothpick in ears to relieve itching. Writer huddled with Dr. Murry and reported findings; Dr. Murry advised that ears not be irrigated today, and patient be seen in clinic for provider assessment of ears and sore in right ear- is ok to use same day appointment. Appointment scheduled for tomorrow with Iris ADDISON, routed to PCP for review of today's appointment notes.    Leticia Ahuja RN  [facility]  "

## 2022-03-22 NOTE — NURSING NOTE
"Daniela Brown is a 89 year old year old patient who comes in today for a Blood Pressure check because of ongoing blood pressure monitoring. Patient says that her BP was high at her last appointment, and she just wanted to follow up and see how it is doing now. Currently taking amlodipine 5mg daily. Patient says that she only has 8 pills left, and will need a refill. Preferred pharmacy- [facility].    Vital Signs as repeated by /58, P- 84. Rechecked 5 minutes later: 130/62, P- 80.  Patient is taking medication as prescribed  Patient is tolerating medications well.  Patient is not monitoring Blood Pressure at home. Says that she did check it this morning, and was 145/61.  Current complaints: none    Disposition: Patient is also scheduled for ear irrigation today. Was seen in clinic on 3/16/22 for ear irrigation and office visit, see encounter notes. Returns today for RN irrigation after using debrox ear drops as recommended at office visit. Patient says that she's noticed her hearing has improved in the left ear since irrigation last week. Writer visualized cerumen in both ears, with reddened sore area noted in right ear canal. Patient denies pain to the area, but does report that it's been \"itchy\" in her right ear and says that she thought it was due to the ear drops. Patient does report history of using a toothpick in ears to relieve itching. Writer huddled with Dr. Murry and reported findings; Dr. Murry advised that ears not be irrigated today, and patient be seen in clinic for provider assessment of ears and sore in right ear- is ok to use same day appointment. Appointment scheduled for tomorrow with Iris ADDISON, routed to PCP for review of today's appointment notes.    Leticia Ahuja RN  [facility]  "

## 2022-03-23 ENCOUNTER — OFFICE VISIT (OUTPATIENT)
Dept: FAMILY MEDICINE | Facility: CLINIC | Age: 87
End: 2022-03-23
Payer: MEDICARE

## 2022-03-23 VITALS
TEMPERATURE: 95.4 F | OXYGEN SATURATION: 98 % | HEIGHT: 64 IN | DIASTOLIC BLOOD PRESSURE: 64 MMHG | SYSTOLIC BLOOD PRESSURE: 126 MMHG | WEIGHT: 159.5 LBS | BODY MASS INDEX: 27.23 KG/M2 | RESPIRATION RATE: 16 BRPM | HEART RATE: 84 BPM

## 2022-03-23 DIAGNOSIS — F41.9 ANXIETY AND DEPRESSION: ICD-10-CM

## 2022-03-23 DIAGNOSIS — F32.A ANXIETY AND DEPRESSION: ICD-10-CM

## 2022-03-23 DIAGNOSIS — E11.42 TYPE 2 DIABETES MELLITUS WITH DIABETIC POLYNEUROPATHY, WITHOUT LONG-TERM CURRENT USE OF INSULIN (H): Chronic | ICD-10-CM

## 2022-03-23 DIAGNOSIS — H60.391 INFECTIVE OTITIS EXTERNA, RIGHT: Primary | ICD-10-CM

## 2022-03-23 DIAGNOSIS — H61.21 IMPACTED CERUMEN OF RIGHT EAR: ICD-10-CM

## 2022-03-23 LAB
ANION GAP SERPL CALCULATED.3IONS-SCNC: 6 MMOL/L (ref 3–14)
BUN SERPL-MCNC: 15 MG/DL (ref 7–30)
CALCIUM SERPL-MCNC: 8.8 MG/DL (ref 8.5–10.1)
CHLORIDE BLD-SCNC: 107 MMOL/L (ref 94–109)
CO2 SERPL-SCNC: 29 MMOL/L (ref 20–32)
CREAT SERPL-MCNC: 0.87 MG/DL (ref 0.52–1.04)
GFR SERPL CREATININE-BSD FRML MDRD: 63 ML/MIN/1.73M2
GLUCOSE BLD-MCNC: 105 MG/DL (ref 70–99)
HBA1C MFR BLD: 5.9 % (ref 0–5.6)
POTASSIUM BLD-SCNC: 3.4 MMOL/L (ref 3.4–5.3)
SODIUM SERPL-SCNC: 142 MMOL/L (ref 133–144)

## 2022-03-23 PROCEDURE — 80048 BASIC METABOLIC PNL TOTAL CA: CPT | Performed by: NURSE PRACTITIONER

## 2022-03-23 PROCEDURE — 99214 OFFICE O/P EST MOD 30 MIN: CPT | Mod: 25 | Performed by: NURSE PRACTITIONER

## 2022-03-23 PROCEDURE — 83036 HEMOGLOBIN GLYCOSYLATED A1C: CPT | Performed by: NURSE PRACTITIONER

## 2022-03-23 PROCEDURE — 36415 COLL VENOUS BLD VENIPUNCTURE: CPT | Performed by: NURSE PRACTITIONER

## 2022-03-23 PROCEDURE — 69210 REMOVE IMPACTED EAR WAX UNI: CPT | Mod: RT | Performed by: NURSE PRACTITIONER

## 2022-03-23 RX ORDER — NEOMYCIN SULFATE, POLYMYXIN B SULFATE, HYDROCORTISONE 3.5; 10000; 1 MG/ML; [USP'U]/ML; MG/ML
3 SOLUTION/ DROPS AURICULAR (OTIC) 4 TIMES DAILY
Qty: 10 ML | Refills: 0 | Status: SHIPPED | OUTPATIENT
Start: 2022-03-23 | End: 2022-03-30

## 2022-03-23 RX ORDER — LORAZEPAM 0.5 MG/1
0.5 TABLET ORAL DAILY PRN
Qty: 15 TABLET | Refills: 1 | Status: SHIPPED | OUTPATIENT
Start: 2022-03-23 | End: 2022-10-28

## 2022-03-23 RX ORDER — SERTRALINE HYDROCHLORIDE 25 MG/1
25 TABLET, FILM COATED ORAL DAILY
Qty: 30 TABLET | Refills: 3 | Status: SHIPPED | OUTPATIENT
Start: 2022-03-23 | End: 2022-07-21

## 2022-03-23 RX ORDER — AMLODIPINE BESYLATE 5 MG/1
5 TABLET ORAL DAILY
Qty: 90 TABLET | Refills: 3 | Status: SHIPPED | OUTPATIENT
Start: 2022-03-23 | End: 2023-03-28

## 2022-03-23 NOTE — PATIENT INSTRUCTIONS
Labs    Ear drops 2 drops into the right ear 4 times daily for 7 days    Leave your hearing aid out for 7 days from your right ear only     Start Zoloft to help with depression and anxiety   Lorazepam just as needed, one tablet daily as needed for anxiety, only for short period of time

## 2022-03-23 NOTE — PROGRESS NOTES
Assessment & Plan     Infective otitis externa, right    - neomycin-polymyxin-hydrocortisone (CORTISPORIN) 3.5-88198-5 otic solution; Place 3 drops in ear(s) 4 times daily for 7 days    Impacted cerumen of right ear  -Right ear cerumenosis is noted.  Wax is removed by manual debridement. Instructions for home care to prevent wax buildup are given.  - REMOVE IMPACTED CERUMEN    Type 2 diabetes mellitus with diabetic polyneuropathy, without long-term current use of insulin (H)  -well controlled  -continue current treatment plan   Hemoglobin A1C POCT   Date Value Ref Range Status   02/22/2021 5.8 (H) 0 - 5.6 % Final     Comment:     Normal <5.7% Prediabetes 5.7-6.4%  Diabetes 6.5% or higher - adopted from ADA   consensus guidelines.       Hemoglobin A1C   Date Value Ref Range Status   03/23/2022 5.9 (H) 0.0 - 5.6 % Final     Comment:     Normal <5.7%   Prediabetes 5.7-6.4%    Diabetes 6.5% or higher     Note: Adopted from ADA consensus guidelines.     - Hemoglobin A1c; Future  - Basic metabolic panel  (Ca, Cl, CO2, Creat, Gluc, K, Na, BUN); Future  - Hemoglobin A1c  - Basic metabolic panel  (Ca, Cl, CO2, Creat, Gluc, K, Na, BUN)    Anxiety and depression    - sertraline (ZOLOFT) 25 MG tablet; Take 1 tablet (25 mg) by mouth daily  - LORazepam (ATIVAN) 0.5 MG tablet; Take 1 tablet (0.5 mg) by mouth daily as needed for anxiety (for short term only, do not take more than one per day)        Return in about 3 months (around 6/23/2022) for Routine Visit.    CYN Pandya CNP  [facility]    Shruti Finnegan is a 89 year old who presents for the following health issues   HPI     Concern - Ear Problem   Onset: 2 weeks   Description: She got her ears irrigated on 3/16. She came into the clinic yesterday to get them irrigated again but states they weren't able to because she has a sore in her right ear.   Intensity: mild, no pain   Progression of Symptoms:  same  Accompanying Signs & Symptoms:  "none   Previous history of similar problem: no   Precipitating factors:        Worsened by: none   Alleviating factors:        Improved by: no   Therapies tried and outcome:  none         Review of Systems   Constitutional, HEENT, cardiovascular, pulmonary, gi and gu systems are negative, except as otherwise noted.      Objective    /64 (BP Location: Right arm, Patient Position: Sitting, Cuff Size: Adult Large)   Pulse 84   Temp (!) 95.4  F (35.2  C) (Tympanic)   Resp 16   Ht 1.626 m (5' 4\")   Wt 72.3 kg (159 lb 8 oz)   SpO2 98%   BMI 27.38 kg/m    Body mass index is 27.38 kg/m .  Physical Exam   GENERAL: healthy, alert and no distress  EYES: Eyes grossly normal to inspection, PERRL and conjunctivae and sclerae normal  HENT: normal cephalic/atraumatic, right ear: erythematous and occluded with wax and left ear: normal: no effusions, no erythema, normal landmarks  NECK: no adenopathy, no asymmetry, masses, or scars and thyroid normal to palpation  RESP: lungs clear to auscultation - no rales, rhonchi or wheezes  CV: regular rate and rhythm, normal S1 S2, no S3 or S4, no murmur, click or rub, no peripheral edema and peripheral pulses strong  NEURO: Normal strength and tone, mentation intact and speech normal  PSYCH: mentation appears normal, affect normal/bright    Results for orders placed or performed in visit on 03/23/22 (from the past 24 hour(s))   Hemoglobin A1c   Result Value Ref Range    Hemoglobin A1C 5.9 (H) 0.0 - 5.6 %     *Note: Due to a large number of results and/or encounters for the requested time period, some results have not been displayed. A complete set of results can be found in Results Review.               "

## 2022-03-23 NOTE — LETTER
March 23, 2022      Sivan Brown  [address] [facility] [address]        Dear ,    We are writing to inform you of your test results.      Kidney function normal, diabetes well controlled, please inform patient     CYN Pandya CNP     Resulted Orders   Hemoglobin A1c   Result Value Ref Range    Hemoglobin A1C 5.9 (H) 0.0 - 5.6 %      Comment:      Normal <5.7%   Prediabetes 5.7-6.4%    Diabetes 6.5% or higher     Note: Adopted from ADA consensus guidelines.   Basic metabolic panel  (Ca, Cl, CO2, Creat, Gluc, K, Na, BUN)   Result Value Ref Range    Sodium 142 133 - 144 mmol/L    Potassium 3.4 3.4 - 5.3 mmol/L    Chloride 107 94 - 109 mmol/L    Carbon Dioxide (CO2) 29 20 - 32 mmol/L    Anion Gap 6 3 - 14 mmol/L    Urea Nitrogen 15 7 - 30 mg/dL    Creatinine 0.87 0.52 - 1.04 mg/dL    Calcium 8.8 8.5 - 10.1 mg/dL    Glucose 105 (H) 70 - 99 mg/dL    GFR Estimate 63 >60 mL/min/1.73m2      Comment:      Effective December 21, 2021 eGFRcr in adults is calculated using the 2021 CKD-EPI creatinine equation which includes age and gender ( et al., NEJM, DOI: 10.1056/UQQWul5866884)       If you have any questions or concerns, please call the clinic at the number listed above.       Sincerely,      CYN Barrios CNP

## 2022-03-24 NOTE — TELEPHONE ENCOUNTER
Pt was called & read provider message. Verbalized understanding & has picked up medication. Had appt for ears yesterday 3/23.    Tahira Rowe RN

## 2022-04-21 ENCOUNTER — OFFICE VISIT (OUTPATIENT)
Dept: ORTHOPEDICS | Facility: CLINIC | Age: 87
End: 2022-04-21
Payer: MEDICARE

## 2022-04-21 ENCOUNTER — ANCILLARY PROCEDURE (OUTPATIENT)
Dept: GENERAL RADIOLOGY | Facility: CLINIC | Age: 87
End: 2022-04-21
Attending: FAMILY MEDICINE
Payer: MEDICARE

## 2022-04-21 VITALS
DIASTOLIC BLOOD PRESSURE: 81 MMHG | BODY MASS INDEX: 27.14 KG/M2 | SYSTOLIC BLOOD PRESSURE: 155 MMHG | WEIGHT: 159 LBS | HEIGHT: 64 IN

## 2022-04-21 DIAGNOSIS — M17.12 PRIMARY OSTEOARTHRITIS OF LEFT KNEE: Primary | ICD-10-CM

## 2022-04-21 DIAGNOSIS — M17.12 PRIMARY OSTEOARTHRITIS OF LEFT KNEE: ICD-10-CM
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
## 2022-04-21 PROCEDURE — 99214 OFFICE O/P EST MOD 30 MIN: CPT | Mod: 25 | Performed by: FAMILY MEDICINE

## 2022-04-21 PROCEDURE — 73562 X-RAY EXAM OF KNEE 3: CPT | Performed by: RADIOLOGY

## 2022-04-21 PROCEDURE — 20611 DRAIN/INJ JOINT/BURSA W/US: CPT | Mod: LT | Performed by: FAMILY MEDICINE

## 2022-04-21 RX ADMIN — ROPIVACAINE HYDROCHLORIDE 3 ML: 5 INJECTION, SOLUTION EPIDURAL; INFILTRATION; PERINEURAL at 13:50

## 2022-04-21 RX ADMIN — TRIAMCINOLONE ACETONIDE 40 MG: 40 INJECTION, SUSPENSION INTRA-ARTICULAR; INTRAMUSCULAR at 13:50

## 2022-04-21 NOTE — PROGRESS NOTES
"Daniela Brown  :  1933  DOS: 2022  MRN: [id number]    Sports Medicine Clinic Visit    PCP: Cynthia Maddox    Daniela Brown is a 89 year old female who is seen in follow-up presenting with acute on chronic left knee pain.    Interim History - 2022  Since last visit on 3/10/2022 patient has moderate-severe left knee with swelling after falling twice @ home in the past 2 weeks, most recently on 3/31/22 landing directly on her knees.  Left knee SynviscOne injection completed on 3/10/22 was providing good relief until her falls.  She notes increased pain with going from sit to stand and walking.  Moderate joint effusion noted today.  She would like repeat imaging to rule out new injury.    Review of Systems  Musculoskeletal: as above  Remainder of review of systems is negative including constitutional, CV, pulmonary, GI, Skin and Neurologic except as noted in HPI or medical history.    Past Medical History:   Diagnosis Date     Abnormal cardiovascular stress test 2/3/2019    9/10/2018 Lexiscan: \"Myocardial perfusion imaging using single isotope technique demonstrated a small perfusion defect of mild severity involving the basal inferior wall which is mostly reversible and may be consistent with mild ischemia in the right coronary artery distribution. In addition, transient ischemic dilatation is noted with a TID ratio of 1.3. \"     Adhesive capsulitis of shoulder     left      Adjustment disorder with anxiety 3/18/2016     B12 deficiency anemia 2012     Chest pain 2004    Chronic right sided/axillary discomfort (musculoskeletal) Atypical substernal (possibly GERD). Negative cardiolite .     Colitis, Clostridium difficile 2012     Diverticulitis 2009     Headache(784.0) 2001    Tension type. MRI  normal.     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     Status post coronary angiogram 2/27/2019     Syncope and collapse 9/10/2018     Past Surgical History:   Procedure Laterality Date     ARTHROPLASTY KNEE  3/26/2012    Procedure:ARTHROPLASTY KNEE; Right Total Knee Arthroplasty; Surgeon:BERNADINE ROSAS; Location:[place] OR     CHOLECYSTECTOMY       CV HEART CATHETERIZATION WITH POSSIBLE INTERVENTION N/A 2/27/2019    Procedure: Coronary Angiogram with Left ventriculogram;  Surgeon: Leandro Carpio MD;  Location:  HEART CARDIAC CATH LAB     HERNIA REPAIR      Hernia Repair     HYSTERECTOMY, PAP NO LONGER INDICATED  1983    TVH/BSO     SURGICAL HISTORY OF -   10/08    A & P Repair, Dr. Hannah     ZZC APPENDECTOMY  1953     Family History   Problem Relation Age of Onset     C.A.D. Mother      Diabetes Mother      Cancer - colorectal Mother      Arthritis Mother      Heart Disease Mother      Lipids Brother      Obesity Brother      Hypertension Brother      Allergies Son      Eye Disorder Son         cataract     Thyroid Disease Sister         graves dz     Eye Disorder Son      Leukemia Son      Alcohol/Drug Father      Objective  BP (!) 155/81   Ht 1.626 m (5' 4\")   Wt 72.1 kg (159 lb)   BMI 27.29 kg/m        General: healthy, alert and in no distress      HEENT: no scleral icterus or conjunctival erythema     Skin: no suspicious lesions or rash. No jaundice.     CV: regular rhythm by palpation, 2+ distal pulses, no pedal edema      Resp: normal respiratory effort without conversational dyspnea     Psych: normal mood and affect      Gait: antalgic, appropriate coordination and balance     Neuro: normal light touch sensory exam of the extremities. Motor strength as noted below     Left Knee exam    ROM:        Flexion lacks 20 degrees       Extension lacks 5 degrees    Inspection:       no visible ecchymosis        effusion noted small/moderate    Skin:       no visible deformities       well perfused       capillary " refill brisk    Patellar Motion:        Normal patellar tracking noted through range of motion       Crepitus noted in the patellofemoral joint    Tender:        lateral patellar border       medial joint line       lateral joint line    Non Tender:         remainder of knee area    Special Tests:        neg (-) varus at 0 deg and 30 deg       neg (-) valgus at 0 deg and 30 deg    Radiology  Recent Results (from the past 744 hour(s))   XR Knee Standing AP Bilat [place] Bilat Lat Left    Narrative    KNEE STANDING AP BILATERAL [place]RISE BILATERAL LATERAL LEFT  4/21/2022  1:32 PM     HISTORY: Left knee pain. Fall at home.    COMPARISON: 2/16/2022 x-ray.      Impression    IMPRESSION: Right total knee arthroplasty in place. No evidence of  complication. Advanced medial and mild to moderate lateral compartment  joint space narrowing. Mild-to-moderate patellofemoral degenerative  changes with very small joint effusion. No acute fracture. No  significant change.    BIJU GALICIA MD         SYSTEM ID:  [id number]         Large Joint Injection/Arthocentesis: L knee joint    Date/Time: 4/21/2022 1:50 PM  Performed by: Tho Newman DO  Authorized by: Tho Newman DO     Indications:  Pain  Needle Size:  21 G  Guidance: ultrasound    Approach:  Superolateral  Location:  Knee      Medications:  3 mL ropivacaine 5 MG/ML; 40 mg triamcinolone 40 MG/ML  Aspirate amount (mL):  15  Aspirate:  Serous and yellow  Outcome:  Tolerated well, no immediate complications  Procedure discussed: discussed risks, benefits, and alternatives    Consent Given by:  Patient  Timeout: timeout called immediately prior to procedure    Prep: patient was prepped and draped in usual sterile fashion     Ultrasound images of procedure were permanently stored.           Assessment:  1. Primary osteoarthritis of left knee    2. Fall in home, initial encounter        Plan:  Discussed the assessment with the patient.  Follow up: 2 weeks if  still in significant pain  Recent fall and exacerbation of underlying OA, no concerning clinical or radiographic signs of fracture  XR images independently visualized and reviewed with patient today in clinic  Assistive device options reviewed  PT options reviewed  Oral Tylenol and topical Voltaren gel reviewed as safe OTC options, reviewed safe dosing strategies  US guided CSI with aspiration today  RICE and compression options reviewed  Expectations and goals of CSI reviewed  Often 2-3 days for steroid effect, and can take up to two weeks for maximum effect  We discussed modified progressive pain-free activity as tolerated  Do not overuse in first two weeks if feeling better due to concern for vulnerability while steroid is working  Supportive care reviewed  All questions were answered today  Contact us with additional questions or concerns  Signs and sx of concern reviewed      Tho Newman DO, OZZIE  Sports Medicine Physician  [facility]              Disclaimer: This note consists of symbols derived from keyboarding, dictation and/or voice recognition software. As a result, there may be errors in the script that have gone undetected. Please consider this when interpreting information found in this chart.

## 2022-04-21 NOTE — LETTER
"    2022         RE: Daniela Brown  [address] [facility] [address]        Dear Colleague,    Thank you for referring your patient, Daniela Brown, to the [facility] [place]. Please see a copy of my visit note below.    Daniela Brown  :  1933  DOS: 2022  MRN: [id number]    Sports Medicine Clinic Visit    PCP: Cynthia Maddox    Daniela Brown is a 89 year old female who is seen in follow-up presenting with acute on chronic left knee pain.    Interim History - 2022  Since last visit on 3/10/2022 patient has moderate-severe left knee with swelling after falling twice @ home in the past 2 weeks, most recently on 3/31/22 landing directly on her knees.  Left knee SynviscOne injection completed on 3/10/22 was providing good relief until her falls.  She notes increased pain with going from sit to stand and walking.  Moderate joint effusion noted today.  She would like repeat imaging to rule out new injury.    Review of Systems  Musculoskeletal: as above  Remainder of review of systems is negative including constitutional, CV, pulmonary, GI, Skin and Neurologic except as noted in HPI or medical history.    Past Medical History:   Diagnosis Date     Abnormal cardiovascular stress test 2/3/2019    9/10/2018 Lexiscan: \"Myocardial perfusion imaging using single isotope technique demonstrated a small perfusion defect of mild severity involving the basal inferior wall which is mostly reversible and may be consistent with mild ischemia in the right coronary artery distribution. In addition, transient ischemic dilatation is noted with a TID ratio of 1.3. \"     Adhesive capsulitis of shoulder     left      Adjustment disorder with anxiety 3/18/2016     B12 deficiency anemia 2012     Chest pain 2004    Chronic right sided/axillary discomfort (musculoskeletal) Atypical substernal (possibly GERD). Negative cardiolite " "2004.     Colitis, Clostridium difficile 4/18/2012     Diverticulitis 2009     Headache(784.0) 2001    Tension type. MRI 2001 normal.     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     Status post coronary angiogram 2/27/2019     Syncope and collapse 9/10/2018     Past Surgical History:   Procedure Laterality Date     ARTHROPLASTY KNEE  3/26/2012    Procedure:ARTHROPLASTY KNEE; Right Total Knee Arthroplasty; Surgeon:BERNADINE ROSAS; Location:[place] OR     CHOLECYSTECTOMY       CV HEART CATHETERIZATION WITH POSSIBLE INTERVENTION N/A 2/27/2019    Procedure: Coronary Angiogram with Left ventriculogram;  Surgeon: Leandro Carpio MD;  Location:  HEART CARDIAC CATH LAB     HERNIA REPAIR      Hernia Repair     HYSTERECTOMY, PAP NO LONGER INDICATED  1983    TVH/BSO     SURGICAL HISTORY OF -   10/08    A & P Repair, Dr. Hannah     ZZC APPENDECTOMY  1953     Family History   Problem Relation Age of Onset     C.A.D. Mother      Diabetes Mother      Cancer - colorectal Mother      Arthritis Mother      Heart Disease Mother      Lipids Brother      Obesity Brother      Hypertension Brother      Allergies Son      Eye Disorder Son         cataract     Thyroid Disease Sister         graves dz     Eye Disorder Son      Leukemia Son      Alcohol/Drug Father      Objective  BP (!) 155/81   Ht 1.626 m (5' 4\")   Wt 72.1 kg (159 lb)   BMI 27.29 kg/m        General: healthy, alert and in no distress      HEENT: no scleral icterus or conjunctival erythema     Skin: no suspicious lesions or rash. No jaundice.     CV: regular rhythm by palpation, 2+ distal pulses, no pedal edema      Resp: normal respiratory effort without conversational dyspnea     Psych: normal mood and affect      Gait: antalgic, appropriate coordination and balance     Neuro: normal light touch sensory exam of the " extremities. Motor strength as noted below     Left Knee exam    ROM:        Flexion lacks 20 degrees       Extension lacks 5 degrees    Inspection:       no visible ecchymosis        effusion noted small/moderate    Skin:       no visible deformities       well perfused       capillary refill brisk    Patellar Motion:        Normal patellar tracking noted through range of motion       Crepitus noted in the patellofemoral joint    Tender:        lateral patellar border       medial joint line       lateral joint line    Non Tender:         remainder of knee area    Special Tests:        neg (-) varus at 0 deg and 30 deg       neg (-) valgus at 0 deg and 30 deg    Radiology  Recent Results (from the past 744 hour(s))   XR Knee Standing AP Bilat [place] Bilat Lat Left    Narrative    KNEE STANDING AP BILATERAL SUNRISE BILATERAL LATERAL LEFT  4/21/2022  1:32 PM     HISTORY: Left knee pain. Fall at home.    COMPARISON: 2/16/2022 x-ray.      Impression    IMPRESSION: Right total knee arthroplasty in place. No evidence of  complication. Advanced medial and mild to moderate lateral compartment  joint space narrowing. Mild-to-moderate patellofemoral degenerative  changes with very small joint effusion. No acute fracture. No  significant change.    BIJU GALICIA MD         SYSTEM ID:  [id number]         Large Joint Injection/Arthocentesis: L knee joint    Date/Time: 4/21/2022 1:50 PM  Performed by: Tho Newman DO  Authorized by: Tho Newman DO     Indications:  Pain  Needle Size:  21 G  Guidance: ultrasound    Approach:  Superolateral  Location:  Knee      Medications:  3 mL ropivacaine 5 MG/ML; 40 mg triamcinolone 40 MG/ML  Aspirate amount (mL):  15  Aspirate:  Serous and yellow  Outcome:  Tolerated well, no immediate complications  Procedure discussed: discussed risks, benefits, and alternatives    Consent Given by:  Patient  Timeout: timeout called immediately prior to procedure    Prep: patient  was prepped and draped in usual sterile fashion     Ultrasound images of procedure were permanently stored.           Assessment:  1. Primary osteoarthritis of left knee    2. Fall in home, initial encounter        Plan:  Discussed the assessment with the patient.  Follow up: 2 weeks if still in significant pain  Recent fall and exacerbation of underlying OA, no concerning clinical or radiographic signs of fracture  XR images independently visualized and reviewed with patient today in clinic  Assistive device options reviewed  PT options reviewed  Oral Tylenol and topical Voltaren gel reviewed as safe OTC options, reviewed safe dosing strategies  US guided CSI with aspiration today  RICE and compression options reviewed  Expectations and goals of CSI reviewed  Often 2-3 days for steroid effect, and can take up to two weeks for maximum effect  We discussed modified progressive pain-free activity as tolerated  Do not overuse in first two weeks if feeling better due to concern for vulnerability while steroid is working  Supportive care reviewed  All questions were answered today  Contact us with additional questions or concerns  Signs and sx of concern reviewed      Tho Newman DO, CAQ  Sports Medicine Physician  [facility]              Disclaimer: This note consists of symbols derived from keyboarding, dictation and/or voice recognition software. As a result, there may be errors in the script that have gone undetected. Please consider this when interpreting information found in this chart.      Again, thank you for allowing me to participate in the care of your patient.        Sincerely,        Tho Newman DO

## 2022-04-25 RX ORDER — TRIAMCINOLONE ACETONIDE 40 MG/ML
40 INJECTION, SUSPENSION INTRA-ARTICULAR; INTRAMUSCULAR
Status: DISCONTINUED | OUTPATIENT
Start: 2022-04-21 | End: 2022-06-16 | Stop reason: ALTCHOICE

## 2022-04-25 RX ORDER — ROPIVACAINE HYDROCHLORIDE 5 MG/ML
3 INJECTION, SOLUTION EPIDURAL; INFILTRATION; PERINEURAL
Status: DISCONTINUED | OUTPATIENT
Start: 2022-04-21 | End: 2022-06-16 | Stop reason: ALTCHOICE

## 2022-05-09 ENCOUNTER — NURSE TRIAGE (OUTPATIENT)
Dept: FAMILY MEDICINE | Facility: CLINIC | Age: 87
End: 2022-05-09

## 2022-05-09 NOTE — TELEPHONE ENCOUNTER
"Nurse Triage SBAR    Is this a 2nd Level Triage? YES, LICENSED PRACTITIONER REVIEW IS REQUIRED    Situation:   Pt fell today. She thinks it's her numb feet but did not get injured or lose conscriusness    Background:   history of \" bad knees\" and DM.    Assessment:   Fell today and had a headache.  No injury or did not lose consciousness.  No chest pain  No shortness of breath.  Her glucose today was 95.  Her blood pressure is 141/79 today.    Protocol Recommended Disposition:   See Today In Office    Recommendation:   No appointments today. Provider please advise.     Routed to provider    Does the patient meet one of the following criteria for ADS visit consideration? 16+ years old, with an [facility] PCP     TIP  Providers, please consider if this condition is appropriate for management at one of our Acute and Diagnostic Services sites.     If patient is a good candidate, please use dotphrase <dot>triageresponse and select Refer to ADS to document.    Reason for Disposition    Taking a medicine that could cause weakness (e.g., blood pressure medications, diuretics)    Additional Information    Negative: Severe difficulty breathing (e.g., struggling for each breath, speaks in single words)    Negative: Shock suspected (e.g., cold/pale/clammy skin, too weak to stand, low BP, rapid pulse)    Negative: Difficult to awaken or acting confused (e.g., disoriented, slurred speech)    Negative: Fainted > 15 minutes ago and still feels too weak or dizzy to stand    Negative: SEVERE weakness (i.e., unable to walk or barely able to walk, requires support) and new onset or worsening    Negative: Sounds like a life-threatening emergency to the triager    Negative: Weakness of the face, arm or leg on one side of the body    Negative: Has diabetes and weakness from low blood sugar (i.e., < 60 mg/dL or 3.5 mmol/L)    Negative: Recent heat exposure, suspected cause of weakness    Negative: Vomiting is the main symptom    Negative: " "Diarrhea is the main symptom    Negative: Difficulty breathing    Negative: Heart beating < 50 beats per minute OR > 140 beats per minute    Negative: Extra heartbeats OR irregular heart beating (i.e., 'palpitations')    Negative: Follows bleeding (e.g., from vomiting, rectum, vagina) (Exception: small transient weakness from sight of a small amount blood)    Negative: Bloody, black, or tarry bowel movements (Exception: chronic-unchanged  black-grey bowel movements and is taking iron pills or Pepto-bismol)    Negative: MODERATE weakness from poor fluid intake with no improvement after 2 hours of rest and fluids    Negative: Drinking very little and dehydration suspected (e.g., no urine > 12 hours, very dry mouth, very lightheaded)    Negative: Patient sounds very sick or weak to the triager    Negative: MODERATE weakness (i.e., interferes with work, school, normal activities) and cause unknown (Exceptions: weakness with acute minor illness, or weakness from poor fluid intake)    Negative: Fever > 103 F (39.4 C) and not able to get the Fever down using CARE ADVICE    Negative: Fever > 100.0 F (37.8 C) and bedridden (e.g., nursing home patient, stroke, chronic illness, recovering from surgery)    Negative: Fever > 101 F (38.3 C) and over 60 years of age    Negative: Fever > 100.0 F (37.8 C) and diabetes mellitus or weak immune system (e.g., HIV positive, cancer chemo, splenectomy, organ transplant, chronic steroids)    Negative: Pale skin (pallor)    Negative: MODERATE weakness (i.e., interferes with work, school, normal activities) and persists > 3 days    Answer Assessment - Initial Assessment Questions  1. DESCRIPTION: \"Describe how you are feeling.\"      OK  2. SEVERITY: \"How bad is it?\"  \"Can you stand and walk?\"    - MILD - Feels weak or tired, but does not interfere with work, school or normal activities    - MODERATE - Able to stand and walk; weakness interferes with work, school, or normal activities    - " "SEVERE - Unable to stand or walk      I walk with a cane at my baseline today  3. ONSET:  \"When did the weakness begin?\"      Today and I fell in February also.  4.CAUSE: \"What do you think is causing the weakness?\"      I don't know.  5. MEDICINES: \"Have you recently started a new medicine or had a change in the amount of a medicine?\"      No  6. OTHER SYMPTOMS: \"Do you have any other symptoms?\" (e.g., chest pain, fever, cough, SOB, vomiting, diarrhea, bleeding, other areas of pain)     Head ache today  7. PREGNANCY: \"Is there any chance you are pregnant?\" \"When was your last menstrual period?\"      No    Protocols used: WEAKNESS (GENERALIZED) AND FATIGUE-A-OH    "

## 2022-05-17 ENCOUNTER — OFFICE VISIT (OUTPATIENT)
Dept: FAMILY MEDICINE | Facility: CLINIC | Age: 87
End: 2022-05-17
Payer: MEDICARE

## 2022-05-17 VITALS
BODY MASS INDEX: 27.25 KG/M2 | SYSTOLIC BLOOD PRESSURE: 154 MMHG | TEMPERATURE: 98.2 F | OXYGEN SATURATION: 98 % | DIASTOLIC BLOOD PRESSURE: 72 MMHG | WEIGHT: 159.6 LBS | RESPIRATION RATE: 17 BRPM | HEIGHT: 64 IN | HEART RATE: 93 BPM

## 2022-05-17 DIAGNOSIS — R39.9 URINARY SYMPTOM OR SIGN: ICD-10-CM

## 2022-05-17 DIAGNOSIS — R53.81 PHYSICAL DECONDITIONING: ICD-10-CM

## 2022-05-17 DIAGNOSIS — R29.6 RECURRENT FALLS: ICD-10-CM

## 2022-05-17 DIAGNOSIS — N32.81 OVERACTIVE BLADDER: Primary | ICD-10-CM

## 2022-05-17 LAB
ALBUMIN UR-MCNC: NEGATIVE MG/DL
APPEARANCE UR: CLEAR
BILIRUB UR QL STRIP: NEGATIVE
COLOR UR AUTO: YELLOW
GLUCOSE UR STRIP-MCNC: NEGATIVE MG/DL
HGB UR QL STRIP: NEGATIVE
KETONES UR STRIP-MCNC: NEGATIVE MG/DL
LEUKOCYTE ESTERASE UR QL STRIP: NEGATIVE
NITRATE UR QL: NEGATIVE
PH UR STRIP: 6.5 [PH] (ref 5–7)
SP GR UR STRIP: 1.01 (ref 1–1.03)
UROBILINOGEN UR STRIP-ACNC: 0.2 E.U./DL

## 2022-05-17 PROCEDURE — 99213 OFFICE O/P EST LOW 20 MIN: CPT | Performed by: NURSE PRACTITIONER

## 2022-05-17 PROCEDURE — 81003 URINALYSIS AUTO W/O SCOPE: CPT | Performed by: NURSE PRACTITIONER

## 2022-05-17 RX ORDER — OXYBUTYNIN CHLORIDE 5 MG/1
2.5 TABLET ORAL 2 TIMES DAILY
Qty: 30 TABLET | Refills: 1 | Status: SHIPPED | OUTPATIENT
Start: 2022-05-17 | End: 2022-07-21

## 2022-05-17 ASSESSMENT — PAIN SCALES - GENERAL: PAINLEVEL: NO PAIN (0)

## 2022-05-17 NOTE — PATIENT INSTRUCTIONS
Handout given on overactive bladder.  Urine was negative for infection.  Start Ditropan 1/2 tab (2.5 mg) twice daily.  Follow-up in 1 month for recheck.  Continue to push fluids and stop fluids after 5 pm prior to bedtime to reduce night time frequency.  Take Vitamin B Complex to help with energy.  Make a Physical Therapy appointment to work with them on strengthening exercises to help reduce falls.

## 2022-05-17 NOTE — PROGRESS NOTES
Assessment & Plan     Overactive bladder  Patient has normal UA but has symptoms of overactive bladder.  Will start her on Ditropan 2.5 mg twice daily to see if this improves symptoms.  Handout given on overactive bladder.  Recommend follow-up in 1 month with PCP for recheck on symptoms.  - oxybutynin (DITROPAN) 5 MG tablet; Take 0.5 tablets (2.5 mg) by mouth 2 times daily    Urinary symptom or sign  UA is negative for infection or blood in the urine.  - UA macro with reflex to Microscopic and Culture - Clinc Collect    Physical deconditioning  Patient has been having falls and feeling unsteady on her feet but no injury.  She continues to live in her house alone and feels that she has nothing to do but sit.  She is currently using a cane for guidance which has been helpful.  Referral placed for Physical Therapy for strengthening and care coordination to discuss options for senior assisted living.  - Physical Therapy Referral; Future  - Primary Care - Care Coordination Referral; Future    Recurrent falls  See note above.    See Patient Instructions    Return in about 1 month (around 6/17/2022) for Follow up.    Juju Patino NP  [facility]    Shruti Finnegan is a 89 year old who presents for the following health issues;     HPI     Genitourinary - Female  Onset/Duration: 3 weeks   Description:   Painful urination (Dysuria): no           Frequency: YES  Blood in urine (Hematuria): YES- unknown but urine appears gold in color   Delay in urine (Hesitency): YES  Intensity: moderate  Progression of Symptoms:  same and constant  Accompanying Signs & Symptoms:  Fever/chills: no  Flank pain: YES- left lower   Nausea and vomiting: no  Vaginal symptoms: none  Abdominal/Pelvic Pain: YES- once in awhile   History:   History of frequent UTI s: YES  History of kidney stones: no  Sexually Active: no  Possibility of pregnancy: No  Precipitating or alleviating factors: None  Therapies tried and  "outcome: OTC advil or tylenol  and  that does not seem to help      Has frequency more at night getting up 5-6 times at night.  Lately the last week has been about 4 times.  Started stopping fluids after 4 pm about a week ago which reduced this.  Was drinking heavy more soda and now she is drinking flavored water.  She will drink 1 bottle (20 oz) and other fluids that are minimal (tea and regular water).  During the day will wear a pad since this will come by itself.  Hx of hysterectomy.    She does feel like she is emptying her bladder when she goes to the bathroom.    Patient has had hx of falls in the last 3 weeks and is feeling week with diabetic neuropathy.  Does not feel weak but tired.    Review of Systems   CONSTITUTIONAL: NEGATIVE for fever, chills, change in weight  RESP: NEGATIVE for significant cough or SOB  CV: NEGATIVE for chest pain, palpitations or peripheral edema  : frequency more at night and daily urgency incontinence with daily pad use   MUSCULOSKELETAL: POSITIVE  for generalized weakness and deconditioning  PSYCHIATRIC: POSITIVE for depressed mood  ROS otherwise negative      Objective    BP (!) 154/72   Pulse 93   Temp 98.2  F (36.8  C) (Tympanic)   Resp 17   Ht 1.626 m (5' 4\")   Wt 72.4 kg (159 lb 9.6 oz)   LMP  (LMP Unknown)   SpO2 98%   Breastfeeding No   BMI 27.40 kg/m    Body mass index is 27.4 kg/m .  Physical Exam   GENERAL: healthy, alert and no distress  RESP: lungs clear to auscultation - no rales, rhonchi or wheezes  CV: regular rate and rhythm, normal S1 S2, no S3 or S4, no murmur, click or rub, no peripheral edema and peripheral pulses strong  ABDOMEN: soft, nontender, without hepatosplenomegaly or masses  MS: limited flexion and extension in left knee, gait is good with cane use  PSYCH: mentation appears normal and appears mildly deperessed  "

## 2022-05-18 ENCOUNTER — PATIENT OUTREACH (OUTPATIENT)
Dept: CARE COORDINATION | Facility: CLINIC | Age: 87
End: 2022-05-18
Payer: MEDICARE

## 2022-05-18 NOTE — PROGRESS NOTES
Clinic Care Coordination Contact  [facility]/Voicemail    Reason for Referral: Other   My Clinical Question Is: assistance in helping to find assisted living options; recent falls and living alone     Notes:  CHW please sched with SW to assist with AL search     Clinical Data: Care Coordination Outreach  Outreach attempted x 1.  Left message on patient's voicemail with call back information and requested return call.  Plan: CHW will try to reach patient again in 1-2 business days.    Padmini MILLER  Community Health Worker  [facility]  Clinic Care Coordination  [facility]  [email or web address]   Office: [phone]

## 2022-05-19 ENCOUNTER — PATIENT OUTREACH (OUTPATIENT)
Dept: NURSING | Facility: CLINIC | Age: 87
End: 2022-05-19
Payer: MEDICARE

## 2022-05-19 NOTE — PROGRESS NOTES
Clinic Care Coordination Contact  Community Health Worker Initial Outreach    CHW Initial Information Gathering:  Referral Source: PCP (Juju Patino NP)  Preferred Hospital: [facility], [place]  [phone]  Preferred Urgent Care: [facility], [phone]  Current living arrangement:: I live alone  Type of residence:: Private home - stairs (Patient stated she has lived in her home for 37 years)  Community Resources:  (Patient is interested in OP PT, referral was made by Juju Patino NP)  Equipment Currently Used at Home: cane, straight  Informal Support system:: Family  No PCP office visit in Past Year: Yes  CHW Additional Questions  If ED/Hospital discharge, follow-up appointment scheduled as recommended?: N/A  Medication changes made following ED/Hospital discharge?: N/A  MyChart active?: No    Patient accepts CC: No, patient stated she is not looking for assisting living options at this time and that her son is moving to [place] and has asked her to move with him. Patient stated she has not made a final decision, but realizes the mccall are no longer good for her.   Patient declined Care Coordination introduction letter for future reference.      The Clinic Community Health Worker spoke with the patient today to discuss possible Clinic Care Coordination enrollment. The service was described to the patient and immediate needs were discussed to include OP PT referral that was placed.  The patient declined CCC enrollment at this time. The PCP is encouraged to refer in the future if the patient's needs change.      Patient stated she would prefer that Dr. Newman approve PT prior to her calling [facility] back to schedule physical therapy visits.  CHW offered to send Tho Newman DO (Sports Medicine) a message to inform him that a PT referral was made by Juju Patino NP and patient is requesting approval to begin.      CHW to pass  along the following info:   Question Answer   Preferred Location: [facility]   Scheduling Instructions: If you have not heard from the scheduling office within 2 business days, please call [phone] for [facility], [phone] for [place] and [phone] for [place].   Course of Action Evaluation and Treatment   Adult or Pediatrics Adult   Specialty Services: Per Associated Diagnosis       Padmini MILLER  Community Health Worker  [facility]  Clinic Care Coordination  [facility]  [email or web address]   Office: [phone]

## 2022-05-20 NOTE — TELEPHONE ENCOUNTER
Spoke to patient discussed that would recommend starting physical therapy as recommended by PCP.  This would be safe and should help with her overall strength and fall prevention.  Patient should follow up in clinic in ~ 6 weeks after starting physical therapy, if not improving.    Chalino Anderson ATC

## 2022-06-08 ENCOUNTER — HOSPITAL ENCOUNTER (OUTPATIENT)
Dept: PHYSICAL THERAPY | Facility: CLINIC | Age: 87
Setting detail: THERAPIES SERIES
Discharge: HOME | End: 2022-06-08
Attending: NURSE PRACTITIONER
Payer: MEDICARE

## 2022-06-08 DIAGNOSIS — R53.81 PHYSICAL DECONDITIONING: ICD-10-CM

## 2022-06-08 PROCEDURE — 97161 PT EVAL LOW COMPLEX 20 MIN: CPT | Mod: GP | Performed by: PHYSICAL THERAPIST

## 2022-06-08 PROCEDURE — 97110 THERAPEUTIC EXERCISES: CPT | Mod: GP | Performed by: PHYSICAL THERAPIST

## 2022-06-08 NOTE — PROGRESS NOTES
06/08/22 1300   General Information   Type of Visit Initial OP Ortho PT Evaluation   Start of Care Date 06/08/22   Referring Physician Juju Patino NP   Patient/Family Goals Statement strengthen and improve balance   Orders Evaluate and Treat   Date of Order 05/17/22   Certification Required? Yes   Medical Diagnosis Physical deconditioning (R53.81)   Body Part(s)   Body Part(s) Knee   Presentation and Etiology   Pertinent history of current problem (include personal factors and/or comorbidities that impact the POC) b/l polyneuropathy. L knee pain increased with cortisone injection. R TKA. She notes 2 falls at home in April and May without known cause but unable to get up. She noted increased L knee pain when landing on both knees. At this time, the pt lives alone and her son is working to have her move in with them when the house is built in [place]. She notes swelling into the knee when seeing orhtopedics for fluid drainage and has received a cortisone injection in the knee.   Impairments A. Pain;B. Decreased WB tolerance;C. Swelling;F. Decreased strength and endurance;G. Impaired balance;H. Impaired gait;M. Locking or catching;P. Bowel or bladder problems   Functional Limitations perform activities of daily living   Symptom Location knees; legs   How/Where did it occur With a fall   Onset date of current episode/exacerbation 04/15/22  (fall in April and in May; unknown dates)   Chronicity New   Pain rating (0-10 point scale) Best (/10);Worst (/10)   Best (/10) 5   Worst (/10) 10   Pain quality A. Sharp;C. Aching;F. Stabbing   Frequency of pain/symptoms A. Constant   Pain/symptoms are: Worse during the night   Pain/symptoms exacerbated by B. Walking;G. Certain positions;I. Bending   Pain/symptoms eased by D. Nothing;H. Cold;J. Braces/supports  (biofreeze)   Progression of symptoms since onset: Worsened   Prior Level of Function   Functional Level Prior Comment pt was independent with all mobility. She noted  some use of cane   Current Level of Function   Patient role/employment history C. Homemaker   Living environment House/townhome   Home/community accessibility no stairs to enter home; does have basement with chair lift   Fall Risk Screen   Fall screen completed by PT   Have you fallen 2 or more times in the past year? Yes   Have you fallen and had an injury in the past year? Yes   Timed Up and Go score (seconds) 21.39 sec   Is patient a fall risk? Yes;Department fall risk interventions implemented   Abuse Screen (yes response referral indicated)   Feels Unsafe at Home or Work/School no   Feels Threatened by Someone no   Does Anyone Try to Keep You From Having Contact with Others or Doing Things Outside Your Home? no   Physical Signs of Abuse Present no   Knee Objective Findings   Gait/Locomotion quad cane in the R hand, shuffling gait, small steps, L knee stiff/no flexion with gait   Balance/Proprioception (Single Leg Stance) tandem stance- 4 sec both positions   Palpation slight tenderness to the anterior portion   Accessory Motion/Joint Mobility fair patellar mobility   Observation brace over the L kne; compression garment   Posture kyphotic posture   Lachmans Test -   Anterior Drawer Test -   Posterior Drawer Test -   Apprehension Test -   Side (if bilateral, select both right and left) Right;Left   Knee Special Test Comments DGI on file   Right Knee Extension AROM full   Right Knee Flexion AROM 115   Right Knee Flexion Strength 3+   Right Knee Extension Strength 4   Right Hip Abduction Strength 3-   R VMO Strength 3-   Right Gastrocnemius Flexibility -   Right Hamstring Flexibility -   Left Knee Extension AROM full   Left Knee Flexion AROM 108   Left Knee Flexion Strength 3   Left Knee Extension Strength 4-   Left Hip Abduction Strength 2+   L VMO Strength 3-   Left Gastrocnemius Flexibility -   Left Hamstring Flexibility -   Planned Therapy Interventions   Planned Therapy Interventions balance training;gait  training;joint mobilization;manual therapy;neuromuscular re-education;ROM;strengthening;stretching   Planned Modality Interventions   Planned Modality Interventions Electrical stimulation;TENS;Ultrasound   Clinical Impression   Criteria for Skilled Therapeutic Interventions Met yes, treatment indicated   PT Diagnosis balance problem   Influenced by the following impairments b/l knee pain after fall; weakness; gait abnormality   Functional limitations due to impairments ambulation, squatting, rising off the ground   Clinical Presentation Stable/Uncomplicated   Clinical Presentation Rationale current status; pain from when she had fallen; chronic condition   Clinical Decision Making (Complexity) Low complexity   Therapy Frequency 2 times/Week   Predicted Duration of Therapy Intervention (days/wks) 8   Risk & Benefits of therapy have been explained Yes   Patient, Family & other staff in agreement with plan of care Yes   ORTHO GOALS   PT Ortho Eval Goals 1;2;3   Ortho Goal 1   Goal Identifier ST HEP goal   Goal Description Pt will be independent with her HEP in 2 weeks to be able to strengthen at home.   Target Date 06/22/22   Ortho Goal 2   Goal Identifier LT balance goal   Goal Description Pt will have a DGI of >16/24 in 8 weeks to improve her balance and work towards a reduction in falls.   Target Date 08/03/22   Ortho Goal 3   Goal Identifier LT TUG score   Goal Description pt will have a TUG time of <15 sec in 8 weeks to improve her dynamic balance for ambulating aron the house.   Target Date 08/03/22   Total Evaluation Time   PT Eval, Low Complexity Minutes (45683) 35   Therapy Certification   Certification date from 06/08/22   Certification date to 09/06/22   Medical Diagnosis Physical deconditioning (R53.81)     Scarlett Perez, PT, DPT

## 2022-06-08 NOTE — PROGRESS NOTES
[facility]    OUTPATIENT PHYSICAL THERAPY ORTHOPEDIC EVALUATION  PLAN OF TREATMENT FOR OUTPATIENT REHABILITATION  (COMPLETE FOR INITIAL CLAIMS ONLY)  Patient's Last Name, First Name, M.I.  YOB: 1933  Daniela Brown    Provider s Name:  [facility]   Medical Record No.  [id number]   Start of Care Date:  06/08/22   Onset Date:  04/15/22 (fall in April and in May; unknown dates)   Type:     _X__PT   ___OT   ___SLP Medical Diagnosis:  Physical deconditioning (R53.81)     PT Diagnosis:  balance problem   Visits from SOC:  1      _________________________________________________________________________________  Plan of Treatment/Functional Goals:  balance training, gait training, joint mobilization, manual therapy, neuromuscular re-education, ROM, strengthening, stretching     Electrical stimulation, TENS, Ultrasound     Goals  Goal Identifier: ST HEP goal  Goal Description: Pt will be independent with her HEP in 2 weeks to be able to strengthen at home.  Target Date: 06/22/22    Goal Identifier: LT balance goal  Goal Description: Pt will have a DGI of >16/24 in 8 weeks to improve her balance and work towards a reduction in falls.  Target Date: 08/03/22    Goal Identifier: LT TUG score  Goal Description: pt will have a TUG time of <15 sec in 8 weeks to improve her dynamic [facility].  Target Date: 08/03/22                                                           Therapy Frequency:  2 times/Week  Predicted Duration of Therapy Intervention:  8    Scarlett Perez PT                 I CERTIFY THE NEED FOR THESE SERVICES FURNISHED UNDER        THIS PLAN OF TREATMENT AND WHILE UNDER MY CARE     (Physician co-signature of this document indicates review and certification of the therapy plan).                       Certification Date From:   06/08/22   Certification Date To:  09/06/22    Referring Provider:  Juju Patino NP    Initial Assessment        See Epic Evaluation Start of Care Date: 06/08/22

## 2022-06-16 ENCOUNTER — HOSPITAL ENCOUNTER (OUTPATIENT)
Dept: PHYSICAL THERAPY | Facility: CLINIC | Age: 87
Setting detail: THERAPIES SERIES
Discharge: HOME | End: 2022-06-16
Attending: NURSE PRACTITIONER
Payer: MEDICARE

## 2022-06-16 ENCOUNTER — OFFICE VISIT (OUTPATIENT)
Dept: ORTHOPEDICS | Facility: CLINIC | Age: 87
End: 2022-06-16
Payer: MEDICARE

## 2022-06-16 VITALS
SYSTOLIC BLOOD PRESSURE: 166 MMHG | BODY MASS INDEX: 27.14 KG/M2 | DIASTOLIC BLOOD PRESSURE: 76 MMHG | HEIGHT: 64 IN | WEIGHT: 159 LBS

## 2022-06-16 DIAGNOSIS — M25.462 EFFUSION OF LEFT KNEE: ICD-10-CM

## 2022-06-16 DIAGNOSIS — M17.12 PRIMARY OSTEOARTHRITIS OF LEFT KNEE: Primary | ICD-10-CM

## 2022-06-16 PROCEDURE — 99213 OFFICE O/P EST LOW 20 MIN: CPT | Mod: 25 | Performed by: FAMILY MEDICINE

## 2022-06-16 PROCEDURE — 20611 DRAIN/INJ JOINT/BURSA W/US: CPT | Mod: LT | Performed by: FAMILY MEDICINE

## 2022-06-16 PROCEDURE — 97110 THERAPEUTIC EXERCISES: CPT | Mod: GP | Performed by: PHYSICAL THERAPIST

## 2022-06-16 RX ORDER — ROPIVACAINE HYDROCHLORIDE 5 MG/ML
3 INJECTION, SOLUTION EPIDURAL; INFILTRATION; PERINEURAL
Status: DISCONTINUED | OUTPATIENT
Start: 2022-06-16 | End: 2022-08-08

## 2022-06-16 RX ORDER — TRIAMCINOLONE ACETONIDE 40 MG/ML
40 INJECTION, SUSPENSION INTRA-ARTICULAR; INTRAMUSCULAR
Status: DISCONTINUED | OUTPATIENT
Start: 2022-06-16 | End: 2022-08-08

## 2022-06-16 RX ADMIN — TRIAMCINOLONE ACETONIDE 40 MG: 40 INJECTION, SUSPENSION INTRA-ARTICULAR; INTRAMUSCULAR at 14:10

## 2022-06-16 RX ADMIN — ROPIVACAINE HYDROCHLORIDE 3 ML: 5 INJECTION, SOLUTION EPIDURAL; INFILTRATION; PERINEURAL at 14:10

## 2022-06-16 NOTE — LETTER
"    2022         RE: Daniela Brown  [id number] [facility] [id number]        Dear Colleague,    Thank you for referring your patient, Daniela Brown, to the [facility] [place]. Please see a copy of my visit note below.    Daniela Brown  :  1933  DOS: 22  MRN: [id number]    Sports Medicine Clinic Visit    PCP: Cynthia Maddox    Daniela Brown is a 89 year old female who is seen in follow-up presenting with acute on chronic left knee pain.    Interim History - 2022  Since last visit on 3/10/2022 patient has moderate-severe left knee with swelling after falling twice @ home in the past 2 weeks, most recently on 3/31/22 landing directly on her knees.  Left knee SynviscOne injection completed on 3/10/22 was providing good relief until her falls.  She notes increased pain with going from sit to stand and walking.  Moderate joint effusion noted today.  She would like repeat imaging to rule out new injury.    Interim History - 2022  Since last visit on 2022 patient has moderate left knee and swelling over the past ~ 3+ weeks.  Left knee aspiration and steroid injection completed on 22 provided good relief for ~ 4 weeks.  Patient has attended two sessions of physical therapy at this point.  Relying on cane more for ambulation.  No new injury in the interim.      Review of Systems  Musculoskeletal: as above  Remainder of review of systems is negative including constitutional, CV, pulmonary, GI, Skin and Neurologic except as noted in HPI or medical history.    Past Medical History:   Diagnosis Date     Abnormal cardiovascular stress test 2/3/2019    9/10/2018 Lexiscan: \"Myocardial perfusion imaging using single isotope technique demonstrated a small perfusion defect of mild severity involving the basal inferior wall which is mostly reversible and may be consistent with mild ischemia in the right " "coronary artery distribution. In addition, transient ischemic dilatation is noted with a TID ratio of 1.3. \"     Adhesive capsulitis of shoulder     left      Adjustment disorder with anxiety 3/18/2016     B12 deficiency anemia 6/20/2012     Chest pain 2004    Chronic right sided/axillary discomfort (musculoskeletal) Atypical substernal (possibly GERD). Negative cardiolite 2004.     Colitis, Clostridium difficile 4/18/2012     Diverticulitis 2009     Headache(784.0) 2001    Tension type. MRI 2001 normal.     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     Status post coronary angiogram 2/27/2019     Syncope and collapse 9/10/2018     Past Surgical History:   Procedure Laterality Date     ARTHROPLASTY KNEE  3/26/2012    Procedure:ARTHROPLASTY KNEE; Right Total Knee Arthroplasty; Surgeon:BERNADINE ROSAS; Location:[place] OR     CHOLECYSTECTOMY       CV HEART CATHETERIZATION WITH POSSIBLE INTERVENTION N/A 2/27/2019    Procedure: Coronary Angiogram with Left ventriculogram;  Surgeon: Leandro Carpio MD;  Location:  HEART CARDIAC CATH LAB     HERNIA REPAIR      Hernia Repair     HYSTERECTOMY, PAP NO LONGER INDICATED  1983    TVH/BSO     SURGICAL HISTORY OF -   10/08    A & P Repair, Dr. Hannah     ZZC APPENDECTOMY  1953     Family History   Problem Relation Age of Onset     C.A.D. Mother      Diabetes Mother      Cancer - colorectal Mother      Arthritis Mother      Heart Disease Mother      Lipids Brother      Obesity Brother      Hypertension Brother      Allergies Son      Eye Disorder Son         cataract     Thyroid Disease Sister         graves dz     Eye Disorder Son      Leukemia Son      Alcohol/Drug Father      Objective  BP (!) 166/76   Ht 1.626 m (5' 4\")   Wt 72.1 kg (159 lb)   LMP  (LMP Unknown)   BMI 27.29 kg/m        General: healthy, alert and in no " distress      HEENT: no scleral icterus or conjunctival erythema     Skin: no suspicious lesions or rash. No jaundice.     CV: regular rhythm by palpation, 2+ distal pulses, no pedal edema      Resp: normal respiratory effort without conversational dyspnea     Psych: normal mood and affect      Gait: antalgic, appropriate coordination and balance     Neuro: normal light touch sensory exam of the extremities. Motor strength as noted below     Left Knee exam - similar to previous    ROM:        Flexion lacks 20 degrees       Extension lacks 5 degrees    Inspection:       no visible ecchymosis        effusion noted small/moderate by palpation    Skin:       no visible deformities       well perfused       capillary refill brisk    Patellar Motion:        Normal patellar tracking noted through range of motion       Crepitus noted in the patellofemoral joint    Tender:        lateral patellar border       medial joint line       lateral joint line    Non Tender:         remainder of knee area    Special Tests:        neg (-) varus at 0 deg and 30 deg       neg (-) valgus at 0 deg and 30 deg    Radiology  Recent Results (from the past 744 hour(s))   XR Knee Standing AP Bilat [place] Bilat Lat Left    Narrative    KNEE STANDING AP BILATERAL SUNRISE BILATERAL LATERAL LEFT  4/21/2022  1:32 PM     HISTORY: Left knee pain. Fall at home.    COMPARISON: 2/16/2022 x-ray.      Impression    IMPRESSION: Right total knee arthroplasty in place. No evidence of  complication. Advanced medial and mild to moderate lateral compartment  joint space narrowing. Mild-to-moderate patellofemoral degenerative  changes with very small joint effusion. No acute fracture. No  significant change.    BIJU GALICIA MD         SYSTEM ID:  [id number]         Large Joint Injection/Arthocentesis: L knee joint    Date/Time: 6/16/2022 2:10 PM  Performed by: Tho Newman DO  Authorized by: Tho Newman DO     Indications:  Pain,  osteoarthritis and joint swelling  Needle Size:  21 G  Guidance: ultrasound    Location:  Knee      Medications:  3 mL ropivacaine 5 MG/ML; 40 mg triamcinolone 40 MG/ML  Aspirate amount (mL):  19  Aspirate:  Serous and yellow  Outcome:  Tolerated well, no immediate complications  Procedure discussed: discussed risks, benefits, and alternatives    Consent Given by:  Patient  Timeout: timeout called immediately prior to procedure    Prep: patient was prepped and draped in usual sterile fashion     Ultrasound images of procedure were permanently stored.         Assessment:  1. Primary osteoarthritis of left knee    2. Effusion of left knee        Plan:  Discussed the assessment with the patient.  Follow up: 2 weeks if still in significant pain, otherwise as needed  Recent fall and exacerbation of underlying OA, no concerning clinical or radiographic signs of fracture  XR images independently visualized and reviewed with patient today in clinic  Assistive device options reviewed  PT options reviewed  Oral Tylenol and topical Voltaren gel reviewed as safe OTC options, reviewed safe dosing strategies  US guided CSI with aspiration repeated today, good relief for over a month last time  We reviewed that its only been 2 months since her last injection which means we are repeating this injection early, and while making an exception today this should not be a long-term plan  Again reviewed the option today for consulting with orthopedic surgery for consideration of TKA based on clinical progress  RICE and compression options reviewed  Expectations and goals of CSI reviewed  Often 2-3 days for steroid effect, and can take up to two weeks for maximum effect  We discussed modified progressive pain-free activity as tolerated  Do not overuse in first two weeks if feeling better due to concern for vulnerability while steroid is working  Supportive care reviewed  All questions were answered today  Contact us with additional  questions or concerns  Signs and sx of concern reviewed      Tho Newman DO, OZZIE  Sports Medicine Physician  [facility]              Disclaimer: This note consists of symbols derived from keyboarding, dictation and/or voice recognition software. As a result, there may be errors in the script that have gone undetected. Please consider this when interpreting information found in this chart.      Again, thank you for allowing me to participate in the care of your patient.        Sincerely,        Tho Newman DO

## 2022-06-16 NOTE — PROGRESS NOTES
"Daniela Brown  :  1933  DOS: 22  MRN: [id number]    Sports Medicine Clinic Visit    PCP: Cynthia Maddox    Daniela Brown is a 89 year old female who is seen in follow-up presenting with acute on chronic left knee pain.    Interim History - 2022  Since last visit on 3/10/2022 patient has moderate-severe left knee with swelling after falling twice @ home in the past 2 weeks, most recently on 3/31/22 landing directly on her knees.  Left knee SynviscOne injection completed on 3/10/22 was providing good relief until her falls.  She notes increased pain with going from sit to stand and walking.  Moderate joint effusion noted today.  She would like repeat imaging to rule out new injury.    Interim History - 2022  Since last visit on 2022 patient has moderate left knee and swelling over the past ~ 3+ weeks.  Left knee aspiration and steroid injection completed on 22 provided good relief for ~ 4 weeks.  Patient has attended two sessions of physical therapy at this point.  Relying on cane more for ambulation.  No new injury in the interim.      Review of Systems  Musculoskeletal: as above  Remainder of review of systems is negative including constitutional, CV, pulmonary, GI, Skin and Neurologic except as noted in HPI or medical history.    Past Medical History:   Diagnosis Date     Abnormal cardiovascular stress test 2/3/2019    9/10/2018 Lexiscan: \"Myocardial perfusion imaging using single isotope technique demonstrated a small perfusion defect of mild severity involving the basal inferior wall which is mostly reversible and may be consistent with mild ischemia in the right coronary artery distribution. In addition, transient ischemic dilatation is noted with a TID ratio of 1.3. \"     Adhesive capsulitis of shoulder     left      Adjustment disorder with anxiety 3/18/2016     B12 deficiency anemia 2012     Chest pain 2004    Chronic right " "sided/axillary discomfort (musculoskeletal) Atypical substernal (possibly GERD). Negative cardiolite 2004.     Colitis, Clostridium difficile 4/18/2012     Diverticulitis 2009     Headache(784.0) 2001    Tension type. MRI 2001 normal.     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     Status post coronary angiogram 2/27/2019     Syncope and collapse 9/10/2018     Past Surgical History:   Procedure Laterality Date     ARTHROPLASTY KNEE  3/26/2012    Procedure:ARTHROPLASTY KNEE; Right Total Knee Arthroplasty; Surgeon:BERNADINE ROSAS; Location:[place] OR     CHOLECYSTECTOMY       CV HEART CATHETERIZATION WITH POSSIBLE INTERVENTION N/A 2/27/2019    Procedure: Coronary Angiogram with Left ventriculogram;  Surgeon: Leandro Carpio MD;  Location:  HEART CARDIAC CATH LAB     HERNIA REPAIR      Hernia Repair     HYSTERECTOMY, PAP NO LONGER INDICATED  1983    TVH/BSO     SURGICAL HISTORY OF -   10/08    A & P Repair, Dr. Hannah     Z APPENDECTOMY  1953     Family History   Problem Relation Age of Onset     C.A.D. Mother      Diabetes Mother      Cancer - colorectal Mother      Arthritis Mother      Heart Disease Mother      Lipids Brother      Obesity Brother      Hypertension Brother      Allergies Son      Eye Disorder Son         cataract     Thyroid Disease Sister         graves dz     Eye Disorder Son      Leukemia Son      Alcohol/Drug Father      Objective  BP (!) 166/76   Ht 1.626 m (5' 4\")   Wt 72.1 kg (159 lb)   LMP  (LMP Unknown)   BMI 27.29 kg/m        General: healthy, alert and in no distress      HEENT: no scleral icterus or conjunctival erythema     Skin: no suspicious lesions or rash. No jaundice.     CV: regular rhythm by palpation, 2+ distal pulses, no pedal edema      Resp: normal respiratory effort without conversational dyspnea     Psych: normal mood " and affect      Gait: antalgic, appropriate coordination and balance     Neuro: normal light touch sensory exam of the extremities. Motor strength as noted below     Left Knee exam - similar to previous    ROM:        Flexion lacks 20 degrees       Extension lacks 5 degrees    Inspection:       no visible ecchymosis        effusion noted small/moderate by palpation    Skin:       no visible deformities       well perfused       capillary refill brisk    Patellar Motion:        Normal patellar tracking noted through range of motion       Crepitus noted in the patellofemoral joint    Tender:        lateral patellar border       medial joint line       lateral joint line    Non Tender:         remainder of knee area    Special Tests:        neg (-) varus at 0 deg and 30 deg       neg (-) valgus at 0 deg and 30 deg    Radiology  Recent Results (from the past 744 hour(s))   XR Knee Standing AP Bilat [place] Bilat Lat Left    Narrative    KNEE STANDING AP BILATERAL SUNRISE BILATERAL LATERAL LEFT  4/21/2022  1:32 PM     HISTORY: Left knee pain. Fall at home.    COMPARISON: 2/16/2022 x-ray.      Impression    IMPRESSION: Right total knee arthroplasty in place. No evidence of  complication. Advanced medial and mild to moderate lateral compartment  joint space narrowing. Mild-to-moderate patellofemoral degenerative  changes with very small joint effusion. No acute fracture. No  significant change.    BIJU GALICIA MD         SYSTEM ID:  [id number]         Large Joint Injection/Arthocentesis: L knee joint    Date/Time: 6/16/2022 2:10 PM  Performed by: Tho Newman DO  Authorized by: Tho Newman DO     Indications:  Pain, osteoarthritis and joint swelling  Needle Size:  21 G  Guidance: ultrasound    Location:  Knee      Medications:  3 mL ropivacaine 5 MG/ML; 40 mg triamcinolone 40 MG/ML  Aspirate amount (mL):  19  Aspirate:  Serous and yellow  Outcome:  Tolerated well, no immediate  complications  Procedure discussed: discussed risks, benefits, and alternatives    Consent Given by:  Patient  Timeout: timeout called immediately prior to procedure    Prep: patient was prepped and draped in usual sterile fashion     Ultrasound images of procedure were permanently stored.         Assessment:  1. Primary osteoarthritis of left knee    2. Effusion of left knee        Plan:  Discussed the assessment with the patient.  Follow up: 2 weeks if still in significant pain, otherwise as needed  Recent fall and exacerbation of underlying OA, no concerning clinical or radiographic signs of fracture  XR images independently visualized and reviewed with patient today in clinic  Assistive device options reviewed  PT options reviewed  Oral Tylenol and topical Voltaren gel reviewed as safe OTC options, reviewed safe dosing strategies  US guided CSI with aspiration repeated today, good relief for over a month last time  We reviewed that its only been 2 months since her last injection which means we are repeating this injection early, and while making an exception today this should not be a long-term plan  Again reviewed the option today for consulting with orthopedic surgery for consideration of TKA based on clinical progress  RICE and compression options reviewed  Expectations and goals of CSI reviewed  Often 2-3 days for steroid effect, and can take up to two weeks for maximum effect  We discussed modified progressive pain-free activity as tolerated  Do not overuse in first two weeks if feeling better due to concern for vulnerability while steroid is working  Supportive care reviewed  All questions were answered today  Contact us with additional questions or concerns  Signs and sx of concern reviewed      Tho Newman DO, OZZIE  Sports Medicine Physician  [facility]              Disclaimer: This note consists of symbols derived from keyboarding, dictation and/or voice recognition  software. As a result, there may be errors in the script that have gone undetected. Please consider this when interpreting information found in this chart.   99.8

## 2022-06-24 ENCOUNTER — HOSPITAL ENCOUNTER (OUTPATIENT)
Dept: PHYSICAL THERAPY | Facility: CLINIC | Age: 87
Setting detail: THERAPIES SERIES
Discharge: HOME | End: 2022-06-24
Attending: NURSE PRACTITIONER
Payer: MEDICARE

## 2022-06-24 PROCEDURE — 97110 THERAPEUTIC EXERCISES: CPT | Mod: GP | Performed by: PHYSICAL THERAPIST

## 2022-06-24 PROCEDURE — 97112 NEUROMUSCULAR REEDUCATION: CPT | Mod: GP | Performed by: PHYSICAL THERAPIST

## 2022-06-29 ENCOUNTER — HOSPITAL ENCOUNTER (OUTPATIENT)
Dept: PHYSICAL THERAPY | Facility: CLINIC | Age: 87
Setting detail: THERAPIES SERIES
Discharge: HOME | End: 2022-06-29
Attending: NURSE PRACTITIONER
Payer: MEDICARE

## 2022-06-29 ENCOUNTER — TELEPHONE (OUTPATIENT)
Dept: FAMILY MEDICINE | Facility: CLINIC | Age: 87
End: 2022-06-29

## 2022-06-29 PROCEDURE — 97110 THERAPEUTIC EXERCISES: CPT | Mod: GP | Performed by: PHYSICAL THERAPIST

## 2022-06-29 NOTE — TELEPHONE ENCOUNTER
Patient called and has frequency of urination and it hurts to urinate.  NO fever  No blood in urine.  She was treated for over active bladder in May.  I checked and there is no openings in [place] with any provider.  Pt will go to [facility] today after in PT appt at [place].

## 2022-07-01 ENCOUNTER — HOSPITAL ENCOUNTER (OUTPATIENT)
Dept: PHYSICAL THERAPY | Facility: CLINIC | Age: 87
Setting detail: THERAPIES SERIES
Discharge: HOME | End: 2022-07-01
Attending: NURSE PRACTITIONER
Payer: MEDICARE

## 2022-07-01 PROCEDURE — 97110 THERAPEUTIC EXERCISES: CPT | Mod: GP | Performed by: PHYSICAL THERAPIST

## 2022-07-01 PROCEDURE — 97112 NEUROMUSCULAR REEDUCATION: CPT | Mod: GP | Performed by: PHYSICAL THERAPIST

## 2022-07-21 ENCOUNTER — VIRTUAL VISIT (OUTPATIENT)
Dept: FAMILY MEDICINE | Facility: CLINIC | Age: 87
End: 2022-07-21
Payer: MEDICARE

## 2022-07-21 DIAGNOSIS — M25.562 CHRONIC PAIN OF LEFT KNEE: Primary | ICD-10-CM

## 2022-07-21 DIAGNOSIS — N32.81 OAB (OVERACTIVE BLADDER): ICD-10-CM

## 2022-07-21 DIAGNOSIS — G89.29 CHRONIC PAIN OF LEFT KNEE: Primary | ICD-10-CM

## 2022-07-21 PROCEDURE — 99214 OFFICE O/P EST MOD 30 MIN: CPT | Mod: 95 | Performed by: INTERNAL MEDICINE

## 2022-07-21 RX ORDER — MIRABEGRON 25 MG/1
25 TABLET, FILM COATED, EXTENDED RELEASE ORAL DAILY
Qty: 30 TABLET | Refills: 3 | Status: SHIPPED | OUTPATIENT
Start: 2022-07-21 | End: 2022-08-17

## 2022-07-21 NOTE — PROGRESS NOTES
"Sivan is a 89 year old who is being evaluated via a billable telephone visit.      What phone number would you like to be contacted at? [phone]  How would you like to obtain your AVS? MyChart    Assessment & Plan     Chronic pain of left knee - want to avoid opiates due to benzo, frequent falls.  No contraindication for NSAID.  Use concurrently with Tylenol.  Patient wrote down instructions  - diclofenac (VOLTAREN) 50 MG EC tablet; Take 1 tablet (50 mg) by mouth 3 times daily for 10 days    OAB (overactive bladder) - may raise blood pressure.  Patient will monitor at home  - mirabegron (MYRBETRIQ) 25 MG 24 hr tablet; Take 1 tablet (25 mg) by mouth daily             BMI:   Estimated body mass index is 27.29 kg/m  as calculated from the following:    Height as of 6/16/22: 1.626 m (5' 4\").    Weight as of 6/16/22: 72.1 kg (159 lb).       Patient Instructions   Knee Pain:  1. Start Diclofenac 50 mg up to 3 x day for 10 days.  Take with food; do not use any over the counter NSAID (ibuprofen/Advil, naproxen) because of similar side effects   2. Take Tylenol 1000 mg three times day  3. Continue topicals, heat, ice    Bladder:  1. Start mirabegron (Myrbetriq) 25 mg daily;  can increase dose after 1 month if needed  2. Can sometimes raise the blood pressure a bit;  if you are seeing your blood pressure at home frequently > 140, please follow-up with DR. Maddox        No follow-ups on file.    Cynthia Maddox DO  [facility]    Subjective   Sivan is a 89 year old, presenting for the following health issues:  Knee Pain (Would like pain med )      HPI     Pain History:  When did you first notice your pain? - Chronic Pain   Have you seen anyone else for your pain? No  Where in your body do you have pain? Musculoskeletal problem/pain  Onset/Duration: ongoing  Description  Location: knee - left  Joint Swelling: YES  Redness: No  Pain: YES  Warmth: No  Intensity:  moderate, severe  Progression of " Symptoms:  same and constant  Accompanying signs and symptoms:   Fevers: No  Numbness/tingling/weakness: No  History  Trauma to the area: No  Recent illness:  No  Previous similar problem: No  Previous evaluation:  No  Precipitating or alleviating factors:  Aggravating factors include: standing, walking and climbing stairs  Therapies tried and outcome: copper brace, ice, heat and bio freeze, Tylenol 500-1000 mg HS, ibuprofen only occ - doesn't think the Tylenol or Ibuprofen are very helpful but also is not using regularly  --she falls frequently (none in the last few months) has chronic weakness/fatigue. She is on chronic benzos, occ use  --she is currently participating in physical therapy but hasn't been able to attend due to severity of pain  --saw Sports Med 4/21 for effusion, had aspiration and steroid shot in left knee; has follow-up scheduled 8/2  --today she reports worsening of pain in the knee  --pain is interfering with sleep  --the swelling in the knee has returned    OAB:  --has urgency daron at night  --was seen 5/17, given oxybutynin and did not help, so stopped after 3 weeks  --she checks blood pressure time to time, usually  --doesn't think she has a good memory    Current Outpatient Medications   Medication Sig Dispense Refill     amLODIPine (NORVASC) 5 MG tablet Take 1 tablet (5 mg) by mouth daily 90 tablet 3     blood glucose (ACCU-CHEK GUIDE) test strip Use to test blood sugar one times daily or as directed. 100 each 1     blood glucose monitoring (ACCU-CHEK FASTCLIX) lancets Use to test blood sugar one times daily or as directed. 102 each 3     Cyanocobalamin (B-12) 1000 MCG SUBL Place 1 tablet under the tongue every evening        dicyclomine (BENTYL) 20 MG tablet Take 1 tablet (20 mg) by mouth 4 times daily as needed (diarrhea) 60 tablet 0     LORazepam (ATIVAN) 0.5 MG tablet Take 1 tablet (0.5 mg) by mouth daily as needed for anxiety (for short term only, do not take more than one per day) 15  tablet 1     Multiple Vitamins-Minerals (THERAPEUTIC MULTIVIT/MINERAL) TABS Take 1 tablet by mouth every evening        nitroGLYcerin (NITROSTAT) 0.4 MG sublingual tablet Place 1 tablet (0.4 mg) under the tongue every 5 minutes as needed for chest pain 25 tablet 4     order for DME 1 walker 1 Device 0     ORDER FOR DME Thigh length teds. 1 Device 2     oxybutynin (DITROPAN) 5 MG tablet Take 0.5 tablets (2.5 mg) by mouth 2 times daily 30 tablet 1     STATIN NOT PRESCRIBED (INTENTIONAL) Please choose reason not prescribed, below       VITAMIN D, CHOLECALCIFEROL, PO Take 1,000 Units by mouth daily       prednisoLONE acetate (PRED FORTE) 1 % ophthalmic suspension instill 1 Drop by ophthalmic route 2x daily into both eyes for 1-2 weeks, then discontinue (Patient not taking: Reported on 7/21/2022)       pregabalin (LYRICA) 50 MG capsule Take 1 capsule (50 mg) by mouth 2 times daily (Patient not taking: Reported on 7/21/2022) 60 capsule 0     sertraline (ZOLOFT) 25 MG tablet Take 1 tablet (25 mg) by mouth daily (Patient not taking: Reported on 7/21/2022) 30 tablet 3         Review of Systems   Constitutional, HEENT, cardiovascular, pulmonary, gi and gu systems are negative, except as otherwise noted.      Objective           Vitals:  No vitals were obtained today due to virtual visit.    Physical Exam   healthy, alert and no distress  PSYCH: Alert and oriented times 3; coherent speech, normal   rate and volume, able to articulate logical thoughts, able   to abstract reason, no tangential thoughts, no hallucinations   or delusions  Her affect is normal  RESP: No cough, no audible wheezing, able to talk in full sentences  Remainder of exam unable to be completed due to telephone visits                Phone call duration: 22 minutes    .  ..

## 2022-07-21 NOTE — PATIENT INSTRUCTIONS
Knee Pain:  Start Diclofenac 50 mg up to 3 x day for 10 days.  Take with food; do not use any over the counter NSAID (ibuprofen/Advil, naproxen) because of similar side effects   Take Tylenol 1000 mg three times day  Continue topicals, heat, ice    Bladder:  Start mirabegron (Myrbetriq) 25 mg daily;  can increase dose after 1 month if needed  Can sometimes raise the blood pressure a bit;  if you are seeing your blood pressure at home frequently > 140, please follow-up with DR. Maddox

## 2022-08-02 ENCOUNTER — OFFICE VISIT (OUTPATIENT)
Dept: ORTHOPEDICS | Facility: CLINIC | Age: 87
End: 2022-08-02
Payer: MEDICARE

## 2022-08-02 VITALS — SYSTOLIC BLOOD PRESSURE: 157 MMHG | DIASTOLIC BLOOD PRESSURE: 74 MMHG | WEIGHT: 159 LBS | BODY MASS INDEX: 27.29 KG/M2

## 2022-08-02 DIAGNOSIS — M17.12 PRIMARY OSTEOARTHRITIS OF LEFT KNEE: ICD-10-CM

## 2022-08-02 DIAGNOSIS — M25.511 ACUTE PAIN OF RIGHT SHOULDER: Primary | ICD-10-CM

## 2022-08-02 DIAGNOSIS — M25.619 LIMITED RANGE OF MOTION OF SHOULDER: ICD-10-CM

## 2022-08-02 DIAGNOSIS — M25.462 EFFUSION OF LEFT KNEE: ICD-10-CM

## 2022-08-02 PROCEDURE — 99213 OFFICE O/P EST LOW 20 MIN: CPT | Mod: 25 | Performed by: FAMILY MEDICINE

## 2022-08-02 PROCEDURE — 20611 DRAIN/INJ JOINT/BURSA W/US: CPT | Mod: LT | Performed by: FAMILY MEDICINE

## 2022-08-02 PROCEDURE — 20611 DRAIN/INJ JOINT/BURSA W/US: CPT | Mod: RT | Performed by: FAMILY MEDICINE

## 2022-08-02 RX ORDER — TRIAMCINOLONE ACETONIDE 40 MG/ML
40 INJECTION, SUSPENSION INTRA-ARTICULAR; INTRAMUSCULAR
Status: DISCONTINUED | OUTPATIENT
Start: 2022-08-02 | End: 2022-08-08

## 2022-08-02 RX ORDER — ROPIVACAINE HYDROCHLORIDE 5 MG/ML
3 INJECTION, SOLUTION EPIDURAL; INFILTRATION; PERINEURAL
Status: DISCONTINUED | OUTPATIENT
Start: 2022-08-02 | End: 2022-08-08

## 2022-08-02 RX ADMIN — ROPIVACAINE HYDROCHLORIDE 3 ML: 5 INJECTION, SOLUTION EPIDURAL; INFILTRATION; PERINEURAL at 13:50

## 2022-08-02 RX ADMIN — TRIAMCINOLONE ACETONIDE 40 MG: 40 INJECTION, SUSPENSION INTRA-ARTICULAR; INTRAMUSCULAR at 13:50

## 2022-08-02 ASSESSMENT — PAIN SCALES - GENERAL: PAINLEVEL: SEVERE PAIN (6)

## 2022-08-02 NOTE — PROGRESS NOTES
"Daniela Brown  :  1933  DOS: 2022  MRN: [id number]    Sports Medicine Clinic Visit    PCP: Cynthia Maddox    Daniela Brown is a 89 year old Right hand dominant female who is seen in follow-up presenting with left knee pain and a new concern of right shoulder pain.    Injury: About a month ago, started to notice right shoulder pain and lack of ROM.  Pain located over lateral right shoulder pain with radiation into her right wrist. Additional Features:  Positive: lack of ROM and painful.  Symptoms are better with nothing.  Symptoms are worse with: gripping and lifting.  Other evaluation and/or treatments so far consists of: voltaren with little relief.  Recent imaging completed: No recent imaging completed.  Prior History of related problems: no    She also notes her left knee is painful once again. She is currently taking medication for her arthritis which has been helpful for her knees. Pain with walking. Corticosteroid injection of left knee performed on 22 allowed for 2 weeks of relief.     Social History: retired    Review of Systems  Musculoskeletal: as above  Remainder of review of systems is negative including constitutional, CV, pulmonary, GI, Skin and Neurologic except as noted in HPI or medical history.    Past Medical History:   Diagnosis Date     Abnormal cardiovascular stress test 2/3/2019    9/10/2018 Lexiscan: \"Myocardial perfusion imaging using single isotope technique demonstrated a small perfusion defect of mild severity involving the basal inferior wall which is mostly reversible and may be consistent with mild ischemia in the right coronary artery distribution. In addition, transient ischemic dilatation is noted with a TID ratio of 1.3. \"     Adhesive capsulitis of shoulder     left      Adjustment disorder with anxiety 3/18/2016     B12 deficiency anemia 2012     Chest pain 2004    Chronic right sided/axillary discomfort (musculoskeletal) Atypical " substernal (possibly GERD). Negative cardiolite 2004.     Colitis, Clostridium difficile 4/18/2012     Diverticulitis 2009     Headache(784.0) 2001    Tension type. MRI 2001 normal.     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     Status post coronary angiogram 2/27/2019     Syncope and collapse 9/10/2018     Past Surgical History:   Procedure Laterality Date     ARTHROPLASTY KNEE  3/26/2012    Procedure:ARTHROPLASTY KNEE; Right Total Knee Arthroplasty; Surgeon:BERNADINE ROSAS; Location:[place] OR     CHOLECYSTECTOMY       CV HEART CATHETERIZATION WITH POSSIBLE INTERVENTION N/A 2/27/2019    Procedure: Coronary Angiogram with Left ventriculogram;  Surgeon: Leandro Carpio MD;  Location:  HEART CARDIAC CATH LAB     HERNIA REPAIR      Hernia Repair     HYSTERECTOMY, PAP NO LONGER INDICATED  1983    TVH/BSO     SURGICAL HISTORY OF -   10/08    A & P Repair, Dr. Hannah     ZC APPENDECTOMY  1953     Family History   Problem Relation Age of Onset     C.A.D. Mother      Diabetes Mother      Cancer - colorectal Mother      Arthritis Mother      Heart Disease Mother      Lipids Brother      Obesity Brother      Hypertension Brother      Allergies Son      Eye Disorder Son         cataract     Thyroid Disease Sister         graves dz     Eye Disorder Son      Leukemia Son      Alcohol/Drug Father        Objective  BP (!) 157/74   Wt 72.1 kg (159 lb)   LMP  (LMP Unknown)   BMI 27.29 kg/m        General: healthy, alert and in no distress      HEENT: no scleral icterus or conjunctival erythema     Skin: no suspicious lesions or rash. No jaundice.     CV: regular rhythm by palpation, 2+ distal pulses, no pedal edema      Resp: normal respiratory effort without conversational dyspnea     Psych: normal mood and affect      Gait: nonantalgic, appropriate coordination and balance      Neuro: normal light touch sensory exam of the extremities. Motor strength as noted below       Right Shoulder exam    ROM:        forward flexion 90        abduction 90       internal rotation limited       external rotation 20    Tender:        subacromial space mild/moderate       posterior shoulder       Anterior GH joint    Non Tender:       remainder of shoulder       sternoclavicular joint       acromioclavicular joint       periscapular region    Strength:        abduction 4/5       internal rotation 4/5       external rotation 4/5       adduction 4/5    Impingement testing:        neg (-) Neer       Mildly painful Pritchard       Mildly painful empty can       positive (+) crossover       positive (+) crank    Stability testing:       neg (-) anterior glide       neg (-) sulcus sign    Skin:       no visible deformities       well perfused       capillary refill brisk    Sensation:        normal sensation over shoulder and upper extremity       Radiology  Recent Results (from the past 744 hour(s))   XR Shoulder Right G/E 3 Views    Narrative    SHOULDER RIGHT THREE OR MORE VIEWS   8/2/2022 1:38 PM     HISTORY: Acute pain of right shoulder.    COMPARISON: None.      Impression    IMPRESSION: Small amount of calcification just distal to the lateral  tip of the acromion process. This could be within the  supraspinatus/infraspinatus tendon or overlying subacromial/subdeltoid  bursa, so clinical correlation for possible bursitis needed. Mild  osteoarthrosis of the AC joint. Diffuse bone demineralization.  Otherwise negative.    NADINE HARO MD         SYSTEM ID:  [id number]     KNEE STANDING AP BILATERAL SUNRISE BILATERAL LATERAL LEFT  4/21/2022  1:32 PM      HISTORY: Left knee pain. Fall at home.     COMPARISON: 2/16/2022 x-ray.         Impression     IMPRESSION: Right total knee arthroplasty in place. No evidence of  complication. Advanced medial and mild to moderate lateral compartment  joint space  narrowing. Mild-to-moderate patellofemoral degenerative  changes with very small joint effusion. No acute fracture. No  significant change.     BIJU GALICIA MD        Large Joint Injection/Arthocentesis: L knee joint    Date/Time: 8/2/2022 1:50 PM  Performed by: Tho Newman DO  Authorized by: Tho Newman DO     Indications:  Pain  Needle Size:  21 G  Guidance: ultrasound    Approach:  Superolateral  Location:  Knee      Aspirate amount (mL):  17  Aspirate:  Clear and yellow  Outcome:  Tolerated well, no immediate complications  Procedure discussed: discussed risks, benefits, and alternatives    Consent Given by:  Patient  Timeout: timeout called immediately prior to procedure    Prep: patient was prepped and draped in usual sterile fashion     Ultrasound images of procedure were permanently stored.     Large Joint Injection/Arthocentesis: R glenohumeral joint    Date/Time: 8/2/2022 1:50 PM  Performed by: Tho Newman DO  Authorized by: Tho Newman DO     Indications:  Pain  Needle Size:  21 G  Guidance: ultrasound    Approach:  Posterolateral  Location:  Shoulder      Site:  R glenohumeral joint  Medications:  3 mL ropivacaine 5 MG/ML; 40 mg triamcinolone 40 MG/ML  Outcome:  Tolerated well, no immediate complications  Procedure discussed: discussed risks, benefits, and alternatives    Consent Given by:  Patient  Timeout: timeout called immediately prior to procedure    Prep: patient was prepped and draped in usual sterile fashion     Ultrasound images of procedure were permanently stored.           Assessment:  1. Acute pain of right shoulder    2. Limited range of motion of shoulder    3. Primary osteoarthritis of left knee    4. Effusion of left knee        Plan:  Discussed the assessment with the patient.  Follow up: As needed based on short-term clinical progress  Recurrence of left knee pain, combination of viscosupplementation and corticosteroid helped for 2  to 4 weeks, now swelling is returned  Reviewed desire to avoid repeated corticosteroid injections in a short period of time, defer injection for now  Ultrasound-guided aspiration of the left knee joint performed today with immediate relief from pressure and pain, can be repeated in the future if needed  Ultrasound-guided left shoulder glenohumeral joint injection performed today as well  XR images independently visualized and reviewed with patient today in clinic  Chronic calcification about the rotator cuff noted, mild joint space narrowing consistent with osteoarthritis, shoulder symptoms today more concerning for joint irritation than rotator cuff  Can consider alternative injection locations in the future based on progress  Physical therapy options and low impact activity strategies reviewed  Expectations and goals of CSI reviewed  Often 2-3 days for steroid effect, and can take up to two weeks for maximum effect  We discussed modified progressive pain-free activity as tolerated  Do not overuse in first two weeks if feeling better due to concern for vulnerability while steroid is working  Supportive care reviewed  All questions were answered today  Contact us with additional questions or concerns  Signs and sx of concern reviewed      Tho Newman DO, OZZIE  Sports Medicine Physician  [facility]          Disclaimer: This note consists of symbols derived from keyboarding, dictation and/or voice recognition software. As a result, there may be errors in the script that have gone undetected. Please consider this when interpreting information found in this chart.

## 2022-08-02 NOTE — LETTER
"    2022         RE: Daniela Brown  [address] [address] [id number]        Dear Colleague,    Thank you for referring your patient, Daniela Brown, to the [facility] [place]. Please see a copy of my visit note below.    Daniela Brown  :  1933  DOS: 2022  MRN: [id number]    Sports Medicine Clinic Visit    PCP: Cynthia Maddox    Daniela Brown is a 89 year old Right hand dominant female who is seen in follow-up presenting with left knee pain and a new concern of right shoulder pain.    Injury: About a month ago, started to notice right shoulder pain and lack of ROM.  Pain located over lateral right shoulder pain with radiation into her right wrist. Additional Features:  Positive: lack of ROM and painful.  Symptoms are better with nothing.  Symptoms are worse with: gripping and lifting.  Other evaluation and/or treatments so far consists of: voltaren with little relief.  Recent imaging completed: No recent imaging completed.  Prior History of related problems: no    She also notes her left knee is painful once again. She is currently taking medication for her arthritis which has been helpful for her knees. Pain with walking. Corticosteroid injection of left knee performed on 22 allowed for 2 weeks of relief.     Social History: retired    Review of Systems  Musculoskeletal: as above  Remainder of review of systems is negative including constitutional, CV, pulmonary, GI, Skin and Neurologic except as noted in HPI or medical history.    Past Medical History:   Diagnosis Date     Abnormal cardiovascular stress test 2/3/2019    9/10/2018 Lexiscan: \"Myocardial perfusion imaging using single isotope technique demonstrated a small perfusion defect of mild severity involving the basal inferior wall which is mostly reversible and may be consistent with mild ischemia in the right coronary artery distribution. In addition, " "transient ischemic dilatation is noted with a TID ratio of 1.3. \"     Adhesive capsulitis of shoulder     left      Adjustment disorder with anxiety 3/18/2016     B12 deficiency anemia 6/20/2012     Chest pain 2004    Chronic right sided/axillary discomfort (musculoskeletal) Atypical substernal (possibly GERD). Negative cardiolite 2004.     Colitis, Clostridium difficile 4/18/2012     Diverticulitis 2009     Headache(784.0) 2001    Tension type. MRI 2001 normal.     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     Status post coronary angiogram 2/27/2019     Syncope and collapse 9/10/2018     Past Surgical History:   Procedure Laterality Date     ARTHROPLASTY KNEE  3/26/2012    Procedure:ARTHROPLASTY KNEE; Right Total Knee Arthroplasty; Surgeon:BENRADINE ROSAS; Location:[place] OR     CHOLECYSTECTOMY       CV HEART CATHETERIZATION WITH POSSIBLE INTERVENTION N/A 2/27/2019    Procedure: Coronary Angiogram with Left ventriculogram;  Surgeon: Leandro Carpio MD;  Location:  HEART CARDIAC CATH LAB     HERNIA REPAIR      Hernia Repair     HYSTERECTOMY, PAP NO LONGER INDICATED  1983    TVH/BSO     SURGICAL HISTORY OF -   10/08    A & P Repair, Dr. Hannah     ZZC APPENDECTOMY  1953     Family History   Problem Relation Age of Onset     C.A.D. Mother      Diabetes Mother      Cancer - colorectal Mother      Arthritis Mother      Heart Disease Mother      Lipids Brother      Obesity Brother      Hypertension Brother      Allergies Son      Eye Disorder Son         cataract     Thyroid Disease Sister         graves dz     Eye Disorder Son      Leukemia Son      Alcohol/Drug Father        Objective  BP (!) 157/74   Wt 72.1 kg (159 lb)   LMP  (LMP Unknown)   BMI 27.29 kg/m        General: healthy, alert and in no distress      HEENT: no scleral icterus or conjunctival erythema "     Skin: no suspicious lesions or rash. No jaundice.     CV: regular rhythm by palpation, 2+ distal pulses, no pedal edema      Resp: normal respiratory effort without conversational dyspnea     Psych: normal mood and affect      Gait: nonantalgic, appropriate coordination and balance     Neuro: normal light touch sensory exam of the extremities. Motor strength as noted below       Right Shoulder exam    ROM:        forward flexion 90        abduction 90       internal rotation limited       external rotation 20    Tender:        subacromial space mild/moderate       posterior shoulder       Anterior GH joint    Non Tender:       remainder of shoulder       sternoclavicular joint       acromioclavicular joint       periscapular region    Strength:        abduction 4/5       internal rotation 4/5       external rotation 4/5       adduction 4/5    Impingement testing:        neg (-) Neer       Mildly painful Pritchard       Mildly painful empty can       positive (+) crossover       positive (+) crank    Stability testing:       neg (-) anterior glide       neg (-) sulcus sign    Skin:       no visible deformities       well perfused       capillary refill brisk    Sensation:        normal sensation over shoulder and upper extremity       Radiology  Recent Results (from the past 744 hour(s))   XR Shoulder Right G/E 3 Views    Narrative    SHOULDER RIGHT THREE OR MORE VIEWS   8/2/2022 1:38 PM     HISTORY: Acute pain of right shoulder.    COMPARISON: None.      Impression    IMPRESSION: Small amount of calcification just distal to the lateral  tip of the acromion process. This could be within the  supraspinatus/infraspinatus tendon or overlying subacromial/subdeltoid  bursa, so clinical correlation for possible bursitis needed. Mild  osteoarthrosis of the AC joint. Diffuse bone demineralization.  Otherwise negative.    NADINE HARO MD         SYSTEM ID:  [id number]     KNEE STANDING AP BILATERAL SUNRISE BILATERAL  LATERAL LEFT  4/21/2022  1:32 PM      HISTORY: Left knee pain. Fall at home.     COMPARISON: 2/16/2022 x-ray.         Impression     IMPRESSION: Right total knee arthroplasty in place. No evidence of  complication. Advanced medial and mild to moderate lateral compartment  joint space narrowing. Mild-to-moderate patellofemoral degenerative  changes with very small joint effusion. No acute fracture. No  significant change.     BIJU GALICIA MD        Large Joint Injection/Arthocentesis: L knee joint    Date/Time: 8/2/2022 1:50 PM  Performed by: Tho Newman DO  Authorized by: Tho Newman DO     Indications:  Pain  Needle Size:  21 G  Guidance: ultrasound    Approach:  Superolateral  Location:  Knee      Aspirate amount (mL):  17  Aspirate:  Clear and yellow  Outcome:  Tolerated well, no immediate complications  Procedure discussed: discussed risks, benefits, and alternatives    Consent Given by:  Patient  Timeout: timeout called immediately prior to procedure    Prep: patient was prepped and draped in usual sterile fashion     Ultrasound images of procedure were permanently stored.     Large Joint Injection/Arthocentesis: R glenohumeral joint    Date/Time: 8/2/2022 1:50 PM  Performed by: Tho Newman DO  Authorized by: Tho Newman DO     Indications:  Pain  Needle Size:  21 G  Guidance: ultrasound    Approach:  Posterolateral  Location:  Shoulder      Site:  R glenohumeral joint  Medications:  3 mL ropivacaine 5 MG/ML; 40 mg triamcinolone 40 MG/ML  Outcome:  Tolerated well, no immediate complications  Procedure discussed: discussed risks, benefits, and alternatives    Consent Given by:  Patient  Timeout: timeout called immediately prior to procedure    Prep: patient was prepped and draped in usual sterile fashion     Ultrasound images of procedure were permanently stored.           Assessment:  1. Acute pain of right shoulder    2. Limited range of motion of shoulder     3. Primary osteoarthritis of left knee    4. Effusion of left knee        Plan:  Discussed the assessment with the patient.  Follow up: As needed based on short-term clinical progress  Recurrence of left knee pain, combination of viscosupplementation and corticosteroid helped for 2 to 4 weeks, now swelling is returned  Reviewed desire to avoid repeated corticosteroid injections in a short period of time, defer injection for now  Ultrasound-guided aspiration of the left knee joint performed today with immediate relief from pressure and pain, can be repeated in the future if needed  Ultrasound-guided left shoulder glenohumeral joint injection performed today as well  XR images independently visualized and reviewed with patient today in clinic  Chronic calcification about the rotator cuff noted, mild joint space narrowing consistent with osteoarthritis, shoulder symptoms today more concerning for joint irritation than rotator cuff  Can consider alternative injection locations in the future based on progress  Physical therapy options and low impact activity strategies reviewed  Expectations and goals of CSI reviewed  Often 2-3 days for steroid effect, and can take up to two weeks for maximum effect  We discussed modified progressive pain-free activity as tolerated  Do not overuse in first two weeks if feeling better due to concern for vulnerability while steroid is working  Supportive care reviewed  All questions were answered today  Contact us with additional questions or concerns  Signs and sx of concern reviewed      Tho Newman DO, OZZIE  Sports Medicine Physician  [facility]          Disclaimer: This note consists of symbols derived from keyboarding, dictation and/or voice recognition software. As a result, there may be errors in the script that have gone undetected. Please consider this when interpreting information found in this chart.      Again, thank you for allowing me to  participate in the care of your patient.        Sincerely,        Tho Newman, DO

## 2022-08-07 ENCOUNTER — APPOINTMENT (OUTPATIENT)
Dept: ULTRASOUND IMAGING | Facility: HOSPITAL | Age: 87
End: 2022-08-07
Attending: INTERNAL MEDICINE
Payer: MEDICARE

## 2022-08-07 ENCOUNTER — APPOINTMENT (OUTPATIENT)
Dept: RADIOLOGY | Facility: HOSPITAL | Age: 87
End: 2022-08-07
Attending: STUDENT IN AN ORGANIZED HEALTH CARE EDUCATION/TRAINING PROGRAM
Payer: MEDICARE

## 2022-08-07 ENCOUNTER — APPOINTMENT (OUTPATIENT)
Dept: CARDIOLOGY | Facility: HOSPITAL | Age: 87
End: 2022-08-07
Attending: INTERNAL MEDICINE
Payer: MEDICARE

## 2022-08-07 ENCOUNTER — HOSPITAL ENCOUNTER (OUTPATIENT)
Facility: HOSPITAL | Age: 87
Setting detail: OBSERVATION
Discharge: HOME | End: 2022-08-08
Attending: EMERGENCY MEDICINE | Admitting: INTERNAL MEDICINE
Payer: MEDICARE

## 2022-08-07 ENCOUNTER — APPOINTMENT (OUTPATIENT)
Dept: CT IMAGING | Facility: HOSPITAL | Age: 87
End: 2022-08-07
Attending: EMERGENCY MEDICINE
Payer: MEDICARE

## 2022-08-07 DIAGNOSIS — I25.119 CORONARY ARTERY DISEASE INVOLVING NATIVE CORONARY ARTERY OF NATIVE HEART WITH ANGINA PECTORIS (H): Primary | Chronic | ICD-10-CM

## 2022-08-07 DIAGNOSIS — R07.9 CHEST PAIN, UNSPECIFIED TYPE: ICD-10-CM

## 2022-08-07 LAB
ANION GAP SERPL CALCULATED.3IONS-SCNC: 11 MMOL/L (ref 5–18)
BASOPHILS # BLD MANUAL: 0 10E3/UL (ref 0–0.2)
BASOPHILS NFR BLD MANUAL: 0 %
BUN SERPL-MCNC: 22 MG/DL (ref 8–28)
CALCIUM SERPL-MCNC: 9.4 MG/DL (ref 8.5–10.5)
CHLORIDE BLD-SCNC: 108 MMOL/L (ref 98–107)
CO2 SERPL-SCNC: 21 MMOL/L (ref 22–31)
CREAT SERPL-MCNC: 1.01 MG/DL (ref 0.6–1.1)
D DIMER PPP FEU-MCNC: 2.71 UG/ML FEU (ref 0–0.5)
EOSINOPHIL # BLD MANUAL: 0.3 10E3/UL (ref 0–0.7)
EOSINOPHIL NFR BLD MANUAL: 2 %
ERYTHROCYTE [DISTWIDTH] IN BLOOD BY AUTOMATED COUNT: 12.8 % (ref 10–15)
GFR SERPL CREATININE-BSD FRML MDRD: 53 ML/MIN/1.73M2
GLUCOSE BLD-MCNC: 171 MG/DL (ref 70–125)
GLUCOSE BLDC GLUCOMTR-MCNC: 144 MG/DL (ref 70–99)
HCT VFR BLD AUTO: 41.9 % (ref 35–47)
HGB BLD-MCNC: 14.3 G/DL (ref 11.7–15.7)
HOLD SPECIMEN: NORMAL
LACTATE SERPL-SCNC: 1.4 MMOL/L (ref 0.7–2)
LVEF ECHO: NORMAL
LYMPHOCYTES # BLD MANUAL: 3.8 10E3/UL (ref 0.8–5.3)
LYMPHOCYTES NFR BLD MANUAL: 28 %
MCH RBC QN AUTO: 28.8 PG (ref 26.5–33)
MCHC RBC AUTO-ENTMCNC: 34.1 G/DL (ref 31.5–36.5)
MCV RBC AUTO: 85 FL (ref 78–100)
MONOCYTES # BLD MANUAL: 0.5 10E3/UL (ref 0–1.3)
MONOCYTES NFR BLD MANUAL: 4 %
NEUTROPHILS # BLD MANUAL: 8.9 10E3/UL (ref 1.6–8.3)
NEUTROPHILS NFR BLD MANUAL: 66 %
PLAT MORPH BLD: ABNORMAL
PLATELET # BLD AUTO: 328 10E3/UL (ref 150–450)
POTASSIUM BLD-SCNC: 3.5 MMOL/L (ref 3.5–5)
RBC # BLD AUTO: 4.96 10E6/UL (ref 3.8–5.2)
RBC MORPH BLD: ABNORMAL
SARS-COV-2 RNA RESP QL NAA+PROBE: NEGATIVE
SODIUM SERPL-SCNC: 140 MMOL/L (ref 136–145)
TROPONIN I SERPL-MCNC: 0.02 NG/ML (ref 0–0.29)
TROPONIN I SERPL-MCNC: 0.03 NG/ML (ref 0–0.29)
TROPONIN I SERPL-MCNC: 0.03 NG/ML (ref 0–0.29)
WBC # BLD AUTO: 13.5 10E3/UL (ref 4–11)

## 2022-08-07 PROCEDURE — 93306 TTE W/DOPPLER COMPLETE: CPT | Mod: 26 | Performed by: INTERNAL MEDICINE

## 2022-08-07 PROCEDURE — 84484 ASSAY OF TROPONIN QUANT: CPT | Performed by: EMERGENCY MEDICINE

## 2022-08-07 PROCEDURE — 87635 SARS-COV-2 COVID-19 AMP PRB: CPT | Performed by: EMERGENCY MEDICINE

## 2022-08-07 PROCEDURE — 73560 X-RAY EXAM OF KNEE 1 OR 2: CPT | Mod: LT

## 2022-08-07 PROCEDURE — 250N000011 HC RX IP 250 OP 636: Performed by: INTERNAL MEDICINE

## 2022-08-07 PROCEDURE — 85007 BL SMEAR W/DIFF WBC COUNT: CPT | Performed by: STUDENT IN AN ORGANIZED HEALTH CARE EDUCATION/TRAINING PROGRAM

## 2022-08-07 PROCEDURE — 93005 ELECTROCARDIOGRAM TRACING: CPT | Performed by: EMERGENCY MEDICINE

## 2022-08-07 PROCEDURE — 36415 COLL VENOUS BLD VENIPUNCTURE: CPT | Performed by: EMERGENCY MEDICINE

## 2022-08-07 PROCEDURE — 85027 COMPLETE CBC AUTOMATED: CPT | Performed by: STUDENT IN AN ORGANIZED HEALTH CARE EDUCATION/TRAINING PROGRAM

## 2022-08-07 PROCEDURE — G0378 HOSPITAL OBSERVATION PER HR: HCPCS

## 2022-08-07 PROCEDURE — 999N000208 ECHOCARDIOGRAM COMPLETE

## 2022-08-07 PROCEDURE — 96374 THER/PROPH/DIAG INJ IV PUSH: CPT

## 2022-08-07 PROCEDURE — 84484 ASSAY OF TROPONIN QUANT: CPT | Performed by: STUDENT IN AN ORGANIZED HEALTH CARE EDUCATION/TRAINING PROGRAM

## 2022-08-07 PROCEDURE — 99285 EMERGENCY DEPT VISIT HI MDM: CPT | Mod: 25

## 2022-08-07 PROCEDURE — 85379 FIBRIN DEGRADATION QUANT: CPT | Performed by: EMERGENCY MEDICINE

## 2022-08-07 PROCEDURE — 82310 ASSAY OF CALCIUM: CPT | Performed by: EMERGENCY MEDICINE

## 2022-08-07 PROCEDURE — C9803 HOPD COVID-19 SPEC COLLECT: HCPCS

## 2022-08-07 PROCEDURE — 36415 COLL VENOUS BLD VENIPUNCTURE: CPT | Mod: 59 | Performed by: INTERNAL MEDICINE

## 2022-08-07 PROCEDURE — 250N000013 HC RX MED GY IP 250 OP 250 PS 637: Performed by: INTERNAL MEDICINE

## 2022-08-07 PROCEDURE — 83605 ASSAY OF LACTIC ACID: CPT | Performed by: INTERNAL MEDICINE

## 2022-08-07 PROCEDURE — 36415 COLL VENOUS BLD VENIPUNCTURE: CPT | Performed by: STUDENT IN AN ORGANIZED HEALTH CARE EDUCATION/TRAINING PROGRAM

## 2022-08-07 PROCEDURE — 255N000002 HC RX 255 OP 636: Performed by: EMERGENCY MEDICINE

## 2022-08-07 PROCEDURE — 84484 ASSAY OF TROPONIN QUANT: CPT | Performed by: INTERNAL MEDICINE

## 2022-08-07 PROCEDURE — 93970 EXTREMITY STUDY: CPT

## 2022-08-07 PROCEDURE — 99220 PR INITIAL OBSERVATION CARE,LEVEL III: CPT | Performed by: INTERNAL MEDICINE

## 2022-08-07 PROCEDURE — 82962 GLUCOSE BLOOD TEST: CPT

## 2022-08-07 PROCEDURE — 255N000002 HC RX 255 OP 636: Performed by: INTERNAL MEDICINE

## 2022-08-07 PROCEDURE — G1010 CDSM STANSON: HCPCS

## 2022-08-07 RX ORDER — LOPERAMIDE HCL 2 MG
2 CAPSULE ORAL 4 TIMES DAILY PRN
COMMUNITY
End: 2022-08-17

## 2022-08-07 RX ORDER — ACETAMINOPHEN 325 MG/1
650 TABLET ORAL EVERY 4 HOURS PRN
Status: DISCONTINUED | OUTPATIENT
Start: 2022-08-07 | End: 2022-08-08 | Stop reason: HOSPADM

## 2022-08-07 RX ORDER — MIRABEGRON 25 MG/1
25 TABLET, FILM COATED, EXTENDED RELEASE ORAL DAILY
Status: DISCONTINUED | OUTPATIENT
Start: 2022-08-07 | End: 2022-08-08 | Stop reason: HOSPADM

## 2022-08-07 RX ORDER — BISMUTH SUBSALICYLATE 262 MG/1
1 TABLET, CHEWABLE ORAL DAILY PRN
COMMUNITY
End: 2023-06-14

## 2022-08-07 RX ORDER — NITROGLYCERIN 0.4 MG/1
0.4 TABLET SUBLINGUAL EVERY 5 MIN PRN
Status: DISCONTINUED | OUTPATIENT
Start: 2022-08-07 | End: 2022-08-08 | Stop reason: HOSPADM

## 2022-08-07 RX ORDER — AMLODIPINE BESYLATE 5 MG/1
5 TABLET ORAL DAILY
Status: DISCONTINUED | OUTPATIENT
Start: 2022-08-07 | End: 2022-08-08 | Stop reason: HOSPADM

## 2022-08-07 RX ORDER — HYDRALAZINE HYDROCHLORIDE 20 MG/ML
10 INJECTION INTRAMUSCULAR; INTRAVENOUS EVERY 6 HOURS PRN
Status: DISCONTINUED | OUTPATIENT
Start: 2022-08-07 | End: 2022-08-08 | Stop reason: HOSPADM

## 2022-08-07 RX ORDER — MAGNESIUM 200 MG
1 TABLET ORAL EVERY EVENING
Status: DISCONTINUED | OUTPATIENT
Start: 2022-08-07 | End: 2022-08-07

## 2022-08-07 RX ORDER — LANOLIN ALCOHOL/MO/W.PET/CERES
1000 CREAM (GRAM) TOPICAL EVERY EVENING
Status: DISCONTINUED | OUTPATIENT
Start: 2022-08-07 | End: 2022-08-08 | Stop reason: HOSPADM

## 2022-08-07 RX ADMIN — IOHEXOL 75 ML: 350 INJECTION, SOLUTION INTRAVENOUS at 04:05

## 2022-08-07 RX ADMIN — ACETAMINOPHEN 650 MG: 325 TABLET ORAL at 20:13

## 2022-08-07 RX ADMIN — PERFLUTREN 2 ML: 6.52 INJECTION, SUSPENSION INTRAVENOUS at 11:54

## 2022-08-07 RX ADMIN — Medication 1000 MCG: at 20:13

## 2022-08-07 RX ADMIN — MIRABEGRON 25 MG: 25 TABLET, FILM COATED, EXTENDED RELEASE ORAL at 11:53

## 2022-08-07 RX ADMIN — AMLODIPINE BESYLATE 5 MG: 5 TABLET ORAL at 09:22

## 2022-08-07 RX ADMIN — HYDRALAZINE HYDROCHLORIDE 10 MG: 20 INJECTION INTRAMUSCULAR; INTRAVENOUS at 20:15

## 2022-08-07 ASSESSMENT — ACTIVITIES OF DAILY LIVING (ADL)
DESCRIBE_HEARING_LOSS: BILATERAL HEARING LOSS
CONCENTRATING,_REMEMBERING_OR_MAKING_DECISIONS_DIFFICULTY: NO
FALL_HISTORY_WITHIN_LAST_SIX_MONTHS: YES
CHANGE_IN_FUNCTIONAL_STATUS_SINCE_ONSET_OF_CURRENT_ILLNESS/INJURY: YES
PATIENT'S_PREFERRED_MEANS_OF_COMMUNICATION: ENGLISH SPEAKER WITH HEARING LOSS, NO SPEECH PROBLEMS.
DIFFICULTY_EATING/SWALLOWING: NO
NUMBER_OF_TIMES_PATIENT_HAS_FALLEN_WITHIN_LAST_SIX_MONTHS: 2
HEARING_DIFFICULTY_OR_DEAF: YES
DEPENDENT_IADLS:: INDEPENDENT;TRANSPORTATION
WERE_AUXILIARY_AIDS_OFFERED?: NO
DRESSING/BATHING_DIFFICULTY: NO
DOING_ERRANDS_INDEPENDENTLY_DIFFICULTY: OTHER (SEE COMMENTS)
VISION_MANAGEMENT: AT HOME
DIFFICULTY_COMMUNICATING: NO
WALKING_OR_CLIMBING_STAIRS_DIFFICULTY: OTHER (SEE COMMENTS)
WEAR_GLASSES_OR_BLIND: YES
TOILETING_ISSUES: NO
USE_OF_HEARING_ASSISTIVE_DEVICES: RIGHT HEARING AID;LEFT HEARING AID

## 2022-08-07 ASSESSMENT — ENCOUNTER SYMPTOMS
FEVER: 0
NUMBNESS: 1
VOMITING: 0
COUGH: 0
SHORTNESS OF BREATH: 1
NAUSEA: 0
DIAPHORESIS: 0

## 2022-08-07 NOTE — ED TRIAGE NOTES
Pt arrives via [facility] for 10/10 chest pain that radiated to back. Pt received 50 mcg of fentanyl and 1 dose of aspirin and nitroglycerin. Pt was hypertensive for EMS with systolic in the 180's-200's. Pt alert and oriented, calm and cooperative.      Triage Assessment     Row Name 08/07/22 0229       Triage Assessment (Adult)    Airway WDL WDL       Respiratory WDL    Respiratory WDL WDL       Skin Circulation/Temperature WDL    Skin Circulation/Temperature WDL WDL       Cardiac WDL    Cardiac WDL X;chest pain       Chest Pain Assessment    Chest Pain Location midsternal    Chest Pain Radiation shoulder;back    Character pressure    Chest Pain Intervention cardiac monitoring continued;cardiac monitor placed;12-lead ECG obtained       Peripheral/Neurovascular WDL    Peripheral Neurovascular WDL WDL       Cognitive/Neuro/Behavioral WDL    Cognitive/Neuro/Behavioral WDL WDL

## 2022-08-07 NOTE — CONSULTS
Care Management Initial Consult    General Information  Assessment completed with: Sivan Beavers  Type of CM/SW Visit: Initial Assessment    Primary Care Provider verified and updated as needed: Yes   Readmission within the last 30 days:        Reason for Consult: discharge planning  Advance Care Planning:    Polst scanned in     Communication Assessment  Patient's communication style: spoken language (English or Bilingual)       Cognitive  Cognitive/Neuro/Behavioral: WDL                      Living Environment:   People in home: alone     Current living Arrangements: house      Able to return to prior arrangements: yes  Living Arrangement Comments: Can live on one level. Has a chair lift to basement but does not go there 'much'.    Family/Social Support:  Care provided by: self  Provides care for: no one  Marital Status:   Children          Description of Support System: Supportive       Current Resources:   Patient receiving home care services: No     Community Resources: None  Equipment currently used at home:    Supplies currently used at home:      Employment/Financial:  Employment Status: retired        Financial Concerns: No concerns identified   Referral to Financial Worker: No     Lifestyle & Psychosocial Needs:  Social Determinants of Health     Tobacco Use: Low Risk      Smoking Tobacco Use: Never Smoker     Smokeless Tobacco Use: Never Used   Alcohol Use: Not on file   Financial Resource Strain: Not on file   Food Insecurity: Not on file   Transportation Needs: Not on file   Physical Activity: Not on file   Stress: Not on file   Social Connections: Not on file   Intimate Partner Violence: Not on file   Depression: At risk     PHQ-2 Score: 4   Housing Stability: Not on file     Functional Status:  Prior to admission patient needed assistance:   Dependent ADLs:: Independent  Dependent IADLs:: Independent, Transportation    Mental Health Status:  Mental Health Status: No Current Concerns        Chemical Dependency Status:  Chemical Dependency Status: No Current Concerns           Values/Beliefs:  Spiritual, Cultural Beliefs, Protestant Practices, Values that affect care:            Values/Beliefs Comment: [place]    Additional Information:  Writer met with patient to review role of observation services/[facility] brochure (copy provided), discuss goals of care and assess need for any possible services at discharge. Patient alert, oriented and engaged in the conversation. Patient is  and lives alone in a single level living home. Her son (whom she states passed away from cancer about 3 years ago) had put in a chair lift to the basement but patient states that she doesn't 'go down there much'. Family lives near by and provides assistance as needed. CM will continue to monitor progression of care, review team recommendations and provide discharge planning assist as needed.      Bharti Hernandez RN

## 2022-08-07 NOTE — PHARMACY-ADMISSION MEDICATION HISTORY
Pharmacy Note - Admission Medication History    Pertinent Provider Information: patient states they also take 1/2 tablet of clozapine as needed. Patient's pharmacy and  have no history of this.     ______________________________________________________________________    Prior To Admission (PTA) med list completed and updated in EMR.       Current Facility-Administered Medications for the 8/7/22 encounter (Hospital Encounter)   Medication     ropivacaine (NAROPIN) injection 3 mL     ropivacaine (NAROPIN) injection 3 mL     triamcinolone (KENALOG-40) injection 40 mg     triamcinolone (KENALOG-40) injection 40 mg     PTA Med List   Medication Sig Last Dose     amLODIPine (NORVASC) 5 MG tablet Take 1 tablet (5 mg) by mouth daily 8/6/2022 at pm     bismuth subsalicylate (PEPTO BISMOL) 262 MG chewable tablet Take 1 tablet by mouth daily as needed      Cyanocobalamin (B-12) 1000 MCG SUBL Place 1 tablet under the tongue every evening  Past Month at Unknown time     loperamide (IMODIUM) 2 MG capsule Take 2 mg by mouth 4 times daily as needed for diarrhea      Multiple Vitamins-Minerals (THERAPEUTIC MULTIVIT/MINERAL) TABS Take 1 tablet by mouth every evening  Past Week at Unknown time     nitroGLYcerin (NITROSTAT) 0.4 MG sublingual tablet Place 1 tablet (0.4 mg) under the tongue every 5 minutes as needed for chest pain      VITAMIN D, CHOLECALCIFEROL, PO Take 1,000 Units by mouth daily Past Week at Unknown time       Information source(s): Patient and CareEverywhere/[facility]  Method of interview communication: in-person    Summary of Changes to PTA Med List  New: Imodium, pepto bismol  Discontinued: mirabegron, prednisolone eye drops, diclofenac, Ativan  Changed: none    Patient was asked about OTC/herbal products specifically.  PTA med list reflects this.    In the past week, patient estimated taking medication this percent of the time:  50-90% due to other.    Allergies were reviewed, assessed, and updated with the  patient.      Patient does not use any multi-dose medications prior to admission.    The information provided in this note is only as accurate as the sources available at the time of the update(s).    Thank you for the opportunity to participate in the care of this patient.    Sandy Saldivar [facility]  8/7/2022 7:45 AM

## 2022-08-07 NOTE — ED NOTES
Bed: JNED-02  Expected date: 8/7/22  Expected time: 2:20 AM  Means of arrival: Ambulance  Comments:  [facility] 89yo F CP

## 2022-08-07 NOTE — H&P
[facility]    History and Physical - Hospitalist Service       Date of Admission:  8/7/2022    Assessment & Plan      Daniela Brown is a 89 year old female with a medical history of CAD, hypertension, hyperlipidemia, T2DM, CKD3, memory loss and neuropathy who presents to the ED for evaluation of acute chest pain.    Acute chest pain: Acute lower squeezing-like chest pain. Initial troponin was normal. EKG shows no ischemic changes.  Her last cardiac catheterization was in Feb 2019. It shows mild non-obstructive CAD.  - Telemetry  - Monitor troponin  - Echocardiogram    Left knee effusion, primary osteoarthritis: Patient has left knee pain and swollen without erythema. She has a history of left knee effusion due to primary osteoarthritis. She had left knee aspiration and steroid injection in 6/16/22. She had falls two times in the past two weeks due to her left knee pain.   - Orthopedic surgery consult  - PT/OT when appropriate  - Tylenol prn     Elevated D dimer: D dimer 2.71 on admission. CT chest shows no PE.   - US lower extremity to rule out DVT    Essential hypertension: Resume PTA amlodipine.       Diet:  Cardiac diet  DVT Prophylaxis: Low Risk/Ambulatory with no VTE prophylaxis indicated  Collazo Catheter: Not present  Central Lines: None  Cardiac Monitoring: None  Code Status:  DNR/DNI per ACP documents      Disposition Plan      Plan to discharge home tomorrow when all workup are done     The patient's care was discussed with the Bedside Nurse, Care Coordinator/ and Patient.    Trang Ortiz MD  Hospitalist Service  [facility]  Securely message with the Vocera Web Console (learn more here)  Text page via e|tab Paging/Directory         ______________________________________________________________________    Chief Complaint   Acute chest pain    History of Present Illness   Daniela Brown is a 89 year old female with a medical  "history of CAD, hypertension, hyperlipidemia, T2DM, CKD3, memory loss and neuropathy who presents to the ED for evaluation of acute chest pain. Patient reports that last night around 1AM, she got up to drink some milk like she normally does. After that, she went back to bed. She then suddenly developed acute chest pain. The chest pain locates in her lower chest and is squeezing-like. She also felt that she was not able to breath. The chest pain lasted for 15 minutes without relief. She felt that she was going to die. She then pushed her life alert button. EMS arrived and she was given 50 mcg Fentanyl, and one dose of aspirin and nitroglycerin. Her chest pain resolved in ER. Patient reports no fever, cough, nausea, vomiting and abdominal pain. She reports that she lives alone in her own house. She reports that she does not have chest pain in general, even when she goes up and down to her basement. She reports having left knee pain and swollen. Due to her knee pain, she fell two times in the past two weeks. Her left knee was tapped and had steroid injection in the past. At night, she feels that her lower legs are red and hot. In ER, initial troponin was normal. EKG shows no ischemic changes. She had elevated D dimer to 2.7. CT chest shows no PE.       Review of Systems    The 10 point Review of Systems is negative other than noted in the HPI or here.     Past Medical History    I have reviewed this patient's medical history and updated it with pertinent information if needed.   Past Medical History:   Diagnosis Date     Abnormal cardiovascular stress test 2/3/2019    9/10/2018 Lexiscan: \"Myocardial perfusion imaging using single isotope technique demonstrated a small perfusion defect of mild severity involving the basal inferior wall which is mostly reversible and may be consistent with mild ischemia in the right coronary artery distribution. In addition, transient ischemic dilatation is noted with a TID ratio of 1.3. " "\"     Adhesive capsulitis of shoulder     left      Adjustment disorder with anxiety 3/18/2016     B12 deficiency anemia 6/20/2012     Chest pain 2004    Chronic right sided/axillary discomfort (musculoskeletal) Atypical substernal (possibly GERD). Negative cardiolite 2004.     Colitis, Clostridium difficile 4/18/2012     Diverticulitis 2009     Headache(784.0) 2001    Tension type. MRI 2001 normal.     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  Past Surgical History:   Procedure Laterality Date     ARTHROPLASTY KNEE  3/26/2012    Procedure:ARTHROPLASTY KNEE; Right Total Knee Arthroplasty; Surgeon:BERNADINE ROSSA; Location:[place] OR     CHOLECYSTECTOMY       CV HEART CATHETERIZATION WITH POSSIBLE INTERVENTION N/A 2/27/2019    Procedure: Coronary Angiogram with Left ventriculogram;  Surgeon: Leandro Carpio MD;  Location:  HEART CARDIAC CATH LAB     HERNIA REPAIR      Hernia Repair     HYSTERECTOMY, PAP NO LONGER INDICATED  1983    TVH/BSO     SURGICAL HISTORY OF -   10/08    A & P Repair, Dr. Hannah     [facility] APPENDECTOMY  1953       Social History   I have reviewed this patient's social history and updated it with pertinent information if needed.  Social History     Tobacco Use     Smoking status: Never Smoker     Smokeless tobacco: Never Used   Vaping Use     Vaping Use: Never used   Substance Use Topics     Alcohol use: No     Drug use: No       Family History   I have reviewed this patient's family history and updated it with pertinent information if needed.  Family History   Problem Relation Age of Onset     C.A.D. Mother      Diabetes Mother      Cancer - " colorectal Mother      Arthritis Mother      Heart Disease Mother      Lipids Brother      Obesity Brother      Hypertension Brother      Allergies Son      Eye Disorder Son         cataract     Thyroid Disease Sister         graves dz     Eye Disorder Son      Leukemia Son      Alcohol/Drug Father        Prior to Admission Medications   Prior to Admission Medications   Prescriptions Last Dose Informant Patient Reported? Taking?   Cyanocobalamin (B-12) 1000 MCG SUBL  Self Yes No   Sig: Place 1 tablet under the tongue every evening    LORazepam (ATIVAN) 0.5 MG tablet   No No   Sig: Take 1 tablet (0.5 mg) by mouth daily as needed for anxiety (for short term only, do not take more than one per day)   Multiple Vitamins-Minerals (THERAPEUTIC MULTIVIT/MINERAL) TABS  Self Yes No   Sig: Take 1 tablet by mouth every evening    ORDER FOR DME  Self No No   Sig: Thigh length teds.   STATIN NOT PRESCRIBED (INTENTIONAL)   No No   Sig: Please choose reason not prescribed, below   VITAMIN D, CHOLECALCIFEROL, PO  Self Yes No   Sig: Take 1,000 Units by mouth daily   amLODIPine (NORVASC) 5 MG tablet   No No   Sig: Take 1 tablet (5 mg) by mouth daily   blood glucose (ACCU-CHEK GUIDE) test strip   No No   Sig: Use to test blood sugar one times daily or as directed.   blood glucose monitoring (ACCU-CHEK FASTCLIX) lancets  Self No No   Sig: Use to test blood sugar one times daily or as directed.   diclofenac (VOLTAREN) 50 MG EC tablet   No No   Sig: Take 1 tablet (50 mg) by mouth 3 times daily for 10 days   dicyclomine (BENTYL) 20 MG tablet   No No   Sig: Take 1 tablet (20 mg) by mouth 4 times daily as needed (diarrhea)   mirabegron (MYRBETRIQ) 25 MG 24 hr tablet   No No   Sig: Take 1 tablet (25 mg) by mouth daily   nitroGLYcerin (NITROSTAT) 0.4 MG sublingual tablet   No No   Sig: Place 1 tablet (0.4 mg) under the tongue every 5 minutes as needed for chest pain   order for DME  Self No No   Si walker   prednisoLONE acetate (PRED  FORTE) 1 % ophthalmic suspension   Yes No   Sig: instill 1 Drop by ophthalmic route 2x daily into both eyes for 1-2 weeks, then discontinue   Patient not taking: Reported on 7/21/2022      Facility-Administered Medications Last Administration Doses Remaining   ropivacaine (NAROPIN) injection 3 mL 6/16/2022  2:10 PM    ropivacaine (NAROPIN) injection 3 mL 8/2/2022  1:50 PM    triamcinolone (KENALOG-40) injection 40 mg 6/16/2022  2:10 PM    triamcinolone (KENALOG-40) injection 40 mg 8/2/2022  1:50 PM         Allergies   Allergies   Allergen Reactions     Metoprolol Itching and Rash     Cephalexin Rash     Erythromycin Rash     Actonel [Bisphosphonates] Itching     Azithromycin Hives     Celebrex [Celecoxib] Rash     Cipro [Ciprofloxacin] Rash     Contrast Dye Hives     Diatrizoate Hives     Erythromycin Rash     Escitalopram Diarrhea     Gets very sick and mad feelings     Fosamax Itching and Rash     Keflex [Cephalexin Monohydrate] Rash     Lisinopril Cough     Risedronate Itching     Seasonal Allergies      Shellfish-Derived Products Hives     Tetanus Toxoid, Adsorbed Swelling     Tetanus-Diphtheria Toxoids Swelling     Vicodin [Acetaminophen] Itching     Patient reported - only when used scheduled in high doses.        Vioxx Rash     Acetaminophen Itching     In high doses  Patient reported - only when used scheduled in high doses.        Alendronic Acid Rash     Celecoxib Rash     Penicillins Rash     Rofecoxib Rash     Sulfa Drugs Itching and Rash     Sulfasalazine Itching and Rash       Physical Exam   Vital Signs: Temp: 98.4  F (36.9  C) Temp src: Oral BP: (!) 184/81 Pulse: 75   Resp: 30 SpO2: 95 % O2 Device: None (Room air)    Weight: 159 lbs 0 oz    General appearance: not in acute distress  HEENT: PERRL, EOMI  Lungs: Clear breath sounds in bilateral lung fields  Cardiovascular: Regular rate and rhythm, normal S1-S2  Abdomen: Soft, non tender, no distension  Musculoskeletal: Left knee swollen with tenderness  to palpation. Right knee s/p arthoplasty and appears normal.   Skin: No rash. Bilateral lower leg edema  Neurology: AAO ×3.  Cranial nerves II - XII normal.  Normal muscle strength in all four extremities.    Data   Data reviewed today: I reviewed all medications, new labs and imaging results over the last 24 hours.     Recent Labs   Lab 08/07/22  0235   WBC 13.5*   HGB 14.3   MCV 85         POTASSIUM 3.5   CHLORIDE 108*   CO2 21*   BUN 22   CR 1.01   ANIONGAP 11   NARESH 9.4   *       CT chest PE run:    IMPRESSION:  1.  No evidence for pulmonary embolism.   2.  Normal caliber thoracic aorta without dissection.   3.  Severe coronary artery calcification.   4.  Advanced atherosclerotic vascular calcification at the origin of the celiac and SMA. Recommend correlation for intestinal angina.   5.  Remainder as detailed above.

## 2022-08-07 NOTE — PROGRESS NOTES
PRIMARY DIAGNOSIS: CHEST PAIN  OUTPATIENT/OBSERVATION GOALS TO BE MET BEFORE DISCHARGE:  1. Ruled out acute coronary syndrome (negative or stable Troponin):  Yes  2. Pain Status: Pain free.  3. Appropriate provocative testing performed: Yes  - Stress Test Procedure: Not needed  - Interpretation of cardiac rhythm per telemetry tech: Normal Sinus Rhythm      4. Cleared by Consultants (if applicable):No  5. Return to near baseline physical activity: Yes  Discharge Planner Nurse   Safe discharge environment identified: Yes  Barriers to discharge: Yes       Entered by: Zana Mccarthy RN 08/07/2022 5:24 PM     Please review provider order for any additional goals.   Nurse to notify provider when observation goals have been met and patient is ready for discharge.

## 2022-08-07 NOTE — CONSULTS
ORTHOPEDIC CONSULTATION    Consultation  Daniela Brown,  1933, MRN [id number]    [unfilled]  Chest pain, unspecified type [R07.9]    PCP: Cynthia Maddox, [phone]   Code status:  Prior       Extended Emergency Contact Information  Primary Emergency Contact: Wally Brown   [place]  Mobile Phone: [phone]  Relation: Son  Secondary Emergency Contact: Tamika Brown (dtr-in-law)  Mobile Phone: [phone]  Relation: Relative         CHIEF COMPLAINT: <principal problem not specified>      HISTORY OF PRESENT ILLNESS:  The patient is seen in orthopedic consultation at the request of Dr. Trang Ortiz.  The patient is a 89 year old female with mild pain of the left  knee. The patient reports that she has longstanding arthritis in her left knee.  Is been bilateral in the last several months.  She has seen a surgeon at [facility] orthopedics has had a recent aspiration and cortisone injection.  This was performed approximately 2 months ago.  This has helped her.  She notes chronic symptoms in her knee.  She notes she is able to walk it is just sore.  This feels like her chronic baseline status.  She does wear a knee sleeve for this.  No significant new trauma to the knee.  No fevers or chills.  She is admitted for new onset chest pain is being worked up for cardiopulmonary issues..      PAST MEDICAL HISTORY:  Significant for hypertension  Diverticulitis      ALLERGIES:   Review of patient's allergies indicates   Allergies   Allergen Reactions     Metoprolol Itching and Rash     Cephalexin Rash     Erythromycin Rash     Actonel [Bisphosphonates] Itching     Azithromycin Hives     Celebrex [Celecoxib] Rash     Cipro [Ciprofloxacin] Rash     Contrast Dye Hives     Diatrizoate Hives     Erythromycin Rash     Escitalopram Diarrhea     Gets very sick and mad feelings     Fosamax Itching and Rash     Keflex [Cephalexin Monohydrate] Rash     Lisinopril Cough     Risedronate Itching     Seasonal  Allergies      Shellfish-Derived Products Hives     Tetanus Toxoid, Adsorbed Swelling     Tetanus-Diphtheria Toxoids Swelling     Vicodin [Acetaminophen] Itching     Patient reported - only when used scheduled in high doses.        Vioxx Rash     Acetaminophen Itching     In high doses  Patient reported - only when used scheduled in high doses.        Alendronic Acid Rash     Celecoxib Rash     Penicillins Rash     Rofecoxib Rash     Sulfa Drugs Itching and Rash     Sulfasalazine Itching and Rash         MEDICATIONS UPON ADMISSION:  Medications were reviewed.  They include:   (Not in a hospital admission)        SOCIAL HISTORY:   she  reports that she has never smoked. She has never used smokeless tobacco. She reports that she does not drink alcohol and does not use drugs.      FAMILY HISTORY:  family history includes Alcohol/Drug in her father; Allergies in her son; Arthritis in her mother; C.A.D. in her mother; Cancer - colorectal in her mother; Diabetes in her mother; Eye Disorder in her son and son; Heart Disease in her mother; Hypertension in her brother; Leukemia in her son; Lipids in her brother; Obesity in her brother; Thyroid Disease in her sister.      REVIEW OF SYSTEMS:   See HPI, otherwise negative      PHYSICAL EXAMINATION:  Vitals: Temp:  [98.4  F (36.9  C)] 98.4  F (36.9  C)  Pulse:  [65-97] 97  Resp:  [20-30] 21  BP: (147-194)/() 147/103  SpO2:  [95 %-98 %] 95 %  General: On examination, the patient is resting comfortably, NAD, awake and alert and oriented to person, place, time, and and general circumstances   SKIN: There is no evidence of erythema, ecchymosis, abrasions, or and lacerations   Pulses:  brachial, radial, dorsalis pedis and posterior tibial  Sensation: intact and equal bilaterally  Tenderness: mild diffuse tenderness about the knee  ROM: 0-90, crepitus  Motor: Intact dorsiflexion, plantarflexion, EHL  Trace effusion left knee  Skin intact no erythema or concerning infectious  signs  Patient is able to weight-bear  Contralateral side= Full range of motion, Negative joint instability findings, 5/5 motor groups about the joint, Non-tender.       RADIOGRAPHIC EVALUATION:   2 view x-rays left knee were obtained today.  These demonstrate advanced tricompartmental osteoarthritis of the left knee without any acute findings.    Pertinent Labs  Lab Results: personally reviewed.   not applicable    IMPRESSION:  89 year old female with advanced tricompartmental osteoarthritis of the left knee     PLAN:  I discussed the imaging findings with the patient.  She does have advanced tricompartment compartmental osteoarthritis.  She notes no acute changes from her baseline chronic pain.  We did discuss a repeat cortisone injection, however its only been 2 months since her last one and we have to wait 3 months.  Unfortunately, she is not a candidate for repeat injection at this time.  As such, I recommend conservative treatment for her osteoarthritis.  Recommend anti-inflammatory medications if able.  Recommend ice and compressive wrap to the knee while in-house.  She may weight-bear as tolerated and mobilize with physical therapy as able.  She has follow-up arranged with her surgeon as an outpatient.  Ortho surgery will sign off.    BENEDICTO BURROWS MD     Thank you for including [facility] in the care of Daniela Brown. It has been a pleasure participating in their care.    BENEDICTO BURROWS MD      CC1:   Trang Ortiz MD    CC2:   Cynthia Maddox

## 2022-08-07 NOTE — ED PROVIDER NOTES
EMERGENCY DEPARTMENT ENCOUNTER      NAME: Daniela Brown  AGE: 89 year old female  YOB: 1933  MRN: [id number]  EVALUATION DATE & TIME: 8/7/2022  2:25 AM    PCP: Cynthia Maddox    ED PROVIDER: Reynaldo Horton M.D.      Chief Complaint   Patient presents with     Chest Pain         FINAL IMPRESSION:  1. Chest pain, unspecified type          ED COURSE & MEDICAL DECISION MAKING:    Pertinent Labs & Imaging studies reviewed. (See chart for details)  89 year old female presents to the Emergency Department for evaluation of chest pain.  Patient had acute onset of chest pain tonight.  Somewhat concerning.  Does have a history of coronary artery disease.  EKG is normal.  Troponin is negative.  D-dimer is elevated.  Therefore did get a CT scan of the chest.  CT scan of the chest does not show PE.  No pneumonia pneumothorax or other cause of chest pain.  No signs of dissection.  Given her coronary disease patient will be admitted under observation status.  Discussed with hospitalist.  Patient is chest pain-free now.    2:37 AM I met with the patient to gather history and to perform my initial exam. I discussed the plan for care while in the Emergency Department. PPE: eye protection, surgical mask and nitrile gloves.  3:40 AM Patient reassessed.  5:01 AM Care discussed with Dr. Silva, hospitalist who accepts the patient for admission.    5:10 AM Patient reassessed.    At the conclusion of the encounter I discussed the results of all of the tests and the disposition. The questions were answered. The patient or family acknowledged understanding and was agreeable with the care plan.         MEDICATIONS GIVEN IN THE EMERGENCY:  Medications   nitroGLYcerin (NITROSTAT) sublingual tablet 0.4 mg (has no administration in time range)   iohexol (OMNIPAQUE) 350 MG/ML injectable solution 75 mL (75 mLs Intravenous Given 8/7/22 9478)       NEW PRESCRIPTIONS STARTED AT TODAY'S ER VISIT  New Prescriptions    No  "medications on file          =================================================================    HPI    Patient information was obtained from: patient     Use of : N/A         Daniela Betty Brown is a 89 year old female with a pertinent history of CAD, hypertension, hyperlipidemia, T2DM, CKD3, memory loss, neuropathy, who presents to this ED via EMS for evaluation of chest pain.    Patient here with sudden onset of chest pain that lasted 15 minutes making it hard for her to breathe when she woke up at 1AM to use the bathroom. She had then hit the alarm she had which informed EMS and 911. En route, she was given 50 mcg Fentanyl, and one dose of aspirin and nitroglycerin. Currently her symptoms had resolved. She has cardiac history but no stents placed. She is not on any blood thinners. She has been compliant with her blood pressure medications but was taken off medications for her diabetes.     She mentions that her legs has been slightly numb bilaterally and they \"turn red and hurt\". She has been having an issue for the past 2 weeks from her legs hurting which had made her sustain a couple of mechanical falls. Her left knee seems to be the worst to which she has been getting injections at orthopedics. No nausea, vomiting, cold sweats, fever, cough. No other medical complaints at this time.       REVIEW OF SYSTEMS   Review of Systems   Constitutional: Negative for diaphoresis and fever.   Respiratory: Positive for shortness of breath (resolved at present). Negative for cough.    Cardiovascular: Positive for chest pain (resolved at present) and leg swelling.   Gastrointestinal: Negative for nausea and vomiting.   Musculoskeletal:        Positive bilateral leg pain   Neurological: Positive for numbness (bilateral legs).   All other systems reviewed and are negative.       PAST MEDICAL HISTORY:  Past Medical History:   Diagnosis Date     Abnormal cardiovascular stress test 2/3/2019    9/10/2018 " "Lexiscan: \"Myocardial perfusion imaging using single isotope technique demonstrated a small perfusion defect of mild severity involving the basal inferior wall which is mostly reversible and may be consistent with mild ischemia in the right coronary artery distribution. In addition, transient ischemic dilatation is noted with a TID ratio of 1.3. \"     Adhesive capsulitis of shoulder     left      Adjustment disorder with anxiety 3/18/2016     B12 deficiency anemia 6/20/2012     Chest pain 2004    Chronic right sided/axillary discomfort (musculoskeletal) Atypical substernal (possibly GERD). Negative cardiolite 2004.     Colitis, Clostridium difficile 4/18/2012     Diverticulitis 2009     Headache(784.0) 2001    Tension type. MRI 2001 normal.     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     Status post coronary angiogram 2/27/2019     Syncope and collapse 9/10/2018       PAST SURGICAL HISTORY:  Past Surgical History:   Procedure Laterality Date     ARTHROPLASTY KNEE  3/26/2012    Procedure:ARTHROPLASTY KNEE; Right Total Knee Arthroplasty; Surgeon:BERNADINE ROSAS; Location:[place] OR     CHOLECYSTECTOMY       CV HEART CATHETERIZATION WITH POSSIBLE INTERVENTION N/A 2/27/2019    Procedure: Coronary Angiogram with Left ventriculogram;  Surgeon: Leandro Carpio MD;  Location:  HEART CARDIAC CATH LAB     HERNIA REPAIR      Hernia Repair     HYSTERECTOMY, PAP NO LONGER INDICATED  1983    TVH/BSO     SURGICAL HISTORY OF -   10/08    A & P Repair, Dr. Hannah     [facility] APPENDECTOMY  1953           CURRENT MEDICATIONS:    Current Facility-Administered Medications   Medication     nitroGLYcerin (NITROSTAT) sublingual tablet 0.4 mg     ropivacaine (NAROPIN) injection 3 mL     ropivacaine (NAROPIN) injection 3 mL     triamcinolone (KENALOG-40) injection 40 mg     triamcinolone " (KENALOG-40) injection 40 mg     Current Outpatient Medications   Medication     amLODIPine (NORVASC) 5 MG tablet     blood glucose (ACCU-CHEK GUIDE) test strip     blood glucose monitoring (ACCU-CHEK FASTCLIX) lancets     Cyanocobalamin (B-12) 1000 MCG SUBL     diclofenac (VOLTAREN) 50 MG EC tablet     dicyclomine (BENTYL) 20 MG tablet     LORazepam (ATIVAN) 0.5 MG tablet     mirabegron (MYRBETRIQ) 25 MG 24 hr tablet     Multiple Vitamins-Minerals (THERAPEUTIC MULTIVIT/MINERAL) TABS     nitroGLYcerin (NITROSTAT) 0.4 MG sublingual tablet     order for DME     ORDER FOR DME     prednisoLONE acetate (PRED FORTE) 1 % ophthalmic suspension     STATIN NOT PRESCRIBED (INTENTIONAL)     VITAMIN D, CHOLECALCIFEROL, PO         ALLERGIES:  Allergies   Allergen Reactions     Metoprolol Itching and Rash     Cephalexin Rash     Erythromycin Rash     Actonel [Bisphosphonates] Itching     Azithromycin Hives     Celebrex [Celecoxib] Rash     Cipro [Ciprofloxacin] Rash     Contrast Dye Hives     Diatrizoate Hives     Erythromycin Rash     Escitalopram Diarrhea     Gets very sick and mad feelings     Fosamax Itching and Rash     Keflex [Cephalexin Monohydrate] Rash     Lisinopril Cough     Risedronate Itching     Seasonal Allergies      Shellfish-Derived Products Hives     Tetanus Toxoid, Adsorbed Swelling     Tetanus-Diphtheria Toxoids Swelling     Vicodin [Acetaminophen] Itching     Patient reported - only when used scheduled in high doses.        Vioxx Rash     Acetaminophen Itching     In high doses  Patient reported - only when used scheduled in high doses.        Alendronic Acid Rash     Celecoxib Rash     Penicillins Rash     Rofecoxib Rash     Sulfa Drugs Itching and Rash     Sulfasalazine Itching and Rash       FAMILY HISTORY:  Family History   Problem Relation Age of Onset     C.A.D. Mother      Diabetes Mother      Cancer - colorectal Mother      Arthritis Mother      Heart Disease Mother      Lipids Brother      Obesity  "Brother      Hypertension Brother      Allergies Son      Eye Disorder Son         cataract     Thyroid Disease Sister         graves dz     Eye Disorder Son      Leukemia Son      Alcohol/Drug Father        SOCIAL HISTORY:   Social History     Socioeconomic History     Marital status:    Tobacco Use     Smoking status: Never Smoker     Smokeless tobacco: Never Used   Vaping Use     Vaping Use: Never used   Substance and Sexual Activity     Alcohol use: No     Drug use: No     Sexual activity: Not Currently     Partners: Male   Other Topics Concern     Parent/sibling w/ CABG, MI or angioplasty before 65F 55M? No   Social History Narrative    Lives in [place] independently. She has son who lives nearby in [place]. Her oldest son passed away in September 2018.        VITALS:  BP (!) 184/81   Pulse 68   Temp 98.4  F (36.9  C) (Oral)   Resp 20   Ht 1.626 m (5' 4\")   Wt 72.1 kg (159 lb)   LMP  (LMP Unknown)   SpO2 96%   BMI 27.29 kg/m      PHYSICAL EXAM    Physical Exam  Constitutional:       General: She is not in acute distress.     Appearance: She is not diaphoretic.   HENT:      Head: Atraumatic.      Mouth/Throat:      Pharynx: No oropharyngeal exudate.   Eyes:      General: No scleral icterus.     Pupils: Pupils are equal, round, and reactive to light.   Cardiovascular:      Heart sounds: Normal heart sounds.   Pulmonary:      Effort: No respiratory distress.      Breath sounds: Normal breath sounds.   Abdominal:      Palpations: Abdomen is soft.      Tenderness: There is no abdominal tenderness. There is no guarding or rebound.   Musculoskeletal:         General: No tenderness.   Skin:     General: Skin is warm.      Findings: No rash.   Neurological:      General: No focal deficit present.      Mental Status: She is alert.      Comments: 5 out of 5 strength in bilateral upper and lower extremities.  Sensation intact in all 4 extremes.  Cranial nerves intact.  No pronator drift           "   LAB:  All pertinent labs reviewed and interpreted.  Labs Ordered and Resulted from Time of ED Arrival to Time of ED Departure   BASIC METABOLIC PANEL - Abnormal       Result Value    Sodium 140      Potassium 3.5      Chloride 108 (*)     Carbon Dioxide (CO2) 21 (*)     Anion Gap 11      Urea Nitrogen 22      Creatinine 1.01      Calcium 9.4      Glucose 171 (*)     GFR Estimate 53 (*)    D DIMER QUANTITATIVE - Abnormal    D-Dimer Quantitative 2.71 (*)    CBC WITH PLATELETS AND DIFFERENTIAL - Abnormal    WBC Count 13.5 (*)     RBC Count 4.96      Hemoglobin 14.3      Hematocrit 41.9      MCV 85      MCH 28.8      MCHC 34.1      RDW 12.8      Platelet Count 328     TROPONIN I - Normal    Troponin I 0.03     COVID-19 VIRUS (CORONAVIRUS) BY PCR - Normal    SARS CoV2 PCR Negative     TROPONIN I   TROPONIN I       RADIOLOGY:  Reviewed all pertinent imaging. Please see official radiology report.  CT Chest Pulmonary Embolism w Contrast   Final Result   IMPRESSION:   1.  No evidence for pulmonary embolism.      2.  Normal caliber thoracic aorta without dissection.      3.  Severe coronary artery calcification.      4.  Advanced atherosclerotic vascular calcification at the origin of the celiac and SMA. Recommend correlation for intestinal angina.      5.  Remainder as detailed above.             EKG:    Performed at: 229  Impression: Sinus rhythm with no acute abnormalities.  Unchanged to previous dated February 27, 2019.  Sinus rhythm ventricular rate of 77.  .  QRS 90.  QTc 468    I have independently reviewed and interpreted the EKG(s) documented above.    PROCEDURES:   None      I, Stephanie Vuong, am serving as a scribe to document services personally performed by Dr. Reynaldo Horton, based on my observation and the provider's statements to me. I, Reynaldo Horton MD attest that Stephanie Vuong is acting in a scribe capacity, has observed my performance of the services and has documented them in accordance with my  direction.    Reynaldo Horton M.D.  Emergency Medicine  [facility] EMERGENCY DEPARTMENT  [facility] [facility] [id number] [phone]  Dept: [phone]     Reynaldo Horton MD  08/07/22 0612

## 2022-08-08 ENCOUNTER — APPOINTMENT (OUTPATIENT)
Dept: PHYSICAL THERAPY | Facility: HOSPITAL | Age: 87
End: 2022-08-08
Attending: HOSPITALIST
Payer: MEDICARE

## 2022-08-08 VITALS
DIASTOLIC BLOOD PRESSURE: 62 MMHG | SYSTOLIC BLOOD PRESSURE: 134 MMHG | WEIGHT: 156.3 LBS | HEART RATE: 80 BPM | BODY MASS INDEX: 26.69 KG/M2 | RESPIRATION RATE: 16 BRPM | TEMPERATURE: 98 F | HEIGHT: 64 IN | OXYGEN SATURATION: 96 %

## 2022-08-08 LAB
ATRIAL RATE - MUSE: 77 BPM
DIASTOLIC BLOOD PRESSURE - MUSE: NORMAL MMHG
GLUCOSE BLDC GLUCOMTR-MCNC: 114 MG/DL (ref 70–99)
GLUCOSE BLDC GLUCOMTR-MCNC: 116 MG/DL (ref 70–99)
INTERPRETATION ECG - MUSE: NORMAL
P AXIS - MUSE: 55 DEGREES
PR INTERVAL - MUSE: 164 MS
QRS DURATION - MUSE: 90 MS
QT - MUSE: 414 MS
QTC - MUSE: 468 MS
R AXIS - MUSE: -1 DEGREES
SYSTOLIC BLOOD PRESSURE - MUSE: NORMAL MMHG
T AXIS - MUSE: 61 DEGREES
VENTRICULAR RATE- MUSE: 77 BPM

## 2022-08-08 PROCEDURE — G0378 HOSPITAL OBSERVATION PER HR: HCPCS

## 2022-08-08 PROCEDURE — 99217 PR OBSERVATION CARE DISCHARGE: CPT | Performed by: HOSPITALIST

## 2022-08-08 PROCEDURE — 250N000013 HC RX MED GY IP 250 OP 250 PS 637: Performed by: INTERNAL MEDICINE

## 2022-08-08 PROCEDURE — 82962 GLUCOSE BLOOD TEST: CPT

## 2022-08-08 PROCEDURE — 97161 PT EVAL LOW COMPLEX 20 MIN: CPT | Mod: GP

## 2022-08-08 RX ADMIN — AMLODIPINE BESYLATE 5 MG: 5 TABLET ORAL at 09:57

## 2022-08-08 RX ADMIN — MIRABEGRON 25 MG: 25 TABLET, FILM COATED, EXTENDED RELEASE ORAL at 09:57

## 2022-08-08 RX ADMIN — ACETAMINOPHEN 650 MG: 325 TABLET ORAL at 00:12

## 2022-08-08 NOTE — PROGRESS NOTES
"Care Management Follow Up    Length of Stay (days): 0    Expected Discharge Date: 08/08/2022     Concerns to be Addressed:       Patient plan of care discussed at interdisciplinary rounds: Yes    Anticipated Discharge Disposition: TBD      Anticipated Discharge Services:  Awaiting PT/OT recommendations   Anticipated Discharge DME:  TBD     Education Provided on the Discharge Plan: Per Team  Patient/Family in Agreement with the Plan:  Yes    Referrals Placed by CM/SW:  Awaiting PT/OT recommendations  Private pay costs discussed: Not applicable    Additional Information:  Chart reviewed.  Patient is . Lives alone, in a single level home. Does have a chair lift to go to the basement, but does not go down there often. Family lives close by and provide assistance when needed. Family would be able to transport. CM will continue to follow the progression of care, review team recommendations, and provide discharge planning as needed.    Sindy Mclean RN     Care Management Discharge Note    Discharge Date: 08/08/2022       Discharge Disposition:  Home with assist of family    Discharge Services:  none    Discharge DME:  none    Discharge Transportation: family or friend will provide    Private pay costs discussed: Not applicable       Education Provided on the Discharge Plan: Per team   Persons Notified of Discharge Plans: Yes  Patient/Family in Agreement with the Plan:  Yes    Handoff Referral Completed: Yes    Additional Information:  PT saw patient and recommended home PT for strength. Patient declined services stating, \"last time I did home PT, I ended up hurting my knee more, I will be just fine, and my family is close and can help.\"     CM will continue to follow up, review recommendations, and help with any discharge needs anticipated.       Sindy Mclean RN                        "

## 2022-08-08 NOTE — DISCHARGE INSTRUCTIONS
Home care services have been arranged for you.  Agency: [facility]  Services:   Phone: [phone]  Instructions: [facility] will contact you within 24 hours to arrange the first visit.

## 2022-08-08 NOTE — PROGRESS NOTES
Physical Therapy Discharge Summary    Reason for therapy discharge:    All goals and outcomes met, no further needs identified.    Progress towards therapy goal(s). See goals on Care Plan in [facility] electronic health record for goal details.  Goals met    Therapy recommendation(s):    Continued therapy is recommended.  Rationale/Recommendations:  HHPT for evaluation of balance and safety in home environment.

## 2022-08-08 NOTE — PROGRESS NOTES
08/08/22 1200   Quick Adds   Quick Adds Certification   Type of Visit Initial PT Evaluation   Living Environment   People in Home alone   Current Living Arrangements house   Home Accessibility no concerns  (basement with chair lift)   Transportation Anticipated family or friend will provide   Living Environment Comments Pt lives alone, but has family and friends that check on her frequently.   Self-Care   Usual Activity Tolerance good   Current Activity Tolerance moderate   Regular Exercise Yes   Activity/Exercise Type walking   Exercise Amount/Frequency daily   Equipment Currently Used at Home walker, rolling;cane, quad   Fall history within last six months yes   Number of times patient has fallen within last six months 2   Activity/Exercise/Self-Care Comment Pt used to work in a [employer].   General Information   Onset of Illness/Injury or Date of Surgery 08/07/22   Referring Physician Dr. Nakia Masters.   Patient/Family Therapy Goals Statement (PT) To go home.   Pertinent History of Current Problem (include personal factors and/or comorbidities that impact the POC) From chart: Daniela Brown is a 89 year old female with a medical history of CAD, hypertension, hyperlipidemia, T2DM, CKD3, memory loss and neuropathy who presents to the ED for evaluation of acute chest pain.   Existing Precautions/Restrictions cardiac;fall   Weight-Bearing Status - LLE full weight-bearing   Weight-Bearing Status - RLE full weight-bearing   Heart Disease Risk Factors Medical history;Age   General Observations Female sitting in chair.   Cognition   Affect/Mental Status (Cognition) WNL   Orientation Status (Cognition) oriented x 4   Follows Commands (Cognition) WNL   Cognitive Status Comments Pleasant, cooperative.   Pain Assessment   Patient Currently in Pain Yes, see Vital Sign flowsheet  (Mild pain L knee.)   Integumentary/Edema   Integumentary/Edema Comments Not formally assessed.   Posture    Posture Forward head  position   Range of Motion (ROM)   ROM Comment Grossly WFL.   Strength (Manual Muscle Testing)   Strength Comments Grossly WFL.   Bed Mobility   Comment, (Bed Mobility) Not observed but likely IND.   Transfers   Comment, (Transfers) Sit-stand with walker, SBA.   Gait/Stairs (Locomotion)   Comment, (Gait/Stairs) Ambulated x 250 ft with walker, SBA. No symptoms. No LOBs.   Balance   Balance Comments Adequate for ambulation with AD, though would recommend further assessment by HHPT in home environment given falls history.   Sensory Examination   Sensory Perception Comments Reports chronic numbness in B feet.   Coordination   Coordination no deficits were identified   Muscle Tone   Muscle Tone no deficits were identified   Clinical Impression   Criteria for Skilled Therapeutic Intervention Evaluation only   PT Diagnosis (PT) None.   Influenced by the following impairments None.   Functional limitations due to impairments None.   Clinical Presentation (PT Evaluation Complexity) Stable/Uncomplicated   Clinical Presentation Rationale Clinician judgment.   Clinical Decision Making (Complexity) low complexity   Risk & Benefits of therapy have been explained patient   PT Discharge Planning   PT Discharge Recommendation (DC Rec) home;home with assist   PT Rationale for DC Rec Pt mobilizing well, near baseline. Assist available as has family and friends that check on her. HHPT for further evaluation of balance, home safety eval.   PT Brief overview of current status PT eval completed. Pt is SBA for all mobility with walker and is mobilizing safely. OK to d/c from PT perspective.   Therapy Certification   Start of care date 08/08/22   Certification date from 08/08/22   Certification date to 08/08/22   Medical Diagnosis Chest pain.   Total Evaluation Time   Total Evaluation Time (Minutes) 12

## 2022-08-08 NOTE — PROGRESS NOTES
SPIRITUAL HEALTH SERVICES Progress Note      Saw pt Daniela Brown per admission screening.    Illness Narrative - Sivan shared about her hospital course and medical condition, but she shared mostly about her social situation.     Distress - She shared about the losses and loneliness in her life that are distressing. Her  and one of her two sons  several years ago. She has been more isolated since that time.     Coping - Patient comes from Religion jordon background and derives meaning, purpose, and comfort from jordon. She stated that she prays all the time and finds comfort in it. Patient welcomed prayer and expressed appreciation for the visit.      Plan - A  will continue to visit as able or per request by patient/family/staff.      Kev Henley MDiv, [facility]  Lead Staff , [facility]  [phone]

## 2022-08-08 NOTE — PROGRESS NOTES
[facility]      OUTPATIENT PHYSICAL THERAPY EVALUATION  PLAN OF TREATMENT FOR OUTPATIENT REHABILITATION  (COMPLETE FOR INITIAL CLAIMS ONLY)  Patient's Last Name, First Name, M.I.  YOB: 1933  KevinDaniela                        Provider's Name  [facility] Medical Record No.  [id number]                               Onset Date:  08/07/22   Start of Care Date:  08/08/22      Type:     _X_PT   ___OT   ___SLP Medical Diagnosis:  Chest pain.                        PT Diagnosis:  None.   Visits from SOC:  1   _________________________________________________________________________________  Plan of Treatment/Functional Goals    Planned Interventions:       Goals: See Physical Therapy Goals on Care Plan in [facility] electronic health record.    Therapy Frequency:    Predicted Duration of Therapy Intervention:    _________________________________________________________________________________    I CERTIFY THE NEED FOR THESE SERVICES FURNISHED UNDER        THIS PLAN OF TREATMENT AND WHILE UNDER MY CARE     (Physician co-signature of this document indicates review and certification of the therapy plan).                Certification date from: 08/08/22, Certification date to: 08/08/22    Referring Physician: Dr. Nakia Masters.            Initial Assessment        See Physical Therapy evaluation dated 08/08/22 in Epic electronic health record.

## 2022-08-08 NOTE — PLAN OF CARE
Goal Outcome Evaluation:    Vital signs stable. Patient verbalized pain on left knee. Received PRN Tylenol. Pt verbalized feeling better. BP was high during the shift. Pt received PRN Hydralazine. BP showed improvement after administration of med. Pt denied chest pain and SOB throughout the shift.

## 2022-08-08 NOTE — DISCHARGE SUMMARY
[facility] MEDICINE  DISCHARGE SUMMARY     Primary Care Physician: Cynthia Maddox  Admission Date: 8/7/2022   Discharge Provider: Nakia Masters MD Discharge Date: 8/8/2022   Diet:   Active Diet and Nourishment Order   Procedures     Room Service     Low Saturated Fat Na <2400 mg     Diet       Code Status: No CPR- Do NOT Intubate   Activity: DCACTIVITY: Activity as tolerated        Condition at Discharge: Stable     REASON FOR PRESENTATION(See Admission Note for Details)     Chest pain    PRINCIPAL & ACTIVE DISCHARGE DIAGNOSES     Active Problems:    Chest pain, unspecified type      PENDING LABS     Unresulted Labs Ordered in the Past 30 Days of this Admission     No orders found for last 31 day(s).            PROCEDURES ( this hospitalization only)          RECOMMENDATIONS TO OUTPATIENT PROVIDER FOR F/U VISIT     Follow up with PCP in 1 week  Follow up with Cardiology in 2 weeks  Follow up with orthopedic surgeon in 1-2 weeks    DISPOSITION     Home with home cares    SUMMARY OF HOSPITAL COURSE:    Daniela Brown is a 89 year old female with a medical history of CAD, hypertension, hyperlipidemia, T2DM, CKD3, memory loss and neuropathy presented to the ED for evaluation of acute chest pain.     Acute chest pain: Acute lower squeezing-like chest pain.   -Troponin X3 negative,  EKG shows no ischemic changes.  - Her last cardiac catheterization was in Feb 2019. It shows mild non-obstructive CAD.  - Echocardiogram unremarkable, No WMA  -Follow up with cardiology as out-pt in 1-2 weeks.      Left knee effusion, primary osteoarthritis: Patient has left knee pain and swollen without erythema. She has a history of left knee effusion due to primary osteoarthritis. She had left knee aspiration and steroid injection in 6/16/22. She had falls two times in the past two weeks due to her left knee pain.   - Evaluated by orthopedic, recommend conservative measures and  follow up with primary orthopedic surgeon on discharge  -PT/OT     Elevated D dimer: D dimer 2.71 on admission. CT chest shows no PE.   - US lower extremity negative for DVT     Essential hypertension: Resume PTA amlodipine.    .Patient stable to be discharged home today. Please refer to discharge medications and instructions for more details.         Discharge Medications with Med changes:     Current Discharge Medication List      CONTINUE these medications which have NOT CHANGED    Details   amLODIPine (NORVASC) 5 MG tablet Take 1 tablet (5 mg) by mouth daily  Qty: 90 tablet, Refills: 3    Associated Diagnoses: Benign essential hypertension      bismuth subsalicylate (PEPTO BISMOL) 262 MG chewable tablet Take 1 tablet by mouth daily as needed      Cyanocobalamin (B-12) 1000 MCG SUBL Place 1 tablet under the tongue every evening       loperamide (IMODIUM) 2 MG capsule Take 2 mg by mouth 4 times daily as needed for diarrhea      Multiple Vitamins-Minerals (THERAPEUTIC MULTIVIT/MINERAL) TABS Take 1 tablet by mouth every evening       nitroGLYcerin (NITROSTAT) 0.4 MG sublingual tablet Place 1 tablet (0.4 mg) under the tongue every 5 minutes as needed for chest pain  Qty: 25 tablet, Refills: 4    Associated Diagnoses: Angina pectoris (H)      VITAMIN D, CHOLECALCIFEROL, PO Take 1,000 Units by mouth daily      blood glucose (ACCU-CHEK GUIDE) test strip Use to test blood sugar one times daily or as directed.  Qty: 100 each, Refills: 1    Associated Diagnoses: Type 2 diabetes mellitus with diabetic polyneuropathy, without long-term current use of insulin (H)      blood glucose monitoring (ACCU-CHEK FASTCLIX) lancets Use to test blood sugar one times daily or as directed.  Qty: 102 each, Refills: 3    Associated Diagnoses: Type 2 diabetes mellitus with diabetic polyneuropathy, without long-term current use of insulin (H)      LORazepam (ATIVAN) 0.5 MG tablet Take 1 tablet (0.5 mg) by mouth daily as needed for anxiety (for  short term only, do not take more than one per day)  Qty: 15 tablet, Refills: 1    Associated Diagnoses: Anxiety and depression      mirabegron (MYRBETRIQ) 25 MG 24 hr tablet Take 1 tablet (25 mg) by mouth daily  Qty: 30 tablet, Refills: 3    Associated Diagnoses: OAB (overactive bladder)      !! order for DME 1 walker  Qty: 1 Device, Refills: 0    Associated Diagnoses: Acute pain of right knee      !! ORDER FOR DME Thigh length teds.  Qty: 1 Device, Refills: 2    Associated Diagnoses: Venous insufficiency      STATIN NOT PRESCRIBED (INTENTIONAL) Please choose reason not prescribed, below    Associated Diagnoses: Type 2 diabetes mellitus with diabetic polyneuropathy, without long-term current use of insulin (H)       !! - Potential duplicate medications found. Please discuss with provider.                Rationale for medication changes:      See med rec        Consults       ORTHOPEDIC SURGERY IP CONSULT  CARE MANAGEMENT / SOCIAL WORK IP CONSULT  PHYSICAL THERAPY ADULT IP CONSULT  OCCUPATIONAL THERAPY ADULT IP CONSULT    Immunizations given this encounter     Most Recent Immunizations   Administered Date(s) Administered     COVID-19,PF,Pfizer (12+ Yrs) 10/14/2021     FLU 6-35 months 10/30/2012     Flu 65+ Years 09/13/2018     Flu, Unspecified 03/27/2012     Influenza (High Dose) 3 valent vaccine 09/18/2019     Influenza (IIV3) PF 10/30/2012     Influenza, Quad, High Dose, Pf, 65yr+ (Fluzone HD) 12/01/2021     Pneumo Conj 13-V (2010&after) 08/06/2019     Pneumococcal 23 valent 09/24/2020     Td (Adult), Adsorbed 01/01/1988     Vitamin B12 06/03/2014           Anticoagulation Information      Recent INR results: No results for input(s): INR in the last 168 hours.  Warfarin doses (if applicable) or name of other anticoagulant: N/A      SIGNIFICANT IMAGING FINDINGS     Results for orders placed or performed during the hospital encounter of 08/07/22   CT Chest Pulmonary Embolism w Contrast    Impression     IMPRESSION:  1.  No evidence for pulmonary embolism.    2.  Normal caliber thoracic aorta without dissection.    3.  Severe coronary artery calcification.    4.  Advanced atherosclerotic vascular calcification at the origin of the celiac and SMA. Recommend correlation for intestinal angina.    5.  Remainder as detailed above.     US Lower Extremity Venous Duplex Bilateral    Impression    IMPRESSION:  1.  No deep venous thrombosis in the bilateral lower extremities.  2.  Small left Baker's cyst.   XR Knee Left 1/2 Views    Impression    IMPRESSION: Advanced degenerative narrowing of the medial compartment of the left knee. Additional degenerative change patellofemoral. No evidence for fracture. Chronic enthesitis along the superior pole of the patella. Diffuse demineralization.     Echocardiogram Complete   Result Value Ref Range    LVEF  > 65%        SIGNIFICANT LABORATORY FINDINGS       Discharge Orders        Home Care Referral      Reason for your hospital stay    Chest pain, Knee pain     Follow-up and recommended labs and tests     Follow up with primary care provider, Cynthia Maddox, within 7 days for hospital follow- up.  No follow up labs or test are needed.    Follow up with Cardiology in 2 weeks  Follow up with orthopedic surgeon in 1-2 weeks     Activity    Your activity upon discharge: activity as tolerated     Diet    Follow this diet upon discharge: Orders Placed This Encounter      Room Service      Low Saturated Fat Na <2400 mg       Examination   Physical Exam   Temp:  [97.8  F (36.6  C)-98.5  F (36.9  C)] 98  F (36.7  C)  Pulse:  [75-97] 80  Resp:  [16-22] 16  BP: (134-176)/(59-73) 134/62  SpO2:  [95 %-98 %] 96 %  Wt Readings from Last 1 Encounters:   08/08/22 70.9 kg (156 lb 4.8 oz)       General: Pleasant, elderly female in NAD  HEENT:EOMI, AT,NC  CVS:RRR, B/l LE edema+   RS:CTAB  Abd: Soft, NT,ND  Neurology:Grossly normal  Ext: Left knee effusion+, ROM restricted.   Psy:Approrpiate  affect        Please see EMR for more detailed significant labs, imaging, consultant notes etc.    I, Nakia Masters MD, personally saw the patient today and spent greater than 30 minutes discharging this patient.    Nakia Masters MD  [facility]    CC:Cynthia Maddox

## 2022-08-08 NOTE — PROGRESS NOTES
Discharge instructions reviewed with patient and son at bedside and copy sent with. Verbalized understanding. All personal belongings sent with. [place].

## 2022-08-08 NOTE — PLAN OF CARE
PRIMARY DIAGNOSIS: CHEST PAIN  OUTPATIENT/OBSERVATION GOALS TO BE MET BEFORE DISCHARGE:  1. Ruled out acute coronary syndrome (negative or stable Troponin): Troponin been negative.  2. Pain Status: Denied pain.  3. Appropriate provocative testing performed: N/A  - Stress Test Procedure: No  - Interpretation of cardiac rhythm per telemetry tech: NSR    4. Cleared by Consultants (if applicable):N/A  5. Return to near baseline physical activity: Yes  Discharge Planner Nurse   Safe discharge environment identified: No  Barriers to discharge: No. PT/OT to see?       Entered by: Betzaida Villegas RN 08/08/2022 6:21 AM     Please review provider order for any additional goals.   Nurse to notify provider when observation goals have been met and patient is ready for discharge.

## 2022-08-08 NOTE — PLAN OF CARE
PRIMARY DIAGNOSIS: CHEST PAIN  OUTPATIENT/OBSERVATION GOALS TO BE MET BEFORE DISCHARGE:  1. Ruled out acute coronary syndrome (negative or stable Troponin): Troponin negative  2. Pain Status: No chest pain  3. Appropriate provocative testing performed: Yes  - Stress Test Procedure: No  - Interpretation of cardiac rhythm per telemetry tech: NSR    4. Cleared by Consultants (if applicable):N.A.  5. Return to near baseline physical activity: Yes  Discharge Planner Nurse   Safe discharge environment identified: PT/OT to see  Barriers to discharge: PT/OT to see.       Entered by: Betzaida Villegas RN 08/08/2022 3:11 AM     Please review provider order for any additional goals.   Nurse to notify provider when observation goals have been met and patient is ready for discharge.

## 2022-08-08 NOTE — PROGRESS NOTES
PRIMARY DIAGNOSIS: CHEST PAIN  OUTPATIENT/OBSERVATION GOALS TO BE MET BEFORE DISCHARGE:  1. Ruled out acute coronary syndrome (negative or stable Troponin):  Yes  2. Pain Status: Pain free.  3. Appropriate provocative testing performed: N/A  - Stress Test Procedure: [place]  - Interpretation of cardiac rhythm per telemetry tech: Not needed    4. Cleared by Consultants (if applicable):No  5. Return to near baseline physical activity: Yes  Discharge Planner Nurse   Safe discharge environment identified: Yes  Barriers to discharge: Yes, needs clearance       Entered by: Nevaeh Duke RN 08/07/2022 10:47 PM     Please review provider order for any additional goals.   Nurse to notify provider when observation goals have been met and patient is ready for discharge.

## 2022-08-09 ENCOUNTER — PATIENT OUTREACH (OUTPATIENT)
Dept: NURSING | Facility: CLINIC | Age: 87
End: 2022-08-09

## 2022-08-09 ASSESSMENT — ACTIVITIES OF DAILY LIVING (ADL): DEPENDENT_IADLS:: INDEPENDENT

## 2022-08-09 NOTE — LETTER
[facility]  Patient Centered Plan of Care  About Me:        Patient Name:  Daniela Wetzel    YOB: 1933  Age:         89 year old   Bettie MRN:    [id number] Telephone Information:  Home Phone [phone]   Mobile [phone]       Address:  [address]  [place] [address] Email address:  No e-mail address on record      Emergency Contact(s)    Name Relationship Lgl Grd Work Phone Home Phone Mobile Phone   1. EDEN WETZEL Son No   [phone]   2. JACE WETZEL (D* Relative No   [phone]   3. JACE WETZEL Daughter-in-Law    [phone]           Primary language:  English     needed? No   [employer]:  [phone] op. 1  Other communication barriers:Glasses    Preferred Method of Communication:  Mail  Current living arrangement: I live alone; I live in a private home    Mobility Status/ Medical Equipment: Independent w/Device        Health Maintenance  Health Maintenance Reviewed: Due/Overdue   Health Maintenance Due   Topic Date Due     ZOSTER IMMUNIZATION (1 of 2) Never done     DTAP/TDAP/TD IMMUNIZATION (1 - Tdap) 01/02/1988     EYE EXAM  12/08/2012     COVID-19 Vaccine (4 - Booster for Pfizer series) 02/14/2022     DIABETIC FOOT EXAM  09/02/2022         My Access Plan  Medical Emergency 911   Primary Clinic Line [facility] - [phone]   24 Hour Appointment Line [phone] or  [phone]) (toll-free)   24 Hour Nurse Line [phone] (toll-free)   Preferred Urgent Care [facility] [place], [phone]     Preferred Hospital [facility]  [phone]     Preferred Pharmacy [facility] [place] [address] [facility]     Behavioral Health Crisis Line The National Suicide Prevention Lifeline at 1-404.500.8543 or 548       My Care Team Members  Patient Care Team       Relationship Specialty Notifications Start End    Cynthia Maddox,  PCP - General  Internal Medicine Admissions 11/14/18     Phone: [phone] Pager: [phone] Fax: [phone]        [address] [facility] [address]    Cynthia Maddox DO Assigned PCP   10/21/18     Phone: [phone] Pager: [phone] Fax: [phone] [address] [facility] [address]    Tho Newman DO Assigned Musculoskeletal Provider   3/13/22     Phone: [phone] Fax: [phone] [address] [facility] [address]    Leatha Davis, RN Lead Care Coordinator Primary Care -  Admissions 8/9/22     Phone: [phone]                 My Care Plans  Self Management and Treatment Plan  Goals and (Comments)   Goals        General     Functional (pt-stated)      Notes - Note created  8/9/2022 10:07 AM by Leatha Davis, RN     Goal Statement: I will increase my strength in the next 1-2 months and decreased falls   Date Goal set: 8/9/2022  Barriers: Deconditioned   Strengths: Agrees with the plan   Date to Achieve By: 10/9/2022  Patient expressed understanding of goal: Yes  Action steps to achieve this goal:  1. I will start home care physical therapy  2. I will use my walker for safety  3. I will pace my activity throughout the day                Action Plans on File: None    Advance Care Plans/Directives Type:   Advanced Directive - On File; POLST      My Medical and Care Information  Problem List   Patient Active Problem List   Diagnosis     Degeneration of lumbar or lumbosacral intervertebral disc     Esophageal reflux     Memory loss     Irritable bowel syndrome with diarrhea     Osteopenia of multiple sites     RESTLESS LEG SYNDROME     Generalized osteoarthrosis, unspecified site     Diverticulosis of large intestine     SENSONRL HEAR LOSS,BILAT     Urticaria     Subjective tinnitus     Vitamin D deficiency     Right foot pain     Urge incontinence     Hyperlipidemia LDL goal <100     Allergic rhinitis     Pronation of foot     Peripheral neuropathy     Benign essential  hypertension     Carpal tunnel syndrome of left wrist     Diastolic dysfunction     Type 2 diabetes mellitus with diabetic polyneuropathy, without long-term current use of insulin (H)     History of recurrent urinary tract infection     CKD (chronic kidney disease) stage 3, GFR 30-59 ml/min (H)     Bilateral wrist pain     Protrusion of lumbar intervertebral disc     Coronary artery disease involving native coronary artery of native heart with angina pectoris (H)     Chronic left-sided low back pain with left-sided sciatica     Generalized weakness     Diarrhea     Abnormal CT of the abdomen     Cystocele, midline     B12 deficiency     Moderate major depression (H)     ARABELLA (generalized anxiety disorder)     Chest pain, unspecified type      Current Medications and Allergies:    Current Outpatient Medications   Medication     amLODIPine (NORVASC) 5 MG tablet     bismuth subsalicylate (PEPTO BISMOL) 262 MG chewable tablet     blood glucose (ACCU-CHEK GUIDE) test strip     blood glucose monitoring (ACCU-CHEK FASTCLIX) lancets     Cyanocobalamin (B-12) 1000 MCG SUBL     loperamide (IMODIUM) 2 MG capsule     LORazepam (ATIVAN) 0.5 MG tablet     mirabegron (MYRBETRIQ) 25 MG 24 hr tablet     Multiple Vitamins-Minerals (THERAPEUTIC MULTIVIT/MINERAL) TABS     nitroGLYcerin (NITROSTAT) 0.4 MG sublingual tablet     order for DME     ORDER FOR DME     STATIN NOT PRESCRIBED (INTENTIONAL)     VITAMIN D, CHOLECALCIFEROL, PO     No current facility-administered medications for this visit.       Care Coordination Start Date: 8/9/2022   Frequency of Care Coordination: 2 weeks     Form Last Updated: 08/09/2022

## 2022-08-09 NOTE — PROGRESS NOTES
Clinic Care Coordination Contact    Clinic Care Coordination Contact  OUTREACH    Referral Information:  Referral Source: IP Handoff    Primary Diagnosis: Other (include Comment box) (Chest pain left knee pain)    Chief Complaint   Patient presents with     Clinic Care Coordination - [facility]     Clinic Care Coordination RN         Universal Utilization:Hospital admission observation 8/7-8/8/2022 Chest Pain unspecified Left knee pain Clinic Utilization  Difficulty keeping appointments:: No  Compliance Concerns: No  No-Show Concerns: No  No PCP office visit in Past Year: No  Utilization    Hospital Admissions  1             ED Visits  1             No Show Count (past year)  3                Current as of: 8/8/2022  3:10 PM              Clinical Concerns:  Current Medical Concerns:    Patient reports both legs are weak. Patient was instructed to switch from the cane to the walker for safety due to multiple falls   Denies any further episodes of chest pain   Patient waits a call from home care  Patient lives alone and her son is 45 minutes away.  Son is selling his house and moving to [place]. Son would like the patient to move to [place] but she would like to visit first   Patient will call the clinic and make a hospital follow up with PCP  . Patient will request referrals for Orthopedic and Cardiology providers at her hospital visit   Patient admits she has not been checking her blood sugars but agrees to start.  Current Behavioral Concerns: No    Education Provided to patient: CC introductory letter and care plan mailed    Pain  Pain (GOAL):: No  Health Maintenance Reviewed: Due/Overdue   Health Maintenance Due   Topic Date Due     ZOSTER IMMUNIZATION (1 of 2) Never done     DTAP/TDAP/TD IMMUNIZATION (1 - Tdap) 01/02/1988     EYE EXAM  12/08/2012     COVID-19 Vaccine (4 - Booster for Pfizer series) 02/14/2022     DIABETIC FOOT EXAM  09/02/2022     Clinical Pathway: None    Medication Management:  Medication  review status: Medications reviewed and no changes reported per patient.             Functional Status:  Dependent ADLs:: Ambulation-walker, Ambulation-cane  Dependent IADLs:: Independent  Bed or wheelchair confined:: No  Mobility Status: Independent w/Device  Fallen 2 or more times in the past year?: Yes  Any fall with injury in the past year?: Yes    Living Situation:  Current living arrangement:: I live alone, I live in a private home    Lifestyle & Psychosocial Needs:    Social Determinants of Health     Tobacco Use: Low Risk      Smoking Tobacco Use: Never Smoker     Smokeless Tobacco Use: Never Used   Alcohol Use: Not on file   Financial Resource Strain: Not on file   Food Insecurity: Not on file   Transportation Needs: Not on file   Physical Activity: Not on file   Stress: Not on file   Social Connections: Not on file   Intimate Partner Violence: Not on file   Depression: At risk     PHQ-2 Score: 4   Housing Stability: Not on file     Diet:: Diabetic diet  Inadequate nutrition (GOAL):: No  Tube Feeding: No  Inadequate activity/exercise (GOAL):: Yes  Significant changes in sleep pattern (GOAL): No  Transportation means:: Accessible car     Scientology or spiritual beliefs that impact treatment:: No  Mental health DX:: Yes  Mental health DX how managed:: Medication  Mental health management concern (GOAL):: No  Chemical Dependency Status: No Current Concerns  Informal Support system:: Children           Resources and Interventions:  Current Resources:   Skilled Home Care Services: Home Health Aid, Physicial Therapy, Skilled Nursing  Community Resources: Home Care  Supplies Currently Used at Home: Diabetic Supplies  Equipment Currently Used at Home: cane, quad, lift device, grab bar, tub/shower, tub bench, walker, rolling            Advance Care Plan/Directive  Advanced Care Plans/Directives on file:: Yes  Type Advanced Care Plans/Directives: Advanced Directive - On File, POLST    Referrals Placed: None        Goals:    Goals        General     Functional (pt-stated)      Notes - Note created  8/9/2022 10:07 AM by Leatha Davis, RN     Goal Statement: I will increase my strength in the next 1-2 months and decreased falls   Date Goal set: 8/9/2022  Barriers: Deconditioned   Strengths: Agrees with the plan   Date to Achieve By: 10/9/2022  Patient expressed understanding of goal: Yes  Action steps to achieve this goal:  1. I will start home care physical therapy  2. I will use my walker for safety  3. I will pace my activity throughout the day               Patient/Caregiver understanding: Patient expresses understanding of discharge instructions after review     Outreach Frequency: 2 weeks  Future Appointments              In 1 week Cynthia Maddox DO [facility]  Arrive at: Clinic A          Plan:   Patient will make a hospital follow up with PCP  Patient will start home care services   CC RN will follow up in 1-2 weeks     [facility]   Leatha Davis RN, Care Coordinator   [facility]'s   E-mail [email or web address]   [phone]

## 2022-08-09 NOTE — LETTER
[facility] CARE COORDINATION  [address] [facility] [address]    August 9, 2022    Daniela Brown  [address]  [place] [address]      Dear Daniela,        I am a clinic care coordinator who works with Cynthia Maddox DO with the [facility]. I wanted to thank you for spending the time to talk with me.  Below is a description of clinic care coordination and how I can further assist you.       The clinic care coordination team is made up of a registered nurse, , financial resource worker and community health worker who understand the health care system. The goal of clinic care coordination is to help you manage your health and improve access to the health care system. Our team works alongside your provider to assist you in determining your health and social needs. We can help you obtain health care and community resources, providing you with necessary information and education. We can work with you through any barriers and develop a care plan that helps coordinate and strengthen the communication between you and your care team.    Please feel free to contact me with any questions or concerns regarding care coordination and what we can offer.      We are focused on providing you with the highest-quality healthcare experience possible.    Sincerely,     [facility]   Leatha Davis RN, Care Coordinator   [facility]'s   E-mail [email or web address]   [phone]    Enclosed: I have enclosed a copy of the Patient Centered Plan of Care. This has helpful information and goals that we have talked about. Please keep this in an easy to access place to use as needed.

## 2022-08-11 ENCOUNTER — HOSPITAL ENCOUNTER (INPATIENT)
Facility: CLINIC | Age: 87
LOS: 1 days | Discharge: HOME HEALTH | DRG: 880 | End: 2022-08-13
Attending: STUDENT IN AN ORGANIZED HEALTH CARE EDUCATION/TRAINING PROGRAM | Admitting: INTERNAL MEDICINE
Payer: MEDICARE

## 2022-08-11 ENCOUNTER — APPOINTMENT (OUTPATIENT)
Dept: GENERAL RADIOLOGY | Facility: CLINIC | Age: 87
DRG: 880 | End: 2022-08-11
Attending: STUDENT IN AN ORGANIZED HEALTH CARE EDUCATION/TRAINING PROGRAM
Payer: MEDICARE

## 2022-08-11 ENCOUNTER — APPOINTMENT (OUTPATIENT)
Dept: CT IMAGING | Facility: CLINIC | Age: 87
DRG: 880 | End: 2022-08-11
Attending: STUDENT IN AN ORGANIZED HEALTH CARE EDUCATION/TRAINING PROGRAM
Payer: MEDICARE

## 2022-08-11 DIAGNOSIS — R07.9 CHEST PAIN, UNSPECIFIED TYPE: Primary | ICD-10-CM

## 2022-08-11 DIAGNOSIS — R06.02 SHORTNESS OF BREATH: ICD-10-CM

## 2022-08-11 DIAGNOSIS — N30.00 ACUTE CYSTITIS WITHOUT HEMATURIA: ICD-10-CM

## 2022-08-11 DIAGNOSIS — Z11.52 ENCOUNTER FOR SCREENING LABORATORY TESTING FOR COVID-19 VIRUS: ICD-10-CM

## 2022-08-11 DIAGNOSIS — F41.1 GAD (GENERALIZED ANXIETY DISORDER): ICD-10-CM

## 2022-08-11 DIAGNOSIS — N39.0 UTI (URINARY TRACT INFECTION): ICD-10-CM

## 2022-08-11 DIAGNOSIS — R82.90 ABNORMAL URINALYSIS: ICD-10-CM

## 2022-08-11 LAB
ALBUMIN SERPL-MCNC: 3.6 G/DL (ref 3.4–5)
ALBUMIN UR-MCNC: ABNORMAL MG/DL
ALP SERPL-CCNC: 69 U/L (ref 40–150)
ALT SERPL W P-5'-P-CCNC: 36 U/L (ref 0–50)
ANION GAP SERPL CALCULATED.3IONS-SCNC: 10 MMOL/L (ref 3–14)
APPEARANCE UR: ABNORMAL
AST SERPL W P-5'-P-CCNC: 21 U/L (ref 0–45)
BACTERIA #/AREA URNS HPF: ABNORMAL /HPF
BASE EXCESS BLDV CALC-SCNC: 0 MMOL/L (ref -7.7–1.9)
BASOPHILS # BLD AUTO: 0.1 10E3/UL (ref 0–0.2)
BASOPHILS NFR BLD AUTO: 0 %
BILIRUB SERPL-MCNC: 0.5 MG/DL (ref 0.2–1.3)
BILIRUB UR QL STRIP: NEGATIVE
BUN SERPL-MCNC: 21 MG/DL (ref 7–30)
CALCIUM SERPL-MCNC: 8.7 MG/DL (ref 8.5–10.1)
CHLORIDE BLD-SCNC: 110 MMOL/L (ref 94–109)
CO2 SERPL-SCNC: 23 MMOL/L (ref 20–32)
COLOR UR AUTO: YELLOW
CREAT SERPL-MCNC: 1.14 MG/DL (ref 0.52–1.04)
EOSINOPHIL # BLD AUTO: 0.6 10E3/UL (ref 0–0.7)
EOSINOPHIL NFR BLD AUTO: 3 %
ERYTHROCYTE [DISTWIDTH] IN BLOOD BY AUTOMATED COUNT: 12.9 % (ref 10–15)
FLUAV RNA SPEC QL NAA+PROBE: NEGATIVE
FLUBV RNA RESP QL NAA+PROBE: NEGATIVE
GFR SERPL CREATININE-BSD FRML MDRD: 46 ML/MIN/1.73M2
GLUCOSE BLD-MCNC: 166 MG/DL (ref 70–99)
GLUCOSE UR STRIP-MCNC: NEGATIVE MG/DL
HCO3 BLDV-SCNC: 25 MMOL/L (ref 21–28)
HCT VFR BLD AUTO: 43.2 % (ref 35–47)
HGB BLD-MCNC: 14.4 G/DL (ref 11.7–15.7)
HGB UR QL STRIP: NEGATIVE
IMM GRANULOCYTES # BLD: 0.1 10E3/UL
IMM GRANULOCYTES NFR BLD: 1 %
KETONES UR STRIP-MCNC: NEGATIVE MG/DL
LEUKOCYTE ESTERASE UR QL STRIP: ABNORMAL
LYMPHOCYTES # BLD AUTO: 2 10E3/UL (ref 0.8–5.3)
LYMPHOCYTES NFR BLD AUTO: 10 %
MCH RBC QN AUTO: 28.5 PG (ref 26.5–33)
MCHC RBC AUTO-ENTMCNC: 33.3 G/DL (ref 31.5–36.5)
MCV RBC AUTO: 85 FL (ref 78–100)
MONOCYTES # BLD AUTO: 0.7 10E3/UL (ref 0–1.3)
MONOCYTES NFR BLD AUTO: 4 %
MUCOUS THREADS #/AREA URNS LPF: PRESENT /LPF
NEUTROPHILS # BLD AUTO: 15.7 10E3/UL (ref 1.6–8.3)
NEUTROPHILS NFR BLD AUTO: 82 %
NITRATE UR QL: POSITIVE
NRBC # BLD AUTO: 0 10E3/UL
NRBC BLD AUTO-RTO: 0 /100
NT-PROBNP SERPL-MCNC: 342 PG/ML (ref 0–1800)
O2/TOTAL GAS SETTING VFR VENT: 0 %
PCO2 BLDV: 43 MM HG (ref 40–50)
PH BLDV: 7.38 [PH] (ref 7.32–7.43)
PH UR STRIP: 6 [PH] (ref 5–7)
PLATELET # BLD AUTO: 282 10E3/UL (ref 150–450)
PO2 BLDV: 27 MM HG (ref 25–47)
POTASSIUM BLD-SCNC: 3.5 MMOL/L (ref 3.4–5.3)
PROT SERPL-MCNC: 7.1 G/DL (ref 6.8–8.8)
RBC # BLD AUTO: 5.06 10E6/UL (ref 3.8–5.2)
RBC URINE: 1 /HPF
SARS-COV-2 RNA RESP QL NAA+PROBE: NEGATIVE
SODIUM SERPL-SCNC: 143 MMOL/L (ref 133–144)
SP GR UR STRIP: 1.01 (ref 1–1.03)
SQUAMOUS EPITHELIAL: 3 /HPF
TROPONIN I SERPL HS-MCNC: 29 NG/L
UROBILINOGEN UR STRIP-MCNC: NORMAL MG/DL
WBC # BLD AUTO: 19.2 10E3/UL (ref 4–11)
WBC URINE: 67 /HPF

## 2022-08-11 PROCEDURE — 250N000013 HC RX MED GY IP 250 OP 250 PS 637: Performed by: EMERGENCY MEDICINE

## 2022-08-11 PROCEDURE — 71045 X-RAY EXAM CHEST 1 VIEW: CPT

## 2022-08-11 PROCEDURE — 99285 EMERGENCY DEPT VISIT HI MDM: CPT | Mod: 25 | Performed by: STUDENT IN AN ORGANIZED HEALTH CARE EDUCATION/TRAINING PROGRAM

## 2022-08-11 PROCEDURE — 99285 EMERGENCY DEPT VISIT HI MDM: CPT | Mod: CS | Performed by: STUDENT IN AN ORGANIZED HEALTH CARE EDUCATION/TRAINING PROGRAM

## 2022-08-11 PROCEDURE — 250N000011 HC RX IP 250 OP 636: Performed by: EMERGENCY MEDICINE

## 2022-08-11 PROCEDURE — G0378 HOSPITAL OBSERVATION PER HR: HCPCS

## 2022-08-11 PROCEDURE — 96365 THER/PROPH/DIAG IV INF INIT: CPT | Performed by: STUDENT IN AN ORGANIZED HEALTH CARE EDUCATION/TRAINING PROGRAM

## 2022-08-11 PROCEDURE — 93005 ELECTROCARDIOGRAM TRACING: CPT | Performed by: STUDENT IN AN ORGANIZED HEALTH CARE EDUCATION/TRAINING PROGRAM

## 2022-08-11 PROCEDURE — 82040 ASSAY OF SERUM ALBUMIN: CPT | Performed by: STUDENT IN AN ORGANIZED HEALTH CARE EDUCATION/TRAINING PROGRAM

## 2022-08-11 PROCEDURE — 87636 SARSCOV2 & INF A&B AMP PRB: CPT | Performed by: STUDENT IN AN ORGANIZED HEALTH CARE EDUCATION/TRAINING PROGRAM

## 2022-08-11 PROCEDURE — 81001 URINALYSIS AUTO W/SCOPE: CPT | Performed by: STUDENT IN AN ORGANIZED HEALTH CARE EDUCATION/TRAINING PROGRAM

## 2022-08-11 PROCEDURE — 93010 ELECTROCARDIOGRAM REPORT: CPT | Performed by: STUDENT IN AN ORGANIZED HEALTH CARE EDUCATION/TRAINING PROGRAM

## 2022-08-11 PROCEDURE — 84484 ASSAY OF TROPONIN QUANT: CPT | Performed by: STUDENT IN AN ORGANIZED HEALTH CARE EDUCATION/TRAINING PROGRAM

## 2022-08-11 PROCEDURE — 36415 COLL VENOUS BLD VENIPUNCTURE: CPT | Performed by: STUDENT IN AN ORGANIZED HEALTH CARE EDUCATION/TRAINING PROGRAM

## 2022-08-11 PROCEDURE — 87086 URINE CULTURE/COLONY COUNT: CPT | Performed by: STUDENT IN AN ORGANIZED HEALTH CARE EDUCATION/TRAINING PROGRAM

## 2022-08-11 PROCEDURE — 71250 CT THORAX DX C-: CPT | Mod: MG

## 2022-08-11 PROCEDURE — C9803 HOPD COVID-19 SPEC COLLECT: HCPCS | Performed by: STUDENT IN AN ORGANIZED HEALTH CARE EDUCATION/TRAINING PROGRAM

## 2022-08-11 PROCEDURE — 85048 AUTOMATED LEUKOCYTE COUNT: CPT | Performed by: STUDENT IN AN ORGANIZED HEALTH CARE EDUCATION/TRAINING PROGRAM

## 2022-08-11 PROCEDURE — 83880 ASSAY OF NATRIURETIC PEPTIDE: CPT | Performed by: STUDENT IN AN ORGANIZED HEALTH CARE EDUCATION/TRAINING PROGRAM

## 2022-08-11 PROCEDURE — 82803 BLOOD GASES ANY COMBINATION: CPT | Performed by: STUDENT IN AN ORGANIZED HEALTH CARE EDUCATION/TRAINING PROGRAM

## 2022-08-11 PROCEDURE — 96366 THER/PROPH/DIAG IV INF ADDON: CPT | Performed by: STUDENT IN AN ORGANIZED HEALTH CARE EDUCATION/TRAINING PROGRAM

## 2022-08-11 PROCEDURE — 80053 COMPREHEN METABOLIC PANEL: CPT | Performed by: STUDENT IN AN ORGANIZED HEALTH CARE EDUCATION/TRAINING PROGRAM

## 2022-08-11 RX ORDER — CEFTRIAXONE 1 G/1
1 INJECTION, POWDER, FOR SOLUTION INTRAMUSCULAR; INTRAVENOUS EVERY 24 HOURS
Status: DISCONTINUED | OUTPATIENT
Start: 2022-08-11 | End: 2022-08-13 | Stop reason: HOSPADM

## 2022-08-11 RX ORDER — ACETAMINOPHEN 325 MG/1
325 TABLET ORAL ONCE
Status: COMPLETED | OUTPATIENT
Start: 2022-08-11 | End: 2022-08-11

## 2022-08-11 RX ADMIN — CEFTRIAXONE 1 G: 1 INJECTION, POWDER, FOR SOLUTION INTRAMUSCULAR; INTRAVENOUS at 18:57

## 2022-08-11 RX ADMIN — ACETAMINOPHEN 325 MG: 325 TABLET, FILM COATED ORAL at 21:27

## 2022-08-11 ASSESSMENT — ACTIVITIES OF DAILY LIVING (ADL)
ADLS_ACUITY_SCORE: 39

## 2022-08-11 NOTE — ED NOTES
"Pt reports SOB and weakness episodes that \"come out of nowhere\".  Was seen at [facility] yesterday for same  "

## 2022-08-11 NOTE — ED PROVIDER NOTES
History     Chief Complaint   Patient presents with     Shortness of Breath     Generalized Weakness     HPI  Daniela Brown is a 89 year old female with documented past medical history which includes hypertension, CAD, type II DM, stage III CKD, memory loss and neuropathy recently discharged from [facility] 8/8/2022 who presents to the department via EMS for evaluation after episode of shortness of breath.  Patient explains that shortly after noon she ate lunch followed by a sensation of difficulty breathing.  The symptoms lasted only minutes before she called for help, arrived to the department without shortness of breath or any active symptoms.  Since discharge from last hospital stay, patient denies fever, headache, chills, cough, chest pain, back pain, abdominal pain or gastrointestinal symptoms.  Patient did take a clonazepam just before lunch today because she felt extremely anxious.      Allergies:  Allergies   Allergen Reactions     Metoprolol Itching and Rash     Cephalexin Rash     Erythromycin Rash     Actonel [Bisphosphonates] Itching     Azithromycin Hives     Celebrex [Celecoxib] Rash     Cipro [Ciprofloxacin] Rash     Contrast Dye Hives     Diatrizoate Hives     Erythromycin Rash     Escitalopram Diarrhea     Gets very sick and mad feelings     Fosamax Itching and Rash     Keflex [Cephalexin Monohydrate] Rash     Lisinopril Cough     Risedronate Itching     Seasonal Allergies      Shellfish-Derived Products Hives     Tetanus Toxoid, Adsorbed Swelling     Tetanus-Diphtheria Toxoids Swelling     Vicodin [Acetaminophen] Itching     Patient reported - only when used scheduled in high doses.        Vioxx Rash     Acetaminophen Itching     In high doses  Patient reported - only when used scheduled in high doses.        Alendronic Acid Rash     Celecoxib Rash     Penicillins Rash     Rofecoxib Rash     Sulfa Drugs Itching and Rash     Sulfasalazine Itching and Rash       Problem List:   "  Patient Active Problem List    Diagnosis Date Noted     Chest pain, unspecified type 08/07/2022     Priority: Medium     Moderate major depression (H) 12/31/2020     Priority: Medium     ARABELLA (generalized anxiety disorder) 12/31/2020     Priority: Medium     Has been on celexa (not effective but no side effects), amitriptyline, cymbalta (dizziness), lexapro (listed as allergy - 'gets very sick\"), zoloft switched to lexapro)       Abnormal CT of the abdomen 10/10/2019     Priority: Medium     CT 10/9/2019- cystic lesion along the anterior aspect of the pancreatic body/tail (series 2 image 27) measures 2 cm, and is new since the previous exam 2011. Pancreatic MRI with MRCP should be considered for further evaluation.   MRI 10/10/2019- There are 2 cystic lesions in the pancreatic body/tail, with the largest measuring 2 cm. No enhancing septations or solid masslike components are identified, although evaluation is limited related to patient motion artifact. These lesions have appearances suggestive of IPMN, but remain technically indeterminate. A follow-up MRI in one year should be considered to confirm stability.       Cystocele, midline      Priority: Medium     grade III       Generalized weakness 10/09/2019     Priority: Medium     Diarrhea 10/09/2019     Priority: Medium     Chronic left-sided low back pain with left-sided sciatica 04/15/2019     Priority: Medium     Coronary artery disease involving native coronary artery of native heart with angina pectoris (H) 02/19/2019     Priority: Medium     coronary angiogram 2/2019 showed mild coronary artery disease.  The most significant of these was the 40% lesion in the mid RCA.       Benign essential hypertension 11/14/2018     Priority: Medium     Type 2 diabetes mellitus with diabetic polyneuropathy, without long-term current use of insulin (H) 11/14/2018     Priority: Medium     Diet controlled.  Diagnosed 6/2016, has never been on medications.       History of " recurrent urinary tract infection 11/14/2018     Priority: Medium     On daily trimethoprim       CKD (chronic kidney disease) stage 3, GFR 30-59 ml/min (H) 11/14/2018     Priority: Medium     Peripheral neuropathy 09/10/2018     Priority: Medium     Bilateral wrist pain 04/11/2018     Priority: Medium     Protrusion of lumbar intervertebral disc 04/11/2018     Priority: Medium     Carpal tunnel syndrome of left wrist 10/21/2014     Priority: Medium     Diastolic dysfunction 03/31/2014     Priority: Medium     Pronation of foot 05/05/2011     Priority: Medium     Bilateral defomity       Allergic rhinitis 01/19/2011     Priority: Medium     Hyperlipidemia LDL goal <100 10/31/2010     Priority: Medium     Urge incontinence 10/20/2009     Priority: Medium     Right foot pain 10/16/2009     Priority: Medium     Xray showed djd -follows with podiatry. -arch supports placed may need cam walker        Vitamin D deficiency 09/09/2008     Priority: Medium     Subjective tinnitus 04/22/2008     Priority: Medium     Urticaria 08/22/2006     Priority: Medium     SENSONRL HEAR LOSS,BILAT 05/03/2006     Priority: Medium     Esophageal reflux      Priority: Medium     Memory loss      Priority: Medium     Early cognitive decline. MMSE 27/30, Neno 4.7/ 6.0 2004. B12, TSH, RPR normal 5430-1822.       Degeneration of lumbar or lumbosacral intervertebral disc      Priority: Medium     MRI 5/04- DDD, L2-3 annular bulge with protrusion into left caudal infra-foraminal area  MRI 2017 with L4-5 loss disc height with spondylolisthesis.       B12 deficiency 10/10/2019     Priority: Low     Irritable bowel syndrome with diarrhea      Priority: Low     diahrrea predominant       Osteopenia of multiple sites      Priority: Low     DEXA 2007 -1.4 hip. Dexa 2004 -1 hip and -1 spine       RESTLESS LEG SYNDROME      Priority: Low     Generalized osteoarthrosis, unspecified site      Priority: Low     C-spine, left hip       Diverticulosis of  large intestine      Priority: Low     flexible sigmoidoscopy with diverticulosis otherwise normal 2001, admit diverticulitis 2009          Past Medical History:    Past Medical History:   Diagnosis Date     Abnormal cardiovascular stress test 2/3/2019     Adhesive capsulitis of shoulder      Adjustment disorder with anxiety 3/18/2016     B12 deficiency anemia 6/20/2012     Chest pain 2004     Colitis, Clostridium difficile 4/18/2012     Diverticulitis 2009     Headache(784.0) 2001     Impaired fasting glucose 2005     PERS HX ALLERGY OTHER FOODS 8/22/2006     PERS HX ALLERGY TO MILK PRODUCTS 8/22/2006     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     Status post coronary angiogram 2/27/2019     Syncope and collapse 9/10/2018       Past Surgical History:    Past Surgical History:   Procedure Laterality Date     ARTHROPLASTY KNEE  3/26/2012    Procedure:ARTHROPLASTY KNEE; Right Total Knee Arthroplasty; Surgeon:BERNADINE ROSAS; Location:[place] OR     CHOLECYSTECTOMY       CV HEART CATHETERIZATION WITH POSSIBLE INTERVENTION N/A 2/27/2019    Procedure: Coronary Angiogram with Left ventriculogram;  Surgeon: Leandro Carpio MD;  Location:  HE[facility]ARD[facility] CATH LAB     HERNIA REPAIR      Hernia Repair     HYSTERECTOMY, PAP NO LONGER INDICATED  1983    TVH/BSO     SURGICAL HISTORY OF -   10/08    A & P Repair, Dr. Hannah     ZZC APPENDECTOMY  1953       Family History:    Family History   Problem Relation Age of Onset     C.A.D. Mother      Diabetes Mother      Cancer - colorectal Mother      Arthritis Mother      Heart Disease Mother      Lipids Brother      Obesity Brother      Hypertension Brother      Allergies Son      Eye Disorder Son         cataract     Thyroid Disease Sister         graves dz     Eye Disorder Son      Leukemia Son      Alcohol/Drug Father        Social History:  Marital Status:   [5]  Social History     Tobacco Use     Smoking status: Never Smoker     Smokeless tobacco: Never Used    Vaping Use     Vaping Use: Never used   Substance Use Topics     Alcohol use: No     Drug use: No        Medications:    amLODIPine (NORVASC) 5 MG tablet  bismuth subsalicylate (PEPTO BISMOL) 262 MG chewable tablet  blood glucose (ACCU-CHEK GUIDE) test strip  blood glucose monitoring (ACCU-CHEK FASTCLIX) lancets  Cyanocobalamin (B-12) 1000 MCG SUBL  loperamide (IMODIUM) 2 MG capsule  LORazepam (ATIVAN) 0.5 MG tablet  mirabegron (MYRBETRIQ) 25 MG 24 hr tablet  Multiple Vitamins-Minerals (THERAPEUTIC MULTIVIT/MINERAL) TABS  nitroGLYcerin (NITROSTAT) 0.4 MG sublingual tablet  order for DME  ORDER FOR DME  STATIN NOT PRESCRIBED (INTENTIONAL)  VITAMIN D, CHOLECALCIFEROL, PO          Review of Systems  Constitutional:  Negative for fever or chills.  Cardiovascular:  Negative for chest discomfort.  Respiratory: Positive for short episode of shortness of breath, resolved.  Negative for cough.  Gastrointestinal:  Negative for abdominal pain, nausea or vomiting.   Musculoskeletal: Negative for back pain.  Denies recent injury.  Neurological:  Negative for headache or dizziness.    All others reviewed and are negative.      Physical Exam   BP: 129/76  Pulse: 80  SpO2: 98 %      Physical Exam  Constitutional:  Well developed, well nourished.  Appears nontoxic and in no acute distress.  Resting comfortably on the gurney.  HENT:  Normocephalic and atraumatic.  Symmetric in appearance.  Eyes:  Conjunctivae are normal.  Neck:  Neck supple.  Cardiovascular:  No cyanosis.  RRR.  No audible murmurs noted.    Respiratory:  Effort normal without sign of respiratory distress.  No audible wheezing or stridor.  CTAB.   Gastrointestinal:  Soft nondistended abdomen.  Nontender and without guarding.  No rigidity or rebound tenderness.    Musculoskeletal:  Moves extremities spontaneously.  Neurological:  Patient is alert.  Skin:  Skin is warm and dry.  Psychiatric:  Normal mood and affect.      ED Course                 Procedures                 EKG Interpretation:      Interpreted by: Prabhu Hansen  Time reviewed: Upon completion    Symptoms at time of EKG: Asymptomatic   Rhythm: Sinus  Rate: 75 bpm  Axis: Left  Conduction: None atypical   ST Segments/ T Waves: No pathologic ST-elevations or T-wave abnormalities.  Q Waves: None  Comparison to prior: Similar morphology to previous     Clinical Impression: No sign of ischemia         Critical Care time:  none               Results for orders placed or performed during the hospital encounter of 08/11/22 (from the past 24 hour(s))   CBC with platelets differential    Narrative    The following orders were created for panel order CBC with platelets differential.  Procedure                               Abnormality         Status                     ---------                               -----------         ------                     CBC with platelets and d...[[id number]]  Abnormal            Final result                 Please view results for these tests on the individual orders.   Comprehensive metabolic panel   Result Value Ref Range    Sodium 143 133 - 144 mmol/L    Potassium 3.5 3.4 - 5.3 mmol/L    Chloride 110 (H) 94 - 109 mmol/L    Carbon Dioxide (CO2) 23 20 - 32 mmol/L    Anion Gap 10 3 - 14 mmol/L    Urea Nitrogen 21 7 - 30 mg/dL    Creatinine 1.14 (H) 0.52 - 1.04 mg/dL    Calcium 8.7 8.5 - 10.1 mg/dL    Glucose 166 (H) 70 - 99 mg/dL    Alkaline Phosphatase 69 40 - 150 U/L    AST 21 0 - 45 U/L    ALT 36 0 - 50 U/L    Protein Total 7.1 6.8 - 8.8 g/dL    Albumin 3.6 3.4 - 5.0 g/dL    Bilirubin Total 0.5 0.2 - 1.3 mg/dL    GFR Estimate 46 (L) >60 mL/min/1.73m2   Troponin I   Result Value Ref Range    Troponin I High Sensitivity 29 <54 ng/L   Blood gas venous   Result Value Ref Range    pH Venous 7.38 7.32 - 7.43    pCO2 Venous 43 40 - 50 mm Hg    pO2 Venous 27 25 - 47 mm Hg    Bicarbonate Venous 25 21 - 28 mmol/L    Base Excess/Deficit (+/-) 0.0 -7.7 - 1.9 mmol/L    FIO2 0    Nt probnp inpatient  (BNP)   Result Value Ref Range    N terminal Pro BNP Inpatient 342 0 - 1,800 pg/mL   CBC with platelets and differential   Result Value Ref Range    WBC Count 19.2 (H) 4.0 - 11.0 10e3/uL    RBC Count 5.06 3.80 - 5.20 10e6/uL    Hemoglobin 14.4 11.7 - 15.7 g/dL    Hematocrit 43.2 35.0 - 47.0 %    MCV 85 78 - 100 fL    MCH 28.5 26.5 - 33.0 pg    MCHC 33.3 31.5 - 36.5 g/dL    RDW 12.9 10.0 - 15.0 %    Platelet Count 282 150 - 450 10e3/uL    % Neutrophils 82 %    % Lymphocytes 10 %    % Monocytes 4 %    % Eosinophils 3 %    % Basophils 0 %    % Immature Granulocytes 1 %    NRBCs per 100 WBC 0 <1 /100    Absolute Neutrophils 15.7 (H) 1.6 - 8.3 10e3/uL    Absolute Lymphocytes 2.0 0.8 - 5.3 10e3/uL    Absolute Monocytes 0.7 0.0 - 1.3 10e3/uL    Absolute Eosinophils 0.6 0.0 - 0.7 10e3/uL    Absolute Basophils 0.1 0.0 - 0.2 10e3/uL    Absolute Immature Granulocytes 0.1 <=0.4 10e3/uL    Absolute NRBCs 0.0 10e3/uL   Symptomatic; Unknown Influenza A/B & SARS-CoV2 (COVID-19) Virus PCR Multiplex Nose    Specimen: Nose; Swab   Result Value Ref Range    Influenza A PCR Negative Negative    Influenza B PCR Negative Negative    SARS CoV2 PCR Negative Negative    Narrative    Testing was performed using the [employer] SARS-CoV-2 & Influenza A/B Assay on the [employer] System. This test should be ordered for the detection of SARS-CoV-2 and influenza viruses in individuals who meet clinical and/or epidemiological criteria. Test performance is unknown in asymptomatic patients. This test is for in vitro diagnostic use under the FDA EUA for laboratories certified under CLIA to perform moderate and/or high complexity testing. This test has not been FDA cleared or approved. A negative result does not rule out the presence of PCR inhibitors in the specimen or target RNA in concentration below the limit of detection for the assay. If only one viral target is positive but coinfection with multiple targets is suspected, the sample should be re-tested  with another FDA cleared, approved or authorized test, if coinfection would change clinical management. [facility] are certified under the Clinical Laboratory Improvement Amendments of 1988 (CLIA-88) as  qualified to perform moderate and/or high complexity laboratory testing.   XR Chest Port 1 View    Narrative    XR CHEST PORT 1 VIEW   8/11/2022 4:19 PM     HISTORY: sob, elevated wbc    COMPARISON: 80 8/7/2022      Impression    IMPRESSION: Stable cardiomediastinal silhouette. There is a new  opacity in the left costophrenic sulcus, could represent small pleural  effusion and/or focal pneumonia. No pneumothorax. No acute bony  abnormality.    CHERYL CÁRDENAS MD         SYSTEM ID:  [id number]     *Note: Due to a large number of results and/or encounters for the requested time period, some results have not been displayed. A complete set of results can be found in Results Review.       Medications - No data to display    Assessments & Plan (with Medical Decision Making)   Daniela Brown is a 89 year old female who presents to the department for evaluation after episode of shortness of breath which occurred shortly after lunch.  However patient's symptoms resolved prior to arrival to the department, she has been without symptoms and remained hemodynamically stable throughout monitored stay.  Records confirm the patient was discharged 8/8/2022 after evaluation of similar complaint at [facility], reassuring work-up including CTPA and echocardiogram.  She maintains she has been without chest pain, EKG morphology without ischemia and troponin within reference range making ACS unlikely.  Repeat PE study seems likely to be of low utility.  CBC however CBC indicates moderate leukocytosis of unknown significance, chest radiograph independently reviewed and agree with radiologist read that there may be a focal infiltrate prompting noncontrast CT study.  Signed over to oncoming evening  physician Dr. Vo for results review and final disposition.          Disclaimer:  This note consists of symbols derived from keyboarding, dictation, and/or voice recognition software.  As a result, there may be errors in the script that have gone undetected.  Please consider this when interpreting information found in the chart.        I have reviewed the nursing notes.    I have reviewed the findings, diagnosis, plan and need for follow up with the patient.       New Prescriptions    No medications on file       Final diagnoses:   Shortness of breath       8/11/2022   [facility] EMERGENCY DEPT     Prabhu Hansen DO  08/12/22 0902

## 2022-08-11 NOTE — ED TRIAGE NOTES
pt arrived via ems with c/o gen weakness and sob. Pt was dcd recently from [facility]. bp low for ems and pt reports taking extra clonazepam today because her anxiety was high. Pt lives alone.

## 2022-08-11 NOTE — ED PROVIDER NOTES
Emergency Department Patient Sign-out       Brief HPI:  This is a 89 year old female signed out to me by Dr. ANEESH Santiago at 5.10pm . See Dr Santiago's ED note for additional details of care prior to shift change and handoff.  In summary by report at handoff 89-year-old female arrived by EMS with sudden onset of shortness of breath of unclear etiology.  Patient has history of type 2 diabetes, stage III chronic kidney disease, history of coronary disease memory loss and neuropathy who was recently evaluated at [facility] 4 days prior.  During her evaluation a cardiac work-up was completed including an echocardiogram.  Cardiology follow-up was recommended.  Last cath February 2019.  During her work-up as an child she had a mild elevated D-dimer and a CT chest PE protocol was completed which showed no PE.  She arrived and admitted that she was feeling anxious although not certain if her shortness of breath was due to anxiety.  She had taken her home clonazepam.  During her work-up prior to shift change patient was noted to have a leukocytosis and work-up was broadened to evaluate for sources for infection including urinalysis and chest CT.  X-ray chest that revealed a probable passed in the left costophrenic sulcus which could be a small pleural effusion cannot exclude pneumonia.  At shift change patient is awaiting urinalysis and CT.  Final disposition will depend on ED course.  Patient does live alone may require admission       Significant Events after my assuming care:  Patient was seen and examined initially at 5:40 PM.  She was trying to urinate sitting on the commode and reported that she would prefer to provide a spontaneous void urinalysis and have a quick cath. We reviewed hand-off and pending work-up.    Urinalysis is positive for infection with positive nitrite, moderate leukocyte Estrace and 6 7 white cells.  Most recent urine culture from March 16, 2022 was negative.  However urine culture from  January 3, 2022 had grown staph.haemolyticus intermediate resistance to ciprofloxacin.  After reviewing patient's allergy profile and discussion with inpatient pharmacist with renal function- creatinine 1.14 today (baseline 0.8 to 1.0) boderline renal function for macrobid which patient received last positive urine culture from 7 months prior. Urine culture is pending. Patient was given a trial of a high generation cephalosporin with ceftriaxone    CT chest without contrast today did not reveal any evidence of pneumonia and did not correlate with x-ray interpretation-which is now felt to be due to a prominent epicardial fat or result hypoventilation.  See details in radiology interpretation.    With patient living alone, positive urinalysis and multiple allergies and no reliable oral antibiotic biotic option based on last urine culture results I felt that a period of observation may be prudent with plan to admit for UTI likely contributed to her generalized weakness is reported although could not explain her shortness of breath and anxiety is reported.    Spoke with LOCO Hitchcock PA-C- admitting provider at 9.45pm who agreed to accept patient for admission after reviewing rationale for admission including patient lives alone, no ideal oral antibiotics given her allergy profile with abnormal urinalysis and leukocytosis after presenting with feeling anxious short of breath and generalized weakness with recent evaluation at [facility] and hand-off at shift change.       ED to Inpatient Handoff:    Discussed with LOCO Hitchcock PA-C at 9.45pm  Patient accepted for observation Stay  Pending studies include: none  Code Status: Not Addressed             Exam:   Patient Vitals for the past 24 hrs:   BP Pulse SpO2   08/11/22 1839 137/60 76 97 %   08/11/22 1503 -- -- 97 %   08/11/22 1501 -- -- 96 %   08/11/22 1500 -- -- 96 %   08/11/22 1450 129/76 80 98 %           ED RESULTS:   Results for orders placed or performed  during the hospital encounter of 08/11/22 (from the past 24 hour(s))   CBC with platelets differential     Status: Abnormal    Collection Time: 08/11/22  3:25 PM    Narrative    The following orders were created for panel order CBC with platelets differential.  Procedure                               Abnormality         Status                     ---------                               -----------         ------                     CBC with platelets and d...[[id number]]  Abnormal            Final result                 Please view results for these tests on the individual orders.   Comprehensive metabolic panel     Status: Abnormal    Collection Time: 08/11/22  3:25 PM   Result Value Ref Range    Sodium 143 133 - 144 mmol/L    Potassium 3.5 3.4 - 5.3 mmol/L    Chloride 110 (H) 94 - 109 mmol/L    Carbon Dioxide (CO2) 23 20 - 32 mmol/L    Anion Gap 10 3 - 14 mmol/L    Urea Nitrogen 21 7 - 30 mg/dL    Creatinine 1.14 (H) 0.52 - 1.04 mg/dL    Calcium 8.7 8.5 - 10.1 mg/dL    Glucose 166 (H) 70 - 99 mg/dL    Alkaline Phosphatase 69 40 - 150 U/L    AST 21 0 - 45 U/L    ALT 36 0 - 50 U/L    Protein Total 7.1 6.8 - 8.8 g/dL    Albumin 3.6 3.4 - 5.0 g/dL    Bilirubin Total 0.5 0.2 - 1.3 mg/dL    GFR Estimate 46 (L) >60 mL/min/1.73m2   Troponin I     Status: Normal    Collection Time: 08/11/22  3:25 PM   Result Value Ref Range    Troponin I High Sensitivity 29 <54 ng/L   Blood gas venous     Status: None    Collection Time: 08/11/22  3:25 PM   Result Value Ref Range    pH Venous 7.38 7.32 - 7.43    pCO2 Venous 43 40 - 50 mm Hg    pO2 Venous 27 25 - 47 mm Hg    Bicarbonate Venous 25 21 - 28 mmol/L    Base Excess/Deficit (+/-) 0.0 -7.7 - 1.9 mmol/L    FIO2 0    Nt probnp inpatient (BNP)     Status: Normal    Collection Time: 08/11/22  3:25 PM   Result Value Ref Range    N terminal Pro BNP Inpatient 342 0 - 1,800 pg/mL   CBC with platelets and differential     Status: Abnormal    Collection Time: 08/11/22  3:25 PM   Result Value  Ref Range    WBC Count 19.2 (H) 4.0 - 11.0 10e3/uL    RBC Count 5.06 3.80 - 5.20 10e6/uL    Hemoglobin 14.4 11.7 - 15.7 g/dL    Hematocrit 43.2 35.0 - 47.0 %    MCV 85 78 - 100 fL    MCH 28.5 26.5 - 33.0 pg    MCHC 33.3 31.5 - 36.5 g/dL    RDW 12.9 10.0 - 15.0 %    Platelet Count 282 150 - 450 10e3/uL    % Neutrophils 82 %    % Lymphocytes 10 %    % Monocytes 4 %    % Eosinophils 3 %    % Basophils 0 %    % Immature Granulocytes 1 %    NRBCs per 100 WBC 0 <1 /100    Absolute Neutrophils 15.7 (H) 1.6 - 8.3 10e3/uL    Absolute Lymphocytes 2.0 0.8 - 5.3 10e3/uL    Absolute Monocytes 0.7 0.0 - 1.3 10e3/uL    Absolute Eosinophils 0.6 0.0 - 0.7 10e3/uL    Absolute Basophils 0.1 0.0 - 0.2 10e3/uL    Absolute Immature Granulocytes 0.1 <=0.4 10e3/uL    Absolute NRBCs 0.0 10e3/uL   Symptomatic; Unknown Influenza A/B & SARS-CoV2 (COVID-19) Virus PCR Multiplex Nose     Status: Normal    Collection Time: 08/11/22  3:56 PM    Specimen: Nose; Swab   Result Value Ref Range    Influenza A PCR Negative Negative    Influenza B PCR Negative Negative    SARS CoV2 PCR Negative Negative    Narrative    Testing was performed using the [employer] SARS-CoV-2 & Influenza A/B Assay on the [employer] Stephenie System. This test should be ordered for the detection of SARS-CoV-2 and influenza viruses in individuals who meet clinical and/or epidemiological criteria. Test performance is unknown in asymptomatic patients. This test is for in vitro diagnostic use under the FDA EUA for laboratories certified under CLIA to perform moderate and/or high complexity testing. This test has not been FDA cleared or approved. A negative result does not rule out the presence of PCR inhibitors in the specimen or target RNA in concentration below the limit of detection for the assay. If only one viral target is positive but coinfection with multiple targets is suspected, the sample should be re-tested with another FDA cleared, approved or authorized test, if coinfection would  change clinical management. [facility] are certified under the Clinical Laboratory Improvement Amendments of 1988 (CLIA-88) as  qualified to perform moderate and/or high complexity laboratory testing.   XR Chest Port 1 View     Status: None    Collection Time: 08/11/22  4:19 PM    Narrative    XR CHEST PORT 1 VIEW   8/11/2022 4:19 PM     HISTORY: sob, elevated wbc    COMPARISON: 80 8/7/2022      Impression    IMPRESSION: Stable cardiomediastinal silhouette. There is a new  opacity in the left costophrenic sulcus, could represent small pleural  effusion and/or focal pneumonia. No pneumothorax. No acute bony  abnormality.    CHERYL CÁRDENAS MD         SYSTEM ID:  [id number]   UA with Microscopic reflex to Culture     Status: Abnormal    Collection Time: 08/11/22  5:47 PM    Specimen: Urine, Clean Catch   Result Value Ref Range    Color Urine Yellow Colorless, Straw, Light Yellow, Yellow    Appearance Urine Cloudy (A) Clear    Glucose Urine Negative Negative mg/dL    Bilirubin Urine Negative Negative    Ketones Urine Negative Negative mg/dL    Specific Gravity Urine 1.015 1.003 - 1.035    Blood Urine Negative Negative    pH Urine 6.0 5.0 - 7.0    Protein Albumin Urine Trace (A) Negative mg/dL    Urobilinogen Urine Normal Normal, 2.0 mg/dL    Nitrite Urine Positive (A) Negative    Leukocyte Esterase Urine Moderate (A) Negative    Bacteria Urine Many (A) None Seen /HPF    Mucus Urine Present (A) None Seen /LPF    RBC Urine 1 <=2 /HPF    WBC Urine 67 (H) <=5 /HPF    Squamous Epithelials Urine 3 (H) <=1 /HPF    Narrative    Urine Culture ordered based on laboratory criteria   Chest CT w/o contrast     Status: None    Collection Time: 08/11/22  6:02 PM    Narrative    EXAM: CT CHEST W/O CONTRAST  LOCATION: [facility] [place]  DATE/TIME: 8/11/2022 5:53 PM    INDICATION: Shortness of breath with leukocytosis, questionable infiltrate on x ray  COMPARISON: Same day chest radiograph, CT  08/07/2022  TECHNIQUE: CT chest without IV contrast. Multiplanar reformats were obtained. Dose reduction techniques were used.  CONTRAST: None.    FINDINGS:   LUNGS AND PLEURA: Lungs are clear. No pleural effusion. No definite correlate for opacity seen on same day chest radiograph.    MEDIASTINUM/AXILLAE: No lymphadenopathy. No thoracic aortic aneurysm.    CORONARY ARTERY CALCIFICATION: Severe.    UPPER ABDOMEN: No significant finding. Prior cholecystectomy.    MUSCULOSKELETAL: No acute bony abnormality.      Impression    IMPRESSION:   1.  No radiographic evidence of pneumonia. Findings on same day chest radiograph may be due to prominent epicardial fat or resolved hypoventilation.         ED MEDICATIONS:   Medications   cefTRIAXone (ROCEPHIN) 1 g vial to attach to  mL bag for ADULTS or NS 50 mL bag for PEDS (has no administration in time range)       Impression:    ICD-10-CM    1. Shortness of breath  R06.02    2. Acute cystitis without hematuria  N30.00    3. Abnormal urinalysis  R82.90        Plan:    Admit to medicnie with concern for generalized weakness shortness of breath with positive UA prior history of UTI due to staph haamolyticus - 7 month prior.     MD Gilda Beasley Ebenezer Tope, MD  08/12/22 0149

## 2022-08-12 ENCOUNTER — PATIENT OUTREACH (OUTPATIENT)
Dept: CARE COORDINATION | Facility: CLINIC | Age: 87
End: 2022-08-12

## 2022-08-12 PROBLEM — R06.02 SHORTNESS OF BREATH: Status: ACTIVE | Noted: 2022-08-12

## 2022-08-12 PROBLEM — Z11.52 ENCOUNTER FOR SCREENING LABORATORY TESTING FOR COVID-19 VIRUS: Status: ACTIVE | Noted: 2022-08-12

## 2022-08-12 LAB
ANION GAP SERPL CALCULATED.3IONS-SCNC: 8 MMOL/L (ref 3–14)
BUN SERPL-MCNC: 18 MG/DL (ref 7–30)
CALCIUM SERPL-MCNC: 8.1 MG/DL (ref 8.5–10.1)
CHLORIDE BLD-SCNC: 113 MMOL/L (ref 94–109)
CO2 SERPL-SCNC: 24 MMOL/L (ref 20–32)
CREAT SERPL-MCNC: 0.78 MG/DL (ref 0.52–1.04)
ERYTHROCYTE [DISTWIDTH] IN BLOOD BY AUTOMATED COUNT: 12.8 % (ref 10–15)
GFR SERPL CREATININE-BSD FRML MDRD: 72 ML/MIN/1.73M2
GLUCOSE BLD-MCNC: 103 MG/DL (ref 70–99)
HCT VFR BLD AUTO: 35.7 % (ref 35–47)
HGB BLD-MCNC: 12.2 G/DL (ref 11.7–15.7)
MCH RBC QN AUTO: 28.9 PG (ref 26.5–33)
MCHC RBC AUTO-ENTMCNC: 34.2 G/DL (ref 31.5–36.5)
MCV RBC AUTO: 85 FL (ref 78–100)
PLATELET # BLD AUTO: 188 10E3/UL (ref 150–450)
POTASSIUM BLD-SCNC: 3.8 MMOL/L (ref 3.4–5.3)
RBC # BLD AUTO: 4.22 10E6/UL (ref 3.8–5.2)
SODIUM SERPL-SCNC: 145 MMOL/L (ref 133–144)
WBC # BLD AUTO: 10.2 10E3/UL (ref 4–11)

## 2022-08-12 PROCEDURE — 99221 1ST HOSP IP/OBS SF/LOW 40: CPT | Mod: AI | Performed by: INTERNAL MEDICINE

## 2022-08-12 PROCEDURE — 250N000013 HC RX MED GY IP 250 OP 250 PS 637: Performed by: INTERNAL MEDICINE

## 2022-08-12 PROCEDURE — 85041 AUTOMATED RBC COUNT: CPT | Performed by: INTERNAL MEDICINE

## 2022-08-12 PROCEDURE — 250N000011 HC RX IP 250 OP 636: Performed by: INTERNAL MEDICINE

## 2022-08-12 PROCEDURE — 80048 BASIC METABOLIC PNL TOTAL CA: CPT | Performed by: INTERNAL MEDICINE

## 2022-08-12 PROCEDURE — 120N000001 HC R&B MED SURG/OB

## 2022-08-12 PROCEDURE — 250N000013 HC RX MED GY IP 250 OP 250 PS 637

## 2022-08-12 PROCEDURE — 36415 COLL VENOUS BLD VENIPUNCTURE: CPT | Performed by: INTERNAL MEDICINE

## 2022-08-12 PROCEDURE — G0378 HOSPITAL OBSERVATION PER HR: HCPCS

## 2022-08-12 PROCEDURE — 96372 THER/PROPH/DIAG INJ SC/IM: CPT | Performed by: INTERNAL MEDICINE

## 2022-08-12 PROCEDURE — 85027 COMPLETE CBC AUTOMATED: CPT | Performed by: INTERNAL MEDICINE

## 2022-08-12 PROCEDURE — 250N000011 HC RX IP 250 OP 636: Performed by: EMERGENCY MEDICINE

## 2022-08-12 RX ORDER — ONDANSETRON 4 MG/1
4 TABLET, ORALLY DISINTEGRATING ORAL EVERY 6 HOURS PRN
Status: DISCONTINUED | OUTPATIENT
Start: 2022-08-12 | End: 2022-08-12

## 2022-08-12 RX ORDER — ONDANSETRON 2 MG/ML
4 INJECTION INTRAMUSCULAR; INTRAVENOUS EVERY 6 HOURS PRN
Status: DISCONTINUED | OUTPATIENT
Start: 2022-08-12 | End: 2022-08-12

## 2022-08-12 RX ORDER — AMLODIPINE BESYLATE 5 MG/1
5 TABLET ORAL DAILY
Status: DISCONTINUED | OUTPATIENT
Start: 2022-08-12 | End: 2022-08-13 | Stop reason: HOSPADM

## 2022-08-12 RX ORDER — ONDANSETRON 2 MG/ML
4 INJECTION INTRAMUSCULAR; INTRAVENOUS EVERY 6 HOURS PRN
Status: DISCONTINUED | OUTPATIENT
Start: 2022-08-12 | End: 2022-08-13 | Stop reason: HOSPADM

## 2022-08-12 RX ORDER — LOPERAMIDE HCL 2 MG
4 CAPSULE ORAL ONCE
Status: COMPLETED | OUTPATIENT
Start: 2022-08-12 | End: 2022-08-12

## 2022-08-12 RX ORDER — ENOXAPARIN SODIUM 100 MG/ML
40 INJECTION SUBCUTANEOUS EVERY 24 HOURS
Status: DISCONTINUED | OUTPATIENT
Start: 2022-08-12 | End: 2022-08-13 | Stop reason: HOSPADM

## 2022-08-12 RX ORDER — ONDANSETRON 4 MG/1
4 TABLET, ORALLY DISINTEGRATING ORAL EVERY 6 HOURS PRN
Status: DISCONTINUED | OUTPATIENT
Start: 2022-08-12 | End: 2022-08-13 | Stop reason: HOSPADM

## 2022-08-12 RX ORDER — ACETAMINOPHEN 500 MG
500-1000 TABLET ORAL EVERY 6 HOURS PRN
COMMUNITY

## 2022-08-12 RX ADMIN — AMLODIPINE BESYLATE 5 MG: 5 TABLET ORAL at 09:53

## 2022-08-12 RX ADMIN — LOPERAMIDE HYDROCHLORIDE 4 MG: 2 CAPSULE ORAL at 15:11

## 2022-08-12 RX ADMIN — ENOXAPARIN SODIUM 40 MG: 100 INJECTION SUBCUTANEOUS at 09:55

## 2022-08-12 RX ADMIN — CEFTRIAXONE 1 G: 1 INJECTION, POWDER, FOR SOLUTION INTRAMUSCULAR; INTRAVENOUS at 19:49

## 2022-08-12 ASSESSMENT — ACTIVITIES OF DAILY LIVING (ADL)
ADLS_ACUITY_SCORE: 30
ADLS_ACUITY_SCORE: 30
USE_OF_HEARING_ASSISTIVE_DEVICES: BILATERAL HEARING AIDS
ADLS_ACUITY_SCORE: 30
ADLS_ACUITY_SCORE: 30
DRESSING/BATHING_DIFFICULTY: NO
ADLS_ACUITY_SCORE: 30
WERE_AUXILIARY_AIDS_OFFERED?: NO
ADLS_ACUITY_SCORE: 30
DESCRIBE_HEARING_LOSS: BILATERAL HEARING LOSS
HEARING_DIFFICULTY_OR_DEAF: YES
ADLS_ACUITY_SCORE: 30
FALL_HISTORY_WITHIN_LAST_SIX_MONTHS: YES
DEPENDENT_IADLS:: INDEPENDENT
DOING_ERRANDS_INDEPENDENTLY_DIFFICULTY: NO
DIFFICULTY_COMMUNICATING: NO
ADLS_ACUITY_SCORE: 30
WEAR_GLASSES_OR_BLIND: YES
CHANGE_IN_FUNCTIONAL_STATUS_SINCE_ONSET_OF_CURRENT_ILLNESS/INJURY: NO
TOILETING_ISSUES: NO
CONCENTRATING,_REMEMBERING_OR_MAKING_DECISIONS_DIFFICULTY: NO
NUMBER_OF_TIMES_PATIENT_HAS_FALLEN_WITHIN_LAST_SIX_MONTHS: 2
WALKING_OR_CLIMBING_STAIRS_DIFFICULTY: OTHER (SEE COMMENTS)
HEARING_MANAGEMENT: HEARING AIDS
PATIENT'S_PREFERRED_MEANS_OF_COMMUNICATION: ENGLISH SPEAKER WITH HEARING LOSS, NO SPEECH PROBLEMS.
ADLS_ACUITY_SCORE: 30
ADLS_ACUITY_SCORE: 39
DIFFICULTY_EATING/SWALLOWING: NO

## 2022-08-12 NOTE — UTILIZATION REVIEW
Admission Status; Secondary Review Determination     Admission Date: 8/11/2022  2:45 PM       Under the authority of the Utilization Management Committee, the utilization review process indicated a secondary review on the above patient.  The review outcome is based on review of the medical records, discussions with staff, and applying clinical experience noted on the date of the review.        (x)      Inpatient Status Appropriate - This patient's medical care is consistent with medical management for inpatient care and reasonable inpatient medical practice.       RATIONALE FOR DETERMINATION      Brief clinical presentation, information copied from the chart, abbreviated and edited for relevant content:     Paged Dr. Scott to advance to .       Daniela Brown is a 89 year old female with a medical history of CAD, hypertension, hyperlipidemia, T2DM, CKD3, memory loss and neuropathy presented for evaluation of sudden onset weakness and shortness of breath . EMS found her hypotensive, and should could not respond to questions and face felt paralyzed. Admits to anxiety and taking extra clonazepam earlier in the day and not drinking enough. Recent admission at [facility] 8/7-8/8 for chest pain. She had an echocardiogram was unremarkable earlier this week at [facility] as well as 3 negative troponins. On admission here, labs normal except for elevated Cr, CT chest unremarkable. Unclear if TIA. If any recurrent neurological symptoms, would get MRI. Also with UTI, Urinalysis with positive nitrites and positive leukocyte esterase.  Started on IV Ceftriaxone, pending culture results.  Cr improved with IVF.               Currently, more than 2 nights hospital complex care was anticipated. Also, there was a risk of adverse outcome if patient was treated outpatient or observation. High intensity of services anticipated. Inpatient admission appropriate based on Medicare guidelines.       The information on this  document is developed by the utilization review team in order for the business office to ensure compliance.  This only denotes the appropriateness of proper admission status and does not reflect the quality of care rendered.         The definitions of Inpatient Status and Observation Status used in making the determination above are those provided in the CMS Coverage Manual, Chapter 1 and Chapter 6, section 70.4.      Sincerely,      Luz Meilssa MD   Utilization Review/ Case Management  [facility].

## 2022-08-12 NOTE — PROGRESS NOTES
"Patient is [place]; has bilateral hearing aids. Alert and oriented x 3. Up with SBA with walker. No complaints of pain or nausea. BP (!) 156/66 (BP Location: Right arm)   Pulse 79   Temp 98.4  F (36.9  C) (Oral)   Ht 1.626 m (5' 4\")   Wt 72 kg (158 lb 11.7 oz)   LMP  (LMP Unknown)   SpO2 96%   BMI 27.25 kg/m      "

## 2022-08-12 NOTE — PROGRESS NOTES
Skin affirmation note    Admitting nurse completed full skin assessment, Serge score and Serge interventions. This writer agrees with the initial skin assessment findings.    Maria Del Carmen Schmidt RN on 8/12/2022 at 1:56 AM     Gunjan Carcamo DO

## 2022-08-12 NOTE — CONSULTS
Care Management Initial Consult    General Information  Assessment completed with: Patient,    Type of CM/SW Visit: Initial Assessment    Primary Care Provider verified and updated as needed: Yes   Readmission within the last 30 days:        Reason for Consult: discharge planning  Advance Care Planning: Advance Care Planning Reviewed: present on chart        Communication Assessment  Patient's communication style: spoken language (English or Bilingual)    Hearing Difficulty or Deaf: yes   Wear Glasses or Blind: yes    Cognitive  Cognitive/Neuro/Behavioral: WDL                    Living Environment:   People in home: alone     Current living Arrangements: house      Able to return to prior arrangements: yes     Family/Social Support:  Care provided by: self  Provides care for: no one  Marital Status:   Children          Description of Support System: Supportive, Involved    Support Assessment: Adequate family and caregiver support, Adequate social supports    Current Resources:   Patient receiving home care services: No     Community Resources: None  Equipment currently used at home: cane, quad, lift device, grab bar, tub/shower, walker, rolling, tub bench  Supplies currently used at home:      Employment/Financial:  Employment Status: retired        Financial Concerns: No concerns identified      Lifestyle & Psychosocial Needs:  Social Determinants of Health     Tobacco Use: Low Risk      Smoking Tobacco Use: Never Smoker     Smokeless Tobacco Use: Never Used   Alcohol Use: Not on file   Financial Resource Strain: Not on file   Food Insecurity: Not on file   Transportation Needs: Not on file   Physical Activity: Not on file   Stress: Not on file   Social Connections: Not on file   Intimate Partner Violence: Not on file   Depression: At risk     PHQ-2 Score: 4   Housing Stability: Not on file       Functional Status:  Prior to admission patient needed assistance:   Dependent ADLs:: Ambulation-walker  Dependent  IADLs:: Independent     Mental Health Status:  Mental Health Status: No Current Concerns       Chemical Dependency Status:  Chemical Dependency Status: No Current Concerns       Values/Beliefs:  Spiritual, Cultural Beliefs, Hoahaoism Practices, Values that affect care: no             Additional Information:    CM met with patient at bedside to discuss discharge planning. Introduced self and role. Patient lives in a home independently in the community. Her home is one level living, she does have a chair lift to get down to her basement. Patient is independent with ADLs and IADLs. Still drives short distances. Uses a walker to assist with ambulation. She has groceries delivered to her house.     Patient interested in meals on wheels. Provided patient with information and phone number to call.     Discussed home care options at discharge. Patient states that she was supposed to have home care after her last hospitalization but the agency never reached out to her.     Patient requests that a referral be sent to [facility] (Phone: [phone]) PT/RN/HHA.     [facility] accepted patient for PT/RN/HHA services.     Son will transport at discharge    PLAN: Home with home care services      CORKY JOHNSON RN    HOME CARE HAND OFF  Patient Name: Daniela Brown    MRN: [id number]    : 1933    Patient Zip Code: [address]    Admit Diagnosis: Shortness of breath [R06.02]  Abnormal urinalysis [R82.90]  Acute cystitis without hematuria [N30.00]    Services Pt Needs at Home: RN/PT/HHA    Discharge Support: Independent-Alone    Living Arrangements: Alone     or Address Other Than Pt: No    Wound Care: No    Anticipate DC Date: 2022

## 2022-08-12 NOTE — PROGRESS NOTES
[facility] Medicine Progress Note  Date of Service: 08/12/2022    Assessment & Plan          Daniela Brown is a 89 year old female with a medical history of CAD, hypertension, hyperlipidemia, T2DM, CKD3, memory loss and neuropathy presented to the ED for evaluation of weakness and shortness of breath in the setting of recent admission at [facility] 8/7-8/8 for chest pain during which she had an echocardiogram was unremarkable earlier this week at [facility] as well as 3 negative troponins.       Hypotensive episode (?)  Shortness of breath  History reviewed with patient.  She was admitted at [facility] earlier in the week after an episode of chest pain and shortness of breath while at rest in bed.  She was discharged home after reassuring echocardiogram.  Yesterday, after patient ate lunch she had a difficult episode of shortness of breath which came on suddenly.  She then called EMS.  Apparently when EMS arrived she was hypotensive.  Per patient, she reports that she felt overall weak, her face felt paralyzed and she could not respond to the paramedics questions.  She was apparently having some anxiety earlier in the day and took she took an extra clonazepam prior to lunch.  She admits to inadequate oral intake since being discharged from the hospital.  She says she lives alone and has been trying to avoid drinking too much as she does not want out of the bathroom as often.    The etiology of her symptoms remains unclear at this time.  Although she was apparently hypotensive in the field, blood pressures here in the ER were normal.  It is unclear if she received IV fluids en route. BNP WNL.  In the ER this presentation she had a CT of her chest without contrast which did not reveal any evidence of pneumonia.  She is not having difficulty breathing at this time.  He may be that she was dehydrated as evidenced by her CHER with creatinine of 1.14 improving to 0.78 .   MAR does not describe any IV fluids but I question if perhaps she received some in the ambulance.  TIA or stroke could be in the differential given her sensation of facial paralysis however no one else noted this and if she was having concurrent hypotension her symptoms could be explained by that.  I do not think the UTI would explain the symptoms  -- I do not think it would be safe to discharge this patient today given I cannot explain her symptoms fully and she lives alone  - Hold off on stress test, does not seem to be ischemic symptomatology  - If recurrent symptoms would obtain head imaging    UTI  Urinalysis with positive nitrites and positive leukocyte esterase.  Previous urinalysis earlier in the year was much clearer appearing.  She does have multiple allergies.  ER has selected ceftriaxone.  She seems to be tolerating this okay so we will continue for now.  It is possible that this is related to her above issues but would seem unlikely.  - Plan would be to continue to monitor here in the hospital as she does have out allergy to other cephalosporins and if she is doing well could discharge on cefdinir at time    Recent episode of chest pain  Was worked up at [facility]. Troponin X3 negative,  EKG shows no ischemic changes. Her last cardiac catheterization was in Feb 2019. It shows mild non-obstructive CAD. Echocardiogram unremarkable, No WMA  -Continue plan for follow-up with cardiology as outpatient     Recent left knee effusion, primary osteoarthritis: Patient has left knee pain and swollen without erythema. She has a history of left knee effusion due to primary osteoarthritis. She had left knee aspiration and steroid injection in 6/16/22. She had falls two times in the past two weeks due to her left knee pain.  - Orthopedics at [facility] has recommended outpatient follow-up.     Essential hypertension: HOLD PTA amlodipine.    Anxiety/depression  Patient reports she lives alone.  She feels isolated.  Her  son does not visit her as often as he used to. Apparently has benzodiazepines available prn but not listed on medication list.   -We will ask social work to help.    Diet: Regular Diet Adult    DVT Prophylaxis: Enoxaparin (Lovenox) SQ  Collazo Catheter: Not present  Central Lines: None  Code Status: No CPR- Do NOT Intubate           Discussion: unclear etiology for her symptoms    Disposition: Anticipate discharge tomorrow?     Attestation:  I have reviewed today's vital signs, notes, medications, labs and imaging.    Jer Scott MD       Interval History   As above.     Physical Exam   Temp:  [98.4  F (36.9  C)-98.6  F (37  C)] 98.4  F (36.9  C)  Pulse:  [76-87] 79  BP: (119-156)/(55-76) 156/66  SpO2:  [94 %-98 %] 96 %    Weights:   Vitals:    08/12/22 0103   Weight: 72 kg (158 lb 11.7 oz)    Body mass index is 27.25 kg/m .    Constitutional: well appearing   CV: Regular, + systolic murmru  Respiratory: CTA bilaterally  GI: Soft, nontender  Skin: Warm and dry  Musculoskeletal:    Data   Recent Labs   Lab 08/12/22  0557 08/11/22  1525 08/08/22  1207 08/07/22  1706 08/07/22  0235   WBC 10.2 19.2*  --   --  13.5*   HGB 12.2 14.4  --   --  14.3   MCV 85 85  --   --  85    282  --   --  328   * 143  --   --  140   POTASSIUM 3.8 3.5  --   --  3.5   CHLORIDE 113* 110*  --   --  108*   CO2 24 23  --   --  21*   BUN 18 21  --   --  22   CR 0.78 1.14*  --   --  1.01   ANIONGAP 8 10  --   --  11   NARESH 8.1* 8.7  --   --  9.4   * 166* 116*   < > 171*   ALBUMIN  --  3.6  --   --   --    PROTTOTAL  --  7.1  --   --   --    BILITOTAL  --  0.5  --   --   --    ALKPHOS  --  69  --   --   --    ALT  --  36  --   --   --    AST  --  21  --   --   --     < > = values in this interval not displayed.       Recent Labs   Lab 08/12/22  0557 08/11/22  1525 08/08/22  1207 08/08/22  0840 08/07/22  1706 08/07/22  0235   * 166* 116* 114* 144* 171*        Unresulted Labs Ordered in the Past 30 Days of this  Admission     Date and Time Order Name Status Description    8/11/2022  6:31 PM Urine Culture In process            Imaging  Recent Results (from the past 24 hour(s))   XR Chest Port 1 View    Narrative    XR CHEST PORT 1 VIEW   8/11/2022 4:19 PM     HISTORY: sob, elevated wbc    COMPARISON: 80 8/7/2022      Impression    IMPRESSION: Stable cardiomediastinal silhouette. There is a new  opacity in the left costophrenic sulcus, could represent small pleural  effusion and/or focal pneumonia. No pneumothorax. No acute bony  abnormality.    CHERYL CÁRDENAS MD         SYSTEM ID:  [id number]   Chest CT w/o contrast    Narrative    EXAM: CT CHEST W/O CONTRAST  LOCATION: [facility]  DATE/TIME: 8/11/2022 5:53 PM    INDICATION: Shortness of breath with leukocytosis, questionable infiltrate on x ray  COMPARISON: Same day chest radiograph, CT 08/07/2022  TECHNIQUE: CT chest without IV contrast. Multiplanar reformats were obtained. Dose reduction techniques were used.  CONTRAST: None.    FINDINGS:   LUNGS AND PLEURA: Lungs are clear. No pleural effusion. No definite correlate for opacity seen on same day chest radiograph.    MEDIASTINUM/AXILLAE: No lymphadenopathy. No thoracic aortic aneurysm.    CORONARY ARTERY CALCIFICATION: Severe.    UPPER ABDOMEN: No significant finding. Prior cholecystectomy.    MUSCULOSKELETAL: No acute bony abnormality.      Impression    IMPRESSION:   1.  No radiographic evidence of pneumonia. Findings on same day chest radiograph may be due to prominent epicardial fat or resolved hypoventilation.          I reviewed all new labs and imaging results over the last 24 hours. I personally reviewed no images or EKG's today.    Medications       amLODIPine  5 mg Oral Daily     cefTRIAXone  1 g Intravenous Q24H     enoxaparin ANTICOAGULANT  40 mg Subcutaneous Q24H       Jer Scott MD

## 2022-08-12 NOTE — H&P
"[facility]    History and Physical - Hospitalist Service       Date of Admission:  8/11/2022    Assessment & Plan    UTI  -cont ceftriaxone IV daily pending culture result    HTN  -cont amlodipine    CAD    CKD stage 3     Diet:  regular  DVT Prophylaxis:lovenox  Collazo Catheter: Not present  Central Lines: None  Code Status:  DNR/DNI    Clinically Significant Risk Factors Present on Admission     # Overweight: Estimated body mass index is 27.25 kg/m  as calculated from the following:    Height as of this encounter: 1.626 m (5' 4\").    Weight as of this encounter: 72 kg (158 lb 11.7 oz).        Disposition Plan   The patient's care was discussed with the patient.    UBALDO WHEELER MD  [facility]  Securely message with the Vocera Web Console (learn more here)  Text page via [facility] Paging/Directory      Visit/Communication Style   Virtual (Video) communication was used to evaluate Daniela.  Daniela consented to the use of video communication: yes  Video START time: 0422, 8/12/2022  Video STOP time:0432 , 8/12/2022   Patient's location: [facility]   Provider's location during the visit: [facility] Tele-medicine site        ______________________________________________________________________    Chief Complaint    SOB    History of Present Illness    89yoF with HTN, CAD, and CKD presented to the ED with complaint of acute onset of SOB however it seems she feels this was more anxiety-related.  She currently denies SOB. She has not required O2.  Chest imaging was unremarkable.      She was admitted 8/7-8/8 at another facility for chest pain.  ACS and PE were ruled out.    She does report burning with urination and increased urinary frequency for about one month.    Review of Systems    General: negative for fever, chills, sweats, weakness  Eyes: negative for blurred vision, loss of vision  Ear Nose and Throat: negative for " "pharyngitis, speech or swallowing difficulties  Respiratory:  negative for sputum production, wheezing, MONDRAGON, pleuritic pain, or cough  Cardiology:  negative for chest pain, palpitations, orthopnea, PND, edema, syncope   Gastrointestinal: negative for abdominal pain, nausea, vomiting, diarrhea, constipation, hematemesis, melena or hematochezia  Genitourinary: negative for , hematuria   Neurological: negative for focal weakness, paresthesia    Past Medical History    I have reviewed this patient's medical history and updated it with pertinent information if needed.   Past Medical History:   Diagnosis Date     Abnormal cardiovascular stress test 2/3/2019    9/10/2018 Lexiscan: \"Myocardial perfusion imaging using single isotope technique demonstrated a small perfusion defect of mild severity involving the basal inferior wall which is mostly reversible and may be consistent with mild ischemia in the right coronary artery distribution. In addition, transient ischemic dilatation is noted with a TID ratio of 1.3. \"     Adhesive capsulitis of shoulder     left      Adjustment disorder with anxiety 3/18/2016     B12 deficiency anemia 6/20/2012     Chest pain 2004    Chronic right sided/axillary discomfort (musculoskeletal) Atypical substernal (possibly GERD). Negative cardiolite 2004.     Colitis, Clostridium difficile 4/18/2012     Diverticulitis 2009     Headache(784.0) 2001    Tension type. MRI 2001 normal.     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  Past Surgical History:   Procedure Laterality Date     ARTHROPLASTY KNEE  3/26/2012    Procedure:ARTHROPLASTY KNEE; Right Total " Knee Arthroplasty; Surgeon:BERNADINE ROSAS; Location:[place] OR     CHOLECYSTECTOMY       CV HEART CATHETERIZATION WITH POSSIBLE INTERVENTION N/A 2/27/2019    Procedure: Coronary Angiogram with Left ventriculogram;  Surgeon: Leandro Carpio MD;  Location:  HEART CARDIAC CATH LAB     HERNIA REPAIR      Hernia Repair     HYSTERECTOMY, PAP NO LONGER INDICATED  1983    TVH/BSO     SURGICAL HISTORY OF -   10/08    A & P Repair, Dr. Hannah     [facility] APPENDECTOMY  1953       Social History   I have reviewed this patient's social history and updated it with pertinent information if needed.  Social History     Tobacco Use     Smoking status: Never Smoker     Smokeless tobacco: Never Used   Vaping Use     Vaping Use: Never used   Substance Use Topics     Alcohol use: No     Drug use: No       Family History   I have reviewed this patient's family history and updated it with pertinent information if needed.  Family History   Problem Relation Age of Onset     C.A.D. Mother      Diabetes Mother      Cancer - colorectal Mother      Arthritis Mother      Heart Disease Mother      Lipids Brother      Obesity Brother      Hypertension Brother      Allergies Son      Eye Disorder Son         cataract     Thyroid Disease Sister         graves dz     Eye Disorder Son      Leukemia Son      Alcohol/Drug Father        Prior to Admission Medications   Prior to Admission Medications   Prescriptions Last Dose Informant Patient Reported? Taking?   Cyanocobalamin (B-12) 1000 MCG SUBL 8/11/2022 at Unknown time Self Yes Yes   Sig: Place 1 tablet under the tongue every evening    LORazepam (ATIVAN) 0.5 MG tablet   No No   Sig: Take 1 tablet (0.5 mg) by mouth daily as needed for anxiety (for short term only, do not take more than one per day)   Patient not taking: Reported on 8/7/2022   Multiple Vitamins-Minerals (THERAPEUTIC MULTIVIT/MINERAL) TABS 8/11/2022 at Unknown time Self Yes Yes   Sig: Take 1 tablet by mouth every evening     ORDER FOR DME  Self No No   Sig: Thigh length teds.   STATIN NOT PRESCRIBED (INTENTIONAL)   No No   Sig: Please choose reason not prescribed, below   VITAMIN D, CHOLECALCIFEROL, PO 2022 at Unknown time Self Yes Yes   Sig: Take 1,000 Units by mouth daily   acetaminophen (TYLENOL) 500 MG tablet 2022 at Unknown time  Yes Yes   Sig: Take 500-1,000 mg by mouth every 6 hours as needed for mild pain or pain   amLODIPine (NORVASC) 5 MG tablet 2022 at Unknown time  No Yes   Sig: Take 1 tablet (5 mg) by mouth daily   bismuth subsalicylate (PEPTO BISMOL) 262 MG chewable tablet 2022 at Unknown time  Yes Yes   Sig: Take 1 tablet by mouth daily as needed   blood glucose (ACCU-CHEK GUIDE) test strip   No No   Sig: Use to test blood sugar one times daily or as directed.   blood glucose monitoring (ACCU-CHEK FASTCLIX) lancets  Self No No   Sig: Use to test blood sugar one times daily or as directed.   loperamide (IMODIUM) 2 MG capsule Past Week at Unknown time  Yes Yes   Sig: Take 2 mg by mouth 4 times daily as needed for diarrhea   mirabegron (MYRBETRIQ) 25 MG 24 hr tablet   No No   Sig: Take 1 tablet (25 mg) by mouth daily   Patient not taking: Reported on 2022   nitroGLYcerin (NITROSTAT) 0.4 MG sublingual tablet   No No   Sig: Place 1 tablet (0.4 mg) under the tongue every 5 minutes as needed for chest pain   order for DME  Self No No   Si walker      Facility-Administered Medications: None     Allergies   Allergies   Allergen Reactions     Metoprolol Itching and Rash     Cephalexin Rash     Erythromycin Rash     Actonel [Bisphosphonates] Itching     Azithromycin Hives     Celebrex [Celecoxib] Rash     Cipro [Ciprofloxacin] Rash     Contrast Dye Hives     Diatrizoate Hives     Erythromycin Rash     Escitalopram Diarrhea     Gets very sick and mad feelings     Fosamax Itching and Rash     Keflex [Cephalexin Monohydrate] Rash     Lisinopril Cough     Risedronate Itching     Seasonal Allergies       Shellfish-Derived Products Hives     Tetanus Toxoid, Adsorbed Swelling     Tetanus-Diphtheria Toxoids Swelling     Vicodin [Acetaminophen] Itching     Patient reported - only when used scheduled in high doses.        Vioxx Rash     Acetaminophen Itching     In high doses  Patient reported - only when used scheduled in high doses.        Alendronic Acid Rash     Celecoxib Rash     Penicillins Rash     Rofecoxib Rash     Sulfa Drugs Itching and Rash     Sulfasalazine Itching and Rash       Physical Exam   Vital Signs: Temp: 98.4  F (36.9  C) Temp src: Oral BP: (!) 156/66 Pulse: 79     SpO2: 96 % O2 Device: None (Room air)    Weight: 158 lbs 11.7 oz    Gen:  Well-developed, well-nourished, in no acute distress, lying semi-supine in hospital stretcher  HEENT:  Anicteric sclera, PER, hard of hearing  Resp:  No accessory muscle use, breath sounds clear; no wheezes no rales no rhonchi  Card:  No murmur, normal S1, S2   Abd:  Soft per RN exam, no TTP, non-distended, normoactive bowel sounds are present  Musc:  Normal strength and movement of the major muscle groups without obvious deformity  Psych:  Good insight, oriented to person, place and time, not anxious, not agitated    Data     Recent Labs   Lab 08/11/22  1525 08/08/22  1207 08/08/22  0840 08/07/22  1706 08/07/22  0235   WBC 19.2*  --   --   --  13.5*   HGB 14.4  --   --   --  14.3   MCV 85  --   --   --  85     --   --   --  328     --   --   --  140   POTASSIUM 3.5  --   --   --  3.5   CHLORIDE 110*  --   --   --  108*   CO2 23  --   --   --  21*   BUN 21  --   --   --  22   CR 1.14*  --   --   --  1.01   ANIONGAP 10  --   --   --  11   NARESH 8.7  --   --   --  9.4   * 116* 114*   < > 171*   ALBUMIN 3.6  --   --   --   --    PROTTOTAL 7.1  --   --   --   --    BILITOTAL 0.5  --   --   --   --    ALKPHOS 69  --   --   --   --    ALT 36  --   --   --   --    AST 21  --   --   --   --     < > = values in this interval not displayed.         Recent  Results (from the past 24 hour(s))   XR Chest Port 1 View    Narrative    XR CHEST PORT 1 VIEW   8/11/2022 4:19 PM     HISTORY: sob, elevated wbc    COMPARISON: 80 8/7/2022      Impression    IMPRESSION: Stable cardiomediastinal silhouette. There is a new  opacity in the left costophrenic sulcus, could represent small pleural  effusion and/or focal pneumonia. No pneumothorax. No acute bony  abnormality.    CHERYL CÁRDENAS MD         SYSTEM ID:  [id number]   Chest CT w/o contrast    Narrative    EXAM: CT CHEST W/O CONTRAST  LOCATION: [facility]  DATE/TIME: 8/11/2022 5:53 PM    INDICATION: Shortness of breath with leukocytosis, questionable infiltrate on x ray  COMPARISON: Same day chest radiograph, CT 08/07/2022  TECHNIQUE: CT chest without IV contrast. Multiplanar reformats were obtained. Dose reduction techniques were used.  CONTRAST: None.    FINDINGS:   LUNGS AND PLEURA: Lungs are clear. No pleural effusion. No definite correlate for opacity seen on same day chest radiograph.    MEDIASTINUM/AXILLAE: No lymphadenopathy. No thoracic aortic aneurysm.    CORONARY ARTERY CALCIFICATION: Severe.    UPPER ABDOMEN: No significant finding. Prior cholecystectomy.    MUSCULOSKELETAL: No acute bony abnormality.      Impression    IMPRESSION:   1.  No radiographic evidence of pneumonia. Findings on same day chest radiograph may be due to prominent epicardial fat or resolved hypoventilation.

## 2022-08-12 NOTE — PROGRESS NOTES
"[place] [facility] ADMISSION NOTE    Patient admitted to room 2302 at approximately 0100 via cart from emergency room. Patient was accompanied by transport tech.     Verbal SBAR report received from MICHEL Hillman prior to patient arrival.     Patient ambulated to bed with one assist. Patient alert and oriented X 3. The patient is not having any pain.  . Admission vital signs: Blood pressure (!) 156/66, pulse 79, temperature 98.4  F (36.9  C), temperature source Oral, height 1.626 m (5' 4\"), weight 72 kg (158 lb 11.7 oz), SpO2 96 %, not currently breastfeeding. Patient was oriented to plan of care, call light, bed controls, tv, telephone, bathroom and visiting hours.     Risk Assessment    The following safety risks were identified during admission: fall. Yellow risk band applied: YES.     Skin Initial Assessment    This writer admitted this patient and completed a full skin assessment and Serge score in the Adult PCS flowsheet. Appropriate interventions initiated as needed.     Secondary skin check completed by MICHEL Joyce.         Education    Patient has a [facility] to Observation order: Yes  Observation education completed and documented: Yes      Teri Phillips RN    "

## 2022-08-12 NOTE — PROGRESS NOTES
Clinic Care Coordination Contact  Ambulatory Care Coordination to Inpatient Care Management   Hand-In Communication    Date:  August 12, 2022  Name: Daniela Brown is enrolled in Ambulatory Care Coordination program and Leatha Davis the Lead Care Coordinator.  CC Contact Information: Epic InBasket + phone  Payor Source: Payor: MEDICA / Plan: MEDICA SELECT SOLUTION / Product Type: Indemnity /   Current services in place:     Please see the CC Snaphot and Care Management Flowsheets for specific  details of this Daniela Brown care plan.   Additional details/specific concerns r/t this admission:    Goals of Care .    Goals        General       Functional (pt-stated)       Goal Statement: I will increase my strength in the next 1-2 months and decreased falls   Date Goal set: 8/9/2022  Barriers: Deconditioned   Strengths: Agrees with the plan   Date to Achieve By: 10/9/2022  Patient expressed understanding of goal: Yes  Action steps to achieve this goal:  1. I will start home care physical therapy  2. I will use my walker for safety  3. I will pace my activity throughout the day             Leatha MULLIGAN RN will follow this admission in Epic. Please feel free to contact Leatha with questions or for further collaboration in discharge planning.    (writer is covering for Leatha in her absence)  Annmarie Bueno RN, BSN, PHN Care Coordinator  [facility]   Phone: [phone]     
[Negative] : Heme/Lymph

## 2022-08-12 NOTE — ED NOTES
Patient's son Wally Brown called and would like to be updated on patient status or transfer. He can be reached at [phone].

## 2022-08-13 VITALS
DIASTOLIC BLOOD PRESSURE: 49 MMHG | RESPIRATION RATE: 18 BRPM | WEIGHT: 158.73 LBS | SYSTOLIC BLOOD PRESSURE: 141 MMHG | HEART RATE: 77 BPM | HEIGHT: 64 IN | OXYGEN SATURATION: 95 % | BODY MASS INDEX: 27.1 KG/M2 | TEMPERATURE: 97.7 F

## 2022-08-13 LAB
ANION GAP SERPL CALCULATED.3IONS-SCNC: 7 MMOL/L (ref 3–14)
BACTERIA UR CULT: ABNORMAL
BUN SERPL-MCNC: 14 MG/DL (ref 7–30)
CALCIUM SERPL-MCNC: 8.3 MG/DL (ref 8.5–10.1)
CHLORIDE BLD-SCNC: 115 MMOL/L (ref 94–109)
CO2 SERPL-SCNC: 24 MMOL/L (ref 20–32)
CREAT SERPL-MCNC: 0.84 MG/DL (ref 0.52–1.04)
ERYTHROCYTE [DISTWIDTH] IN BLOOD BY AUTOMATED COUNT: 13.1 % (ref 10–15)
GFR SERPL CREATININE-BSD FRML MDRD: 66 ML/MIN/1.73M2
GLUCOSE BLD-MCNC: 100 MG/DL (ref 70–99)
HCT VFR BLD AUTO: 35.9 % (ref 35–47)
HGB BLD-MCNC: 11.8 G/DL (ref 11.7–15.7)
MCH RBC QN AUTO: 28.4 PG (ref 26.5–33)
MCHC RBC AUTO-ENTMCNC: 32.9 G/DL (ref 31.5–36.5)
MCV RBC AUTO: 87 FL (ref 78–100)
PLATELET # BLD AUTO: 207 10E3/UL (ref 150–450)
POTASSIUM BLD-SCNC: 3.6 MMOL/L (ref 3.4–5.3)
RBC # BLD AUTO: 4.15 10E6/UL (ref 3.8–5.2)
SODIUM SERPL-SCNC: 146 MMOL/L (ref 133–144)
WBC # BLD AUTO: 9.3 10E3/UL (ref 4–11)

## 2022-08-13 PROCEDURE — 85027 COMPLETE CBC AUTOMATED: CPT | Performed by: HOSPITALIST

## 2022-08-13 PROCEDURE — 80048 BASIC METABOLIC PNL TOTAL CA: CPT | Performed by: HOSPITALIST

## 2022-08-13 PROCEDURE — 250N000011 HC RX IP 250 OP 636: Performed by: INTERNAL MEDICINE

## 2022-08-13 PROCEDURE — 250N000011 HC RX IP 250 OP 636: Performed by: EMERGENCY MEDICINE

## 2022-08-13 PROCEDURE — 36415 COLL VENOUS BLD VENIPUNCTURE: CPT | Performed by: HOSPITALIST

## 2022-08-13 PROCEDURE — 99239 HOSP IP/OBS DSCHRG MGMT >30: CPT | Performed by: HOSPITALIST

## 2022-08-13 PROCEDURE — 250N000013 HC RX MED GY IP 250 OP 250 PS 637: Performed by: HOSPITALIST

## 2022-08-13 RX ORDER — LOPERAMIDE HCL 2 MG
4 CAPSULE ORAL ONCE
Status: COMPLETED | OUTPATIENT
Start: 2022-08-13 | End: 2022-08-13

## 2022-08-13 RX ADMIN — LOPERAMIDE HYDROCHLORIDE 4 MG: 2 CAPSULE ORAL at 09:08

## 2022-08-13 RX ADMIN — ENOXAPARIN SODIUM 40 MG: 100 INJECTION SUBCUTANEOUS at 07:58

## 2022-08-13 RX ADMIN — CEFTRIAXONE 1 G: 1 INJECTION, POWDER, FOR SOLUTION INTRAMUSCULAR; INTRAVENOUS at 18:21

## 2022-08-13 ASSESSMENT — ACTIVITIES OF DAILY LIVING (ADL)
ADLS_ACUITY_SCORE: 30

## 2022-08-13 NOTE — PLAN OF CARE
Alert and oriented.  Calls with needs.  Up to bathroom with walker and SBA, moves well.  Denies pain.  VSS and afebrile.    IV Ceftriaxone given.

## 2022-08-13 NOTE — PLAN OF CARE
Patient is alert and oriented. Up with stand by assist and walker to bathroom.   Denies any shortness of breath. Denies pain.States she is feeling better. Voiding with no difficulty. Patient reported 1 episode of diarrhea. Vital signs stable.

## 2022-08-13 NOTE — DISCHARGE SUMMARY
[facility]  Discharge Summary - Hospital Medicine  Date of Admission:  8/11/2022; Date of Discharge:  8/13/2022      Primary Care     Cynthia Maddox  [address] [facility] [address]      Hospital Course            Daniela Brown is a 89 year old female with a medical history of CAD, hypertension, hyperlipidemia, T2DM, CKD3, memory loss and neuropathy presented to the ED for evaluation of weakness and shortness of breath in the setting of recent admission at [facility] 8/7-8/8 for chest pain during which she had an echocardiogram was unremarkable earlier this week at [facility] as well as 3 negative troponins.     Hypotensive episode (?)  Shortness of breath  History reviewed with patient.  She was admitted at [facility] earlier in the week after an episode of chest pain and shortness of breath while at rest in bed.  She was discharged home after reassuring echocardiogram.  Yesterday, after patient ate lunch she had a difficult episode of shortness of breath which came on suddenly.  She then called EMS.  Apparently when EMS arrived she was hypotensive.  Per patient, she reports that she felt overall weak, her face felt paralyzed and she could not respond to the paramedics questions.  She was apparently having some anxiety earlier in the day and took she took an extra clonazepam prior to lunch.  She admits to inadequate oral intake since being discharged from the hospital.  She says she lives alone and has been trying to avoid drinking too much as she does not want out of the bathroom as often.    The etiology of her symptoms remains unclear at this time.  Although she was apparently hypotensive in the field, blood pressures here in the ER were normal.  It is unclear if she received IV fluids en route. BNP WNL.  In the ER this presentation she had a CT of her chest without contrast which did not reveal any evidence of pneumonia.  She is not having difficulty breathing  at this time.  He may be that she was dehydrated as evidenced by her CHER with creatinine of 1.14 improving to 0.78 .  MAR does not describe any IV fluids but I question if perhaps she received some in the ambulance.  TIA or stroke could be in the differential given her sensation of facial paralysis however no one else noted this and if she was having concurrent hypotension her symptoms could be explained by that.  I do not think the UTI would explain the symptoms  --suspect this may have been anxiety, see below    UTI vs urinary colonization  Patient reports she has had UTI symptoms for about a month now.  However in review of her chart, I can see that she has had chronic urinary complaints for almost a decade now.  Question if this is actual UTI or just urinary colonization.  Urinalysis with positive nitrites and positive leukocyte esterase.  Previous urinalysis earlier in the year was much clearer appearing.  She does have multiple allergies.  She was placed on ceftriaxone.  She was treated with 3 dose of this here in the hospital.  She did not have any allergies to this.  Urine culture grew out E. coli.  Given that she is completed 3 doses, I do not think she requires any additional antibiotics at discharge.  - Outpatient follow-up with PCP.  She does seem to have some chronic urinary symptoms, question if she would benefit from urology follow-up or at least checking to see if she is emptying her bladder fully.  However in chart review I do see that she has previously in urology a long time ago.    Recent episode of chest pain  Was worked up at [facility]. Troponin X3 negative,  EKG shows no ischemic changes. Her last cardiac catheterization was in Feb 2019. It shows mild non-obstructive CAD. Echocardiogram unremarkable, No WMA  -Continue plan for follow-up with cardiology as outpatient     Recent left knee effusion, primary osteoarthritis: Patient has left knee pain and swollen without erythema. She has a  history of left knee effusion due to primary osteoarthritis. She had left knee aspiration and steroid injection in 6/16/22. She had falls two times in the past two weeks due to her left knee pain.  - Orthopedics at [facility] has recommended outpatient follow-up.     Essential hypertension: can resume PTA amlodipine.    Anxiety/depression  Patient reports she lives alone.  She feels isolated. Discussed case with the patient's son Wally Brown.  He feels that his mom is always anxious and overly Perseverating on her various complaints. He feels that much of her breathing attacks are anxiety attacks related to her watching crime shows at night.  He relates that she had a prior angiogram that was normal.  He feels like she does not drink much oral intake which the patient does confirm.  Previously the patient's other son lives with her for quite some time and the 2 of them were quite close.  However unfortunately that son passed away and since then she has been alone.  The patient's remaining living son Wally has offered for the patient to come live with them but it sounds like that has never worked out for variety of reasons.      Pending Results   Unresulted Labs Ordered in the Past 30 Days of this Admission     No orders found from 7/12/2022 to 8/12/2022.          Discharge Diagnoses     UTI (urinary tract infection)    Abnormal urinalysis    Acute cystitis without hematuria    Shortness of breath    Encounter for screening laboratory testing for COVID-19 virus    * No resolved hospital problems. *      Discharge Disposition   Discharged to home with home cares    Discharge Orders      Home Care Referral      Primary Care - Care Coordination Referral      Reason for your hospital stay    Chest pressure  Anxiety  UTI     Follow-up and recommended labs and tests     Follow up with primary care provider, Cynthia Maddox, within 7 days for hospital follow- up.  No follow up labs or test are needed.     Activity    Your  activity upon discharge: activity as tolerated     Diet    Follow this diet upon discharge: regular         Discharge Medications   Discharge Medication List as of 8/13/2022  7:43 PM      CONTINUE these medications which have NOT CHANGED    Details   acetaminophen (TYLENOL) 500 MG tablet Take 500-1,000 mg by mouth every 6 hours as needed for mild pain or pain, Historical      amLODIPine (NORVASC) 5 MG tablet Take 1 tablet (5 mg) by mouth daily, Disp-90 tablet, R-3, E-Prescribe      bismuth subsalicylate (PEPTO BISMOL) 262 MG chewable tablet Take 1 tablet by mouth daily as needed, Historical      Cyanocobalamin (B-12) 1000 MCG SUBL Place 1 tablet under the tongue every evening , Historical      loperamide (IMODIUM) 2 MG capsule Take 2 mg by mouth 4 times daily as needed for diarrhea, Historical      Multiple Vitamins-Minerals (THERAPEUTIC MULTIVIT/MINERAL) TABS Take 1 tablet by mouth every evening , Historical      VITAMIN D, CHOLECALCIFEROL, PO Take 1,000 Units by mouth daily, Historical      blood glucose (ACCU-CHEK GUIDE) test strip Use to test blood sugar one times daily or as directed., Disp-100 each, R-1, E-Prescribe      blood glucose monitoring (ACCU-CHEK FASTCLIX) lancets Use to test blood sugar one times daily or as directed., Disp-102 each, R-3, E-Prescribe      LORazepam (ATIVAN) 0.5 MG tablet Take 1 tablet (0.5 mg) by mouth daily as needed for anxiety (for short term only, do not take more than one per day), Disp-15 tablet, R-1, E-Prescribe      mirabegron (MYRBETRIQ) 25 MG 24 hr tablet Take 1 tablet (25 mg) by mouth daily, Disp-30 tablet, R-3, E-Prescribe      nitroGLYcerin (NITROSTAT) 0.4 MG sublingual tablet Place 1 tablet (0.4 mg) under the tongue every 5 minutes as needed for chest pain, Disp-25 tablet, R-4, E-Prescribe      !! order for DME 1 walkerDisp-1 Device, R-0, Local Print      !! ORDER FOR DME Thigh length teds.Disp-1 Device, R-2, Normal      STATIN NOT PRESCRIBED (INTENTIONAL) Reason  Statin was Not Prescribed: Intolerance (with supporting documentation of trying a statin at least once within the last 5 years)       !! - Potential duplicate medications found. Please discuss with provider.        Allergies   Allergies   Allergen Reactions     Metoprolol Itching and Rash     Cephalexin Rash     Erythromycin Rash     Actonel [Bisphosphonates] Itching     Azithromycin Hives     Celebrex [Celecoxib] Rash     Cipro [Ciprofloxacin] Rash     Contrast Dye Hives     Diatrizoate Hives     Erythromycin Rash     Escitalopram Diarrhea     Gets very sick and mad feelings     Fosamax Itching and Rash     Keflex [Cephalexin Monohydrate] Rash     Lisinopril Cough     Risedronate Itching     Seasonal Allergies      Shellfish-Derived Products Hives     Tetanus Toxoid, Adsorbed Swelling     Tetanus-Diphtheria Toxoids Swelling     Vicodin [Acetaminophen] Itching     Patient reported - only when used scheduled in high doses.        Vioxx Rash     Acetaminophen Itching     In high doses  Patient reported - only when used scheduled in high doses.        Alendronic Acid Rash     Celecoxib Rash     Penicillins Rash     Rofecoxib Rash     Sulfa Drugs Itching and Rash     Sulfasalazine Itching and Rash       Consultations This Hospital Stay   No consultations were requested during this admission    Significant Results and Procedures   Procedures    None  Most Recent Immunizations   Administered Date(s) Administered     COVID-19,PF,Pfizer (12+ Yrs) 10/14/2021     FLU 6-35 months 10/30/2012     Flu 65+ Years 09/13/2018     Flu, Unspecified 03/27/2012     Influenza (High Dose) 3 valent vaccine 09/18/2019     Influenza (IIV3) PF 10/30/2012     Influenza, Quad, High Dose, Pf, 65yr+ (Fluzone HD) 12/01/2021     Pneumo Conj 13-V (2010&after) 08/06/2019     Pneumococcal 23 valent 09/24/2020     Td (Adult), Adsorbed 01/01/1988     Vitamin B12 06/03/2014          Physical Exam      Vitals:    08/12/22 0103   Weight: 72 kg (158 lb 11.7  oz)       Constitutional: well appearing   CV: Regular  Respiratory: CTA bilaterally  GI: Soft, nontender  Skin: Warm and dry    The discharge plan was discussed with the patient and son Wally HEARD, Jer Scott MD, personally saw the patient today and spent greater than 30 minutes discharging this patient.    Jer Scott MD

## 2022-08-13 NOTE — PLAN OF CARE
Patient is alert and oriented. Up with stand by assist and walker, she uses a cane at home. Using call light appropriately.   Voiding with no difficulty. Encouraged her to drink more water.  Patient has had diarrhea after eating a few times.   Denies pain or shortness of air.

## 2022-08-14 NOTE — PROGRESS NOTES
Care Management Discharge Note    Discharge Date: 08/13/2022       Discharge Disposition: Home Care    Discharge Services: None    Discharge DME: None    Discharge Transportation: family or friend will provide    Private pay costs discussed: Not applicable    Patient/family educated on Medicare website which has current facility and service quality ratings: yes    Education Provided on the Discharge Plan:  yes  Persons Notified of Discharge Plans: Patient  Patient/Family in Agreement with the Plan: yes    Handoff Referral Completed: Yes    Additional Information:    Plan:  Home with [facility] Care RN,PT, HHA.      Theodora Gutierrez, RN

## 2022-08-14 NOTE — PROGRESS NOTES
SOO NUÑEZG DISCHARGE NOTE    Patient discharged to home at 7:47 PM via wheel chair. Accompanied by son and staff. Discharge instructions reviewed with patient, opportunity offered to ask questions. Prescriptions - None ordered for discharge. All belongings sent with patient.    Patient discharged home with home care and Follow-up with Dr. Maddox scheduled. Patient aware of instructions. Son picked up.    Natalia Carey RN

## 2022-08-16 ENCOUNTER — PATIENT OUTREACH (OUTPATIENT)
Dept: NURSING | Facility: CLINIC | Age: 87
End: 2022-08-16

## 2022-08-16 ASSESSMENT — ACTIVITIES OF DAILY LIVING (ADL): DEPENDENT_IADLS:: SHOPPING

## 2022-08-16 NOTE — PROGRESS NOTES
Clinic Care Coordination Contact    Clinic Care Coordination Contact  OUTREACH    Referral Information:  Referral Source: IP Handoff    Primary Diagnosis: Other (include Comment box) (Chest pain left knee pain)    Chief Complaint   Patient presents with     Clinic Care Coordination - Post Hospital     Clinic Care Coordination RN         Universal Utilization: Hospital admission 8/11-8/13/2022  weakness SOB chest pain UTI  and anxiety   Clinic Utilization  Difficulty keeping appointments:: No  Compliance Concerns: No  No-Show Concerns: No  No PCP office visit in Past Year: No  Utilization    Hospital Admissions  2             ED Visits  2             No Show Count (past year)  3                Current as of: 8/16/2022  7:32 AM              Clinical Concerns:  Current Medical Concerns:    Denies any further chest pain episodes  Patient states she is not getting up as much in the middle of the night to urinate  Denies any burning or frequency  Patient states she did have one loose stool this morning and will take an imodium     Current Behavioral Concerns: Denies nay anxiety today     Education Provided to patient: CC role introduced   Pain  Pain (GOAL):: No  Health Maintenance Reviewed: Due/Overdue   Health Maintenance Due   Topic Date Due     ZOSTER IMMUNIZATION (1 of 2) Never done     DTAP/TDAP/TD IMMUNIZATION (1 - Tdap) 01/02/1988     EYE EXAM  12/08/2012     COVID-19 Vaccine (4 - Booster for Pfizer series) 02/14/2022     DIABETIC FOOT EXAM  09/02/2022     PHQ-9  09/16/2022     Clinical Pathway: None    Medication Management:  Medication review status: Medications reviewed and no changes reported per patient.             Functional Status:  Dependent ADLs:: Ambulation-walker  Dependent IADLs:: Shopping  Bed or wheelchair confined:: No  Mobility Status: Independent w/Device  Fallen 2 or more times in the past year?: Yes  Any fall with injury in the past year?: Yes    Living Situation:  Current living arrangement:: I  live alone, I live in a private home    Lifestyle & Psychosocial Needs:    Social Determinants of Health     Tobacco Use: Low Risk      Smoking Tobacco Use: Never Smoker     Smokeless Tobacco Use: Never Used   Alcohol Use: Not on file   Financial Resource Strain: Not on file   Food Insecurity: Not on file   Transportation Needs: Not on file   Physical Activity: Not on file   Stress: Not on file   Social Connections: Not on file   Intimate Partner Violence: Not on file   Depression: At risk     PHQ-2 Score: 4   Housing Stability: Not on file     Diet:: Diabetic diet  Inadequate nutrition (GOAL):: No  Tube Feeding: No  Inadequate activity/exercise (GOAL):: Yes  Significant changes in sleep pattern (GOAL): No  Transportation means:: Accessible car     Sikhism or spiritual beliefs that impact treatment:: No  Mental health DX:: Yes  Mental health DX how managed:: Medication  Mental health management concern (GOAL):: No  Chemical Dependency Status: No Current Concerns  Informal Support system:: Children           Resources and Interventions:  Current Resources:   Skilled Home Care Services: Skilled Nursing, Physicial Therapy, Home Health Aid  Community Resources: Home Care  Supplies Currently Used at Home: Compression Stockings, Diabetic Supplies  Equipment Currently Used at Home: cane, quad, lift device, grab bar, tub/shower, walker, rolling, tub bench  Employment Status: retired         Advance Care Plan/Directive  Advanced Care Plans/Directives on file:: Yes  Type Advanced Care Plans/Directives: Advanced Directive - On File, POLST    Referrals Placed: None       Goals:    Goals        General     Functional (pt-stated)      Notes - Note created  8/9/2022 10:07 AM by Leatha Davis RN     Goal Statement: I will increase my strength in the next 1-2 months and decreased falls   Date Goal set: 8/9/2022  Barriers: Deconditioned   Strengths: Agrees with the plan   Date to Achieve By: 10/9/2022  Patient expressed  understanding of goal: Yes  Action steps to achieve this goal:  1. I will start home care physical therapy  2. I will use my walker for safety  3. I will pace my activity throughout the day          Medical (pt-stated)      Notes - Note created  8/16/2022  9:53 AM by Leatha Davis, RN     Goal Statement: I will complete Health Maintenance items due in the next 1-3 months   Health Maintenance Due   Topic Date Due     ZOSTER IMMUNIZATION (1 of 2) Never done     DTAP/TDAP/TD IMMUNIZATION (1 - Tdap) 01/02/1988     EYE EXAM  12/08/2012     COVID-19 Vaccine (4 - Booster for Pfizer series) 02/14/2022     DIABETIC FOOT EXAM  09/02/2022     PHQ-9  09/16/2022      Date Goal set: 8/16/2022  Barriers: None identified   Strengths: Agrees wit this plan   Date to Achieve By: 11/16/2022  Patient expressed understanding of goal: Yes  Action steps to achieve this goal:  1. I will discuss with my provider at my next appointment                Patient/Caregiver understanding: Patient expresses good understanding after CCC RN review     Outreach Frequency: weekly  Future Appointments              Tomorrow Cynthia Maddox DO [facility]  Arrive at: Clinic A          Plan:   Patient will keep her hospital follow up tomorrow with PCP   CC RN will follow up in 3-5 business days     [facility]   Leatha Davis RN, Care Coordinator   [facility]'s   E-mail [email or web address]   [phone]

## 2022-08-17 ENCOUNTER — OFFICE VISIT (OUTPATIENT)
Dept: FAMILY MEDICINE | Facility: CLINIC | Age: 87
End: 2022-08-17
Payer: MEDICARE

## 2022-08-17 VITALS
WEIGHT: 157.1 LBS | DIASTOLIC BLOOD PRESSURE: 70 MMHG | HEIGHT: 64 IN | HEART RATE: 93 BPM | BODY MASS INDEX: 26.82 KG/M2 | RESPIRATION RATE: 14 BRPM | OXYGEN SATURATION: 97 % | TEMPERATURE: 99 F | SYSTOLIC BLOOD PRESSURE: 134 MMHG

## 2022-08-17 DIAGNOSIS — K22.4 ESOPHAGEAL SPASM: Primary | ICD-10-CM

## 2022-08-17 DIAGNOSIS — F41.1 GAD (GENERALIZED ANXIETY DISORDER): ICD-10-CM

## 2022-08-17 DIAGNOSIS — H93.12 SUBJECTIVE TINNITUS OF LEFT EAR: ICD-10-CM

## 2022-08-17 DIAGNOSIS — K59.1 FUNCTIONAL DIARRHEA: ICD-10-CM

## 2022-08-17 PROCEDURE — 99495 TRANSJ CARE MGMT MOD F2F 14D: CPT | Performed by: INTERNAL MEDICINE

## 2022-08-17 PROCEDURE — 0054A COVID-19,PF,PFIZER (12+ YRS): CPT | Performed by: INTERNAL MEDICINE

## 2022-08-17 PROCEDURE — 91305 COVID-19,PF,PFIZER (12+ YRS): CPT | Performed by: INTERNAL MEDICINE

## 2022-08-17 RX ORDER — OMEPRAZOLE 40 MG/1
40 CAPSULE, DELAYED RELEASE ORAL DAILY
Qty: 30 CAPSULE | Refills: 0 | Status: SHIPPED | OUTPATIENT
Start: 2022-08-17 | End: 2022-09-22

## 2022-08-17 RX ORDER — PAROXETINE 10 MG/1
20 TABLET, FILM COATED ORAL EVERY MORNING
Qty: 30 TABLET | Refills: 3 | Status: SHIPPED | OUTPATIENT
Start: 2022-08-17 | End: 2023-07-26

## 2022-08-17 RX ORDER — LOPERAMIDE HYDROCHLORIDE 2 MG/1
2 TABLET ORAL 2 TIMES DAILY PRN
Qty: 60 TABLET | Refills: 11 | Status: SHIPPED | OUTPATIENT
Start: 2022-08-17 | End: 2023-06-14

## 2022-08-17 ASSESSMENT — PAIN SCALES - GENERAL: PAINLEVEL: MODERATE PAIN (4)

## 2022-08-17 NOTE — PATIENT INSTRUCTIONS
Diarrhea:  Order for imodium, use sparingly - can cause constipation  If diarrhea is not improving, follow-up in clinic    Chest pain:  Not due to your heart  Could be due to esophageal spasm  Start Omeprazole (Prilosec) 40 mg daily x 30 days    Anxiety:  Start Paxil 10 mg daily  If you have side effects, let Dr. Maddox know.  Recommend consultation with Psychiatry, since you have tried several medications     Recurrent bladder infections:  You did not think the Myrbetriq helped, so you stopped it  Discussed that an daily antibiotic would breed resistance in the bacteria; because you have so many drug allergies, this is not ideal  Consider Urology referral, you declined

## 2022-08-17 NOTE — PROGRESS NOTES
"  Assessment & Plan     Esophageal spasm -it is possible that her chest pain and shortness of breath are due to esophageal spasm.  Start PPI x1 month.  If she continues to have episodes, would refer back to cardiology  - omeprazole (PRILOSEC) 40 MG DR capsule; Take 1 capsule (40 mg) by mouth daily    Functional diarrhea -she feels the prescription works better than over-the-counter.  Advised to use sparingly and to avoid constipation  - loperamide (IMODIUM A-D) 2 MG tablet; Take 1 tablet (2 mg) by mouth 2 times daily as needed for diarrhea    ARABELLA (generalized anxiety disorder) -still highly uncontrolled.  Patient minimizes symptoms.  Tried and failed multiple medications.  Start Paxil, not ideal due to its potential anticholinergic effects.  Referral to psychiatry given  - PARoxetine (PAXIL) 10 MG tablet; Take 2 tablets (20 mg) by mouth every morning  - Adult Mental Health  Referral; Future    Subjective tinnitus of left ear -ear exam is normal.  Advised to monitor, likely related to hearing loss               BMI:   Estimated body mass index is 26.97 kg/m  as calculated from the following:    Height as of this encounter: 1.626 m (5' 4\").    Weight as of this encounter: 71.3 kg (157 lb 1.6 oz).       Patient Instructions   Diarrhea:  1. Order for imodium, use sparingly - can cause constipation  2. If diarrhea is not improving, follow-up in clinic    Chest pain:  1. Not due to your heart  2. Could be due to esophageal spasm  3. Start Omeprazole (Prilosec) 40 mg daily x 30 days    Anxiety:  1. Start Paxil 10 mg daily  2. If you have side effects, let Dr. Maddox know.  3. Recommend consultation with Psychiatry, since you have tried several medications     Recurrent bladder infections:  1. You did not think the Myrbetriq helped, so you stopped it  2. Discussed that an daily antibiotic would breed resistance in the bacteria; because you have so many drug allergies, this is not ideal  3. Consider Urology " referral, you declined          No follow-ups on file.    Cynthia Maddox, DO  [facility] is a 89 year old accompanied by her self, presenting for the following health issues:  Hospital F/U and Health Maintenance (Aware has to go to pharmacy for Td and shingles will do covid today )      HPI   Chief Complaint   Patient presents with     Hospital F/U     Health Maintenance     Aware has to go to pharmacy for Td and shingles will do covid today             Hospital Follow-up Visit:    Hospital/Nursing Home/IP Rehab Facility: [facility]  Date of Admission: 8/11/22  Date of Discharge: 8/13/22  Reason(s) for Admission: UTI (urinary tract infection)    Abnormal urinalysis    Acute cystitis without hematuria    Shortness of breath    Was your hospitalization related to COVID-19? No   Problems taking medications regularly:  None  Medication changes since discharge: None  Problems adhering to non-medication therapy:  None    Summary of hospitalization:  [facility] discharge summary reviewed  Diagnostic Tests/Treatments reviewed.  Follow up needed: none  Other Healthcare Providers Involved in Patient s Care:         Care Coordination  Update since discharge: improved.         Post Medication Reconciliation Status:        Plan of care communicated with patient           acute kidney injury  Hypotension  -- Hospital doctor speculated that anxiety was leading to poor oral intake and led to dehydration and hypotension.  --she thinks she had a 'small stroke' that caused the symptoms     Anxiety:  -- I have recommended she wean off the clonazepam but she has been extremely reluctant.  She has chronic complaints of memory disturbance, and we have discussed on multiple occasions how clonazepam can contribute to memory deficits.  -- She has chronic shortness of breath and chest tightness that has been attributed to anxiety  --I had referred  her to therapy  --We discussed referral to psychiatry but she had declined.  --She has had side effects (or perceived side effects) to multiple medications - effexor, cymbalta, celexa, lexapro, amitritpyline, zoloft  --checked [place] < last filled lorazpeam 3/23.  No clonazepam fills on file x 12 months  --she reports her anxiety is not bad, only bad once in a while  --then later states it is getting worse again;  Wonders what else she can do for anxiety    Shortness of breath/ chest pain   --she had angiogram 2/2019 for abnormal stress test - only showed mild non-obstructive CAD  ---Continue plan for follow-up with cardiology as outpatient, no appointment scheduled.  Last visit was 2019    UTI:  -- Not thought to be a cause of her acute kidney injury or dehydration  --Was given 3 doses of ceftriaxone  -- She has overactive bladder and we recently started Myrbetriq; she did not think it is helping so she stopped the medication  --wonders if she needs an antibiotic to prevent UTI    Left ear ringing;  --worse at night  --wears hearing aides    Diarrhea:  --she has loose stools after eating;  In the hospital was given imodium and this helped  --ongoing for months  --no nocturnal bowel movement, no abdominal pain, blood in the stool    Current Outpatient Medications   Medication Sig Dispense Refill     acetaminophen (TYLENOL) 500 MG tablet Take 500-1,000 mg by mouth every 6 hours as needed for mild pain or pain       amLODIPine (NORVASC) 5 MG tablet Take 1 tablet (5 mg) by mouth daily 90 tablet 3     bismuth subsalicylate (PEPTO BISMOL) 262 MG chewable tablet Take 1 tablet by mouth daily as needed       blood glucose (ACCU-CHEK GUIDE) test strip Use to test blood sugar one times daily or as directed. 100 each 1     blood glucose monitoring (ACCU-CHEK FASTCLIX) lancets Use to test blood sugar one times daily or as directed. 102 each 3     Cyanocobalamin (B-12) 1000 MCG SUBL Place 1 tablet under the tongue every evening    "     loperamide (IMODIUM) 2 MG capsule Take 2 mg by mouth 4 times daily as needed for diarrhea       LORazepam (ATIVAN) 0.5 MG tablet Take 1 tablet (0.5 mg) by mouth daily as needed for anxiety (for short term only, do not take more than one per day) 15 tablet 1     mirabegron (MYRBETRIQ) 25 MG 24 hr tablet Take 1 tablet (25 mg) by mouth daily 30 tablet 3     Multiple Vitamins-Minerals (THERAPEUTIC MULTIVIT/MINERAL) TABS Take 1 tablet by mouth every evening        nitroGLYcerin (NITROSTAT) 0.4 MG sublingual tablet Place 1 tablet (0.4 mg) under the tongue every 5 minutes as needed for chest pain 25 tablet 4     order for DME 1 walker 1 Device 0     ORDER FOR DME Thigh length teds. 1 Device 2     STATIN NOT PRESCRIBED (INTENTIONAL) Please choose reason not prescribed, below       VITAMIN D, CHOLECALCIFEROL, PO Take 1,000 Units by mouth daily         Review of Systems   Constitutional, HEENT, cardiovascular, pulmonary, GI, , musculoskeletal, neuro, skin, endocrine and psych systems are negative, except as otherwise noted.      Objective    /70 (BP Location: Right arm, Patient Position: Sitting, Cuff Size: Adult Regular)   Pulse 93   Temp 99  F (37.2  C) (Tympanic)   Resp 14   Ht 1.626 m (5' 4\")   Wt 71.3 kg (157 lb 1.6 oz)   LMP  (LMP Unknown)   SpO2 97%   BMI 26.97 kg/m    Body mass index is 26.97 kg/m .  Physical Exam   GENERAL APPEARANCE: alert and no distress  HENT: ear canals and TM's normal and nose and mouth without ulcers or lesions  RESP: lungs clear to auscultation - no rales, rhonchi or wheezes  CV: regular rates and rhythm, normal S1 S2, no S3 or S4 and no murmur, click or rub  ABDOMEN: soft, nontender, without hepatosplenomegaly or masses and bowel sounds normal  PSYCH: mentation appears normal, affect normal/bright and worried                    .  ..  "

## 2022-08-31 ENCOUNTER — PATIENT OUTREACH (OUTPATIENT)
Dept: CARE COORDINATION | Facility: CLINIC | Age: 87
End: 2022-08-31

## 2022-08-31 ASSESSMENT — ACTIVITIES OF DAILY LIVING (ADL): DEPENDENT_IADLS:: SHOPPING

## 2022-08-31 NOTE — PROGRESS NOTES
Clinic Care Coordination Contact  [facility]/Voicemail    Referral Source: IP Handoff  Clinical Data:   Hospital admission 8/11-8/13/2022  weakness SOB chest pain UTI  and anxiety   Care Coordinator Outreach  Outreach attempted x 1. No answer  Plan: . Care Coordinator will try to reach patient again in 3-5 business days.    [facility]   Leatha Davis RN, Care Coordinator   [facility]'s   E-mail [email or web address]   [phone]

## 2022-09-06 ENCOUNTER — PATIENT OUTREACH (OUTPATIENT)
Dept: CARE COORDINATION | Facility: CLINIC | Age: 87
End: 2022-09-06

## 2022-09-06 ASSESSMENT — ACTIVITIES OF DAILY LIVING (ADL): DEPENDENT_IADLS:: SHOPPING

## 2022-09-06 NOTE — PROGRESS NOTES
Clinic Care Coordination Contact  [facility]/Voicemail    Referral Source: IP Handoff  Clinical Data:   Hospital admission 8/11-8/13/2022  weakness SOB chest pain UTI  and anxiety   Care Coordinator Outreach  Outreach attempted x 2.  Left message on patient's voicemail with call back information and requested return call.  Plan: Care Coordinator will await a return call from the patient   No further outreaches will be made     [facility]   Leatha Davis RN, Care Coordinator   [facility]'s   E-mail [email or web address]   [phone]

## 2022-09-09 NOTE — PROGRESS NOTES
[facility]    Outpatient Physical Therapy Discharge Note  Patient: Daniela Brown  : 1933    Beginning/End Dates of Reporting Period:  22 to 22    Referring Provider: Juju Patino NP    Therapy Diagnosis: balance problem     Client Self Report: Pt notes a decrease in knee pain today compared to Wednesday. She notes no knee pain in knee after her Wednesday appointment. She noted an increase in knee pain last night.    Objective Measurements:  Objective Measure: TUG  Details: 16.15 sec  Objective Measure: DGI       Goals:  Goal Identifier ST HEP goal   Goal Description Pt will be independent with her HEP in 2 weeks to be able to strengthen at home.   Target Date 22   Date Met  22   Progress (detail required for progress note):       Goal Identifier LT balance goal   Goal Description Pt will have a DGI of >16/24 in 8 weeks to improve her balance and work towards a reduction in falls.   Target Date 22   Date Met      Progress (detail required for progress note):       Goal Identifier LT TUG score   Goal Description pt will have a TUG time of <15 sec in 8 weeks to improve her dynamic balance for ambulating aroudn the house.   Target Date 22   Date Met      Progress (detail required for progress note):         Plan:  Discharge from therapy.    Discharge:    Reason for Discharge: Patient chooses to discontinue therapy with not following up. The pt had noted an increase in knee pain which was affecting her mobility and function. She wished to follow-up with orthopedics and her PCP. Status unknown with minimal improvements made in her goals.    Equipment Issued: none    Discharge Plan: Patient to continue home program.

## 2022-09-22 ENCOUNTER — OFFICE VISIT (OUTPATIENT)
Dept: FAMILY MEDICINE | Facility: CLINIC | Age: 87
End: 2022-09-22
Payer: MEDICARE

## 2022-09-22 VITALS
WEIGHT: 158 LBS | SYSTOLIC BLOOD PRESSURE: 148 MMHG | HEIGHT: 64 IN | RESPIRATION RATE: 18 BRPM | DIASTOLIC BLOOD PRESSURE: 72 MMHG | BODY MASS INDEX: 26.98 KG/M2 | HEART RATE: 82 BPM | OXYGEN SATURATION: 98 % | TEMPERATURE: 99.1 F

## 2022-09-22 DIAGNOSIS — G89.29 CHRONIC LEFT-SIDED LOW BACK PAIN WITH LEFT-SIDED SCIATICA: Chronic | ICD-10-CM

## 2022-09-22 DIAGNOSIS — Z79.1 LONG TERM (CURRENT) USE OF NON-STEROIDAL ANTI-INFLAMMATORIES (NSAID): ICD-10-CM

## 2022-09-22 DIAGNOSIS — M54.42 CHRONIC LEFT-SIDED LOW BACK PAIN WITH LEFT-SIDED SCIATICA: Chronic | ICD-10-CM

## 2022-09-22 DIAGNOSIS — E11.42 TYPE 2 DIABETES MELLITUS WITH DIABETIC POLYNEUROPATHY, WITHOUT LONG-TERM CURRENT USE OF INSULIN (H): ICD-10-CM

## 2022-09-22 DIAGNOSIS — M15.0 PRIMARY OSTEOARTHRITIS INVOLVING MULTIPLE JOINTS: Primary | ICD-10-CM

## 2022-09-22 DIAGNOSIS — Z23 NEED FOR PROPHYLACTIC VACCINATION AND INOCULATION AGAINST INFLUENZA: ICD-10-CM

## 2022-09-22 DIAGNOSIS — Z13.220 SCREENING FOR HYPERLIPIDEMIA: ICD-10-CM

## 2022-09-22 DIAGNOSIS — I25.119 CORONARY ARTERY DISEASE INVOLVING NATIVE CORONARY ARTERY OF NATIVE HEART WITH ANGINA PECTORIS (H): ICD-10-CM

## 2022-09-22 PROBLEM — Z11.52 ENCOUNTER FOR SCREENING LABORATORY TESTING FOR COVID-19 VIRUS: Status: RESOLVED | Noted: 2022-08-12 | Resolved: 2022-09-22

## 2022-09-22 PROBLEM — N30.00 ACUTE CYSTITIS WITHOUT HEMATURIA: Status: RESOLVED | Noted: 2022-08-11 | Resolved: 2022-09-22

## 2022-09-22 PROBLEM — R82.90 ABNORMAL URINALYSIS: Status: RESOLVED | Noted: 2022-08-11 | Resolved: 2022-09-22

## 2022-09-22 PROBLEM — R06.02 SHORTNESS OF BREATH: Status: RESOLVED | Noted: 2022-08-12 | Resolved: 2022-09-22

## 2022-09-22 PROBLEM — R07.9 CHEST PAIN, UNSPECIFIED TYPE: Status: RESOLVED | Noted: 2022-08-07 | Resolved: 2022-09-22

## 2022-09-22 PROBLEM — N39.0 UTI (URINARY TRACT INFECTION): Status: RESOLVED | Noted: 2022-08-11 | Resolved: 2022-09-22

## 2022-09-22 PROCEDURE — 90662 IIV NO PRSV INCREASED AG IM: CPT | Performed by: INTERNAL MEDICINE

## 2022-09-22 PROCEDURE — G0008 ADMIN INFLUENZA VIRUS VAC: HCPCS | Performed by: INTERNAL MEDICINE

## 2022-09-22 PROCEDURE — 99214 OFFICE O/P EST MOD 30 MIN: CPT | Mod: 25 | Performed by: INTERNAL MEDICINE

## 2022-09-22 RX ORDER — FAMOTIDINE 20 MG/1
20 TABLET, FILM COATED ORAL 2 TIMES DAILY
Qty: 90 TABLET | Refills: 3 | Status: SHIPPED | OUTPATIENT
Start: 2022-09-22 | End: 2023-06-14

## 2022-09-22 RX ORDER — MELOXICAM 7.5 MG/1
7.5 TABLET ORAL DAILY
Qty: 90 TABLET | Refills: 3 | Status: SHIPPED | OUTPATIENT
Start: 2022-09-22 | End: 2023-10-19

## 2022-09-22 ASSESSMENT — PAIN SCALES - GENERAL: PAINLEVEL: EXTREME PAIN (9)

## 2022-09-22 ASSESSMENT — PATIENT HEALTH QUESTIONNAIRE - PHQ9: SUM OF ALL RESPONSES TO PHQ QUESTIONS 1-9: 4

## 2022-09-22 NOTE — PATIENT INSTRUCTIONS
Arthritis Pain  Discussed main drug class - Tylenol, NSAIDs (like ibuprofen) and Opiates.  NSAIDs are the most effective with the fewest side effects  Side effects to NSAIDs - high blood pressure, bleeding stomach ulcer, reduced kidneys  Recommend to start Meloxicam (Mobic) 7.5 mg once daily; take with a meal  Do NOT take any other over the counter NSAIDs such as Ibuprofen (Advil), Naproxen (Aleve), Aspirin  It IS OK to use Acetaminophen (Tylenol) since this is a different drug class  Consider trying Glucosamine or CBD;  other topicals as below  We can increase the dose of the Meloxicam after 2 weeks if needed  Recommend to stop Omeprazole and start Famotidine 20 mg daily to protect your stomach          Treatments for arthritis:  1. Over the counter pain medications   A. Tylenol (Acetaminophen) 500-1000 mg 3 x day as needed   B. Ibuprofen (or other NSAIDs such as Aleve, Aspirin, Advil) 400-600 mg 3 x day as needed - can alternate with Tylenol    C. Supplement such as Glucosamine with Chondroitin   2. Over the counter topical   A. Aspercream with Lidocaine, Capsaicin, Icy Hot, Biofreeze, Salon Pas, Blue Emu, Voltaren  3. Prescription pain medications (NSAIDS)   A. Celebrex 100-200 mg 2 x day as needed.  Similar to Ibuprofen, cannot take along with Celebrex   B. Prescription Ibuprofen  4. Physical therapy   5. Heat, ice, stretching, braces, modified activity  6. Sports Medicine or Orthopedics for Injections (steroids, 'rooster comb')  7. Joint replacement

## 2022-09-22 NOTE — PROGRESS NOTES
"  Assessment & Plan     Primary osteoarthritis involving multiple joints -Long discussion about different treatment options for osteoarthritis.  NSAIDs carry some risks, but opiates are likely higher risk.  Start meloxicam.  Can increase dose if needed.  Start famotidine to offset GI side effects.  PPI and blood pressure  - meloxicam (MOBIC) 7.5 MG tablet; Take 1 tablet (7.5 mg) by mouth daily    Chronic left-sided low back pain with left-sided sciatica    Type 2 diabetes mellitus with diabetic polyneuropathy, without long-term current use of insulin (H) - due at next follow-up   - HEMOGLOBIN A1C; Future  - Albumin Random Urine Quantitative with Creat Ratio; Future  - Basic metabolic panel  (Ca, Cl, CO2, Creat, Gluc, K, Na, BUN); Future    Coronary artery disease involving native coronary artery of native heart with angina pectoris (H)    Need for prophylactic vaccination and inoculation against influenza  - INFLUENZA, QUAD, HIGH DOSE, PF, 65YR + (FLUZONE HD)    Screening for hyperlipidemia  - Lipid panel reflex to direct LDL Non-fasting; Future    Long term (current) use of non-steroidal anti-inflammatories (nsaid)  - famotidine (PEPCID) 20 MG tablet; Take 1 tablet (20 mg) by mouth 2 times daily             BMI:   Estimated body mass index is 27.12 kg/m  as calculated from the following:    Height as of this encounter: 1.626 m (5' 4\").    Weight as of this encounter: 71.7 kg (158 lb).       Patient Instructions   Arthritis Pain  1. Discussed main drug class - Tylenol, NSAIDs (like ibuprofen) and Opiates.  NSAIDs are the most effective with the fewest side effects  2. Side effects to NSAIDs - high blood pressure, bleeding stomach ulcer, reduced kidneys  3. Recommend to start Meloxicam (Mobic) 7.5 mg once daily; take with a meal  4. Do NOT take any other over the counter NSAIDs such as Ibuprofen (Advil), Naproxen (Aleve), Aspirin  5. It IS OK to use Acetaminophen (Tylenol) since this is a different drug " class  6. Consider trying Glucosamine or CBD;  other topicals as below  7. We can increase the dose of the Meloxicam after 2 weeks if needed  8. Recommend to stop Omeprazole and start Famotidine 20 mg daily to protect your stomach          Treatments for arthritis:  1. Over the counter pain medications   A. Tylenol (Acetaminophen) 500-1000 mg 3 x day as needed   B. Ibuprofen (or other NSAIDs such as Aleve, Aspirin, Advil) 400-600 mg 3 x day as needed - can alternate with Tylenol    C. Supplement such as Glucosamine with Chondroitin   2. Over the counter topical   A. Aspercream with Lidocaine, Capsaicin, Icy Hot, Biofreeze, Salon Pas, Blue Emu, Voltaren  3. Prescription pain medications (NSAIDS)   A. Celebrex 100-200 mg 2 x day as needed.  Similar to Ibuprofen, cannot take along with Celebrex   B. Prescription Ibuprofen  4. Physical therapy   5. Heat, ice, stretching, braces, modified activity  6. Sports Medicine or Orthopedics for Injections (steroids, 'rooster comb')  7. Joint replacement        No follow-ups on file.    Cynthia Maddox, [facility]    Shruti Finnegan is a 89 year old, presenting for the following health issues:  Arthritis and Imm/Inj (Flu Shot)      HPI     Pain History:  When did you first notice your pain? - Acute Pain   Have you seen anyone else for your pain? Yes - Ortho    Where in your body do you have pain? Musculoskeletal problem/pain  Onset/Duration: For last couple months getting worse  Description  Location: shoulders, knees, low back, legs  - bilateral  Joint Swelling: YES- left and right knee, right shoulder, back  Redness: No  Pain: YES- 9-10/10  Warmth: No  Intensity:  severe  Progression of Symptoms:  worsening  Accompanying signs and symptoms:   Fevers: No  Numbness/tingling/weakness: YES- in legs and bottom of feet   History  Trauma to the area: YES- took a fall on knees about 2-3 months ago   Recent illness:  No  Previous similar problem:  No  Previous evaluation:  YES- did get xray at ortho, states pt has arthritis all over body.  Precipitating or alleviating factors:  Aggravating factors include: sitting to standing, overuse   Therapies tried and outcome: heat, ice and acetaminophen (minimally helpful), biofreeze (helpful) ibuprofen (minimally helpful, more helpful than Tylenol, but seems to cause constipation)  --CKD 3a - cannot use NSAIDs; also celebrex caused rash  --duloxetine - side effects of dizziness  --has not tried TCA, glucosamine, cannabis  --gabapentin - not effective at 300 mg TID  --Lyrica - Rx but never filled  --Ortho did injections of knee and shoulder~ 1 month ago            Current Outpatient Medications   Medication Sig Dispense Refill     acetaminophen (TYLENOL) 500 MG tablet Take 500-1,000 mg by mouth every 6 hours as needed for mild pain or pain       amLODIPine (NORVASC) 5 MG tablet Take 1 tablet (5 mg) by mouth daily 90 tablet 3     bismuth subsalicylate (PEPTO BISMOL) 262 MG chewable tablet Take 1 tablet by mouth daily as needed       Cyanocobalamin (B-12) 1000 MCG SUBL Place 1 tablet under the tongue every evening        loperamide (IMODIUM A-D) 2 MG tablet Take 1 tablet (2 mg) by mouth 2 times daily as needed for diarrhea 60 tablet 11     LORazepam (ATIVAN) 0.5 MG tablet Take 1 tablet (0.5 mg) by mouth daily as needed for anxiety (for short term only, do not take more than one per day) 15 tablet 1     Multiple Vitamins-Minerals (THERAPEUTIC MULTIVIT/MINERAL) TABS Take 1 tablet by mouth every evening        nitroGLYcerin (NITROSTAT) 0.4 MG sublingual tablet Place 1 tablet (0.4 mg) under the tongue every 5 minutes as needed for chest pain 25 tablet 4     omeprazole (PRILOSEC) 40 MG DR capsule Take 1 capsule (40 mg) by mouth daily 30 capsule 0     PARoxetine (PAXIL) 10 MG tablet Take 2 tablets (20 mg) by mouth every morning 30 tablet 3     VITAMIN D, CHOLECALCIFEROL, PO Take 1,000 Units by mouth daily       blood glucose  "(ACCU-CHEK GUIDE) test strip Use to test blood sugar one times daily or as directed. 100 each 1     blood glucose monitoring (ACCU-CHEK FASTCLIX) lancets Use to test blood sugar one times daily or as directed. 102 each 3     order for DME 1 walker 1 Device 0     ORDER FOR DME Thigh length teds. 1 Device 2     STATIN NOT PRESCRIBED (INTENTIONAL) Please choose reason not prescribed, below           Review of Systems   Constitutional, HEENT, cardiovascular, pulmonary, gi and gu systems are negative, except as otherwise noted.      Objective    BP (!) 148/72   Pulse 82   Temp 99.1  F (37.3  C) (Tympanic)   Resp 18   Ht 1.626 m (5' 4\")   Wt 71.7 kg (158 lb)   LMP  (LMP Unknown)   SpO2 98%   BMI 27.12 kg/m    Body mass index is 27.12 kg/m .  Physical Exam   GENERAL APPEARANCE: alert and no distress                      "

## 2022-10-28 ENCOUNTER — OFFICE VISIT (OUTPATIENT)
Dept: FAMILY MEDICINE | Facility: CLINIC | Age: 87
End: 2022-10-28
Payer: MEDICARE

## 2022-10-28 VITALS
WEIGHT: 160.2 LBS | HEIGHT: 64 IN | RESPIRATION RATE: 16 BRPM | DIASTOLIC BLOOD PRESSURE: 70 MMHG | TEMPERATURE: 98.6 F | OXYGEN SATURATION: 93 % | BODY MASS INDEX: 27.35 KG/M2 | HEART RATE: 98 BPM | SYSTOLIC BLOOD PRESSURE: 150 MMHG

## 2022-10-28 DIAGNOSIS — F41.1 GAD (GENERALIZED ANXIETY DISORDER): ICD-10-CM

## 2022-10-28 DIAGNOSIS — G63 POLYNEUROPATHY ASSOCIATED WITH UNDERLYING DISEASE (H): ICD-10-CM

## 2022-10-28 DIAGNOSIS — E11.42 TYPE 2 DIABETES MELLITUS WITH DIABETIC POLYNEUROPATHY, WITHOUT LONG-TERM CURRENT USE OF INSULIN (H): ICD-10-CM

## 2022-10-28 DIAGNOSIS — E55.9 VITAMIN D DEFICIENCY: ICD-10-CM

## 2022-10-28 DIAGNOSIS — F32.1 MODERATE MAJOR DEPRESSION (H): ICD-10-CM

## 2022-10-28 DIAGNOSIS — Z00.00 ENCOUNTER FOR MEDICARE ANNUAL WELLNESS EXAM: Primary | ICD-10-CM

## 2022-10-28 DIAGNOSIS — R53.82 CHRONIC FATIGUE: ICD-10-CM

## 2022-10-28 DIAGNOSIS — Z13.220 SCREENING FOR HYPERLIPIDEMIA: ICD-10-CM

## 2022-10-28 LAB
ANION GAP SERPL CALCULATED.3IONS-SCNC: 10 MMOL/L (ref 7–15)
BUN SERPL-MCNC: 11.5 MG/DL (ref 8–23)
CALCIUM SERPL-MCNC: 9.4 MG/DL (ref 8.8–10.2)
CHLORIDE SERPL-SCNC: 104 MMOL/L (ref 98–107)
CHOLEST SERPL-MCNC: 188 MG/DL
CREAT SERPL-MCNC: 0.94 MG/DL (ref 0.51–0.95)
DEPRECATED HCO3 PLAS-SCNC: 26 MMOL/L (ref 22–29)
GFR SERPL CREATININE-BSD FRML MDRD: 58 ML/MIN/1.73M2
GLUCOSE SERPL-MCNC: 132 MG/DL (ref 70–99)
HBA1C MFR BLD: 5.9 % (ref 0–5.6)
HDLC SERPL-MCNC: 62 MG/DL
LDLC SERPL CALC-MCNC: 84 MG/DL
NONHDLC SERPL-MCNC: 126 MG/DL
POTASSIUM SERPL-SCNC: 3.7 MMOL/L (ref 3.4–5.3)
SODIUM SERPL-SCNC: 140 MMOL/L (ref 136–145)
TRIGL SERPL-MCNC: 208 MG/DL
TSH SERPL DL<=0.005 MIU/L-ACNC: 4.19 UIU/ML (ref 0.3–4.2)
VIT B12 SERPL-MCNC: 495 PG/ML (ref 232–1245)

## 2022-10-28 PROCEDURE — G0439 PPPS, SUBSEQ VISIT: HCPCS | Performed by: INTERNAL MEDICINE

## 2022-10-28 PROCEDURE — 84443 ASSAY THYROID STIM HORMONE: CPT | Performed by: INTERNAL MEDICINE

## 2022-10-28 PROCEDURE — 82306 VITAMIN D 25 HYDROXY: CPT | Performed by: INTERNAL MEDICINE

## 2022-10-28 PROCEDURE — 36415 COLL VENOUS BLD VENIPUNCTURE: CPT | Performed by: INTERNAL MEDICINE

## 2022-10-28 PROCEDURE — 99214 OFFICE O/P EST MOD 30 MIN: CPT | Mod: 25 | Performed by: INTERNAL MEDICINE

## 2022-10-28 PROCEDURE — 82607 VITAMIN B-12: CPT | Performed by: INTERNAL MEDICINE

## 2022-10-28 PROCEDURE — 80061 LIPID PANEL: CPT | Performed by: INTERNAL MEDICINE

## 2022-10-28 PROCEDURE — 83036 HEMOGLOBIN GLYCOSYLATED A1C: CPT | Performed by: INTERNAL MEDICINE

## 2022-10-28 PROCEDURE — 80048 BASIC METABOLIC PNL TOTAL CA: CPT | Performed by: INTERNAL MEDICINE

## 2022-10-28 RX ORDER — PREGABALIN 50 MG/1
50 CAPSULE ORAL 2 TIMES DAILY
Qty: 60 CAPSULE | Refills: 3 | Status: SHIPPED | OUTPATIENT
Start: 2022-10-28 | End: 2023-04-19

## 2022-10-28 RX ORDER — BUPROPION HYDROCHLORIDE 150 MG/1
150 TABLET ORAL EVERY MORNING
Qty: 90 TABLET | Refills: 3 | Status: SHIPPED | OUTPATIENT
Start: 2022-10-28 | End: 2024-01-09

## 2022-10-28 ASSESSMENT — PAIN SCALES - GENERAL: PAINLEVEL: EXTREME PAIN (8)

## 2022-10-28 NOTE — LETTER
October 31, 2022      Sivan Brown  [address] [place] [address]        Dear ,    We are writing to inform you of your test results.    Vitamin B23 is normal. Thyroid is normal.  Cholesterol is well controlled and similar to previous values.  The kidney function is very mildly low but similar to previous values.  If the electrolytes are normal.  A1c discussed at the time of visit.  Vitamin D and B12 are still in process.    Resulted Orders   HEMOGLOBIN A1C   Result Value Ref Range    Hemoglobin A1C 5.9 (H) 0.0 - 5.6 %      Comment:      Normal <5.7%   Prediabetes 5.7-6.4%    Diabetes 6.5% or higher     Note: Adopted from ADA consensus guidelines.   Lipid panel reflex to direct LDL Non-fasting   Result Value Ref Range    Cholesterol 188 <200 mg/dL    Triglycerides 208 (H) <150 mg/dL    Direct Measure HDL 62 >=50 mg/dL    LDL Cholesterol Calculated 84 <=100 mg/dL    Non HDL Cholesterol 126 <130 mg/dL    Narrative    Cholesterol  Desirable:  <200 mg/dL    Triglycerides  Normal:  Less than 150 mg/dL  Borderline High:  150-199 mg/dL  High:  200-499 mg/dL  Very High:  Greater than or equal to 500 mg/dL    Direct Measure HDL  Female:  Greater than or equal to 50 mg/dL   Male:  Greater than or equal to 40 mg/dL    LDL Cholesterol  Desirable:  <100mg/dL  Above Desirable:  100-129 mg/dL   Borderline High:  130-159 mg/dL   High:  160-189 mg/dL   Very High:  >= 190 mg/dL    Non HDL Cholesterol  Desirable:  130 mg/dL  Above Desirable:  130-159 mg/dL  Borderline High:  160-189 mg/dL  High:  190-219 mg/dL  Very High:  Greater than or equal to 220 mg/dL   Basic metabolic panel  (Ca, Cl, CO2, Creat, Gluc, K, Na, BUN)   Result Value Ref Range    Sodium 140 136 - 145 mmol/L    Potassium 3.7 3.4 - 5.3 mmol/L    Chloride 104 98 - 107 mmol/L    Carbon Dioxide (CO2) 26 22 - 29 mmol/L    Anion Gap 10 7 - 15 mmol/L    Urea Nitrogen 11.5 8.0 - 23.0 mg/dL    Creatinine 0.94 0.51 - 0.95 mg/dL    Calcium 9.4 8.8 -  10.2 mg/dL    Glucose 132 (H) 70 - 99 mg/dL    GFR Estimate 58 (L) >60 mL/min/1.73m2      Comment:      Effective December 21, 2021 eGFRcr in adults is calculated using the 2021 CKD-EPI creatinine equation which includes age and gender (Nathaniel et al., NEJ, DOI: 10.1056/UONIpt3353018)   TSH with free T4 reflex   Result Value Ref Range    TSH 4.19 0.30 - 4.20 uIU/mL   Vitamin B12   Result Value Ref Range    Vitamin B12 495 232 - 1,245 pg/mL       If you have any questions or concerns, please call the clinic at the number listed above.       Sincerely,      Cynthia Maddox, DO

## 2022-10-28 NOTE — PATIENT INSTRUCTIONS
Neuropathy:  Start Pregabalin (Lyrica) 50 mg twice daily;  can increase dose if needed, follow-up with Dr. Maddox  Legs are red because of swelling, neuropathy and arthritis    Fatigue:  Will add on Vitamin B12, VIt D and thyroid to your blood test    Anxiety:  Recommend to start Bupropion (Wellbutrin) 150 mg once daily.   Take every day, not just as needed        Patient Education   Personalized Prevention Plan  You are due for the preventive services outlined below.  Your care team is available to assist you in scheduling these services.  If you have already completed any of these items, please share that information with your care team to update in your medical record.  Health Maintenance Due   Topic Date Due    Eye Exam  12/08/2012    Diabetic Foot Exam  09/02/2022    A1C Lab  09/23/2022    Cholesterol Lab  10/06/2022    Kidney Microalbumin Urine Test  10/06/2022

## 2022-10-28 NOTE — PROGRESS NOTES
SUBJECTIVE:   Sivan is a 89 year old who presents for Preventive Visit.      Patient has been advised of split billing requirements and indicates understanding: Yes  Are you in the first 12 months of your Medicare coverage?  No    HPI     Chief Complaint   Patient presents with     medicare wellness      Legs are all red every day when get out of bed legs are all red, is pain upper and lower, only  sleep with a sheet, not warm to touch and don't itch      Health Maintenance     Dont want COVID shot today     Diabetes     Hypertension       Do you feel safe in your environment? Yes    Have you ever done Advance Care Planning? (For example, a Health Directive, POLST, or a discussion with a medical provider or your loved ones about your wishes): Yes, patient states has an Advance Care Planning document and will bring a copy to the clinic.       Fall risk       Cognitive Screening   1) Repeat 3 items (Leader, Season, Table)    2) Clock draw: ABNORMAL number not correct spot,   3) 3 item recall: Recalls 1 object   Results: ABNORMAL clock, 1-2 items recalled: PROBABLE COGNITIVE IMPAIRMENT, **INFORM PROVIDER**  --has known mild cognitive decline  Mini-CogTM Copyright YUDELKA Mary. Licensed by the author for use in [facility]; reprinted with permission (jj@[facility]). All rights reserved.      Do you have sleep apnea, excessive snoring or daytime drowsiness?: no    Reviewed and updated as needed this visit by clinical staff   Tobacco  Allergies      Fam Hx  Soc Hx        Reviewed and updated as needed this visit by Provider                 Social History     Tobacco Use     Smoking status: Never     Smokeless tobacco: Never   Substance Use Topics     Alcohol use: No     If you drink alcohol do you typically have >3 drinks per day or >7 drinks per week? No    No flowsheet data found.    Fatigue:  --she reports she has 'no energy'  --has anhedonia and lack of physical energy;  Has chronic leg pain and weakness  which contributes to her fatigue  --she is also worried because legs are red in the AM; no known swelling  --she often has a hard time falling asleep and staying asleep, takes ASA 81 and this helps  --gets up a lot at night to urinate;  Has known OAB;  No naps during the day  --struggles with anxiety, denies depression;  paxil is on her med list but she reports she is not taking  --is bothered by lack of energy; when she has days that she has activities planned, low energy does not bother her    Neuropathy:  --has painful neuropathy daron at night; can be severe at times  --falls frequently at home; ambulates with a cane.  --was on gabapentin until 9/21 - stopped because 'not hepful;  Switched to lyrica    OA: meloxicam is helping a lot    Diabetes Follow-up    How often are you checking your blood sugar? One time daily  What time of day are you checking your blood sugars (select all that apply)?  AM  Have you had any blood sugars above 200?  No  Have you had any blood sugars below 70?  No    What symptoms do you notice when your blood sugar is low?  None    What concerns do you have today about your diabetes? None     Do you have any of these symptoms? (Select all that apply)  Numbness in feet    Have you had a diabetic eye exam in the last 12 months? No        BP Readings from Last 2 Encounters:   10/28/22 (!) 150/70   09/22/22 (!) 148/72     Hemoglobin A1C (%)   Date Value   03/23/2022 5.9 (H)   09/02/2021 5.6   02/22/2021 5.8 (H)   03/06/2020 6.8 (H)     LDL Cholesterol Calculated (mg/dL)   Date Value   10/06/2021 52   01/03/2020 43   12/06/2012 161 (H)               Hypertension Follow-up      Do you check your blood pressure regularly outside of the clinic? Yes     Are you following a low salt diet? No    Are your blood pressures ever more than 140 on the top number (systolic) OR more   than 90 on the bottom number (diastolic), for example 140/90? No      Current providers sharing in care for this patient  include:   Patient Care Team:  Cynthia Maddox DO as PCP - General (Internal Medicine)  Cynthia Maddox DO as Assigned PCP  Tho Newman DO as Assigned Musculoskeletal Provider    The following health maintenance items are reviewed in Epic and correct as of today:  Health Maintenance   Topic Date Due     EYE EXAM  12/08/2012     DIABETIC FOOT EXAM  09/02/2022     A1C  09/23/2022     LIPID  10/06/2022     MICROALBUMIN  10/06/2022     MEDICARE ANNUAL WELLNESS VISIT  10/06/2022     ZOSTER IMMUNIZATION (1 of 2) 12/30/2022 (Originally 1/26/1983)     DTAP/TDAP/TD IMMUNIZATION (1 - Tdap) 12/30/2022 (Originally 1/2/1988)     COVID-19 Vaccine (5 - Booster for Pfizer series) 12/30/2022 (Originally 10/12/2022)     PHQ-9  03/22/2023     BMP  08/13/2023     HEMOGLOBIN  08/13/2023     FALL RISK ASSESSMENT  08/16/2023     ANNUAL REVIEW OF HM ORDERS  08/17/2023     ADVANCE CARE PLANNING  10/28/2027     DEPRESSION ACTION PLAN  Completed     INFLUENZA VACCINE  Completed     Pneumococcal Vaccine: 65+ Years  Completed     URINALYSIS  Completed     IPV IMMUNIZATION  Aged Out     MENINGITIS IMMUNIZATION  Aged Out     URINE DRUG SCREEN  Discontinued     Current Outpatient Medications   Medication Sig Dispense Refill     acetaminophen (TYLENOL) 500 MG tablet Take 500-1,000 mg by mouth every 6 hours as needed for mild pain or pain       amLODIPine (NORVASC) 5 MG tablet Take 1 tablet (5 mg) by mouth daily 90 tablet 3     bismuth subsalicylate (PEPTO BISMOL) 262 MG chewable tablet Take 1 tablet by mouth daily as needed       blood glucose (ACCU-CHEK GUIDE) test strip Use to test blood sugar one times daily or as directed. 100 each 1     blood glucose monitoring (ACCU-CHEK FASTCLIX) lancets Use to test blood sugar one times daily or as directed. 102 each 3     Cyanocobalamin (B-12) 1000 MCG SUBL Place 1 tablet under the tongue every evening        famotidine (PEPCID) 20 MG tablet Take 1 tablet (20 mg) by mouth 2 times  "daily 90 tablet 3     loperamide (IMODIUM A-D) 2 MG tablet Take 1 tablet (2 mg) by mouth 2 times daily as needed for diarrhea 60 tablet 11     LORazepam (ATIVAN) 0.5 MG tablet Take 1 tablet (0.5 mg) by mouth daily as needed for anxiety (for short term only, do not take more than one per day) 15 tablet 1     meloxicam (MOBIC) 7.5 MG tablet Take 1 tablet (7.5 mg) by mouth daily 90 tablet 3     Multiple Vitamins-Minerals (THERAPEUTIC MULTIVIT/MINERAL) TABS Take 1 tablet by mouth every evening        nitroGLYcerin (NITROSTAT) 0.4 MG sublingual tablet Place 1 tablet (0.4 mg) under the tongue every 5 minutes as needed for chest pain 25 tablet 4     order for DME 1 walker 1 Device 0     ORDER FOR DME Thigh length teds. 1 Device 2     PARoxetine (PAXIL) 10 MG tablet Take 2 tablets (20 mg) by mouth every morning 30 tablet 3     STATIN NOT PRESCRIBED (INTENTIONAL) Please choose reason not prescribed, below       VITAMIN D, CHOLECALCIFEROL, PO Take 1,000 Units by mouth daily             Pertinent mammograms are reviewed under the imaging tab.    Review of Systems   Negative except as noted above  Constitutional, HEENT, cardiovascular, pulmonary, GI, , musculoskeletal, neuro, skin, endocrine and psych systems are negative, except as otherwise noted.    OBJECTIVE:   BP (!) 150/70 (BP Location: Right arm, Patient Position: Sitting, Cuff Size: Adult Regular)   Pulse 98   Temp 98.6  F (37  C) (Tympanic)   Resp 16   Ht 1.626 m (5' 4.02\")   Wt 72.7 kg (160 lb 3.2 oz)   LMP  (LMP Unknown)   SpO2 93%   BMI 27.48 kg/m   Estimated body mass index is 27.48 kg/m  as calculated from the following:    Height as of this encounter: 1.626 m (5' 4.02\").    Weight as of this encounter: 72.7 kg (160 lb 3.2 oz).  Physical Exam  GENERAL: alert, no distress, frail and elderly  EYES: Eyes grossly normal to inspection, PERRL and conjunctivae and sclerae normal  HENT: ear canals and TM's normal, nose and mouth without ulcers or lesions  NECK: " no adenopathy, no asymmetry, masses, or scars and thyroid normal to palpation  RESP: lungs clear to auscultation - no rales, rhonchi or wheezes  CV: regular rate and rhythm, normal S1 S2, no S3 or S4, no murmur, click or rub, no peripheral edema and peripheral pulses strong  ABDOMEN: soft, nontender, no hepatosplenomegaly, no masses and bowel sounds normal  MS: no gross musculoskeletal defects noted, no edema  SKIN: mild venous stasis changes bilateral legs  NEURO: Normal strength and tone, mentation intact and speech normal  PSYCH: mentation appears normal, affect normal/bright        ASSESSMENT / PLAN:       ICD-10-CM    1. Encounter for Medicare annual wellness exam  Z00.00       2. Type 2 diabetes mellitus with diabetic polyneuropathy, without long-term current use of insulin (H)  E11.42 HEMOGLOBIN A1C     Basic metabolic panel  (Ca, Cl, CO2, Creat, Gluc, K, Na, BUN)     Albumin Random Urine Quantitative with Creat Ratio     OFFICE/OUTPT VISIT,EST,LEVL IV     CANCELED: Albumin Random Urine Quantitative with Creat Ratio      3. Screening for hyperlipidemia  Z13.220 Lipid panel reflex to direct LDL Non-fasting      4. ARABELLA (generalized anxiety disorder)  F41.1 OFFICE/OUTPT VISIT,EST,LEVL IV     buPROPion (WELLBUTRIN XL) 150 MG 24 hr tablet      5. Chronic fatigue  R53.82 TSH with free T4 reflex     Vitamin D Deficiency     Vitamin B12     OFFICE/OUTPT VISIT,EST,LEVL IV     TSH with free T4 reflex     Vitamin B12     Vitamin D Deficiency      6. Polyneuropathy associated with underlying disease (H)  G63 OFFICE/OUTPT VISIT,EST,LEVL IV     pregabalin (LYRICA) 50 MG capsule      7. Vitamin D deficiency  E55.9 Vitamin D Deficiency     Vitamin D Deficiency      8. Moderate major depression (H)  F32.1 buPROPion (WELLBUTRIN XL) 150 MG 24 hr tablet          Neuropathy:  1. Start Pregabalin (Lyrica) 50 mg twice daily;  can increase dose if needed, follow-up with Dr. Maddox  2. Legs are red because of swelling, neuropathy and  "arthritis    Fatigue:  1. Will add on Vitamin B12, VIt D and thyroid to your blood test    Anxiety:  Recommend to start Bupropion (Wellbutrin) 150 mg once daily.   Take every day, not just as needed    Patient has been advised of split billing requirements and indicates understanding: Yes      COUNSELING:  Reviewed preventive health counseling, as reflected in patient instructions    Estimated body mass index is 27.48 kg/m  as calculated from the following:    Height as of this encounter: 1.626 m (5' 4.02\").    Weight as of this encounter: 72.7 kg (160 lb 3.2 oz).        She reports that she has never smoked. She has never used smokeless tobacco.      Appropriate preventive services were discussed with this patient, including applicable screening as appropriate for cardiovascular disease, diabetes, osteopenia/osteoporosis, and glaucoma.  As appropriate for age/gender, discussed screening for colorectal cancer, prostate cancer, breast cancer, and cervical cancer. Checklist reviewing preventive services available has been given to the patient.    Reviewed patients plan of care and provided an AVS. The Complex Care Plan (for patients with higher acuity and needing more deliberate coordination of services) for Daniela meets the Care Plan requirement. This Care Plan has been established and reviewed with the Patient.    Counseling Resources:  ATP IV Guidelines  Pooled Cohorts Equation Calculator  Breast Cancer Risk Calculator  Breast Cancer: Medication to Reduce Risk  FRAX Risk Assessment  ICSI Preventive Guidelines  Dietary Guidelines for Americans, 2010  USDA's MyPlate  ASA Prophylaxis  Lung CA Screening    DO MADISON Reddy [facility]    Identified Health Risks:  "

## 2022-11-01 LAB — DEPRECATED CALCIDIOL+CALCIFEROL SERPL-MC: 16 UG/L (ref 20–75)

## 2022-11-02 DIAGNOSIS — E55.9 VITAMIN D DEFICIENCY: Primary | ICD-10-CM

## 2022-11-02 RX ORDER — CHOLECALCIFEROL (VITAMIN D3) 50 MCG
1 TABLET ORAL DAILY
Qty: 90 TABLET | Refills: 3 | Status: SHIPPED | OUTPATIENT
Start: 2023-01-02 | End: 2023-06-14

## 2022-11-02 RX ORDER — ERGOCALCIFEROL 1.25 MG/1
50000 CAPSULE, LIQUID FILLED ORAL WEEKLY
Qty: 12 CAPSULE | Refills: 0 | Status: SHIPPED | OUTPATIENT
Start: 2022-11-02 | End: 2023-01-31

## 2022-11-12 ENCOUNTER — HOSPITAL ENCOUNTER (EMERGENCY)
Facility: CLINIC | Age: 87
Discharge: HOME | End: 2022-11-12
Attending: FAMILY MEDICINE | Admitting: FAMILY MEDICINE
Payer: MEDICARE

## 2022-11-12 VITALS
SYSTOLIC BLOOD PRESSURE: 131 MMHG | OXYGEN SATURATION: 97 % | HEIGHT: 64 IN | WEIGHT: 162 LBS | HEART RATE: 93 BPM | RESPIRATION RATE: 18 BRPM | DIASTOLIC BLOOD PRESSURE: 84 MMHG | TEMPERATURE: 98.8 F | BODY MASS INDEX: 27.66 KG/M2

## 2022-11-12 DIAGNOSIS — M54.2 NECK PAIN: ICD-10-CM

## 2022-11-12 DIAGNOSIS — R42 DIZZINESS: ICD-10-CM

## 2022-11-12 DIAGNOSIS — N30.90 BLADDER INFECTION: ICD-10-CM

## 2022-11-12 LAB
ALBUMIN SERPL BCG-MCNC: 4.2 G/DL (ref 3.5–5.2)
ALBUMIN UR-MCNC: NEGATIVE MG/DL
ALP SERPL-CCNC: 79 U/L (ref 35–104)
ALT SERPL W P-5'-P-CCNC: 15 U/L (ref 10–35)
ANION GAP SERPL CALCULATED.3IONS-SCNC: 11 MMOL/L (ref 7–15)
APPEARANCE UR: CLEAR
AST SERPL W P-5'-P-CCNC: 30 U/L (ref 10–35)
BACTERIA #/AREA URNS HPF: ABNORMAL /HPF
BASOPHILS # BLD AUTO: 0.1 10E3/UL (ref 0–0.2)
BASOPHILS NFR BLD AUTO: 1 %
BILIRUB SERPL-MCNC: 0.3 MG/DL
BILIRUB UR QL STRIP: NEGATIVE
BUN SERPL-MCNC: 16.9 MG/DL (ref 8–23)
CALCIUM SERPL-MCNC: 9.5 MG/DL (ref 8.8–10.2)
CHLORIDE SERPL-SCNC: 105 MMOL/L (ref 98–107)
COLOR UR AUTO: ABNORMAL
CREAT SERPL-MCNC: 1.09 MG/DL (ref 0.51–0.95)
DEPRECATED HCO3 PLAS-SCNC: 23 MMOL/L (ref 22–29)
EOSINOPHIL # BLD AUTO: 0.2 10E3/UL (ref 0–0.7)
EOSINOPHIL NFR BLD AUTO: 1 %
ERYTHROCYTE [DISTWIDTH] IN BLOOD BY AUTOMATED COUNT: 12.5 % (ref 10–15)
GFR SERPL CREATININE-BSD FRML MDRD: 48 ML/MIN/1.73M2
GLUCOSE SERPL-MCNC: 129 MG/DL (ref 70–99)
GLUCOSE UR STRIP-MCNC: NEGATIVE MG/DL
HCT VFR BLD AUTO: 39.6 % (ref 35–47)
HGB BLD-MCNC: 13.7 G/DL (ref 11.7–15.7)
HGB UR QL STRIP: ABNORMAL
HYALINE CASTS: 1 /LPF
IMM GRANULOCYTES # BLD: 0 10E3/UL
IMM GRANULOCYTES NFR BLD: 0 %
KETONES UR STRIP-MCNC: NEGATIVE MG/DL
LEUKOCYTE ESTERASE UR QL STRIP: ABNORMAL
LYMPHOCYTES # BLD AUTO: 3 10E3/UL (ref 0.8–5.3)
LYMPHOCYTES NFR BLD AUTO: 26 %
MCH RBC QN AUTO: 28.7 PG (ref 26.5–33)
MCHC RBC AUTO-ENTMCNC: 34.6 G/DL (ref 31.5–36.5)
MCV RBC AUTO: 83 FL (ref 78–100)
MONOCYTES # BLD AUTO: 0.8 10E3/UL (ref 0–1.3)
MONOCYTES NFR BLD AUTO: 7 %
MUCOUS THREADS #/AREA URNS LPF: PRESENT /LPF
NEUTROPHILS # BLD AUTO: 7.5 10E3/UL (ref 1.6–8.3)
NEUTROPHILS NFR BLD AUTO: 65 %
NITRATE UR QL: POSITIVE
NRBC # BLD AUTO: 0 10E3/UL
NRBC BLD AUTO-RTO: 0 /100
PH UR STRIP: 5.5 [PH] (ref 5–7)
PLATELET # BLD AUTO: 329 10E3/UL (ref 150–450)
POTASSIUM SERPL-SCNC: 4.3 MMOL/L (ref 3.4–5.3)
PROT SERPL-MCNC: 7.7 G/DL (ref 6.4–8.3)
RBC # BLD AUTO: 4.78 10E6/UL (ref 3.8–5.2)
RBC URINE: 3 /HPF
SODIUM SERPL-SCNC: 139 MMOL/L (ref 136–145)
SP GR UR STRIP: 1.01 (ref 1–1.03)
SQUAMOUS EPITHELIAL: 3 /HPF
TRANSITIONAL EPI: 2 /HPF
UROBILINOGEN UR STRIP-MCNC: NORMAL MG/DL
WBC # BLD AUTO: 11.5 10E3/UL (ref 4–11)
WBC CLUMPS #/AREA URNS HPF: PRESENT /HPF
WBC URINE: 171 /HPF

## 2022-11-12 PROCEDURE — 99284 EMERGENCY DEPT VISIT MOD MDM: CPT | Performed by: FAMILY MEDICINE

## 2022-11-12 PROCEDURE — 87186 SC STD MICRODIL/AGAR DIL: CPT | Performed by: FAMILY MEDICINE

## 2022-11-12 PROCEDURE — 36415 COLL VENOUS BLD VENIPUNCTURE: CPT | Performed by: FAMILY MEDICINE

## 2022-11-12 PROCEDURE — 93005 ELECTROCARDIOGRAM TRACING: CPT | Performed by: FAMILY MEDICINE

## 2022-11-12 PROCEDURE — 80053 COMPREHEN METABOLIC PANEL: CPT | Performed by: FAMILY MEDICINE

## 2022-11-12 PROCEDURE — 81001 URINALYSIS AUTO W/SCOPE: CPT | Performed by: FAMILY MEDICINE

## 2022-11-12 PROCEDURE — 85025 COMPLETE CBC W/AUTO DIFF WBC: CPT | Performed by: FAMILY MEDICINE

## 2022-11-12 PROCEDURE — 99284 EMERGENCY DEPT VISIT MOD MDM: CPT | Mod: 25 | Performed by: FAMILY MEDICINE

## 2022-11-12 PROCEDURE — 82040 ASSAY OF SERUM ALBUMIN: CPT | Performed by: FAMILY MEDICINE

## 2022-11-12 PROCEDURE — 93010 ELECTROCARDIOGRAM REPORT: CPT | Performed by: FAMILY MEDICINE

## 2022-11-12 RX ORDER — NITROFURANTOIN 25; 75 MG/1; MG/1
100 CAPSULE ORAL 2 TIMES DAILY
Qty: 14 CAPSULE | Refills: 0 | Status: SHIPPED | OUTPATIENT
Start: 2022-11-12 | End: 2022-11-23

## 2022-11-12 RX ORDER — NITROFURANTOIN 25; 75 MG/1; MG/1
100 CAPSULE ORAL 2 TIMES DAILY
Qty: 14 CAPSULE | Refills: 0 | Status: SHIPPED | OUTPATIENT
Start: 2022-11-12 | End: 2023-04-19

## 2022-11-12 ASSESSMENT — ENCOUNTER SYMPTOMS
HEMATOLOGIC/LYMPHATIC NEGATIVE: 1
SORE THROAT: 1
SHORTNESS OF BREATH: 0
DIARRHEA: 0
PSYCHIATRIC NEGATIVE: 1
PALPITATIONS: 0
DIFFICULTY URINATING: 0
COUGH: 0
FEVER: 0
NECK PAIN: 1
NAUSEA: 0
SPEECH DIFFICULTY: 0
VOMITING: 0
DIZZINESS: 1

## 2022-11-12 ASSESSMENT — ACTIVITIES OF DAILY LIVING (ADL)
ADLS_ACUITY_SCORE: 37
ADLS_ACUITY_SCORE: 39

## 2022-11-12 NOTE — ED TRIAGE NOTES
Pt reports high blood pressure this am, pt took another bp pill.   Pt continues to have dizziness and pain radiating up back of head/neck area.      Triage Assessment     Row Name 11/12/22 [id number]       Cognitive/Neuro/Behavioral WDL    Cognitive/Neuro/Behavioral WDL WDL

## 2022-11-12 NOTE — ED NOTES
Pt states last Sunday she lifted a 24 pack of water and strained her neck. The pain on the right side of her neck was bothering her today and she felt dizzy so she checked her BP at home and it was 135/115. She thought this was high so she took an extra BP med and called her granddaughter who brought her in. Denied chest pain. Had c/o of a headache earlier, which is gone. Walked to the bathroom here and was steady on her feet.

## 2022-11-12 NOTE — ED PROVIDER NOTES
History     Chief Complaint   Patient presents with     Dizziness     HPI  Daniela Brown is a 89 year old female who presents with dizziness.      This 89-year-old woman developed neck pain 6 days ago when she lifted a case of water bottles.  She has had right-sided neck pain which radiates proximally.  She has tried heat and cold applications as well as Tylenol ES.  She continued to be bothered by this through this morning.    This morning she felt kind of weak and had diminished appetite.  She then felt lightheaded.  She checked her blood pressure which was elevated and she took her BP med early.  Because of the dizziness, she talked to her granddaughter who drove her to the ER.    She is not having vertigo.  No visual disturbance.  No focal weakness or numbness.    Her neck is feeling a bit better right now and she was able to ambulate over to the bathroom with the use of her cane here in the ER.    Her medical problems include diabetes and CKD and hypertension.  Anxiety.    Patient is 89 years old.  Has lived in her own house since 1985.  Lives by herself.      Allergies:  Allergies   Allergen Reactions     Metoprolol Itching and Rash     Cephalexin Rash     Erythromycin Rash     Actonel [Bisphosphonates] Itching     Azithromycin Hives     Celebrex [Celecoxib] Rash     Cipro [Ciprofloxacin] Rash     Contrast Dye Hives     Diatrizoate Hives     Erythromycin Rash     Escitalopram Diarrhea     Gets very sick and mad feelings     Fosamax Itching and Rash     Keflex [Cephalexin Monohydrate] Rash     Lisinopril Cough     Risedronate Itching     Seasonal Allergies      Shellfish-Derived Products Hives     Tetanus Toxoid, Adsorbed Swelling     Tetanus-Diphtheria Toxoids Swelling     Vicodin [Acetaminophen] Itching     Patient reported - only when used scheduled in high doses.        Vioxx Rash     Acetaminophen Itching     In high doses  Patient reported - only when used scheduled in high doses.         "Alendronic Acid Rash     Celecoxib Rash     Penicillins Rash     Rofecoxib Rash     Sulfa Drugs Itching and Rash     Sulfasalazine Itching and Rash       Problem List:    Patient Active Problem List    Diagnosis Date Noted     Moderate major depression (H) 12/31/2020     Priority: Medium     ARABELLA (generalized anxiety disorder) 12/31/2020     Priority: Medium     Has been on celexa (not effective but no side effects), amitriptyline, cymbalta (dizziness), lexapro (listed as allergy - 'gets very sick\"), zoloft switched to lexapro)       Abnormal CT of the abdomen 10/10/2019     Priority: Medium     CT 10/9/2019- cystic lesion along the anterior aspect of the pancreatic body/tail (series 2 image 27) measures 2 cm, and is new since the previous exam 2011. Pancreatic MRI with MRCP should be considered for further evaluation.   MRI 10/10/2019- There are 2 cystic lesions in the pancreatic body/tail, with the largest measuring 2 cm. No enhancing septations or solid masslike components are identified, although evaluation is limited related to patient motion artifact. These lesions have appearances suggestive of IPMN, but remain technically indeterminate. A follow-up MRI in one year should be considered to confirm stability.       Cystocele, midline      Priority: Medium     grade III       Generalized weakness 10/09/2019     Priority: Medium     Diarrhea 10/09/2019     Priority: Medium     Chronic left-sided low back pain with left-sided sciatica 04/15/2019     Priority: Medium     Coronary artery disease involving native coronary artery of native heart with angina pectoris (H) 02/19/2019     Priority: Medium     coronary angiogram 2/2019 showed mild coronary artery disease.  The most significant of these was the 40% lesion in the mid RCA.       Benign essential hypertension 11/14/2018     Priority: Medium     Type 2 diabetes mellitus with diabetic polyneuropathy, without long-term current use of insulin (H) 11/14/2018     " Priority: Medium     Diet controlled.  Diagnosed 6/2016, has never been on medications.       History of recurrent urinary tract infection 11/14/2018     Priority: Medium     On daily trimethoprim       CKD (chronic kidney disease) stage 3, GFR 30-59 ml/min (H) 11/14/2018     Priority: Medium     Peripheral neuropathy 09/10/2018     Priority: Medium     Bilateral wrist pain 04/11/2018     Priority: Medium     Protrusion of lumbar intervertebral disc 04/11/2018     Priority: Medium     Carpal tunnel syndrome of left wrist 10/21/2014     Priority: Medium     Diastolic dysfunction 03/31/2014     Priority: Medium     Pronation of foot 05/05/2011     Priority: Medium     Bilateral defomity       Allergic rhinitis 01/19/2011     Priority: Medium     Hyperlipidemia LDL goal <100 10/31/2010     Priority: Medium     Urge incontinence 10/20/2009     Priority: Medium     Right foot pain 10/16/2009     Priority: Medium     Xray showed djd -follows with podiatry. -arch supports placed may need cam walker        Vitamin D deficiency 09/09/2008     Priority: Medium     Subjective tinnitus 04/22/2008     Priority: Medium     Urticaria 08/22/2006     Priority: Medium     SENSONRL HEAR LOSS,BILAT 05/03/2006     Priority: Medium     Esophageal reflux      Priority: Medium     Memory loss      Priority: Medium     Early cognitive decline. MMSE 27/30, Neno 4.7/ 6.0 2004. B12, TSH, RPR normal 1910-2664.       Degeneration of lumbar or lumbosacral intervertebral disc      Priority: Medium     MRI 5/04- DDD, L2-3 annular bulge with protrusion into left caudal infra-foraminal area  MRI 2017 with L4-5 loss disc height with spondylolisthesis.       B12 deficiency 10/10/2019     Priority: Low     Irritable bowel syndrome with diarrhea      Priority: Low     diahrrea predominant       Osteopenia of multiple sites      Priority: Low     DEXA 2007 -1.4 hip. Dexa 2004 -1 hip and -1 spine       RESTLESS LEG SYNDROME      Priority: Low     Primary  osteoarthritis involving multiple joints      Priority: Low     C-spine, left hip       Diverticulosis of large intestine      Priority: Low     flexible sigmoidoscopy with diverticulosis otherwise normal 2001, admit diverticulitis 2009          Past Medical History:    Past Medical History:   Diagnosis Date     Abnormal cardiovascular stress test 2/3/2019     Adhesive capsulitis of shoulder      Adjustment disorder with anxiety 3/18/2016     B12 deficiency anemia 6/20/2012     Chest pain 2004     Colitis, Clostridium difficile 4/18/2012     Diverticulitis 2009     Headache(784.0) 2001     Impaired fasting glucose 2005     PERS HX ALLERGY OTHER FOODS 8/22/2006     PERS HX ALLERGY TO MILK PRODUCTS 8/22/2006     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     Status post coronary angiogram 2/27/2019     Syncope and collapse 9/10/2018       Past Surgical History:    Past Surgical History:   Procedure Laterality Date     ARTHROPLASTY KNEE  3/26/2012    Procedure:ARTHROPLASTY KNEE; Right Total Knee Arthroplasty; Surgeon:BERNADINE ROSAS; Location:[facility]     CHOLECYSTECTOMY       CV HEART CATHETERIZATION WITH POSSIBLE INTERVENTION N/A 2/27/2019    Procedure: Coronary Angiogram with Left ventriculogram;  Surgeon: Leandro Carpio MD;  Location:  [facility]     HERNIA REPAIR      Hernia Repair     HYSTERECTOMY, PAP NO LONGER INDICATED  1983    TVH/BSO     SURGICAL HISTORY OF -   10/08    A & P Repair, Dr. Hannah     ZZC APPENDECTOMY  1953       Family History:    Family History   Problem Relation Age of Onset     C.A.D. Mother      Diabetes Mother      Cancer - colorectal Mother      Arthritis Mother      Heart Disease Mother      Lipids Brother      Obesity Brother      Hypertension Brother      Allergies Son      Eye Disorder Son         cataract     Thyroid Disease Sister         graves dz     Eye Disorder Son      Leukemia Son      Alcohol/Drug Father        Social History:  Marital Status:    "[5]  Social History     Tobacco Use     Smoking status: Never     Smokeless tobacco: Never   Vaping Use     Vaping Use: Never used   Substance Use Topics     Alcohol use: No     Drug use: No        Medications:    nitroFURantoin macrocrystal-monohydrate (MACROBID) 100 MG capsule  nitroFURantoin macrocrystal-monohydrate (MACROBID) 100 MG capsule  acetaminophen (TYLENOL) 500 MG tablet  amLODIPine (NORVASC) 5 MG tablet  bismuth subsalicylate (PEPTO BISMOL) 262 MG chewable tablet  blood glucose (ACCU-CHEK GUIDE) test strip  blood glucose monitoring (ACCU-CHEK FASTCLIX) lancets  buPROPion (WELLBUTRIN XL) 150 MG 24 hr tablet  Cyanocobalamin (B-12) 1000 MCG SUBL  famotidine (PEPCID) 20 MG tablet  loperamide (IMODIUM A-D) 2 MG tablet  meloxicam (MOBIC) 7.5 MG tablet  Multiple Vitamins-Minerals (THERAPEUTIC MULTIVIT/MINERAL) TABS  nitroGLYcerin (NITROSTAT) 0.4 MG sublingual tablet  order for DME  ORDER FOR DME  PARoxetine (PAXIL) 10 MG tablet  pregabalin (LYRICA) 50 MG capsule  STATIN NOT PRESCRIBED (INTENTIONAL)  vitamin D2 (ERGOCALCIFEROL) 23429 units (1250 mcg) capsule  [START ON 1/2/2023] vitamin D3 (CHOLECALCIFEROL) 50 mcg (2000 units) tablet          Review of Systems   Constitutional: Negative for fever.   HENT: Positive for sore throat.    Eyes: Negative for visual disturbance.   Respiratory: Negative for cough and shortness of breath.    Cardiovascular: Negative for chest pain and palpitations.   Gastrointestinal: Negative for diarrhea, nausea and vomiting.   Genitourinary: Negative for difficulty urinating.   Musculoskeletal: Positive for neck pain.   Skin: Negative for rash.   Neurological: Positive for dizziness. Negative for speech difficulty.   Hematological: Negative.    Psychiatric/Behavioral: Negative.        Physical Exam   BP: (!) 162/75  Pulse: 96  Temp: 98.8  F (37.1  C)  Resp: 18  Height: 162.6 cm (5' 4\")  Weight: 73.5 kg (162 lb)  SpO2: 99 %      Physical Exam  Constitutional:       General: She is not " in acute distress.  HENT:      Head: Normocephalic and atraumatic.      Mouth/Throat:      Mouth: Mucous membranes are moist.      Pharynx: Oropharynx is clear.   Eyes:      Extraocular Movements: Extraocular movements intact.      Pupils: Pupils are equal, round, and reactive to light.   Neck:      Comments: Diminished extension and sidebending.  Cardiovascular:      Rate and Rhythm: Normal rate and regular rhythm.      Heart sounds: Murmur heard.      Comments: 2-3 / 6 CHRISSY.  Pulmonary:      Breath sounds: Normal breath sounds.   Abdominal:      General: There is no distension.      Tenderness: There is no abdominal tenderness.   Musculoskeletal:         General: No tenderness.      Right lower leg: No edema.      Left lower leg: No edema.   Skin:     General: Skin is warm and dry.   Neurological:      General: No focal deficit present.      Mental Status: She is alert.      Motor: No weakness.   Psychiatric:         Mood and Affect: Mood normal.         ED Course     1610 -patient seen for initial evaluation.  Studies ordered.                  Procedures              Critical Care time:  none     EKG:  RSR, rate 73.  Axis -35.  Conduction normal.  Intervals normal.  ST segments and T waves WNL.  No ectopy.  Possible LVH.  Abnormal EKG with LAD, possible LVH.  Tracing is similar to that of 8-.              Results for orders placed or performed during the hospital encounter of 11/12/22 (from the past 24 hour(s))   Comprehensive metabolic panel   Result Value Ref Range    Sodium 139 136 - 145 mmol/L    Potassium 4.3 3.4 - 5.3 mmol/L    Chloride 105 98 - 107 mmol/L    Carbon Dioxide (CO2) 23 22 - 29 mmol/L    Anion Gap 11 7 - 15 mmol/L    Urea Nitrogen 16.9 8.0 - 23.0 mg/dL    Creatinine 1.09 (H) 0.51 - 0.95 mg/dL    Calcium 9.5 8.8 - 10.2 mg/dL    Glucose 129 (H) 70 - 99 mg/dL    Alkaline Phosphatase 79 35 - 104 U/L    AST 30 10 - 35 U/L    ALT 15 10 - 35 U/L    Protein Total 7.7 6.4 - 8.3 g/dL    Albumin 4.2  3.5 - 5.2 g/dL    Bilirubin Total 0.3 <=1.2 mg/dL    GFR Estimate 48 (L) >60 mL/min/1.73m2   CBC with platelets differential    Narrative    The following orders were created for panel order CBC with platelets differential.  Procedure                               Abnormality         Status                     ---------                               -----------         ------                     CBC with platelets and d...[[id number]]  Abnormal            Final result                 Please view results for these tests on the individual orders.   CBC with platelets and differential   Result Value Ref Range    WBC Count 11.5 (H) 4.0 - 11.0 10e3/uL    RBC Count 4.78 3.80 - 5.20 10e6/uL    Hemoglobin 13.7 11.7 - 15.7 g/dL    Hematocrit 39.6 35.0 - 47.0 %    MCV 83 78 - 100 fL    MCH 28.7 26.5 - 33.0 pg    MCHC 34.6 31.5 - 36.5 g/dL    RDW 12.5 10.0 - 15.0 %    Platelet Count 329 150 - 450 10e3/uL    % Neutrophils 65 %    % Lymphocytes 26 %    % Monocytes 7 %    % Eosinophils 1 %    % Basophils 1 %    % Immature Granulocytes 0 %    NRBCs per 100 WBC 0 <1 /100    Absolute Neutrophils 7.5 1.6 - 8.3 10e3/uL    Absolute Lymphocytes 3.0 0.8 - 5.3 10e3/uL    Absolute Monocytes 0.8 0.0 - 1.3 10e3/uL    Absolute Eosinophils 0.2 0.0 - 0.7 10e3/uL    Absolute Basophils 0.1 0.0 - 0.2 10e3/uL    Absolute Immature Granulocytes 0.0 <=0.4 10e3/uL    Absolute NRBCs 0.0 10e3/uL   UA with Microscopic reflex to Culture    Specimen: Urine, Midstream   Result Value Ref Range    Color Urine Light Yellow Colorless, Straw, Light Yellow, Yellow    Appearance Urine Clear Clear    Glucose Urine Negative Negative mg/dL    Bilirubin Urine Negative Negative    Ketones Urine Negative Negative mg/dL    Specific Gravity Urine 1.015 1.003 - 1.035    Blood Urine Trace (A) Negative    pH Urine 5.5 5.0 - 7.0    Protein Albumin Urine Negative Negative mg/dL    Urobilinogen Urine Normal Normal, 2.0 mg/dL    Nitrite Urine Positive (A) Negative    Leukocyte  Esterase Urine Moderate (A) Negative    Bacteria Urine Many (A) None Seen /HPF    WBC Clumps Urine Present (A) None Seen /HPF    Mucus Urine Present (A) None Seen /LPF    RBC Urine 3 (H) <=2 /HPF    WBC Urine 171 (H) <=5 /HPF    Squamous Epithelials Urine 3 (H) <=1 /HPF    Transitional Epithelials Urine 2 (H) <=1 /HPF    Hyaline Casts Urine 1 <=2 /LPF    Narrative    Urine Culture ordered based on laboratory criteria     *Note: Due to a large number of results and/or encounters for the requested time period, some results have not been displayed. A complete set of results can be found in Results Review.       Medications - No data to display    Assessments & Plan (with Medical Decision Making)     This patient had a week history of neck pain which was bothering her quite a bit.  She did not feel quite right this morning, had some dizziness.  This has not persisted.    Work-up showed regular cardiac rhythm, normal electrolytes, likely bladder infection ongoing.    She ambulated a couple of times while in the ER without difficulty.    At this time it was elected to treat her UTI.  Also have her continue to treat her neck symptomatically.  Have office follow-up if not improving in that situation.    Return to ER if she has worsening dizziness or other concerning symptoms.      I have reviewed the nursing notes.    I have reviewed the findings, diagnosis, plan and need for follow up with the patient.       Discharge Medication List as of 11/12/2022  6:38 PM      START taking these medications    Details   !! nitroFURantoin macrocrystal-monohydrate (MACROBID) 100 MG capsule Take 1 capsule (100 mg) by mouth 2 times daily, Disp-14 capsule, R-0, Local Print      !! nitroFURantoin macrocrystal-monohydrate (MACROBID) 100 MG capsule Take 1 capsule (100 mg) by mouth 2 times daily, Disp-14 capsule, R-0, E-Prescribe       !! - Potential duplicate medications found. Please discuss with provider.          Final diagnoses:    Dizziness   Neck pain   Bladder infection       11/12/2022   [facility] EMERGENCY DEPT     Bert Drew MD  11/12/22 [id number]

## 2022-11-13 LAB — BACTERIA UR CULT: ABNORMAL

## 2022-11-13 NOTE — DISCHARGE INSTRUCTIONS
Take the antibiotic as directed.    If you continue to have neck pain problems or dizziness problems continue, please have follow-up with your doctor.    If you have worsening symptoms, return to the emergency room.

## 2022-11-14 NOTE — RESULT ENCOUNTER NOTE
Final Urine Culture Report on 11/13/22  [facility] Emergency Dept discharge antibiotic prescribed: Nitrofurantoin Macrocrystal-Monohydrate (Macrobid) 100 mg PO capsule, 1 capsule (100 mg) by mouth 2 times daily for 7 days  #1. Bacteria, >100,000 CFU/ML Escherichia coli , is SUSCEPTIBLE to Antibiotic.    No change in treatment per [facility] ED lab result Urine Culture protocol.

## 2022-11-23 ENCOUNTER — TELEPHONE (OUTPATIENT)
Dept: FAMILY MEDICINE | Facility: CLINIC | Age: 87
End: 2022-11-23

## 2022-11-23 ENCOUNTER — OFFICE VISIT (OUTPATIENT)
Dept: FAMILY MEDICINE | Facility: CLINIC | Age: 87
End: 2022-11-23
Payer: MEDICARE

## 2022-11-23 VITALS
OXYGEN SATURATION: 98 % | SYSTOLIC BLOOD PRESSURE: 140 MMHG | RESPIRATION RATE: 16 BRPM | TEMPERATURE: 98.9 F | WEIGHT: 155 LBS | DIASTOLIC BLOOD PRESSURE: 72 MMHG | BODY MASS INDEX: 26.61 KG/M2 | HEART RATE: 83 BPM

## 2022-11-23 DIAGNOSIS — N30.90 BLADDER INFECTION: Primary | ICD-10-CM

## 2022-11-23 DIAGNOSIS — R79.89 ELEVATED SERUM CREATININE: ICD-10-CM

## 2022-11-23 LAB
ALBUMIN UR-MCNC: NEGATIVE MG/DL
ANION GAP SERPL CALCULATED.3IONS-SCNC: 11 MMOL/L (ref 7–15)
APPEARANCE UR: CLEAR
BACTERIA #/AREA URNS HPF: ABNORMAL /HPF
BASOPHILS # BLD AUTO: 0 10E3/UL (ref 0–0.2)
BASOPHILS NFR BLD AUTO: 0 %
BILIRUB UR QL STRIP: NEGATIVE
BUN SERPL-MCNC: 13.9 MG/DL (ref 8–23)
CALCIUM SERPL-MCNC: 9.7 MG/DL (ref 8.8–10.2)
CHLORIDE SERPL-SCNC: 105 MMOL/L (ref 98–107)
COLOR UR AUTO: YELLOW
CREAT SERPL-MCNC: 0.87 MG/DL (ref 0.51–0.95)
DEPRECATED HCO3 PLAS-SCNC: 24 MMOL/L (ref 22–29)
EOSINOPHIL # BLD AUTO: 0.2 10E3/UL (ref 0–0.7)
EOSINOPHIL NFR BLD AUTO: 2 %
ERYTHROCYTE [DISTWIDTH] IN BLOOD BY AUTOMATED COUNT: 12.7 % (ref 10–15)
GFR SERPL CREATININE-BSD FRML MDRD: 63 ML/MIN/1.73M2
GLUCOSE SERPL-MCNC: 102 MG/DL (ref 70–99)
GLUCOSE UR STRIP-MCNC: NEGATIVE MG/DL
HCT VFR BLD AUTO: 40.4 % (ref 35–47)
HGB BLD-MCNC: 13.5 G/DL (ref 11.7–15.7)
HGB UR QL STRIP: ABNORMAL
KETONES UR STRIP-MCNC: NEGATIVE MG/DL
LEUKOCYTE ESTERASE UR QL STRIP: NEGATIVE
LYMPHOCYTES # BLD AUTO: 2.5 10E3/UL (ref 0.8–5.3)
LYMPHOCYTES NFR BLD AUTO: 24 %
MCH RBC QN AUTO: 28.7 PG (ref 26.5–33)
MCHC RBC AUTO-ENTMCNC: 33.4 G/DL (ref 31.5–36.5)
MCV RBC AUTO: 86 FL (ref 78–100)
MONOCYTES # BLD AUTO: 0.8 10E3/UL (ref 0–1.3)
MONOCYTES NFR BLD AUTO: 8 %
NEUTROPHILS # BLD AUTO: 6.7 10E3/UL (ref 1.6–8.3)
NEUTROPHILS NFR BLD AUTO: 66 %
NITRATE UR QL: NEGATIVE
PH UR STRIP: 6 [PH] (ref 5–7)
PLATELET # BLD AUTO: 284 10E3/UL (ref 150–450)
POTASSIUM SERPL-SCNC: 3.8 MMOL/L (ref 3.4–5.3)
RBC # BLD AUTO: 4.7 10E6/UL (ref 3.8–5.2)
RBC #/AREA URNS AUTO: ABNORMAL /HPF
SODIUM SERPL-SCNC: 140 MMOL/L (ref 136–145)
SP GR UR STRIP: 1.01 (ref 1–1.03)
SQUAMOUS #/AREA URNS AUTO: ABNORMAL /LPF
UROBILINOGEN UR STRIP-ACNC: 0.2 E.U./DL
WBC # BLD AUTO: 10.1 10E3/UL (ref 4–11)
WBC #/AREA URNS AUTO: ABNORMAL /HPF

## 2022-11-23 PROCEDURE — 99213 OFFICE O/P EST LOW 20 MIN: CPT | Performed by: NURSE PRACTITIONER

## 2022-11-23 PROCEDURE — 81001 URINALYSIS AUTO W/SCOPE: CPT | Performed by: NURSE PRACTITIONER

## 2022-11-23 PROCEDURE — 80048 BASIC METABOLIC PNL TOTAL CA: CPT | Performed by: NURSE PRACTITIONER

## 2022-11-23 PROCEDURE — 36415 COLL VENOUS BLD VENIPUNCTURE: CPT | Performed by: NURSE PRACTITIONER

## 2022-11-23 PROCEDURE — 85025 COMPLETE CBC W/AUTO DIFF WBC: CPT | Performed by: NURSE PRACTITIONER

## 2022-11-23 ASSESSMENT — PAIN SCALES - GENERAL: PAINLEVEL: NO PAIN (0)

## 2022-11-23 NOTE — PROGRESS NOTES
Assessment & Plan     Bladder infection  Resolved. Discussed increased fluids during the day. Follow up if nocturia not improving.  - UA macro with reflex to Microscopic and Culture - Clinc Collect  - Urine Microscopic    Elevated serum creatinine  Mild elevation in creatinine and WBC while in ER, will recheck today. Follow up with PCP as needed.  - CBC with platelets and differential; Future  - Basic metabolic panel  (Ca, Cl, CO2, Creat, Gluc, K, Na, BUN); Future       MED REC REQUIRED  Post Medication Reconciliation Status:  Discharge medications reconciled, continue medications without change    Patient Instructions   We will repeat your labs today and let you know what those show.  Urine looks better today.      Return in about 1 week (around 11/30/2022) for worsening or continued symptoms.    [facility]    Shruti Finnegan is a 89 year old, presenting for the following health issues:  ER F/U (11/12/22)      [facility]     ED/UC Followup:    Facility:  [facility]  Date of visit: 11/12/22  Reason for visit: Dizziness, UTI  Current Status: improved, decreased neck pain, dizziness is improved, her legs have been feeling mildly weak    Above HPI reviewed. Additionally, overall feels much better since emergency department visit on November 12.  Is not having further UTI symptoms other than nocturia.  She notes that prior to the last several days, she was getting up between 5 and 8 times a night to urinate.  She is currently only getting up 1-2 times per night.  She is not having any dysuria or hematuria.  No back pain or abdominal pain.  She never had fevers.  She notes that the dizziness she experienced prior to the emergency department visit has completely resolved.  She does note that her legs feel slightly weaker than normal, but she has been attributing this to peripheral neuropathy as it feels quite similar.  She is ambulating without difficulty with her  cane.  She lives at home alone, and has had no trouble getting around her home.  She is offered physical therapy which she declines.      Review of Systems   Constitutional, HEENT, cardiovascular, pulmonary, gi and gu systems are negative, except as otherwise noted.      Objective    BP (!) 140/72   Pulse 83   Temp 98.9  F (37.2  C) (Tympanic)   Resp 16   Wt 70.3 kg (155 lb)   LMP  (LMP Unknown)   SpO2 98%   BMI 26.61 kg/m    Body mass index is 26.61 kg/m .  Physical Exam  Vitals and nursing note reviewed.   Constitutional:       Appearance: Normal appearance.   HENT:      Head: Normocephalic and atraumatic.      Mouth/Throat:      Mouth: Mucous membranes are moist.   Eyes:      Comments: Non-icteric   Cardiovascular:      Rate and Rhythm: Normal rate and regular rhythm.      Pulses: Normal pulses.      Heart sounds: Normal heart sounds, S1 normal and S2 normal. Heart sounds not distant. No murmur heard.    No friction rub. No gallop.   Pulmonary:      Effort: Pulmonary effort is normal.      Breath sounds: Normal breath sounds.   Abdominal:      General: Abdomen is flat. Bowel sounds are normal.      Palpations: Abdomen is soft.   Musculoskeletal:      Cervical back: Neck supple.      Right lower leg: No edema.      Left lower leg: No edema.   Skin:     General: Skin is warm and dry.      Capillary Refill: Capillary refill takes less than 2 seconds.   Neurological:      General: No focal deficit present.      Mental Status: She is alert and oriented to person, place, and time.   Psychiatric:         Mood and Affect: Mood normal.         Behavior: Behavior normal.         Thought Content: Thought content normal.         Judgment: Judgment normal.            Results for orders placed or performed in visit on 11/23/22 (from the past 24 hour(s))   UA macro with reflex to Microscopic and Culture - Clinc Collect    Specimen: Urine, Clean Catch   Result Value Ref Range    Color Urine Yellow Colorless, Straw, Light  Yellow, Yellow    Appearance Urine Clear Clear    Glucose Urine Negative Negative mg/dL    Bilirubin Urine Negative Negative    Ketones Urine Negative Negative mg/dL    Specific Gravity Urine 1.015 1.003 - 1.035    Blood Urine Trace (A) Negative    pH Urine 6.0 5.0 - 7.0    Protein Albumin Urine Negative Negative mg/dL    Urobilinogen Urine 0.2 0.2, 1.0 E.U./dL    Nitrite Urine Negative Negative    Leukocyte Esterase Urine Negative Negative   Urine Microscopic   Result Value Ref Range    Bacteria Urine Moderate (A) None Seen /HPF    RBC Urine None Seen 0-2 /HPF /HPF    WBC Urine None Seen 0-5 /HPF /HPF    Squamous Epithelials Urine Few (A) None Seen /LPF    Narrative    Urine Culture not indicated   CBC with platelets and differential    Narrative    The following orders were created for panel order CBC with platelets and differential.  Procedure                               Abnormality         Status                     ---------                               -----------         ------                     CBC with platelets and d...[[id number]]                                                   Please view results for these tests on the individual orders.     *Note: Due to a large number of results and/or encounters for the requested time period, some results have not been displayed. A complete set of results can be found in Results Review.

## 2022-11-23 NOTE — TELEPHONE ENCOUNTER
Patient has a 2:10 clinic appt today and was wondering if she could just  a urine cup to leave a sample and bring it back because her legs are weak and she does not think she can walk that far. Advised to ask for assistance once she enters [facility]. A volunteer should be able to transport her via wheelchair to the clinic. She agrees to this plan.  Sharmila LINARES RN

## 2022-11-23 NOTE — LETTER
November 28, 2022      Sivan Brown  [address] [facility] [address]        Dear ,    We are writing to inform you of your test results.    Blood counts today are normal, and white blood cell count has normalized since emergency department visit.  Kidney function has returned to baseline.    Resulted Orders   UA macro with reflex to Microscopic and Culture - Clinc Collect   Result Value Ref Range    Color Urine Yellow Colorless, Straw, Light Yellow, Yellow    Appearance Urine Clear Clear    Glucose Urine Negative Negative mg/dL    Bilirubin Urine Negative Negative    Ketones Urine Negative Negative mg/dL    Specific Gravity Urine 1.015 1.003 - 1.035    Blood Urine Trace (A) Negative    pH Urine 6.0 5.0 - 7.0    Protein Albumin Urine Negative Negative mg/dL    Urobilinogen Urine 0.2 0.2, 1.0 E.U./dL    Nitrite Urine Negative Negative    Leukocyte Esterase Urine Negative Negative   Urine Microscopic   Result Value Ref Range    Bacteria Urine Moderate (A) None Seen /HPF    RBC Urine None Seen 0-2 /HPF /HPF    WBC Urine None Seen 0-5 /HPF /HPF    Squamous Epithelials Urine Few (A) None Seen /LPF    Narrative    Urine Culture not indicated   CBC with platelets and differential   Result Value Ref Range    WBC Count 10.1 4.0 - 11.0 10e3/uL    RBC Count 4.70 3.80 - 5.20 10e6/uL    Hemoglobin 13.5 11.7 - 15.7 g/dL    Hematocrit 40.4 35.0 - 47.0 %    MCV 86 78 - 100 fL    MCH 28.7 26.5 - 33.0 pg    MCHC 33.4 31.5 - 36.5 g/dL    RDW 12.7 10.0 - 15.0 %    Platelet Count 284 150 - 450 10e3/uL    % Neutrophils 66 %    % Lymphocytes 24 %    % Monocytes 8 %    % Eosinophils 2 %    % Basophils 0 %    Absolute Neutrophils 6.7 1.6 - 8.3 10e3/uL    Absolute Lymphocytes 2.5 0.8 - 5.3 10e3/uL    Absolute Monocytes 0.8 0.0 - 1.3 10e3/uL    Absolute Eosinophils 0.2 0.0 - 0.7 10e3/uL    Absolute Basophils 0.0 0.0 - 0.2 10e3/uL       If you have any questions or concerns, please call the clinic at the number listed  above.       Sincerely,      CYN Rivers CNP

## 2022-12-16 ENCOUNTER — TELEPHONE (OUTPATIENT)
Dept: FAMILY MEDICINE | Facility: CLINIC | Age: 87
End: 2022-12-16

## 2022-12-16 NOTE — TELEPHONE ENCOUNTER
"S-(situation): possible shingles outbreak.  Pt is asking for a telephone visit?    Pt explains that she cannot get out of her driveway; it has not been plowed yet.    Pt says she has painful, itchy blisters that began a couple weeks ago.  Started on her left side of chest and moving up her neck, side of face and head.  Head hurts and is itchy.    Her left eye is \"bothering quite bad.\"  Continuing to get new lesions.    Pt says she is having trouble sleeping due to the pain.    B-(background): per above.    A-(assessment): possible shingles outbreak.    R-(recommendations): Advised needs to be evaluated today.     Pt is not able to do a video visit.   Asks for telephone visit?    Pt says that she has no one to help her.  No helpful neighbors or friends.  Son is in [place].    [facility] would deliver meds if ordered.    Sandy Turner RN     "

## 2022-12-16 NOTE — TELEPHONE ENCOUNTER
Left non-detailed message for patient to return a call to the clinic RN.     Huddled with Dr Maddox, due to eye involvement, pt needs to be evaluated today.  MARK Turner RN

## 2022-12-16 NOTE — TELEPHONE ENCOUNTER
This is an atypical distribution of shingles.  A telephone call would not be helpful to diagnose this.  At minimum, she would need a video visit with pictures sent before the visit;

## 2022-12-16 NOTE — TELEPHONE ENCOUNTER
Reason for call:    Symptom or request:     Patient called asking if she has shingles-- theres a itchy rash along jaw inline that works up to the hairline.     Requesting an appt for next week        Best Time:  any    Can we leave a detailed message on this number?  YES     Lelo FLORES  Station

## 2022-12-19 NOTE — TELEPHONE ENCOUNTER
Patient's home phone is busy signal, mobile phone number there is no mailbox set up. Nasrin Encarnacion on 12/19/2022 at 11:44 AM

## 2022-12-19 NOTE — TELEPHONE ENCOUNTER
Spoke with pt and shared provider's instructions.    Pt updates that the rash is improving now.  She is applying hydrocortisone cream and the rash is improving.     Pt says that she will make an appt to be seen later this week if she fails to improve or if symptoms do not resolve.    Sandy Turner RN

## 2022-12-28 ENCOUNTER — OFFICE VISIT (OUTPATIENT)
Dept: FAMILY MEDICINE | Facility: CLINIC | Age: 87
End: 2022-12-28
Payer: MEDICARE

## 2022-12-28 ENCOUNTER — TRANSFERRED RECORDS (OUTPATIENT)
Dept: HEALTH INFORMATION MANAGEMENT | Facility: CLINIC | Age: 87
End: 2022-12-28

## 2022-12-28 VITALS
TEMPERATURE: 98.6 F | HEART RATE: 87 BPM | WEIGHT: 158.8 LBS | SYSTOLIC BLOOD PRESSURE: 154 MMHG | OXYGEN SATURATION: 97 % | DIASTOLIC BLOOD PRESSURE: 70 MMHG | BODY MASS INDEX: 28.14 KG/M2 | RESPIRATION RATE: 16 BRPM | HEIGHT: 63 IN

## 2022-12-28 DIAGNOSIS — B02.9 HERPES ZOSTER WITHOUT COMPLICATION: Primary | ICD-10-CM

## 2022-12-28 PROCEDURE — 99213 OFFICE O/P EST LOW 20 MIN: CPT | Performed by: INTERNAL MEDICINE

## 2022-12-28 ASSESSMENT — PAIN SCALES - GENERAL: PAINLEVEL: NO PAIN (0)

## 2022-12-28 NOTE — PROGRESS NOTES
Assessment & Plan     Herpes zoster without complication - probable shingles based on description.  Distribution of face and upper torso is not typical (not a single dermatome).  I was able to have [facility] see her same day due to complaints of blurry vision and probable shingles.  She is outside treatment window for anti-viral                   No follow-ups on file.    Cynthia Maddox [facility]    Shruti Finnegan is a 89 year old, presenting for the following health issues:  Derm Problem      HPI     Rash  Onset/Duration: x about a month or so. Thinking it could be shingles. Patient states her left eye has been bothering her. Has has blurry vision, itchy eye, and water eye. Patient states her left ear is also bothering her.  Description  Location: Front of neck and upper chest, spread up on left side of neck behind her ear up into hair line.  Character: round, raised, draining, red  Itching: moderate to severe  Intensity:  Mild right now  Progression of Symptoms:  Intermittent but feels okay right now.  Accompanying signs and symptoms:   Fever: No  Body aches or joint pain: No  Sore throat symptoms: No  Recent cold symptoms: No  History:           Previous episodes of similar rash: poss shingles  New exposures:  None  Recent travel: No  Exposure to similar rash: No  Precipitating or alleviating factors: Gets worse at night. When her left side of neck/ face touches the pillow it makes the rash itch.   Therapies tried and outcome: hydrocortisone cream -  Effective for a little bit.  --patient called on 12/16 asking for telephone visit for this rash.  Myself and RN told patient virtual visit is not appropriate to discuss shingles and recommend UC;  She declined due to transportation  --she is noting blurry vision of left eye, along with itching of the eye; onset of when rash occurred. No eye pain  --there is itching of the area behind the left ear  --there was a similar  rash on the left chest wall  --I showed her pictures of shingles but she reports her rash did not look like this  --the rash has entirely resolved now      Concern - Legs are red and painful at night.  Patient states if she doesn't wear certain pajama pants her legs are very red, swollen and painful. Patient wondering if this is from arthritis?     Patient wondering why she is growing some whisker like hairs on her chin? Patient wondering it is her hormones?        Current Outpatient Medications   Medication Sig Dispense Refill     amLODIPine (NORVASC) 5 MG tablet Take 1 tablet (5 mg) by mouth daily 90 tablet 3     bismuth subsalicylate (PEPTO BISMOL) 262 MG chewable tablet Take 1 tablet by mouth daily as needed       blood glucose (ACCU-CHEK GUIDE) test strip Use to test blood sugar one times daily or as directed. 100 each 1     blood glucose monitoring (ACCU-CHEK FASTCLIX) lancets Use to test blood sugar one times daily or as directed. 102 each 3     Cyanocobalamin (B-12) 1000 MCG SUBL Place 1 tablet under the tongue every evening        famotidine (PEPCID) 20 MG tablet Take 1 tablet (20 mg) by mouth 2 times daily 90 tablet 3     meloxicam (MOBIC) 7.5 MG tablet Take 1 tablet (7.5 mg) by mouth daily 90 tablet 3     Multiple Vitamins-Minerals (THERAPEUTIC MULTIVIT/MINERAL) TABS Take 1 tablet by mouth every evening        nitroGLYcerin (NITROSTAT) 0.4 MG sublingual tablet Place 1 tablet (0.4 mg) under the tongue every 5 minutes as needed for chest pain 25 tablet 4     order for DME 1 walker 1 Device 0     ORDER FOR DME Thigh length teds. 1 Device 2     PARoxetine (PAXIL) 10 MG tablet Take 2 tablets (20 mg) by mouth every morning 30 tablet 3     pregabalin (LYRICA) 50 MG capsule Take 1 capsule (50 mg) by mouth 2 times daily 60 capsule 3     STATIN NOT PRESCRIBED (INTENTIONAL) Please choose reason not prescribed, below       vitamin D2 (ERGOCALCIFEROL) 04395 units (1250 mcg) capsule Take 1 capsule (50,000 Units) by  "mouth once a week for 90 days 12 capsule 0     [START ON 1/2/2023] vitamin D3 (CHOLECALCIFEROL) 50 mcg (2000 units) tablet Take 1 tablet (50 mcg) by mouth daily 90 tablet 3     acetaminophen (TYLENOL) 500 MG tablet Take 500-1,000 mg by mouth every 6 hours as needed for mild pain or pain (Patient not taking: Reported on 12/28/2022)       buPROPion (WELLBUTRIN XL) 150 MG 24 hr tablet Take 1 tablet (150 mg) by mouth every morning (Patient not taking: Reported on 12/28/2022) 90 tablet 3     loperamide (IMODIUM A-D) 2 MG tablet Take 1 tablet (2 mg) by mouth 2 times daily as needed for diarrhea (Patient not taking: Reported on 12/28/2022) 60 tablet 11     nitroFURantoin macrocrystal-monohydrate (MACROBID) 100 MG capsule Take 1 capsule (100 mg) by mouth 2 times daily (Patient not taking: Reported on 12/28/2022) 14 capsule 0         Review of Systems   CONSTITUTIONAL: NEGATIVE for fever, chills, change in weight  ENT/MOUTH: NEGATIVE for ear, mouth and throat problems  RESP: NEGATIVE for significant cough or SOB  CV: NEGATIVE for chest pain, palpitations or peripheral edema      Objective    BP (!) 152/72 (BP Location: Right arm, Patient Position: Sitting, Cuff Size: Adult Large)   Pulse 87   Temp 98.6  F (37  C) (Tympanic)   Resp 16   Ht 1.6 m (5' 3\")   Wt 72 kg (158 lb 12.8 oz)   LMP  (LMP Unknown)   SpO2 97%   BMI 28.13 kg/m    Body mass index is 28.13 kg/m .  Physical Exam   GENERAL APPEARANCE: alert and no distress  EYES: Eyes grossly normal to inspection, PERRL and conjunctivae and sclerae normal  SKIN: no suspicious lesions or rashes                    "

## 2023-01-12 ENCOUNTER — TELEPHONE (OUTPATIENT)
Dept: FAMILY MEDICINE | Facility: CLINIC | Age: 88
End: 2023-01-12
Payer: MEDICARE

## 2023-01-12 DIAGNOSIS — R21 RASH: Primary | ICD-10-CM

## 2023-01-12 NOTE — TELEPHONE ENCOUNTER
Patient called and says her rash she was seen for in December is not getting better and wants to go see dermatology and was told she needs a referral.      Nasrin Pool, JOSE J Schwartz

## 2023-01-13 NOTE — TELEPHONE ENCOUNTER
The patient called to request derm referral. The patient was seen on 12/28/22. The patient reports it has spread on the left side up behind her ear and in her hair. The patient reports it is extremely itchy in her hair and makes it hard for her to sleep.  The patient would like referral and maybe something for the itchiness. The patient is not able to see what it looks like in her hair.    Thank you    Juju METZ RN    Referral and pharmacy pended.

## 2023-01-15 NOTE — TELEPHONE ENCOUNTER
I place a referral since Dr. Maddox is out currently.  Sincerely,  Tej Nava MD  Covering for primary care provider.

## 2023-01-17 DIAGNOSIS — B02.9 HERPES ZOSTER WITHOUT COMPLICATION: ICD-10-CM

## 2023-01-17 DIAGNOSIS — R21 RASH: Primary | ICD-10-CM

## 2023-01-17 RX ORDER — CAPSAICIN 0.025 %
CREAM (GRAM) TOPICAL
Qty: 60 G | Refills: 0 | Status: SHIPPED | OUTPATIENT
Start: 2023-01-17 | End: 2023-06-14

## 2023-01-17 RX ORDER — LIDOCAINE 50 MG/G
1 PATCH TOPICAL EVERY 24 HOURS
Qty: 15 PATCH | Refills: 0 | Status: SHIPPED | OUTPATIENT
Start: 2023-01-17 | End: 2023-06-14

## 2023-01-17 NOTE — TELEPHONE ENCOUNTER
I spoke with pt.    Pt will make appt with derm.  She says they called her and next available is April.  Pt will make appt so that she has it if she eventually needs a derm appt.    In the meantime, pt says that her shingles rash continues to spread up her neck and into her hairline.    Denies rash near her eye.  Rash is itchy but not painful.  Pt saw Dr Maddox 12/28/22 for shingles rash.  Pt did see eye dr too as she was directed.    Routed to provider of day, Dr Dillon, provider of day for further recommendations?  Be seen again?  Same day?    Sandy Turner, RN

## 2023-01-17 NOTE — PROGRESS NOTES
Received a message in my basket stating that the patient has a referral to dermatology and that she is still complaining of pruritus along the location of her rash.  The rash is reportedly now spreading into her hairline.  Given the concern for potential shingles breakout, will avoid steroid medication for antipruritic therapy.     Prescriptions for capsaicin cream and lidocaine patches were sent to the patient's pharmacy on file.     Given the patient's rash is spreading, I requested the nursing staff make an appointment to have her see me sometime on Thursday or Friday afternoon if possible.    If the patient continues to endorse worsening of her rash and itching, per up to date, a provider can trial antiviral treatment >72 hours after the rash presents if it keeps spreading. So that could potentially be another treatment option as well, although given her age and numerous medications, will have to monitor for potential polypharmacy. If there is worsening pain with neuritis like symptoms, can consider increasing her dose of Lyrica assuming tylenol and nsaids did not work.     Zaida Dillon MD

## 2023-01-18 NOTE — TELEPHONE ENCOUNTER
Message from Dr Dillon:    Kitty Delgado,     I sent two prescriptions for topical pruritis treatment: capsaicin cream and lidocaine patches     I don't want to give her steroid creams in case that worsens the rash.     Additionally given the fact that it is growing and its not necessarily in a dermatomal pattern, I do not mind seeing the patient sometime on Thursday or Friday for further evaluation.  I should have some openings slots in the afternoon you can use a blocked off slot if you need to.     Sincerely,     Zaida Dillon MD

## 2023-01-23 ENCOUNTER — TELEPHONE (OUTPATIENT)
Dept: DERMATOLOGY | Facility: CLINIC | Age: 88
End: 2023-01-23
Payer: MEDICARE

## 2023-01-23 NOTE — TELEPHONE ENCOUNTER
Reason for Call:  Other appointment    Detailed comments: Patient has appointment with Rosario on 01/26 and would like to get in sooner, if possible. Patient's rash is worsening. Patient unable to sleep last night. Rash is under chin and goes up her left side up her ear and onto her head. Pt states it is also on her other side of neck now also. Patient states the salve and patches are not working. Patient states the rash is red and terribly itchy. Patient states ibuprofen did she took in middle of the night seemed to settle down the rash a bit. Please advise.     Phone Number Patient can be reached at: Home number on file [phone] (home)    Best Time: any    Can we leave a detailed message on this number? YES    Call taken on 1/23/2023 at 9:25 AM by Martínez Lim

## 2023-01-24 ENCOUNTER — OFFICE VISIT (OUTPATIENT)
Dept: DERMATOLOGY | Facility: CLINIC | Age: 88
End: 2023-01-24
Payer: MEDICARE

## 2023-01-24 ENCOUNTER — TELEPHONE (OUTPATIENT)
Dept: DERMATOLOGY | Facility: CLINIC | Age: 88
End: 2023-01-24

## 2023-01-24 DIAGNOSIS — L30.9 DERMATITIS: Primary | ICD-10-CM

## 2023-01-24 PROCEDURE — 99203 OFFICE O/P NEW LOW 30 MIN: CPT | Performed by: DERMATOLOGY

## 2023-01-24 RX ORDER — FLUOCINONIDE TOPICAL SOLUTION USP, 0.05% 0.5 MG/ML
SOLUTION TOPICAL 2 TIMES DAILY
Qty: 120 ML | Refills: 6 | Status: SHIPPED | OUTPATIENT
Start: 2023-01-24 | End: 2023-06-14

## 2023-01-24 RX ORDER — FLUOCINONIDE 0.5 MG/G
CREAM TOPICAL 2 TIMES DAILY
Qty: 60 G | Refills: 6 | Status: SHIPPED | OUTPATIENT
Start: 2023-01-24 | End: 2023-06-14

## 2023-01-24 ASSESSMENT — PAIN SCALES - GENERAL: PAINLEVEL: NO PAIN (0)

## 2023-01-24 NOTE — TELEPHONE ENCOUNTER
Prior Authorization Retail Medication Request    Medication/Dose: fluocinonide (LIDEX) 0.05 % external cream  ICD code (if different than what is on RX):    Previously Tried and Failed:    Rationale:      Insurance Name:  [employer] Ph: [phone]  Insurance ID:  [id number]      Pharmacy Information (if different than what is on RX)  Name:    Phone:

## 2023-01-24 NOTE — LETTER
"    1/24/2023         RE: Daniela Brown  [address] [facility] [address]        Dear Colleague,    Thank you for referring your patient, Daniela Brown, to the [facility]. Please see a copy of my visit note below.    Daniela Brown , a 89 year old year old female patient, I was asked to see by Dr. Nava for rash on scalp.  Patient states this has been present for weeks.  Patient reports the following symptoms:  itching .  Patient reports the following previous treatments cortisone helps.  .  Patient reports the following modifying factors none.  Associated symptoms: none.  Patient has no other skin complaints today.  Remainder of the HPI, Meds, PMH, Allergies, FH, and SH was reviewed in chart.      Past Medical History:   Diagnosis Date     Abnormal cardiovascular stress test 2/3/2019    9/10/2018 Lexiscan: \"Myocardial perfusion imaging using single isotope technique demonstrated a small perfusion defect of mild severity involving the basal inferior wall which is mostly reversible and may be consistent with mild ischemia in the right coronary artery distribution. In addition, transient ischemic dilatation is noted with a TID ratio of 1.3. \"     Adhesive capsulitis of shoulder     left      Adjustment disorder with anxiety 3/18/2016     B12 deficiency anemia 6/20/2012     Chest pain 2004    Chronic right sided/axillary discomfort (musculoskeletal) Atypical substernal (possibly GERD). Negative cardiolite 2004.     Colitis, Clostridium difficile 4/18/2012     Diverticulitis 2009     Headache(784.0) 2001    Tension type. MRI 2001 normal.     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     Status post coronary angiogram 2/27/2019     Syncope and collapse 9/10/2018       Past Surgical History:   Procedure Laterality Date "     ARTHROPLASTY KNEE  3/26/2012    Procedure:ARTHROPLASTY KNEE; Right Total Knee Arthroplasty; Surgeon:BERNADINE ROSAS; Location:[place] OR     CHOLECYSTECTOMY       CV HEART CATHETERIZATION WITH POSSIBLE INTERVENTION N/A 2/27/2019    Procedure: Coronary Angiogram with Left ventriculogram;  Surgeon: Leandro Carpio MD;  Location:  HEART CARDI[facility] CATH LAB     HERNIA REPAIR      Hernia Repair     HYSTERECTOMY, PAP NO LONGER INDICATED  1983    TVH/BSO     SURGICAL HISTORY OF -   10/08    A & P Repair, Dr. Hannah     ZZC APPENDECTOMY  1953        Family History   Problem Relation Age of Onset     C.A.D. Mother      Diabetes Mother      Cancer - colorectal Mother      Arthritis Mother      Heart Disease Mother      Lipids Brother      Obesity Brother      Hypertension Brother      Allergies Son      Eye Disorder Son         cataract     Thyroid Disease Sister         graves dz     Eye Disorder Son      Leukemia Son      Alcohol/Drug Father        Social History     Socioeconomic History     Marital status:      Spouse name: Not on file     Number of children: Not on file     Years of education: Not on file     Highest education level: Not on file   Occupational History     Not on file   Tobacco Use     Smoking status: Never     Smokeless tobacco: Never   Vaping Use     Vaping Use: Never used   Substance and Sexual Activity     Alcohol use: No     Drug use: No     Sexual activity: Not Currently     Partners: Male   Other Topics Concern     Parent/sibling w/ CABG, MI or angioplasty before 65F 55M? No   Social History Narrative    Lives in [place] independently. She has son who lives nearby in [place]. Her oldest son passed away in September 2018.      Social Determinants of Health     Financial Resource Strain: Not on file   Food Insecurity: Not on file   Transportation Needs: Not on file   Physical Activity: Not on file   Stress: Not on file   Social Connections: Not on file   Intimate Partner  Violence: Not on file   Housing Stability: Not on file       Outpatient Encounter Medications as of 1/24/2023   Medication Sig Dispense Refill     acetaminophen (TYLENOL) 500 MG tablet Take 500-1,000 mg by mouth every 6 hours as needed for mild pain or pain (Patient not taking: Reported on 12/28/2022)       amLODIPine (NORVASC) 5 MG tablet Take 1 tablet (5 mg) by mouth daily 90 tablet 3     bismuth subsalicylate (PEPTO BISMOL) 262 MG chewable tablet Take 1 tablet by mouth daily as needed       blood glucose (ACCU-CHEK GUIDE) test strip Use to test blood sugar one times daily or as directed. 100 each 1     blood glucose monitoring (ACCU-CHEK FASTCLIX) lancets Use to test blood sugar one times daily or as directed. 102 each 3     buPROPion (WELLBUTRIN XL) 150 MG 24 hr tablet Take 1 tablet (150 mg) by mouth every morning (Patient not taking: Reported on 12/28/2022) 90 tablet 3     capsaicin (ZOSTRIX) 0.025 % external cream Place on affected area, you may notice a transient burning sensation 60 g 0     Cyanocobalamin (B-12) 1000 MCG SUBL Place 1 tablet under the tongue every evening        famotidine (PEPCID) 20 MG tablet Take 1 tablet (20 mg) by mouth 2 times daily 90 tablet 3     lidocaine (LIDODERM) 5 % patch Place 1 patch onto the skin every 24 hours To prevent lidocaine toxicity, patient should be patch free for 12 hrs daily. 15 patch 0     loperamide (IMODIUM A-D) 2 MG tablet Take 1 tablet (2 mg) by mouth 2 times daily as needed for diarrhea (Patient not taking: Reported on 12/28/2022) 60 tablet 11     meloxicam (MOBIC) 7.5 MG tablet Take 1 tablet (7.5 mg) by mouth daily 90 tablet 3     Multiple Vitamins-Minerals (THERAPEUTIC MULTIVIT/MINERAL) TABS Take 1 tablet by mouth every evening        nitroFURantoin macrocrystal-monohydrate (MACROBID) 100 MG capsule Take 1 capsule (100 mg) by mouth 2 times daily (Patient not taking: Reported on 12/28/2022) 14 capsule 0     nitroGLYcerin (NITROSTAT) 0.4 MG sublingual tablet  Place 1 tablet (0.4 mg) under the tongue every 5 minutes as needed for chest pain 25 tablet 4     order for DME 1 walker 1 Device 0     ORDER FOR DME Thigh length teds. 1 Device 2     PARoxetine (PAXIL) 10 MG tablet Take 2 tablets (20 mg) by mouth every morning 30 tablet 3     pregabalin (LYRICA) 50 MG capsule Take 1 capsule (50 mg) by mouth 2 times daily 60 capsule 3     STATIN NOT PRESCRIBED (INTENTIONAL) Please choose reason not prescribed, below       vitamin D2 (ERGOCALCIFEROL) 65879 units (1250 mcg) capsule Take 1 capsule (50,000 Units) by mouth once a week for 90 days 12 capsule 0     vitamin D3 (CHOLECALCIFEROL) 50 mcg (2000 units) tablet Take 1 tablet (50 mcg) by mouth daily 90 tablet 3     No facility-administered encounter medications on file as of 1/24/2023.             Review Of Systems  Skin: As above  Eyes: negative  Ears/Nose/Throat: negative  Respiratory: No shortness of breath, dyspnea on exertion, cough, or hemoptysis  Cardiovascular: negative  Gastrointestinal: negative  Genitourinary: negative  Musculoskeletal: negative  Neurologic: negative  Psychiatric: negative  Hematologic/Lymphatic/Immunologic: negative  Endocrine: negative      O:   NAD, WDWN, Alert & Oriented, Mood & Affect wnl, Vitals stable   Here today alone   General appearance arsh ii   Vitals stable   Alert, oriented and in no acute distress   Neck with red eczematous plaque  No visible rash on frontal scalp  Post neck faint erythema and scale      Eyes: Conjunctivae/lids:Normal     ENT: Lips, buccal mucosa, tongue: normal    MSK:Normal    Cardiovascular: peripheral edema none    Pulm: Breathing Normal    Neuro/Psych: Orientation:Normal; Mood/Affect:Normal      A/P:  1. Dermatitis  Lidex to trunk and next   Lidex for scalp  claritin in am  Zyrtec bedtime  Return to clinic 4 weeks  It was a pleasure speaking to Daniela Brown today.  Previous clinic  notes and pertinent laboratory tests were reviewed prior to Daniela  Betty Brown's visit.        Again, thank you for allowing me to participate in the care of your patient.        Sincerely,        Leandro Posada MD

## 2023-01-24 NOTE — PATIENT INSTRUCTIONS
Start taking 2 tablets of over the counter Claritin every morning.    Start taking 2 tablets of cover the counter Zyrtec every evening.

## 2023-01-24 NOTE — PROGRESS NOTES
"Daniela Brown , a 89 year old year old female patient, I was asked to see by Dr. Nava for rash on scalp.  Patient states this has been present for weeks.  Patient reports the following symptoms:  itching .  Patient reports the following previous treatments cortisone helps.  .  Patient reports the following modifying factors none.  Associated symptoms: none.  Patient has no other skin complaints today.  Remainder of the HPI, Meds, PMH, Allergies, FH, and SH was reviewed in chart.      Past Medical History:   Diagnosis Date     Abnormal cardiovascular stress test 2/3/2019    9/10/2018 Lexiscan: \"Myocardial perfusion imaging using single isotope technique demonstrated a small perfusion defect of mild severity involving the basal inferior wall which is mostly reversible and may be consistent with mild ischemia in the right coronary artery distribution. In addition, transient ischemic dilatation is noted with a TID ratio of 1.3. \"     Adhesive capsulitis of shoulder     left      Adjustment disorder with anxiety 3/18/2016     B12 deficiency anemia 6/20/2012     Chest pain 2004    Chronic right sided/axillary discomfort (musculoskeletal) Atypical substernal (possibly GERD). Negative cardiolite 2004.     Colitis, Clostridium difficile 4/18/2012     Diverticulitis 2009     Headache(784.0) 2001    Tension type. MRI 2001 normal.     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     Status post coronary angiogram 2/27/2019     Syncope and collapse 9/10/2018       Past Surgical History:   Procedure Laterality Date     ARTHROPLASTY KNEE  3/26/2012    Procedure:ARTHROPLASTY KNEE; Right Total Knee Arthroplasty; Surgeon:BERNADINE ROSAS; Location:[facility]     CHOLECYSTECTOMY       CV HEART CATHETERIZATION WITH POSSIBLE INTERVENTION N/A 2/27/2019    Procedure: Coronary Angiogram with " Left ventriculogram;  Surgeon: Leandro Carpoi MD;  Location:  [facility]     HERNIA REPAIR      Hernia Repair     HYSTERECTOMY, PAP NO LONGER INDICATED  1983    TVH/BSO     SURGICAL HISTORY OF -   10/08    A & P Repair, Dr. Camden INFANTEC APPENDECTOMY  1953        Family History   Problem Relation Age of Onset     C.A.D. Mother      Diabetes Mother      Cancer - colorectal Mother      Arthritis Mother      Heart Disease Mother      Lipids Brother      Obesity Brother      Hypertension Brother      Allergies Son      Eye Disorder Son         cataract     Thyroid Disease Sister         graves dz     Eye Disorder Son      Leukemia Son      Alcohol/Drug Father        Social History     Socioeconomic History     Marital status:      Spouse name: Not on file     Number of children: Not on file     Years of education: Not on file     Highest education level: Not on file   Occupational History     Not on file   Tobacco Use     Smoking status: Never     Smokeless tobacco: Never   Vaping Use     Vaping Use: Never used   Substance and Sexual Activity     Alcohol use: No     Drug use: No     Sexual activity: Not Currently     Partners: Male   Other Topics Concern     Parent/sibling w/ CABG, MI or angioplasty before 65F 55M? No   Social History Narrative    Lives in [place] independently. She has son who lives nearby in [place]. Her oldest son passed away in September 2018.      Social Determinants of Health     Financial Resource Strain: Not on file   Food Insecurity: Not on file   Transportation Needs: Not on file   Physical Activity: Not on file   Stress: Not on file   Social Connections: Not on file   Intimate Partner Violence: Not on file   Housing Stability: Not on file       Outpatient Encounter Medications as of 1/24/2023   Medication Sig Dispense Refill     acetaminophen (TYLENOL) 500 MG tablet Take 500-1,000 mg by mouth every 6 hours as needed for mild pain or pain (Patient not taking:  Reported on 12/28/2022)       amLODIPine (NORVASC) 5 MG tablet Take 1 tablet (5 mg) by mouth daily 90 tablet 3     bismuth subsalicylate (PEPTO BISMOL) 262 MG chewable tablet Take 1 tablet by mouth daily as needed       blood glucose (ACCU-CHEK GUIDE) test strip Use to test blood sugar one times daily or as directed. 100 each 1     blood glucose monitoring (ACCU-CHEK FASTCLIX) lancets Use to test blood sugar one times daily or as directed. 102 each 3     buPROPion (WELLBUTRIN XL) 150 MG 24 hr tablet Take 1 tablet (150 mg) by mouth every morning (Patient not taking: Reported on 12/28/2022) 90 tablet 3     capsaicin (ZOSTRIX) 0.025 % external cream Place on affected area, you may notice a transient burning sensation 60 g 0     Cyanocobalamin (B-12) 1000 MCG SUBL Place 1 tablet under the tongue every evening        famotidine (PEPCID) 20 MG tablet Take 1 tablet (20 mg) by mouth 2 times daily 90 tablet 3     lidocaine (LIDODERM) 5 % patch Place 1 patch onto the skin every 24 hours To prevent lidocaine toxicity, patient should be patch free for 12 hrs daily. 15 patch 0     loperamide (IMODIUM A-D) 2 MG tablet Take 1 tablet (2 mg) by mouth 2 times daily as needed for diarrhea (Patient not taking: Reported on 12/28/2022) 60 tablet 11     meloxicam (MOBIC) 7.5 MG tablet Take 1 tablet (7.5 mg) by mouth daily 90 tablet 3     Multiple Vitamins-Minerals (THERAPEUTIC MULTIVIT/MINERAL) TABS Take 1 tablet by mouth every evening        nitroFURantoin macrocrystal-monohydrate (MACROBID) 100 MG capsule Take 1 capsule (100 mg) by mouth 2 times daily (Patient not taking: Reported on 12/28/2022) 14 capsule 0     nitroGLYcerin (NITROSTAT) 0.4 MG sublingual tablet Place 1 tablet (0.4 mg) under the tongue every 5 minutes as needed for chest pain 25 tablet 4     order for DME 1 walker 1 Device 0     ORDER FOR DME Thigh length teds. 1 Device 2     PARoxetine (PAXIL) 10 MG tablet Take 2 tablets (20 mg) by mouth every morning 30 tablet 3      pregabalin (LYRICA) 50 MG capsule Take 1 capsule (50 mg) by mouth 2 times daily 60 capsule 3     STATIN NOT PRESCRIBED (INTENTIONAL) Please choose reason not prescribed, below       vitamin D2 (ERGOCALCIFEROL) 89696 units (1250 mcg) capsule Take 1 capsule (50,000 Units) by mouth once a week for 90 days 12 capsule 0     vitamin D3 (CHOLECALCIFEROL) 50 mcg (2000 units) tablet Take 1 tablet (50 mcg) by mouth daily 90 tablet 3     No facility-administered encounter medications on file as of 1/24/2023.             Review Of Systems  Skin: As above  Eyes: negative  Ears/Nose/Throat: negative  Respiratory: No shortness of breath, dyspnea on exertion, cough, or hemoptysis  Cardiovascular: negative  Gastrointestinal: negative  Genitourinary: negative  Musculoskeletal: negative  Neurologic: negative  Psychiatric: negative  Hematologic/Lymphatic/Immunologic: negative  Endocrine: negative      O:   NAD, WDWN, Alert & Oriented, Mood & Affect wnl, Vitals stable   Here today alone   General appearance arsh ii   Vitals stable   Alert, oriented and in no acute distress   Neck with red eczematous plaque  No visible rash on frontal scalp  Post neck faint erythema and scale      Eyes: Conjunctivae/lids:Normal     ENT: Lips, buccal mucosa, tongue: normal    MSK:Normal    Cardiovascular: peripheral edema none    Pulm: Breathing Normal    Neuro/Psych: Orientation:Normal; Mood/Affect:Normal      A/P:  1. Dermatitis  Lidex to trunk and next   Lidex for scalp  claritin in am  Zyrtec bedtime  Return to clinic 4 weeks  It was a pleasure speaking to Daniela Brown today.  Previous clinic  notes and pertinent laboratory tests were reviewed prior to Daniela Brown's visit.

## 2023-01-25 ENCOUNTER — TELEPHONE (OUTPATIENT)
Dept: DERMATOLOGY | Facility: CLINIC | Age: 88
End: 2023-01-25
Payer: MEDICARE

## 2023-01-25 DIAGNOSIS — L30.9 DERMATITIS: Primary | ICD-10-CM

## 2023-01-25 RX ORDER — MOMETASONE FUROATE 1 MG/G
CREAM TOPICAL DAILY
Qty: 50 G | Refills: 6 | Status: SHIPPED | OUTPATIENT
Start: 2023-01-25 | End: 2023-06-14

## 2023-01-25 NOTE — CONFIDENTIAL NOTE
Called patient - she is ok with using [place]  Low cost if that is an option?    Please advise    Thank you,    Annamarie BENOIT RN BSN  [facility]- [phone]

## 2023-01-27 NOTE — TELEPHONE ENCOUNTER
PA Initiation    Medication: fluocinonide (LIDEX) 0.05 % external cream - Initiated  Insurance Company: [employer] Part D - Phone [phone] Fax [phone]  Pharmacy Filling the Rx: [facility] - [place] - [address]  Filling Pharmacy Phone: [phone]  Filling Pharmacy Fax: [phone]  Start Date: 1/27/2023

## 2023-01-31 NOTE — TELEPHONE ENCOUNTER
Prior Authorization Approval    Authorization Effective Date: 1/1/2023  Authorization Expiration Date: 1/27/2024  Medication: fluocinonide (LIDEX) 0.05 % external cream - Approved  Approved Dose/Quantity: 60g/30ds  Reference #: [id number]   Insurance Company: [employer] Part D - Phone [phone] Fax [phone]  Which Pharmacy is filling the prescription (Not needed for infusion/clinic administered): [facility] - [place] - [address] [address] [address] [facility]  Pharmacy Notified: Yes  Patient Notified: Yes

## 2023-02-01 ENCOUNTER — TELEPHONE (OUTPATIENT)
Dept: DERMATOLOGY | Facility: CLINIC | Age: 88
End: 2023-02-01
Payer: MEDICARE

## 2023-02-01 NOTE — TELEPHONE ENCOUNTER
"I called [facility] and asked how much Mometasone was and was told patient did pick it up and it was $40.    PA was approved for the Fluocinonide cream, but call from 1-25-23 stated that was too expensive for patient as well.     Spoke to patient and the \"one was $25 and the 2nd one was around $55..\"     \"I was asked if I wanted to use the low cost pharmacy and I said I did, so they sent me a cream..\"    I did explain she does not need to use both Mometasone and Lidex solutions- just one or the other. She does now have both, so advised Dr. Posada originally ordered the Lidex, but it was not covered, so he ordered Mometasone as a substitute, then the insurance approved a PA for the Lidex and Pt picked both up.     I did explain she is to use the Lidex cream on her chest and her neck as well. Patient verbalized understanding. Ene Cantrell RN              "

## 2023-02-01 NOTE — TELEPHONE ENCOUNTER
Reason for Call:  Other call back    Detailed comments: Patient calling stating all three medications prescribed that she has picked up are to expensive and that her rash is still present. Patient is concerned that she is not going to be able to afford more medication at this rate as she relies on social security. Please advise.     Phone Number Patient can be reached at: Home number on file [phone] (home)    Best Time: any    Can we leave a detailed message on this number? YES    Call taken on 2/1/2023 at 1:51 PM by Martínez Lim

## 2023-03-16 NOTE — NURSING NOTE
"Initial BP (!) 150/90 (BP Location: Left arm, Patient Position: Sitting, Cuff Size: Adult Large)   Pulse 117   Temp 98.8  F (37.1  C) (Tympanic)   Resp 14   Ht 1.626 m (5' 4\")   Wt 75.4 kg (166 lb 3.2 oz)   SpO2 98%   BMI 28.53 kg/m   Estimated body mass index is 28.53 kg/m  as calculated from the following:    Height as of this encounter: 1.626 m (5' 4\").    Weight as of this encounter: 75.4 kg (166 lb 3.2 oz). .      "
supervision/stand-by assist

## 2023-04-04 ENCOUNTER — ANCILLARY PROCEDURE (OUTPATIENT)
Dept: GENERAL RADIOLOGY | Facility: CLINIC | Age: 88
End: 2023-04-04
Attending: FAMILY MEDICINE
Payer: MEDICARE

## 2023-04-04 ENCOUNTER — OFFICE VISIT (OUTPATIENT)
Dept: ORTHOPEDICS | Facility: CLINIC | Age: 88
End: 2023-04-04
Payer: MEDICARE

## 2023-04-04 VITALS
BODY MASS INDEX: 28.7 KG/M2 | HEIGHT: 63 IN | WEIGHT: 162 LBS | SYSTOLIC BLOOD PRESSURE: 168 MMHG | DIASTOLIC BLOOD PRESSURE: 69 MMHG

## 2023-04-04 DIAGNOSIS — M25.512 PAIN IN JOINT OF LEFT SHOULDER: Primary | ICD-10-CM

## 2023-04-04 DIAGNOSIS — M25.512 PAIN IN JOINT OF LEFT SHOULDER: ICD-10-CM

## 2023-04-04 DIAGNOSIS — M25.462 EFFUSION OF LEFT KNEE: ICD-10-CM

## 2023-04-04 DIAGNOSIS — M19.012 OSTEOARTHRITIS OF GLENOHUMERAL JOINT, LEFT: ICD-10-CM

## 2023-04-04 DIAGNOSIS — M17.12 PRIMARY OSTEOARTHRITIS OF LEFT KNEE: ICD-10-CM

## 2023-04-04 PROCEDURE — 20611 DRAIN/INJ JOINT/BURSA W/US: CPT | Mod: LT | Performed by: FAMILY MEDICINE

## 2023-04-04 PROCEDURE — 73030 X-RAY EXAM OF SHOULDER: CPT | Mod: TC | Performed by: STUDENT IN AN ORGANIZED HEALTH CARE EDUCATION/TRAINING PROGRAM

## 2023-04-04 PROCEDURE — 99214 OFFICE O/P EST MOD 30 MIN: CPT | Mod: 25 | Performed by: FAMILY MEDICINE

## 2023-04-04 PROCEDURE — 20611 DRAIN/INJ JOINT/BURSA W/US: CPT | Mod: 51 | Performed by: FAMILY MEDICINE

## 2023-04-04 RX ORDER — ROPIVACAINE HYDROCHLORIDE 5 MG/ML
3 INJECTION, SOLUTION EPIDURAL; INFILTRATION; PERINEURAL
Status: DISCONTINUED | OUTPATIENT
Start: 2023-04-04 | End: 2023-08-18

## 2023-04-04 RX ORDER — TRIAMCINOLONE ACETONIDE 40 MG/ML
40 INJECTION, SUSPENSION INTRA-ARTICULAR; INTRAMUSCULAR
Status: DISCONTINUED | OUTPATIENT
Start: 2023-04-04 | End: 2023-08-18

## 2023-04-04 RX ORDER — ROPIVACAINE HYDROCHLORIDE 5 MG/ML
3 INJECTION, SOLUTION EPIDURAL; INFILTRATION; PERINEURAL
Status: DISCONTINUED | OUTPATIENT
Start: 2023-04-04 | End: 2023-12-14

## 2023-04-04 RX ADMIN — ROPIVACAINE HYDROCHLORIDE 3 ML: 5 INJECTION, SOLUTION EPIDURAL; INFILTRATION; PERINEURAL at 15:00

## 2023-04-04 RX ADMIN — TRIAMCINOLONE ACETONIDE 40 MG: 40 INJECTION, SUSPENSION INTRA-ARTICULAR; INTRAMUSCULAR at 15:00

## 2023-04-04 NOTE — TELEPHONE ENCOUNTER
Patient is doing well after parathyroid exploration.  The patient is eating, pain is controlled.    The labs were checked and the calciums are normal.  The patient's wound looks dry and intact.    In summary the patient has a normal parathyroid exploration course and can be discharged today.  The patient will follow-up with Dr. Kahlil Huang in 1 week.   Pre-screening Questions for Radiology Injections:    Injection to be done at which interventional clinic site? [facility], [place]    Instruct patient to arrive as directed prior to the scheduled appointment time:    [place]: 30 minutes before      Procedure ordered by Tan    Procedure ordered? Lumbar Epidural Steroid Injection    What insurance would patient like us to bill for this procedure? Medicare/[employer]      Worker's comp or MVA (motor vehicle accident) -Any injection DO NOT SCHEDULE and route to Marcella Babar.      [employer] - For SI joint injections, DO NOT SCHEDULE and route Marcella Babar. [employer] FREEDOM NO PA REQUIRED EFFECTIVE 11/1/2017      [employer]- MBB's must be scheduled at LEAST two weeks apart      [employer] - Any injection besides hip/shoulder/knee joint DO NOT SCHEDULE and route to Marcella Babar. She will obtain PA and call pt back to schedule procedure or notify pt of denial.       [employer]-Route to Marcella for review    Any chance of pregnancy? NO   If YES, do NOT schedule and route to RN pool    Is an  needed? No     Patient has a drive home? (mandatory) YES: ok    Is patient taking any blood thinners (plavix, coumadin, jantoven, warfarin, heparin, pradaxa or dabigatran )? No   If hold needed, do NOT schedule, route to RN pool     Is patient taking any aspirin products (includes Excedrin and Fiorinal)? No     If more than 325mg/day do NOT schedule; route to RN pool     For CERVICAL procedures, hold all aspirin products for 6 days.     Tell pt that if aspirin product is not held for 6 days, the procedure WILL BE cancelled.      Does the patient have a bleeding or clotting disorder? No     If YES, okay to schedule AND route to RN nurse pool    For any patients with platelet count <100, must be forwarded to provider    Is patient diabetic?  Yes  If YES, have them bring their glucometer.    Does patient have an active infection or treated for one  within the past week? No     Is patient currently taking any antibiotics?  No     For patients on chronic, preventative, or prophylactic antibiotics, procedures may be scheduled.     For patients on antibiotics for active or recent infection:    Fany Nath Burton, [place]-antibiotic course must have been completed for 4 days    Is patient currently taking any steroid medications? (i.e. Prednisone, Medrol)  No     For patients on steroid medications:    Fany Nath Burton, [place]-steroid course must have been completed for 4 days    Reviewed with patient:  If you are started on any steroids or antibiotics between now and your appointment, you must contact us because the procedure may need to be cancelled.  Yes    Is patient actively being treated for cancer or immunocompromised? No  If YES, do NOT schedule and route to RN pool     Are you able to get on and off an exam table with minimal or no assistance? Yes  If NO, do NOT schedule and route to RN pool    Are you able to roll over and lay on your stomach with minimal or no assistance? Yes  If NO, do NOT schedule and route to RN pool     Any allergies to contrast dye, iodine, shellfish, or numbing and steroid medications? YEs  If YES, route to RN pool AND add allergy information to appointment notes    Allergies: Metoprolol; Cephalexin; Erythromycin; Actonel [bisphosphonates]; Azithromycin; Celebrex [celecoxib]; Cipro [ciprofloxacin]; Erythromycin; Escitalopram; Fosamax; Keflex [cephalexin monohydrate]; Lisinopril; Nitrofurantoin; No clinical screening - see comments; Risedronate; Shellfish-derived products; Tetanus-diphtheria toxoids; Vicodin [acetaminophen]; Vioxx; Alendronic acid; Celecoxib; Penicillins; Rofecoxib; Sulfa drugs; and Sulfasalazine      Has the patient had a flu shot or any other vaccinations within 7 days before or after the procedure.  No     Does patient have an MRI/CT?  YES: mri  (SI joint, hip injections, lumbar  sympathetic blocks, and stellate ganglion blocks do not require an MRI)    Was the MRI done w/in the last 3 years?  Yes    Was MRI done at [place]? No      If not, where was it done? [facility]       If MRI was not done at [place], [facility] or [facility] do NOT schedule and route to nursing.  If pt has an imaging disc, the injection may be scheduled but pt has to bring disc to appt. If they show up w/out disc the injection cannot be done    Reminders (please tell patient if applicable):       Instructed pt to arrive 30 minutes early for IV start if this is for a cervical procedure, ALL sympathetic (stellate ganglion, hypogastric, or lumbar sympathetic block) and all sedation procedures (RFA, spinal cord stimulation trials).  Not Applicable   -IVs are not routinely placed for Dr. Tejeda cervical cases   -Dr. Mendiola: IVs for cervical ESIs and cervical TBDs (not CMBBs/facet inj)      If NPO for sedation, informed patient that it is okay to take medications with sips of water (except if they are to hold blood thinners).  Not Applicable   *DO take blood pressure medication if it is prescribed*      If this is for a cervical LASHAE, informed patient that aspirin needs to be held for 6 days.   NO      For all patients not having spinal cord stimulator (SCS) trials or radiofrequency ablations (RFAs), informed patient:    IV sedation is not provided for this procedure.  If you feel that an oral anti-anxiety medication is needed, you can discuss this further with your referring provider or primary care provider.  The Pain Clinic provider will discuss specifics of what the procedure includes at your appointment.  Most procedures last 10-20 minutes.  We use numbing medications to help with any discomfort during the procedure.  Not Applicable      Do not schedule procedures requiring IV placement in the first appointment of the day or first appointment after lunch. Do NOT schedule at 0745, 0815 or 1245.       For patients  85 or older we recommend having an adult stay w/ them for the remainder of the day.       Does the patient have any questions?  NO  Khushboo Shaw  [facility]

## 2023-04-04 NOTE — LETTER
2023         RE: Daniela Brown  [address] [facility] [id number]        Dear Colleague,    Thank you for referring your patient, Daniela Brown, to the [facility] [place]. Please see a copy of my visit note below.    Daniela Brown  :  1933  DOS: 23  MRN: [id number]    Sports Medicine Clinic Visit    PCP: Cynthia Maddox    Daniela Brown is a 89 year old Right hand dominant female who is seen in follow-up presenting with left knee pain and a new concern of right shoulder pain.    Injury: About a month ago, started to notice right shoulder pain and lack of ROM.  Pain located over lateral right shoulder pain with radiation into her right wrist. Additional Features:  Positive: lack of ROM and painful.  Symptoms are better with nothing.  Symptoms are worse with: gripping and lifting.  Other evaluation and/or treatments so far consists of: voltaren with little relief.  Recent imaging completed: No recent imaging completed.  Prior History of related problems: no    She also notes her left knee is painful once again. She is currently taking medication for her arthritis which has been helpful for her knees. Pain with walking. Corticosteroid injection of left knee performed on 22 allowed for 2 weeks of relief.     Social History: retired     Interim History - 2023  Since last visit on 22 patient has worsening moderate left knee pain with intermittent swelling over the past 2 - 3 months.  Left knee steroid injection completed on 22 provided relief for ~ 4 months.  Patient notes increased pain with going from a sit to stand position.  No new injury in the interim.    Second complaint left anterior lateral shoulder, glenohumeral joint pain over the past ~ 3 months.  Pain is worse with shoulder flexion/abduction, reaching, and lying on left shoulder.  She would like to discuss a steroid injection  "today.    Review of Systems  Musculoskeletal: as above  Remainder of review of systems is negative including constitutional, CV, pulmonary, GI, Skin and Neurologic except as noted in HPI or medical history.    Past Medical History:   Diagnosis Date     Abnormal cardiovascular stress test 2/3/2019    9/10/2018 Lexiscan: \"Myocardial perfusion imaging using single isotope technique demonstrated a small perfusion defect of mild severity involving the basal inferior wall which is mostly reversible and may be consistent with mild ischemia in the right coronary artery distribution. In addition, transient ischemic dilatation is noted with a TID ratio of 1.3. \"     Adhesive capsulitis of shoulder     left      Adjustment disorder with anxiety 3/18/2016     B12 deficiency anemia 6/20/2012     Chest pain 2004    Chronic right sided/axillary discomfort (musculoskeletal) Atypical substernal (possibly GERD). Negative cardiolite 2004.     Colitis, Clostridium difficile 4/18/2012     Diverticulitis 2009     Headache(784.0) 2001    Tension type. MRI 2001 normal.     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     Status post coronary angiogram 2/27/2019     Syncope and collapse 9/10/2018     Past Surgical History:   Procedure Laterality Date     ARTHROPLASTY KNEE  3/26/2012    Procedure:ARTHROPLASTY KNEE; Right Total Knee Arthroplasty; Surgeon:BERNADINE ROSAS; Location:[place] OR     CHOLECYSTECTOMY       CV HEART CATHETERIZATION WITH POSSIBLE INTERVENTION N/A 2/27/2019    Procedure: Coronary Angiogram with Left ventriculogram;  Surgeon: Leandro Carpio MD;  Location:  HEART CARDIAC CATH LAB     HERNIA REPAIR      Hernia Repair     HYSTERECTOMY, PAP NO LONGER INDICATED  1983    TVH/BSO     SURGICAL HISTORY OF -   10/08    A & P Repair, Dr. Hannah     [facility] APPENDECTOMY  1953     Family " "History   Problem Relation Age of Onset     C.A.D. Mother      Diabetes Mother      Cancer - colorectal Mother      Arthritis Mother      Heart Disease Mother      Lipids Brother      Obesity Brother      Hypertension Brother      Allergies Son      Eye Disorder Son         cataract     Thyroid Disease Sister         graves dz     Eye Disorder Son      Leukemia Son      Alcohol/Drug Father        Objective  BP (!) 168/69   Ht 1.6 m (5' 3\")   Wt 73.5 kg (162 lb)   LMP  (LMP Unknown)   BMI 28.70 kg/m        General: healthy, alert and in no distress      HEENT: no scleral icterus or conjunctival erythema     Skin: no suspicious lesions or rash. No jaundice.     CV: regular rhythm by palpation, 2+ distal pulses, no pedal edema      Resp: normal respiratory effort without conversational dyspnea     Psych: normal mood and affect      Gait: nonantalgic, appropriate coordination and balance     Neuro: normal light touch sensory exam of the extremities. Motor strength as noted below       Left Shoulder exam    ROM:        forward flexion 100        abduction 100       internal rotation limited       external rotation 20    Tender:        subacromial space mild       posterior shoulder       Anterior GH joint focal    Non Tender:       remainder of shoulder       sternoclavicular joint       acromioclavicular joint       periscapular region    Strength:        abduction 4/5       internal rotation 4/5       external rotation 4/5       adduction 4/5    Impingement testing:        neg (-) Neer       Mildly painful Pritchard       Mildly painful empty can       positive (+) crossover       positive (+) crank    Stability testing:       neg (-) anterior glide       neg (-) sulcus sign    Skin:       no visible deformities       well perfused       capillary refill brisk    Sensation:        normal sensation over shoulder and upper extremity     Left Knee exam     ROM:        Flexion lacks 20 degrees       Extension lacks 5 " degrees     Inspection:       no visible ecchymosis        effusion noted small/moderate by palpation     Skin:       no visible deformities       well perfused       capillary refill brisk     Patellar Motion:        Normal patellar tracking noted through range of motion       Crepitus noted in the patellofemoral joint     Tender:        medial joint line       lateral joint line     Non Tender:         remainder of knee area     Special Tests:        neg (-) varus at 0 deg and 30 deg       neg (-) valgus at 0 deg and 30 deg    Radiology  Recent Results (from the past 744 hour(s))   XR Shoulder Right G/E 3 Views    Narrative    SHOULDER RIGHT THREE OR MORE VIEWS   8/2/2022 1:38 PM     HISTORY: Acute pain of right shoulder.    COMPARISON: None.      Impression    IMPRESSION: Small amount of calcification just distal to the lateral  tip of the acromion process. This could be within the  supraspinatus/infraspinatus tendon or overlying subacromial/subdeltoid  bursa, so clinical correlation for possible bursitis needed. Mild  osteoarthrosis of the AC joint. Diffuse bone demineralization.  Otherwise negative.    NADINE HARO MD         SYSTEM ID:  [id number]     KNEE STANDING AP BILATERAL SUNRISE BILATERAL LATERAL LEFT  4/21/2022  1:32 PM      HISTORY: Left knee pain. Fall at home.     COMPARISON: 2/16/2022 x-ray.         Impression     IMPRESSION: Right total knee arthroplasty in place. No evidence of  complication. Advanced medial and mild to moderate lateral compartment  joint space narrowing. Mild-to-moderate patellofemoral degenerative  changes with very small joint effusion. No acute fracture. No  significant change.     BIJU GALICIA MD      Recent Results (from the past 744 hour(s))   XR Shoulder Left G/E 3 Views    Narrative    XR SHOULDER LEFT G/E 3 VIEWS 4/4/2023 2:29 PM     HISTORY: Pain in joint of left shoulder    COMPARISON: None.       Impression    IMPRESSION:    Severe glenohumeral and moderate  acromioclavicular joint  osteoarthrosis. Small calcification at the superior aspect of the  greater tuberosity, which could reflect calcific tendinopathy.  Calcifications projecting over the proximal humerus, which are  nonspecific however may be within the biceps tendon sheath.  Osteopenia. No acute fracture or dislocation.    NYA REICH MD         SYSTEM ID:  [id number]         Large Joint Injection/Arthocentesis: L knee joint    Date/Time: 4/4/2023 3:00 PM    Performed by: Tho Newman DO  Authorized by: Tho Newman DO    Indications:  Pain and osteoarthritis  Needle Size:  21 G  Guidance: ultrasound    Approach:  Superolateral  Location:  Knee      Medications:  3 mL ropivacaine 5 MG/ML; 40 mg triamcinolone 40 MG/ML  Aspirate amount (mL):  30  Outcome:  Tolerated well, no immediate complications  Procedure discussed: discussed risks, benefits, and alternatives    Consent Given by:  Patient  Timeout: timeout called immediately prior to procedure    Prep: patient was prepped and draped in usual sterile fashion     Ultrasound images of procedure were permanently stored.     Large Joint Injection/Arthocentesis: L glenohumeral joint    Date/Time: 4/4/2023 3:00 PM    Performed by: Tho Newman DO  Authorized by: Tho Newman DO    Indications:  Pain and osteoarthritis  Needle Size:  21 G  Guidance: ultrasound    Approach:  Posterolateral  Location:  Shoulder      Site:  L glenohumeral joint  Medications:  3 mL ropivacaine 5 MG/ML; 40 mg triamcinolone 40 MG/ML  Outcome:  Tolerated well, no immediate complications  Procedure discussed: discussed risks, benefits, and alternatives    Consent Given by:  Patient  Timeout: timeout called immediately prior to procedure    Prep: patient was prepped and draped in usual sterile fashion     Ultrasound images of procedure were permanently stored.         Assessment:  1. Pain in joint of left shoulder    2. Osteoarthritis of  glenohumeral joint, left    3. Primary osteoarthritis of left knee    4. Effusion of left knee        Plan:  Discussed the assessment with the patient.  Follow up: As needed based on short-term clinical progress  Recurrence of left knee pain, combination of viscosupplementation and corticosteroid helped for 2 to 4 weeks, now swelling is returned  Repeat ultrasound-guided corticosteroid injection to the left knee today, with aspiration  Ultrasound-guided aspiration of the left glenohumeral joint as well for flare of underlying severe osteoarthritis in the glenohumeral joint  XR images independently visualized and reviewed with patient today in clinic  Can consider alternative injection locations in the future based on progress  Physical therapy options and low impact activity strategies reviewed  Expectations and goals of CSI reviewed  Often 2-3 days for steroid effect, and can take up to two weeks for maximum effect  We discussed modified progressive pain-free activity as tolerated  Do not overuse in first two weeks if feeling better due to concern for vulnerability while steroid is working  Supportive care reviewed  All questions were answered today  Contact us with additional questions or concerns  Signs and sx of concern reviewed      Tho Newman DO, CASAMY  Sports Medicine Physician  [facility]      Time spent in chart review, one-on-one evaluation, discussion with patient regarding: nature of problem, clinical course, prior treatments, therapeutic options, shared-decision making, potential procedures and referrals, and charting related to the visit: 32 minutes.  If applicable, time does not include time spent performing any procedure.        Disclaimer: This note consists of symbols derived from keyboarding, dictation and/or voice recognition software. As a result, there may be errors in the script that have gone undetected. Please consider this when interpreting information  found in this chart.      Again, thank you for allowing me to participate in the care of your patient.        Sincerely,        Tho Newman, DO

## 2023-04-04 NOTE — PROGRESS NOTES
Daniela Brown  :  1933  DOS: 23  MRN: [id number]    Sports Medicine Clinic Visit    PCP: Cynthia Maddox    Daniela Borwn is a 89 year old Right hand dominant female who is seen in follow-up presenting with left knee pain and a new concern of right shoulder pain.    Injury: About a month ago, started to notice right shoulder pain and lack of ROM.  Pain located over lateral right shoulder pain with radiation into her right wrist. Additional Features:  Positive: lack of ROM and painful.  Symptoms are better with nothing.  Symptoms are worse with: gripping and lifting.  Other evaluation and/or treatments so far consists of: voltaren with little relief.  Recent imaging completed: No recent imaging completed.  Prior History of related problems: no    She also notes her left knee is painful once again. She is currently taking medication for her arthritis which has been helpful for her knees. Pain with walking. Corticosteroid injection of left knee performed on 22 allowed for 2 weeks of relief.     Social History: retired     Interim History - 2023  Since last visit on 22 patient has worsening moderate left knee pain with intermittent swelling over the past 2 - 3 months.  Left knee steroid injection completed on 22 provided relief for ~ 4 months.  Patient notes increased pain with going from a sit to stand position.  No new injury in the interim.    Second complaint left anterior lateral shoulder, glenohumeral joint pain over the past ~ 3 months.  Pain is worse with shoulder flexion/abduction, reaching, and lying on left shoulder.  She would like to discuss a steroid injection today.    Review of Systems  Musculoskeletal: as above  Remainder of review of systems is negative including constitutional, CV, pulmonary, GI, Skin and Neurologic except as noted in HPI or medical history.    Past Medical History:   Diagnosis Date     Abnormal cardiovascular stress test  "2/3/2019    9/10/2018 Lexiscan: \"Myocardial perfusion imaging using single isotope technique demonstrated a small perfusion defect of mild severity involving the basal inferior wall which is mostly reversible and may be consistent with mild ischemia in the right coronary artery distribution. In addition, transient ischemic dilatation is noted with a TID ratio of 1.3. \"     Adhesive capsulitis of shoulder     left      Adjustment disorder with anxiety 3/18/2016     B12 deficiency anemia 6/20/2012     Chest pain 2004    Chronic right sided/axillary discomfort (musculoskeletal) Atypical substernal (possibly GERD). Negative cardiolite 2004.     Colitis, Clostridium difficile 4/18/2012     Diverticulitis 2009     Headache(784.0) 2001    Tension type. MRI 2001 normal.     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     Status post coronary angiogram 2/27/2019     Syncope and collapse 9/10/2018     Past Surgical History:   Procedure Laterality Date     ARTHROPLASTY KNEE  3/26/2012    Procedure:ARTHROPLASTY KNEE; Right Total Knee Arthroplasty; Surgeon:BERNADINE ROSAS; Location:[place] OR     CHOLECYSTECTOMY       CV HEART CATHETERIZATION WITH POSSIBLE INTERVENTION N/A 2/27/2019    Procedure: Coronary Angiogram with Left ventriculogram;  Surgeon: Leandro Carpio MD;  Location:  HEART CARDIAC CATH LAB     HERNIA REPAIR      Hernia Repair     HYSTERECTOMY, PAP NO LONGER INDICATED  1983    TVH/BSO     SURGICAL HISTORY OF -   10/08    A & P Repair, Dr. Hannah     [facility] APPENDECTOMY  1953     Family History   Problem Relation Age of Onset     C.A.D. Mother      Diabetes Mother      Cancer - colorectal Mother      Arthritis Mother      Heart Disease Mother      Lipids Brother      Obesity Brother      Hypertension Brother      Allergies Son      Eye Disorder Son         cataract     " "Thyroid Disease Sister         graves dz     Eye Disorder Son      Leukemia Son      Alcohol/Drug Father        Objective  BP (!) 168/69   Ht 1.6 m (5' 3\")   Wt 73.5 kg (162 lb)   LMP  (LMP Unknown)   BMI 28.70 kg/m        General: healthy, alert and in no distress      HEENT: no scleral icterus or conjunctival erythema     Skin: no suspicious lesions or rash. No jaundice.     CV: regular rhythm by palpation, 2+ distal pulses, no pedal edema      Resp: normal respiratory effort without conversational dyspnea     Psych: normal mood and affect      Gait: nonantalgic, appropriate coordination and balance     Neuro: normal light touch sensory exam of the extremities. Motor strength as noted below       Left Shoulder exam    ROM:        forward flexion 100        abduction 100       internal rotation limited       external rotation 20    Tender:        subacromial space mild       posterior shoulder       Anterior GH joint focal    Non Tender:       remainder of shoulder       sternoclavicular joint       acromioclavicular joint       periscapular region    Strength:        abduction 4/5       internal rotation 4/5       external rotation 4/5       adduction 4/5    Impingement testing:        neg (-) Neer       Mildly painful Pritchard       Mildly painful empty can       positive (+) crossover       positive (+) crank    Stability testing:       neg (-) anterior glide       neg (-) sulcus sign    Skin:       no visible deformities       well perfused       capillary refill brisk    Sensation:        normal sensation over shoulder and upper extremity     Left Knee exam     ROM:        Flexion lacks 20 degrees       Extension lacks 5 degrees     Inspection:       no visible ecchymosis        effusion noted small/moderate by palpation     Skin:       no visible deformities       well perfused       capillary refill brisk     Patellar Motion:        Normal patellar tracking noted through range of motion       Crepitus " noted in the patellofemoral joint     Tender:        medial joint line       lateral joint line     Non Tender:         remainder of knee area     Special Tests:        neg (-) varus at 0 deg and 30 deg       neg (-) valgus at 0 deg and 30 deg    Radiology  Recent Results (from the past 744 hour(s))   XR Shoulder Right G/E 3 Views    Narrative    SHOULDER RIGHT THREE OR MORE VIEWS   8/2/2022 1:38 PM     HISTORY: Acute pain of right shoulder.    COMPARISON: None.      Impression    IMPRESSION: Small amount of calcification just distal to the lateral  tip of the acromion process. This could be within the  supraspinatus/infraspinatus tendon or overlying subacromial/subdeltoid  bursa, so clinical correlation for possible bursitis needed. Mild  osteoarthrosis of the AC joint. Diffuse bone demineralization.  Otherwise negative.    NADINE HARO MD         SYSTEM ID:  [id number]     KNEE STANDING AP BILATERAL SUNRISE BILATERAL LATERAL LEFT  4/21/2022  1:32 PM      HISTORY: Left knee pain. Fall at home.     COMPARISON: 2/16/2022 x-ray.         Impression     IMPRESSION: Right total knee arthroplasty in place. No evidence of  complication. Advanced medial and mild to moderate lateral compartment  joint space narrowing. Mild-to-moderate patellofemoral degenerative  changes with very small joint effusion. No acute fracture. No  significant change.     BIJU GALICIA MD      Recent Results (from the past 744 hour(s))   XR Shoulder Left G/E 3 Views    Narrative    XR SHOULDER LEFT G/E 3 VIEWS 4/4/2023 2:29 PM     HISTORY: Pain in joint of left shoulder    COMPARISON: None.       Impression    IMPRESSION:    Severe glenohumeral and moderate acromioclavicular joint  osteoarthrosis. Small calcification at the superior aspect of the  greater tuberosity, which could reflect calcific tendinopathy.  Calcifications projecting over the proximal humerus, which are  nonspecific however may be within the biceps tendon  sheath.  Osteopenia. No acute fracture or dislocation.    NYA REICH MD         SYSTEM ID:  [id number]         Large Joint Injection/Arthocentesis: L knee joint    Date/Time: 4/4/2023 3:00 PM    Performed by: Tho Newman DO  Authorized by: Tho Newman DO    Indications:  Pain and osteoarthritis  Needle Size:  21 G  Guidance: ultrasound    Approach:  Superolateral  Location:  Knee      Medications:  3 mL ropivacaine 5 MG/ML; 40 mg triamcinolone 40 MG/ML  Aspirate amount (mL):  30  Outcome:  Tolerated well, no immediate complications  Procedure discussed: discussed risks, benefits, and alternatives    Consent Given by:  Patient  Timeout: timeout called immediately prior to procedure    Prep: patient was prepped and draped in usual sterile fashion     Ultrasound images of procedure were permanently stored.     Large Joint Injection/Arthocentesis: L glenohumeral joint    Date/Time: 4/4/2023 3:00 PM    Performed by: Tho Newman DO  Authorized by: Tho Newman DO    Indications:  Pain and osteoarthritis  Needle Size:  21 G  Guidance: ultrasound    Approach:  Posterolateral  Location:  Shoulder      Site:  L glenohumeral joint  Medications:  3 mL ropivacaine 5 MG/ML; 40 mg triamcinolone 40 MG/ML  Outcome:  Tolerated well, no immediate complications  Procedure discussed: discussed risks, benefits, and alternatives    Consent Given by:  Patient  Timeout: timeout called immediately prior to procedure    Prep: patient was prepped and draped in usual sterile fashion     Ultrasound images of procedure were permanently stored.         Assessment:  1. Pain in joint of left shoulder    2. Osteoarthritis of glenohumeral joint, left    3. Primary osteoarthritis of left knee    4. Effusion of left knee        Plan:  Discussed the assessment with the patient.  Follow up: As needed based on short-term clinical progress  Recurrence of left knee pain, combination of  viscosupplementation and corticosteroid helped for 2 to 4 weeks, now swelling is returned  Repeat ultrasound-guided corticosteroid injection to the left knee today, with aspiration  Ultrasound-guided aspiration of the left glenohumeral joint as well for flare of underlying severe osteoarthritis in the glenohumeral joint  XR images independently visualized and reviewed with patient today in clinic  Can consider alternative injection locations in the future based on progress  Physical therapy options and low impact activity strategies reviewed  Expectations and goals of CSI reviewed  Often 2-3 days for steroid effect, and can take up to two weeks for maximum effect  We discussed modified progressive pain-free activity as tolerated  Do not overuse in first two weeks if feeling better due to concern for vulnerability while steroid is working  Supportive care reviewed  All questions were answered today  Contact us with additional questions or concerns  Signs and sx of concern reviewed      Tho Newman DO, OZZIE  Sports Medicine Physician  [facility]      Time spent in chart review, one-on-one evaluation, discussion with patient regarding: nature of problem, clinical course, prior treatments, therapeutic options, shared-decision making, potential procedures and referrals, and charting related to the visit: 32 minutes.  If applicable, time does not include time spent performing any procedure.        Disclaimer: This note consists of symbols derived from keyboarding, dictation and/or voice recognition software. As a result, there may be errors in the script that have gone undetected. Please consider this when interpreting information found in this chart.

## 2023-04-09 ENCOUNTER — HOSPITAL ENCOUNTER (EMERGENCY)
Facility: CLINIC | Age: 88
Discharge: HOME | End: 2023-04-09
Attending: FAMILY MEDICINE | Admitting: FAMILY MEDICINE
Payer: MEDICARE

## 2023-04-09 VITALS
HEART RATE: 71 BPM | SYSTOLIC BLOOD PRESSURE: 179 MMHG | HEIGHT: 64 IN | DIASTOLIC BLOOD PRESSURE: 70 MMHG | RESPIRATION RATE: 18 BRPM | BODY MASS INDEX: 27.66 KG/M2 | OXYGEN SATURATION: 96 % | WEIGHT: 162 LBS | TEMPERATURE: 97.7 F

## 2023-04-09 DIAGNOSIS — M19.012 PRIMARY OSTEOARTHRITIS OF LEFT SHOULDER: ICD-10-CM

## 2023-04-09 PROCEDURE — 99283 EMERGENCY DEPT VISIT LOW MDM: CPT | Performed by: FAMILY MEDICINE

## 2023-04-09 PROCEDURE — 99284 EMERGENCY DEPT VISIT MOD MDM: CPT | Performed by: FAMILY MEDICINE

## 2023-04-09 PROCEDURE — 250N000013 HC RX MED GY IP 250 OP 250 PS 637: Performed by: FAMILY MEDICINE

## 2023-04-09 RX ORDER — LIDOCAINE 4 G/G
1 PATCH TOPICAL
Status: DISCONTINUED | OUTPATIENT
Start: 2023-04-09 | End: 2023-04-10 | Stop reason: HOSPADM

## 2023-04-09 RX ORDER — OXYCODONE HYDROCHLORIDE 5 MG/1
5 TABLET ORAL EVERY 6 HOURS PRN
Qty: 6 TABLET | Refills: 0 | Status: SHIPPED | OUTPATIENT
Start: 2023-04-09 | End: 2023-06-07

## 2023-04-09 RX ADMIN — LIDOCAINE 1 PATCH: 560 PATCH PERCUTANEOUS; TOPICAL; TRANSDERMAL at 12:10

## 2023-04-09 ASSESSMENT — ACTIVITIES OF DAILY LIVING (ADL)
ADLS_ACUITY_SCORE: 39

## 2023-04-09 NOTE — DISCHARGE INSTRUCTIONS
ICD-10-CM    1. Primary osteoarthritis of left shoulder  M19.012 Primary Care Referral    Suspect this is a post-injection steroid flare as the pain increased within 2 days of the injection and there are no signs of infection.  please take acetaminophen 650 mg orally every 6 hours.  avoid ibuprofen due to ulcer risk - if you do use it, limit to 400 mg twice daily.  use a lidocaine 4% patch OTC to the area on for 12 of every 24 hours.  it is critical to maintain shoulder range of motion with gentle pendulum and wall walking exercises.  call Dr. Newman and let him know about the increased pain and ED visit.  - follow-up with him this week or with your primary provider.  may use oxycodone for breakthrough pain.  ice to the area on for 20 min/hour.

## 2023-04-09 NOTE — ED TRIAGE NOTES
Pt reports pain to left shoulder for the past month, pt reports she had a steroid injection sometime last week. Pt reports she cant take the pain anymore. Pt denies chest pains at this time

## 2023-04-09 NOTE — ED PROVIDER NOTES
History     Chief Complaint   Patient presents with     Shoulder Pain     Pt reports pain to left shoulder for the past month, pt reports she had a steroid injection sometime last week. Pt reports she cant take the pain anymore. Pt denies chest pains at this time      HPI  Daniela Brown is a 89 year old female who arrives with pain in the left shoulder for the last month.  Status post corticosteroid injection last week.  Pain has been incapacitating no associated chest pain as of breath, fever or other associated symptoms.  He has painful range of motion of the shoulder in all directions.  No overlying erythema.  Pain started within 48 hours of the injection.  It is persisted to today which is about 4 to 5 days out.        I reviewed the note from Dr. Newman seen on April 4 and a referral from Dr. Maddox.  She had started with right shoulder pain and decreased range of motion of the shoulder.  It is been painful on motion.  Nothing seems to make this better but gripping and lifting made it worse.  Has been trying topical Voltaren without effect.   she underwent a shoulder x-ray August 2022 showing a small calcification distal to the lateral tip of the acromion.  Could also be in the supraspinatus and for spinatus tendon or the subacromial subdeltoid bursa.  X-ray of the shoulder was repeated   On the left side at the April 4 visits and still had the small calcific finding.  April 4 she underwent injection of both the left knee and also the left glenohumeral joint.  This was with probe Vivacaine and triamcinolone.  She tolerated the procedures well.  Pain is thought to be osteoarthritis related.        Allergies:  Allergies   Allergen Reactions     Metoprolol Itching and Rash     Cephalexin Rash     Erythromycin Rash     Actonel [Bisphosphonates] Itching     Azithromycin Hives     Celebrex [Celecoxib] Rash     Cipro [Ciprofloxacin] Rash     Contrast Dye Hives     Diatrizoate Hives     Erythromycin Rash      "Escitalopram Diarrhea     Gets very sick and mad feelings     Fosamax Itching and Rash     Keflex [Cephalexin Monohydrate] Rash     Lisinopril Cough     Risedronate Itching     Seasonal Allergies      Shellfish-Derived Products Hives     Tetanus Toxoid, Adsorbed Swelling     Tetanus-Diphtheria Toxoids Swelling     Vicodin [Acetaminophen] Itching     Patient reported - only when used scheduled in high doses.        Vioxx Rash     Acetaminophen Itching     In high doses  Patient reported - only when used scheduled in high doses.        Alendronic Acid Rash     Celecoxib Rash     Penicillins Rash     Rofecoxib Rash     Sulfa Drugs Itching and Rash     Sulfasalazine Itching and Rash       Problem List:    Patient Active Problem List    Diagnosis Date Noted     Moderate major depression (H) 12/31/2020     Priority: Medium     ARABELLA (generalized anxiety disorder) 12/31/2020     Priority: Medium     Has been on celexa (not effective but no side effects), amitriptyline, cymbalta (dizziness), lexapro (listed as allergy - 'gets very sick\"), zoloft switched to lexapro)       Abnormal CT of the abdomen 10/10/2019     Priority: Medium     CT 10/9/2019- cystic lesion along the anterior aspect of the pancreatic body/tail (series 2 image 27) measures 2 cm, and is new since the previous exam 2011. Pancreatic MRI with MRCP should be considered for further evaluation.   MRI 10/10/2019- There are 2 cystic lesions in the pancreatic body/tail, with the largest measuring 2 cm. No enhancing septations or solid masslike components are identified, although evaluation is limited related to patient motion artifact. These lesions have appearances suggestive of IPMN, but remain technically indeterminate. A follow-up MRI in one year should be considered to confirm stability.       Cystocele, midline      Priority: Medium     grade III       Generalized weakness 10/09/2019     Priority: Medium     Diarrhea 10/09/2019     Priority: Medium     Chronic " left-sided low back pain with left-sided sciatica 04/15/2019     Priority: Medium     Coronary artery disease involving native coronary artery of native heart with angina pectoris (H) 02/19/2019     Priority: Medium     coronary angiogram 2/2019 showed mild coronary artery disease.  The most significant of these was the 40% lesion in the mid RCA.       Benign essential hypertension 11/14/2018     Priority: Medium     Type 2 diabetes mellitus with diabetic polyneuropathy, without long-term current use of insulin (H) 11/14/2018     Priority: Medium     Diet controlled.  Diagnosed 6/2016, has never been on medications.       History of recurrent urinary tract infection 11/14/2018     Priority: Medium     On daily trimethoprim       CKD (chronic kidney disease) stage 3, GFR 30-59 ml/min (H) 11/14/2018     Priority: Medium     Peripheral neuropathy 09/10/2018     Priority: Medium     Bilateral wrist pain 04/11/2018     Priority: Medium     Protrusion of lumbar intervertebral disc 04/11/2018     Priority: Medium     Carpal tunnel syndrome of left wrist 10/21/2014     Priority: Medium     Diastolic dysfunction 03/31/2014     Priority: Medium     Pronation of foot 05/05/2011     Priority: Medium     Bilateral defomity       Allergic rhinitis 01/19/2011     Priority: Medium     Hyperlipidemia LDL goal <100 10/31/2010     Priority: Medium     Urge incontinence 10/20/2009     Priority: Medium     Right foot pain 10/16/2009     Priority: Medium     Xray showed djd -follows with podiatry. -arch supports placed may need cam walker        Vitamin D deficiency 09/09/2008     Priority: Medium     Subjective tinnitus 04/22/2008     Priority: Medium     Urticaria 08/22/2006     Priority: Medium     SENSONRL HEAR LOSS,BILAT 05/03/2006     Priority: Medium     Esophageal reflux      Priority: Medium     Memory loss      Priority: Medium     Early cognitive decline. MMSE 27/30, Neno 4.7/ 6.0 2004. B12, TSH, RPR normal [id number].        Degeneration of lumbar or lumbosacral intervertebral disc      Priority: Medium     MRI 5/04- DDD, L2-3 annular bulge with protrusion into left caudal infra-foraminal area  MRI 2017 with L4-5 loss disc height with spondylolisthesis.       B12 deficiency 10/10/2019     Priority: Low     Irritable bowel syndrome with diarrhea      Priority: Low     diahrrea predominant       Osteopenia of multiple sites      Priority: Low     DEXA 2007 -1.4 hip. Dexa 2004 -1 hip and -1 spine       RESTLESS LEG SYNDROME      Priority: Low     Primary osteoarthritis involving multiple joints      Priority: Low     C-spine, left hip       Diverticulosis of large intestine      Priority: Low     flexible sigmoidoscopy with diverticulosis otherwise normal 2001, admit diverticulitis 2009          Past Medical History:    Past Medical History:   Diagnosis Date     Abnormal cardiovascular stress test 2/3/2019     Adhesive capsulitis of shoulder      Adjustment disorder with anxiety 3/18/2016     B12 deficiency anemia 6/20/2012     Chest pain 2004     Colitis, Clostridium difficile 4/18/2012     Diverticulitis 2009     Headache(784.0) 2001     Impaired fasting glucose 2005     PERS HX ALLERGY OTHER FOODS 8/22/2006     PERS HX ALLERGY TO MILK PRODUCTS 8/22/2006     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     Status post coronary angiogram 2/27/2019     Syncope and collapse 9/10/2018       Past Surgical History:    Past Surgical History:   Procedure Laterality Date     ARTHROPLASTY KNEE  3/26/2012    Procedure:ARTHROPLASTY KNEE; Right Total Knee Arthroplasty; Surgeon:BERNADINE ROSAS; Location:[place] OR     CHOLECYSTECTOMY       CV HEART CATHETERIZATION WITH POSSIBLE INTERVENTION N/A 2/27/2019    Procedure: Coronary Angiogram with Left ventriculogram;  Surgeon: Leandro Carpio MD;  Location:  HEART CARDIAC CATH LAB     HERNIA REPAIR      Hernia Repair     HYSTERECTOMY, PAP NO LONGER INDICATED  1983    TVH/BSO     SURGICAL HISTORY OF -    10/08    A & P Repair, Dr. Hannah     [facility] APPENDECTOMY  1953       Family History:    Family History   Problem Relation Age of Onset     C.A.D. Mother      Diabetes Mother      Cancer - colorectal Mother      Arthritis Mother      Heart Disease Mother      Lipids Brother      Obesity Brother      Hypertension Brother      Allergies Son      Eye Disorder Son         cataract     Thyroid Disease Sister         graves dz     Eye Disorder Son      Leukemia Son      Alcohol/Drug Father        Social History:  Marital Status:   [5]  Social History     Tobacco Use     Smoking status: Never     Smokeless tobacco: Never   Vaping Use     Vaping status: Never Used   Substance Use Topics     Alcohol use: No     Drug use: No        Medications:    acetaminophen (TYLENOL) 500 MG tablet  amLODIPine (NORVASC) 5 MG tablet  bismuth subsalicylate (PEPTO BISMOL) 262 MG chewable tablet  blood glucose (ACCU-CHEK GUIDE) test strip  blood glucose monitoring (ACCU-CHEK FASTCLIX) lancets  buPROPion (WELLBUTRIN XL) 150 MG 24 hr tablet  capsaicin (ZOSTRIX) 0.025 % external cream  Cyanocobalamin (B-12) 1000 MCG SUBL  famotidine (PEPCID) 20 MG tablet  fluocinonide (LIDEX) 0.05 % external cream  fluocinonide (LIDEX) 0.05 % external solution  lidocaine (LIDODERM) 5 % patch  loperamide (IMODIUM A-D) 2 MG tablet  meloxicam (MOBIC) 7.5 MG tablet  mometasone (ELOCON) 0.1 % external cream  Multiple Vitamins-Minerals (THERAPEUTIC MULTIVIT/MINERAL) TABS  nitroFURantoin macrocrystal-monohydrate (MACROBID) 100 MG capsule  nitroGLYcerin (NITROSTAT) 0.4 MG sublingual tablet  order for DME  ORDER FOR DME  PARoxetine (PAXIL) 10 MG tablet  pregabalin (LYRICA) 50 MG capsule  STATIN NOT PRESCRIBED (INTENTIONAL)  vitamin D3 (CHOLECALCIFEROL) 50 mcg (2000 units) tablet          Review of Systems    ROS:  5 point ROS negative except as noted above in HPI, including Gen., Resp., CV, GI &  system review.    Physical Exam   BP: (!) 179/70  Pulse: 71  Temp:  "97.7  F (36.5  C)  Resp: 18  Height: 162.6 cm (5' 4\")  Weight: 73.5 kg (162 lb)  SpO2: 96 %      Physical Exam  Constitutional:       General: She is in acute distress.      Appearance: She is not diaphoretic.   Eyes:      Conjunctiva/sclera: Conjunctivae normal.   Musculoskeletal:      Cervical back: Neck supple.   Neurological:      Mental Status: She is alert.        The shoulder is without effusion no erythema overlying the region of likely injection.  There is reduced range of motion in all directions forward flexion abduction.  The chest is without tenderness to palpation in the scapular region and cervical neck is without significant tenderness.  Normal lung exam.  No wheezes rales rhonchi or stridor.  Normal radial pulse.  Normal ulnar median radial nerve function motor and sensory.      ED Course                 Procedures              Critical Care time:  none               No results found. However, due to the size of the patient record, not all encounters were searched. Please check Results Review for a complete set of results.    Medications - No data to display    Assessments & Plan (with Medical Decision Making)     MDM: Daniela Brown is a 89 year old female who presents with shoulder pain following corticosteroid injection approximately 4 days ago but started 2 days after this.  Suspect this is a postinjection corticosteroid flare.  I see no overlying signs of optic joint.  She is afebrile here reassuring exam.  Suspect this is acute on chronic pain.  Have discussed with her her use of ibuprofen which is not recommended given that she is 89 years old.  There is high risk for ulcer and kidney injury.  I told her that if she still was going to use this despite that she should limit it to 400 mg twice daily.  This should always be with food or milk if she does take it but again cautioned against it.  I recommended she take Tylenol.  She told me that at some point she used an acetaminophen " containing product but other items in it apparently with a rash.  I did recommend that she trial on this.  She has numerous allergies in the past 28 to be exact.  I am doubtful of a anaphylactic reaction to acetaminophen.  Would recommend she take 650 every 6 hours orally with oxycodone for breakthrough pain lidocaine patch.  She will need to follow-up this week to confirm no signs of infection.  This is expected to clear in the next couple of days if this is a corticosteroid flare.  We did however address that she needs to maintain shoulder range of motion as should she get a frozen shoulder this would be far worse than what she has had before.    I have reviewed the nursing notes.    I have reviewed the findings, diagnosis, plan and need for follow up with the patient.           Medical Decision Making  The patient's presentation was of moderate complexity (a chronic illness mild to moderate exacerbation, progression, or side effect of treatment).    The patient's evaluation involved:  history and exam without other MDM data elements    The patient's management necessitated moderate risk (prescription drug management including medications given in the ED).        New Prescriptions    No medications on file       Final diagnoses:   Primary osteoarthritis of left shoulder - Suspect this is a post-injection steroid flare as the pain increased within 2 days of the injection and there are no signs of infection.  please take acetaminophen 650 mg orally every 6 hours.  avoid ibuprofen due to ulcer risk - if you do use it, limit to 400 mg twice daily.  use a lidocaine 4% patch OTC to the area on for 12 of every 24 hours.  it is critical to maintain shoulder range of motion with gentle pendulum and wall walking exercises.  call Dr. Newman and let him know about the increased pain and ED visit.  - follow-up with him this week or with your primary provider.  may use oxycodone for breakthrough pain.  ice to the area on for 20  min/hour.         4/9/2023   [facility] EMERGENCY DEPT     Tho Sanchez MD  04/09/23 [id number]

## 2023-04-19 ENCOUNTER — OFFICE VISIT (OUTPATIENT)
Dept: FAMILY MEDICINE | Facility: CLINIC | Age: 88
End: 2023-04-19
Payer: MEDICARE

## 2023-04-19 VITALS
OXYGEN SATURATION: 98 % | HEART RATE: 85 BPM | DIASTOLIC BLOOD PRESSURE: 68 MMHG | HEIGHT: 63 IN | RESPIRATION RATE: 16 BRPM | SYSTOLIC BLOOD PRESSURE: 150 MMHG | BODY MASS INDEX: 28.33 KG/M2 | WEIGHT: 159.9 LBS | TEMPERATURE: 97.7 F

## 2023-04-19 DIAGNOSIS — E11.42 TYPE 2 DIABETES MELLITUS WITH DIABETIC POLYNEUROPATHY, WITHOUT LONG-TERM CURRENT USE OF INSULIN (H): ICD-10-CM

## 2023-04-19 DIAGNOSIS — G89.29 CHRONIC LEFT SHOULDER PAIN: Primary | ICD-10-CM

## 2023-04-19 DIAGNOSIS — M25.512 CHRONIC LEFT SHOULDER PAIN: Primary | ICD-10-CM

## 2023-04-19 DIAGNOSIS — L30.9 DERMATITIS: ICD-10-CM

## 2023-04-19 DIAGNOSIS — G63 POLYNEUROPATHY ASSOCIATED WITH UNDERLYING DISEASE (H): ICD-10-CM

## 2023-04-19 LAB
CREAT UR-MCNC: 80.4 MG/DL
HBA1C MFR BLD: 6.4 % (ref 0–5.6)
MICROALBUMIN UR-MCNC: <12 MG/L
MICROALBUMIN/CREAT UR: NORMAL MG/G{CREAT}

## 2023-04-19 PROCEDURE — 99214 OFFICE O/P EST MOD 30 MIN: CPT | Performed by: INTERNAL MEDICINE

## 2023-04-19 PROCEDURE — 82570 ASSAY OF URINE CREATININE: CPT | Performed by: INTERNAL MEDICINE

## 2023-04-19 PROCEDURE — 82043 UR ALBUMIN QUANTITATIVE: CPT | Performed by: INTERNAL MEDICINE

## 2023-04-19 PROCEDURE — 36415 COLL VENOUS BLD VENIPUNCTURE: CPT | Performed by: INTERNAL MEDICINE

## 2023-04-19 PROCEDURE — 83036 HEMOGLOBIN GLYCOSYLATED A1C: CPT | Performed by: INTERNAL MEDICINE

## 2023-04-19 RX ORDER — PREGABALIN 50 MG/1
50 CAPSULE ORAL 2 TIMES DAILY
Qty: 60 CAPSULE | Refills: 5 | Status: ON HOLD | OUTPATIENT
Start: 2023-04-19 | End: 2023-08-18

## 2023-04-19 ASSESSMENT — PATIENT HEALTH QUESTIONNAIRE - PHQ9: SUM OF ALL RESPONSES TO PHQ QUESTIONS 1-9: 8

## 2023-04-19 ASSESSMENT — PAIN SCALES - GENERAL: PAINLEVEL: SEVERE PAIN (7)

## 2023-04-19 NOTE — PROGRESS NOTES
Assessment & Plan   Problem List Items Addressed This Visit     Peripheral neuropathy    Relevant Medications    pregabalin (LYRICA) 50 MG capsule    Type 2 diabetes mellitus with diabetic polyneuropathy, without long-term current use of insulin (H) (Chronic)    Relevant Medications    pregabalin (LYRICA) 50 MG capsule    Other Relevant Orders    HEMOGLOBIN A1C (Completed)   Other Visit Diagnoses     Chronic left shoulder pain    -  Primary    Relevant Orders    Physical Therapy Referral    Dermatitis                 Patient Instructions   Shoulder pain  1. Schedule follow-up with DR. Newman  2. You take meloxicam.  You should NOT use any other over the counter pain medication except aceteminophen (Tyleonol).  Ibuprofen/Advil, Naproxen/Aleve ae similar to Meloxicam and therefore unsafe  3. Ensure you are taking pregabalin - this is for arthritis and neuropathy pain  4. Referral to physical therapy - try to secure rides      Rash:  1. Keep follow-up appointment with Dermatology  next week  2. Start Loratadine (Claritin)10 mg twice daily to help with rash and itch  3. Bring your creams/salve to Derm appointment so the doctor knows which ones you are using  4. Use the cream and shampoo twice daily    Diabetes  1. Blood work today      Cynthia Maddox DO  [facility]    Shruti Finnegan is a 89 year old, presenting for the following health issues:  ER F/U and Health Maintenance (Dont want shots )        4/19/2023     7:25 AM   Additional Questions   Roomed by geovanny RODRIGUEZ     Chief Complaint   Patient presents with     ER F/U     Health Maintenance     Dont want shots          ED/UC Followup:    Facility:  [place]   Date of visit: 4/9/23  Reason for visit: shoulder pain  Current Status: left shoulder pain is 7/10, no redness, no edema, sharpness for pain this will come and go all day long worse at night time when moving around , did get an injection at ER this helped at 1st but pain  "came back.  Is doing ROM exercise every 2-3 hrs 5-6 min long.   \"Primary osteoarthritis of left shoulder - Suspect this is a post-injection steroid flare as the pain increased within 2 days of the injection and there are no signs of infection.  please take acetaminophen 650 mg orally every 6 hours.  avoid ibuprofen due to ulcer risk - if you do use it, limit to 400 mg twice daily.  use a lidocaine 4% patch OTC to the area on for 12 of every 24 hours.  it is critical to maintain shoulder range of motion with gentle pendulum and wall walking exercises.  call Dr. Newman and let him know about the increased pain and ED visit.  - follow-up with him this week or with your primary provider.  may use oxycodone for breakthrough pain.  ice to the area on for 20 min/hour.  \"    --she follows with Sports med DR. Newman for shoulder pain.  --in the ER she was given oxycodone #6 pills;  She has 2 left.  Oxy does help quite a bit  --at our last visit in Oct, I started lyrica for pain and neuropathy.  She doesn't think she is taking this - \"I take too many pills'.  --she is Rx meloxicam to help with pain despite her CKD due to significant benefit  --she is using Tylenol 500 mg 3 x day; somewhat helpful  --also using heat  --no fevers  --overall pain is some improved, perhaps 15% improved    Rash  --on right shoulder/neck area. Sometimes on the face.  She has thought it was shingles, saw me for this in Dec but rash had resolved by then  --rash is itchy and painful at times  --sometimes it occurs on the left shoulder, sometimes right.    --she saw derm in Jan for this - diagnosis was dermatitis;  Was given Lidex to trunk and neck and to the scalp; she reports the visit lasted less than 1 min  --she was also seen 3/3 at [facility] for this rash  --she was told to use Loratadine 10 mg BID but she has not been using.  --she has appointment with Derm in [place] on 4/28    Current Outpatient Medications   Medication Sig Dispense Refill     " acetaminophen (TYLENOL) 500 MG tablet Take 500-1,000 mg by mouth every 6 hours as needed for mild pain or pain       amLODIPine (NORVASC) 5 MG tablet TAKE 1 TABLET BY MOUTH ONCE DAILY 90 tablet 0     bismuth subsalicylate (PEPTO BISMOL) 262 MG chewable tablet Take 1 tablet by mouth daily as needed       blood glucose (ACCU-CHEK GUIDE) test strip Use to test blood sugar one times daily or as directed. 100 each 1     blood glucose monitoring (ACCU-CHEK FASTCLIX) lancets Use to test blood sugar one times daily or as directed. 102 each 3     buPROPion (WELLBUTRIN XL) 150 MG 24 hr tablet Take 1 tablet (150 mg) by mouth every morning 90 tablet 3     capsaicin (ZOSTRIX) 0.025 % external cream Place on affected area, you may notice a transient burning sensation 60 g 0     Cyanocobalamin (B-12) 1000 MCG SUBL Place 1 tablet under the tongue every evening        famotidine (PEPCID) 20 MG tablet Take 1 tablet (20 mg) by mouth 2 times daily 90 tablet 3     fluocinonide (LIDEX) 0.05 % external cream Apply topically 2 times daily To chest and neck 60 g 6     fluocinonide (LIDEX) 0.05 % external solution Apply topically 2 times daily To scalp 120 mL 6     lidocaine (LIDODERM) 5 % patch Place 1 patch onto the skin every 24 hours To prevent lidocaine toxicity, patient should be patch free for 12 hrs daily. 15 patch 0     loperamide (IMODIUM A-D) 2 MG tablet Take 1 tablet (2 mg) by mouth 2 times daily as needed for diarrhea 60 tablet 11     meloxicam (MOBIC) 7.5 MG tablet Take 1 tablet (7.5 mg) by mouth daily 90 tablet 3     mometasone (ELOCON) 0.1 % external cream Apply topically daily 50 g 6     Multiple Vitamins-Minerals (THERAPEUTIC MULTIVIT/MINERAL) TABS Take 1 tablet by mouth every evening        nitroGLYcerin (NITROSTAT) 0.4 MG sublingual tablet Place 1 tablet (0.4 mg) under the tongue every 5 minutes as needed for chest pain 25 tablet 4     order for DME 1 walker 1 Device 0     ORDER FOR DME Thigh length teds. 1 Device 2      "oxyCODONE (ROXICODONE) 5 MG tablet Take 1 tablet (5 mg) by mouth every 6 hours as needed for severe pain 6 tablet 0     PARoxetine (PAXIL) 10 MG tablet Take 2 tablets (20 mg) by mouth every morning 30 tablet 3     pregabalin (LYRICA) 50 MG capsule Take 1 capsule (50 mg) by mouth 2 times daily 60 capsule 3     STATIN NOT PRESCRIBED (INTENTIONAL) Please choose reason not prescribed, below       vitamin D3 (CHOLECALCIFEROL) 50 mcg (2000 units) tablet Take 1 tablet (50 mcg) by mouth daily 90 tablet 3           Review of Systems   Constitutional, HEENT, cardiovascular, pulmonary, gi and gu systems are negative, except as otherwise noted.      Objective    BP (!) 150/68 (BP Location: Right arm, Patient Position: Sitting, Cuff Size: Adult Regular)   Pulse 85   Temp 97.7  F (36.5  C) (Tympanic)   Resp 16   Ht 1.6 m (5' 2.99\")   Wt 72.5 kg (159 lb 14.4 oz)   LMP  (LMP Unknown)   SpO2 98%   BMI 28.33 kg/m    Body mass index is 28.33 kg/m .  Physical Exam   GENERAL APPEARANCE: alert and no distress  SKIN: erythematous maculopapular rash on right trap area        Left trap/neck area                          "

## 2023-04-19 NOTE — PATIENT INSTRUCTIONS
Shoulder pain  Schedule follow-up with DR. Newman  You take meloxicam.  You should NOT use any other over the counter pain medication except aceteminophen (Tyleonol).  Ibuprofen/Advil, Naproxen/Aleve ae similar to Meloxicam and therefore unsafe  Ensure you are taking pregabalin - this is for arthritis and neuropathy pain  Referral to physical therapy - try to secure rides      Rash:  Keep follow-up appointment with Dermatology  next week  Start Loratadine (Claritin)10 mg twice daily to help with rash and itch  Bring your creams/salve to Derm appointment so the doctor knows which ones you are using  Use the cream and shampoo twice daily    Diabetes  Blood work today

## 2023-04-20 NOTE — RESULT ENCOUNTER NOTE
Hemoglobin A1c remains well controlled.  No medications needed.  Urine is negative for protein which is good

## 2023-05-08 ENCOUNTER — HOSPITAL ENCOUNTER (EMERGENCY)
Facility: CLINIC | Age: 88
Discharge: HOME | End: 2023-05-08
Attending: FAMILY MEDICINE | Admitting: FAMILY MEDICINE
Payer: MEDICARE

## 2023-05-08 VITALS
HEART RATE: 105 BPM | RESPIRATION RATE: 14 BRPM | OXYGEN SATURATION: 95 % | SYSTOLIC BLOOD PRESSURE: 146 MMHG | BODY MASS INDEX: 27.31 KG/M2 | DIASTOLIC BLOOD PRESSURE: 107 MMHG | WEIGHT: 160 LBS | HEIGHT: 64 IN | TEMPERATURE: 98.4 F
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
## 2023-05-08 DIAGNOSIS — R26.81 GAIT INSTABILITY: ICD-10-CM

## 2023-05-08 LAB
ANION GAP SERPL CALCULATED.3IONS-SCNC: 14 MMOL/L (ref 7–15)
BASOPHILS # BLD AUTO: 0.1 10E3/UL (ref 0–0.2)
BASOPHILS NFR BLD AUTO: 0 %
BUN SERPL-MCNC: 16.5 MG/DL (ref 8–23)
CALCIUM SERPL-MCNC: 9.8 MG/DL (ref 8.2–9.6)
CHLORIDE SERPL-SCNC: 104 MMOL/L (ref 98–107)
CREAT SERPL-MCNC: 0.95 MG/DL (ref 0.51–0.95)
DEPRECATED HCO3 PLAS-SCNC: 23 MMOL/L (ref 22–29)
EOSINOPHIL # BLD AUTO: 0.2 10E3/UL (ref 0–0.7)
EOSINOPHIL NFR BLD AUTO: 1 %
ERYTHROCYTE [DISTWIDTH] IN BLOOD BY AUTOMATED COUNT: 13.1 % (ref 10–15)
GFR SERPL CREATININE-BSD FRML MDRD: 57 ML/MIN/1.73M2
GLUCOSE SERPL-MCNC: 180 MG/DL (ref 70–99)
HCT VFR BLD AUTO: 42 % (ref 35–47)
HGB BLD-MCNC: 14.3 G/DL (ref 11.7–15.7)
HOLD SPECIMEN: NORMAL
IMM GRANULOCYTES # BLD: 0.1 10E3/UL
IMM GRANULOCYTES NFR BLD: 1 %
LYMPHOCYTES # BLD AUTO: 2.8 10E3/UL (ref 0.8–5.3)
LYMPHOCYTES NFR BLD AUTO: 22 %
MCH RBC QN AUTO: 28.9 PG (ref 26.5–33)
MCHC RBC AUTO-ENTMCNC: 34 G/DL (ref 31.5–36.5)
MCV RBC AUTO: 85 FL (ref 78–100)
MONOCYTES # BLD AUTO: 0.9 10E3/UL (ref 0–1.3)
MONOCYTES NFR BLD AUTO: 7 %
NEUTROPHILS # BLD AUTO: 8.8 10E3/UL (ref 1.6–8.3)
NEUTROPHILS NFR BLD AUTO: 69 %
NRBC # BLD AUTO: 0 10E3/UL
NRBC BLD AUTO-RTO: 0 /100
PLATELET # BLD AUTO: 326 10E3/UL (ref 150–450)
POTASSIUM SERPL-SCNC: 3.8 MMOL/L (ref 3.4–5.3)
RBC # BLD AUTO: 4.95 10E6/UL (ref 3.8–5.2)
SODIUM SERPL-SCNC: 141 MMOL/L (ref 136–145)
WBC # BLD AUTO: 12.8 10E3/UL (ref 4–11)

## 2023-05-08 PROCEDURE — 93010 ELECTROCARDIOGRAM REPORT: CPT | Performed by: FAMILY MEDICINE

## 2023-05-08 PROCEDURE — 36415 COLL VENOUS BLD VENIPUNCTURE: CPT | Performed by: NURSE PRACTITIONER

## 2023-05-08 PROCEDURE — 93005 ELECTROCARDIOGRAM TRACING: CPT | Performed by: FAMILY MEDICINE

## 2023-05-08 PROCEDURE — 85025 COMPLETE CBC W/AUTO DIFF WBC: CPT | Performed by: NURSE PRACTITIONER

## 2023-05-08 PROCEDURE — 82310 ASSAY OF CALCIUM: CPT | Performed by: FAMILY MEDICINE

## 2023-05-08 PROCEDURE — 99283 EMERGENCY DEPT VISIT LOW MDM: CPT | Mod: 25 | Performed by: FAMILY MEDICINE

## 2023-05-08 PROCEDURE — 99284 EMERGENCY DEPT VISIT MOD MDM: CPT | Performed by: FAMILY MEDICINE

## 2023-05-08 PROCEDURE — 85025 COMPLETE CBC W/AUTO DIFF WBC: CPT | Performed by: FAMILY MEDICINE

## 2023-05-08 ASSESSMENT — ACTIVITIES OF DAILY LIVING (ADL): ADLS_ACUITY_SCORE: 39

## 2023-05-08 NOTE — ED TRIAGE NOTES
"Pt comes to ER via EMS after falling when she walked outside to get the mail. She landed on her bottom. No LOC and no blood thinners. Pt arrives in a C-collar- per EMS that is their protocol.  Pt denies neck pain. Pt states she was unable to get up on her own \"because I have bad knees\".  Pt was assisted by neighbors who saw her fall.      Triage Assessment     Row Name 05/08/23 [id number]       Triage Assessment (Adult)    Airway WDL WDL       Cognitive/Neuro/Behavioral WDL    Cognitive/Neuro/Behavioral WDL WDL              "

## 2023-05-08 NOTE — DISCHARGE INSTRUCTIONS
Continue all current meds, follow-up with your primary care provider regarding your left knee concerns.

## 2023-05-08 NOTE — ED PROVIDER NOTES
History     Chief Complaint   Patient presents with     Fall     Dizziness     HPI  Daniela Brown is a 89 year old female, past medical history significant for depression, generalized anxiety disorder, cystocele, generalized weakness chronic left-sided low back pain with left-sided sciatica, ASCVD, hypertension, type 2 diabetes, recurrent UTI, stage III renal disease, peripheral neuropathy, diastolic dysfunction, hyperlipidemia, vitamin D deficiency, GERD, memory loss, diverticulosis, OA, restless leg syndrome, osteopenia, irritable bowel syndrome with diarrhea, presents to the emergency department with concerns of fall in the context of dizziness.  History is obtained from the patient who lives alone and arrives by EMS.  She states that she has had problems with walking for quite some time due to neuropathy in her legs as well as her left knee that she feels really needs to be replaced as the joint injections for pain really do not work for very long.  Today she was walking down her long driveway to the mailbox, she had taken an antianxiety pill about half an hour before doing so and questions whether this might be playing a part in this fall that she had, she got to the mailbox and turned around and her knee gave out on her and she fell down.  Fortunately she did not hurt herself she has no pain in the head neck chest back abdomen arms or legs presently.  She did not strike her head or neck.  She reports no loss of consciousness.  She had her life alert bracelet on and pressed that.  She was surprised that it was very effective and that there were actually multiple people and perhaps neighbors that saw her and came to her assistance.  It was felt because she was 89 and felt that she should be assessed in the emergency department.  The patient reports no concerns acutely at this time.  She states that she has been eating and drinking normally.      Allergies:  Allergies   Allergen Reactions      "Metoprolol Itching and Rash     Cephalexin Rash     Erythromycin Rash     Actonel [Bisphosphonates] Itching     Azithromycin Hives     Celebrex [Ricci-2 Inhibitors] Rash     Cipro [Ciprofloxacin] Rash     Contrast Dye Hives     Diatrizoate Hives     Erythromycin Rash     Escitalopram Diarrhea     Gets very sick and mad feelings     Fosamax Itching and Rash     Keflex [Cephalexin Monohydrate] Rash     Lisinopril Cough     Risedronate Itching     Seasonal Allergies      Shellfish-Derived Products Hives     Tetanus Toxoid, Adsorbed Swelling     Tetanus-Diphtheria Toxoids Td Swelling     Vicodin [Acetaminophen] Itching     Patient reported - only when used scheduled in high doses.        Vioxx Rash     Acetaminophen Itching     In high doses  Patient reported - only when used scheduled in high doses.        Alendronate Rash     Celecoxib Rash     Penicillins Rash     Rofecoxib Rash     Sulfa Antibiotics Itching and Rash     Sulfasalazine Itching and Rash       Problem List:    Patient Active Problem List    Diagnosis Date Noted     Moderate major depression (H) 12/31/2020     Priority: Medium     ARABELLA (generalized anxiety disorder) 12/31/2020     Priority: Medium     Has been on celexa (not effective but no side effects), amitriptyline, cymbalta (dizziness), lexapro (listed as allergy - 'gets very sick\"), zoloft switched to lexapro)       Abnormal CT of the abdomen 10/10/2019     Priority: Medium     CT 10/9/2019- cystic lesion along the anterior aspect of the pancreatic body/tail (series 2 image 27) measures 2 cm, and is new since the previous exam 2011. Pancreatic MRI with MRCP should be considered for further evaluation.   MRI 10/10/2019- There are 2 cystic lesions in the pancreatic body/tail, with the largest measuring 2 cm. No enhancing septations or solid masslike components are identified, although evaluation is limited related to patient motion artifact. These lesions have appearances suggestive of IPMN, but remain " technically indeterminate. A follow-up MRI in one year should be considered to confirm stability.       Cystocele, midline      Priority: Medium     grade III       Generalized weakness 10/09/2019     Priority: Medium     Diarrhea 10/09/2019     Priority: Medium     Chronic left-sided low back pain with left-sided sciatica 04/15/2019     Priority: Medium     Coronary artery disease involving native coronary artery of native heart with angina pectoris (H) 02/19/2019     Priority: Medium     coronary angiogram 2/2019 showed mild coronary artery disease.  The most significant of these was the 40% lesion in the mid RCA.       Benign essential hypertension 11/14/2018     Priority: Medium     Type 2 diabetes mellitus with diabetic polyneuropathy, without long-term current use of insulin (H) 11/14/2018     Priority: Medium     Diet controlled.  Diagnosed 6/2016, has never been on medications.       History of recurrent urinary tract infection 11/14/2018     Priority: Medium     On daily trimethoprim       CKD (chronic kidney disease) stage 3, GFR 30-59 ml/min (H) 11/14/2018     Priority: Medium     Peripheral neuropathy 09/10/2018     Priority: Medium     Bilateral wrist pain 04/11/2018     Priority: Medium     Protrusion of lumbar intervertebral disc 04/11/2018     Priority: Medium     Carpal tunnel syndrome of left wrist 10/21/2014     Priority: Medium     Diastolic dysfunction 03/31/2014     Priority: Medium     Pronation of foot 05/05/2011     Priority: Medium     Bilateral defomity       Allergic rhinitis 01/19/2011     Priority: Medium     Hyperlipidemia LDL goal <100 10/31/2010     Priority: Medium     Urge incontinence 10/20/2009     Priority: Medium     Right foot pain 10/16/2009     Priority: Medium     Xray showed djd -follows with podiatry. -arch supports placed may need cam walker        Vitamin D deficiency 09/09/2008     Priority: Medium     Subjective tinnitus 04/22/2008     Priority: Medium     Urticaria  08/22/2006     Priority: Medium     SENSONRL HEAR LOSS,BILAT 05/03/2006     Priority: Medium     Esophageal reflux      Priority: Medium     Memory loss      Priority: Medium     Early cognitive decline. MMSE 27/30, Neno 4.7/ 6.0 2004. B12, TSH, RPR normal 5408-4527.       Degeneration of lumbar or lumbosacral intervertebral disc      Priority: Medium     MRI 5/04- DDD, L2-3 annular bulge with protrusion into left caudal infra-foraminal area  MRI 2017 with L4-5 loss disc height with spondylolisthesis.       B12 deficiency 10/10/2019     Priority: Low     Irritable bowel syndrome with diarrhea      Priority: Low     diahrrea predominant       Osteopenia of multiple sites      Priority: Low     DEXA 2007 -1.4 hip. Dexa 2004 -1 hip and -1 spine       RESTLESS LEG SYNDROME      Priority: Low     Primary osteoarthritis involving multiple joints      Priority: Low     C-spine, left hip       Diverticulosis of large intestine      Priority: Low     flexible sigmoidoscopy with diverticulosis otherwise normal 2001, admit diverticulitis 2009          Past Medical History:    Past Medical History:   Diagnosis Date     Abnormal cardiovascular stress test 2/3/2019     Adhesive capsulitis of shoulder      Adjustment disorder with anxiety 3/18/2016     B12 deficiency anemia 6/20/2012     Chest pain 2004     Colitis, Clostridium difficile 4/18/2012     Diverticulitis 2009     Headache(784.0) 2001     Impaired fasting glucose 2005     PERS HX ALLERGY OTHER FOODS 8/22/2006     PERS HX ALLERGY TO MILK PRODUCTS 8/22/2006     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     Status post coronary angiogram 2/27/2019     Syncope and collapse 9/10/2018       Past Surgical History:    Past Surgical History:   Procedure Laterality Date     ARTHROPLASTY KNEE  3/26/2012    Procedure:ARTHROPLASTY KNEE; Right Total Knee Arthroplasty; Surgeon:BERNADINE ROSAS; Location:[facility] OR     CHOLECYSTECTOMY       CV HEART CATHETERIZATION WITH POSSIBLE  INTERVENTION N/A 2/27/2019    Procedure: Coronary Angiogram with Left ventriculogram;  Surgeon: Leandro Carpio MD;  Location:  [facility]     HERNIA REPAIR      Hernia Repair     HYSTERECTOMY, PAP NO LONGER INDICATED  1983    TVH/BSO     SURGICAL HISTORY OF -   10/08    A & P Repair, Dr. Hannah     [facility] APPENDECTOMY  1953       Family History:    Family History   Problem Relation Age of Onset     C.A.D. Mother      Diabetes Mother      Cancer - colorectal Mother      Arthritis Mother      Heart Disease Mother      Lipids Brother      Obesity Brother      Hypertension Brother      Allergies Son      Eye Disorder Son         cataract     Thyroid Disease Sister         graves dz     Eye Disorder Son      Leukemia Son      Alcohol/Drug Father        Social History:  Marital Status:   [5]  Social History     Tobacco Use     Smoking status: Never     Smokeless tobacco: Never   Vaping Use     Vaping status: Never Used   Substance Use Topics     Alcohol use: No     Drug use: No        Medications:    acetaminophen (TYLENOL) 500 MG tablet  amLODIPine (NORVASC) 5 MG tablet  bismuth subsalicylate (PEPTO BISMOL) 262 MG chewable tablet  blood glucose (ACCU-CHEK GUIDE) test strip  blood glucose monitoring (ACCU-CHEK FASTCLIX) lancets  buPROPion (WELLBUTRIN XL) 150 MG 24 hr tablet  capsaicin (ZOSTRIX) 0.025 % external cream  Cyanocobalamin (B-12) 1000 MCG SUBL  famotidine (PEPCID) 20 MG tablet  fluocinonide (LIDEX) 0.05 % external cream  fluocinonide (LIDEX) 0.05 % external solution  lidocaine (LIDODERM) 5 % patch  loperamide (IMODIUM A-D) 2 MG tablet  meloxicam (MOBIC) 7.5 MG tablet  mometasone (ELOCON) 0.1 % external cream  Multiple Vitamins-Minerals (THERAPEUTIC MULTIVIT/MINERAL) TABS  nitroGLYcerin (NITROSTAT) 0.4 MG sublingual tablet  order for DME  ORDER FOR DME  oxyCODONE (ROXICODONE) 5 MG tablet  PARoxetine (PAXIL) 10 MG tablet  pregabalin (LYRICA) 50 MG capsule  STATIN NOT PRESCRIBED  "(INTENTIONAL)  vitamin D3 (CHOLECALCIFEROL) 50 mcg (2000 units) tablet          Review of Systems   All other systems reviewed and are negative.      Physical Exam   BP: (!) 197/97  Pulse: 105  Temp: 98.4  F (36.9  C)  Resp: 18  Height: 162.6 cm (5' 4\")  Weight: 72.6 kg (160 lb)  SpO2: 97 %      Physical Exam  Vitals and nursing note reviewed.   Constitutional:       General: She is not in acute distress.     Appearance: Normal appearance. She is normal weight. She is not ill-appearing.   HENT:      Head: Normocephalic and atraumatic.      Right Ear: Tympanic membrane, ear canal and external ear normal.      Left Ear: Tympanic membrane, ear canal and external ear normal.      Nose: Nose normal.      Mouth/Throat:      Mouth: Mucous membranes are dry.      Pharynx: Oropharynx is clear.   Eyes:      Extraocular Movements: Extraocular movements intact.      Conjunctiva/sclera: Conjunctivae normal.      Pupils: Pupils are equal, round, and reactive to light.   Cardiovascular:      Rate and Rhythm: Normal rate.      Pulses: Normal pulses.      Heart sounds: Normal heart sounds.   Pulmonary:      Effort: Pulmonary effort is normal.      Breath sounds: Normal breath sounds.   Abdominal:      General: Bowel sounds are normal.      Palpations: Abdomen is soft.   Musculoskeletal:         General: Normal range of motion.      Cervical back: Normal range of motion and neck supple.   Skin:     General: Skin is warm and dry.      Capillary Refill: Capillary refill takes less than 2 seconds.   Neurological:      General: No focal deficit present.      Mental Status: She is alert and oriented to person, place, and time.   Psychiatric:         Mood and Affect: Mood normal.         Behavior: Behavior normal.         ED Course                 Procedures              EKG Interpretation:      Interpreted by Americo Donaldson MD  Time reviewed: Time obtained 1531 time interpreted same comparison EKG dated 11/15/2022.  95 bpm sinus " rhythm, voltage criteria for LVH, no acute ST-T wave changes.  No distinct changes from comparison EKG.          Results for orders placed or performed during the hospital encounter of 05/08/23 (from the past 24 hour(s))   [facility] Draw    Narrative    The following orders were created for panel order [facility] Draw.  Procedure                               Abnormality         Status                     ---------                               -----------         ------                     Extra Blue Top Tube[[id number]]                              Final result               Extra Red Top Tube[[id number]]                               Final result               Extra Green Top (Lithium...[[id number]]                      Final result               Extra Purple Top Tube[[id number]]                            Final result                 Please view results for these tests on the individual orders.   Extra Blue Top Tube   Result Value Ref Range    Hold Specimen JIC    Extra Red Top Tube   Result Value Ref Range    Hold Specimen JIC    Extra Green Top (Lithium Heparin) Tube   Result Value Ref Range    Hold Specimen JIC    Extra Purple Top Tube   Result Value Ref Range    Hold Specimen JIC    CBC with platelets, differential    Narrative    The following orders were created for panel order CBC with platelets, differential.  Procedure                               Abnormality         Status                     ---------                               -----------         ------                     CBC with platelets and d...[[id number]]  Abnormal            Final result                 Please view results for these tests on the individual orders.   Basic metabolic panel   Result Value Ref Range    Sodium 141 136 - 145 mmol/L    Potassium 3.8 3.4 - 5.3 mmol/L    Chloride 104 98 - 107 mmol/L    Carbon Dioxide (CO2) 23 22 - 29 mmol/L    Anion Gap 14 7 - 15 mmol/L    Urea Nitrogen 16.5 8.0 - 23.0 mg/dL    Creatinine 0.95 0.51 - 0.95  mg/dL    Calcium 9.8 (H) 8.2 - 9.6 mg/dL    Glucose 180 (H) 70 - 99 mg/dL    GFR Estimate 57 (L) >60 mL/min/1.73m2   CBC with platelets and differential   Result Value Ref Range    WBC Count 12.8 (H) 4.0 - 11.0 10e3/uL    RBC Count 4.95 3.80 - 5.20 10e6/uL    Hemoglobin 14.3 11.7 - 15.7 g/dL    Hematocrit 42.0 35.0 - 47.0 %    MCV 85 78 - 100 fL    MCH 28.9 26.5 - 33.0 pg    MCHC 34.0 31.5 - 36.5 g/dL    RDW 13.1 10.0 - 15.0 %    Platelet Count 326 150 - 450 10e3/uL    % Neutrophils 69 %    % Lymphocytes 22 %    % Monocytes 7 %    % Eosinophils 1 %    % Basophils 0 %    % Immature Granulocytes 1 %    NRBCs per 100 WBC 0 <1 /100    Absolute Neutrophils 8.8 (H) 1.6 - 8.3 10e3/uL    Absolute Lymphocytes 2.8 0.8 - 5.3 10e3/uL    Absolute Monocytes 0.9 0.0 - 1.3 10e3/uL    Absolute Eosinophils 0.2 0.0 - 0.7 10e3/uL    Absolute Basophils 0.1 0.0 - 0.2 10e3/uL    Absolute Immature Granulocytes 0.1 <=0.4 10e3/uL    Absolute NRBCs 0.0 10e3/uL     *Note: Due to a large number of results and/or encounters for the requested time period, some results have not been displayed. A complete set of results can be found in Results Review.   4:03 PM  After reviewing EKG and the patient ambulated with assistance of ERT.  She was able to ambulate per her normal with a walker.  Felt comfortable.  She would like to go home and I think this is entirely reasonable.  She has no identified areas of pain and is cognitively intact and gives a cogent history.      Medications - No data to display    Assessments & Plan (with Medical Decision Making)   89-year-old female past medical history reviewed as above who presents to the emergency department after a fall as described in HPI and documented with respect to lack of traumatic findings on exam.  The patient has no pain related to the fall, she did not injure herself.  She did take an antianxiety medication prior to the fall which certainly may be playing a role in addition to her comorbidities.   Review of systems is otherwise negative.  She is very much cognitively intact and gives excellent history, she has pre-existing medical conditions that predispose her to falling and this is not unusual for her.  She is actually fallen 3 times in the last several months under similar circumstance.  She is comfortable with living on her own and does not want to consider placement of an alternate facility.  This relates to an experience that she had with her son who had cancer and went through nursing home placement.  She demonstrated appropriate ability to ambulate for her age and her physical restriction pre-existing in the emergency department before discharge.  Return to the emergency department as needed.      Disclaimer: This note consists of symbols derived from keyboarding, dictation and/or voice recognition software. As a result, there may be errors in the script that have gone undetected. Please consider this when interpreting information found in this chart.      I have reviewed the nursing notes.    I have reviewed the findings, diagnosis, plan and need for follow up with the patient.        New Prescriptions    No medications on file       Final diagnoses:   Fall, initial encounter   Gait instability       5/8/2023   [facility] EMERGENCY DEPT     Americo Donaldson MD  05/08/23 1999

## 2023-05-17 ENCOUNTER — OFFICE VISIT (OUTPATIENT)
Dept: FAMILY MEDICINE | Facility: CLINIC | Age: 88
End: 2023-05-17
Payer: MEDICARE

## 2023-05-17 VITALS
SYSTOLIC BLOOD PRESSURE: 146 MMHG | HEIGHT: 64 IN | WEIGHT: 161.2 LBS | OXYGEN SATURATION: 98 % | TEMPERATURE: 97 F | HEART RATE: 94 BPM | RESPIRATION RATE: 20 BRPM | DIASTOLIC BLOOD PRESSURE: 66 MMHG | BODY MASS INDEX: 27.52 KG/M2

## 2023-05-17 DIAGNOSIS — I25.119 CORONARY ARTERY DISEASE INVOLVING NATIVE CORONARY ARTERY OF NATIVE HEART WITH ANGINA PECTORIS (H): ICD-10-CM

## 2023-05-17 DIAGNOSIS — M48.062 SPINAL STENOSIS OF LUMBAR REGION WITH NEUROGENIC CLAUDICATION: ICD-10-CM

## 2023-05-17 DIAGNOSIS — N18.31 STAGE 3A CHRONIC KIDNEY DISEASE (H): ICD-10-CM

## 2023-05-17 DIAGNOSIS — G63 POLYNEUROPATHY ASSOCIATED WITH UNDERLYING DISEASE (H): ICD-10-CM

## 2023-05-17 DIAGNOSIS — M15.0 PRIMARY OSTEOARTHRITIS INVOLVING MULTIPLE JOINTS: Primary | ICD-10-CM

## 2023-05-17 DIAGNOSIS — F32.1 MODERATE MAJOR DEPRESSION (H): ICD-10-CM

## 2023-05-17 PROCEDURE — 99214 OFFICE O/P EST MOD 30 MIN: CPT | Performed by: INTERNAL MEDICINE

## 2023-05-17 ASSESSMENT — PAIN SCALES - GENERAL: PAINLEVEL: WORST PAIN (10)

## 2023-05-17 NOTE — PATIENT INSTRUCTIONS
Redness of feet and legs   Legs are red because of swelling, neuropathy and arthritis    Leg pain  Recommend physical therapy to strengthen the legs  Referral for spine injection - we will send referral to [facility]  Recommend to see Orthopedic surgeon - you are borderline candidate for knee replacement, but Surgeon can give their opinion   You have leg pain due to arthritis in the hips, knees, low back and neuropathy  The Pregabalin (lyrica) is for neuropathy  The Meloxicam is for arthritis     Medications  I recommend you set your pills up in a pillbox  Referral to [facility] - they will contact you to schedule

## 2023-05-17 NOTE — PROGRESS NOTES
Assessment & Plan   Problem List Items Addressed This Visit     CKD (chronic kidney disease) stage 3, GFR 30-59 ml/min (H) (Chronic)    Relevant Orders    Community Paramedic Referral    Coronary artery disease involving native coronary artery of native heart with angina pectoris (H) (Chronic)    Relevant Orders    Community Paramedic Referral    Moderate major depression (H)    Relevant Orders    Community Paramedic Referral    Polyneuropathy associated with underlying disease (H)    Relevant Orders    Physical Therapy Referral    Community Paramedic Referral    Primary osteoarthritis involving multiple joints - Primary    Relevant Orders    Physical Therapy Referral    Community Paramedic Referral   Other Visit Diagnoses     Spinal stenosis of lumbar region with neurogenic claudication        Relevant Orders    Physical Therapy Referral    Community Paramedic Referral    Pain Management  Referral           Discussed penitentiary, she is not ready  Needs to improve MSK strength.  Multi-factorial leg weakness and falls with swelling, neuropathy, spinal stenosis, OA of multiple joints;  Probably not a great candidate for TKA due to multiple other issues; if the knee was the primary  of falls then TKA is reasonable.  However, I am not confident that TKA would significantly improve her mobility and may set her back      Patient Instructions     Redness of feet and legs   1. Legs are red because of swelling, neuropathy and arthritis    Leg pain  1. Recommend physical therapy to strengthen the legs  2. Referral for spine injection - we will send referral to [facility]  3. Recommend to see Orthopedic surgeon - you are borderline candidate for knee replacement, but Surgeon can give their opinion   4. You have leg pain due to arthritis in the hips, knees, low back and neuropathy  5. The Pregabalin (lyrica) is for neuropathy  6. The Meloxicam is for arthritis     Medications  1. I recommend you set your pills  up in a pillbox  2. Referral to Community Paramedic - they will contact you to schedule        DO MADISON Reddy [facility]    Shruti Finnegan is a 89 year old, presenting for the following health issues:  ER F/U        5/17/2023     7:41 AM   Additional Questions   Roomed by geovanny lewis   Accompanied by none         5/17/2023     7:41 AM   Patient Reported Additional Medications   Patient reports taking the following new medications self     HPI     Chief Complaint   Patient presents with     ER F/U         ED/UC Followup:    Facility:  [facility]  Date of visit: 5/8/23  Reason for visit: fall  Current Status: 10/10 for leg pain, knee pain 10/10, leg are red and feet       Feet/leg redness  --we discussed at visit in Oct.    Fall  --legs gave out while walking to mailbox.  She also took a lorazepam ~30 min prior to fall    Neuropathy  OA:  --has painful neuropathy daron at night; can be severe at times  --falls frequently at home; ambulates with a cane.  --was on gabapentin until 9/21 - stopped because 'not hepful;  Switched to lyrica; she has not started;  She found it in a cabinet and was not sure what it was for;  --meloxicam is helping a lot  --today she reports her legs are 'really bad' - meaning weak and painful  --she was told she needs a knee replacement  --wonders about back injection - it seems to have helped before - done at [facility] and the [facility] radiology      Current Outpatient Medications   Medication Sig Dispense Refill     acetaminophen (TYLENOL) 500 MG tablet Take 500-1,000 mg by mouth every 6 hours as needed for mild pain or pain       amLODIPine (NORVASC) 5 MG tablet TAKE 1 TABLET BY MOUTH ONCE DAILY 90 tablet 0     bismuth subsalicylate (PEPTO BISMOL) 262 MG chewable tablet Take 1 tablet by mouth daily as needed       blood glucose (ACCU-CHEK GUIDE) test strip Use to test blood sugar one times daily or as directed. 100 each 1     blood glucose monitoring (ACCU-CHEK FASTCLIX)  lancets Use to test blood sugar one times daily or as directed. 102 each 3     buPROPion (WELLBUTRIN XL) 150 MG 24 hr tablet Take 1 tablet (150 mg) by mouth every morning 90 tablet 3     capsaicin (ZOSTRIX) 0.025 % external cream Place on affected area, you may notice a transient burning sensation 60 g 0     Cyanocobalamin (B-12) 1000 MCG SUBL Place 1 tablet under the tongue every evening        famotidine (PEPCID) 20 MG tablet Take 1 tablet (20 mg) by mouth 2 times daily 90 tablet 3     fluocinonide (LIDEX) 0.05 % external cream Apply topically 2 times daily To chest and neck 60 g 6     fluocinonide (LIDEX) 0.05 % external solution Apply topically 2 times daily To scalp 120 mL 6     lidocaine (LIDODERM) 5 % patch Place 1 patch onto the skin every 24 hours To prevent lidocaine toxicity, patient should be patch free for 12 hrs daily. 15 patch 0     loperamide (IMODIUM A-D) 2 MG tablet Take 1 tablet (2 mg) by mouth 2 times daily as needed for diarrhea 60 tablet 11     meloxicam (MOBIC) 7.5 MG tablet Take 1 tablet (7.5 mg) by mouth daily 90 tablet 3     mometasone (ELOCON) 0.1 % external cream Apply topically daily 50 g 6     Multiple Vitamins-Minerals (THERAPEUTIC MULTIVIT/MINERAL) TABS Take 1 tablet by mouth every evening        nitroGLYcerin (NITROSTAT) 0.4 MG sublingual tablet Place 1 tablet (0.4 mg) under the tongue every 5 minutes as needed for chest pain 25 tablet 4     order for DME 1 walker 1 Device 0     ORDER FOR DME Thigh length teds. 1 Device 2     oxyCODONE (ROXICODONE) 5 MG tablet Take 1 tablet (5 mg) by mouth every 6 hours as needed for severe pain 6 tablet 0     PARoxetine (PAXIL) 10 MG tablet Take 2 tablets (20 mg) by mouth every morning 30 tablet 3     pregabalin (LYRICA) 50 MG capsule Take 1 capsule (50 mg) by mouth 2 times daily 60 capsule 5     vitamin D3 (CHOLECALCIFEROL) 50 mcg (2000 units) tablet Take 1 tablet (50 mcg) by mouth daily 90 tablet 3     STATIN NOT PRESCRIBED (INTENTIONAL) Please  "choose reason not prescribed, below             Review of Systems   Constitutional, HEENT, cardiovascular, pulmonary, gi and gu systems are negative, except as otherwise noted.      Objective    BP (!) 146/66 (BP Location: Right arm, Patient Position: Sitting, Cuff Size: Adult Regular)   Pulse 94   Temp 97  F (36.1  C) (Tympanic)   Resp 20   Ht 1.626 m (5' 4.02\")   Wt 73.1 kg (161 lb 3.2 oz)   LMP  (LMP Unknown)   SpO2 98%   BMI 27.66 kg/m    Body mass index is 27.66 kg/m .  Physical Exam   GENERAL APPEARANCE: alert, no distress and frail  MS: 4/5 strength bilateral LE at hip, knee, ankle                    "

## 2023-05-22 ENCOUNTER — PATIENT OUTREACH (OUTPATIENT)
Dept: CARE COORDINATION | Facility: CLINIC | Age: 88
End: 2023-05-22
Payer: MEDICARE

## 2023-05-22 NOTE — PROGRESS NOTES
Community Paramedic Program  Community Health Worker Outreach    UTC/Voicemail    Outreach attempted x 1.      Left message on patient's voicemail with call back information and requested return call.    CHW Follow-up Plan: will try to reach patient again in 1-2 business days.    Note: Available with CP3 on 5/30 or CP5 on 5/31?    Referral source: Pt's PCP  Reason(s) for visit: Med Check/Setup   Goals for visit(s): Medication Compliance   How often should patient be seen: Weekly   Preference on when patient should be seen: Within 2 Weeks

## 2023-05-25 NOTE — PROGRESS NOTES
Community Paramedic Program  Community Health Worker Outreach    UTC/Voicemail    Outreach attempted x 2.      Left message on patient's voicemail with call back information and requested return call.    CHW Follow-up Plan: will try to reach patient again in 3-5 business days.    Note: Available with CP5 on 6/5 at 1:30-2 pm?    Referral source: Pt's PCP  Reason(s) for visit: Med Check/Setup   Goals for visit(s): Medication Compliance   How often should patient be seen: Weekly   Preference on when patient should be seen: Within 2 Weeks

## 2023-05-26 DIAGNOSIS — F41.9 ANXIETY AND DEPRESSION: ICD-10-CM

## 2023-05-26 DIAGNOSIS — F32.A ANXIETY AND DEPRESSION: ICD-10-CM

## 2023-05-26 RX ORDER — LORAZEPAM 0.5 MG/1
0.5 TABLET ORAL DAILY PRN
Qty: 8 TABLET | Refills: 0 | Status: SHIPPED | OUTPATIENT
Start: 2023-05-26 | End: 2023-07-04

## 2023-05-26 NOTE — TELEPHONE ENCOUNTER
I sent refill, recommend follow up with Dr Maddox for consideration of future refills    CYN Pandya CNP

## 2023-05-26 NOTE — TELEPHONE ENCOUNTER
Medication Question or Refill        What medication are you calling about (include dose and sig)?: lorazepam (ATIVAN) 0.5 MG TABLET    Preferred Pharmacy:   [facility] - [place] - [address] [address]  [address] [address] [address]  Phone: [phone] Fax: [phone]      Controlled Substance Agreement on file:   CSA -- Patient Level:    CSA: None found at the patient level.       Who prescribed the medication?: ELIZABETH  Do you need a refill? Yes    When did you use the medication last? LAST YEAR    Patient offered an appointment? Yes: SCHEDULED THE SOONEST AVAILABLE 6/15 WITH DR. JOHNSON    Do you have any questions or concerns?  Yes: DEATH in the family and am having a lot of trouble with sleeping right now. Extreme anxiety. The Lorazepam helped me in the past and I would like to try them now as I am having a really hard time.      Okay to leave a detailed message?: Yes at Home number on file [phone] (home) please call when script is ready to be delivered

## 2023-05-26 NOTE — TELEPHONE ENCOUNTER
Dr. Reynolds (provider of the day):    Please see note below regarding death in the family. Please advise refill.      MICHEL Robles

## 2023-05-31 NOTE — PROGRESS NOTES
"Community Paramedic Program  Community Health Worker Initial Outreach    Referral source: Pt's PCP  Reason(s) for visit: Med Check/Setup   Goals for visit(s): Medication Compliance   How often should patient be seen: Weekly   Preference on when patient should be seen: Within 2 Weeks     Initial visit: Monday, June 5th / 2 pm / CP Mehdi (date/time/CP)       Additional information:   Spoke with pt. Explained my role and the reason for my call. Pt said she would be interested to have a CP visit to review her medications at home and \"talk about my blood pressure.\" Pt said she has a blood pressure cuff that she uses and reported that her bp was 130/56 yesterday. She said she'd like \"to know what my blood pressure should be.\" Thanked her for sharing and notified her I will ask the CP to discuss with her during the visit. She agreed. Gave her a brief description of our program and what to expect. Pt said she would like an afternoon visit.    Confirmed pt's home address. She said she'll be alone during the visit and does not have any pets. She said the CP can park on the driveway and come to the front door when he arrives. She asked if he could call when he's on the way, and I agreed to pass this along to the CP. Sivan wrote down the visit details during the call, including my contact information, and agreed to call me with questions or to reschedule if needed.    When I asked pt if there's anything else she wanted us to know about her, she said \"my legs are weak all the time.\" Pt said her PCP recently sent a referral to [facility] for back injections but pt hasn't had the chance to make an appointment yet. She said \"when I've had those before they have helped my legs feel better.\" Thanked pt for sharing and for speaking with me today before ending the call.    Routing to pt's PCP and the CP for awareness.                       "

## 2023-06-01 ENCOUNTER — TELEPHONE (OUTPATIENT)
Dept: FAMILY MEDICINE | Facility: CLINIC | Age: 88
End: 2023-06-01
Payer: MEDICARE

## 2023-06-01 DIAGNOSIS — I10 BENIGN ESSENTIAL HYPERTENSION: Primary | Chronic | ICD-10-CM

## 2023-06-01 RX ORDER — OLMESARTAN MEDOXOMIL 20 MG/1
20 TABLET ORAL DAILY
Qty: 90 TABLET | Refills: 3 | Status: SHIPPED | OUTPATIENT
Start: 2023-06-01 | End: 2024-03-26

## 2023-06-01 NOTE — TELEPHONE ENCOUNTER
"S-(situation):  Pt reports elevated blood pressure readings and headaches over the past 10 days.    Pt say that she checks her blood pressure twice a day.  Over past 10 days, it has been going high and low.  When it goes high, pt reports a bad headache in the back of her head and neck area.  \"It always seems to happen about 11 am every day.\"    Pt reports lowest readings of 113/66, pulse 75, taken today.  Highest readings over recent weekend were 150's/60's.    Pt takes 1/2 acetaminophen and which helps the headache a lot.    Denies feeling weak, light-headed, or dizzy.   Denies palpitations.  Denies chest pain or pressure.  Denies nausea or vomiting.    Is eating and drinking per regular routine.    B-(background): CAD and htn.    A-(assessment): headaches and elevated BP's late morning for past 10 days reduced  with 1/2 dose of acetaminophen.    R-(recommendations):   Routed to provider for further instructions.    Pt last seen 5/17/23; no med changes.  Pt has pending appt 6/15/23.    Sandy Turner RN       "

## 2023-06-01 NOTE — TELEPHONE ENCOUNTER
Continue amlodipine.  Start olmesartan 20 mg once daily    Blood pressure in the 110s is not too low     RN blood pressure and BMP in 1-2 weeks

## 2023-06-02 NOTE — TELEPHONE ENCOUNTER
Patient notified, patient has appointment on 6-15-23 with PCP so patient will have B/P and lab done at that time.      MICHEL Robles

## 2023-06-03 ENCOUNTER — APPOINTMENT (OUTPATIENT)
Dept: RADIOLOGY | Facility: HOSPITAL | Age: 88
End: 2023-06-03
Attending: FAMILY MEDICINE
Payer: MEDICARE

## 2023-06-03 ENCOUNTER — HOSPITAL ENCOUNTER (EMERGENCY)
Facility: HOSPITAL | Age: 88
Discharge: HOME | End: 2023-06-03
Attending: FAMILY MEDICINE | Admitting: FAMILY MEDICINE
Payer: MEDICARE

## 2023-06-03 VITALS
OXYGEN SATURATION: 95 % | HEART RATE: 79 BPM | DIASTOLIC BLOOD PRESSURE: 59 MMHG | RESPIRATION RATE: 20 BRPM | TEMPERATURE: 98 F | SYSTOLIC BLOOD PRESSURE: 133 MMHG

## 2023-06-03 DIAGNOSIS — N39.0 RECURRENT UTI: ICD-10-CM

## 2023-06-03 DIAGNOSIS — R06.00 DYSPNEA, UNSPECIFIED TYPE: ICD-10-CM

## 2023-06-03 LAB
ALBUMIN UR-MCNC: NEGATIVE MG/DL
ANION GAP SERPL CALCULATED.3IONS-SCNC: 13 MMOL/L (ref 7–15)
APPEARANCE UR: ABNORMAL
BACTERIA #/AREA URNS HPF: ABNORMAL /HPF
BASE EXCESS BLDV CALC-SCNC: 3.3 MMOL/L
BASOPHILS # BLD AUTO: 0.1 10E3/UL (ref 0–0.2)
BASOPHILS NFR BLD AUTO: 1 %
BILIRUB UR QL STRIP: NEGATIVE
BUN SERPL-MCNC: 10.9 MG/DL (ref 8–23)
CALCIUM SERPL-MCNC: 9.6 MG/DL (ref 8.2–9.6)
CHLORIDE SERPL-SCNC: 106 MMOL/L (ref 98–107)
COLOR UR AUTO: ABNORMAL
CREAT SERPL-MCNC: 0.88 MG/DL (ref 0.51–0.95)
DEPRECATED HCO3 PLAS-SCNC: 24 MMOL/L (ref 22–29)
EOSINOPHIL # BLD AUTO: 0.3 10E3/UL (ref 0–0.7)
EOSINOPHIL NFR BLD AUTO: 3 %
ERYTHROCYTE [DISTWIDTH] IN BLOOD BY AUTOMATED COUNT: 13 % (ref 10–15)
GFR SERPL CREATININE-BSD FRML MDRD: 62 ML/MIN/1.73M2
GLUCOSE SERPL-MCNC: 135 MG/DL (ref 70–99)
GLUCOSE UR STRIP-MCNC: NEGATIVE MG/DL
HCO3 BLDV-SCNC: 28 MMOL/L (ref 24–30)
HCT VFR BLD AUTO: 41.1 % (ref 35–47)
HGB BLD-MCNC: 14.1 G/DL (ref 11.7–15.7)
HGB UR QL STRIP: NEGATIVE
HOLD SPECIMEN: NORMAL
IMM GRANULOCYTES # BLD: 0.1 10E3/UL
IMM GRANULOCYTES NFR BLD: 1 %
KETONES UR STRIP-MCNC: NEGATIVE MG/DL
LEUKOCYTE ESTERASE UR QL STRIP: ABNORMAL
LYMPHOCYTES # BLD AUTO: 3.2 10E3/UL (ref 0.8–5.3)
LYMPHOCYTES NFR BLD AUTO: 31 %
MAGNESIUM SERPL-MCNC: 2 MG/DL (ref 1.7–2.3)
MCH RBC QN AUTO: 28.7 PG (ref 26.5–33)
MCHC RBC AUTO-ENTMCNC: 34.3 G/DL (ref 31.5–36.5)
MCV RBC AUTO: 84 FL (ref 78–100)
MONOCYTES # BLD AUTO: 0.8 10E3/UL (ref 0–1.3)
MONOCYTES NFR BLD AUTO: 8 %
MUCOUS THREADS #/AREA URNS LPF: PRESENT /LPF
NEUTROPHILS # BLD AUTO: 5.9 10E3/UL (ref 1.6–8.3)
NEUTROPHILS NFR BLD AUTO: 56 %
NITRATE UR QL: NEGATIVE
NRBC # BLD AUTO: 0 10E3/UL
NRBC BLD AUTO-RTO: 0 /100
NT-PROBNP SERPL-MCNC: 176 PG/ML (ref 0–1800)
OXYHGB MFR BLDV: 59.3 % (ref 70–75)
PCO2 BLDV: 44 MM HG (ref 35–50)
PH BLDV: 7.41 [PH] (ref 7.35–7.45)
PH UR STRIP: 6.5 [PH] (ref 5–7)
PLATELET # BLD AUTO: 262 10E3/UL (ref 150–450)
PO2 BLDV: 30 MM HG (ref 25–47)
POTASSIUM SERPL-SCNC: 3.3 MMOL/L (ref 3.4–5.3)
RBC # BLD AUTO: 4.91 10E6/UL (ref 3.8–5.2)
RBC URINE: 3 /HPF
SAO2 % BLDV: 60.2 % (ref 70–75)
SODIUM SERPL-SCNC: 143 MMOL/L (ref 136–145)
SP GR UR STRIP: 1.01 (ref 1–1.03)
SQUAMOUS EPITHELIAL: <1 /HPF
TRANSITIONAL EPI: <1 /HPF
TROPONIN T SERPL HS-MCNC: 12 NG/L
UROBILINOGEN UR STRIP-MCNC: <2 MG/DL
WBC # BLD AUTO: 10.3 10E3/UL (ref 4–11)
WBC URINE: 97 /HPF
YEAST #/AREA URNS HPF: ABNORMAL /HPF

## 2023-06-03 PROCEDURE — 36415 COLL VENOUS BLD VENIPUNCTURE: CPT | Performed by: FAMILY MEDICINE

## 2023-06-03 PROCEDURE — 96365 THER/PROPH/DIAG IV INF INIT: CPT

## 2023-06-03 PROCEDURE — 82310 ASSAY OF CALCIUM: CPT | Performed by: FAMILY MEDICINE

## 2023-06-03 PROCEDURE — 84484 ASSAY OF TROPONIN QUANT: CPT | Performed by: FAMILY MEDICINE

## 2023-06-03 PROCEDURE — 96366 THER/PROPH/DIAG IV INF ADDON: CPT

## 2023-06-03 PROCEDURE — 99285 EMERGENCY DEPT VISIT HI MDM: CPT | Mod: 25

## 2023-06-03 PROCEDURE — 85025 COMPLETE CBC W/AUTO DIFF WBC: CPT | Performed by: FAMILY MEDICINE

## 2023-06-03 PROCEDURE — 71046 X-RAY EXAM CHEST 2 VIEWS: CPT

## 2023-06-03 PROCEDURE — 81001 URINALYSIS AUTO W/SCOPE: CPT | Performed by: FAMILY MEDICINE

## 2023-06-03 PROCEDURE — 250N000011 HC RX IP 250 OP 636: Performed by: FAMILY MEDICINE

## 2023-06-03 PROCEDURE — 93005 ELECTROCARDIOGRAM TRACING: CPT | Performed by: FAMILY MEDICINE

## 2023-06-03 PROCEDURE — 82805 BLOOD GASES W/O2 SATURATION: CPT | Performed by: FAMILY MEDICINE

## 2023-06-03 PROCEDURE — 83735 ASSAY OF MAGNESIUM: CPT | Performed by: FAMILY MEDICINE

## 2023-06-03 PROCEDURE — 83880 ASSAY OF NATRIURETIC PEPTIDE: CPT | Performed by: FAMILY MEDICINE

## 2023-06-03 PROCEDURE — 87086 URINE CULTURE/COLONY COUNT: CPT | Performed by: FAMILY MEDICINE

## 2023-06-03 RX ORDER — CEFTRIAXONE 1 G/1
1 INJECTION, POWDER, FOR SOLUTION INTRAMUSCULAR; INTRAVENOUS ONCE
Status: COMPLETED | OUTPATIENT
Start: 2023-06-03 | End: 2023-06-03

## 2023-06-03 RX ADMIN — CEFTRIAXONE SODIUM 1 G: 1 INJECTION, POWDER, FOR SOLUTION INTRAMUSCULAR; INTRAVENOUS at 06:10

## 2023-06-03 ASSESSMENT — ACTIVITIES OF DAILY LIVING (ADL)
ADLS_ACUITY_SCORE: 40

## 2023-06-03 ASSESSMENT — ENCOUNTER SYMPTOMS
RHINORRHEA: 0
VOMITING: 0
SHORTNESS OF BREATH: 1
COUGH: 0
PALPITATIONS: 0
DIARRHEA: 1

## 2023-06-03 NOTE — ED NOTES
Bed: JNED-20  Expected date: 6/3/23  Expected time: 3:19 AM  Means of arrival: Ambulance  Comments:  [facility]  89 F--gen weak

## 2023-06-03 NOTE — ED NOTES
Per EMS, Pt is using a new glucometer at home and does not know how to use it, has not checked her BG in about a month. EMS tried to help Pt use it, but were unsuccessful in getting the unit to work. PMH: HTN, DM2

## 2023-06-03 NOTE — ED TRIAGE NOTES
"Pt to room 20 via [facility] from home where she lives alone d/t general weakness. Per EMS, PD stated that Pt had chest tightness and SOB. Upon EMS arrival, those symptoms had resolved, but still felt weak and minimally SOB. En route,  and hypertensive to 160s. Here Pt is hypertensive to 200s, repeating statements \"I was seen a while ago at [facility] for the same thing, but they couldn't find out what was wrong\". Pt was also seen at home by EMS for similar Sx, was given supplemental O2, and refused transport and was not seen at that time. Pt is A&O to all spheres.      Triage Assessment     Row Name 06/03/23 6500       Triage Assessment (Adult)    Airway WDL WDL              "

## 2023-06-03 NOTE — ED PROVIDER NOTES
EMERGENCY DEPARTMENT ENCOUNTER      NAME: Daniela Brown  AGE: 89 year old female  YOB: 1933  MRN: [id number]  EVALUATION DATE & TIME: 6/3/2023  3:36 AM    PCP: Cynthia Maddox    ED PROVIDER: Heath Tang M.D.    Chief Complaint   Patient presents with     Generalized Weakness     Shortness of Breath       FINAL IMPRESSION:  1. Recurrent UTI    2. Dyspnea, unspecified type        ED COURSE & MEDICAL DECISION MAKING:    Pertinent Labs & Imaging studies independently interpreted by me. (See chart for details)  3:53 AM Patient seen and examined, reviewed prior emergency department record from May 8 when patient was seen for a fall, at that time labs are reassuring, no urinalysis performed at that time.  Differential diagnosis includes but not limited to COPD, asthma, CHF, pneumonia, bronchitis, pneumothorax, myocardial infarction, pulmonary embolism, anxiety.  Patient woke up with feeling of shortness of breath now resolved, similar episode several days ago.  Denies chest pain or palpitations, no cough, no leg swelling.  No abdominal pain, nausea, vomiting or diarrhea.  Labs and chest x-ray ordered.  5:08 AM labs independently interpreted by me with reassuring venous blood gas, negative troponin given time since onset of symptoms, normal BNP.  CBC is reassuring with no leukocytosis or anemia, basic panel with mild hypokalemia but no other acute findings.  Chest x-ray independently interpreted by me does not demonstrate any acute abnormalities.  Urinalysis is consistent with urinary tract infection.  Review of prior urine culture November 2022 with E. coli resistant to quinolones, similar was seen in August 2022.  Patient will be given Rocephin in the emergency department and plan for discharge on Omnicef.  Remained stable from respiratory standpoint.  5:39 AM I updated the patient with lab and imaging results.  Discussed urinalysis findings with patient.  Symptoms could be from  sleep apnea as well but unusual age of onset.  In any case, patient is [place] to discharge but will have to wait till daytime to find a ride    At the conclusion of the encounter I discussed the results of all of the tests and the disposition. The questions were answered. The patient or family acknowledged understanding and was agreeable with the care plan.     Medical Decision Making    History:    Supplemental history from: Documented in chart, if applicable    External Record(s) reviewed: Documented in chart, if applicable. and Other: [place] ED visit on 5/9/23    Work Up:    Chart documentation includes differential considered and any EKGs or imaging independently interpreted by provider, where specified.    In additional to work up documented, I considered the following work up: Documented in chart, if applicable.    External consultation:    Discussion of management with another provider: Documented in chart, if applicable    Complicating factors:    Care impacted by chronic illness: Chronic Kidney Disease, Diabetes, Hyperlipidemia and Hypertension    Care affected by social determinants of health: N/A    Disposition considerations: Discharge. I prescribed additional prescription strength medication(s) as charted. I considered admission, but discharged the patient after share decision making conversation.    EKG:    Performed at: 3:37 AM  Impression: Prolonged QT otherwise normal  Rate: 80  Rhythm: Sinus  Axis: Normal  MS Interval: 162  QRS Interval: 96  QTc Interval: 495  ST Changes: No acute ischemic changes  Comparison: Prior of August 2022 no change    Dr. Heath Tang reviewed and independently interpreted the EKG(s) documented above.    MEDICATIONS GIVEN IN THE EMERGENCY:  Medications   cefTRIAXone (ROCEPHIN) 1 g vial to attach to  mL bag for ADULTS or NS 50 mL bag for PEDS (1 g Intravenous $New Bag 6/3/23 0610)       NEW PRESCRIPTIONS STARTED AT TODAY'S ER VISIT  New Prescriptions    No  "medications on file       =================================================================    HPI    Patient information was obtained from: Patient       Daniela Brown is a 89 year old female with a pertinent history of CAD, CKD 3, DM type II, HTN, and HLD who presents to this ED EMS for evaluation of shortness of breath     Per chart review: The patient was seen at [facility] ED on 5/8/23 for a fall. The patient reports taking an antianxiety pill and feeling dizzy and falling. The patient's EKG was unchanged from past EKGs. The patient denied any pain and the patient was comfortable with discharge.     The patient reports prior to arrival she awoke from sleep and \"couldn't breathe\". The patient notes this is the second time this has happened on the last two weeks. The patient notes the shortness of breath lasts for a \"half hour or longer\" during these episodes. The patient notes she had some diarrhea recently as well. The patient denies chest pain, palpitations, cough, runny nose, or vomiting.    The patient notes her legs have been swollen for 1.5 years and has been seen for it, but can't figure out why.     REVIEW OF SYSTEMS   Review of Systems   HENT: Negative for rhinorrhea.    Respiratory: Positive for shortness of breath. Negative for cough.    Cardiovascular: Positive for leg swelling. Negative for chest pain and palpitations.   Gastrointestinal: Positive for diarrhea. Negative for vomiting.   All other systems reviewed and are negative.     All other systems reviewed and negative    PAST MEDICAL HISTORY:  Past Medical History:   Diagnosis Date     Abnormal cardiovascular stress test 2/3/2019    9/10/2018 Lexiscan: \"Myocardial perfusion imaging using single isotope technique demonstrated a small perfusion defect of mild severity involving the basal inferior wall which is mostly reversible and may be consistent with mild ischemia in the right coronary artery distribution. In " "addition, transient ischemic dilatation is noted with a TID ratio of 1.3. \"     Adhesive capsulitis of shoulder     left      Adjustment disorder with anxiety 3/18/2016     B12 deficiency anemia 6/20/2012     Chest pain 2004    Chronic right sided/axillary discomfort (musculoskeletal) Atypical substernal (possibly GERD). Negative cardiolite 2004.     Colitis, Clostridium difficile 4/18/2012     Diverticulitis 2009     Headache(784.0) 2001    Tension type. MRI 2001 normal.     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     Status post coronary angiogram 2/27/2019     Syncope and collapse 9/10/2018       PAST SURGICAL HISTORY:  Past Surgical History:   Procedure Laterality Date     ARTHROPLASTY KNEE  3/26/2012    Procedure:ARTHROPLASTY KNEE; Right Total Knee Arthroplasty; Surgeon:BERNADINE ROSAS; Location:[place] OR     CHOLECYSTECTOMY       CV HEART CATHETERIZATION WITH POSSIBLE INTERVENTION N/A 2/27/2019    Procedure: Coronary Angiogram with Left ventriculogram;  Surgeon: Leandro Carpio MD;  Location:  HEART CARDIAC CATH LAB     HERNIA REPAIR      Hernia Repair     HYSTERECTOMY, PAP NO LONGER INDICATED  1983    TVH/BSO     SURGICAL HISTORY OF -   10/08    A & P Repair, Dr. Camden INFANTEC APPENDECTOMY  1953       CURRENT MEDICATIONS:    Current Facility-Administered Medications   Medication     3 mL ropivacaine (NAROPIN) injection 5 mg/mL     3 mL ropivacaine (NAROPIN) injection 5 mg/mL     cefTRIAXone (ROCEPHIN) 1 g vial to attach to  mL bag for ADULTS or NS 50 mL bag for PEDS     triamcinolone (KENALOG-40) injection 40 mg     triamcinolone (KENALOG-40) injection 40 mg     Current Outpatient Medications   Medication     acetaminophen (TYLENOL) 500 MG tablet     amLODIPine (NORVASC) 5 MG tablet     bismuth subsalicylate (PEPTO BISMOL) 262 MG chewable tablet     " blood glucose (ACCU-CHEK GUIDE) test strip     blood glucose monitoring (ACCU-CHEK FASTCLIX) lancets     buPROPion (WELLBUTRIN XL) 150 MG 24 hr tablet     capsaicin (ZOSTRIX) 0.025 % external cream     Cyanocobalamin (B-12) 1000 MCG SUBL     famotidine (PEPCID) 20 MG tablet     fluocinonide (LIDEX) 0.05 % external cream     fluocinonide (LIDEX) 0.05 % external solution     lidocaine (LIDODERM) 5 % patch     loperamide (IMODIUM A-D) 2 MG tablet     LORazepam (ATIVAN) 0.5 MG tablet     meloxicam (MOBIC) 7.5 MG tablet     mometasone (ELOCON) 0.1 % external cream     Multiple Vitamins-Minerals (THERAPEUTIC MULTIVIT/MINERAL) TABS     nitroGLYcerin (NITROSTAT) 0.4 MG sublingual tablet     olmesartan (BENICAR) 20 MG tablet     order for DME     ORDER FOR DME     oxyCODONE (ROXICODONE) 5 MG tablet     PARoxetine (PAXIL) 10 MG tablet     pregabalin (LYRICA) 50 MG capsule     STATIN NOT PRESCRIBED (INTENTIONAL)     vitamin D3 (CHOLECALCIFEROL) 50 mcg (2000 units) tablet       ALLERGIES:  Allergies   Allergen Reactions     Metoprolol Itching and Rash     Cephalexin Rash     Erythromycin Rash     Actonel [Bisphosphonates] Itching     Azithromycin Hives     Celebrex [Ricci-2 Inhibitors] Rash     Cipro [Ciprofloxacin] Rash     Contrast Dye Hives     Diatrizoate Hives     Erythromycin Rash     Escitalopram Diarrhea     Gets very sick and mad feelings     Fosamax Itching and Rash     Keflex [Cephalexin Monohydrate] Rash     Lisinopril Cough     Risedronate Itching     Seasonal Allergies      Shellfish-Derived Products Hives     Tetanus Toxoid, Adsorbed Swelling     Tetanus-Diphtheria Toxoids Td Swelling     Vicodin [Acetaminophen] Itching     Patient reported - only when used scheduled in high doses.        Vioxx Rash     Acetaminophen Itching     In high doses  Patient reported - only when used scheduled in high doses.        Alendronate Rash     Celecoxib Rash     Penicillins Rash     Rofecoxib Rash     Sulfa Antibiotics Itching  and Rash     Sulfasalazine Itching and Rash       FAMILY HISTORY:  Family History   Problem Relation Age of Onset     C.A.D. Mother      Diabetes Mother      Cancer - colorectal Mother      Arthritis Mother      Heart Disease Mother      Lipids Brother      Obesity Brother      Hypertension Brother      Allergies Son      Eye Disorder Son         cataract     Thyroid Disease Sister         graves dz     Eye Disorder Son      Leukemia Son      Alcohol/Drug Father        SOCIAL HISTORY:   Social History     Socioeconomic History     Marital status:    Tobacco Use     Smoking status: Never     Smokeless tobacco: Never   Vaping Use     Vaping status: Never Used   Substance and Sexual Activity     Alcohol use: No     Drug use: No     Sexual activity: Not Currently     Partners: Male   Other Topics Concern     Parent/sibling w/ CABG, MI or angioplasty before 65F 55M? No   Social History Narrative    Lives in [place] independently. She has son who lives nearby in [place]. Her oldest son passed away in September 2018.        VITALS:  BP (!) 170/100   Pulse 68   Temp 98  F (36.7  C) (Oral)   Resp 20   LMP  (LMP Unknown)   SpO2 96%     PHYSICAL EXAM:  Physical Exam  Vitals and nursing note reviewed.   Constitutional:       Appearance: Normal appearance.   HENT:      Head: Normocephalic and atraumatic.      Right Ear: External ear normal.      Left Ear: External ear normal.      Nose: Nose normal.      Mouth/Throat:      Mouth: Mucous membranes are moist.   Eyes:      Extraocular Movements: Extraocular movements intact.      Conjunctiva/sclera: Conjunctivae normal.      Pupils: Pupils are equal, round, and reactive to light.   Cardiovascular:      Rate and Rhythm: Normal rate and regular rhythm.      Heart sounds: Murmur heard.       Systolic murmur is present with a grade of 3/6.     Comments: Trace bilateral lower extremity edema   Pulmonary:      Effort: Pulmonary effort is normal.      Breath sounds: Normal  breath sounds. No wheezing or rales.   Abdominal:      General: Abdomen is flat. There is no distension.      Palpations: Abdomen is soft.      Tenderness: There is no abdominal tenderness. There is no guarding.   Musculoskeletal:         General: Normal range of motion.      Cervical back: Normal range of motion and neck supple.      Right lower leg: Edema present.      Left lower leg: Edema present.   Lymphadenopathy:      Cervical: No cervical adenopathy.   Skin:     General: Skin is warm and dry.   Neurological:      General: No focal deficit present.      Mental Status: She is alert and oriented to person, place, and time. Mental status is at baseline.      Comments: No gross focal neurologic deficits   Psychiatric:         Mood and Affect: Mood normal.         Behavior: Behavior normal.         Thought Content: Thought content normal.          LAB:  All pertinent labs reviewed and interpreted.  Results for orders placed or performed during the hospital encounter of 06/03/23   XR Chest 2 Views    Impression    IMPRESSION: Prominent epicardial fat pad. Left midlung subsegmental atelectasis. No focal pulmonary consolidation or effusion otherwise. Heart size is upper limits normal without pulmonary vascular congestion. Atherosclerotic calcifications of the aortic   arch and descending thoracic and abdominal aorta. No pneumothorax. Multilevel degenerative changes of the thoracic spine. Cholecystectomy clips.    Extra Blue Top Tube   Result Value Ref Range    Hold Specimen JIC    Extra Red Top Tube   Result Value Ref Range    Hold Specimen JIC    Extra Green Top (Lithium Heparin) Tube   Result Value Ref Range    Hold Specimen JIC    Extra Purple Top Tube   Result Value Ref Range    Hold Specimen JIC    Basic metabolic panel   Result Value Ref Range    Sodium 143 136 - 145 mmol/L    Potassium 3.3 (L) 3.4 - 5.3 mmol/L    Chloride 106 98 - 107 mmol/L    Carbon Dioxide (CO2) 24 22 - 29 mmol/L    Anion Gap 13 7 - 15  mmol/L    Urea Nitrogen 10.9 8.0 - 23.0 mg/dL    Creatinine 0.88 0.51 - 0.95 mg/dL    Calcium 9.6 8.2 - 9.6 mg/dL    Glucose 135 (H) 70 - 99 mg/dL    GFR Estimate 62 >60 mL/min/1.73m2   Result Value Ref Range    Troponin T, High Sensitivity 12 <=14 ng/L   Result Value Ref Range    Magnesium 2.0 1.7 - 2.3 mg/dL   Blood gas venous   Result Value Ref Range    pH Venous 7.41 7.35 - 7.45    pCO2 Venous 44 35 - 50 mm Hg    pO2 Venous 30 25 - 47 mm Hg    Bicarbonate Venous 28 24 - 30 mmol/L    Base Excess/Deficit (+/-) 3.3   mmol/L    Oxyhemoglobin Venous 59.3 (L) 70.0 - 75.0 %    O2 Sat, Venous 60.2 (L) 70.0 - 75.0 %   Nt probnp inpatient (BNP)   Result Value Ref Range    N terminal Pro BNP Inpatient 176 0 - 1,800 pg/mL   CBC with platelets and differential   Result Value Ref Range    WBC Count 10.3 4.0 - 11.0 10e3/uL    RBC Count 4.91 3.80 - 5.20 10e6/uL    Hemoglobin 14.1 11.7 - 15.7 g/dL    Hematocrit 41.1 35.0 - 47.0 %    MCV 84 78 - 100 fL    MCH 28.7 26.5 - 33.0 pg    MCHC 34.3 31.5 - 36.5 g/dL    RDW 13.0 10.0 - 15.0 %    Platelet Count 262 150 - 450 10e3/uL    % Neutrophils 56 %    % Lymphocytes 31 %    % Monocytes 8 %    % Eosinophils 3 %    % Basophils 1 %    % Immature Granulocytes 1 %    NRBCs per 100 WBC 0 <1 /100    Absolute Neutrophils 5.9 1.6 - 8.3 10e3/uL    Absolute Lymphocytes 3.2 0.8 - 5.3 10e3/uL    Absolute Monocytes 0.8 0.0 - 1.3 10e3/uL    Absolute Eosinophils 0.3 0.0 - 0.7 10e3/uL    Absolute Basophils 0.1 0.0 - 0.2 10e3/uL    Absolute Immature Granulocytes 0.1 <=0.4 10e3/uL    Absolute NRBCs 0.0 10e3/uL   UA with Microscopic reflex to Culture    Specimen: Urine, Midstream   Result Value Ref Range    Color Urine Light Yellow Colorless, Straw, Light Yellow, Yellow    Appearance Urine Turbid (A) Clear    Glucose Urine Negative Negative mg/dL    Bilirubin Urine Negative Negative    Ketones Urine Negative Negative mg/dL    Specific Gravity Urine 1.007 1.001 - 1.030    Blood Urine Negative Negative     pH Urine 6.5 5.0 - 7.0    Protein Albumin Urine Negative Negative mg/dL    Urobilinogen Urine <2.0 <2.0 mg/dL    Nitrite Urine Negative Negative    Leukocyte Esterase Urine 500 Jef/uL (A) Negative    Bacteria Urine Many (A) None Seen /HPF    Budding Yeast Urine Few (A) None Seen /HPF    Mucus Urine Present (A) None Seen /LPF    RBC Urine 3 (H) <=2 /HPF    WBC Urine 97 (H) <=5 /HPF    Squamous Epithelials Urine <1 <=1 /HPF    Transitional Epithelials Urine <1 <=1 /HPF       RADIOLOGY:  Reviewed all pertinent imaging. Please see official radiology report.  XR Chest 2 Views   Final Result   IMPRESSION: Prominent epicardial fat pad. Left midlung subsegmental atelectasis. No focal pulmonary consolidation or effusion otherwise. Heart size is upper limits normal without pulmonary vascular congestion. Atherosclerotic calcifications of the aortic    arch and descending thoracic and abdominal aorta. No pneumothorax. Multilevel degenerative changes of the thoracic spine. Cholecystectomy clips.           I, Jamila Atwood, am serving as a scribe to document services personally performed by Dr. Tang based on my observation and the provider's statements to me. I, Heath Tang MD attest that Jamila Atwood is acting in a scribe capacity, has observed my performance of the services and has documented them in accordance with my direction.    Heath Tang M.D.  Emergency Medicine  [facility] EMERGENCY DEPARTMENT  [facility] [facility] [address]  [phone]  Dept: [phone]     Heath Tang MD  06/03/23 0614

## 2023-06-04 LAB — BACTERIA UR CULT: ABNORMAL

## 2023-06-04 RX ORDER — CEFDINIR 300 MG/1
300 CAPSULE ORAL 2 TIMES DAILY
Qty: 14 CAPSULE | Refills: 0 | Status: SHIPPED | OUTPATIENT
Start: 2023-06-04 | End: 2023-06-14

## 2023-06-05 ENCOUNTER — ALLIED HEALTH/NURSE VISIT (OUTPATIENT)
Dept: OTHER | Facility: CLINIC | Age: 88
End: 2023-06-05
Payer: MEDICARE

## 2023-06-05 VITALS
OXYGEN SATURATION: 98 % | HEART RATE: 84 BPM | DIASTOLIC BLOOD PRESSURE: 64 MMHG | SYSTOLIC BLOOD PRESSURE: 150 MMHG | TEMPERATURE: 98.8 F

## 2023-06-05 DIAGNOSIS — M48.062 SPINAL STENOSIS OF LUMBAR REGION WITH NEUROGENIC CLAUDICATION: ICD-10-CM

## 2023-06-05 DIAGNOSIS — G63 POLYNEUROPATHY ASSOCIATED WITH UNDERLYING DISEASE (H): ICD-10-CM

## 2023-06-05 DIAGNOSIS — I25.119 CORONARY ARTERY DISEASE INVOLVING NATIVE CORONARY ARTERY OF NATIVE HEART WITH ANGINA PECTORIS (H): ICD-10-CM

## 2023-06-05 DIAGNOSIS — M15.0 PRIMARY OSTEOARTHRITIS INVOLVING MULTIPLE JOINTS: ICD-10-CM

## 2023-06-05 DIAGNOSIS — N18.31 STAGE 3A CHRONIC KIDNEY DISEASE (H): ICD-10-CM

## 2023-06-05 DIAGNOSIS — F32.1 MODERATE MAJOR DEPRESSION (H): ICD-10-CM

## 2023-06-05 PROCEDURE — 99207 PR COMMUNITY PARAMEDIC - PATIENT NOT BILLABLE: CPT

## 2023-06-06 LAB
ATRIAL RATE - MUSE: 80 BPM
DIASTOLIC BLOOD PRESSURE - MUSE: NORMAL MMHG
INTERPRETATION ECG - MUSE: NORMAL
P AXIS - MUSE: 43 DEGREES
PR INTERVAL - MUSE: 162 MS
QRS DURATION - MUSE: 96 MS
QT - MUSE: 430 MS
QTC - MUSE: 495 MS
R AXIS - MUSE: -19 DEGREES
SYSTOLIC BLOOD PRESSURE - MUSE: NORMAL MMHG
T AXIS - MUSE: 47 DEGREES
VENTRICULAR RATE- MUSE: 80 BPM

## 2023-06-07 ENCOUNTER — OFFICE VISIT (OUTPATIENT)
Dept: FAMILY MEDICINE | Facility: CLINIC | Age: 88
End: 2023-06-07
Payer: MEDICARE

## 2023-06-07 ENCOUNTER — TELEPHONE (OUTPATIENT)
Dept: FAMILY MEDICINE | Facility: CLINIC | Age: 88
End: 2023-06-07

## 2023-06-07 VITALS
HEART RATE: 72 BPM | OXYGEN SATURATION: 98 % | DIASTOLIC BLOOD PRESSURE: 56 MMHG | RESPIRATION RATE: 20 BRPM | HEIGHT: 64 IN | TEMPERATURE: 98.7 F | SYSTOLIC BLOOD PRESSURE: 114 MMHG | BODY MASS INDEX: 27.31 KG/M2 | WEIGHT: 160 LBS

## 2023-06-07 DIAGNOSIS — I10 BENIGN ESSENTIAL HYPERTENSION: Chronic | ICD-10-CM

## 2023-06-07 DIAGNOSIS — M15.0 PRIMARY OSTEOARTHRITIS INVOLVING MULTIPLE JOINTS: ICD-10-CM

## 2023-06-07 DIAGNOSIS — Z87.440 HISTORY OF RECURRENT URINARY TRACT INFECTION: Primary | ICD-10-CM

## 2023-06-07 DIAGNOSIS — F41.1 GAD (GENERALIZED ANXIETY DISORDER): ICD-10-CM

## 2023-06-07 DIAGNOSIS — G63 POLYNEUROPATHY ASSOCIATED WITH UNDERLYING DISEASE (H): ICD-10-CM

## 2023-06-07 DIAGNOSIS — R41.3 MEMORY LOSS: ICD-10-CM

## 2023-06-07 PROCEDURE — 99214 OFFICE O/P EST MOD 30 MIN: CPT | Performed by: INTERNAL MEDICINE

## 2023-06-07 PROCEDURE — 96127 BRIEF EMOTIONAL/BEHAV ASSMT: CPT | Mod: 59 | Performed by: INTERNAL MEDICINE

## 2023-06-07 RX ORDER — PAROXETINE 30 MG/1
TABLET, FILM COATED ORAL EVERY MORNING
Status: CANCELLED | OUTPATIENT
Start: 2023-06-07

## 2023-06-07 ASSESSMENT — ANXIETY QUESTIONNAIRES
1. FEELING NERVOUS, ANXIOUS, OR ON EDGE: NEARLY EVERY DAY
IF YOU CHECKED OFF ANY PROBLEMS ON THIS QUESTIONNAIRE, HOW DIFFICULT HAVE THESE PROBLEMS MADE IT FOR YOU TO DO YOUR WORK, TAKE CARE OF THINGS AT HOME, OR GET ALONG WITH OTHER PEOPLE: NOT DIFFICULT AT ALL
6. BECOMING EASILY ANNOYED OR IRRITABLE: NOT AT ALL
GAD7 TOTAL SCORE: 12
5. BEING SO RESTLESS THAT IT IS HARD TO SIT STILL: NEARLY EVERY DAY
7. FEELING AFRAID AS IF SOMETHING AWFUL MIGHT HAPPEN: NOT AT ALL
2. NOT BEING ABLE TO STOP OR CONTROL WORRYING: NEARLY EVERY DAY
3. WORRYING TOO MUCH ABOUT DIFFERENT THINGS: NEARLY EVERY DAY
GAD7 TOTAL SCORE: 12

## 2023-06-07 ASSESSMENT — PATIENT HEALTH QUESTIONNAIRE - PHQ9
SUM OF ALL RESPONSES TO PHQ QUESTIONS 1-9: 12
5. POOR APPETITE OR OVEREATING: NOT AT ALL

## 2023-06-07 ASSESSMENT — PAIN SCALES - GENERAL: PAINLEVEL: NO PAIN (0)

## 2023-06-07 NOTE — PROGRESS NOTES
Assessment & Plan     History of recurrent urinary tract infection -I have recommended seeing urology or infectious disease many times in the past, most recently August/22.  She has declined.  Finish course of antibiotic    Benign essential hypertension -better controlled today.  Wonder if some of her previous hypertension was due to medication noncompliance    Memory loss -probably multifactorial with perhaps early stage dementia versus anxiety as a cause.  I still feel she is safe to live independently.  I have encouraged her to consider assisted living primarily due to functional limitations with mobility historically severe and uncontrolled.  She has poor medication compliance    ARABELLA (generalized anxiety disorder) -due to perceived side effects or poor health literacy about what the medications are for.  She is now using a pillbox as set up by the community paramedic to ensure medication compliance.  Recommend follow-up in 1-2 months and if anxiety continues to be uncontrolled would increase Paxil.  Discouraged regular use of benzo due to recent fall directly under the influence of benzo use and concern for worsening cognitive function.    Primary osteoarthritis involving multiple joints meloxicam is helpful    Polyneuropathy associated with underlying disease (H) -she is now taking the Lyrica regularly.  She often has visits complaining of neuropathy pain, and I have prescribed Lyrica multiple times in the past but she never was taking regularly          Patient Instructions   Medications:  1.     Anxiety  1. Long term use of Lorazepam (Ativan) is not safe -can cause memory problems  2. Continue paroxetine (Paxil) from 20 mg daily and Buproprion (Wellbutrin) daily  3. Lets follow-up in 1 month and see how anxiety is doing with taking the medications regularly    Recurrent UTI  1. I have recommended seeing infectious disease and/or Urology in the past to consider starting suppressive therapy but you have  declined in Aug and again today    From Aug 2022 appointment   Anxiety:  1. Start Paxil 10 mg daily  2. Recommend consultation with Psychiatry, since you have tried several medications      Recurrent bladder infections:  1. You did not think the Myrbetriq helped, so you stopped it  2. Discussed that an daily antibiotic would breed resistance in the bacteria; because you have so many drug allergies, this is not ideal  3. Consider Urology referral, you declined      Cynthia Maddox, [facility]    Shruti Finnegan is a 89 year old, presenting for the following health issues:  ER F/U (Dont know her meds name ), Depression, and Anxiety        6/7/2023     8:44 AM   Additional Questions   Roomed by geovanny lewis   Accompanied by self         6/7/2023     8:44 AM   Patient Reported Additional Medications   Patient reports taking the following new medications none     HPI     Chief Complaint   Patient presents with     ER F/U     Dont know her meds name      Depression     Anxiety         ED/UC Followup:    Facility:  [facility]  Date of visit: 6/3/23  Reason for visit: Generalized Weakness. Shortness of Breath  Current Status: not short of breath , just having a hard time taking meds everyday too many meds, would like to know if need to take all of them, no more weakness      Polypharmacy  --She is frequently concerned about polypharmacy.  She regularly is not familiar with her medications or when she takes them for.  I referred her to the community paramedic who has already met with her.  They set her pills up in the pillbox and explained each medication one by 1.  They plan to do a recheck next week  --She often self starts/stops medications without physician guidance  --before community paramedic set up pills she is not sure which medications she was taking regularly.    UTI  --urinary frequency at night has improved since being on antibiotic on 6/4  --she reports symptoms had been ongoing  x 1 month but she attributed it to age  --she was also seen in ER for shortness of breath and this has resolved    Depression and Anxiety Follow-Up    How are you doing with your depression since your last visit? No change    How are you doing with your anxiety since your last visit?  No change    Are you having other symptoms that might be associated with depression or anxiety? Yes:  dont like to take meds    Have you had a significant life event? No     Do you have any concerns with your use of alcohol or other drugs? No     She agrees w/her son that she is very anxious    Social History     Tobacco Use     Smoking status: Never     Smokeless tobacco: Never   Vaping Use     Vaping status: Never Used   Substance Use Topics     Alcohol use: No     Drug use: No         9/22/2022     3:33 PM 4/19/2023    10:32 AM 6/7/2023     1:16 PM   PHQ   PHQ-9 Total Score 4 8 12   Q9: Thoughts of better off dead/self-harm past 2 weeks Not at all Not at all Not at all         5/27/2021     2:30 PM 3/16/2022     2:40 PM 6/7/2023     1:16 PM   ARABELLA-7 SCORE   Total Score 3 2 12         6/7/2023     1:16 PM   Last PHQ-9   1.  Little interest or pleasure in doing things 2   2.  Feeling down, depressed, or hopeless 2   3.  Trouble falling or staying asleep, or sleeping too much 0   4.  Feeling tired or having little energy 3   5.  Poor appetite or overeating 2   6.  Feeling bad about yourself 0   7.  Trouble concentrating 0   8.  Moving slowly or restless 3   Q9: Thoughts of better off dead/self-harm past 2 weeks 0   PHQ-9 Total Score 12   Difficulty at work, home, or with people Not difficult at all         6/7/2023     1:16 PM   ARABELLA-7    1. Feeling nervous, anxious, or on edge 3   2. Not being able to stop or control worrying 3   3. Worrying too much about different things 3   4. Trouble relaxing 0   5. Being so restless that it is hard to sit still 3   6. Becoming easily annoyed or irritable 0   7. Feeling afraid, as if something awful  "might happen 0   ARABELLA-7 Total Score 12   If you checked any problems, how difficult have they made it for you to do your work, take care of things at home, or get along with other people? Not difficult at all       Suicide Assessment Five-step Evaluation and Treatment (SAFE-T)      How many servings of fruits and vegetables do you eat daily?  2-3    On average, how many sweetened beverages do you drink each day (Examples: soda, juice, sweet tea, etc.  Do NOT count diet or artificially sweetened beverages)?   1 7/up half a can    How many days per week do you exercise enough to make your heart beat faster? 7    How many minutes a day do you exercise enough to make your heart beat faster? 30 - 60    How many days per week do you miss taking your medication? 0        --son called and spoke with RN today \"Every week mom pushes the button to be transported to the ER for different reasons. Mom has hypochondria since I was little.  Mom needs more than 8 tablets of anxiety med's because she then calls the ambulance to take her to the ER when she is out of the medication for anxiety.  Mom needs to be on a continuous antibiotic because she gets UTI all of the time. \"    Anxiety  -- We have discussed anxiety at multiple previous visits.  I have declined to prescribe a larger amount of the benzodiazepines due to safety concerns  --I have recommended seeing psychiatry and behavioral clinician multiple times but she has declined  --I most recently referred her to psychiatry Aug 2022  -- She has been on multiple SSRIs including Celexa, amitriptyline, Cymbalta, Lexapro, Zoloft, wellbutrin  --she is currently on paxil  -- She often self starts and stops medications, hence the community paramedic referral from the last visit  --she reports her son is 'pushing me to go to a nursing home' and she feels strongly she doesn't want to live there; he also thinks she goes to the doctor too much    Recurrent UTI  -- I have recommended seeing " infectious disease and urology in the past but she has declined      Current Outpatient Medications   Medication Sig Dispense Refill     acetaminophen (TYLENOL) 500 MG tablet Take 500-1,000 mg by mouth every 6 hours as needed for mild pain or pain       amLODIPine (NORVASC) 5 MG tablet TAKE 1 TABLET BY MOUTH ONCE DAILY 90 tablet 0     bismuth subsalicylate (PEPTO BISMOL) 262 MG chewable tablet Take 1 tablet by mouth daily as needed       blood glucose (ACCU-CHEK GUIDE) test strip Use to test blood sugar one times daily or as directed. 100 each 1     blood glucose monitoring (ACCU-CHEK FASTCLIX) lancets Use to test blood sugar one times daily or as directed. 102 each 3     buPROPion (WELLBUTRIN XL) 150 MG 24 hr tablet Take 1 tablet (150 mg) by mouth every morning 90 tablet 3     capsaicin (ZOSTRIX) 0.025 % external cream Place on affected area, you may notice a transient burning sensation 60 g 0     cefdinir (OMNICEF) 300 MG capsule Take 1 capsule (300 mg) by mouth 2 times daily 14 capsule 0     Cyanocobalamin (B-12) 1000 MCG SUBL Place 1 tablet under the tongue every evening        famotidine (PEPCID) 20 MG tablet Take 1 tablet (20 mg) by mouth 2 times daily 90 tablet 3     fluocinonide (LIDEX) 0.05 % external cream Apply topically 2 times daily To chest and neck 60 g 6     fluocinonide (LIDEX) 0.05 % external solution Apply topically 2 times daily To scalp 120 mL 6     lidocaine (LIDODERM) 5 % patch Place 1 patch onto the skin every 24 hours To prevent lidocaine toxicity, patient should be patch free for 12 hrs daily. (Patient taking differently: Place onto the skin every 24 hours To prevent lidocaine toxicity, patient should be patch free for 12 hrs daily.) 15 patch 0     loperamide (IMODIUM A-D) 2 MG tablet Take 1 tablet (2 mg) by mouth 2 times daily as needed for diarrhea 60 tablet 11     LORazepam (ATIVAN) 0.5 MG tablet Take 1 tablet (0.5 mg) by mouth daily as needed for anxiety (for short term only, do not  "take more than one per day) 8 tablet 0     meloxicam (MOBIC) 7.5 MG tablet Take 1 tablet (7.5 mg) by mouth daily 90 tablet 3     mometasone (ELOCON) 0.1 % external cream Apply topically daily 50 g 6     Multiple Vitamins-Minerals (THERAPEUTIC MULTIVIT/MINERAL) TABS Take 1 tablet by mouth every evening        nitroGLYcerin (NITROSTAT) 0.4 MG sublingual tablet Place 1 tablet (0.4 mg) under the tongue every 5 minutes as needed for chest pain 25 tablet 4     olmesartan (BENICAR) 20 MG tablet Take 1 tablet (20 mg) by mouth daily 90 tablet 3     order for DME 1 walker 1 Device 0     ORDER FOR DME Thigh length teds. 1 Device 2     oxyCODONE (ROXICODONE) 5 MG tablet Take 1 tablet (5 mg) by mouth every 6 hours as needed for severe pain 6 tablet 0     PARoxetine (PAXIL) 10 MG tablet Take 2 tablets (20 mg) by mouth every morning 30 tablet 3     pregabalin (LYRICA) 50 MG capsule Take 1 capsule (50 mg) by mouth 2 times daily 60 capsule 5     STATIN NOT PRESCRIBED (INTENTIONAL) Please choose reason not prescribed, below       vitamin D3 (CHOLECALCIFEROL) 50 mcg (2000 units) tablet Take 1 tablet (50 mcg) by mouth daily 90 tablet 3       Review of Systems   Constitutional, HEENT, cardiovascular, pulmonary, gi and gu systems are negative, except as otherwise noted.      Objective    /56 (BP Location: Right arm, Patient Position: Sitting, Cuff Size: Adult Regular)   Pulse 72   Temp 98.7  F (37.1  C) (Tympanic)   Resp 20   Ht 1.626 m (5' 4.02\")   Wt 72.6 kg (160 lb)   LMP  (LMP Unknown)   SpO2 98%   BMI 27.45 kg/m    Body mass index is 27.45 kg/m .  Physical Exam   GENERAL APPEARANCE: healthy, alert and no distress  RESP: lungs clear to auscultation - no rales, rhonchi or wheezes  CV: regular rates and rhythm, normal S1 S2, no S3 or S4 and no murmur, click or rub  PSYCH: mentation appears normal, affect normal/bright, anxious and worried                    "

## 2023-06-07 NOTE — TELEPHONE ENCOUNTER
"Wally is calling to inform Dr. Maddox of some on going issues that are needing to be addressed at the office visit today.    \"Every week mom pushes the button to be transported to the ER for different reasons. Mom has hypochondria since I was little.     Mom needs more than 8 tablets of anxiety med's because she then calls the ambulance to take her to the ER when she is out of the medication for anxiety.    Mom needs to be on a continuous antibiotic because she gets UTI all of the time. \"      Routing message to Dr. Maddox for today's appointment.     Jalyn Castillo, RN on 6/7/2023 at 12:44 PM         "

## 2023-06-07 NOTE — TELEPHONE ENCOUNTER
Noted son response.  Patient is already set up with the community paramedic and she had her first visit earlier this week and there is plans for follow-up visit next week.  I do not believe she qualifies for home care at this time.  I have also talked to patient about consideration of assisted living to help with medication set up/management, mobility, etc and she declines.  She has medical decision making capacity to decline.  I am aware that she is often noncompliant with her medications.

## 2023-06-07 NOTE — TELEPHONE ENCOUNTER
Noted.  These are all issues patient and I have discussed multiple multiple times over the years.  I would encourage the son to attend appointments given his concerns.     She does not follow through on my recommendation to see a mental health profession (for anxiety) or Urology (for UTI); she self starts/stops mental health medications (paxil, wellbutrin), which leads to her anxiety being poorly controlled.    I will NOT be Rxing more ativan.  It is not safe for regular use in the elderly - leads to falls, dementia.  Patient had a fall recently in the ER, directly related to ativan use.

## 2023-06-07 NOTE — PATIENT INSTRUCTIONS
Medications:      Anxiety  Long term use of Lorazepam (Ativan) is not safe -can cause memory problems  Continue paroxetine (Paxil) from 20 mg daily and Buproprion (Wellbutrin) daily  Lets follow-up in 1 month and see how anxiety is doing with taking the medications regularly    Recurrent UTI  I have recommended seeing infectious disease and/or Urology in the past to consider starting suppressive therapy but you have declined in Aug and again today    From Aug 2022 appointment   Anxiety:  Start Paxil 10 mg daily  Recommend consultation with Psychiatry, since you have tried several medications      Recurrent bladder infections:  You did not think the Myrbetriq helped, so you stopped it  Discussed that an daily antibiotic would breed resistance in the bacteria; because you have so many drug allergies, this is not ideal  Consider Urology referral, you declined

## 2023-06-07 NOTE — TELEPHONE ENCOUNTER
Left message for the patient's son Wally to call the clinic. We do have permission to speak to her son Wally. Marisela NICHOLSON RN

## 2023-06-07 NOTE — TELEPHONE ENCOUNTER
DR Maddox,    Please see telephone note.  Spoke with son Wally. He asks about home care.     Would a community paramedic be appropriate for this pt?     Tahira Rowe RN

## 2023-06-07 NOTE — TELEPHONE ENCOUNTER
"Son, Wally (C2C on file) calls back.  States he offered to take pt to appt on a different day because he had an eye appt today but pt refused to wait.  States pt refuses to move in with him, refuses to move into an apt & refuses assisted living. Pt only wants to stay in her house.  Wally has removed throw rugs, put hand rails up & ramps in.   He states pt also refuses to have family members come in & help her, states shes \"sick\" so they won't come over.  States its very hard to get pt to take meds. She seems to take meds for few days & then quits. Wally sets up her meds but is not there every day to make sure she takes them. consistently. He has tried different pill containers. He is looking into an automated timed dispenser now.   He feels like pt sometimes \"fakes\" falling.     Wally questions home care & also asks about abx for recurring UTI's.   He said he will call & help make appt for mental health referral. He was given the referral number.    Routing to provider to update.    Tahira Rowe RN    "

## 2023-06-07 NOTE — PROGRESS NOTES
"Community Paramedics Initial Visit  June 5, 2023 TIME:1400    Daniela Brown is a 89 year old female being seen at home for a Community Paramedic Home visit.    Present at appointment: Patient and CP Mehdi Ellis     Pt was met in a well kept home. Pt complained of being tired and having difficulty finding energy to \"just do things.\" she stated that she has to force herself to get going and get things done. Pt stated that she does not take her medications unless she knows what they are for, several of her prescribed medications were marked as \"Not taking\" for this reason. I explained to her what they were and she seemed to understand and even wrote down some of them, Pt also agreed to take them moving forward. Pt was provided with a pill organizer and it was set up for her by the CP. Pt was receptive to following a plan for her medications. CP will follow up in one week to verify medication compliance and continue assisting with medication management.           Chief Complaint   Patient presents with     Outreach     Hypertension     Diabetes     Recheck Medication       Universal Utilization:      Utilization    Hospital Admissions  2             ED Visits  6             No Show Count (past year)  1                Current as of: 6/6/2023  9:59 AM              Clinical Concerns:  Current Medical Concerns:  Not taking medications that are prescribed because she is unsure what they are.    Current Behavioral Concerns: Depression and anxiety    Education Provided to patient: Educated patient on the reason certain medications are prescribed to her, in the cases of some that she said she was not taking because she did not know what they were for.     Vitals: BP (!) 150/64 (BP Location: Right arm, Patient Position: Sitting, Cuff Size: Adult Regular)   Pulse 84   Temp 98.8  F (37.1  C) (Temporal)   LMP  (LMP Unknown)   SpO2 98%   BMI: There is no height or weight on file to calculate BMI.         Clinical " Pathway: None    Review of Symptoms/PE    Skin: negative  Eyes: negative  Ears/Nose/Throat: negative  Respiratory: No shortness of breath, dyspnea on exertion, cough, or hemoptysis  Cardiovascular: negative  Gastrointestinal: negative  Genitourinary: negative  Musculoskeletal: negative  Neurologic: negative  Psychiatric: positive for anxiety and depression    Medication Management:    Medications need to be refilled:none.     Medications set up: Yes  Pill Box issued: Yes  Scale issued: No  Flu Shot given: No  COVID Vaccine Given: No  Lab draw or specimen collection: No  Food resource given: No  Collaborative visit with PCP: No  Wound Care: No  Health Maintenance Addressed No    Functional Status:       Living Situation:       [facility] Home Safety Assessment  Floors:  Are there throw rugs on the floor?: Yes  Are there papers, books, towels, shoes, magazines on the floor?: No  Are there loose wires and cords you need to walk around? : No  Stairs and Steps  Are there papers, books, towels, shoes, magazines on the stairs?: No  Are some steps broken or uneven?: No  Is there a light over the stairway?: Yes  Has the stairway bulb burned out?: No  Is the carpet on the steps loose or torn?: No  Are the handrails loose or broken?: No  Kitchen:  Are the things you use often on high shelves?: No  Is your step stool unsteady?: No  Bathrooms:  Is the tub or shower floor slippery?: No  Do you need support when you get in and out of the tub?: No  Bedrooms:  Is the light near the bed hard to reach?: No  Is the path from your bed to the bathroom dark?: No       Diet/Exercise/Sleep:       Transportation:           Psychosocial:       Core Healthy Days Survey - Healthy Days Survey - (Centers for Disease Control and Prevention - Used with Permission.)  Would you say that in general your health is: : Fair  Now thinking about your physical health, which includes physical illness and injury, for how many days during the past 30  days was your physical health not good?: 15  Now thinking about your mental health, which includes stress, depression, and problems with emotions, for how many days during the past 30 days was your mental health not good?: 30  During the past 30 days, for about how many days did poor physical or mental health keep you from doing your usual activities, such as self-care, work, or recreation?: 15  CHDS SCORE: 45    No  Face to Face in Home / Community    Today's PHQ-2 Score:      Today's PHQ-9 Score:       4/19/2023    10:32 AM   PHQ-9 SCORE   PHQ-9 Total Score 8        Today's GAD7 score:       3/16/2022     2:40 PM   ARABELLA-7 SCORE   Total Score 2                Patient Active Problem List   Diagnosis     Degeneration of lumbar or lumbosacral intervertebral disc     Esophageal reflux     Memory loss     Irritable bowel syndrome with diarrhea     Osteopenia of multiple sites     RESTLESS LEG SYNDROME     Primary osteoarthritis involving multiple joints     Diverticulosis of large intestine     SENSONRL HEAR LOSS,BILAT     Urticaria     Subjective tinnitus     Vitamin D deficiency     Right foot pain     Urge incontinence     Hyperlipidemia LDL goal <100     Allergic rhinitis     Pronation of foot     Peripheral neuropathy     Benign essential hypertension     Carpal tunnel syndrome of left wrist     Diastolic dysfunction     Type 2 diabetes mellitus with diabetic polyneuropathy, without long-term current use of insulin (H)     History of recurrent urinary tract infection     CKD (chronic kidney disease) stage 3, GFR 30-59 ml/min (H)     Bilateral wrist pain     Protrusion of lumbar intervertebral disc     Coronary artery disease involving native coronary artery of native heart with angina pectoris (H)     Chronic left-sided low back pain with left-sided sciatica     Generalized weakness     Diarrhea     Abnormal CT of the abdomen     Cystocele, midline     B12 deficiency     Moderate major depression (H)     ARABELLA  (generalized anxiety disorder)     Polyneuropathy associated with underlying disease (H)         Current Outpatient Medications:      acetaminophen (TYLENOL) 500 MG tablet, Take 500-1,000 mg by mouth every 6 hours as needed for mild pain or pain, Disp: , Rfl:      amLODIPine (NORVASC) 5 MG tablet, TAKE 1 TABLET BY MOUTH ONCE DAILY, Disp: 90 tablet, Rfl: 0     bismuth subsalicylate (PEPTO BISMOL) 262 MG chewable tablet, Take 1 tablet by mouth daily as needed, Disp: , Rfl:      capsaicin (ZOSTRIX) 0.025 % external cream, Place on affected area, you may notice a transient burning sensation, Disp: 60 g, Rfl: 0     cefdinir (OMNICEF) 300 MG capsule, Take 1 capsule (300 mg) by mouth 2 times daily, Disp: 14 capsule, Rfl: 0     Cyanocobalamin (B-12) 1000 MCG SUBL, Place 1 tablet under the tongue every evening , Disp: , Rfl:      LORazepam (ATIVAN) 0.5 MG tablet, Take 1 tablet (0.5 mg) by mouth daily as needed for anxiety (for short term only, do not take more than one per day), Disp: 8 tablet, Rfl: 0     meloxicam (MOBIC) 7.5 MG tablet, Take 1 tablet (7.5 mg) by mouth daily, Disp: 90 tablet, Rfl: 3     olmesartan (BENICAR) 20 MG tablet, Take 1 tablet (20 mg) by mouth daily, Disp: 90 tablet, Rfl: 3     vitamin D3 (CHOLECALCIFEROL) 50 mcg (2000 units) tablet, Take 1 tablet (50 mcg) by mouth daily, Disp: 90 tablet, Rfl: 3     blood glucose (ACCU-CHEK GUIDE) test strip, Use to test blood sugar one times daily or as directed., Disp: 100 each, Rfl: 1     blood glucose monitoring (ACCU-CHEK FASTCLIX) lancets, Use to test blood sugar one times daily or as directed., Disp: 102 each, Rfl: 3     buPROPion (WELLBUTRIN XL) 150 MG 24 hr tablet, Take 1 tablet (150 mg) by mouth every morning (Patient not taking: Reported on 6/5/2023), Disp: 90 tablet, Rfl: 3     famotidine (PEPCID) 20 MG tablet, Take 1 tablet (20 mg) by mouth 2 times daily (Patient not taking: Reported on 6/5/2023), Disp: 90 tablet, Rfl: 3     fluocinonide (LIDEX) 0.05 %  external cream, Apply topically 2 times daily To chest and neck, Disp: 60 g, Rfl: 6     fluocinonide (LIDEX) 0.05 % external solution, Apply topically 2 times daily To scalp, Disp: 120 mL, Rfl: 6     lidocaine (LIDODERM) 5 % patch, Place 1 patch onto the skin every 24 hours To prevent lidocaine toxicity, patient should be patch free for 12 hrs daily. (Patient not taking: Reported on 6/5/2023), Disp: 15 patch, Rfl: 0     loperamide (IMODIUM A-D) 2 MG tablet, Take 1 tablet (2 mg) by mouth 2 times daily as needed for diarrhea (Patient not taking: Reported on 6/5/2023), Disp: 60 tablet, Rfl: 11     mometasone (ELOCON) 0.1 % external cream, Apply topically daily, Disp: 50 g, Rfl: 6     Multiple Vitamins-Minerals (THERAPEUTIC MULTIVIT/MINERAL) TABS, Take 1 tablet by mouth every evening , Disp: , Rfl:      nitroGLYcerin (NITROSTAT) 0.4 MG sublingual tablet, Place 1 tablet (0.4 mg) under the tongue every 5 minutes as needed for chest pain, Disp: 25 tablet, Rfl: 4     order for DME, 1 walker, Disp: 1 Device, Rfl: 0     ORDER FOR DME, Thigh length teds., Disp: 1 Device, Rfl: 2     oxyCODONE (ROXICODONE) 5 MG tablet, Take 1 tablet (5 mg) by mouth every 6 hours as needed for severe pain (Patient not taking: Reported on 6/5/2023), Disp: 6 tablet, Rfl: 0     PARoxetine (PAXIL) 10 MG tablet, Take 2 tablets (20 mg) by mouth every morning (Patient not taking: Reported on 6/5/2023), Disp: 30 tablet, Rfl: 3     pregabalin (LYRICA) 50 MG capsule, Take 1 capsule (50 mg) by mouth 2 times daily (Patient not taking: Reported on 6/5/2023), Disp: 60 capsule, Rfl: 5     STATIN NOT PRESCRIBED (INTENTIONAL), Please choose reason not prescribed, below, Disp: , Rfl:     Current Facility-Administered Medications:      3 mL ropivacaine (NAROPIN) injection 5 mg/mL, 3 mL, , , Tho Newman DO, 3 mL at 04/04/23 1500     3 mL ropivacaine (NAROPIN) injection 5 mg/mL, 3 mL, , , Tho Newman DO, 3 mL at 04/04/23 1500      triamcinolone (KENALOG-40) injection 40 mg, 40 mg, , , Tho Newman, DO, 40 mg at 04/04/23 1500     triamcinolone (KENALOG-40) injection 40 mg, 40 mg, , , Tho Newman, DO, 40 mg at 04/04/23 1500      Time spent with patient: 60    The patient meets one or more of the following criteria:  * Has received hospital emergency department services three or more times in four consecutive months within a twelve month period    Acute concern/Follow-up recommendations: none    Next CP visit scheduled: 6/12 @ 11:00am    Issues for Provider to follow up on: none    Provider follow up visit needed: none

## 2023-06-08 ENCOUNTER — TELEPHONE (OUTPATIENT)
Dept: FAMILY MEDICINE | Facility: CLINIC | Age: 88
End: 2023-06-08
Payer: MEDICARE

## 2023-06-08 NOTE — TELEPHONE ENCOUNTER
"  Patient Returning Call    Reason for call:  Blood sugars this am was 207. Felt really bad. Weak and dizzy. Couldn't hardly stand up. Stayed home today because I was so weak.    Information relayed to patient:  I Am diabetic but I don't have any medication for it.     Patient has additional questions:  Yes    What are your questions/concerns:\"  Don't know what to do about taking something for this. I am on so many medications right now I don't know if this is helping\"    Who does the patient want to speak with:  RN  Okay to leave a detailed message?: Yes at Home number on file [phone] (home)   "

## 2023-06-09 NOTE — TELEPHONE ENCOUNTER
"Pt was called back regarding yesterday's message about her 207 blood sugar yesterday. Pt is diagnosed with diet controlled Type II diabetes. Blood sugar was 180 on 5/8 135 on 6/3. She didn't feel well yesterday morning, she felt very weak and tired so she didn't go out of town with her son. She feels better this afternoon but was unable to check her blood sugar again as the battery was dead. She said she hasn't been taking her home medications as \"I can't take all those pills.\" Pt was advised to take 1 at a time over a few hours but she stated,\"I can't do that.\" Pt is eating, drinking and urinating okay, she was advised to drink more water as she said she hasn't been urinating as much. Her legs are swollen more at night but are back to normal size in the morning, she denies SOA.   BP readings today were 135/66 and 126/65. Pt will ask her son to get batteries for her glucometer. Community paramedic will be out on Monday, pt will call with any further problems. Marisela Bynum RN     "

## 2023-06-12 ENCOUNTER — HOSPITAL ENCOUNTER (EMERGENCY)
Facility: CLINIC | Age: 88
Discharge: HOME | End: 2023-06-12
Attending: FAMILY MEDICINE | Admitting: FAMILY MEDICINE
Payer: MEDICARE

## 2023-06-12 ENCOUNTER — APPOINTMENT (OUTPATIENT)
Dept: GENERAL RADIOLOGY | Facility: CLINIC | Age: 88
End: 2023-06-12
Attending: FAMILY MEDICINE
Payer: MEDICARE

## 2023-06-12 VITALS
TEMPERATURE: 98 F | RESPIRATION RATE: 16 BRPM | HEART RATE: 72 BPM | DIASTOLIC BLOOD PRESSURE: 70 MMHG | WEIGHT: 160 LBS | BODY MASS INDEX: 27.31 KG/M2 | OXYGEN SATURATION: 97 % | SYSTOLIC BLOOD PRESSURE: 174 MMHG | HEIGHT: 64 IN

## 2023-06-12 DIAGNOSIS — R06.00 DYSPNEA, UNSPECIFIED TYPE: ICD-10-CM

## 2023-06-12 LAB
ALBUMIN SERPL BCG-MCNC: 4.3 G/DL (ref 3.5–5.2)
ALBUMIN UR-MCNC: NEGATIVE MG/DL
ALP SERPL-CCNC: 71 U/L (ref 35–104)
ALT SERPL W P-5'-P-CCNC: 24 U/L (ref 10–35)
ANION GAP SERPL CALCULATED.3IONS-SCNC: 14 MMOL/L (ref 7–15)
APPEARANCE UR: CLEAR
AST SERPL W P-5'-P-CCNC: 30 U/L (ref 10–35)
BASOPHILS # BLD AUTO: 0.1 10E3/UL (ref 0–0.2)
BASOPHILS NFR BLD AUTO: 1 %
BILIRUB SERPL-MCNC: 0.5 MG/DL
BILIRUB UR QL STRIP: NEGATIVE
BUN SERPL-MCNC: 10.7 MG/DL (ref 8–23)
CALCIUM SERPL-MCNC: 9.6 MG/DL (ref 8.2–9.6)
CHLORIDE SERPL-SCNC: 106 MMOL/L (ref 98–107)
COLOR UR AUTO: ABNORMAL
CREAT SERPL-MCNC: 0.86 MG/DL (ref 0.51–0.95)
DEPRECATED HCO3 PLAS-SCNC: 22 MMOL/L (ref 22–29)
EOSINOPHIL # BLD AUTO: 0.2 10E3/UL (ref 0–0.7)
EOSINOPHIL NFR BLD AUTO: 2 %
ERYTHROCYTE [DISTWIDTH] IN BLOOD BY AUTOMATED COUNT: 12.8 % (ref 10–15)
GFR SERPL CREATININE-BSD FRML MDRD: 64 ML/MIN/1.73M2
GLUCOSE SERPL-MCNC: 131 MG/DL (ref 70–99)
GLUCOSE UR STRIP-MCNC: NEGATIVE MG/DL
HCT VFR BLD AUTO: 38.3 % (ref 35–47)
HGB BLD-MCNC: 13.3 G/DL (ref 11.7–15.7)
HGB UR QL STRIP: ABNORMAL
IMM GRANULOCYTES # BLD: 0.1 10E3/UL
IMM GRANULOCYTES NFR BLD: 1 %
KETONES UR STRIP-MCNC: NEGATIVE MG/DL
LEUKOCYTE ESTERASE UR QL STRIP: NEGATIVE
LYMPHOCYTES # BLD AUTO: 1.6 10E3/UL (ref 0.8–5.3)
LYMPHOCYTES NFR BLD AUTO: 16 %
MCH RBC QN AUTO: 29.1 PG (ref 26.5–33)
MCHC RBC AUTO-ENTMCNC: 34.7 G/DL (ref 31.5–36.5)
MCV RBC AUTO: 84 FL (ref 78–100)
MONOCYTES # BLD AUTO: 0.6 10E3/UL (ref 0–1.3)
MONOCYTES NFR BLD AUTO: 5 %
NEUTROPHILS # BLD AUTO: 7.8 10E3/UL (ref 1.6–8.3)
NEUTROPHILS NFR BLD AUTO: 75 %
NITRATE UR QL: NEGATIVE
NRBC # BLD AUTO: 0 10E3/UL
NRBC BLD AUTO-RTO: 0 /100
PH UR STRIP: 7 [PH] (ref 5–7)
PLATELET # BLD AUTO: 255 10E3/UL (ref 150–450)
POTASSIUM SERPL-SCNC: 3.9 MMOL/L (ref 3.4–5.3)
PROT SERPL-MCNC: 7.2 G/DL (ref 6.4–8.3)
RBC # BLD AUTO: 4.57 10E6/UL (ref 3.8–5.2)
RBC URINE: 1 /HPF
SODIUM SERPL-SCNC: 142 MMOL/L (ref 136–145)
SP GR UR STRIP: 1 (ref 1–1.03)
SQUAMOUS EPITHELIAL: <1 /HPF
TROPONIN T SERPL HS-MCNC: 13 NG/L
TSH SERPL DL<=0.005 MIU/L-ACNC: 3.31 UIU/ML (ref 0.3–4.2)
UROBILINOGEN UR STRIP-MCNC: NORMAL MG/DL
WBC # BLD AUTO: 10.2 10E3/UL (ref 4–11)
WBC URINE: 2 /HPF

## 2023-06-12 PROCEDURE — 84443 ASSAY THYROID STIM HORMONE: CPT | Performed by: FAMILY MEDICINE

## 2023-06-12 PROCEDURE — 93010 ELECTROCARDIOGRAM REPORT: CPT | Performed by: FAMILY MEDICINE

## 2023-06-12 PROCEDURE — 99285 EMERGENCY DEPT VISIT HI MDM: CPT | Mod: 25 | Performed by: FAMILY MEDICINE

## 2023-06-12 PROCEDURE — 71046 X-RAY EXAM CHEST 2 VIEWS: CPT

## 2023-06-12 PROCEDURE — 80053 COMPREHEN METABOLIC PANEL: CPT | Performed by: FAMILY MEDICINE

## 2023-06-12 PROCEDURE — 93005 ELECTROCARDIOGRAM TRACING: CPT | Performed by: FAMILY MEDICINE

## 2023-06-12 PROCEDURE — 99284 EMERGENCY DEPT VISIT MOD MDM: CPT | Mod: 25 | Performed by: FAMILY MEDICINE

## 2023-06-12 PROCEDURE — 85025 COMPLETE CBC W/AUTO DIFF WBC: CPT | Performed by: FAMILY MEDICINE

## 2023-06-12 PROCEDURE — 36415 COLL VENOUS BLD VENIPUNCTURE: CPT | Performed by: FAMILY MEDICINE

## 2023-06-12 PROCEDURE — 84484 ASSAY OF TROPONIN QUANT: CPT | Performed by: FAMILY MEDICINE

## 2023-06-12 PROCEDURE — 81003 URINALYSIS AUTO W/O SCOPE: CPT | Performed by: FAMILY MEDICINE

## 2023-06-12 ASSESSMENT — ENCOUNTER SYMPTOMS
DYSURIA: 0
CHILLS: 0
ABDOMINAL PAIN: 0
SORE THROAT: 0
DIAPHORESIS: 0
NERVOUS/ANXIOUS: 1
BLOOD IN STOOL: 0
HEADACHES: 0
COUGH: 0
PALPITATIONS: 0
SINUS PRESSURE: 0
CONSTIPATION: 0
WHEEZING: 0
FEVER: 0
VOMITING: 0
FREQUENCY: 0
NAUSEA: 0
SHORTNESS OF BREATH: 1
DIARRHEA: 0

## 2023-06-12 ASSESSMENT — ACTIVITIES OF DAILY LIVING (ADL)
ADLS_ACUITY_SCORE: 39

## 2023-06-12 NOTE — ED NOTES
Call placed to son as he has not arrived to  patient - offered [employer] - states he will be here in 10 min - tech will prepare patient and meet him at the door.

## 2023-06-12 NOTE — ED NOTES
Pt brought to ED by EMS after having an episode of shortness of breath at home. Pt reports the shortness of breath resolved immediately when EMS arrived. EMS reports some concern regarding her not taking her medication. Pt states she has had a similar episode in the past that was associated with a UTI. Pt also states she must have forgot about the antibiotic and that's why it isn't getting better.

## 2023-06-12 NOTE — DISCHARGE INSTRUCTIONS
ICD-10-CM    1. Dyspnea, unspecified type  R06.00 Primary Care Referral     Adult Mental Health  Referral    no serious findings on evaluation.  unclear cause of the episode.  I am asking for close interval follow-up with primary provider and also for mental health consult regarding anxiety component.

## 2023-06-12 NOTE — ED TRIAGE NOTES
Pt brought to ED by EMS after having an episode of shortness of breath at home. Pt reports the shortness of breath resolved immediately when EMS arrived. EMS reports some concern regarding her not taking her medication.      Triage Assessment     Row Name 06/12/23 1046       Triage Assessment (Adult)    Airway WDL WDL       Cardiac WDL    Cardiac WDL WDL       Cognitive/Neuro/Behavioral WDL    Cognitive/Neuro/Behavioral WDL WDL

## 2023-06-12 NOTE — ED PROVIDER NOTES
History     Chief Complaint   Patient presents with     Anxiety     Pt brought to ED by EMS after having an episode of shortness of breath at home. Pt reports the shortness of breath resolved immediately when EMS arrived. EMS reports some concern regarding her not taking her medication.      HPI  Daniela Brown is a 89 year old female who presents with a complicated past history of type 2 diabetes coronary disease, hypertension and hyperlipidemia as well as diastolic dysfunction.  She has had multiple episodes in the past of dyspnea evaluations and superimposed cardiopulmonary disease with a mental health overlying component of more severe anxiety.  Today she had an episode of acute dyspnea at home.  This seemed to resolve soon after EMS had arrived.  She had not yet taken some of her medications this morning and was somewhat anxious about this.  There may have been a brief episode of chest pain with the episode but is primarily a sense of dyspnea.  May have been a sense of throat closure.  No obvious wheezing cough congestion.  No other associated symptoms.  No signs of allergic reaction.  No obvious leg edema.    Recent evaluation for similar episode and was diagnosed with UTI.  This was a couple of weeks ago and she was evaluated at [facility].    No known VTE risk          Wt Readings from Last 5 Encounters:   06/12/23 72.6 kg (160 lb)   06/07/23 72.6 kg (160 lb)   05/17/23 73.1 kg (161 lb 3.2 oz)   05/08/23 72.6 kg (160 lb)   04/19/23 72.5 kg (159 lb 14.4 oz)         Allergies:  Allergies   Allergen Reactions     Metoprolol Itching and Rash     Cephalexin Rash     Erythromycin Rash     Actonel [Bisphosphonates] Itching     Azithromycin Hives     Celebrex [Ricci-2 Inhibitors] Rash     Cipro [Ciprofloxacin] Rash     Contrast Dye Hives     Diatrizoate Hives     Erythromycin Rash     Escitalopram Diarrhea     Gets very sick and mad feelings     Fosamax Itching and Rash     Keflex [Cephalexin  "Monohydrate] Rash     Lisinopril Cough     Risedronate Itching     Seasonal Allergies      Shellfish-Derived Products Hives     Tetanus Toxoid, Adsorbed Swelling     Tetanus-Diphtheria Toxoids Td Swelling     Vicodin [Acetaminophen] Itching     Patient reported - only when used scheduled in high doses.        Vioxx Rash     Acetaminophen Itching     In high doses  Patient reported - only when used scheduled in high doses.        Alendronate Rash     Celecoxib Rash     Penicillins Rash     Rofecoxib Rash     Sulfa Antibiotics Itching and Rash     Sulfasalazine Itching and Rash       Problem List:    Patient Active Problem List    Diagnosis Date Noted     Polyneuropathy associated with underlying disease (H) 05/17/2023     Priority: Medium     Moderate major depression (H) 12/31/2020     Priority: Medium     ARABELLA (generalized anxiety disorder) 12/31/2020     Priority: Medium     Has been on celexa (not effective but no side effects), amitriptyline, cymbalta (dizziness), lexapro (listed as allergy - 'gets very sick\"), zoloft  paxil helping somewhat 6/23       Abnormal CT of the abdomen 10/10/2019     Priority: Medium     CT 10/9/2019- cystic lesion along the anterior aspect of the pancreatic body/tail (series 2 image 27) measures 2 cm, and is new since the previous exam 2011. Pancreatic MRI with MRCP should be considered for further evaluation.   MRI 10/10/2019- There are 2 cystic lesions in the pancreatic body/tail, with the largest measuring 2 cm. No enhancing septations or solid masslike components are identified, although evaluation is limited related to patient motion artifact. These lesions have appearances suggestive of IPMN, but remain technically indeterminate. A follow-up MRI in one year should be considered to confirm stability.       Cystocele, midline      Priority: Medium     grade III       Generalized weakness 10/09/2019     Priority: Medium     Diarrhea 10/09/2019     Priority: Medium     Chronic " left-sided low back pain with left-sided sciatica 04/15/2019     Priority: Medium     Coronary artery disease involving native coronary artery of native heart with angina pectoris (H) 02/19/2019     Priority: Medium     coronary angiogram 2/2019 showed mild coronary artery disease.  The most significant of these was the 40% lesion in the mid RCA.       Benign essential hypertension 11/14/2018     Priority: Medium     Type 2 diabetes mellitus with diabetic polyneuropathy, without long-term current use of insulin (H) 11/14/2018     Priority: Medium     Diet controlled.  Diagnosed 6/2016, has never been on medications.       History of recurrent urinary tract infection 11/14/2018     Priority: Medium     Recommend referral to Urology to discuss resuming daily suppressive therapy but she declines 8/22 and 6/23       CKD (chronic kidney disease) stage 3, GFR 30-59 ml/min (H) 11/14/2018     Priority: Medium     Peripheral neuropathy 09/10/2018     Priority: Medium     Bilateral wrist pain 04/11/2018     Priority: Medium     Protrusion of lumbar intervertebral disc 04/11/2018     Priority: Medium     Carpal tunnel syndrome of left wrist 10/21/2014     Priority: Medium     Diastolic dysfunction 03/31/2014     Priority: Medium     Pronation of foot 05/05/2011     Priority: Medium     Bilateral defomity       Allergic rhinitis 01/19/2011     Priority: Medium     Hyperlipidemia LDL goal <100 10/31/2010     Priority: Medium     Urge incontinence 10/20/2009     Priority: Medium     Right foot pain 10/16/2009     Priority: Medium     Xray showed djd -follows with podiatry. -arch supports placed may need cam walker        Vitamin D deficiency 09/09/2008     Priority: Medium     Subjective tinnitus 04/22/2008     Priority: Medium     Urticaria 08/22/2006     Priority: Medium     SENSONRL HEAR LOSS,BILAT 05/03/2006     Priority: Medium     Esophageal reflux      Priority: Medium     Memory loss      Priority: Medium     Early  cognitive decline. MMSE 27/30, Neno 4.7/ 6.0 2004. B12, TSH, RPR normal 7210-0031.       Degeneration of lumbar or lumbosacral intervertebral disc      Priority: Medium     MRI 5/04- DDD, L2-3 annular bulge with protrusion into left caudal infra-foraminal area  MRI 2017 with L4-5 loss disc height with spondylolisthesis.       B12 deficiency 10/10/2019     Priority: Low     Irritable bowel syndrome with diarrhea      Priority: Low     diahrrea predominant       Osteopenia of multiple sites      Priority: Low     DEXA 2007 -1.4 hip. Dexa 2004 -1 hip and -1 spine       RESTLESS LEG SYNDROME      Priority: Low     Primary osteoarthritis involving multiple joints      Priority: Low     C-spine, left hip       Diverticulosis of large intestine      Priority: Low     flexible sigmoidoscopy with diverticulosis otherwise normal 2001, admit diverticulitis 2009          Past Medical History:    Past Medical History:   Diagnosis Date     Abnormal cardiovascular stress test 2/3/2019     Adhesive capsulitis of shoulder      Adjustment disorder with anxiety 3/18/2016     B12 deficiency anemia 6/20/2012     Chest pain 2004     Colitis, Clostridium difficile 4/18/2012     Diverticulitis 2009     Headache(784.0) 2001     Impaired fasting glucose 2005     PERS HX ALLERGY OTHER FOODS 8/22/2006     PERS HX ALLERGY TO MILK PRODUCTS 8/22/2006     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     Status post coronary angiogram 2/27/2019     Syncope and collapse 9/10/2018       Past Surgical History:    Past Surgical History:   Procedure Laterality Date     ARTHROPLASTY KNEE  3/26/2012    Procedure:ARTHROPLASTY KNEE; Right Total Knee Arthroplasty; Surgeon:BERNADINE ROSAS; Location:[place] OR     CHOLECYSTECTOMY       CV HEART CATHETERIZATION WITH POSSIBLE INTERVENTION N/A 2/27/2019    Procedure: Coronary Angiogram with Left ventriculogram;  Surgeon: Leandro Carpio MD;  Location: [facility] CARDIAC CATH LAB     HERNIA REPAIR      Hernia Repair      HYSTERECTOMY, PAP NO LONGER INDICATED  1983    TVH/BSO     SURGICAL HISTORY OF -   10/08    A & P Repair, Dr. Hannah     [facility] APPENDECTOMY  1953       Family History:    Family History   Problem Relation Age of Onset     C.A.D. Mother      Diabetes Mother      Cancer - colorectal Mother      Arthritis Mother      Heart Disease Mother      Lipids Brother      Obesity Brother      Hypertension Brother      Allergies Son      Eye Disorder Son         cataract     Thyroid Disease Sister         graves dz     Eye Disorder Son      Leukemia Son      Alcohol/Drug Father        Social History:  Marital Status:   [5]  Social History     Tobacco Use     Smoking status: Never     Smokeless tobacco: Never   Vaping Use     Vaping status: Never Used   Substance Use Topics     Alcohol use: No     Drug use: No        Medications:    acetaminophen (TYLENOL) 500 MG tablet  amLODIPine (NORVASC) 5 MG tablet  bismuth subsalicylate (PEPTO BISMOL) 262 MG chewable tablet  blood glucose (ACCU-CHEK GUIDE) test strip  blood glucose monitoring (ACCU-CHEK FASTCLIX) lancets  buPROPion (WELLBUTRIN XL) 150 MG 24 hr tablet  capsaicin (ZOSTRIX) 0.025 % external cream  cefdinir (OMNICEF) 300 MG capsule  Cyanocobalamin (B-12) 1000 MCG SUBL  famotidine (PEPCID) 20 MG tablet  fluocinonide (LIDEX) 0.05 % external cream  fluocinonide (LIDEX) 0.05 % external solution  lidocaine (LIDODERM) 5 % patch  loperamide (IMODIUM A-D) 2 MG tablet  LORazepam (ATIVAN) 0.5 MG tablet  meloxicam (MOBIC) 7.5 MG tablet  mometasone (ELOCON) 0.1 % external cream  Multiple Vitamins-Minerals (THERAPEUTIC MULTIVIT/MINERAL) TABS  nitroGLYcerin (NITROSTAT) 0.4 MG sublingual tablet  olmesartan (BENICAR) 20 MG tablet  order for DME  ORDER FOR DME  PARoxetine (PAXIL) 10 MG tablet  pregabalin (LYRICA) 50 MG capsule  STATIN NOT PRESCRIBED (INTENTIONAL)  vitamin D3 (CHOLECALCIFEROL) 50 mcg (2000 units) tablet          Review of Systems   Constitutional: Negative for chills,  "diaphoresis and fever.   HENT: Negative for ear pain, sinus pressure and sore throat.    Eyes: Negative for visual disturbance.   Respiratory: Positive for shortness of breath. Negative for cough and wheezing.    Cardiovascular: Negative for chest pain and palpitations.   Gastrointestinal: Negative for abdominal pain, blood in stool, constipation, diarrhea, nausea and vomiting.   Genitourinary: Negative for dysuria, frequency and urgency.   Skin: Negative for rash.   Neurological: Negative for headaches.   Psychiatric/Behavioral: The patient is nervous/anxious.    All other systems reviewed and are negative.      Physical Exam   BP: (!) 174/70  Pulse: 72  Temp: 98  F (36.7  C)  Resp: 16  Height: 162.6 cm (5' 4\")  Weight: 72.6 kg (160 lb)  SpO2: 97 %      Physical Exam  Constitutional:       General: She is in acute distress.   Eyes:      Conjunctiva/sclera: Conjunctivae normal.   Cardiovascular:      Rate and Rhythm: Normal rate and regular rhythm.      Heart sounds: No murmur heard.  Pulmonary:      Effort: Respiratory distress present.      Breath sounds: Wheezing present.   Musculoskeletal:      Cervical back: Neck supple.   Neurological:      Mental Status: She is alert.         ED Course                 Procedures                EKG Interpretation:      Interpreted by Tho Sanchez MD  EKG done at 1355 hrs. demonstrates a sinus rhythm at 62 bpm with   A left axis and no ST change.  No T wave changes.  Normal R progression and no Q waves.  Normal intervals.  Normal conduction.  No ectopy.  Impression sinus rhythm 62 bpm and no significant acute changes.        Critical Care time:  none              Imaging Interpretation:      I independently viewed the CXR images and found no infiltrate.  no signs of fluid overlad/chf              Results for orders placed or performed during the hospital encounter of 06/12/23 (from the past 24 hour(s))   UA with Microscopic reflex to Culture    Specimen: Urine, Clean Catch "   Result Value Ref Range    Color Urine Straw Colorless, Straw, Light Yellow, Yellow    Appearance Urine Clear Clear    Glucose Urine Negative Negative mg/dL    Bilirubin Urine Negative Negative    Ketones Urine Negative Negative mg/dL    Specific Gravity Urine 1.004 1.003 - 1.035    Blood Urine Small (A) Negative    pH Urine 7.0 5.0 - 7.0    Protein Albumin Urine Negative Negative mg/dL    Urobilinogen Urine Normal Normal, 2.0 mg/dL    Nitrite Urine Negative Negative    Leukocyte Esterase Urine Negative Negative    RBC Urine 1 <=2 /HPF    WBC Urine 2 <=5 /HPF    Squamous Epithelials Urine <1 <=1 /HPF    Narrative    Urine Culture not indicated   CBC with platelets differential    Narrative    The following orders were created for panel order CBC with platelets differential.  Procedure                               Abnormality         Status                     ---------                               -----------         ------                     CBC with platelets and d...[[id number]]                      Final result                 Please view results for these tests on the individual orders.   Comprehensive metabolic panel   Result Value Ref Range    Sodium 142 136 - 145 mmol/L    Potassium 3.9 3.4 - 5.3 mmol/L    Chloride 106 98 - 107 mmol/L    Carbon Dioxide (CO2) 22 22 - 29 mmol/L    Anion Gap 14 7 - 15 mmol/L    Urea Nitrogen 10.7 8.0 - 23.0 mg/dL    Creatinine 0.86 0.51 - 0.95 mg/dL    Calcium 9.6 8.2 - 9.6 mg/dL    Glucose 131 (H) 70 - 99 mg/dL    Alkaline Phosphatase 71 35 - 104 U/L    AST 30 10 - 35 U/L    ALT 24 10 - 35 U/L    Protein Total 7.2 6.4 - 8.3 g/dL    Albumin 4.3 3.5 - 5.2 g/dL    Bilirubin Total 0.5 <=1.2 mg/dL    GFR Estimate 64 >60 mL/min/1.73m2   Troponin T, High Sensitivity   Result Value Ref Range    Troponin T, High Sensitivity 13 <=14 ng/L   TSH with free T4 reflex   Result Value Ref Range    TSH 3.31 0.30 - 4.20 uIU/mL   CBC with platelets and differential   Result Value Ref Range     WBC Count 10.2 4.0 - 11.0 10e3/uL    RBC Count 4.57 3.80 - 5.20 10e6/uL    Hemoglobin 13.3 11.7 - 15.7 g/dL    Hematocrit 38.3 35.0 - 47.0 %    MCV 84 78 - 100 fL    MCH 29.1 26.5 - 33.0 pg    MCHC 34.7 31.5 - 36.5 g/dL    RDW 12.8 10.0 - 15.0 %    Platelet Count 255 150 - 450 10e3/uL    % Neutrophils 75 %    % Lymphocytes 16 %    % Monocytes 5 %    % Eosinophils 2 %    % Basophils 1 %    % Immature Granulocytes 1 %    NRBCs per 100 WBC 0 <1 /100    Absolute Neutrophils 7.8 1.6 - 8.3 10e3/uL    Absolute Lymphocytes 1.6 0.8 - 5.3 10e3/uL    Absolute Monocytes 0.6 0.0 - 1.3 10e3/uL    Absolute Eosinophils 0.2 0.0 - 0.7 10e3/uL    Absolute Basophils 0.1 0.0 - 0.2 10e3/uL    Absolute Immature Granulocytes 0.1 <=0.4 10e3/uL    Absolute NRBCs 0.0 10e3/uL   Chest XR,  PA & LAT    Narrative    CHEST TWO VIEWS   6/12/2023 12:34 PM     HISTORY: Shortness of breath, evaluate for infectious cause.    COMPARISON: Chest CT on 8/11/2022.      Impression    IMPRESSION: PA and lateral views of the chest were obtained.  Cardiomediastinal silhouette is within normal limits. Left midlung  platelike pulmonary opacity, likely atelectasis. No significant  pleural effusion or pneumothorax. Mild asymmetric elevation of the  right hemidiaphragm as compared to the left.    ULYSSES PANDA MD         SYSTEM ID:  [id number]     *Note: Due to a large number of results and/or encounters for the requested time period, some results have not been displayed. A complete set of results can be found in Results Review.       Medications - No data to display    Assessments & Plan (with Medical Decision Making)     MDM: Daniela Brown is a 89 year old female who presents with episode of dyspnea at home resolved by the time of arrival without acute findings of hypoxia tachypnea acute ischemic changes congestive heart failure other findings to suggest cause.  No known VTE risk.  EKG is nonischemic.  Laboratory testing is reassuring.  Chest x-ray  is without infiltrate.  We discussed lack of findings today but precautions for return.  She is unsure what she will do in the near future as her son is likely moving to [place] and she wonders if she will stay around this region or in [place].  She has been considering assisted living.  She has some excessive worry about the future.  We discussed that this may be impacting these episodes and I did discuss with her possibly meeting with a mental health provider in addition to close interval follow-up with Dr. Maddox so she may also readdress the patient's concerns from a medical standpoint.  She agrees to this overall plan.  She is worried about returning home but encouraged to return for worsening.      I have reviewed the nursing notes.    I have reviewed the findings, diagnosis, plan and need for follow up with the patient.           Medical Decision Making  The patient's presentation was of moderate complexity (an acute illness with systemic symptoms).    The patient's evaluation involved:  review of external note(s) from 1 sources (last ed visit)  ordering and/or review of 3+ test(s) in this encounter (see separate area of note for details)  review of 3+ test result(s) ordered prior to this encounter (last ed visit)    The patient's management necessitated only low risk treatment.        New Prescriptions    No medications on file       Final diagnoses:   Dyspnea, unspecified type - no serious findings on evaluation.  unclear cause of the episode.  I am asking for close interval follow-up with primary provider and also for mental health consult regarding anxiety component.         6/12/2023   [facility] EMERGENCY DEPT     Tho Sanchez MD  06/12/23 0178

## 2023-06-14 ENCOUNTER — VIRTUAL VISIT (OUTPATIENT)
Dept: FAMILY MEDICINE | Facility: CLINIC | Age: 88
End: 2023-06-14
Attending: FAMILY MEDICINE
Payer: MEDICARE

## 2023-06-14 DIAGNOSIS — F41.1 GAD (GENERALIZED ANXIETY DISORDER): ICD-10-CM

## 2023-06-14 DIAGNOSIS — N30.00 ACUTE CYSTITIS WITHOUT HEMATURIA: Primary | ICD-10-CM

## 2023-06-14 DIAGNOSIS — R06.00 DYSPNEA, UNSPECIFIED TYPE: ICD-10-CM

## 2023-06-14 PROCEDURE — 99442 PR PHYSICIAN TELEPHONE EVALUATION 11-20 MIN: CPT | Mod: 95 | Performed by: INTERNAL MEDICINE

## 2023-06-14 RX ORDER — CEFDINIR 300 MG/1
300 CAPSULE ORAL 2 TIMES DAILY
Qty: 14 CAPSULE | Refills: 0 | Status: ON HOLD | OUTPATIENT
Start: 2023-06-14 | End: 2023-08-18

## 2023-06-14 NOTE — PATIENT INSTRUCTIONS
Bladder infection  Start antibiotic - it sounds like you did not start the antibiotic given at [facility] ER 6/3; that could be causing the urine retention and weakness  Start Cefdinir twice daily x 1 week  Recommend to see Urology - referral placed  If you are no better with the antibiotic - follow-up with me - may try adjusting medications or seeing what else could be causing low appetite and weakness    Anxiety  Agree with plan to see Counselor. I am referring you to [facility] [place].  The phone number is [phone]  Follow-up with me in 4 weeks to check on anxiety - after the medications have had time to work

## 2023-06-14 NOTE — PROGRESS NOTES
Sivan is a 89 year old who is being evaluated via a billable telephone visit.      What phone number would you like to be contacted at? [phone]  How would you like to obtain your AVS? Mail a copy  Distant Location (provider location):  On-site    Assessment & Plan     Acute cystitis without hematuria -at her last visit 1 week ago she reported that she was taking the antibiotic, but today she reports she never received any antibiotic.  It sounds like she was under the impression that the 1 dose of the IV antibiotic would have been sufficient to treat the bladder infection.  Due to weakness, low appetite would recommend a course of antibiotic.  See urology for recurrent UTI.  - cefdinir (OMNICEF) 300 MG capsule; Take 1 capsule (300 mg) by mouth 2 times daily  - Adult Urology  Referral; Future    ARABELLA (generalized anxiety disorder) -if the weakness and the low appetite persist, would do a trial off the bupropion since this can cause low appetite.  Anxiety is an underlying tried and virtually all of her visits, so she would benefit from behavioral health clinician  - Adult Mental Health  Referral; Future    Dyspnea, unspecified type -no further episodes per patient.  Anxiety likely contributes  - Primary Care Referral      Patient Instructions       Bladder infection  1. Start antibiotic - it sounds like you did not start the antibiotic given at [facility] ER 6/3; that could be causing the urine retention and weakness  2. Start Cefdinir twice daily x 1 week  3. Recommend to see Urology - referral placed  4. If you are no better with the antibiotic - follow-up with me - may try adjusting medications or seeing what else could be causing low appetite and weakness    Anxiety  1. Agree with plan to see Counselor. I am referring you to [facility] [place].  The phone number is [phone]  2. Follow-up with me in 4 weeks to check on anxiety - after the medications  have had time to work      Cynthia Maddox DO  [facility]    Shruti Finnegan is a 89 year old, presenting for the following health issues:  ER F/U        6/14/2023     1:15 PM   Additional Questions   Roomed by geovanny lewis   Accompanied by debbie         6/14/2023     1:15 PM   Patient Reported Additional Medications   Patient reports taking the following new medications none     HPI       Chief Complaint   Patient presents with     ER F/U       ED/UC Followup:    Facility:  [place]   Date of visit: 6/12/23  Reason for visit: Dyspnea  Current Status: no dyspnea, had some anxiety yesterday  Took something for it and this did helped , is not having any today       Polypharmacy  --She had community paramedic come to the home and go through all of her medications and set them up in pillboxes.  -- Today however she told the CNA she is not taking many of her medications.  She is not taking Tylenol, Pepto-Bismol,  cefdinir, B12, famotidine, Imodium, multivitamin, nitro, vitamin D.  I updated her med list to reflect this  --she has follow-up with community paramedic on 6/21    Weakness  Low energy  Low appetite  --she reports feeling 'terrible' today with above symptoms  --no longer having shortness of breath   --she reports ongoing symptoms for weeks    UTI  --she was given cefdinir antibiotic on 6/3 ER visit;  She doesn't recall filling this or taking it  --she reports decreased frequency, only urinated 1 x this am but large amount  --she does not have nocturia, dysuria  --ER recommended to see Urology    Anxiety:  --still not well controlled.    Current Outpatient Medications   Medication Sig Dispense Refill     acetaminophen (TYLENOL) 500 MG tablet Take 500-1,000 mg by mouth every 6 hours as needed for mild pain or pain       amLODIPine (NORVASC) 5 MG tablet TAKE 1 TABLET BY MOUTH ONCE DAILY 90 tablet 0     blood glucose (ACCU-CHEK GUIDE) test strip Use to test blood sugar one times daily or  as directed. 100 each 1     blood glucose monitoring (ACCU-CHEK FASTCLIX) lancets Use to test blood sugar one times daily or as directed. 102 each 3     buPROPion (WELLBUTRIN XL) 150 MG 24 hr tablet Take 1 tablet (150 mg) by mouth every morning 90 tablet 3     cefdinir (OMNICEF) 300 MG capsule Take 1 capsule (300 mg) by mouth 2 times daily 14 capsule 0     LORazepam (ATIVAN) 0.5 MG tablet Take 1 tablet (0.5 mg) by mouth daily as needed for anxiety (for short term only, do not take more than one per day) 8 tablet 0     meloxicam (MOBIC) 7.5 MG tablet Take 1 tablet (7.5 mg) by mouth daily 90 tablet 3     Multiple Vitamins-Minerals (THERAPEUTIC MULTIVIT/MINERAL) TABS Take 1 tablet by mouth every evening        olmesartan (BENICAR) 20 MG tablet Take 1 tablet (20 mg) by mouth daily 90 tablet 3     PARoxetine (PAXIL) 10 MG tablet Take 2 tablets (20 mg) by mouth every morning (Patient taking differently: Take 20 mg by mouth every morning Taking 20 mg total) 30 tablet 3     pregabalin (LYRICA) 50 MG capsule Take 1 capsule (50 mg) by mouth 2 times daily 60 capsule 5     STATIN NOT PRESCRIBED (INTENTIONAL) Please choose reason not prescribed, below             Review of Systems   Constitutional, HEENT, cardiovascular, pulmonary, gi and gu systems are negative, except as otherwise noted.      Objective    Vitals - Patient Reported  Systolic (Patient Reported): (!) 145  Diastolic (Patient Reported): 62  Pain Score: No Pain (0)      Vitals:  No vitals were obtained today due to virtual visit.    Physical Exam   alert and no distress  PSYCH: Alert and oriented times 3; coherent speech, normal   rate and volume, able to articulate logical thoughts, able   to abstract reason, no tangential thoughts, no hallucinations   or delusions  Her affect is anxious  RESP: No cough, no audible wheezing, able to talk in full sentences  Remainder of exam unable to be completed due to telephone visits                Phone call duration: 20  minutes

## 2023-06-21 ENCOUNTER — TELEPHONE (OUTPATIENT)
Dept: UROLOGY | Facility: CLINIC | Age: 88
End: 2023-06-21

## 2023-06-21 ENCOUNTER — ALLIED HEALTH/NURSE VISIT (OUTPATIENT)
Dept: OTHER | Facility: CLINIC | Age: 88
End: 2023-06-21
Payer: MEDICARE

## 2023-06-21 DIAGNOSIS — F41.9 ANXIETY: ICD-10-CM

## 2023-06-21 DIAGNOSIS — I10 ESSENTIAL HYPERTENSION: Primary | ICD-10-CM

## 2023-06-21 PROCEDURE — 99207 PR NO BILLABLE SERVICE THIS VISIT: CPT

## 2023-06-21 NOTE — TELEPHONE ENCOUNTER
[facility] Call Center    Phone Message    May a detailed message be left on voicemail: yes     Reason for Call: Other: .   Pt son Wally calling to schedule pt to be seen. Pt has referral to be seen for cystitis , per guidelines appt is [facility] only. Wally states pt is unable to meet virtually. Please advise and call Wally, thank you.      Action Taken: Message routed to:  Other: [facility]    Travel Screening: Not Applicable

## 2023-06-27 VITALS — RESPIRATION RATE: 18 BRPM | OXYGEN SATURATION: 98 % | HEART RATE: 65 BPM | TEMPERATURE: 98.8 F

## 2023-06-27 NOTE — PROGRESS NOTES
[facility]ics Follow-up Visit  June 21, 2023  TIME: 1200    Daniela Brown is a 89 year old female being seen at home for a follow-up visit.    Present at appointment:           Chief Complaint   Patient presents with     Recheck Medication     Diabetes     Hypertension       Universal Utilization:      Utilization    Hospital Admissions  2             ED Visits  7             No Show Count (past year)  3                Current as of: 6/22/2023  1:06 PM              Pulse 65   Temp 98.8  F (37.1  C) (Temporal)   Resp 18   LMP  (LMP Unknown)   SpO2 98%     Clinical Concerns:  Current Medical Concerns:    Current Behavioral Concerns: Anxiety    Education Provided to patient:    Medication set up? Yes  Pill Box issued: No  Scale issued: No  Flu Shot given: No  COVID Vaccine Given: No  Lab draw or specimen collection: No  Food resource given: No  Collaborative visit with PCP: No  Wound Care: No  Health Maintenance Addressed No    Clinical Pathway: Clinic Care Coordination Anxiety Assessment    Discharge:    Hospital summary:   Day of hospital discharge: 6/12/23  What recommendations were made for follow up after your recent hospitalization?   Have the follow up appointments been scheduled? No  If not, can I help you set up these appointments? No       Symptoms:        No  Face to Face in Home / Community    Recent blood sugars: 195 taken during today's appointment.    Review of Symptoms/PE    Skin: negative  Eyes: negative  Ears/Nose/Throat: negative  Respiratory: No shortness of breath, dyspnea on exertion, cough, or hemoptysis  Cardiovascular: negative  Gastrointestinal: negative  Genitourinary: negative  Musculoskeletal: negative  Neurologic: negative  Psychiatric: positive for negative and anxiety    Pain Management::                 Plan:     Time spent with patient: 30    The patient meets one or more of the following criteria:  * Has received hospital emergency department services three or  more times in four consecutive months within a twelve month period    Acute concern/Follow-up recommendations: Chronic anxiety attacks.    Next CP visit scheduled:     Issues for Provider to follow up on:     Provider follow up visit needed:

## 2023-06-28 ENCOUNTER — ALLIED HEALTH/NURSE VISIT (OUTPATIENT)
Dept: OTHER | Facility: CLINIC | Age: 88
End: 2023-06-28
Payer: MEDICARE

## 2023-06-28 VITALS
TEMPERATURE: 98.2 F | HEART RATE: 83 BPM | DIASTOLIC BLOOD PRESSURE: 65 MMHG | RESPIRATION RATE: 16 BRPM | OXYGEN SATURATION: 96 % | SYSTOLIC BLOOD PRESSURE: 146 MMHG

## 2023-06-28 DIAGNOSIS — I10 ESSENTIAL HYPERTENSION: Primary | ICD-10-CM

## 2023-06-28 PROCEDURE — 99207 PR NO BILLABLE SERVICE THIS VISIT: CPT | Mod: GC

## 2023-06-29 ENCOUNTER — OFFICE VISIT (OUTPATIENT)
Dept: BEHAVIORAL HEALTH | Facility: CLINIC | Age: 88
End: 2023-06-29
Attending: INTERNAL MEDICINE
Payer: MEDICARE

## 2023-06-29 DIAGNOSIS — F41.1 GAD (GENERALIZED ANXIETY DISORDER): ICD-10-CM

## 2023-06-29 PROCEDURE — 90837 PSYTX W PT 60 MINUTES: CPT

## 2023-06-29 ASSESSMENT — ANXIETY QUESTIONNAIRES
6. BECOMING EASILY ANNOYED OR IRRITABLE: NOT AT ALL
GAD7 TOTAL SCORE: 11
4. TROUBLE RELAXING: NOT AT ALL
7. FEELING AFRAID AS IF SOMETHING AWFUL MIGHT HAPPEN: NOT AT ALL
GAD7 TOTAL SCORE: 11
7. FEELING AFRAID AS IF SOMETHING AWFUL MIGHT HAPPEN: NOT AT ALL
8. IF YOU CHECKED OFF ANY PROBLEMS, HOW DIFFICULT HAVE THESE MADE IT FOR YOU TO DO YOUR WORK, TAKE CARE OF THINGS AT HOME, OR GET ALONG WITH OTHER PEOPLE?: VERY DIFFICULT
5. BEING SO RESTLESS THAT IT IS HARD TO SIT STILL: MORE THAN HALF THE DAYS
3. WORRYING TOO MUCH ABOUT DIFFERENT THINGS: NEARLY EVERY DAY
1. FEELING NERVOUS, ANXIOUS, OR ON EDGE: NEARLY EVERY DAY
2. NOT BEING ABLE TO STOP OR CONTROL WORRYING: NEARLY EVERY DAY
IF YOU CHECKED OFF ANY PROBLEMS ON THIS QUESTIONNAIRE, HOW DIFFICULT HAVE THESE PROBLEMS MADE IT FOR YOU TO DO YOUR WORK, TAKE CARE OF THINGS AT HOME, OR GET ALONG WITH OTHER PEOPLE: VERY DIFFICULT
GAD7 TOTAL SCORE: 11

## 2023-06-29 ASSESSMENT — COLUMBIA-SUICIDE SEVERITY RATING SCALE - C-SSRS
6. IN YOUR LIFETIME, HAVE YOU EVER DONE ANYTHING, STARTED TO DO ANYTHING, OR PREPARED TO DO ANYTHING TO END YOUR LIFE?: NO
1. IN THE PAST MONTH, HAVE YOU WISHED YOU WERE DEAD OR WISHED YOU COULD GO TO SLEEP AND NOT WAKE UP?: YES
2. HAVE YOU ACTUALLY HAD ANY THOUGHTS OF KILLING YOURSELF?: NO

## 2023-06-29 ASSESSMENT — PATIENT HEALTH QUESTIONNAIRE - PHQ9
10. IF YOU CHECKED OFF ANY PROBLEMS, HOW DIFFICULT HAVE THESE PROBLEMS MADE IT FOR YOU TO DO YOUR WORK, TAKE CARE OF THINGS AT HOME, OR GET ALONG WITH OTHER PEOPLE: VERY DIFFICULT
SUM OF ALL RESPONSES TO PHQ QUESTIONS 1-9: 12
SUM OF ALL RESPONSES TO PHQ QUESTIONS 1-9: 12

## 2023-06-30 NOTE — PROGRESS NOTES
[facility]: Integrated Behavioral Health  June 30, 2023    Behavioral Health Clinician Progress Note    Patient Name: Daniela Brown       Service Type:  Individual      Service Location:   Face to Face in Clinic     Session Start Time: 2:45pm  Session End Time: 4:00 pm      Session Length: 53 - 60      Attendees: Patient and son     Service Modality:  In-person    Visit Activities (Refresh list every visit): NEW, [facility] Only, Referral - Mental Health and Same day referral from provider for urgent safety assessment - outpatient plan made    Diagnostic Assessment Date: Will complete in the next few sessions, not completed due to time constraints.   Treatment Plan Review Date: Will complete in the next few sessions, not completed due to time constraints.   See Flowsheets for today's PHQ-9 and ARABELLA-7 results  Previous PHQ-9:     4/19/2023    10:32 AM 6/7/2023     1:16 PM 6/29/2023     2:12 PM   PHQ-9 SCORE   PHQ-9 Total Score MyChart   12 (Moderate depression)   PHQ-9 Total Score 8 12 12     Previous ARABELLA-7:       3/16/2022     2:40 PM 6/7/2023     1:16 PM 6/29/2023     2:14 PM   ARABELLA-7 SCORE   Total Score   11 (moderate anxiety)   Total Score 2 12 11     DATA  Extended Session (60+ minutes): PROLONGED SERVICE IN THE OUTPATIENT SETTING REQUIRING DIRECT (FACE-TO-FACE) PATIENT CONTACT BEYOND THE USUAL SERVICE:    - Longer session due to limited access to mental health appointments and necessity to address patient's distress / complexity    - Patient's presenting concerns require more intensive intervention than could be completed within the usual service  Interactive Complexity: No  Crisis: No  [facility] Patient: No    Treatment Objective(s) Addressed in This Session:  Target Behavior(s): disease management/lifestyle changes ongoing anxiety     Anxiety: will experience a reduction in anxiety, will develop more effective coping skills to manage anxiety symptoms, will develop healthy  cognitive patterns and beliefs and will increase ability to function adaptively  Relationship Problems: will address relationship difficulties in a more adaptive manner    Current Stressors / Issues:  Pt presented with her son for appt.  Discussion surrounded her anxiety and multiple trips to the ER.  She discussed her concerns about going out of the home and difficulties controlling her bowls.  She stated that no one comes to visit her or call her, her son denies this and that she often turns them away and or doesn't answer her phone.  Discussed medication compliance and prompted following directions.  Discuss anxiety coping skills and write out pieces to remember to complete.  Prompt getting out and visiting with others.      Progress on Treatment Objective(s) / Homework:  New Objective established this session - CONTEMPLATION (Considering change and yet undecided); Intervened by assessing the negative and positive thinking (ambivalence) about behavior change    Motivational Interviewing    MI Intervention: Permission to raise concern or advise and Reflections: simple and complex     Change Talk Expressed by the Patient: Desire to change    Provider Response to Change Talk: A - Affirmed patient's thoughts, decisions, or attempts at behavior change    Care Plan review completed: Yes    Medication Review:  No changes to current psychiatric medication(s)    Medication Compliance:  Yes at times    Changes in Health Issues:   None reported    Chemical Use Review:   Substance Use: Chemical use reviewed, no active concerns identified      Tobacco Use: No current tobacco use.      Assessment: Current Emotional / Mental Status (status of significant symptoms):  Risk status (Self / Other harm or suicidal ideation)  Patient denies a history of suicidal ideation, suicide attempts, self-injurious behavior, homicidal ideation, homicidal behavior and and other safety concerns  Patient denies current fears or concerns for  personal safety.  Patient denies current or recent suicidal ideation or behaviors.  Patient denies current or recent homicidal ideation or behaviors.  Patient denies current or recent self injurious behavior or ideation.  Patient denies other safety concerns.  A safety and risk management plan has not been developed at this time, however patient was encouraged to call [place] [phone] should there be a change in any of these risk factors.    Appearance:   Appropriate   Eye Contact:   Fair   Psychomotor Behavior: Normal   Attitude:   Cooperative   Orientation:   All  Speech   Rate / Production: Normal    Volume:  Normal   Mood:    Anxious  Normal  Affect:    Appropriate  Worrisome   Thought Content:  Clear   Thought Form:  Coherent  Logical   Insight:    Poor   Diagnoses:  1. ARABELLA (generalized anxiety disorder)      Collateral Reports Completed:  Not Applicable    Plan: (Homework, other):  Patient was given information about behavioral services and encouraged to schedule a follow up appointment with the clinic [facility] in 1 week.  She was also given information about mental health symptoms and treatment options .  CD Recommendations: No indications of CD issues.     WILBERT Cuevas  June 30, 2023               Answers for HPI/ROS submitted by the patient on 6/29/2023  If you checked off any problems, how difficult have these problems made it for you to do your work, take care of things at home, or get along with other people?: Very difficult  PHQ9 TOTAL SCORE: 12  ARABELLA 7 TOTAL SCORE: 11

## 2023-07-03 DIAGNOSIS — I10 BENIGN ESSENTIAL HYPERTENSION: Chronic | ICD-10-CM

## 2023-07-03 DIAGNOSIS — F41.9 ANXIETY AND DEPRESSION: ICD-10-CM

## 2023-07-03 DIAGNOSIS — F32.A ANXIETY AND DEPRESSION: ICD-10-CM

## 2023-07-03 NOTE — TELEPHONE ENCOUNTER
Patient called stating that she is having a flare in her anxiety, and feels ativan refill is needed.     Last Written Prescription Date:  5/26/23  Last Fill Quantity: 8,  # refills: 0   Last office visit: 6/7/2023 ; last virtual visit: 6/14/2023 with prescribing provider:    Future Office Visit:

## 2023-07-03 NOTE — TELEPHONE ENCOUNTER
"Routing refill request to provider for review/approval because:  BP does not meet RN protocol parameters        Requested Prescriptions   Pending Prescriptions Disp Refills     amLODIPine (NORVASC) 5 MG tablet [Pharmacy Med Name: amlodipine 5 mg tablet] 90 tablet 0     Sig: TAKE 1 TABLET BY MOUTH ONCE DAILY       Calcium Channel Blockers Protocol  Failed - 7/3/2023 12:00 PM        Failed - Blood pressure under 140/90 in past 12 months     BP Readings from Last 3 Encounters:   06/28/23 (!) 146/65   06/12/23 (!) 174/70   06/07/23 114/56                 Passed - Recent (12 mo) or future (30 days) visit within the authorizing provider's specialty     Patient has had an office visit with the authorizing provider or a provider within the authorizing providers department within the previous 12 mos or has a future within next 30 days. See \"Patient Info\" tab in inbasket, or \"Choose Columns\" in Meds & Orders section of the refill encounter.              Passed - Medication is active on med list        Passed - Patient is age 18 or older        Passed - No active pregnancy on record        Passed - Normal serum creatinine on file in past 12 months     Recent Labs   Lab Test 06/12/23  1204   CR 0.86       Ok to refill medication if creatinine is low          Passed - No positive pregnancy test in past 12 months                 Terry Whitt RN 07/03/23 1:39 PM  "

## 2023-07-03 NOTE — TELEPHONE ENCOUNTER
Routing refill request to provider for review/approval because:  Drug not on the [facility] refill protocol       Requested Prescriptions   Pending Prescriptions Disp Refills     LORazepam (ATIVAN) 0.5 MG tablet 8 tablet 0     Sig: Take 1 tablet (0.5 mg) by mouth daily as needed for anxiety (for short term only, do not take more than one per day)       There is no refill protocol information for this order              Terry Whitt RN 07/03/23 1:56 PM

## 2023-07-04 RX ORDER — AMLODIPINE BESYLATE 5 MG/1
TABLET ORAL
Qty: 90 TABLET | Refills: 0 | Status: SHIPPED | OUTPATIENT
Start: 2023-07-04 | End: 2023-09-25

## 2023-07-04 RX ORDER — LORAZEPAM 0.5 MG/1
0.5 TABLET ORAL DAILY PRN
Qty: 4 TABLET | Refills: 0 | Status: ON HOLD | OUTPATIENT
Start: 2023-07-04 | End: 2023-08-18

## 2023-07-05 ENCOUNTER — ALLIED HEALTH/NURSE VISIT (OUTPATIENT)
Dept: OTHER | Facility: CLINIC | Age: 88
End: 2023-07-05
Payer: MEDICARE

## 2023-07-05 VITALS
SYSTOLIC BLOOD PRESSURE: 148 MMHG | HEART RATE: 70 BPM | DIASTOLIC BLOOD PRESSURE: 68 MMHG | TEMPERATURE: 99.2 F | RESPIRATION RATE: 20 BRPM | OXYGEN SATURATION: 98 %

## 2023-07-05 DIAGNOSIS — I10 ESSENTIAL HYPERTENSION: Primary | ICD-10-CM

## 2023-07-05 PROCEDURE — 99207 PR COMMUNITY PARAMEDIC - PATIENT NOT BILLABLE: CPT

## 2023-07-06 ENCOUNTER — TELEPHONE (OUTPATIENT)
Dept: FAMILY MEDICINE | Facility: CLINIC | Age: 88
End: 2023-07-06
Payer: MEDICARE

## 2023-07-06 NOTE — TELEPHONE ENCOUNTER
"  FYI - Status Update    Who is Calling: family member, Wally, Son, calling with concerns about his mom. She says :\"they are putting her in a mental hospital\".      Update: son calling to say her mom has lost her mind completely. She needs and intervention.   Whatever assistance you can find, please help her. Son is washing his hands, she is on her own. He is the only one left of family who was trying to help her.    Does caller want a call/response back: Yes [phone] Wlaly   please let him know if there is someting that can be done.      "

## 2023-07-07 ENCOUNTER — TELEPHONE (OUTPATIENT)
Dept: BEHAVIORAL HEALTH | Facility: CLINIC | Age: 88
End: 2023-07-07
Payer: MEDICARE

## 2023-07-07 ENCOUNTER — VIRTUAL VISIT (OUTPATIENT)
Dept: BEHAVIORAL HEALTH | Facility: CLINIC | Age: 88
End: 2023-07-07
Payer: MEDICARE

## 2023-07-07 DIAGNOSIS — F41.1 GAD (GENERALIZED ANXIETY DISORDER): Primary | ICD-10-CM

## 2023-07-07 PROCEDURE — 90834 PSYTX W PT 45 MINUTES: CPT | Mod: 95
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
## 2023-07-07 NOTE — TELEPHONE ENCOUNTER
Writer huddled with Priya [facility] re: this encounter. She says that she's scheduled to meet with pt this morning, and will address this. She'll notify care team if further action is needed.    Leticia Ahuja RN  [facility]

## 2023-07-07 NOTE — PROGRESS NOTES
[facility]: Integrated Behavioral Health  July 7, 2023    Behavioral Health Clinician Progress Note    Patient Name: Daniela Brown       Service Type:  Individual      Service Location:   Face to Face in Clinic     Session Start Time: 11:45am  Session End Time: 12:20 pm      Session Length: 16 - 37      Attendees: Patient     Service Modality:  In-person    Visit Activities (Refresh list every visit): NEW, [facility] Only, Referral - Mental Health and Same day referral from provider for urgent safety assessment - outpatient plan made    Diagnostic Assessment Date: Will complete in the next few sessions, not completed due to time constraints.   Treatment Plan Review Date: Will complete in the next few sessions, not completed due to time constraints.   See Flowsheets for today's PHQ-9 and ARABELLA-7 results  Previous PHQ-9:       4/19/2023    10:32 AM 6/7/2023     1:16 PM 6/29/2023     2:12 PM   PHQ-9 SCORE   PHQ-9 Total Score MyChart   12 (Moderate depression)   PHQ-9 Total Score 8 12 12     Previous ARABELLA-7:       3/16/2022     2:40 PM 6/7/2023     1:16 PM 6/29/2023     2:14 PM   ARABELLA-7 SCORE   Total Score   11 (moderate anxiety)   Total Score 2 12 11     DATA  Extended Session (60+ minutes): No  Interactive Complexity: No  Crisis: No  [facility] Patient: No    Treatment Objective(s) Addressed in This Session:  Target Behavior(s): disease management/lifestyle changes ongoing anxiety     Anxiety: will experience a reduction in anxiety, will develop more effective coping skills to manage anxiety symptoms, will develop healthy cognitive patterns and beliefs and will increase ability to function adaptively  Relationship Problems: will address relationship difficulties in a more adaptive manner    Current Stressors / Issues:  Pt answered the phone when she didn't attend appt in person.  She stated that her son is no longer speaking with her and says she doesn't know why but then is able to  identify that they continue to argue and not agree with decisions being made.  She stated she has not had any panic attacks in the last week.  She doesn't want to leave her home and refuses to go into an assisted living.  Discuss anxiety coping skills and write out pieces to remember to complete.  Prompt getting out and visiting with others.      *Reached her on the 2nd call: she was agreeable to meeting with me via phone in 2 weeks to check in.     Progress on Treatment Objective(s) / Homework:  New Objective established this session - CONTEMPLATION (Considering change and yet undecided); Intervened by assessing the negative and positive thinking (ambivalence) about behavior change    Motivational Interviewing    MI Intervention: Permission to raise concern or advise and Reflections: simple and complex     Change Talk Expressed by the Patient: Desire to change    Provider Response to Change Talk: A - Affirmed patient's thoughts, decisions, or attempts at behavior change    Care Plan review completed: Yes    Medication Review:  No changes to current psychiatric medication(s)    Medication Compliance:  Yes at times    Changes in Health Issues:   None reported    Chemical Use Review:   Substance Use: Chemical use reviewed, no active concerns identified      Tobacco Use: No current tobacco use.      Assessment: Current Emotional / Mental Status (status of significant symptoms):  Risk status (Self / Other harm or suicidal ideation)  Patient denies a history of suicidal ideation, suicide attempts, self-injurious behavior, homicidal ideation, homicidal behavior and and other safety concerns  Patient denies current fears or concerns for personal safety.  Patient denies current or recent suicidal ideation or behaviors.  Patient denies current or recent homicidal ideation or behaviors.  Patient denies current or recent self injurious behavior or ideation.  Patient denies other safety concerns.  A safety and risk management  plan has not been developed at this time, however patient was encouraged to call [place] [phone] should there be a change in any of these risk factors.    Appearance:   Unable to assess  Eye Contact:   Unable to assess  Psychomotor Behavior: Unable to assess  Attitude:   Cooperative   Orientation:   All  Speech   Rate / Production: Normal    Volume:  Normal   Mood:    Anxious  Normal  Affect:    Appropriate  Worrisome   Thought Content:  Clear   Thought Form:  Coherent  Logical   Insight:    Poor   Diagnoses:  1. ARABELLA (generalized anxiety disorder)      Collateral Reports Completed:  Not Applicable    Plan: (Homework, other):  Patient was given information about behavioral services and encouraged to schedule a follow up appointment with the clinic [facility] in 1 week.  She was also given information about mental health symptoms and treatment options .  CD Recommendations: No indications of CD issues.     WILBERT Cuevas  July 7, 2023               Answers for HPI/ROS submitted by the patient on 6/29/2023  If you checked off any problems, how difficult have these problems made it for you to do your work, take care of things at home, or get along with other people?: Very difficult  PHQ9 TOTAL SCORE: 12  ARABELLA 7 TOTAL SCORE: 11

## 2023-07-10 NOTE — PROGRESS NOTES
[facility]amedics Follow-up Visit  June 28, 2023  TIME:    Daniela Brown is a 89 year old female being seen at home for a follow-up visit.    Present at appointment:           Chief Complaint   Patient presents with     Outreach     Recheck Medication       Universal Utilization:      Utilization    Hospital Admissions  2             ED Visits  7             No Show Count (past year)  5                Current as of: 7/7/2023  3:40 PM              BP (!) 146/65 (BP Location: Right arm, Patient Position: Sitting, Cuff Size: Adult Regular)   Pulse 83   Temp 98.2  F (36.8  C) (Temporal)   Resp 16   LMP  (LMP Unknown)   SpO2 96%     Clinical Concerns:  Current Medical Concerns:      Current Behavioral Concerns:     Education Provided to patient:    Medication set up? Yes  Pill Box issued: No  Scale issued: No  Flu Shot given: No  COVID Vaccine Given: No  Lab draw or specimen collection: No  Food resource given: No  Collaborative visit with PCP: No  Wound Care: No  Health Maintenance Addressed No      No  Face to Face in Home / Community    Recent blood sugars:    Review of Symptoms/PE    Skin: negative  Eyes: negative  Ears/Nose/Throat: negative  Respiratory: No shortness of breath, dyspnea on exertion, cough, or hemoptysis  Cardiovascular: negative  Gastrointestinal: negative  Genitourinary: negative  Musculoskeletal: negative  Neurologic: negative  Psychiatric: negative    Pain Management::                 Plan:     Time spent with patient: 30    The patient meets one or more of the following criteria:  * Has been identified by their primary care provider at risk of nursing home placement    Acute concern/Follow-up recommendations:     Next CP visit scheduled:     Issues for Provider to follow up on:     Provider follow up visit needed:
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
## 2023-07-18 ENCOUNTER — ALLIED HEALTH/NURSE VISIT (OUTPATIENT)
Dept: OTHER | Facility: CLINIC | Age: 88
End: 2023-07-18
Payer: MEDICARE

## 2023-07-18 VITALS
HEART RATE: 62 BPM | RESPIRATION RATE: 18 BRPM | TEMPERATURE: 99.8 F | SYSTOLIC BLOOD PRESSURE: 134 MMHG | DIASTOLIC BLOOD PRESSURE: 70 MMHG | OXYGEN SATURATION: 97 %

## 2023-07-18 DIAGNOSIS — I10 ESSENTIAL HYPERTENSION: Primary | ICD-10-CM

## 2023-07-18 PROCEDURE — 99207 PR COMMUNITY PARAMEDIC - PATIENT NOT BILLABLE: CPT
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
## 2023-07-19 ENCOUNTER — TELEPHONE (OUTPATIENT)
Dept: FAMILY MEDICINE | Facility: CLINIC | Age: 88
End: 2023-07-19
Payer: MEDICARE

## 2023-07-19 DIAGNOSIS — F41.1 GAD (GENERALIZED ANXIETY DISORDER): Primary | ICD-10-CM
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
## 2023-07-20 ENCOUNTER — PATIENT OUTREACH (OUTPATIENT)
Dept: CARE COORDINATION | Facility: CLINIC | Age: 88
End: 2023-07-20
Payer: MEDICARE
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
## 2023-07-24 ENCOUNTER — PATIENT OUTREACH (OUTPATIENT)
Dept: NURSING | Facility: CLINIC | Age: 88
End: 2023-07-24
Payer: MEDICARE

## 2023-07-24 NOTE — LETTER
Toe nail clipping services at home:     [facility]: [phone] // [email or web address]   [facility]: [phone] // [email or web address]  [employer] [facility], [phone]  [facility] [phone]     Meals on Wheels:      [facility] (frozen mail order): [phone]  [employer] (frozen mail order): [phone]  [facility] meals on wheels (daily hot): [phone]    [employer] door step grocery delivery: Nicolasa [phone]     Transportation: [employer] [phone], option 13  See attached flyer

## 2023-07-24 NOTE — LETTER
[facility] CARE COORDINATION  [facility]  [address] [address]    July 24, 2023    Daniela Brown  [address]  [place] [address]      Dear Daniela,    I am a clinic care coordinator who works with Cynthia Maddox DO with the [facility]. I wanted to thank you for spending the time to talk with me.  Below is a description of clinic care coordination and how I can further assist you.       The clinic care coordination team is made up of a registered nurse, , financial resource worker and community health worker who understand the health care system. The goal of clinic care coordination is to help you manage your health and improve access to the health care system. Our team works alongside your provider to assist you in determining your health and social needs. We can help you obtain health care and community resources, providing you with necessary information and education. We can work with you through any barriers and develop a care plan that helps coordinate and strengthen the communication between you and your care team.  Our services are voluntary and are offered without charge to you personally.    Please feel free to contact me with any questions or concerns regarding care coordination and what we can offer.      We are focused on providing you with the highest-quality healthcare experience possible.    Sincerely,     Bharti Riley, [facility]   Social Work Primary Care Clinic Care Coordinator   [facility]  [phone]  [email or web address]     Enclosed: I have enclosed a copy of the Patient Centered Plan of Care. This has helpful information and goals that we have talked about. Please keep this in an easy to access place to use as needed.  Resources

## 2023-07-24 NOTE — LETTER
[facility]  Patient Centered Plan of Care  About Me:        Patient Name:  aDniela Wetzel    YOB: 1933  Age:         89 year old   [place] MRN:    [id number] Telephone Information:  Home Phone [phone]   Mobile [phone]       Address:  [address]  [place] [address] Email address:  No e-mail address on record      Emergency Contact(s)    Name Relationship Lgl Grd Work Phone Home Phone Mobile Phone   1. EDEN WETZEL Son No   [phone]   2. JACE WETZEL (D* Relative No   [phone]   3. JACE WETZEL Daughter-in-Law    [phone]           Primary language:  English     needed? No   [place] [facility]:  [phone] op. 1  Other communication barriers:Glasses  Preferred Method of Communication:  Mail  Current living arrangement: I live alone; I live in a private home  Mobility Status/ Medical Equipment: Independent w/Device    Health Maintenance  Health Maintenance Reviewed: Due/Overdue   Health Maintenance Due   Topic Date Due    EYE EXAM  12/08/2012    DIABETIC FOOT EXAM  09/02/2022     My Access Plan  Medical Emergency 911   Primary Clinic Line [facility] - [phone]   24 Hour Appointment Line [phone] or  [phone]) (toll-free)   24 Hour Nurse Line [phone] (toll-free)   Preferred Urgent Care [facility], [phone]     Preferred Hospital [place] [place]  [phone]     [place] [facility] [address] [facility]     Behavioral Health Crisis Line The National Suicide Prevention Lifeline at 1-281.649.1753 or Text/Call 678       My Care Team Members  Patient Care Team         Relationship Specialty Notifications Start End    Cynthia Maddox DO PCP - General Internal Medicine Admissions 11/14/18     Phone: [phone] Pager: [phone] Fax: [phone] [address] [address]    Tan  Cynthia Longoria DO Assigned PCP   10/21/18     Phone: [phone] Pager: [phone] Fax: [phone]        [address] [address]    Tho Newman DO Assigned Musculoskeletal Provider   3/13/22     Phone: [phone] Fax: [phone]         [address] [facility] [address]    Gunjan Rowe PAShelbyC Physician Assistant Dermatology  1/17/23     Phone: [phone] Fax: [phone]         [address] [address] [facility] [address]    Leandro Posada MD Assigned Surgical Provider   1/28/23     Phone: [phone] Pager: [phone] Fax: [phone]        [address] [facility] [address]    Mehdi Ellis [facility] Paramedic  Admissions 6/6/23     [facility] Paramedic [phone]    Padmini Moran [facility] Health Worker Primary Care - CC Admissions 7/19/23     Phone: [phone]         Bharti Riley LSW Lead Care Coordinator Primary Care - CC Admissions 7/20/23     Phone: [phone]          [address] [facility] [address]              My Care Plans  Self Management and Treatment Plan  Care Plan  Care Plan: General       Problem: HP GENERAL PROBLEM       Goal: Resources       Start Date: 7/24/2023 Expected End Date: 12/1/2023    Priority: Medium    Note:     Barriers: Access  Strengths: Motivated  Patient expressed understanding of goal: Yes    Action steps to achieve this goal:  1. I will look into using [employer].  2. I will look into toe nail home services.   3. I will look into food resources.   4. I will work with care coordination team as needed.                                 Action Plans on File:                       Advance Care Plans/Directives Type:   Advanced Directive - On File; POLST      My Medical and Care Information  Problem List   Patient Active Problem List   Diagnosis    Degeneration of lumbar or lumbosacral intervertebral disc    Esophageal reflux    Memory loss    Irritable bowel syndrome with diarrhea    Osteopenia of multiple sites     RESTLESS LEG SYNDROME    Primary osteoarthritis involving multiple joints    Diverticulosis of large intestine    SENSONRL HEAR LOSS,BILAT    Urticaria    Subjective tinnitus    Vitamin D deficiency    Right foot pain    Urge incontinence    Hyperlipidemia LDL goal <100    Allergic rhinitis    Pronation of foot    Peripheral neuropathy    Benign essential hypertension    Carpal tunnel syndrome of left wrist    Diastolic dysfunction    Type 2 diabetes mellitus with diabetic polyneuropathy, without long-term current use of insulin (H)    History of recurrent urinary tract infection    CKD (chronic kidney disease) stage 3, GFR 30-59 ml/min (H)    Bilateral wrist pain    Protrusion of lumbar intervertebral disc    Coronary artery disease involving native coronary artery of native heart with angina pectoris (H)    Chronic left-sided low back pain with left-sided sciatica    Generalized weakness    Diarrhea    Abnormal CT of the abdomen    Cystocele, midline    B12 deficiency    Moderate major depression (H)    ARABELLA (generalized anxiety disorder)    Polyneuropathy associated with underlying disease (H)      Current Medications and Allergies:    Current Outpatient Medications   Medication Instructions    acetaminophen (TYLENOL) 500-1,000 mg, Oral, EVERY 6 HOURS PRN    amLODIPine (NORVASC) 5 MG tablet TAKE 1 TABLET BY MOUTH ONCE DAILY    blood glucose (ACCU-CHEK GUIDE) test strip Use to test blood sugar one times daily or as directed.    blood glucose monitoring (ACCU-CHEK FASTCLIX) lancets Use to test blood sugar one times daily or as directed.    buPROPion (WELLBUTRIN XL) 150 mg, Oral, EVERY MORNING    cefdinir (OMNICEF) 300 mg, Oral, 2 TIMES DAILY    LORazepam (ATIVAN) 0.5 mg, Oral, DAILY PRN, Further refills to be addressed by primary care provider.    meloxicam (MOBIC) 7.5 mg, Oral, DAILY    Multiple Vitamins-Minerals (THERAPEUTIC MULTIVIT/MINERAL) TABS 1 tablet, Oral, EVERY EVENING    olmesartan (BENICAR) 20 mg, Oral,  DAILY    PARoxetine (PAXIL) 20 mg, Oral, EVERY MORNING    pregabalin (LYRICA) 50 mg, Oral, 2 TIMES DAILY    STATIN NOT PRESCRIBED (INTENTIONAL) Please choose reason not prescribed, below     Care Coordination Start Date: 7/19/2023   Frequency of Care Coordination: monthly     Form Last Updated: 07/24/2023
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
## 2023-07-26 ENCOUNTER — ALLIED HEALTH/NURSE VISIT (OUTPATIENT)
Dept: OTHER | Facility: CLINIC | Age: 88
End: 2023-07-26
Payer: MEDICARE

## 2023-07-26 ENCOUNTER — TELEPHONE (OUTPATIENT)
Dept: FAMILY MEDICINE | Facility: CLINIC | Age: 88
End: 2023-07-26

## 2023-07-26 DIAGNOSIS — F41.1 GAD (GENERALIZED ANXIETY DISORDER): ICD-10-CM

## 2023-07-26 DIAGNOSIS — I10 ESSENTIAL HYPERTENSION: Primary | ICD-10-CM

## 2023-07-26 PROCEDURE — 99207 PR COMMUNITY PARAMEDIC - PATIENT NOT BILLABLE: CPT

## 2023-07-26 RX ORDER — PAROXETINE 20 MG/1
20 TABLET, FILM COATED ORAL EVERY MORNING
Qty: 90 TABLET | Refills: 3 | Status: SHIPPED | OUTPATIENT
Start: 2023-07-26 | End: 2024-04-11

## 2023-07-26 RX ORDER — PAROXETINE 10 MG/1
20 TABLET, FILM COATED ORAL EVERY MORNING
Qty: 60 TABLET | Refills: 3 | Status: SHIPPED | OUTPATIENT
Start: 2023-07-26 | End: 2023-07-26

## 2023-07-26 NOTE — TELEPHONE ENCOUNTER
Dr. Maddox,  I received a note from [employer] indicating that the insurance company will only cover one tablet per day of Paxil. Will you send in a prescription for 20 mg tabs to allow coverage from the insurance?    If not we can pursue PA.    Thank you,  Cherri Robert

## 2023-07-26 NOTE — TELEPHONE ENCOUNTER
Medication Question or Refill        What medication are you calling about (include dose and sig)?: patient is asking if she should continue to take pregabalin( she has refill at pharmacy) and paroxetine 20mg (needs refills). She says that she takes too many medications and it becoming a lot for her.      Preferred Pharmacy:  [facility] [place] - [address] [facility]  [id number] [place] [address]  Phone: [phone] Fax: [phone]      Controlled Substance Agreement on file:   CSA -- Patient Level:    CSA: None found at the patient level.

## 2023-07-26 NOTE — TELEPHONE ENCOUNTER
Prior Authorization Retail Medication Request    Medication/Dose: Paroxetine hcl 10 mg  ICD code (if different than what is on RX):    Previously Tried and Failed:  elavil; amitriptyline; bupropion; celexa; cymbalta; lexapro; paxil; sertraline; venlafaxine;   Rationale:  patient has tried and failed other meds in drug class. Has used since 8/22    Insurance Name:  not provided  Insurance ID:  not provided  [facility] Key: [id number]      Pharmacy Information (if different than what is on RX)  Name:    Phone:

## 2023-07-26 NOTE — TELEPHONE ENCOUNTER
Order changed.  Please notify the patient of the reason for the change.  She has a lot of confusion surrounding her medications

## 2023-07-26 NOTE — TELEPHONE ENCOUNTER
This is what the community paramedic is for - helping to set up medications, checking for drug interactions, etc.  She should continue current medications, refill sent

## 2023-07-28 NOTE — TELEPHONE ENCOUNTER
I notified pt and she read back to me.  She will take Paxil 20 mg tablet , 1 tablet, 1x daily.  She will notify Community Paramedics that set up her pill box for her.

## 2023-07-31 ENCOUNTER — ALLIED HEALTH/NURSE VISIT (OUTPATIENT)
Dept: OTHER | Facility: CLINIC | Age: 88
End: 2023-07-31
Payer: MEDICARE

## 2023-07-31 VITALS
SYSTOLIC BLOOD PRESSURE: 121 MMHG | TEMPERATURE: 98.9 F | DIASTOLIC BLOOD PRESSURE: 88 MMHG | RESPIRATION RATE: 20 BRPM | OXYGEN SATURATION: 99 % | HEART RATE: 71 BPM

## 2023-07-31 VITALS
HEART RATE: 62 BPM | OXYGEN SATURATION: 97 % | DIASTOLIC BLOOD PRESSURE: 70 MMHG | SYSTOLIC BLOOD PRESSURE: 134 MMHG | TEMPERATURE: 99.8 F | RESPIRATION RATE: 18 BRPM

## 2023-07-31 DIAGNOSIS — I10 ESSENTIAL HYPERTENSION: Primary | ICD-10-CM

## 2023-07-31 PROCEDURE — 99207 PR COMMUNITY PARAMEDIC - PATIENT NOT BILLABLE: CPT

## 2023-07-31 NOTE — PROGRESS NOTES
Community Paramedics Follow-up Visit  July 26, 2023  TIME: 0900    Daniela Brown is a 89 year old female being seen at home for a follow-up visit.    Present at appointment:  Patient and CP Mehdi Ellis    Pt was seen at home today to monitor medication compliance and discuss issues regarding her financial situation and a recent bank problem that the Pt was presented with. Pt provided some documentation to the CP that showed a check was written in the amount $18,858.52. [employer] did not honor this check as it was out of sequence however it did cause a momentary insufficient funds in the patient's account. It does not appear that the patient had any charges in regards to this situation.         Chief Complaint   Patient presents with    Recheck Medication    Hypertension       Universal Utilization:      Utilization      Hospital Admissions  2             ED Visits  7             No Show Count (past year)  4                    Current as of: 7/28/2023 12:55 PM                /70 (BP Location: Right arm, Patient Position: Sitting, Cuff Size: Adult Regular)   Pulse 62   Temp 99.8  F (37.7  C) (Temporal)   Resp 18   LMP  (LMP Unknown)   SpO2 97%     Clinical Concerns:  Current Medical Concerns:      Current Behavioral Concerns:     Education Provided to patient:    Medication set up? Yes  Pill Box issued: No  Scale issued: No  Flu Shot given: No  COVID Vaccine Given: No  Lab draw or specimen collection: No  Food resource given: No  Collaborative visit with PCP: No  Wound Care: No  Health Maintenance Addressed No        No  Face to Face in Home / Community      Review of Symptoms/PE    Skin: negative  Eyes: negative  Ears/Nose/Throat: negative  Respiratory: No shortness of breath, dyspnea on exertion, cough, or hemoptysis  Cardiovascular: negative  Gastrointestinal: negative  Genitourinary: negative  Musculoskeletal: negative  Neurologic: negative  Psychiatric: negative        Time spent with  patient: 45    The patient meets one or more of the following criteria:  * Has been identified by their primary care provider at risk of nursing home placement    Acute concern/Follow-up recommendations:     Next CP visit scheduled: 7/31/23    Issues for Provider to follow up on:     Provider follow up visit needed:

## 2023-07-31 NOTE — PROGRESS NOTES
Community Paramedics Follow-up Visit  July 31, 2023  TIME: 09:00    Daniela Brown is a 89 year old female being seen at home for a follow-up visit.    Present at appointment:  Patient and CP Mehdi Ellis    Pt is being seen today in her home for PC follow up. Pt was seen recently and described an issue she was having with her bank, Pt produced a statement from her bank that indicated a duplicate check that she had written for $28.40 was also written for $18,858.52. Because this check was a duplicate number, the bank did not honor the check and it was rejected. This was discussed with patient and I discussed filing an adult protection report with her, she agreed so one will be completed by CP. Pt had no other complaints and was compliant with medications over the past two weeks. Pt's medications were set up for two weeks and will be reassessed then.          Chief Complaint   Patient presents with    Recheck Medication    Hypertension       Universal Utilization:      Utilization      Hospital Admissions  2             ED Visits  7             No Show Count (past year)  4                    Current as of: 7/31/2023 11:07 AM                /88 (BP Location: Left arm, Patient Position: Standing, Cuff Size: Adult Regular)   Pulse 71   Temp 98.9  F (37.2  C) (Temporal)   Resp 20   LMP  (LMP Unknown)   SpO2 99%     Clinical Concerns:  Current Medical Concerns:      Current Behavioral Concerns:     Education Provided to patient:    Medication set up? Yes  Pill Box issued: Yes  Scale issued: No  Flu Shot given: No  COVID Vaccine Given: No  Lab draw or specimen collection: No  Food resource given: No  Collaborative visit with PCP: No  Wound Care: No  Health Maintenance Addressed No      No  Face to Face in Home / Community    Recent blood sugars: 124 POC today    Review of Symptoms/PE    Skin: negative  Eyes: negative  Ears/Nose/Throat: negative  Respiratory: No shortness of breath, dyspnea on  exertion, cough, or hemoptysis  Cardiovascular: negative  Gastrointestinal: negative  Genitourinary: negative  Musculoskeletal: negative  Neurologic: negative  Psychiatric: negative      Time spent with patient: 45    The patient meets one or more of the following criteria:  * Has been identified by their primary care provider at risk of nursing home placement    Acute concern/Follow-up recommendations:     Next CP visit scheduled:     Issues for Provider to follow up on:     Provider follow up visit needed:

## 2023-08-15 ENCOUNTER — PATIENT OUTREACH (OUTPATIENT)
Dept: CARE COORDINATION | Facility: CLINIC | Age: 88
End: 2023-08-15

## 2023-08-15 ENCOUNTER — HOSPITAL ENCOUNTER (INPATIENT)
Facility: CLINIC | Age: 88
LOS: 3 days | Discharge: NURSING, REHAB OR LONG-TERM CARE | DRG: 682 | End: 2023-08-18
Attending: FAMILY MEDICINE | Admitting: HOSPITALIST
Payer: MEDICARE

## 2023-08-15 ENCOUNTER — APPOINTMENT (OUTPATIENT)
Dept: CT IMAGING | Facility: CLINIC | Age: 88
DRG: 682 | End: 2023-08-15
Attending: FAMILY MEDICINE
Payer: MEDICARE

## 2023-08-15 ENCOUNTER — APPOINTMENT (OUTPATIENT)
Dept: GENERAL RADIOLOGY | Facility: CLINIC | Age: 88
DRG: 682 | End: 2023-08-15
Attending: FAMILY MEDICINE
Payer: MEDICARE

## 2023-08-15 DIAGNOSIS — F41.1 GAD (GENERALIZED ANXIETY DISORDER): ICD-10-CM

## 2023-08-15 DIAGNOSIS — F32.1 MODERATE MAJOR DEPRESSION (H): Primary | ICD-10-CM

## 2023-08-15 DIAGNOSIS — F41.9 ANXIETY AND DEPRESSION: ICD-10-CM

## 2023-08-15 DIAGNOSIS — M19.012 OSTEOARTHRITIS OF GLENOHUMERAL JOINT, LEFT: ICD-10-CM

## 2023-08-15 DIAGNOSIS — M17.12 PRIMARY OSTEOARTHRITIS OF LEFT KNEE: ICD-10-CM

## 2023-08-15 DIAGNOSIS — M62.82 NON-TRAUMATIC RHABDOMYOLYSIS: ICD-10-CM

## 2023-08-15 DIAGNOSIS — F32.A ANXIETY AND DEPRESSION: ICD-10-CM

## 2023-08-15 DIAGNOSIS — M25.512 PAIN IN JOINT OF LEFT SHOULDER: ICD-10-CM

## 2023-08-15 DIAGNOSIS — N39.0 URINARY TRACT INFECTION WITHOUT HEMATURIA, SITE UNSPECIFIED: ICD-10-CM

## 2023-08-15 DIAGNOSIS — K59.00 CONSTIPATION, UNSPECIFIED CONSTIPATION TYPE: ICD-10-CM

## 2023-08-15 DIAGNOSIS — S09.90XA CLOSED HEAD INJURY, INITIAL ENCOUNTER: ICD-10-CM
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
## 2023-08-15 DIAGNOSIS — M25.462 EFFUSION OF LEFT KNEE: ICD-10-CM

## 2023-08-15 DIAGNOSIS — G63 POLYNEUROPATHY ASSOCIATED WITH UNDERLYING DISEASE (H): ICD-10-CM

## 2023-08-15 LAB
ALBUMIN SERPL BCG-MCNC: 3.9 G/DL (ref 3.5–5.2)
ALBUMIN UR-MCNC: 30 MG/DL
ALP SERPL-CCNC: 70 U/L (ref 35–104)
ALT SERPL W P-5'-P-CCNC: 27 U/L (ref 0–50)
ANION GAP SERPL CALCULATED.3IONS-SCNC: 13 MMOL/L (ref 7–15)
APPEARANCE UR: ABNORMAL
AST SERPL W P-5'-P-CCNC: 55 U/L (ref 0–45)
BACTERIA #/AREA URNS HPF: ABNORMAL /HPF
BASOPHILS # BLD AUTO: 0.1 10E3/UL (ref 0–0.2)
BASOPHILS NFR BLD AUTO: 0 %
BILIRUB SERPL-MCNC: 1.1 MG/DL
BILIRUB UR QL STRIP: NEGATIVE
BUN SERPL-MCNC: 24.1 MG/DL (ref 8–23)
CALCIUM SERPL-MCNC: 9.5 MG/DL (ref 8.2–9.6)
CHLORIDE SERPL-SCNC: 100 MMOL/L (ref 98–107)
CK SERPL-CCNC: 1447 U/L (ref 26–192)
COLOR UR AUTO: YELLOW
CREAT SERPL-MCNC: 1.31 MG/DL (ref 0.51–0.95)
DEPRECATED HCO3 PLAS-SCNC: 23 MMOL/L (ref 22–29)
EOSINOPHIL # BLD AUTO: 0.2 10E3/UL (ref 0–0.7)
EOSINOPHIL NFR BLD AUTO: 1 %
ERYTHROCYTE [DISTWIDTH] IN BLOOD BY AUTOMATED COUNT: 12.5 % (ref 10–15)
GFR SERPL CREATININE-BSD FRML MDRD: 39 ML/MIN/1.73M2
GLUCOSE SERPL-MCNC: 109 MG/DL (ref 70–99)
GLUCOSE UR STRIP-MCNC: NEGATIVE MG/DL
HCT VFR BLD AUTO: 37.8 % (ref 35–47)
HGB BLD-MCNC: 13 G/DL (ref 11.7–15.7)
HGB UR QL STRIP: ABNORMAL
HYALINE CASTS: 8 /LPF
IMM GRANULOCYTES # BLD: 0.1 10E3/UL
IMM GRANULOCYTES NFR BLD: 1 %
KETONES UR STRIP-MCNC: 20 MG/DL
LEUKOCYTE ESTERASE UR QL STRIP: ABNORMAL
LYMPHOCYTES # BLD AUTO: 1.8 10E3/UL (ref 0.8–5.3)
LYMPHOCYTES NFR BLD AUTO: 9 %
MCH RBC QN AUTO: 28.9 PG (ref 26.5–33)
MCHC RBC AUTO-ENTMCNC: 34.4 G/DL (ref 31.5–36.5)
MCV RBC AUTO: 84 FL (ref 78–100)
MONOCYTES # BLD AUTO: 0.8 10E3/UL (ref 0–1.3)
MONOCYTES NFR BLD AUTO: 4 %
MUCOUS THREADS #/AREA URNS LPF: PRESENT /LPF
NEUTROPHILS # BLD AUTO: 15.9 10E3/UL (ref 1.6–8.3)
NEUTROPHILS NFR BLD AUTO: 85 %
NITRATE UR QL: NEGATIVE
NRBC # BLD AUTO: 0 10E3/UL
NRBC BLD AUTO-RTO: 0 /100
PH UR STRIP: 5 [PH] (ref 5–7)
PLATELET # BLD AUTO: 236 10E3/UL (ref 150–450)
POTASSIUM SERPL-SCNC: 3.6 MMOL/L (ref 3.4–5.3)
PROT SERPL-MCNC: 7 G/DL (ref 6.4–8.3)
RBC # BLD AUTO: 4.5 10E6/UL (ref 3.8–5.2)
RBC URINE: 2 /HPF
SODIUM SERPL-SCNC: 136 MMOL/L (ref 136–145)
SP GR UR STRIP: 1.01 (ref 1–1.03)
UROBILINOGEN UR STRIP-MCNC: NORMAL MG/DL
WBC # BLD AUTO: 18.9 10E3/UL (ref 4–11)
WBC URINE: 25 /HPF

## 2023-08-15 PROCEDURE — 120N000001 HC R&B MED SURG/OB

## 2023-08-15 PROCEDURE — 85025 COMPLETE CBC W/AUTO DIFF WBC: CPT | Performed by: FAMILY MEDICINE

## 2023-08-15 PROCEDURE — 82550 ASSAY OF CK (CPK): CPT | Performed by: FAMILY MEDICINE

## 2023-08-15 PROCEDURE — 258N000003 HC RX IP 258 OP 636: Performed by: FAMILY MEDICINE

## 2023-08-15 PROCEDURE — 250N000011 HC RX IP 250 OP 636: Mod: JZ | Performed by: FAMILY MEDICINE

## 2023-08-15 PROCEDURE — 71046 X-RAY EXAM CHEST 2 VIEWS: CPT

## 2023-08-15 PROCEDURE — 87088 URINE BACTERIA CULTURE: CPT | Performed by: FAMILY MEDICINE

## 2023-08-15 PROCEDURE — 93010 ELECTROCARDIOGRAM REPORT: CPT | Performed by: FAMILY MEDICINE

## 2023-08-15 PROCEDURE — 80053 COMPREHEN METABOLIC PANEL: CPT | Performed by: FAMILY MEDICINE

## 2023-08-15 PROCEDURE — G1010 CDSM STANSON: HCPCS

## 2023-08-15 PROCEDURE — 81001 URINALYSIS AUTO W/SCOPE: CPT | Performed by: FAMILY MEDICINE

## 2023-08-15 PROCEDURE — 73502 X-RAY EXAM HIP UNI 2-3 VIEWS: CPT

## 2023-08-15 PROCEDURE — 99285 EMERGENCY DEPT VISIT HI MDM: CPT | Mod: 25 | Performed by: FAMILY MEDICINE

## 2023-08-15 PROCEDURE — 36415 COLL VENOUS BLD VENIPUNCTURE: CPT | Performed by: FAMILY MEDICINE

## 2023-08-15 PROCEDURE — 93005 ELECTROCARDIOGRAM TRACING: CPT | Performed by: FAMILY MEDICINE

## 2023-08-15 RX ORDER — SODIUM CHLORIDE 9 MG/ML
1000 INJECTION, SOLUTION INTRAVENOUS CONTINUOUS
Status: DISCONTINUED | OUTPATIENT
Start: 2023-08-15 | End: 2023-08-17

## 2023-08-15 RX ORDER — CEFTRIAXONE 1 G/1
1 INJECTION, POWDER, FOR SOLUTION INTRAMUSCULAR; INTRAVENOUS ONCE
Status: COMPLETED | OUTPATIENT
Start: 2023-08-15 | End: 2023-08-16

## 2023-08-15 RX ADMIN — SODIUM CHLORIDE 1000 ML: 9 INJECTION, SOLUTION INTRAVENOUS at 22:31

## 2023-08-15 RX ADMIN — CEFTRIAXONE SODIUM 1 G: 1 INJECTION, POWDER, FOR SOLUTION INTRAMUSCULAR; INTRAVENOUS at 22:35

## 2023-08-15 ASSESSMENT — ENCOUNTER SYMPTOMS
FEVER: 0
VOMITING: 0
DYSURIA: 0
WHEEZING: 0
CHILLS: 0
SINUS PRESSURE: 0
DIARRHEA: 0
CONSTIPATION: 0
PALPITATIONS: 0
COUGH: 0
ABDOMINAL PAIN: 0
SORE THROAT: 0
DIAPHORESIS: 0
NAUSEA: 0
SHORTNESS OF BREATH: 0
FREQUENCY: 0
HEADACHES: 1
BLOOD IN STOOL: 0

## 2023-08-15 ASSESSMENT — ACTIVITIES OF DAILY LIVING (ADL)
ADLS_ACUITY_SCORE: 39

## 2023-08-15 NOTE — ED TRIAGE NOTES
"Pt fell last night around 8:30-9:00 pm.  Pt states she fell on her buttock getting out of bed.  Denies hitting head.  No LOC.  Pt c/o left hip and shoulder pain.  CMS intact in extremities.  Using all extremities.  Pt denies dizziness and just states \"my legs gave out on me.\"  Pt confused on exactly when she fell, how long she was on the floor, or if she fell more than once.       Triage Assessment       Row Name 08/15/23 4300       Triage Assessment (Adult)    Airway WDL WDL       Respiratory WDL    Respiratory WDL WDL       Skin Circulation/Temperature WDL    Skin Circulation/Temperature WDL WDL       Cardiac WDL    Cardiac WDL WDL       Peripheral/Neurovascular WDL    Peripheral Neurovascular WDL WDL       Cognitive/Neuro/Behavioral WDL    Cognitive/Neuro/Behavioral WDL WDL                    "

## 2023-08-15 NOTE — PROGRESS NOTES
Clinic Care Coordination Contact  [facility]     Clinical Data: Care Coordination Outreach  Outreach attempted x 1.  Phone continued to ring, no VM option.   CHW not able to leave a message with call back information.    CHW Plan:   will route encounter with note to Community Paramedic, Mehdi Ellis (see below)     CHW attempted to call patient today for a monthly care coordination check in to go over goal progress and current/future support and/or resource needs.  I was not able to leave a message, phone continued to ring.  This message is FYI only / possible future home visit discussion if time permits:     BRUCE CC outreach to pt for initial assessment 23 and sent the following resources:     Toe nail clipping services at home:        [facility]: [phone] // [email or web address]     [facility]: [phone] // [email or web address]    [employer], [phone]    [facility] [phone]     Meals on Wheels:   (Discussed referral [employer] grocery drop off. Pt needs to call Nicolasa)        [employer] (frozen mail order): [phone]    [employer] (frozen mail order): [phone]    [facility] meals on wheels (daily hot): [phone]     [employer] door step grocery delivery: Nicolasa [phone]      Transportation: [employer] [phone], option 13.  Pt thinks she has taken it before. Sent info.   Pt can use this transportation service to [facility] [place] appt.    will print and mail resources to patient's home address on file  will try to reach patient again in 7-10 business days.    Padmini MILLER  Community Health Worker  [facility]  Clinic Care Coordination  [facility]  [email or web address]   Office: [phone]

## 2023-08-16 ENCOUNTER — PATIENT OUTREACH (OUTPATIENT)
Dept: CARE COORDINATION | Facility: CLINIC | Age: 88
End: 2023-08-16

## 2023-08-16 LAB
ANION GAP SERPL CALCULATED.3IONS-SCNC: 13 MMOL/L (ref 7–15)
BASOPHILS # BLD AUTO: 0 10E3/UL (ref 0–0.2)
BASOPHILS NFR BLD AUTO: 0 %
BUN SERPL-MCNC: 23.8 MG/DL (ref 8–23)
CALCIUM SERPL-MCNC: 8.5 MG/DL (ref 8.2–9.6)
CHLORIDE SERPL-SCNC: 105 MMOL/L (ref 98–107)
CK SERPL-CCNC: 1307 U/L (ref 26–192)
CREAT SERPL-MCNC: 1.08 MG/DL (ref 0.51–0.95)
DEPRECATED HCO3 PLAS-SCNC: 22 MMOL/L (ref 22–29)
EOSINOPHIL # BLD AUTO: 0.3 10E3/UL (ref 0–0.7)
EOSINOPHIL NFR BLD AUTO: 2 %
ERYTHROCYTE [DISTWIDTH] IN BLOOD BY AUTOMATED COUNT: 12.6 % (ref 10–15)
GFR SERPL CREATININE-BSD FRML MDRD: 49 ML/MIN/1.73M2
GLUCOSE BLDC GLUCOMTR-MCNC: 123 MG/DL (ref 70–99)
GLUCOSE BLDC GLUCOMTR-MCNC: 155 MG/DL (ref 70–99)
GLUCOSE BLDC GLUCOMTR-MCNC: 77 MG/DL (ref 70–99)
GLUCOSE SERPL-MCNC: 122 MG/DL (ref 70–99)
HCT VFR BLD AUTO: 34 % (ref 35–47)
HGB BLD-MCNC: 11.6 G/DL (ref 11.7–15.7)
IMM GRANULOCYTES # BLD: 0.1 10E3/UL
IMM GRANULOCYTES NFR BLD: 1 %
LACTATE SERPL-SCNC: 1.5 MMOL/L (ref 0.7–2)
LYMPHOCYTES # BLD AUTO: 1.6 10E3/UL (ref 0.8–5.3)
LYMPHOCYTES NFR BLD AUTO: 10 %
MCH RBC QN AUTO: 29 PG (ref 26.5–33)
MCHC RBC AUTO-ENTMCNC: 34.1 G/DL (ref 31.5–36.5)
MCV RBC AUTO: 85 FL (ref 78–100)
MONOCYTES # BLD AUTO: 0.8 10E3/UL (ref 0–1.3)
MONOCYTES NFR BLD AUTO: 5 %
NEUTROPHILS # BLD AUTO: 12.8 10E3/UL (ref 1.6–8.3)
NEUTROPHILS NFR BLD AUTO: 82 %
NRBC # BLD AUTO: 0 10E3/UL
NRBC BLD AUTO-RTO: 0 /100
PLATELET # BLD AUTO: 206 10E3/UL (ref 150–450)
POTASSIUM SERPL-SCNC: 3.5 MMOL/L (ref 3.4–5.3)
RBC # BLD AUTO: 4 10E6/UL (ref 3.8–5.2)
SODIUM SERPL-SCNC: 140 MMOL/L (ref 136–145)
WBC # BLD AUTO: 15.6 10E3/UL (ref 4–11)

## 2023-08-16 PROCEDURE — 87040 BLOOD CULTURE FOR BACTERIA: CPT | Performed by: INTERNAL MEDICINE

## 2023-08-16 PROCEDURE — 82550 ASSAY OF CK (CPK): CPT | Performed by: INTERNAL MEDICINE

## 2023-08-16 PROCEDURE — 85014 HEMATOCRIT: CPT | Performed by: HOSPITALIST

## 2023-08-16 PROCEDURE — 99221 1ST HOSP IP/OBS SF/LOW 40: CPT | Mod: AI | Performed by: HOSPITALIST

## 2023-08-16 PROCEDURE — 83605 ASSAY OF LACTIC ACID: CPT | Performed by: INTERNAL MEDICINE

## 2023-08-16 PROCEDURE — 36415 COLL VENOUS BLD VENIPUNCTURE: CPT | Performed by: INTERNAL MEDICINE

## 2023-08-16 PROCEDURE — 120N000001 HC R&B MED SURG/OB

## 2023-08-16 PROCEDURE — 258N000003 HC RX IP 258 OP 636: Performed by: HOSPITALIST

## 2023-08-16 PROCEDURE — 99207 PR NOT IN PERSON INPATIENT CONSULT STATISTICAL MARKER: CPT | Performed by: HOSPITALIST

## 2023-08-16 PROCEDURE — 82310 ASSAY OF CALCIUM: CPT | Performed by: HOSPITALIST

## 2023-08-16 PROCEDURE — 250N000013 HC RX MED GY IP 250 OP 250 PS 637: Performed by: HOSPITALIST

## 2023-08-16 PROCEDURE — 250N000011 HC RX IP 250 OP 636: Mod: JZ | Performed by: HOSPITALIST

## 2023-08-16 PROCEDURE — 36415 COLL VENOUS BLD VENIPUNCTURE: CPT | Performed by: HOSPITALIST

## 2023-08-16 PROCEDURE — 99207 PR NO CHARGE LOS: CPT | Performed by: INTERNAL MEDICINE

## 2023-08-16 RX ORDER — ENOXAPARIN SODIUM 100 MG/ML
30 INJECTION SUBCUTANEOUS EVERY 24 HOURS
Status: DISCONTINUED | OUTPATIENT
Start: 2023-08-16 | End: 2023-08-17

## 2023-08-16 RX ORDER — LOSARTAN POTASSIUM 50 MG/1
50 TABLET ORAL DAILY
Status: DISCONTINUED | OUTPATIENT
Start: 2023-08-16 | End: 2023-08-18 | Stop reason: HOSPADM

## 2023-08-16 RX ORDER — PAROXETINE 20 MG/1
20 TABLET, FILM COATED ORAL EVERY MORNING
Status: DISCONTINUED | OUTPATIENT
Start: 2023-08-16 | End: 2023-08-18 | Stop reason: HOSPADM

## 2023-08-16 RX ORDER — ACETAMINOPHEN 500 MG
500-1000 TABLET ORAL EVERY 6 HOURS PRN
Status: DISCONTINUED | OUTPATIENT
Start: 2023-08-16 | End: 2023-08-18 | Stop reason: HOSPADM

## 2023-08-16 RX ORDER — ONDANSETRON 4 MG/1
4 TABLET, ORALLY DISINTEGRATING ORAL EVERY 6 HOURS PRN
Status: DISCONTINUED | OUTPATIENT
Start: 2023-08-16 | End: 2023-08-18 | Stop reason: HOSPADM

## 2023-08-16 RX ORDER — POLYETHYLENE GLYCOL 3350 17 G/17G
17 POWDER, FOR SOLUTION ORAL DAILY
Status: DISCONTINUED | OUTPATIENT
Start: 2023-08-16 | End: 2023-08-18 | Stop reason: HOSPADM

## 2023-08-16 RX ORDER — MULTIPLE VITAMINS W/ MINERALS TAB 9MG-400MCG
1 TAB ORAL EVERY EVENING
Status: DISCONTINUED | OUTPATIENT
Start: 2023-08-16 | End: 2023-08-18 | Stop reason: HOSPADM

## 2023-08-16 RX ORDER — ONDANSETRON 2 MG/ML
4 INJECTION INTRAMUSCULAR; INTRAVENOUS EVERY 6 HOURS PRN
Status: DISCONTINUED | OUTPATIENT
Start: 2023-08-16 | End: 2023-08-18 | Stop reason: HOSPADM

## 2023-08-16 RX ORDER — BUPROPION HYDROCHLORIDE 150 MG/1
150 TABLET ORAL EVERY MORNING
Status: DISCONTINUED | OUTPATIENT
Start: 2023-08-16 | End: 2023-08-18 | Stop reason: HOSPADM

## 2023-08-16 RX ORDER — AMLODIPINE BESYLATE 5 MG/1
5 TABLET ORAL DAILY
Status: DISCONTINUED | OUTPATIENT
Start: 2023-08-16 | End: 2023-08-18 | Stop reason: HOSPADM

## 2023-08-16 RX ORDER — BLOOD-GLUCOSE METER
EACH MISCELLANEOUS
COMMUNITY
Start: 2023-03-06 | End: 2024-04-11

## 2023-08-16 RX ORDER — LORAZEPAM 0.5 MG/1
0.5 TABLET ORAL DAILY PRN
Status: DISCONTINUED | OUTPATIENT
Start: 2023-08-16 | End: 2023-08-18 | Stop reason: HOSPADM

## 2023-08-16 RX ORDER — SODIUM CHLORIDE 9 MG/ML
INJECTION, SOLUTION INTRAVENOUS CONTINUOUS
Status: DISCONTINUED | OUTPATIENT
Start: 2023-08-16 | End: 2023-08-17

## 2023-08-16 RX ORDER — PREGABALIN 50 MG/1
50 CAPSULE ORAL 2 TIMES DAILY
Status: DISCONTINUED | OUTPATIENT
Start: 2023-08-16 | End: 2023-08-18 | Stop reason: HOSPADM

## 2023-08-16 RX ORDER — LIDOCAINE 40 MG/G
CREAM TOPICAL
Status: DISCONTINUED | OUTPATIENT
Start: 2023-08-16 | End: 2023-08-18 | Stop reason: HOSPADM

## 2023-08-16 RX ORDER — CEFTRIAXONE 2 G/1
2 INJECTION, POWDER, FOR SOLUTION INTRAMUSCULAR; INTRAVENOUS EVERY 24 HOURS
Status: DISCONTINUED | OUTPATIENT
Start: 2023-08-16 | End: 2023-08-18 | Stop reason: HOSPADM

## 2023-08-16 RX ORDER — MELOXICAM 7.5 MG/1
7.5 TABLET ORAL DAILY
Status: DISCONTINUED | OUTPATIENT
Start: 2023-08-16 | End: 2023-08-18 | Stop reason: HOSPADM

## 2023-08-16 RX ADMIN — CEFTRIAXONE SODIUM 2 G: 2 INJECTION, POWDER, FOR SOLUTION INTRAMUSCULAR; INTRAVENOUS at 21:48

## 2023-08-16 RX ADMIN — LOSARTAN POTASSIUM 50 MG: 50 TABLET, FILM COATED ORAL at 08:13

## 2023-08-16 RX ADMIN — MULTIPLE VITAMINS W/ MINERALS TAB 1 TABLET: TAB at 18:08

## 2023-08-16 RX ADMIN — SODIUM CHLORIDE: 9 INJECTION, SOLUTION INTRAVENOUS at 19:55

## 2023-08-16 RX ADMIN — PREGABALIN 50 MG: 50 CAPSULE ORAL at 08:13

## 2023-08-16 RX ADMIN — POLYETHYLENE GLYCOL 3350 17 G: 17 POWDER, FOR SOLUTION ORAL at 08:13

## 2023-08-16 RX ADMIN — SODIUM CHLORIDE: 9 INJECTION, SOLUTION INTRAVENOUS at 10:12

## 2023-08-16 RX ADMIN — PAROXETINE HYDROCHLORIDE 20 MG: 20 TABLET, FILM COATED ORAL at 08:13

## 2023-08-16 RX ADMIN — ENOXAPARIN SODIUM 30 MG: 30 INJECTION SUBCUTANEOUS at 03:15

## 2023-08-16 RX ADMIN — FAMOTIDINE 20 MG: 10 INJECTION INTRAVENOUS at 03:16

## 2023-08-16 RX ADMIN — PREGABALIN 50 MG: 50 CAPSULE ORAL at 19:55

## 2023-08-16 RX ADMIN — MELOXICAM 7.5 MG: 7.5 TABLET ORAL at 08:13

## 2023-08-16 RX ADMIN — SODIUM CHLORIDE: 9 INJECTION, SOLUTION INTRAVENOUS at 03:20

## 2023-08-16 RX ADMIN — AMLODIPINE BESYLATE 5 MG: 5 TABLET ORAL at 08:13

## 2023-08-16 RX ADMIN — ACETAMINOPHEN 1000 MG: 500 TABLET, FILM COATED ORAL at 19:55

## 2023-08-16 RX ADMIN — Medication 1 MG: at 19:55

## 2023-08-16 RX ADMIN — BUPROPION HYDROCHLORIDE 150 MG: 150 TABLET, EXTENDED RELEASE ORAL at 08:13

## 2023-08-16 ASSESSMENT — ACTIVITIES OF DAILY LIVING (ADL)
ADLS_ACUITY_SCORE: 32
ADLS_ACUITY_SCORE: 32
ADLS_ACUITY_SCORE: 39
ADLS_ACUITY_SCORE: 32

## 2023-08-16 NOTE — PROGRESS NOTES
"Time: 4959-7805     Reason for Admission: Fall, UTI     Activity: SBA     Neuro: Alert, intermittent confusion      GI/: WNL     Diet: Regular diet     Lines/Drains: PIV infusing      Vitals: BP (!) 101/39 (BP Location: Left arm)   Pulse 78   Temp 98.3  F (36.8  C) (Oral)   Resp 18   Ht 1.626 m (5' 4\")   Wt 70.3 kg (154 lb 15.7 oz)   LMP  (LMP Unknown)   SpO2 96%   BMI 26.60 kg/m        Pain:Denies     Plan:Monitor per plan of care      Labs: CK total- 1307                   "

## 2023-08-16 NOTE — ED NOTES
".[facility]   Admission Handoff    The patient is Daniela Brown, 89 year old who arrived in the ED by AMBULANCE from home with a complaint of Fall (Left hip pain, lives alone, on floor for 12+ hours, no thinners, hit head, no LOC.), Shoulder Pain, and Hip Pain  . The patient's current symptoms are new and during this time the symptoms have remained the same. In the ED, patient was diagnosed with   Final diagnoses:   Non-traumatic rhabdomyolysis   Fall, initial encounter   Closed head injury, initial encounter         Needed?: No    Allergies:    Allergies   Allergen Reactions    Metoprolol Itching and Rash    Cephalexin Rash    Erythromycin Rash    Actonel [Bisphosphonates] Itching    Azithromycin Hives    Celebrex [Ricci-2 Inhibitors] Rash    Cipro [Ciprofloxacin] Rash    Contrast Dye Hives    Diatrizoate Hives    Erythromycin Rash    Escitalopram Diarrhea     Gets very sick and mad feelings    Fosamax Itching and Rash    Keflex [Cephalexin Monohydrate] Rash    Lisinopril Cough    Risedronate Itching    Seasonal Allergies     Shellfish-Derived Products Hives    Tetanus Toxoid, Adsorbed Swelling    Tetanus-Diphtheria Toxoids Td Swelling    Vicodin [Acetaminophen] Itching     Patient reported - only when used scheduled in high doses.       Vioxx Rash    Acetaminophen Itching     In high doses  Patient reported - only when used scheduled in high doses.       Alendronate Rash    Celecoxib Rash    Penicillins Rash    Rofecoxib Rash    Sulfa Antibiotics Itching and Rash    Sulfasalazine Itching and Rash       Past Medical Hx:   Past Medical History:   Diagnosis Date    Abnormal cardiovascular stress test 2/3/2019    9/10/2018 Lexiscan: \"Myocardial perfusion imaging using single isotope technique demonstrated a small perfusion defect of mild severity involving the basal inferior wall which is mostly reversible and may be consistent with mild ischemia in the right coronary " "artery distribution. In addition, transient ischemic dilatation is noted with a TID ratio of 1.3. \"    Adhesive capsulitis of shoulder     left     Adjustment disorder with anxiety 3/18/2016    B12 deficiency anemia 6/20/2012    Chest pain 2004    Chronic right sided/axillary discomfort (musculoskeletal) Atypical substernal (possibly GERD). Negative cardiolite 2004.    Colitis, Clostridium difficile 4/18/2012    Diverticulitis 2009    Headache(784.0) 2001    Tension type. MRI 2001 normal.    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    Status post coronary angiogram 2/27/2019    Syncope and collapse 9/10/2018       Initial vitals were: BP: (!) 161/59  Pulse: 81  Temp: 97.8  F (36.6  C)  Resp: 15  Height: 162.6 cm (5' 4\")  Weight: 72.6 kg (160 lb)  SpO2: 98 %   Recent vital Signs: /57   Pulse 70   Temp 97.8  F (36.6  C) (Oral)   Resp 16   Ht 1.626 m (5' 4\")   Wt 72.6 kg (160 lb)   LMP  (LMP Unknown)   SpO2 96%   BMI 27.46 kg/m      Elimination Status: Continent: wears briefs     Activity Level: SBA w/ walker    Fall Status: Reason for falls risk:  Mobility and Reason for falls risk: Cognition  nonskid shoes/slippers when out of bed, arm band in place, patient and family education, assistive device/personal items within reach, and activity supervised    Baseline Mental status: WDL  Current Mental Status changes: acute confusion    Infection present or suspected this encounter: yes urinary  Sepsis suspected: No    Isolation type:     Bariatric equipment needed?: No    In the ED these meds were given:   Medications   cefTRIAXone (ROCEPHIN) 1 g vial to attach to  mL bag for ADULTS or NS 50 mL bag for PEDS (1 g Intravenous $New Bag 8/15/23 2235)   0.9% sodium chloride BOLUS (1,000 mLs Intravenous $New Bag 8/15/23 2231)   sodium chloride 0.9% infusion (has no " administration in time range)       Drips running?  No    Home pump  No    Current LDAs: Peripheral IV: Site left AC; Gauge 29g  normal saline     Results:   Labs/Imaging  Ordered and Resulted from Time of ED Arrival Up to the Time of Departure from the ED  Results for orders placed or performed during the hospital encounter of 08/15/23 (from the past 24 hour(s))   CBC with platelets differential    Narrative    The following orders were created for panel order CBC with platelets differential.  Procedure                               Abnormality         Status                     ---------                               -----------         ------                     CBC with platelets and d...[[id number]]  Abnormal            Final result                 Please view results for these tests on the individual orders.   Comprehensive metabolic panel   Result Value Ref Range    Sodium 136 136 - 145 mmol/L    Potassium 3.6 3.4 - 5.3 mmol/L    Chloride 100 98 - 107 mmol/L    Carbon Dioxide (CO2) 23 22 - 29 mmol/L    Anion Gap 13 7 - 15 mmol/L    Urea Nitrogen 24.1 (H) 8.0 - 23.0 mg/dL    Creatinine 1.31 (H) 0.51 - 0.95 mg/dL    Calcium 9.5 8.2 - 9.6 mg/dL    Glucose 109 (H) 70 - 99 mg/dL    Alkaline Phosphatase 70 35 - 104 U/L    AST 55 (H) 0 - 45 U/L    ALT 27 0 - 50 U/L    Protein Total 7.0 6.4 - 8.3 g/dL    Albumin 3.9 3.5 - 5.2 g/dL    Bilirubin Total 1.1 <=1.2 mg/dL    GFR Estimate 39 (L) >60 mL/min/1.73m2   CBC with platelets and differential   Result Value Ref Range    WBC Count 18.9 (H) 4.0 - 11.0 10e3/uL    RBC Count 4.50 3.80 - 5.20 10e6/uL    Hemoglobin 13.0 11.7 - 15.7 g/dL    Hematocrit 37.8 35.0 - 47.0 %    MCV 84 78 - 100 fL    MCH 28.9 26.5 - 33.0 pg    MCHC 34.4 31.5 - 36.5 g/dL    RDW 12.5 10.0 - 15.0 %    Platelet Count 236 150 - 450 10e3/uL    % Neutrophils 85 %    % Lymphocytes 9 %    % Monocytes 4 %    % Eosinophils 1 %    % Basophils 0 %    % Immature Granulocytes 1 %    NRBCs per 100 WBC 0 <1 /100     Absolute Neutrophils 15.9 (H) 1.6 - 8.3 10e3/uL    Absolute Lymphocytes 1.8 0.8 - 5.3 10e3/uL    Absolute Monocytes 0.8 0.0 - 1.3 10e3/uL    Absolute Eosinophils 0.2 0.0 - 0.7 10e3/uL    Absolute Basophils 0.1 0.0 - 0.2 10e3/uL    Absolute Immature Granulocytes 0.1 <=0.4 10e3/uL    Absolute NRBCs 0.0 10e3/uL   CK total   Result Value Ref Range    CK 1,447 (HH) 26 - 192 U/L   UA with Microscopic reflex to Culture    Specimen: Urine, Catheter   Result Value Ref Range    Color Urine Yellow Colorless, Straw, Light Yellow, Yellow    Appearance Urine Slightly Cloudy (A) Clear    Glucose Urine Negative Negative mg/dL    Bilirubin Urine Negative Negative    Ketones Urine 20 (A) Negative mg/dL    Specific Gravity Urine 1.015 1.003 - 1.035    Blood Urine Moderate (A) Negative    pH Urine 5.0 5.0 - 7.0    Protein Albumin Urine 30 (A) Negative mg/dL    Urobilinogen Urine Normal Normal, 2.0 mg/dL    Nitrite Urine Negative Negative    Leukocyte Esterase Urine Small (A) Negative    Bacteria Urine Many (A) None Seen /HPF    Mucus Urine Present (A) None Seen /LPF    RBC Urine 2 <=2 /HPF    WBC Urine 25 (H) <=5 /HPF    Hyaline Casts Urine 8 (H) <=2 /LPF    Narrative    Urine Culture ordered based on laboratory criteria   Head CT w/o contrast    Narrative    EXAM: CT HEAD W/O CONTRAST  LOCATION: [facility]  DATE: 8/15/2023    INDICATION: Head injury.  COMPARISON: None.  TECHNIQUE: Routine CT Head without IV contrast. Multiplanar reformats. Dose reduction techniques were used.    FINDINGS:  INTRACRANIAL CONTENTS: No intracranial hemorrhage, extraaxial collection, or mass effect.  No CT evidence of acute infarct. Mild presumed chronic small vessel ischemic changes. Mild to moderate generalized volume loss. No hydrocephalus.     VISUALIZED ORBITS/SINUSES/MASTOIDS: Prior bilateral cataract surgery. Visualized portions of the orbits are otherwise unremarkable. Mild mucosal thickening in the right sphenoid sinus  locule. No middle ear or mastoid effusion.    BONES/SOFT TISSUES: No acute abnormality.      Impression    IMPRESSION:  1.  No CT evidence for acute intracranial process.  2.  Brain atrophy and presumed chronic microvascular ischemic changes as above.   Cervical spine CT w/o contrast    Narrative    EXAM: CT CERVICAL SPINE W/O CONTRAST  LOCATION: [facility]  DATE: 8/15/2023    INDICATION: Neck pain.  COMPARISON: None.  TECHNIQUE: Routine CT Cervical Spine without IV contrast. Multiplanar reformats. Dose reduction techniques were used.    FINDINGS:  VERTEBRA: Osteopenia. Cervical vertebral body heights are maintained. No acute cervical spine fracture.     CANAL/FORAMINA: No canal or neural foraminal stenosis.    PARASPINAL: No extraspinal abnormality.      Impression    IMPRESSION:  1.  No acute cervical spine fracture.   Chest XR,  PA & LAT    Narrative    EXAM: XR CHEST 2 VIEWS  LOCATION: [facility]  DATE: 8/15/2023    INDICATION: syncope  COMPARISON: None.      Impression    IMPRESSION: Heart is normal in size. Lungs are clear.   Pelvis XR w/ unilateral hip left    Narrative    EXAM: XR PELVIS AND HIP LEFT 1 VIEW  LOCATION: [facility]  DATE: 8/15/2023    INDICATION: fall, left hip pain  COMPARISON: None.      Impression    IMPRESSION: Normal joint spaces and alignment. No fracture.     *Note: Due to a large number of results and/or encounters for the requested time period, some results have not been displayed. A complete set of results can be found in Results Review.       For the majority of the shift this patient's behavior was Green     Cardiac Rhythm: Normal Sinus  Pt needs tele? No  Skin/wound Issues: None    Code Status: DNR / DNI    Pain control: good    Nausea control: pt had none    Abnormal labs/tests/findings requiring intervention:     Patient tested for COVID 19 prior to admission: NO     OBS brochure/video  discussed/provided to patient/family: Yes     Family present during ED course? No     Family Comments/Social Situation comments:     Tasks needing completion: None    Lupis Galindo RN

## 2023-08-16 NOTE — PROGRESS NOTES
Clinic Care Coordination Contact    Ambulatory Care Coordination to Inpatient Care Management   Hand-In Communication    Date:  August 16, 2023    Name: Daniela Brown is enrolled in Ambulatory Care Coordination program and I am the Lead Care Coordinator.    CC Contact Information: Epic InMedical Compression Systemset + phone    Payor Source: Payor: MEDICARE / Plan: MEDICARE / Product Type: Medicare /     Current services in place:    Please see the CC Snaphot and Care Management Flowsheets for specific details of this Daniela Brown care plan.     Additional details/specific concerns r/t this admission:   Home Safety   and Lack of Support System      I will follow this admission in Epic. Please feel free to contact me with questions or for further collaboration in discharge planning.    MANJIT Arroyo   Social Work Primary Care Clinic Care Coordinator   [facility]  [phone]  [email or web address]

## 2023-08-16 NOTE — PROGRESS NOTES
Daniela Brown is a 89 year old female admitted on 8/15/2023 with unwitnessed fall, probable UTI and rhabdomyolysis 1400. 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 CT head and cervical spine negative for acute process. Chest xray negative for acute process. Ordered blood cultures and lactate. She is on empiric ceftriaxone 2 G IV daily. Strict I/O's. Negative orthostatic vitals. Fall and delirium precautions.

## 2023-08-16 NOTE — PROGRESS NOTES
"[place] [facility] ADMISSION NOTE    Patient admitted to room 2304 at approximately 0100 via cart from emergency room. Patient was accompanied by transport tech.     Verbal SBAR report received from MICHEL Hollins prior to patient arrival.     Patient ambulated to bed with stand-by assist. Patient alert and oriented X 3. The patient is not having any pain.  . Admission vital signs: Blood pressure 133/49, pulse 73, temperature 98.9  F (37.2  C), temperature source Oral, resp. rate 18, height 1.626 m (5' 4\"), weight 70.3 kg (154 lb 15.7 oz), SpO2 97 %, not currently breastfeeding. Patient was oriented to plan of care, call light, bed controls, tv, telephone, bathroom, and visiting hours.     Risk Assessment    The following safety risks were identified during admission: fall. Yellow risk band applied: YES.     Skin Initial Assessment    This writer admitted this patient and completed a full skin assessment and Serge score in the Adult PCS flowsheet. Appropriate interventions initiated as needed.     Secondary skin check completed by MICHEL Walton.         Education    Patient has a [place] to Observation order: No  Observation education completed and documented: N/A      Karen M Devick, RN      "

## 2023-08-16 NOTE — UTILIZATION REVIEW
Admission Status; Secondary Review Determination    Under the authority of the Utilization Management Committee, the utilization review process indicated a secondary review on the above patient. The review outcome is based on review of the medical records, discussions with staff, and applying clinical experience noted on the date of the review.    (x) Inpatient Status Appropriate - This patient's medical care is consistent with medical management for inpatient care and reasonable inpatient medical practice.    RATIONALE FOR DETERMINATION: 89-year-old female with history of anxiety, depression, peripheral neuropathy, mild CAD, hypertension, type 2 diabetes and history of recurrent urinary tract infection presented to the hospital after a fall with profound weakness unable to get up for approximately 12 hours.  Patient found to have significant new leukocytosis of 19,000 with a left shift along with pyuria concerning for UTI with systemic findings.  Patient also had acute kidney injury with a 65% rise in creatinine level as well as findings of rhabdomyolysis with a CPK of approximately 1500.  Thus an elderly patient with these multiple issues requiring treatment and with noted significant ongoing leukocytosis of 15.5 thousand the morning of hospital day 2 as well as persistent acute kidney injury, patient expected to require greater than 2 nights in the hospital appropriate for inpatient care.    At the time of admission with the information available to the attending physician more than 2 nights Hospital complex care was anticipated, based on patient risk of adverse outcome if treated as outpatient and complex care required. Inpatient admission is appropriate based on the Medicare guidelines.    This document was produced using voice recognition software    The information on this document is developed by the utilization review team in order for the business office to ensure compliance. This only denotes the  appropriateness of proper admission status and does not reflect the quality of care rendered.    The definitions of Inpatient Status and Observation Status used in making the determination above are those provided in the CMS Coverage Manual, Chapter 1 and Chapter 6, section 70.4.    Sincerely,    Bert Villafana MD  Utilization Review  Physician Advisor  [facility].

## 2023-08-16 NOTE — ED PROVIDER NOTES
History     Chief Complaint   Patient presents with     Fall     Left hip pain, lives alone, on floor for 12+ hours, no thinners, hit head, no LOC.     Shoulder Pain     Hip Pain     HPI  Daniela Brown is a 89 year old female who presents after a fall.  Not on anticoagulation.  She has had multiple falls in the past.  Admitted in August 2022.  Today she was in bed and fell out of the bed.  May have hit her head.  Is no loss of consciousness.  No associated focal weakness.  Had been on the floor for possibly as long as 12 hours.  In the past she has been admitted but is refused TCU or long-term care.  Continues to want to live at home alone.  She does have a son who is involved in her care at home.  Multiple medication allergies.  Recent UTI on cefdinir.  Has no recent fever.  There is no chest pain shortness of breath palpitations other systemic symptoms prior to her falling out of bed.  She has no focal weakness.    Allergies:  Allergies   Allergen Reactions     Metoprolol Itching and Rash     Cephalexin Rash     Erythromycin Rash     Actonel [Bisphosphonates] Itching     Azithromycin Hives     Celebrex [Ricci-2 Inhibitors] Rash     Cipro [Ciprofloxacin] Rash     Contrast Dye Hives     Diatrizoate Hives     Erythromycin Rash     Escitalopram Diarrhea     Gets very sick and mad feelings     Fosamax Itching and Rash     Keflex [Cephalexin Monohydrate] Rash     Lisinopril Cough     Risedronate Itching     Seasonal Allergies      Shellfish-Derived Products Hives     Tetanus Toxoid, Adsorbed Swelling     Tetanus-Diphtheria Toxoids Td Swelling     Vicodin [Acetaminophen] Itching     Patient reported - only when used scheduled in high doses.        Vioxx Rash     Acetaminophen Itching     In high doses  Patient reported - only when used scheduled in high doses.        Alendronate Rash     Celecoxib Rash     Penicillins Rash     Rofecoxib Rash     Sulfa Antibiotics Itching and Rash     Sulfasalazine Itching and  "Rash       Problem List:    Patient Active Problem List    Diagnosis Date Noted     Polyneuropathy associated with underlying disease (H) 05/17/2023     Priority: Medium     Moderate major depression (H) 12/31/2020     Priority: Medium     ARABELLA (generalized anxiety disorder) 12/31/2020     Priority: Medium     Has been on celexa (not effective but no side effects), amitriptyline, cymbalta (dizziness), lexapro (listed as allergy - 'gets very sick\"), zoloft  paxil helping somewhat 6/23       Abnormal CT of the abdomen 10/10/2019     Priority: Medium     CT 10/9/2019- cystic lesion along the anterior aspect of the pancreatic body/tail (series 2 image 27) measures 2 cm, and is new since the previous exam 2011. Pancreatic MRI with MRCP should be considered for further evaluation.   MRI 10/10/2019- There are 2 cystic lesions in the pancreatic body/tail, with the largest measuring 2 cm. No enhancing septations or solid masslike components are identified, although evaluation is limited related to patient motion artifact. These lesions have appearances suggestive of IPMN, but remain technically indeterminate. A follow-up MRI in one year should be considered to confirm stability.       Cystocele, midline      Priority: Medium     grade III       Generalized weakness 10/09/2019     Priority: Medium     Diarrhea 10/09/2019     Priority: Medium     Chronic left-sided low back pain with left-sided sciatica 04/15/2019     Priority: Medium     Coronary artery disease involving native coronary artery of native heart with angina pectoris (H) 02/19/2019     Priority: Medium     coronary angiogram 2/2019 showed mild coronary artery disease.  The most significant of these was the 40% lesion in the mid RCA.       Benign essential hypertension 11/14/2018     Priority: Medium     Type 2 diabetes mellitus with diabetic polyneuropathy, without long-term current use of insulin (H) 11/14/2018     Priority: Medium     Diet controlled.  Diagnosed " 6/2016, has never been on medications.       History of recurrent urinary tract infection 11/14/2018     Priority: Medium     Recommend referral to Urology to discuss resuming daily suppressive therapy but she declines 8/22 and 6/23       CKD (chronic kidney disease) stage 3, GFR 30-59 ml/min (H) 11/14/2018     Priority: Medium     Peripheral neuropathy 09/10/2018     Priority: Medium     Bilateral wrist pain 04/11/2018     Priority: Medium     Protrusion of lumbar intervertebral disc 04/11/2018     Priority: Medium     Carpal tunnel syndrome of left wrist 10/21/2014     Priority: Medium     Diastolic dysfunction 03/31/2014     Priority: Medium     Pronation of foot 05/05/2011     Priority: Medium     Bilateral defomity       Allergic rhinitis 01/19/2011     Priority: Medium     Hyperlipidemia LDL goal <100 10/31/2010     Priority: Medium     Urge incontinence 10/20/2009     Priority: Medium     Right foot pain 10/16/2009     Priority: Medium     Xray showed djd -follows with podiatry. -arch supports placed may need cam walker        Vitamin D deficiency 09/09/2008     Priority: Medium     Subjective tinnitus 04/22/2008     Priority: Medium     Urticaria 08/22/2006     Priority: Medium     SENSONRL HEAR LOSS,BILAT 05/03/2006     Priority: Medium     Esophageal reflux      Priority: Medium     Memory loss      Priority: Medium     Early cognitive decline. MMSE 27/30, Neno 4.7/ 6.0 2004. B12, TSH, RPR normal 8728-7815.       Degeneration of lumbar or lumbosacral intervertebral disc      Priority: Medium     MRI 5/04- DDD, L2-3 annular bulge with protrusion into left caudal infra-foraminal area  MRI 2017 with L4-5 loss disc height with spondylolisthesis.       B12 deficiency 10/10/2019     Priority: Low     Irritable bowel syndrome with diarrhea      Priority: Low     diahrrea predominant       Osteopenia of multiple sites      Priority: Low     DEXA 2007 -1.4 hip. Dexa 2004 -1 hip and -1 spine       RESTLESS LEG  SYNDROME      Priority: Low     Primary osteoarthritis involving multiple joints      Priority: Low     C-spine, left hip       Diverticulosis of large intestine      Priority: Low     flexible sigmoidoscopy with diverticulosis otherwise normal 2001, admit diverticulitis 2009          Past Medical History:    Past Medical History:   Diagnosis Date     Abnormal cardiovascular stress test 2/3/2019     Adhesive capsulitis of shoulder      Adjustment disorder with anxiety 3/18/2016     B12 deficiency anemia 6/20/2012     Chest pain 2004     Colitis, Clostridium difficile 4/18/2012     Diverticulitis 2009     Headache(784.0) 2001     Impaired fasting glucose 2005     PERS HX ALLERGY OTHER FOODS 8/22/2006     PERS HX ALLERGY TO MILK PRODUCTS 8/22/2006     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     Status post coronary angiogram 2/27/2019     Syncope and collapse 9/10/2018       Past Surgical History:    Past Surgical History:   Procedure Laterality Date     ARTHROPLASTY KNEE  3/26/2012    Procedure:ARTHROPLASTY KNEE; Right Total Knee Arthroplasty; Surgeon:BERNADINE ROSAS; Location:[place] OR     CHOLECYSTECTOMY       CV HEART CATHETERIZATION WITH POSSIBLE INTERVENTION N/A 2/27/2019    Procedure: Coronary Angiogram with Left ventriculogram;  Surgeon: Leandro Carpio MD;  Location:  [facility]     HERNIA REPAIR      Hernia Repair     HYSTERECTOMY, PAP NO LONGER INDICATED  1983    TVH/BSO     SURGICAL HISTORY OF -   10/08    A & P Repair, Dr. Hannah     ZC APPENDECTOMY  1953       Family History:    Family History   Problem Relation Age of Onset     C.A.D. Mother      Diabetes Mother      Cancer - colorectal Mother      Arthritis Mother      Heart Disease Mother      Lipids Brother      Obesity Brother      Hypertension Brother      Allergies Son      Eye Disorder Son         cataract     Thyroid Disease Sister         graves dz     Eye Disorder Son      Leukemia Son      Alcohol/Drug Father   "      Social History:  Marital Status:   [5]  Social History     Tobacco Use     Smoking status: Never     Smokeless tobacco: Never   Vaping Use     Vaping Use: Never used   Substance Use Topics     Alcohol use: No     Drug use: No        Medications:    acetaminophen (TYLENOL) 500 MG tablet  amLODIPine (NORVASC) 5 MG tablet  blood glucose (ACCU-CHEK GUIDE) test strip  blood glucose monitoring (ACCU-CHEK FASTCLIX) lancets  buPROPion (WELLBUTRIN XL) 150 MG 24 hr tablet  cefdinir (OMNICEF) 300 MG capsule  LORazepam (ATIVAN) 0.5 MG tablet  meloxicam (MOBIC) 7.5 MG tablet  Multiple Vitamins-Minerals (THERAPEUTIC MULTIVIT/MINERAL) TABS  olmesartan (BENICAR) 20 MG tablet  PARoxetine (PAXIL) 20 MG tablet  pregabalin (LYRICA) 50 MG capsule  STATIN NOT PRESCRIBED (INTENTIONAL)          Review of Systems   Constitutional:  Negative for chills, diaphoresis and fever.   HENT:  Negative for ear pain, sinus pressure and sore throat.    Eyes:  Negative for visual disturbance.   Respiratory:  Negative for cough, shortness of breath and wheezing.    Cardiovascular:  Negative for chest pain and palpitations.   Gastrointestinal:  Negative for abdominal pain, blood in stool, constipation, diarrhea, nausea and vomiting.   Genitourinary:  Negative for dysuria, frequency and urgency.   Skin:  Negative for rash.   Neurological:  Positive for headaches.   All other systems reviewed and are negative.      Physical Exam   BP: (!) 161/59  Pulse: 81  Temp: 97.8  F (36.6  C)  Resp: 15  Height: 162.6 cm (5' 4\")  Weight: 72.6 kg (160 lb)  SpO2: 98 %      Physical Exam  Constitutional:       General: She is in acute distress.      Appearance: She is not diaphoretic.   Eyes:      Conjunctiva/sclera: Conjunctivae normal.   Cardiovascular:      Rate and Rhythm: Normal rate and regular rhythm.      Heart sounds: No murmur heard.  Pulmonary:      Effort: Pulmonary effort is normal. No respiratory distress.      Breath sounds: Normal breath " sounds. No stridor. No wheezing or rhonchi.   Abdominal:      General: Abdomen is flat. There is no distension.      Palpations: Abdomen is soft. There is no mass.      Tenderness: There is no abdominal tenderness. There is no guarding.   Musculoskeletal:      Cervical back: Neck supple.      Right lower leg: No edema.      Left lower leg: No edema.   Skin:     Coloration: Skin is not pale.      Findings: No rash.   Neurological:      General: No focal deficit present.      Mental Status: She is alert and oriented to person, place, and time.      Cranial Nerves: No cranial nerve deficit.      Sensory: No sensory deficit.      Motor: No weakness.      Coordination: Coordination normal.       No midline tenderness to palpation over the cervical spine.  Full range of motion.  No raccoon's eyes perdue sign.  No epistaxis.  No focal weakness.  Some tenderness palpation of the left hip.  ED Course                Procedures                EKG Interpretation:      Interpreted by Tho Sanchez MD  EKG done at 2141 hrs. demonstrates a sinus rhythm at 70 bpm with a left axis.  There is no ST change.  No T wave changes.  There is poor R progression V1 through V6.  Possible inferior Q waves.  Normal intervals.  Normal conduction.  No ectopy.  Impression sinus rhythm 70 bpm likely prior anterior septal and inferior MI.    Critical Care time:  none               Results for orders placed or performed during the hospital encounter of 08/15/23 (from the past 24 hour(s))   CBC with platelets differential    Narrative    The following orders were created for panel order CBC with platelets differential.  Procedure                               Abnormality         Status                     ---------                               -----------         ------                     CBC with platelets and d...[[id number]]  Abnormal            Final result                 Please view results for these tests on the individual orders.    Comprehensive metabolic panel   Result Value Ref Range    Sodium 136 136 - 145 mmol/L    Potassium 3.6 3.4 - 5.3 mmol/L    Chloride 100 98 - 107 mmol/L    Carbon Dioxide (CO2) 23 22 - 29 mmol/L    Anion Gap 13 7 - 15 mmol/L    Urea Nitrogen 24.1 (H) 8.0 - 23.0 mg/dL    Creatinine 1.31 (H) 0.51 - 0.95 mg/dL    Calcium 9.5 8.2 - 9.6 mg/dL    Glucose 109 (H) 70 - 99 mg/dL    Alkaline Phosphatase 70 35 - 104 U/L    AST 55 (H) 0 - 45 U/L    ALT 27 0 - 50 U/L    Protein Total 7.0 6.4 - 8.3 g/dL    Albumin 3.9 3.5 - 5.2 g/dL    Bilirubin Total 1.1 <=1.2 mg/dL    GFR Estimate 39 (L) >60 mL/min/1.73m2   CBC with platelets and differential   Result Value Ref Range    WBC Count 18.9 (H) 4.0 - 11.0 10e3/uL    RBC Count 4.50 3.80 - 5.20 10e6/uL    Hemoglobin 13.0 11.7 - 15.7 g/dL    Hematocrit 37.8 35.0 - 47.0 %    MCV 84 78 - 100 fL    MCH 28.9 26.5 - 33.0 pg    MCHC 34.4 31.5 - 36.5 g/dL    RDW 12.5 10.0 - 15.0 %    Platelet Count 236 150 - 450 10e3/uL    % Neutrophils 85 %    % Lymphocytes 9 %    % Monocytes 4 %    % Eosinophils 1 %    % Basophils 0 %    % Immature Granulocytes 1 %    NRBCs per 100 WBC 0 <1 /100    Absolute Neutrophils 15.9 (H) 1.6 - 8.3 10e3/uL    Absolute Lymphocytes 1.8 0.8 - 5.3 10e3/uL    Absolute Monocytes 0.8 0.0 - 1.3 10e3/uL    Absolute Eosinophils 0.2 0.0 - 0.7 10e3/uL    Absolute Basophils 0.1 0.0 - 0.2 10e3/uL    Absolute Immature Granulocytes 0.1 <=0.4 10e3/uL    Absolute NRBCs 0.0 10e3/uL   UA with Microscopic reflex to Culture    Specimen: Urine, Catheter   Result Value Ref Range    Color Urine Yellow Colorless, Straw, Light Yellow, Yellow    Appearance Urine Slightly Cloudy (A) Clear    Glucose Urine Negative Negative mg/dL    Bilirubin Urine Negative Negative    Ketones Urine 20 (A) Negative mg/dL    Specific Gravity Urine 1.015 1.003 - 1.035    Blood Urine Moderate (A) Negative    pH Urine 5.0 5.0 - 7.0    Protein Albumin Urine 30 (A) Negative mg/dL    Urobilinogen Urine Normal Normal, 2.0  mg/dL    Nitrite Urine Negative Negative    Leukocyte Esterase Urine Small (A) Negative    Bacteria Urine Many (A) None Seen /HPF    Mucus Urine Present (A) None Seen /LPF    RBC Urine 2 <=2 /HPF    WBC Urine 25 (H) <=5 /HPF    Hyaline Casts Urine 8 (H) <=2 /LPF    Narrative    Urine Culture ordered based on laboratory criteria   Head CT w/o contrast    Narrative    EXAM: CT HEAD W/O CONTRAST  LOCATION: [facility]  DATE: 8/15/2023    INDICATION: Head injury.  COMPARISON: None.  TECHNIQUE: Routine CT Head without IV contrast. Multiplanar reformats. Dose reduction techniques were used.    FINDINGS:  INTRACRANIAL CONTENTS: No intracranial hemorrhage, extraaxial collection, or mass effect.  No CT evidence of acute infarct. Mild presumed chronic small vessel ischemic changes. Mild to moderate generalized volume loss. No hydrocephalus.     VISUALIZED ORBITS/SINUSES/MASTOIDS: Prior bilateral cataract surgery. Visualized portions of the orbits are otherwise unremarkable. Mild mucosal thickening in the right sphenoid sinus locule. No middle ear or mastoid effusion.    BONES/SOFT TISSUES: No acute abnormality.      Impression    IMPRESSION:  1.  No CT evidence for acute intracranial process.  2.  Brain atrophy and presumed chronic microvascular ischemic changes as above.   Cervical spine CT w/o contrast    Narrative    EXAM: CT CERVICAL SPINE W/O CONTRAST  LOCATION: [facility]  DATE: 8/15/2023    INDICATION: Neck pain.  COMPARISON: None.  TECHNIQUE: Routine CT Cervical Spine without IV contrast. Multiplanar reformats. Dose reduction techniques were used.    FINDINGS:  VERTEBRA: Osteopenia. Cervical vertebral body heights are maintained. No acute cervical spine fracture.     CANAL/FORAMINA: No canal or neural foraminal stenosis.    PARASPINAL: No extraspinal abnormality.      Impression    IMPRESSION:  1.  No acute cervical spine fracture.   Chest XR,  PA & LAT     Narrative    EXAM: XR CHEST 2 VIEWS  LOCATION: [facility]  DATE: 8/15/2023    INDICATION: syncope  COMPARISON: None.      Impression    IMPRESSION: Heart is normal in size. Lungs are clear.   Pelvis XR w/ unilateral hip left    Narrative    EXAM: XR PELVIS AND HIP LEFT 1 VIEW  LOCATION: [facility]  DATE: 8/15/2023    INDICATION: fall, left hip pain  COMPARISON: None.      Impression    IMPRESSION: Normal joint spaces and alignment. No fracture.     *Note: Due to a large number of results and/or encounters for the requested time period, some results have not been displayed. A complete set of results can be found in Results Review.       Medications   cefTRIAXone (ROCEPHIN) 1 g vial to attach to  mL bag for ADULTS or NS 50 mL bag for PEDS (has no administration in time range)       Assessments & Plan (with Medical Decision Making)     MDM: Daniela Brown is a 89 year old female presents with a fall that occurred again this evening.  Multiple falls in the last year.  Has been admitted to the floor multiple times.  No obvious presyncopal symptoms.  No obvious injury on her evaluation externally but does complain of a headache after this.  Reassuring trauma exam.  Her imaging is reassuring including CT head CT cervical spine chest x-ray and pelvic x-ray.  We will have her ambulate in the room.  She \efused TCU and long-term care in the past wishes to remain at home.  She is certainly at risk for falls.  Unclear if hospital stay will offer benefit and she has been cleared medically as long as she can ambulate.  There is an abnormal urine sent for culture we will give 1 dose of Rocephin and continue oral antibiotics outpatient.  She has numerous medication allergies but is tolerated cephalosporins in the past    As we were readying for patient discharge, CK had been obtained because of being on the floor for a considerable time.  The CK enzyme was  elevated to over thousand.  We will continue IV fluids.  She will need recheck on CHEM panel and CK.  The prolonged time on the floor and and the numerous falls she has had up to 10 in the last year she tells me -makes it even more important that the patient reconsider her disposition.  I do not believe that she is safe at home and consider occupational therapy and physical therapy assessments. Consider surrogate decision makers including her sons -I do believe TCU or long-term care would be appropriate for this patient.      I have reviewed the nursing notes.    I have reviewed the findings, diagnosis, plan and need for follow up with the patient.       ED to Inpatient Handoff:    Discussed with Lloyd  Patient accepted for Inpatient Stay  Pending studies include none  Code Status: Not Addressed               Medical Decision Making  The patient's presentation was of moderate complexity (an acute complicated injury).    The patient's evaluation involved:  review of 3+ test result(s) ordered prior to this encounter (see separate area of note for details)    The patient's management necessitated high risk (a decision regarding hospitalization).        New Prescriptions    No medications on file       Final diagnoses:   Non-traumatic rhabdomyolysis   Fall, initial encounter   Closed head injury, initial encounter   Urinary tract infection without hematuria, site unspecified       8/15/2023   [facility] EMERGENCY DEPT       Tho Sanchez MD  08/16/23 0923

## 2023-08-16 NOTE — H&P
"[facility]    History and Physical - Hospitalist Service       Date of Admission:  8/15/2023    Assessment & Plan      Daniela Brown is a 89 year old female admitted on 8/15/2023. She presented with a fall work-up was negative including CT scan except for CK was high at 1400.      Recurrent falls  Abdominal CK 1400s  Start IV fluids and follow  CT scan of the head was negative, CT cervical spine x-ray of the pelvis and chest were negative as well  Placed on cardiac monitor and follow.  Given recurrent falls should consider neurology consult and Holter monitor at time of discharge.    UTI follow cultures  Ceftriaxone 2 g daily  Could be the cause of the patient's fall as well.    Hypertension  continue home medications with hold parameters  prn antihypertensive   monitor closely     Diet: Combination Diet Regular Diet Adult    DVT Prophylaxis: Enoxaparin (Lovenox) SQ  Collazo Catheter: Not present  Lines: None     Cardiac Monitoring: ACTIVE order. Indication: Syncope- low cardiac risk (24 hours)  Code Status: Full Code      Clinically Significant Risk Factors Present on Admission                  # Hypertension: Noted on problem list      # Overweight: Estimated body mass index is 26.6 kg/m  as calculated from the following:    Height as of this encounter: 1.626 m (5' 4\").    Weight as of this encounter: 70.3 kg (154 lb 15.7 oz).              Disposition Plan      Expected Discharge Date: 08/17/2023                  Wilver Miller MD  Hospitalist Service  [facility]  Securely message with [employer] (more info)  Text page via [employer] Paging/Directory     ______________________________________________________________________    Chief Complaint   Fall    History is obtained from the patient, electronic health record, and emergency department physician    History of Present Illness   Daniela Brown is a 89 year old female who presents after a fall.  The " "patient is not on anticoagulation.  The patient has had multiple falls in the last few months now.  Was admitted on August 22 for the same the patient was admitted and fell out of bed.  May have Hit her head but denies any loss of consciousness.  No focal weaknesses.  Was on the floor for as long as 12 hours.  The patient has refused assisted living facility or long-term care in the past.  Continues to want to live at home alone.  Patient recently had a UTI and was on cefdinir.  Patient's UA continues to be positive for UTI will follow cultures.  CT scan of the head spine x-ray of the pelvis and chest wall negative.  CK was 1447, glucose 109, AST 55, creatinine slightly elevated at 1.31 from baseline, white count 18.9, UA positive for UTI  Denies headache, LOC, seizures, no Chest pain, SOB, palpitation, cough or fever, no abdominal pain, nausea or vomiting. no bowel or bladder issues, no extremity swelling, no focal weakness, no recent travel, taking medications regulary, no sick contacts        Past Medical History    Past Medical History:   Diagnosis Date    Abnormal cardiovascular stress test 2/3/2019    9/10/2018 Lexiscan: \"Myocardial perfusion imaging using single isotope technique demonstrated a small perfusion defect of mild severity involving the basal inferior wall which is mostly reversible and may be consistent with mild ischemia in the right coronary artery distribution. In addition, transient ischemic dilatation is noted with a TID ratio of 1.3. \"    Adhesive capsulitis of shoulder     left     Adjustment disorder with anxiety 3/18/2016    B12 deficiency anemia 6/20/2012    Chest pain 2004    Chronic right sided/axillary discomfort (musculoskeletal) Atypical substernal (possibly GERD). Negative cardiolite 2004.    Colitis, Clostridium difficile 4/18/2012    Diverticulitis 2009    Headache(784.0) 2001    Tension type. MRI 2001 normal.    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    Status post coronary angiogram 2/27/2019    Syncope and collapse 9/10/2018       Past Surgical History   Past Surgical History:   Procedure Laterality Date    ARTHROPLASTY KNEE  3/26/2012    Procedure:ARTHROPLASTY KNEE; Right Total Knee Arthroplasty; Surgeon:BERNADINE ROSAS; Location:[place] OR    CHOLECYSTECTOMY      CV HEART CATHETERIZATION WITH POSSIBLE INTERVENTION N/A 2/27/2019    Procedure: Coronary Angiogram with Left ventriculogram;  Surgeon: Leandro Carpio MD;  Location:  HEART CARDIAC CATH LAB    HERNIA REPAIR      Hernia Repair    HYSTERECTOMY, PAP NO LONGER INDICATED  1983    TVH/BSO    SURGICAL HISTORY OF -   10/08    A & P Repair, Dr. Camden INFANTE APPENDECTOMY  1953       Prior to Admission Medications   Prior to Admission Medications   Prescriptions Last Dose Informant Patient Reported? Taking?   LORazepam (ATIVAN) 0.5 MG tablet   No No   Sig: Take 1 tablet (0.5 mg) by mouth daily as needed for anxiety (for short term only, do not take more than one per day) Further refills to be addressed by primary care provider.   Multiple Vitamins-Minerals (THERAPEUTIC MULTIVIT/MINERAL) TABS  Self Yes No   Sig: Take 1 tablet by mouth every evening    PARoxetine (PAXIL) 20 MG tablet   No No   Sig: Take 1 tablet (20 mg) by mouth every morning   STATIN NOT PRESCRIBED (INTENTIONAL)   No No   Sig: Please choose reason not prescribed, below   acetaminophen (TYLENOL) 500 MG tablet   Yes No   Sig: Take 500-1,000 mg by mouth every 6 hours as needed for mild pain or pain   amLODIPine (NORVASC) 5 MG tablet   No No   Sig: TAKE 1 TABLET BY MOUTH ONCE DAILY   blood glucose (ACCU-CHEK GUIDE) test strip   No No   Sig: Use to test blood sugar one times daily or as directed.   blood glucose monitoring (ACCU-CHEK FASTCLIX) lancets  Self No No   Sig: Use to test blood sugar one times daily or as  directed.   buPROPion (WELLBUTRIN XL) 150 MG 24 hr tablet   No No   Sig: Take 1 tablet (150 mg) by mouth every morning   cefdinir (OMNICEF) 300 MG capsule   No No   Sig: Take 1 capsule (300 mg) by mouth 2 times daily   Patient not taking: Reported on 6/28/2023   meloxicam (MOBIC) 7.5 MG tablet   No No   Sig: Take 1 tablet (7.5 mg) by mouth daily   olmesartan (BENICAR) 20 MG tablet   No No   Sig: Take 1 tablet (20 mg) by mouth daily   pregabalin (LYRICA) 50 MG capsule   No No   Sig: Take 1 capsule (50 mg) by mouth 2 times daily      Facility-Administered Medications Last Administration Doses Remaining   3 mL ropivacaine (NAROPIN) injection 5 mg/mL 4/4/2023  3:00 PM    3 mL ropivacaine (NAROPIN) injection 5 mg/mL 4/4/2023  3:00 PM    triamcinolone (KENALOG-40) injection 40 mg 4/4/2023  3:00 PM    triamcinolone (KENALOG-40) injection 40 mg 4/4/2023  3:00 PM            Review of Systems    The 10 point Review of Systems is negative other than noted in the HPI or here.      Physical Exam   Vital Signs: Temp: 98.9  F (37.2  C) Temp src: Oral BP: 133/49 Pulse: 73   Resp: 18 SpO2: 97 % O2 Device: None (Room air)    Weight: 154 lbs 15.73 oz    General:  Alert and oriented, No acute distress.    HENT:  Normocephalic.    Respiratory:  Lungs are clear to auscultation, Respirations are non-labored.    Cardiovascular:  Normal rate, Regular rhythm.    Gastrointestinal:  Soft, Non-tender, Non-distended, Normal bowel sounds.    Musculoskeletal:  Within normal limits.  No edema  Neurologic:  Alert, Oriented.  No focal deficit  Cognition and Speech:  Oriented, Speech clear and coherent.    Psychiatric:  Cooperative, Appropriate mood & affect.    Medical Decision Making       35 MINUTES SPENT BY ME on the date of service doing chart review, history, exam, documentation & further activities per the note.      Data     I have personally reviewed the following data over the past 24 hrs:    18.9 (H)  \   13.0   / 236     136 100 24.1 (H) /   109 (H)   3.6 23 1.31 (H) \     ALT: 27 AST: 55 (H) AP: 70 TBILI: 1.1   ALB: 3.9 TOT PROTEIN: 7.0 LIPASE: N/A       Imaging results reviewed over the past 24 hrs:   Recent Results (from the past 24 hour(s))   Head CT w/o contrast    Narrative    EXAM: CT HEAD W/O CONTRAST  LOCATION: [facility]  DATE: 8/15/2023    INDICATION: Head injury.  COMPARISON: None.  TECHNIQUE: Routine CT Head without IV contrast. Multiplanar reformats. Dose reduction techniques were used.    FINDINGS:  INTRACRANIAL CONTENTS: No intracranial hemorrhage, extraaxial collection, or mass effect.  No CT evidence of acute infarct. Mild presumed chronic small vessel ischemic changes. Mild to moderate generalized volume loss. No hydrocephalus.     VISUALIZED ORBITS/SINUSES/MASTOIDS: Prior bilateral cataract surgery. Visualized portions of the orbits are otherwise unremarkable. Mild mucosal thickening in the right sphenoid sinus locule. No middle ear or mastoid effusion.    BONES/SOFT TISSUES: No acute abnormality.      Impression    IMPRESSION:  1.  No CT evidence for acute intracranial process.  2.  Brain atrophy and presumed chronic microvascular ischemic changes as above.   Cervical spine CT w/o contrast    Narrative    EXAM: CT CERVICAL SPINE W/O CONTRAST  LOCATION: [facility]  DATE: 8/15/2023    INDICATION: Neck pain.  COMPARISON: None.  TECHNIQUE: Routine CT Cervical Spine without IV contrast. Multiplanar reformats. Dose reduction techniques were used.    FINDINGS:  VERTEBRA: Osteopenia. Cervical vertebral body heights are maintained. No acute cervical spine fracture.     CANAL/FORAMINA: No canal or neural foraminal stenosis.    PARASPINAL: No extraspinal abnormality.      Impression    IMPRESSION:  1.  No acute cervical spine fracture.   Chest XR,  PA & LAT    Narrative    EXAM: XR CHEST 2 VIEWS  LOCATION: [facility]  DATE: 8/15/2023    INDICATION:  syncope  COMPARISON: None.      Impression    IMPRESSION: Heart is normal in size. Lungs are clear.   Pelvis XR w/ unilateral hip left    Narrative    EXAM: XR PELVIS AND HIP LEFT 1 VIEW  LOCATION: [facility]  DATE: 8/15/2023    INDICATION: fall, left hip pain  COMPARISON: None.      Impression    IMPRESSION: Normal joint spaces and alignment. No fracture.       Visit/Communication Style   Virtual (Video) communication was used to evaluate Daniela.  Daniela consented to the use of video communication: yes  Video START time: 0300 AM EST, 8/16/2023  Video STOP time: 0330 AM EST, 8/16/2023   Patient's location: [facility]   Provider's location during the visit: [facility] [place] Tele-medicine site

## 2023-08-16 NOTE — PROGRESS NOTES
Pt set off chair alarm.Writer talked with pt about where she was going and pt explained that she was going to find her friend.Writer asked orientation questions and pt stated that she was going to a party.Pt was oriented and was not confused earlier.Writer paged provider to make him aware of her change in mental status. Will continue to monitor.

## 2023-08-16 NOTE — CONSULTS
Care Management Initial Consult    General Information  Assessment completed with: Patient,    Type of CM/SW Visit: Initial Assessment    Primary Care Provider verified and updated as needed: Yes   Readmission within the last 30 days: no previous admission in last 30 days      Reason for Consult: discharge planning  Advance Care Planning: Advance Care Planning Reviewed: no concerns identified          Communication Assessment  Patient's communication style: spoken language (English or Bilingual)    Hearing Difficulty or Deaf: yes   Wear Glasses or Blind: yes    Cognitive  Cognitive/Neuro/Behavioral: .WDL except, orientation  Level of Consciousness: confused  Arousal Level: opens eyes spontaneously  Orientation: disoriented to, situation  Mood/Behavior: calm, cooperative  Best Language: 0 - No aphasia  Speech: clear, logical    Living Environment:   People in home: alone     Current living Arrangements: house      Able to return to prior arrangements:         Family/Social Support:  Care provided by: self  Provides care for: no one     Other (specify) (friend Dixie)          Description of Support System: Supportive         Current Resources:   Patient receiving home care services: No     Community Resources:    Equipment currently used at home: walker, rolling, walker, standard, raised toilet seat, other (see comments) (chair lift)  Supplies currently used at home: Diabetic Supplies, Compression Stockings    Employment/Financial:  Employment Status: retired        Financial Concerns: No concerns identified           Does the patient's insurance plan have a 3 day qualifying hospital stay waiver?  Yes   Will the waiver be used for post-acute placement? Yes    Lifestyle & Psychosocial Needs:  Social Determinants of Health     Tobacco Use: Low Risk  (6/14/2023)    Patient History     Smoking Tobacco Use: Never     Smokeless Tobacco Use: Never     Passive Exposure: Not on file   Alcohol Use: Not on file   Financial  "Resource Strain: Not on file   Food Insecurity: Not on file   Transportation Needs: Not on file   Physical Activity: Not on file   Stress: Not on file   Social Connections: Not on file   Intimate Partner Violence: Not on file   Depression: At risk (6/29/2023)    PHQ-2     PHQ-2 Score: 3   Housing Stability: Not on file       Functional Status:  Prior to admission patient needed assistance:   Dependent ADLs:: Ambulation-walker  Dependent IADLs:: Shopping, Transportation, Cooking       Mental Health Status:  Mental Health Status: No Current Concerns         Values/Beliefs:  Spiritual, Cultural Beliefs, Congregation Practices, Values that affect care: no               Additional Information:  Per MD at IDT rounds pt is not medically stable for discharge today. Per MD at rounds MD feels pt is appropriate for TCU and willing to go. CM met with pt at bedside and introduced self and role. PT was alert and oriented for visit. Pt lives alone in a home with 2 levels. Has a chair lift for the stairs and walks with a walker at baseline. Pt has both a 4WW and standard. Pt provides care for herself, and does not have any outside services. Pt was previously getting her groceries dropped off to her doorstep which was set up by her son but pt stated \"my son Linden is mad at me because I came into the hospital\" CM asked about sons involvement, son lives in [facility] and was previously involved but pt stated their relationship is \"bindu\" Pt stated she has a friend Dixie who has been her support system for 50 years. Pt stated she needs to be stronger to go home and willing to go to a TCU. Pt given choice, referrals sent to following TCU's. Pts primary MD Cynthia Maddox spoke with CM to discuss hx of pt, CC and MD working to get additional help and services for pt. MD glad to hear that pt willing to go to TCU. Cm spoke with therapy who will see pt tomorrow, unable to get to pt today. Referrals sent to TCU per MD recommendation at this " time pending therapy to make recommendation as well.    Plan: TCU  Transport: agency v.s family    Destination    Service Provider Request Status Selected Services Address Phone Fax Patient Preferred   [facility] [place] - REFERRAL ONLY (SNF)  Pending - Request Sent N/A [address] [facility] [id number] [phone]    [facility] ()  Pending - Request Sent N/A [address] [id number] [phone] --   [facility] REFERRAL (REF'L)  Pending - Request Sent N/A [address] [facility] [id number] [phone] --     Mickie Boothe RNCM  Care Transitions Registered Nurse  Tele: [phone]

## 2023-08-17 ENCOUNTER — APPOINTMENT (OUTPATIENT)
Dept: PHYSICAL THERAPY | Facility: CLINIC | Age: 88
DRG: 682 | End: 2023-08-17
Payer: MEDICARE

## 2023-08-17 ENCOUNTER — APPOINTMENT (OUTPATIENT)
Dept: OCCUPATIONAL THERAPY | Facility: CLINIC | Age: 88
DRG: 682 | End: 2023-08-17
Payer: MEDICARE

## 2023-08-17 ENCOUNTER — APPOINTMENT (OUTPATIENT)
Dept: GENERAL RADIOLOGY | Facility: CLINIC | Age: 88
DRG: 682 | End: 2023-08-17
Attending: INTERNAL MEDICINE
Payer: MEDICARE

## 2023-08-17 LAB
ANION GAP SERPL CALCULATED.3IONS-SCNC: 10 MMOL/L (ref 7–15)
BACTERIA UR CULT: ABNORMAL
BACTERIA UR CULT: ABNORMAL
BASOPHILS # BLD AUTO: 0.1 10E3/UL (ref 0–0.2)
BASOPHILS NFR BLD AUTO: 1 %
BUN SERPL-MCNC: 23.1 MG/DL (ref 8–23)
CALCIUM SERPL-MCNC: 8.4 MG/DL (ref 8.2–9.6)
CHLORIDE SERPL-SCNC: 111 MMOL/L (ref 98–107)
CK SERPL-CCNC: 762 U/L (ref 26–192)
CREAT SERPL-MCNC: 1.01 MG/DL (ref 0.51–0.95)
DEPRECATED HCO3 PLAS-SCNC: 19 MMOL/L (ref 22–29)
EOSINOPHIL # BLD AUTO: 0.4 10E3/UL (ref 0–0.7)
EOSINOPHIL NFR BLD AUTO: 4 %
ERYTHROCYTE [DISTWIDTH] IN BLOOD BY AUTOMATED COUNT: 12.8 % (ref 10–15)
GFR SERPL CREATININE-BSD FRML MDRD: 53 ML/MIN/1.73M2
GLUCOSE BLDC GLUCOMTR-MCNC: 117 MG/DL (ref 70–99)
GLUCOSE BLDC GLUCOMTR-MCNC: 128 MG/DL (ref 70–99)
GLUCOSE BLDC GLUCOMTR-MCNC: 96 MG/DL (ref 70–99)
GLUCOSE SERPL-MCNC: 104 MG/DL (ref 70–99)
HCT VFR BLD AUTO: 31.2 % (ref 35–47)
HGB BLD-MCNC: 10.5 G/DL (ref 11.7–15.7)
IMM GRANULOCYTES # BLD: 0 10E3/UL
IMM GRANULOCYTES NFR BLD: 0 %
LYMPHOCYTES # BLD AUTO: 1.5 10E3/UL (ref 0.8–5.3)
LYMPHOCYTES NFR BLD AUTO: 17 %
MAGNESIUM SERPL-MCNC: 1.6 MG/DL (ref 1.7–2.3)
MCH RBC QN AUTO: 29.2 PG (ref 26.5–33)
MCHC RBC AUTO-ENTMCNC: 33.7 G/DL (ref 31.5–36.5)
MCV RBC AUTO: 87 FL (ref 78–100)
MONOCYTES # BLD AUTO: 0.6 10E3/UL (ref 0–1.3)
MONOCYTES NFR BLD AUTO: 7 %
NEUTROPHILS # BLD AUTO: 6.4 10E3/UL (ref 1.6–8.3)
NEUTROPHILS NFR BLD AUTO: 71 %
NRBC # BLD AUTO: 0 10E3/UL
NRBC BLD AUTO-RTO: 0 /100
PLATELET # BLD AUTO: 180 10E3/UL (ref 150–450)
POTASSIUM SERPL-SCNC: 3.8 MMOL/L (ref 3.4–5.3)
RBC # BLD AUTO: 3.6 10E6/UL (ref 3.8–5.2)
SODIUM SERPL-SCNC: 140 MMOL/L (ref 136–145)
WBC # BLD AUTO: 9 10E3/UL (ref 4–11)

## 2023-08-17 PROCEDURE — 36415 COLL VENOUS BLD VENIPUNCTURE: CPT | Performed by: INTERNAL MEDICINE

## 2023-08-17 PROCEDURE — 82550 ASSAY OF CK (CPK): CPT | Performed by: INTERNAL MEDICINE

## 2023-08-17 PROCEDURE — 85025 COMPLETE CBC W/AUTO DIFF WBC: CPT | Performed by: INTERNAL MEDICINE

## 2023-08-17 PROCEDURE — 120N000001 HC R&B MED SURG/OB

## 2023-08-17 PROCEDURE — 250N000013 HC RX MED GY IP 250 OP 250 PS 637: Performed by: HOSPITALIST

## 2023-08-17 PROCEDURE — 97116 GAIT TRAINING THERAPY: CPT | Mod: GP | Performed by: PHYSICAL THERAPIST

## 2023-08-17 PROCEDURE — 82310 ASSAY OF CALCIUM: CPT | Performed by: INTERNAL MEDICINE

## 2023-08-17 PROCEDURE — 99232 SBSQ HOSP IP/OBS MODERATE 35: CPT | Performed by: INTERNAL MEDICINE

## 2023-08-17 PROCEDURE — 97530 THERAPEUTIC ACTIVITIES: CPT | Mod: GO | Performed by: OCCUPATIONAL THERAPIST

## 2023-08-17 PROCEDURE — 73560 X-RAY EXAM OF KNEE 1 OR 2: CPT | Mod: LT

## 2023-08-17 PROCEDURE — 97165 OT EVAL LOW COMPLEX 30 MIN: CPT | Mod: GO | Performed by: OCCUPATIONAL THERAPIST

## 2023-08-17 PROCEDURE — 250N000011 HC RX IP 250 OP 636: Mod: JZ | Performed by: INTERNAL MEDICINE

## 2023-08-17 PROCEDURE — 250N000011 HC RX IP 250 OP 636: Mod: JZ | Performed by: HOSPITALIST

## 2023-08-17 PROCEDURE — 97535 SELF CARE MNGMENT TRAINING: CPT | Mod: GO | Performed by: OCCUPATIONAL THERAPIST

## 2023-08-17 PROCEDURE — 97161 PT EVAL LOW COMPLEX 20 MIN: CPT | Mod: GP | Performed by: PHYSICAL THERAPIST

## 2023-08-17 PROCEDURE — 83735 ASSAY OF MAGNESIUM: CPT | Performed by: INTERNAL MEDICINE

## 2023-08-17 RX ORDER — ENOXAPARIN SODIUM 100 MG/ML
40 INJECTION SUBCUTANEOUS EVERY 24 HOURS
Status: DISCONTINUED | OUTPATIENT
Start: 2023-08-18 | End: 2023-08-18 | Stop reason: HOSPADM

## 2023-08-17 RX ADMIN — POLYETHYLENE GLYCOL 3350 17 G: 17 POWDER, FOR SOLUTION ORAL at 08:41

## 2023-08-17 RX ADMIN — PREGABALIN 50 MG: 50 CAPSULE ORAL at 21:21

## 2023-08-17 RX ADMIN — PREGABALIN 50 MG: 50 CAPSULE ORAL at 08:42

## 2023-08-17 RX ADMIN — ACETAMINOPHEN 1000 MG: 500 TABLET, FILM COATED ORAL at 16:36

## 2023-08-17 RX ADMIN — MULTIPLE VITAMINS W/ MINERALS TAB 1 TABLET: TAB at 16:37

## 2023-08-17 RX ADMIN — MELOXICAM 7.5 MG: 7.5 TABLET ORAL at 08:41

## 2023-08-17 RX ADMIN — BUPROPION HYDROCHLORIDE 150 MG: 150 TABLET, EXTENDED RELEASE ORAL at 08:42

## 2023-08-17 RX ADMIN — ENOXAPARIN SODIUM 30 MG: 30 INJECTION SUBCUTANEOUS at 04:03

## 2023-08-17 RX ADMIN — AMLODIPINE BESYLATE 5 MG: 5 TABLET ORAL at 08:42

## 2023-08-17 RX ADMIN — LOSARTAN POTASSIUM 50 MG: 50 TABLET, FILM COATED ORAL at 08:42

## 2023-08-17 RX ADMIN — CEFTRIAXONE SODIUM 2 G: 2 INJECTION, POWDER, FOR SOLUTION INTRAMUSCULAR; INTRAVENOUS at 21:22

## 2023-08-17 RX ADMIN — PAROXETINE HYDROCHLORIDE 20 MG: 20 TABLET, FILM COATED ORAL at 08:42

## 2023-08-17 RX ADMIN — FAMOTIDINE 20 MG: 10 INJECTION INTRAVENOUS at 08:42

## 2023-08-17 ASSESSMENT — ACTIVITIES OF DAILY LIVING (ADL)
ADLS_ACUITY_SCORE: 33
ADLS_ACUITY_SCORE: 32
ADLS_ACUITY_SCORE: 33
PREVIOUS_RESPONSIBILITIES: MEAL PREP;HOUSEKEEPING;LAUNDRY;SHOPPING
ADLS_ACUITY_SCORE: 32
ADLS_ACUITY_SCORE: 33
ADLS_ACUITY_SCORE: 32

## 2023-08-17 NOTE — PROGRESS NOTES
Inpatient Physical Therapy Evaluation       08/17/23 0800   Appointment Info   Signing Clinician's Name / Credentials (PT) Deepti Lorenzo, PT, DPT, CLT       Present no   Living Environment   People in Home alone   Current Living Arrangements house   Home Accessibility   (stair lift inside home)   Transportation Anticipated car, drives self   Living Environment Comments Pt lives alone in a home with 2 levels. Has a chair lift for the stairs inside and home and a ramp from garage to enter the home. Pt walks with a walker at baseline (has a FWW and 4WW). Pt provides care for herself for the most part, does have a friend to help take out garbage every 2 weeks as well as help for lawn care and snow removal. Pt does her own light grocery shopping at [employer] nearby (drives self).   Self-Care   Usual Activity Tolerance moderate   Current Activity Tolerance fair   Regular Exercise No   Equipment Currently Used at Home walker, rolling;walker, standard;raised toilet seat;other (see comments)  (chair lift)   Fall history within last six months yes   Number of times patient has fallen within last six months 10  (pt reports all the falls were without using her walker and since has started to use it all the time)   Activity/Exercise/Self-Care Comment Pt reports she's fairly sedentary at home, does usual household activities and often breaks them up due to fatigue and/or back and L knee pain, so will take breast as needed.   General Information   Onset of Illness/Injury or Date of Surgery 08/16/23   Referring Physician Nighat Mascorro MD   Patient/Family Therapy Goals Statement (PT) I'm agreeable to go to TCU   Pertinent History of Current Problem (include personal factors and/or comorbidities that impact the POC) 89 year old female admitted on 8/15/2023 with unwitnessed fall, probable UTI and rhabdomyolysis. 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 CT head and cervical spine negative for acute process. Chest xray negative for acute process.   Existing Precautions/Restrictions fall   General Observations Pt received sitting upright in chair finishing breakfast and agreeable to PT evaluation/session .   Cognition   Affect/Mental Status (Cognition) WNL   Orientation Status (Cognition) oriented x 3   Follows Commands (Cognition) WNL   Behavioral Issues   (no issues; pt calm, plesant and cooperative)   Safety Deficit (Cognition)   (good insight into safety demonstrated today)   Pain Assessment   Patient Currently in Pain Yes, see Vital Sign flowsheet  (7/10 pain in low back and LLE)   Integumentary/Edema   Integumentary/Edema Comments mild edema in BLEs which pt reports is her baseline   Posture    Posture Forward head position   Range of Motion (ROM)   Range of Motion ROM is WFL   Strength (Manual Muscle Testing)   Strength (Manual Muscle Testing) strength is WFL   Strength Comments at least 3/5 hip flex and knee ext   Bed Mobility   Comment, (Bed Mobility) Not formally assessed as pt received and returned to sitting in chair at bedside this session.   Transfers   Comment, (Transfers) STS up to FWW from recliner at close SBA; from very low chair without arm rests pt required min-Ax1 up to FWW   Gait/Stairs (Locomotion)   Comment, (Gait/Stairs) Pt ambulated 20 feet in room using FWW at SBA, steady on feet and able to safely negotiate around tight corners/obstacles in room without concern   Balance   Balance Comments Good static standing balance using FWW with BUEs for safety; denies any dizziness/lightheadedness when uprigh   Sensory Examination   Sensory Perception other (describe)   Sensory Perception Comments baseline B foot neuropathy   Coordination   Coordination no deficits were identified   Muscle Tone   Muscle Tone no deficits were identified   Clinical Impression   Criteria for  Skilled Therapeutic Intervention Yes, treatment indicated   PT Diagnosis (PT) generalized deconditioning   Influenced by the following impairments generalized deconditioning/weakness from age   Functional limitations due to impairments bed mobility, transfers, ambulation   Clinical Presentation (PT Evaluation Complexity) Stable/Uncomplicated   Clinical Presentation Rationale clinical judgement   Clinical Decision Making (Complexity) low complexity   Planned Therapy Interventions (PT) bed mobility training;gait training;home exercise program;patient/family education;strengthening;transfer training;home program guidelines;progressive activity/exercise   Anticipated Equipment Needs at Discharge (PT)   (none)   Risk & Benefits of therapy have been explained evaluation/treatment results reviewed;care plan/treatment goals reviewed;risks/benefits reviewed;participants voiced agreement with care plan;patient   PT Total Evaluation Time   PT Eval, Low Complexity Minutes (61398) 10   Physical Therapy Goals   PT Frequency 5x/week   PT Predicted Duration/Target Date for Goal Attainment 08/24/23   PT Goals Bed Mobility;Transfers;Gait   PT: Bed Mobility Independent;Supine to/from sit   PT: Transfers Modified independent;Sit to/from stand;Bed to/from chair;Assistive device   PT: Gait Supervision/stand-by assist;Rolling walker;Greater than 200 feet   Interventions   Interventions Quick Adds Gait Training   Gait Training   Gait Training Minutes (58345) 25   Symptoms Noted During/After Treatment (Gait Training) fatigue   Treatment Detail/Skilled Intervention STS from recliner chair up to FWW for pre-gait training at SBA; Pt ambulated 90 feet x2 using FWW initially at [facility] for safety, progressing to close SBA as pt steady and stable; pt does demo decreased gait speed, decreased step lenght and slightly narrow ARJUN. Pt able to hold conversation w/therapist and perform environmental scanning and navigate around obstacles in hallway without  LOB or safety concern. Pt did require a sitting rest break after first bout of ambulation for ~5min due to fagitue and L knee pain; due to sitting in lower chair without arm-rests, pt required rocking for momentum and min-Ax1 to perform STS to complete ambulation back to room. Pt left sitting up in recliner chair at end of session w/needs in reach and chair alarm on. Education to pt on recommendation for rehab stay which pt open and agreeable to at this time. Education on role of IP PT while pt in hospital to answered pt's questions.   PT Discharge Planning   PT Plan Thurs 1/5; Plan: formally assess bed mobility, progress ambulation distance/duration; BLE strengthening   PT Discharge Recommendation (DC Rec) Transitional Care Facility  (anticipate short-stay)   PT Rationale for DC Rec generalized deconditioning, numberous falls at home (reported 10 falls this year alone so far) and age   PT Brief overview of current status STS up to FWW at SBA; ambulated 90 feet x2 with a sitting rest break of 5min between bouts using FWW at A   Total Session Time   Timed Code Treatment Minutes 25   Total Session Time (sum of timed and untimed services) 35

## 2023-08-17 NOTE — PROGRESS NOTES
Care Management Follow Up    Length of Stay (days): 2    Expected Discharge Date: 08/18/2023     Concerns to be Addressed: discharge planning     Patient plan of care discussed at interdisciplinary rounds: Yes    Anticipated Discharge Disposition: Transitional Care  Disposition Comments: TCU  Anticipated Discharge Services: None  Anticipated Discharge DME:      Patient/family educated on Medicare website which has current facility and service quality ratings: yes  Education Provided on the Discharge Plan: Yes  Patient/Family in Agreement with the Plan: yes    Referrals Placed by CM/SW: Internal Clinic Care Coordination, Transportation  Private pay costs discussed: transportation costs    Additional Information:  Per MD at IDT rounds pt is not medically stable for discharge today. Disposition expected 1-2 days per MD. Pt has been accepted at [facility] Phone (Main Phone:[phone] Admissions Phone:[phone] Fax: [phone]) TCU and [facility] (Main: [phone]/ Fax: [phone]) pt given choice of facility and decided she would like to go to [facility] Phone (Main Phone:[phone] Admissions Phone:[phone] Fax: [phone]) TCU upon discharge. CM discussed again the goal of TCU and what it consists of. Pt understanding, pt alert with some intermittent confusion. CM asked who would provide pt with clothing and belongings for TCU, pt stated her son Wally would. CM asked pt if CM would like writer to contact her son with an update and to discuss needs for TCU. Pt agreed. CM discussed transport with pt, pt stated she would need transport set up, CM discussed costs with pt, pt in agreement.    CM spoke with son Wally who stated he has been fighting with his mom the last 6 months as he has been trying to get her additional help but she hasn't wanted it. Son informed of TCU placement upon discharge, son in agreement and CM updated son about transport. Son will have  his granddaugther bring pts personal items for her. Son asked about additional help after discharge from hospital, CM informed Wally of pts Clinic Care coordination team through Cynthia Maddox MD, Son understood. Son denied further questions at this time. CM to send handoff to care coordinator once pt discharges.    ZAINAB spoke with Mari at [facility] Phone (Main Phone:[phone] Admissions Phone:[phone] Fax: [phone]) to discuss pts acceptance of facility. CM informed Mari of ride and PAS #. ZAINAB stated that discharge is planned for tomorrow but may be an additional day depending on how pt is doing per MD, ZAINAB asked about accomodation of admission on a Saturday if needed. Mari stated they could accommodate this if needed for pt if they had things situated tomorrow. CM to update Mari tomorrow morning with discharge plan once known.    Plan: [facility] Phone (Main Phone:[phone] Admissions Phone:[phone] Fax: [phone])     Transport: [facility] via w/c 12:40-13:20      DAVIDE Wallis  Care Transitions Registered Nurse  Tele: [phone]

## 2023-08-17 NOTE — PROGRESS NOTES
"  Reason for Admission: UTI, falls, and rhabdomyolysis  Activity: SBA with gait belt  Neuro: Alert and orientated to self -- pt needs frequent reminders she is in the hospital. When mentation question pt stated the years \"1923\" and the President was Albert. Pt will realize at times she is confused and state \"this infection has made me really confused\". Pt will at times be impulsive and forget to call for assistance and set off bed alarm.     Cardiac: Denies any chest pain or palpations.   Respiratory: Denies any SOB. Lung sounds are clear and on room air.   GI/: Pt will at times dribble urine, has a incontinent pad on. Able to ambulate to and from the bathroom without difficulty.   Skin: Has bruised on her left thigh and shin.   Diet: Regular diet.   IV Access: 20 gauge in the left AC. IV fluids NS 0.9% infusing at 100 ml/hr. Pt receiving IV Rocephin every 24 hours.   Vitals: BP (!) 144/54 (BP Location: Right arm)   Pulse 81   Temp 97.4  F (36.3  C) (Oral)   Resp 18   Ht 1.626 m (5' 4\")   Wt 70.3 kg (154 lb 15.7 oz)   LMP  (LMP Unknown)   SpO2 96%   BMI 26.60 kg/m    Pain: Reported of back pain \"3\"/10, given PRN Tylenol 1000 mg x1.   Plan: Monitor labs, give IV fluids and IV antibiotics. TCU referrals pending.     "

## 2023-08-17 NOTE — PLAN OF CARE
Time: Assumed care of patient at 0700    Reason, date of admission: Closed head injury due to fall admitted on 8/15/23, unknown time lapse that patient was down. Developed rhabdomyolysis.     Inpatient status     Admitted from: Home, lives alone Admission background information: Multiple falls in recent history. Has refused TCU and nursing home during previous admissions, however, patient considering discharge to a TCU/LTC unit vs discharge to home.  Code: Full    Isolation? None    History DM2, GERD, CKD 3, Generalized weakness, peripheral neuropathy, HTN, recurring UTIs, CAD, IBS, RLS    Activity: SBA    Neuro: At first and second neuro check, patient was alert and oriented x4    Cardiac: WNL      Telemetry: None    Resp: WNL     O2: Room air    GI/:  Occasional dribbling    Last BM: 8/16/23    Lines/Drains: Left AC    Fluids: SL    Vitals: Stable    Labs/BG: CK this AM: 762, 1307 yesterday - 8/16/23.    K+ protocol? Yes (3.8 this AM, no replacement needed). AM lab ordered.        Mag protocol? Yes (1.6 this AM, replacement needed). AM lab ordered.     Pain: Intermittent back pain. Bilateral leg pain.     Other: I&O, Neuro checks every 4 hours.     Plan: Expected to discharge within 1-2 days. Patient considering discharge to [place] [facility].      Goal Outcome Evaluation:      Plan of Care Reviewed With: patient

## 2023-08-17 NOTE — PROGRESS NOTES
08/17/23 1300   Appointment Info   Signing Clinician's Name / Credentials (OT) Andrés Morales OTR/L   Living Environment   People in Home alone   Current Living Arrangements house   Home Accessibility   (has stair lift inside home)   Transportation Anticipated car, drives self   Living Environment Comments Pt lives alone in a home with 2 levels. Has a chair lift for the stairs inside and home and a ramp from garage to enter the home. Pt walks with a walker at baseline (has a FWW and 4WW). Pt provides care for herself for the most part, does have a friend to help take out garbage every 2 weeks as well as help for lawn care and snow removal. Pt does her own light grocery shopping at [employer] nearby (drives self).   Self-Care   Usual Activity Tolerance moderate   Current Activity Tolerance fair   Regular Exercise No   Equipment Currently Used at Home walker, rolling;walker, standard;raised toilet seat;other (see comments)   Fall history within last six months yes   Number of times patient has fallen within last six months 10  (before she started using walker)   Activity/Exercise/Self-Care Comment Pt reports she's fairly sedentary at home, does usual household activities and often breaks them up due to fatigue and/or back and L knee pain, so will take breaks as needed. Previously independent with ADL   Instrumental Activities of Daily Living (IADL)   Previous Responsibilities meal prep;housekeeping;laundry;shopping   General Information   Onset of Illness/Injury or Date of Surgery 08/15/23   Referring Physician Nighat Mascorro   Patient/Family Therapy Goal Statement (OT) to get home   Additional Occupational Profile Info/Pertinent History of Current Problem Daniela Brown is a 89 year old female admitted on 8/15/2023 with unwitnessed fall, probable UTI and rhabdomyolysis 1400. 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 CT head and cervical spine negative for acute process. Chest xray negative for acute process.   Existing Precautions/Restrictions fall   Limitations/Impairments safety/cognitive   Cognitive Status Examination   Orientation Status orientation to person, place and time   Cognitive Status Comments some concerns with memory noted   Cognitive Screens/Assessments   Cognitive Assessments Completed [facility] Mental Status Exam (UMS):  Total Score out of /30 13   [facility] Norms 27-30 equals normal   [facility] Domains assessed: orientation, memory, attention, executive functions   SLUMS Interpretation Pt with difficulty with memory and recall   Visual Perception   Impact of Vision Impairment on Function (Vision) no acute changes   Range of Motion Comprehensive   General Range of Motion no range of motion deficits identified;bilateral upper extremity ROM WFL   Comment, General Range of Motion B UE WFL   Strength Comprehensive (MMT)   General Manual Muscle Testing (MMT) Assessment no strength deficits identified   Comment, General Manual Muscle Testing (MMT) Assessment B UE WFL   Coordination   Upper Extremity Coordination No deficits were identified   Transfers   Transfer Comments SBA for toilet transfer and from chair   Balance   Balance Comments no concerns noted   Clinical Impression   Criteria for Skilled Therapeutic Interventions Met (OT) Yes, treatment indicated   OT Diagnosis decreased ADL independence   OT Problem List-Impairments impacting ADL problems related to;activity tolerance impaired;cognition;pain;strength   Assessment of Occupational Performance 3-5 Performance Deficits   Planned Therapy Interventions (OT) ADL retraining;IADL retraining;transfer training;strengthening;home program guidelines;progressive activity/exercise;cognition   Clinical Decision Making Complexity (OT) low complexity   Anticipated Equipment Needs Upon Discharge (OT) shower  chair   Risk & Benefits of therapy have been explained evaluation/treatment results reviewed;care plan/treatment goals reviewed;participants voiced agreement with care plan;patient   Clinical Impression Comments Pt presents with decreased activity tolerance and functional endurance, resulting in decreased independence with ADL.  Pt would benefit from skilled OT services to increase safety and independence with ADL   OT Total Evaluation Time   OT Eval, Low Complexity Minutes (02519) 12   OT Goals   Therapy Frequency (OT) 3 times/wk   OT Predicted Duration/Target Date for Goal Attainment 08/25/23   OT Goals Hygiene/Grooming;Cognition;Home Management;Meal Preparation   OT: Hygiene/Grooming modified independent;while standing   OT: Upper Body Dressing Modified independent   OT: Meal Preparation Supervision/stand-by assist;with simple meal preparation;ambulatory level   OT: Home Management Modified independent;with light demand household tasks;ambulatory level   OT: Cognitive Patient/caregiver will verbalize understanding of cognitive assessment results/recommendations as needed for safe discharge planning   OT Discharge Planning   OT Plan standing ADL, simulated home mgmt, endurance, EC/WS education   OT Discharge Recommendation (DC Rec) Transitional Care Facility   OT Rationale for DC Rec Pt below baseline with endurance and strength, would benefit from short TCU stay to build endurance to ensure safety and independence with ADL

## 2023-08-17 NOTE — PROGRESS NOTES
"[facility]    Medicine Progress Note - Hospitalist Service    Date of Admission:  8/15/2023    Assessment & Plan   Daniela Brown is a 89 year old female admitted on 8/15/2023 with unwitnessed fall, probable UTI and rhabdomyolysis 1400. 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 CT head and cervical spine negative for acute process. Chest xray negative for acute process. Ordered blood cultures and lactate.     UTI with metabolic encephalopathy  Follow blood and urine cultures  She is on empiric ceftriaxone 2 G IV daily.   Strict I/O's.     CHER, prerenal from dehydration, resolved  Rhabdomyolysis, from laying on the ground for 12 hours, improving  S/p aggressive IVF hydration   Trend renal function daily  Avoid nephrotoxins    Hypertension  continue home medications with hold parameters  prn antihypertensive   monitor closely    Generalized weakness  Recurrent Falls  Underlying dementia  PT/OT consult to evaluate and treat  Fall and delirium precautions.  Negative orthostatic vitals.       Diet: Advance Diet as Tolerated: Regular Diet Adult    DVT Prophylaxis: Enoxaparin (Lovenox) SQ  Coll[place] Catheter: Not present  Lines: None     Cardiac Monitoring: ACTIVE order. Indication: Syncope- low cardiac risk (24 hours)  Code Status: Full Code      Clinically Significant Risk Factors          # Hypocalcemia: Lowest Ca = 8.4 mg/dL in last 2 days, will monitor and replace as appropriate   # Hypomagnesemia: Lowest Mg = 1.6 mg/dL in last 2 days, will replace as needed       # Hypertension: Noted on problem list        # Overweight: Estimated body mass index is 26.6 kg/m  as calculated from the following:    Height as of this encounter: 1.626 m (5' 4\").    Weight as of this encounter: 70.3 kg (154 lb 15.7 oz)., PRESENT ON ADMISSION            Disposition Plan "     Expected Discharge Date: 08/17/2023      Destination: inpatient rehabilitation facility            Nighat Mascorro MD  Hospitalist Service  [facility]  Securely message with [employer] (more info)  Text page via [employer] Paging/Directory   ______________________________________________________________________    Interval History   No acute events overnight. Wax and waning confusion attributed to UTI and underlying dementia. Hemodynamically stable. No complaints noted.     Physical Exam   Vital Signs: Temp: 97.4  F (36.3  C) Temp src: Oral BP: (!) 144/54 Pulse: 81   Resp: 18 SpO2: 96 % O2 Device: None (Room air)    Weight: 154 lbs 15.73 oz    General: Intermittent confused, lying comfortably in bed in no apparent distress  HEENT: pupils equal and reactive to light and accommodation, extraocular movements are intact; oral mucosa moist  Neck: Supple no raised JVD, no rigidity  Respiratory: lungs b/l clear to auscultation, no wheezing or crepitations  CVS: nl s1 s2, regular rate and rhythm, no murmur  P/A: soft, nt,nd, no guarding rigidity or rebound tenderness  Ext: no edema  Neuro: no focal neurological deficits noted, cranial nerves 2-12 grossly intact  Psychiatry: normal mood and affect  Skin: No obvious skin rashes or ulcers     Medical Decision Making       45 MINUTES SPENT BY ME on the date of service doing chart review, history, exam, documentation & further activities per the note.      Data     I have personally reviewed the following data over the past 24 hrs:    9.0  \   10.5 (L)   / 180     140 111 (H) 23.1 (H) /  96   3.8 19 (L) 1.01 (H) \     Procal: N/A CRP: N/A Lactic Acid: 1.5         Imaging results reviewed over the past 24 hrs:   No results found for this or any previous visit (from the past 24 hour(s)).

## 2023-08-18 ENCOUNTER — APPOINTMENT (OUTPATIENT)
Dept: OCCUPATIONAL THERAPY | Facility: CLINIC | Age: 88
DRG: 682 | End: 2023-08-18
Payer: MEDICARE

## 2023-08-18 VITALS
HEIGHT: 64 IN | SYSTOLIC BLOOD PRESSURE: 135 MMHG | BODY MASS INDEX: 26.46 KG/M2 | HEART RATE: 68 BPM | TEMPERATURE: 98.6 F | RESPIRATION RATE: 18 BRPM | OXYGEN SATURATION: 98 % | WEIGHT: 154.98 LBS | DIASTOLIC BLOOD PRESSURE: 49 MMHG

## 2023-08-18 LAB
ANION GAP SERPL CALCULATED.3IONS-SCNC: 9 MMOL/L (ref 7–15)
BASOPHILS # BLD AUTO: 0 10E3/UL (ref 0–0.2)
BASOPHILS NFR BLD AUTO: 1 %
BUN SERPL-MCNC: 19.8 MG/DL (ref 8–23)
CALCIUM SERPL-MCNC: 8.9 MG/DL (ref 8.2–9.6)
CHLORIDE SERPL-SCNC: 108 MMOL/L (ref 98–107)
CREAT SERPL-MCNC: 0.99 MG/DL (ref 0.51–0.95)
DEPRECATED HCO3 PLAS-SCNC: 23 MMOL/L (ref 22–29)
EOSINOPHIL # BLD AUTO: 0.3 10E3/UL (ref 0–0.7)
EOSINOPHIL NFR BLD AUTO: 4 %
ERYTHROCYTE [DISTWIDTH] IN BLOOD BY AUTOMATED COUNT: 12.8 % (ref 10–15)
GFR SERPL CREATININE-BSD FRML MDRD: 54 ML/MIN/1.73M2
GLUCOSE BLDC GLUCOMTR-MCNC: 141 MG/DL (ref 70–99)
GLUCOSE BLDC GLUCOMTR-MCNC: 97 MG/DL (ref 70–99)
GLUCOSE SERPL-MCNC: 104 MG/DL (ref 70–99)
HCT VFR BLD AUTO: 33.4 % (ref 35–47)
HGB BLD-MCNC: 11.1 G/DL (ref 11.7–15.7)
IMM GRANULOCYTES # BLD: 0 10E3/UL
IMM GRANULOCYTES NFR BLD: 1 %
LYMPHOCYTES # BLD AUTO: 1.7 10E3/UL (ref 0.8–5.3)
LYMPHOCYTES NFR BLD AUTO: 21 %
MAGNESIUM SERPL-MCNC: 1.7 MG/DL (ref 1.7–2.3)
MCH RBC QN AUTO: 28.8 PG (ref 26.5–33)
MCHC RBC AUTO-ENTMCNC: 33.2 G/DL (ref 31.5–36.5)
MCV RBC AUTO: 87 FL (ref 78–100)
MONOCYTES # BLD AUTO: 0.7 10E3/UL (ref 0–1.3)
MONOCYTES NFR BLD AUTO: 8 %
NEUTROPHILS # BLD AUTO: 5.4 10E3/UL (ref 1.6–8.3)
NEUTROPHILS NFR BLD AUTO: 65 %
NRBC # BLD AUTO: 0 10E3/UL
NRBC BLD AUTO-RTO: 0 /100
PLATELET # BLD AUTO: 220 10E3/UL (ref 150–450)
POTASSIUM SERPL-SCNC: 4 MMOL/L (ref 3.4–5.3)
RBC # BLD AUTO: 3.86 10E6/UL (ref 3.8–5.2)
SODIUM SERPL-SCNC: 140 MMOL/L (ref 136–145)
WBC # BLD AUTO: 8.1 10E3/UL (ref 4–11)

## 2023-08-18 PROCEDURE — 250N000011 HC RX IP 250 OP 636: Mod: JZ | Performed by: INTERNAL MEDICINE

## 2023-08-18 PROCEDURE — 97535 SELF CARE MNGMENT TRAINING: CPT | Mod: GO | Performed by: OCCUPATIONAL THERAPIST

## 2023-08-18 PROCEDURE — 97530 THERAPEUTIC ACTIVITIES: CPT | Mod: GO | Performed by: OCCUPATIONAL THERAPIST

## 2023-08-18 PROCEDURE — 99239 HOSP IP/OBS DSCHRG MGMT >30: CPT | Performed by: INTERNAL MEDICINE

## 2023-08-18 PROCEDURE — 250N000013 HC RX MED GY IP 250 OP 250 PS 637: Performed by: HOSPITALIST

## 2023-08-18 PROCEDURE — 85025 COMPLETE CBC W/AUTO DIFF WBC: CPT | Performed by: INTERNAL MEDICINE

## 2023-08-18 PROCEDURE — 82310 ASSAY OF CALCIUM: CPT | Performed by: INTERNAL MEDICINE

## 2023-08-18 PROCEDURE — 83735 ASSAY OF MAGNESIUM: CPT | Performed by: INTERNAL MEDICINE

## 2023-08-18 PROCEDURE — 36415 COLL VENOUS BLD VENIPUNCTURE: CPT | Performed by: INTERNAL MEDICINE

## 2023-08-18 RX ORDER — LORAZEPAM 0.5 MG/1
0.5 TABLET ORAL DAILY PRN
Qty: 7 TABLET | Refills: 0 | Status: SHIPPED | OUTPATIENT
Start: 2023-08-18 | End: 2023-09-01

## 2023-08-18 RX ORDER — PREGABALIN 50 MG/1
50 CAPSULE ORAL 2 TIMES DAILY
Qty: 20 CAPSULE | Refills: 0 | Status: SHIPPED | OUTPATIENT
Start: 2023-08-18 | End: 2023-08-24

## 2023-08-18 RX ORDER — POLYETHYLENE GLYCOL 3350 17 G/17G
17 POWDER, FOR SOLUTION ORAL DAILY
Qty: 510 G | DISCHARGE
Start: 2023-08-18 | End: 2023-09-06

## 2023-08-18 RX ADMIN — ENOXAPARIN SODIUM 40 MG: 100 INJECTION SUBCUTANEOUS at 04:50

## 2023-08-18 RX ADMIN — BUPROPION HYDROCHLORIDE 150 MG: 150 TABLET, EXTENDED RELEASE ORAL at 08:24

## 2023-08-18 RX ADMIN — LOSARTAN POTASSIUM 50 MG: 50 TABLET, FILM COATED ORAL at 08:24

## 2023-08-18 RX ADMIN — AMLODIPINE BESYLATE 5 MG: 5 TABLET ORAL at 08:24

## 2023-08-18 RX ADMIN — PREGABALIN 50 MG: 50 CAPSULE ORAL at 08:24

## 2023-08-18 RX ADMIN — POLYETHYLENE GLYCOL 3350 17 G: 17 POWDER, FOR SOLUTION ORAL at 08:23

## 2023-08-18 RX ADMIN — PAROXETINE HYDROCHLORIDE 20 MG: 20 TABLET, FILM COATED ORAL at 08:24

## 2023-08-18 RX ADMIN — MELOXICAM 7.5 MG: 7.5 TABLET ORAL at 08:25

## 2023-08-18 ASSESSMENT — ACTIVITIES OF DAILY LIVING (ADL)
ADLS_ACUITY_SCORE: 32
ADLS_ACUITY_SCORE: 31
ADLS_ACUITY_SCORE: 32
ADLS_ACUITY_SCORE: 31
ADLS_ACUITY_SCORE: 32
ADLS_ACUITY_SCORE: 32

## 2023-08-18 NOTE — PROGRESS NOTES
"Care Management Discharge Note    Discharge Date: 08/18/2023       Discharge Disposition: Transitional Care    Discharge Services: None    Discharge DME:      Discharge Transportation: car, drives self    Private pay costs discussed: transportation costs    Does the patient's insurance plan have a 3 day qualifying hospital stay waiver?  Yes   Will the waiver be used for post-acute placement? Yes    PAS Confirmation Code: [id number]  Patient/family educated on Medicare website which has current facility and service quality ratings: yes    Education Provided on the Discharge Plan: Yes  Persons Notified of Discharge Plans: Patient, facility admissions Mari  Patient/Family in Agreement with the Plan: yes    Handoff Referral Completed: Yes    Additional Information:  Per MD at IDT rounds pt is medically stable for discharge today. Pt will be going to [facility] Phone (Main Phone:[phone] Admissions Phone:[phone] Fax: [phone]) [facility] via w/c transport. CM met bedside with pt to discuss discharge plan, pt in agreement with plan and discussed wanting to look at additional help at home after leaving TCU, possibly pursuing [facility]. Pt had not received any items yet from family and pt asked that CM call her son Wally to see about clothes. CM called Wally to discuss personal items for pt to have while at TCU, Wally stated he could bring the items and would like to drop them off at the  because otherwise pt may accuse him of \"trying to put her in a home\". CM informed Wally he can drop them off at the desk. Wally informed pt discussed wanting additional help after leaving TCU, Wally was happy to hear this. Wally denied further questions.    CM placed call to [facility] Phone (Main Phone:[phone] Admissions Phone:[phone] Fax: [phone]) and spoke with Mari with discharge plan, Mari denied questions or concerns.    CM placed call to pts clinic care coordinator Bharti " Osvaldo at [phone] to update on discharge plan and to coordinate with pt on discharge planning from TCU, if pt is planning to seek additional placement after rehab. ZAINAB MEJIA for call back regarding this.    Plan: [facility] Phone (Main Phone:[phone] Admissions Phone:[phone] Fax: [phone])    Transport: [facility] transport      Mickie Boothe RNCM  Care Transitions Registered Nurse  Tele: [phone]

## 2023-08-18 NOTE — SIGNIFICANT EVENT
Spoke with charge nurse   Pt had unwitnessed fall  No apparent head trauma  Pt c/o left knee pain  Xray ordered-no fracture  Small skin tear on right elbow

## 2023-08-18 NOTE — PLAN OF CARE
Physical Therapy Discharge Summary    Reason for therapy discharge:    Discharged to transitional care facility.    Progress towards therapy goal(s). See goals on Care Plan in [facility] electronic health record for goal details.  Goals partially met.  Barriers to achieving goals:   discharge from facility.    Therapy recommendation(s):    Continued therapy is recommended.  Rationale/Recommendations:  Recommend continued PT at TCU to maximize safe and indep mobility prior to return home.

## 2023-08-18 NOTE — DISCHARGE SUMMARY
"[facility]  Hospitalist Discharge Summary      Date of Admission:  8/15/2023  Date of Discharge:  8/18/2023  Discharging Provider: Nighat Mascorro MD  Discharge Service: Hospitalist Service    Discharge Diagnoses   CHER, prerenal from dehydration, resolved  Rhabdomyolysis, from laying on the ground for 12 hours, resolved  UTI with metabolic encephalopathy  Generalized weakness  Recurrent Falls  Underlying dementia    Clinically Significant Risk Factors     # Overweight: Estimated body mass index is 26.6 kg/m  as calculated from the following:    Height as of this encounter: 1.626 m (5' 4\").    Weight as of this encounter: 70.3 kg (154 lb 15.7 oz).       Follow-ups Needed After Discharge   Follow-up Appointments     Follow Up and recommended labs and tests      Follow up with Nursing home physician.  No follow up labs or test are   needed.            Unresulted Labs Ordered in the Past 30 Days of this Admission       Date and Time Order Name Status Description    8/16/2023  2:26 PM Blood Culture Arm, Right Preliminary     8/16/2023  2:26 PM Blood Culture Hand, Left Preliminary         These results will be followed up by PCP.    Discharge Disposition   Discharged to short-term care facility  Condition at discharge: Stable    Hospital Course   Daniela Brown is a 89 year old female admitted on 8/15/2023 with unwitnessed fall, probable UTI and rhabdomyolysis 1400. 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 CT head and cervical spine negative for acute process. Chest xray negative for acute process. Blood cultures negative and lactate normal. Urine cultures grew E.coli > 100,000 CFU widely sensitive. She completed 3 days of IV ceftriaxone inpatient for UTI, no antibiotics on discharge. Metabolic encephalopathy resolved, however she does have underlying " age related dementia with intermittent sundowning and delirium as expected.     CHER and Rhabdomyolysis resolved with IV fluids hydration. No changes to home medications. Skilled therapy recommended acute short term rehab. She accepted at [facility] and will discharged this morning.    UTI with metabolic encephalopathy  Blood negative and urine culture: E.coli > 100,000 CFU widely sensitive  Completed empiric ceftriaxone 2 G IV daily x 3 doses  Strict I/O's.     CHER, prerenal from dehydration, resolved  Rhabdomyolysis, from laying on the ground for 12 hours, resolved  S/p aggressive IVF hydration   Avoid nephrotoxins    Hypertension  continue home medications with hold parameters  prn antihypertensive   monitor closely    Generalized weakness  Recurrent Falls  Underlying dementia  PT/OT rec STR  Fall and delirium precautions.  Negative orthostatic vitals.    Consultations This Hospital Stay   PHYSICAL THERAPY ADULT IP CONSULT  OCCUPATIONAL THERAPY ADULT IP CONSULT  CARE MANAGEMENT / SOCIAL WORK IP CONSULT  PHYSICAL THERAPY ADULT IP CONSULT  OCCUPATIONAL THERAPY ADULT IP CONSULT    Code Status   Full Code    Time Spent on this Encounter   Nighat HEARD MD, personally saw the patient today and spent greater than 30 minutes discharging this patient.       Nighat Mascorro MD  [facility] SURGICAL  [address] [address]  Phone: [phone]  Fax: [phone]  ______________________________________________________________________    Physical Exam   Vital Signs: Temp: 98.3  F (36.8  C) Temp src: Oral BP: (!) 141/47 Pulse: 70   Resp: 18 SpO2: 97 % O2 Device: None (Room air)    Weight: 154 lbs 15.73 oz  General: confused at times, lying comfortably in bed in no apparent distress  HEENT: pupils equal and reactive to light and accommodation, extraocular movements are intact; oral mucosa moist  Neck: Supple no raised JVD, no rigidity  Respiratory: lungs b/l clear to  auscultation, no wheezing or crepitations  CVS: nl s1 s2, regular rate and rhythm, no murmur  P/A: soft, nt,nd, no guarding rigidity or rebound tenderness  Ext: no edema  Neuro: no focal neurological deficits noted, cranial nerves 2-12 grossly intact  Psychiatry: normal mood and affect  Skin: No obvious skin rashes or ulcers         Primary Care Physician   Cynthia Maddox    Discharge Orders      General info for SNF    Length of Stay Estimate: Short Term Care: Estimated # of Days <30  Condition at Discharge: Improving  Level of care:skilled   Rehabilitation Potential: Excellent  Admission H&P remains valid and up-to-date: Yes  Recent Chemotherapy: N/A  Use Nursing Home Standing Orders: Yes     Mantoux instructions    Give two-step Mantoux (PPD) Per Facility Policy Yes     Follow Up and recommended labs and tests    Follow up with Nursing home physician.  No follow up labs or test are needed.     Reason for your hospital stay    CHER, prerenal from dehydration, resolved  Rhabdomyolysis, from laying on the ground for 12 hours, resolved  UTI with metabolic encephalopathy  Generalized weakness  Recurrent Falls  Underlying dementia     Activity - Up with assistive device     Activity - Up with nursing assistance     Full Code     Physical Therapy Adult Consult    Evaluate and treat as clinically indicated.    Reason:  weakness     Occupational Therapy Adult Consult    Evaluate and treat as clinically indicated.    Reason:  weakness     Fall precautions     Diet    Follow this diet upon discharge: Orders Placed This Encounter      Advance Diet as Tolerated: Regular Diet Adult       Significant Results and Procedures     Results for orders placed or performed during the hospital encounter of 08/15/23   Head CT w/o contrast    Narrative    EXAM: CT HEAD W/O CONTRAST  LOCATION: [facility]  DATE: 8/15/2023    INDICATION: Head injury.  COMPARISON: None.  TECHNIQUE: Routine CT Head without IV  contrast. Multiplanar reformats. Dose reduction techniques were used.    FINDINGS:  INTRACRANIAL CONTENTS: No intracranial hemorrhage, extraaxial collection, or mass effect.  No CT evidence of acute infarct. Mild presumed chronic small vessel ischemic changes. Mild to moderate generalized volume loss. No hydrocephalus.     VISUALIZED ORBITS/SINUSES/MASTOIDS: Prior bilateral cataract surgery. Visualized portions of the orbits are otherwise unremarkable. Mild mucosal thickening in the right sphenoid sinus locule. No middle ear or mastoid effusion.    BONES/SOFT TISSUES: No acute abnormality.      Impression    IMPRESSION:  1.  No CT evidence for acute intracranial process.  2.  Brain atrophy and presumed chronic microvascular ischemic changes as above.   Cervical spine CT w/o contrast    Narrative    EXAM: CT CERVICAL SPINE W/O CONTRAST  LOCATION: [facility]  DATE: 8/15/2023    INDICATION: Neck pain.  COMPARISON: None.  TECHNIQUE: Routine CT Cervical Spine without IV contrast. Multiplanar reformats. Dose reduction techniques were used.    FINDINGS:  VERTEBRA: Osteopenia. Cervical vertebral body heights are maintained. No acute cervical spine fracture.     CANAL/FORAMINA: No canal or neural foraminal stenosis.    PARASPINAL: No extraspinal abnormality.      Impression    IMPRESSION:  1.  No acute cervical spine fracture.   Chest XR,  PA & LAT    Narrative    EXAM: XR CHEST 2 VIEWS  LOCATION: [facility]  DATE: 8/15/2023    INDICATION: syncope  COMPARISON: None.      Impression    IMPRESSION: Heart is normal in size. Lungs are clear.   Pelvis XR w/ unilateral hip left    Narrative    EXAM: XR PELVIS AND HIP LEFT 1 VIEW  LOCATION: [facility]  DATE: 8/15/2023    INDICATION: fall, left hip pain  COMPARISON: None.      Impression    IMPRESSION: Normal joint spaces and alignment. No fracture.   XR Knee Port Left 1/2 Views    Narrative    EXAM:  XR KNEE PORT LEFT 1/2 VIEWS  LOCATION: [facility]  DATE: 8/17/2023    INDICATION: Left knee pain after a fall.  COMPARISON: None.      Impression    IMPRESSION:   1.  No fracture or joint malalignment.  2.  Advanced left knee degenerative arthrosis with medial compartment narrowing.  3.  Small joint effusion.  4.  Atherosclerotic calcification.      *Note: Due to a large number of results and/or encounters for the requested time period, some results have not been displayed. A complete set of results can be found in Results Review.       Discharge Medications   Current Discharge Medication List        START taking these medications    Details   polyethylene glycol (MIRALAX) 17 GM/Dose powder Take 17 g by mouth daily  Qty: 510 g    Associated Diagnoses: Constipation, unspecified constipation type           CONTINUE these medications which have CHANGED    Details   LORazepam (ATIVAN) 0.5 MG tablet Take 1 tablet (0.5 mg) by mouth daily as needed for anxiety (for short term only, do not take more than one per day)  Qty: 7 tablet, Refills: 0    Associated Diagnoses: ARABELLA (generalized anxiety disorder)      pregabalin (LYRICA) 50 MG capsule Take 1 capsule (50 mg) by mouth 2 times daily  Qty: 20 capsule, Refills: 0    Associated Diagnoses: Polyneuropathy associated with underlying disease (H)           CONTINUE these medications which have NOT CHANGED    Details   acetaminophen (TYLENOL) 500 MG tablet Take 500-1,000 mg by mouth every 6 hours as needed for mild pain or pain      amLODIPine (NORVASC) 5 MG tablet TAKE 1 TABLET BY MOUTH ONCE DAILY  Qty: 90 tablet, Refills: 0    Associated Diagnoses: Benign essential hypertension      blood glucose monitoring (ONE TOUCH ULTRA 2) meter device kit ONCE DAILY AS DIRECTED      buPROPion (WELLBUTRIN XL) 150 MG 24 hr tablet Take 1 tablet (150 mg) by mouth every morning  Qty: 90 tablet, Refills: 3    Associated Diagnoses: ARABELLA (generalized anxiety disorder);  Moderate major depression (H)      meloxicam (MOBIC) 7.5 MG tablet Take 1 tablet (7.5 mg) by mouth daily  Qty: 90 tablet, Refills: 3    Associated Diagnoses: Primary osteoarthritis involving multiple joints      Multiple Vitamins-Minerals (THERAPEUTIC MULTIVIT/MINERAL) TABS Take 1 tablet by mouth every evening       olmesartan (BENICAR) 20 MG tablet Take 1 tablet (20 mg) by mouth daily  Qty: 90 tablet, Refills: 3    Associated Diagnoses: Benign essential hypertension      PARoxetine (PAXIL) 20 MG tablet Take 1 tablet (20 mg) by mouth every morning  Qty: 90 tablet, Refills: 3    Associated Diagnoses: ARABELLA (generalized anxiety disorder)      blood glucose (ACCU-CHEK GUIDE) test strip Use to test blood sugar one times daily or as directed.  Qty: 100 each, Refills: 1    Associated Diagnoses: Type 2 diabetes mellitus with diabetic polyneuropathy, without long-term current use of insulin (H)      blood glucose monitoring (ACCU-CHEK FASTCLIX) lancets Use to test blood sugar one times daily or as directed.  Qty: 102 each, Refills: 3    Associated Diagnoses: Type 2 diabetes mellitus with diabetic polyneuropathy, without long-term current use of insulin (H)      STATIN NOT PRESCRIBED (INTENTIONAL) Please choose reason not prescribed, below    Associated Diagnoses: Type 2 diabetes mellitus with diabetic polyneuropathy, without long-term current use of insulin (H)           STOP taking these medications       cefdinir (OMNICEF) 300 MG capsule Comments:   Reason for Stopping:             Allergies   Allergies   Allergen Reactions    Metoprolol Itching and Rash    Cephalexin Rash    Erythromycin Rash    Actonel [Bisphosphonates] Itching    Azithromycin Hives    Celebrex [Ricci-2 Inhibitors] Rash    Cipro [Ciprofloxacin] Rash    Contrast Dye Hives    Diatrizoate Hives    Erythromycin Rash    Escitalopram Diarrhea     Gets very sick and mad feelings    Fosamax Itching and Rash    Keflex [Cephalexin Monohydrate] Rash    Lisinopril  Cough    Risedronate Itching    Seasonal Allergies     Shellfish-Derived Products Hives    Tetanus Toxoid, Adsorbed Swelling    Tetanus-Diphtheria Toxoids Td Swelling    Vicodin [Acetaminophen] Itching     Patient reported - only when used scheduled in high doses.       Vioxx Rash    Acetaminophen Itching     In high doses  Patient reported - only when used scheduled in high doses.       Alendronate Rash    Celecoxib Rash    Penicillins Rash    Rofecoxib Rash    Sulfa Antibiotics Itching and Rash    Sulfasalazine Itching and Rash

## 2023-08-18 NOTE — PROGRESS NOTES
Approx 1945, Pt sitting in chair with chair alarm in place, chair alarm went off, and this writer heard a  crash. Pt found on floor on hands and knees, 2 other staff members in the room already. Scrap/abrasion to lt elbow and pt verbalized pain in lt knee area. Pt placed in bed and and elbow dressed. Dr. Garcia notified and xrays done. Bedside sitter initiated. Pt was confused at time of the fall, orinted to self only.

## 2023-08-18 NOTE — PLAN OF CARE
[facility] DISCHARGE NOTE    Patient discharged to transitional care unit at 12:57 PM via wheel chair. Accompanied by EMS and staff. Discharge instructions reviewed with caregiver, opportunity offered to ask questions. Prescriptions sent with patient to fill . All belongings sent with patient. Patient going to [facility], report called and scripts sent.     Taryn Long RN

## 2023-08-18 NOTE — PLAN OF CARE
Occupational Therapy Discharge Summary    Reason for therapy discharge:    Discharged to transitional care facility.    Progress towards therapy goal(s). See goals on Care Plan in [facility] electronic health record for goal details.  Goals partially met.  Barriers to achieving goals:   discharge from facility.    Therapy recommendation(s):    Continued therapy is recommended.  Rationale/Recommendations:  Pt would benefit from continued skilled OT services to increase safety and independence with ADL.

## 2023-08-20 NOTE — PROGRESS NOTES
[facility] GERIATRICS  Primary Care Provider & Clinic: Cynthia Maddox, DO, [address] [facility] [address] [facility] [address]  Chief Complaint   Patient presents with    Hospital F/U     [facility] 8/15/2023 - 8/18/2023     [place] Medical Record Number: [id number]  Place of Service Where Encounter Took Place: [facility] (TCU/SNF) [4000]    Daniela Brown is a 89 year old (1/26/1933), admitted to the above facility from  [facility]. Hospital stay 8/15/23 through 8/18/23.    HPI:    Brief Summary of Hospital Course:  patient at the hospital for weakness following a fall.  Also treated with antibiotics for UTI.  Treated for rhabdomyolysis with IV fluids.  Noted to have intermittent  sundowning with baseline cognitive impairement.   MEDICATION CHANGES: Start MiraLAX  RECOMMENDED FOLLOW UP: None  Updates on Status Since Skilled nursing Admission: none    Today: Patient reports absolutely no concerns.  Denies constipation, nausea, diarrhea.  Denies dizziness.  Denies pain.  States therapy went well and she has had twice today.  Facility EHR notes blood pressures 112/53-1 70/75.  External notes reviewed: Facility EHR including progress notes, vital signs. Hospital discharge summary reviewed. Hospital discharge orders reviewed.  POLST signed for DNR DNI.   reports family is not involved in are not interested in being helped in care.    CODE STATUS/ADVANCE DIRECTIVES DISCUSSION: DNR/DNI/comfort    ALLERGIES:   Allergies   Allergen Reactions    Metoprolol Itching and Rash    Cephalexin Rash    Erythromycin Rash    Acetaminophen Itching     In high doses  Patient reported - only when used scheduled in high doses.       Actonel [Bisphosphonates] Itching    Alendronate Rash    Azithromycin Hives    Celebrex [Ricci-2 Inhibitors] Rash    Celecoxib Rash    Cipro [Ciprofloxacin] Rash    Contrast Dye Hives    Diatrizoate Hives    Erythromycin Rash    Escitalopram  "Diarrhea     Gets very sick and mad feelings    Fosamax Itching and Rash    Keflex [Cephalexin Monohydrate] Rash    Lisinopril Cough    Penicillins Rash    Risedronate Itching    Rofecoxib Rash    Seasonal Allergies Other (See Comments)     Seasonal rhinitis    Shellfish-Derived Products Hives    Sulfa Antibiotics Itching and Rash    Sulfasalazine Itching and Rash    Tetanus Toxoid, Adsorbed Swelling    Tetanus-Diphtheria Toxoids Td Swelling    Vicodin [Acetaminophen] Itching     Patient reported - only when used scheduled in high doses.       Vioxx Rash      PAST MEDICAL HISTORY:   Past Medical History:   Diagnosis Date    Abnormal cardiovascular stress test 2/3/2019    9/10/2018 Lexiscan: \"Myocardial perfusion imaging using single isotope technique demonstrated a small perfusion defect of mild severity involving the basal inferior wall which is mostly reversible and may be consistent with mild ischemia in the right coronary artery distribution. In addition, transient ischemic dilatation is noted with a TID ratio of 1.3. \"    Adhesive capsulitis of shoulder     left     Adjustment disorder with anxiety 3/18/2016    B12 deficiency anemia 6/20/2012    Chest pain 2004    Chronic right sided/axillary discomfort (musculoskeletal) Atypical substernal (possibly GERD). Negative cardiolite 2004.    Colitis, Clostridium difficile 4/18/2012    Diverticulitis 2009    Headache(784.0) 2001    Tension type. MRI 2001 normal.    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  FAMILY HISTORY: family history includes Alcohol/Drug in her father; Allergies in her son; Arthritis in her mother; C.A.D. in her mother; Cancer - colorectal in her mother; Diabetes in her mother; Eye Disorder in her son and son; Heart Disease in her mother; Hypertension in her brother; Leukemia in her son; Lipids in her brother; Obesity in her brother; Thyroid Disease in her sister.  SOCIAL HISTORY:   reports that she has never smoked. She has never used smokeless tobacco. She reports that she does not drink alcohol and does not use drugs.    Post Discharge Medication Reconciliation Status: discharge medications reconciled, continue medications without change  Current Outpatient Medications   Medication Sig    acetaminophen (TYLENOL) 500 MG tablet Take 500-1,000 mg by mouth every 6 hours as needed for mild pain or pain    amLODIPine (NORVASC) 5 MG tablet TAKE 1 TABLET BY MOUTH ONCE DAILY    blood glucose (ACCU-CHEK GUIDE) test strip Use to test blood sugar one times daily or as directed.    blood glucose monitoring (ACCU-CHEK FASTCLIX) lancets Use to test blood sugar one times daily or as directed.    blood glucose monitoring (ONE TOUCH ULTRA 2) meter device kit ONCE DAILY AS DIRECTED    buPROPion (WELLBUTRIN XL) 150 MG 24 hr tablet Take 1 tablet (150 mg) by mouth every morning    LORazepam (ATIVAN) 0.5 MG tablet Take 1 tablet (0.5 mg) by mouth daily as needed for anxiety (for short term only, do not take more than one per day)    meloxicam (MOBIC) 7.5 MG tablet Take 1 tablet (7.5 mg) by mouth daily    Multiple Vitamins-Minerals (THERAPEUTIC MULTIVIT/MINERAL) TABS Take 1 tablet by mouth every evening     olmesartan (BENICAR) 20 MG tablet Take 1 tablet (20 mg) by mouth daily    PARoxetine (PAXIL) 20 MG tablet Take 1 tablet (20 mg) by mouth every morning    polyethylene glycol (MIRALAX) 17 GM/Dose powder Take 17 g by mouth daily    pregabalin (LYRICA) 50 MG capsule Take  "1 capsule (50 mg) by mouth 2 times daily    STATIN NOT PRESCRIBED (INTENTIONAL) Please choose reason not prescribed, below     Current Facility-Administered Medications   Medication    3 mL ropivacaine (NAROPIN) injection 5 mg/mL       ROS:  4 point ROS including Respiratory, CV, GI and , other than that noted in the HPI,  is negative    Vitals:  /53   Pulse 65   Temp 97.7  F (36.5  C)   Resp 16   Ht 1.626 m (5' 4\")   Wt 73.3 kg (161 lb 9.6 oz)   LMP  (LMP Unknown)   SpO2 95%   BMI 27.74 kg/m    Exam:  GENERAL APPEARANCE:  Alert, in no distress  RESP:  respiratory effort normal  CV:  edema none  M/S:  Gait and station sitting in chair.    SKIN:  Inspection and palpation of skin and subcutaneous tissue at baseline  PSYCH:  insight and judgement, memory seems impaired, affect and mood normal    Lab/Diagnostic data: Pertinent hospital labs: no concerning labs    ASSESSMENT/PLAN:  Polyneuropathy associated with underlying disease (H)  Chronic left-sided low back pain with left-sided sciatica  reports pain is improved.  Continue PTA medications of meloxicam and pregabalin.     Benign essential hypertension  Most readings are controlled.  Continue PTA medications of amlodipine and olmesartan.  Monitor once daily and consider decreasing if needed.    Memory loss  May need increase in cares at discharge from TCU.  following.     Moderate major depression (H)  Anxiety.   PHQ-9 elevated. Continue PTA medications.   - ok for psychology    Impaired mobility and activities of daily living  Admitted to the tcu for therapy and nursign care following hospital stay after a fall.   - continue Physical Therapy / Ocupational Therapy   - nursing for monitoring  -  following for discharge planning       Orders:  Add 14 day stop date for lorazepam.     Electronically signed by: Linda Bacon NP    "

## 2023-08-21 ENCOUNTER — TRANSITIONAL CARE UNIT VISIT (OUTPATIENT)
Dept: GERIATRICS | Facility: CLINIC | Age: 88
End: 2023-08-21
Payer: MEDICARE

## 2023-08-21 ENCOUNTER — PATIENT OUTREACH (OUTPATIENT)
Dept: CARE COORDINATION | Facility: CLINIC | Age: 88
End: 2023-08-21

## 2023-08-21 VITALS
BODY MASS INDEX: 27.59 KG/M2 | OXYGEN SATURATION: 95 % | HEART RATE: 65 BPM | SYSTOLIC BLOOD PRESSURE: 112 MMHG | WEIGHT: 161.6 LBS | DIASTOLIC BLOOD PRESSURE: 53 MMHG | TEMPERATURE: 97.7 F | HEIGHT: 64 IN | RESPIRATION RATE: 16 BRPM

## 2023-08-21 DIAGNOSIS — G89.29 CHRONIC LEFT-SIDED LOW BACK PAIN WITH LEFT-SIDED SCIATICA: Chronic | ICD-10-CM

## 2023-08-21 DIAGNOSIS — M54.42 CHRONIC LEFT-SIDED LOW BACK PAIN WITH LEFT-SIDED SCIATICA: Chronic | ICD-10-CM

## 2023-08-21 DIAGNOSIS — I10 BENIGN ESSENTIAL HYPERTENSION: Chronic | ICD-10-CM

## 2023-08-21 DIAGNOSIS — F32.1 MODERATE MAJOR DEPRESSION (H): ICD-10-CM

## 2023-08-21 DIAGNOSIS — R41.3 MEMORY LOSS: ICD-10-CM

## 2023-08-21 DIAGNOSIS — Z78.9 IMPAIRED MOBILITY AND ACTIVITIES OF DAILY LIVING: ICD-10-CM

## 2023-08-21 DIAGNOSIS — Z74.09 IMPAIRED MOBILITY AND ACTIVITIES OF DAILY LIVING: ICD-10-CM

## 2023-08-21 DIAGNOSIS — G63 POLYNEUROPATHY ASSOCIATED WITH UNDERLYING DISEASE (H): Primary | ICD-10-CM

## 2023-08-21 LAB
BACTERIA BLD CULT: NO GROWTH
BACTERIA BLD CULT: NO GROWTH

## 2023-08-21 PROCEDURE — 99309 SBSQ NF CARE MODERATE MDM 30: CPT | Performed by: NURSE PRACTITIONER

## 2023-08-21 NOTE — LETTER
[facility]   To:   [facility]          Please give to facility    From:   MANJIT Arroyo Care Coordinator with [facility]     Patient Name:  Daniela Brown YOB: 1933   Admit date: 8/18/23      Pt wants to pursue detention after TCU stay per hospital notes.     *Information Needed:  Please contact me when the patient will discharge (or if they will move to long term care)- include the discharge date, disposition, and main diagnosis. We work with patients after discharge from their primary care clinic setting.   If the patient is discharged with home care services, please provide the name of the agency    Ok to Phone or Email with information       Thank You,   MANJIT Arroyo   Social Work Primary Care Clinic Care Coordinator   [facility]  [phone]  [email or web address]

## 2023-08-21 NOTE — LETTER
8/21/2023        RE: Daniela Brown  [address] [place] [address]        [facility] GERIATRICS  Primary Care Provider & Clinic: Cynthia Maddox DO, [address] / [place] [address]  Chief Complaint   Patient presents with     Hospital F/U     [facility] 8/15/2023 - 8/18/2023     [facility] [place] Medical Record Number: [id number]  Place of Service Where Encounter Took Place: [facility] (TCU/SNF) [4000]    Daniela Brown is a 89 year old (1/26/1933), admitted to the above facility from  [facility]. Hospital stay 8/15/23 through 8/18/23.    HPI:    Brief Summary of Hospital Course:  patient at the hospital for weakness following a fall.  Also treated with antibiotics for UTI.  Treated for rhabdomyolysis with IV fluids.  Noted to have intermittent  sundowning with baseline cognitive impairement.   MEDICATION CHANGES: Start MiraLAX  RECOMMENDED FOLLOW UP: None  Updates on Status Since Skilled nursing Admission: none    Today: Patient reports absolutely no concerns.  Denies constipation, nausea, diarrhea.  Denies dizziness.  Denies pain.  States therapy went well and she has had twice today.  Facility EHR notes blood pressures 112/53-1 70/75.  External notes reviewed: Facility EHR including progress notes, vital signs. Hospital discharge summary reviewed. Hospital discharge orders reviewed.  POLST signed for DNR DNI.   reports family is not involved in are not interested in being helped in care.    CODE STATUS/ADVANCE DIRECTIVES DISCUSSION: DNR/DNI/comfort    ALLERGIES:   Allergies   Allergen Reactions     Metoprolol Itching and Rash     Cephalexin Rash     Erythromycin Rash     Acetaminophen Itching     In high doses  Patient reported - only when used scheduled in high doses.        Actonel [Bisphosphonates] Itching     Alendronate Rash     Azithromycin Hives     Celebrex [Ricci-2 Inhibitors] Rash     Celecoxib Rash      "Cipro [Ciprofloxacin] Rash     Contrast Dye Hives     Diatrizoate Hives     Erythromycin Rash     Escitalopram Diarrhea     Gets very sick and mad feelings     Fosamax Itching and Rash     Keflex [Cephalexin Monohydrate] Rash     Lisinopril Cough     Penicillins Rash     Risedronate Itching     Rofecoxib Rash     Seasonal Allergies Other (See Comments)     Seasonal rhinitis     Shellfish-Derived Products Hives     Sulfa Antibiotics Itching and Rash     Sulfasalazine Itching and Rash     Tetanus Toxoid, Adsorbed Swelling     Tetanus-Diphtheria Toxoids Td Swelling     Vicodin [Acetaminophen] Itching     Patient reported - only when used scheduled in high doses.        Vioxx Rash      PAST MEDICAL HISTORY:   Past Medical History:   Diagnosis Date     Abnormal cardiovascular stress test 2/3/2019    9/10/2018 Lexiscan: \"Myocardial perfusion imaging using single isotope technique demonstrated a small perfusion defect of mild severity involving the basal inferior wall which is mostly reversible and may be consistent with mild ischemia in the right coronary artery distribution. In addition, transient ischemic dilatation is noted with a TID ratio of 1.3. \"     Adhesive capsulitis of shoulder     left      Adjustment disorder with anxiety 3/18/2016     B12 deficiency anemia 6/20/2012     Chest pain 2004    Chronic right sided/axillary discomfort (musculoskeletal) Atypical substernal (possibly GERD). Negative cardiolite 2004.     Colitis, Clostridium difficile 4/18/2012     Diverticulitis 2009     Headache(784.0) 2001    Tension type. MRI 2001 normal.     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  FAMILY HISTORY: family history includes Alcohol/Drug in her father; Allergies in her son; Arthritis in her mother; C.A.D. in her mother; Cancer - colorectal in her mother; Diabetes in her mother; Eye Disorder in her son and son; Heart Disease in her mother; Hypertension in her brother; Leukemia in her son; Lipids in her brother; Obesity in her brother; Thyroid Disease in her sister.  SOCIAL HISTORY:   reports that she has never smoked. She has never used smokeless tobacco. She reports that she does not drink alcohol and does not use drugs.    Post Discharge Medication Reconciliation Status: discharge medications reconciled, continue medications without change  Current Outpatient Medications   Medication Sig     acetaminophen (TYLENOL) 500 MG tablet Take 500-1,000 mg by mouth every 6 hours as needed for mild pain or pain     amLODIPine (NORVASC) 5 MG tablet TAKE 1 TABLET BY MOUTH ONCE DAILY     blood glucose (ACCU-CHEK GUIDE) test strip Use to test blood sugar one times daily or as directed.     blood glucose monitoring (ACCU-CHEK FASTCLIX) lancets Use to test blood sugar one times daily or as directed.     blood glucose monitoring (ONE TOUCH ULTRA 2) meter device kit ONCE DAILY AS DIRECTED     buPROPion (WELLBUTRIN XL) 150 MG 24 hr tablet Take 1 tablet (150 mg) by mouth every morning     LORazepam (ATIVAN) 0.5 MG tablet Take 1 tablet (0.5 mg) by mouth daily as needed for anxiety (for short term only, do not take more than one per day)     meloxicam (MOBIC) 7.5 MG tablet Take 1 tablet (7.5 mg) by mouth daily     Multiple Vitamins-Minerals (THERAPEUTIC MULTIVIT/MINERAL) TABS Take 1 tablet by mouth every evening      olmesartan (BENICAR) 20 MG tablet Take 1 tablet (20 mg) by mouth daily     PARoxetine (PAXIL) 20 MG tablet Take 1  "tablet (20 mg) by mouth every morning     polyethylene glycol (MIRALAX) 17 GM/Dose powder Take 17 g by mouth daily     pregabalin (LYRICA) 50 MG capsule Take 1 capsule (50 mg) by mouth 2 times daily     STATIN NOT PRESCRIBED (INTENTIONAL) Please choose reason not prescribed, below     Current Facility-Administered Medications   Medication     3 mL ropivacaine (NAROPIN) injection 5 mg/mL       ROS:  4 point ROS including Respiratory, CV, GI and , other than that noted in the HPI,  is negative    Vitals:  /53   Pulse 65   Temp 97.7  F (36.5  C)   Resp 16   Ht 1.626 m (5' 4\")   Wt 73.3 kg (161 lb 9.6 oz)   LMP  (LMP Unknown)   SpO2 95%   BMI 27.74 kg/m    Exam:  GENERAL APPEARANCE:  Alert, in no distress  RESP:  respiratory effort normal  CV:  edema none  M/S:  Gait and station sitting in chair.    SKIN:  Inspection and palpation of skin and subcutaneous tissue at baseline  PSYCH:  insight and judgement, memory seems impaired, affect and mood normal    Lab/Diagnostic data: Pertinent hospital labs: no concerning labs    ASSESSMENT/PLAN:  Polyneuropathy associated with underlying disease (H)  Chronic left-sided low back pain with left-sided sciatica  reports pain is improved.  Continue PTA medications of meloxicam and pregabalin.     Benign essential hypertension  Most readings are controlled.  Continue PTA medications of amlodipine and olmesartan.  Monitor once daily and consider decreasing if needed.    Memory loss  May need increase in cares at discharge from TCU.  following.     Moderate major depression (H)  Anxiety.   PHQ-9 elevated. Continue PTA medications.   - ok for psychology    Impaired mobility and activities of daily living  Admitted to the tcu for therapy and nursign care following hospital stay after a fall.   - continue Physical Therapy / Ocupational Therapy   - nursing for monitoring  -  following for discharge planning       Orders:  Add 14 day stop date for " lorazepam.     Electronically signed by: Linda Bacon NP      Sincerely,        Linda Bacon NP

## 2023-08-21 NOTE — PROGRESS NOTES
Clinic Care Coordination Contact  Care Coordination Transition Communication    Referral Source: PCP    Clinical Data: Patient was hospitalized at [facility] from 8/15/23 to 8/18/23 with diagnosis of CHER, prerenal from dehydration, resolved; Rhabdomyolysis, from laying on the ground for 12 hours, resolved; UTI with metabolic encephalopathy; Generalized weakness; Recurrent Falls; Underlying dementia.     Transition to Facility:              Facility Name: [facility]              Contact name and phone number/fax: Fax [phone]    BRUCE  sent fax to facility for discharge planning.     Plan:  Care Coordinator will await notification from facility staff informing  Care Coordinator of patient's discharge plans/needs.  Care Coordinator will review chart and outreach to facility staff every 2-4 weeks and as needed.     MANJIT Arroyo   Social Work Primary Care Clinic Care Coordinator   [facility]  [phone]  [email or web address]

## 2023-08-24 ENCOUNTER — TRANSITIONAL CARE UNIT VISIT (OUTPATIENT)
Dept: GERIATRICS | Facility: CLINIC | Age: 88
End: 2023-08-24
Payer: MEDICARE

## 2023-08-24 VITALS
OXYGEN SATURATION: 96 % | WEIGHT: 161.6 LBS | SYSTOLIC BLOOD PRESSURE: 132 MMHG | TEMPERATURE: 98.1 F | BODY MASS INDEX: 27.59 KG/M2 | HEART RATE: 69 BPM | DIASTOLIC BLOOD PRESSURE: 67 MMHG | HEIGHT: 64 IN | RESPIRATION RATE: 16 BRPM

## 2023-08-24 DIAGNOSIS — I10 BENIGN ESSENTIAL HYPERTENSION: ICD-10-CM

## 2023-08-24 DIAGNOSIS — F32.1 MODERATE MAJOR DEPRESSION (H): ICD-10-CM

## 2023-08-24 DIAGNOSIS — Z74.09 IMPAIRED MOBILITY AND ACTIVITIES OF DAILY LIVING: ICD-10-CM

## 2023-08-24 DIAGNOSIS — Z78.9 IMPAIRED MOBILITY AND ACTIVITIES OF DAILY LIVING: ICD-10-CM

## 2023-08-24 DIAGNOSIS — G63 POLYNEUROPATHY ASSOCIATED WITH UNDERLYING DISEASE (H): Primary | ICD-10-CM

## 2023-08-24 DIAGNOSIS — G89.29 CHRONIC LEFT-SIDED LOW BACK PAIN WITH LEFT-SIDED SCIATICA: ICD-10-CM

## 2023-08-24 DIAGNOSIS — R41.3 MEMORY LOSS: ICD-10-CM

## 2023-08-24 DIAGNOSIS — M54.42 CHRONIC LEFT-SIDED LOW BACK PAIN WITH LEFT-SIDED SCIATICA: ICD-10-CM

## 2023-08-24 PROCEDURE — 99309 SBSQ NF CARE MODERATE MDM 30: CPT | Performed by: NURSE PRACTITIONER

## 2023-08-24 RX ORDER — PREGABALIN 25 MG/1
25 CAPSULE ORAL 2 TIMES DAILY
Qty: 30 CAPSULE | Refills: 5 | Status: SHIPPED | OUTPATIENT
Start: 2023-08-24 | End: 2023-09-06

## 2023-08-24 NOTE — PROGRESS NOTES
[facility] GERIATRICS  TCU FOLLOW UP VISIT    Chief Complaint   Patient presents with    RECHECK      Place of Service where encounter took place:  [facility] (TCU/SNF) [4000]    Daniela Brown  is a 89 year old  (1/26/1933), who is being seen today at the above facility to discuss progress in therapy, review nursing home EHR, recheck chronic medical problems as well as address any new concerns.       HPI:    Copied forward from previous note: admitted to the above facility from  [facility]. Hospital stay 8/15/23 through 8/18/23.     HPI:    Brief Summary of Hospital Course:  patient at the hospital for weakness following a fall.  Also treated with antibiotics for UTI.  Treated for rhabdomyolysis with IV fluids.  Noted to have intermittent  sundowning with baseline cognitive impairement.   MEDICATION CHANGES: Start MiraLAX  RECOMMENDED FOLLOW UP: None  Updates on Status Since Skilled nursing Admission: 8/21 Add 14 day stop date for lorazepam.      Today: Patient denies pain.  Reports therapy is going well.  Denies problems with constipation and diarrhea problems.  Denies problems sleeping at night.  Reports that she is often fatigued during the day.  We discussed decreasing her pregabalin and she is okay with this.    Facility EHR reviewed including vital signs, medications, progress notes and use of as needed medications.  No behavior concerns noted.  No use of lorazepam    ALLERGIES: Metoprolol; Cephalexin; Erythromycin; Acetaminophen; Actonel [bisphosphonates]; Alendronate; Azithromycin; Celebrex [qureshi-2 inhibitors]; Celecoxib; Cipro [ciprofloxacin]; Contrast dye; Diatrizoate; Erythromycin; Escitalopram; Fosamax; Keflex [cephalexin monohydrate]; Lisinopril; Penicillins; Risedronate; Rofecoxib; Seasonal allergies; Shellfish-derived products; Sulfa antibiotics; Sulfasalazine; Tetanus toxoid, adsorbed; Tetanus-diphtheria toxoids td; Vicodin  "[acetaminophen]; and Vioxx    ROS:  4 point ROS including Respiratory, CV, GI and , other than that noted in the HPI,  is negative    Vitals:  /67   Pulse 69   Temp 98.1  F (36.7  C)   Resp 16   Ht 1.626 m (5' 4\")   Wt 73.3 kg (161 lb 9.6 oz)   LMP  (LMP Unknown)   SpO2 96%   BMI 27.74 kg/m    Exam:  GENERAL APPEARANCE:  Alert, in no distress  RESP:  respiratory effort normal   CV: edema none  M/S:  Gait and station sitting in recliner.  No tenderness or swelling of the joints   SKIN:  Inspection and palpation of skin and subcutaneous tissue at baseline  PSYCH:  insight and judgement, memory seems impaired, affect and mood normal    ASSESSMENT/PLAN:  Polyneuropathy associated with underlying disease (H)  Chronic left-sided low back pain with left-sided sciatica  Denies pain.  Currently on meloxicam and pregabalin.  Agrees to decrease pregabalin to see if that helps with her daytime sleepiness.       Benign essential hypertension  Most readings are controlled.  Continue PTA medications of amlodipine and olmesartan.     Memory loss  May need increase in cares at discharge from TCU.  following.      Moderate major depression (H)  Anxiety.   Denies mood concerns.  Continue supportive care  - ok for psychology     Impaired mobility and activities of daily living  Admitted to the tcu for therapy and nursign care following hospital stay after a fall.   - continue Physical Therapy / Ocupational Therapy   - nursing for monitoring  -  following for discharge planning        Orders:  Decrease pregabalin to 25 mg p.o. twice daily.    Electronically signed by: Linda Bacon NP    "

## 2023-08-24 NOTE — LETTER
8/24/2023        RE: Daniela Brown  [address]  [place] [facility] [place] [address]        [facility] GERIATRICS  TCU FOLLOW UP VISIT    Chief Complaint   Patient presents with     RECHECK      Place of Service where encounter took place:  [facility] (TCU/SNF) [4000]    Daniela Brown  is a 89 year old  (1/26/1933), who is being seen today at the above facility to discuss progress in therapy, review nursing home EHR, recheck chronic medical problems as well as address any new concerns.       HPI:    Copied forward from previous note: admitted to the above facility from  [facility]. Hospital stay 8/15/23 through 8/18/23.     HPI:    Brief Summary of Hospital Course:  patient at the hospital for weakness following a fall.  Also treated with antibiotics for UTI.  Treated for rhabdomyolysis with IV fluids.  Noted to have intermittent  sundowning with baseline cognitive impairement.   MEDICATION CHANGES: Start MiraLAX  RECOMMENDED FOLLOW UP: None  Updates on Status Since Skilled nursing Admission: 8/21 Add 14 day stop date for lorazepam.      Today: Patient denies pain.  Reports therapy is going well.  Denies problems with constipation and diarrhea problems.  Denies problems sleeping at night.  Reports that she is often fatigued during the day.  We discussed decreasing her pregabalin and she is okay with this.    Facility EHR reviewed including vital signs, medications, progress notes and use of as needed medications.  No behavior concerns noted.  No use of lorazepam    ALLERGIES: Metoprolol; Cephalexin; Erythromycin; Acetaminophen; Actonel [bisphosphonates]; Alendronate; Azithromycin; Celebrex [qureshi-2 inhibitors]; Celecoxib; Cipro [ciprofloxacin]; Contrast dye; Diatrizoate; Erythromycin; Escitalopram; Fosamax; Keflex [cephalexin monohydrate]; Lisinopril; Penicillins; Risedronate; Rofecoxib; Seasonal allergies; Shellfish-derived products; Sulfa  "antibiotics; Sulfasalazine; Tetanus toxoid, adsorbed; Tetanus-diphtheria toxoids td; Vicodin [acetaminophen]; and Vioxx    ROS:  4 point ROS including Respiratory, CV, GI and , other than that noted in the HPI,  is negative    Vitals:  /67   Pulse 69   Temp 98.1  F (36.7  C)   Resp 16   Ht 1.626 m (5' 4\")   Wt 73.3 kg (161 lb 9.6 oz)   LMP  (LMP Unknown)   SpO2 96%   BMI 27.74 kg/m    Exam:  GENERAL APPEARANCE:  Alert, in no distress  RESP:  respiratory effort normal   CV: edema none  M/S:  Gait and station sitting in recliner.  No tenderness or swelling of the joints   SKIN:  Inspection and palpation of skin and subcutaneous tissue at baseline  PSYCH:  insight and judgement, memory seems impaired, affect and mood normal    ASSESSMENT/PLAN:  Polyneuropathy associated with underlying disease (H)  Chronic left-sided low back pain with left-sided sciatica  Denies pain.  Currently on meloxicam and pregabalin.  Agrees to decrease pregabalin to see if that helps with her daytime sleepiness.       Benign essential hypertension  Most readings are controlled.  Continue PTA medications of amlodipine and olmesartan.     Memory loss  May need increase in cares at discharge from TCU.  following.      Moderate major depression (H)  Anxiety.   Denies mood concerns.  Continue supportive care  - ok for psychology     Impaired mobility and activities of daily living  Admitted to the tcu for therapy and nursign care following hospital stay after a fall.   - continue Physical Therapy / Ocupational Therapy   - nursing for monitoring  -  following for discharge planning        Orders:  Decrease pregabalin to 25 mg p.o. twice daily.    Electronically signed by: Linda Bacon NP      Sincerely,        Linda Bacon NP      "

## 2023-08-28 ENCOUNTER — TRANSITIONAL CARE UNIT VISIT (OUTPATIENT)
Dept: GERIATRICS | Facility: CLINIC | Age: 88
End: 2023-08-28
Payer: MEDICARE

## 2023-08-28 VITALS
RESPIRATION RATE: 16 BRPM | WEIGHT: 161.6 LBS | BODY MASS INDEX: 27.59 KG/M2 | OXYGEN SATURATION: 97 % | DIASTOLIC BLOOD PRESSURE: 62 MMHG | SYSTOLIC BLOOD PRESSURE: 163 MMHG | TEMPERATURE: 99 F | HEIGHT: 64 IN | HEART RATE: 65 BPM

## 2023-08-28 DIAGNOSIS — F32.1 MODERATE MAJOR DEPRESSION (H): ICD-10-CM

## 2023-08-28 DIAGNOSIS — R41.3 MEMORY LOSS: ICD-10-CM

## 2023-08-28 DIAGNOSIS — Z74.09 IMPAIRED MOBILITY AND ACTIVITIES OF DAILY LIVING: ICD-10-CM

## 2023-08-28 DIAGNOSIS — G63 POLYNEUROPATHY ASSOCIATED WITH UNDERLYING DISEASE (H): Primary | ICD-10-CM

## 2023-08-28 DIAGNOSIS — M54.42 CHRONIC LEFT-SIDED LOW BACK PAIN WITH LEFT-SIDED SCIATICA: ICD-10-CM

## 2023-08-28 DIAGNOSIS — I10 BENIGN ESSENTIAL HYPERTENSION: ICD-10-CM

## 2023-08-28 DIAGNOSIS — Z78.9 IMPAIRED MOBILITY AND ACTIVITIES OF DAILY LIVING: ICD-10-CM

## 2023-08-28 DIAGNOSIS — G89.29 CHRONIC LEFT-SIDED LOW BACK PAIN WITH LEFT-SIDED SCIATICA: ICD-10-CM

## 2023-08-28 PROCEDURE — 99309 SBSQ NF CARE MODERATE MDM 30: CPT | Performed by: NURSE PRACTITIONER

## 2023-08-28 NOTE — LETTER
8/28/2023        RE: Daniela Brown  [address]  [place] [address]        [facility] GERIATRICS  TCU FOLLOW UP VISIT    Chief Complaint   Patient presents with     RECHECK      Place of Service where encounter took place:  [facility] (TCU/SNF) [4000]    Daniela Brown  is a 89 year old  (1/26/1933), who is being seen today at the above facility to discuss progress in therapy, review nursing home EHR, recheck chronic medical problems as well as address any new concerns.       HPI:    Copied forward from previous note: admitted to the above facility from  [facility]. Hospital stay 8/15/23 through 8/18/23.     HPI:    Brief Summary of Hospital Course:  patient at the hospital for weakness following a fall.  Also treated with antibiotics for UTI.  Treated for rhabdomyolysis with IV fluids.  Noted to have intermittent  sundowning with baseline cognitive impairement.   MEDICATION CHANGES: Start MiraLAX  RECOMMENDED FOLLOW UP: None  Updates on Status Since Skilled nursing Admission: 8/21 Add 14 day stop date for lorazepam.      Today: Patient denies pain.  During last visit on 8/24 we decreased her Lyrica.  We discussed this decreased today and she reports that she has no increase in neuropathic pain in her feet.  She reports therapy is going well.  Denies problems with constipation and diarrhea problems.  Denies problems sleeping at night.  Therapy reports she is dressing and toileting on her own slums 13/30.  She is ambulating 60 feet with a wheeled walker; blood pressure 132/67-1 63/62.  Heart rate 65-68.  Blood sugar 101-117.  She wonders when she can go home.    Facility EHR reviewed including vital signs, medications, progress notes and use of as needed medications.  No behavior concerns noted.     ALLERGIES: Metoprolol; Cephalexin; Erythromycin; Acetaminophen; Actonel [bisphosphonates]; Alendronate; Azithromycin; Celebrex [qureshi-2  "inhibitors]; Celecoxib; Cipro [ciprofloxacin]; Contrast dye; Diatrizoate; Erythromycin; Escitalopram; Fosamax; Keflex [cephalexin monohydrate]; Lisinopril; Penicillins; Risedronate; Rofecoxib; Seasonal allergies; Shellfish-derived products; Sulfa antibiotics; Sulfasalazine; Tetanus toxoid, adsorbed; Tetanus-diphtheria toxoids td; Vicodin [acetaminophen]; and Vioxx    ROS:  4 point ROS including Respiratory, CV, GI and , other than that noted in the HPI,  is negative    Vitals:  BP (!) 163/62   Pulse 65   Temp 99  F (37.2  C)   Resp 16   Ht 1.626 m (5' 4\")   Wt 73.3 kg (161 lb 9.6 oz)   LMP  (LMP Unknown)   SpO2 97%   BMI 27.74 kg/m    Exam:  GENERAL APPEARANCE:  Alert, in no distress  RESP:  respiratory effort normal   CV: edema none  M/S:  Gait and station sitting in recliner.  No tenderness or swelling of the joints   SKIN:  Inspection and palpation of skin and subcutaneous tissue at baseline  PSYCH:  insight and judgement, memory seems impaired, affect and mood normal    ASSESSMENT/PLAN:  Polyneuropathy associated with underlying disease (H)  Chronic left-sided low back pain with left-sided sciatica  Denies pain.  Currently on meloxicam and pregabalin.  Controlled with decreased dose of pregabalin.  Continue to monitor.      Benign essential hypertension  Most readings are controlled.  Continue PTA medications of amlodipine and olmesartan.     Memory loss  May need increase in cares at discharge from TCU.  following.      Moderate major depression (H)  Anxiety.   Denies mood concerns.  Continue supportive care  - ok for psychology     Impaired mobility and activities of daily living  Admitted to the tcu for therapy and nursign care following hospital stay after a fall.   - continue Physical Therapy / Ocupational Therapy   - nursing for monitoring  -  following for discharge planning        Orders:  No changes    Electronically signed by: Linda Bacon, " NP      Sincerely,        Linda Bacon NP

## 2023-08-30 NOTE — PROGRESS NOTES
[facility] GERIATRICS  TCU FOLLOW UP VISIT    Chief Complaint   Patient presents with    RECHECK      Place of Service where encounter took place:  [facility] (TCU/SNF) [4000]    Daniela Brown  is a 89 year old  (1/26/1933), who is being seen today at the above facility to discuss progress in therapy, review nursing home EHR, recheck chronic medical problems as well as address any new concerns.       HPI:    Copied forward from previous note: admitted to the above facility from  [facility]. Hospital stay 8/15/23 through 8/18/23.     HPI:    Brief Summary of Hospital Course:  patient at the hospital for weakness following a fall.  Also treated with antibiotics for UTI.  Treated for rhabdomyolysis with IV fluids.  Noted to have intermittent  sundowning with baseline cognitive impairement.   MEDICATION CHANGES: Start MiraLAX  RECOMMENDED FOLLOW UP: None  Updates on Status Since Skilled nursing Admission: 8/21 Add 14 day stop date for lorazepam.      Today: Patient denies pain.  During last visit on 8/24 we decreased her Lyrica.  We discussed this decreased today and she reports that she has no increase in neuropathic pain in her feet.  She reports therapy is going well.  Denies problems with constipation and diarrhea problems.  Denies problems sleeping at night.  Therapy reports she is dressing and toileting on her own slums 13/30.  She is ambulating 60 feet with a wheeled walker; blood pressure 132/67-1 63/62.  Heart rate 65-68.  Blood sugar 101-117.  She wonders when she can go home.    Facility EHR reviewed including vital signs, medications, progress notes and use of as needed medications.  No behavior concerns noted.     ALLERGIES: Metoprolol; Cephalexin; Erythromycin; Acetaminophen; Actonel [bisphosphonates]; Alendronate; Azithromycin; Celebrex [qureshi-2 inhibitors]; Celecoxib; Cipro [ciprofloxacin]; Contrast dye; Diatrizoate; Erythromycin; Escitalopram;  "Fosamax; Keflex [cephalexin monohydrate]; Lisinopril; Penicillins; Risedronate; Rofecoxib; Seasonal allergies; Shellfish-derived products; Sulfa antibiotics; Sulfasalazine; Tetanus toxoid, adsorbed; Tetanus-diphtheria toxoids td; Vicodin [acetaminophen]; and Vioxx    ROS:  4 point ROS including Respiratory, CV, GI and , other than that noted in the HPI,  is negative    Vitals:  BP (!) 163/62   Pulse 65   Temp 99  F (37.2  C)   Resp 16   Ht 1.626 m (5' 4\")   Wt 73.3 kg (161 lb 9.6 oz)   LMP  (LMP Unknown)   SpO2 97%   BMI 27.74 kg/m    Exam:  GENERAL APPEARANCE:  Alert, in no distress  RESP:  respiratory effort normal   CV: edema none  M/S:  Gait and station sitting in recliner.  No tenderness or swelling of the joints   SKIN:  Inspection and palpation of skin and subcutaneous tissue at baseline  PSYCH:  insight and judgement, memory seems impaired, affect and mood normal    ASSESSMENT/PLAN:  Polyneuropathy associated with underlying disease (H)  Chronic left-sided low back pain with left-sided sciatica  Denies pain.  Currently on meloxicam and pregabalin.  Controlled with decreased dose of pregabalin.  Continue to monitor.      Benign essential hypertension  Most readings are controlled.  Continue PTA medications of amlodipine and olmesartan.     Memory loss  May need increase in cares at discharge from TCU.  following.      Moderate major depression (H)  Anxiety.   Denies mood concerns.  Continue supportive care  - ok for psychology     Impaired mobility and activities of daily living  Admitted to the tcu for therapy and nursign care following hospital stay after a fall.   - continue Physical Therapy / Ocupational Therapy   - nursing for monitoring  -  following for discharge planning        Orders:  No changes    Electronically signed by: Linda Bacon NP  "

## 2023-08-31 ENCOUNTER — TRANSITIONAL CARE UNIT VISIT (OUTPATIENT)
Dept: GERIATRICS | Facility: CLINIC | Age: 88
End: 2023-08-31
Payer: MEDICARE

## 2023-08-31 VITALS
OXYGEN SATURATION: 92 % | WEIGHT: 161.6 LBS | RESPIRATION RATE: 16 BRPM | SYSTOLIC BLOOD PRESSURE: 112 MMHG | TEMPERATURE: 97.3 F | HEIGHT: 64 IN | DIASTOLIC BLOOD PRESSURE: 45 MMHG | BODY MASS INDEX: 27.59 KG/M2 | HEART RATE: 61 BPM

## 2023-08-31 DIAGNOSIS — R41.3 MEMORY LOSS: ICD-10-CM

## 2023-08-31 DIAGNOSIS — Z74.09 IMPAIRED MOBILITY AND ACTIVITIES OF DAILY LIVING: ICD-10-CM

## 2023-08-31 DIAGNOSIS — Z78.9 IMPAIRED MOBILITY AND ACTIVITIES OF DAILY LIVING: ICD-10-CM

## 2023-08-31 DIAGNOSIS — M54.42 CHRONIC LEFT-SIDED LOW BACK PAIN WITH LEFT-SIDED SCIATICA: ICD-10-CM

## 2023-08-31 DIAGNOSIS — G63 POLYNEUROPATHY ASSOCIATED WITH UNDERLYING DISEASE (H): Primary | ICD-10-CM

## 2023-08-31 DIAGNOSIS — K59.00 CONSTIPATION, UNSPECIFIED CONSTIPATION TYPE: ICD-10-CM

## 2023-08-31 DIAGNOSIS — F32.1 MODERATE MAJOR DEPRESSION (H): ICD-10-CM

## 2023-08-31 DIAGNOSIS — G89.29 CHRONIC LEFT-SIDED LOW BACK PAIN WITH LEFT-SIDED SCIATICA: ICD-10-CM

## 2023-08-31 DIAGNOSIS — I10 BENIGN ESSENTIAL HYPERTENSION: ICD-10-CM

## 2023-08-31 PROCEDURE — 99308 SBSQ NF CARE LOW MDM 20: CPT | Performed by: NURSE PRACTITIONER

## 2023-08-31 NOTE — LETTER
8/31/2023        RE: Daniela Brown  [address]  [facility] [address]        [facility] GERIATRICS  TCU FOLLOW UP VISIT    Chief Complaint   Patient presents with     RECHECK      Place of Service where encounter took place:  [facility] (TCU/SNF) [4000]    Daniela Brown  is a 89 year old  (1/26/1933), who is being seen today at the above facility to discuss progress in therapy, review nursing home EHR, recheck chronic medical problems as well as address any new concerns.       HPI:    Copied forward from previous note: admitted to the above facility from  [facility]. Hospital stay 8/15/23 through 8/18/23.     HPI:    Brief Summary of Hospital Course:  patient at the hospital for weakness following a fall.  Also treated with antibiotics for UTI.  Treated for rhabdomyolysis with IV fluids.  Noted to have intermittent  sundowning with baseline cognitive impairement.   MEDICATION CHANGES: Start MiraLAX  RECOMMENDED FOLLOW UP: None  Updates on Status Since Skilled nursing Admission: 8/21 Add 14 day stop date for lorazepam.    8/24 decrease lyrica to 25 mg po BID  8/28 Therapy reports she is dressing and toileting on her own SLUMS 13/30.  She is ambulating 60 feet with a wheeled walker    Today: patient denies problems with pain. She does endorse 2 loose stools this morning and nurse is requesting something for that. She denies stomach pains or nausea. She is currently on miralax so will stop that too.     Facility EHR reviewed including vital signs, medications, progress notes and use of as needed medications.  No behavior concerns noted.     ALLERGIES: Metoprolol; Cephalexin; Erythromycin; Acetaminophen; Actonel [bisphosphonates]; Alendronate; Azithromycin; Celebrex [qureshi-2 inhibitors]; Celecoxib; Cipro [ciprofloxacin]; Contrast dye; Diatrizoate; Erythromycin; Escitalopram; Fosamax; Keflex [cephalexin monohydrate]; Lisinopril;  "Penicillins; Risedronate; Rofecoxib; Seasonal allergies; Shellfish-derived products; Sulfa antibiotics; Sulfasalazine; Tetanus toxoid, adsorbed; Tetanus-diphtheria toxoids td; Vicodin [acetaminophen]; and Vioxx    ROS:  4 point ROS including Respiratory, CV, GI and , other than that noted in the HPI,  is negative    Vitals:  /45   Pulse 61   Temp 97.3  F (36.3  C)   Resp 16   Ht 1.626 m (5' 4\")   Wt 73.3 kg (161 lb 9.6 oz)   LMP  (LMP Unknown)   SpO2 92%   BMI 27.74 kg/m    Exam:  GENERAL APPEARANCE:  Alert, in no distress  RESP:  respiratory effort normal   CV: edema none  M/S:  Gait and station sitting in recliner.  No tenderness or swelling of the joints   SKIN:  Inspection and palpation of skin and subcutaneous tissue at baseline  PSYCH:  insight and judgement, memory seems impaired, affect and mood normal    ASSESSMENT/PLAN:  Polyneuropathy associated with underlying disease (H)  Chronic left-sided low back pain with left-sided sciatica  Denies pain.  Currently on meloxicam and pregabalin.  Controlled with decreased dose of pregabalin.  Continue to monitor.      Benign essential hypertension  Most readings are controlled.  Continue PTA medications of amlodipine and olmesartan.     Memory loss  May need increase in cares at discharge from TCU.  following.      Moderate major depression (H)  Anxiety.   Denies mood concerns.  Continue supportive care  - ok for psychology     Impaired mobility and activities of daily living  Admitted to the tcu for therapy and nursign care following hospital stay after a fall.   - continue Physical Therapy / Ocupational Therapy   - nursing for monitoring  -  following for discharge planning      Constipation.   Now having loose stools  - discontinue miralax and start prn imodium.     Orders:  - discontinue miralax   - imodium 2 mg po qid prn      Electronically signed by: Linda Bacon NP      Sincerely,        Linda Bacon NP      "

## 2023-09-01 NOTE — PROGRESS NOTES
[facility] GERIATRICS  TCU FOLLOW UP VISIT    Chief Complaint   Patient presents with    RECHECK      Place of Service where encounter took place:  [facility] (TCU/SNF) [4000]    Daniela Brown  is a 89 year old  (1/26/1933), who is being seen today at the above facility to discuss progress in therapy, review nursing home EHR, recheck chronic medical problems as well as address any new concerns.       HPI:    Copied forward from previous note: admitted to the above facility from  [facility]. Hospital stay 8/15/23 through 8/18/23.     HPI:    Brief Summary of Hospital Course:  patient at the hospital for weakness following a fall.  Also treated with antibiotics for UTI.  Treated for rhabdomyolysis with IV fluids.  Noted to have intermittent  sundowning with baseline cognitive impairement.   MEDICATION CHANGES: Start MiraLAX  RECOMMENDED FOLLOW UP: None  Updates on Status Since Skilled nursing Admission: 8/21 Add 14 day stop date for lorazepam.    8/24 decrease lyrica to 25 mg po BID  8/28 Therapy reports she is dressing and toileting on her own SLUMS 13/30.  She is ambulating 60 feet with a wheeled walker    Today: patient denies problems with pain. She does endorse 2 loose stools this morning and nurse is requesting something for that. She denies stomach pains or nausea. She is currently on miralax so will stop that too.     Facility EHR reviewed including vital signs, medications, progress notes and use of as needed medications.  No behavior concerns noted.     ALLERGIES: Metoprolol; Cephalexin; Erythromycin; Acetaminophen; Actonel [bisphosphonates]; Alendronate; Azithromycin; Celebrex [qureshi-2 inhibitors]; Celecoxib; Cipro [ciprofloxacin]; Contrast dye; Diatrizoate; Erythromycin; Escitalopram; Fosamax; Keflex [cephalexin monohydrate]; Lisinopril; Penicillins; Risedronate; Rofecoxib; Seasonal allergies; Shellfish-derived products; Sulfa antibiotics;  "Sulfasalazine; Tetanus toxoid, adsorbed; Tetanus-diphtheria toxoids td; Vicodin [acetaminophen]; and Vioxx    ROS:  4 point ROS including Respiratory, CV, GI and , other than that noted in the HPI,  is negative    Vitals:  /45   Pulse 61   Temp 97.3  F (36.3  C)   Resp 16   Ht 1.626 m (5' 4\")   Wt 73.3 kg (161 lb 9.6 oz)   LMP  (LMP Unknown)   SpO2 92%   BMI 27.74 kg/m    Exam:  GENERAL APPEARANCE:  Alert, in no distress  RESP:  respiratory effort normal   CV: edema none  M/S:  Gait and station sitting in recliner.  No tenderness or swelling of the joints   SKIN:  Inspection and palpation of skin and subcutaneous tissue at baseline  PSYCH:  insight and judgement, memory seems impaired, affect and mood normal    ASSESSMENT/PLAN:  Polyneuropathy associated with underlying disease (H)  Chronic left-sided low back pain with left-sided sciatica  Denies pain.  Currently on meloxicam and pregabalin.  Controlled with decreased dose of pregabalin.  Continue to monitor.      Benign essential hypertension  Most readings are controlled.  Continue PTA medications of amlodipine and olmesartan.     Memory loss  May need increase in cares at discharge from TCU.  following.      Moderate major depression (H)  Anxiety.   Denies mood concerns.  Continue supportive care  - ok for psychology     Impaired mobility and activities of daily living  Admitted to the tcu for therapy and nursign care following hospital stay after a fall.   - continue Physical Therapy / Ocupational Therapy   - nursing for monitoring  -  following for discharge planning      Constipation.   Now having loose stools  - discontinue miralax and start prn imodium.     Orders:  - discontinue miralax   - imodium 2 mg po qid prn      Electronically signed by: Linda Bacon NP  "

## 2023-09-05 ENCOUNTER — TRANSITIONAL CARE UNIT VISIT (OUTPATIENT)
Dept: GERIATRICS | Facility: CLINIC | Age: 88
End: 2023-09-05
Payer: MEDICARE

## 2023-09-05 VITALS
TEMPERATURE: 98.4 F | WEIGHT: 154.8 LBS | RESPIRATION RATE: 18 BRPM | BODY MASS INDEX: 26.43 KG/M2 | SYSTOLIC BLOOD PRESSURE: 131 MMHG | OXYGEN SATURATION: 97 % | HEIGHT: 64 IN | DIASTOLIC BLOOD PRESSURE: 62 MMHG | HEART RATE: 84 BPM

## 2023-09-05 DIAGNOSIS — G63 POLYNEUROPATHY ASSOCIATED WITH UNDERLYING DISEASE (H): Primary | ICD-10-CM

## 2023-09-05 DIAGNOSIS — R41.3 MEMORY LOSS: ICD-10-CM

## 2023-09-05 DIAGNOSIS — G89.29 CHRONIC LEFT-SIDED LOW BACK PAIN WITH LEFT-SIDED SCIATICA: ICD-10-CM

## 2023-09-05 DIAGNOSIS — I10 BENIGN ESSENTIAL HYPERTENSION: ICD-10-CM

## 2023-09-05 DIAGNOSIS — M54.42 CHRONIC LEFT-SIDED LOW BACK PAIN WITH LEFT-SIDED SCIATICA: ICD-10-CM

## 2023-09-05 DIAGNOSIS — K59.00 CONSTIPATION, UNSPECIFIED CONSTIPATION TYPE: ICD-10-CM

## 2023-09-05 DIAGNOSIS — F32.1 MODERATE MAJOR DEPRESSION (H): ICD-10-CM

## 2023-09-05 DIAGNOSIS — Z74.09 IMPAIRED MOBILITY AND ACTIVITIES OF DAILY LIVING: ICD-10-CM

## 2023-09-05 DIAGNOSIS — Z78.9 IMPAIRED MOBILITY AND ACTIVITIES OF DAILY LIVING: ICD-10-CM

## 2023-09-05 PROCEDURE — 99309 SBSQ NF CARE MODERATE MDM 30: CPT | Performed by: NURSE PRACTITIONER

## 2023-09-05 NOTE — LETTER
9/5/2023        RE: Daniela Brown  [address]  [facility] [address]        [facility] GERIATRICS  TCU FOLLOW UP VISIT    Chief Complaint   Patient presents with     RECHECK      Place of Service where encounter took place:  [facility] (TCU/SNF) [4000]    Daniela Brown  is a 89 year old  (1/26/1933), who is being seen today at the above facility to discuss progress in therapy, review nursing home EHR, recheck chronic medical problems as well as address any new concerns.       HPI:    Copied forward from previous note: admitted to the above facility from  [facility]. Hospital stay 8/15/23 through 8/18/23.     HPI:    Brief Summary of Hospital Course:  patient at the hospital for weakness following a fall.  Also treated with antibiotics for UTI.  Treated for rhabdomyolysis with IV fluids.  Noted to have intermittent  sundowning with baseline cognitive impairement.   MEDICATION CHANGES: Start MiraLAX  RECOMMENDED FOLLOW UP: None  Updates on Status Since Skilled nursing Admission: 8/21 Add 14 day stop date for lorazepam.    8/24 decrease lyrica to 25 mg po BID  8/28 Therapy reports she is dressing and toileting on her own SLUMS 13/30.  She is ambulating 60 feet with a wheeled walker  8/28 - discontinue miralax   - imodium 2 mg po qid prn      Today: patient reports no loose stool. BM's are normal. She reports that her feet feel less numb than before but she is also not having pain in feet. We discussed stopping the lyrica and she thinks that will be good.     Facility EHR reviewed including vital signs, medications, progress notes and use of as needed medications.  No behavior concerns noted.     ALLERGIES: Metoprolol; Cephalexin; Erythromycin; Acetaminophen; Actonel [bisphosphonates]; Alendronate; Azithromycin; Celebrex [qureshi-2 inhibitors]; Celecoxib; Cipro [ciprofloxacin]; Contrast dye; Diatrizoate; Erythromycin; Escitalopram;  "Fosamax; Keflex [cephalexin monohydrate]; Lisinopril; Penicillins; Risedronate; Rofecoxib; Seasonal allergies; Shellfish-derived products; Sulfa antibiotics; Sulfasalazine; Tetanus toxoid, adsorbed; Tetanus-diphtheria toxoids td; Vicodin [acetaminophen]; and Vioxx    ROS:  4 point ROS including Respiratory, CV, GI and , other than that noted in the HPI,  is negative    Vitals:  /62   Pulse 84   Temp 98.4  F (36.9  C)   Resp 18   Ht 1.626 m (5' 4\")   Wt 70.2 kg (154 lb 12.8 oz)   LMP  (LMP Unknown)   SpO2 97%   BMI 26.57 kg/m    Exam:  GENERAL APPEARANCE:  Alert, in no distress  RESP:  respiratory effort normal   CV: edema none  M/S:  Gait and station sitting in recliner.  No tenderness or swelling of the joints   SKIN:  Inspection and palpation of skin and subcutaneous tissue at baseline  PSYCH:  insight and judgement, memory seems impaired, affect and mood normal    ASSESSMENT/PLAN:  Polyneuropathy associated with underlying disease (H)  Chronic left-sided low back pain with left-sided sciatica  Denies pain.  Currently on meloxicam and pregabalin.  Controlled with decreased dose of pregabalin will decrease and stop dose.  - decrease pregabalin to 25 mg po at HS x 3 days then discontinue.     Benign essential hypertension  Most readings are controlled.  Continue PTA medications of amlodipine and olmesartan.     Memory loss  Would be best for assisted living or someone to check in with her daily but at this time she plans to discharge to home. Her son is minimally involved at this time.  -   following.   - ok to discharge     Moderate major depression (H)  Anxiety.   Denies mood concerns. Lorazepam has been discontinued.   Continue supportive care  - ok for psychology     Impaired mobility and activities of daily living  Admitted to the tcu for therapy and nursign care following hospital stay after a fall.   - continue Physical Therapy / Ocupational Therapy   - nursing for monitoring  - "  following for discharge planning     Orders:  - ok to discharge to home with Physical Therapy/Ocupational Therapy  -- decrease pregabalin to 25 mg po at HS x 3 days then discontinue.     Electronically signed by: Linda Bacon NP    DISCHARGE MEDICATIONS:  Current Outpatient Medications   Medication Sig Dispense Refill     acetaminophen (TYLENOL) 500 MG tablet Take 500-1,000 mg by mouth every 6 hours as needed for mild pain or pain       amLODIPine (NORVASC) 5 MG tablet TAKE 1 TABLET BY MOUTH ONCE DAILY 90 tablet 0     buPROPion (WELLBUTRIN XL) 150 MG 24 hr tablet Take 1 tablet (150 mg) by mouth every morning 90 tablet 3     meloxicam (MOBIC) 7.5 MG tablet Take 1 tablet (7.5 mg) by mouth daily 90 tablet 3     Multiple Vitamins-Minerals (THERAPEUTIC MULTIVIT/MINERAL) TABS Take 1 tablet by mouth every evening        olmesartan (BENICAR) 20 MG tablet Take 1 tablet (20 mg) by mouth daily 90 tablet 3     PARoxetine (PAXIL) 20 MG tablet Take 1 tablet (20 mg) by mouth every morning 90 tablet 3     blood glucose (ACCU-CHEK GUIDE) test strip Use to test blood sugar one times daily or as directed. 100 each 1     blood glucose monitoring (ACCU-CHEK FASTCLIX) lancets Use to test blood sugar one times daily or as directed. 102 each 3     blood glucose monitoring (ONE TOUCH ULTRA 2) meter device kit ONCE DAILY AS DIRECTED       STATIN NOT PRESCRIBED (INTENTIONAL) Please choose reason not prescribed, below             Documentation of Face to Face and Certification for Home Health Services  I certify that services are/were furnished while this patient was under the care of a physician and that a physician or an allowed non-physician practitioner (NPP), had a face-to-face encounter that meets the physician face-to-face encounter requirements. The encounter was in whole, or in part, related to the primary reason for home health. The patient is confined to his/her home and needs intermittent skilled nursing, physical  therapy, speech-language pathology, or the continued need for occupational therapy. A plan of care has been established by a physician and is periodically reviewed by a physician.  Date of Face-to-Face Encounter:  9/5/2023.    I certify that, based on my findings, the following services are medically necessary home health services: Nursing, Occupational Therapy, and Physical Therapy.    My clinical findings support the need for the above skilled services because: Requires assistance of another person or specialized equipment to access medical services because patient: Is prone to wander/get lost without assistance...    Patient to re-establish plan of care with their PCP within 7-10 days after leaving the facility to reestablish care.  Medicare certified PECOS provider: Linda Bacon NP   Date: September 6, 2023      Sincerely,        Linda Bacon NP

## 2023-09-06 NOTE — PROGRESS NOTES
[facility] GERIATRICS  TCU FOLLOW UP VISIT    Chief Complaint   Patient presents with    RECHECK      Place of Service where encounter took place:  [facility] (TCU/SNF) [4000]    Daniela Brown  is a 89 year old  (1/26/1933), who is being seen today at the above facility to discuss progress in therapy, review nursing home EHR, recheck chronic medical problems as well as address any new concerns.       HPI:    Copied forward from previous note: admitted to the above facility from  [facility]. Hospital stay 8/15/23 through 8/18/23.     HPI:    Brief Summary of Hospital Course:  patient at the hospital for weakness following a fall.  Also treated with antibiotics for UTI.  Treated for rhabdomyolysis with IV fluids.  Noted to have intermittent  sundowning with baseline cognitive impairement.   MEDICATION CHANGES: Start MiraLAX  RECOMMENDED FOLLOW UP: None  Updates on Status Since Skilled nursing Admission: 8/21 Add 14 day stop date for lorazepam.    8/24 decrease lyrica to 25 mg po BID  8/28 Therapy reports she is dressing and toileting on her own SLUMS 13/30.  She is ambulating 60 feet with a wheeled walker  8/28 - discontinue miralax   - imodium 2 mg po qid prn      Today: patient reports no loose stool. BM's are normal. She reports that her feet feel less numb than before but she is also not having pain in feet. We discussed stopping the lyrica and she thinks that will be good.     Facility EHR reviewed including vital signs, medications, progress notes and use of as needed medications.  No behavior concerns noted.     ALLERGIES: Metoprolol; Cephalexin; Erythromycin; Acetaminophen; Actonel [bisphosphonates]; Alendronate; Azithromycin; Celebrex [qureshi-2 inhibitors]; Celecoxib; Cipro [ciprofloxacin]; Contrast dye; Diatrizoate; Erythromycin; Escitalopram; Fosamax; Keflex [cephalexin monohydrate]; Lisinopril; Penicillins; Risedronate; Rofecoxib; Seasonal  "allergies; Shellfish-derived products; Sulfa antibiotics; Sulfasalazine; Tetanus toxoid, adsorbed; Tetanus-diphtheria toxoids td; Vicodin [acetaminophen]; and Vioxx    ROS:  4 point ROS including Respiratory, CV, GI and , other than that noted in the HPI,  is negative    Vitals:  /62   Pulse 84   Temp 98.4  F (36.9  C)   Resp 18   Ht 1.626 m (5' 4\")   Wt 70.2 kg (154 lb 12.8 oz)   LMP  (LMP Unknown)   SpO2 97%   BMI 26.57 kg/m    Exam:  GENERAL APPEARANCE:  Alert, in no distress  RESP:  respiratory effort normal   CV: edema none  M/S:  Gait and station sitting in recliner.  No tenderness or swelling of the joints   SKIN:  Inspection and palpation of skin and subcutaneous tissue at baseline  PSYCH:  insight and judgement, memory seems impaired, affect and mood normal    ASSESSMENT/PLAN:  Polyneuropathy associated with underlying disease (H)  Chronic left-sided low back pain with left-sided sciatica  Denies pain.  Currently on meloxicam and pregabalin.  Controlled with decreased dose of pregabalin will decrease and stop dose.  - decrease pregabalin to 25 mg po at HS x 3 days then discontinue.     Benign essential hypertension  Most readings are controlled.  Continue PTA medications of amlodipine and olmesartan.     Memory loss  Would be best for assisted living or someone to check in with her daily but at this time she plans to discharge to home. Her son is minimally involved at this time.  -   following.   - ok to discharge     Moderate major depression (H)  Anxiety.   Denies mood concerns. Lorazepam has been discontinued.   Continue supportive care  - ok for psychology     Impaired mobility and activities of daily living  Admitted to the tcu for therapy and nursign care following hospital stay after a fall.   - continue Physical Therapy / Ocupational Therapy   - nursing for monitoring  -  following for discharge planning     Orders:  - ok to discharge to home with Physical " Therapy/Ocupational Therapy  -- decrease pregabalin to 25 mg po at HS x 3 days then discontinue.     Electronically signed by: Linda Bacno NP    DISCHARGE MEDICATIONS:  Current Outpatient Medications   Medication Sig Dispense Refill    acetaminophen (TYLENOL) 500 MG tablet Take 500-1,000 mg by mouth every 6 hours as needed for mild pain or pain      amLODIPine (NORVASC) 5 MG tablet TAKE 1 TABLET BY MOUTH ONCE DAILY 90 tablet 0    buPROPion (WELLBUTRIN XL) 150 MG 24 hr tablet Take 1 tablet (150 mg) by mouth every morning 90 tablet 3    meloxicam (MOBIC) 7.5 MG tablet Take 1 tablet (7.5 mg) by mouth daily 90 tablet 3    Multiple Vitamins-Minerals (THERAPEUTIC MULTIVIT/MINERAL) TABS Take 1 tablet by mouth every evening       olmesartan (BENICAR) 20 MG tablet Take 1 tablet (20 mg) by mouth daily 90 tablet 3    PARoxetine (PAXIL) 20 MG tablet Take 1 tablet (20 mg) by mouth every morning 90 tablet 3    blood glucose (ACCU-CHEK GUIDE) test strip Use to test blood sugar one times daily or as directed. 100 each 1    blood glucose monitoring (ACCU-CHEK FASTCLIX) lancets Use to test blood sugar one times daily or as directed. 102 each 3    blood glucose monitoring (ONE TOUCH ULTRA 2) meter device kit ONCE DAILY AS DIRECTED      STATIN NOT PRESCRIBED (INTENTIONAL) Please choose reason not prescribed, below             Documentation of Face to Face and Certification for Home Health Services  I certify that services are/were furnished while this patient was under the care of a physician and that a physician or an allowed non-physician practitioner (NPP), had a face-to-face encounter that meets the physician face-to-face encounter requirements. The encounter was in whole, or in part, related to the primary reason for home health. The patient is confined to his/her home and needs intermittent skilled nursing, physical therapy, speech-language pathology, or the continued need for occupational therapy. A plan of care has been  established by a physician and is periodically reviewed by a physician.  Date of Face-to-Face Encounter:  9/5/2023.    I certify that, based on my findings, the following services are medically necessary home health services: Nursing, Occupational Therapy, and Physical Therapy.    My clinical findings support the need for the above skilled services because: Requires assistance of another person or specialized equipment to access medical services because patient: Is prone to wander/get lost without assistance...    Patient to re-establish plan of care with their PCP within 7-10 days after leaving the facility to reestablish care.  Medicare certified PECOS provider: Linda Bacon NP   Date: September 6, 2023

## 2023-09-11 ENCOUNTER — PATIENT OUTREACH (OUTPATIENT)
Dept: CARE COORDINATION | Facility: CLINIC | Age: 88
End: 2023-09-11
Payer: MEDICARE

## 2023-09-11 ENCOUNTER — TELEPHONE (OUTPATIENT)
Dept: FAMILY MEDICINE | Facility: CLINIC | Age: 88
End: 2023-09-11
Payer: MEDICARE

## 2023-09-11 NOTE — TELEPHONE ENCOUNTER
[facility], [phone],    Assessment (evaluation) yesterday.  Requests start of care orders:    Is etablished patient with  Jimdo Lenorah.    Requesting orders from: Cynthia Maddox  Provider is following patient: Yes  Is this a 60-day recertification request?  No    Orders Requested     SN 1 x per week for 8 weeks  PT eval and treat.  SW evaluation.     Confirmed ok to leave a detailed message with call back.  Contact information confirmed and updated as needed.    Sandy Turner RN

## 2023-09-11 NOTE — PROGRESS NOTES
Clinic Care Coordination Contact  [facility]/Voicemail    Clinical Data: Care Coordinator Outreach    Lead BRUCE MULLIGAN received email from TCU SW that patient discharged home on 9/8 from [facility] with [facility].     Outreach attempted x 1. Unable to leave message on both home phone and mobile phone.   Plan: Care Coordinator will try to reach patient again in 1-2 business days.    Shannon Nguyen [facility]  Social Work Primary Care Clinic Care Coordinator  [facility]  [phone]  [email or web address]

## 2023-09-11 NOTE — LETTER
[facility]  [address] [address]  Phone: [phone]       September 13th, 2023      Daniela Brown  [address] [address]        Dear Sivan,    Please see below the resources for meal delivery services.    Meals on Wheels:      [employer] (frozen mail order): [phone]  [employer] (frozen mail order): [phone]  [facility] meals on wheels (daily hot): [phone]     [employer] door step grocery delivery: [place] [phone]     Thank you,    Shannon Nguyen, [facility]  Social Work Primary Care Clinic Care Coordinator  [facility]  [phone]  [email or web address]

## 2023-09-13 ENCOUNTER — TELEPHONE (OUTPATIENT)
Dept: FAMILY MEDICINE | Facility: CLINIC | Age: 88
End: 2023-09-13
Payer: MEDICARE

## 2023-09-13 NOTE — TELEPHONE ENCOUNTER
[facility] with instructions to see PCP within 7-10 days.   Next available appt is 9/22.  Scheduled.    Routed to PCP.  Let us know if need to see pt soooner since this is past the 7-10 days from discharge date.    I spoke with pt and she is doing well at this time and believes she can wait for appt.    Sandy Turner RN

## 2023-09-13 NOTE — PROGRESS NOTES
"Clinic Care Coordination Contact  [facility]: Post-Discharge Note  SITUATION                                                      Admission:    Admission Date: 08/15/23   Reason for Admission: Closed head injury, initial encounter  Discharge:   Discharge Date: 08/18/23  Discharge Diagnosis: CHER, prerenal from dehydration, resolved  Rhabdomyolysis, from laying on the ground for 12 hours, resolved, UTI with metabolic encephalopathy, Generalized weakness, Recurrent Falls, Underlying dementia    BACKGROUND                                                      Per hospital discharge summary and inpatient provider notes:  Patient admitted to [facility] from 8/15-8/18. Patient discharged from hospital to [facility] T[facility] and discharged from TCU on 9/08. [facility] 1 x per week for 8 weeks, PT eval and treat, and SW evaluation.    ASSESSMENT      Enrollment  Outreach Frequency: monthly    Discharge Assessment  How are you doing now that you are home?: \"Better but I'd still like to be better\"  How are your symptoms? (Red Flag symptoms escalate to triage hotline per guidelines): Improved  Do you feel your condition is stable enough to be safe at home until your provider visit?: Yes  Does the patient have their discharge instructions? : Yes  Does the patient have questions regarding their discharge instructions? : No  Were you started on any new medications or were there changes to any of your previous medications? : Yes  Does the patient have all of their medications?: Yes  Do you have questions regarding any of your medications? : No  Discharge follow-up appointment scheduled within 14 calendar days? : No  Is patient agreeable to assistance with scheduling? :  (Patient prefers to call on her own)      Care Management       Care Mgmt Encounter Assessment  Preventative Care  Routine Health maintenance Reviewed: Not " assessed  Clinic Utilization  Difficulty keeping appointments:: No  Compliance Concerns: No  No-Show Concerns: No  No PCP office visit in Past Year: No  Transportation  Transportation means:: None     Primary Diagnosis  Primary Diagnosis: Psychosocial  Barriers in Communication  Other concerns:: Glasses  How confident are you filling out medical forms by yourself:: A little bit  Pain  Pain (GOAL):: No  Medication Review  Medication adherence problem (GOAL):: No  Knowledgeable about how to use meds:: Yes  Medication side effects suspected:: No  Diet/Exercise/Sleep  Diet:: Diabetic diet  Inadequate nutrition (GOAL):: No  Tube Feeding: No  Inadequate activity/exercise (GOAL):: Yes  Significant changes in sleep pattern (GOAL): No    PLAN                                                      Outpatient Plan:  Patient has orders for home care with SN, PT, and BRUCE deal. BRUCE MULLIGAN offered to assist patient with scheduling her appt but patient preferred to call on her own. BRUCE MULLIGAN provided number to [facility].     No future appointments.    For any urgent concerns, please contact our 24 hour nurse triage line: [phone] ([phone])       Shannon Nguyen [facility]  Social Work Primary Care Clinic Care Coordinator  [facility]  [phone]  [email or web address]    Covering for Bharti Riley.

## 2023-09-13 NOTE — TELEPHONE ENCOUNTER
Message left on confidential line with V.O. for     SN 1 x per week for 8 weeks  PT eval and treat.  SW evaluation.   . Marisela NICHOLSON RN

## 2023-09-18 ENCOUNTER — PATIENT OUTREACH (OUTPATIENT)
Dept: CARE COORDINATION | Facility: CLINIC | Age: 88
End: 2023-09-18
Payer: MEDICARE

## 2023-09-18 NOTE — LETTER
[facility]  Patient Centered Plan of Care  About Me:        Patient Name:  Daniela Wetzel    YOB: 1933  Age:         89 year old   Bettie MRN:    [id number] Telephone Information:  Home Phone [phone]   Mobile [phone]       [place]  [address]  [facility] [place] [address] Email address:  No e-mail address on record      Emergency Contact(s)    Name Relationship Lgl Grd Work Phone Home Phone Mobile Phone   1. EDEN WETZEL Son No   [phone]   2. JACE WETZEL (D* Relative No   [phone]   3. JACE WETZEL Daughter-in-Law    [phone]           Primary language:  English     needed? No   [employer]:  [phone] op. 1  Other communication barriers:Glasses  Preferred Method of Communication:  Mail  Current living arrangement: I live alone; I live in a private home  Mobility Status/ Medical Equipment: Independent w/Device    Health Maintenance  Health Maintenance Reviewed: Due/Overdue   Health Maintenance Due   Topic Date Due    HEPATITIS B IMMUNIZATION (1 of 3 - Risk 3-dose series) Never done    RSV VACCINE 60+ (1 - 1-dose 60+ series) Never done    EYE EXAM  12/08/2012    DIABETIC FOOT EXAM  09/02/2022    INFLUENZA VACCINE (1) 09/01/2023    COVID-19 Vaccine (6 - 2023-24 season) 09/01/2023    A1C  10/19/2023    MEDICARE ANNUAL WELLNESS VISIT  10/28/2023    LIPID  10/28/2023     My Access Plan  Medical Emergency 911   Primary Clinic Line [facility] - [phone]   24 Hour Appointment Line [phone] or  [phone]) (toll-free)   24 Hour Nurse Line [phone] (toll-free)   Preferred Urgent Care [facility], [phone]     Preferred Hospital [place], [place]  [phone]     Preferred Pharmacy [place] [facility] [place] - [address] [facility] [address]     Behavioral Health Crisis Line The National Suicide Prevention Lifeline at 1-723.434.8435 or Text/Call 265      My Care Team Members  Patient Care Team         Relationship Specialty Notifications Start End    Cynthia Maddox,  PCP - General Internal Medicine Admissions 11/14/18     Phone: [phone] Pager: [phone] Fax: [phone]        [address] [facility] [address]    Cynthia Maddox, DO Assigned PCP   10/21/18     Phone: [phone] Pager: [phone] Fax: [phone]        [address] [facility] [address]    Tho Newman,  Assigned Musculoskeletal Provider   3/13/22     Phone: [phone] Fax: [phone] [address] [facility] [address]    Gunjan Rowe PAShelbyC Physician Assistant Dermatology  1/17/23     Phone: [phone] Fax: [phone] [address] [facility] [address]    Leandro Posada MD Assigned Surgical Provider   1/28/23     Phone: [phone] Pager: [phone] Fax: [phone]        [address] [facility] [address]    Mehdi Ellis Community Paramedic  Admissions 6/6/23     Community Paramedic [phone]    Padmini Moran Community Health Worker Primary Care - CC Admissions 7/19/23     Phone: [phone]         Bharti Riley LSW Lead Care Coordinator Primary Care - CC Admissions 7/20/23     Phone: [phone]          [address] [facility] [address]              My Care Plans  Self Management and Treatment Plan  Care Plan  Care Plan: General       Problem: HP GENERAL PROBLEM       Goal: Resources       Start Date: 7/24/2023 Expected End Date: 12/1/2023    This Visit's Progress: 10%    Priority: Medium    Note:     Barriers: Access  Strengths: Motivated  Patient expressed understanding of goal: Yes    Action steps to achieve this goal:  1. I will look into using [facility].  2. I will look into toe nail home services.   3. I will look into food resources.   4. I will work with care coordination team as needed.  (CHW will mail resources to patient as she doesn't remember receiving them from CP)                                Action Plans on File:                       Advance Care Plans/Directives Type:   Advanced Directive - On File; POLST      My Medical and Care Information  Problem List   Patient Active Problem List   Diagnosis    Degeneration of lumbar or lumbosacral intervertebral disc    Esophageal reflux    Memory loss    Irritable bowel syndrome with diarrhea    Osteopenia of multiple sites    RESTLESS LEG SYNDROME    Primary osteoarthritis involving multiple joints    Diverticulosis of large intestine    SENSONRL HEAR LOSS,BILAT    Urticaria    Subjective tinnitus    Vitamin D deficiency    Right foot pain    Urge incontinence    Hyperlipidemia LDL goal <100    Allergic rhinitis    Pronation of foot    Peripheral neuropathy    Benign essential hypertension    Carpal tunnel syndrome of left wrist    Diastolic dysfunction    Type 2 diabetes mellitus with diabetic polyneuropathy, without long-term current use of insulin (H)    History of recurrent urinary tract infection    CKD (chronic kidney disease) stage 3, GFR 30-59 ml/min (H)    Bilateral wrist pain    Protrusion of lumbar intervertebral disc    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)    Chronic left-sided low back pain with left-sided sciatica    Generalized weakness    Diarrhea    Abnormal CT of the abdomen    Cystocele, midline    B12 deficiency    Moderate major depression (H)    ARABELLA (generalized anxiety disorder)    Polyneuropathy associated with underlying disease (H24)    Closed head injury, initial encounter    Fall, initial encounter    Non-traumatic rhabdomyolysis    Impaired mobility and activities of daily living      Current Medications and Allergies:    Current Outpatient Medications   Medication Instructions    acetaminophen (TYLENOL) 500-1,000 mg, Oral, EVERY 6 HOURS PRN    amLODIPine (NORVASC) 5 MG tablet TAKE 1 TABLET BY MOUTH ONCE DAILY    blood glucose (ACCU-CHEK GUIDE)  test strip Use to test blood sugar one times daily or as directed.    blood glucose monitoring (ACCU-CHEK FASTCLIX) lancets Use to test blood sugar one times daily or as directed.    blood glucose monitoring (ONE TOUCH ULTRA 2) meter device kit ONCE DAILY AS DIRECTED    buPROPion (WELLBUTRIN XL) 150 mg, Oral, EVERY MORNING    meloxicam (MOBIC) 7.5 mg, Oral, DAILY    Multiple Vitamins-Minerals (THERAPEUTIC MULTIVIT/MINERAL) TABS 1 tablet, Oral, EVERY EVENING    olmesartan (BENICAR) 20 mg, Oral, DAILY    PARoxetine (PAXIL) 20 mg, Oral, EVERY MORNING    STATIN NOT PRESCRIBED (INTENTIONAL) Please choose reason not prescribed, below     Care Coordination Start Date: 7/19/2023   Frequency of Care Coordination: monthly     Form Last Updated: 10/18/2023

## 2023-09-18 NOTE — PROGRESS NOTES
Clinic Care Coordination Contact  Care Coordination Clinician Chart Review    Situation: Patient chart reviewed by Care Coordinator.       Background: Care Coordination Program started: 7/19/2023. Initial assessment completed 7/24/23 and patient-centered care plan(s) were developed with participation from patient. Lead CC handed patient off to CHW for continued outreaches.       Assessment: Per chart review, patient outreach completed by CC CHW on 8/15/23. Patient is actively working to accomplish goal(s). Patient's goal(s) appropriate and relevant at this time.     Patient is not due for updated Plan of Care.      Assessments will be completed annually or as needed/with change of patient status. Post hospital assessment completed 9/13/23. Due 9/13/24.        Care Plan: General       Problem: HP GENERAL PROBLEM       Goal: Resources       Start Date: 7/24/2023 Expected End Date: 12/1/2023    Priority: Medium    Note:     Barriers: Access  Strengths: Motivated  Patient expressed understanding of goal: Yes    Action steps to achieve this goal:  1. I will look into using [facility].  2. I will look into toe nail home services.   3. I will look into food resources.   4. I will work with care coordination team as needed.                                   Plan/Recommendations: The patient will continue working with Care Coordination to achieve goal(s) as above.     CHW will continue outreaches at minimum every 30 days and will involve Lead CC as needed or if patient is ready to move to Maintenance.     Lead CC will continue to monitor CHW outreaches and patient's progress to goal(s) every 6 weeks.     Plan of Care updated and sent to patient: No    MANJIT Arroyo   Social Work Primary Care Clinic Care Coordinator   [facility]  [phone]  [email or web address]

## 2023-09-24 DIAGNOSIS — I10 BENIGN ESSENTIAL HYPERTENSION: Chronic | ICD-10-CM

## 2023-09-25 RX ORDER — AMLODIPINE BESYLATE 5 MG/1
TABLET ORAL
Qty: 90 TABLET | Refills: 0 | Status: SHIPPED | OUTPATIENT
Start: 2023-09-25 | End: 2023-10-19

## 2023-09-27 ENCOUNTER — TELEPHONE (OUTPATIENT)
Dept: FAMILY MEDICINE | Facility: CLINIC | Age: 88
End: 2023-09-27
Payer: MEDICARE

## 2023-09-27 NOTE — TELEPHONE ENCOUNTER
Home Care is calling regarding an established patient with [facility].       Requesting orders from: Cynthia Maddox  Provider is following patient: Yes  Is this a 60-day recertification request?  No    Orders Requested    Occupational Therapy  Request for initial evaluation and treatment (one time)       Verbal orders given.  Home Care will send orders for provider to sign.  Confirmed ok to leave a detailed message with call back.  Contact information confirmed and updated as needed.    Juju Head RN

## 2023-10-03 NOTE — PROGRESS NOTES
"Daniela Brown is a 87 year old female who is being evaluated via a billable telephone visit.      The patient has been notified of following:     \"This telephone visit will be conducted via a call between you and your physician/provider. We have found that certain health care needs can be provided without the need for a physical exam.  This service lets us provide the care you need with a short phone conversation.  If a prescription is necessary we can send it directly to your pharmacy.  If lab work is needed we can place an order for that and you can then stop by our lab to have the test done at a later time.    Telephone visits are billed at different rates depending on your insurance coverage. During this emergency period, for some insurers they may be billed the same as an in-person visit.  Please reach out to your insurance provider with any questions.    If during the course of the call the physician/provider feels a telephone visit is not appropriate, you will not be charged for this service.\"    Patient has given verbal consent for Telephone visit?  Yes    What phone number would you like to be contacted at? [phone]    How would you like to obtain your AVS? Mail a copy    Subjective     Daniela Brown is a 87 year old female who presents via phone visit today for the following health issues:    HPI       Medication Followup of clonazePAM (KLONOPIN) 0.5 MG tablet    Taking Medication as prescribed: yes    Side Effects:  None    Medication Helping Symptoms:  yes           Depression and Anxiety Follow-Up    How are you doing with your depression since your last visit? Improved     How are you doing with your anxiety since your last visit?  Improved     Are you having other symptoms that might be associated with depression or anxiety? No    Have you had a significant life event? No     Do you have any concerns with your use of alcohol or other drugs? No     She has seen 2 other " "providers for dep/anx the last several months.    Has been on celexa (not effective but no side effects), amitriptyline, cymbalta (dizziness), lexapro (listed as allergy - 'gets very sick\"), zoloft _switched to lexapro)    May have been on others at [facility]    Checked [facility], last filled Clonazepam 12/14.  She is using the Clonazepam 0.5 mg twice a week.  Tolerating well, no side effects.  It helps with sleep.  Develops severe anxiety episodes, not quite panic attacks, that build up.  This will prompt her to take the clonazepam.      COVID vaccine: She reports that her son is getting his Covid vaccine on Monday.  She reports his son got a letter from his doctor and a referral for him to get Covid vaccine.  She wants a letter from me and a referral for her Covid vaccine.  Discussed at length that no letter or referrals needed for the Covid vaccine.  At this time, only healthcare workers and nursing home residents are eligible to receive the Covid vaccine.  Encourage patient that she should definitely receive the vaccine, but it is not quite available for the general public unfortunately just yet    Social History     Tobacco Use     Smoking status: Never Smoker     Smokeless tobacco: Never Used   Substance Use Topics     Alcohol use: No     Drug use: No     PHQ 7/17/2020 10/27/2020 12/31/2020   PHQ-9 Total Score 3 15 5   Q9: Thoughts of better off dead/self-harm past 2 weeks Not at all Not at all Not at all     ARABELLA-7 SCORE 7/17/2020 10/27/2020 12/31/2020   Total Score - - -   Total Score 0 14 3     Last PHQ-9 12/31/2020   1.  Little interest or pleasure in doing things 0   2.  Feeling down, depressed, or hopeless 0   3.  Trouble falling or staying asleep, or sleeping too much 0   4.  Feeling tired or having little energy 2   5.  Poor appetite or overeating 3   6.  Feeling bad about yourself 0   7.  Trouble concentrating 0   8.  Moving slowly or restless 0   Q9: Thoughts of better off dead/self-harm past 2 weeks 0 "   PHQ-9 Total Score 5   Difficulty at work, home, or with people Not difficult at all     ARABELLA-7  12/31/2020   1. Feeling nervous, anxious, or on edge 0   2. Not being able to stop or control worrying 0   3. Worrying too much about different things 0   4. Trouble relaxing 1   5. Being so restless that it is hard to sit still 2   6. Becoming easily annoyed or irritable 0   7. Feeling afraid, as if something awful might happen 0   ARABELLA-7 Total Score 3   If you checked any problems, how difficult have they made it for you to do your work, take care of things at home, or get along with other people? Not difficult at all       Suicide Assessment Five-step Evaluation and Treatment (SAFE-T)         Review of Systems   Constitutional, HEENT, cardiovascular, pulmonary, gi and gu systems are negative, except as otherwise noted.       Objective          Vitals:  No vitals were obtained today due to virtual visit.    healthy, alert and no distress  PSYCH: Alert and oriented times 3; coherent speech, normal   rate and volume, able to articulate logical thoughts, able   to abstract reason, no tangential thoughts, no hallucinations   or delusions  Her affect is normal  RESP: No cough, no audible wheezing, able to talk in full sentences  Remainder of exam unable to be completed due to telephone visits          Assessment/Plan:    Assessment & Plan     Daniela was seen today for anxiety, depression and medication request.    Diagnoses and all orders for this visit:    ARABELLA (generalized anxiety disorder)    Moderate major depression (H)    Abnormal CT of the abdomen    Anxiety and depression           Patient Instructions   Anxiety:   1. Ok to use the Clonazepam 2 x week, no more than 3 x week.  If you are using it more than 3 x week, we should consider trying a different daily medication to better manage anxiety.  Regular use of Clonazepam cause cause memory problems or increased risk of falls.        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 You are due for follow-up MRI.  They will call you to schedule.    Radiology test was ordered.  You can call [phone] to schedule.  You can use the clonazepam 30-60 min prior to MRI.    COVID Vaccine:   1. You should get the vaccine as soon as it is available.  As of right now, it is only being given to healthcare workers and nursing home residents.  You do not need an order or referral when your time comes to get the vaccine.          No follow-ups on file.    Cynthia Maddox, [facility]    Phone call duration:  17 minutes                 Zyclara Counseling:  I discussed with the patient the risks of imiquimod including but not limited to erythema, scaling, itching, weeping, crusting, and pain.  Patient understands that the inflammatory response to imiquimod is variable from person to person and was educated regarded proper titration schedule.  If flu-like symptoms develop, patient knows to discontinue the medication and contact us.

## 2023-10-04 ENCOUNTER — PATIENT OUTREACH (OUTPATIENT)
Dept: CARE COORDINATION | Facility: CLINIC | Age: 88
End: 2023-10-04
Payer: MEDICARE

## 2023-10-04 NOTE — LETTER
2023      Daniela Brown  [address]  [place] [address]        Dear Daniela,    Thank you for talking to me today for monthly care coordination check in and to go over goal progress and current/future support and/or resource needs.     Per our discussion on the phone, please find below resources that were recently mailed to you.  Review and reach out to me if you need anything further.     Toe nail clipping services at home:        [facility]: [phone] // [email or web address]     [facility]: [phone] // [email or web address]     [employer], [phone]     [facility] [phone]      Meals on Wheels:   (Discussed referral [employer] grocery drop off. Pt needs to call Nicolasa)           [facility] Direct (frozen mail order): [phone]     [employer] (frozen mail order): [phone]     [facility] meals on wheels (daily hot): [phone]      [employer] door step grocery delivery: Nicolasa [phone]      Transportation: [email or web address] Bus [phone], option 13.  You could use this transportation service to get to [facility] [place] as needed.     Sincerely,      Padmini MILLER  Community Health Worker  [facility]  Clinic Care Coordination  [facility]  [email or web address]   Office: [phone]

## 2023-10-04 NOTE — PROGRESS NOTES
"Clinic Care Coordination Contact  Community Health Worker Follow Up    Care Gaps:     Health Maintenance Due   Topic Date Due    EYE EXAM  2012    DIABETIC FOOT EXAM  2022    INFLUENZA VACCINE (1) 2023    COVID-19 Vaccine ( season) 2023    MEDICARE ANNUAL WELLNESS VISIT  10/28/2023    LIPID  10/28/2023     Postponed to future outreach     Care Plan:   Care Plan: General       Problem: HP GENERAL PROBLEM       Goal: Resources       Start Date: 2023 Expected End Date: 2023    This Visit's Progress: 10%    Priority: Medium    Note:     Barriers: Access  Strengths: Motivated  Patient expressed understanding of goal: Yes    Action steps to achieve this goal:  1. I will look into using [facility].  2. I will look into toe nail home services.   3. I will look into food resources.   4. I will work with care coordination team as needed.  (CHW will mail resources to patient as she doesn't remember receiving them from CP)                          Intervention and Education during outreach: Patient discharged from [facility] recently. Patient stated she has been doing well at home with assistance from the home care RN ([facility]).  Patient is under the impression that the next visit is in 2 weeks; CHW and patient discussed reviewing the \"packet\" she received so she is able to call the home care RN if a visit is needed before then.     Patient stated that she has not received the resources previously sent by [facility]. CHW will print and mail to patient this week, patient stated she will watch for the letter in the mail. CHW re-sending the following resources:     Toe nail clipping services at home:        [facility]: [phone] // [email or web address]     [facility]: [phone] // [email or web address]     [employer], [phone]     [facility] [phone]      Meals on Wheels:   (Discussed " referral [employer] grocery drop off. Pt needs to call Nicolasa)       [employer] (frozen mail order): [phone]     [employer] (frozen mail order): [phone]     [facility] meals on wheels (daily hot): [phone]      [employer] door step grocery delivery: Nicolasa [phone]      Transportation: [employer] [phone], option 13.    Pt thinks she has taken it before and can use this transportation service to [facility] [place] appt.     CHW Plan:   will outreach in 1 month to inquire if patient received resources and to discuss how home care is going.    will attempt to address care gaps-AWV, eye exam, etc.     Padmini MILLER  Community Health Worker  [facility]  Clinic Care Coordination  [facility]  [email or web address]   Office: [phone]

## 2023-10-05 ENCOUNTER — TELEPHONE (OUTPATIENT)
Dept: FAMILY MEDICINE | Facility: CLINIC | Age: 88
End: 2023-10-05
Payer: MEDICARE

## 2023-10-05 NOTE — TELEPHONE ENCOUNTER
Left message for patient to return call to clinic.   *voicemail did not state Ashley's name.     Tahira Rowe RN

## 2023-10-05 NOTE — TELEPHONE ENCOUNTER
Home Care is calling regarding an established patient with [facility].       Requesting orders from: Cynthia Maddox  Provider is following patient: Yes  Is this a 60-day recertification request?  No    Orders Requested    Occupational Therapy  Request for initial certification (first set of orders)   Frequency:  1x/wk for 3 wks      Information was gathered and will be sent to provider for review.  RN will contact Home Care with information after provider review.  Confirmed ok to leave a detailed message with call back.  Contact information confirmed and updated as needed.    Betty Rowe RN

## 2023-10-06 NOTE — TELEPHONE ENCOUNTER
Called [facility] main office at [phone] & spoke with Xiomara. Confirmed Ashley's phone number & xiomara will give message to ashley to call clinic.    Tahira Rowe RN

## 2023-10-18 NOTE — PROGRESS NOTES
90 day letter sent.     Bharti Riley [facility]   Social Work Primary Care Clinic Care Coordinator   [facility]  [phone]  [email or web address]

## 2023-10-19 DIAGNOSIS — M15.0 PRIMARY OSTEOARTHRITIS INVOLVING MULTIPLE JOINTS: ICD-10-CM

## 2023-10-19 DIAGNOSIS — I10 BENIGN ESSENTIAL HYPERTENSION: Chronic | ICD-10-CM

## 2023-10-19 RX ORDER — AMLODIPINE BESYLATE 5 MG/1
TABLET ORAL
Qty: 90 TABLET | Refills: 1 | Status: SHIPPED | OUTPATIENT
Start: 2023-10-19 | End: 2024-04-11

## 2023-10-19 RX ORDER — MELOXICAM 7.5 MG/1
7.5 TABLET ORAL DAILY
Qty: 90 TABLET | Refills: 1 | Status: SHIPPED | OUTPATIENT
Start: 2023-10-19 | End: 2024-04-11

## 2023-11-02 ENCOUNTER — PATIENT OUTREACH (OUTPATIENT)
Dept: CARE COORDINATION | Facility: CLINIC | Age: 88
End: 2023-11-02
Payer: MEDICARE

## 2023-11-02 NOTE — PROGRESS NOTES
Clinic Care Coordination Contact  [facility]/Voicemail       Clinical Data: CHW Outreach    Outreach attempted x 1.  CHW was unable to leave a message on patient's voicemail with call back information and requested return call. Due to Patient's VM is not set up yet.    Plan: CHW will make another attempt to reach the patient via phone or MyChart.    CHW outreach in the next 2 weeks.      ABDOULAYE Harper  Clinic Care Coordination   [facility]   Phone: [phone]  [email or web address]

## 2023-11-02 NOTE — PROGRESS NOTES
Clinic Care Coordination Contact  Care Coordination Clinician Chart Review    Situation: Patient chart reviewed by Care Coordinator.       Background: Care Coordination Program started: 7/19/2023. Initial assessment completed 7/24/23 and patient-centered care plan(s) were developed with participation from patient. Lead CC handed patient off to CHW for continued outreaches.       Assessment: Per chart review, patient outreach completed by CC CHW on 10/4/23. Patient is actively working to accomplish goal(s). Patient's goal(s) appropriate and relevant at this time.     Patient is not due for updated Plan of Care.      Assessments will be completed annually or as needed/with change of patient status. Post hospital assessment completed 9/13/23. Due 9/13/24.         Care Plan: General       Problem: HP GENERAL PROBLEM       Goal: Resources       Start Date: 7/24/2023 Expected End Date: 12/1/2023    This Visit's Progress: 10%    Priority: Medium    Note:     Barriers: Access  Strengths: Motivated  Patient expressed understanding of goal: Yes    Action steps to achieve this goal:  1. I will look into using [facility].  2. I will look into toe nail home services.   3. I will look into food resources.   4. I will work with care coordination team as needed.  (CHW will mail resources to patient as she doesn't remember receiving them from CP)                                 Plan/Recommendations: The patient will continue working with Care Coordination to achieve goal(s) as above.     CHW will continue outreaches at minimum every 30 days and will involve Lead CC as needed or if patient is ready to move to Maintenance.     Lead CC will continue to monitor CHW outreaches and patient's progress to goal(s) every 6 weeks.     Plan of Care updated and sent to patient: MANJIT Garcia   Social Work Primary Care Clinic Care Coordinator   [facility]  [phone]   [email or web address]

## 2023-11-03 ENCOUNTER — TELEPHONE (OUTPATIENT)
Dept: FAMILY MEDICINE | Facility: CLINIC | Age: 88
End: 2023-11-03
Payer: MEDICARE

## 2023-11-03 NOTE — TELEPHONE ENCOUNTER
General Call      Reason for Call:  patient called stating that her son is telling others that she has dementia, but patient does not recall this ever being talked about to her. She wants to know if she was diagnosed with dementia.     Okay to leave a detailed message?: Yes at Home number on file [phone] (home)      Lelo FLORES  Station

## 2023-11-06 ENCOUNTER — TELEPHONE (OUTPATIENT)
Dept: FAMILY MEDICINE | Facility: CLINIC | Age: 88
End: 2023-11-06
Payer: MEDICARE

## 2023-11-06 NOTE — TELEPHONE ENCOUNTER
Verbal orders were given to Salena PEARSON , [facility] , for:  1 nurse visit next week and then every other week through end of cert period.

## 2023-11-06 NOTE — TELEPHONE ENCOUNTER
Called patient, no answer and unable to leave a message as phone only rang then went to constant busy signal.     Will attempt to call patient back.     Julie Behrendt RN

## 2023-11-07 ENCOUNTER — ALLIED HEALTH/NURSE VISIT (OUTPATIENT)
Dept: CARE COORDINATION | Facility: CLINIC | Age: 88
End: 2023-11-07
Payer: MEDICARE

## 2023-11-07 DIAGNOSIS — I10 ESSENTIAL HYPERTENSION: Primary | ICD-10-CM

## 2023-11-07 PROCEDURE — 99207 PR COMMUNITY PARAMEDIC - PATIENT NOT BILLABLE: CPT

## 2023-11-07 NOTE — PROGRESS NOTES
Community Paramedics Follow-up Visit  November 7, 2023  TIME: 10:30    Daniela Brown is a 89 year old female being seen at home for a follow-up visit.    Present at appointment:  Patient and CP Mehdi Rasaleida    Patient was graduated from CP program.         Chief Complaint   Patient presents with    Community Paramedic-Home Visit       [place] Utilization:      Utilization      No Show Count (past year)  5             ED Visits  6             Hospital Admissions  1                    Current as of: 11/6/2023 10:52 PM                LMP  (LMP Unknown)     Clinical Concerns:  Current Medical Concerns:      Current Behavioral Concerns:     Education Provided to patient:    Medication set up? No  Pill Box issued: No  Scale issued: No  Flu Shot given: No  COVID Vaccine Given: No  Lab draw or specimen collection: No  Food resource given: No  Collaborative visit with PCP: No  Wound Care: No  Health Maintenance Addressed No      No  Phone call (patient / identified key support person reached)      Review of Symptoms/PE    Skin: negative  Eyes: negative  Ears/Nose/Throat: negative  Respiratory: No shortness of breath, dyspnea on exertion, cough, or hemoptysis  Cardiovascular: negative  Gastrointestinal: negative  Genitourinary: negative  Musculoskeletal: negative  Neurologic: negative  Psychiatric: negative    Time spent with patient: 15    The patient meets one or more of the following criteria:  * Does not apply

## 2023-11-10 NOTE — PROGRESS NOTES
"Daniela Brown  :  1933  DOS: 2023  MRN: [id number]    Sports Medicine Clinic Visit    PCP: Cynthia Maddox    Daniela Brown is a 89 year old female who is seen in follow-up presenting with acute on chronic left knee pain.    Interim History - 2022  Since last visit on 3/10/2022 patient has moderate-severe left knee with swelling after falling twice @ home in the past 2 weeks, most recently on 3/31/22 landing directly on her knees.  Left knee SynviscOne injection completed on 3/10/22 was providing good relief until her falls.  She notes increased pain with going from sit to stand and walking.  Moderate joint effusion noted today.  She would like repeat imaging to rule out new injury.    Interim History - 2022  Since last visit on 2022 patient has moderate left knee and swelling over the past ~ 3+ weeks.  Left knee aspiration and steroid injection completed on 22 provided good relief for ~ 4 weeks.  Patient has attended two sessions of physical therapy at this point.  Relying on cane more for ambulation.  No new injury in the interim.    Interim History - 2023  Since last visit on 2023, patient has noticed a return in left anterior knee pain.  Left knee corticosteroid injection completed on 2023 provided 4 months of full relief. Pain with walking and uses a cane for ambulation. Had been using icy hot but was only temporary relief. No new injury in the interim. Is hopeful for a repeat corticosteroid injection today.    **Would like to discuss bracing options.**    Review of Systems  Musculoskeletal: as above  Remainder of review of systems is negative including constitutional, CV, pulmonary, GI, Skin and Neurologic except as noted in HPI or medical history.    Past Medical History:   Diagnosis Date    Abnormal cardiovascular stress test 2/3/2019    9/10/2018 Lexiscan: \"Myocardial perfusion imaging using single isotope technique " "demonstrated a small perfusion defect of mild severity involving the basal inferior wall which is mostly reversible and may be consistent with mild ischemia in the right coronary artery distribution. In addition, transient ischemic dilatation is noted with a TID ratio of 1.3. \"    Adhesive capsulitis of shoulder     left     Adjustment disorder with anxiety 3/18/2016    B12 deficiency anemia 6/20/2012    Chest pain 2004    Chronic right sided/axillary discomfort (musculoskeletal) Atypical substernal (possibly GERD). Negative cardiolite 2004.    Colitis, Clostridium difficile 4/18/2012    Diverticulitis 2009    Headache(784.0) 2001    Tension type. MRI 2001 normal.    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    Status post coronary angiogram 2/27/2019    Syncope and collapse 9/10/2018     Past Surgical History:   Procedure Laterality Date    ARTHROPLASTY KNEE  3/26/2012    Procedure:ARTHROPLASTY KNEE; Right Total Knee Arthroplasty; Surgeon:BERNADINE ROSAS; Location:[place]    CHOLECYSTECTOMY      CV HEART CATHETERIZATION WITH POSSIBLE INTERVENTION N/A 2/27/2019    Procedure: Coronary Angiogram with Left ventriculogram;  Surgeon: Leandro Carpio MD;  Location:  HEART CARDIAC CATH LAB    HERNIA REPAIR      Hernia Repair    HYSTERECTOMY, PAP NO LONGER INDICATED  1983    TVH/BSO    SURGICAL HISTORY OF -   10/08    A & P Repair, Dr. Hannah    [facility] APPENDECTOMY  1953     Family History   Problem Relation Age of Onset    C.A.D. Mother     Diabetes Mother     Cancer - colorectal Mother     Arthritis Mother     Heart Disease Mother     Lipids Brother     Obesity Brother     Hypertension Brother     Allergies Son     Eye Disorder Son         cataract    Thyroid Disease Sister         graves dz    Eye Disorder Son     Leukemia Son     Alcohol/Drug Father      Objective  BP (!) " 143/63   Pulse 88   Wt 70.2 kg (154 lb 12.8 oz)   LMP  (LMP Unknown)   BMI 26.57 kg/m      General: healthy, alert and in no distress    HEENT: no scleral icterus or conjunctival erythema   Skin: no suspicious lesions or rash. No jaundice.   CV: regular rhythm by palpation, 2+ distal pulses, no pedal edema    Resp: normal respiratory effort without conversational dyspnea   Psych: normal mood and affect    Gait: antalgic, appropriate coordination and balance   Neuro: normal light touch sensory exam of the extremities. Motor strength as noted below     Left Knee exam - similar to previous    ROM:        Flexion lacks 25 degrees       Extension lacks 10 degrees    Inspection:       no visible ecchymosis        effusion noted small/moderate by palpation    Skin:       no visible deformities       well perfused       capillary refill brisk    Patellar Motion:        Normal patellar tracking noted through range of motion       Crepitus noted in the patellofemoral joint    Tender:        lateral patellar border       medial joint line       lateral joint line    Non Tender:         remainder of knee area    Special Tests:        neg (-) varus at 0 deg and 30 deg       neg (-) valgus at 0 deg and 30 deg    Radiology  Recent Results (from the past 744 hour(s))   XR Knee Standing AP Bilat Aucilla Bilat Lat Left    Narrative    KNEE STANDING AP BILATERAL SUNRISE BILATERAL LATERAL LEFT  4/21/2022  1:32 PM     HISTORY: Left knee pain. Fall at home.    COMPARISON: 2/16/2022 x-ray.      Impression    IMPRESSION: Right total knee arthroplasty in place. No evidence of  complication. Advanced medial and mild to moderate lateral compartment  joint space narrowing. Mild-to-moderate patellofemoral degenerative  changes with very small joint effusion. No acute fracture. No  significant change.    BIJU GALICIA MD         SYSTEM ID:  [id number]       Large Joint Injection/Arthocentesis: L knee joint    Date/Time: 11/14/2023 2:49  PM    Performed by: Tho Newman DO  Authorized by: Tho Newman DO    Indications:  Pain  Needle Size:  22 G  Guidance: ultrasound    Approach:  Superolateral  Location:  Knee      Medications:  40 mg triamcinolone 40 MG/ML; 3 mL ROPivacaine 5 MG/ML  Aspirate amount (mL):  23  Aspirate:  Clear and yellow  Outcome:  Tolerated well, no immediate complications  Procedure discussed: discussed risks, benefits, and alternatives    Consent Given by:  Patient  Timeout: timeout called immediately prior to procedure    Prep: patient was prepped and draped in usual sterile fashion     Ultrasound images of procedure were permanently stored.       Assessment:  1. Primary osteoarthritis of left knee    2. Effusion of left knee    3. Chronic pain of left knee        Plan:  Discussed the assessment with the patient.  Follow up: 2 weeks if still in significant pain, otherwise as needed  Recurrent exacerbation of underlying OA, no concerning clinical or radiographic signs of fracture  XR images independently visualized and reviewed with patient today in clinic  Assistive device options reviewed  PT options reviewed  Oral Tylenol and topical Voltaren gel reviewed as safe OTC options, reviewed safe dosing strategies  US guided CSI with aspiration repeated today, good relief for 2-3 mo last time  Again reviewed the option today for consulting with orthopedic surgery for consideration of TKA based on clinical progress, she is hopeful to avoid which is reasonable, and may not be a viable surgical candidate  RICE and compression options reviewed  Expectations and goals of CSI reviewed  Often 2-3 days for steroid effect, and can take up to two weeks for maximum effect  We discussed modified progressive pain-free activity as tolerated  Do not overuse in first two weeks if feeling better due to concern for vulnerability while steroid is working  Supportive care reviewed  All questions were answered today  Contact us  with additional questions or concerns  Signs and sx of concern reviewed      Tho Newman DO, OZZIE  Sports Medicine Physician  [facility]              Disclaimer: This note consists of symbols derived from keyboarding, dictation and/or voice recognition software. As a result, there may be errors in the script that have gone undetected. Please consider this when interpreting information found in this chart.

## 2023-11-10 NOTE — PROGRESS NOTES
Daniela Brown  :  1933  DOS: 23  MRN: [id number]    Sports Medicine Clinic Visit    PCP: Cynthia Maddox    Daniela Brown is a 89 year old Right hand dominant female who is seen in follow-up presenting with left knee pain and a new concern of right shoulder pain.    Injury: About a month ago, started to notice right shoulder pain and lack of ROM.  Pain located over lateral right shoulder pain with radiation into her right wrist. Additional Features:  Positive: lack of ROM and painful.  Symptoms are better with nothing.  Symptoms are worse with: gripping and lifting.  Other evaluation and/or treatments so far consists of: voltaren with little relief.  Recent imaging completed: No recent imaging completed.  Prior History of related problems: no    She also notes her left knee is painful once again. She is currently taking medication for her arthritis which has been helpful for her knees. Pain with walking. Corticosteroid injection of left knee performed on 22 allowed for 2 weeks of relief.     Social History: retired     Interim History - 2023  Since last visit on 22 patient has worsening moderate left knee pain with intermittent swelling over the past 2 - 3 months.  Left knee steroid injection completed on 22 provided relief for ~ 4 months.  Patient notes increased pain with going from a sit to stand position.  No new injury in the interim.    Second complaint left anterior lateral shoulder, glenohumeral joint pain over the past ~ 3 months.  Pain is worse with shoulder flexion/abduction, reaching, and lying on left shoulder.  She would like to discuss a steroid injection today.    Review of Systems  Musculoskeletal: as above  Remainder of review of systems is negative including constitutional, CV, pulmonary, GI, Skin and Neurologic except as noted in HPI or medical history.    Past Medical History:   Diagnosis Date     Abnormal cardiovascular stress test  "2/3/2019    9/10/2018 Lexiscan: \"Myocardial perfusion imaging using single isotope technique demonstrated a small perfusion defect of mild severity involving the basal inferior wall which is mostly reversible and may be consistent with mild ischemia in the right coronary artery distribution. In addition, transient ischemic dilatation is noted with a TID ratio of 1.3. \"     Adhesive capsulitis of shoulder     left      Adjustment disorder with anxiety 3/18/2016     B12 deficiency anemia 6/20/2012     Chest pain 2004    Chronic right sided/axillary discomfort (musculoskeletal) Atypical substernal (possibly GERD). Negative cardiolite 2004.     Colitis, Clostridium difficile 4/18/2012     Diverticulitis 2009     Headache(784.0) 2001    Tension type. MRI 2001 normal.     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     Status post coronary angiogram 2/27/2019     Syncope and collapse 9/10/2018     Past Surgical History:   Procedure Laterality Date     ARTHROPLASTY KNEE  3/26/2012    Procedure:ARTHROPLASTY KNEE; Right Total Knee Arthroplasty; Surgeon:BERNADINE ROSAS; Location:[place] OR     CHOLECYSTECTOMY       CV HEART CATHETERIZATION WITH POSSIBLE INTERVENTION N/A 2/27/2019    Procedure: Coronary Angiogram with Left ventriculogram;  Surgeon: Leandro Carpio MD;  Location:  HEART CARDIAC CATH LAB     HERNIA REPAIR      Hernia Repair     HYSTERECTOMY, PAP NO LONGER INDICATED  1983    TVH/BSO     SURGICAL HISTORY OF -   10/08    A & P Repair, Dr. Hannah     [facility] APPENDECTOMY  1953     Family History   Problem Relation Age of Onset     C.A.D. Mother      Diabetes Mother      Cancer - colorectal Mother      Arthritis Mother      Heart Disease Mother      Lipids Brother      Obesity Brother      Hypertension Brother      Allergies Son      Eye Disorder Son         cataract     " Thyroid Disease Sister         graves dz     Eye Disorder Son      Leukemia Son      Alcohol/Drug Father        Objective  LMP  (LMP Unknown)     General: healthy, alert and in no distress    HEENT: no scleral icterus or conjunctival erythema   Skin: no suspicious lesions or rash. No jaundice.   CV: regular rhythm by palpation, 2+ distal pulses, no pedal edema    Resp: normal respiratory effort without conversational dyspnea   Psych: normal mood and affect    Gait: nonantalgic, appropriate coordination and balance   Neuro: normal light touch sensory exam of the extremities. Motor strength as noted below       Left Shoulder exam    ROM:        forward flexion 100        abduction 100       internal rotation limited       external rotation 20    Tender:        subacromial space mild       posterior shoulder       Anterior GH joint focal    Non Tender:       remainder of shoulder       sternoclavicular joint       acromioclavicular joint       periscapular region    Strength:        abduction 4/5       internal rotation 4/5       external rotation 4/5       adduction 4/5    Impingement testing:        neg (-) Neer       Mildly painful [place]       Mildly painful empty can       positive (+) crossover       positive (+) crank    Stability testing:       neg (-) anterior glide       neg (-) sulcus sign    Skin:       no visible deformities       well perfused       capillary refill brisk    Sensation:        normal sensation over shoulder and upper extremity     Left Knee exam     ROM:        Flexion lacks 20 degrees       Extension lacks 5 degrees     Inspection:       no visible ecchymosis        effusion noted small/moderate by palpation     Skin:       no visible deformities       well perfused       capillary refill brisk     Patellar Motion:        Normal patellar tracking noted through range of motion       Crepitus noted in the patellofemoral joint     Tender:        medial joint line       lateral joint line      Non Tender:         remainder of knee area     Special Tests:        neg (-) varus at 0 deg and 30 deg       neg (-) valgus at 0 deg and 30 deg    Radiology  Recent Results (from the past 744 hour(s))   XR Shoulder Right G/E 3 Views    Narrative    SHOULDER RIGHT THREE OR MORE VIEWS   8/2/2022 1:38 PM     HISTORY: Acute pain of right shoulder.    COMPARISON: None.      Impression    IMPRESSION: Small amount of calcification just distal to the lateral  tip of the acromion process. This could be within the  supraspinatus/infraspinatus tendon or overlying subacromial/subdeltoid  bursa, so clinical correlation for possible bursitis needed. Mild  osteoarthrosis of the AC joint. Diffuse bone demineralization.  Otherwise negative.    NADINE HARO MD         SYSTEM ID:  [id number]     KNEE STANDING AP BILATERAL SUNRISE BILATERAL LATERAL LEFT  4/21/2022  1:32 PM      HISTORY: Left knee pain. Fall at home.     COMPARISON: 2/16/2022 x-ray.         Impression     IMPRESSION: Right total knee arthroplasty in place. No evidence of  complication. Advanced medial and mild to moderate lateral compartment  joint space narrowing. Mild-to-moderate patellofemoral degenerative  changes with very small joint effusion. No acute fracture. No  significant change.     BIJU GALICIA MD      Recent Results (from the past 744 hour(s))   XR Shoulder Left G/E 3 Views    Narrative    XR SHOULDER LEFT G/E 3 VIEWS 4/4/2023 2:29 PM     HISTORY: Pain in joint of left shoulder    COMPARISON: None.       Impression    IMPRESSION:    Severe glenohumeral and moderate acromioclavicular joint  osteoarthrosis. Small calcification at the superior aspect of the  greater tuberosity, which could reflect calcific tendinopathy.  Calcifications projecting over the proximal humerus, which are  nonspecific however may be within the biceps tendon sheath.  Osteopenia. No acute fracture or dislocation.    NYA REICH MD         SYSTEM ID:  [id number]          Procedures    Assessment:  No diagnosis found.      Plan:  Discussed the assessment with the patient.  Follow up: As needed based on short-term clinical progress  Recurrence of left knee pain, combination of viscosupplementation and corticosteroid helped for 2 to 4 weeks, now swelling is returned  Repeat ultrasound-guided corticosteroid injection to the left knee today, with aspiration  Ultrasound-guided aspiration of the left glenohumeral joint as well for flare of underlying severe osteoarthritis in the glenohumeral joint  XR images independently visualized and reviewed with patient today in clinic  Can consider alternative injection locations in the future based on progress  Physical therapy options and low impact activity strategies reviewed  Expectations and goals of CSI reviewed  Often 2-3 days for steroid effect, and can take up to two weeks for maximum effect  We discussed modified progressive pain-free activity as tolerated  Do not overuse in first two weeks if feeling better due to concern for vulnerability while steroid is working  Supportive care reviewed  All questions were answered today  Contact us with additional questions or concerns  Signs and sx of concern reviewed      Tho Newman DO, OZZIE  Sports Medicine Physician  [facility]      Time spent in chart review, one-on-one evaluation, discussion with patient regarding: nature of problem, clinical course, prior treatments, therapeutic options, shared-decision making, potential procedures and referrals, and charting related to the visit: 32 minutes.  If applicable, time does not include time spent performing any procedure.        Disclaimer: This note consists of symbols derived from keyboarding, dictation and/or voice recognition software. As a result, there may be errors in the script that have gone undetected. Please consider this when interpreting information found in this chart.

## 2023-11-14 ENCOUNTER — OFFICE VISIT (OUTPATIENT)
Dept: ORTHOPEDICS | Facility: CLINIC | Age: 88
End: 2023-11-14
Payer: MEDICARE

## 2023-11-14 VITALS
HEART RATE: 88 BPM | WEIGHT: 154.8 LBS | DIASTOLIC BLOOD PRESSURE: 63 MMHG | BODY MASS INDEX: 26.57 KG/M2 | SYSTOLIC BLOOD PRESSURE: 143 MMHG

## 2023-11-14 DIAGNOSIS — M25.462 EFFUSION OF LEFT KNEE: ICD-10-CM

## 2023-11-14 DIAGNOSIS — M25.562 CHRONIC PAIN OF LEFT KNEE: ICD-10-CM

## 2023-11-14 DIAGNOSIS — G89.29 CHRONIC PAIN OF LEFT KNEE: ICD-10-CM

## 2023-11-14 DIAGNOSIS — M17.12 PRIMARY OSTEOARTHRITIS OF LEFT KNEE: Primary | ICD-10-CM

## 2023-11-14 PROCEDURE — 20611 DRAIN/INJ JOINT/BURSA W/US: CPT | Mod: LT | Performed by: FAMILY MEDICINE

## 2023-11-14 RX ORDER — TRIAMCINOLONE ACETONIDE 40 MG/ML
40 INJECTION, SUSPENSION INTRA-ARTICULAR; INTRAMUSCULAR
Status: DISCONTINUED | OUTPATIENT
Start: 2023-11-14 | End: 2023-12-14

## 2023-11-14 RX ORDER — ROPIVACAINE HYDROCHLORIDE 5 MG/ML
3 INJECTION, SOLUTION EPIDURAL; INFILTRATION; PERINEURAL
Status: DISCONTINUED | OUTPATIENT
Start: 2023-11-14 | End: 2023-12-14

## 2023-11-14 RX ADMIN — ROPIVACAINE HYDROCHLORIDE 3 ML: 5 INJECTION, SOLUTION EPIDURAL; INFILTRATION; PERINEURAL at 14:49

## 2023-11-14 RX ADMIN — TRIAMCINOLONE ACETONIDE 40 MG: 40 INJECTION, SUSPENSION INTRA-ARTICULAR; INTRAMUSCULAR at 14:49

## 2023-11-14 ASSESSMENT — PAIN SCALES - GENERAL: PAINLEVEL: SEVERE PAIN (6)

## 2023-11-14 NOTE — LETTER
2023         RE: Daniela Brown  [address] [address]        Dear Colleague,    Thank you for referring your patient, Daniela Brown, to the [facility] [place]. Please see a copy of my visit note below.    Daniela Brown  :  1933  DOS: 2023  MRN: [id number]    Sports Medicine Clinic Visit    PCP: Cynthia Maddox    Daniela Brown is a 89 year old female who is seen in follow-up presenting with acute on chronic left knee pain.    Interim History - 2022  Since last visit on 3/10/2022 patient has moderate-severe left knee with swelling after falling twice @ home in the past 2 weeks, most recently on 3/31/22 landing directly on her knees.  Left knee SynviscOne injection completed on 3/10/22 was providing good relief until her falls.  She notes increased pain with going from sit to stand and walking.  Moderate joint effusion noted today.  She would like repeat imaging to rule out new injury.    Interim History - 2022  Since last visit on 2022 patient has moderate left knee and swelling over the past ~ 3+ weeks.  Left knee aspiration and steroid injection completed on 22 provided good relief for ~ 4 weeks.  Patient has attended two sessions of physical therapy at this point.  Relying on cane more for ambulation.  No new injury in the interim.    Interim History - 2023  Since last visit on 2023, patient has noticed a return in left anterior knee pain.  Left knee corticosteroid injection completed on 2023 provided 4 months of full relief. Pain with walking and uses a cane for ambulation. Had been using icy hot but was only temporary relief. No new injury in the interim. Is hopeful for a repeat corticosteroid injection today.    **Would like to discuss bracing options.**    Review of Systems  Musculoskeletal: as above  Remainder of review of systems is  "negative including constitutional, CV, pulmonary, GI, Skin and Neurologic except as noted in HPI or medical history.    Past Medical History:   Diagnosis Date     Abnormal cardiovascular stress test 2/3/2019    9/10/2018 Lexiscan: \"Myocardial perfusion imaging using single isotope technique demonstrated a small perfusion defect of mild severity involving the basal inferior wall which is mostly reversible and may be consistent with mild ischemia in the right coronary artery distribution. In addition, transient ischemic dilatation is noted with a TID ratio of 1.3. \"     Adhesive capsulitis of shoulder     left      Adjustment disorder with anxiety 3/18/2016     B12 deficiency anemia 6/20/2012     Chest pain 2004    Chronic right sided/axillary discomfort (musculoskeletal) Atypical substernal (possibly GERD). Negative cardiolite 2004.     Colitis, Clostridium difficile 4/18/2012     Diverticulitis 2009     Headache(784.0) 2001    Tension type. MRI 2001 normal.     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     Status post coronary angiogram 2/27/2019     Syncope and collapse 9/10/2018     Past Surgical History:   Procedure Laterality Date     ARTHROPLASTY KNEE  3/26/2012    Procedure:ARTHROPLASTY KNEE; Right Total Knee Arthroplasty; Surgeon:BERNADINE ROSAS; Location:[place] OR     CHOLECYSTECTOMY       CV HEART CATHETERIZATION WITH POSSIBLE INTERVENTION N/A 2/27/2019    Procedure: Coronary Angiogram with Left ventriculogram;  Surgeon: Leandro Carpio MD;  Location:  HEART CARDIAC CATH LAB     HERNIA REPAIR      Hernia Repair     HYSTERECTOMY, PAP NO LONGER INDICATED  1983    TVH/BSO     SURGICAL HISTORY OF -   10/08    A & P Repair, Dr. Camden INFANTEPAUL APPENDECTOMY  1953     Family History   Problem Relation Age of Onset     C.A.D. Mother      Diabetes Mother      Cancer - " colorectal Mother      Arthritis Mother      Heart Disease Mother      Lipids Brother      Obesity Brother      Hypertension Brother      Allergies Son      Eye Disorder Son         cataract     Thyroid Disease Sister         graves dz     Eye Disorder Son      Leukemia Son      Alcohol/Drug Father      Objective  BP (!) 143/63   Pulse 88   Wt 70.2 kg (154 lb 12.8 oz)   LMP  (LMP Unknown)   BMI 26.57 kg/m      General: healthy, alert and in no distress    HEENT: no scleral icterus or conjunctival erythema   Skin: no suspicious lesions or rash. No jaundice.   CV: regular rhythm by palpation, 2+ distal pulses, no pedal edema    Resp: normal respiratory effort without conversational dyspnea   Psych: normal mood and affect    Gait: antalgic, appropriate coordination and balance   Neuro: normal light touch sensory exam of the extremities. Motor strength as noted below     Left Knee exam - similar to previous    ROM:        Flexion lacks 25 degrees       Extension lacks 10 degrees    Inspection:       no visible ecchymosis        effusion noted small/moderate by palpation    Skin:       no visible deformities       well perfused       capillary refill brisk    Patellar Motion:        Normal patellar tracking noted through range of motion       Crepitus noted in the patellofemoral joint    Tender:        lateral patellar border       medial joint line       lateral joint line    Non Tender:         remainder of knee area    Special Tests:        neg (-) varus at 0 deg and 30 deg       neg (-) valgus at 0 deg and 30 deg    Radiology  Recent Results (from the past 744 hour(s))   XR Knee Standing AP Bilat [place] Bilat Lat Left    Narrative    KNEE STANDING AP BILATERAL [place] BILATERAL LATERAL LEFT  4/21/2022  1:32 PM     HISTORY: Left knee pain. Fall at home.    COMPARISON: 2/16/2022 x-ray.      Impression    IMPRESSION: Right total knee arthroplasty in place. No evidence of  complication. Advanced medial and mild to  moderate lateral compartment  joint space narrowing. Mild-to-moderate patellofemoral degenerative  changes with very small joint effusion. No acute fracture. No  significant change.    BIJU GALICIA MD         SYSTEM ID:  [id number]       Large Joint Injection/Arthocentesis: L knee joint    Date/Time: 11/14/2023 2:49 PM    Performed by: Tho Newman DO  Authorized by: Tho Newman DO    Indications:  Pain  Needle Size:  22 G  Guidance: ultrasound    Approach:  Superolateral  Location:  Knee      Medications:  40 mg triamcinolone 40 MG/ML; 3 mL ROPivacaine 5 MG/ML  Aspirate amount (mL):  23  Aspirate:  Clear and yellow  Outcome:  Tolerated well, no immediate complications  Procedure discussed: discussed risks, benefits, and alternatives    Consent Given by:  Patient  Timeout: timeout called immediately prior to procedure    Prep: patient was prepped and draped in usual sterile fashion     Ultrasound images of procedure were permanently stored.       Assessment:  1. Primary osteoarthritis of left knee    2. Effusion of left knee    3. Chronic pain of left knee        Plan:  Discussed the assessment with the patient.  Follow up: 2 weeks if still in significant pain, otherwise as needed  Recurrent exacerbation of underlying OA, no concerning clinical or radiographic signs of fracture  XR images independently visualized and reviewed with patient today in clinic  Assistive device options reviewed  PT options reviewed  Oral Tylenol and topical Voltaren gel reviewed as safe OTC options, reviewed safe dosing strategies  US guided CSI with aspiration repeated today, good relief for 2-3 mo last time  Again reviewed the option today for consulting with orthopedic surgery for consideration of TKA based on clinical progress, she is hopeful to avoid which is reasonable, and may not be a viable surgical candidate  RICE and compression options reviewed  Expectations and goals of CSI reviewed  Often 2-3 days  for steroid effect, and can take up to two weeks for maximum effect  We discussed modified progressive pain-free activity as tolerated  Do not overuse in first two weeks if feeling better due to concern for vulnerability while steroid is working  Supportive care reviewed  All questions were answered today  Contact us with additional questions or concerns  Signs and sx of concern reviewed      Tho Newman DO, OZZIE  Sports Medicine Physician  [facility]              Disclaimer: This note consists of symbols derived from keyboarding, dictation and/or voice recognition software. As a result, there may be errors in the script that have gone undetected. Please consider this when interpreting information found in this chart.      Again, thank you for allowing me to participate in the care of your patient.        Sincerely,        Tho Newman DO

## 2023-11-17 DIAGNOSIS — Z53.9 DIAGNOSIS NOT YET DEFINED: Primary | ICD-10-CM

## 2023-11-17 PROCEDURE — G0179 MD RECERTIFICATION HHA PT: HCPCS | Performed by: INTERNAL MEDICINE

## 2023-11-21 ENCOUNTER — PATIENT OUTREACH (OUTPATIENT)
Dept: CARE COORDINATION | Facility: CLINIC | Age: 88
End: 2023-11-21
Payer: MEDICARE

## 2023-11-21 NOTE — LETTER
[facility] COORDINATION  [address] [address]    November 21, 2023    Daniela Brown  [address] [address]      Dear Daniela,    I have been attempting to reach you since our last contact. I would like to continue to work with you and provide any additional support you may need on achieving your health care related goals. I would appreciate if you would give me a call at [phone] to let me know if you would like to continue working together. I know that there are many things that can affect our ability to communicate and I hope we can continue to work together.    All of us at the [facility] are invested in your health and are here to assist you in meeting your goals.     Sincerely,    ABDOULAYE Edwards

## 2023-11-21 NOTE — PROGRESS NOTES
Clinic Care Coordination Contact  [facility]/Voicemail    Clinical Data: Care Coordinator Outreach    Outreach Documentation Number of Outreach Attempt   11/21/2023  10:31 AM 2       Voicemail not set up, unable to leave a message.    Plan: Care Coordinator will send unable to contact letter with care coordinator contact information via mail. Care Coordinator will try to reach patient again in 1 month.    ABDOULAYE Thompson  [facility] Care Coordination  [facility]  [phone]

## 2023-12-14 ENCOUNTER — OFFICE VISIT (OUTPATIENT)
Dept: FAMILY MEDICINE | Facility: CLINIC | Age: 88
End: 2023-12-14
Payer: MEDICARE

## 2023-12-14 VITALS
HEART RATE: 82 BPM | RESPIRATION RATE: 14 BRPM | OXYGEN SATURATION: 99 % | DIASTOLIC BLOOD PRESSURE: 62 MMHG | BODY MASS INDEX: 26.12 KG/M2 | TEMPERATURE: 98.4 F | SYSTOLIC BLOOD PRESSURE: 146 MMHG | WEIGHT: 153 LBS | HEIGHT: 64 IN

## 2023-12-14 DIAGNOSIS — N39.0 RECURRENT UTI: ICD-10-CM

## 2023-12-14 DIAGNOSIS — Z23 NEED FOR VACCINATION: ICD-10-CM

## 2023-12-14 DIAGNOSIS — N30.00 ACUTE CYSTITIS WITHOUT HEMATURIA: Primary | ICD-10-CM

## 2023-12-14 DIAGNOSIS — I25.119 CORONARY ARTERY DISEASE INVOLVING NATIVE CORONARY ARTERY OF NATIVE HEART WITH ANGINA PECTORIS (H): ICD-10-CM

## 2023-12-14 DIAGNOSIS — N95.2 ATROPHIC VAGINITIS: ICD-10-CM

## 2023-12-14 DIAGNOSIS — E11.42 TYPE 2 DIABETES MELLITUS WITH DIABETIC POLYNEUROPATHY, WITHOUT LONG-TERM CURRENT USE OF INSULIN (H): ICD-10-CM

## 2023-12-14 LAB
ALBUMIN UR-MCNC: NEGATIVE MG/DL
APPEARANCE UR: ABNORMAL
BACTERIA #/AREA URNS HPF: ABNORMAL /HPF
BILIRUB UR QL STRIP: NEGATIVE
COLOR UR AUTO: YELLOW
GLUCOSE UR STRIP-MCNC: NEGATIVE MG/DL
HGB UR QL STRIP: NEGATIVE
KETONES UR STRIP-MCNC: NEGATIVE MG/DL
LEUKOCYTE ESTERASE UR QL STRIP: ABNORMAL
MUCOUS THREADS #/AREA URNS LPF: PRESENT /LPF
NITRATE UR QL: POSITIVE
PH UR STRIP: 5.5 [PH] (ref 5–7)
RBC #/AREA URNS AUTO: ABNORMAL /HPF
SP GR UR STRIP: 1.02 (ref 1–1.03)
SQUAMOUS #/AREA URNS AUTO: ABNORMAL /LPF
UROBILINOGEN UR STRIP-ACNC: 0.2 E.U./DL
WBC #/AREA URNS AUTO: ABNORMAL /HPF
WBC CLUMPS #/AREA URNS HPF: PRESENT /HPF

## 2023-12-14 PROCEDURE — 87186 SC STD MICRODIL/AGAR DIL: CPT | Performed by: INTERNAL MEDICINE

## 2023-12-14 PROCEDURE — 91320 SARSCV2 VAC 30MCG TRS-SUC IM: CPT | Performed by: INTERNAL MEDICINE

## 2023-12-14 PROCEDURE — 81001 URINALYSIS AUTO W/SCOPE: CPT | Performed by: INTERNAL MEDICINE

## 2023-12-14 PROCEDURE — 90480 ADMN SARSCOV2 VAC 1/ONLY CMP: CPT | Performed by: INTERNAL MEDICINE

## 2023-12-14 PROCEDURE — 87086 URINE CULTURE/COLONY COUNT: CPT | Performed by: INTERNAL MEDICINE

## 2023-12-14 PROCEDURE — 99214 OFFICE O/P EST MOD 30 MIN: CPT | Performed by: INTERNAL MEDICINE

## 2023-12-14 RX ORDER — ESTRADIOL 0.1 MG/G
2 CREAM VAGINAL
Qty: 42.5 G | Refills: 11 | Status: SHIPPED | OUTPATIENT
Start: 2023-12-14

## 2023-12-14 RX ORDER — CEFDINIR 300 MG/1
300 CAPSULE ORAL 2 TIMES DAILY
Qty: 14 CAPSULE | Refills: 0 | Status: SHIPPED | OUTPATIENT
Start: 2023-12-14 | End: 2023-12-21

## 2023-12-14 ASSESSMENT — PAIN SCALES - GENERAL: PAINLEVEL: NO PAIN (0)

## 2023-12-14 ASSESSMENT — PATIENT HEALTH QUESTIONNAIRE - PHQ9
SUM OF ALL RESPONSES TO PHQ QUESTIONS 1-9: 2
10. IF YOU CHECKED OFF ANY PROBLEMS, HOW DIFFICULT HAVE THESE PROBLEMS MADE IT FOR YOU TO DO YOUR WORK, TAKE CARE OF THINGS AT HOME, OR GET ALONG WITH OTHER PEOPLE: NOT DIFFICULT AT ALL
SUM OF ALL RESPONSES TO PHQ QUESTIONS 1-9: 2

## 2023-12-14 NOTE — PATIENT INSTRUCTIONS
Bladder infection  1. Recommend to start antibiotic Cefdnir twice daily x 1 week    Bladder infection prevention  Start estrogen cream twice a week at bedtime.  You need to use this long term to help 'strengthen' the skin near the urethra to prevent bacteria from entering the bladder    Diabetes/Medicare Visit  Recommend to follow-up for Medicare visit in the next few months, lab prior to visit    Vaccines  Covid shot today, flu shot next week based on your preferences

## 2023-12-14 NOTE — PROGRESS NOTES
Assessment & Plan     Acute cystitis without hematuria  UA is suggestive of UTI.  Patient is a bit fuzzy on the timeline of her symptoms but she has a history of recurrent UTI and urosepsis.  Start antibiotic.  She has tolerated the cefdinir several times in the past although cephalosporins are listed as an allergy  - UA Macroscopic with reflex to Microscopic and Culture - Clinic Collect  - Urine Microscopic Exam  - Urine Culture  - cefdinir (OMNICEF) 300 MG capsule; Take 1 capsule (300 mg) by mouth 2 times daily for 7 days    Atrophic vaginitis  We have discussed UTI prevention at many previous visits.  I recommended seeing urology and or infectious disease but she is declined.  Discussed topical estrogen and she is agreeable  - estradiol (ESTRACE) 0.1 MG/GM vaginal cream; Place 2 g vaginally twice a week    Recurrent UTI  - estradiol (ESTRACE) 0.1 MG/GM vaginal cream; Place 2 g vaginally twice a week    Need for vaccination  - COVID-19 12+ (2023-24) (PFIZER)    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)  due  - Lipid panel reflex to direct LDL Non-fasting; Future    Type 2 diabetes mellitus with diabetic polyneuropathy, without long-term current use of insulin (H)  Due   - HEMOGLOBIN A1C; Future  - CBC with platelets; Future  - Basic metabolic panel  (Ca, Cl, CO2, Creat, Gluc, K, Na, BUN); Future        Patient Instructions   Bladder infection  1. Recommend to start antibiotic Cefdnir twice daily x 1 week    Bladder infection prevention  Start estrogen cream twice a week at bedtime.  You need to use this long term to help 'strengthen' the skin near the urethra to prevent bacteria from entering the bladder    Diabetes/Medicare Visit  Recommend to follow-up for Medicare visit in the next few months, lab prior to visit    Vaccines  Covid shot today, flu shot next week based on your preferences    Cynthia Maddox, [facility]  ISMAEL Finnegan is a 89 year old, presenting for the following health issues:  Bladder Problems        12/14/2023    12:39 PM   Additional Questions   Roomed by Kaya IZQUIERDO   Accompanied by self         12/14/2023    12:39 PM   Patient Reported Additional Medications   Patient reports taking the following new medications No new meds     Discuss vaccines, unsure about getting.       HPI     Genitourinary - Female  Onset/Duration: About a month   Description:   Painful urination (Dysuria): No           Frequency: YES- worst at night   Blood in urine (Hematuria): No  Delay in urine (Hesitency): YES  Intensity: mild  Progression of Symptoms:  worsening  Accompanying Signs & Symptoms:  Fever/chills: No  Flank pain: YES- occasional   Nausea and vomiting: No  Vaginal symptoms: none  Abdominal/Pelvic Pain: YES- just when have to go feeling urgent. Comes on fast   History:   History of frequent UTI s: YES  History of kidney stones: No  Sexually Active: No  Possibility of pregnancy: No  Precipitating or alleviating factors: None  Therapies tried and outcome: no   --she has taken macrobid and cefdinir w/out complication  --she reports urinary urgency, daron at night.    Social  --she reports her son has not talked to her in 3+ months.  She requests we not contact him or share HPI with him or his wife    Current Outpatient Medications   Medication Sig Dispense Refill    acetaminophen (TYLENOL) 500 MG tablet Take 500-1,000 mg by mouth every 6 hours as needed for mild pain or pain      amLODIPine (NORVASC) 5 MG tablet TAKE 1 TABLET BY MOUTH ONCE DAILY 90 tablet 1    buPROPion (WELLBUTRIN XL) 150 MG 24 hr tablet Take 1 tablet (150 mg) by mouth every morning 90 tablet 3    meloxicam (MOBIC) 7.5 MG tablet Take 1 tablet (7.5 mg) by mouth daily 90 tablet 1    Multiple Vitamins-Minerals (THERAPEUTIC MULTIVIT/MINERAL) TABS Take 1 tablet by mouth every evening       olmesartan (BENICAR) 20 MG tablet Take 1 tablet (20 mg) by  "mouth daily 90 tablet 3    PARoxetine (PAXIL) 20 MG tablet Take 1 tablet (20 mg) by mouth every morning 90 tablet 3    blood glucose (ACCU-CHEK GUIDE) test strip Use to test blood sugar one times daily or as directed. 100 each 1    blood glucose monitoring (ACCU-CHEK FASTCLIX) lancets Use to test blood sugar one times daily or as directed. 102 each 3    blood glucose monitoring (ONE TOUCH ULTRA 2) meter device kit ONCE DAILY AS DIRECTED      STATIN NOT PRESCRIBED (INTENTIONAL) Please choose reason not prescribed, below             Review of Systems   Constitutional, HEENT, cardiovascular, pulmonary, gi and gu systems are negative, except as otherwise noted.      Objective    BP (!) 146/62   Pulse 82   Temp 98.4  F (36.9  C) (Tympanic)   Resp 14   Ht 1.626 m (5' 4\")   Wt 69.4 kg (153 lb)   LMP  (LMP Unknown)   SpO2 99%   BMI 26.26 kg/m    Body mass index is 26.26 kg/m .  Physical Exam   GENERAL APPEARANCE: alert and no distress    Results for orders placed or performed in visit on 12/14/23 (from the past 24 hour(s))   UA Macroscopic with reflex to Microscopic and Culture - Clinic Collect    Specimen: Urine, Clean Catch   Result Value Ref Range    Color Urine Yellow Colorless, Straw, Light Yellow, Yellow    Appearance Urine Slightly Cloudy (A) Clear    Glucose Urine Negative Negative mg/dL    Bilirubin Urine Negative Negative    Ketones Urine Negative Negative mg/dL    Specific Gravity Urine 1.020 1.003 - 1.035    Blood Urine Negative Negative    pH Urine 5.5 5.0 - 7.0    Protein Albumin Urine Negative Negative mg/dL    Urobilinogen Urine 0.2 0.2, 1.0 E.U./dL    Nitrite Urine Positive (A) Negative    Leukocyte Esterase Urine Trace (A) Negative   Urine Microscopic Exam   Result Value Ref Range    Bacteria Urine Many (A) None Seen /HPF    RBC Urine 0-2 0-2 /HPF /HPF    WBC Urine 5-10 (A) 0-5 /HPF /HPF    Squamous Epithelials Urine Few (A) None Seen /LPF    WBC Clumps Urine Present (A) None Seen /HPF    Mucus " Urine Present (A) None Seen /LPF     *Note: Due to a large number of results and/or encounters for the requested time period, some results have not been displayed. A complete set of results can be found in Results Review.

## 2023-12-15 LAB — BACTERIA UR CULT: ABNORMAL

## 2023-12-18 ENCOUNTER — PATIENT OUTREACH (OUTPATIENT)
Dept: CARE COORDINATION | Facility: CLINIC | Age: 88
End: 2023-12-18
Payer: MEDICARE

## 2023-12-18 NOTE — LETTER
[facility]  Patient Centered Plan of Care  About Me:        Patient Name:  Daniela Wetzel    YOB: 1933  Age:         89 year old   Bettie MRN:    [id number] Telephone Information:  Home Phone [phone]   Mobile [phone]       Address:  [address]  [place] [place] [facility] [place] [address] Email address:  No e-mail address on record      Emergency Contact(s)    Name Relationship Lgl Grd Work Phone Home Phone Mobile Phone   1. EDEN WETZEL Son No   [phone]   2. JACE WETZEL (D* Relative No   [phone]   3. JACE WETZEL Daughter-in-Law    [phone]           Primary language:  English     needed? No   [employer]:  [phone] op. 1  Other communication barriers:Glasses  Preferred Method of Communication:  Mail  Current living arrangement: I live alone; I live in a private home  Mobility Status/ Medical Equipment: Independent w/Device    Health Maintenance  Health Maintenance Reviewed: Due/Overdue   Health Maintenance Due   Topic Date Due    ZOSTER IMMUNIZATION (1 of 2) Never done    DTAP/TDAP/TD IMMUNIZATION (1 - Tdap) 01/02/1988    RSV VACCINE (Pregnancy & 60+) (1 - 1-dose 60+ series) Never done    EYE EXAM  12/08/2012    DIABETIC FOOT EXAM  09/02/2022    INFLUENZA VACCINE (1) 09/01/2023    A1C  10/19/2023    MEDICARE ANNUAL WELLNESS VISIT  10/28/2023    LIPID  10/28/2023           My Access Plan  Medical Emergency 911   Primary Clinic Line [facility] - [phone]   24 Hour Appointment Line [phone] or  [phone]) (toll-free)   24 Hour Nurse Line [phone] (toll-free)   Preferred Urgent Care [facility], [phone]     Preferred Hospital [place], [place]  [phone]     Preferred Pharmacy [facility] [place] [place] - [address]     Behavioral Health Crisis Line The National Suicide Prevention Lifeline at 1-471.243.4538 or Text/Call 828           My  Care Team Members  Patient Care Team         Relationship Specialty Notifications Start End    TanCynthia, DO PCP - General Internal Medicine Admissions 11/14/18     Phone: [phone] Pager: [phone] Fax: [phone]        [address] [address]    Tan Cynthia Swati, DO Assigned PCP   10/21/18     Phone: [phone] Pager: [phone] Fax: [phone]        [address] [address]    Tho Newman,  Assigned Musculoskeletal Provider   3/13/22     Phone: [phone] Fax: [phone]         [address] [address]    Gunjan Rowe, PAShelbyC Physician Assistant Dermatology  1/17/23     Phone: [phone] Fax: [phone]         [address] [address]    Leandro Posada MD Assigned Surgical Provider   1/28/23     Phone: [phone] Pager: [phone] Fax: [phone]        [address] [address]    Bharti Riley LSW Lead Care Coordinator Primary Care -  Admissions 7/20/23     Phone: [phone]          [address] [address]    Lisa Canas, W Community Health Worker  Admissions 11/20/23                 My Care Plans  Self Management and Treatment Plan    Care Plan  Care Plan: General       Problem: HP GENERAL PROBLEM       Goal: Resources       Start Date: 7/24/2023 Expected End Date: 12/1/2023    This Visit's Progress: 10%    Priority: Medium    Note:     Barriers: Access  Strengths: Motivated  Patient expressed understanding of goal: Yes    Action steps to achieve this goal:  1. I will look into using [employer].  2. I will look into toe nail home services.   3. I will look into food resources.   4. I will work with care coordination team as needed.  (CHW will mail resources to patient as she doesn't remember receiving them from CP)                              Action Plans on File:                       Advance Care Plans/Directives:   Advanced Care Plan/Directives on  file:   Yes    Status of Document(s): No data recorded  Advanced Care Plan/Directives Type:   Advanced Directive - On File; POLST           My Medical and Care Information  Problem List   Patient Active Problem List   Diagnosis    Degeneration of lumbar or lumbosacral intervertebral disc    Esophageal reflux    Memory loss    Irritable bowel syndrome with diarrhea    Osteopenia of multiple sites    RESTLESS LEG SYNDROME    Primary osteoarthritis involving multiple joints    Diverticulosis of large intestine    SENSONRL HEAR LOSS,BILAT    Urticaria    Subjective tinnitus    Vitamin D deficiency    Right foot pain    Urge incontinence    Hyperlipidemia LDL goal <100    Allergic rhinitis    Pronation of foot    Peripheral neuropathy    Benign essential hypertension    Carpal tunnel syndrome of left wrist    Diastolic dysfunction    Type 2 diabetes mellitus with diabetic polyneuropathy, without long-term current use of insulin (H)    History of recurrent urinary tract infection    CKD (chronic kidney disease) stage 3, GFR 30-59 ml/min (H)    Bilateral wrist pain    Protrusion of lumbar intervertebral disc    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)    Chronic left-sided low back pain with left-sided sciatica    Generalized weakness    Diarrhea    Abnormal CT of the abdomen    Cystocele, midline    B12 deficiency    Moderate major depression (H)    ARABELLA (generalized anxiety disorder)    Polyneuropathy associated with underlying disease (H24)    Closed head injury, initial encounter    Fall, initial encounter    Non-traumatic rhabdomyolysis    Impaired mobility and activities of daily living      Current Medications and Allergies:  See printed Medication Report.    Care Coordination Start Date: 7/19/2023   Frequency of Care Coordination: monthly, more frequently as needed     Form Last Updated: 01/16/2024

## 2023-12-18 NOTE — PROGRESS NOTES
Clinic Care Coordination Contact  Care Coordination Clinician Chart Review    Situation: Patient chart reviewed by Care Coordinator.       Background: Care Coordination Program started: 7/19/2023. Initial assessment completed 7/24/23 and patient-centered care plan(s) were developed with participation from patient. Lead CC handed patient off to CHW for continued outreaches.       Assessment: Per chart review, patient outreach completed by CC CHW on 11/21/23. Patient is actively working to accomplish goal(s). Patient's goal(s) appropriate and relevant at this time.     Patient is not due for updated Plan of Care.      Assessments will be completed annually or as needed/with change of patient status. Post hospital assessment completed 9/13/23. Due 9/13/24.          Care Plan: General       Problem: HP GENERAL PROBLEM       Goal: Resources       Start Date: 7/24/2023 Expected End Date: 12/1/2023    This Visit's Progress: 10%    Priority: Medium    Note:     Barriers: Access  Strengths: Motivated  Patient expressed understanding of goal: Yes    Action steps to achieve this goal:  1. I will look into using [employer].  2. I will look into toe nail home services.   3. I will look into food resources.   4. I will work with care coordination team as needed.  (CHW will mail resources to patient as she doesn't remember receiving them from CP)                                 Plan/Recommendations: The patient will continue working with Care Coordination to achieve goal(s) as above.     CHW will continue outreaches at minimum every 30 days and will involve Lead CC as needed or if patient is ready to move to Maintenance.     Lead CC will continue to monitor CHW outreaches and patient's progress to goal(s) every 6 weeks.     Plan of Care updated and sent to patient: MANJIT Garcia   Social Work Primary Care Clinic Care Coordinator   [facility]  [phone]   [email or web address]

## 2023-12-28 ENCOUNTER — HOSPITAL ENCOUNTER (EMERGENCY)
Facility: CLINIC | Age: 88
Discharge: HOME | End: 2023-12-29
Attending: EMERGENCY MEDICINE | Admitting: EMERGENCY MEDICINE
Payer: MEDICARE

## 2023-12-28 VITALS
HEIGHT: 64 IN | SYSTOLIC BLOOD PRESSURE: 149 MMHG | DIASTOLIC BLOOD PRESSURE: 67 MMHG | WEIGHT: 150 LBS | TEMPERATURE: 97.5 F | OXYGEN SATURATION: 98 % | HEART RATE: 72 BPM | RESPIRATION RATE: 18 BRPM | BODY MASS INDEX: 25.61 KG/M2

## 2023-12-28 DIAGNOSIS — R53.1 WEAKNESS GENERALIZED: ICD-10-CM

## 2023-12-28 LAB
ALBUMIN UR-MCNC: NEGATIVE MG/DL
ANION GAP SERPL CALCULATED.3IONS-SCNC: 13 MMOL/L (ref 7–15)
APPEARANCE UR: CLEAR
BACTERIA #/AREA URNS HPF: ABNORMAL /HPF
BASOPHILS # BLD AUTO: 0.1 10E3/UL (ref 0–0.2)
BASOPHILS NFR BLD AUTO: 0 %
BILIRUB UR QL STRIP: NEGATIVE
BUN SERPL-MCNC: 23.1 MG/DL (ref 8–23)
CALCIUM SERPL-MCNC: 9.8 MG/DL (ref 8.2–9.6)
CHLORIDE SERPL-SCNC: 105 MMOL/L (ref 98–107)
COLOR UR AUTO: YELLOW
CREAT SERPL-MCNC: 1.21 MG/DL (ref 0.51–0.95)
DEPRECATED HCO3 PLAS-SCNC: 23 MMOL/L (ref 22–29)
EGFRCR SERPLBLD CKD-EPI 2021: 42 ML/MIN/1.73M2
EOSINOPHIL # BLD AUTO: 0.1 10E3/UL (ref 0–0.7)
EOSINOPHIL NFR BLD AUTO: 1 %
ERYTHROCYTE [DISTWIDTH] IN BLOOD BY AUTOMATED COUNT: 12.6 % (ref 10–15)
FLUAV RNA SPEC QL NAA+PROBE: NEGATIVE
FLUBV RNA RESP QL NAA+PROBE: NEGATIVE
GLUCOSE SERPL-MCNC: 120 MG/DL (ref 70–99)
GLUCOSE UR STRIP-MCNC: NEGATIVE MG/DL
HCT VFR BLD AUTO: 35.9 % (ref 35–47)
HGB BLD-MCNC: 12.2 G/DL (ref 11.7–15.7)
HGB UR QL STRIP: NEGATIVE
IMM GRANULOCYTES # BLD: 0 10E3/UL
IMM GRANULOCYTES NFR BLD: 0 %
KETONES UR STRIP-MCNC: NEGATIVE MG/DL
LEUKOCYTE ESTERASE UR QL STRIP: ABNORMAL
LYMPHOCYTES # BLD AUTO: 2.5 10E3/UL (ref 0.8–5.3)
LYMPHOCYTES NFR BLD AUTO: 21 %
MAGNESIUM SERPL-MCNC: 2 MG/DL (ref 1.7–2.3)
MCH RBC QN AUTO: 29.1 PG (ref 26.5–33)
MCHC RBC AUTO-ENTMCNC: 34 G/DL (ref 31.5–36.5)
MCV RBC AUTO: 86 FL (ref 78–100)
MONOCYTES # BLD AUTO: 0.7 10E3/UL (ref 0–1.3)
MONOCYTES NFR BLD AUTO: 6 %
MUCOUS THREADS #/AREA URNS LPF: PRESENT /LPF
NEUTROPHILS # BLD AUTO: 8.9 10E3/UL (ref 1.6–8.3)
NEUTROPHILS NFR BLD AUTO: 72 %
NITRATE UR QL: NEGATIVE
NRBC # BLD AUTO: 0 10E3/UL
NRBC BLD AUTO-RTO: 0 /100
PH UR STRIP: 6 [PH] (ref 5–7)
PLATELET # BLD AUTO: 241 10E3/UL (ref 150–450)
POTASSIUM SERPL-SCNC: 4.2 MMOL/L (ref 3.4–5.3)
RBC # BLD AUTO: 4.19 10E6/UL (ref 3.8–5.2)
RBC URINE: 1 /HPF
RSV RNA SPEC NAA+PROBE: NEGATIVE
SARS-COV-2 RNA RESP QL NAA+PROBE: NEGATIVE
SODIUM SERPL-SCNC: 141 MMOL/L (ref 135–145)
SP GR UR STRIP: 1.01 (ref 1–1.03)
SQUAMOUS EPITHELIAL: 1 /HPF
UROBILINOGEN UR STRIP-MCNC: NORMAL MG/DL
WBC # BLD AUTO: 12.4 10E3/UL (ref 4–11)
WBC URINE: 4 /HPF

## 2023-12-28 PROCEDURE — 99284 EMERGENCY DEPT VISIT MOD MDM: CPT | Mod: 25 | Performed by: STUDENT IN AN ORGANIZED HEALTH CARE EDUCATION/TRAINING PROGRAM

## 2023-12-28 PROCEDURE — 93005 ELECTROCARDIOGRAM TRACING: CPT

## 2023-12-28 PROCEDURE — 96360 HYDRATION IV INFUSION INIT: CPT

## 2023-12-28 PROCEDURE — 93010 ELECTROCARDIOGRAM REPORT: CPT | Performed by: STUDENT IN AN ORGANIZED HEALTH CARE EDUCATION/TRAINING PROGRAM

## 2023-12-28 PROCEDURE — 80048 BASIC METABOLIC PNL TOTAL CA: CPT | Performed by: STUDENT IN AN ORGANIZED HEALTH CARE EDUCATION/TRAINING PROGRAM

## 2023-12-28 PROCEDURE — 258N000003 HC RX IP 258 OP 636: Performed by: STUDENT IN AN ORGANIZED HEALTH CARE EDUCATION/TRAINING PROGRAM

## 2023-12-28 PROCEDURE — 36415 COLL VENOUS BLD VENIPUNCTURE: CPT | Performed by: STUDENT IN AN ORGANIZED HEALTH CARE EDUCATION/TRAINING PROGRAM

## 2023-12-28 PROCEDURE — 85025 COMPLETE CBC W/AUTO DIFF WBC: CPT | Performed by: STUDENT IN AN ORGANIZED HEALTH CARE EDUCATION/TRAINING PROGRAM

## 2023-12-28 PROCEDURE — 81001 URINALYSIS AUTO W/SCOPE: CPT | Performed by: EMERGENCY MEDICINE

## 2023-12-28 PROCEDURE — 96361 HYDRATE IV INFUSION ADD-ON: CPT

## 2023-12-28 PROCEDURE — 83735 ASSAY OF MAGNESIUM: CPT | Performed by: STUDENT IN AN ORGANIZED HEALTH CARE EDUCATION/TRAINING PROGRAM

## 2023-12-28 PROCEDURE — 250N000013 HC RX MED GY IP 250 OP 250 PS 637: Performed by: STUDENT IN AN ORGANIZED HEALTH CARE EDUCATION/TRAINING PROGRAM

## 2023-12-28 PROCEDURE — 87637 SARSCOV2&INF A&B&RSV AMP PRB: CPT | Performed by: EMERGENCY MEDICINE

## 2023-12-28 PROCEDURE — 99284 EMERGENCY DEPT VISIT MOD MDM: CPT

## 2023-12-28 RX ORDER — ACETAMINOPHEN 325 MG/1
650 TABLET ORAL ONCE
Status: COMPLETED | OUTPATIENT
Start: 2023-12-28 | End: 2023-12-28

## 2023-12-28 RX ADMIN — SODIUM CHLORIDE 500 ML: 9 INJECTION, SOLUTION INTRAVENOUS at 23:03

## 2023-12-28 RX ADMIN — ACETAMINOPHEN 650 MG: 325 TABLET, FILM COATED ORAL at 22:41

## 2023-12-28 ASSESSMENT — ACTIVITIES OF DAILY LIVING (ADL)
ADLS_ACUITY_SCORE: 37
ADLS_ACUITY_SCORE: 39

## 2023-12-29 ENCOUNTER — PATIENT OUTREACH (OUTPATIENT)
Dept: CARE COORDINATION | Facility: CLINIC | Age: 88
End: 2023-12-29
Payer: MEDICARE

## 2023-12-29 NOTE — ED PROVIDER NOTES
History     Chief Complaint   Patient presents with    Dizziness     Pt comes by EMS from home, pt was eating dinner, got lightheaded and dizzy, was nauseated but no vomiting, pt pushed her alert button. Pt EMS reports feeling very weak, pt lives alone, pt was . Pt denies chest pain, abdominal pain, sob     Nausea     HPI  Daniela Brown is a 89 year old female who has GERD, IBS, hyperlipidemia, peripheral neuropathy, hypertension, type 2 diabetes, recurrent UTIs, CKD, coronary artery disease, chronic low back pain, anxiety, currently on cefdinir for UTI who presents to the emergency department for evaluation of weakness.  Patient states that about half an hour after eating dinner she was sitting on her couch when she suddenly began to feel very weak had an upset stomach and was nauseated.  She felt like she did throw up so she went to the bathroom and tried to throw up but was unable to.  She then started getting lightheaded and she grabbed her walker and went back to the couch.  She states that she felt very weak and her legs felt wobbly.  She did not pass out.  She did not fall.  She states that this lasted about 20 minutes and then resolved.  She currently denies having any lightheadedness or nausea.  She denied any room spinning.  She states that she still feels very weak.  She states she was in her normal state of health prior to this.  She denies fever or recent illness.  States she has not felt sick at all and has had no URI-like symptoms.  She made homemade beef stew for dinner.  She currently denies abdominal pain.  She had no chest pain or trouble breathing during the episode and does not have any of the symptoms now.  She denies focal weakness.  No numbness or paresthesias.  No vision issues that are new.  She states that she has to wear a pad at night and urinates frequently at night, but this is not new.  She denies dysuria.  She had a bowel movement earlier today.  She denies  "diarrhea, melena or hematochezia.  She denies new medications.  States that she has been taking her meds as prescribed.  She states that she has not tried to walk because she is nervous.    Allergies:  Allergies   Allergen Reactions    Metoprolol Itching and Rash    Cephalexin Rash    Erythromycin Rash    Acetaminophen Itching     Patient reported - only when used scheduled in high doses      Actonel [Bisphosphonates] Itching    Alendronate Rash    Azithromycin Hives    Celebrex [Ricci-2 Inhibitors] Rash    Celecoxib Rash    Cipro [Ciprofloxacin] Rash    Contrast Dye Hives    Diatrizoate Hives    Erythromycin Rash    Escitalopram Diarrhea     Gets very sick and mad feelings    Fosamax Itching and Rash    Keflex [Cephalexin Monohydrate] Rash    Lisinopril Cough    Penicillins Rash    Risedronate Itching    Rofecoxib Rash    Seasonal Allergies Other (See Comments)     Seasonal rhinitis    Shellfish-Derived Products Hives    Sulfa Antibiotics Itching and Rash    Sulfasalazine Itching and Rash    Tetanus Toxoid, Adsorbed Swelling    Tetanus-Diphtheria Toxoids Td Swelling    Vicodin [Acetaminophen] Itching     Patient reported - only when used scheduled in high doses.       Vioxx Rash       Problem List:    Patient Active Problem List    Diagnosis Date Noted    Impaired mobility and activities of daily living 08/21/2023     Priority: Medium    Closed head injury, initial encounter 08/15/2023     Priority: Medium    Fall, initial encounter 08/15/2023     Priority: Medium    Non-traumatic rhabdomyolysis 08/15/2023     Priority: Medium    Polyneuropathy associated with underlying disease (H24) 05/17/2023     Priority: Medium    Moderate major depression (H) 12/31/2020     Priority: Medium    ARABELLA (generalized anxiety disorder) 12/31/2020     Priority: Medium     Has been on celexa (not effective but no side effects), amitriptyline, cymbalta (dizziness), lexapro (listed as allergy - 'gets very sick\"), zoloft  paxil helping " somewhat 6/23      Abnormal CT of the abdomen 10/10/2019     Priority: Medium     CT 10/9/2019- cystic lesion along the anterior aspect of the pancreatic body/tail (series 2 image 27) measures 2 cm, and is new since the previous exam 2011. Pancreatic MRI with MRCP should be considered for further evaluation.   MRI 10/10/2019- There are 2 cystic lesions in the pancreatic body/tail, with the largest measuring 2 cm. No enhancing septations or solid masslike components are identified, although evaluation is limited related to patient motion artifact. These lesions have appearances suggestive of IPMN, but remain technically indeterminate. A follow-up MRI in one year should be considered to confirm stability.      Cystocele, midline      Priority: Medium     grade III      Generalized weakness 10/09/2019     Priority: Medium    Diarrhea 10/09/2019     Priority: Medium    Chronic left-sided low back pain with left-sided sciatica 04/15/2019     Priority: Medium    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) 02/19/2019     Priority: Medium     coronary angiogram 2/2019 showed mild coronary artery disease.  The most significant of these was the 40% lesion in the mid RCA.      Benign essential hypertension 11/14/2018     Priority: Medium    Type 2 diabetes mellitus with diabetic polyneuropathy, without long-term current use of insulin (H) 11/14/2018     Priority: Medium     Diet controlled.  Diagnosed 6/2016, has never been on medications.      History of recurrent urinary tract infection 11/14/2018     Priority: Medium     Recommend referral to Urology to discuss resuming daily suppressive therapy but she declines 8/22 and 6/23      CKD (chronic kidney disease) stage 3, GFR 30-59 ml/min (H) 11/14/2018     Priority: Medium    Peripheral neuropathy 09/10/2018     Priority: Medium    Bilateral wrist pain 04/11/2018     Priority: Medium    Protrusion of lumbar intervertebral  disc 04/11/2018     Priority: Medium    Carpal tunnel syndrome of left wrist 10/21/2014     Priority: Medium    Diastolic dysfunction 03/31/2014     Priority: Medium    Pronation of foot 05/05/2011     Priority: Medium     Bilateral defomity      Allergic rhinitis 01/19/2011     Priority: Medium    Hyperlipidemia LDL goal <100 10/31/2010     Priority: Medium    Urge incontinence 10/20/2009     Priority: Medium    Right foot pain 10/16/2009     Priority: Medium     Xray showed djd -follows with podiatry. -arch supports placed may need cam walker       Vitamin D deficiency 09/09/2008     Priority: Medium    Subjective tinnitus 04/22/2008     Priority: Medium    Urticaria 08/22/2006     Priority: Medium    SENSONRL HEAR LOSS,BILAT 05/03/2006     Priority: Medium    Esophageal reflux      Priority: Medium    Memory loss      Priority: Medium     Early cognitive decline. MMSE 27/30, Neno 4.7/ 6.0 2004. B12, TSH, RPR normal 9030-8205.      Degeneration of lumbar or lumbosacral intervertebral disc      Priority: Medium     MRI 5/04- DDD, L2-3 annular bulge with protrusion into left caudal infra-foraminal area  MRI 2017 with L4-5 loss disc height with spondylolisthesis.      B12 deficiency 10/10/2019     Priority: Low    Irritable bowel syndrome with diarrhea      Priority: Low     diahrrea predominant      Osteopenia of multiple sites      Priority: Low     DEXA 2007 -1.4 hip. Dexa 2004 -1 hip and -1 spine      RESTLESS LEG SYNDROME      Priority: Low    Primary osteoarthritis involving multiple joints      Priority: Low     C-spine, left hip      Diverticulosis of large intestine      Priority: Low     flexible sigmoidoscopy with diverticulosis otherwise normal 2001, admit diverticulitis 2009          Past Medical History:    Past Medical History:   Diagnosis Date    Abnormal cardiovascular stress test 2/3/2019    Adhesive capsulitis of shoulder     Adjustment disorder with anxiety 3/18/2016    B12 deficiency anemia  6/20/2012    Chest pain 2004    Colitis, Clostridium difficile 4/18/2012    Diverticulitis 2009    Headache(784.0) 2001    Impaired fasting glucose 2005    PERS HX ALLERGY OTHER FOODS 8/22/2006    PERS HX ALLERGY TO MILK PRODUCTS 8/22/2006    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    Status post coronary angiogram 2/27/2019    Syncope and collapse 9/10/2018       Past Surgical History:    Past Surgical History:   Procedure Laterality Date    ARTHROPLASTY KNEE  3/26/2012    Procedure:ARTHROPLASTY KNEE; Right Total Knee Arthroplasty; Surgeon:BERNADINE ROSAS; Location:[place] OR    CHOLECYSTECTOMY      CV HEART CATHETERIZATION WITH POSSIBLE INTERVENTION N/A 2/27/2019    Procedure: Coronary Angiogram with Left ventriculogram;  Surgeon: Leandro Carpio MD;  Location:  HEART CARDI[facility] CATH LAB    HERNIA REPAIR      Hernia Repair    HYSTERECTOMY, PAP NO LONGER INDICATED  1983    TVH/BSO    SURGICAL HISTORY OF -   10/08    A & P Repair, Dr. Hannah    ZZC APPENDECTOMY  1953       Family History:    Family History   Problem Relation Age of Onset    C.A.D. Mother     Diabetes Mother     Cancer - colorectal Mother     Arthritis Mother     Heart Disease Mother     Lipids Brother     Obesity Brother     Hypertension Brother     Allergies Son     Eye Disorder Son         cataract    Thyroid Disease Sister         graves dz    Eye Disorder Son     Leukemia Son     Alcohol/Drug Father        Social History:  Marital Status:   [5]  Social History     Tobacco Use    Smoking status: Never    Smokeless tobacco: Never   Vaping Use    Vaping Use: Never used   Substance Use Topics    Alcohol use: No    Drug use: No        Medications:    acetaminophen (TYLENOL) 500 MG tablet  amLODIPine (NORVASC) 5 MG tablet  blood glucose (ACCU-CHEK GUIDE) test strip  blood glucose monitoring (ACCU-CHEK FASTCLIX) lancets  blood glucose monitoring (ONE TOUCH ULTRA 2) meter device kit  buPROPion (WELLBUTRIN XL) 150 MG 24 hr tablet  estradiol  "(ESTRACE) 0.1 MG/GM vaginal cream  meloxicam (MOBIC) 7.5 MG tablet  Multiple Vitamins-Minerals (THERAPEUTIC MULTIVIT/MINERAL) TABS  olmesartan (BENICAR) 20 MG tablet  PARoxetine (PAXIL) 20 MG tablet  STATIN NOT PRESCRIBED (INTENTIONAL)          Review of Systems  See HPI  Physical Exam   BP: (!) 149/67  Pulse: 72  Temp: 97.5  F (36.4  C)  Resp: 18  Height: 162.6 cm (5' 4\")  Weight: 68 kg (150 lb)  SpO2: 98 %      Physical Exam  BP (!) 149/67   Pulse 72   Temp 97.5  F (36.4  C) (Oral)   Resp 18   Ht 1.626 m (5' 4\")   Wt 68 kg (150 lb)   LMP  (LMP Unknown)   SpO2 98%   BMI 25.75 kg/m    General: alert, interactive, in no apparent distress  Head: atraumatic  Nose: no rhinorrhea or epistaxis  Ears: no external auditory canal discharge or bleeding.    Eyes: Sclera nonicteric. Conjunctiva noninjected. PERRL, EOMI  Mouth: no tonsillar erythema, edema, or exudate.  Moist mucous membranes  Neck: supple, moving spontaneously no midline cervical tenderness  Lungs: No increased work of breathing.  Clear to auscultation bilaterally.  CV: RRR, peripheral pulses palpable and symmetric.  Murmur noted.  Abdomen: soft, nt, nd, no guarding or rebound.   Extremities: Warm and well-perfused.  Skin: no rash or diaphoresis  Neuro: CN II-XII grossly intact, strength 5/5 in UE and LEs bilaterally, sensation intact to light touch in UE and LEs bilaterally; normal gait    ED Course                 Procedures              EKG Interpretation:      Interpreted by Aaron Chaney MD  Time reviewed: 12:20 AM  Symptoms at time of EKG: Weakness   Rhythm: normal sinus   Rate: Normal  Axis: Left Axis Deviation  Ectopy: none  Conduction: left anterior fasciclar block  ST Segments/ T Waves: No ST-T wave changes  Q Waves: none  Comparison to prior: Left anterior fascicular block is new.    Clinical Impression: Sinus rhythm with left anterior fascicular block.  No concerning ischemic changes.  No dysrhythmias.  Intervals are normal.    Critical Care " time:  none         Results for orders placed or performed during the hospital encounter of 12/28/23 (from the past 24 hour(s))   UA with Microscopic reflex to Culture    Specimen: Urine, Midstream   Result Value Ref Range    Color Urine Yellow Colorless, Straw, Light Yellow, Yellow    Appearance Urine Clear Clear    Glucose Urine Negative Negative mg/dL    Bilirubin Urine Negative Negative    Ketones Urine Negative Negative mg/dL    Specific Gravity Urine 1.012 1.003 - 1.035    Blood Urine Negative Negative    pH Urine 6.0 5.0 - 7.0    Protein Albumin Urine Negative Negative mg/dL    Urobilinogen Urine Normal Normal, 2.0 mg/dL    Nitrite Urine Negative Negative    Leukocyte Esterase Urine Trace (A) Negative    Bacteria Urine Few (A) None Seen /HPF    Mucus Urine Present (A) None Seen /LPF    RBC Urine 1 <=2 /HPF    WBC Urine 4 <=5 /HPF    Squamous Epithelials Urine 1 <=1 /HPF    Narrative    Urine Culture not indicated   Asymptomatic Influenza A/B, RSV, & SARS-CoV2 PCR (COVID-19) Nose    Specimen: Nose; Swab   Result Value Ref Range    Influenza A PCR Negative Negative    Influenza B PCR Negative Negative    RSV PCR Negative Negative    SARS CoV2 PCR Negative Negative    Narrative    Testing was performed using the Xpert Xpress CoV2/Flu/RSV Assay on the [employer] GeneXpert Instrument. This test should be ordered for the detection of SARS-CoV-2, influenza, and RSV viruses in individuals who meet clinical and/or epidemiological criteria. Test performance is unknown in asymptomatic patients. This test is for in vitro diagnostic use under the FDA EUA for laboratories certified under CLIA to perform high or moderate complexity testing. This test has not been FDA cleared or approved. A negative result does not rule out the presence of PCR inhibitors in the specimen or target RNA in concentration below the limit of detection for the assay. If only one viral target is positive but coinfection with multiple targets is  suspected, the sample should be re-tested with another FDA cleared, approved, or authorized test, if coinfection would change clinical management. This test was validated by the [facility]. These laboratories are certified under the Clinical Laboratory Improvement Amendments of 1988 (CLIA-88) as qualified to perform high complexity laboratory testing.   Basic metabolic panel   Result Value Ref Range    Sodium 141 135 - 145 mmol/L    Potassium 4.2 3.4 - 5.3 mmol/L    Chloride 105 98 - 107 mmol/L    Carbon Dioxide (CO2) 23 22 - 29 mmol/L    Anion Gap 13 7 - 15 mmol/L    Urea Nitrogen 23.1 (H) 8.0 - 23.0 mg/dL    Creatinine 1.21 (H) 0.51 - 0.95 mg/dL    GFR Estimate 42 (L) >60 mL/min/1.73m2    Calcium 9.8 (H) 8.2 - 9.6 mg/dL    Glucose 120 (H) 70 - 99 mg/dL   CBC with platelets differential    Narrative    The following orders were created for panel order CBC with platelets differential.  Procedure                               Abnormality         Status                     ---------                               -----------         ------                     CBC with platelets and d...[[id number]]  Abnormal            Final result                 Please view results for these tests on the individual orders.   Magnesium   Result Value Ref Range    Magnesium 2.0 1.7 - 2.3 mg/dL   CBC with platelets and differential   Result Value Ref Range    WBC Count 12.4 (H) 4.0 - 11.0 10e3/uL    RBC Count 4.19 3.80 - 5.20 10e6/uL    Hemoglobin 12.2 11.7 - 15.7 g/dL    Hematocrit 35.9 35.0 - 47.0 %    MCV 86 78 - 100 fL    MCH 29.1 26.5 - 33.0 pg    MCHC 34.0 31.5 - 36.5 g/dL    RDW 12.6 10.0 - 15.0 %    Platelet Count 241 150 - 450 10e3/uL    % Neutrophils 72 %    % Lymphocytes 21 %    % Monocytes 6 %    % Eosinophils 1 %    % Basophils 0 %    % Immature Granulocytes 0 %    NRBCs per 100 WBC 0 <1 /100    Absolute Neutrophils 8.9 (H) 1.6 - 8.3 10e3/uL    Absolute Lymphocytes 2.5 0.8 - 5.3 10e3/uL    Absolute  "Monocytes 0.7 0.0 - 1.3 10e3/uL    Absolute Eosinophils 0.1 0.0 - 0.7 10e3/uL    Absolute Basophils 0.1 0.0 - 0.2 10e3/uL    Absolute Immature Granulocytes 0.0 <=0.4 10e3/uL    Absolute NRBCs 0.0 10e3/uL     *Note: Due to a large number of results and/or encounters for the requested time period, some results have not been displayed. A complete set of results can be found in Results Review.       Medications   acetaminophen (TYLENOL) tablet 650 mg (650 mg Oral $Given 12/28/23 7417)   sodium chloride 0.9% BOLUS 500 mL (500 mLs Intravenous $New Bag 12/28/23 9891)       Assessments & Plan (with Medical Decision Making)     I have reviewed the nursing notes.    I have reviewed the findings, diagnosis, plan and need for follow up with the patient.    Medical Decision Making  Daniela Brown is a 89 year old female who has GERD, IBS, hyperlipidemia, peripheral neuropathy, hypertension, type 2 diabetes, recurrent UTIs, CKD, coronary artery disease, chronic low back pain, anxiety, currently on cefdinir for UTI who presents to the emergency department for evaluation of weakness.  Vital signs reviewed and reassuring.  Patient is feeling better now.  She has a normal neurological exam.  EKG is reassuring.  She is able to ambulate without difficulty.  Her labs are notable for mild leukocytosis of unclear significance.  Viral swabs are negative.  UA is negative for UTI and she is just finishing a course of cefdinir.  Hemoglobin is normal.  Electrolytes are all normal.  She has a very mild CHER.  I do wonder if she has an unspecified viral illness and then is a little hypovolemic causing her symptoms.  She was given Tylenol and fluid bolus here and is feeling better.  She is not having any symptoms.  She has been able to ambulate without difficulty.  Patient is concerned about going home because she lives alone and she is worried that it could happen again.  She is requesting that I admit her to the hospital \"in case it " "happens again.\"  I explained to her that I do not have a medical reason to keep her in the hospital.  She states that she does feel safe at home and feels like she can care for herself.  At this time, she is well-appearing, has normal exam and I do not think that we need to admit her to the hospital.  I will discharge her home and have her follow-up with her regular doctor within a week.  I have given her explicit instructions to return to the ER with any new or worsening symptoms.  She is also instructed to drink plenty of fluids.  All questions were answered.  Patient is discharged in stable condition.        New Prescriptions    No medications on file       Final diagnoses:   Weakness generalized       12/28/2023   [facility] EMERGENCY DEPT       Aaron Chaney MD  12/29/23 0025    "

## 2023-12-29 NOTE — ED NOTES
Patient arrives with dizziness and nausea that occurred after dinner. No complaints of any chest pain, SOB, nausea, or dizziness at this time. Complains of a frontal headache and blurred vision which has been ongoing for awhile, she stated she thinks she needs new eye glass prescription.

## 2024-01-02 NOTE — PROGRESS NOTES
Clinic Care Coordination Contact  RUST/Voicemail    Clinical Data: Care Coordinator Outreach    Outreach Documentation Number of Outreach Attempt   12/29/2023  10:03 AM 1   1/2/2024   9:32 AM 2       No answer.     Plan: CHW Care Coordinator will try to reach patient again in 3-5 business days for goal check in.    MANJIT Arroyo   Social Work Primary Care Clinic Care Coordinator   [facility]  [phone]  [email or web address]

## 2024-01-08 ENCOUNTER — HOSPITAL ENCOUNTER (EMERGENCY)
Facility: CLINIC | Age: 89
Discharge: HOME | End: 2024-01-08
Attending: EMERGENCY MEDICINE | Admitting: EMERGENCY MEDICINE
Payer: COMMERCIAL

## 2024-01-08 ENCOUNTER — PATIENT OUTREACH (OUTPATIENT)
Dept: CARE COORDINATION | Facility: CLINIC | Age: 89
End: 2024-01-08
Payer: MEDICARE

## 2024-01-08 VITALS
TEMPERATURE: 98.1 F | SYSTOLIC BLOOD PRESSURE: 157 MMHG | HEART RATE: 76 BPM | HEIGHT: 64 IN | BODY MASS INDEX: 25.61 KG/M2 | RESPIRATION RATE: 16 BRPM | WEIGHT: 150 LBS | OXYGEN SATURATION: 97 % | DIASTOLIC BLOOD PRESSURE: 67 MMHG

## 2024-01-08 DIAGNOSIS — R53.1 GENERALIZED WEAKNESS: ICD-10-CM

## 2024-01-08 DIAGNOSIS — Z78.9 IMPAIRED MOBILITY AND ACTIVITIES OF DAILY LIVING: ICD-10-CM

## 2024-01-08 DIAGNOSIS — R42 DIZZINESS: ICD-10-CM

## 2024-01-08 DIAGNOSIS — Z74.09 IMPAIRED MOBILITY AND ACTIVITIES OF DAILY LIVING: ICD-10-CM

## 2024-01-08 DIAGNOSIS — N18.30 STAGE 3 CHRONIC KIDNEY DISEASE, UNSPECIFIED WHETHER STAGE 3A OR 3B CKD (H): Chronic | ICD-10-CM

## 2024-01-08 LAB
ALBUMIN SERPL BCG-MCNC: 4.3 G/DL (ref 3.5–5.2)
ALBUMIN UR-MCNC: NEGATIVE MG/DL
ALP SERPL-CCNC: 72 U/L (ref 40–150)
ALT SERPL W P-5'-P-CCNC: 21 U/L (ref 0–50)
ANION GAP SERPL CALCULATED.3IONS-SCNC: 12 MMOL/L (ref 7–15)
APPEARANCE UR: CLEAR
AST SERPL W P-5'-P-CCNC: 24 U/L (ref 0–45)
BACTERIA #/AREA URNS HPF: ABNORMAL /HPF
BASOPHILS # BLD AUTO: 0.1 10E3/UL (ref 0–0.2)
BASOPHILS NFR BLD AUTO: 1 %
BILIRUB SERPL-MCNC: 0.4 MG/DL
BILIRUB UR QL STRIP: NEGATIVE
BUN SERPL-MCNC: 17.7 MG/DL (ref 8–23)
CALCIUM SERPL-MCNC: 9.7 MG/DL (ref 8.2–9.6)
CHLORIDE SERPL-SCNC: 108 MMOL/L (ref 98–107)
COLOR UR AUTO: YELLOW
CREAT SERPL-MCNC: 1.01 MG/DL (ref 0.51–0.95)
DEPRECATED HCO3 PLAS-SCNC: 23 MMOL/L (ref 22–29)
EGFRCR SERPLBLD CKD-EPI 2021: 53 ML/MIN/1.73M2
EOSINOPHIL # BLD AUTO: 0.1 10E3/UL (ref 0–0.7)
EOSINOPHIL NFR BLD AUTO: 1 %
ERYTHROCYTE [DISTWIDTH] IN BLOOD BY AUTOMATED COUNT: 12.4 % (ref 10–15)
GLUCOSE SERPL-MCNC: 139 MG/DL (ref 70–99)
GLUCOSE UR STRIP-MCNC: NEGATIVE MG/DL
HCT VFR BLD AUTO: 37.7 % (ref 35–47)
HGB BLD-MCNC: 12.8 G/DL (ref 11.7–15.7)
HGB UR QL STRIP: ABNORMAL
HOLD SPECIMEN: NORMAL
HOLD SPECIMEN: NORMAL
IMM GRANULOCYTES # BLD: 0 10E3/UL
IMM GRANULOCYTES NFR BLD: 0 %
KETONES UR STRIP-MCNC: NEGATIVE MG/DL
LEUKOCYTE ESTERASE UR QL STRIP: NEGATIVE
LYMPHOCYTES # BLD AUTO: 1.4 10E3/UL (ref 0.8–5.3)
LYMPHOCYTES NFR BLD AUTO: 16 %
MCH RBC QN AUTO: 29.1 PG (ref 26.5–33)
MCHC RBC AUTO-ENTMCNC: 34 G/DL (ref 31.5–36.5)
MCV RBC AUTO: 86 FL (ref 78–100)
MONOCYTES # BLD AUTO: 0.6 10E3/UL (ref 0–1.3)
MONOCYTES NFR BLD AUTO: 6 %
NEUTROPHILS # BLD AUTO: 6.6 10E3/UL (ref 1.6–8.3)
NEUTROPHILS NFR BLD AUTO: 76 %
NITRATE UR QL: NEGATIVE
NRBC # BLD AUTO: 0 10E3/UL
NRBC BLD AUTO-RTO: 0 /100
PH UR STRIP: 6 [PH] (ref 5–7)
PLATELET # BLD AUTO: 218 10E3/UL (ref 150–450)
POTASSIUM SERPL-SCNC: 4.2 MMOL/L (ref 3.4–5.3)
PROT SERPL-MCNC: 7.3 G/DL (ref 6.4–8.3)
RBC # BLD AUTO: 4.4 10E6/UL (ref 3.8–5.2)
RBC URINE: <1 /HPF
SODIUM SERPL-SCNC: 143 MMOL/L (ref 135–145)
SP GR UR STRIP: 1.01 (ref 1–1.03)
SQUAMOUS EPITHELIAL: <1 /HPF
TSH SERPL DL<=0.005 MIU/L-ACNC: 3.53 UIU/ML (ref 0.3–4.2)
UROBILINOGEN UR STRIP-MCNC: NORMAL MG/DL
WBC # BLD AUTO: 8.7 10E3/UL (ref 4–11)
WBC URINE: 1 /HPF

## 2024-01-08 PROCEDURE — 81001 URINALYSIS AUTO W/SCOPE: CPT | Performed by: EMERGENCY MEDICINE

## 2024-01-08 PROCEDURE — 80053 COMPREHEN METABOLIC PANEL: CPT | Performed by: EMERGENCY MEDICINE

## 2024-01-08 PROCEDURE — 85025 COMPLETE CBC W/AUTO DIFF WBC: CPT | Performed by: EMERGENCY MEDICINE

## 2024-01-08 PROCEDURE — 84443 ASSAY THYROID STIM HORMONE: CPT | Performed by: EMERGENCY MEDICINE

## 2024-01-08 PROCEDURE — 99283 EMERGENCY DEPT VISIT LOW MDM: CPT

## 2024-01-08 PROCEDURE — 99283 EMERGENCY DEPT VISIT LOW MDM: CPT | Performed by: EMERGENCY MEDICINE

## 2024-01-08 PROCEDURE — 36415 COLL VENOUS BLD VENIPUNCTURE: CPT | Performed by: EMERGENCY MEDICINE

## 2024-01-08 ASSESSMENT — ACTIVITIES OF DAILY LIVING (ADL)
ADLS_ACUITY_SCORE: 39

## 2024-01-08 NOTE — ED PROVIDER NOTES
ED Provider Note  [facility]      History     Chief Complaint   Patient presents with    Generalized Weakness     Pt woke up with generalized weakness and dizziness; states similar to how she felt when diagnosed with UTI a few weeks ago. Urinary symptoms have improved, finished abx course last week     HPI  Daniela Brown is a 89 year old female who presents to the emergency department with concerns regarding generalized weakness in addition to feelings of dizziness.  Patient has underlying history of IBS, GERD, hyperlipidemia, neuropathy, hypertension, type 2 diabetes, recurrent bladder infections, chronic kidney disease, chronic low back pain, with completion of cefdinir for bladder infection about 1 to 2 weeks ago, who presents to the emergency department with similar types of symptoms compared to December 28, 2023 visit at which time patient had generalized weakness as well.  She was lightheaded and dizzy during that visit as well, and had pushed her life alert button.  Patient ultimately with negative laboratory workup, and ultimately discharged home.    Patient discusses similar type of history, stating that when she got up this morning she was feeling weak, and lightheaded.  There was no focal weakness of 1 side of the body or another.  There was no fever, or respiratory symptoms.  Denies any chest pain, cough, shortness of breath.  No abdominal pain, chest pain.  No other new symptoms.        Independent Historian:        Review of External Notes:    As above      Allergies:  Allergies   Allergen Reactions    Metoprolol Itching and Rash    Cephalexin Rash    Erythromycin Rash    Acetaminophen Itching     Patient reported - only when used scheduled in high doses      Actonel [Bisphosphonates] Itching    Alendronate Rash    Azithromycin Hives    Celebrex [Ricci-2 Inhibitors] Rash    Celecoxib Rash    Cipro [Ciprofloxacin] Rash    Contrast Dye Hives    Diatrizoate Hives     "Erythromycin Rash    Escitalopram Diarrhea     Gets very sick and mad feelings    Fosamax Itching and Rash    Keflex [Cephalexin Monohydrate] Rash    Lisinopril Cough    Penicillins Rash    Risedronate Itching    Rofecoxib Rash    Seasonal Allergies Other (See Comments)     Seasonal rhinitis    Shellfish-Derived Products Hives    Sulfa Antibiotics Itching and Rash    Sulfasalazine Itching and Rash    Tetanus Toxoid, Adsorbed Swelling    Tetanus-Diphtheria Toxoids Td Swelling    Vicodin [Acetaminophen] Itching     Patient reported - only when used scheduled in high doses.       Vioxx Rash       Problem List:    Patient Active Problem List    Diagnosis Date Noted    Impaired mobility and activities of daily living 08/21/2023     Priority: Medium    Closed head injury, initial encounter 08/15/2023     Priority: Medium    Fall, initial encounter 08/15/2023     Priority: Medium    Non-traumatic rhabdomyolysis 08/15/2023     Priority: Medium    Polyneuropathy associated with underlying disease (H24) 05/17/2023     Priority: Medium    Moderate major depression (H) 12/31/2020     Priority: Medium    ARABELLA (generalized anxiety disorder) 12/31/2020     Priority: Medium     Has been on celexa (not effective but no side effects), amitriptyline, cymbalta (dizziness), lexapro (listed as allergy - 'gets very sick\"), zoloft  paxil helping somewhat 6/23      Abnormal CT of the abdomen 10/10/2019     Priority: Medium     CT 10/9/2019- cystic lesion along the anterior aspect of the pancreatic body/tail (series 2 image 27) measures 2 cm, and is new since the previous exam 2011. Pancreatic MRI with MRCP should be considered for further evaluation.   MRI 10/10/2019- There are 2 cystic lesions in the pancreatic body/tail, with the largest measuring 2 cm. No enhancing septations or solid masslike components are identified, although evaluation is limited related to patient motion artifact. These lesions have appearances suggestive of IPMN, " but remain technically indeterminate. A follow-up MRI in one year should be considered to confirm stability.      Cystocele, midline      Priority: Medium     grade III      Generalized weakness 10/09/2019     Priority: Medium    Diarrhea 10/09/2019     Priority: Medium    Chronic left-sided low back pain with left-sided sciatica 04/15/2019     Priority: Medium    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) 02/19/2019     Priority: Medium     coronary angiogram 2/2019 showed mild coronary artery disease.  The most significant of these was the 40% lesion in the mid RCA.      Benign essential hypertension 11/14/2018     Priority: Medium    Type 2 diabetes mellitus with diabetic polyneuropathy, without long-term current use of insulin (H) 11/14/2018     Priority: Medium     Diet controlled.  Diagnosed 6/2016, has never been on medications.      History of recurrent urinary tract infection 11/14/2018     Priority: Medium     Recommend referral to Urology to discuss resuming daily suppressive therapy but she declines 8/22 and 6/23      CKD (chronic kidney disease) stage 3, GFR 30-59 ml/min (H) 11/14/2018     Priority: Medium    Peripheral neuropathy 09/10/2018     Priority: Medium    Bilateral wrist pain 04/11/2018     Priority: Medium    Protrusion of lumbar intervertebral disc 04/11/2018     Priority: Medium    Carpal tunnel syndrome of left wrist 10/21/2014     Priority: Medium    Diastolic dysfunction 03/31/2014     Priority: Medium    Pronation of foot 05/05/2011     Priority: Medium     Bilateral defomity      Allergic rhinitis 01/19/2011     Priority: Medium    Hyperlipidemia LDL goal <100 10/31/2010     Priority: Medium    Urge incontinence 10/20/2009     Priority: Medium    Right foot pain 10/16/2009     Priority: Medium     Xray showed djd -follows with podiatry. -arch supports placed may need cam walker       Vitamin D deficiency 09/09/2008     Priority:  Medium    Subjective tinnitus 04/22/2008     Priority: Medium    Urticaria 08/22/2006     Priority: Medium    SENSONRL HEAR LOSS,BILAT 05/03/2006     Priority: Medium    Esophageal reflux      Priority: Medium    Memory loss      Priority: Medium     Early cognitive decline. MMSE 27/30, Neno 4.7/ 6.0 2004. B12, TSH, RPR normal [id number].      Degeneration of lumbar or lumbosacral intervertebral disc      Priority: Medium     MRI 5/04- DDD, L2-3 annular bulge with protrusion into left caudal infra-foraminal area  MRI 2017 with L4-5 loss disc height with spondylolisthesis.      B12 deficiency 10/10/2019     Priority: Low    Irritable bowel syndrome with diarrhea      Priority: Low     diahrrea predominant      Osteopenia of multiple sites      Priority: Low     DEXA 2007 -1.4 hip. Dexa 2004 -1 hip and -1 spine      RESTLESS LEG SYNDROME      Priority: Low    Primary osteoarthritis involving multiple joints      Priority: Low     C-spine, left hip      Diverticulosis of large intestine      Priority: Low     flexible sigmoidoscopy with diverticulosis otherwise normal 2001, admit diverticulitis 2009          Past Medical History:    Past Medical History:   Diagnosis Date    Abnormal cardiovascular stress test 2/3/2019    Adhesive capsulitis of shoulder     Adjustment disorder with anxiety 3/18/2016    B12 deficiency anemia 6/20/2012    Chest pain 2004    Colitis, Clostridium difficile 4/18/2012    Diverticulitis 2009    Headache(784.0) 2001    Impaired fasting glucose 2005    PERS HX ALLERGY OTHER FOODS 8/22/2006    PERS HX ALLERGY TO MILK PRODUCTS 8/22/2006    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    Status post coronary angiogram 2/27/2019    Syncope and collapse 9/10/2018       Past Surgical History:    Past Surgical History:   Procedure Laterality Date    ARTHROPLASTY KNEE  3/26/2012    Procedure:ARTHROPLASTY KNEE; Right Total Knee Arthroplasty; Surgeon:BERNADINE ROSAS; Location:[place] OR    CHOLECYSTECTOMY       CV HEART CATHETERIZATION WITH POSSIBLE INTERVENTION N/A 2/27/2019    Procedure: Coronary Angiogram with Left ventriculogram;  Surgeon: Leandro Carpio MD;  Location:  HEART CARDIAC CATH LAB    HERNIA REPAIR      Hernia Repair    HYSTERECTOMY, PAP NO LONGER INDICATED  1983    TVH/BSO    SURGICAL HISTORY OF -   10/08    A & P Repair, Dr. Hannah    Z APPENDECTOMY  1953       Family History:    Family History   Problem Relation Age of Onset    C.A.D. Mother     Diabetes Mother     Cancer - colorectal Mother     Arthritis Mother     Heart Disease Mother     Lipids Brother     Obesity Brother     Hypertension Brother     Allergies Son     Eye Disorder Son         cataract    Thyroid Disease Sister         graves dz    Eye Disorder Son     Leukemia Son     Alcohol/Drug Father        Social History:  Marital Status:   [5]  Social History     Tobacco Use    Smoking status: Never    Smokeless tobacco: Never   Vaping Use    Vaping Use: Never used   Substance Use Topics    Alcohol use: No    Drug use: No        Medications:    acetaminophen (TYLENOL) 500 MG tablet  amLODIPine (NORVASC) 5 MG tablet  blood glucose (ACCU-CHEK GUIDE) test strip  blood glucose monitoring (ACCU-CHEK FASTCLIX) lancets  blood glucose monitoring (ONE TOUCH ULTRA 2) meter device kit  buPROPion (WELLBUTRIN XL) 150 MG 24 hr tablet  estradiol (ESTRACE) 0.1 MG/GM vaginal cream  meloxicam (MOBIC) 7.5 MG tablet  Multiple Vitamins-Minerals (THERAPEUTIC MULTIVIT/MINERAL) TABS  olmesartan (BENICAR) 20 MG tablet  PARoxetine (PAXIL) 20 MG tablet  STATIN NOT PRESCRIBED (INTENTIONAL)          Review of Systems  A medically appropriate review of systems was performed with pertinent positives and negatives noted in the HPI, and all other systems negative.    Physical Exam   Patient Vitals for the past 24 hrs:   BP Temp Temp src Pulse Resp SpO2 Height Weight   01/08/24 1600 (!) 157/67 -- -- 76 -- 97 % -- --   01/08/24 1500 (!) 140/93 -- -- 94 -- 98 %  "-- --   01/08/24 1240 (!) 143/102 98.1  F (36.7  C) Oral 80 16 97 % 1.626 m (5' 4\") 68 kg (150 lb)          Physical Exam  General: alert and in no acute distress on arrival  Head: atraumatic, normocephalic  Lungs:  nonlabored  CV:  extremities warm and perfused  Abd: nondistended  Skin: no rashes, no diaphoresis and skin color normal  Neuro: Patient awake, alert, speech is fluent,   Psychiatric: affect/mood normal,        ED Course                 Procedures                           Results for orders placed or performed during the hospital encounter of 01/08/24 (from the past 24 hour(s))   CBC with platelets, differential    Narrative    The following orders were created for panel order CBC with platelets, differential.  Procedure                               Abnormality         Status                     ---------                               -----------         ------                     CBC with platelets and d...[[id number]]                      Final result                 Please view results for these tests on the individual orders.   Comprehensive metabolic panel   Result Value Ref Range    Sodium 143 135 - 145 mmol/L    Potassium 4.2 3.4 - 5.3 mmol/L    Carbon Dioxide (CO2) 23 22 - 29 mmol/L    Anion Gap 12 7 - 15 mmol/L    Urea Nitrogen 17.7 8.0 - 23.0 mg/dL    Creatinine 1.01 (H) 0.51 - 0.95 mg/dL    GFR Estimate 53 (L) >60 mL/min/1.73m2    Calcium 9.7 (H) 8.2 - 9.6 mg/dL    Chloride 108 (H) 98 - 107 mmol/L    Glucose 139 (H) 70 - 99 mg/dL    Alkaline Phosphatase 72 40 - 150 U/L    AST 24 0 - 45 U/L    ALT 21 0 - 50 U/L    Protein Total 7.3 6.4 - 8.3 g/dL    Albumin 4.3 3.5 - 5.2 g/dL    Bilirubin Total 0.4 <=1.2 mg/dL   [place] Draw    Narrative    The following orders were created for panel order [place] Draw.  Procedure                               Abnormality         Status                     ---------                               -----------         ------                     Extra Blue Top " Tube[[id number]]                              Final result               Extra Red Top Tube[[id number]]                               Final result                 Please view results for these tests on the individual orders.   TSH with free T4 reflex   Result Value Ref Range    TSH 3.53 0.30 - 4.20 uIU/mL   CBC with platelets and differential   Result Value Ref Range    WBC Count 8.7 4.0 - 11.0 10e3/uL    RBC Count 4.40 3.80 - 5.20 10e6/uL    Hemoglobin 12.8 11.7 - 15.7 g/dL    Hematocrit 37.7 35.0 - 47.0 %    MCV 86 78 - 100 fL    MCH 29.1 26.5 - 33.0 pg    MCHC 34.0 31.5 - 36.5 g/dL    RDW 12.4 10.0 - 15.0 %    Platelet Count 218 150 - 450 10e3/uL    % Neutrophils 76 %    % Lymphocytes 16 %    % Monocytes 6 %    % Eosinophils 1 %    % Basophils 1 %    % Immature Granulocytes 0 %    NRBCs per 100 WBC 0 <1 /100    Absolute Neutrophils 6.6 1.6 - 8.3 10e3/uL    Absolute Lymphocytes 1.4 0.8 - 5.3 10e3/uL    Absolute Monocytes 0.6 0.0 - 1.3 10e3/uL    Absolute Eosinophils 0.1 0.0 - 0.7 10e3/uL    Absolute Basophils 0.1 0.0 - 0.2 10e3/uL    Absolute Immature Granulocytes 0.0 <=0.4 10e3/uL    Absolute NRBCs 0.0 10e3/uL   Extra Blue Top Tube   Result Value Ref Range    Hold Specimen JIC    Extra Red Top Tube   Result Value Ref Range    Hold Specimen JI    UA with Microscopic reflex to Culture    Specimen: Urine, Clean Catch   Result Value Ref Range    Color Urine Yellow Colorless, Straw, Light Yellow, Yellow    Appearance Urine Clear Clear    Glucose Urine Negative Negative mg/dL    Bilirubin Urine Negative Negative    Ketones Urine Negative Negative mg/dL    Specific Gravity Urine 1.009 1.003 - 1.035    Blood Urine Small (A) Negative    pH Urine 6.0 5.0 - 7.0    Protein Albumin Urine Negative Negative mg/dL    Urobilinogen Urine Normal Normal, 2.0 mg/dL    Nitrite Urine Negative Negative    Leukocyte Esterase Urine Negative Negative    Bacteria Urine Few (A) None Seen /HPF    RBC Urine <1 <=2 /HPF    WBC Urine 1 <=5 /HPF     Squamous Epithelials Urine <1 <=1 /HPF    Narrative    Urine Culture not indicated     *Note: Due to a large number of results and/or encounters for the requested time period, some results have not been displayed. A complete set of results can be found in Results Review.       MEDICATIONS GIVEN IN THE EMERGENCY DEPARTMENT:  Medications - No data to display        Independent Interpretation (X-rays, CTs, rhythm strip):  None    Consultations/Discussion of Management or Tests:  None       Social Determinants of Health affecting care:         Assessments & Plan (with Medical Decision Making)  89 year old female who presents to the Emergency Department for evaluation of generalized weakness, in addition to feelings of lightheadedness/dizziness upon standing.  Patient denies any symptoms when laying flat.  She has been drinking plenty of fluids.  States that her food intake has been decreased.  No fever, chills, respiratory symptoms, chest pains.  No abdominal pains.  Patient well-appearing, nontoxic.    Patient does have history of frequent bladder infections.  Will obtain electrolytes, in addition to urinalysis.    Patient with no focal neurologic deficits that would suggest need for imaging at this point.    Laboratory workup performed, and CMP with creatinine at 1.01, which is actually improved compared to 11 days ago, and close to baseline.  CBC is normal, with normal white blood cell count.  Thyroid test normal.  Urinalysis without any signs of infection.    Patient was up, ambulatory without any difficulty, and given her baseline status, I feel it is reasonable for discharge home.  Return instructions discussed if new or worsening symptoms develop.  Patient did mention whether it could be something in her home.  I did mention carbon monoxide alarm, and patient does state that she has a carbon monoxide alarm in her house, and I encouraged her to check to ensure that it is working.           I have reviewed the  nursing notes.    I have reviewed the findings, diagnosis, plan and need for follow up with the patient.       Critical Care time:  none      NEW PRESCRIPTIONS STARTED AT TODAY'S ER VISIT  New Prescriptions    No medications on file       Final diagnoses:   Generalized weakness   Dizziness   Impaired mobility and activities of daily living   Stage 3 chronic kidney disease, unspecified whether stage 3a or 3b CKD (H)       1/8/2024   [facility] EMERGENCY DEPT       Abdulaziz Christian MD  01/08/24 3543

## 2024-01-08 NOTE — DISCHARGE INSTRUCTIONS
Your labs looked okay today.  Your urine test also looked okay.    Follow-up with clinic providers as needed.

## 2024-01-08 NOTE — PROGRESS NOTES
Clinic Care Coordination Contact  [facility]    Clinical Data: Care Coordinator Outreach    Outreach Documentation Number of Outreach Attempt   11/21/2023  10:31 AM 1   12/29/2023  10:03 AM 2       1/8/2024  10:00 AM 3       Phone rang out, unable to leave a message.    Plan: Care Coordinator will try to reach patient again in 3-5 business days.    ABDOULAYE Thompson  [facility] Care Coordination  [facility]  [phone]

## 2024-01-08 NOTE — ED TRIAGE NOTES
Pt woke up with generalized weakness and dizziness; states similar to how she felt when diagnosed with UTI a few weeks ago. Urinary symptoms have improved, finished abx course last week     Triage Assessment (Adult)       Row Name 01/08/24 1242          Triage Assessment    Airway WDL WDL        Cardiac WDL    Cardiac WDL WDL        Cognitive/Neuro/Behavioral WDL    Cognitive/Neuro/Behavioral WDL WDL

## 2024-01-09 DIAGNOSIS — F41.1 GAD (GENERALIZED ANXIETY DISORDER): ICD-10-CM

## 2024-01-09 DIAGNOSIS — F32.1 MODERATE MAJOR DEPRESSION (H): ICD-10-CM

## 2024-01-09 RX ORDER — BUPROPION HYDROCHLORIDE 150 MG/1
150 TABLET ORAL EVERY MORNING
Qty: 90 TABLET | Refills: 3 | Status: SHIPPED | OUTPATIENT
Start: 2024-01-09

## 2024-01-10 ENCOUNTER — PATIENT OUTREACH (OUTPATIENT)
Dept: CARE COORDINATION | Facility: CLINIC | Age: 89
End: 2024-01-10
Payer: MEDICARE

## 2024-01-10 NOTE — PROGRESS NOTES
Clinic Care Coordination Contact    Situation: Patient chart reviewed by care coordinator.    Background: pt went to Ed for generalized weakness.    Assessment: patient was assessed without anything out of normal limits.  She was discharged with cautions for return.     SW attempted to reach after the last ED and CHW has been trying to reach pt.     Plan/Recommendations: will not call at this time.     MANJIT Jasmine   [facility] - Care Coordination  [facility]   [phone]

## 2024-01-11 NOTE — PROGRESS NOTES
Clinic Care Coordination Contact  [facility]/Voicemail    Clinical Data: Care Coordinator Outreach    Outreach Documentation Number of Outreach Attempt   12/29/2023  10:03 AM 1   1/2/2024   9:32 AM 2   1/8/2024  10:00 AM 3   1/11/2024   9:11 AM 4       Left message on patient's voicemail with call back information and requested return call. Left clinic # to make follow up appt.     Plan: CHW Care Coordinator will try to reach patient again in 3-5 business days.    MANJIT Arroyo   Social Work Primary Care Clinic Care Coordinator   [facility]  [phone]  [email or web address]

## 2024-01-16 ENCOUNTER — PATIENT OUTREACH (OUTPATIENT)
Dept: CARE COORDINATION | Facility: CLINIC | Age: 89
End: 2024-01-16
Payer: MEDICARE

## 2024-01-16 NOTE — PROGRESS NOTES
Clinic Care Coordination Contact  Community Health Worker Follow Up    Care Gaps:     Health Maintenance Due   Topic Date Due    ZOSTER IMMUNIZATION (1 of 2) Never done    DTAP/TDAP/TD IMMUNIZATION (1 - Tdap) 01/02/1988    RSV VACCINE (Pregnancy & 60+) (1 - 1-dose 60+ series) Never done    EYE EXAM  12/08/2012    DIABETIC FOOT EXAM  09/02/2022    INFLUENZA VACCINE (1) 09/01/2023    A1C  10/19/2023    MEDICARE ANNUAL WELLNESS VISIT  10/28/2023    LIPID  10/28/2023       Postponed to next CHW outreach.     Care Plan:   Care Plan: General       Problem: HP GENERAL PROBLEM       Goal: Resources       Start Date: 7/24/2023 Expected End Date: 12/1/2023    This Visit's Progress: 10% Recent Progress: 10%    Priority: Medium    Note:     Barriers: Access  Strengths: Motivated  Patient expressed understanding of goal: Yes    Action steps to achieve this goal:  1. I will look into using [employer].  2. I will look into toe nail home services.   3. I will look into food resources.   4. I will work with care coordination team as needed.  (CHW will mail resources to patient as she doesn't remember receiving them from  - 01/16/2024)                               ** CHW spoke to patient who stated she is doing well. Patient stated she has not had a chance to review resources that were sent to her via mail and does not know where that letter is. CHW will re-send letter to patient, patient will call CHW if any assistance is needed. Patient's priority is to find a toe nail cutting service mainly. Patient has been buying groceries from nearby corner store. Store delivers to patient as well so she does not have to drive. Patient stated she is still interested in finding meal delivery service. Has used [employer] in the past, but did not like their selection.    Intervention and Education during outreach: CHW encouraged patient to reach out to [facility] if any needs arise.    CHW Plan: next CHW outreach in one month.    Lisa Canas  ABDOULAYE Rush  [facility] Care Coordination  [facility]  [phone]

## 2024-01-16 NOTE — PROGRESS NOTES
90 day letter sent.     Bharti [facility]   Social Work Primary Care Clinic Care Coordinator   [facility]  [phone]  [email or web address]

## 2024-02-01 ENCOUNTER — PATIENT OUTREACH (OUTPATIENT)
Dept: CARE COORDINATION | Facility: CLINIC | Age: 89
End: 2024-02-01
Payer: MEDICARE

## 2024-02-01 NOTE — PROGRESS NOTES
Clinic Care Coordination Contact  Care Coordination Clinician Chart Review    Situation: Patient chart reviewed by Care Coordinator.       Background: Care Coordination Program started: 7/19/2023. Initial assessment completed 7/24/23 and patient-centered care plan(s) were developed with participation from patient. Lead CC handed patient off to CHW for continued outreaches.       Assessment: Per chart review, patient outreach completed by CC CHW on 1/16/24. Patient is actively working to accomplish goal(s). Patient's goal(s) appropriate and relevant at this time.     Patient is not due for updated Plan of Care.      Assessments will be completed annually or as needed/with change of patient status. Post hospital assessment completed 9/13/23. Due 9/13/24.         Care Plan: General       Problem: HP GENERAL PROBLEM       Goal: Resources       Start Date: 7/24/2023 Expected End Date: 12/1/2023    This Visit's Progress: 10% Recent Progress: 10%    Priority: Medium    Note:     Barriers: Access  Strengths: Motivated  Patient expressed understanding of goal: Yes    Action steps to achieve this goal:  1. I will look into using [employer].  2. I will look into toe nail home services.   3. I will look into food resources.   4. I will work with care coordination team as needed.  (CHW will mail resources to patient as she doesn't remember receiving them from CP - 01/16/2024)                                  Plan/Recommendations: The patient will continue working with Care Coordination to achieve goal(s) as above.     CHW will continue outreaches at minimum every 30 days and will involve Lead CC as needed or if patient is ready to move to Maintenance.     - help pt reschedule PCP OV?  - help patient call in home nail care company?  - review resources sent on call?    Lead CC will continue to monitor CHW outreaches and patient's progress to goal(s) every 6 weeks.     Plan of Care updated and sent to patient: Verito Hay  MANJIT Riley   Social Work Primary Care Clinic Care Coordinator   [facility]  [phone]  [email or web address]

## 2024-02-05 ENCOUNTER — TELEPHONE (OUTPATIENT)
Dept: FAMILY MEDICINE | Facility: CLINIC | Age: 89
End: 2024-02-05
Payer: MEDICARE

## 2024-02-05 DIAGNOSIS — H90.3 SENSORINEURAL HEARING LOSS, BILATERAL: Primary | Chronic | ICD-10-CM

## 2024-02-05 NOTE — TELEPHONE ENCOUNTER
Forms/Letter Request    Type of form/letter: OTHER: Hearing aids       Do we have the form/letter: No, pt is looking for PCP to provide letter that pt needs hearing aids since she does not have insurance and if she gets a letter from PCP she can get them    Who is the form from? PCP     Where did/will the form come from? Will come from PCP    When is form/letter needed by: CARRIEP    How would you like the form/letter returned: Mail  Is this the correct address?: Yes  [address] [place] [address]    Patient Notified form requests are processed in 5-7 business days:Yes    Okay to leave a detailed message?: Yes at Home number on file [phone] (home)

## 2024-02-05 NOTE — LETTER
February 6, 2024      Daniela Betty Wood  [address] [address] [address]        To Whom It May Concern,     Vida is under my care.  She has known moderate-severe bilateral sensorineural hearing loss since at least 2008.  She would benefit from bilateral hearing aids to amplify her hearing.          Sincerely,        Cynthia Maddox, DO

## 2024-02-06 NOTE — TELEPHONE ENCOUNTER
LM on patient home phone, letter ready, how would patient like to get it? Nasrin Encarnacion on 2/6/2024 at 8:18 AM

## 2024-02-09 NOTE — TELEPHONE ENCOUNTER
[facility] sent a fax back stating they need a actual referral for audiology hearing aids. Not just a letter.  Thank You  Aimee Faulkner PSC on 2/9/2024 at 7:21 AM

## 2024-02-12 ENCOUNTER — TRANSFERRED RECORDS (OUTPATIENT)
Dept: HEALTH INFORMATION MANAGEMENT | Facility: CLINIC | Age: 89
End: 2024-02-12
Payer: MEDICARE

## 2024-02-19 ENCOUNTER — PATIENT OUTREACH (OUTPATIENT)
Dept: CARE COORDINATION | Facility: CLINIC | Age: 89
End: 2024-02-19
Payer: MEDICARE

## 2024-02-19 NOTE — TELEPHONE ENCOUNTER
Patient's son Wally calls to check status of letter.  Consent on file.    He was notified that per documentation below, letter was mailed to patient, and a referral was also signed and faxed to [facility].  Understanding voiced.    Gilma Mcelroy RN  [facility]

## 2024-02-19 NOTE — PROGRESS NOTES
Clinic Care Coordination Contact  [facility]/Voicemail    Clinical Data: Care Coordinator Outreach    Outreach Documentation Number of Outreach Attempt   2/19/2024  10:08 AM 1       Left message on patient's voicemail with call back information and requested return call.    Plan: Care Coordinator will try to reach patient again in 10 business days.    ABDOULAYE Thompson  [facility] Care Coordination  [facility]  [phone]

## 2024-02-22 ENCOUNTER — OFFICE VISIT (OUTPATIENT)
Dept: ORTHOPEDICS | Facility: CLINIC | Age: 89
End: 2024-02-22
Payer: MEDICARE

## 2024-02-22 VITALS
BODY MASS INDEX: 24.92 KG/M2 | DIASTOLIC BLOOD PRESSURE: 65 MMHG | SYSTOLIC BLOOD PRESSURE: 161 MMHG | WEIGHT: 146 LBS | HEIGHT: 64 IN

## 2024-02-22 DIAGNOSIS — G89.29 CHRONIC PAIN OF LEFT KNEE: ICD-10-CM

## 2024-02-22 DIAGNOSIS — M25.562 CHRONIC PAIN OF LEFT KNEE: ICD-10-CM

## 2024-02-22 DIAGNOSIS — M25.462 EFFUSION OF LEFT KNEE: ICD-10-CM

## 2024-02-22 DIAGNOSIS — M17.12 PRIMARY OSTEOARTHRITIS OF LEFT KNEE: Primary | ICD-10-CM

## 2024-02-22 PROCEDURE — 20611 DRAIN/INJ JOINT/BURSA W/US: CPT | Mod: LT | Performed by: FAMILY MEDICINE

## 2024-02-22 RX ADMIN — ROPIVACAINE HYDROCHLORIDE 3 ML: 5 INJECTION, SOLUTION EPIDURAL; INFILTRATION; PERINEURAL at 13:48

## 2024-02-22 RX ADMIN — TRIAMCINOLONE ACETONIDE 40 MG: 40 INJECTION, SUSPENSION INTRA-ARTICULAR; INTRAMUSCULAR at 13:48
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
## 2024-02-24 RX ORDER — ROPIVACAINE HYDROCHLORIDE 5 MG/ML
3 INJECTION, SOLUTION EPIDURAL; INFILTRATION; PERINEURAL
Status: DISCONTINUED | OUTPATIENT
Start: 2024-02-22 | End: 2024-05-16

## 2024-02-24 RX ORDER — TRIAMCINOLONE ACETONIDE 40 MG/ML
40 INJECTION, SUSPENSION INTRA-ARTICULAR; INTRAMUSCULAR
Status: DISCONTINUED | OUTPATIENT
Start: 2024-02-22 | End: 2024-05-16

## 2024-02-27 ENCOUNTER — TELEPHONE (OUTPATIENT)
Dept: ORTHOPEDICS | Facility: CLINIC | Age: 89
End: 2024-02-27
Payer: MEDICARE

## 2024-02-29 ENCOUNTER — OFFICE VISIT (OUTPATIENT)
Dept: FAMILY MEDICINE | Facility: CLINIC | Age: 89
End: 2024-02-29
Payer: MEDICARE

## 2024-02-29 VITALS
HEART RATE: 77 BPM | SYSTOLIC BLOOD PRESSURE: 138 MMHG | WEIGHT: 146 LBS | TEMPERATURE: 98.8 F | RESPIRATION RATE: 14 BRPM | DIASTOLIC BLOOD PRESSURE: 76 MMHG | HEIGHT: 64 IN | BODY MASS INDEX: 24.92 KG/M2 | OXYGEN SATURATION: 98 %

## 2024-02-29 DIAGNOSIS — R19.7 INTERMITTENT DIARRHEA: ICD-10-CM

## 2024-02-29 DIAGNOSIS — F41.1 GAD (GENERALIZED ANXIETY DISORDER): Primary | ICD-10-CM

## 2024-02-29 PROCEDURE — 99213 OFFICE O/P EST LOW 20 MIN: CPT | Performed by: NURSE PRACTITIONER

## 2024-02-29 RX ORDER — RESPIRATORY SYNCYTIAL VIRUS VACCINE 120MCG/0.5
0.5 KIT INTRAMUSCULAR ONCE
Qty: 1 EACH | Refills: 0 | Status: CANCELLED | OUTPATIENT
Start: 2024-02-29 | End: 2024-02-29
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
## 2024-03-08 ENCOUNTER — PATIENT OUTREACH (OUTPATIENT)
Dept: CARE COORDINATION | Facility: CLINIC | Age: 89
End: 2024-03-08
Payer: MEDICARE

## 2024-03-08 NOTE — PROGRESS NOTES
Clinic Care Coordination Contact  [facility]/Voicemail    Clinical Data: Care Coordinator Outreach    Outreach Documentation Number of Outreach Attempt   2/19/2024  10:08 AM 1   3/8/2024  10:36 AM 2       Left message on patient's voicemail with call back information and requested return call.    Plan: Care Coordinator will send unable to contact letter with care coordinator contact information via mail. Care Coordinator will try to reach patient again in 1 month.    ABDOULAYE Thompson  [facility] Care Coordination  [facility]  [phone]

## 2024-03-08 NOTE — LETTER
[facility] CARE COORDINATION  [address] [address]    March 8, 2024    Daniela Brown  [address] [address] [address]      Dear Daniela,    I have been attempting to reach you since our last contact. I would like to continue to work with you and provide any additional support you may need on achieving your health care related goals. I would appreciate if you would give me a call at [phone] to let me know if you would like to continue working together. I know that there are many things that can affect our ability to communicate and I hope we can continue to work together.    All of us at the [facility] are invested in your health and are here to assist you in meeting your goals.     Sincerely,    ABDOULAYE Edwards

## 2024-03-19 ENCOUNTER — PATIENT OUTREACH (OUTPATIENT)
Dept: CARE COORDINATION | Facility: CLINIC | Age: 89
End: 2024-03-19
Payer: MEDICARE

## 2024-03-19 NOTE — PROGRESS NOTES
Clinic Care Coordination Contact  Care Coordination Clinician Chart Review    Situation: Patient chart reviewed by Care Coordinator.       Background: Care Coordination Program started: 7/19/2023. Initial assessment completed 7/24/23 and patient-centered care plan(s) were developed with participation from patient. Lead CC handed patient off to CHW for continued outreaches.       Assessment: Per chart review, patient outreach completed by CC CHW on 3/8/24. Patient is actively working to accomplish goal(s). Patient's goal(s) appropriate and relevant at this time.     Patient is not due for updated Plan of Care.      Assessments will be completed annually or as needed/with change of patient status. Post hospital assessment completed 9/13/23. Due 9/13/24.          Care Plan: General       Problem: HP GENERAL PROBLEM       Goal: Resources       Start Date: 7/24/2023 Expected End Date: 12/1/2023    This Visit's Progress: 10% Recent Progress: 10%    Priority: Medium    Note:     Barriers: Access  Strengths: Motivated  Patient expressed understanding of goal: Yes    Action steps to achieve this goal:  1. I will look into using [facility].  2. I will look into toe nail home services.   3. I will look into food resources.   4. I will work with care coordination team as needed.  (CHW will mail resources to patient as she doesn't remember receiving them from CP - 01/16/2024)                                  Plan/Recommendations: The patient will continue working with Care Coordination to achieve goal(s) as above.     CHW will continue outreaches at minimum every 30 days and will involve Lead CC as needed or if patient is ready to move to Maintenance.     - help make [facility] appt #[phone]; new referral made 2/29/24 at alternate PCP appt  - reported issues with son, APS report made 2/23/24 by sports medicine provider  - CHW to follow up in 1-2 weeks instead of monthly to see if we can reconnect with pt due to vulnerable  adult status    Lead CC will continue to monitor CHW outreaches and patient's progress to goal(s) every 6 weeks.     Plan of Care updated and sent to patient: MANJIT Garcia   Social Work Primary Care Clinic Care Coordinator   [facility]  [phone]  [email or web address]

## 2024-03-19 NOTE — LETTER
[facility]  Patient Centered Plan of Care  About Me:        Patient Name:  Daniela Wetzel    YOB: 1933  Age:         89 year old   Bettie MRN:    [id number] Telephone Information:  Home Phone [phone]   Mobile [phone]       Address:  [address]  [facility] [address] Email address:  No e-mail address on record      Emergency Contact(s)    Name Relationship Lgl Grd Work Phone Home Phone Mobile Phone   1. EDEN WETZEL Son No   [phone]   2. JACE WETZEL (D* Relative No   [phone]   3. JACE WETZEL Daughter-in-Law    [phone]           Primary language:  English     needed? No   [employer]:  [phone] op. 1  Other communication barriers:Glasses  Preferred Method of Communication:  Mail  Current living arrangement: I live alone; I live in a private home  Mobility Status/ Medical Equipment: Independent w/Device    Health Maintenance  Health Maintenance Reviewed: Due/Overdue   Health Maintenance Due   Topic Date Due    ZOSTER IMMUNIZATION (1 of 2) Never done    DTAP/TDAP/TD IMMUNIZATION (1 - Tdap) 01/02/1988    RSV VACCINE (Pregnancy & 60+) (1 - 1-dose 60+ series) Never done    EYE EXAM  12/08/2012    DIABETIC FOOT EXAM  09/02/2022    INFLUENZA VACCINE (1) 09/01/2023    A1C  10/19/2023    LIPID  10/28/2023    MICROALBUMIN  04/19/2024     My Access Plan  Medical Emergency 911   Primary Clinic Line [facility] - [phone]   24 Hour Appointment Line [phone] or  [phone]) (toll-free)   24 Hour Nurse Line [phone] (toll-free)   Preferred Urgent Care [facility], [phone]     Preferred Hospital [place], [place]  [phone]     Preferred Pharmacy [facility] - [place], [place] - [address] [facility]     Behavioral Health Crisis Line The National Suicide Prevention Lifeline at 1-369.199.7091 or Text/Call 648           My Care Team  Members  Patient Care Team         Relationship Specialty Notifications Start End    Cynthia Maddox Swati, DO PCP - General Internal Medicine Admissions 11/14/18     Phone: [phone] Pager: Use Vocera Fax: [phone]        [address] [facility] [address]    Cynthia Maddox Swati, DO Assigned PCP   10/21/18     Phone: [phone] Pager: Use Vocera Fax: [phone]        [address] [facility] [address]    Tho Newman,  Assigned Musculoskeletal Provider   3/13/22     Phone: [phone] Fax: [phone]         [address] [facility] [address]    Gunjan Rowe PA-C Physician Assistant Dermatology  1/17/23     Phone: [phone] Fax: [phone]         [address] [facility] [address]    Leandro Posada MD Assigned Surgical Provider   1/28/23     Phone: [phone] Pager: [phone] Fax: [phone]        [address] [facility] [address]    Bharti Riley LSW Lead Care Coordinator Primary Care - CC Admissions 7/20/23     Phone: [phone]          [address] [facility] [address]    Lalitha Tripp CHW Community Health Worker Primary Care - CC Admissions 3/18/24                 My Care Plans  Self Management and Treatment Plan    Care Plan  Care Plan: General       Problem: HP GENERAL PROBLEM       Goal: Resources       Start Date: 7/24/2023 Expected End Date: 12/1/2023    This Visit's Progress: 10% Recent Progress: 10%    Priority: Medium    Note:     Barriers: Access  Strengths: Motivated  Patient expressed understanding of goal: Yes    Action steps to achieve this goal:  1. I will look into using [facility].  2. I will look into toe nail home services.   3. I will look into food resources.   4. I will work with care coordination team as needed.  (CHW will mail resources to patient as she doesn't remember receiving them from  - 01/16/2024)                               Action Plans on File:                       Advance Care Plans/Directives:    Advanced Care Plan/Directives on file:   Yes    Status of Document(s): No data recorded  Advanced Care Plan/Directives Type:   Advanced Directive - On File; POLST           My Medical and Care Information  Problem List   Patient Active Problem List   Diagnosis    Degeneration of lumbar or lumbosacral intervertebral disc    Esophageal reflux    Memory loss    Irritable bowel syndrome with diarrhea    Osteopenia of multiple sites    RESTLESS LEG SYNDROME    Primary osteoarthritis involving multiple joints    Diverticulosis of large intestine    SENSONRL HEAR LOSS,BILAT    Urticaria    Subjective tinnitus    Vitamin D deficiency    Right foot pain    Urge incontinence    Hyperlipidemia LDL goal <100    Allergic rhinitis    Pronation of foot    Peripheral neuropathy    Benign essential hypertension    Carpal tunnel syndrome of left wrist    Diastolic dysfunction    Type 2 diabetes mellitus with diabetic polyneuropathy, without long-term current use of insulin (H)    History of recurrent urinary tract infection    CKD (chronic kidney disease) stage 3, GFR 30-59 ml/min (H)    Bilateral wrist pain    Protrusion of lumbar intervertebral disc    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)    Chronic left-sided low back pain with left-sided sciatica    Generalized weakness    Diarrhea    Abnormal CT of the abdomen    Cystocele, midline    B12 deficiency    Moderate major depression (H)    ARABELLA (generalized anxiety disorder)    Polyneuropathy associated with underlying disease (H24)    Closed head injury, initial encounter    Fall, initial encounter    Non-traumatic rhabdomyolysis    Impaired mobility and activities of daily living      Current Medications and Allergies:   Current Outpatient Medications   Medication Instructions    acetaminophen (TYLENOL) 500-1,000 mg, Oral, EVERY 6 HOURS PRN    amLODIPine (NORVASC) 5 mg, Oral, DAILY    buPROPion (WELLBUTRIN XL) 150 mg,  Oral, EVERY MORNING    estradiol (ESTRACE) 2 g, Vaginal, TWICE WEEKLY    meloxicam (MOBIC) 7.5 mg, Oral, DAILY    Multiple Vitamins-Minerals (THERAPEUTIC MULTIVIT/MINERAL) TABS 1 tablet, Oral, EVERY EVENING    olmesartan (BENICAR) 20 mg, Oral, DAILY    PARoxetine (PAXIL) 20 mg, Oral, EVERY MORNING    psyllium (METAMUCIL/KONSYL) 58.6 % powder 1 teaspoonful, Oral, DAILY     Care Coordination Start Date: 7/19/2023   Frequency of Care Coordination: monthly, more frequently as needed     Form Last Updated: 04/15/2024

## 2024-03-26 DIAGNOSIS — I10 BENIGN ESSENTIAL HYPERTENSION: Chronic | ICD-10-CM

## 2024-03-26 RX ORDER — OLMESARTAN MEDOXOMIL 20 MG/1
20 TABLET ORAL DAILY
Qty: 90 TABLET | Refills: 3 | Status: SHIPPED | OUTPATIENT
Start: 2024-03-26

## 2024-03-26 NOTE — TELEPHONE ENCOUNTER
Requested Prescriptions  Pending Prescriptions  Disp  Refills  olmesartan (BENICAR) 20 MG tablet [Pharmacy Med Name: olmesartan 20 mg tablet]  90 tablet  3  Sig: Take 1 tablet (20 mg) by mouth daily  Angiotensin-II Receptors Failed - 3/26/2024  8:03 AM  Failed - Has GFR on file in past 12 months and most recent value is normal  GFR Estimate   Date Value Ref Range Status   01/08/2024 53 (L) >60 mL/min/1.73m2 Final   02/28/2021 64 >60 mL/min/[1.73_m2] Final     Comment:     Non  GFR Calc  Starting 12/18/2018, serum creatinine based estimated GFR (eGFR) will be   calculated using the Chronic Kidney Disease Epidemiology Collaboration   (CKD-EPI) equation.       GFR Estimate If Black   Date Value Ref Range Status   02/28/2021 74 >60 mL/min/[1.73_m2] Final     Comment:      GFR Calc  Starting 12/18/2018, serum creatinine based estimated GFR (eGFR) will be   calculated using the Chronic Kidney Disease Epidemiology Collaboration   (CKD-EPI) equation.           Passed - Last blood pressure under 140/90 in past 12 months  BP Readings from Last 3 Encounters:  02/29/24  138/76  02/22/24  (!) 161/65  01/08/24  (!) 157/67  Systolic (Patient Reported): (!) 145 (6/14/2023  1:40 PM)  Diastolic (Patient Reported): 62 (6/14/2023  1:40 PM)    Passed - Medication is active on med list  Passed - Medication indicated for associated diagnosis  The medication is prescribed for one or more of the following conditions:    Chronic Kidney Disease (CDK)    Heart Failure (HF)    Diabetes, Nephropathy   Hypertension    Coronary Artery Disease (CAD)   Raynaud's Disease  Passed - Recent (12 mo) or future (90days) visit within the authorizing provider's specialty  The patient must have completed an in-person or virtual visit within the past 12 months or has a future visit scheduled within the next 90 days with the authorizing provider s specialty.  Urgent care and e-visits do not quality as an office visit for this  protocol.  Passed - Patient is age 18 or older  Passed - No active pregnancy on record  Passed - Normal serum potassium on file in past 12 months  Recent Labs  Lab Test  01/08/24  1410  POTASSIUM  4.2     Passed - No positive pregnancy test in past 12 months

## 2024-04-02 ENCOUNTER — PATIENT OUTREACH (OUTPATIENT)
Dept: CARE COORDINATION | Facility: CLINIC | Age: 89
End: 2024-04-02
Payer: MEDICARE

## 2024-04-02 NOTE — LETTER
[facility] CARE COORDINATION  [address] [facility] [address]    2024    Daniela Brown  [address]  [facility] [address]      Dear Daniela,    Thank you for taking the time to speak with me on the phone today. Here are some resources that could be beneficial to you.      Toe nail clipping services at home:        [facility]: [phone] // [email or web address]     [facility]: [phone] // [email or web address]     [employer] [facility], [phone]     [facility] [phone]      Meals on Wheels:   (Discussed referral [employer] grocery drop off. Pt needs to call Nicolasa)        [facility] (frozen mail order): [phone]     [employer] (frozen mail order): [phone]     [facility] meals on wheels (daily hot): [phone]      [employer] door step grocery delivery: Nicolasa [phone]      Transportation: [employer] [phone], option 13.  You could use this transportation service to get to [facility] in [place] as needed.      Please let me know if you have any questions or concerns!    Lalitha Tripp, ABDOULAYE, B.A. Community Health  Clinic Care Coordination  [facility] Clinics:   [facility]  [phone]

## 2024-04-02 NOTE — PROGRESS NOTES
Clinic Care Coordination Contact  Community Health Worker Follow Up    Care Gaps:     Health Maintenance Due   Topic Date Due    ZOSTER IMMUNIZATION (1 of 2) Never done    DTAP/TDAP/TD IMMUNIZATION (1 - Tdap) 01/02/1988    RSV VACCINE (Pregnancy & 60+) (1 - 1-dose 60+ series) Never done    EYE EXAM  12/08/2012    DIABETIC FOOT EXAM  09/02/2022    INFLUENZA VACCINE (1) 09/01/2023    A1C  10/19/2023    MEDICARE ANNUAL WELLNESS VISIT  10/28/2023    LIPID  10/28/2023    MICROALBUMIN  04/19/2024       Patient was made aware of care gaps and states that she will call and schedule on her own time.    Care Plan:   Care Plan: General       Problem: HP GENERAL PROBLEM       Goal: Resources       Start Date: 7/24/2023 Expected End Date: 12/1/2023    This Visit's Progress: 10% Recent Progress: 10%    Priority: Medium    Note:     Barriers: Access  Strengths: Motivated  Patient expressed understanding of goal: Yes    Action steps to achieve this goal:  1. I will look into using [facility].  2. I will look into toe nail home services.   3. I will look into food resources.   4. I will work with care coordination team as needed.  (CHW will mail resources to patient as she doesn't remember receiving them from  - 01/16/2024)                               Intervention and Education during outreach:   Patient states that she is doing okay, but has not received a letter in the mail from . CHW informed patient that she will send a letter via mail this week with resources that were provided to the patient in October. Patient acknowledged and thanked CHW.   Patient states that she has no other questions or concerns for CC at this time.    CHW Plan: CHW will mail patient a letter with resources and will do next patient outreach in one month.    ABDOULAYE Rios, B.A. [facility] Care Coordination  [facility]:   [facility]  [phone]

## 2024-04-10 ENCOUNTER — TELEPHONE (OUTPATIENT)
Dept: FAMILY MEDICINE | Facility: CLINIC | Age: 89
End: 2024-04-10
Payer: MEDICARE

## 2024-04-10 NOTE — TELEPHONE ENCOUNTER
"  Symptoms    Describe your symptoms: patient called stating that she is \"sick to her stomach\"-experiencing nausea unable to vomit-appetite bad/doesn't want to eat. Drinks a lot of water.  hard to walk- leg pain. Reports it hard to walk. Sound weak on the phone.     Any pain: Yes: leg pain, stomach    How long have you been having symptoms: couple days ago.   Have you been seen for this:  No    Preferred Pharmacy:   [facility] [id number] [place] [address]  Phone: [phone] Fax: [phone]      Okay to leave a detailed message?: Yes at Cell number on file:    Telephone Information:   Mobile [phone]       "

## 2024-04-10 NOTE — TELEPHONE ENCOUNTER
Received a call transfer from [facility] to speak to patient, see triage note:    S-(situation): diarrhea, feels weak    B-(background): started 2 days ago    A-(assessment): patient reporting she had watery diarrhea twice last night and once this morning, she also felt nauseated and thought she would throw up yesterday but could not, she has not thrown up today, currently is not nauseated. She also felt like she was warm yesterday, she has not checked her temperature but does have a thermometer to check later. She also had leg weakness and pain yesterday and felt like it was more difficult to walk, she uses a walker, she says her leg pain is better today. She has poor appetite, but is drinking fluids, she wonders if she may have UTI as she has history of UTI's, she wears a brief and is incontinent so not sure if she is urinating more frequently. Also reporting right ear pain. Denies sore throat or cough.    R-(recommendations): advised appointment today, she is not able to find a ride and feels like her legs are not quite strong enough, she is then scheduled for tomorrow with PCP using same day, patient is agreeable to this plan. She is encouraged to keep hydrated, eat simple easily digested foods as tolerated, use her walker at all times and get up slowly, call back if she feels worse or be seen more urgently if needed. Patient agrees with plan, she says she will drink  more teas as well, will ask a friend to drive her in for appointment, she is told to keep her mobile phone near and call 9-1-1 if becomes more weak.      MICHEL Robles

## 2024-04-11 ENCOUNTER — OFFICE VISIT (OUTPATIENT)
Dept: FAMILY MEDICINE | Facility: CLINIC | Age: 89
End: 2024-04-11
Payer: MEDICARE

## 2024-04-11 VITALS
RESPIRATION RATE: 20 BRPM | OXYGEN SATURATION: 96 % | TEMPERATURE: 98.5 F | BODY MASS INDEX: 26.12 KG/M2 | HEIGHT: 64 IN | WEIGHT: 153 LBS | HEART RATE: 79 BPM | DIASTOLIC BLOOD PRESSURE: 72 MMHG | SYSTOLIC BLOOD PRESSURE: 144 MMHG

## 2024-04-11 DIAGNOSIS — I10 BENIGN ESSENTIAL HYPERTENSION: Chronic | ICD-10-CM

## 2024-04-11 DIAGNOSIS — Z00.00 ENCOUNTER FOR MEDICARE ANNUAL WELLNESS EXAM: Primary | ICD-10-CM

## 2024-04-11 DIAGNOSIS — K58.0 IRRITABLE BOWEL SYNDROME WITH DIARRHEA: Chronic | ICD-10-CM

## 2024-04-11 DIAGNOSIS — F41.1 GAD (GENERALIZED ANXIETY DISORDER): ICD-10-CM

## 2024-04-11 DIAGNOSIS — N18.31 STAGE 3A CHRONIC KIDNEY DISEASE (H): ICD-10-CM

## 2024-04-11 DIAGNOSIS — E11.42 TYPE 2 DIABETES MELLITUS WITH DIABETIC POLYNEUROPATHY, WITHOUT LONG-TERM CURRENT USE OF INSULIN (H): ICD-10-CM

## 2024-04-11 DIAGNOSIS — M15.0 PRIMARY OSTEOARTHRITIS INVOLVING MULTIPLE JOINTS: ICD-10-CM

## 2024-04-11 PROCEDURE — G0439 PPPS, SUBSEQ VISIT: HCPCS | Performed by: INTERNAL MEDICINE

## 2024-04-11 PROCEDURE — 99214 OFFICE O/P EST MOD 30 MIN: CPT | Mod: 25 | Performed by: INTERNAL MEDICINE

## 2024-04-11 RX ORDER — MELOXICAM 7.5 MG/1
7.5 TABLET ORAL DAILY
Qty: 90 TABLET | Refills: 3 | Status: SHIPPED | OUTPATIENT
Start: 2024-04-11

## 2024-04-11 RX ORDER — AMLODIPINE BESYLATE 5 MG/1
5 TABLET ORAL DAILY
Qty: 90 TABLET | Refills: 3 | Status: SHIPPED | OUTPATIENT
Start: 2024-04-11

## 2024-04-11 RX ORDER — PAROXETINE 20 MG/1
20 TABLET, FILM COATED ORAL EVERY MORNING
Qty: 90 TABLET | Refills: 3 | Status: SHIPPED | OUTPATIENT
Start: 2024-04-11

## 2024-04-11 ASSESSMENT — PAIN SCALES - GENERAL: PAINLEVEL: EXTREME PAIN (8)

## 2024-04-11 ASSESSMENT — PATIENT HEALTH QUESTIONNAIRE - PHQ9: SUM OF ALL RESPONSES TO PHQ QUESTIONS 1-9: 12

## 2024-04-11 NOTE — PATIENT INSTRUCTIONS
Diarrhea  Recommend to take Metamucil 1 tsp in water daily  If diarrhea does not improve with metamucil, follow-up with me    Anxiety  I am referring you to [facility] [place].  The phone number is [phone] or you can schedule at the  on your way out.          Preventive Care Advice   This is general advice given by our system to help you stay healthy. However, your care team may have specific advice just for you. Please talk to your care team about your preventive care needs.  Nutrition  Eat 5 or more servings of fruits and vegetables each day.  Try wheat bread, brown rice and whole grain pasta (instead of white bread, rice, and pasta).  Get enough calcium and vitamin D. Check the label on foods and aim for 100% of the RDA (recommended daily allowance).  Lifestyle  Exercise at least 150 minutes each week   (30 minutes a day, 5 days a week).  Do muscle strengthening activities 2 days a week. These help control your weight and prevent disease.  No smoking.  Wear sunscreen to prevent skin cancer.  Have a dental exam and cleaning every 6 months.  Yearly exams  See your health care team every year to talk about:  Any changes in your health.  Any medicines your care team has prescribed.  Preventive care, family planning, and ways to prevent chronic diseases.  Shots (vaccines)   HPV shots (up to age 26), if you've never had them before.  Hepatitis B shots (up to age 59), if you've never had them before.  COVID-19 shot: Get this shot when it's due.  Flu shot: Get a flu shot every year.  Tetanus shot: Get a tetanus shot every 10 years.  Pneumococcal, hepatitis A, and RSV shots: Ask your care team if you need these based on your risk.  Shingles shot (for age 50 and up).  General health tests  Diabetes screening:  Starting at age 35, Get screened for diabetes at least every 3 years.  If you are younger than age 35, ask your care team if you should be screened for  diabetes.  Cholesterol test: At age 39, start having a cholesterol test every 5 years, or more often if advised.  Bone density scan (DEXA): At age 50, ask your care team if you should have this scan for osteoporosis (brittle bones).  Hepatitis C: Get tested at least once in your life.  STIs (sexually transmitted infections)  Before age 24: Ask your care team if you should be screened for STIs.  After age 24: Get screened for STIs if you're at risk. You are at risk for STIs (including HIV) if:  You are sexually active with more than one person.  You don't use condoms every time.  You or a partner was diagnosed with a sexually transmitted infection.  If you are at risk for HIV, ask about PrEP medicine to prevent HIV.  Get tested for HIV at least once in your life, whether you are at risk for HIV or not.  Cancer screening tests  Cervical cancer screening: If you have a cervix, begin getting regular cervical cancer screening tests at age 21. Most people who have regular screenings with normal results can stop after age 65. Talk about this with your provider.  Breast cancer scan (mammogram): If you've ever had breasts, begin having regular mammograms starting at age 40. This is a scan to check for breast cancer.  Colon cancer screening: It is important to start screening for colon cancer at age 45.  Have a colonoscopy test every 10 years (or more often if you're at risk) Or, ask your provider about stool tests like a FIT test every year or Cologuard test every 3 years.  To learn more about your testing options, visit: [email or web address]://[email or web address] [email or web address].pdf.  For help making a decision, visit: https://bit.ly/ow91026.  Prostate cancer screening test: If you have a prostate and are age 55 to 69, ask your provider if you would benefit from a yearly prostate cancer screening test.  Lung cancer screening: If you are a current or former smoker age 50 to 80, ask your care team if ongoing lung cancer screenings are right for  you.  For informational purposes only. Not to replace the advice of your health care provider. Copyright   2023 [facility]. All rights reserved. Clinically reviewed by the [facility]. [employer] 409546 - REV 01/24.    Preventing Falls: Care Instructions  Injuries and health problems such as trouble walking or poor eyesight can increase your risk of falling. So can some medicines. But there are things you can do to help prevent falls. You can exercise to get stronger. You can also arrange your home to make it safer.    Talk to your doctor about the medicines you take. Ask if any of them increase the risk of falls and whether they can be changed or stopped.   Try to exercise regularly. It can help improve your strength and balance. This can help lower your risk of falling.     Practice fall safety and prevention.    Wear low-heeled shoes that fit well and give your feet good support. Talk to your doctor if you have foot problems that make this hard.  Carry a cellphone or wear a medical alert device that you can use to call for help.  Use stepladders instead of chairs to reach high objects. Don't climb if you're at risk for falls. Ask for help, if needed.  Wear the correct eyeglasses, if you need them.    Make your home safer.    Remove rugs, cords, clutter, and furniture from walkways.  Keep your house well lit. Use night-lights in hallways and bathrooms.  Install and use sturdy handrails on stairways.  Wear nonskid footwear, even inside. Don't walk barefoot or in socks without shoes.    Be safe outside.    Use handrails, curb cuts, and ramps whenever possible.  Keep your hands free by using a shoulder bag or backpack.  Try to walk in well-lit areas. Watch out for uneven ground, changes in pavement, and debris.  Be careful in the winter. Walk on the grass or gravel when sidewalks are slippery. Use de-icer on steps and walkways. Add non-slip devices to shoes.    Put grab bars  "and nonskid mats in your shower or tub and near the toilet. Try to use a shower chair or bath bench when bathing.   Get into a tub or shower by putting in your weaker leg first. Get out with your strong side first. Have a phone or medical alert device in the bathroom with you.   Where can you learn more?  Go to https://www.ZeroPercent.us.Funnely/patiented  Enter G117 in the search box to learn more about \"Preventing Falls: Care Instructions.\"  Current as of: July 17, 2023               Content Version: 14.0    6105-4154 [employer].   Care instructions adapted under license by your healthcare professional. If you have questions about a medical condition or this instruction, always ask your healthcare professional. [employer] disclaims any warranty or liability for your use of this information.      Learning About Sleeping Well  What does sleeping well mean?     Sleeping well means getting enough sleep to feel good and stay healthy. How much sleep is enough varies among people.  The number of hours you sleep and how you feel when you wake up are both important. If you do not feel refreshed, you probably need more sleep. Another sign of not getting enough sleep is feeling tired during the day.  Experts recommend that adults get at least 7 or more hours of sleep per day. Children and older adults need more sleep.  Why is getting enough sleep important?  Getting enough quality sleep is a basic part of good health. When your sleep suffers, your physical health, mood, and your thoughts can suffer too. You may find yourself feeling more grumpy or stressed. Not getting enough sleep also can lead to serious problems, including injury, accidents, anxiety, and depression.  What might cause poor sleeping?  Many things can cause sleep problems, including:  Changes to your sleep schedule.  Stress. Stress can be caused by fear about a single event, such as giving a speech. Or you may have ongoing stress, such as " "worry about work or school.  Depression, anxiety, and other mental or emotional conditions.  Changes in your sleep habits or surroundings. This includes changes that happen where you sleep, such as noise, light, or sleeping in a different bed. It also includes changes in your sleep pattern, such as having jet lag or working a late shift.  Health problems, such as pain, breathing problems, and restless legs syndrome.  Lack of regular exercise.  Using alcohol, nicotine, or caffeine before bed.  How can you help yourself?  Here are some tips that may help you sleep more soundly and wake up feeling more refreshed.  Your sleeping area   Use your bedroom only for sleeping and sex. A bit of light reading may help you fall asleep. But if it doesn't, do your reading elsewhere in the house. Try not to use your TV, computer, smartphone, or tablet while you are in bed.  Be sure your bed is big enough to stretch out comfortably, especially if you have a sleep partner.  Keep your bedroom quiet, dark, and cool. Use curtains, blinds, or a sleep mask to block out light. To block out noise, use earplugs, soothing music, or a \"white noise\" machine.  Your evening and bedtime routine   Create a relaxing bedtime routine. You might want to take a warm shower or bath, or listen to soothing music.  Go to bed at the same time every night. And get up at the same time every morning, even if you feel tired.  What to avoid   Limit caffeine (coffee, tea, caffeinated sodas) during the day, and don't have any for at least 6 hours before bedtime.  Avoid drinking alcohol before bedtime. Alcohol can cause you to wake up more often during the night.  Try not to smoke or use tobacco, especially in the evening. Nicotine can keep you awake.  Limit naps during the day, especially close to bedtime.  Avoid lying in bed awake for too long. If you can't fall asleep or if you wake up in the middle of the night and can't get back to sleep within about 20 " "minutes, get out of bed and go to another room until you feel sleepy.  Avoid taking medicine right before bed that may keep you awake or make you feel hyper or energized. Your doctor can tell you if your medicine may do this and if you can take it earlier in the day.  If you can't sleep   Imagine yourself in a peaceful, pleasant scene. Focus on the details and feelings of being in a place that is relaxing.  Get up and do a quiet or boring activity until you feel sleepy.  Avoid drinking any liquids before going to bed to help prevent waking up often to use the bathroom.  Where can you learn more?  Go to https://www.[employer]Tronik.net/patiented  Enter J942 in the search box to learn more about \"Learning About Sleeping Well.\"  Current as of: July 10, 2023               Content Version: 14.0    7174-4970 [employer].   Care instructions adapted under license by your healthcare professional. If you have questions about a medical condition or this instruction, always ask your healthcare professional. [employer] disclaims any warranty or liability for your use of this information.      Bladder Training: Care Instructions  Your Care Instructions     Bladder training is used to treat urge incontinence and stress incontinence. Urge incontinence means that the need to urinate comes on so fast that you can't get to a toilet in time. Stress incontinence means that you leak urine because of pressure on your bladder. For example, it may happen when you laugh, cough, or lift something heavy.  Bladder training can increase how long you can wait before you have to urinate. It can also help your bladder hold more urine. And it can give you better control over the urge to urinate.  It is important to remember that bladder training takes a few weeks to a few months to make a difference. You may not see results right away, but don't give up.  Follow-up care is a key part of your treatment and safety. Be sure to " make and go to all appointments, and call your doctor if you are having problems. It's also a good idea to know your test results and keep a list of the medicines you take.  How can you care for yourself at home?  Work with your doctor to come up with a bladder training program that is right for you. You may use one or more of the following methods.  Delayed urination  In the beginning, try to keep from urinating for 5 minutes after you first feel the need to go.  While you wait, take deep, slow breaths to relax. Kegel exercises can also help you delay the need to go to the bathroom.  After some practice, when you can easily wait 5 minutes to urinate, try to wait 10 minutes before you urinate.  Slowly increase the waiting period until you are able to control when you have to urinate.  Scheduled urination  Empty your bladder when you first wake up in the morning.  Schedule times throughout the day when you will urinate.  Start by going to the bathroom every hour, even if you don't need to go.  Slowly increase the time between trips to the bathroom.  When you have found a schedule that works well for you, keep doing it.  If you wake up during the night and have to urinate, do it. Apply your schedule to waking hours only.  Kegel exercises  These tighten and strengthen pelvic muscles, which can help you control the flow of urine. (If doing these exercises causes pain, stop doing them and talk with your doctor.) To do Kegel exercises:  Squeeze your muscles as if you were trying not to pass gas. Or squeeze your muscles as if you were stopping the flow of urine. Your belly, legs, and buttocks shouldn't move.  Hold the squeeze for 3 seconds, then relax for 5 to 10 seconds.  Start with 3 seconds, then add 1 second each week until you are able to squeeze for 10 seconds.  Repeat the exercise 10 times a session. Do 3 to 8 sessions a day.  When should you call for help?  Watch closely for changes in your health, and be sure to  "contact your doctor if:    Your incontinence is getting worse.     You do not get better as expected.   Where can you learn more?  Go to https://www.[employer].net/patiented  Enter V684 in the search box to learn more about \"Bladder Training: Care Instructions.\"  Current as of: November 15, 2023               Content Version: 14.0    4972-6925 [employer].   Care instructions adapted under license by your healthcare professional. If you have questions about a medical condition or this instruction, always ask your healthcare professional. [employer] disclaims any warranty or liability for your use of this information.      Learning About Depression Screening  What is depression screening?  Depression screening is a way to see if you have depression symptoms. It may be done by a doctor or counselor. It's often part of a routine checkup. That's because your mental health is just as important as your physical health.  Depression is a mental health condition that affects how you feel, think, and act. You may:  Have less energy.  Lose interest in your daily activities.  Feel sad and grouchy for a long time.  Depression is very common. It affects people of all ages.  Many things can lead to depression. Some people become depressed after they have a stroke or find out they have a major illness like cancer or heart disease. The death of a loved one or a breakup may lead to depression. It can run in families. Most experts believe that a combination of inherited genes and stressful life events can cause it.  What happens during screening?  You may be asked to fill out a form about your depression symptoms. You and the doctor will discuss your answers. The doctor may ask you more questions to learn more about how you think, act, and feel.  What happens after screening?  If you have symptoms of depression, your doctor will talk to you about your options.  Doctors usually treat depression with medicines " "or counseling. Often, combining the two works best. Many people don't get help because they think that they'll get over the depression on their own. But people with depression may not get better unless they get treatment.  The cause of depression is not well understood. There may be many factors involved. But if you have depression, it's not your fault.  A serious symptom of depression is thinking about death or suicide. If you or someone you care about talks about this or about feeling hopeless, get help right away.  It's important to know that depression can be treated. Medicine, counseling, and self-care may help.  Where can you learn more?  Go to https://www.[employer]merityre.net/patiented  Enter T185 in the search box to learn more about \"Learning About Depression Screening.\"  Current as of: June 24, 2023               Content Version: 14.0    4805-1690 [employer].   Care instructions adapted under license by your healthcare professional. If you have questions about a medical condition or this instruction, always ask your healthcare professional. [employer] disclaims any warranty or liability for your use of this information.      "

## 2024-04-11 NOTE — PROGRESS NOTES
Assessment & Plan     Encounter for Medicare annual wellness exam    Type 2 diabetes mellitus with diabetic polyneuropathy, without long-term current use of insulin (H)  Well controlled  - OFFICE/OUTPT VISIT,EST,LEVL IV    Stage 3a chronic kidney disease (H)  Known issue that I take into account for their medical decisions, no current exacerbations or new concerns.  Is on NSAIDs but they significantly improve the quality of life.  Okay to continue  - OFFICE/OUTPT VISIT,EST,LEVL IV    Irritable bowel syndrome with diarrhea  Is likely the cause of her diarrhea which is very longstanding.  However, she is on medications which might provoke diarrhea.  Will monitor closely.  Recommend to add fiber since there are times that she is constipated  - psyllium (METAMUCIL/KONSYL) 58.6 % powder; Take 6 g (1 teaspoonful) by mouth daily  - OFFICE/OUTPT VISIT,ROHITH CHAVARRIA IV    ARABELLA (generalized anxiety disorder)  Recommend seeing behavioral health clinician due to ongoing anxiety and stressors with her son.  She is often in the ER for concerns for anxiety.  Previous providers have prescribed lorazepam but she tends to overuse these medications and she had a fall shortly after taking a dose.  Benzos are not a good long-term or even safe short-term solution for her  - Adult Mental Health  Referral; Future  - OFFICE/OUTPT VISIT,EST,LEVL IV  - PARoxetine (PAXIL) 20 MG tablet; Take 1 tablet (20 mg) by mouth every morning    Benign essential hypertension   - stable, refill provided  - amLODIPine (NORVASC) 5 MG tablet; Take 1 tablet (5 mg) by mouth daily    Primary osteoarthritis involving multiple joints  Known issue that I take into account for their medical decisions, no current exacerbations or new concerns.  See above for discussion of CKD and NSAIDs  - meloxicam (MOBIC) 7.5 MG tablet; Take 1 tablet (7.5 mg) by mouth daily          Patient Instructions   Diarrhea  Recommend to take Metamucil 1 tsp in water daily  If diarrhea  does not improve with metamucil, follow-up with me    Anxiety  I am referring you to [facility] [place].  The phone number is [phone] or you can schedule at the  on your way out.          Preventive Care Advice   This is general advice given by our system to help you stay healthy. However, your care team may have specific advice just for you. Please talk to your care team about your preventive care needs.  Nutrition  Eat 5 or more servings of fruits and vegetables each day.  Try wheat bread, brown rice and whole grain pasta (instead of white bread, rice, and pasta).  Get enough calcium and vitamin D. Check the label on foods and aim for 100% of the RDA (recommended daily allowance).  Lifestyle  Exercise at least 150 minutes each week   (30 minutes a day, 5 days a week).  Do muscle strengthening activities 2 days a week. These help control your weight and prevent disease.  No smoking.  Wear sunscreen to prevent skin cancer.  Have a dental exam and cleaning every 6 months.  Yearly exams  See your health care team every year to talk about:  Any changes in your health.  Any medicines your care team has prescribed.  Preventive care, family planning, and ways to prevent chronic diseases.  Shots (vaccines)   HPV shots (up to age 26), if you've never had them before.  Hepatitis B shots (up to age 59), if you've never had them before.  COVID-19 shot: Get this shot when it's due.  Flu shot: Get a flu shot every year.  Tetanus shot: Get a tetanus shot every 10 years.  Pneumococcal, hepatitis A, and RSV shots: Ask your care team if you need these based on your risk.  Shingles shot (for age 50 and up).  General health tests  Diabetes screening:  Starting at age 35, Get screened for diabetes at least every 3 years.  If you are younger than age 35, ask your care team if you should be screened for diabetes.  Cholesterol test: At age 39, start having a cholesterol test every 5  years, or more often if advised.  Bone density scan (DEXA): At age 50, ask your care team if you should have this scan for osteoporosis (brittle bones).  Hepatitis C: Get tested at least once in your life.  STIs (sexually transmitted infections)  Before age 24: Ask your care team if you should be screened for STIs.  After age 24: Get screened for STIs if you're at risk. You are at risk for STIs (including HIV) if:  You are sexually active with more than one person.  You don't use condoms every time.  You or a partner was diagnosed with a sexually transmitted infection.  If you are at risk for HIV, ask about PrEP medicine to prevent HIV.  Get tested for HIV at least once in your life, whether you are at risk for HIV or not.  Cancer screening tests  Cervical cancer screening: If you have a cervix, begin getting regular cervical cancer screening tests at age 21. Most people who have regular screenings with normal results can stop after age 65. Talk about this with your provider.  Breast cancer scan (mammogram): If you've ever had breasts, begin having regular mammograms starting at age 40. This is a scan to check for breast cancer.  Colon cancer screening: It is important to start screening for colon cancer at age 45.  Have a colonoscopy test every 10 years (or more often if you're at risk) Or, ask your provider about stool tests like a FIT test every year or Cologuard test every 3 years.  To learn more about your testing options, visit: [email or web address].  For help making a decision, visit: https://bit.ly/sp15405.  Prostate cancer screening test: If you have a prostate and are age 55 to 69, ask your provider if you would benefit from a yearly prostate cancer screening test.  Lung cancer screening: If you are a current or former smoker age 50 to 80, ask your care team if ongoing lung cancer screenings are right for you.  For informational purposes only. Not to replace the advice of your health care  provider. Copyright   2023 [facility]. All rights reserved. Clinically reviewed by the [facility] Transitions Program. [employer] 823358 - REV 01/24.    Preventing Falls: Care Instructions  Injuries and health problems such as trouble walking or poor eyesight can increase your risk of falling. So can some medicines. But there are things you can do to help prevent falls. You can exercise to get stronger. You can also arrange your home to make it safer.    Talk to your doctor about the medicines you take. Ask if any of them increase the risk of falls and whether they can be changed or stopped.   Try to exercise regularly. It can help improve your strength and balance. This can help lower your risk of falling.     Practice fall safety and prevention.    Wear low-heeled shoes that fit well and give your feet good support. Talk to your doctor if you have foot problems that make this hard.  Carry a cellphone or wear a medical alert device that you can use to call for help.  Use stepladders instead of chairs to reach high objects. Don't climb if you're at risk for falls. Ask for help, if needed.  Wear the correct eyeglasses, if you need them.    Make your home safer.    Remove rugs, cords, clutter, and furniture from walkways.  Keep your house well lit. Use night-lights in hallways and bathrooms.  Install and use sturdy handrails on stairways.  Wear nonskid footwear, even inside. Don't walk barefoot or in socks without shoes.    Be safe outside.    Use handrails, curb cuts, and ramps whenever possible.  Keep your hands free by using a shoulder bag or backpack.  Try to walk in well-lit areas. Watch out for uneven ground, changes in pavement, and debris.  Be careful in the winter. Walk on the grass or gravel when sidewalks are slippery. Use de-icer on steps and walkways. Add non-slip devices to shoes.    Put grab bars and nonskid mats in your shower or tub and near the toilet. Try to use a shower chair  "or bath bench when bathing.   Get into a tub or shower by putting in your weaker leg first. Get out with your strong side first. Have a phone or medical alert device in the bathroom with you.   Where can you learn more?  Go to https://www.[employer].net/patiented  Enter G117 in the search box to learn more about \"Preventing Falls: Care Instructions.\"  Current as of: July 17, 2023               Content Version: 14.0    5280-1226 [employer].   Care instructions adapted under license by your healthcare professional. If you have questions about a medical condition or this instruction, always ask your healthcare professional. [employer] disclaims any warranty or liability for your use of this information.      Learning About Sleeping Well  What does sleeping well mean?     Sleeping well means getting enough sleep to feel good and stay healthy. How much sleep is enough varies among people.  The number of hours you sleep and how you feel when you wake up are both important. If you do not feel refreshed, you probably need more sleep. Another sign of not getting enough sleep is feeling tired during the day.  Experts recommend that adults get at least 7 or more hours of sleep per day. Children and older adults need more sleep.  Why is getting enough sleep important?  Getting enough quality sleep is a basic part of good health. When your sleep suffers, your physical health, mood, and your thoughts can suffer too. You may find yourself feeling more grumpy or stressed. Not getting enough sleep also can lead to serious problems, including injury, accidents, anxiety, and depression.  What might cause poor sleeping?  Many things can cause sleep problems, including:  Changes to your sleep schedule.  Stress. Stress can be caused by fear about a single event, such as giving a speech. Or you may have ongoing stress, such as worry about work or school.  Depression, anxiety, and other mental or emotional " "conditions.  Changes in your sleep habits or surroundings. This includes changes that happen where you sleep, such as noise, light, or sleeping in a different bed. It also includes changes in your sleep pattern, such as having jet lag or working a late shift.  Health problems, such as pain, breathing problems, and restless legs syndrome.  Lack of regular exercise.  Using alcohol, nicotine, or caffeine before bed.  How can you help yourself?  Here are some tips that may help you sleep more soundly and wake up feeling more refreshed.  Your sleeping area   Use your bedroom only for sleeping and sex. A bit of light reading may help you fall asleep. But if it doesn't, do your reading elsewhere in the house. Try not to use your TV, computer, smartphone, or tablet while you are in bed.  Be sure your bed is big enough to stretch out comfortably, especially if you have a sleep partner.  Keep your bedroom quiet, dark, and cool. Use curtains, blinds, or a sleep mask to block out light. To block out noise, use earplugs, soothing music, or a \"white noise\" machine.  Your evening and bedtime routine   Create a relaxing bedtime routine. You might want to take a warm shower or bath, or listen to soothing music.  Go to bed at the same time every night. And get up at the same time every morning, even if you feel tired.  What to avoid   Limit caffeine (coffee, tea, caffeinated sodas) during the day, and don't have any for at least 6 hours before bedtime.  Avoid drinking alcohol before bedtime. Alcohol can cause you to wake up more often during the night.  Try not to smoke or use tobacco, especially in the evening. Nicotine can keep you awake.  Limit naps during the day, especially close to bedtime.  Avoid lying in bed awake for too long. If you can't fall asleep or if you wake up in the middle of the night and can't get back to sleep within about 20 minutes, get out of bed and go to another room until you feel sleepy.  Avoid taking " "medicine right before bed that may keep you awake or make you feel hyper or energized. Your doctor can tell you if your medicine may do this and if you can take it earlier in the day.  If you can't sleep   Imagine yourself in a peaceful, pleasant scene. Focus on the details and feelings of being in a place that is relaxing.  Get up and do a quiet or boring activity until you feel sleepy.  Avoid drinking any liquids before going to bed to help prevent waking up often to use the bathroom.  Where can you learn more?  Go to https://www.[employer].net/patiented  Enter J942 in the search box to learn more about \"Learning About Sleeping Well.\"  Current as of: July 10, 2023               Content Version: 14.0    2347-7900 [employer].   Care instructions adapted under license by your healthcare professional. If you have questions about a medical condition or this instruction, always ask your healthcare professional. [employer] disclaims any warranty or liability for your use of this information.      Bladder Training: Care Instructions  Your Care Instructions     Bladder training is used to treat urge incontinence and stress incontinence. Urge incontinence means that the need to urinate comes on so fast that you can't get to a toilet in time. Stress incontinence means that you leak urine because of pressure on your bladder. For example, it may happen when you laugh, cough, or lift something heavy.  Bladder training can increase how long you can wait before you have to urinate. It can also help your bladder hold more urine. And it can give you better control over the urge to urinate.  It is important to remember that bladder training takes a few weeks to a few months to make a difference. You may not see results right away, but don't give up.  Follow-up care is a key part of your treatment and safety. Be sure to make and go to all appointments, and call your doctor if you are having problems. It's " also a good idea to know your test results and keep a list of the medicines you take.  How can you care for yourself at home?  Work with your doctor to come up with a bladder training program that is right for you. You may use one or more of the following methods.  Delayed urination  In the beginning, try to keep from urinating for 5 minutes after you first feel the need to go.  While you wait, take deep, slow breaths to relax. Kegel exercises can also help you delay the need to go to the bathroom.  After some practice, when you can easily wait 5 minutes to urinate, try to wait 10 minutes before you urinate.  Slowly increase the waiting period until you are able to control when you have to urinate.  Scheduled urination  Empty your bladder when you first wake up in the morning.  Schedule times throughout the day when you will urinate.  Start by going to the bathroom every hour, even if you don't need to go.  Slowly increase the time between trips to the bathroom.  When you have found a schedule that works well for you, keep doing it.  If you wake up during the night and have to urinate, do it. Apply your schedule to waking hours only.  Kegel exercises  These tighten and strengthen pelvic muscles, which can help you control the flow of urine. (If doing these exercises causes pain, stop doing them and talk with your doctor.) To do Kegel exercises:  Squeeze your muscles as if you were trying not to pass gas. Or squeeze your muscles as if you were stopping the flow of urine. Your belly, legs, and buttocks shouldn't move.  Hold the squeeze for 3 seconds, then relax for 5 to 10 seconds.  Start with 3 seconds, then add 1 second each week until you are able to squeeze for 10 seconds.  Repeat the exercise 10 times a session. Do 3 to 8 sessions a day.  When should you call for help?  Watch closely for changes in your health, and be sure to contact your doctor if:    Your incontinence is getting worse.     You do not get better  "as expected.   Where can you learn more?  Go to https://www.SDNsquare.net/patiented  Enter V684 in the search box to learn more about \"Bladder Training: Care Instructions.\"  Current as of: November 15, 2023               Content Version: 14.0    4277-2703 [employer].   Care instructions adapted under license by your healthcare professional. If you have questions about a medical condition or this instruction, always ask your healthcare professional. [employer] disclaims any warranty or liability for your use of this information.      Learning About Depression Screening  What is depression screening?  Depression screening is a way to see if you have depression symptoms. It may be done by a doctor or counselor. It's often part of a routine checkup. That's because your mental health is just as important as your physical health.  Depression is a mental health condition that affects how you feel, think, and act. You may:  Have less energy.  Lose interest in your daily activities.  Feel sad and grouchy for a long time.  Depression is very common. It affects people of all ages.  Many things can lead to depression. Some people become depressed after they have a stroke or find out they have a major illness like cancer or heart disease. The death of a loved one or a breakup may lead to depression. It can run in families. Most experts believe that a combination of inherited genes and stressful life events can cause it.  What happens during screening?  You may be asked to fill out a form about your depression symptoms. You and the doctor will discuss your answers. The doctor may ask you more questions to learn more about how you think, act, and feel.  What happens after screening?  If you have symptoms of depression, your doctor will talk to you about your options.  Doctors usually treat depression with medicines or counseling. Often, combining the two works best. Many people don't get help because " "they think that they'll get over the depression on their own. But people with depression may not get better unless they get treatment.  The cause of depression is not well understood. There may be many factors involved. But if you have depression, it's not your fault.  A serious symptom of depression is thinking about death or suicide. If you or someone you care about talks about this or about feeling hopeless, get help right away.  It's important to know that depression can be treated. Medicine, counseling, and self-care may help.  Where can you learn more?  Go to https://www.[employer].net/patiented  Enter T185 in the search box to learn more about \"Learning About Depression Screening.\"  Current as of: June 24, 2023               Content Version: 14.0    1146-3394 [employer].   Care instructions adapted under license by your healthcare professional. If you have questions about a medical condition or this instruction, always ask your healthcare professional. [employer] disclaims any warranty or liability for your use of this information.      Shruti Finnegan is a 89 year old, presenting for the following health issues:  Illness (Right Ear, Weakness, Diarrhea), Orders (Patient would like order for new glucometer and supplies ), Imm/Inj (Flu shot), and Wellness Visit (Wellness Add on - declined gait speed test due to leg pain )        4/11/2024     2:33 PM   Additional Questions   Roomed by Sha VAZ CMA   Accompanied by self     History of Present Illness       Reason for visit:  Right Ear , Weakness, Diarrhea    She eats 2-3 servings of fruits and vegetables daily.She consumes 2 sweetened beverage(s) daily.She exercises with enough effort to increase her heart rate 20 to 29 minutes per day.  She exercises with enough effort to increase her heart rate 4 days per week.   She is taking medications regularly.     Social  --she has not spoken to her son for 4 months and she doesn't want any " "information shared w/him  --vunerable adult report has been filed by another physician due to concern for financial exploitation  --care coordination has been involved intermittently over the years    Acute Illness    Onset/Duration: a few weeks   Symptoms:  Fever: No  Chills/Sweats: \"Cold all the time\"   Headache (location?): No  Sinus Pressure: No  Conjunctivitis:  YES  Ear Pain: YES: right  Rhinorrhea: No  Congestion: No  Sore Throat: No  Cough: no  Wheeze: No  Decreased Appetite: YES  Nausea: YES  Vomiting: No  Diarrhea: YES  Dysuria/Freq.: YES- incontinence ongoing   Dysuria or Hematuria: No  Fatigue/Achiness: YES  Weakness in Legs   Sick/Strep Exposure: No  Therapies tried and outcome: Tylenol - helps  --she reports epigastric pain, nausea w/out vomiting and diarrhea. - episode occurred 2 days ago  --up most of the night feeling ill  --she thinks its 'nerves' due to worry about her son and their relationship  --having increase in anxiety; she has been taking 'pills for anxiety' but she isn't sure which med.  --has occ constipation    Polypharmacy  --she does not set up medications in a pill box  --she is independent in self cares    Annual Wellness Visit     Patient has been advised of split billing requirements and indicates understanding: Yes          Health Care Directive  Patient has a Health Care Directive on file    In general, how would you rate your overall physical health? (!) FAIR   Discussed with patient their rating of physical health; information has been provided.   Do you have a special diet?  Regular (no restrictions)        4/11/2024   Exercise, Social Connection, Stress   Days per week of moderate/strenous exercise 3 days   Average minutes spent exercising at this level 20 min   Frequency of gathering with friends or relatives Never   Feel stress (tense, anxious, or unable to sleep) Rather much     Do you see a dentist two times every year?  (!) NO  The patient was instructed to see the " dentist every 6 months.   Have you been more tired than usual lately?  (!) YES   Discussed possible causes of fatigue.   If you drink alcohol do you typically have >3 drinks per day or >7 drinks per week? No  Do you have a current opioid prescription? No  Do you use any other controlled substances or medications that are not prescribed by a provider? None  Social History     Tobacco Use    Smoking status: Never    Smokeless tobacco: Never   Vaping Use    Vaping status: Never Used   Substance Use Topics    Alcohol use: No    Drug use: No       Needs assistance for the following daily activities: no assistance needed  Which of the following safety concerns are present in your home?  (!) NO GRAB BARS IN THE BATHROOM   Do you (or your family members) have any concerns about your safety while driving?  No  Do you have any of the following hearing concerns?: wears hearing aides- ordering new ones   In the past 6 months, have you been bothered by leaking of urine? (!) YES   Information on urinary incontinence and treatment options given to patient.        12/14/2023   Social Factors   Worry food won't last until get money to buy more No   Food not last or not have enough money for food? No   Do you have housing?  Yes   Are you worried about losing your housing? No   Lack of transportation? No   Unable to get utilities (heat,electricity)? No          4/11/2024   Fall Risk   Fallen 2 or more times in the past year? Yes   Trouble with walking or balance? Yes   Reason Gait Speed Test Not Completed Patient declines   Reason for decline not able to due to pains in legs per patient          Today's PHQ-9 Score:       4/11/2024     2:42 PM   PHQ-9 SCORE   PHQ-9 Total Score 12                    Reviewed and updated as needed this visit by Provider     Meds                Current Outpatient Medications   Medication Sig Dispense Refill    acetaminophen (TYLENOL) 500 MG tablet Take 500-1,000 mg by mouth every 6 hours as needed for  mild pain or pain      amLODIPine (NORVASC) 5 MG tablet Take 1 tablet (5 mg) by mouth daily 90 tablet 3    buPROPion (WELLBUTRIN XL) 150 MG 24 hr tablet Take 1 tablet (150 mg) by mouth every morning 90 tablet 3    estradiol (ESTRACE) 0.1 MG/GM vaginal cream Place 2 g vaginally twice a week 42.5 g 11    meloxicam (MOBIC) 7.5 MG tablet Take 1 tablet (7.5 mg) by mouth daily 90 tablet 3    Multiple Vitamins-Minerals (THERAPEUTIC MULTIVIT/MINERAL) TABS Take 1 tablet by mouth every evening       olmesartan (BENICAR) 20 MG tablet Take 1 tablet (20 mg) by mouth daily 90 tablet 3    PARoxetine (PAXIL) 20 MG tablet Take 1 tablet (20 mg) by mouth every morning 90 tablet 3    psyllium (METAMUCIL/KONSYL) 58.6 % powder Take 6 g (1 teaspoonful) by mouth daily 283 g 11       Current providers sharing in care for this patient include:  Patient Care Team:  Cynthia Maddox DO as PCP - General (Internal Medicine)  Cynthia Maddox DO as Assigned PCP  Tho Newman DO as Assigned Musculoskeletal Provider  Gunjan Rowe PA-C as Physician Assistant (Dermatology)  Leandro Posada MD as Assigned Surgical Provider  Bharti Riley LSW as Lead Care Coordinator (Primary Care - CC)  Lalitha Tripp CHW as Community Health Worker (Primary Care - CC)    The following health maintenance items are reviewed in Epic and correct as of today:  Health Maintenance   Topic Date Due    ZOSTER IMMUNIZATION (1 of 2) Never done    DTAP/TDAP/TD IMMUNIZATION (1 - Tdap) 01/02/1988    RSV VACCINE (Pregnancy & 60+) (1 - 1-dose 60+ series) Never done    EYE EXAM  12/08/2012    DIABETIC FOOT EXAM  09/02/2022    INFLUENZA VACCINE (1) 09/01/2023    A1C  10/19/2023    MEDICARE ANNUAL WELLNESS VISIT  10/28/2023    LIPID  10/28/2023    MICROALBUMIN  04/19/2024    PHQ-9  10/11/2024    ANNUAL REVIEW OF HM ORDERS  12/14/2024    BMP  01/08/2025    HEMOGLOBIN  01/08/2025    FALL RISK ASSESSMENT  04/11/2025    ADVANCE CARE PLANNING   "11/09/2027    DEPRESSION ACTION PLAN  Completed    Pneumococcal Vaccine: 65+ Years  Completed    URINALYSIS  Completed    COVID-19 Vaccine  Completed    IPV IMMUNIZATION  Aged Out    HPV IMMUNIZATION  Aged Out    MENINGITIS IMMUNIZATION  Aged Out    RSV MONOCLONAL ANTIBODY  Aged Out    URINE DRUG SCREEN  Discontinued       Appropriate preventive services were discussed with this patient, including applicable screening as appropriate for fall prevention, nutrition, physical activity, Tobacco-use cessation, weight loss and cognition.  Checklist reviewing preventive services available has been given to the patient.           4/11/2024   Mini Cog   Clock Draw Score 0 Abnormal   3 Item Recall 2 objects recalled   Mini Cog Total Score 2                  Review of Systems  Constitutional, neuro, ENT, endocrine, pulmonary, cardiac, gastrointestinal, genitourinary, musculoskeletal, integument and psychiatric systems are negative, except as otherwise noted.      Objective    BP (!) 156/60 (BP Location: Right arm, Patient Position: Sitting, Cuff Size: Adult Regular)   Pulse 79   Temp 98.5  F (36.9  C) (Tympanic)   Resp 20   Ht 1.626 m (5' 4\")   Wt 69.4 kg (153 lb)   LMP  (LMP Unknown)   SpO2 96%   BMI 26.26 kg/m    Body mass index is 26.26 kg/m .  Physical Exam   GENERAL: alert and no distress  RESP: lungs clear to auscultation - no rales, rhonchi or wheezes  CV: regular rate and rhythm, normal S1 S2, no S3 or S4, no murmur, click or rub, no peripheral edema   ABDOMEN: soft, nontender, no hepatosplenomegaly, no masses and bowel sounds normal            Signed Electronically by: Cynthia Maddox DO    "

## 2024-04-15 ENCOUNTER — DOCUMENTATION ONLY (OUTPATIENT)
Dept: OTHER | Facility: CLINIC | Age: 89
End: 2024-04-15
Payer: MEDICARE

## 2024-04-15 ENCOUNTER — TELEPHONE (OUTPATIENT)
Dept: FAMILY MEDICINE | Facility: CLINIC | Age: 89
End: 2024-04-15
Payer: MEDICARE

## 2024-04-15 NOTE — TELEPHONE ENCOUNTER
General Call      Reason for Call:  Pt wants to talk to Dr Maddox.  She says her son told her she has dementia but says Dr Maddox never told her that personally.  States she is also having trouble with her son.  Wonders if Dr Maddox can call her.        Okay to leave a detailed message?: Yes at Cell number on file:    Telephone Information:   Mobile [phone]       Rubia Martinez on 4/15/2024 at 10:51 AM

## 2024-04-16 NOTE — TELEPHONE ENCOUNTER
Left message for patient to return call to clinic.     Leticia Ahuja RN  [facility]

## 2024-04-17 NOTE — TELEPHONE ENCOUNTER
Memory concerns are not a new problem/concern.    Per 6/7/23 visit with provider:  Memory loss -probably multifactorial with perhaps early stage dementia versus anxiety as a cause.  I still feel she is safe to live independently.  I have encouraged her to consider assisted living primarily due to functional limitations with mobility historically severe and uncontrolled.  She has poor medication compliance

## 2024-04-17 NOTE — TELEPHONE ENCOUNTER
Attempted to reach pt but pt says she does not have her hearing aids in and cannot hear me.    Pt hung up.    Will try again later.  Not sure what problems she is having with her son.    Sandy Turner RN

## 2024-04-19 NOTE — TELEPHONE ENCOUNTER
Spoke with patient. She found a letter written by her son that said she has dementia. Seen in office with  4/11 she is independent in self cares. She has some memory impairment but no diagnosis of dementia on her chart. Patient is scheduled with Priya  in May. Patient did discuss social concerns with her son during office visit 4/11 she does not have further concerns at this time.     Loulou Russell RN

## 2024-04-22 ENCOUNTER — TELEPHONE (OUTPATIENT)
Dept: FAMILY MEDICINE | Facility: CLINIC | Age: 89
End: 2024-04-22
Payer: MEDICARE

## 2024-04-22 DIAGNOSIS — G31.84 MILD COGNITIVE IMPAIRMENT: Primary | ICD-10-CM

## 2024-04-22 NOTE — TELEPHONE ENCOUNTER
Reason for Call:  Other appointment    Detailed comments: Pt wants to stay at [facility] for an appointment for her ear.    Phone Number Patient can be reached at: Home number on file [phone] (home)    Best Time: any    Can we leave a detailed message on this number? YES    Call taken on 4/22/2024 at 2:50 PM by Amber Simmons
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
## 2024-04-22 NOTE — TELEPHONE ENCOUNTER
Called and spoke with patient and scheduled an appt with her.     Cele Sheikh  [facility], Primary Care

## 2024-04-23 NOTE — TELEPHONE ENCOUNTER
I am unable to write a letter stating she does not have dementia..  She does not have a formal diagnosis of dementia.  However, on her last 2 Medicare visits, she did not pass the cognitive screen.  She would need formal neuropsych testing for me to exclude a diagnosis of dementia.  I can write a letter stating exactly this

## 2024-04-24 ENCOUNTER — OFFICE VISIT (OUTPATIENT)
Dept: FAMILY MEDICINE | Facility: CLINIC | Age: 89
End: 2024-04-24
Payer: MEDICARE

## 2024-04-24 VITALS
DIASTOLIC BLOOD PRESSURE: 68 MMHG | OXYGEN SATURATION: 97 % | HEIGHT: 64 IN | BODY MASS INDEX: 25.95 KG/M2 | RESPIRATION RATE: 18 BRPM | HEART RATE: 74 BPM | TEMPERATURE: 97.8 F | SYSTOLIC BLOOD PRESSURE: 140 MMHG | WEIGHT: 152 LBS

## 2024-04-24 DIAGNOSIS — Z23 NEED FOR PROPHYLACTIC VACCINATION AND INOCULATION AGAINST INFLUENZA: ICD-10-CM

## 2024-04-24 DIAGNOSIS — H61.23 IMPACTED CERUMEN OF BOTH EARS: ICD-10-CM

## 2024-04-24 DIAGNOSIS — H91.93 DECREASED HEARING OF BOTH EARS: Primary | ICD-10-CM

## 2024-04-24 PROCEDURE — 99213 OFFICE O/P EST LOW 20 MIN: CPT | Mod: 25 | Performed by: NURSE PRACTITIONER

## 2024-04-24 PROCEDURE — 90662 IIV NO PRSV INCREASED AG IM: CPT | Performed by: NURSE PRACTITIONER

## 2024-04-24 PROCEDURE — 69209 REMOVE IMPACTED EAR WAX UNI: CPT | Mod: 50 | Performed by: NURSE PRACTITIONER

## 2024-04-24 PROCEDURE — G0008 ADMIN INFLUENZA VIRUS VAC: HCPCS | Performed by: NURSE PRACTITIONER

## 2024-04-24 ASSESSMENT — PAIN SCALES - GENERAL: PAINLEVEL: EXTREME PAIN (8)

## 2024-04-24 NOTE — PATIENT INSTRUCTIONS
I would recommend you follow up with [facility] in [place] since they did your hearing screen. Their number is [phone].

## 2024-04-24 NOTE — PROGRESS NOTES
"  Assessment & Plan     Decreased hearing of both ears  Patient was seen by an outside audiology clinic in [place] in Feb, hearing aids were recommended. She got these in the mail, but is unsure how to use. Recommend follow up in audiology clinic for assistance.    Impacted cerumen of both ears  Did have impacted cerumen in both ears, irrigated by clinic staff. Could certainly be causing symptoms.   - KS REMOVAL IMPACTED CERUMEN IRRIGATION/LVG UNILAT        BMI  Estimated body mass index is 26.09 kg/m  as calculated from the following:    Height as of this encounter: 1.626 m (5' 4\").    Weight as of this encounter: 68.9 kg (152 lb).         Patient Instructions   I would recommend you follow up with [facility] in [place] since they did your hearing screen. Their number is [phone].    Shruti Finnegan is a 89 year old, presenting for the following health issues:  Ear Problem (Hearing loud noises in left ear. Pt states there is a loud noise roaring sound just at night between 3-6 in the morning, hears a whistling sound in left ear. No pain in ears. Pt does report she just got hearing aids but can't figure out how to set up, wondering where can get help/ resources to help set up hearing aids. )        4/24/2024     1:37 PM   Additional Questions   Roomed by Kaya IZQUIERDO   Accompanied by self         4/24/2024     1:37 PM   Patient Reported Additional Medications   Patient reports taking the following new medications no new meds     Ear Problem  Onset: been on going   Description: Hearing loud noises in left ear. Pt states there is a loud noise roaring sound just at night between 3-6 in the morning, hears a whistling sound in left ear throughout the day. No pain in ears. Pt does report she just got hearing aids but can't figure out how to set up, wondering where can get help/ resources to help set up hearing aids.   Intensity: severe  Progression of Symptoms:  same  Accompanying Signs & Symptoms: no " "  Previous history of similar problem: no   Precipitating factors:        Worsened by: not sure  Alleviating factors:        Improved by: nothing  Therapies tried and outcome: None          Review of Systems  Constitutional, HEENT, cardiovascular, pulmonary, gi and gu systems are negative, except as otherwise noted.      Objective    BP (!) 140/68   Pulse 74   Temp 97.8  F (36.6  C) (Tympanic)   Resp 18   Ht 1.626 m (5' 4\")   Wt 68.9 kg (152 lb)   LMP  (LMP Unknown)   SpO2 97%   BMI 26.09 kg/m    Body mass index is 26.09 kg/m .  Physical Exam  Vitals and nursing note reviewed.   Constitutional:       General: She is not in acute distress.     Appearance: Normal appearance.   HENT:      Head: Normocephalic and atraumatic.      Right Ear: There is impacted cerumen.      Left Ear: There is impacted cerumen.      Mouth/Throat:      Mouth: Mucous membranes are moist.   Cardiovascular:      Rate and Rhythm: Normal rate.   Pulmonary:      Effort: Pulmonary effort is normal.   Musculoskeletal:      Cervical back: Neck supple.   Skin:     General: Skin is warm and dry.   Neurological:      General: No focal deficit present.      Mental Status: She is alert.   Psychiatric:         Mood and Affect: Mood normal.         Behavior: Behavior normal.                    Signed Electronically by: CYN George CNP    "

## 2024-04-26 NOTE — TELEPHONE ENCOUNTER
Referral signed for neuropsych testing.  It is likely 6-18 months out, and this is true across the [place] area even another healthcare system is unfortunately

## 2024-04-26 NOTE — TELEPHONE ENCOUNTER
"Routed to provider for referral.    Spoke with pt and shared provider's reply.  Pt asks for referral for neuropsych testing.    Pt says she will tell her  \"just forget about it.\"  Pt says she is not talking to her son at this time and hasn't talked to him in months.  Sandy Turner RN    "

## 2024-05-03 ENCOUNTER — PATIENT OUTREACH (OUTPATIENT)
Dept: CARE COORDINATION | Facility: CLINIC | Age: 89
End: 2024-05-03
Payer: MEDICARE

## 2024-05-03 NOTE — PROGRESS NOTES
Clinic Care Coordination Contact  Care Coordination Clinician Chart Review    Situation: Patient chart reviewed by Care Coordinator.       Background: Care Coordination Program started: 7/19/2023. Initial assessment completed 7/24/23 and patient-centered care plan(s) were developed with participation from patient. Lead CC handed patient off to CHW for continued outreaches.       Assessment: Per chart review, patient outreach completed by CC CHW on 4/2/24. Patient is actively working to accomplish goal(s). Patient's goal(s) appropriate and relevant at this time.     Patient is not due for updated Plan of Care.      Assessments will be completed annually or as needed/with change of patient status. Post hospital assessment completed 9/13/23. Due 9/13/24.          Care Plan: General       Problem: HP GENERAL PROBLEM       Goal: Resources       Start Date: 7/24/2023 Expected End Date: 12/1/2023    This Visit's Progress: 10% Recent Progress: 10%    Priority: Medium    Note:     Barriers: Access  Strengths: Motivated  Patient expressed understanding of goal: Yes    Action steps to achieve this goal:  1. I will look into using [facility].  2. I will look into toe nail home services.   3. I will look into food resources.   4. I will work with care coordination team as needed.  (CHW will mail resources to patient as she doesn't remember receiving them from CP - 01/16/2024)                                  Plan/Recommendations: The patient will continue working with Care Coordination to achieve goal(s) as above.     CHW will continue outreaches at minimum every 30 days and will involve Lead CC as needed or if patient is ready to move to Maintenance.     - help make [facility] appt #[phone]; new referral made 2/29/24 at alternate PCP appt    Lead CC will continue to monitor CHW outreaches and patient's progress to goal(s) every 6 weeks.     Plan of Care updated and sent to patient: No    MANJIT Arroyo   Social Work  Primary Care Clinic Care Coordinator   [facility]  [phone]  [email or web address]

## 2024-05-09 ENCOUNTER — PATIENT OUTREACH (OUTPATIENT)
Dept: CARE COORDINATION | Facility: CLINIC | Age: 89
End: 2024-05-09
Payer: MEDICARE

## 2024-05-09 NOTE — PROGRESS NOTES
Clinic Care Coordination Contact  [facility]/Voicemail    Clinical Data: Care Coordinator Outreach    Outreach Documentation Number of Outreach Attempt   5/9/2024  12:50 PM 1       CHW briefly spoke with patient. Patient states that she does not have her hearing aids on so she can't really hear at this time. Patient requested to be called back at another time.    Plan: Care Coordinator will try to reach patient again in 1-2 business days.    ABDOULAYE Rios, B.A. [facility] Care Coordination  [facility]:   [facility]  [phone]

## 2024-05-15 ENCOUNTER — OFFICE VISIT (OUTPATIENT)
Dept: FAMILY MEDICINE | Facility: CLINIC | Age: 89
End: 2024-05-15
Payer: MEDICARE

## 2024-05-15 VITALS
HEIGHT: 64 IN | RESPIRATION RATE: 20 BRPM | HEART RATE: 78 BPM | TEMPERATURE: 97.7 F | OXYGEN SATURATION: 98 % | BODY MASS INDEX: 25.27 KG/M2 | WEIGHT: 148 LBS | DIASTOLIC BLOOD PRESSURE: 62 MMHG | SYSTOLIC BLOOD PRESSURE: 138 MMHG

## 2024-05-15 DIAGNOSIS — G63 POLYNEUROPATHY ASSOCIATED WITH UNDERLYING DISEASE (H): ICD-10-CM

## 2024-05-15 DIAGNOSIS — F32.1 MODERATE MAJOR DEPRESSION (H): ICD-10-CM

## 2024-05-15 DIAGNOSIS — I25.119 CORONARY ARTERY DISEASE INVOLVING NATIVE CORONARY ARTERY OF NATIVE HEART WITH ANGINA PECTORIS (H): ICD-10-CM

## 2024-05-15 DIAGNOSIS — L30.9 DERMATITIS: Primary | ICD-10-CM

## 2024-05-15 PROCEDURE — 99213 OFFICE O/P EST LOW 20 MIN: CPT | Performed by: FAMILY MEDICINE

## 2024-05-15 RX ORDER — TRIAMCINOLONE ACETONIDE 1 MG/G
CREAM TOPICAL 2 TIMES DAILY
Qty: 30 G | Refills: 1 | Status: SHIPPED | OUTPATIENT
Start: 2024-05-15 | End: 2024-05-29

## 2024-05-15 RX ORDER — RESPIRATORY SYNCYTIAL VIRUS VACCINE 120MCG/0.5
0.5 KIT INTRAMUSCULAR ONCE
Qty: 1 EACH | Refills: 0 | Status: CANCELLED | OUTPATIENT
Start: 2024-05-15 | End: 2024-05-15

## 2024-05-15 ASSESSMENT — PAIN SCALES - GENERAL: PAINLEVEL: NO PAIN (0)

## 2024-05-15 NOTE — PATIENT INSTRUCTIONS
Daily medication that is non-drowsy for allergies to take at bedtime like Claritin (loratadine) or Zyrtec (cetirizine)    This may have started as Shingles and is now causing skin irritation from the rash.  Apply the triaminciolone cream twice a day for 14 days under the breast.   If the itching doesn't go away after this let me know.  If the itching goes away then ok to just use the cream if needed in the future for itching.    There is no rash on the back, but you can use the cream on the back too, if or when you need it when it itches.

## 2024-05-15 NOTE — PROGRESS NOTES
"  Assessment & Plan     Dermatitis  Under left breast, not fungal in appearance, likely was the shingles rash that had never full resolved either from friction of the breast or from ongoing nerve symptoms after shingles  Plan to try triamcinolone to calm the skin  If not improving after 14 days patient will contact me, then consider treatment for neuropathy after shingles.  - triamcinolone (KENALOG) 0.1 % external cream; Apply topically 2 times daily for 14 days Under left breast and back Then use as needed for skin irritation    Polyneuropathy associated with underlying disease (H24)  Due to diabetes, stable,    Moderate major depression (H)  Recurrent, on medication which is helping keep it stable.    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    Weight as of this encounter: 67.1 kg (148 lb).         See Patient Instructions    Subjective   Sivan is a 89 year old, presenting for the following health issues:  Derm Problem (Under left breast for 2-3 months)      5/15/2024    11:24 AM   Additional Questions   Roomed by Katarzyna John   Accompanied by self         5/15/2024    11:24 AM   Patient Reported Additional Medications   Patient reports taking the following new medications none     History of Present Illness       Reason for visit:  Rash  Symptom onset:  More than a month  Symptoms include:  RAsh under left breast  Symptom intensity:  Moderate  Symptom progression:  Worsening  Had these symptoms before:  No  What makes it worse:  Wearing clothes  What makes it better:  Jergens hand lotion., allergt pill for the itchiness    She eats 2-3 servings of fruits and vegetables daily.She consumes 1 sweetened " "beverage(s) daily.She exercises with enough effort to increase her heart rate 10 to 19 minutes per day.  She exercises with enough effort to increase her heart rate 4 days per week.   She is taking medications regularly.         Shingles 3 months ago treated with valtrex and itching resolved and the rash nearly resolved. Then after the medication was done the area had a rash and started itching again.    Wearing a bra really irritates and itches.      Objective    /62   Pulse 78   Temp 97.7  F (36.5  C) (Tympanic)   Resp 20   Ht 1.62 m (5' 3.78\")   Wt 67.1 kg (148 lb)   LMP  (LMP Unknown)   SpO2 98%   BMI 25.58 kg/m    Body mass index is 25.58 kg/m .  Physical Exam   GENERAL: alert and no distress, hard of hearing using pocket talker  SKIN: dry scaley mildly erythematous rash under left breast both on  breast and the skin below breast. No rash in the fold.  Mild dry skin on mid back with excoriation marks.  PSYCH: mentation appears normal, affect normal/bright          Signed Electronically by: Marcos Reynolds MD    "

## 2024-05-16 ENCOUNTER — OFFICE VISIT (OUTPATIENT)
Dept: ORTHOPEDICS | Facility: CLINIC | Age: 89
End: 2024-05-16
Payer: MEDICARE

## 2024-05-16 VITALS
HEIGHT: 64 IN | WEIGHT: 148 LBS | BODY MASS INDEX: 25.27 KG/M2 | SYSTOLIC BLOOD PRESSURE: 167 MMHG | DIASTOLIC BLOOD PRESSURE: 71 MMHG

## 2024-05-16 DIAGNOSIS — M25.619 LIMITED RANGE OF MOTION OF SHOULDER: ICD-10-CM

## 2024-05-16 DIAGNOSIS — M25.512 PAIN IN JOINT OF LEFT SHOULDER: ICD-10-CM

## 2024-05-16 DIAGNOSIS — M17.12 PRIMARY OSTEOARTHRITIS OF LEFT KNEE: Primary | ICD-10-CM

## 2024-05-16 DIAGNOSIS — G89.29 CHRONIC PAIN OF LEFT KNEE: ICD-10-CM

## 2024-05-16 DIAGNOSIS — M25.562 CHRONIC PAIN OF LEFT KNEE: ICD-10-CM

## 2024-05-16 DIAGNOSIS — M25.462 EFFUSION OF LEFT KNEE: ICD-10-CM

## 2024-05-16 DIAGNOSIS — M19.012 OSTEOARTHRITIS OF GLENOHUMERAL JOINT, LEFT: ICD-10-CM

## 2024-05-16 PROCEDURE — 20611 DRAIN/INJ JOINT/BURSA W/US: CPT | Mod: LT | Performed by: FAMILY MEDICINE

## 2024-05-16 PROCEDURE — 99213 OFFICE O/P EST LOW 20 MIN: CPT | Mod: 25 | Performed by: FAMILY MEDICINE

## 2024-05-16 RX ADMIN — TRIAMCINOLONE ACETONIDE 40 MG: 40 INJECTION, SUSPENSION INTRA-ARTICULAR; INTRAMUSCULAR at 14:40

## 2024-05-16 RX ADMIN — ROPIVACAINE HYDROCHLORIDE 3 ML: 5 INJECTION, SOLUTION EPIDURAL; INFILTRATION; PERINEURAL at 14:45

## 2024-05-16 RX ADMIN — ROPIVACAINE HYDROCHLORIDE 3 ML: 5 INJECTION, SOLUTION EPIDURAL; INFILTRATION; PERINEURAL at 14:40

## 2024-05-16 RX ADMIN — TRIAMCINOLONE ACETONIDE 40 MG: 40 INJECTION, SUSPENSION INTRA-ARTICULAR; INTRAMUSCULAR at 14:45

## 2024-05-16 NOTE — LETTER
"    2024         RE: Daniela Brown  [address] [facility] [address]        Dear Colleague,    Thank you for referring your patient, Daniela Brown, to the [facility] [place]. Please see a copy of my visit note below.    Daniela Brown  :  1933  DOS:24  MRN: [id number]    Sports Medicine Clinic Visit    PCP: Cynthia Maddox    Daniela Brown is a 89 year old female who is seen in follow-up presenting with acute on chronic left knee pain.    Interim History - 2024  Since last visit on 2023 patient has moderate-severe left knee pain over the past 6+ weeks.  Left knee steroid injection completed on 23 provided relief for ~ 6 - 8 weeks.  Patient notes continued discomfort with walking and going from sit to stand position.  No new injury in the interim.    Interim History - May 16, 2024  Since last visit on 2024 patient has moderate-severe left knee pain with radiation to lower leg over the past 4 - 6 weeks.  Left knee steroid injection injection completed on 24 provided relief for ~ 6 - 8 weeks..  No new injury in the interim.    Review of Systems  Musculoskeletal: as above  Remainder of review of systems is negative including constitutional, CV, pulmonary, GI, Skin and Neurologic except as noted in HPI or medical history.    Past Medical History:   Diagnosis Date     Abnormal cardiovascular stress test 2/3/2019    9/10/2018 Lexiscan: \"Myocardial perfusion imaging using single isotope technique demonstrated a small perfusion defect of mild severity involving the basal inferior wall which is mostly reversible and may be consistent with mild ischemia in the right coronary artery distribution. In addition, transient ischemic dilatation is noted with a TID ratio of 1.3. \"     Adhesive capsulitis of shoulder     left      Adjustment disorder with anxiety 3/18/2016     B12 deficiency " "anemia 6/20/2012     Chest pain 2004    Chronic right sided/axillary discomfort (musculoskeletal) Atypical substernal (possibly GERD). Negative cardiolite 2004.     Colitis, Clostridium difficile 4/18/2012     Diverticulitis 2009     Headache(784.0) 2001    Tension type. MRI 2001 normal.     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     Status post coronary angiogram 2/27/2019     Syncope and collapse 9/10/2018     Past Surgical History:   Procedure Laterality Date     ARTHROPLASTY KNEE  3/26/2012    Procedure:ARTHROPLASTY KNEE; Right Total Knee Arthroplasty; Surgeon:BERNADINE ROSAS; Location:[place] OR     CHOLECYSTECTOMY       CV HEART CATHETERIZATION WITH POSSIBLE INTERVENTION N/A 2/27/2019    Procedure: Coronary Angiogram with Left ventriculogram;  Surgeon: Leandro Carpio MD;  Location:  HEART CARDIAC CATH LAB     HERNIA REPAIR      Hernia Repair     HYSTERECTOMY, PAP NO LONGER INDICATED  1983    TVH/BSO     SURGICAL HISTORY OF -   10/08    A & P Repair, Dr. Camden INFANTEC APPENDECTOMY  1953     Family History   Problem Relation Age of Onset     C.A.D. Mother      Diabetes Mother      Cancer - colorectal Mother      Arthritis Mother      Heart Disease Mother      Lipids Brother      Obesity Brother      Hypertension Brother      Allergies Son      Eye Disorder Son         cataract     Thyroid Disease Sister         graves dz     Eye Disorder Son      Leukemia Son      Alcohol/Drug Father      Objective  BP (!) 167/71   Ht 1.62 m (5' 3.78\")   Wt 67.1 kg (148 lb)   LMP  (LMP Unknown)   BMI 25.58 kg/m      General: healthy, alert and in no distress    HEENT: no scleral icterus or conjunctival erythema   Skin: no suspicious lesions or rash. No jaundice.   CV: regular rhythm by palpation, 2+ distal pulses, no pedal edema    Resp: normal respiratory effort without " conversational dyspnea   Psych: normal mood and affect    Gait: antalgic, appropriate coordination and balance   Neuro: normal light touch sensory exam of the extremities. Motor strength as noted below     Left Knee exam     ROM:        Flexion lacks 15 degrees       Extension lacks 5 degrees    Inspection:       no visible ecchymosis        effusion noted moderate by palpation    Skin:       no visible deformities       well perfused       capillary refill brisk    Patellar Motion:        Normal patellar tracking noted through range of motion       Crepitus noted in the patellofemoral joint    Tender:        lateral patellar border       medial joint line       lateral joint line    Non Tender:         remainder of knee area    Special Tests:        neg (-) varus at 0 deg and 30 deg       neg (-) valgus at 0 deg and 30 deg    Left Shoulder exam     ROM:        forward flexion 100        abduction 100       internal rotation limited       external rotation 20     Tender:        subacromial space mild       posterior shoulder       Anterior GH joint focal     Non Tender:       remainder of shoulder       sternoclavicular joint       acromioclavicular joint       periscapular region     Strength:        abduction 4/5       internal rotation 4/5       external rotation 4/5       adduction 4/5     Impingement testing:       Mildly painful empty can       negative crossover       positive (+) crank     Skin:       no visible deformities       well perfused       capillary refill brisk     Sensation:        normal sensation over shoulder and upper extremity    Radiology      SHOULDER RIGHT THREE OR MORE VIEWS   8/2/2022 1:38 PM      HISTORY: Acute pain of right shoulder.     COMPARISON: None.         Impression     IMPRESSION: Small amount of calcification just distal to the lateral  tip of the acromion process. This could be within the  supraspinatus/infraspinatus tendon or overlying subacromial/subdeltoid  bursa, so  clinical correlation for possible bursitis needed. Mild  osteoarthrosis of the AC joint. Diffuse bone demineralization.  Otherwise negative.     NADINE HARO MD      Recent Results (from the past 744 hour(s))   XR Knee Standing AP Bilat [place] Bilat Lat Left    Narrative    KNEE STANDING AP BILATERAL [place] BILATERAL LATERAL LEFT  4/21/2022  1:32 PM     HISTORY: Left knee pain. Fall at home.    COMPARISON: 2/16/2022 x-ray.      Impression    IMPRESSION: Right total knee arthroplasty in place. No evidence of  complication. Advanced medial and mild to moderate lateral compartment  joint space narrowing. Mild-to-moderate patellofemoral degenerative  changes with very small joint effusion. No acute fracture. No  significant change.    BIJU GALICIA MD         SYSTEM ID:  [id number]       Large Joint Injection/Arthocentesis: L knee joint    Date/Time: 5/16/2024 2:40 PM    Performed by: Tho Newman DO  Authorized by: Tho Newman DO    Indications:  Pain and osteoarthritis  Needle Size:  21 G  Guidance: ultrasound    Approach:  Superolateral  Location:  Knee      Medications:  40 mg triamcinolone 40 MG/ML; 3 mL ROPivacaine 5 MG/ML  Aspirate amount (mL):  22  Aspirate:  Serous and yellow  Outcome:  Tolerated well, no immediate complications  Procedure discussed: discussed risks, benefits, and alternatives    Consent Given by:  Patient  Timeout: timeout called immediately prior to procedure    Prep: patient was prepped and draped in usual sterile fashion     Ultrasound images of procedure were permanently stored.     Large Joint Injection/Arthocentesis: L glenohumeral joint    Date/Time: 5/16/2024 2:45 PM    Performed by: Tho Newman DO  Authorized by: Tho Newman DO    Indications:  Pain and osteoarthritis  Needle Size:  21 G  Guidance: ultrasound    Approach:  Posterior  Location:  Shoulder      Site:  L glenohumeral joint  Medications:  40 mg triamcinolone 40  MG/ML; 3 mL ROPivacaine 5 MG/ML  Outcome:  Tolerated well, no immediate complications  Procedure discussed: discussed risks, benefits, and alternatives    Consent Given by:  Patient  Timeout: timeout called immediately prior to procedure    Prep: patient was prepped and draped in usual sterile fashion     Ultrasound images of procedure were permanently stored.       Assessment:  1. Primary osteoarthritis of left knee    2. Effusion of left knee    3. Chronic pain of left knee    4. Osteoarthritis of glenohumeral joint, left    5. Pain in joint of left shoulder    6. Limited range of motion of shoulder        Plan:  Discussed the assessment with the patient.  Follow up: 3 weeks if still in significant pain, otherwise as needed  Recurrent exacerbation of underlying OA, no concerning clinical or radiographic signs of fracture  XR images independently visualized and reviewed with patient today in clinic  Assistive device options reviewed  PT options reviewed  Oral Tylenol and topical Voltaren gel reviewed as safe OTC options, reviewed safe dosing strategies  US guided CSI with aspiration repeated today for the left knee  Again reviewed the option today for consulting with orthopedic surgery for consideration of TKA based on clinical progress, she is hopeful to avoid which is reasonable, and may not be a viable surgical candidate  Also acute severe flare of left shoulder pain, known underlying GH joint DJD, severe  US guided GH joint CSI today, reviewed relative risks and goals  Activity modification strategies reviewed  RICE and compression options reviewed  Expectations and goals of CSI reviewed  Often 2-3 days for steroid effect, and can take up to two weeks for maximum effect  We discussed modified progressive pain-free activity as tolerated  Do not overuse in first two weeks if feeling better due to concern for vulnerability while steroid is working  Supportive care reviewed  All questions were answered  today  Contact us with additional questions or concerns  Signs and sx of concern reviewed      Tho Newman DO, OZZIE  Sports Medicine Physician  [facility]              Disclaimer: This note consists of symbols derived from keyboarding, dictation and/or voice recognition software. As a result, there may be errors in the script that have gone undetected. Please consider this when interpreting information found in this chart.      Again, thank you for allowing me to participate in the care of your patient.        Sincerely,        Tho Newman DO

## 2024-05-16 NOTE — PROGRESS NOTES
"Daniela Brown  :  1933  DOS:24  MRN: [id number]    Sports Medicine Clinic Visit    PCP: Cynthia Maddox    Daniela Brown is a 89 year old female who is seen in follow-up presenting with acute on chronic left knee pain.    Interim History - 2024  Since last visit on 2023 patient has moderate-severe left knee pain over the past 6+ weeks.  Left knee steroid injection completed on 23 provided relief for ~ 6 - 8 weeks.  Patient notes continued discomfort with walking and going from sit to stand position.  No new injury in the interim.    Interim History - May 16, 2024  Since last visit on 2024 patient has moderate-severe left knee pain with radiation to lower leg over the past 4 - 6 weeks.  Left knee steroid injection injection completed on 24 provided relief for ~ 6 - 8 weeks..  No new injury in the interim.    Review of Systems  Musculoskeletal: as above  Remainder of review of systems is negative including constitutional, CV, pulmonary, GI, Skin and Neurologic except as noted in HPI or medical history.    Past Medical History:   Diagnosis Date    Abnormal cardiovascular stress test 2/3/2019    9/10/2018 Lexiscan: \"Myocardial perfusion imaging using single isotope technique demonstrated a small perfusion defect of mild severity involving the basal inferior wall which is mostly reversible and may be consistent with mild ischemia in the right coronary artery distribution. In addition, transient ischemic dilatation is noted with a TID ratio of 1.3. \"    Adhesive capsulitis of shoulder     left     Adjustment disorder with anxiety 3/18/2016    B12 deficiency anemia 2012    Chest pain 2004    Chronic right sided/axillary discomfort (musculoskeletal) Atypical substernal (possibly GERD). Negative cardiolite .    Colitis, Clostridium difficile 2012    Diverticulitis 2009    Headache(784.0) 2001    Tension type. MRI  normal.    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    Status post coronary angiogram 2/27/2019    Syncope and collapse 9/10/2018     Past Surgical History:   Procedure Laterality Date    ARTHROPLASTY KNEE  3/26/2012    Procedure:ARTHROPLASTY KNEE; Right Total Knee Arthroplasty; Surgeon:BERNADINE ROSAS; Location:[place] OR    CHOLECYSTECTOMY      CV HEART CATHETERIZATION WITH POSSIBLE INTERVENTION N/A 2/27/2019    Procedure: Coronary Angiogram with Left ventriculogram;  Surgeon: Leandro Carpio MD;  Location:  HEART CARDIAC CATH LAB    HERNIA REPAIR      Hernia Repair    HYSTERECTOMY, PAP NO LONGER INDICATED  1983    TVH/BSO    SURGICAL HISTORY OF -   10/08    A & P Repair, Dr. Camden CASILLAS APPENDECTOMY  1953     Family History   Problem Relation Age of Onset    C.A.D. Mother     Diabetes Mother     Cancer - colorectal Mother     Arthritis Mother     Heart Disease Mother     Lipids Brother     Obesity Brother     Hypertension Brother     Allergies Son     Eye Disorder Son         cataract    Thyroid Disease Sister         graves dz    Eye Disorder Son     Leukemia Son     Alcohol/Drug Father      Objective  BP (!) 167/71   Ht 1.62 m (5' 3.78\")   Wt 67.1 kg (148 lb)   LMP  (LMP Unknown)   BMI 25.58 kg/m      General: healthy, alert and in no distress    HEENT: no scleral icterus or conjunctival erythema   Skin: no suspicious lesions or rash. No jaundice.   CV: regular rhythm by palpation, 2+ distal pulses, no pedal edema    Resp: normal respiratory effort without conversational dyspnea   Psych: normal mood and affect    Gait: antalgic, appropriate coordination and balance   Neuro: normal light touch sensory exam of the extremities. Motor strength as noted below     Left Knee exam     ROM:        Flexion lacks 15 degrees       Extension lacks 5 degrees    Inspection:       no " visible ecchymosis        effusion noted moderate by palpation    Skin:       no visible deformities       well perfused       capillary refill brisk    Patellar Motion:        Normal patellar tracking noted through range of motion       Crepitus noted in the patellofemoral joint    Tender:        lateral patellar border       medial joint line       lateral joint line    Non Tender:         remainder of knee area    Special Tests:        neg (-) varus at 0 deg and 30 deg       neg (-) valgus at 0 deg and 30 deg    Left Shoulder exam     ROM:        forward flexion 100        abduction 100       internal rotation limited       external rotation 20     Tender:        subacromial space mild       posterior shoulder       Anterior GH joint focal     Non Tender:       remainder of shoulder       sternoclavicular joint       acromioclavicular joint       periscapular region     Strength:        abduction 4/5       internal rotation 4/5       external rotation 4/5       adduction 4/5     Impingement testing:       Mildly painful empty can       negative crossover       positive (+) crank     Skin:       no visible deformities       well perfused       capillary refill brisk     Sensation:        normal sensation over shoulder and upper extremity    Radiology      SHOULDER RIGHT THREE OR MORE VIEWS   8/2/2022 1:38 PM      HISTORY: Acute pain of right shoulder.     COMPARISON: None.         Impression     IMPRESSION: Small amount of calcification just distal to the lateral  tip of the acromion process. This could be within the  supraspinatus/infraspinatus tendon or overlying subacromial/subdeltoid  bursa, so clinical correlation for possible bursitis needed. Mild  osteoarthrosis of the AC joint. Diffuse bone demineralization.  Otherwise negative.     NADINE HARO MD      Recent Results (from the past 744 hour(s))   XR Knee Standing AP Bilat [place] Bilat Lat Left    Narrative    KNEE STANDING AP BILATERAL [place]  BILATERAL LATERAL LEFT  4/21/2022  1:32 PM     HISTORY: Left knee pain. Fall at home.    COMPARISON: 2/16/2022 x-ray.      Impression    IMPRESSION: Right total knee arthroplasty in place. No evidence of  complication. Advanced medial and mild to moderate lateral compartment  joint space narrowing. Mild-to-moderate patellofemoral degenerative  changes with very small joint effusion. No acute fracture. No  significant change.    BIJU GALICIA MD         SYSTEM ID:  [id number]       Large Joint Injection/Arthocentesis: L knee joint    Date/Time: 5/16/2024 2:40 PM    Performed by: Tho Newman DO  Authorized by: Tho Newman DO    Indications:  Pain and osteoarthritis  Needle Size:  21 G  Guidance: ultrasound    Approach:  Superolateral  Location:  Knee      Medications:  40 mg triamcinolone 40 MG/ML; 3 mL ROPivacaine 5 MG/ML  Aspirate amount (mL):  22  Aspirate:  Serous and yellow  Outcome:  Tolerated well, no immediate complications  Procedure discussed: discussed risks, benefits, and alternatives    Consent Given by:  Patient  Timeout: timeout called immediately prior to procedure    Prep: patient was prepped and draped in usual sterile fashion     Ultrasound images of procedure were permanently stored.     Large Joint Injection/Arthocentesis: L glenohumeral joint    Date/Time: 5/16/2024 2:45 PM    Performed by: Tho Newman DO  Authorized by: Tho Newman DO    Indications:  Pain and osteoarthritis  Needle Size:  21 G  Guidance: ultrasound    Approach:  Posterior  Location:  Shoulder      Site:  L glenohumeral joint  Medications:  40 mg triamcinolone 40 MG/ML; 3 mL ROPivacaine 5 MG/ML  Outcome:  Tolerated well, no immediate complications  Procedure discussed: discussed risks, benefits, and alternatives    Consent Given by:  Patient  Timeout: timeout called immediately prior to procedure    Prep: patient was prepped and draped in usual sterile fashion     Ultrasound  images of procedure were permanently stored.       Assessment:  1. Primary osteoarthritis of left knee    2. Effusion of left knee    3. Chronic pain of left knee    4. Osteoarthritis of glenohumeral joint, left    5. Pain in joint of left shoulder    6. Limited range of motion of shoulder        Plan:  Discussed the assessment with the patient.  Follow up: 3 weeks if still in significant pain, otherwise as needed  Recurrent exacerbation of underlying OA, no concerning clinical or radiographic signs of fracture  XR images independently visualized and reviewed with patient today in clinic  Assistive device options reviewed  PT options reviewed  Oral Tylenol and topical Voltaren gel reviewed as safe OTC options, reviewed safe dosing strategies  US guided CSI with aspiration repeated today for the left knee  Again reviewed the option today for consulting with orthopedic surgery for consideration of TKA based on clinical progress, she is hopeful to avoid which is reasonable, and may not be a viable surgical candidate  Also acute severe flare of left shoulder pain, known underlying GH joint DJD, severe  US guided GH joint CSI today, reviewed relative risks and goals  Activity modification strategies reviewed  RICE and compression options reviewed  Expectations and goals of CSI reviewed  Often 2-3 days for steroid effect, and can take up to two weeks for maximum effect  We discussed modified progressive pain-free activity as tolerated  Do not overuse in first two weeks if feeling better due to concern for vulnerability while steroid is working  Supportive care reviewed  All questions were answered today  Contact us with additional questions or concerns  Signs and sx of concern reviewed      Tho Newman DO, OZZIE  Sports Medicine Physician  [facility]              Disclaimer: This note consists of symbols derived from keyboarding, dictation and/or voice recognition software. As a result,  there may be errors in the script that have gone undetected. Please consider this when interpreting information found in this chart.

## 2024-05-20 ENCOUNTER — OFFICE VISIT (OUTPATIENT)
Dept: AUDIOLOGY | Facility: CLINIC | Age: 89
End: 2024-05-20
Payer: MEDICARE

## 2024-05-20 DIAGNOSIS — H90.3 SENSORINEURAL HEARING LOSS, BILATERAL: Primary | ICD-10-CM

## 2024-05-20 PROCEDURE — 92591 PR HEARING AID EXAM BINAURAL: CPT | Performed by: AUDIOLOGIST

## 2024-05-20 RX ORDER — TRIAMCINOLONE ACETONIDE 40 MG/ML
40 INJECTION, SUSPENSION INTRA-ARTICULAR; INTRAMUSCULAR
Status: SHIPPED | OUTPATIENT
Start: 2024-05-16

## 2024-05-20 RX ORDER — ROPIVACAINE HYDROCHLORIDE 5 MG/ML
3 INJECTION, SOLUTION EPIDURAL; INFILTRATION; PERINEURAL
Status: SHIPPED | OUTPATIENT
Start: 2024-05-16

## 2024-05-20 NOTE — PROGRESS NOTES
AUDIOLOGY REPORT    SUBJECTIVE: Daniela Brown is a 89 year old female was seen in the Audiology Clinic at  [facility] on 5/20/24 to discuss concerns with hearing and functional communication difficulties. The patient was accompanied by their self. . Daniela has been seen previously on 2/12/2024 by an audiologist at another facility, and results revealed a severe to profound sensorineural hearing loss bilaterally. Daniela notes difficulty with communication in a variety of listening situations.    OBJECTIVE:    Patient is a hearing aid candidate. Patient would like to move forward with a hearing aid evaluation today. Therefore, the patient was presented with different options for amplification to help aid in communication. Discussed styles, levels of technology and monaural vs. binaural fitting.     The hearing aid(s) mutually chosen were:  Binaural: Phonak Cassandra L50-UP  COLOR: P6 siliver gray  BATTERY SIZE: 675  EARMOLD/TIPS: skeleton silicone      Otoscopy revealed ears are clear of cerumen bilaterally. Bilateral earmolds were taken without incident.    ASSESSMENT:     ICD-10-CM    1. Sensorineural hearing loss, bilateral  H90.3           Reviewed purchase information and warranty information with patient. The 45 day trial period was explained to patient. The patient was given a copy of the [place] [employer] consumer brochure on purchasing hearing instruments. Patient risk factors have been provided to the patient in writing prior to the sale of the hearing aid per FDA regulation. The risk factors are also available in the User Instructional Booklet to be presented on the day of the hearing aid fitting. Hearing aid(s) ordered. Hearing aid evaluation completed.    PLAN: Daniela is scheduled to return in 2-3 weeks for a hearing aid fitting and programming. Purchase agreement will be completed on that date. Please contact this clinic with any questions or  concerns.      Theodora NICHOLE, #[id number]

## 2024-05-21 ENCOUNTER — PATIENT OUTREACH (OUTPATIENT)
Dept: CARE COORDINATION | Facility: CLINIC | Age: 89
End: 2024-05-21
Payer: MEDICARE

## 2024-05-21 NOTE — PROGRESS NOTES
Clinic Care Coordination Contact  [facility]/Voicemail    Clinical Data: Care Coordinator Outreach    Outreach Documentation Number of Outreach Attempt   5/9/2024  12:50 PM 1   5/21/2024   1:22 PM 2       Patient's listed phone number kept ringing. CHW unable to leave a message.    Plan: Care Coordinator will try to reach patient again in 10 business days.    ABDOULAYE Rios, B.A. [facility] Care Coordination  [facility]:   [facility]  [phone]

## 2024-05-29 NOTE — PROGRESS NOTES
Pre-procedure Intake    Have you been fasting? No     If yes, for how long?     Are you taking a prescribed blood thinner such as coumadin, Plavix, Xarelto?    No    If yes, when did you take your last dose?     Do you take aspirin?  No    If cervical procedure, have you held aspirin for 6 days?   NA    Do you have any allergies to contrast dye, iodine, steroid and/or numbing medications?  NO    Are you currently taking antibiotics or have an active infection?  NO    Have you had a fever/elevated temperature within the past week? NO    Are you currently taking oral steroids? NO    Do you have a ? Yes       Are you pregnant or breastfeeding?  Not Applicable    Are the vital signs normal?  Yes      
MSSA bacteremia from previous hospitalization, line associated. BCX+ 5/9. RUE PICC was removed and replaced. Pt was started on Cefazolin 2g with expected course 5/9.  - C/w Cefazolin 2g q8h 4 week total duration (EOT 6/8/24) per previous ID recs  - Case surveillance cultures 5/29, f/u growth  - Can reconsult ID in am

## 2024-06-04 ENCOUNTER — PATIENT OUTREACH (OUTPATIENT)
Dept: CARE COORDINATION | Facility: CLINIC | Age: 89
End: 2024-06-04
Payer: MEDICARE

## 2024-06-04 NOTE — PROGRESS NOTES
Clinic Care Coordination Contact  [facility]/Voicemail    Clinical Data: Care Coordinator Outreach    Outreach Documentation Number of Outreach Attempt   5/9/2024  12:50 PM 1   5/21/2024   1:22 PM 2   6/4/2024  11:17 AM 3       Left message on patient's voicemail with call back information and requested return call.    Plan: Care Coordinator will route patient to [facility] to determine if further outreaches should be made.     Lalitha Tripp, VERNW, B.A. [facility] Care Coordination  [facility]:   [facility]  [phone]

## 2024-06-04 NOTE — PROGRESS NOTES
CHW briefly spoke to pt 5/9/24. CHW unable to leave a message 5/21/24. CHW left msg today.     Please try another time in 2-3 weeks.     MANJIT Arroyo   Social Work Primary Care Clinic Care Coordinator   [facility]  [phone]  [email or web address]

## 2024-06-11 ENCOUNTER — OFFICE VISIT (OUTPATIENT)
Dept: AUDIOLOGY | Facility: CLINIC | Age: 89
End: 2024-06-11
Payer: MEDICARE

## 2024-06-11 DIAGNOSIS — H90.3 SENSORINEURAL HEARING LOSS, BILATERAL: Primary | ICD-10-CM

## 2024-06-11 PROCEDURE — V5020 CONFORMITY EVALUATION: HCPCS | Mod: RT | Performed by: AUDIOLOGIST

## 2024-06-11 PROCEDURE — V5261 HEARING AID, DIGIT, BIN, BTE: HCPCS | Performed by: AUDIOLOGIST

## 2024-06-11 PROCEDURE — V5160 DISPENSING FEE BINAURAL: HCPCS | Performed by: AUDIOLOGIST

## 2024-06-11 PROCEDURE — V5011 HEARING AID FITTING/CHECKING: HCPCS | Performed by: AUDIOLOGIST

## 2024-06-11 PROCEDURE — V5264 EAR MOLD/INSERT: HCPCS | Mod: RT | Performed by: AUDIOLOGIST

## 2024-06-12 NOTE — PROGRESS NOTES
AUDIOLOGY REPORT    SUBJECTIVE: Daniela Brown, a 89 year old female, was seen in the Audiology Clinic at [facility] today for a Binaural hearing aid fitting. Previous results have revealed a bilateral severe to profound sensorineural hearing loss bilaterally.  The patient is an experienced hearing aid user who has not worn hearing aids for the past few years.      OBJECTIVE:  Prior to fitting, a hearing aid check was performed to ensure device functionality. The hearing aid conformity evaluation was completed.The hearing aids were placed and they provided a good fit. Real-ear-probe-microphone measurements were completed on the dineout system and were a good match to NAL-NL2 target with soft sounds audible, moderate sounds comfortable, and loud sounds below discomfort. UCLs are verified through maximum power output measures and demonstrate appropriate limiting of loud inputs. Ms. Brown was oriented to proper hearing aid use, care, cleaning (no water, dry brush), batteries (size 675, insertion/removal, toxicity, low-battery signal), aid insertion/removal, user booklet, warranty information, storage cases, and other hearing aid details. The patient confirmed understanding of hearing aid use and care, and showed proper insertion of hearing aid and batteries while in the office today. Ms. Brown reported good volume and sound quality today.    EAR(S) FIT: Binaural  MA HEARING AID MAKE: Right: Phonak; Left: Phonak    MA HEARING AID MODEL #: Right: Cassandra L50-UP; Left: Cassandra L50-UP  HEARING AID STYLE: Right: BTE; Left: BTE  EARMOLDS: Right: Silicone, skeleton #[id number]; Left:  Silicone, skeleton #[email or web address]  SERIAL NUMBERS: Right: [id number]; Left: [id number]  WARRANTY END DATE: Right: 6/22/2027; Left:: 6/22/2027         ASSESSMENT: Binaural hearing aid fitting completed today. Verification measures were performed. The 45 day trial period was explained to patient, and they expressed  understanding. Ms. Brown signed the Hearing Aid Purchase Agreement and was given a copy, as well as details on her hearing aids. Patient was counseled that exact out of pocket amounts cannot be determined for hearing aid claims being sent to insurance. Any insurance coverage information presented to the patient is an estimate only, and is not a guarantee of payment. Patient has been advised to check with their own insurance.    PLAN: Ms. Brown will return for follow-up in 2-3 weeks for a hearing aid review appointment. Please call this clinic with questions regarding today s appointment.    Theodora Pierre M.A. -[facility], #[id number]

## 2024-06-12 NOTE — PATIENT INSTRUCTIONS

## 2024-06-14 NOTE — RESULT ENCOUNTER NOTE
Thyroid is normal.  Cholesterol is well controlled and similar to previous values.  The kidney function is very mildly low but similar to previous values.  If the electrolytes are normal.  A1c discussed at the time of visit.  Vitamin D and B12 are still in process.
Vitamin B23 is normal 
Vitamin D level is low.  Vitamin D is listed on med list at 1000 units daily.  Recommend to stop this.  Start high dose Vitamin D 50,000 units weekly x 3 months.  Then start higher maintenance dose of Vitamin D at 2000 units daily.  Sent both to pharmacy.
Detail Level: Zone
Initiate Treatment: OTC hydrocortisone qD PRN

## 2024-06-17 ENCOUNTER — PATIENT OUTREACH (OUTPATIENT)
Dept: CARE COORDINATION | Facility: CLINIC | Age: 89
End: 2024-06-17
Payer: MEDICARE

## 2024-06-17 NOTE — PROGRESS NOTES
Clinic Care Coordination Contact  Care Coordination Clinician Chart Review    Situation: Patient chart reviewed by Care Coordinator.       Background: Care Coordination Program started: 7/19/2023. Initial assessment completed 7/24/23 and patient-centered care plan(s) were developed with participation from patient. Lead CC handed patient off to CHW for continued outreaches.       Assessment: Per chart review, patient outreach completed by CC CHW on 6/4/24. Patient is actively working to accomplish goal(s). Patient's goal(s) appropriate and relevant at this time.     Patient is not due for updated Plan of Care.      Assessments will be completed annually or as needed/with change of patient status. Due 9/13/24.          Care Plan: General       Problem: HP GENERAL PROBLEM       Goal: Resources       Start Date: 7/24/2023 Expected End Date: 12/1/2023    This Visit's Progress: 10% Recent Progress: 10%    Priority: Medium    Note:     Barriers: Access  Strengths: Motivated  Patient expressed understanding of goal: Yes    Action steps to achieve this goal:  1. I will look into using [employer].  2. I will look into toe nail home services.   3. I will look into food resources.   4. I will work with care coordination team as needed.  (CHW will mail resources to patient as she doesn't remember receiving them from CP - 01/16/2024)                                  Plan/Recommendations: The patient will continue working with Care Coordination to achieve goal(s) as above.     CHW will continue outreaches at minimum every 30 days and will involve Lead CC as needed or if patient is ready to move to Maintenance.     Lead CC will continue to monitor CHW outreaches and patient's progress to goal(s) every 6 weeks.     Plan of Care updated and sent to patient: MANJIT Garcia   Social Work Primary Care Clinic Care Coordinator   [facility]  [phone]  [email or web address]

## 2024-06-20 ENCOUNTER — PATIENT OUTREACH (OUTPATIENT)
Dept: CARE COORDINATION | Facility: CLINIC | Age: 89
End: 2024-06-20
Payer: MEDICARE

## 2024-06-20 NOTE — PROGRESS NOTES
Clinic Care Coordination Contact  Community Health Worker Follow Up    Care Gaps:     Health Maintenance Due   Topic Date Due    ZOSTER IMMUNIZATION (1 of 2) Never done    DTAP/TDAP/TD IMMUNIZATION (1 - Tdap) 01/02/1988    RSV VACCINE (Pregnancy & 60+) (1 - 1-dose 60+ series) Never done    EYE EXAM  12/08/2012    DIABETIC FOOT EXAM  09/02/2022    A1C  10/19/2023    LIPID  10/28/2023    COVID-19 Vaccine (7 - 2023-24 season) 04/14/2024    MICROALBUMIN  04/19/2024       Postponed to next CHW outreach.     Care Plan:   Care Plan: General       Problem: HP GENERAL PROBLEM       Goal: Resources       Start Date: 7/24/2023 Expected End Date: 12/1/2023    This Visit's Progress: 10% Recent Progress: 10%    Priority: Medium    Note:     Barriers: Access  Strengths: Motivated  Patient expressed understanding of goal: Yes    Action steps to achieve this goal:  1. I will look into using [employer].  2. I will look into toe nail home services.   3. I will look into food resources.   4. I will work with care coordination team as needed.  (CHW will mail resources to patient as she doesn't remember receiving them from CP - 01/16/2024)                               Intervention and Education during outreach:   Patient states that she did not receive the CHW's letter. Patient requested to have the letter resent. CHW and patient confirmed the patient's address.     CHW Plan: CHW will re-send letter in the mail to patient. CHW will do next patient outreach in 10 business days to ensure that patient received letter.    ABDOULAYE Rios, B.A. [facility] Care Coordination  [facility]:   [facility]  [phone]

## 2024-06-20 NOTE — LETTER
[facility] CARE COORDINATION  [address]  [place] [address]     2024     Daniela Brown  [address]  [place] [address]      Dear Daniela,     Thank you for taking the time to speak with me on the phone today. Here are some resources that could be beneficial to you.       Toe nail clipping services at home:        [facility]: [phone] // [email or web address]     [facility]: [phone] // [email or web address]     [employer] [facility], [phone]     [facility] [phone]      Meals on Wheels:   (Discussed referral [employer] grocery drop off. Pt needs to call Nicolasa)        [facility] Direct (frozen mail order): [phone]     [employer] (frozen mail order): [phone]     [facility] meals on wheels (daily hot): [phone]      [employer] door step grocery delivery: [place] [phone]      Transportation: [employer] [phone], option 13.  You could use this transportation service to get to [facility] in [place] as needed.        Please let me know if you have any questions or concerns!     Lalitha Tripp, ABDOULAYE, B.A. Community [facility]  Clinic Care Coordination  [facility]:   [facility]  [phone]

## 2024-06-26 ENCOUNTER — TELEPHONE (OUTPATIENT)
Dept: FAMILY MEDICINE | Facility: CLINIC | Age: 89
End: 2024-06-26

## 2024-06-26 NOTE — TELEPHONE ENCOUNTER
Contacted the patient.  The patient reports she has had symptoms for about One month or more. The patient has history of UTI's.  The patient reports she has urine that appears more gold/dark in color (Concentrated).  The patient reports urination urgency. She does not see any blood in the urine. No abdominal pain at this time. The patient denies any fevers, nausea or vomiting. The patient does feel weak. No confusion and lives alone. She is able to do ADL's. The patient can possibly find a ride to clinic. She would be willing to be seen in ADS if available. Huddled with provider and suggested ADS. They will reach out to patient.    Thank you  Juju METZ RN

## 2024-06-27 ENCOUNTER — TELEPHONE (OUTPATIENT)
Dept: FAMILY MEDICINE | Facility: CLINIC | Age: 89
End: 2024-06-27

## 2024-06-27 ENCOUNTER — OFFICE VISIT (OUTPATIENT)
Dept: PEDIATRICS | Facility: CLINIC | Age: 89
End: 2024-06-27
Payer: MEDICARE

## 2024-06-27 VITALS
HEIGHT: 63 IN | TEMPERATURE: 98.2 F | SYSTOLIC BLOOD PRESSURE: 156 MMHG | HEART RATE: 81 BPM | RESPIRATION RATE: 14 BRPM | DIASTOLIC BLOOD PRESSURE: 62 MMHG | OXYGEN SATURATION: 97 % | BODY MASS INDEX: 26.22 KG/M2 | WEIGHT: 148 LBS

## 2024-06-27 DIAGNOSIS — G89.29 CHRONIC LOW BACK PAIN WITH BILATERAL SCIATICA, UNSPECIFIED BACK PAIN LATERALITY: ICD-10-CM

## 2024-06-27 DIAGNOSIS — M54.41 CHRONIC LOW BACK PAIN WITH BILATERAL SCIATICA, UNSPECIFIED BACK PAIN LATERALITY: ICD-10-CM

## 2024-06-27 DIAGNOSIS — N39.42 URINARY INCONTINENCE WITHOUT SENSORY AWARENESS: Primary | ICD-10-CM

## 2024-06-27 DIAGNOSIS — M54.42 CHRONIC LOW BACK PAIN WITH BILATERAL SCIATICA, UNSPECIFIED BACK PAIN LATERALITY: ICD-10-CM

## 2024-06-27 DIAGNOSIS — R82.90 CLOUDY URINE: ICD-10-CM

## 2024-06-27 DIAGNOSIS — I10 BENIGN ESSENTIAL HYPERTENSION: Chronic | ICD-10-CM

## 2024-06-27 LAB
ALBUMIN UR-MCNC: 30 MG/DL
APPEARANCE UR: ABNORMAL
BACTERIA #/AREA URNS HPF: ABNORMAL /HPF
BILIRUB UR QL STRIP: NEGATIVE
COLOR UR AUTO: YELLOW
GLUCOSE UR STRIP-MCNC: NEGATIVE MG/DL
HGB UR QL STRIP: ABNORMAL
KETONES UR STRIP-MCNC: NEGATIVE MG/DL
LEUKOCYTE ESTERASE UR QL STRIP: ABNORMAL
MUCOUS THREADS #/AREA URNS LPF: PRESENT /LPF
NITRATE UR QL: NEGATIVE
PH UR STRIP: 6 [PH] (ref 5–7)
RBC #/AREA URNS AUTO: ABNORMAL /HPF
SP GR UR STRIP: 1.02 (ref 1–1.03)
SQUAMOUS #/AREA URNS AUTO: ABNORMAL /LPF
URATE CRY #/AREA URNS HPF: ABNORMAL /HPF
UROBILINOGEN UR STRIP-ACNC: 1 E.U./DL
WBC #/AREA URNS AUTO: ABNORMAL /HPF

## 2024-06-27 PROCEDURE — 81001 URINALYSIS AUTO W/SCOPE: CPT | Performed by: FAMILY MEDICINE

## 2024-06-27 PROCEDURE — 99215 OFFICE O/P EST HI 40 MIN: CPT | Performed by: FAMILY MEDICINE

## 2024-06-27 PROCEDURE — 87086 URINE CULTURE/COLONY COUNT: CPT | Performed by: FAMILY MEDICINE

## 2024-06-27 ASSESSMENT — PAIN SCALES - GENERAL: PAINLEVEL: NO PAIN (0)

## 2024-06-27 NOTE — PATIENT INSTRUCTIONS
I don't see any sign of bladder infection, we have sent a urinary culture to make sure and will gave you a call.     Follow up with Dr Maddxo as planned

## 2024-06-27 NOTE — PROGRESS NOTES
"Acute and Diagnostic Services Clinic Visit    Assessment & Plan     Urinary incontinence without sensory awareness  Cloudy urine  UA is unremarkable, Culture sent and pending  Suspect her incontinence is from her back, will have her follow up with Dr Maddox in one week  - UA with Microscopic reflex to Culture; Future  - UA with Microscopic reflex to Culture  - UA Microscopic with Reflex to Culture  - Urine Culture Aerobic Bacterial - lab collect; Future  - Urine Culture Aerobic Bacterial - lab collect    Benign essential hypertension   Ok for her age  Continue Amlodipine , olmesartan    Chronic low back pain  Follow up with Dr Maddox     40 minutes were spent doing chart review, history and exam, documentation and further activities per the note.      BMI  Estimated body mass index is 26.22 kg/m  as calculated from the following:    Height as of this encounter: 1.6 m (5' 3\").    Weight as of this encounter: 67.1 kg (148 lb).         Follow up in one month    No follow-ups on file.    Shruti Finnegan is a 89 year old, presenting for the following health issues:  Pelvic Pain    HPI     Abdominal/Flank Pain  Onset/Duration: THIS MORNING  Description:   Character: Stabbing  Location: pelvic region  Radiation: None  Intensity: moderate  Progression of Symptoms:  same and intermittent  Accompanying Signs & Symptoms:  Fever/chills: no   Gas/Bloating: YES- GAS  Nausea: no   Vomitting: no   Diarrhea: no   Constipation:no   Dysuria: no states she \"does not feel it when she urinates comes when it wants to.\"           Hematuria: no            Frequency: YES           Incontinence of urine: YES  History:            Last bowel movement: yesterday  Trauma: no   Previous similar pain: no    Previous tests done: none           Previous Abdominal surgery: no   Precipitating factors:   Does the pain change with:     Food: no      Bowel Movement: no     Urination: no              Other factors: no   Therapies tried and outcome:  " "None    When food last eaten: N/A    Genitourinary - Female  Onset/Duration: one month +  Description:   Painful urination (Dysuria): NO says she cannot feel when she urinates           Frequency: YES  Blood in urine (Hematuria): unknown  Delay in urine (Hesitency): does not know, \"urine just comes\"  Intensity: moderate  Progression of Symptoms:  same and intermittent  Accompanying Signs & Symptoms:  Fever/chills: No  Flank pain: No  Nausea and vomiting: No  Vaginal symptoms: none  Abdominal/Pelvic Pain: YES  History:   History of frequent UTI s: YES  History of kidney stones: No  Sexually Active: No  Possibility of pregnancy: No  Precipitating or alleviating factors: None  Therapies tried and outcome:  none     89 y/o with diabetes Type 2, polyneuropathy, coronary artery disease, chronic back pain with bilateral sciatica, has had multiple injections, IBS with diarrhea. Has a history of UTI, last one was in Dec. Had a normal UA in Jan.   With 3 months of urinary incontinence without noticing that she goes.  No dysuria. Did have some \"gas pains\" this am, resolved. Currently no pain. Last bowel movement was yesterday and normal.   Has chronic back pain with bilateral sciatica and neuropathy, is hoping to get an injection when finds a ride. Has to use a wheelchair for long distances her back hurts so much.  No fever.   No chest pain or shortness of breath.       Review of Systems  Constitutional, HEENT, cardiovascular, pulmonary, gi and gu systems are negative, except as otherwise noted.      Objective    BP (!) 156/62 (BP Location: Right arm, Patient Position: Sitting, Cuff Size: Adult Large)   Pulse 81   Temp 98.2  F (36.8  C) (Oral)   Resp 14   Ht 1.6 m (5' 3\")   Wt 67.1 kg (148 lb)   LMP  (LMP Unknown)   SpO2 97%   BMI 26.22 kg/m    Body mass index is 26.22 kg/m .  Physical Exam   GENERAL: alert and no distress, sitting comfortably in wheelchair  NECK: no adenopathy, no asymmetry, masses, or scars  RESP: " lungs clear to auscultation - no rales, rhonchi or wheezes  CV: regular rate and rhythm, normal S1 S2, 12-/6 systolic ejection murmur, no peripheral edema  ABDOMEN: soft, nontender, no hepatosplenomegaly, no masses and bowel sounds normal  MS: no gross musculoskeletal defects noted, no edema    Results for orders placed or performed in visit on 06/27/24 (from the past 24 hour(s))   UA with Microscopic reflex to Culture    Specimen: Urine, Clean Catch   Result Value Ref Range    Color Urine Yellow Colorless, Straw, Light Yellow, Yellow    Appearance Urine Slightly Cloudy (A) Clear    Glucose Urine Negative Negative mg/dL    Bilirubin Urine Negative Negative    Ketones Urine Negative Negative mg/dL    Specific Gravity Urine 1.025 1.003 - 1.035    Blood Urine Trace (A) Negative    pH Urine 6.0 5.0 - 7.0    Protein Albumin Urine 30 (A) Negative mg/dL    Urobilinogen Urine 1.0 0.2, 1.0 E.U./dL    Nitrite Urine Negative Negative    Leukocyte Esterase Urine Trace (A) Negative   UA Microscopic with Reflex to Culture   Result Value Ref Range    Bacteria Urine Few (A) None Seen /HPF    RBC Urine 0-2 0-2 /HPF /HPF    WBC Urine 0-5 0-5 /HPF /HPF    Squamous Epithelials Urine Few (A) None Seen /LPF    Mucus Urine Present (A) None Seen /LPF    Uric Acid Crystals Urine Few (A) None Seen /HPF    Narrative    Urine Culture not indicated     *Note: Due to a large number of results and/or encounters for the requested time period, some results have not been displayed. A complete set of results can be found in Results Review.           Signed Electronically by: Cherri Obando MD

## 2024-06-27 NOTE — TELEPHONE ENCOUNTER
Order/Referral Request    Who is requesting: patient    Orders being requested: order for injection for back pain    Reason service is needed/diagnosis: back pain     When are orders needed by: asap     Has this been discussed with Provider: Yes    Does patient have a preference on a Group/Provider/Facility? [facility] olga tran/justino     Does patient have an appointment scheduled?: No    Where to send orders: Mail    Okay to leave a detailed message?: Yes at Home number on file [phone] (home)

## 2024-06-28 LAB — BACTERIA UR CULT: NORMAL

## 2024-06-28 NOTE — TELEPHONE ENCOUNTER
She will order it at your upcoming appt next week.  Covering for your clinician.  Thank you,  Marcos Reynolds MD  [facility]

## 2024-06-28 NOTE — TELEPHONE ENCOUNTER
The patient reports the PCP has ordered in the past. She did order this about 2 months ago and she was not able to get in. The patient is having back pain now. Would like order to have injection.    Will send to provider to review.    Thank you    Juju METZ RN

## 2024-06-28 NOTE — TELEPHONE ENCOUNTER
Patient advised. Verbalizes understanding and denies further questions or concerns at this time.     Moriah LE RN  [facility]  [phone]

## 2024-07-03 ENCOUNTER — PATIENT OUTREACH (OUTPATIENT)
Dept: CARE COORDINATION | Facility: CLINIC | Age: 89
End: 2024-07-03

## 2024-07-03 ENCOUNTER — OFFICE VISIT (OUTPATIENT)
Dept: FAMILY MEDICINE | Facility: CLINIC | Age: 89
End: 2024-07-03
Payer: MEDICARE

## 2024-07-03 VITALS
BODY MASS INDEX: 26.4 KG/M2 | DIASTOLIC BLOOD PRESSURE: 60 MMHG | RESPIRATION RATE: 12 BRPM | WEIGHT: 149 LBS | OXYGEN SATURATION: 97 % | HEART RATE: 83 BPM | HEIGHT: 63 IN | SYSTOLIC BLOOD PRESSURE: 120 MMHG | TEMPERATURE: 98.9 F

## 2024-07-03 DIAGNOSIS — N39.42 URINARY INCONTINENCE WITHOUT SENSORY AWARENESS: ICD-10-CM

## 2024-07-03 DIAGNOSIS — L30.4 INTERTRIGO: ICD-10-CM

## 2024-07-03 DIAGNOSIS — M54.42 CHRONIC LEFT-SIDED LOW BACK PAIN WITH LEFT-SIDED SCIATICA: Primary | Chronic | ICD-10-CM

## 2024-07-03 DIAGNOSIS — E11.42 TYPE 2 DIABETES MELLITUS WITH DIABETIC POLYNEUROPATHY, WITHOUT LONG-TERM CURRENT USE OF INSULIN (H): ICD-10-CM

## 2024-07-03 DIAGNOSIS — G89.29 CHRONIC LEFT-SIDED LOW BACK PAIN WITH LEFT-SIDED SCIATICA: Primary | Chronic | ICD-10-CM

## 2024-07-03 LAB — HBA1C MFR BLD: 5.8 % (ref 0–5.6)

## 2024-07-03 PROCEDURE — 36415 COLL VENOUS BLD VENIPUNCTURE: CPT | Performed by: INTERNAL MEDICINE

## 2024-07-03 PROCEDURE — 99214 OFFICE O/P EST MOD 30 MIN: CPT | Performed by: INTERNAL MEDICINE

## 2024-07-03 PROCEDURE — 83036 HEMOGLOBIN GLYCOSYLATED A1C: CPT | Performed by: INTERNAL MEDICINE

## 2024-07-03 RX ORDER — LANCETS
EACH MISCELLANEOUS
Qty: 100 EACH | Refills: 6 | Status: SHIPPED | OUTPATIENT
Start: 2024-07-03

## 2024-07-03 RX ORDER — NYSTATIN 100000 U/G
CREAM TOPICAL 2 TIMES DAILY
Qty: 30 G | Refills: 11 | Status: SHIPPED | OUTPATIENT
Start: 2024-07-03

## 2024-07-03 ASSESSMENT — PAIN SCALES - GENERAL: PAINLEVEL: EXTREME PAIN (9)

## 2024-07-03 NOTE — PROGRESS NOTES
Assessment & Plan     Chronic left-sided low back pain with left-sided sciatica  Her ongoing back pain, radicular symptoms and acute on chronic leg weakness warrant updated imaging.  Last MRI was 2018.  She has had acute on chronic leg weakness in the past and has improved with the epidural injection.  Will place referral to [facility] for her to get updated imaging and epidural  - MR Lumbar Spine w/o Contrast; Future  - Pain Management  Referral; Future    Urinary incontinence without sensory awareness  See above  - MR Lumbar Spine w/o Contrast; Future  - Pain Management  Referral; Future    Type 2 diabetes mellitus with diabetic polyneuropathy, without long-term current use of insulin (H)  Well-controlled.  Needs new meter  - Albumin Random Urine Quantitative with Creat Ratio; Future  - Hemoglobin A1c; Future  - blood glucose monitoring (NO BRAND SPECIFIED) meter device kit; Use to test blood sugar 1 times daily or as directed. Preferred blood glucose meter OR supplies to accompany: Blood Glucose Monitor Brands: per insurance.  - blood glucose (NO BRAND SPECIFIED) test strip; Use to test blood sugar 1 times daily or as directed. To accompany: Blood Glucose Monitor Brands: per insurance.  - thin (NO BRAND SPECIFIED) lancets; Use with lanceting device. To accompany: Blood Glucose Monitor Brands: per insurance.  - Hemoglobin A1c    Intertrigo  Intermittent problem.  Refill sent  - nystatin (MYCOSTATIN) 881127 UNIT/GM external cream; Apply topically 2 times daily Apply to area under bilateral breasts            Patient Instructions   Diabetes  You are due for dilated eye exam.  Have the report sent to Dr. aMddox's office.  Will check A1c  Order for new meter    Back and leg pain  Will get updated MRI and back injection at [facility]    Rash  Looks like yeast rash  You can use nystatin cream twice daily x 1-2 weeks, may need to repeat every few weeks or months when rash  "returns    Subjective   Sivan is a 89 year old, presenting for the following health issues:  ER F/U and Health Maintenance (No shots)        7/3/2024     7:33 AM   Additional Questions   Roomed by geovanny   Accompanied by self         7/3/2024     7:33 AM   Patient Reported Additional Medications   Patient reports taking the following new medications none     HPI     Chief Complaint   Patient presents with    ER F/U    Health Maintenance     No shots           ADS Followup:    Facility:  [place]  Date of visit: 6/27/24  Reason for visit: Urinary incontinence without sensory awareness   Current Status: only having 8oz of water a day, only going 2 times to the bathroom (urine) no burning, no blood , no pain , only pain is in the knee 9/10  From ADS note \"89 y/o with diabetes Type 2, polyneuropathy, coronary artery disease, chronic back pain with bilateral sciatica, has had multiple injections, IBS with diarrhea. Has a history of UTI, last one was in Dec. Had a normal UA in Jan.   With 3 months of urinary incontinence without noticing that she goes.  No dysuria. Did have some \"gas pains\" this am, resolved. Currently no pain. Last bowel movement was yesterday and normal.   Has chronic back pain with bilateral sciatica and neuropathy, is hoping to get an injection when finds a ride. Has to use a wheelchair for long distances her back hurts so much.  No fever. \"  --UA/UC was negative  --ADS provider thought it was a neurogenic cause of her bladder symptoms   --she has had epidurals at [facility] and wants another referral sent  --she has acute on chronic left leg radiculopathy impairing her mobility; leg feels weak  --taking Tylenol 500-1000 mg with minimal benefit; also using topical treatment, helps only briefly  --left foot is numb on plantar surface  --denies saddle anesthesia  --no urinary retention symptoms   --she has had similar symptoms in her leg and back that have responded well to " "injection    Diabetes  --glucometer broke  --wants A1c checked today    Current Outpatient Medications   Medication Sig Dispense Refill    acetaminophen (TYLENOL) 500 MG tablet Take 500-1,000 mg by mouth every 6 hours as needed for mild pain or pain      amLODIPine (NORVASC) 5 MG tablet Take 1 tablet (5 mg) by mouth daily 90 tablet 3    buPROPion (WELLBUTRIN XL) 150 MG 24 hr tablet Take 1 tablet (150 mg) by mouth every morning 90 tablet 3    estradiol (ESTRACE) 0.1 MG/GM vaginal cream Place 2 g vaginally twice a week 42.5 g 11    meloxicam (MOBIC) 7.5 MG tablet Take 1 tablet (7.5 mg) by mouth daily 90 tablet 3    Multiple Vitamins-Minerals (THERAPEUTIC MULTIVIT/MINERAL) TABS Take 1 tablet by mouth every evening       olmesartan (BENICAR) 20 MG tablet Take 1 tablet (20 mg) by mouth daily 90 tablet 3    PARoxetine (PAXIL) 20 MG tablet Take 1 tablet (20 mg) by mouth every morning 90 tablet 3    psyllium (METAMUCIL/KONSYL) 58.6 % powder Take 6 g (1 teaspoonful) by mouth daily 283 g 11           Review of Systems  Constitutional, HEENT, cardiovascular, pulmonary, gi and gu systems are negative, except as otherwise noted.      Objective    /60 (BP Location: Right arm, Patient Position: Sitting, Cuff Size: Adult Regular)   Pulse 83   Temp 98.9  F (37.2  C) (Tympanic)   Resp 12   Ht 1.6 m (5' 3\")   Wt 67.6 kg (149 lb)   LMP  (LMP Unknown)   SpO2 97%   BMI 26.39 kg/m    Body mass index is 26.39 kg/m .  Physical Exam   GENERAL: alert and no distress  MS: 4/5 strength left hip flex/ext, knee flex/ext, 4+/5 strength left ankle flex/ext  5/5 strength right hip/knee/ankle    The longitudinal plan of care for the diagnosis(es)/condition(s) as documented were addressed during this visit. Due to the added complexity in care, I will continue to support Sivan in the subsequent management and with ongoing continuity of care.        Signed Electronically by: Cynthia Madodx DO    "

## 2024-07-03 NOTE — RESULT ENCOUNTER NOTE
Hemoglobin A1c remains very well-controlled at 5.8. controlled  - Continue Imdur, c/w diltiazem as above

## 2024-07-03 NOTE — PATIENT INSTRUCTIONS
Diabetes  You are due for dilated eye exam.  Have the report sent to Dr. Maddox's office.  Will check A1c  Order for new meter    Back and leg pain  Will get updated MRI and back injection at [facility]    Rash  Looks like yeast rash  You can use nystatin cream twice daily x 1-2 weeks, may need to repeat every few weeks or months when rash returns

## 2024-07-03 NOTE — PROGRESS NOTES
Clinic Care Coordination Contact    Patient has completed all goals with Clinic Care Coordination. Maintenance is approved.    Bharti Riley [facility]   Social Work Primary Care Clinic Care Coordinator   [facility]  [phone]  [email or web address]

## 2024-07-03 NOTE — PROGRESS NOTES
Clinic Care Coordination Contact  Community Health Worker Follow Up    Care Gaps:     Health Maintenance Due   Topic Date Due    ZOSTER IMMUNIZATION (1 of 2) Never done    DTAP/TDAP/TD IMMUNIZATION (1 - Tdap) 01/02/1988    RSV VACCINE (Pregnancy & 60+) (1 - 1-dose 60+ series) Never done    EYE EXAM  12/08/2012    DIABETIC FOOT EXAM  09/02/2022    A1C  10/19/2023    LIPID  10/28/2023    COVID-19 Vaccine (7 - 2023-24 season) 04/14/2024    MICROALBUMIN  04/19/2024       Patient acknowledged care gaps.    Care Plan:   Care Plan: General       Problem: HP GENERAL PROBLEM       Goal: Resources  Completed 7/3/2024      Start Date: 7/24/2023 Expected End Date: 12/1/2023    This Visit's Progress: 100% Recent Progress: 10%    Priority: Medium    Note:     Barriers: Access  Strengths: Motivated  Patient expressed understanding of goal: Yes    Action steps to achieve this goal:  1. I will look into using [facility].  2. I will look into toe nail home services.   3. I will look into food resources.   4. I will work with care coordination team as needed.  (CHW will mail resources to patient as she doesn't remember receiving them from  - 01/16/2024)                               Intervention and Education during outreach:   Patient states that she received CHW's letter in the mail. Patient states that she has no urgent needs at this time, but will reach out to the resources when she needs assistance with toe nail clipping or transportation.   Patient states that she has no questions or concerns for CC at this time and is okay with being placed on maintenance.    CHW Plan: CHW will do next patient outreach in one month.    ABDOULAYE Rios, B.A. [facility] Care Coordination  [facility]:   [facility]  [phone]

## 2024-07-17 NOTE — ED TRIAGE NOTES
Pt comes by EMS from home, pt was eating dinner, got lightheaded and dizzy, was nauseated but no vomiting, pt pushed her alert button. Pt EMS reports feeling very weak, pt lives alone, pt was . Pt denies chest pain, abdominal pain, sob   Pt is currently taking cefdenir for UTI          Sepsis due to methicillin susceptible Staphylococcus aureus (MSSA) without acute organ dysfunction

## 2024-07-26 ENCOUNTER — ANCILLARY PROCEDURE (OUTPATIENT)
Dept: GENERAL RADIOLOGY | Facility: CLINIC | Age: 89
End: 2024-07-26
Payer: MEDICARE

## 2024-07-26 ENCOUNTER — OFFICE VISIT (OUTPATIENT)
Dept: FAMILY MEDICINE | Facility: CLINIC | Age: 89
End: 2024-07-26
Payer: MEDICARE

## 2024-07-26 VITALS
DIASTOLIC BLOOD PRESSURE: 56 MMHG | HEART RATE: 87 BPM | HEIGHT: 63 IN | BODY MASS INDEX: 26.52 KG/M2 | OXYGEN SATURATION: 98 % | TEMPERATURE: 98.4 F | WEIGHT: 149.7 LBS | RESPIRATION RATE: 16 BRPM | SYSTOLIC BLOOD PRESSURE: 158 MMHG

## 2024-07-26 DIAGNOSIS — Z71.89 COMPLEX CARE COORDINATION: ICD-10-CM

## 2024-07-26 DIAGNOSIS — M79.672 LEFT FOOT PAIN: ICD-10-CM

## 2024-07-26 DIAGNOSIS — E11.42 TYPE 2 DIABETES MELLITUS WITH DIABETIC POLYNEUROPATHY, WITHOUT LONG-TERM CURRENT USE OF INSULIN (H): ICD-10-CM

## 2024-07-26 DIAGNOSIS — M79.672 LEFT FOOT PAIN: Primary | ICD-10-CM

## 2024-07-26 DIAGNOSIS — Z01.00 EXAMINATION OF EYES AND VISION: ICD-10-CM

## 2024-07-26 PROCEDURE — 99214 OFFICE O/P EST MOD 30 MIN: CPT

## 2024-07-26 PROCEDURE — 73630 X-RAY EXAM OF FOOT: CPT | Mod: TC | Performed by: RADIOLOGY

## 2024-07-26 ASSESSMENT — PATIENT HEALTH QUESTIONNAIRE - PHQ9
10. IF YOU CHECKED OFF ANY PROBLEMS, HOW DIFFICULT HAVE THESE PROBLEMS MADE IT FOR YOU TO DO YOUR WORK, TAKE CARE OF THINGS AT HOME, OR GET ALONG WITH OTHER PEOPLE: SOMEWHAT DIFFICULT
SUM OF ALL RESPONSES TO PHQ QUESTIONS 1-9: 12
SUM OF ALL RESPONSES TO PHQ QUESTIONS 1-9: 12

## 2024-07-26 ASSESSMENT — PAIN SCALES - GENERAL: PAINLEVEL: EXTREME PAIN (9)

## 2024-07-26 NOTE — PROGRESS NOTES
"  Assessment & Plan     Left foot pain  Patient reports dropping something heavy on foot three weeks ago and has had severe foot pain ever since. Patient has full range of motion and can bare weight with a cane as expected with baseline. Patient has some pinkish discoloration that patient states is from childhood trauma, a blister, and from the object that was dropped. Patient is diabetic and has mild neuropathy on bottom of foot and significant Onychomycosis on both toes.     - XR Foot Left G/E 3 Views; Future  - Orthopedic  Referral; Future    Examination of eyes and vision  Patient due for diabetic eye exam.     - Adult Eye  Referral; Future    Type 2 diabetes mellitus with diabetic polyneuropathy, without long-term current use of insulin (H)  Patient due for annual eye exam and foot complications.     - Adult Eye  Referral; Future    Complex care coordination  Patient reports she will only drive two blocks to clinic from home. Patient does not drive anywhere else. Reports local grocery store delivers groceries as a kind gesture. Patient needs to see podiatry and has no transportation. Patient has no family near by and no reports of a support network.     - Primary Care - Care Coordination Referral; Future  - Orthopedic  Referral; Future          BMI  Estimated body mass index is 26.66 kg/m  as calculated from the following:    Height as of this encounter: 1.596 m (5' 2.84\").    Weight as of this encounter: 67.9 kg (149 lb 11.2 oz).         CONSULTATION/REFERRAL to Podiatry for left foot evaluation and diabetic toe nail clipping.    Shruti Finnegan is a 89 year old, presenting for the following health issues:  Foot Pain (Left foot x 3 weeks - dropped a heavy object on foot x 2 months ago but pain started 3 weeks ago )        7/26/2024     2:49 PM   Additional Questions   Roomed by Yanique SMITH     History of Present Illness       Reason for visit:  Left foot pain & left leg/knee " pain  Symptom onset:  3-4 weeks ago  Symptoms include:  Left foot pain & rash on the side of foot  Symptom intensity:  Severe  Symptom progression:  Staying the same  Had these symptoms before:  No  What makes it worse:  Activity  What makes it better:  Not using her left foot and left leg    She eats 2-3 servings of fruits and vegetables daily.She consumes 2 sweetened beverage(s) daily.She exercises with enough effort to increase her heart rate 9 or less minutes per day.  She exercises with enough effort to increase her heart rate 3 or less days per week.   She is taking medications regularly.     Patient reports dropping something heavy on foot three weeks ago and has had severe foot pain ever since. Patient has full range of motion and can bare weight with a cane as expected with baseline. Patient has some pinkish discoloration that patient states is from childhood trauma, a blister, and from the object that was dropped. Patient has full ROM, and equal strength bilaterally. Patient is diabetic and has mild neuropathy on bottom of foot and significant Onychomycosis on both toes. Patient was referred to Podiatry and care coordination for transportation help.     Pain History:  When did you first notice your pain? X 3 weeks ago    Have you seen anyone else for your pain? No  How has your pain affected your ability to work? Not currently working - unrelated to pain, patient states that she is having trouble doing normal activities due to pain   Where in your body do you have pain?  Left leg, Left knee, and Left foot         Review of Systems  CONSTITUTIONAL: NEGATIVE for fever, chills, change in weight  ENT/MOUTH: NEGATIVE for ear, mouth and throat problems  RESP: NEGATIVE for significant cough or SOB  CV: NEGATIVE for chest pain, palpitations or peripheral edema      Objective    BP (!) 160/60 (BP Location: Right arm, Patient Position: Sitting, Cuff Size: Adult Regular)   Pulse 87   Temp 98.4  F (36.9  C)  "(Tympanic)   Resp 16   Ht 1.596 m (5' 2.84\")   Wt 67.9 kg (149 lb 11.2 oz)   LMP  (LMP Unknown)   SpO2 98%   BMI 26.66 kg/m    Body mass index is 26.66 kg/m .  Physical Exam   GENERAL: alert and no distress  NECK: no adenopathy, no asymmetry, masses, or scars  RESP: lungs clear to auscultation - no rales, rhonchi or wheezes  CV: regular rate and rhythm, normal S1 S2, no S3 or S4, no murmur, click or rub, no peripheral edema  MS: no gross musculoskeletal defects noted, no edema    Foot x-ray still in process.         Signed Electronically by: CYN Delaney CNP    "

## 2024-07-26 NOTE — PATIENT INSTRUCTIONS
Care Coordination for transportation.    2.  Podiatry for toe nail clipping and review x-ray for pain management.     3. Eye care exam referral.

## 2024-07-29 ENCOUNTER — PATIENT OUTREACH (OUTPATIENT)
Dept: CARE COORDINATION | Facility: CLINIC | Age: 89
End: 2024-07-29
Payer: MEDICARE

## 2024-07-29 NOTE — RESULT ENCOUNTER NOTE
Can someone please call patient as she does not have MyChart. Thank you    Foot is not broken, has significant arthritis though, which may be cause of pain.     Left foot negative for fracture or dislocation. Advanced  osteoarthritic changes in the midfoot and in the toes, involving the  TMT joints, navicular cuneiform joints, and IP joints, with high-grade  cartilage thinning and bone-on-bone articulation.

## 2024-07-29 NOTE — PROGRESS NOTES
Clinic Care Coordination Contact  Community Health Worker Follow Up    Care Gaps:     Health Maintenance Due   Topic Date Due    EYE EXAM  12/08/2012    DIABETIC FOOT EXAM  09/02/2022    LIPID  10/28/2023    MICROALBUMIN  04/19/2024       Patient accepted scheduling phone number for eye exam and podiatry  to schedule independently       Intervention and Education during outreach: CHW spoke with patient. CHW provided patient with the phone numbers for referrals for:    Podiatry- [phone]   Eye Exam- [phone]     Patient accepted phone numbers and will call to schedule appointments independently. No other needs at this time.    CHW Plan: CHW will follow up with patient in about 2 months.    ABDOULAYE Luther  [phone]  [facility]

## 2024-07-29 NOTE — PROGRESS NOTES
Clinic Care Coordination Contact    Situation: Patient chart reviewed by care coordinator.    Background: New CC order entered at PCP OV 7/26/24. Pt is already enrolled in CCC.     Order notes: Resources for Transportation; Patient has difficulty driving due to pain in knees and feet. Patient is alone, independent, and does not have support or family close.     PCP OV notes: Complex care coordination  Patient reports she will only drive two blocks to clinic from home. Patient does not drive anywhere else. Reports local grocery store delivers groceries as a kind gesture. Patient needs to see podiatry and has no transportation. Patient has no family near by and no reports of a support network.     Assessment: Referral made to podiatry for left foot eval and nail clipping. Referral made for diabetic eye exam. Pt can access [facility] in our area to get to/from appts. Does not go south of [facility]. Resources were last mailed to pt in Letter tab 4/2/24. Pt may need frequent reminders and more support to access resources.    Plan/Recommendations: CHW and SW CC to outreach as previously indicated. Outreach date moved for CHW. Consider moving pt back to enrolled status.     MANJIT Arroyo   Social Work Primary Care Clinic Care Coordinator   [facility]  [phone]  [email or web address]

## 2024-08-12 ENCOUNTER — OFFICE VISIT (OUTPATIENT)
Dept: ORTHOPEDICS | Facility: CLINIC | Age: 89
End: 2024-08-12
Payer: MEDICARE

## 2024-08-12 ENCOUNTER — OFFICE VISIT (OUTPATIENT)
Dept: PODIATRY | Facility: CLINIC | Age: 89
End: 2024-08-12
Payer: MEDICARE

## 2024-08-12 VITALS
WEIGHT: 149 LBS | SYSTOLIC BLOOD PRESSURE: 149 MMHG | BODY MASS INDEX: 26.4 KG/M2 | DIASTOLIC BLOOD PRESSURE: 68 MMHG | HEART RATE: 83 BPM | HEIGHT: 63 IN

## 2024-08-12 VITALS — BODY MASS INDEX: 26.4 KG/M2 | WEIGHT: 149 LBS | HEIGHT: 63 IN

## 2024-08-12 DIAGNOSIS — M17.12 PRIMARY OSTEOARTHRITIS OF LEFT KNEE: Primary | ICD-10-CM

## 2024-08-12 DIAGNOSIS — S90.32XA CONTUSION OF LEFT FOOT, INITIAL ENCOUNTER: Primary | ICD-10-CM

## 2024-08-12 DIAGNOSIS — M79.672 LEFT FOOT PAIN: ICD-10-CM

## 2024-08-12 PROCEDURE — 20611 DRAIN/INJ JOINT/BURSA W/US: CPT | Mod: LT | Performed by: FAMILY MEDICINE

## 2024-08-12 PROCEDURE — 99203 OFFICE O/P NEW LOW 30 MIN: CPT | Performed by: PODIATRIST

## 2024-08-12 RX ORDER — BETAMETHASONE SODIUM PHOSPHATE AND BETAMETHASONE ACETATE 3; 3 MG/ML; MG/ML
6 INJECTION, SUSPENSION INTRA-ARTICULAR; INTRALESIONAL; INTRAMUSCULAR; SOFT TISSUE
Status: SHIPPED | OUTPATIENT
Start: 2024-08-12

## 2024-08-12 RX ORDER — ROPIVACAINE HYDROCHLORIDE 5 MG/ML
4 INJECTION, SOLUTION EPIDURAL; INFILTRATION; PERINEURAL
Status: SHIPPED | OUTPATIENT
Start: 2024-08-12

## 2024-08-12 RX ADMIN — BETAMETHASONE SODIUM PHOSPHATE AND BETAMETHASONE ACETATE 6 MG: 3; 3 INJECTION, SUSPENSION INTRA-ARTICULAR; INTRALESIONAL; INTRAMUSCULAR; SOFT TISSUE at 11:19

## 2024-08-12 RX ADMIN — ROPIVACAINE HYDROCHLORIDE 4 ML: 5 INJECTION, SOLUTION EPIDURAL; INFILTRATION; PERINEURAL at 11:19

## 2024-08-12 ASSESSMENT — PAIN SCALES - GENERAL: PAINLEVEL: EXTREME PAIN (8)

## 2024-08-12 NOTE — NURSING NOTE
"Chief Complaint   Patient presents with    Consult     Dropped something on foot about 2 months ago       Initial BP (!) 149/68   Pulse 83   Ht 1.596 m (5' 2.84\")   Wt 67.6 kg (149 lb)   LMP  (LMP Unknown)   BMI 26.53 kg/m   Estimated body mass index is 26.53 kg/m  as calculated from the following:    Height as of this encounter: 1.596 m (5' 2.84\").    Weight as of this encounter: 67.6 kg (149 lb).  Medications and allergies reviewed.      Jaycee CHURCHILL MA    "

## 2024-08-12 NOTE — PROGRESS NOTES
"PATIENT HISTORY:  Daniela Brown is a 89 year old female who presents to clinic in consultation at the request of  Renay Hannah C.N.P. with a chief complaint of painful left foot.   The patient relates the pain is primarily located around the top of the left foot .   The patient relates that the symptoms have been going on for several day(s).  The patient has previously tried different shoes, with little relief.   Any previous notes and studies that pertain to the patient's condition were reviewed.    Pertinent medical, surgical and family history was reviewed in the [facility] chart.    Past Medical History:   Past Medical History:   Diagnosis Date    Abnormal cardiovascular stress test 2/3/2019    9/10/2018 Lexiscan: \"Myocardial perfusion imaging using single isotope technique demonstrated a small perfusion defect of mild severity involving the basal inferior wall which is mostly reversible and may be consistent with mild ischemia in the right coronary artery distribution. In addition, transient ischemic dilatation is noted with a TID ratio of 1.3. \"    Adhesive capsulitis of shoulder     left     Adjustment disorder with anxiety 3/18/2016    B12 deficiency anemia 6/20/2012    Chest pain 2004    Chronic right sided/axillary discomfort (musculoskeletal) Atypical substernal (possibly GERD). Negative cardiolite 2004.    Colitis, Clostridium difficile 4/18/2012    Diverticulitis 2009    Headache(784.0) 2001    Tension type. MRI 2001 normal.    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 To accompany: Blood Glucose Monitor Brands: per insurance., Disp: 100 strip, Rfl: 6    blood glucose monitoring (NO BRAND SPECIFIED) meter device kit, Use to test blood sugar 1 times daily or as directed. Preferred blood glucose meter OR supplies to accompany: Blood Glucose Monitor Brands: per insurance., Disp: 1 kit, Rfl: 0    buPROPion (WELLBUTRIN XL) 150 MG 24 hr tablet, Take 1 tablet (150 mg) by mouth every morning, Disp: 90 tablet, Rfl: 3    estradiol (ESTRACE) 0.1 MG/GM vaginal cream, Place 2 g vaginally twice a week, Disp: 42.5 g, Rfl: 11    meloxicam (MOBIC) 7.5 MG tablet, Take 1 tablet (7.5 mg) by mouth daily, Disp: 90 tablet, Rfl: 3    Multiple Vitamins-Minerals (THERAPEUTIC MULTIVIT/MINERAL) TABS, Take 1 tablet by mouth every evening , Disp: , Rfl:     nystatin (MYCOSTATIN) 503875 UNIT/GM external cream, Apply topically 2 times daily Apply to area under bilateral breasts, Disp: 30 g, Rfl: 11    olmesartan (BENICAR) 20 MG tablet, Take 1 tablet (20 mg) by mouth daily, Disp: 90 tablet, Rfl: 3    PARoxetine (PAXIL) 20 MG tablet, Take 1 tablet (20 mg) by mouth every morning, Disp: 90 tablet, Rfl: 3    psyllium (METAMUCIL/KONSYL) 58.6 % powder, Take 6 g (1 teaspoonful) by mouth daily, Disp: 283 g, Rfl: 11    thin (NO BRAND SPECIFIED) lancets, Use with lanceting device. To accompany: Blood Glucose Monitor Brands: per insurance., Disp: 100 each, Rfl: 6    Current Facility-Administered Medications:     3 mL ropivacaine (NAROPIN) injection 5 mg/mL, 3 mL, , , , 3 mL at 05/16/24 1440    3 mL ropivacaine (NAROPIN) injection 5 mg/mL, 3 mL, , , , 3 mL at 05/16/24 1445    4 mL ropivacaine (NAROPIN) injection 5 mg/mL, 4 mL, , , , 4 mL at 08/12/24 1119    betamethasone acet & sod phos (CELESTONE) injection 6 mg, 6 mg, , , , 6 mg at 08/12/24 1119     "triamcinolone (KENALOG-40) injection 40 mg, 40 mg, , , , 40 mg at 05/16/24 1440    triamcinolone (KENALOG-40) injection 40 mg, 40 mg, , , , 40 mg at 05/16/24 1445     Allergies:    Allergies   Allergen Reactions    Metoprolol Itching and Rash    Cephalexin Rash    Erythromycin Rash    Hydrocodone     Shellfish Allergy     Acetaminophen Itching     Patient reported - only when used scheduled in high doses      Actonel [Bisphosphonates] Itching    Alendronate Rash    Azithromycin Hives    Celebrex [Ricci-2 Inhibitors (Sulfonamide)] Rash    Celecoxib Rash    Cipro [Ciprofloxacin] Rash    Contrast Dye Hives    Diatrizoate Hives    Erythromycin Rash    Escitalopram Diarrhea     Gets very sick and mad feelings    Fosamax Itching and Rash    Keflex [Cephalexin Monohydrate] Rash    Lisinopril Cough    Penicillins Rash    Risedronate Itching    Rofecoxib Rash    Seasonal Allergies Other (See Comments)     Seasonal rhinitis    Shellfish-Derived Products Hives    Sulfa Antibiotics Itching and Rash    Sulfasalazine Itching and Rash    Tetanus Toxoid, Adsorbed Swelling    Tetanus-Diphtheria Toxoids Td Swelling    Vicodin [Acetaminophen] Itching     Patient reported - only when used scheduled in high doses.       Vioxx Rash       Vitals: BP (!) 149/68   Pulse 83   Ht 1.596 m (5' 2.84\")   Wt 67.6 kg (149 lb)   LMP  (LMP Unknown)   BMI 26.53 kg/m    BMI= Body mass index is 26.53 kg/m .    LOWER EXTREMITY PHYSICAL EXAM    Dermatologic: Skin is intact to left lower extremity without significant lesions, rash or abrasion.        Vascular: DP & PT pulses are intact & regular on the left.   CFT and skin temperature is normal to the left lower extremity.     Neurologic: Lower extremity sensation is intact to light touch.  No evidence of weakness in the left lower extremity.        Musculoskeletal: Patient is ambulatory without assistive device or brace.  No gross ankle deformity noted.  No foot or ankle joint effusion is noted.  Noted " pain on palpation on the dorsal aspect of the left foot.    Diagnostics:  Radiographs included three views of the left foot demonstrating  no cortical erosions or periosteal elevation.  All joint margins appear stable.  There is no apparent fracture or tumor formation noted.  There is no evidence of foreign body.  The images were independently reviewed by myself along with the patient explaining the findings.      ASSESSMENT / PLAN:     ICD-10-CM    1. Contusion of left foot, initial encounter  S90.32XA       2. Left foot pain  M79.672 Orthopedic  Referral          I have explained to Daniela about the conditions.  At this time, the patient was educated on the importance of offloading supportive shoes and other devices.   The patient was instructed to perform warm soaks with Epson salt after which to also apply over-the-counter Voltaren gel to deeply massage the injured tissue.  The patient was instructed to do this on a daily basis until symptoms resolve.   The patient may return in four weeks for reevaluation to determine if any further treatment will be needed.         Daniela verbalized agreement with and understanding of the rational for the diagnosis and treatment plan.  All questions were answered to best of my ability and the patient's satisfaction. The patient was advised to contact the clinic with any questions that may arise after the clinic visit.      Disclaimer: This note consists of symbols derived from keyboarding, dictation and/or voice recognition software. As a result, there may be errors in the script that have gone undetected. Please consider this when interpreting information found in this chart.       AAKASH Webb D.P.M., [email or web address].

## 2024-08-12 NOTE — PROGRESS NOTES
"ASSESSMENT & PLAN    Sivan was seen today for pain.    Diagnoses and all orders for this visit:    Primary osteoarthritis of left knee  -     Large Joint Injection/Arthocentesis: L knee joint        # Left Knee Arthritis: Patient having left knee pain for 1+ years with pain worsening over the past few months. On exam she has tenderness to palpation over the left knee joint. Previous steroid injection on 5/16/24 helped for 1 mon. Counseled patient on nature of condition and treatment options.  Given this plan to repeat steroid injection and follow-up as needed.    -----    SUBJECTIVE:  Daniela Brown is a 89 year old female who is seen in follow-up for left knee pain. They were last seen 5/16/2024 by Dr. Newman and left knee joint and left GH steroid injections were performed.  Provided good relief for 1 month. The patient is seen by themselves.    Since their last visit reports returned left knee pain.  They indicate that their current pain level is 8/10. They have tried left knee Synvisc One injection 5/16/24 and 2/22/24.        Patient's past medical, surgical, social, and family histories were reviewed today and no changes are noted.    REVIEW OF SYSTEMS:  Constitutional: NEGATIVE for fever, chills, change in weight  Skin: NEGATIVE for worrisome rashes, moles or lesions  GI/: NEGATIVE for bowel or bladder changes  Neuro: NEGATIVE for weakness, dizziness or paresthesias    OBJECTIVE:  Ht 1.596 m (5' 2.84\")   Wt 67.6 kg (149 lb)   LMP  (LMP Unknown)   BMI 26.53 kg/m     General: healthy, alert and in no distress  HEENT: no scleral icterus or conjunctival erythema  Skin: no suspicious lesions or rash. No jaundice.  CV: regular rhythm by palpation, no pedal edema  Resp: normal respiratory effort without conversational dyspnea   Psych: normal mood and affect  Gait: normal steady gait with appropriate coordination and balance  Neuro: normal light touch sensory exam of the extremities.    MSK:    LEFT " KNEE  Inspection:    Normal alignment; no edema, erythema, or ecchymosis present  Palpation:    Tender about the lateral joint line and medial joint line. Remainder of bony and ligamentous landmarks are nontender.    Mild effusion is present    Patellofemoral crepitus is Present  Range of Motion:     00 extension to 1350 flexion  Strength:    Quadriceps 5/5, hamstrings 5/5, gastrocsoleus 5/5, and tibialis anterior 5/5    Extensor mechanism intact       Independent visualization of the below image:  EXAM: XR KNEE PORT LEFT 1/2 VIEWS  LOCATION: [facility]  DATE: 8/17/2023     INDICATION: Left knee pain after a fall.  COMPARISON: None.                                                                      IMPRESSION:   1.  No fracture or joint malalignment.  2.  Advanced left knee degenerative arthrosis with medial compartment narrowing.  3.  Small joint effusion.  4.  Atherosclerotic calcification.      Iban Galeana MD, [facility]    Disclaimer: This note consists of symbols derived from keyboarding, dictation and/or voice recognition software. As a result, there may be errors in the script that have gone undetected. Please consider this when interpreting information found in this chart.    Large Joint Injection/Arthocentesis: L knee joint    Date/Time: 8/12/2024 11:19 AM    Performed by: Iban Galeana MD  Authorized by: Iban Galeana MD    Indications:  Pain and osteoarthritis  Needle Size:  25 G  Guidance: ultrasound    Approach:  Superolateral  Location:  Knee      Medications:  6 mg betamethasone acet & sod phos 6 (3-3) MG/ML; 4 mL ROPivacaine 5 MG/ML  Aspirate amount (mL):  21  Aspirate:  Serous and yellow  Outcome:  Tolerated well, no immediate complications  Procedure discussed: discussed risks, benefits, and alternatives    Consent Given by:  Patient  Timeout: timeout called immediately prior to procedure    Prep: patient was prepped and  draped in usual sterile fashion     Ultrasound images of procedure were permanently stored.

## 2024-08-12 NOTE — LETTER
"8/12/2024      Daniela Brown  [address] [facility] [address]      Dear Colleague,    Thank you for referring your patient, Daniela Brown, to the [facility] [place]. Please see a copy of my visit note below.    PATIENT HISTORY:  Daniela Brown is a 89 year old female who presents to clinic in consultation at the request of  Rneay Hannah C.N.P. with a chief complaint of painful left foot.   The patient relates the pain is primarily located around the top of the left foot .   The patient relates that the symptoms have been going on for several day(s).  The patient has previously tried different shoes, with little relief.   Any previous notes and studies that pertain to the patient's condition were reviewed.    Pertinent medical, surgical and family history was reviewed in the Epic chart.    Past Medical History:   Past Medical History:   Diagnosis Date     Abnormal cardiovascular stress test 2/3/2019    9/10/2018 Lexiscan: \"Myocardial perfusion imaging using single isotope technique demonstrated a small perfusion defect of mild severity involving the basal inferior wall which is mostly reversible and may be consistent with mild ischemia in the right coronary artery distribution. In addition, transient ischemic dilatation is noted with a TID ratio of 1.3. \"     Adhesive capsulitis of shoulder     left      Adjustment disorder with anxiety 3/18/2016     B12 deficiency anemia 6/20/2012     Chest pain 2004    Chronic right sided/axillary discomfort (musculoskeletal) Atypical substernal (possibly GERD). Negative cardiolite 2004.     Colitis, Clostridium difficile 4/18/2012     Diverticulitis 2009     Headache(784.0) 2001    Tension type. MRI 2001 normal.     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 To accompany: Blood Glucose Monitor Brands: per insurance., Disp: 100 strip, Rfl: 6     blood glucose monitoring (NO BRAND SPECIFIED) meter device kit, Use to test blood sugar 1 times daily or as directed. Preferred blood glucose meter OR supplies to accompany: Blood Glucose Monitor Brands: per insurance., Disp: 1 kit, Rfl: 0     buPROPion (WELLBUTRIN XL) 150 MG 24 hr tablet, Take 1 tablet (150 mg) by mouth every morning, Disp: 90 tablet, Rfl: 3     estradiol (ESTRACE) 0.1 MG/GM vaginal cream, Place 2 g vaginally twice a week, Disp: 42.5 g, Rfl: 11     meloxicam (MOBIC) 7.5 MG tablet, Take 1 tablet (7.5 mg) by mouth daily, Disp: 90 tablet, Rfl: 3     Multiple Vitamins-Minerals (THERAPEUTIC MULTIVIT/MINERAL) TABS, Take 1 tablet by mouth every evening , Disp: , Rfl:      nystatin (MYCOSTATIN) 071166 UNIT/GM external cream, Apply topically 2 times daily Apply to area under bilateral breasts, Disp: 30 g, Rfl: 11     olmesartan (BENICAR) 20 MG tablet, Take 1 tablet (20 mg) by mouth daily, Disp: 90 tablet, Rfl: 3     PARoxetine (PAXIL) 20 MG tablet, Take 1 tablet (20 mg) by mouth every morning, Disp: 90 tablet, Rfl: 3     psyllium (METAMUCIL/KONSYL) 58.6 % powder, Take 6 g (1 teaspoonful) by mouth daily, Disp: 283 g, Rfl: 11     thin (NO BRAND SPECIFIED) lancets, Use with lanceting device. To accompany: Blood Glucose Monitor Brands: per insurance., Disp: 100 each, Rfl: 6    Current Facility-Administered Medications:      3 mL ropivacaine (NAROPIN) injection 5 mg/mL, 3 mL, , , ,  "3 mL at 05/16/24 1440     3 mL ropivacaine (NAROPIN) injection 5 mg/mL, 3 mL, , , , 3 mL at 05/16/24 1445     4 mL ropivacaine (NAROPIN) injection 5 mg/mL, 4 mL, , , , 4 mL at 08/12/24 1119     betamethasone acet & sod phos (CELESTONE) injection 6 mg, 6 mg, , , , 6 mg at 08/12/24 1119     triamcinolone (KENALOG-40) injection 40 mg, 40 mg, , , , 40 mg at 05/16/24 1440     triamcinolone (KENALOG-40) injection 40 mg, 40 mg, , , , 40 mg at 05/16/24 1445     Allergies:    Allergies   Allergen Reactions     Metoprolol Itching and Rash     Cephalexin Rash     Erythromycin Rash     Hydrocodone      Shellfish Allergy      Acetaminophen Itching     Patient reported - only when used scheduled in high doses       Actonel [Bisphosphonates] Itching     Alendronate Rash     Azithromycin Hives     Celebrex [Ricci-2 Inhibitors (Sulfonamide)] Rash     Celecoxib Rash     Cipro [Ciprofloxacin] Rash     Contrast Dye Hives     Diatrizoate Hives     Erythromycin Rash     Escitalopram Diarrhea     Gets very sick and mad feelings     Fosamax Itching and Rash     Keflex [Cephalexin Monohydrate] Rash     Lisinopril Cough     Penicillins Rash     Risedronate Itching     Rofecoxib Rash     Seasonal Allergies Other (See Comments)     Seasonal rhinitis     Shellfish-Derived Products Hives     Sulfa Antibiotics Itching and Rash     Sulfasalazine Itching and Rash     Tetanus Toxoid, Adsorbed Swelling     Tetanus-Diphtheria Toxoids Td Swelling     Vicodin [Acetaminophen] Itching     Patient reported - only when used scheduled in high doses.        Vioxx Rash       Vitals: BP (!) 149/68   Pulse 83   Ht 1.596 m (5' 2.84\")   Wt 67.6 kg (149 lb)   LMP  (LMP Unknown)   BMI 26.53 kg/m    BMI= Body mass index is 26.53 kg/m .    LOWER EXTREMITY PHYSICAL EXAM    Dermatologic: Skin is intact to left lower extremity without significant lesions, rash or abrasion.        Vascular: DP & PT pulses are intact & regular on the left.   CFT and skin temperature is " normal to the left lower extremity.     Neurologic: Lower extremity sensation is intact to light touch.  No evidence of weakness in the left lower extremity.        Musculoskeletal: Patient is ambulatory without assistive device or brace.  No gross ankle deformity noted.  No foot or ankle joint effusion is noted.  Noted pain on palpation on the dorsal aspect of the left foot.    Diagnostics:  Radiographs included three views of the left foot demonstrating  no cortical erosions or periosteal elevation.  All joint margins appear stable.  There is no apparent fracture or tumor formation noted.  There is no evidence of foreign body.  The images were independently reviewed by myself along with the patient explaining the findings.      ASSESSMENT / PLAN:     ICD-10-CM    1. Contusion of left foot, initial encounter  S90.32XA       2. Left foot pain  M79.672 Orthopedic  Referral          I have explained to Daniela about the conditions.  At this time, the patient was educated on the importance of offloading supportive shoes and other devices.   The patient was instructed to perform warm soaks with Epson salt after which to also apply over-the-counter Voltaren gel to deeply massage the injured tissue.  The patient was instructed to do this on a daily basis until symptoms resolve.   The patient may return in four weeks for reevaluation to determine if any further treatment will be needed.         Daniela verbalized agreement with and understanding of the rational for the diagnosis and treatment plan.  All questions were answered to best of my ability and the patient's satisfaction. The patient was advised to contact the clinic with any questions that may arise after the clinic visit.      Disclaimer: This note consists of symbols derived from keyboarding, dictation and/or voice recognition software. As a result, there may be errors in the script that have gone undetected. Please consider this when interpreting  information found in this chart.       AAKASH Webb D.P.M., ROSA.F.A.S.      Again, thank you for allowing me to participate in the care of your patient.        Sincerely,        Axel Webb DPM

## 2024-08-12 NOTE — PATIENT INSTRUCTIONS
[employer] [facility]  Ling Cates Owner & Nurse  [phone]  [email or web address] Now IN [place] & [place]     [facility]  Anh Garcia BSN [phone] [email or web address] Servicing: [facility] and surrounding areas     [facility] Natty Monzon CWANALILIAN, RN, [facility]  [phone] [email or web address] [place]     [facility] [place] [facility] Lucy Caruso CFCN, CWCN, RN, [phone] [email or web address] [place]     Nurse [facility] Naomie House [phone] [email or web address] [facility]ossie [facility] Bindu Jorge RN, BSN, [facility] [phone] [email or web address] [facility]     [facility]ie[facility] Jackie Reyes RN, [facility] [phone] [email or web address] [facility]     [facility]  [phone] [email or web address] [place]     [employer] Annamarie Negron RN, [facility], [facility] [phone] [email or web address] [place]:  [place], [facility], [place], [facility]     [facility], [facility] Roxana Monteiro RN, CWOCN, [facility] [phone] [email or web address]  Clinic: [facility]     Home visits: [facility]     Mayte[facility] Mayte ALVARENGA, RN, [facility] [phone] [email or web address] Clinic:  [facility]  Home visits:  [facility]      Norma[facility] Norma London RN, [facility] [phone] [email or web address] [facility]     [facility], [facility] Lucia Hong    RN ,BSN,[facility] [phone]    [email or web address] [place], [place], [place], [place], [place], [place], [place]     [facility]  Mary Annebelkis Alves [phone]  [email or web address]  Clinic: [facility]  Home visits: [place] / [facility]     [facility] Lucia Hong  [phone] [email or web address] [place]           Dr. Jim Klein  [facility]  [phone]

## 2024-08-12 NOTE — PATIENT INSTRUCTIONS
[facility] [place]    Procedure: Left Knee Aspiration/Steroid Injection    Follow-up: 3+ mon can consider repeat steroid injections  You may shower, however avoid swimming, tub baths or hot tubs for 24 hours following your procedure  You may have a mild to moderate increase in pain for several days following the injection.  It may take up to 14 days for the steroid medication to start working although you may feel the effect as early as a few days after the procedure.  You may use ice packs for 10-15 minutes, 3 to 4 times a day at the injection site for comfort  You may use anti-inflammatory medications (such as Ibuprofen or Aleve or Advil) or Tylenol for pain control if necessary  If you were fasting, you may resume your normal diet and medications after the procedure  If you have diabetes, check your blood sugar more frequently than usual as your blood sugar may be higher than normal for 10-14 days following a steroid injection. Contact your doctor who manages your diabetes if your blood sugar is higher than usual    If you experience any of the following, call [facility] @ [phone] or [phone]  -Fever over 100 degree F  -Swelling, bleeding, redness, drainage, warmth at the injection site  - New or worsening pain     It was great seeing you today!    Iban Galeana

## 2024-08-12 NOTE — LETTER
"8/12/2024      Daniela Brown  [address] [address] [facility] [facility] [address]      Dear Colleague,    Thank you for referring your patient, Daniela Brown, to the [facility] [place]. Please see a copy of my visit note below.    ASSESSMENT & PLAN    Sivan was seen today for pain.    Diagnoses and all orders for this visit:    Primary osteoarthritis of left knee  -     Large Joint Injection/Arthocentesis: L knee joint        # Left Knee Arthritis: Patient having left knee pain for 1+ years with pain worsening over the past few months. On exam she has tenderness to palpation over the left knee joint. Previous steroid injection on 5/16/24 helped for 1 mon. Counseled patient on nature of condition and treatment options.  Given this plan to repeat steroid injection and follow-up as needed.    -----    SUBJECTIVE:  Daniela Brown is a 89 year old female who is seen in follow-up for left knee pain. They were last seen 5/16/2024 by Dr. Newman and left knee joint and left GH steroid injections were performed.  Provided good relief for 1 month. The patient is seen by themselves.    Since their last visit reports returned left knee pain.  They indicate that their current pain level is 8/10. They have tried left knee Synvisc One injection 5/16/24 and 2/22/24.        Patient's past medical, surgical, social, and family histories were reviewed today and no changes are noted.    REVIEW OF SYSTEMS:  Constitutional: NEGATIVE for fever, chills, change in weight  Skin: NEGATIVE for worrisome rashes, moles or lesions  GI/: NEGATIVE for bowel or bladder changes  Neuro: NEGATIVE for weakness, dizziness or paresthesias    OBJECTIVE:  Ht 1.596 m (5' 2.84\")   Wt 67.6 kg (149 lb)   LMP  (LMP Unknown)   BMI 26.53 kg/m     General: healthy, alert and in no distress  HEENT: no scleral icterus or conjunctival erythema  Skin: no suspicious lesions or rash. No jaundice.  CV: regular rhythm " by palpation, no pedal edema  Resp: normal respiratory effort without conversational dyspnea   Psych: normal mood and affect  Gait: normal steady gait with appropriate coordination and balance  Neuro: normal light touch sensory exam of the extremities.    MSK:    LEFT KNEE  Inspection:    Normal alignment; no edema, erythema, or ecchymosis present  Palpation:    Tender about the lateral joint line and medial joint line. Remainder of bony and ligamentous landmarks are nontender.    Mild effusion is present    Patellofemoral crepitus is Present  Range of Motion:     00 extension to 1350 flexion  Strength:    Quadriceps 5/5, hamstrings 5/5, gastrocsoleus 5/5, and tibialis anterior 5/5    Extensor mechanism intact       Independent visualization of the below image:  EXAM: XR KNEE PORT LEFT 1/2 VIEWS  LOCATION: [facility]  DATE: 8/17/2023     INDICATION: Left knee pain after a fall.  COMPARISON: None.                                                                      IMPRESSION:   1.  No fracture or joint malalignment.  2.  Advanced left knee degenerative arthrosis with medial compartment narrowing.  3.  Small joint effusion.  4.  Atherosclerotic calcification.      Iban Galeana MD, [facility]    Disclaimer: This note consists of symbols derived from keyboarding, dictation and/or voice recognition software. As a result, there may be errors in the script that have gone undetected. Please consider this when interpreting information found in this chart.    Large Joint Injection/Arthocentesis: L knee joint    Date/Time: 8/12/2024 11:19 AM    Performed by: Iban Galeana MD  Authorized by: Iban Galeana MD    Indications:  Pain and osteoarthritis  Needle Size:  25 G  Guidance: ultrasound    Approach:  Superolateral  Location:  Knee      Medications:  6 mg betamethasone acet & sod phos 6 (3-3) MG/ML; 4 mL ROPivacaine 5 MG/ML  Aspirate amount (mL):   21  Aspirate:  Serous and yellow  Outcome:  Tolerated well, no immediate complications  Procedure discussed: discussed risks, benefits, and alternatives    Consent Given by:  Patient  Timeout: timeout called immediately prior to procedure    Prep: patient was prepped and draped in usual sterile fashion     Ultrasound images of procedure were permanently stored.           Again, thank you for allowing me to participate in the care of your patient.        Sincerely,        Iban Galeana MD

## 2024-08-21 ENCOUNTER — TELEPHONE (OUTPATIENT)
Dept: FAMILY MEDICINE | Facility: CLINIC | Age: 89
End: 2024-08-21
Payer: MEDICARE

## 2024-08-21 NOTE — TELEPHONE ENCOUNTER
Order/Referral Request    Who is requesting: patient    Orders being requested: injection for LB---patient was  not able to get in for the last ordered injection and was asking for the order to be resubmitted     Reason service is needed/diagnosis: pain in LB    When are orders needed by:     Has this been discussed with Provider: Yes    Does patient have a preference on a Group/Provider/Facility?  [facility] in [place]     Does patient have an appointment scheduled?: No    Where to send orders: Fax    Okay to leave a detailed message?: Yes at Home number on file [phone] (home)

## 2024-08-22 NOTE — TELEPHONE ENCOUNTER
Pt notified that a current referral is in place for this, and gave her the scheduling number. She verbalized understanding.    Leticia Ahuja RN  [facility]

## 2024-09-11 ENCOUNTER — OFFICE VISIT (OUTPATIENT)
Dept: AUDIOLOGY | Facility: CLINIC | Age: 89
End: 2024-09-11
Payer: MEDICARE

## 2024-09-11 DIAGNOSIS — H90.3 SENSORINEURAL HEARING LOSS, BILATERAL: Primary | ICD-10-CM

## 2024-09-11 PROCEDURE — V5299 HEARING SERVICE: HCPCS | Performed by: AUDIOLOGIST

## 2024-09-11 NOTE — PROGRESS NOTES
AUDIOLOGY REPORT    SUBJECTIVE:Daniela Brown is a 89 year old female who was seen in the Audiology Clinic at the [facility] on 9/11/2024  for a follow-up check regarding the fitting of new hearing aids. Previous results have revealed a severe to profound sensorineural hearing loss bilaterally.  The patient has been seen previously in this clinic and was fit with Phonak Cassandra L50-UP hearing aid with custom earmolds on 6/11/2024.  Daniela reports good sound quality with the hearing aid(s). She states she is hearing much better with these hearing aids than previous ones.     OBJECTIVE:   The International Outcome Inventory-Hearing Aids (IOI-HA) was administered today.The patient s responses to the 7 questions can be compared to normative data relative to how others are performing with their hearing aids, as well as focusing audiologic care and counseling.This patient s Quality of Life score (Question 7) was 5, which is above normative average.     Based on patient report, the following changes were made;replaced hardened right earmold tubing..    Reviewed 45 day trial period, care, cleaning (no water, dry brush), batteries (size 675) insertion/removal, toxicity, low-battery signal), aid insertion/removal, volume adjustment (if applicable), user booklet, warranty information, storage cases, and other hearing aid details.       No charge visit today (in warranty hearing aid check).     ASSESSMENT: A follow-up appointment for hearing aid fitting was completed today. IOI-HA administered today. Changes to hearing aid was completed as outlined above.     PLAN:Daniela will return for follow-up as needed, or at least every 9-12 months for cleaning and assessment of hearing aid.  . Please call this clinic with any questions regarding today s appointment.    Theodora GEORGE-AAA, #[id number]

## 2024-09-19 NOTE — PROGRESS NOTES
Task was lost to follow up. New task added today for ASAP outreach. CHW to outreach and establish new goals, or graduate.     MANJIT Arroyo   Social Work Primary Care Clinic Care Coordinator   [facility]  [phone]  [email or web address]

## 2024-09-23 ENCOUNTER — PATIENT OUTREACH (OUTPATIENT)
Dept: CARE COORDINATION | Facility: CLINIC | Age: 89
End: 2024-09-23
Payer: MEDICARE

## 2024-09-23 NOTE — PROGRESS NOTES
"Clinic Care Coordination Contact  Community Health Worker Follow Up    Care Gaps:     Health Maintenance Due   Topic Date Due    EYE EXAM  12/08/2012    DIABETIC FOOT EXAM  09/02/2022    LIPID  10/28/2023    MICROALBUMIN  04/19/2024    INFLUENZA VACCINE (1) 09/01/2024    COVID-19 Vaccine (7 - 2024-25 season) 09/01/2024       Patient accepted scheduling phone number for Eye Exam  to schedule independently   Patient was last seen with podiatry on 8/5/24.    Care Plan:   Care Plan: General       Problem: HP GENERAL PROBLEM                   Care Plan: Health Maintenance       Problem: Health Maintenance Due or Overdue       Goal: Become up-to-date with health maintenance visit(s)       Start Date: 9/23/2024    This Visit's Progress: 50%    Priority: Medium    Note:     Barriers: Forgetfulness  Strengths: Motivated   Patient expressed understanding of goal: Yes     Action steps to complete these goals:  I will call the Eye Clinic to schedule my eye exam at [phone]  I will attend the eye exam appointment                                Intervention and Education during outreach: CHW spoke with patient. Patient reported she has not called to schedule an Eye Exam. Patient reported she thinks of it everyday, but then forgets to call due to her memory \"not being the greatest\". CHW provided patient the phone number at [phone] and patient stated she will call right after she hangs up. CHW inquired about diabetic foot exam, patient reports she last saw someone for her legs and feet in August.     CHW Plan: CHW will follow up with patient in about one month.     ABDOULAYE Luther  [phone]  [facility]    "

## 2024-09-24 ENCOUNTER — PATIENT OUTREACH (OUTPATIENT)
Dept: CARE COORDINATION | Facility: CLINIC | Age: 89
End: 2024-09-24

## 2024-09-24 NOTE — PROGRESS NOTES
Moved pt back to enrolled status.     Bharti Riley \[facility]\""   Social Work Primary Care Clinic Care Coordinator   [facility]  [phone]  [email or web address]

## 2024-09-24 NOTE — PROGRESS NOTES
Clinic Care Coordination Contact  Care Coordination Clinician Chart Review    Situation: Patient chart reviewed by Care Coordinator.       Background: Care Coordination Program started: 7/19/2023. Initial assessment completed 7/24/23 and patient-centered care plan(s) were developed with participation from patient. Lead CC handed patient off to CHW for continued outreaches.       Assessment: Per chart review, patient outreach completed by CC CHW on 9/23/24. Patient is actively working to accomplish goal(s). Patient's goal(s) appropriate and relevant at this time.     Patient is due for updated Plan of Care.      Assessments will be completed annually or as needed/with change of patient status. Due 9/13/25.         Care Plan: General       Problem: HP GENERAL PROBLEM                   Care Plan: Health Maintenance       Problem: Health Maintenance Due or Overdue       Goal: Become up-to-date with health maintenance visit(s)       Start Date: 9/23/2024    This Visit's Progress: 50%    Priority: Medium    Note:     Barriers: Forgetfulness  Strengths: Motivated   Patient expressed understanding of goal: Yes     Action steps to complete these goals:  I will call the Eye Clinic to schedule my eye exam at [phone]  I will attend the eye exam appointment                                   Plan/Recommendations: The patient will continue working with Care Coordination to achieve goal(s) as above.     CHW will continue outreaches at minimum every 30 days and will involve Lead CC as needed or if patient is ready to move to Maintenance.     Lead CC will continue to monitor CHW outreaches and patient's progress to goal(s) every 6 weeks.     Plan of Care updated and sent to patient: Yes, via mail    MANJIT Arroyo   Social Work Primary Care Clinic Care Coordinator   [facility]  [phone]  [email or web address]

## 2024-09-24 NOTE — LETTER
[facility]  Patient Centered Plan of Care  About Me:        Patient Name:  Daniela Wetzel    YOB: 1933  Age:         89 year old   [place] MRN:    [id number] Telephone Information:  Home Phone [phone]   Mobile [phone]       Address:  [address]  [facility] [address] Email address:  No e-mail address on record      Emergency Contact(s)    Name Relationship Lgl Grd Work Phone Home Phone Mobile Phone   Caridad WETZEL Son No   [phone]           Primary language:  English     needed? No   [place] [employer]:  [phone] op. 1  Other communication barriers:Glasses  Preferred Method of Communication:  Mail  Current living arrangement: No data recorded  Mobility Status/ Medical Equipment: No data recorded    Health Maintenance  Health Maintenance Reviewed: Due/Overdue   Health Maintenance Due   Topic Date Due    EYE EXAM  12/08/2012    DIABETIC FOOT EXAM  09/02/2022    LIPID  10/28/2023    MICROALBUMIN  04/19/2024    INFLUENZA VACCINE (1) 09/01/2024    COVID-19 Vaccine (7 - 2024-25 season) 09/01/2024     My Access Plan  Medical Emergency 911   Primary Clinic Line [facility] - [phone]   24 Hour Appointment Line [phone] or  [phone]) (toll-free)   24 Hour Nurse Line [phone] (toll-free)   Preferred Urgent Care No data recorded   Preferred Hospital No data recorded   Preferred Pharmacy [facility] - [place], [place] - [address] [facility]     Behavioral Health Crisis Line The National Suicide Prevention Lifeline at 1-369.426.4881 or Text/Call 958           My Care Team Members  Patient Care Team         Relationship Specialty Notifications Start End    Renay Hannah, APRN CNP PCP - General Nurse Practitioner Primary Care  7/26/24     Phone: [phone] Fax: [phone] [address] [facility] [address]    Cynthia Maddox, DO Assigned PCP   10/21/18     Phone: [phone]  Pager: Use Vocera Fax: [phone]        [address] [facility] [address]    Tho Newman DO Assigned Musculoskeletal Provider   3/13/22     Phone: [phone] Fax: [phone]         [address] [facility] [address]    Gunjan Rowe, PAShelbyC Physician Assistant Dermatology  1/17/23     Phone: [phone] Fax: [phone]         [address] [facility] [address]    Bharti Riley LSW Lead Care Coordinator Primary Care - CC Admissions 7/20/23     Phone: [phone]          [address] [facility] [address]    Priya Valera LICSW Assigned Behavioral Health Provider   4/23/24     Phone: [phone] Fax: [phone]         [address] [facility] [address]    Theodora Pierre AuD Audiologist Audiology  4/25/24     Phone: [phone] Fax: [phone]         [address] [facility] [address]    Melonie Ortega, CHW Community Health Worker  Admissions 7/25/24     Axel Webb DPM Assigned Surgical Provider   8/23/24     Phone: [phone] Pager: [phone] Fax: [phone]        [address] [address]                My Care Plans  Self Management and Treatment Plan    Care Plan  Care Plan: General       Problem: HP GENERAL PROBLEM                   Care Plan: Health Maintenance       Problem: Health Maintenance Due or Overdue       Goal: Become up-to-date with health maintenance visit(s)       Start Date: 9/23/2024    This Visit's Progress: 50%    Priority: Medium    Note:     Barriers: Forgetfulness  Strengths: Motivated   Patient expressed understanding of goal: Yes     Action steps to complete these goals:  [facility] to [facility] my eye exam at [phone]  I will attend the eye exam appointment                                Action Plans on File:                       Advance Care Plans/Directives:   Advanced Care Plan/Directives on file: No data recorded  Status of Document(s): No data recorded  Advanced Care  Plan/Directives Type: No data recorded     Honoring Choices    Advance Care Planning and Health Care Directives  When it comes to decisions about your health care, it s important that your voice is heard. You may not always be able to speak for yourself.    We encourage you to have discussions with your loved ones, cultural or spiritual leaders and health care providers about your goals, values, beliefs and choices.    We are a part of [employer] [place] , supporting and promoting the benefits of advance care planning conversations.    Our goals are to:  Help you make informed decisions about your healthcare choices and ensure that those choices are honored  Offer advance care planning discussions with trained staff  Ensure your choices are clearly defined, documented and available in your medical record  Translate your choices into medical orders as needed    Why is Advance Care Planning important?  Know what your health care choices are and decide what is right for you  An unexpected illness or injury could leave you unable to participate in important treatment decisions  When choices are left to others to decide that responsibility can be difficult and stressful  By discussing and outlining your choices, your voice is heard in the care you want to receive    How can I learn more?  We offer free classes at multiple locations, days and times. Our trained facilitators will provide information and guide you through a Health Care Directive document. They can also review, notarize and add your document to your medical record.    Call [employer] at [phone] or toll free at [phone] for assistance.      My Medical and Care Information  Problem List   Patient Active Problem List   Diagnosis    Degeneration of lumbar or lumbosacral intervertebral disc    Esophageal reflux    Memory loss    Irritable bowel syndrome with diarrhea    Osteopenia of multiple sites    RESTLESS LEG SYNDROME     Primary osteoarthritis involving multiple joints    Diverticulosis of large intestine    SENSONRL HEAR LOSS,BILAT    Urticaria    Subjective tinnitus    Vitamin D deficiency    Right foot pain    Urge incontinence    Hyperlipidemia LDL goal <100    Allergic rhinitis    Pronation of foot    Peripheral neuropathy    Benign essential hypertension    Carpal tunnel syndrome of left wrist    Diastolic dysfunction    Type 2 diabetes mellitus with diabetic polyneuropathy, without long-term current use of insulin (H)    History of recurrent urinary tract infection    CKD (chronic kidney disease) stage 3, GFR 30-59 ml/min (H)    Bilateral wrist pain    Protrusion of lumbar intervertebral disc    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)    Chronic left-sided low back pain with left-sided sciatica    Generalized weakness    Diarrhea    Abnormal CT of the abdomen    Cystocele, midline    B12 deficiency    Moderate major depression (H)    ARABELLA (generalized anxiety disorder)    Polyneuropathy associated with underlying disease (H24)    Closed head injury, initial encounter    Fall, initial encounter    Non-traumatic rhabdomyolysis    Impaired mobility and activities of daily living    Complex care coordination    Examination of eyes and vision    Left foot pain      Current Medications and Allergies:    Current Outpatient Medications   Medication Instructions    acetaminophen (TYLENOL) 500-1,000 mg, Oral, EVERY 6 HOURS PRN    amLODIPine (NORVASC) 5 mg, Oral, DAILY    blood glucose (NO BRAND SPECIFIED) test strip Use to test blood sugar 1 times daily or as directed. To accompany: Blood Glucose Monitor Brands: per insurance.    blood glucose monitoring (NO BRAND SPECIFIED) meter device kit Use to test blood sugar 1 times daily or as directed. Preferred blood glucose meter OR supplies to accompany: Blood Glucose Monitor Brands: per insurance.    buPROPion (WELLBUTRIN XL) 150  mg, Oral, EVERY MORNING    estradiol (ESTRACE) 2 g, Vaginal, TWICE WEEKLY    meloxicam (MOBIC) 7.5 mg, Oral, DAILY    Multiple Vitamins-Minerals (THERAPEUTIC MULTIVIT/MINERAL) TABS 1 tablet, Oral, EVERY EVENING    nystatin (MYCOSTATIN) 695951 UNIT/GM external cream Topical, 2 TIMES DAILY, Apply to area under bilateral breasts    olmesartan (BENICAR) 20 mg, Oral, DAILY    PARoxetine (PAXIL) 20 mg, Oral, EVERY MORNING    psyllium (METAMUCIL/KONSYL) 58.6 % powder 1 teaspoonful, Oral, DAILY    thin (NO BRAND SPECIFIED) lancets Use with lanceting device. To accompany: Blood Glucose Monitor Brands: per insurance.     Care Coordination Start Date: 7/19/2023   Frequency of Care Coordination: monthly, more frequently as needed     Form Last Updated: 09/24/2024

## 2024-10-08 ENCOUNTER — OFFICE VISIT (OUTPATIENT)
Dept: ORTHOPEDICS | Facility: CLINIC | Age: 89
End: 2024-10-08
Payer: MEDICARE

## 2024-10-08 VITALS
HEIGHT: 63 IN | WEIGHT: 150 LBS | SYSTOLIC BLOOD PRESSURE: 168 MMHG | BODY MASS INDEX: 26.58 KG/M2 | DIASTOLIC BLOOD PRESSURE: 66 MMHG

## 2024-10-08 DIAGNOSIS — M17.12 PRIMARY OSTEOARTHRITIS OF LEFT KNEE: Primary | ICD-10-CM

## 2024-10-08 PROCEDURE — 99213 OFFICE O/P EST LOW 20 MIN: CPT | Performed by: FAMILY MEDICINE

## 2024-10-08 NOTE — LETTER
"10/8/2024      Daniela Brown  [address] [facility] [address]      Dear Colleague,    Thank you for referring your patient, Daniela Brown, to the [facility] [place]. Please see a copy of my visit note below.    Daniela Brown  :  1933  DOS:10/08/24  MRN: [id number]    Sports Medicine Clinic Visit    PCP: Cynthia Maddox    Daniela Brown is a 89 year old female who is seen in follow-up presenting with acute on chronic left knee pain.    Interim History - 2024  Since last visit on 2023 patient has moderate-severe left knee pain over the past 6+ weeks.  Left knee steroid injection completed on 23 provided relief for ~ 6 - 8 weeks.  Patient notes continued discomfort with walking and going from sit to stand position.  No new injury in the interim.    Interim History - May 16, 2024  Since last visit on 2024 patient has moderate-severe left knee pain with radiation to lower leg over the past 4 - 6 weeks.  Left knee steroid injection injection completed on 24 provided relief for ~ 6 - 8 weeks..  No new injury in the interim.    Interim History - 2024  Since last visit on 2024 with Dr Galeana patient has moderate-severe left knee pain over the past 4+ weeks.  Left knee steroid injection completed on 24 provided relief for ~ 6 weeks.  Patient notes worsening pain with walking and going from sit to stand position.  No new injury in the interim.      Review of Systems  Musculoskeletal: as above  Remainder of review of systems is negative including constitutional, CV, pulmonary, GI, Skin and Neurologic except as noted in HPI or medical history.    Past Medical History:   Diagnosis Date     Abnormal cardiovascular stress test 2/3/2019    9/10/2018 Lexiscan: \"Myocardial perfusion imaging using single isotope technique demonstrated a small perfusion defect of mild severity " "involving the basal inferior wall which is mostly reversible and may be consistent with mild ischemia in the right coronary artery distribution. In addition, transient ischemic dilatation is noted with a TID ratio of 1.3. \"     Adhesive capsulitis of shoulder     left      Adjustment disorder with anxiety 3/18/2016     B12 deficiency anemia 6/20/2012     Chest pain 2004    Chronic right sided/axillary discomfort (musculoskeletal) Atypical substernal (possibly GERD). Negative cardiolite 2004.     Colitis, Clostridium difficile 4/18/2012     Diverticulitis 2009     Headache(784.0) 2001    Tension type. MRI 2001 normal.     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     Status post coronary angiogram 2/27/2019     Syncope and collapse 9/10/2018     Past Surgical History:   Procedure Laterality Date     ARTHROPLASTY KNEE  3/26/2012    Procedure:ARTHROPLASTY KNEE; Right Total Knee Arthroplasty; Surgeon:BERNADINE ROSAS; Location:[place] [facility]     CHOLECYSTECTOMY       CV HEART CATHETERIZATION WITH POSSIBLE INTERVENTION N/A 2/27/2019    Procedure: Coronary Angiogram with Left ventriculogram;  Surgeon: Leandro Carpio MD;  Location:  HEART CARDIAC CATH LAB     HERNIA REPAIR      Hernia Repair     HYSTERECTOMY, PAP NO LONGER INDICATED  1983    TVH/BSO     SURGICAL HISTORY OF -   10/08    A & P Repair, Dr. Camden SAUNDERSC APPENDECTOMY  1953     Family History   Problem Relation Age of Onset     C.A.D. Mother      Diabetes Mother      Cancer - colorectal Mother      Arthritis Mother      Heart Disease Mother      Lipids Brother      Obesity Brother      Hypertension Brother      Allergies Son      Eye Disorder Son         cataract     Thyroid Disease Sister         graves dz     Eye Disorder Son      Leukemia Son      Alcohol/Drug Father      Objective  BP (!) 168/66   Ht 1.595 m (5' " "2.8\")   Wt 68 kg (150 lb)   LMP  (LMP Unknown)   BMI 26.74 kg/m      General: healthy, alert and in no distress    HEENT: no scleral icterus or conjunctival erythema   Skin: no suspicious lesions or rash. No jaundice.   CV: regular rhythm by palpation, 2+ distal pulses, no pedal edema    Resp: normal respiratory effort without conversational dyspnea   Psych: normal mood and affect    Gait: antalgic, appropriate coordination and balance   Neuro: normal light touch sensory exam of the extremities. Motor strength as noted below     Left Knee exam     ROM:        Flexion lacks 20 degrees       Extension lacks 5 degrees    Inspection:       no visible ecchymosis        effusion noted moderate by palpation    Skin:       no visible deformities       well perfused       capillary refill brisk    Patellar Motion:        Normal patellar tracking noted through range of motion       Crepitus noted in the patellofemoral joint    Tender:        lateral patellar border       medial joint line       lateral joint line    Non Tender:         remainder of knee area    Special Tests:        neg (-) varus at 0 deg and 30 deg       neg (-) valgus at 0 deg and 30 deg      Radiology      SHOULDER RIGHT THREE OR MORE VIEWS   8/2/2022 1:38 PM      HISTORY: Acute pain of right shoulder.     COMPARISON: None.         Impression     IMPRESSION: Small amount of calcification just distal to the lateral  tip of the acromion process. This could be within the  supraspinatus/infraspinatus tendon or overlying subacromial/subdeltoid  bursa, so clinical correlation for possible bursitis needed. Mild  osteoarthrosis of the AC joint. Diffuse bone demineralization.  Otherwise negative.     NADINE HARO MD      Recent Results (from the past 744 hour(s))   XR Knee Standing AP Bilat [place] Bilat Lat Left    Narrative    KNEE STANDING AP BILATERAL SUNRISE BILATERAL LATERAL LEFT  4/21/2022  1:32 PM     HISTORY: Left knee pain. Fall at " home.    COMPARISON: 2/16/2022 x-ray.      Impression    IMPRESSION: Right total knee arthroplasty in place. No evidence of  complication. Advanced medial and mild to moderate lateral compartment  joint space narrowing. Mild-to-moderate patellofemoral degenerative  changes with very small joint effusion. No acute fracture. No  significant change.    BIJU GALICIA MD         SYSTEM ID:  [id number]       Assessment:  1. Primary osteoarthritis of left knee        Plan:  Discussed the assessment with the patient.  Follow up: next week for likely viscosupplementation injection  Insurance changed nd we will submit as necessary for pre-approval as well as try to determine the most accurate cost of care information as her finances are limited  Prior insurance covered her visco injections at 100%, so we reviewed potential costs in detail today to the best of my ability, and she agreed that she would like as much clarity as possible on cost  Recurrent exacerbation of underlying OA, no concerning clinical or radiographic signs of fracture  XR images independently visualized and reviewed with patient today in clinic  Assistive device options reviewed  PT options reviewed  Oral Tylenol and topical Voltaren gel reviewed as safe OTC options, reviewed safe dosing strategies  Again reviewed the option today for consulting with orthopedic surgery for consideration of TKA based on clinical progress, she is hopeful to avoid which is reasonable, and may not be a viable surgical candidate  Expectations and goals of CSI reviewed  Often 2-3 days for steroid effect, and can take up to two weeks for maximum effect  We discussed modified progressive pain-free activity as tolerated  Do not overuse in first two weeks if feeling better due to concern for vulnerability while steroid is working  Supportive care reviewed  All questions were answered today  Contact us with additional questions or concerns  Signs and sx of concern reviewed      Tho  DO Paty, OZZIE  Sports Medicine Physician  [facility]              Disclaimer: This note consists of symbols derived from keyboarding, dictation and/or voice recognition software. As a result, there may be errors in the script that have gone undetected. Please consider this when interpreting information found in this chart.      Again, thank you for allowing me to participate in the care of your patient.        Sincerely,        Tho Newman DO

## 2024-10-08 NOTE — PROGRESS NOTES
"Daniela Brown  :  1933  DOS:10/08/24  MRN: [id number]    Sports Medicine Clinic Visit    PCP: Cynthia Maddox    Daniela Brown is a 89 year old female who is seen in follow-up presenting with acute on chronic left knee pain.    Interim History - 2024  Since last visit on 2023 patient has moderate-severe left knee pain over the past 6+ weeks.  Left knee steroid injection completed on 23 provided relief for ~ 6 - 8 weeks.  Patient notes continued discomfort with walking and going from sit to stand position.  No new injury in the interim.    Interim History - May 16, 2024  Since last visit on 2024 patient has moderate-severe left knee pain with radiation to lower leg over the past 4 - 6 weeks.  Left knee steroid injection injection completed on 24 provided relief for ~ 6 - 8 weeks..  No new injury in the interim.    Interim History - 2024  Since last visit on 2024 with Dr Galeana patient has moderate-severe left knee pain over the past 4+ weeks.  Left knee steroid injection completed on 24 provided relief for ~ 6 weeks.  Patient notes worsening pain with walking and going from sit to stand position.  No new injury in the interim.      Review of Systems  Musculoskeletal: as above  Remainder of review of systems is negative including constitutional, CV, pulmonary, GI, Skin and Neurologic except as noted in HPI or medical history.    Past Medical History:   Diagnosis Date    Abnormal cardiovascular stress test 2/3/2019    9/10/2018 Lexiscan: \"Myocardial perfusion imaging using single isotope technique demonstrated a small perfusion defect of mild severity involving the basal inferior wall which is mostly reversible and may be consistent with mild ischemia in the right coronary artery distribution. In addition, transient ischemic dilatation is noted with a TID ratio of 1.3. \"    Adhesive capsulitis of shoulder     left     " "Adjustment disorder with anxiety 3/18/2016    B12 deficiency anemia 6/20/2012    Chest pain 2004    Chronic right sided/axillary discomfort (musculoskeletal) Atypical substernal (possibly GERD). Negative cardiolite 2004.    Colitis, Clostridium difficile 4/18/2012    Diverticulitis 2009    Headache(784.0) 2001    Tension type. MRI 2001 normal.    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    Status post coronary angiogram 2/27/2019    Syncope and collapse 9/10/2018     Past Surgical History:   Procedure Laterality Date    ARTHROPLASTY KNEE  3/26/2012    Procedure:ARTHROPLASTY KNEE; Right Total Knee Arthroplasty; Surgeon:BERNADINE ROSAS; Location:[place] [facility]    CHOLECYSTECTOMY      CV HEART CATHETERIZATION WITH POSSIBLE INTERVENTION N/A 2/27/2019    Procedure: Coronary Angiogram with Left ventriculogram;  Surgeon: Leandro Carpio MD;  Location:  HEART CARDIAC CATH LAB    HERNIA REPAIR      Hernia Repair    HYSTERECTOMY, PAP NO LONGER INDICATED  1983    TVH/BSO    SURGICAL HISTORY OF -   10/08    A & P Repair, Dr. Hannah    [facility] APPENDECTOMY  1953     Family History   Problem Relation Age of Onset    C.A.D. Mother     Diabetes Mother     Cancer - colorectal Mother     Arthritis Mother     Heart Disease Mother     Lipids Brother     Obesity Brother     Hypertension Brother     Allergies Son     Eye Disorder Son         cataract    Thyroid Disease Sister         graves dz    Eye Disorder Son     Leukemia Son     Alcohol/Drug Father      Objective  BP (!) 168/66   Ht 1.595 m (5' 2.8\")   Wt 68 kg (150 lb)   LMP  (LMP Unknown)   BMI 26.74 kg/m      General: healthy, alert and in no distress    HEENT: no scleral icterus or conjunctival erythema   Skin: no suspicious lesions or rash. No jaundice.   CV: regular rhythm by palpation, 2+ distal pulses, no pedal edema    Resp: normal " respiratory effort without conversational dyspnea   Psych: normal mood and affect    Gait: antalgic, appropriate coordination and balance   Neuro: normal light touch sensory exam of the extremities. Motor strength as noted below     Left Knee exam     ROM:        Flexion lacks 20 degrees       Extension lacks 5 degrees    Inspection:       no visible ecchymosis        effusion noted moderate by palpation    Skin:       no visible deformities       well perfused       capillary refill brisk    Patellar Motion:        Normal patellar tracking noted through range of motion       Crepitus noted in the patellofemoral joint    Tender:        lateral patellar border       medial joint line       lateral joint line    Non Tender:         remainder of knee area    Special Tests:        neg (-) varus at 0 deg and 30 deg       neg (-) valgus at 0 deg and 30 deg      Radiology      SHOULDER RIGHT THREE OR MORE VIEWS   8/2/2022 1:38 PM      HISTORY: Acute pain of right shoulder.     COMPARISON: None.         Impression     IMPRESSION: Small amount of calcification just distal to the lateral  tip of the acromion process. This could be within the  supraspinatus/infraspinatus tendon or overlying subacromial/subdeltoid  bursa, so clinical correlation for possible bursitis needed. Mild  osteoarthrosis of the AC joint. Diffuse bone demineralization.  Otherwise negative.     NAIDNE HARO MD      Recent Results (from the past 744 hour(s))   XR Knee Standing AP Bilat [place] Bilat Lat Left    Narrative    KNEE STANDING AP BILATERAL [place] BILATERAL LATERAL LEFT  4/21/2022  1:32 PM     HISTORY: Left knee pain. Fall at home.    COMPARISON: 2/16/2022 x-ray.      Impression    IMPRESSION: Right total knee arthroplasty in place. No evidence of  complication. Advanced medial and mild to moderate lateral compartment  joint space narrowing. Mild-to-moderate patellofemoral degenerative  changes with very small joint effusion. No acute  fracture. No  significant change.    BIJU GALICIA MD         SYSTEM ID:  [id number]       Assessment:  1. Primary osteoarthritis of left knee        Plan:  Discussed the assessment with the patient.  Follow up: next week for likely viscosupplementation injection  Insurance changed nd we will submit as necessary for pre-approval as well as try to determine the most accurate cost of care information as her finances are limited  Prior insurance covered her visco injections at 100%, so we reviewed potential costs in detail today to the best of my ability, and she agreed that she would like as much clarity as possible on cost  Recurrent exacerbation of underlying OA, no concerning clinical or radiographic signs of fracture  XR images independently visualized and reviewed with patient today in clinic  Assistive device options reviewed  PT options reviewed  Oral Tylenol and topical Voltaren gel reviewed as safe OTC options, reviewed safe dosing strategies  Again reviewed the option today for consulting with orthopedic surgery for consideration of TKA based on clinical progress, she is hopeful to avoid which is reasonable, and may not be a viable surgical candidate  Expectations and goals of CSI reviewed  Often 2-3 days for steroid effect, and can take up to two weeks for maximum effect  We discussed modified progressive pain-free activity as tolerated  Do not overuse in first two weeks if feeling better due to concern for vulnerability while steroid is working  Supportive care reviewed  All questions were answered today  Contact us with additional questions or concerns  Signs and sx of concern reviewed      Tho Newman DO, OZZIE  Sports Medicine Physician  [facility]              Disclaimer: This note consists of symbols derived from keyboarding, dictation and/or voice recognition software. As a result, there may be errors in the script that have gone undetected. Please consider this  when interpreting information found in this chart.

## 2024-10-09 ENCOUNTER — PATIENT OUTREACH (OUTPATIENT)
Dept: CARE COORDINATION | Facility: CLINIC | Age: 89
End: 2024-10-09
Payer: MEDICARE

## 2024-10-09 NOTE — PROGRESS NOTES
Attempted to reach patient and left VM. Current preventative visit is overdue. left scheduling number for patient to call. [phone]

## 2024-10-15 ENCOUNTER — TELEPHONE (OUTPATIENT)
Dept: ORTHOPEDICS | Facility: CLINIC | Age: 89
End: 2024-10-15
Payer: MEDICARE

## 2024-10-15 NOTE — TELEPHONE ENCOUNTER
Spoke to patient discussed that Dr Newman had been in contact with Cost of Care team.  Her SynviscOne injections were approved, it does not appear that patient will have additional bill based of estimate that was received.  Will keep appointment for 10/17/24, as scheduled.    Chalino Anderson ATC

## 2024-10-15 NOTE — TELEPHONE ENCOUNTER
Other: Pt needs to talk about PA for gel injections. Recently she has changed insurance. She is on straight medicare now. With no supplementary insurance. And she needs to discuss the options going forward.  She is scheduled for an appointment on 10/17 for synvisc. Please contact her asap to discuss procedure options and costs associated with these procedures.     Could we send this information to you in [employer] or would you prefer to receive a phone call?:   Patient would prefer a phone call   Okay to leave a detailed message?: Yes at Home number on file [phone] (home)

## 2024-10-17 ENCOUNTER — OFFICE VISIT (OUTPATIENT)
Dept: ORTHOPEDICS | Facility: CLINIC | Age: 89
End: 2024-10-17
Payer: MEDICARE

## 2024-10-17 VITALS — BODY MASS INDEX: 26.58 KG/M2 | WEIGHT: 150 LBS | HEIGHT: 63 IN

## 2024-10-17 DIAGNOSIS — M17.12 PRIMARY OSTEOARTHRITIS OF LEFT KNEE: Primary | ICD-10-CM

## 2024-10-17 PROCEDURE — 20611 DRAIN/INJ JOINT/BURSA W/US: CPT | Mod: LT | Performed by: FAMILY MEDICINE

## 2024-10-17 NOTE — LETTER
10/17/2024      Daniela Brown  [address] [address] [address]      Dear Colleague,    Thank you for referring your patient, Daniela Brown, to the [facility] [place]. Please see a copy of my visit note below.    Daniela Brown  :  1933  DOS: 10/17/2024  MRN: [id number]    Sports Medicine Clinic Procedure    Ultrasound Guided Left Intra-Articular Knee SynviscOne Injection, +/- Aspiration    Clinical History:     Diagnosis:   1. Primary osteoarthritis of left knee        Large Joint Injection/Arthocentesis: L knee joint    Date/Time: 10/17/2024 2:48 PM    Performed by: Tho Newman DO  Authorized by: Tho Newman DO    Indications:  Osteoarthritis and pain  Needle Size:  21 G  Guidance: ultrasound    Approach:  Superolateral  Location:  Knee      Medications:  48 mg hylan 48 MG/6ML  Aspirate amount (mL):  20  Aspirate:  Serous and yellow  Outcome:  Tolerated well, no immediate complications  Procedure discussed: discussed risks, benefits, and alternatives    Consent Given by:  Patient  Timeout: timeout called immediately prior to procedure    Prep: patient was prepped and draped in usual sterile fashion     Ultrasound images of procedure were permanently stored.      Impression:  Successful Left intra-articular knee SynviscOne injection and aspiration, US guided    Plan:  Follow up prn based on short term clinical progress  Expectations and limitations of synvisc were reviewed in detail  Often 4-6 weeks before full effect may be noticed  Usually covered up to every 6 months by insurance, but does not need to be repeated unless pain returns, at which point we would re-evaluate  Potential use of CSI in future for flares of pain reviewed in detail  Encouraged modified progressive pain-free activity as tolerated  HEP and Supportive care reviewed  All questions were answered today  Contact us with additional questions or  concerns  Signs and sx of concern reviewed    Tho Newman DO, CAQ  Primary Care Sports Medicine  [facility]       Again, thank you for allowing me to participate in the care of your patient.        Sincerely,        Tho Newman DO

## 2024-10-17 NOTE — PROGRESS NOTES
Dainela Brown  :  1933  DOS: 10/17/2024  MRN: [id number]    Sports Medicine Clinic Procedure    Ultrasound Guided Left Intra-Articular Knee SynviscOne Injection, +/- Aspiration    Clinical History:     Diagnosis:   1. Primary osteoarthritis of left knee        Large Joint Injection/Arthocentesis: L knee joint    Date/Time: 10/17/2024 2:48 PM    Performed by: Tho Newman DO  Authorized by: Tho Newman DO    Indications:  Osteoarthritis and pain  Needle Size:  21 G  Guidance: ultrasound    Approach:  Superolateral  Location:  Knee      Medications:  48 mg hylan 48 MG/6ML  Aspirate amount (mL):  20  Aspirate:  Serous and yellow  Outcome:  Tolerated well, no immediate complications  Procedure discussed: discussed risks, benefits, and alternatives    Consent Given by:  Patient  Timeout: timeout called immediately prior to procedure    Prep: patient was prepped and draped in usual sterile fashion     Ultrasound images of procedure were permanently stored.      Impression:  Successful Left intra-articular knee SynviscOne injection and aspiration, US guided    Plan:  Follow up prn based on short term clinical progress  Expectations and limitations of synvisc were reviewed in detail  Often 4-6 weeks before full effect may be noticed  Usually covered up to every 6 months by insurance, but does not need to be repeated unless pain returns, at which point we would re-evaluate  Potential use of CSI in future for flares of pain reviewed in detail  Encouraged modified progressive pain-free activity as tolerated  HEP and Supportive care reviewed  All questions were answered today  Contact us with additional questions or concerns  Signs and sx of concern reviewed    Tho Newman DO, CAQ  Primary Care Sports Medicine  [facility]

## 2024-10-23 ENCOUNTER — PATIENT OUTREACH (OUTPATIENT)
Dept: CARE COORDINATION | Facility: CLINIC | Age: 89
End: 2024-10-23
Payer: MEDICARE

## 2024-10-23 NOTE — PROGRESS NOTES
Clinic Care Coordination Contact  Community Health Worker Follow Up    Care Gaps:     Health Maintenance Due   Topic Date Due    EYE EXAM  12/08/2012    DIABETIC FOOT EXAM  09/02/2022    LIPID  10/28/2023    MICROALBUMIN  04/19/2024    INFLUENZA VACCINE (1) 09/01/2024    COVID-19 Vaccine (7 - 2024-25 season) 09/01/2024    HEMOGLOBIN  01/08/2025       Scheduled Eye Exam Nov 14th.      Care Plan:   Care Plan: General       Problem: HP GENERAL PROBLEM                   Care Plan: Health Maintenance       Problem: Health Maintenance Due or Overdue       Goal: Become up-to-date with health maintenance visit(s)       Start Date: 9/23/2024    This Visit's Progress: 70% Recent Progress: 50%    Priority: Medium    Note:     Barriers: Forgetfulness  Strengths: Motivated   Patient expressed understanding of goal: Yes     Action steps to complete these goals:  I will call the Eye Clinic to schedule my eye exam at [phone]. I have scheduled my Eye Exam for November 14th.  I will attend the eye exam appointment                                Intervention and Education during outreach: CHW spoke with patient. Patient stated she has scheduled an Eye Exam for Nov 14th. Patient stated she needs to call to see if she can get in sooner due to losing her glasses. CHW provided patient with [facility] phone number to call and request earlier appt.     CHW Plan: CHW will follow up with patient in about one month.    ABDOULAYE Luther  [phone]  [facility]

## 2024-10-24 ENCOUNTER — OFFICE VISIT (OUTPATIENT)
Dept: FAMILY MEDICINE | Facility: CLINIC | Age: 89
End: 2024-10-24
Payer: MEDICARE

## 2024-10-24 VITALS
OXYGEN SATURATION: 95 % | HEART RATE: 72 BPM | RESPIRATION RATE: 16 BRPM | BODY MASS INDEX: 26.98 KG/M2 | SYSTOLIC BLOOD PRESSURE: 129 MMHG | DIASTOLIC BLOOD PRESSURE: 55 MMHG | WEIGHT: 152.3 LBS | TEMPERATURE: 97 F | HEIGHT: 63 IN

## 2024-10-24 DIAGNOSIS — L30.4 INTERTRIGO: ICD-10-CM

## 2024-10-24 DIAGNOSIS — Z00.00 ENCOUNTER FOR MEDICARE ANNUAL WELLNESS EXAM: Primary | ICD-10-CM

## 2024-10-24 DIAGNOSIS — I25.119 CORONARY ARTERY DISEASE INVOLVING NATIVE CORONARY ARTERY OF NATIVE HEART WITH ANGINA PECTORIS (H): ICD-10-CM

## 2024-10-24 DIAGNOSIS — M15.0 PRIMARY OSTEOARTHRITIS INVOLVING MULTIPLE JOINTS: ICD-10-CM

## 2024-10-24 DIAGNOSIS — G89.29 CHRONIC PAIN OF LEFT KNEE: ICD-10-CM

## 2024-10-24 DIAGNOSIS — E11.42 TYPE 2 DIABETES MELLITUS WITH DIABETIC POLYNEUROPATHY, WITHOUT LONG-TERM CURRENT USE OF INSULIN (H): Chronic | ICD-10-CM

## 2024-10-24 DIAGNOSIS — M25.562 CHRONIC PAIN OF LEFT KNEE: ICD-10-CM

## 2024-10-24 DIAGNOSIS — N18.31 STAGE 3A CHRONIC KIDNEY DISEASE (H): ICD-10-CM

## 2024-10-24 DIAGNOSIS — I10 BENIGN ESSENTIAL HYPERTENSION: ICD-10-CM

## 2024-10-24 DIAGNOSIS — F41.1 GAD (GENERALIZED ANXIETY DISORDER): ICD-10-CM

## 2024-10-24 DIAGNOSIS — F32.1 MODERATE MAJOR DEPRESSION (H): ICD-10-CM

## 2024-10-24 LAB
ALBUMIN SERPL BCG-MCNC: 4.1 G/DL (ref 3.5–5.2)
ALP SERPL-CCNC: 70 U/L (ref 40–150)
ALT SERPL W P-5'-P-CCNC: 12 U/L (ref 0–50)
ANION GAP SERPL CALCULATED.3IONS-SCNC: 10 MMOL/L (ref 7–15)
AST SERPL W P-5'-P-CCNC: 21 U/L (ref 0–45)
BASOPHILS # BLD AUTO: 0 10E3/UL (ref 0–0.2)
BASOPHILS NFR BLD AUTO: 0 %
BILIRUB SERPL-MCNC: 0.3 MG/DL
BUN SERPL-MCNC: 23.3 MG/DL (ref 8–23)
CALCIUM SERPL-MCNC: 8.9 MG/DL (ref 8.8–10.4)
CHLORIDE SERPL-SCNC: 105 MMOL/L (ref 98–107)
CHOLEST SERPL-MCNC: 193 MG/DL
CREAT SERPL-MCNC: 1.23 MG/DL (ref 0.51–0.95)
EGFRCR SERPLBLD CKD-EPI 2021: 41 ML/MIN/1.73M2
EOSINOPHIL # BLD AUTO: 0.3 10E3/UL (ref 0–0.7)
EOSINOPHIL NFR BLD AUTO: 2 %
ERYTHROCYTE [DISTWIDTH] IN BLOOD BY AUTOMATED COUNT: 12.3 % (ref 10–15)
EST. AVERAGE GLUCOSE BLD GHB EST-MCNC: 126 MG/DL
FASTING STATUS PATIENT QL REPORTED: NO
FASTING STATUS PATIENT QL REPORTED: NO
GLUCOSE SERPL-MCNC: 102 MG/DL (ref 70–99)
HBA1C MFR BLD: 6 % (ref 0–5.6)
HCO3 SERPL-SCNC: 24 MMOL/L (ref 22–29)
HCT VFR BLD AUTO: 34.5 % (ref 35–47)
HDLC SERPL-MCNC: 66 MG/DL
HGB BLD-MCNC: 11.3 G/DL (ref 11.7–15.7)
IMM GRANULOCYTES # BLD: 0 10E3/UL
IMM GRANULOCYTES NFR BLD: 0 %
LDLC SERPL CALC-MCNC: 93 MG/DL
LYMPHOCYTES # BLD AUTO: 2.5 10E3/UL (ref 0.8–5.3)
LYMPHOCYTES NFR BLD AUTO: 21 %
MCH RBC QN AUTO: 28.8 PG (ref 26.5–33)
MCHC RBC AUTO-ENTMCNC: 32.8 G/DL (ref 31.5–36.5)
MCV RBC AUTO: 88 FL (ref 78–100)
MONOCYTES # BLD AUTO: 1 10E3/UL (ref 0–1.3)
MONOCYTES NFR BLD AUTO: 8 %
NEUTROPHILS # BLD AUTO: 8.3 10E3/UL (ref 1.6–8.3)
NEUTROPHILS NFR BLD AUTO: 69 %
NONHDLC SERPL-MCNC: 127 MG/DL
PLATELET # BLD AUTO: 275 10E3/UL (ref 150–450)
POTASSIUM SERPL-SCNC: 3.9 MMOL/L (ref 3.4–5.3)
PROT SERPL-MCNC: 6.8 G/DL (ref 6.4–8.3)
RBC # BLD AUTO: 3.92 10E6/UL (ref 3.8–5.2)
SODIUM SERPL-SCNC: 139 MMOL/L (ref 135–145)
TRIGL SERPL-MCNC: 172 MG/DL
WBC # BLD AUTO: 12 10E3/UL (ref 4–11)

## 2024-10-24 PROCEDURE — 85025 COMPLETE CBC W/AUTO DIFF WBC: CPT

## 2024-10-24 PROCEDURE — 83036 HEMOGLOBIN GLYCOSYLATED A1C: CPT

## 2024-10-24 PROCEDURE — 36415 COLL VENOUS BLD VENIPUNCTURE: CPT

## 2024-10-24 PROCEDURE — G0439 PPPS, SUBSEQ VISIT: HCPCS

## 2024-10-24 PROCEDURE — 80061 LIPID PANEL: CPT

## 2024-10-24 PROCEDURE — 99213 OFFICE O/P EST LOW 20 MIN: CPT | Mod: 25

## 2024-10-24 PROCEDURE — 80053 COMPREHEN METABOLIC PANEL: CPT

## 2024-10-24 RX ORDER — AMLODIPINE BESYLATE 5 MG/1
5 TABLET ORAL DAILY
Qty: 90 TABLET | Refills: 3 | Status: SHIPPED | OUTPATIENT
Start: 2024-10-24

## 2024-10-24 RX ORDER — OLMESARTAN MEDOXOMIL 20 MG/1
20 TABLET ORAL DAILY
Qty: 90 TABLET | Refills: 3 | Status: SHIPPED | OUTPATIENT
Start: 2024-10-24

## 2024-10-24 RX ORDER — LIDOCAINE 4 G/G
1 PATCH TOPICAL EVERY 24 HOURS
Qty: 30 PATCH | Refills: 3 | Status: SHIPPED | OUTPATIENT
Start: 2024-10-24

## 2024-10-24 RX ORDER — NYSTATIN 100000 U/G
CREAM TOPICAL 2 TIMES DAILY
Qty: 30 G | Refills: 11 | Status: SHIPPED | OUTPATIENT
Start: 2024-10-24

## 2024-10-24 RX ORDER — BUPROPION HYDROCHLORIDE 150 MG/1
150 TABLET ORAL EVERY MORNING
Qty: 90 TABLET | Refills: 3 | Status: SHIPPED | OUTPATIENT
Start: 2024-10-24

## 2024-10-24 SDOH — HEALTH STABILITY: PHYSICAL HEALTH: ON AVERAGE, HOW MANY DAYS PER WEEK DO YOU ENGAGE IN MODERATE TO STRENUOUS EXERCISE (LIKE A BRISK WALK)?: 7 DAYS

## 2024-10-24 SDOH — HEALTH STABILITY: PHYSICAL HEALTH: ON AVERAGE, HOW MANY MINUTES DO YOU ENGAGE IN EXERCISE AT THIS LEVEL?: 20 MIN

## 2024-10-24 ASSESSMENT — PATIENT HEALTH QUESTIONNAIRE - PHQ9: SUM OF ALL RESPONSES TO PHQ QUESTIONS 1-9: 6

## 2024-10-24 ASSESSMENT — PAIN SCALES - GENERAL: PAINLEVEL_OUTOF10: SEVERE PAIN (6)

## 2024-10-24 ASSESSMENT — SOCIAL DETERMINANTS OF HEALTH (SDOH): HOW OFTEN DO YOU GET TOGETHER WITH FRIENDS OR RELATIVES?: NEVER

## 2024-10-24 NOTE — LETTER
October 25, 2024      Sivan Brown  [address] [address] [address]        Dear ,    We are writing to inform you of your test results.    Cholesterol is similar to last check 1 year ago and remains reasonably well-controlled.     Resulted Orders   Lipid panel reflex to direct LDL Non-fasting   Result Value Ref Range    Cholesterol 193 <200 mg/dL    Triglycerides 172 (H) <150 mg/dL    Direct Measure HDL 66 >=50 mg/dL    LDL Cholesterol Calculated 93 <100 mg/dL    Non HDL Cholesterol 127 <130 mg/dL    Patient Fasting > 8hrs? No     Narrative    Cholesterol  Desirable: < 200 mg/dL  Borderline High: 200 - 239 mg/dL  High: >= 240 mg/dL    Triglycerides  Normal: < 150 mg/dL  Borderline High: 150 - 199 mg/dL  High: 200-499 mg/dL  Very High: >= 500 mg/dL    Direct Measure HDL  Female: >= 50 mg/dL   Male: >= 40 mg/dL    LDL Cholesterol  Desirable: < 100 mg/dL  Above Desirable: 100 - 129 mg/dL   Borderline High: 130 - 159 mg/dL   High:  160 - 189 mg/dL   Very High: >= 190 mg/dL    Non HDL Cholesterol  Desirable: < 130 mg/dL  Above Desirable: 130 - 159 mg/dL  Borderline High: 160 - 189 mg/dL  High: 190 - 219 mg/dL  Very High: >= 220 mg/dL       If you have any questions or concerns, please call the clinic at the number listed above.       Sincerely,      Cynthia Maddox, DO

## 2024-10-24 NOTE — PATIENT INSTRUCTIONS
Influenza  Covid 19    Bladder training    Start by going to the toilet and trying to urinate as often as your shortest voiding interval (the length of time between trips to the bathroom) based on your voiding diary. For example, go every hour if that is what your bladder diary indicates is the shortest interval of time between visits to urinate. Make these regular trips to the toilet while you are awake. You do not have to get up during the night!   You must try to urinate whether you feel the need or not. You must try to urinate even if you have just been incontinent.   If you get a strong urge to go to the bathroom before your scheduled time, use distraction or relaxation:  Stop, do not run to the bathroom!  Stand still or sit down if you can.  RELAX. Take a deep breath and let it out slowly.  Concentrate on making the urge decrease or even go away anyway you can (imagine the pressure becoming less and less). You can also try doing quick contractions of your pelvic floor muscles.  DISTRACT yourself, for example by doing math problems in your head.  When you feel IN CONTROL OF YOUR BLADDER, walk slowly to the bathroom, and then go.   Keep this schedule until you can go 1 day without urine leakage. Then, increase the time between scheduled trips to the toilet by 15 minutes. When you can go 1 day on this new schedule without urine leakage, extend the time between bathroom trips again by 15 minutes.   Keep this up until you can go 4 hours between trips to the toilet (which is NORMAL), or until you are comfortable with a shorter time interval. This may take several weeks.   DO NOT GET DISCOURAGED! Bladder training takes time and effort, but it is an effective way to get rid of incontinence without medication or surgery.       Patient Education   Preventive Care Advice   This is general advice given by our system to help you stay healthy. However, your care team may have specific advice just for you. Please talk to  your care team about your preventive care needs.  Nutrition  Eat 5 or more servings of fruits and vegetables each day.  Try wheat bread, brown rice and whole grain pasta (instead of white bread, rice, and pasta).  Get enough calcium and vitamin D. Check the label on foods and aim for 100% of the RDA (recommended daily allowance).  Lifestyle  Exercise at least 150 minutes each week  (30 minutes a day, 5 days a week).  Do muscle strengthening activities 2 days a week. These help control your weight and prevent disease.  No smoking.  Wear sunscreen to prevent skin cancer.  Have a dental exam and cleaning every 6 months.  Yearly exams  See your health care team every year to talk about:  Any changes in your health.  Any medicines your care team has prescribed.  Preventive care, family planning, and ways to prevent chronic diseases.  Shots (vaccines)   HPV shots (up to age 26), if you've never had them before.  Hepatitis B shots (up to age 59), if you've never had them before.  COVID-19 shot: Get this shot when it's due.  Flu shot: Get a flu shot every year.  Tetanus shot: Get a tetanus shot every 10 years.  Pneumococcal, hepatitis A, and RSV shots: Ask your care team if you need these based on your risk.  Shingles shot (for age 50 and up)  General health tests  Diabetes screening:  Starting at age 35, Get screened for diabetes at least every 3 years.  If you are younger than age 35, ask your care team if you should be screened for diabetes.  Cholesterol test: At age 39, start having a cholesterol test every 5 years, or more often if advised.  Bone density scan (DEXA): At age 50, ask your care team if you should have this scan for osteoporosis (brittle bones).  Hepatitis C: Get tested at least once in your life.  STIs (sexually transmitted infections)  Before age 24: Ask your care team if you should be screened for STIs.  After age 24: Get screened for STIs if you're at risk. You are at risk for STIs (including HIV)  if:  You are sexually active with more than one person.  You don't use condoms every time.  You or a partner was diagnosed with a sexually transmitted infection.  If you are at risk for HIV, ask about PrEP medicine to prevent HIV.  Get tested for HIV at least once in your life, whether you are at risk for HIV or not.  Cancer screening tests  Cervical cancer screening: If you have a cervix, begin getting regular cervical cancer screening tests starting at age 21.  Breast cancer scan (mammogram): If you've ever had breasts, begin having regular mammograms starting at age 40. This is a scan to check for breast cancer.  Colon cancer screening: It is important to start screening for colon cancer at age 45.  Have a colonoscopy test every 10 years (or more often if you're at risk) Or, ask your provider about stool tests like a FIT test every year or Cologuard test every 3 years.  To learn more about your testing options, visit:   .  For help making a decision, visit:   https://bit.ly/hm87969.  Prostate cancer screening test: If you have a prostate, ask your care team if a prostate cancer screening test (PSA) at age 55 is right for you.  Lung cancer screening: If you are a current or former smoker ages 50 to 80, ask your care team if ongoing lung cancer screenings are right for you.  For informational purposes only. Not to replace the advice of your health care provider. Copyright   2023 [facility]. All rights reserved. Clinically reviewed by the [facility]. [employer] [place] [id number] - REV 01/24.  Learning About Activities of Daily Living  What are activities of daily living?     Activities of daily living (ADLs) are the basic self-care tasks you do every day. These include eating, bathing, dressing, and moving around.  As you age, and if you have health problems, you may find that it's harder to do some of these tasks. If so, your doctor can suggest ideas that may help.  To measure what  kind of help you may need, your doctor will ask how well you are able to do ADLs. Let your doctor know if there are any tasks that you are having trouble doing. This is an important first step to getting help. And when you have the help you need, you can stay as independent as possible.  How will a doctor assess your ADLs?  Asking about ADLs is part of a routine health checkup your doctor will likely do as you age. Your health check might be done in a doctor's office, in your home, or at a hospital. The goal is to find out if you are having any problems that could make it hard to care for yourself or that make it unsafe for you to be on your own.  To measure your ADLs, your doctor will ask how hard it is for you to do routine tasks. Your doctor may also want to know if you have changed the way you do a task because of a health problem. Your doctor may watch how you:  Walk back and forth.  Keep your balance while you stand or walk.  Move from sitting to standing or from a bed to a chair.  Button or unbutton a shirt or sweater.  Remove and put on your shoes.  It's common to feel a little worried or anxious if you find you can't do all the things you used to be able to do. Talking with your doctor about ADLs is a way to make sure you're as safe as possible and able to care for yourself as well as you can. You may want to bring a caregiver, friend, or family member to your checkup. They can help you talk to your doctor.  Follow-up care is a key part of your treatment and safety. Be sure to make and go to all appointments, and call your doctor if you are having problems. It's also a good idea to know your test results and keep a list of the medicines you take.  Current as of: October 24, 2023  Content Version: 14.2    2024 [employer].   Care instructions adapted under license by your healthcare professional. If you have questions about a medical condition or this instruction, always ask your healthcare  professional. [employer] disclaims any warranty or liability for your use of this information.    Preventing Falls: Care Instructions  Injuries and health problems such as trouble walking or poor eyesight can increase your risk of falling. So can some medicines. But there are things you can do to help prevent falls. You can exercise to get stronger. You can also arrange your home to make it safer.    Talk to your doctor about the medicines you take. Ask if any of them increase the risk of falls and whether they can be changed or stopped.   Try to exercise regularly. It can help improve your strength and balance. This can help lower your risk of falling.         Practice fall safety and prevention.   Wear low-heeled shoes that fit well and give your feet good support. Talk to your doctor if you have foot problems that make this hard.  Carry a cellphone or wear a medical alert device that you can use to call for help.  Use stepladders instead of chairs to reach high objects. Don't climb if you're at risk for falls. Ask for help, if needed.  Wear the correct eyeglasses, if you need them.        Make your home safer.   Remove rugs, cords, clutter, and furniture from walkways.  Keep your house well lit. Use night-lights in hallways and bathrooms.  Install and use sturdy handrails on stairways.  Wear nonskid footwear, even inside. Don't walk barefoot or in socks without shoes.        Be safe outside.   Use handrails, curb cuts, and ramps whenever possible.  Keep your hands free by using a shoulder bag or backpack.  Try to walk in well-lit areas. Watch out for uneven ground, changes in pavement, and debris.  Be careful in the winter. Walk on the grass or gravel when sidewalks are slippery. Use de-icer on steps and walkways. Add non-slip devices to shoes.    Put grab bars and nonskid mats in your shower or tub and near the toilet. Try to use a shower chair or bath bench when bathing.   Get into a tub or shower  "by putting in your weaker leg first. Get out with your strong side first. Have a phone or medical alert device in the bathroom with you.   Where can you learn more?  Go to https://www.[employer].net/patiented  Enter G117 in the search box to learn more about \"Preventing Falls: Care Instructions.\"  Current as of: July 17, 2023  Content Version: 14.2 2024 [employer].   Care instructions adapted under license by your healthcare professional. If you have questions about a medical condition or this instruction, always ask your healthcare professional. [employer] disclaims any warranty or liability for your use of this information.    Bladder Training: Care Instructions  Your Care Instructions     Bladder training is used to treat urge incontinence and stress incontinence. Urge incontinence means that the need to urinate comes on so fast that you can't get to a toilet in time. Stress incontinence means that you leak urine because of pressure on your bladder. For example, it may happen when you laugh, cough, or lift something heavy.  Bladder training can increase how long you can wait before you have to urinate. It can also help your bladder hold more urine. And it can give you better control over the urge to urinate.  It is important to remember that bladder training takes a few weeks to a few months to make a difference. You may not see results right away, but don't give up.  Follow-up care is a key part of your treatment and safety. Be sure to make and go to all appointments, and call your doctor if you are having problems. It's also a good idea to know your test results and keep a list of the medicines you take.  How can you care for yourself at home?  Work with your doctor to come up with a bladder training program that is right for you. You may use one or more of the following methods.  Delayed urination  In the beginning, try to keep from urinating for 5 minutes after you first feel the " "need to go.  While you wait, take deep, slow breaths to relax. Kegel exercises can also help you delay the need to go to the bathroom.  After some practice, when you can easily wait 5 minutes to urinate, try to wait 10 minutes before you urinate.  Slowly increase the waiting period until you are able to control when you have to urinate.  Scheduled urination  Empty your bladder when you first wake up in the morning.  Schedule times throughout the day when you will urinate.  Start by going to the bathroom every hour, even if you don't need to go.  Slowly increase the time between trips to the bathroom.  When you have found a schedule that works well for you, keep doing it.  If you wake up during the night and have to urinate, do it. Apply your schedule to waking hours only.  Kegel exercises  These tighten and strengthen pelvic muscles, which can help you control the flow of urine. (If doing these exercises causes pain, stop doing them and talk with your doctor.) To do Kegel exercises:  Squeeze your muscles as if you were trying not to pass gas. Or squeeze your muscles as if you were stopping the flow of urine. Your belly, legs, and buttocks shouldn't move.  Hold the squeeze for 3 seconds, then relax for 5 to 10 seconds.  Start with 3 seconds, then add 1 second each week until you are able to squeeze for 10 seconds.  Repeat the exercise 10 times a session. Do 3 to 8 sessions a day.  When should you call for help?  Watch closely for changes in your health, and be sure to contact your doctor if:    Your incontinence is getting worse.     You do not get better as expected.   Where can you learn more?  Go to https://www.[employer].net/patiented  Enter V684 in the search box to learn more about \"Bladder Training: Care Instructions.\"  Current as of: November 15, 2023  Content Version: 14.2 2024 [employer].   Care instructions adapted under license by your healthcare professional. If you have questions about a " medical condition or this instruction, always ask your healthcare professional. Healthwise, [facility] disclaims any warranty or liability for your use of this information.    Learning About Depression Screening  What is depression screening?  Depression screening is a way to see if you have depression symptoms. It may be done by a doctor or counselor. It's often part of a routine checkup. That's because your mental health is just as important as your physical health.  Depression is a mental health condition that affects how you feel, think, and act. You may:  Have less energy.  Lose interest in your daily activities.  Feel sad and grouchy for a long time.  Depression is very common. It affects people of all ages.  Many things can lead to depression. Some people become depressed after they have a stroke or find out they have a major illness like cancer or heart disease. The death of a loved one or a breakup may lead to depression. It can run in families. Most experts believe that a combination of inherited genes and stressful life events can cause it.  What happens during screening?  You may be asked to fill out a form about your depression symptoms. You and the doctor will discuss your answers. The doctor may ask you more questions to learn more about how you think, act, and feel.  What happens after screening?  If you have symptoms of depression, your doctor will talk to you about your options.  Doctors usually treat depression with medicines or counseling. Often, combining the two works best. Many people don't get help because they think that they'll get over the depression on their own. But people with depression may not get better unless they get treatment.  The cause of depression is not well understood. There may be many factors involved. But if you have depression, it's not your fault.  A serious symptom of depression is thinking about death or suicide. If you or someone you care about talks about this or  "about feeling hopeless, get help right away.  It's important to know that depression can be treated. Medicine, counseling, and self-care may help.  Where can you learn more?  Go to https://www.[employer].net/patiented  Enter T185 in the search box to learn more about \"Learning About Depression Screening.\"  Current as of: June 24, 2023  Content Version: 14.2 2024 [facility] [place] [place].   Care instructions adapted under license by your healthcare professional. If you have questions about a medical condition or this instruction, always ask your healthcare professional. [employer]wise[employer] Inc[employer] disclaims any warranty or liability for your use of this information.       "

## 2024-10-24 NOTE — PROGRESS NOTES
Preventive Care Visit  [facility]  CYN Delaney CNP, Nurse Practitioner Primary Care  Oct 24, 2024      Assessment & Plan     Encounter for Medicare annual wellness exam  Patient is in overall age appropriate general good health.    Benign essential hypertension  Patient blood pressure is being managed well with current medications 129/55. Refilled medications.     - amLODIPine (NORVASC) 5 MG tablet; Take 1 tablet (5 mg) by mouth daily.  - olmesartan (BENICAR) 20 MG tablet; Take 1 tablet (20 mg) by mouth daily.  - CBC with platelets and differential; Future  - Comprehensive metabolic panel (BMP + Alb, Alk Phos, ALT, AST, Total. Bili, TP); Future  - CBC with platelets and differential  - Comprehensive metabolic panel (BMP + Alb, Alk Phos, ALT, AST, Total. Bili, TP)    Chronic pain of left knee  Patient reports left knee pain, and she receives injections to manage pain and reports she still continues to have pain. Trying topical Lidocaine patches for relief.     - Lidocaine (LIDOCARE) 4 % Patch; Place 1 patch over 12 hours onto the skin every 24 hours. To prevent lidocaine toxicity, patient should be patch free for 12 hrs daily.    Stage 3a chronic kidney disease (H)      ARABELLA (generalized anxiety disorder)  Patient reports anxiety is managed well with Wellbutrin. Refilled medication.    - buPROPion (WELLBUTRIN XL) 150 MG 24 hr tablet; Take 1 tablet (150 mg) by mouth every morning.    Moderate major depression (H)  Patient reports depressions is well managed with Wellbutrin. Patient denies thoughts of wanting to hurt herself or others and identified with reasons for living. Patient reports she is tolerating Wellbutrin well.     - buPROPion (WELLBUTRIN XL) 150 MG 24 hr tablet; Take 1 tablet (150 mg) by mouth every morning.    Primary osteoarthritis involving multiple joints  Patient reports overall general joint pain.     Intertrigo  Patient reports she is utilizing cream in folds  of skin when needed.     - nystatin (MYCOSTATIN) 976555 UNIT/GM external cream; Apply topically 2 times daily. Apply to area under bilateral breasts    Type 2 diabetes mellitus with diabetic polyneuropathy, without long-term current use of insulin (H)  Monofilament test was positive for all toes and bottom of feet.    - Hemoglobin A1c; Future  - Comprehensive metabolic panel (BMP + Alb, Alk Phos, ALT, AST, Total. Bili, TP); Future  - Albumin Random Urine Quantitative with Creat Ratio  - Hemoglobin A1c  - Comprehensive metabolic panel (BMP + Alb, Alk Phos, ALT, AST, Total. Bili, TP)    Coronary artery disease involving native coronary artery of native heart with angina pectoris (H)    - Lipid panel reflex to direct LDL Non-fasting    Patient has been advised of split billing requirements and indicates understanding: Yes    Counseling  Appropriate preventive services were addressed with this patient via screening, questionnaire, or discussion as appropriate for fall prevention, nutrition, physical activity, Tobacco-use cessation, social engagement, weight loss and cognition.  Checklist reviewing preventive services available has been given to the patient.  Reviewed patient's diet, addressing concerns and/or questions.   Patient is at risk for social isolation and has been provided with information about the benefit of social connection.   The patient was instructed to see the dentist every 6 months.   She is at risk for psychosocial distress and has been provided with information to reduce risk.   Updated plan of care.  Patient reported difficulty with activities of daily living were addressed today.Information on urinary incontinence and treatment options given to patient.   The patient's PHQ-9 score is consistent with mild depression. She was provided with information regarding depression.     Shruti Finnegan is a 89 year old, presenting for the following:  Annual Visit (Not fasting )        10/24/2024     3:17  PM   Additional Questions   Roomed by Yanique RODRIGUEZ  Patient is in overall age appropriate general good health. Patient blood pressure is being managed well with current medications 129/55. Patient reports left knee pain, and she receives injections to manage pain and reports she still continues to have pain. Patient reports anxiety and depressions is well managed with Wellbutrin. Patient denies thoughts of wanting to hurt herself or others and identified with reasons for living. Monofilament test was positive for all toes and bottom of feet. Patient denies seasonal allergies, headache, pain, and nausea.     Annual Wellness Visit     Patient has been advised of split billing requirements and indicates understanding: Yes       Health Care Directive  Patient has a Health Care Directive on file  Advance care planning document is on file and is current.      10/24/2024   General Health   How would you rate your overall physical health? (!) FAIR   Feel stress (tense, anxious, or unable to sleep) Only a little             10/24/2024   Nutrition   Diet: Regular (no restrictions)            10/24/2024   Exercise   Days per week of moderate/strenous exercise 7 days   Average minutes spent exercising at this level 20 min              10/24/2024   Social Factors   Frequency of gathering with friends or relatives Never   Worry food won't last until get money to buy more No   Food not last or not have enough money for food? No   Do you have housing? (Housing is defined as stable permanent housing and does not include staying ouside in a car, in a tent, in an abandoned building, in an overnight shelter, or couch-surfing.) Yes   Are you worried about losing your housing? No   Lack of transportation? No   Unable to get utilities (heat,electricity)? No      (!) SOCIAL CONNECTIONS CONCERN        10/24/2024   Fall Risk   Fallen 2 or more times in the past year? Yes    Trouble with walking or balance? No    Reason Gait Speed  Test Not Completed Patient does not tolerate an upright or standing position (e.g. wheelchair)       Patient-reported          10/24/2024   Activities of Daily Living- Home Safety   Needs help with the following daily activites Transportation    Shopping   Safety concerns in the home None of the above       Multiple values from one day are sorted in reverse-chronological order         10/24/2024   Dental   Dentist two times every year? (!) NO            10/24/2024   Hearing Screening   Hearing concerns? None of the above            10/24/2024   Driving Risk Screening   Patient/family members have concerns about driving No            10/24/2024   General Alertness/Fatigue Screening   Have you been more tired than usual lately? No            10/24/2024   Urinary Incontinence Screening   Bothered by leaking urine in past 6 months Yes            10/24/2024   TB Screening   Were you born outside of the [place]? No          Social History     Tobacco Use    Smoking status: Never    Smokeless tobacco: Never   Vaping Use    Vaping status: Never Used   Substance Use Topics    Alcohol use: No    Drug use: No       Today's PHQ-9 Score:       10/24/2024     3:18 PM   PHQ-9 SCORE   PHQ-9 Total Score 6        Mammogram Screening - After age 74- determine frequency with patient based on health status, life expectancy and patient goals            Reviewed and updated as needed this visit by Provider                    Lab work is in process    Current providers sharing in care for this patient include:  Patient Care Team:  Renay Hannah APRN CNP as PCP - General (Nurse Practitioner Primary Care)  Cynthia Maddox DO as Assigned PCP  Tho Newman DO as Assigned Musculoskeletal Provider  Gunjan Rowe PAShelbyC as Physician Assistant (Dermatology)  Bharti Riley LSW as Lead Care Coordinator (Primary Care - CC)  Priya Valera LICSW as Assigned Behavioral Health Provider  Theodora Pierre AuD as Audiologist  (Audiology)  Melonie Ortega, ABDOULAYE as Community Health Worker  Axel Webb DPM as Assigned Surgical Provider    The following health maintenance items are reviewed in Epic and correct as of today:  Health Maintenance   Topic Date Due    EYE EXAM  12/08/2012    DIABETIC FOOT EXAM  09/02/2022    MICROALBUMIN  04/19/2024    INFLUENZA VACCINE (1) 09/01/2024    COVID-19 Vaccine (7 - 2024-25 season) 09/01/2024    RSV VACCINE (1 - 1-dose 75+ series) 07/03/2025 (Originally 1/26/2008)    ZOSTER IMMUNIZATION (1 of 2) 07/31/2025 (Originally 1/26/1983)    DTAP/TDAP/TD IMMUNIZATION (1 - Tdap) 08/01/2025 (Originally 1/2/1988)    ANNUAL REVIEW OF HM ORDERS  12/14/2024    A1C  04/24/2025    PHQ-9  04/24/2025    MEDICARE ANNUAL WELLNESS VISIT  10/24/2025    BMP  10/24/2025    LIPID  10/24/2025    FALL RISK ASSESSMENT  10/24/2025    HEMOGLOBIN  10/24/2025    ADVANCE CARE PLANNING  04/15/2029    DEPRESSION ACTION PLAN  Completed    Pneumococcal Vaccine: 65+ Years  Completed    URINALYSIS  Completed    HPV IMMUNIZATION  Aged Out    MENINGITIS IMMUNIZATION  Aged Out    RSV MONOCLONAL ANTIBODY  Aged Out    URINE DRUG SCREEN  Discontinued       Appropriate preventive services were discussed with this patient, including applicable screening as appropriate for fall prevention, nutrition, physical activity, Tobacco-use cessation, weight loss and cognition.  Checklist reviewing preventive services available has been given to the patient.          10/24/2024   Mini Cog   Clock Draw Score 0 Abnormal   3 Item Recall 2 objects recalled   Mini Cog Total Score 2                10/24/2024   Vision Screen   Reason Vision Screen Not Completed Screening Recommend: Patient/Guardian Declined          Health Care Directive  Patient has a Health Care Directive on file  Advance care planning document is on file and is current.      10/24/2024   General Health   How would you rate your overall physical health? (!) FAIR   Feel stress (tense,  anxious, or unable to sleep) Only a little      (!) STRESS CONCERN      10/24/2024   Nutrition   Diet: Regular (no restrictions)            10/24/2024   Exercise   Days per week of moderate/strenous exercise 7 days   Average minutes spent exercising at this level 20 min            10/24/2024   Social Factors   Frequency of gathering with friends or relatives Never   Worry food won't last until get money to buy more No   Food not last or not have enough money for food? No   Do you have housing? (Housing is defined as stable permanent housing and does not include staying ouside in a car, in a tent, in an abandoned building, in an overnight shelter, or couch-surfing.) Yes   Are you worried about losing your housing? No   Lack of transportation? No   Unable to get utilities (heat,electricity)? No      (!) SOCIAL CONNECTIONS CONCERN      10/24/2024   Fall Risk   Fallen 2 or more times in the past year? Yes    Trouble with walking or balance? No    Reason Gait Speed Test Not Completed Patient does not tolerate an upright or standing position (e.g. wheelchair)       Patient-reported          10/24/2024   Activities of Daily Living- Home Safety   Needs help with the following daily activites Transportation    Shopping   Safety concerns in the home None of the above       Multiple values from one day are sorted in reverse-chronological order         10/24/2024   Dental   Dentist two times every year? (!) NO            10/24/2024   Hearing Screening   Hearing concerns? None of the above            10/24/2024   Driving Risk Screening   Patient/family members have concerns about driving No            10/24/2024   General Alertness/Fatigue Screening   Have you been more tired than usual lately? No            10/24/2024   Urinary Incontinence Screening   Bothered by leaking urine in past 6 months Yes            10/24/2024   TB Screening   Were you born outside of the [place]? No          Today's PHQ-9 Score:       10/24/2024     3:18  PM   PHQ-9 SCORE   PHQ-9 Total Score 6         10/24/2024   Substance Use   Alcohol more than 3/day or more than 7/wk Not Applicable   Do you have a current opioid prescription? No   How severe/bad is pain from 1 to 10? 6/10   Do you use any other substances recreationally? No        Social History     Tobacco Use    Smoking status: Never    Smokeless tobacco: Never   Vaping Use    Vaping status: Never Used   Substance Use Topics    Alcohol use: No    Drug use: No              Reviewed and updated as needed this visit by Provider                  Current providers sharing in care for this patient include:  Patient Care Team:  Renay Hannah, CYN CNP as PCP - General (Nurse Practitioner Primary Care)  Cynthia Maddox DO as Assigned PCP  Tho Newman DO as Assigned Musculoskeletal Provider  Gunjan Rowe PA-C as Physician Assistant (Dermatology)  Bharti Riley LSW as Lead Care Coordinator (Primary Care - CC)  Priya Valera LICSW as Assigned Behavioral Health Provider  Theodora Pierre AuD as Audiologist (Audiology)  Melonie Ortega CHW as Community Health Worker  Axel Webb DPM as Assigned Surgical Provider    The following health maintenance items are reviewed in Epic and correct as of today:  Health Maintenance   Topic Date Due    EYE EXAM  12/08/2012    DIABETIC FOOT EXAM  09/02/2022    LIPID  10/28/2023    MICROALBUMIN  04/19/2024    INFLUENZA VACCINE (1) 09/01/2024    COVID-19 Vaccine (7 - 2024-25 season) 09/01/2024    HEMOGLOBIN  01/08/2025    RSV VACCINE (1 - 1-dose 75+ series) 07/03/2025 (Originally 1/26/2008)    ZOSTER IMMUNIZATION (1 of 2) 07/31/2025 (Originally 1/26/1983)    DTAP/TDAP/TD IMMUNIZATION (1 - Tdap) 08/01/2025 (Originally 1/2/1988)    ANNUAL REVIEW OF HM ORDERS  12/14/2024    A1C  01/03/2025    BMP  01/08/2025    MEDICARE ANNUAL WELLNESS VISIT  04/11/2025    PHQ-9  04/24/2025    FALL RISK ASSESSMENT  10/24/2025    ADVANCE CARE PLANNING   "04/15/2029    DEPRESSION ACTION PLAN  Completed    Pneumococcal Vaccine: 65+ Years  Completed    URINALYSIS  Completed    HPV IMMUNIZATION  Aged Out    MENINGITIS IMMUNIZATION  Aged Out    RSV MONOCLONAL ANTIBODY  Aged Out    URINE DRUG SCREEN  Discontinued         Review of Systems  Constitutional, HEENT, cardiovascular, pulmonary, GI, , musculoskeletal, neuro, skin, endocrine and psych systems are negative, except as otherwise noted.     Objective    Exam  /55 (BP Location: Right arm, Patient Position: Sitting, Cuff Size: Adult Regular)   Pulse 72   Temp 97  F (36.1  C) (Tympanic)   Resp 16   Ht 1.59 m (5' 2.6\")   Wt 69.1 kg (152 lb 4.8 oz)   LMP  (LMP Unknown)   SpO2 95%   BMI 27.33 kg/m     Estimated body mass index is 27.33 kg/m  as calculated from the following:    Height as of this encounter: 1.59 m (5' 2.6\").    Weight as of this encounter: 69.1 kg (152 lb 4.8 oz).    Physical Exam  GENERAL: alert and no distress  EYES: Eyes grossly normal to inspection, PERRL and conjunctivae and sclerae normal  HENT: ear canals and TM's normal, nose and mouth without ulcers or lesions  NECK: no adenopathy, no asymmetry, masses, or scars  RESP: lungs clear to auscultation - no rales, rhonchi or wheezes  CV: regular rate and rhythm, normal S1 S2, no S3 or S4, no murmur, click or rub, no peripheral edema  ABDOMEN: soft, nontender, no hepatosplenomegaly, no masses and bowel sounds normal  MS: no gross musculoskeletal defects noted, no edema  SKIN: no suspicious lesions or rashes  NEURO: Normal strength and tone, mentation intact and speech normal  PSYCH: mentation appears normal, affect normal/bright        10/24/2024   Mini Cog   Clock Draw Score 0 Abnormal   3 Item Recall 2 objects recalled   Mini Cog Total Score 2                10/24/2024   Vision Screen   Reason Vision Screen Not Completed Screening Recommend: Patient/Guardian Declined          Signed Electronically by: CYN Delaney CNP    "

## 2024-10-29 NOTE — ED NOTES
"Patient has  Ira to Observation  order. Patient has been given the Observation brochure -  What does Observation mean to me.\"  Patient has been given the opportunity to ask questions about observation status and their plan of care.      Dixie Gallegos RN  " Functionality Assessment/Goals Worksheet     On a scale of 0 (Does not Interfere) to 10 (Completely Interferes)     1.  Which number describes how during the past week pain has interfered with           the following:  A.  General Activity:  4  B.  Mood: 4  C.  Walking Ability:  4  D.  Normal Work (Includes both work outside the home and housework):  5  E.  Relations with Other People:   4  F.  Sleep:   4  G.  Enjoyment of Life:   4    2.  Patient Prefers to Take their Pain Medications:     []  On a regular basis   [x]  Only when necessary    []  Does not take pain medications    3.  What are the Patient's Goals/Expectations for Visiting Pain Management?     [x]  Learn about my pain    []  Receive Medication   []  Physical Therapy     []  Treat Depression   []  Receive Injections    []  Treat Sleep   []  Deal with Anxiety and Stress   []  Treat Opoid Dependence/Addiction   []  Other:   HPI:   Florecita Ibarra is a 51 y.o. female is here today for    Chief Complaint: Low back pain, Hip pain and SI pain        F/U   Has continued relief from LESI. She does have some occasional pain in  right hip SI leg, tolerable short lived.  Still doing very well.  Trouble sleeping.       Last time Percocet  4 week ago  Last UDS 8/2024 WNL  Pill Count NO- out  was given 2 week trial doesn't want anymore        Here without walker.   Her main pain complaint is her low lumbar  right SI pain  radiates into buttocks  hip thigh groin.    States using walker outside of home now. Her pain increases with standing walking sitting laying. Hard to find  pain free position.  Her RLE has numbness tingling pins needles down to ankle.     She follows Dr Regalado he recommended surgery or LESI for pinched nerve, she was referred to Dr Abernathy and they recommended LESI. She refused because he was rude and wanted to come here. He started her on Neurontin  she thinks it has made pain worse. She had had to call in sick past 2 days missed work. She last seen

## 2024-11-08 ENCOUNTER — PATIENT OUTREACH (OUTPATIENT)
Dept: CARE COORDINATION | Facility: CLINIC | Age: 89
End: 2024-11-08
Payer: MEDICARE

## 2024-11-08 NOTE — PROGRESS NOTES
Clinic Care Coordination Contact  Care Coordination Clinician Chart Review    Situation: Patient chart reviewed by Care Coordinator.       Background: Care Coordination Program started: 7/19/2023. Initial assessment completed 7/24/23 and patient-centered care plan(s) were developed with participation from patient. Lead CC handed patient off to CHW for continued outreaches.       Assessment: Per chart review, patient outreach completed by CC CHW on 10/23/24. Patient is actively working to accomplish goal(s). Patient's goal(s) appropriate and relevant at this time.     Patient is not due for updated Plan of Care.      Assessments will be completed annually or as needed/with change of patient status. Due.        Care Plan: General       Problem: HP GENERAL PROBLEM                   Care Plan: Health Maintenance       Problem: Health Maintenance Due or Overdue       Goal: Become up-to-date with health maintenance visit(s)       Start Date: 9/23/2024    This Visit's Progress: 70% Recent Progress: 50%    Priority: Medium    Note:     Barriers: Forgetfulness  Strengths: Motivated   Patient expressed understanding of goal: Yes     Action steps to complete these goals:  [facility] to schedule my eye exam at [phone]. I have scheduled my Eye Exam for November 14th.  I will attend the eye exam appointment                                   Plan/Recommendations: The patient will continue working with Care Coordination to achieve goal(s) as above.     CHW will continue outreaches at minimum every 30 days and will involve Lead CC as needed or if patient is ready to move to Maintenance.     - if continues to be enrolled, please schedule annual eassessment with [facility]  - see [facility] note 7/29/24    Lead CC will continue to monitor CHW outreaches and patient's progress to goal(s) every 6 weeks.     Plan of Care updated and sent to patient: MANJIT Garcia   Social Work Primary Care Clinic Care Coordinator    [facility]  [phone]  [email or web address]

## 2024-11-19 ENCOUNTER — TRANSFERRED RECORDS (OUTPATIENT)
Dept: HEALTH INFORMATION MANAGEMENT | Facility: CLINIC | Age: 89
End: 2024-11-19
Payer: MEDICARE

## 2024-11-19 LAB — RETINOPATHY: NEGATIVE

## 2024-11-25 ENCOUNTER — TELEPHONE (OUTPATIENT)
Dept: FAMILY MEDICINE | Facility: CLINIC | Age: 89
End: 2024-11-25
Payer: MEDICARE

## 2024-11-25 NOTE — TELEPHONE ENCOUNTER
Patient calling for appointment with complaints of incontinence/leaking urine, intermittent diarrhea, lower back pain that shoots into her legs/hips, decreased appetite and weakness for the last few days.     Denies fever, burning with urination, urgency, and malodorous urine.   Scheduled same day for 11/26. Advised if she gets feverish, pain with urination, increased pain she should be evaluated sooner.    Ashli Eckert RN on 11/25/2024 at 9:13 AM

## 2024-11-26 ENCOUNTER — OFFICE VISIT (OUTPATIENT)
Dept: FAMILY MEDICINE | Facility: CLINIC | Age: 89
End: 2024-11-26
Payer: MEDICARE

## 2024-11-26 VITALS
SYSTOLIC BLOOD PRESSURE: 136 MMHG | HEART RATE: 78 BPM | DIASTOLIC BLOOD PRESSURE: 58 MMHG | HEIGHT: 63 IN | RESPIRATION RATE: 20 BRPM | OXYGEN SATURATION: 98 % | WEIGHT: 148 LBS | BODY MASS INDEX: 26.22 KG/M2 | TEMPERATURE: 97 F

## 2024-11-26 DIAGNOSIS — K58.0 IRRITABLE BOWEL SYNDROME WITH DIARRHEA: Primary | Chronic | ICD-10-CM

## 2024-11-26 DIAGNOSIS — N39.41 URGENCY INCONTINENCE: ICD-10-CM

## 2024-11-26 PROCEDURE — 99213 OFFICE O/P EST LOW 20 MIN: CPT | Performed by: NURSE PRACTITIONER

## 2024-11-26 RX ORDER — LOPERAMIDE HYDROCHLORIDE 2 MG/1
2 TABLET ORAL DAILY PRN
Qty: 30 TABLET | Refills: 2 | Status: SHIPPED | OUTPATIENT
Start: 2024-11-26

## 2024-11-26 ASSESSMENT — PATIENT HEALTH QUESTIONNAIRE - PHQ9: SUM OF ALL RESPONSES TO PHQ QUESTIONS 1-9: 6

## 2024-11-26 ASSESSMENT — PAIN SCALES - GENERAL: PAINLEVEL_OUTOF10: MILD PAIN (3)

## 2024-11-26 NOTE — PATIENT INSTRUCTIONS
Prescription for metamucil sent in - take this every day as it may help with the diarrhea.    If the metamucil isn't enough to control to diarrhea, you can have one tablet of imodium if needed.

## 2024-11-26 NOTE — PROGRESS NOTES
Assessment & Plan     Irritable bowel syndrome with diarrhea  Chronic issue  Reinforced treatment with:  - psyllium (METAMUCIL/KONSYL) 58.6 % powder; Take 6 g (1 teaspoonful) by mouth daily.  - loperamide (IMODIUM A-D) 2 MG tablet; Take 1 tablet (2 mg) by mouth daily as needed for diarrhea.    Urgency incontinence  Only happens at night - doesn't make it to the bathroom on time.  Has been seen for this before - last UA was normal.  Hesitant to start anticholinergic medication given her age.   Discussed avoiding caffeine, not drinking water too close to bedtime.  Follow up as needed.      The risks, benefits and treatment options of prescribed medications or other treatments have been discussed with the patient. The patient verbalized their understanding and should call or follow up if no improvement or if they develop further problems.  Anh Oswald CNP                  Subjective   Sivan is a 89 year old, presenting for the following health issues:  History of Present Illness (Multiple concerns of intermittent diarrhea, lower back pain, decreased appetite, weakness )        11/26/2024    10:57 AM   Additional Questions   Roomed by Ayala ADAN CMA   Accompanied by self         11/26/2024    10:57 AM   Patient Reported Additional Medications   Patient reports taking the following new medications none     HPI       Concerns:    Incontinence urine- long time  Only at night - has the urge to urinate, wakes her up in the middle of the night but then she can't make it to the bathroom  She has to wear an incontinence pad every day  Has talked to multiple providers about this  Urine is orange  No incontinence during the day      Couple weeks of intermittent diarrhea  Chronic problem - has diarrhea predominant IBS  Sometimes can't get to the bathroom in time  Two episodes in the last 24 hours  No blood in stool  Stools are loose  But has normal formed stools in between  Pepto is sometimes helpful  Has been off her  "metamucil for a while - can't remember if it was helpful              Review of Systems  Constitutional, HEENT, cardiovascular, pulmonary, gi and gu systems are negative, except as otherwise noted.      Objective    /58 (BP Location: Right arm, Patient Position: Sitting, Cuff Size: Adult Large)   Pulse 78   Temp 97  F (36.1  C) (Tympanic)   Resp 20   Ht 1.588 m (5' 2.5\")   Wt 67.1 kg (148 lb)   LMP  (LMP Unknown)   SpO2 98%   BMI 26.64 kg/m    Body mass index is 26.64 kg/m .  Physical Exam   GENERAL: alert and no distress  NECK: no adenopathy, no asymmetry, masses, or scars  RESP: lungs clear to auscultation - no rales, rhonchi or wheezes  CV: regular rate and rhythm, normal S1 S2, no S3 or S4, no murmur, click or rub, no peripheral edema  ABDOMEN: soft, nontender, no hepatosplenomegaly, no masses and bowel sounds normal  MS: no gross musculoskeletal defects noted, no edema            Signed Electronically by: CYN Jean Baptiste CNP    "

## 2024-12-02 ENCOUNTER — TELEPHONE (OUTPATIENT)
Dept: FAMILY MEDICINE | Facility: CLINIC | Age: 89
End: 2024-12-02
Payer: MEDICARE

## 2024-12-02 NOTE — TELEPHONE ENCOUNTER
Received call from Patient.  Patient states that she thinks that she might have shingles.  Has a band around her back and another on her leg.  Area is painful and really itchy, can't stand the itching any more.  Has been going on for about 1 month.  Appointment scheduled for 12/3/24 at 1:20 with YISEL Oswald.  Jarad Iverson RN

## 2024-12-03 ENCOUNTER — OFFICE VISIT (OUTPATIENT)
Dept: FAMILY MEDICINE | Facility: CLINIC | Age: 89
End: 2024-12-03
Payer: MEDICARE

## 2024-12-03 VITALS
OXYGEN SATURATION: 99 % | DIASTOLIC BLOOD PRESSURE: 68 MMHG | TEMPERATURE: 98.7 F | SYSTOLIC BLOOD PRESSURE: 134 MMHG | WEIGHT: 150 LBS | HEART RATE: 83 BPM | BODY MASS INDEX: 26.58 KG/M2 | HEIGHT: 63 IN | RESPIRATION RATE: 24 BRPM

## 2024-12-03 DIAGNOSIS — L29.9 ITCHY SKIN: Primary | ICD-10-CM

## 2024-12-03 DIAGNOSIS — F41.9 ANXIETY: ICD-10-CM

## 2024-12-03 PROCEDURE — G0008 ADMIN INFLUENZA VIRUS VAC: HCPCS | Performed by: NURSE PRACTITIONER

## 2024-12-03 PROCEDURE — 90662 IIV NO PRSV INCREASED AG IM: CPT | Performed by: NURSE PRACTITIONER

## 2024-12-03 PROCEDURE — 99213 OFFICE O/P EST LOW 20 MIN: CPT | Performed by: NURSE PRACTITIONER

## 2024-12-03 ASSESSMENT — PAIN SCALES - GENERAL: PAINLEVEL_OUTOF10: NO PAIN (0)

## 2024-12-03 NOTE — PROGRESS NOTES
Assessment & Plan     Itchy skin  No rash on skin today  Discussed switch to a dye free, fragrance free detergent.  Apply lotion daily to itchy skin  Follow up as needed.    Anxiety  Poorly controlled  She is unsure is she is taking the Paxil  Printed medication list for her and she will check her pill box at home.    The risks, benefits and treatment options of prescribed medications or other treatments have been discussed with the patient. The patient verbalized their understanding and should call or follow up if no improvement or if they develop further problems.  Anh Oswald, KIMBERLI                    Subjective   Sivan is a 89 year old, presenting for the following health issues:  Rash        12/3/2024     1:38 PM   Additional Questions   Roomed by Ayala ADAN CMA   Accompanied by self         12/3/2024     1:38 PM   Patient Reported Additional Medications   Patient reports taking the following new medications none     HPI       Rash  Onset/Duration: two months or more  Comes and goes  Description  Location: back- upper and middle  Character: little white bumps; then turns into a red pimple, very itchy  Itching: severe  Intensity:  moderate  Progression of Symptoms:  improving today and intermittent  Accompanying signs and symptoms:   Fever: No  Body aches or joint pain: No  Sore throat symptoms: No  Recent cold symptoms: No  History:           Previous episodes of similar rash: yes.  New exposures:  clothes detergant   Recent travel: No  Exposure to similar rash: No  Precipitating or alleviating factors:   Therapies tried and outcome: none      Anxiety:  More anxious lately  States that she is a nervous person.  Unsure if she is taking the Paxil - states that the name doesn't sound familiar.          Review of Systems  Constitutional, HEENT, cardiovascular, pulmonary, gi and gu systems are negative, except as otherwise noted.      Objective    /68   Pulse 83   Temp 98.7  F (37.1  C) (Tympanic)    "Resp 24   Ht 1.588 m (5' 2.5\")   Wt 68 kg (150 lb)   LMP  (LMP Unknown)   SpO2 99%   BMI 27.00 kg/m    Body mass index is 27 kg/m .  Physical Exam   GENERAL: alert and no distress  SKIN: no suspicious lesions or rashes  PSYCH: mentation appears normal, affect normal/bright            Signed Electronically by: CYN Jean Baptiste CNP    "

## 2024-12-10 ENCOUNTER — OFFICE VISIT (OUTPATIENT)
Dept: ORTHOPEDICS | Facility: CLINIC | Age: 89
End: 2024-12-10
Payer: MEDICARE

## 2024-12-10 VITALS — BODY MASS INDEX: 26.58 KG/M2 | WEIGHT: 150 LBS | HEIGHT: 63 IN

## 2024-12-10 DIAGNOSIS — M17.12 PRIMARY OSTEOARTHRITIS OF LEFT KNEE: Primary | ICD-10-CM

## 2024-12-10 DIAGNOSIS — M25.562 CHRONIC PAIN OF LEFT KNEE: ICD-10-CM

## 2024-12-10 DIAGNOSIS — G89.29 CHRONIC PAIN OF LEFT KNEE: ICD-10-CM

## 2024-12-10 PROCEDURE — 20611 DRAIN/INJ JOINT/BURSA W/US: CPT | Mod: LT | Performed by: FAMILY MEDICINE

## 2024-12-10 RX ORDER — TRIAMCINOLONE ACETONIDE 40 MG/ML
40 INJECTION, SUSPENSION INTRA-ARTICULAR; INTRAMUSCULAR
Status: COMPLETED | OUTPATIENT
Start: 2024-12-10 | End: 2024-12-10

## 2024-12-10 RX ORDER — LIDOCAINE 50 MG/G
1 PATCH TOPICAL EVERY 24 HOURS
Qty: 30 PATCH | Refills: 1 | Status: SHIPPED | OUTPATIENT
Start: 2024-12-10

## 2024-12-10 RX ORDER — ROPIVACAINE HYDROCHLORIDE 5 MG/ML
3 INJECTION, SOLUTION EPIDURAL; INFILTRATION; PERINEURAL
Status: COMPLETED | OUTPATIENT
Start: 2024-12-10 | End: 2024-12-10

## 2024-12-10 RX ADMIN — ROPIVACAINE HYDROCHLORIDE 3 ML: 5 INJECTION, SOLUTION EPIDURAL; INFILTRATION; PERINEURAL at 16:30

## 2024-12-10 RX ADMIN — TRIAMCINOLONE ACETONIDE 40 MG: 40 INJECTION, SUSPENSION INTRA-ARTICULAR; INTRAMUSCULAR at 16:30

## 2024-12-10 NOTE — PROGRESS NOTES
Daniela Brown  :  1933  DOS: 12/10/2024  MRN: [id number]    Sports Medicine Clinic Procedure    Ultrasound Guided Left Intra-Articular Knee Injection, +/- Aspiration    Clinical History: Interim History - December 10, 2024  Since last visit on 10/17/2024 patient has moderate-severe left knee pain.  Left knee SynviscOne injection completed on 10/17/24 provided limited relief.  Patient notes worsening pain with walking.  Wearing compression sleeve with only mild relief.  No new injury in the interim.    Diagnosis:   1. Primary osteoarthritis of left knee      Large Joint Injection/Arthocentesis: L knee joint    Date/Time: 12/10/2024 4:30 PM    Performed by: Tho Newman DO  Authorized by: Tho Newman DO    Indications:  Pain and osteoarthritis  Needle Size:  21 G  Guidance: ultrasound    Approach:  Superolateral  Location:  Knee      Medications:  40 mg triamcinolone 40 MG/ML; 3 mL ROPivacaine 5 MG/ML  Aspirate amount (mL):  26  Aspirate:  Serous and yellow  Outcome:  Tolerated well, no immediate complications  Procedure discussed: discussed risks, benefits, and alternatives    Consent Given by:  Patient  Timeout: timeout called immediately prior to procedure    Prep: patient was prepped and draped in usual sterile fashion     Ultrasound images of procedure were permanently stored.         Impression:  Successful Left intra-articular knee injection and aspiration.    Plan:  Follow up as needed based on short term progress  Expectations and goals of CSI reviewed  Often 2-3 days for steroid effect, and can take up to two weeks for maximum effect  We discussed modified progressive pain-free activity as tolerated  Do not overuse in first two weeks if feeling better due to concern for vulnerability while steroid is working  Supportive care reviewed  All questions were answered today  Contact us with additional questions or concerns  Signs and sx of concern reviewed      Tho  DO Paty, CAQ  Primary Care Sports Medicine  [facility]

## 2024-12-10 NOTE — LETTER
12/10/2024      Daniela Brown  [address] [facility] [address]      Dear Colleague,    Thank you for referring your patient, Daniela Brown, to the [facility] [place]. Please see a copy of my visit note below.    Daniela Brown  :  1933  DOS: 12/10/2024  MRN: [id number]    Sports Medicine Clinic Procedure    Ultrasound Guided Left Intra-Articular Knee Injection, +/- Aspiration    Clinical History: Interim History - December 10, 2024  Since last visit on 10/17/2024 patient has moderate-severe left knee pain.  Left knee SynviscOne injection completed on 10/17/24 provided limited relief.  Patient notes worsening pain with walking.  Wearing compression sleeve with only mild relief.  No new injury in the interim.    Diagnosis:   1. Primary osteoarthritis of left knee      Large Joint Injection/Arthocentesis: L knee joint    Date/Time: 12/10/2024 4:30 PM    Performed by: Tho Newman DO  Authorized by: Tho Newman DO    Indications:  Pain and osteoarthritis  Needle Size:  21 G  Guidance: ultrasound    Approach:  Superolateral  Location:  Knee      Medications:  40 mg triamcinolone 40 MG/ML; 3 mL ROPivacaine 5 MG/ML  Aspirate amount (mL):  26  Aspirate:  Serous and yellow  Outcome:  Tolerated well, no immediate complications  Procedure discussed: discussed risks, benefits, and alternatives    Consent Given by:  Patient  Timeout: timeout called immediately prior to procedure    Prep: patient was prepped and draped in usual sterile fashion     Ultrasound images of procedure were permanently stored.         Impression:  Successful Left intra-articular knee injection and aspiration.    Plan:  Follow up as needed based on short term progress  Expectations and goals of CSI reviewed  Often 2-3 days for steroid effect, and can take up to two weeks for maximum effect  We discussed modified progressive pain-free activity as  tolerated  Do not overuse in first two weeks if feeling better due to concern for vulnerability while steroid is working  Supportive care reviewed  All questions were answered today  Contact us with additional questions or concerns  Signs and sx of concern reviewed      Tho Newman DO, CAQ  Primary Care Sports Medicine  [facility]       Again, thank you for allowing me to participate in the care of your patient.        Sincerely,        Tho Newman DO

## 2025-01-06 ENCOUNTER — OFFICE VISIT (OUTPATIENT)
Dept: FAMILY MEDICINE | Facility: CLINIC | Age: OVER 89
End: 2025-01-06
Payer: COMMERCIAL

## 2025-01-06 VITALS
HEIGHT: 63 IN | TEMPERATURE: 98.5 F | DIASTOLIC BLOOD PRESSURE: 72 MMHG | HEART RATE: 91 BPM | BODY MASS INDEX: 26.74 KG/M2 | WEIGHT: 150.9 LBS | SYSTOLIC BLOOD PRESSURE: 149 MMHG | OXYGEN SATURATION: 97 %

## 2025-01-06 DIAGNOSIS — L29.9 ITCHING: Primary | ICD-10-CM

## 2025-01-06 PROCEDURE — 99213 OFFICE O/P EST LOW 20 MIN: CPT | Performed by: NURSE PRACTITIONER

## 2025-01-06 RX ORDER — TRIAMCINOLONE ACETONIDE 1 MG/G
CREAM TOPICAL 2 TIMES DAILY
Qty: 45 G | Refills: 0 | Status: SHIPPED | OUTPATIENT
Start: 2025-01-06

## 2025-01-06 ASSESSMENT — PATIENT HEALTH QUESTIONNAIRE - PHQ9
SUM OF ALL RESPONSES TO PHQ QUESTIONS 1-9: 6
10. IF YOU CHECKED OFF ANY PROBLEMS, HOW DIFFICULT HAVE THESE PROBLEMS MADE IT FOR YOU TO DO YOUR WORK, TAKE CARE OF THINGS AT HOME, OR GET ALONG WITH OTHER PEOPLE: SOMEWHAT DIFFICULT
SUM OF ALL RESPONSES TO PHQ QUESTIONS 1-9: 6

## 2025-01-06 ASSESSMENT — PAIN SCALES - GENERAL: PAINLEVEL_OUTOF10: NO PAIN (0)

## 2025-01-06 NOTE — PROGRESS NOTES
Assessment & Plan     Itching  Etiology unclear.  Area does not appear to have an infectious cause.  Possible inflammatory versus stress versus age related.  Prescribed kenalog cream for itch relief on chest and back  - triamcinolone (KENALOG) 0.1 % external cream; Apply topically 2 times daily. To rash on back for up to 2 weeks.      The risks, benefits and treatment options of prescribed medications or other treatments have been discussed with the patient. The patient verbalized their understanding and should call or follow up if no improvement or if they develop further problems.            Subjective   Sivan is a 89 year old, presenting for the following health issues:  Derm Problem (Rash on chest and back)        1/6/2025    11:10 AM   Additional Questions   Roomed by Carolina Sher   Accompanied by self         1/6/2025    11:10 AM   Patient Reported Additional Medications   Patient reports taking the following new medications none     HPI       Rash  Onset/Duration: about 3 weeks  Description  Location: chest and back  Character: looks like little pimples  Itching: moderate  Intensity:  moderate  Progression of Symptoms:  same  Accompanying signs and symptoms:   Fever: No  Body aches or joint pain: No  Sore throat symptoms: No  Recent cold symptoms: No  History:           Previous episodes of similar rash: None  New exposures:  clothes detergant   Recent travel: No  Exposure to similar rash: No  Precipitating or alleviating factors: none  Therapies tried and outcome: hand lotion      Patient presents with persistent itching on her chest and back for the past 1.5 months. She has tried to switch to a free and clear laundry detergent but this has not helped. She also has attempted applying moisturizing lotion without relief. The itch is bothersome to the point where she is itching her skin with a hairbrush. She also endorses increased stress, anxiety and isolation lately.      Review of Systems  CONSTITUTIONAL:  "NEGATIVE for fever, chills, change in weight  INTEGUMENTARY/SKIN: POSITIVE for rash back, torso, and trunk  ENT/MOUTH: NEGATIVE for ear, mouth and throat problems  RESP: NEGATIVE for significant cough or SOB  CV: NEGATIVE for chest pain, palpitations or peripheral edema  : POSITIVE for incontinence      Objective    BP (!) 149/72 (BP Location: Right arm, Patient Position: Sitting, Cuff Size: Adult Regular)   Pulse 91   Temp 98.5  F (36.9  C) (Tympanic)   Ht 1.61 m (5' 3.39\")   Wt 68.4 kg (150 lb 14.4 oz)   LMP  (LMP Unknown)   SpO2 97%   BMI 26.41 kg/m    Body mass index is 26.41 kg/m .  Physical Exam   GENERAL: alert and no distress  MS: no gross musculoskeletal defects noted, no edema  SKIN: few scattered papules on chest and upper back without surrounding erythema.            Signed Electronically by: Milana Whalen NP Student on 1/6/2025 at 11:53 AM      I saw this patient in collaboration with Milana Whalen.     I was present with the APRN/PA student (Milana Whalen) who participated in the service and in the documentation of the services provided. I have verified the history and personally performed the physical exam and medical decision making, as documented by the student and edited by me.    CYN Jean Baptiste CNP    "

## 2025-01-09 ASSESSMENT — PATIENT HEALTH QUESTIONNAIRE - PHQ9: SUM OF ALL RESPONSES TO PHQ QUESTIONS 1-9: 2

## 2025-01-13 PROBLEM — Z91.199 NO-SHOW FOR APPOINTMENT: Status: ACTIVE | Noted: 2025-01-13

## 2025-01-21 ENCOUNTER — PATIENT OUTREACH (OUTPATIENT)
Dept: CARE COORDINATION | Facility: CLINIC | Age: OVER 89
End: 2025-01-21
Payer: COMMERCIAL

## 2025-01-21 NOTE — PROGRESS NOTES
Clinic Care Coordination Contact    Assessment: CHW Care Coordinator contacted patient for 2 month follow up. Patient has continued to follow the plan of care and assessment is negative for any new needs or concerns.    Enrollment status: Graduated     Plan: No further outreaches at this time. Patient will continue to follow the plan of care. If new needs arise a new Care Coordination referral may be placed. Letter sent.     Bharti Riley SEAMUS   Social Work Primary Care Clinic Care Coordinator   [facility]  [phone]  [email or web address]

## 2025-01-21 NOTE — LETTER
[facility] CARE COORDINATION  [facility]  [address] [place] [address]    January 21, 2025    Daniela Brown  [address] [address] [address]    Dear Daniela,    Your Care Team congratulates you on your journey to maintain wellness. This document will help guide you on your journey to maintain a healthy lifestyle.  You can use this to help you overcome any barriers you may encounter.  If you should have any questions or concerns, you can contact the members of your Care Team or contact your Primary Care Clinic for assistance.     Health Maintenance  Health Maintenance Reviewed:    Health Maintenance Due   Topic Date Due    DIABETIC FOOT EXAM  09/02/2022    MICROALBUMIN  04/19/2024    COVID-19 Vaccine (7 - 2024-25 season) 09/01/2024       My Access Plan  Medical Emergency 911   Primary Clinic Line [facility] - [phone]   24 Hour Appointment Line [phone] or  [phone]) (toll-free)   24 Hour Nurse Line [phone] (toll-free)   Preferred Urgent Care     Preferred Hospital     Preferred Pharmacy [facility] [place] [facility] [place] [address] [facility]     Behavioral Health Crisis Line The National Suicide Prevention Lifeline at 1-577.249.6090 or 911     My Care Team Members  Patient Care Team         Relationship Specialty Notifications Start End    Renay Hannah, CYN CNP PCP - General Nurse Practitioner Primary Care  7/26/24     Phone: [phone] Fax: [phone]         [address] [facility] [address]    Cynthia Maddox DO Assigned PCP   10/21/18     Phone: [phone] Pager: Use Vocera Fax: [phone]        [address] [facility] [address]    Tho Newman,  Assigned Musculoskeletal Provider   3/13/22     Phone: [phone] Fax: [phone]         [address] [facility] [address]    Gunjan Rowe PA-C Physician Assistant Dermatology  1/17/23     Phone: [phone] Fax:  [phone]         [address] [facility] [address]    Bharti Riley LSW Lead Care Coordinator Primary Care - CC Admissions 7/20/23 1/21/25    Phone: [phone]          [address] [facility] [address]    Priya Valera LICSW Assigned Behavioral Health Provider   4/23/24     Phone: [phone] Fax: [phone]         [address] [facility] [address]    Theodora Pierre MA Audiologist Audiology  4/25/24     Phone: [phone] Fax: [phone]         [address] [facility] [address]    Melonie Ortega, CHW Community Health Worker  Admissions 7/25/24 1/21/25    Axel Webb DPM Assigned Surgical Provider   8/23/24     Phone: [phone] Pager: [phone] Fax: [phone] [address] [address]    Leandro Posada MD MD Dermatology  1/7/25     Phone: [phone] Pager: [phone] Fax: [phone]        [facility] [facility] [address]              Advance Care Plans/Directives Type:      Thank you for providing us with your Advance Directive. We will keep this on file and recommend that it be updated every 2 years, if your health situation changes, if there is a death of one of your health care agents, and/or if there are changes in your marital status.    It has been your Clinic Care Team's pleasure to work with you on accomplishing your goals.    Regards,    Your Clinic Care Team

## 2025-01-21 NOTE — PROGRESS NOTES
Clinic Care Coordination Contact  Community Health Worker Follow Up    Care Gaps:     Health Maintenance Due   Topic Date Due    DIABETIC FOOT EXAM  09/02/2022    MICROALBUMIN  04/19/2024    COVID-19 Vaccine (7 - 2024-25 season) 09/01/2024       Patient accepted scheduling phone number for PCP  to schedule independently     Care Plan:   Care Plan: General       Problem: HP GENERAL PROBLEM       Goal: Resources  Completed 7/3/2024      Start Date: 7/24/2023 Expected End Date: 12/1/2023    This Visit's Progress: 100% Recent Progress: 10%    Priority: Medium    Note:     Barriers: Access  Strengths: Motivated  Patient expressed understanding of goal: Yes    Action steps to achieve this goal:  1. I will look into using [employer].  2. I will look into toe nail home services.   3. I will look into food resources.   4. I will work with care coordination team as needed.  (CHW will mail resources to patient as she doesn't remember receiving them from  - 01/16/2024)                             Care Plan: Health Maintenance       Problem: Health Maintenance Due or Overdue       Goal: Become up-to-date with health maintenance visit(s)  Completed 11/22/2024      Start Date: 9/23/2024    This Visit's Progress: 100% Recent Progress: 70%    Priority: Medium    Note:     Barriers: Forgetfulness  Strengths: Motivated   Patient expressed understanding of goal: Yes     Action steps to complete these goals:  I will call the [facility] to schedule my eye exam at [phone]. I have scheduled my Eye Exam for November 14th. (Completed)  I will attend the eye exam appointment. (Completed)                                Intervention and Education during outreach: Patient calling clinic to set up follow up appointment with PCP and diabetic foot exam.     CHW Plan: CHW will send chart to [facility] to review for graduation.    Patient has completed all goals with Clinic Care Coordination.  Please review the chart and confirm if graduation is  approved.    Melonie Ortega, SEAMUS  [phone]  [facility]

## 2025-01-22 ENCOUNTER — TELEPHONE (OUTPATIENT)
Dept: FAMILY MEDICINE | Facility: CLINIC | Age: OVER 89
End: 2025-01-22
Payer: COMMERCIAL

## 2025-01-22 NOTE — TELEPHONE ENCOUNTER
Reason for Call:  Appointment Request    Patient requesting this type of appt:  Rash on back    Requested provider: Any provider    Reason patient unable to be scheduled: Not within requested timeframe    When does patient want to be seen/preferred time: 1-2 days    Comments: Patient has a very itchy rash on back and has been there for a month and would like to be seen asap.     Okay to leave a detailed message?: Yes at Home number on file [phone] (home)    Call taken on 1/22/2025 at 3:35 PM by David Sexton

## 2025-01-23 ENCOUNTER — OFFICE VISIT (OUTPATIENT)
Dept: FAMILY MEDICINE | Facility: CLINIC | Age: OVER 89
End: 2025-01-23
Payer: COMMERCIAL

## 2025-01-23 VITALS
BODY MASS INDEX: 26.22 KG/M2 | TEMPERATURE: 98.9 F | DIASTOLIC BLOOD PRESSURE: 60 MMHG | OXYGEN SATURATION: 98 % | HEART RATE: 89 BPM | RESPIRATION RATE: 16 BRPM | HEIGHT: 63 IN | WEIGHT: 148 LBS | SYSTOLIC BLOOD PRESSURE: 130 MMHG

## 2025-01-23 DIAGNOSIS — L29.9 SKIN PRURITUS: Primary | ICD-10-CM

## 2025-01-23 RX ORDER — HYDROXYZINE PAMOATE 25 MG/1
25 CAPSULE ORAL 2 TIMES DAILY PRN
Qty: 60 CAPSULE | Refills: 0 | Status: SHIPPED | OUTPATIENT
Start: 2025-01-23

## 2025-01-23 NOTE — TELEPHONE ENCOUNTER
Called and spoke with patient.  Scheduled her with Iris today.    Rubia Martinez on 1/23/2025 at 11:33 AM

## 2025-01-23 NOTE — PROGRESS NOTES
"  Assessment & Plan     Skin pruritus  -can be due to dry skin, I advised patient to start using humidifier, she can continue Kenalog as needed and start using over the counter moisturizing creams or lotions   - hydrOXYzine gregorio (VISTARIL) 25 MG capsule; Take 1 capsule (25 mg) by mouth 2 times daily as needed for itching.  -she has appointment with dermatologist in February if she still not improving     Subjective   Sivan is a 89 year old, presenting for the following health issues:  Derm Problem        1/23/2025    12:55 PM   Additional Questions   Roomed by aicha   Accompanied by self         1/23/2025    12:55 PM   Patient Reported Additional Medications   Patient reports taking the following new medications none     Patient has a very itchy rash on her  back and has been there for a month   HPI     Rash  Onset/Duration: month   Description  Location: back   Character: red bumps   Itching: moderate  Intensity:  mild  Progression of Symptoms:  intermittent  Accompanying signs and symptoms:   Fever: No  Body aches or joint pain: No  Sore throat symptoms: No  Recent cold symptoms: No  History:           Previous episodes of similar rash: YES- seen on 1/6/25- Was given a cream. .   New exposures:  None  Recent travel: No  Exposure to similar rash: No  Precipitating or alleviating factors: none   Therapies tried and outcome: kenalog cream- not helping       Review of Systems  Constitutional, HEENT, cardiovascular, pulmonary, gi and gu systems are negative, except as otherwise noted.      Objective    /60   Pulse 89   Temp 98.9  F (37.2  C) (Tympanic)   Resp 16   Ht 1.61 m (5' 3.39\")   Wt 67.1 kg (148 lb)   LMP  (LMP Unknown)   SpO2 98%   BMI 25.90 kg/m    Body mass index is 25.9 kg/m .  Physical Exam   GENERAL: alert and no distress  SKIN: no suspicious lesions or rashes  NEURO: Normal strength and tone, mentation intact and speech normal  PSYCH: mentation appears normal, affect normal/bright        "     Signed Electronically by: CYN Pandya CNP

## 2025-01-29 ENCOUNTER — NURSE TRIAGE (OUTPATIENT)
Dept: FAMILY MEDICINE | Facility: CLINIC | Age: OVER 89
End: 2025-01-29

## 2025-01-29 DIAGNOSIS — Z71.89 COUNSELING AND COORDINATION OF CARE: Primary | ICD-10-CM

## 2025-01-29 DIAGNOSIS — Z59.82 TRANSPORTATION UNAVAILABLE: ICD-10-CM

## 2025-01-29 DIAGNOSIS — Z78.9 NEEDS ASSISTANCE WITH COMMUNITY RESOURCES: ICD-10-CM

## 2025-01-29 SDOH — ECONOMIC STABILITY - TRANSPORTATION SECURITY: TRANSPORTATION INSECURITY: Z59.82

## 2025-01-29 NOTE — TELEPHONE ENCOUNTER
RN reached out to patient for re-triage at request of [facility] staff.  Apparently they were not able to see patient today for evaluation following a fall she sustained a few days ago with trouble walking.  RN sees no previous communication or triage regarding this episode, but it appears patient has a follow-up appointment with [facility] tomorrow for chronic L knee pain.     Patient is reached.  She states she had two different appointments today, but cancelled because she doesn't drive anymore and couldn't get transportation.  Her granddaughter can sometimes drive her, but is at home sick in bed.   Patient reports having a fall around Friday/over the weekend.  She recalls getting out of bed during the night and fell, isn't sure exactly why, but doesn't think it was mechanical (tripping), as she just remembers getting up then waking up on the floor on the other side of the room.  It sounds like she lost consciousness, but unsure for how long or if she hit her head at all.  She then crawled over to the bed and after awhile was able to get up on her knees and back to bed.  She thinks it may have been the hydroxyzine prescribed the day prior for pruritus.   Since then her L shoulder has hurt, with redness.  It was swollen yesterday, but not today.  She is still able to use that arm with almost full ROM, but she's a side sleeper and can't sleep on that side due to discomfort.  She also notes R side down by back and buttock hurts, but can't see that area so unsure of bruising, etc. It sounds like this is affecting her ability to walk.  Overall she rates pain 4/10 right now (after Tylenol), but before OTC medication can be 7/10 and more severe.  She may have hit her head against the wall at the time of the fall, but isn't sure.  At this time she has no bumps or bleeding that she notes, and is A&O.  She does not take blood thinners.  She denies any specific symptoms like dizziness, cardiac or stroke sx prior.  She is able to  walk and get around now, is eating, drinking, going to the bathroom, just has difficulty due to discomfort in back, etc.  She is using a cane or walker.  Currently denies chest pain, unilateral weakness, issues with speech or vision.   See further triage.      Patient is a  and lives at home alone, doesn't drive.  At this time patient has no transportation for the foreseeable future.  She reports her granddaughter is sick at home in bed, then her son lives in [place] and hasn't called her in months, and the rest of her family and sister are in [place], then neighbors are unavailable.    She states she's been trying to get a hold of her sister there, as she's thinking she might need to stay with her for a bit until she's feeling better.  Patient does need to be evaluated to r/o acute problems like fracture or subdural hematoma from recent fall, but then it sounds like she has more long-term needs for assistance at home and resources.  Since no transportation, patient is agreeable to RN calling 911 for ambulance transportation.    RN did this, and dispatcher will send an officer for welfare check, as well as ambulance EMS for evaluation and transportation to ER for evaluation.  Warm hand-off done with .   RN called patient back and advised her that someone is on the way and to ensure door is unlocked so they can get in.  Understanding voiced.   RN asked patient if there is anyone (i.e. family, friend) she would like me to call, and she declines.       Reason for Disposition   Injury (or injuries) that need emergency care    Additional Information   Negative: Major injury from dangerous force (e.g., fall > 10 feet or 3 meters)   Negative: Major bleeding (e.g., actively dripping or spurting) and can't be stopped   Negative: Shock suspected (e.g., cold/pale/clammy skin, too weak to stand)   Negative: Difficult to awaken or acting confused (e.g., disoriented, slurred speech)   Negative: SEVERE  weakness (e.g., unable to walk or barely able to walk, requires support) and new-onset or getting worse   Negative: Can't stand (bear weight) or walk and new-onset after fall   Negative: Sounds like a life-threatening emergency to the triager   Negative: Passed out (e.g., fainted, lost consciousness, blacked out and was not responding)   Negative: Weakness of the face, arm or leg on one side of the body AND new-onset or getting worse   Negative: Dizziness described as spinning or off balance (vertigo) AND new-onset or getting worse   Negative: Dizziness described as lightheadedness (no spinning sensation or trouble with balance) AND new-onset or getting worse   Negative: Pregnant and fall   Negative: Patient has a concerning injury to a specific part of the body (e.g., chest, leg, head)   Negative: Patient has a wound (e.g., cut, puncture, skin tear)    Answer Assessment - Initial Assessment Questions  See my notes.    Protocols used: Falls and [id number]    Gilma Mcelroy RN  [facility]

## 2025-01-30 NOTE — TELEPHONE ENCOUNTER
RN received a message from [facility] staff indicating that they were able to connect with patient and have her scheduled for tomorrow morning.   Also received communication from Care [facility]ination stating they have worked with patient in the past and just discharged her on 1/21/25.  They will still reach out again to see if anything has changed or if patient has new needs.      Gilma Mcelroy RN  [facility]

## 2025-01-30 NOTE — TELEPHONE ENCOUNTER
Anh reaches out and updates that they can accept patient; however, she's unsure if she can get a ride today.  She is looking into this, had mentioned possibly a community van, and Anh plans to follow-up with patient in about an hour regarding this.   In the meantime, RN will place a Care [facility]ination referral, as it seems patient could benefit from some resources.  Writing RN did run this by patient in conversation yesterday, and she was open to this.    Gilma Mcelroy RN  [facility]

## 2025-01-30 NOTE — TELEPHONE ENCOUNTER
Forwarding to [facility] pool as FYI that a referral was placed by writing RN today.   Patient has had issues with transportation that have prevented her from attending needed appointments recently.    We're currently trying to get her into [facility] for evaluation after a fall she had at home last Friday.   She lives alone, does not really have family in state except for a granddaughter, and she could likely benefit from other resources, as well as possible LTC planning.     Thanks,  Gilma Mcelroy RN  [facility]

## 2025-01-30 NOTE — TELEPHONE ENCOUNTER
"Patient calls back today requesting an x-ray for ongoing low back and shoulder pain.    See triage notes below from yesterday.  Patient states that the paramedics did go to her home, but \"didn't really do anything, just stayed and talked for a few hours and then left\".  Therefore she was not brought in to ER or evaluated yet since her fall last Friday.   Patient reports slightly worsening pain to low back/buttock and L shoulder, otherwise no changes in status since yesterday.    Patient states she can probably get a ride to be seen today.  We discussed [facility], and she is agreeable.  RN advised that I would call over to see if they can accept her as a patient, and then someone will call her back.  Advised her to work on securing transportation in the meantime.   RN called over to [facility] and gave report to Anh, who will huddle with provider and send Teams message on whether they can accept patient.    Referral to Acute and Diagnostic Services    [phone] ([address] [address]    Transition to [facility] Clinic has been discussed with patient, and she agrees with next level of care.   Patient understands that evaluation/treatment at [facility] typically takes significantly longer than in clinic/urgent care (>2 hours).  [facility] has been contacted by provider/staff to confirm patient acceptance.     Special issues:      Allergy to contrast dye  Claustrophobia requiring premedication      The following provider has assessed this patient for intervention at [facility], and directed the patient for referral: GILMA MODI, RN  Also provider Charissa Muñoz yesterday.        Gilma Modi RN  [facility]      "

## 2025-01-31 ENCOUNTER — APPOINTMENT (OUTPATIENT)
Dept: CT IMAGING | Facility: CLINIC | Age: OVER 89
End: 2025-01-31
Attending: EMERGENCY MEDICINE
Payer: COMMERCIAL

## 2025-01-31 ENCOUNTER — HOSPITAL ENCOUNTER (EMERGENCY)
Facility: CLINIC | Age: OVER 89
Discharge: HOME | End: 2025-01-31
Attending: EMERGENCY MEDICINE | Admitting: EMERGENCY MEDICINE
Payer: COMMERCIAL

## 2025-01-31 ENCOUNTER — PATIENT OUTREACH (OUTPATIENT)
Dept: CARE COORDINATION | Facility: CLINIC | Age: OVER 89
End: 2025-01-31

## 2025-01-31 VITALS
OXYGEN SATURATION: 97 % | WEIGHT: 150 LBS | BODY MASS INDEX: 25.61 KG/M2 | HEART RATE: 91 BPM | SYSTOLIC BLOOD PRESSURE: 156 MMHG | HEIGHT: 64 IN | RESPIRATION RATE: 18 BRPM | TEMPERATURE: 98.4 F | DIASTOLIC BLOOD PRESSURE: 77 MMHG
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
## 2025-01-31 DIAGNOSIS — S32.010A CLOSED COMPRESSION FRACTURE OF L1 LUMBAR VERTEBRA, INITIAL ENCOUNTER (H): ICD-10-CM

## 2025-01-31 DIAGNOSIS — K86.2 PANCREATIC CYST: ICD-10-CM
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
## 2025-01-31 DIAGNOSIS — N39.0 URINARY TRACT INFECTION WITHOUT HEMATURIA, SITE UNSPECIFIED: ICD-10-CM

## 2025-01-31 LAB
ALBUMIN UR-MCNC: NEGATIVE MG/DL
ANION GAP SERPL CALCULATED.3IONS-SCNC: 11 MMOL/L (ref 7–15)
APPEARANCE UR: CLEAR
BACTERIA #/AREA URNS HPF: ABNORMAL /HPF
BILIRUB UR QL STRIP: NEGATIVE
BUN SERPL-MCNC: 18.1 MG/DL (ref 8–23)
CALCIUM SERPL-MCNC: 9.6 MG/DL (ref 8.8–10.4)
CHLORIDE SERPL-SCNC: 106 MMOL/L (ref 98–107)
COLOR UR AUTO: ABNORMAL
CREAT SERPL-MCNC: 1.05 MG/DL (ref 0.51–0.95)
EGFRCR SERPLBLD CKD-EPI 2021: 50 ML/MIN/1.73M2
GLUCOSE SERPL-MCNC: 119 MG/DL (ref 70–99)
GLUCOSE UR STRIP-MCNC: NEGATIVE MG/DL
HCO3 SERPL-SCNC: 26 MMOL/L (ref 22–29)
HGB UR QL STRIP: NEGATIVE
HOLD SPECIMEN: NORMAL
HYALINE CASTS: 1 /LPF
KETONES UR STRIP-MCNC: NEGATIVE MG/DL
LEUKOCYTE ESTERASE UR QL STRIP: ABNORMAL
MUCOUS THREADS #/AREA URNS LPF: PRESENT /LPF
NITRATE UR QL: POSITIVE
PH UR STRIP: 6 [PH] (ref 5–7)
POTASSIUM SERPL-SCNC: 3.8 MMOL/L (ref 3.4–5.3)
RBC URINE: 2 /HPF
SODIUM SERPL-SCNC: 143 MMOL/L (ref 135–145)
SP GR UR STRIP: 1.01 (ref 1–1.03)
SQUAMOUS EPITHELIAL: 1 /HPF
UROBILINOGEN UR STRIP-MCNC: NORMAL MG/DL
WBC URINE: 44 /HPF

## 2025-01-31 PROCEDURE — 82435 ASSAY OF BLOOD CHLORIDE: CPT | Performed by: EMERGENCY MEDICINE

## 2025-01-31 PROCEDURE — 72131 CT LUMBAR SPINE W/O DYE: CPT

## 2025-01-31 PROCEDURE — 96366 THER/PROPH/DIAG IV INF ADDON: CPT | Performed by: EMERGENCY MEDICINE

## 2025-01-31 PROCEDURE — 36415 COLL VENOUS BLD VENIPUNCTURE: CPT | Performed by: EMERGENCY MEDICINE

## 2025-01-31 PROCEDURE — 82565 ASSAY OF CREATININE: CPT | Performed by: EMERGENCY MEDICINE

## 2025-01-31 PROCEDURE — 250N000013 HC RX MED GY IP 250 OP 250 PS 637: Performed by: EMERGENCY MEDICINE

## 2025-01-31 PROCEDURE — 250N000011 HC RX IP 250 OP 636: Performed by: EMERGENCY MEDICINE

## 2025-01-31 PROCEDURE — 81003 URINALYSIS AUTO W/O SCOPE: CPT | Performed by: EMERGENCY MEDICINE

## 2025-01-31 PROCEDURE — 74176 CT ABD & PELVIS W/O CONTRAST: CPT

## 2025-01-31 PROCEDURE — 99285 EMERGENCY DEPT VISIT HI MDM: CPT | Mod: 25 | Performed by: EMERGENCY MEDICINE

## 2025-01-31 PROCEDURE — 96365 THER/PROPH/DIAG IV INF INIT: CPT | Performed by: EMERGENCY MEDICINE

## 2025-01-31 PROCEDURE — 99284 EMERGENCY DEPT VISIT MOD MDM: CPT | Performed by: EMERGENCY MEDICINE

## 2025-01-31 PROCEDURE — 87086 URINE CULTURE/COLONY COUNT: CPT | Performed by: EMERGENCY MEDICINE

## 2025-01-31 PROCEDURE — 80048 BASIC METABOLIC PNL TOTAL CA: CPT | Performed by: EMERGENCY MEDICINE

## 2025-01-31 RX ORDER — LIDOCAINE 4 G/G
1 PATCH TOPICAL EVERY 24 HOURS
Qty: 8 PATCH | Refills: 0 | Status: SHIPPED | OUTPATIENT
Start: 2025-01-31 | End: 2025-02-08

## 2025-01-31 RX ORDER — LIDOCAINE 4 G/G
1 PATCH TOPICAL
Status: DISCONTINUED | OUTPATIENT
Start: 2025-01-31 | End: 2025-01-31 | Stop reason: HOSPADM

## 2025-01-31 RX ORDER — ACETAMINOPHEN 325 MG/1
650 TABLET ORAL ONCE
Status: COMPLETED | OUTPATIENT
Start: 2025-01-31 | End: 2025-01-31

## 2025-01-31 RX ORDER — CEFTRIAXONE 1 G/1
1 INJECTION, POWDER, FOR SOLUTION INTRAMUSCULAR; INTRAVENOUS ONCE
Status: COMPLETED | OUTPATIENT
Start: 2025-01-31 | End: 2025-01-31

## 2025-01-31 RX ORDER — CEFDINIR 300 MG/1
300 CAPSULE ORAL 2 TIMES DAILY
Qty: 14 CAPSULE | Refills: 0 | Status: SHIPPED | OUTPATIENT
Start: 2025-01-31

## 2025-01-31 RX ADMIN — CEFTRIAXONE SODIUM 1 G: 1 INJECTION, POWDER, FOR SOLUTION INTRAMUSCULAR; INTRAVENOUS at 13:38

## 2025-01-31 RX ADMIN — ACETAMINOPHEN 650 MG: 325 TABLET, FILM COATED ORAL at 10:56

## 2025-01-31 ASSESSMENT — ACTIVITIES OF DAILY LIVING (ADL)
ADLS_ACUITY_SCORE: 65

## 2025-01-31 ASSESSMENT — COLUMBIA-SUICIDE SEVERITY RATING SCALE - C-SSRS
2. HAVE YOU ACTUALLY HAD ANY THOUGHTS OF KILLING YOURSELF IN THE PAST MONTH?: NO
6. HAVE YOU EVER DONE ANYTHING, STARTED TO DO ANYTHING, OR PREPARED TO DO ANYTHING TO END YOUR LIFE?: NO
1. IN THE PAST MONTH, HAVE YOU WISHED YOU WERE DEAD OR WISHED YOU COULD GO TO SLEEP AND NOT WAKE UP?: NO

## 2025-01-31 NOTE — DISCHARGE INSTRUCTIONS
Symptoms worsen or new symptoms develop.  Follow-up with primary care physician next available.  Drink plenty of fluids.  Take Tylenol for pain.  Use lidocaine patches on back.  Put them on for 12 hours and then take them off for 12 hours try heat.  You are able to ambulate around the department and at this time felt he could go home.  If difficulty getting around any numbness or weakness in extremities or other symptoms please return for recheck you did have a urinary tract infection and if you have fevers decreased urine output any abdominal pain or other concerns please return for further evaluation and care you feel comfortable going home at this time

## 2025-01-31 NOTE — ED NOTES
IV out, feeling improved. EMS here for [facility]. Pt has discharge paperwork. Alert and oriented.

## 2025-01-31 NOTE — LETTER
[address] [facility] [address] [address]        Sivan Kevin  [address] [place] [address]      Sivan,    Please keep this list of transportation resources accessible for future use.    [employer]: [phone]  [employer]: [phone]  [employer] [facility]: [phone]  [employer]: [phone]  [employer]: [phone]        Sincerely,    Melonie Ortega, SEAM[facility]  [phone]  [facility]

## 2025-01-31 NOTE — ED PROVIDER NOTES
History     Chief Complaint   Patient presents with    Back Pain     Lower back pain both sides-pt fell one week ago     HPI  Daniela Brown is a 89 year old female with past medical history significant for syncope and collapse B12 deficiency status post knee replacement colitis diverticulitis generalized weakness chronic back pain who presents emerged part with right-sided back pain.  Patient states about a week ago she got some medication for a rash and felt like it caused her to feel out of it a bit she was up and around and fell and and states she landed on her side and back and has been having some pain in her back especially in the right side and flank since that time the flank pain is worsened and she does have pain in her lower back with movements.  She has chronic urinary incontinence denies any focal numbness weakness any extremity did not hit her head does not think she lost consciousness denies any chest pain or shortness of breath has not had any abdominal pain.    Allergies:  Allergies   Allergen Reactions    Metoprolol Itching and Rash    Cephalexin Rash    Erythromycin Rash    Hydrocodone     Shellfish Allergy     Acetaminophen Itching     Patient reported - only when used scheduled in high doses      Actonel [Bisphosphonates] Itching    Alendronate Rash    Azithromycin Hives    Celebrex [Ricci-2 Inhibitors (Sulfonamide)] Rash    Celecoxib Rash    Cipro [Ciprofloxacin] Rash    Contrast Dye Hives    Diatrizoate Hives    Erythromycin Rash    Escitalopram Diarrhea     Gets very sick and mad feelings    Fosamax Itching and Rash    Keflex [Cephalexin Monohydrate] Rash    Lisinopril Cough    Penicillins Rash    Risedronate Itching    Rofecoxib Rash    Seasonal Allergies Other (See Comments)     Seasonal rhinitis    Shellfish-Derived Products Hives    Sulfa Antibiotics Itching and Rash    Sulfasalazine Itching and Rash    Tetanus Toxoid, Adsorbed Swelling    Tetanus-Diphtheria Toxoids Td Swelling  "   Vicodin [Acetaminophen] Itching     Patient reported - only when used scheduled in high doses.       Vioxx Rash       Problem List:    Patient Active Problem List    Diagnosis Date Noted    No-show for appointment 01/13/2025     Priority: Medium    Encounter for Medicare annual wellness exam 10/24/2024     Priority: Medium    Chronic pain of left knee 10/24/2024     Priority: Medium    Intertrigo 10/24/2024     Priority: Medium    Complex care coordination 07/26/2024     Priority: Medium    Examination of eyes and vision 07/26/2024     Priority: Medium    Left foot pain 07/26/2024     Priority: Medium    Impaired mobility and activities of daily living 08/21/2023     Priority: Medium    Closed head injury, initial encounter 08/15/2023     Priority: Medium    Fall, initial encounter 08/15/2023     Priority: Medium    Non-traumatic rhabdomyolysis 08/15/2023     Priority: Medium    Polyneuropathy associated with underlying disease 05/17/2023     Priority: Medium    Moderate major depression (H) 12/31/2020     Priority: Medium    ARABELLA (generalized anxiety disorder) 12/31/2020     Priority: Medium     Has been on celexa (not effective but no side effects), amitriptyline, cymbalta (dizziness), lexapro (listed as allergy - 'gets very sick\"), zoloft  paxil helping somewhat 6/23      Abnormal CT of the abdomen 10/10/2019     Priority: Medium     CT 10/9/2019- cystic lesion along the anterior aspect of the pancreatic body/tail (series 2 image 27) measures 2 cm, and is new since the previous exam 2011. Pancreatic MRI with MRCP should be considered for further evaluation.   MRI 10/10/2019- There are 2 cystic lesions in the pancreatic body/tail, with the largest measuring 2 cm. No enhancing septations or solid masslike components are identified, although evaluation is limited related to patient motion artifact. These lesions have appearances suggestive of IPMN, but remain technically indeterminate. A follow-up MRI in one year " should be considered to confirm stability.      Cystocele, midline      Priority: Medium     grade III      Generalized weakness 10/09/2019     Priority: Medium    Diarrhea 10/09/2019     Priority: Medium    Chronic left-sided low back pain with left-sided sciatica 04/15/2019     Priority: Medium    Coronary artery disease involving native coronary artery of native heart with angina pectoris 02/19/2019     Priority: Medium     coronary angiogram 2/2019 showed mild coronary artery disease.  The most significant of these was the 40% lesion in the mid RCA.      Benign essential hypertension 11/14/2018     Priority: Medium    Type 2 diabetes mellitus with diabetic polyneuropathy, without long-term current use of insulin (H) 11/14/2018     Priority: Medium     Diet controlled.  Diagnosed 6/2016, has never been on medications.      History of recurrent urinary tract infection 11/14/2018     Priority: Medium     Recommend referral to Urology to discuss resuming daily suppressive therapy but she declines 8/22 and 6/23      CKD (chronic kidney disease) stage 3, GFR 30-59 ml/min (H) 11/14/2018     Priority: Medium    Peripheral neuropathy 09/10/2018     Priority: Medium    Bilateral wrist pain 04/11/2018     Priority: Medium    Protrusion of lumbar intervertebral disc 04/11/2018     Priority: Medium    Carpal tunnel syndrome of left wrist 10/21/2014     Priority: Medium    Diastolic dysfunction 03/31/2014     Priority: Medium    Pronation of foot 05/05/2011     Priority: Medium     Bilateral defomity      Allergic rhinitis 01/19/2011     Priority: Medium    Hyperlipidemia LDL goal <100 10/31/2010     Priority: Medium    Urge incontinence 10/20/2009     Priority: Medium    Right foot pain 10/16/2009     Priority: Medium     Xray showed djd -follows with podiatry. -arch supports placed may need cam walker       Vitamin D deficiency 09/09/2008     Priority: Medium    Subjective tinnitus 04/22/2008     Priority: Medium     Urticaria 08/22/2006     Priority: Medium    SENSONRL HEAR LOSS,BILAT 05/03/2006     Priority: Medium    Esophageal reflux      Priority: Medium    Memory loss      Priority: Medium     Early cognitive decline. MMSE 27/30, Neno 4.7/ 6.0 2004. B12, TSH, RPR normal [id number].      Degeneration of lumbar or lumbosacral intervertebral disc      Priority: Medium     MRI 5/04- DDD, L2-3 annular bulge with protrusion into left caudal infra-foraminal area  MRI 2017 with L4-5 loss disc height with spondylolisthesis.      B12 deficiency 10/10/2019     Priority: Low    Irritable bowel syndrome with diarrhea      Priority: Low     diahrrea predominant      Osteopenia of multiple sites      Priority: Low     DEXA 2007 -1.4 hip. Dexa 2004 -1 hip and -1 spine      RESTLESS LEG SYNDROME      Priority: Low    Primary osteoarthritis involving multiple joints      Priority: Low     C-spine, left hip      Diverticulosis of large intestine      Priority: Low     flexible sigmoidoscopy with diverticulosis otherwise normal 2001, admit diverticulitis 2009          Past Medical History:    Past Medical History:   Diagnosis Date    Abnormal cardiovascular stress test 2/3/2019    Adhesive capsulitis of shoulder     Adjustment disorder with anxiety 3/18/2016    B12 deficiency anemia 6/20/2012    Chest pain 2004    Colitis, Clostridium difficile 4/18/2012    Diverticulitis 2009    Headache(784.0) 2001    Impaired fasting glucose 2005    PERS HX ALLERGY OTHER FOODS 8/22/2006    PERS HX ALLERGY TO MILK PRODUCTS 8/22/2006    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    Status post coronary angiogram 2/27/2019    Syncope and collapse 9/10/2018       Past Surgical History:    Past Surgical History:   Procedure Laterality Date    ARTHROPLASTY KNEE  3/26/2012    Procedure:ARTHROPLASTY KNEE; Right Total Knee Arthroplasty; Surgeon:BERNADINE ROSAS; Location:[place] OR    CHOLECYSTECTOMY      CV HEART CATHETERIZATION WITH POSSIBLE INTERVENTION N/A 2/27/2019  "   Procedure: Coronary Angiogram with Left ventriculogram;  Surgeon: Leandro Carpio MD;  Location:  [facility]ATH [facility]    HERNIA REPAIR      Hernia Repair    HYSTERECTOMY, PAP NO LONGER INDICATED  1983    TVH/BSO    SURGICAL HISTORY OF -   10/08    A & P Repair, Dr. Hannah    [facility] APPENDECTOMY  1953       Family History:    Family History   Problem Relation Age of Onset    C.A.D. Mother     Diabetes Mother     Cancer - colorectal Mother     Arthritis Mother     Heart Disease Mother     Lipids Brother     Obesity Brother     Hypertension Brother     Allergies Son     Eye Disorder Son         cataract    Thyroid Disease Sister         graves dz    Eye Disorder Son     Leukemia Son     Alcohol/Drug Father        Social History:  Marital Status:   [5]  Social History     Tobacco Use    Smoking status: Never    Smokeless tobacco: Never   Vaping Use    Vaping status: Never Used   Substance Use Topics    Alcohol use: No    Drug use: No        Medications:    acetaminophen (TYLENOL) 500 MG tablet  amLODIPine (NORVASC) 5 MG tablet  blood glucose (NO BRAND SPECIFIED) test strip  blood glucose monitoring (NO BRAND SPECIFIED) meter device kit  buPROPion (WELLBUTRIN XL) 150 MG 24 hr tablet  hydrOXYzine gregorio (VISTARIL) 25 MG capsule  lidocaine (LIDODERM) 5 % patch  loperamide (IMODIUM A-D) 2 MG tablet  meloxicam (MOBIC) 7.5 MG tablet  Multiple Vitamins-Minerals (THERAPEUTIC MULTIVIT/MINERAL) TABS  nystatin (MYCOSTATIN) 477531 UNIT/GM external cream  olmesartan (BENICAR) 20 MG tablet  PARoxetine (PAXIL) 20 MG tablet  psyllium (METAMUCIL/KONSYL) 58.6 % powder  thin (NO BRAND SPECIFIED) lancets  triamcinolone (KENALOG) 0.1 % external cream          Review of Systems  As per HPI.  Physical Exam   BP: (!) 156/77  Pulse: 91  Temp: 98.4  F (36.9  C)  Resp: 18  Height: 162.6 cm (5' 4\")  Weight: 68 kg (150 lb)  SpO2: 97 %      Physical Exam  Vitals and nursing note reviewed.   Constitutional:       General: She is not " in acute distress.     Appearance: Normal appearance. She is not ill-appearing, toxic-appearing or diaphoretic.   HENT:      Head: Normocephalic and atraumatic.      Nose: Nose normal.      Mouth/Throat:      Mouth: Mucous membranes are moist.      Pharynx: Oropharynx is clear.   Eyes:      Conjunctiva/sclera: Conjunctivae normal.   Cardiovascular:      Rate and Rhythm: Normal rate and regular rhythm.      Pulses: Normal pulses.      Heart sounds: Normal heart sounds. No murmur heard.  Pulmonary:      Effort: Pulmonary effort is normal.      Breath sounds: Normal breath sounds. No stridor. No wheezing or rhonchi.   Abdominal:      General: Abdomen is flat. Bowel sounds are normal. There is no distension.      Palpations: Abdomen is soft. There is no mass.      Hernia: No hernia is present.      Comments: There is tenderness to palpation of the right flank region.  No anterior abdominal tenderness palpation somewhat reproduces pain on right no left flank tenderness.   Musculoskeletal:      Cervical back: Normal range of motion and neck supple.      Right lower leg: No edema.      Left lower leg: No edema.      Comments: Tenderness to palpation of the right lower back in the paraspinous muscle region mostly but some midline lower back tenderness no erythema edema rash or swelling is noted.  Patient able to raise leg off the bed is with pain on right in the lower back.  Able to raise leg on left without difficulty good plantarflexion dorsiflexion pulses sensation symmetrical at this time.   Skin:     General: Skin is warm and dry.      Findings: No rash.   Neurological:      General: No focal deficit present.      Mental Status: She is alert and oriented to person, place, and time.      Sensory: No sensory deficit.      Motor: No weakness.      Coordination: Coordination normal.   Psychiatric:         Mood and Affect: Mood normal.         ED Course        Procedures              Critical Care time:  none               Labs Ordered and Resulted from Time of ED Arrival to Time of ED Departure   BASIC METABOLIC PANEL - Abnormal       Result Value    Sodium 143      Potassium 3.8      Chloride 106      Carbon Dioxide (CO2) 26      Anion Gap 11      Urea Nitrogen 18.1      Creatinine 1.05 (*)     GFR Estimate 50 (*)     Calcium 9.6      Glucose 119 (*)    ROUTINE UA WITH MICROSCOPIC REFLEX TO CULTURE - Abnormal    Color Urine Light Yellow      Appearance Urine Clear      Glucose Urine Negative      Bilirubin Urine Negative      Ketones Urine Negative      Specific Gravity Urine 1.011      Blood Urine Negative      pH Urine 6.0      Protein Albumin Urine Negative      Urobilinogen Urine Normal      Nitrite Urine Positive (*)     Leukocyte Esterase Urine Moderate (*)     Bacteria Urine Many (*)     Mucus Urine Present (*)     RBC Urine 2      WBC Urine 44 (*)     Squamous Epithelials Urine 1      Hyaline Casts Urine 1        Results for orders placed or performed during the hospital encounter of 01/31/25   Abd/pelvis CT - no contrast - Stone Protocol    Narrative    EXAM: CT ABDOMEN PELVIS W/O CONTRAST  LOCATION: [facility]  DATE: 1/31/2025    INDICATION: R sided flank pain  COMPARISON: 10/9/2019  TECHNIQUE: CT scan of the abdomen and pelvis was performed without IV contrast. Multiplanar reformats were obtained. Dose reduction techniques were used.  CONTRAST: None.    FINDINGS:   LOWER CHEST: No acute lung base opacity. Coronary artery, aortic valvular, and dense mitral valve annular calcifications.    HEPATOBILIARY: Normal unenhanced liver. Cholecystectomy with normal caliber bile ducts.    PANCREAS: Partial atrophy with stable 21 x 19 mm cystic lesion in the pancreatic body (series 5, image 74). A 10 x 8 mm cystic lesion in the ventral pancreatic head (image 80) was not definitely present on the prior exam. No main duct dilation.    SPLEEN: Normal.    ADRENAL GLANDS: Normal.    KIDNEYS/BLADDER: No  significant mass, stone, or hydronephrosis. Normal unenhanced bladder.    BOWEL: No obstruction or inflammatory change. Appendectomy. Colonic diverticulosis.    LYMPH NODES: No lymphadenopathy.    VASCULATURE: Moderate to severe aortoiliac atherosclerosis without aneurysmal dilation. There is at least mild to moderate luminal narrowing of the abdominal aorta near the superior mesenteric artery secondary to calcified atherosclerosis, increased   since 2019.    PELVIC ORGANS: Hysterectomy without suspicious pelvic mass or ascites.    MUSCULOSKELETAL: Bony demineralization without aggressive osseous lesion. Age indeterminant, but acute to subacute appearing mild compression deformity of the superior T12 endplate. Of note, the T12 ribs will be considered transitional with 5   lumbar-type, nonrib-bearing vertebra. Grade 1 anterolisthesis of L4 on L5.        Impression    IMPRESSION:   1.  No distinct abdominopelvic abnormality to explain right flank pain.  2.  Age indeterminant, but acute to subacute appearing mild compression deformity of the superior T12 endplate.  3.  Two pancreatic cystic lesions. The larger of the two cysts is stable at 2.1 cm and the other in the pancreatic head was not definitely present on the prior exam. This lesion can be followed with CT or MRI in 6 months.        REFERENCE:  Revisions of international consensus [place] guidelines for the management of IPMN of the pancreas. Pancreatology 2017;17(5):738-753.    10-20 mm: CT or MRI/MRCP every 6 months for 1 year and then yearly for 2 years and then every 2 years if no change.        CT Lumbar Spine w/o Contrast    Narrative    EXAM: CT LUMBAR SPINE W/O CONTRAST  LOCATION: [facility]  DATE: 1/31/2025    INDICATION: Lower back pain, status post fall.  COMPARISON: CT abdomen 10/9/2019. Lumbar spine MRI 7/17/2018.  TECHNIQUE: Routine CT Lumbar Spine without IV contrast. Multiplanar reformats. Dose reduction techniques  were used.     FINDINGS:  Nomenclature: Five lumbar-type vertebral bodies.    Alignment: Redemonstrated grade I anterolisthesis at L4-L5. Slight L3-L4 and L2-L3 retrolisthesis. Mildly accentuated kyphosis at T11-T12, related to new T12 fracture described below.    Bones: Acute to subacute T12 superior endplate fracture with height loss of approximately 20%. The remaining vertebral bodies are unremarkable in height. The visualized portions of the sacrum and emmett appear intact. The bones are osteopenic. There are   degenerative changes of the sacroiliac joints. Moderate lower lumbar facet arthropathy.    Discs: Mild to moderate multilevel spondylosis, most conspicuous at L4-L5 and L2-L3, where there is broad-based disc bulging.    Spinal Canal: No findings specific for epidural hematoma. Moderate L4-L5 and mild-to-moderate L3-L4 spinal canal stenosis.    Neural Foramina: Moderate right L4-L5 neural foraminal stenosis.    Paraspinal Soft Tissues: Unremarkable.    Abdominal Cavity: Reported separately.      Impression    IMPRESSION:  1.  Acute to subacute L1 superior endplate fracture with mild height loss.  2.  No evidence of traumatic subluxation.  3.  Multilevel degenerative change.  4.  Diffuse osteopenia.     *Note: Due to a large number of results and/or encounters for the requested time period, some results have not been displayed. A complete set of results can be found in Results Review.        Medications   acetaminophen (TYLENOL) tablet 650 mg (650 mg Oral $Given 1/31/25 1056)   cefTRIAXone (ROCEPHIN) 1 g vial to attach to  mL bag for ADULTS or NS 50 mL bag for PEDS (0 g Intravenous Stopped 1/31/25 1516)       Assessments & Plan (with Medical Decision Making) records were reviewed including past medical history medications and allergies.  Family practice office visit for pruritus on 1/6/2025 was reviewed.  Office visit from 7/3/2024 for lower back pain was reviewed.  Labs were obtained.  I independently  reviewed and interpreted labs.  Patient was given Tylenol for pain.  Patient metabolic panel was without significant abnormality UA with positive for nitrates moderate leukocyte Estrace and 44 WBCs.  Due to patient's presentation and exam I felt it was warranted to do renal stone protocol to rule out kidney involvement kidney stone or other abnormality.  I also felt due to patient's lower back pain CT lumbar spine would be also warranted to rule out fracture or other abnormality.  Dependently reviewed these imaging studies and agree with radiologist findings of no distinct abdominal/pelvic abnormality to explain right flank pain age-indeterminate but acute/subacute appearing mild compression depression fracture of T12 to pancreatic cystic lesions follow-up CT 6 months is warranted.  Lumbar spine showed a subacute L1 superior endplate fracture with mild height loss no evidence of traumatic subluxation.  Findings discussed with daughter and patient need follow-up in 6 months and will need pain medication and heat to area should take Tylenol.  Was covered with a dose of ceftriaxone.  We will await culture results.  Patient feels comfortable going home at this time.     I have reviewed the nursing notes.    I have reviewed the findings, diagnosis, plan and need for follow up with the patient.           Discharge Medication List as of 1/31/2025  4:21 PM        START taking these medications    Details   cefdinir (OMNICEF) 300 MG capsule Take 1 capsule (300 mg) by mouth 2 times daily., Disp-14 capsule, R-0, E-Prescribe      Lidocaine (LIDOCARE) 4 % Patch Place 1 patch over 12 hours onto the skin every 24 hours for 8 days. To prevent lidocaine toxicity, patient should be patch free for 12 hrs daily.Disp-8 patch, R-0Local Print             Final diagnoses:   Fall at home, initial encounter - 1 week ago.   Closed compression fracture of L1 lumbar vertebra, initial encounter (H)   Urinary tract infection without hematuria,  site unspecified       1/31/2025   [facility] EMERGENCY DEPT       Abdulaziz Brewer MD  02/03/25 0753

## 2025-01-31 NOTE — PROGRESS NOTES
Clinic Care Coordination Contact  [facility]/Voicemail    Clinical Data: Care Coordinator Outreach    Outreach Documentation Number of Outreach Attempt   1/31/2025  11:20 AM 1       Unable to leave a message due to: phone kept ringing and did not go to .      Plan: Care Coordinator will try to reach patient again in 1-2 business days.    ABDOULAYE Luther  [phone]  [facility]

## 2025-01-31 NOTE — ED TRIAGE NOTES
Pt here via EMS for lower back pain that has progressively gotten worse since falling 1 wk ago. Pt reports some numbness in her feet which she thinks it new. Pt has urinary incontinence at baseline. Pt reports some diarrhea the past few days.      Triage Assessment (Adult)       Row Name 01/31/25 0950          Triage Assessment    Airway WDL WDL        Respiratory WDL    Respiratory WDL WDL        Cardiac WDL    Cardiac WDL WDL        Cognitive/Neuro/Behavioral WDL    Cognitive/Neuro/Behavioral WDL WDL

## 2025-02-01 LAB — BACTERIA UR CULT: NORMAL

## 2025-02-03 ENCOUNTER — NURSE TRIAGE (OUTPATIENT)
Dept: FAMILY MEDICINE | Facility: CLINIC | Age: OVER 89
End: 2025-02-03
Payer: COMMERCIAL

## 2025-02-03 NOTE — PROGRESS NOTES
Clinic Care Coordination Contact  Community Health Worker Initial Outreach            Patient accepts CC: No, patient recently graduated from  1/21/25. CHW asked if she still had resources for transportation that we worked on. Patient stated she misplaced them. CHW re-sent transportation resources in the mail. No other needs. Patient will be sent Care Coordination introduction letter for future reference.     ABDOULAYE Luther  [phone]  [facility]

## 2025-02-03 NOTE — TELEPHONE ENCOUNTER
General Call      Reason for Call:  back pain    What are your questions or concerns:  Pt calling - was recently discharged from hospital from having a fall and back pain. Pt stated her back pain is still really bad. Wondering if she can get some pain medicine/appt scheduled.    Okay to leave a detailed message?: Yes at Home number on file [phone] (home)    Joselyn Siddiqui Patient

## 2025-02-04 ENCOUNTER — APPOINTMENT (OUTPATIENT)
Dept: GENERAL RADIOLOGY | Facility: CLINIC | Age: OVER 89
End: 2025-02-04
Attending: EMERGENCY MEDICINE
Payer: COMMERCIAL

## 2025-02-04 ENCOUNTER — HOSPITAL ENCOUNTER (EMERGENCY)
Facility: CLINIC | Age: OVER 89
Discharge: HOME | End: 2025-02-04
Attending: EMERGENCY MEDICINE | Admitting: EMERGENCY MEDICINE
Payer: COMMERCIAL

## 2025-02-04 ENCOUNTER — APPOINTMENT (OUTPATIENT)
Dept: MRI IMAGING | Facility: CLINIC | Age: OVER 89
End: 2025-02-04
Attending: EMERGENCY MEDICINE
Payer: COMMERCIAL

## 2025-02-04 VITALS
HEIGHT: 64 IN | WEIGHT: 150 LBS | SYSTOLIC BLOOD PRESSURE: 150 MMHG | TEMPERATURE: 98.3 F | RESPIRATION RATE: 18 BRPM | OXYGEN SATURATION: 97 % | DIASTOLIC BLOOD PRESSURE: 67 MMHG | BODY MASS INDEX: 25.61 KG/M2 | HEART RATE: 77 BPM
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
## 2025-02-04 DIAGNOSIS — M25.551 RIGHT HIP PAIN: ICD-10-CM

## 2025-02-04 DIAGNOSIS — R93.5 ABNORMAL MRI, PELVIS: ICD-10-CM

## 2025-02-04 LAB
ALBUMIN SERPL BCG-MCNC: 3.9 G/DL (ref 3.5–5.2)
ALP SERPL-CCNC: 76 U/L (ref 40–150)
ALT SERPL W P-5'-P-CCNC: 24 U/L (ref 0–50)
ANION GAP SERPL CALCULATED.3IONS-SCNC: 11 MMOL/L (ref 7–15)
AST SERPL W P-5'-P-CCNC: 30 U/L (ref 0–45)
BASOPHILS # BLD AUTO: 0 10E3/UL (ref 0–0.2)
BASOPHILS NFR BLD AUTO: 0 %
BILIRUB SERPL-MCNC: 0.2 MG/DL
BUN SERPL-MCNC: 20.2 MG/DL (ref 8–23)
CALCIUM SERPL-MCNC: 9.1 MG/DL (ref 8.8–10.4)
CHLORIDE SERPL-SCNC: 109 MMOL/L (ref 98–107)
CREAT SERPL-MCNC: 1 MG/DL (ref 0.51–0.95)
CRP SERPL-MCNC: 3.04 MG/L
EGFRCR SERPLBLD CKD-EPI 2021: 53 ML/MIN/1.73M2
EOSINOPHIL # BLD AUTO: 0.4 10E3/UL (ref 0–0.7)
EOSINOPHIL NFR BLD AUTO: 4 %
ERYTHROCYTE [DISTWIDTH] IN BLOOD BY AUTOMATED COUNT: 12.7 % (ref 10–15)
GLUCOSE SERPL-MCNC: 115 MG/DL (ref 70–99)
HCO3 SERPL-SCNC: 23 MMOL/L (ref 22–29)
HCT VFR BLD AUTO: 33.4 % (ref 35–47)
HGB BLD-MCNC: 11.3 G/DL (ref 11.7–15.7)
IMM GRANULOCYTES # BLD: 0 10E3/UL
IMM GRANULOCYTES NFR BLD: 0 %
LYMPHOCYTES # BLD AUTO: 1.8 10E3/UL (ref 0.8–5.3)
LYMPHOCYTES NFR BLD AUTO: 19 %
MCH RBC QN AUTO: 29.3 PG (ref 26.5–33)
MCHC RBC AUTO-ENTMCNC: 33.8 G/DL (ref 31.5–36.5)
MCV RBC AUTO: 87 FL (ref 78–100)
MONOCYTES # BLD AUTO: 0.6 10E3/UL (ref 0–1.3)
MONOCYTES NFR BLD AUTO: 7 %
NEUTROPHILS # BLD AUTO: 6.4 10E3/UL (ref 1.6–8.3)
NEUTROPHILS NFR BLD AUTO: 70 %
NRBC # BLD AUTO: 0 10E3/UL
NRBC BLD AUTO-RTO: 0 /100
PLATELET # BLD AUTO: 247 10E3/UL (ref 150–450)
POTASSIUM SERPL-SCNC: 3.8 MMOL/L (ref 3.4–5.3)
PROT SERPL-MCNC: 6.5 G/DL (ref 6.4–8.3)
RBC # BLD AUTO: 3.86 10E6/UL (ref 3.8–5.2)
SODIUM SERPL-SCNC: 143 MMOL/L (ref 135–145)
WBC # BLD AUTO: 9.3 10E3/UL (ref 4–11)

## 2025-02-04 PROCEDURE — 99284 EMERGENCY DEPT VISIT MOD MDM: CPT | Performed by: EMERGENCY MEDICINE

## 2025-02-04 PROCEDURE — 72195 MRI PELVIS W/O DYE: CPT

## 2025-02-04 PROCEDURE — 85004 AUTOMATED DIFF WBC COUNT: CPT | Performed by: EMERGENCY MEDICINE

## 2025-02-04 PROCEDURE — 85018 HEMOGLOBIN: CPT | Performed by: EMERGENCY MEDICINE

## 2025-02-04 PROCEDURE — 36415 COLL VENOUS BLD VENIPUNCTURE: CPT | Performed by: EMERGENCY MEDICINE

## 2025-02-04 PROCEDURE — 84155 ASSAY OF PROTEIN SERUM: CPT | Performed by: EMERGENCY MEDICINE

## 2025-02-04 PROCEDURE — 73502 X-RAY EXAM HIP UNI 2-3 VIEWS: CPT

## 2025-02-04 PROCEDURE — 250N000013 HC RX MED GY IP 250 OP 250 PS 637: Performed by: EMERGENCY MEDICINE

## 2025-02-04 PROCEDURE — 86140 C-REACTIVE PROTEIN: CPT | Performed by: EMERGENCY MEDICINE

## 2025-02-04 PROCEDURE — 82040 ASSAY OF SERUM ALBUMIN: CPT | Performed by: EMERGENCY MEDICINE

## 2025-02-04 PROCEDURE — 99284 EMERGENCY DEPT VISIT MOD MDM: CPT | Mod: 25

## 2025-02-04 RX ORDER — LORAZEPAM 0.5 MG/1
0.5 TABLET ORAL
Status: COMPLETED | OUTPATIENT
Start: 2025-02-04 | End: 2025-02-04

## 2025-02-04 RX ORDER — CYCLOBENZAPRINE HCL 5 MG
5 TABLET ORAL
Status: COMPLETED | OUTPATIENT
Start: 2025-02-04 | End: 2025-02-04

## 2025-02-04 RX ADMIN — CYCLOBENZAPRINE 5 MG: 5 TABLET, FILM COATED ORAL at 21:22

## 2025-02-04 RX ADMIN — LORAZEPAM 0.5 MG: 0.5 TABLET ORAL at 19:52

## 2025-02-04 ASSESSMENT — ACTIVITIES OF DAILY LIVING (ADL)
ADLS_ACUITY_SCORE: 65

## 2025-02-04 ASSESSMENT — ENCOUNTER SYMPTOMS
EYES NEGATIVE: 1
PSYCHIATRIC NEGATIVE: 1
HEMATOLOGIC/LYMPHATIC NEGATIVE: 1
RESPIRATORY NEGATIVE: 1
GASTROINTESTINAL NEGATIVE: 1
NEUROLOGICAL NEGATIVE: 1
ALLERGIC/IMMUNOLOGIC NEGATIVE: 1
CARDIOVASCULAR NEGATIVE: 1
CONSTITUTIONAL NEGATIVE: 1

## 2025-02-04 ASSESSMENT — COLUMBIA-SUICIDE SEVERITY RATING SCALE - C-SSRS
1. IN THE PAST MONTH, HAVE YOU WISHED YOU WERE DEAD OR WISHED YOU COULD GO TO SLEEP AND NOT WAKE UP?: NO
2. HAVE YOU ACTUALLY HAD ANY THOUGHTS OF KILLING YOURSELF IN THE PAST MONTH?: NO
6. HAVE YOU EVER DONE ANYTHING, STARTED TO DO ANYTHING, OR PREPARED TO DO ANYTHING TO END YOUR LIFE?: NO

## 2025-02-04 NOTE — ED PROVIDER NOTES
History     Chief Complaint   Patient presents with    Back Pain     HPI  Daniela Brown is a 89 year old female who presents for evaluation with back pain.  Reviewed the medical record.  Reviewed visit 1/31/2025-   Patient was evaluated after reporting low back pain after a fall a week prior.  She has multiple prior diagnoses including history of syncope and collapse, history of knee replacements, chronic pain and a history of leg swelling deficiency.  During her assessment  5 days prior she was found to have acute to subacute L1 superior endplate fracture with mild height loss without traumatic subluxation.  He has multiple degenerative change with diffuse osteopenia.  Abdominal imaging with contrast was also obtained additional findings not related to her trauma were aligned in the radiology report     On examination patient reports since her visit 4 days ago she is continue to have right hip and sacroiliac joint pain.  She reports that she did fall at home about a week prior to her evaluation 4 days ago.  She reported that she was placed on antibiotics with concern for possible urinary tract infection based on abnormal urinalysis.  Records show she was prescribed cefdinir based on her urinalysis although her urine culture came back negative.  She reports that her pain  worsens with movement but improves with rest.  She has no new leg weakness or numbness.  Has not had any fever or chills.  She does live alone by EMS by her report.  With symptoms and patient's advanced age.  In light of her recent imaging with ongoing symptoms she presented to be reevaluated.      Allergies:  Allergies   Allergen Reactions    Metoprolol Itching and Rash    Cephalexin Rash    Erythromycin Rash    Hydrocodone     Shellfish Allergy     Acetaminophen Itching     Patient reported - only when used scheduled in high doses      Actonel [Bisphosphonates] Itching    Alendronate Rash    Azithromycin Hives    Celebrex [Ricci-2  "Inhibitors (Sulfonamide)] Rash    Celecoxib Rash    Cipro [Ciprofloxacin] Rash    Contrast Dye Hives    Diatrizoate Hives    Erythromycin Rash    Escitalopram Diarrhea     Gets very sick and mad feelings    Fosamax Itching and Rash    Keflex [Cephalexin Monohydrate] Rash    Lisinopril Cough    Penicillins Rash    Risedronate Itching    Rofecoxib Rash    Seasonal Allergies Other (See Comments)     Seasonal rhinitis    Shellfish-Derived Products Hives    Sulfa Antibiotics Itching and Rash    Sulfasalazine Itching and Rash    Tetanus Toxoid, Adsorbed Swelling    Tetanus-Diphtheria Toxoids Td Swelling    Vicodin [Acetaminophen] Itching     Patient reported - only when used scheduled in high doses.       Vioxx Rash       Problem List:    Patient Active Problem List    Diagnosis Date Noted    No-show for appointment 01/13/2025     Priority: Medium    Encounter for Medicare annual wellness exam 10/24/2024     Priority: Medium    Chronic pain of left knee 10/24/2024     Priority: Medium    Intertrigo 10/24/2024     Priority: Medium    Complex care coordination 07/26/2024     Priority: Medium    Examination of eyes and vision 07/26/2024     Priority: Medium    Left foot pain 07/26/2024     Priority: Medium    Impaired mobility and activities of daily living 08/21/2023     Priority: Medium    Closed head injury, initial encounter 08/15/2023     Priority: Medium    Fall, initial encounter 08/15/2023     Priority: Medium    Non-traumatic rhabdomyolysis 08/15/2023     Priority: Medium    Polyneuropathy associated with underlying disease 05/17/2023     Priority: Medium    Moderate major depression (H) 12/31/2020     Priority: Medium    ARABELLA (generalized anxiety disorder) 12/31/2020     Priority: Medium     Has been on celexa (not effective but no side effects), amitriptyline, cymbalta (dizziness), lexapro (listed as allergy - 'gets very sick\"), zoloft  paxil helping somewhat 6/23      Abnormal CT of the abdomen 10/10/2019     " Priority: Medium     CT 10/9/2019- cystic lesion along the anterior aspect of the pancreatic body/tail (series 2 image 27) measures 2 cm, and is new since the previous exam 2011. Pancreatic MRI with MRCP should be considered for further evaluation.   MRI 10/10/2019- There are 2 cystic lesions in the pancreatic body/tail, with the largest measuring 2 cm. No enhancing septations or solid masslike components are identified, although evaluation is limited related to patient motion artifact. These lesions have appearances suggestive of IPMN, but remain technically indeterminate. A follow-up MRI in one year should be considered to confirm stability.      Cystocele, midline      Priority: Medium     grade III      Generalized weakness 10/09/2019     Priority: Medium    Diarrhea 10/09/2019     Priority: Medium    Chronic left-sided low back pain with left-sided sciatica 04/15/2019     Priority: Medium    Coronary artery disease involving native coronary artery of native heart with angina pectoris 02/19/2019     Priority: Medium     coronary angiogram 2/2019 showed mild coronary artery disease.  The most significant of these was the 40% lesion in the mid RCA.      Benign essential hypertension 11/14/2018     Priority: Medium    Type 2 diabetes mellitus with diabetic polyneuropathy, without long-term current use of insulin (H) 11/14/2018     Priority: Medium     Diet controlled.  Diagnosed 6/2016, has never been on medications.      History of recurrent urinary tract infection 11/14/2018     Priority: Medium     Recommend referral to Urology to discuss resuming daily suppressive therapy but she declines 8/22 and 6/23      CKD (chronic kidney disease) stage 3, GFR 30-59 ml/min (H) 11/14/2018     Priority: Medium    Peripheral neuropathy 09/10/2018     Priority: Medium    Bilateral wrist pain 04/11/2018     Priority: Medium    Protrusion of lumbar intervertebral disc 04/11/2018     Priority: Medium    Carpal tunnel syndrome of  left wrist 10/21/2014     Priority: Medium    Diastolic dysfunction 03/31/2014     Priority: Medium    Pronation of foot 05/05/2011     Priority: Medium     Bilateral defomity      Allergic rhinitis 01/19/2011     Priority: Medium    Hyperlipidemia LDL goal <100 10/31/2010     Priority: Medium    Urge incontinence 10/20/2009     Priority: Medium    Right foot pain 10/16/2009     Priority: Medium     Xray showed djd -follows with podiatry. -arch supports placed may need cam walker       Vitamin D deficiency 09/09/2008     Priority: Medium    Subjective tinnitus 04/22/2008     Priority: Medium    Urticaria 08/22/2006     Priority: Medium    SENSONRL HEAR LOSS,BILAT 05/03/2006     Priority: Medium    Esophageal reflux      Priority: Medium    Memory loss      Priority: Medium     Early cognitive decline. MMSE 27/30, Neno 4.7/ 6.0 2004. B12, TSH, RPR normal [id number].      Degeneration of lumbar or lumbosacral intervertebral disc      Priority: Medium     MRI 5/04- DDD, L2-3 annular bulge with protrusion into left caudal infra-foraminal area  MRI 2017 with L4-5 loss disc height with spondylolisthesis.      B12 deficiency 10/10/2019     Priority: Low    Irritable bowel syndrome with diarrhea      Priority: Low     diahrrea predominant      Osteopenia of multiple sites      Priority: Low     DEXA 2007 -1.4 hip. Dexa 2004 -1 hip and -1 spine      RESTLESS LEG SYNDROME      Priority: Low    Primary osteoarthritis involving multiple joints      Priority: Low     C-spine, left hip      Diverticulosis of large intestine      Priority: Low     flexible sigmoidoscopy with diverticulosis otherwise normal 2001, admit diverticulitis 2009          Past Medical History:    Past Medical History:   Diagnosis Date    Abnormal cardiovascular stress test 2/3/2019    Adhesive capsulitis of shoulder     Adjustment disorder with anxiety 3/18/2016    B12 deficiency anemia 6/20/2012    Chest pain 2004    Colitis, Clostridium difficile  4/18/2012    Diverticulitis 2009    Headache(784.0) 2001    Impaired fasting glucose 2005    PERS HX ALLERGY OTHER FOODS 8/22/2006    PERS HX ALLERGY TO MILK PRODUCTS 8/22/2006    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    Status post coronary angiogram 2/27/2019    Syncope and collapse 9/10/2018       Past Surgical History:    Past Surgical History:   Procedure Laterality Date    ARTHROPLASTY KNEE  3/26/2012    Procedure:ARTHROPLASTY KNEE; Right Total Knee Arthroplasty; Surgeon:BERNADINE ROSAS; Location:[facility]    CHOLECYSTECTOMY      CV HEART CATHETERIZATION WITH POSSIBLE INTERVENTION N/A 2/27/2019    Procedure: Coronary Angiogram with Left ventriculogram;  Surgeon: Leandro Carpio MD;  Location:  [facility]    HERNIA REPAIR      Hernia Repair    HYSTERECTOMY, PAP NO LONGER INDICATED  1983    TVH/BSO    SURGICAL HISTORY OF -   10/08    A & P Repair, Dr. Hannah    ZZC APPENDECTOMY  1953       Family History:    Family History   Problem Relation Age of Onset    C.A.D. Mother     Diabetes Mother     Cancer - colorectal Mother     Arthritis Mother     Heart Disease Mother     Lipids Brother     Obesity Brother     Hypertension Brother     Allergies Son     Eye Disorder Son         cataract    Thyroid Disease Sister         graves dz    Eye Disorder Son     Leukemia Son     Alcohol/Drug Father        Social History:  Marital Status:   [5]  Social History     Tobacco Use    Smoking status: Never    Smokeless tobacco: Never   Vaping Use    Vaping status: Never Used   Substance Use Topics    Alcohol use: No    Drug use: No        Medications:    acetaminophen (TYLENOL) 500 MG tablet  amLODIPine (NORVASC) 5 MG tablet  blood glucose (NO BRAND SPECIFIED) test strip  blood glucose monitoring (NO BRAND SPECIFIED) meter device kit  buPROPion (WELLBUTRIN XL) 150 MG 24 hr tablet  cefdinir (OMNICEF) 300 MG capsule  hydrOXYzine gregorio (VISTARIL) 25 MG capsule  Lidocaine (LIDOCARE) 4 % Patch  lidocaine  "(LIDODERM) 5 % patch  loperamide (IMODIUM A-D) 2 MG tablet  meloxicam (MOBIC) 7.5 MG tablet  Multiple Vitamins-Minerals (THERAPEUTIC MULTIVIT/MINERAL) TABS  nystatin (MYCOSTATIN) 626009 UNIT/GM external cream  olmesartan (BENICAR) 20 MG tablet  PARoxetine (PAXIL) 20 MG tablet  psyllium (METAMUCIL/KONSYL) 58.6 % powder  thin (NO BRAND SPECIFIED) lancets  triamcinolone (KENALOG) 0.1 % external cream          Review of Systems   Constitutional: Negative.    HENT: Negative.     Eyes: Negative.    Respiratory: Negative.     Cardiovascular: Negative.    Gastrointestinal: Negative.    Musculoskeletal:         Right hip and sacroiliac pain   Skin: Negative.    Allergic/Immunologic: Negative.    Neurological: Negative.    Hematological: Negative.    Psychiatric/Behavioral: Negative.     All other systems reviewed and are negative.      Physical Exam   BP: (!) 141/73  Pulse: 85  Temp: 98.3  F (36.8  C)  Resp: 18  Height: 162.6 cm (5' 4\")  Weight: 68 kg (150 lb)  SpO2: 97 %      Physical Exam  Constitutional:       General: She is not in acute distress.     Appearance: Normal appearance. She is not ill-appearing, toxic-appearing or diaphoretic.   HENT:      Head: Normocephalic and atraumatic.      Nose: Nose normal.      Mouth/Throat:      Mouth: Mucous membranes are moist.   Eyes:      Extraocular Movements: Extraocular movements intact.      Pupils: Pupils are equal, round, and reactive to light.   Neck:      Vascular: No carotid bruit.   Cardiovascular:      Rate and Rhythm: Normal rate and regular rhythm.   Pulmonary:      Effort: Pulmonary effort is normal. No respiratory distress.      Breath sounds: Normal breath sounds. No stridor. No wheezing, rhonchi or rales.   Chest:      Chest wall: No tenderness.   Musculoskeletal:         General: Tenderness present. No swelling, deformity or signs of injury.      Cervical back: Normal range of motion and neck supple. No rigidity or tenderness.        Back:       Right lower " leg: No edema.      Left lower leg: No edema.   Lymphadenopathy:      Cervical: No cervical adenopathy.   Skin:     Capillary Refill: Capillary refill takes less than 2 seconds.      Coloration: Skin is not jaundiced or pale.      Findings: No bruising, erythema, lesion or rash.   Neurological:      General: No focal deficit present.      Mental Status: She is alert and oriented to person, place, and time.      Cranial Nerves: No cranial nerve deficit.      Sensory: No sensory deficit.      Motor: No weakness.      Coordination: Coordination normal.      Gait: Gait normal.      Deep Tendon Reflexes: Reflexes normal.   Psychiatric:         Mood and Affect: Mood normal.         Behavior: Behavior normal.         Thought Content: Thought content normal.         Judgment: Judgment normal.         ED Course        Procedures              Critical Care time:  none       ED medications:  Medications   cyclobenzaprine (FLEXERIL) tablet 5 mg (has no administration in time range)   LORazepam (ATIVAN) tablet 0.5 mg (0.5 mg Oral $Given 2/4/25 1952)     ED Vitals:  Vitals:    02/04/25 1630 02/04/25 1730 02/04/25 1800 02/04/25 1830   BP: 132/66 (!) 160/100 (!) 154/71 (!) 162/72   Pulse: 84 83 82 90   Resp:       Temp:       TempSrc:       SpO2: 98% 97% 99% 97%   Weight:       Height:         Vitals:    02/04/25 1830 02/04/25 1930 02/04/25 2000 02/04/25 2100   BP: (!) 162/72 (!) 159/65 (!) 149/65 (!) 150/67   Pulse: 90 85 85 77   Resp:       Temp:       TempSrc:       SpO2: 97% 96% 97% 97%   Weight:       Height:            ED labs and imaging:  Results for orders placed or performed during the hospital encounter of 02/04/25   Pelvis XR w/ unilateral hip right     Status: None    Narrative    EXAM: XR PELVIS AND HIP RIGHT 1 VIEW  LOCATION: [facility]  DATE: 2/4/2025    INDICATION: Right hip and SI joint pain. Fall a week prior. Advanced age. Abnormal CT lumbar spine and CTA P on 1/31/2025  T12 and L1  subacute Fx. Evaluate for acute bony process.  COMPARISON: 01/31/2025 CT.      Impression    IMPRESSION: Bones are markedly demineralized which limits fracture detection. Moderate right hip arthrosis. No acute displaced fracture identified. No dislocation.    If there is high clinical concern for an occult fracture, recommend MRI.   MR Pelvis Bone wo Contrast     Status: None    Narrative    EXAM: MR PELVIC BONES W/O CONTRAST  LOCATION: [facility]  DATE: 2/4/2025    INDICATION: Right SI and hip pain after fall a week prior. Abnormal CT lumbar spine and abdomen pelvis on 1 31 25.  Persistent low back and hip pain.  Advanced age. MR advised with bony demineralization on x ray. Evaluate for acute bony process  COMPARISON: Same day radiographs and prior CT abdomen pelvis and lumbar spine dated 1/31/2025.   TECHNIQUE: Unenhanced.    FINDINGS:     No acute fracture or confluent marrow replacing process. Moderate degenerative arthrosis of both hips with small bilateral hip joint effusions, likely degenerative in etiology. Mild degenerative arthrosis of the pubic symphysis and both SI joints. No SI   joint effusion. Mild subchondral edema-like marrow signal of the sacral and ilial sides of both SI joints, slightly more pronounced on the right. Lower lumbar spondylosis, better characterized on the dedicated CT lumbar spine of 1/31/2025.    Bilateral gluteal and proximal hamstring tendinopathy. No tendon tear. Mildly distended and fluid-filled right trochanteric bursa.    Mild diffuse fatty atrophy of the pelvic girdle and proximal thigh musculature. Mild patchy T2 hyperintensity within the bilateral adductor muscles (series 103 image 9 and series 105 image 32).    No soft tissue mass or fluid collection. Mild subcutaneous soft tissue edema involving the right gluteal subcutaneous soft tissues (series 105 image 23).    Hysterectomy. Visualized intrapelvic contents are otherwise negative.        Impression    IMPRESSION:  1.  No acute fracture.  2.  Degenerative arthrosis of both hips, both SI joints, the lumbar spine, and pubic symphysis.  3.  Bilateral gluteal and proximal hamstring tendinopathy.  4.  Mild right trochanteric bursopathy.   5.  Mild strain of the bilateral adductor muscles.  6.  Contusion of the right gluteal soft tissues.   Comprehensive metabolic panel     Status: Abnormal   Result Value Ref Range    Sodium 143 135 - 145 mmol/L    Potassium 3.8 3.4 - 5.3 mmol/L    Carbon Dioxide (CO2) 23 22 - 29 mmol/L    Anion Gap 11 7 - 15 mmol/L    Urea Nitrogen 20.2 8.0 - 23.0 mg/dL    Creatinine 1.00 (H) 0.51 - 0.95 mg/dL    GFR Estimate 53 (L) >60 mL/min/1.73m2    Calcium 9.1 8.8 - 10.4 mg/dL    Chloride 109 (H) 98 - 107 mmol/L    Glucose 115 (H) 70 - 99 mg/dL    Alkaline Phosphatase 76 40 - 150 U/L    AST 30 0 - 45 U/L    ALT 24 0 - 50 U/L    Protein Total 6.5 6.4 - 8.3 g/dL    Albumin 3.9 3.5 - 5.2 g/dL    Bilirubin Total 0.2 <=1.2 mg/dL   CRP inflammation     Status: Normal   Result Value Ref Range    CRP Inflammation 3.04 <5.00 mg/L   CBC with platelets and differential     Status: Abnormal   Result Value Ref Range    WBC Count 9.3 4.0 - 11.0 10e3/uL    RBC Count 3.86 3.80 - 5.20 10e6/uL    Hemoglobin 11.3 (L) 11.7 - 15.7 g/dL    Hematocrit 33.4 (L) 35.0 - 47.0 %    MCV 87 78 - 100 fL    MCH 29.3 26.5 - 33.0 pg    MCHC 33.8 31.5 - 36.5 g/dL    RDW 12.7 10.0 - 15.0 %    Platelet Count 247 150 - 450 10e3/uL    % Neutrophils 70 %    % Lymphocytes 19 %    % Monocytes 7 %    % Eosinophils 4 %    % Basophils 0 %    % Immature Granulocytes 0 %    NRBCs per 100 WBC 0 <1 /100    Absolute Neutrophils 6.4 1.6 - 8.3 10e3/uL    Absolute Lymphocytes 1.8 0.8 - 5.3 10e3/uL    Absolute Monocytes 0.6 0.0 - 1.3 10e3/uL    Absolute Eosinophils 0.4 0.0 - 0.7 10e3/uL    Absolute Basophils 0.0 0.0 - 0.2 10e3/uL    Absolute Immature Granulocytes 0.0 <=0.4 10e3/uL    Absolute NRBCs 0.0 10e3/uL   CBC with platelets  differential     Status: Abnormal    Narrative    The following orders were created for panel order CBC with platelets differential.  Procedure                               Abnormality         Status                     ---------                               -----------         ------                     CBC with platelets and d...[[id number]]  Abnormal            Final result                 Please view results for these tests on the individual orders.       Assessments & Plan (with Medical Decision Making)   Assessment Summary and clinical impression: 89-year-old female who presented with back pain.  Patient was evaluated 4 days prior after report of a fall a week prior to her assessment on 1/31/25-reporting low back pain after a trip and fall.  Imaging was obtained during that assessment revealing concern for acute to subacute L1 superior endplate fracture with mild height loss.  There was no traumatic subluxation and multi lumbar degenerative changes noted with diffuse osteopenia. On abdominal imaging there was concern for an age-indeterminate but acute to subacute appearing compression deformity of the superior T12 endplate. On arrival patient was afebrile blood pressure was 141/73.  She was 97% on room air.  On exam pain was mostly around the sacroiliac joint there is no warmth or redness and no CVA tenderness.  She reported no hip pain.,  With CT abdomen pelvis showing no distinct abdominal pelvic abnormality explain patient's flank pain is noted 4 days ago.  And probable subacute L1 and T12 fractures noted with grade 1 anterolisthesis of L4 and L5 and her advanced age and agreed to repeat imaging to ensure there has been no new change to explain patient's pain and discomfort.  No acute fractures were appreciated on x-ray and MRI of the pelvis.  Right trochanteric bursopathy and contusion of the right gluteal soft tissue was appreciated MRI of the pelvis.  She was discharged plan to trial a pain management  plan without opioids.      ED course and plan:  Reviewed the medical record.  Reviewed visit on 1/13/25, reviewed imaging completed on 1/31/25-CT lumbar spine CT abdomen pelvis.  See details outlined in the radiology report.  Given location for pain with recent CT imaging elected to proceed with x-ray imaging to ensure there is been no new change, doubt contribute to patient's pain or discomfort.  With a negative urine culture low suspicion that symptoms are due to pyelonephritis.  Given advanced age and symptoms that did not seem to correlate with her recent imaging 4 days ago blood work was obtained. Blood work today was reassuring and unrevealing including normal CRP.  Glucose 115,  normal liver enzymes. Hgb- 11.3. XR pelvis and right hip demineralized bones which limits fracture detection.  Moderate right hip arthrosis was noted but no displaced fracture.  MRI was recommended if there is concern for occult fracture. See details in the medical record.  With normal urine culture the last 4 days normal CRP repeat urinalysis is not obtained.  Reexamined the patient and she can continue to complain of pain and discomfort.  After our discussion patient agreed to proceed with MRI of the pelvis. she was offered oral lorazepam to help with claustrophobia.    MRI of the pelvis revealed no acute fracture.  Radiology did note that there was a mild right trochanteric bursa apathy and strain of the bilateral abductor muscles with contusion of the right gluteal soft tissue.  There is degenerative arthrosis of both hips both SI joints and lumbar spine and pubic symphysis.  Given absence of fracture we discussed that patient could return home. Patient requested pain medication during her visit.  Expressed concern about pain medications given her age including risk for fall.  She was encouraged to follow-up with her clinic provider  for medication management.  I did encourage her to consider topical agents and nonopioid  medications. Patient expressed comfort being transferred by [facility].  Low threshold to return for reexamination if there are new concerns      Disclaimer: This note consists of symbols derived from keyboarding, dictation and/or voice recognition software. As a result, there may be errors in the script that have gone undetected. Please consider this when interpreting information found in this chart.   I have reviewed the nursing notes.    I have reviewed the findings, diagnosis, plan and need for follow up with the patient.           Medical Decision Making  The patient's presentation was of high complexity (advanced age, low back pain, recent blunt trauma, abnormal CT lumbar spine imaging on 1/31/25).    The patient's evaluation involved:  ordering and/or review of 2 test(s) in this encounter (blood work, x-ray and MRI imaging)    The patient's management necessitated high risk ( pain management).        New Prescriptions    No medications on file       Final diagnoses:   Right hip pain   Fall, initial encounter - A week prior to evaluation on 1/31/25-with abnormal findings on CT- L1 and T12 on 1/31/25   Abnormal MRI, pelvis - IMPRESSION: 1.  No acute fracture. 2.  Degenerative arthrosis of both hips, both SI joints, the lumbar spine, and pubic symphysis. 3.  Bilateral gluteal and proximal hamstring tendinopathy. 4.  Mild right trochanteric bursopathy.  5.  Mild strain of the bilateral adductor muscles. 6.  Contusion of the right gluteal soft tissues       2/4/2025   [facility] EMERGENCY DEPT       Epifanio Vo MD  02/04/25 [id number]

## 2025-02-04 NOTE — ED TRIAGE NOTES
Fall 1 week ago, seen here and dx with L1 fracture. Has been taking tylenol and ibuprofen and not getting any relief. No new falls or trauma.

## 2025-02-04 NOTE — TELEPHONE ENCOUNTER
Hello,    After reviewing symptoms. Agreed, patient should be seen today. She can check in at the  in the Er and they can triage her to see if she needs to go to Urgent care or ED. Agreed ED would be best place based on current symptoms as she may need fluids.    Thank you    With Kind Regards,    CYN Delaney CNP

## 2025-02-04 NOTE — TELEPHONE ENCOUNTER
Patient was called. Provider's recommendations were reviewed with her. She agrees with plan. She is unsure about a ride. She plans to call her grand-daughter. Reports that no one else would be able to drive her and that family is out of town. She is aware that if unable to find a ride she should call 911 for a ride to the hospital. She was also given the phone number to clinic if needing further help.    Cherri Thomas RN

## 2025-02-04 NOTE — TELEPHONE ENCOUNTER
Reason for Call:  Appointment Request    Patient requesting this type of appt:  Back pain     Requested provider: Renay Hannah or any provider     Reason patient unable to be scheduled: Not within requested timeframe    When does patient want to be seen/preferred time:  As soon as possible     Comments: N/A    Okay to leave a detailed message?: Yes at Home number on file [phone] (home)    Call taken on 2/4/2025 at 11:28 AM by Cheryl Velazquez

## 2025-02-04 NOTE — TELEPHONE ENCOUNTER
Pt called back and stated that she was unable to find a ride on her own. Pt states she will call 911 to get to the ED    Teri Soriano on 2/4/2025 at 3:25 PM

## 2025-02-04 NOTE — TELEPHONE ENCOUNTER
Patient was seen in the Emergency Department on 1/31/25 for a fall a week prior. ER diagnosed with closed compression fracture, UTI and pancreatic cyst.  Current pain medication. Tylenol 1000 mg 3 times a day. She has tried ice packs and heat and nothing is helpful. Patient does not have lidocaine patches although recommended by ER. Looks like order may not have gone though.  Complains of severe 9/10 lower back pain sitting and moving.  On antibiotic for UTI and had diarrhea. 3 episodes yesterday and 2 today. Last urinated this morning. No fevers. No abdominal pain.     Preferred pharmacy is [facility].    Triage indicates to be seen in clinic today. She is unsure when a ride can be available. Discussed she could be seen in the Urgent Care when ride is available. Writer is questioning if needs to be seen in the ER for pain a 9/10.    Will route to Renay Hannah PCP for recommendation.    Thank you,  Cherri Thomas RN    Reason for Disposition   SEVERE back pain (e.g., excruciating, unable to do any normal activities) and not improved after pain medicine and CARE ADVICE    Additional Information   Negative: Passed out (e.g., fainted, lost consciousness, blacked out and was not responding)   Negative: Shock suspected (e.g., cold/pale/clammy skin, too weak to stand, low BP, rapid pulse)   Negative: Sounds like a life-threatening emergency to the triager   Negative: Major injury to the back (e.g., MVA, fall > 10 feet or 3 meters, penetrating injury, etc.)   Negative: Pain in the upper back over the ribs (rib cage) that radiates (travels) into the chest   Negative: Pain in the upper back over the ribs (rib cage) and worsened by coughing (or clearly increases with breathing)   Negative: Back pain during pregnancy   Negative: SEVERE back pain of sudden onset and age > 60 years   Negative: SEVERE abdominal pain (e.g., excruciating)   Negative: Abdominal pain and age > 60 years   Negative: Unable to urinate (or only a  "few drops) and bladder feels very full   Negative: Loss of bladder or bowel control (urine or bowel incontinence; wetting self, leaking stool) of new-onset   Negative: Numbness (loss of sensation) in groin or rectal area   Negative: Pain radiates into groin, scrotum   Negative: Blood in urine (red, pink, or tea-colored)   Negative: Vomiting and pain over lower ribs of back (i.e., flank - kidney area)   Negative: Weakness of a leg or foot (e.g., unable to bear weight, dragging foot)   Negative: Patient sounds very sick or weak to the triager   Negative: Fever > 100.4 F (38.0 C) and flank pain   Negative: Pain or burning with passing urine (urination)    Answer Assessment - Initial Assessment Questions  1. ONSET: \"When did the pain begin?\" (e.g., minutes, hours, days)      2 weeks ago  2. LOCATION: \"Where does it hurt?\" (upper, mid or lower back)      Lower back  3. SEVERITY: \"How bad is the pain?\"  (e.g., Scale 1-10; mild, moderate, or severe)      9/10  4. PATTERN: \"Is the pain constant?\" (e.g., yes, no; constant, intermittent)       constant  5. RADIATION: \"Does the pain shoot into your legs or somewhere else?\"      Pain goes down both legs  6. CAUSE:  \"What do you think is causing the back pain?\"       Had a fall about 2 weeks ago and was seen in the ER diagnosed with compression fracture  7. BACK OVERUSE:  \"Any recent lifting of heavy objects, strenuous work or exercise?\"      Fall 2 weeks ago. Now using walker  8. MEDICINES: \"What have you taken so far for the pain?\" (e.g., nothing, acetaminophen, NSAIDS)      Tylenol  does not help. Takes 1000 mg 3 times a day.  9. NEUROLOGIC SYMPTOMS: \"Do you have any weakness, numbness, or problems with bowel/bladder control?\"      Legs feel weak and worse since she had fall. No numbness. Patient has had diarrhea and is on antibioitic  10. OTHER SYMPTOMS: \"Do you have any other symptoms?\" (e.g., fever, abdomen pain, burning with urination, blood in urine)        No fever, " "abdominal pain, no pain with urination, no blood in urine  11. PREGNANCY: \"Is there any chance you are pregnant?\" \"When was your last menstrual period?\"        N/a    Protocols used: Back Pain-A-[place]    "

## 2025-02-05 ENCOUNTER — TELEPHONE (OUTPATIENT)
Dept: FAMILY MEDICINE | Facility: CLINIC | Age: OVER 89
End: 2025-02-05

## 2025-02-05 NOTE — TELEPHONE ENCOUNTER
"URSULA    The [facility]  called regarding this patient. The  was concerned about if she needed to file an vulnerable adult for her. The  has tried multiple times to get out to her and assess. The patient has been seen 2 times in the ER in the last week. She is slightly confused.  She continues to have pain.   got her signed up for grocery delivery.  The patient lives alone, she has friend and sister that help out some. Her son lives near by but she does not talk to him.  She has drivers license and drives to [employer] grocery store if needed.  The  came to the home and she scored \"0\" on the cognitive test.    Recommended to the  that patient schedule an appointment to be seen for follow up appointment.  She will encourage patient to do so.      Thank you    Juju METZ RN        "

## 2025-02-05 NOTE — DISCHARGE INSTRUCTIONS
1) Your evaluation today did not suggest the missed injury as a result of your fall after your evaluation 4 days ago.  Imaging today did not suggest any new infection and your urine culture from your evaluation 4 days ago was negative I do not think you need any more antibiotics.    2) MRI imaging did not reveal any new fractures. There was some soft tissue contusion around the right buttock with bursopathy and bilateral adductor muscle strain.      3) For home for pain we discussed my concern about discharging home with pain medication given the risk of causing confusion and actually causing a fall and serious injury.  You should consider over-the-counter remedies such as Voltaren cream, or capsaicin cream, heat therapy, and Tylenol.  If you continue to have pain concerns you should follow-up with your clinic provider to discuss and review medication management needs

## 2025-02-06 ENCOUNTER — TELEPHONE (OUTPATIENT)
Dept: FAMILY MEDICINE | Facility: CLINIC | Age: OVER 89
End: 2025-02-06
Payer: COMMERCIAL

## 2025-02-06 NOTE — TELEPHONE ENCOUNTER
Patient calling for results for MRI and Xray  States she is having a lot of pain from recent falls, low back, right hip  Scheduled for appointment 2/7  She will call for a     Sharmila Monteiro RN on 2/6/2025 at 1:36 PM

## 2025-02-07 ENCOUNTER — HOSPITAL ENCOUNTER (OUTPATIENT)
Facility: CLINIC | Age: OVER 89
Setting detail: OBSERVATION
Discharge: HOME HEALTH | End: 2025-02-10
Attending: EMERGENCY MEDICINE | Admitting: STUDENT IN AN ORGANIZED HEALTH CARE EDUCATION/TRAINING PROGRAM
Payer: COMMERCIAL

## 2025-02-07 DIAGNOSIS — Z71.89 COMPLEX CARE COORDINATION: ICD-10-CM

## 2025-02-07 DIAGNOSIS — M25.562 CHRONIC PAIN OF LEFT KNEE: ICD-10-CM

## 2025-02-07 DIAGNOSIS — G89.29 CHRONIC LEFT-SIDED LOW BACK PAIN WITH LEFT-SIDED SCIATICA: Primary | Chronic | ICD-10-CM

## 2025-02-07 DIAGNOSIS — R53.1 GENERALIZED WEAKNESS: ICD-10-CM

## 2025-02-07 DIAGNOSIS — Z74.09 IMPAIRED MOBILITY AND ACTIVITIES OF DAILY LIVING: ICD-10-CM

## 2025-02-07 DIAGNOSIS — M54.41 RIGHT-SIDED LOW BACK PAIN WITH RIGHT-SIDED SCIATICA, UNSPECIFIED CHRONICITY: ICD-10-CM

## 2025-02-07 DIAGNOSIS — G89.29 CHRONIC PAIN OF LEFT KNEE: ICD-10-CM
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
## 2025-02-07 DIAGNOSIS — Z78.9 IMPAIRED MOBILITY AND ACTIVITIES OF DAILY LIVING: ICD-10-CM

## 2025-02-07 DIAGNOSIS — M54.42 CHRONIC LEFT-SIDED LOW BACK PAIN WITH LEFT-SIDED SCIATICA: Primary | Chronic | ICD-10-CM

## 2025-02-07 LAB
ALBUMIN UR-MCNC: NEGATIVE MG/DL
ANION GAP SERPL CALCULATED.3IONS-SCNC: 8 MMOL/L (ref 7–15)
APPEARANCE UR: CLEAR
BILIRUB UR QL STRIP: NEGATIVE
BUN SERPL-MCNC: 19.4 MG/DL (ref 8–23)
CALCIUM SERPL-MCNC: 9.6 MG/DL (ref 8.8–10.4)
CHLORIDE SERPL-SCNC: 107 MMOL/L (ref 98–107)
COLOR UR AUTO: ABNORMAL
CREAT SERPL-MCNC: 0.99 MG/DL (ref 0.51–0.95)
EGFRCR SERPLBLD CKD-EPI 2021: 53 ML/MIN/1.73M2
ERYTHROCYTE [DISTWIDTH] IN BLOOD BY AUTOMATED COUNT: 12.8 % (ref 10–15)
FLUAV RNA SPEC QL NAA+PROBE: NEGATIVE
FLUBV RNA RESP QL NAA+PROBE: NEGATIVE
GLUCOSE SERPL-MCNC: 103 MG/DL (ref 70–99)
GLUCOSE UR STRIP-MCNC: NEGATIVE MG/DL
HCO3 SERPL-SCNC: 27 MMOL/L (ref 22–29)
HCT VFR BLD AUTO: 37.3 % (ref 35–47)
HGB BLD-MCNC: 12.2 G/DL (ref 11.7–15.7)
HGB UR QL STRIP: NEGATIVE
HYALINE CASTS: 1 /LPF
KETONES UR STRIP-MCNC: NEGATIVE MG/DL
LEUKOCYTE ESTERASE UR QL STRIP: ABNORMAL
MCH RBC QN AUTO: 28.6 PG (ref 26.5–33)
MCHC RBC AUTO-ENTMCNC: 32.7 G/DL (ref 31.5–36.5)
MCV RBC AUTO: 88 FL (ref 78–100)
MUCOUS THREADS #/AREA URNS LPF: PRESENT /LPF
NITRATE UR QL: NEGATIVE
PH UR STRIP: 5.5 [PH] (ref 5–7)
PLATELET # BLD AUTO: 252 10E3/UL (ref 150–450)
POTASSIUM SERPL-SCNC: 4.4 MMOL/L (ref 3.4–5.3)
RBC # BLD AUTO: 4.26 10E6/UL (ref 3.8–5.2)
RBC URINE: 3 /HPF
RSV RNA SPEC NAA+PROBE: NEGATIVE
SARS-COV-2 RNA RESP QL NAA+PROBE: NEGATIVE
SODIUM SERPL-SCNC: 142 MMOL/L (ref 135–145)
SP GR UR STRIP: 1.02 (ref 1–1.03)
SQUAMOUS EPITHELIAL: 1 /HPF
UROBILINOGEN UR STRIP-MCNC: NORMAL MG/DL
WBC # BLD AUTO: 10.8 10E3/UL (ref 4–11)
WBC URINE: 14 /HPF

## 2025-02-07 PROCEDURE — 36415 COLL VENOUS BLD VENIPUNCTURE: CPT | Performed by: EMERGENCY MEDICINE

## 2025-02-07 PROCEDURE — 85041 AUTOMATED RBC COUNT: CPT | Performed by: EMERGENCY MEDICINE

## 2025-02-07 PROCEDURE — 99222 1ST HOSP IP/OBS MODERATE 55: CPT | Mod: AI | Performed by: INTERNAL MEDICINE

## 2025-02-07 PROCEDURE — 99285 EMERGENCY DEPT VISIT HI MDM: CPT | Performed by: EMERGENCY MEDICINE

## 2025-02-07 PROCEDURE — 250N000013 HC RX MED GY IP 250 OP 250 PS 637

## 2025-02-07 PROCEDURE — 99285 EMERGENCY DEPT VISIT HI MDM: CPT | Mod: 25 | Performed by: EMERGENCY MEDICINE

## 2025-02-07 PROCEDURE — G0378 HOSPITAL OBSERVATION PER HR: HCPCS

## 2025-02-07 PROCEDURE — 82374 ASSAY BLOOD CARBON DIOXIDE: CPT | Performed by: EMERGENCY MEDICINE

## 2025-02-07 PROCEDURE — 87637 SARSCOV2&INF A&B&RSV AMP PRB: CPT | Performed by: EMERGENCY MEDICINE

## 2025-02-07 PROCEDURE — 85014 HEMATOCRIT: CPT | Performed by: EMERGENCY MEDICINE

## 2025-02-07 PROCEDURE — 87086 URINE CULTURE/COLONY COUNT: CPT | Performed by: EMERGENCY MEDICINE

## 2025-02-07 PROCEDURE — 80048 BASIC METABOLIC PNL TOTAL CA: CPT | Performed by: EMERGENCY MEDICINE

## 2025-02-07 PROCEDURE — 81001 URINALYSIS AUTO W/SCOPE: CPT | Performed by: EMERGENCY MEDICINE

## 2025-02-07 RX ORDER — OXYCODONE HYDROCHLORIDE 5 MG/1
5 TABLET ORAL EVERY 4 HOURS PRN
Status: DISCONTINUED | OUTPATIENT
Start: 2025-02-07 | End: 2025-02-10 | Stop reason: HOSPADM

## 2025-02-07 RX ORDER — NALOXONE HYDROCHLORIDE 0.4 MG/ML
0.2 INJECTION, SOLUTION INTRAMUSCULAR; INTRAVENOUS; SUBCUTANEOUS
Status: DISCONTINUED | OUTPATIENT
Start: 2025-02-07 | End: 2025-02-10 | Stop reason: HOSPADM

## 2025-02-07 RX ORDER — ACETAMINOPHEN 325 MG/1
650 TABLET ORAL EVERY 4 HOURS PRN
Status: DISCONTINUED | OUTPATIENT
Start: 2025-02-07 | End: 2025-02-10 | Stop reason: HOSPADM

## 2025-02-07 RX ORDER — NYSTATIN 100000 U/G
CREAM TOPICAL 2 TIMES DAILY
Status: DISCONTINUED | OUTPATIENT
Start: 2025-02-07 | End: 2025-02-10 | Stop reason: HOSPADM

## 2025-02-07 RX ORDER — BUPROPION HYDROCHLORIDE 150 MG/1
150 TABLET ORAL EVERY MORNING
Status: DISCONTINUED | OUTPATIENT
Start: 2025-02-08 | End: 2025-02-10 | Stop reason: HOSPADM

## 2025-02-07 RX ORDER — ONDANSETRON 2 MG/ML
4 INJECTION INTRAMUSCULAR; INTRAVENOUS EVERY 6 HOURS PRN
Status: DISCONTINUED | OUTPATIENT
Start: 2025-02-07 | End: 2025-02-10 | Stop reason: HOSPADM

## 2025-02-07 RX ORDER — AMOXICILLIN 250 MG
1 CAPSULE ORAL 2 TIMES DAILY PRN
Status: DISCONTINUED | OUTPATIENT
Start: 2025-02-07 | End: 2025-02-10 | Stop reason: HOSPADM

## 2025-02-07 RX ORDER — LIDOCAINE 4 G/G
1 PATCH TOPICAL
Status: DISCONTINUED | OUTPATIENT
Start: 2025-02-07 | End: 2025-02-10 | Stop reason: HOSPADM

## 2025-02-07 RX ORDER — ONDANSETRON 4 MG/1
4 TABLET, ORALLY DISINTEGRATING ORAL EVERY 6 HOURS PRN
Status: DISCONTINUED | OUTPATIENT
Start: 2025-02-07 | End: 2025-02-10 | Stop reason: HOSPADM

## 2025-02-07 RX ORDER — LOSARTAN POTASSIUM 50 MG/1
50 TABLET ORAL DAILY
Status: DISCONTINUED | OUTPATIENT
Start: 2025-02-07 | End: 2025-02-10 | Stop reason: HOSPADM

## 2025-02-07 RX ORDER — PAROXETINE 20 MG/1
20 TABLET, FILM COATED ORAL EVERY MORNING
Status: DISCONTINUED | OUTPATIENT
Start: 2025-02-08 | End: 2025-02-10 | Stop reason: HOSPADM

## 2025-02-07 RX ORDER — AMOXICILLIN 250 MG
2 CAPSULE ORAL 2 TIMES DAILY PRN
Status: DISCONTINUED | OUTPATIENT
Start: 2025-02-07 | End: 2025-02-10 | Stop reason: HOSPADM

## 2025-02-07 RX ORDER — NALOXONE HYDROCHLORIDE 0.4 MG/ML
0.4 INJECTION, SOLUTION INTRAMUSCULAR; INTRAVENOUS; SUBCUTANEOUS
Status: DISCONTINUED | OUTPATIENT
Start: 2025-02-07 | End: 2025-02-10 | Stop reason: HOSPADM

## 2025-02-07 RX ORDER — PROCHLORPERAZINE MALEATE 5 MG/1
5 TABLET ORAL EVERY 6 HOURS PRN
Status: DISCONTINUED | OUTPATIENT
Start: 2025-02-07 | End: 2025-02-10 | Stop reason: HOSPADM

## 2025-02-07 RX ORDER — METHOCARBAMOL 500 MG/1
500 TABLET, FILM COATED ORAL 4 TIMES DAILY
Status: DISCONTINUED | OUTPATIENT
Start: 2025-02-07 | End: 2025-02-10 | Stop reason: HOSPADM

## 2025-02-07 RX ORDER — POLYETHYLENE GLYCOL 3350 17 G/17G
17 POWDER, FOR SOLUTION ORAL 2 TIMES DAILY PRN
Status: DISCONTINUED | OUTPATIENT
Start: 2025-02-07 | End: 2025-02-10 | Stop reason: HOSPADM

## 2025-02-07 RX ORDER — AMLODIPINE BESYLATE 5 MG/1
5 TABLET ORAL DAILY
Status: DISCONTINUED | OUTPATIENT
Start: 2025-02-08 | End: 2025-02-10 | Stop reason: HOSPADM

## 2025-02-07 RX ORDER — CALCIUM CARBONATE 500 MG/1
1000 TABLET, CHEWABLE ORAL 4 TIMES DAILY PRN
Status: DISCONTINUED | OUTPATIENT
Start: 2025-02-07 | End: 2025-02-10 | Stop reason: HOSPADM

## 2025-02-07 RX ORDER — TRIAMCINOLONE ACETONIDE 1 MG/G
OINTMENT TOPICAL 2 TIMES DAILY
Status: DISCONTINUED | OUTPATIENT
Start: 2025-02-07 | End: 2025-02-10 | Stop reason: HOSPADM

## 2025-02-07 RX ADMIN — OXYCODONE 5 MG: 5 TABLET ORAL at 16:38

## 2025-02-07 RX ADMIN — LOSARTAN POTASSIUM 50 MG: 50 TABLET, FILM COATED ORAL at 16:31

## 2025-02-07 RX ADMIN — ACETAMINOPHEN 650 MG: 325 TABLET, FILM COATED ORAL at 16:38

## 2025-02-07 RX ADMIN — METHOCARBAMOL 500 MG: 500 TABLET ORAL at 16:31

## 2025-02-07 RX ADMIN — OXYCODONE 5 MG: 5 TABLET ORAL at 20:14

## 2025-02-07 RX ADMIN — METHOCARBAMOL 500 MG: 500 TABLET ORAL at 21:47

## 2025-02-07 ASSESSMENT — ACTIVITIES OF DAILY LIVING (ADL)
ADLS_ACUITY_SCORE: 50
ADLS_ACUITY_SCORE: 65
ADLS_ACUITY_SCORE: 50
ADLS_ACUITY_SCORE: 65
ADLS_ACUITY_SCORE: 65
ADLS_ACUITY_SCORE: 43
ADLS_ACUITY_SCORE: 65
ADLS_ACUITY_SCORE: 50
ADLS_ACUITY_SCORE: 50
ADLS_ACUITY_SCORE: 45
ADLS_ACUITY_SCORE: 43
ADLS_ACUITY_SCORE: 50

## 2025-02-07 ASSESSMENT — COLUMBIA-SUICIDE SEVERITY RATING SCALE - C-SSRS
1. IN THE PAST MONTH, HAVE YOU WISHED YOU WERE DEAD OR WISHED YOU COULD GO TO SLEEP AND NOT WAKE UP?: NO
6. HAVE YOU EVER DONE ANYTHING, STARTED TO DO ANYTHING, OR PREPARED TO DO ANYTHING TO END YOUR LIFE?: NO
2. HAVE YOU ACTUALLY HAD ANY THOUGHTS OF KILLING YOURSELF IN THE PAST MONTH?: NO

## 2025-02-07 NOTE — ED PROVIDER NOTES
[facility]  Emergency Department Visit Note    PATIENT:  Daniela Brown     89 year old     female      [id number]    Chief complaint:  Chief Complaint   Patient presents with    Back Pain      History of present illness:  Patient is a 89 year old female with a medical history significant for chronic kidney disease, type 2 diabetes, recurrent urinary tract infections, urge incontinence, presenting for evaluation of persistent low back pain, bothersome to the point where she is no longer able to feed herself or cope at home alone.  She had a fall on 1-.  After that she was found to have a subacute or acute L1 superior endplate fracture with mild height loss as well as a T12 superior endplate fracture with about 20% height loss.  She came to this emergency department for evaluation after the fall and had extensive imaging.  Was ultimately discharged home with pain control.  However patient has been unable to cope since then.  She does not have a car so she is unable to go to the pharmacy and get her medications.  She also was unable to go grocery shopping or get food so she is having a difficult time feeding herself, taking care of herself and walking around at home.  She is very tearful because she has lived in this house for a very long time and it is hard to be at the point where she is no longer able to care for self.  Mentally she states that she is totally fine and with it but the pain is what is limiting her ability to function at home.  She lost her  and her oldest son a couple of years ago and now she only has her younger son who she no longer has contact with.  He owns the house she lives in and she notes that he limits her ability to access resources.  She states that she has family in [place] and has been considering moving in with them for some time.    She was recently treated for UTI but states that those symptoms are completely resolved despite not taking  a full course of antibiotics.    Review of Systems:  As in HPI above    Temp 98.2  F (36.8  C) (Oral)   Wt 68 kg (150 lb)   LMP  (LMP Unknown)   BMI 25.75 kg/m      Physical Exam  Constitutional: laying in hospital bed, alert, oriented, in no apparent distress, conversant, and answering questions appropriately  HEENT: normocephalic, atraumatic, pupils 3mm, equal, round, and reactive to light, sclerae anicteric, extraocular motions intact, and moist mucous membranes  Neck: able to fully range and no midline tenderness  Cardiovascular: regular rate and rhythm  Pulmonary: breathing comfortably on room air and lungs clear to auscultation bilaterally  Abdominal: soft, non-tender, non-distended  Genitourinary: deferred  Extremities/MSK: midline spinal tenderness along right low back and SI joint and buttocks, otherwise no spinal tenderness, no leg tenderness to palpation  Skin: warm, dry, non-diaphoretic, no rashes or lesions, and no mottling  Neurologic: moves all four extremities spontaneously and GCS 15  Psychiatric: calm, appropriate      MDM:  Patient is a 89 year old female with above history presenting for evaluation of persistent low back pain, decreased oral intake secondary to back pain and .    Vitals reassuring and within normal limits. Exam reveals low back pain, otherwise reassuring. Elderly and tearful but appropriate on exam.    Differential includes but is not limited to acute on chronic low back pain, new fall, UTI, other infection, electrolyte abnormality, dehydration, malnutrition, depression.    Patient is tearful during my exam but she seems appropriately sad at the point that she is no longer able to function at home independently.  She is not suicidal.  She is tearful about her age and her status.  She wishes to move in with her family in [place].  Patient has had no recent falls after her fall on the 31st.  She tells me she has had about 7-8 falls in the past 2 years.  She has a very clear  memory and considering what she is telling me deferring additional workup at this time.  Plan to get basic labs and admit her to the hospital for PT OT and evaluation for appropriateness of discharge versus discharge to a facility.  Patient consents to this.  She is very hungry at this time is ordering her some food.  I suspect that her inability to feed herself at home was related to access to resources and not her ability to care for herself.    Remainder of ED course below.    ED COURSE:  ED Course as of 02/08/25 0954   Sat Feb 08, 2025   0953 Creatinine of 0.99, calculated GFR 53.  This is within normal limits when compared with patient's past values.  Her UA is reassuring.  CBC normal, viral swab negative.   0954 I have a very low concern for UTI as a cause of her symptoms especially considering her known fractures, reassuring urine today.       Encounter Diagnoses:  Final diagnoses:   Right-sided low back pain with right-sided sciatica, unspecified chronicity       Final disposition: [facility] observation    Aleyda Rios DO  EM Physician  [facility] ED       Aleyda Rios DO  02/08/25 0954

## 2025-02-07 NOTE — ED TRIAGE NOTES
Pt called EMS for right lower back pain. On 1/31 patient was seen for a UTI and L1 compression fracture. Pt states she's been inconsistent with taking the antibiotics an did not  her lidocaine patch. Pt lives alone, ambulates with a cane. Denies urinary symptoms. Does reports tingling down the right leg and foot.     Triage Assessment (Adult)       Row Name 02/07/25 1129          Triage Assessment    Airway WDL WDL        Respiratory WDL    Respiratory WDL WDL        Skin Circulation/Temperature WDL    Skin Circulation/Temperature WDL WDL        Cardiac WDL    Cardiac WDL WDL        Peripheral/Neurovascular WDL    Peripheral Neurovascular WDL WDL        Cognitive/Neuro/Behavioral WDL    Cognitive/Neuro/Behavioral WDL WDL

## 2025-02-07 NOTE — PROGRESS NOTES
[facility] ADMISSION NOTE    Patient admitted to room 2215 at approximately 0340 PM via cart from emergency room. Patient was accompanied by transport tech.     SBAR report received from ER RN Handoff Note prior to patient arrival.     Patient ambulated to bed via SBA. Patient alert and oriented X 2, patient disoriented to time date month and year otherwise oriented. Pain is controlled without any medications.  . Admission vital signs: Blood pressure (!) 185/72, pulse 78, temperature 98.5  F (36.9  C), temperature source Oral, resp. rate 16, weight 68 kg (150 lb), SpO2 97%, not currently breastfeeding. Patient was oriented to plan of care, call light, bed controls, tv, telephone, bathroom, and visiting hours.     Risk Assessment    The following safety risks were identified during admission: fall. Yellow risk band applied: YES.     Skin Initial Assessment    This writer admitted this patient and completed a full skin assessment and Serge score in the Adult PCS flowsheet.   Photo documentation of skin problem and/or wound competed via [employer] application (located under Media):  N/A    Appropriate interventions initiated as needed.     Secondary skin check completed by MS RN Carolina VERA. Some thin red scratches like from itching on right hip and three less than pea sized red dots on upper right shoulder, otherwise no skin issues.         Education    Patient has a [place] to Observation order: Yes  Observation education completed and documented: Yes      Basil Mcelroy RN

## 2025-02-07 NOTE — MEDICATION SCRIBE - ADMISSION MEDICATION HISTORY
Medication Scribe Admission Medication History    Admission medication history is complete. The information provided in this note is only as accurate as the sources available at the time of the update.    Information Source(s): Patient via in-person    Pertinent Information: Patient did not take any prescription medications yet today. Finished Cefdinir antibiotic on 02/06/25. Has not been testing blood sugars as of recently due to broken machine (did receive a replacement but has not started using yet)    Changes made to PTA medication list:  Added: None  Deleted: Cefdinir, Hydroxyzine, Metamucil  Changed: None    Allergies reviewed with patient and updates made in EHR: yes    Medication History Completed By: Kaya Cruz 2/7/2025 12:59 PM    PTA Med List   Medication Sig Note Last Dose/Taking    acetaminophen (TYLENOL) 500 MG tablet Take 500-1,000 mg by mouth every 6 hours as needed for mild pain or pain  2/7/2025 Morning    amLODIPine (NORVASC) 5 MG tablet Take 1 tablet (5 mg) by mouth daily.  2/6/2025 Morning    blood glucose (NO BRAND SPECIFIED) test strip Use to test blood sugar 1 times daily or as directed. To accompany: Blood Glucose Monitor Brands: per insurance. 2/7/2025: Machine broke-just got replacement but has not started testing again yet Unknown    blood glucose monitoring (NO BRAND SPECIFIED) meter device kit Use to test blood sugar 1 times daily or as directed. Preferred blood glucose meter OR supplies to accompany: Blood Glucose Monitor Brands: per insurance.  Taking    buPROPion (WELLBUTRIN XL) 150 MG 24 hr tablet Take 1 tablet (150 mg) by mouth every morning.  2/6/2025 Morning    loperamide (IMODIUM A-D) 2 MG tablet Take 1 tablet (2 mg) by mouth daily as needed for diarrhea.  Past Week    Multiple Vitamins-Minerals (THERAPEUTIC MULTIVIT/MINERAL) TABS Take 1 tablet by mouth every evening   2/6/2025 Morning    nystatin (MYCOSTATIN) 521962 UNIT/GM external cream Apply topically 2 times daily.  Apply to area under bilateral breasts  Past Month    olmesartan (BENICAR) 20 MG tablet Take 1 tablet (20 mg) by mouth daily.  2/6/2025 Morning    PARoxetine (PAXIL) 20 MG tablet Take 1 tablet (20 mg) by mouth every morning  2/6/2025 Morning    thin (NO BRAND SPECIFIED) lancets Use with lanceting device. To accompany: Blood Glucose Monitor Brands: per insurance.  Taking    triamcinolone (KENALOG) 0.1 % external cream Apply topically 2 times daily. To rash on back for up to 2 weeks.  Past Month

## 2025-02-07 NOTE — ED NOTES
"[facility]   Admission Handoff    The patient is Daniela Brown, 89 year old who arrived in the ED by AMBULANCE from home with a complaint of Back Pain  . The patient's current symptoms are new and during this time the symptoms have remained the same. In the ED, patient was diagnosed with   Final diagnoses:   Right-sided low back pain with right-sided sciatica, unspecified chronicity         Needed?: No    Allergies:    Allergies   Allergen Reactions    Metoprolol Itching and Rash    Cephalexin Rash    Erythromycin Rash    Hydrocodone     Shellfish Allergy     Acetaminophen Itching     Patient reported - only when used scheduled in high doses      Actonel [Bisphosphonates] Itching    Alendronate Rash    Azithromycin Hives    Celebrex [Ricci-2 Inhibitors (Sulfonamide)] Rash    Celecoxib Rash    Cipro [Ciprofloxacin] Rash    Contrast Dye Hives    Diatrizoate Hives    Erythromycin Rash    Escitalopram Diarrhea     Gets very sick and mad feelings    Fosamax Itching and Rash    Keflex [Cephalexin Monohydrate] Rash    Lisinopril Cough    Penicillins Rash    Risedronate Itching    Rofecoxib Rash    Seasonal Allergies Other (See Comments)     Seasonal rhinitis    Shellfish-Derived Products Hives    Sulfa Antibiotics Itching and Rash    Sulfasalazine Itching and Rash    Tetanus Toxoid, Adsorbed Swelling    Tetanus-Diphtheria Toxoids Td Swelling    Vicodin [Acetaminophen] Itching     Patient reported - only when used scheduled in high doses.       Vioxx Rash       Past Medical Hx:   Past Medical History:   Diagnosis Date    Abnormal cardiovascular stress test 2/3/2019    9/10/2018 Lexiscan: \"Myocardial perfusion imaging using single isotope technique demonstrated a small perfusion defect of mild severity involving the basal inferior wall which is mostly reversible and may be consistent with mild ischemia in the right coronary artery distribution. In addition, transient ischemic " "dilatation is noted with a TID ratio of 1.3. \"    Adhesive capsulitis of shoulder     left     Adjustment disorder with anxiety 3/18/2016    B12 deficiency anemia 6/20/2012    Chest pain 2004    Chronic right sided/axillary discomfort (musculoskeletal) Atypical substernal (possibly GERD). Negative cardiolite 2004.    Colitis, Clostridium difficile 4/18/2012    Diverticulitis 2009    Headache(784.0) 2001    Tension type. MRI 2001 normal.    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    Status post coronary angiogram 2/27/2019    Syncope and collapse 9/10/2018       Initial vitals were: BP: (!) 167/66  Pulse: 81  Temp: 98.2  F (36.8  C)  Weight: 68 kg (150 lb)  SpO2: 99 %   Recent vital Signs: BP (!) 142/68   Pulse 81   Temp 98.2  F (36.8  C) (Oral)   Wt 68 kg (150 lb)   LMP  (LMP Unknown)   SpO2 96%   BMI 25.75 kg/m      Elimination Status: Continent: Yes     Activity Level: SBA    Fall Status: Reason for falls risk:  Mobility and Reason for falls risk: Elimination  bed/chair alarm on, nonskid shoes/slippers when out of bed, arm band in place, patient and family education, assistive device/personal items within reach, and activity supervised    Baseline Mental status: WDL  Current Mental Status changes: at basesline    Infection present or suspected this encounter: no  Sepsis suspected: No    Isolation type: na    Bariatric equipment needed?: No    In the ED these meds were given: Medications - No data to display    Drips running?  No    Home pump  No    Current LDAs: Peripheral IV: Site left ac; Gauge 20  none     Results:   Labs/Imaging  Ordered and Resulted from Time of ED Arrival Up to the Time of Departure from the ED  Results for orders placed or performed during the hospital encounter of 02/07/25 (from the past 24 hours)   UA with Microscopic reflex to Culture    Specimen: " Urine, Midstream   Result Value Ref Range    Color Urine Light Yellow Colorless, Straw, Light Yellow, Yellow    Appearance Urine Clear Clear    Glucose Urine Negative Negative mg/dL    Bilirubin Urine Negative Negative    Ketones Urine Negative Negative mg/dL    Specific Gravity Urine 1.016 1.003 - 1.035    Blood Urine Negative Negative    pH Urine 5.5 5.0 - 7.0    Protein Albumin Urine Negative Negative mg/dL    Urobilinogen Urine Normal Normal, 2.0 mg/dL    Nitrite Urine Negative Negative    Leukocyte Esterase Urine Small (A) Negative    Mucus Urine Present (A) None Seen /LPF    RBC Urine 3 (H) <=2 /HPF    WBC Urine 14 (H) <=5 /HPF    Squamous Epithelials Urine 1 <=1 /HPF    Hyaline Casts Urine 1 <=2 /LPF    Narrative    Urine Culture ordered based on laboratory criteria   Influenza A/B, RSV and SARS-CoV2 PCR (COVID-19) Nose    Specimen: Nose; Swab   Result Value Ref Range    Influenza A PCR Negative Negative    Influenza B PCR Negative Negative    RSV PCR Negative Negative    SARS CoV2 PCR Negative Negative    Narrative    Testing was performed using the Xpert Xpress CoV2/Flu/RSV Assay on the [employer] GeneXpert Instrument. This test should be ordered for the detection of SARS-CoV2, influenza, and RSV viruses in individuals with signs and symptoms of respiratory tract infection. This test is for in vitro diagnostic use under the [place] FDA for laboratories certified under CLIA to perform high or moderate complexity testing. This test has been [place] FDA cleared. A negative result does not rule out the presence of PCR inhibitors in the specimen or target RNA in concentration below the limit of detection for the assay. If only one viral target is positive but coinfection with multiple targets is suspected, the sample should be re-tested with another FDA cleared, approved, or authorized test, if coninfection would change clinical management. This test was validated by the [facility]. These laboratories are  certified under the Clinical Laboratory Improvement Amendments of 1988 (CLIA-88) as qualified to perfom high complexity laboratory testing.   Basic metabolic panel   Result Value Ref Range    Sodium 142 135 - 145 mmol/L    Potassium 4.4 3.4 - 5.3 mmol/L    Chloride 107 98 - 107 mmol/L    Carbon Dioxide (CO2) 27 22 - 29 mmol/L    Anion Gap 8 7 - 15 mmol/L    Urea Nitrogen 19.4 8.0 - 23.0 mg/dL    Creatinine 0.99 (H) 0.51 - 0.95 mg/dL    GFR Estimate 53 (L) >60 mL/min/1.73m2    Calcium 9.6 8.8 - 10.4 mg/dL    Glucose 103 (H) 70 - 99 mg/dL   CBC with platelets   Result Value Ref Range    WBC Count 10.8 4.0 - 11.0 10e3/uL    RBC Count 4.26 3.80 - 5.20 10e6/uL    Hemoglobin 12.2 11.7 - 15.7 g/dL    Hematocrit 37.3 35.0 - 47.0 %    MCV 88 78 - 100 fL    MCH 28.6 26.5 - 33.0 pg    MCHC 32.7 31.5 - 36.5 g/dL    RDW 12.8 10.0 - 15.0 %    Platelet Count 252 150 - 450 10e3/uL     *Note: Due to a large number of results and/or encounters for the requested time period, some results have not been displayed. A complete set of results can be found in Results Review.       For the majority of the shift this patient's behavior was Green     Cardiac Rhythm: N/A  Pt needs tele? No  Skin/wound Issues:  none noted in limited er focused assessment.    Code Status: DNR / DNI    Pain control: fair    Nausea control: pt had none    Abnormal labs/tests/findings requiring intervention: L1 fx from previous fall.     Patient tested for COVID 19 prior to admission: No     OBS brochure/video discussed/provided to patient/family: Yes     Family present during ED course? No     Family Comments/Social Situation comments: NA    Tasks needing completion: None    Catrachita Carmichael, RN

## 2025-02-08 ENCOUNTER — APPOINTMENT (OUTPATIENT)
Dept: PHYSICAL THERAPY | Facility: CLINIC | Age: OVER 89
End: 2025-02-08
Payer: COMMERCIAL

## 2025-02-08 LAB
ANION GAP SERPL CALCULATED.3IONS-SCNC: 12 MMOL/L (ref 7–15)
BACTERIA UR CULT: NORMAL
BUN SERPL-MCNC: 18.6 MG/DL (ref 8–23)
CALCIUM SERPL-MCNC: 9.5 MG/DL (ref 8.8–10.4)
CHLORIDE SERPL-SCNC: 104 MMOL/L (ref 98–107)
CREAT SERPL-MCNC: 0.87 MG/DL (ref 0.51–0.95)
EGFRCR SERPLBLD CKD-EPI 2021: 62 ML/MIN/1.73M2
ERYTHROCYTE [DISTWIDTH] IN BLOOD BY AUTOMATED COUNT: 12.9 % (ref 10–15)
GLUCOSE BLDC GLUCOMTR-MCNC: 100 MG/DL (ref 70–99)
GLUCOSE SERPL-MCNC: 101 MG/DL (ref 70–99)
HCO3 SERPL-SCNC: 24 MMOL/L (ref 22–29)
HCT VFR BLD AUTO: 36.8 % (ref 35–47)
HGB BLD-MCNC: 12.3 G/DL (ref 11.7–15.7)
MCH RBC QN AUTO: 28.9 PG (ref 26.5–33)
MCHC RBC AUTO-ENTMCNC: 33.4 G/DL (ref 31.5–36.5)
MCV RBC AUTO: 86 FL (ref 78–100)
PLATELET # BLD AUTO: 285 10E3/UL (ref 150–450)
POTASSIUM SERPL-SCNC: 4.1 MMOL/L (ref 3.4–5.3)
RBC # BLD AUTO: 4.26 10E6/UL (ref 3.8–5.2)
SODIUM SERPL-SCNC: 140 MMOL/L (ref 135–145)
WBC # BLD AUTO: 10.6 10E3/UL (ref 4–11)

## 2025-02-08 PROCEDURE — 84520 ASSAY OF UREA NITROGEN: CPT

## 2025-02-08 PROCEDURE — 85027 COMPLETE CBC AUTOMATED: CPT

## 2025-02-08 PROCEDURE — 99232 SBSQ HOSP IP/OBS MODERATE 35: CPT | Performed by: INTERNAL MEDICINE

## 2025-02-08 PROCEDURE — 250N000013 HC RX MED GY IP 250 OP 250 PS 637: Performed by: FAMILY MEDICINE

## 2025-02-08 PROCEDURE — 250N000013 HC RX MED GY IP 250 OP 250 PS 637: Performed by: INTERNAL MEDICINE

## 2025-02-08 PROCEDURE — G0378 HOSPITAL OBSERVATION PER HR: HCPCS

## 2025-02-08 PROCEDURE — 80048 BASIC METABOLIC PNL TOTAL CA: CPT

## 2025-02-08 PROCEDURE — 97116 GAIT TRAINING THERAPY: CPT | Mod: GP | Performed by: PHYSICAL THERAPIST

## 2025-02-08 PROCEDURE — 250N000013 HC RX MED GY IP 250 OP 250 PS 637

## 2025-02-08 PROCEDURE — 82962 GLUCOSE BLOOD TEST: CPT

## 2025-02-08 PROCEDURE — 97161 PT EVAL LOW COMPLEX 20 MIN: CPT | Mod: GP | Performed by: PHYSICAL THERAPIST

## 2025-02-08 PROCEDURE — 36415 COLL VENOUS BLD VENIPUNCTURE: CPT

## 2025-02-08 RX ORDER — HALOPERIDOL 1 MG/1
2 TABLET ORAL ONCE
Status: COMPLETED | OUTPATIENT
Start: 2025-02-08 | End: 2025-02-08

## 2025-02-08 RX ORDER — DIPHENHYDRAMINE HCL 25 MG
25 CAPSULE ORAL EVERY 6 HOURS PRN
Status: DISCONTINUED | OUTPATIENT
Start: 2025-02-08 | End: 2025-02-08

## 2025-02-08 RX ADMIN — DIPHENHYDRAMINE HYDROCHLORIDE 25 MG: 25 CAPSULE ORAL at 06:08

## 2025-02-08 RX ADMIN — NYSTATIN: 100000 CREAM TOPICAL at 19:41

## 2025-02-08 RX ADMIN — OXYCODONE 5 MG: 5 TABLET ORAL at 03:53

## 2025-02-08 RX ADMIN — METHOCARBAMOL 500 MG: 500 TABLET ORAL at 08:36

## 2025-02-08 RX ADMIN — ACETAMINOPHEN 650 MG: 325 TABLET, FILM COATED ORAL at 08:35

## 2025-02-08 RX ADMIN — LOSARTAN POTASSIUM 50 MG: 50 TABLET, FILM COATED ORAL at 08:36

## 2025-02-08 RX ADMIN — TRIAMCINOLONE ACETONIDE: 1 OINTMENT TOPICAL at 08:36

## 2025-02-08 RX ADMIN — BUPROPION HYDROCHLORIDE 150 MG: 150 TABLET, EXTENDED RELEASE ORAL at 08:36

## 2025-02-08 RX ADMIN — TRIAMCINOLONE ACETONIDE: 1 OINTMENT TOPICAL at 19:42

## 2025-02-08 RX ADMIN — HALOPERIDOL 2 MG: 1 TABLET ORAL at 18:37

## 2025-02-08 RX ADMIN — OXYCODONE 5 MG: 5 TABLET ORAL at 08:35

## 2025-02-08 RX ADMIN — PAROXETINE 20 MG: 20 TABLET, FILM COATED ORAL at 08:36

## 2025-02-08 RX ADMIN — METHOCARBAMOL 500 MG: 500 TABLET ORAL at 21:16

## 2025-02-08 RX ADMIN — METHOCARBAMOL 500 MG: 500 TABLET ORAL at 17:07

## 2025-02-08 RX ADMIN — AMLODIPINE BESYLATE 5 MG: 5 TABLET ORAL at 08:36

## 2025-02-08 RX ADMIN — NYSTATIN: 100000 CREAM TOPICAL at 08:36

## 2025-02-08 RX ADMIN — LIDOCAINE 1 PATCH: 4 PATCH TOPICAL at 19:41

## 2025-02-08 ASSESSMENT — ACTIVITIES OF DAILY LIVING (ADL)
ADLS_ACUITY_SCORE: 43
ADLS_ACUITY_SCORE: 48
ADLS_ACUITY_SCORE: 43
ADLS_ACUITY_SCORE: 43
ADLS_ACUITY_SCORE: 48
ADLS_ACUITY_SCORE: 43
ADLS_ACUITY_SCORE: 48
ADLS_ACUITY_SCORE: 49
ADLS_ACUITY_SCORE: 49
ADLS_ACUITY_SCORE: 48
ADLS_ACUITY_SCORE: 43
ADLS_ACUITY_SCORE: 43
ADLS_ACUITY_SCORE: 49
ADLS_ACUITY_SCORE: 48
ADLS_ACUITY_SCORE: 43
ADLS_ACUITY_SCORE: 49
ADLS_ACUITY_SCORE: 43

## 2025-02-08 NOTE — H&P
HOSPITALIST TELEMED ADMISSION H&P    Service Date : 2/7/2025  Dr. Zenia HEARD am located in [place]  Daniela Brown is located in [place] at [facility]     RN, Marisa,  on Med/Surg is assisting me today with this patient.    chief complaint: Intractable pain    History of Present Illness:  Daniela Brown is a 89 year old female, with  estless leg syndrome, hyperlipidemia, hypertension, type 2 diabetes, CKD stage III, coronary artery disease, generalized anxiety disorder, depression,  breast intertrigo, and significant number of medication allergies, 30 to be exact, presents to the ED with intractable back pain. Patient lives alone, and on 01/31/2025 she sustained a fall. Patient  said that she  took a medication (hydroxyzine) that her doctor had prescribed for her because she had an itchy rash on her back.  She states that she took this medication in the evening and the next thing that she knows she woke up on the floor.  Patient said that she does not remember how she ended up on the floor but she does recall taking this new medication that evening. The patient lives alone, and has a medical fall alert that she wears all the time, and in this definitely was beneficial for this patient.  I indicated to the patient that we would have to add hydroxyzine to her medication list of allergies.  The patient denied any fever, chills, chest pain, shortness of breath, nausea or vomiting.  She states that she had some diarrhea off-and-on but then she was diagnosed recently with a UTI and completed her antibiotics a couple of days ago.  She also denied any burning with urination.    Emergency Room Course - in the emergency room, serologies for CMP, CBC,  Influenza A/B, urinalysis with reflex microscope     Radiological diagnostics included:      EXAM: CT ABDOMEN PELVIS W/O CONTRAST  LOCATION: [facility]  DATE: 1/31/2025     INDICATION: R sided flank  pain  COMPARISON: 10/9/2019  TECHNIQUE: CT scan of the abdomen and pelvis was performed without IV contrast. Multiplanar reformats were obtained. Dose reduction techniques were used.  CONTRAST: None.     FINDINGS:   LOWER CHEST: No acute lung base opacity. Coronary artery, aortic valvular, and dense mitral valve annular calcifications.     HEPATOBILIARY: Normal unenhanced liver. Cholecystectomy with normal caliber bile ducts.     PANCREAS: Partial atrophy with stable 21 x 19 mm cystic lesion in the pancreatic body (series 5, image 74). A 10 x 8 mm cystic lesion in the ventral pancreatic head (image 80) was not definitely present on the prior exam. No main duct dilation.     SPLEEN: Normal.     ADRENAL GLANDS: Normal.     KIDNEYS/BLADDER: No significant mass, stone, or hydronephrosis. Normal unenhanced bladder.     BOWEL: No obstruction or inflammatory change. Appendectomy. Colonic diverticulosis.     LYMPH NODES: No lymphadenopathy.     VASCULATURE: Moderate to severe aortoiliac atherosclerosis without aneurysmal dilation. There is at least mild to moderate luminal narrowing of the abdominal aorta near the superior mesenteric artery secondary to calcified atherosclerosis, increased   since 2019.     PELVIC ORGANS: Hysterectomy without suspicious pelvic mass or ascites.     MUSCULOSKELETAL: Bony demineralization without aggressive osseous lesion. Age indeterminant, but acute to subacute appearing mild compression deformity of the superior T12 endplate. Of note, the T12 ribs will be considered transitional with 5   lumbar-type, nonrib-bearing vertebra. Grade 1 anterolisthesis of L4 on L5.                                                                         IMPRESSION:   1.  No distinct abdominopelvic abnormality to explain right flank pain.  2.  Age indeterminant, but acute to subacute appearing mild compression deformity of the superior T12 endplate.  3.  Two pancreatic cystic lesions. The larger of the two cysts  "is stable at 2.1 cm and the other in the pancreatic head was not definitely present on the prior exam. This lesion can be followed with CT or MRI in 6 months.          Narrative & Impression  EXAM: CT LUMBAR SPINE W/O CONTRAST  LOCATION: [facility]  DATE: 1/31/2025     INDICATION: Lower back pain, status post fall.  COMPARISON: CT abdomen 10/9/2019. Lumbar spine MRI 7/17/2018.  TECHNIQUE: Routine CT Lumbar Spine without IV contrast. Multiplanar reformats. Dose reduction techniques were used.      FINDINGS:  Nomenclature: Five lumbar-type vertebral bodies.     Alignment: Redemonstrated grade I anterolisthesis at L4-L5. Slight L3-L4 and L2-L3 retrolisthesis. Mildly accentuated kyphosis at T11-T12, related to new T12 fracture described below.     Bones: Acute to subacute T12 superior endplate fracture with height loss of approximately 20%. The remaining vertebral bodies are unremarkable in height. The visualized portions of the sacrum and emmett appear intact. The bones are osteopenic. There are   degenerative changes of the sacroiliac joints. Moderate lower lumbar facet arthropathy.     Discs: Mild to moderate multilevel spondylosis, most conspicuous at L4-L5 and L2-L3, where there is broad-based disc bulging.     Spinal Canal: No findings specific for epidural hematoma. Moderate L4-L5 and mild-to-moderate L3-L4 spinal canal stenosis.     Neural Foramina: Moderate right L4-L5 neural foraminal stenosis.     Paraspinal Soft Tissues: Unremarkable.     Abdominal Cavity: Reported separately.                                                                      IMPRESSION:  1.  Acute to subacute L1 superior endplate fracture with mild height loss.  2.  No evidence of traumatic subluxation.  3.  Multilevel degenerative change.  4.  Diffuse osteopenia.         Past Medical History  Past Medical History:   Diagnosis Date    Abnormal cardiovascular stress test 2/3/2019    9/10/2018 Lexiscan: \"Myocardial " "perfusion imaging using single isotope technique demonstrated a small perfusion defect of mild severity involving the basal inferior wall which is mostly reversible and may be consistent with mild ischemia in the right coronary artery distribution. In addition, transient ischemic dilatation is noted with a TID ratio of 1.3. \"    Adhesive capsulitis of shoulder     left     Adjustment disorder with anxiety 3/18/2016    B12 deficiency anemia 6/20/2012    Chest pain 2004    Chronic right sided/axillary discomfort (musculoskeletal) Atypical substernal (possibly GERD). Negative cardiolite 2004.    Colitis, Clostridium difficile 4/18/2012    Diverticulitis 2009    Headache(784.0) 2001    Tension type. MRI 2001 normal.    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    Status post coronary angiogram 2/27/2019    Syncope and collapse 9/10/2018      Patient Active Problem List   Diagnosis    Degeneration of lumbar or lumbosacral intervertebral disc    Esophageal reflux    Memory loss    Irritable bowel syndrome with diarrhea    Osteopenia of multiple sites    RESTLESS LEG SYNDROME    Primary osteoarthritis involving multiple joints    Diverticulosis of large intestine    SENSONRL HEAR LOSS,BILAT    Urticaria    Subjective tinnitus    Vitamin D deficiency    Right foot pain    Urge incontinence    Hyperlipidemia LDL goal <100    Allergic rhinitis    Pronation of foot    Peripheral neuropathy    Benign essential hypertension    Carpal tunnel syndrome of left wrist    Diastolic dysfunction    Type 2 diabetes mellitus with diabetic polyneuropathy, without long-term current use of insulin (H)    History of recurrent urinary tract infection    CKD (chronic kidney disease) stage 3, GFR 30-59 ml/min (H)    Bilateral wrist pain    Protrusion of lumbar intervertebral disc    Coronary artery disease " involving native coronary artery of native heart with angina pectoris    Chronic left-sided low back pain with left-sided sciatica    Generalized weakness    Diarrhea    Abnormal CT of the abdomen    Cystocele, midline    B12 deficiency    Moderate major depression (H)    ARABELLA (generalized anxiety disorder)    Polyneuropathy associated with underlying disease    Closed head injury, initial encounter    Fall, initial encounter    Non-traumatic rhabdomyolysis    Impaired mobility and activities of daily living    Complex care coordination    Examination of eyes and vision    Left foot pain    Encounter for Medicare annual wellness exam    Chronic pain of left knee    Intertrigo    No-show for appointment    Right-sided low back pain with right-sided sciatica, unspecified chronicity         Past Surgical History  Past Surgical History:   Procedure Laterality Date    ARTHROPLASTY KNEE  3/26/2012    Procedure:ARTHROPLASTY KNEE; Right Total Knee Arthroplasty; Surgeon:BERNADINE ROSAS; Location:[place] [facility]    CHOLECYSTECTOMY      CV HEART CATHETERIZATION WITH POSSIBLE INTERVENTION N/A 2/27/2019    Procedure: Coronary Angiogram with Left ventriculogram;  Surgeon: Leandro Carpio MD;  Location: [facility] CARDIAC CATH LAB    HERNIA REPAIR      Hernia Repair    HYSTERECTOMY, PAP NO LONGER INDICATED  1983    TVH/BSO    SURGICAL HISTORY OF -   10/08    [facility] & P Repair, Dr. Hannah    [facility] APPENDECTOMY  1953      Social History  Daniela  reports that she has never smoked. She has never used smokeless tobacco. She reports that she does not drink alcohol and does not use drugs.    Family History  Daniela's family history includes Alcohol/Drug in her father; Allergies in her son; Arthritis in her mother; C.A.D. in her mother; Cancer - colorectal in her mother; Diabetes in her mother; Eye Disorder in her son and son; Heart Disease in her mother; Hypertension in her brother; Leukemia in her son; Lipids in her brother; Obesity in  her brother; Thyroid Disease in her sister.    Home Medications  Current Outpatient Medications   Medication Instructions    acetaminophen (TYLENOL) 500-1,000 mg, Oral, EVERY 6 HOURS PRN    amLODIPine (NORVASC) 5 mg, Oral, DAILY    blood glucose (NO BRAND SPECIFIED) test strip Use to test blood sugar 1 times daily or as directed. To accompany: Blood Glucose Monitor Brands: per insurance.    blood glucose monitoring (NO BRAND SPECIFIED) meter device kit Use to test blood sugar 1 times daily or as directed. Preferred blood glucose meter OR supplies to accompany: Blood Glucose Monitor Brands: per insurance.    buPROPion (WELLBUTRIN XL) 150 mg, Oral, EVERY MORNING    loperamide (IMODIUM A-D) 2 mg, Oral, DAILY PRN    Multiple Vitamins-Minerals (THERAPEUTIC MULTIVIT/MINERAL) TABS 1 tablet, EVERY EVENING    nystatin (MYCOSTATIN) 933717 UNIT/GM external cream Topical, 2 TIMES DAILY, Apply to area under bilateral breasts    olmesartan (BENICAR) 20 mg, Oral, DAILY    PARoxetine (PAXIL) 20 mg, Oral, EVERY MORNING    thin (NO BRAND SPECIFIED) lancets Use with lanceting device. To accompany: Blood Glucose Monitor Brands: per insurance.    triamcinolone (KENALOG) 0.1 % external cream Topical, 2 TIMES DAILY, To rash on back for up to 2 weeks.       Allergies  Allergies   Allergen Reactions    Metoprolol Itching and Rash    Cephalexin Rash    Erythromycin Rash    Hydrocodone     Shellfish Allergy     Acetaminophen Itching     Patient reported - only when used scheduled in high doses      Actonel [Bisphosphonates] Itching    Alendronate Rash    Azithromycin Hives    Celebrex [Ricci-2 Inhibitors (Sulfonamide)] Rash    Celecoxib Rash    Cipro [Ciprofloxacin] Rash    Contrast Dye Hives    Diatrizoate Hives    Erythromycin Rash    Escitalopram Diarrhea     Gets very sick and mad feelings    Fosamax Itching and Rash    Keflex [Cephalexin Monohydrate] Rash    Lisinopril Cough    Penicillins Rash    Risedronate Itching    Rofecoxib Rash     Seasonal Allergies Other (See Comments)     Seasonal rhinitis    Shellfish-Derived Products Hives    Sulfa Antibiotics Itching and Rash    Sulfasalazine Itching and Rash    Tetanus Toxoid, Adsorbed Swelling    Tetanus-Diphtheria Toxoids Td Swelling    Vicodin [Acetaminophen] Itching     Patient reported - only when used scheduled in high doses.       Vioxx Rash        10 pt ROS neg except as noted in HPI    Physical Exam:  I performed all aspects of the physical examination via Telemedicine associated with two way audio and video communication.    Vital Signs: Blood pressure (!) 143/61, pulse 75, temperature 98.4  F (36.9  C), temperature source Oral, resp. rate 20, weight 64.7 kg (142 lb 10.2 oz), SpO2 96%,      The virtual stethoscope was not working therefore parts of this physical examination were obtained with the assistance of the RN's  examination as well as the ED provider note    Physical Exam    Gen:  Well-developed, well-nourished, in no acute distress, lying semi-supine in her hospital bed  HEENT: NC/AT,  hearing intact to voice  Resp:  No accessory muscle use, breath sounds clear; no wheezes no rales no rhonchi  Card:  No murmur, normal S1, S2   Abd:  Soft per RN exam, no TTP, non-distended, normoactive bowel sounds are present  Ext: No clubbing, cyanosis, there is some trace  edema of bilateral ankles.   Skin: no bruises were noted.   Psych:  Not anxious, not agitated    DATA:    I&O: No intake/output data recorded.          Labs:  I have personally reviewed the following studies:  Recent Labs   Lab 02/07/25  1310 02/04/25  1722   POTASSIUM 4.4 3.8   CHLORIDE 107 109*   CO2 27 23   BUN 19.4 20.2   ALBUMIN  --  3.9   ALKPHOS  --  76   AST  --  30   ALT  --  24      Recent Labs   Lab 02/07/25  1310   WBC 10.8   HGB 12.2   HCT 37.3            Imaging: ER imaging reviewed    Misc  DVT prophylaxis:  pneumatic compression device  Code Status: DNR/DNI  Cardiac Monitoring:  no active telemetry  [place]:   none  Lines: peripheral IV  Diet:  diabetic    ASSESSMENT/PLAN:   89-year-old female who sustained a fall on 1/31/2025 with a new T -12 compression fracture  will be placed in observation for further   Pain management and stabilization.      This patient's current admission to an acute care setting is essential for the treatment of t-12 compression fracture    The patient's recovery is dependent on the following treatments of   -PT/OT  - Pain Management  - Fall precautions  - Social Work consultation  -Correcting any electrolyte  abnormalities to stabilize this condition.       The patient is at risk of developing further complications of multiple falls with possibly more fractures if not treated in an acute care setting. I expect the patient's hospital stay to require       Our patient will be admitted under the hospitalist services and further management to be based on patient's clinical progress.         # ACTIVE PROBLEM LISTS:      # Hypertension:    143/61  Stable.  - Monitor with daily vitals   - Amlodipine 5mg q day  - Losartan 50mg q day    # Hyperlipidemia:   Cholesterol   Date Value Ref Range Status   10/24/2024 193 <200 mg/dL Final   01/03/2020 141 <200 mg/dL Final    - Stable  - takes no medication    #Type II Diabetes  - hypoglycemia protocol  - blood glucose checks daily  - takes no medications    #Generalized Anxiety Disorder/Depression  - Welbutrin XL 150mg q day  - Paxil 20mg q hs    #Back Rash  - Triamcinolone 0.1%     #Breasts Intertrigo  - Mycostatin external cream    Clinically Significant Risk Factors Present on Admission                        Our patient will be admitted under the hospitalist services and further management to be based on patient's clinical progress.      As the provider for the telehealth service, I attest that I introduced myself to the patient and provided my credentials. Based on review of the patient s chart and/or a discussion with members of the patient s treatment  team, I determined that telemedicine via a real-time, two-way, interactive audio and video platform is an appropriate means of providing this service.     The encounter was approximately 15  minutes. The nurse was present for the duration of the encounter.    Chart reviewed,labs and available imaging reviewed,consultant notes reviewed. Previous available medical records have been reviewed  Care plan discussed with care team    I have seen and examined the patient today. Documentation for this visit was completed using a template. Everything documented was personally performed today with the necessary additions, deletions and changes made as appropriate with the diagnoses being listed in no particular order of clinical relevance.    [facility]  Securely message with the [employer] Web Console (learn more here)  Text page via [employer] Paging/Directory

## 2025-02-08 NOTE — PROGRESS NOTES
02/08/25 1000   Appointment Info   Signing Clinician's Name / Credentials (PT) Suzy Champagne, PT, DPT   Quick Adds   Quick Adds Certification   Living Environment   People in Home alone   Current Living Arrangements house   Home Accessibility no concerns   Living Environment Comments Pt states that she lives alone in a house.   Self-Care   Usual Activity Tolerance moderate   Current Activity Tolerance moderate   Equipment Currently Used at Home cane, quad;walker, rolling   Fall history within last six months yes   Number of times patient has fallen within last six months 1   General Information   Onset of Illness/Injury or Date of Surgery 02/07/25   Referring Physician Adrian Ferraro MD   Patient/Family Therapy Goals Statement (PT) return home   Pertinent History of Current Problem (include personal factors and/or comorbidities that impact the POC) Pt called EMS for right lower back pain. On 1/31 patient was seen for a UTI and L1 compression fracture. Pt states she's been inconsistent with taking the antibiotics an did not  her lidocaine patch. Pt lives alone, ambulates with a cane. Denies urinary symptoms. Does reports tingling down the right leg and foot.   Existing Precautions/Restrictions fall   General Observations Pt alert and agreeable to PT.   Cognition   Affect/Mental Status (Cognition) WFL   Pain Assessment   Patient Currently in Pain Yes, see Vital Sign flowsheet   Integumentary/Edema   Integumentary/Edema no deficits were identifed   Posture    Posture Not impaired   Range of Motion (ROM)   Range of Motion ROM is WFL   Strength (Manual Muscle Testing)   Strength (Manual Muscle Testing) strength is WFL   Transfers   Transfers sit-stand transfer   Impairments Contributing to Impaired Transfers decreased strength   Comment, (Transfers) gait belt, FWW   Sit-Stand Transfer   Sit-Stand Cook (Transfers) supervision   Assistive Device (Sit-Stand Transfers) walker, front-wheeled    Comment, (Sit-Stand Transfer) gait belt   Gait/Stairs (Locomotion)   [place] Level (Gait) modified independence;contact guard   Assistive Device (Gait) walker, front-wheeled   Distance in Feet (Gait) 75   Balance   Balance no deficits were identified   Sensory Examination   Sensory Perception WFL   Coordination   Coordination no deficits were identified   Muscle Tone   Muscle Tone no deficits were identified   Clinical Impression   Criteria for Skilled Therapeutic Intervention Yes, treatment indicated   PT Diagnosis (PT) deconditioning   Functional limitations due to impairments transfers, gait, mobility   Clinical Presentation (PT Evaluation Complexity) stable   Clinical Presentation Rationale clinical reasoning   Clinical Decision Making (Complexity) low complexity   Planned Therapy Interventions (PT) balance training;bed mobility training;gait training;home exercise program;patient/family education;ROM (range of motion);strengthening;risk factor education;home program guidelines;progressive activity/exercise;transfer training   Risk & Benefits of therapy have been explained evaluation/treatment results reviewed;care plan/treatment goals reviewed;risks/benefits reviewed;current/potential barriers reviewed;participants voiced agreement with care plan;patient   Clinical Impression Comments Pt alert and agreeable to PT. She lives at home alone but is close to indep at baseline. Uses a cane but has a walker. Rec home with HC and discussed with PT to use FWW at home for greater stability.   PT Total Evaluation Time   PT Eval, Low Complexity Minutes (92762) 5   Therapy Certification   Start of care date 02/08/25   Certification date from 02/08/25   Certification date to 02/12/25   Medical Diagnosis Low back pain   Physical Therapy Goals   PT Frequency 3x/week   PT Predicted Duration/Target Date for Goal Attainment 02/11/25   PT Goals Gait;Transfers   PT: Transfers Modified independent;Bed to/from chair;Sit  to/from stand   PT: Gait Modified independent;Rolling walker;100 feet   Interventions   Interventions Quick Adds Gait Training   Gait Training   Gait Training Minutes (14021) 10   Symptoms Noted During/After Treatment (Gait Training) fatigue   Treatment Detail/Skilled Intervention Pt seate din chair at start of session. Pt edu provided on role of PT, activity recs and d/c plan for PT. Sit to stand from chair with FWW and CGA with gait belt. Amb ~55 ft in HW with FWW and CGA. Returned to room, transfer to chair. Pt seated in chair, all needs in reach and alarm on at end of session.   PT Discharge Planning   PT Plan Sat 1/3; gait, transfers   PT Discharge Recommendation (DC Rec) home with home care physical therapy   PT Rationale for DC Rec Pt livesat home alone and does not drive. Will benefit from HC PT to address recent falls and safe mobility in the home.   PT Total Distance Amb During Session (feet) 55   Physical Therapy Time and Intention   Timed Code Treatment Minutes 10   Total Session Time (sum of timed and untimed services) 15

## 2025-02-08 NOTE — PLAN OF CARE
"  Problem: Adult Inpatient Plan of Care  Goal: Plan of Care Review  Description: The Plan of Care Review/Shift note should be completed every shift.  The Outcome Evaluation is a brief statement about your assessment that the patient is improving, declining, or no change.  This information will be displayed automatically on your shift  note.  Outcome: Progressing  Goal: Patient-Specific Goal (Individualized)  Description: You can add care plan individualizations to a care plan. Examples of Individualization might be:  \"Parent requests to be called daily at 9am for status\", \"I have a hard time hearing out of my right ear\", or \"Do not touch me to wake me up as it startles  me\".  Outcome: Progressing  Goal: Absence of Hospital-Acquired Illness or Injury  Outcome: Progressing  Intervention: Prevent Skin Injury  Recent Flowsheet Documentation  Taken 2/8/2025 1001 by Basil Mcelroy, RN  Body Position: position changed independently  Goal: Optimal Comfort and Wellbeing  Outcome: Progressing  Goal: Readiness for Transition of Care  Outcome: Progressing     Problem: Pain Acute  Goal: Optimal Pain Control and Function  Outcome: Progressing     Problem: Fall Injury Risk  Goal: Absence of Fall and Fall-Related Injury  Outcome: Progressing   Goal Outcome Evaluation:         A&Ox3  Disoriented to time but had the year right today just not the date  Vitals stable  Back pain controlled with medications per MAR  SBA for mobility  Using walker can use cane  PLeasant calm pam Gracia RN [facility]                 "

## 2025-02-08 NOTE — PLAN OF CARE
Goal Outcome Evaluation:      Plan of Care Reviewed With: patient    Overall Patient Progress: improvingOverall Patient Progress: improving         Patient is alert and oriented x3, disoriented to time.  Up with stand by assist and a walker ambulating to the bathroom.  IV saline locked.  Patient reports lower back pain that is relieved with schedule robaxin and prn oxycodone.  Patient reports itching and was given prn benadryl.

## 2025-02-08 NOTE — PROGRESS NOTES
[facility]    Hospitalist Progress Note    Assessment & Plan   This 89-year-old female who was admitted with intractable low back pain after a fall at home.    Low back pain  Mechanical fall     X-ray pelvis with right hip shows markedly demineralized bone without obvious fracture or dislocation.  There is moderate right hip arthrosis.     MRI pelvis is negative for acute fracture.  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  Bilateral gluteal and proximal hamstring tendinopathy, mild right trochanteric bursa apathy, mild strain bilateral adductor muscles, contusion of right gluteal soft tissues.  - Pain control with lidocaine patch, Robaxin, acetaminophen, and oxycodone  - Patient evaluated by physical therapy, who recommends home with physical therapy    Hypertension  - Continue losartan and amlodipine    Depression and anxiety  - Continue Paxil and Wellbutrin    Clinically Significant Risk Factors Present on Admission                # Hypertension: Noted on problem list                    Date of Service (when I saw the patient): 02/08/2025    Disposition: Medically Ready for Discharge: Anticipated Tomorrow    Adrian Heath Ferraro MD    Interval History   Patient states that her pain is well-controlled with oral medications.  She has to stay 1 additional day due to her severe fatigue.    -Data reviewed today: I reviewed all new labs and imaging results over the last 24 hours. I personally reviewed no ECG or imaging today.    No results found for this or any previous visit (from the past 24 hours).  Significant Results and Procedures     Physical Exam   Temp: 98.7  F (37.1  C) Temp src: Oral BP: 106/46 Pulse: 73   Resp: 16 SpO2: 94 % O2 Device: None (Room air)    Vitals:    02/07/25 1128 02/07/25 1555   Weight: 68 kg (150 lb) 64.7 kg (142 lb 10.2 oz)     Vital Signs with Ranges  Temp:  [97.6  F (36.4  C)-98.8  F (37.1  C)] 98.7  F (37.1   C)  Pulse:  [72-88] 73  Resp:  [16-20] 16  BP: (106-185)/(46-74) 106/46  SpO2:  [94 %-98 %] 94 %  I/O last 3 completed shifts:  In: 730 [P.O.:730]  Out: -     Gen: Well nourished, elderly female, resting comfortably in bed, no acute distressed  HEENT: Atraumatic, normocephalic; sclera non-injected, anicterric; oral mucosa moist, no lesion, no exudate  Lungs: Clear to ausculation, no wheezes, no rhonchi, no rales  Heart: Regular rate, regular rhythm, no gallops, no rubs, no murmurs  GI: Bowel sound normal, no hepatosplenomegaly, no masses, non-tender, non-distended, no guarding, no rebound tenderness  Extremities: No rashes, no edema  Musculoskeletal: Normal muscle mass and tone, no arthritis    Medications   Current Facility-Administered Medications   Medication Dose Route Frequency Provider Last Rate Last Admin     Current Facility-Administered Medications   Medication Dose Route Frequency Provider Last Rate Last Admin    amLODIPine (NORVASC) tablet 5 mg  5 mg Oral Daily Analilia Cerrato PA-C   5 mg at 02/08/25 0836    buPROPion (WELLBUTRIN XL) 24 hr tablet 150 mg  150 mg Oral QAM Analilia Cerrato PA-C   150 mg at 02/08/25 0836    Lidocaine (LIDOCARE) 4 % Patch 1 patch  1 patch Transdermal Q24h Analilia Cerrato PA-C        losartan (COZAAR) tablet 50 mg  50 mg Oral Daily Analilia Cerrato PA-C   50 mg at 02/08/25 0836    methocarbamol (ROBAXIN) tablet 500 mg  500 mg Oral 4x Daily Analilia Cerrato PA-C   500 mg at 02/08/25 0836    nystatin (MYCOSTATIN) cream   Topical BID Zenia Martinez MD   Given at 02/08/25 0836    PARoxetine (PAXIL) tablet 20 mg  20 mg Oral QAM Analilia Cerrato PA-C   20 mg at 02/08/25 0836    triamcinolone (KENALOG) 0.1 % ointment   Topical BID Zenia Martinez MD   Given at 02/08/25 0836       Data   Recent Labs   Lab 02/08/25  0515 02/07/25  1310 02/04/25  1722   WBC 10.6 10.8 9.3   HGB 12.3 12.2 11.3*   MCV 86 88 87    252 247    142 143   POTASSIUM 4.1 4.4 3.8   CHLORIDE 104  107 109*   CO2 24 27 23   BUN 18.6 19.4 20.2   CR 0.87 0.99* 1.00*   ANIONGAP 12 8 11   NARESH 9.5 9.6 9.1   * 103* 115*   ALBUMIN  --   --  3.9   PROTTOTAL  --   --  6.5   BILITOTAL  --   --  0.2   ALKPHOS  --   --  76   ALT  --   --  24   AST  --   --  30

## 2025-02-09 PROCEDURE — 99232 SBSQ HOSP IP/OBS MODERATE 35: CPT | Performed by: INTERNAL MEDICINE

## 2025-02-09 PROCEDURE — 96374 THER/PROPH/DIAG INJ IV PUSH: CPT

## 2025-02-09 PROCEDURE — 250N000013 HC RX MED GY IP 250 OP 250 PS 637

## 2025-02-09 PROCEDURE — G0378 HOSPITAL OBSERVATION PER HR: HCPCS

## 2025-02-09 PROCEDURE — 250N000011 HC RX IP 250 OP 636

## 2025-02-09 RX ADMIN — ACETAMINOPHEN 650 MG: 325 TABLET, FILM COATED ORAL at 20:10

## 2025-02-09 RX ADMIN — METHOCARBAMOL 500 MG: 500 TABLET ORAL at 17:27

## 2025-02-09 RX ADMIN — TRIAMCINOLONE ACETONIDE: 1 OINTMENT TOPICAL at 08:15

## 2025-02-09 RX ADMIN — OXYCODONE HYDROCHLORIDE 2.5 MG: 5 TABLET ORAL at 23:44

## 2025-02-09 RX ADMIN — NYSTATIN: 100000 CREAM TOPICAL at 20:15

## 2025-02-09 RX ADMIN — ONDANSETRON 4 MG: 2 INJECTION, SOLUTION INTRAMUSCULAR; INTRAVENOUS at 08:14

## 2025-02-09 RX ADMIN — NYSTATIN: 100000 CREAM TOPICAL at 08:14

## 2025-02-09 RX ADMIN — OXYCODONE HYDROCHLORIDE 2.5 MG: 5 TABLET ORAL at 17:27

## 2025-02-09 RX ADMIN — LIDOCAINE 1 PATCH: 4 PATCH TOPICAL at 20:11

## 2025-02-09 RX ADMIN — TRIAMCINOLONE ACETONIDE: 1 OINTMENT TOPICAL at 20:16

## 2025-02-09 RX ADMIN — METHOCARBAMOL 500 MG: 500 TABLET ORAL at 12:28

## 2025-02-09 RX ADMIN — METHOCARBAMOL 500 MG: 500 TABLET ORAL at 21:50

## 2025-02-09 ASSESSMENT — ACTIVITIES OF DAILY LIVING (ADL)
ADLS_ACUITY_SCORE: 48
ADLS_ACUITY_SCORE: 49
ADLS_ACUITY_SCORE: 52
ADLS_ACUITY_SCORE: 45
DEPENDENT_IADLS:: SHOPPING;TRANSPORTATION
ADLS_ACUITY_SCORE: 49
ADLS_ACUITY_SCORE: 52
ADLS_ACUITY_SCORE: 49
ADLS_ACUITY_SCORE: 48
ADLS_ACUITY_SCORE: 52
ADLS_ACUITY_SCORE: 49
ADLS_ACUITY_SCORE: 52
ADLS_ACUITY_SCORE: 52
ADLS_ACUITY_SCORE: 48
ADLS_ACUITY_SCORE: 52
ADLS_ACUITY_SCORE: 49
ADLS_ACUITY_SCORE: 48
ADLS_ACUITY_SCORE: 48
ADLS_ACUITY_SCORE: 45

## 2025-02-09 NOTE — PROGRESS NOTES
[facility]    Hospitalist Progress Note    Assessment & Plan   This 89-year-old female who was admitted with intractable low back pain after a fall at home.    Low back pain  Mechanical fall     X-ray pelvis with right hip shows markedly demineralized bone without obvious fracture or dislocation.  There is moderate right hip arthrosis.     MRI pelvis is negative for acute fracture.  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  Bilateral gluteal and proximal hamstring tendinopathy, mild right trochanteric bursa apathy, mild strain bilateral adductor muscles, contusion of right gluteal soft tissues.  - Pain control with lidocaine patch, Robaxin, acetaminophen, and oxycodone  - Patient evaluated by physical therapy, who recommends home with physical therapy  - Pain controlled with oral medications on 2/9  - Medically stable for discharge, but no medical transportation available today.    Hypertension  - Continue losartan and amlodipine    Depression and anxiety  - Continue Paxil and Wellbutrin    Clinically Significant Risk Factors Present on Admission                # Hypertension: Noted on problem list               # Financial/Environmental Concerns: none       Date of Service (when I saw the patient): 02/09/2025    Disposition: Medically Ready for Discharge: Ready Now    Adrian Ferraro MD    Interval History   Patient states that her pain is well-controlled with oral medications.  Patient is agreeable to discharge to home today.    -Data reviewed today: I reviewed all new labs and imaging results over the last 24 hours. I personally reviewed no ECG or imaging today.    No results found for this or any previous visit (from the past 24 hours).  Significant Results and Procedures     Physical Exam   Temp: 97.9  F (36.6  C) Temp src: Oral BP: 116/50 Pulse: 81   Resp: 16 SpO2: 95 % O2 Device: None (Room air)    Vitals:    02/07/25 1128  02/07/25 1555   Weight: 68 kg (150 lb) 64.7 kg (142 lb 10.2 oz)     Vital Signs with Ranges  Temp:  [97.5  F (36.4  C)-98  F (36.7  C)] 97.9  F (36.6  C)  Pulse:  [74-92] 81  Resp:  [16-18] 16  BP: (116-158)/(45-69) 116/50  SpO2:  [94 %-95 %] 95 %  No intake/output data recorded.    Gen: Well nourished, elderly female, resting comfortably in bed, no acute distressed  HEENT: Atraumatic, normocephalic; sclera non-injected, anicterric; oral mucosa moist, no lesion, no exudate  Lungs: Clear to ausculation, no wheezes, no rhonchi, no rales  Heart: Regular rate, regular rhythm, no gallops, no rubs, no murmurs  GI: Bowel sound normal, no hepatosplenomegaly, no masses, non-tender, non-distended, no guarding, no rebound tenderness  Extremities: No rashes, no edema  Musculoskeletal: Normal muscle mass and tone, no arthritis    Medications   Current Facility-Administered Medications   Medication Dose Route Frequency Provider Last Rate Last Admin     Current Facility-Administered Medications   Medication Dose Route Frequency Provider Last Rate Last Admin    amLODIPine (NORVASC) tablet 5 mg  5 mg Oral Daily Analilia Cerrtao PA-C   5 mg at 02/08/25 0836    buPROPion (WELLBUTRIN XL) 24 hr tablet 150 mg  150 mg Oral QAM Analilia Cerrato PA-C   150 mg at 02/08/25 0836    Lidocaine (LIDOCARE) 4 % Patch 1 patch  1 patch Transdermal Q24h Analilia Cerrato PA-C   1 patch at 02/08/25 1941    losartan (COZAAR) tablet 50 mg  50 mg Oral Daily Analilia Cerrato PA-C   50 mg at 02/08/25 0836    methocarbamol (ROBAXIN) tablet 500 mg  500 mg Oral 4x Daily Analilia Cerrato PA-C   500 mg at 02/09/25 1228    nystatin (MYCOSTATIN) cream   Topical BID Zenia Martinez MD   Given at 02/09/25 0814    PARoxetine (PAXIL) tablet 20 mg  20 mg Oral QAM Analilia Cerrato PA-C   20 mg at 02/08/25 0836    triamcinolone (KENALOG) 0.1 % ointment   Topical BID Zenia Martinez MD   Given at 02/09/25 0815       Data   Recent Labs   Lab 02/08/25  1243  02/08/25  0515 02/07/25  1310 02/04/25  1722   WBC  --  10.6 10.8 9.3   HGB  --  12.3 12.2 11.3*   MCV  --  86 88 87   PLT  --  285 252 247   NA  --  140 142 143   POTASSIUM  --  4.1 4.4 3.8   CHLORIDE  --  104 107 109*   CO2  --  24 27 23   BUN  --  18.6 19.4 20.2   CR  --  0.87 0.99* 1.00*   ANIONGAP  --  12 8 11   NARESH  --  9.5 9.6 9.1   * 101* 103* 115*   ALBUMIN  --   --   --  3.9   PROTTOTAL  --   --   --  6.5   BILITOTAL  --   --   --  0.2   ALKPHOS  --   --   --  76   ALT  --   --   --  24   AST  --   --   --  30

## 2025-02-09 NOTE — CONSULTS
Care Management Initial Consult    General Information  Assessment completed with: Patient,    Type of CM/SW Visit: Initial Assessment    Primary Care Provider verified and updated as needed: Yes   Readmission within the last 30 days: no previous admission in last 30 days    Reason for Consult: discharge planning  Advance Care Planning:          Communication Assessment  Patient's communication style: spoken language (English or Bilingual)    Hearing Difficulty or Deaf: no   Wear Glasses or Blind: no    Cognitive  Cognitive/Neuro/Behavioral: .WDL except, orientation  Level of Consciousness: confused  Arousal Level: opens eyes spontaneously  Orientation: disoriented to, situation, place, time  Mood/Behavior: calm, cooperative  Best Language: 0 - No aphasia  Speech: clear    Living Environment:   People in home: alone     Current living Arrangements: house      Able to return to prior arrangements: yes     Family/Social Support:  Care provided by: self, friend, other (see comments) (sibling, neighbor)  Provides care for: no one  Marital Status:   Support system: Sibling(s), Friend, Neighbor          Description of Support System: Supportive, Involved       Current Resources:   Patient receiving home care services: No  Community Resources: None  Equipment currently used at home: cane, quad, walker, rolling  Supplies currently used at home:      Employment/Financial:  Employment Status: retired     Financial Concerns: none  Does the patient's insurance plan have a 3 day qualifying hospital stay waiver?  No    Lifestyle & Psychosocial Needs:  Social Drivers of Health     Food Insecurity: Low Risk  (2/7/2025)    Food Insecurity     Within the past 12 months, did you worry that your food would run out before you got money to buy more?: No     Within the past 12 months, did the food you bought just not last and you didn t have money to get more?: No   Depression: Not at risk (1/6/2025)    PHQ-2     PHQ-2 Score: 1    Recent Concern: Depression - At risk (10/24/2024)    PHQ-2     PHQ-2 Score: 3   Housing Stability: Low Risk  (2/7/2025)    Housing Stability     Do you have housing? : Yes     Are you worried about losing your housing?: No   Tobacco Use: Low Risk  (1/23/2025)    Patient History     Smoking Tobacco Use: Never     Smokeless Tobacco Use: Never     Passive Exposure: Not on file   Financial Resource Strain: Low Risk  (2/7/2025)    Financial Resource Strain     Within the past 12 months, have you or your family members you live with been unable to get utilities (heat, electricity) when it was really needed?: No   Alcohol Use: Not on file   Transportation Needs: Low Risk  (2/7/2025)    Transportation Needs     Within the past 12 months, has lack of transportation kept you from medical appointments, getting your medicines, non-medical meetings or appointments, work, or from getting things that you need?: No   Physical Activity: Insufficiently Active (10/24/2024)    Exercise Vital Sign     Days of Exercise per Week: 7 days     Minutes of Exercise per Session: 20 min   Interpersonal Safety: Low Risk  (10/24/2024)    Interpersonal Safety     Do you feel physically and emotionally safe where you currently live?: Yes     Within the past 12 months, have you been hit, slapped, kicked or otherwise physically hurt by someone?: No     Within the past 12 months, have you been humiliated or emotionally abused in other ways by your partner or ex-partner?: No   Stress: No Stress Concern Present (10/24/2024)    [employer] [place] [employer] - Occupational Stress Questionnaire     Feeling of Stress : Only a little   Social Connections: Unknown (10/24/2024)    Social Connection and Isolation Panel [NHANES]     Frequency of Communication with Friends and Family: Not on file     Frequency of Social Gatherings with Friends and Family: Never     Attends Latter day Services: Not on file     Active Member of Clubs or Organizations:  "Not on file     Attends Club or Organization Meetings: Not on file     Marital Status: Not on file   Health Literacy: Not on file     Functional Status:  Prior to admission patient needed assistance:   Dependent ADLs:: Ambulation-cane  Dependent IADLs:: Shopping, Transportation     Mental Health Status:  Mental Health Status: No Current Concerns       Chemical Dependency Status:  Chemical Dependency Status: No Current Concerns           Values/Beliefs:  Spiritual, Cultural Beliefs, Methodist Practices, Values that affect care: no          Discussed  Partnership in Safe Discharge Planning  document with patient/family: Yes    Additional Information:    Referral received for discharge planning. Patient is medically ready for discharge today.    Met with patient at bedside and introduced self/role. Patient lives alone in a private home. She is independent with ADLs at baseline. Uses a cane for ambulation. Patient reports that she uses a grocery delivery service as she recently gave up driving.     Patient shared that she has 2 sons- one passed away 4 years ago and the other son lives locally but she does not have any contact with him.  Patient reports that he quit answering and responding to her a while back, pt states \"I think he got into drugs.\"  She has a neighbor that gets her mail, mows her lawn, and shovels her driveway. She has a couple friends that live locally and are involved.  Patient shares that she has a sister in [place] who she is close with. Patient reports that her sister wants her to move in. Patient is considering the move and acknowledges that she doesn't think she should live alone much longer.     Discussed home care options with patient. She is in agreement and has been accepted by [facility] Phone: [phone] Fax: [phone] for PT/OT/RN/HHA services.     Discussed transport with patient. She reports that does not have anyone available to provide transport and will need CM to " arrange a w/c transport.     Per [facility] transport- there is no availability until 10pm.   arranged a w/c transport for tomorrow (2/10).    PLAN: Home with home care- PT/OT/RN/HHA  Transport: [facility] w/c [phone]      CORKY JOHNSON RN

## 2025-02-09 NOTE — PROGRESS NOTES
"Pts VSS. Room Air. Nauseous this morning, PRN Zofran worked well. PRN Oxy and scheduled Robaxin for complaints of back pain, see MAR. Fair appetite. Remains intermittently confused. Able to utilize call light at times. Bed Alarm on for safety as pt can be impulsive. Very sleepy throughout writers shift. PIV removed as pt was supposed to discharge today, though a ride could not be arranged. Plan to discharge home tomorrow via [facility] transport.       Vital signs:  Temp: 97.9  F (36.6  C) Temp src: Oral BP: 116/50 Pulse: 81   Resp: 16 SpO2: 95 % O2 Device: None (Room air)     Weight: 64.7 kg (142 lb 10.2 oz)  Estimated body mass index is 24.48 kg/m  as calculated from the following:    Height as of 2/4/25: 1.626 m (5' 4\").    Weight as of this encounter: 64.7 kg (142 lb 10.2 oz).       "

## 2025-02-09 NOTE — PROGRESS NOTES
Patient is alert with intermittent confusion. She was given Halodol for confusion last night from prior shift and set off the alarm a few times. She finally was able to rest comfortable after redirecting a few times. She has a left PIV that is saline locked. SHe receives Kenalog cream on her back for her rash/dry skin. No complaints of pain.

## 2025-02-10 ENCOUNTER — TELEPHONE (OUTPATIENT)
Dept: FAMILY MEDICINE | Facility: CLINIC | Age: OVER 89
End: 2025-02-10
Payer: COMMERCIAL

## 2025-02-10 VITALS
HEART RATE: 83 BPM | TEMPERATURE: 97.8 F | WEIGHT: 142.64 LBS | RESPIRATION RATE: 16 BRPM | SYSTOLIC BLOOD PRESSURE: 133 MMHG | BODY MASS INDEX: 24.48 KG/M2 | DIASTOLIC BLOOD PRESSURE: 56 MMHG | OXYGEN SATURATION: 96 %

## 2025-02-10 PROCEDURE — 250N000013 HC RX MED GY IP 250 OP 250 PS 637

## 2025-02-10 PROCEDURE — G0378 HOSPITAL OBSERVATION PER HR: HCPCS

## 2025-02-10 PROCEDURE — 99239 HOSP IP/OBS DSCHRG MGMT >30: CPT | Performed by: STUDENT IN AN ORGANIZED HEALTH CARE EDUCATION/TRAINING PROGRAM

## 2025-02-10 RX ORDER — METHOCARBAMOL 500 MG/1
500 TABLET, FILM COATED ORAL 4 TIMES DAILY PRN
Qty: 60 TABLET | Refills: 0 | Status: SHIPPED | OUTPATIENT
Start: 2025-02-10

## 2025-02-10 RX ORDER — OXYCODONE HYDROCHLORIDE 5 MG/1
5 TABLET ORAL EVERY 4 HOURS PRN
Qty: 12 TABLET | Refills: 0 | Status: SHIPPED | OUTPATIENT
Start: 2025-02-10

## 2025-02-10 RX ADMIN — BUPROPION HYDROCHLORIDE 150 MG: 150 TABLET, EXTENDED RELEASE ORAL at 08:35

## 2025-02-10 RX ADMIN — TRIAMCINOLONE ACETONIDE: 1 OINTMENT TOPICAL at 08:35

## 2025-02-10 RX ADMIN — OXYCODONE 5 MG: 5 TABLET ORAL at 06:05

## 2025-02-10 RX ADMIN — LOSARTAN POTASSIUM 50 MG: 50 TABLET, FILM COATED ORAL at 08:35

## 2025-02-10 RX ADMIN — PAROXETINE 20 MG: 20 TABLET, FILM COATED ORAL at 08:35

## 2025-02-10 RX ADMIN — NYSTATIN: 100000 CREAM TOPICAL at 08:36

## 2025-02-10 RX ADMIN — METHOCARBAMOL 500 MG: 500 TABLET ORAL at 08:35

## 2025-02-10 RX ADMIN — AMLODIPINE BESYLATE 5 MG: 5 TABLET ORAL at 08:35

## 2025-02-10 ASSESSMENT — ACTIVITIES OF DAILY LIVING (ADL)
ADLS_ACUITY_SCORE: 52
ADLS_ACUITY_SCORE: 49
ADLS_ACUITY_SCORE: 52
ADLS_ACUITY_SCORE: 49
ADLS_ACUITY_SCORE: 52
ADLS_ACUITY_SCORE: 52

## 2025-02-10 NOTE — DISCHARGE SUMMARY
[facility]  Hospitalist Discharge Summary      Date of Admission:  2/7/2025  Date of Discharge:  2/10/2025  Discharging Provider: Gustabo Nava MD  Discharge Service: Hospitalist Service    Discharge Diagnoses   # Low back pain  # Initial concern for  acute lumbar compression fracture ruled out on MRI   # Mechanical fall  # Hx T2DM  # Hypertension  # Depression and anxiety  # Rash on back  # Breast intertrigo     Clinically Significant Risk Factors          Follow-ups Needed After Discharge   Follow-up Appointments       Follow-up and recommended labs and tests       Follow up with primary care provider, Renay Hannah, within 7 days to evaluate medication change and for hospital follow- up.                Unresulted Labs Ordered in the Past 30 Days of this Admission       No orders found from 1/8/2025 to 2/8/2025.        These results will be followed up by Gustabo Nava MD      Discharge Disposition   Discharged to home  Condition at discharge: Stable    Hospital Course   This 89-year-old female with past medical history of  restless leg syndrome, hyperlipidemia, hypertension, type 2 diabetes, CKD stage III, coronary artery disease, generalized anxiety disorder, depression, breast intertrigo, and significant number of medication allergies, 31 to be exact, presents to the ED with intractable back pain after a fall 2/7/2025 and was admitted admitted with possible compression fracture. Managed conservatively and discharging back to home with home cares.      # Low back pain  # Initial concern for  acute lumbar compression fracture ruled out on MRI   # Mechanical fall   X-ray pelvis with right hip shows markedly demineralized bone raising concern for occult fracture but no fracture appreciated on MRI. 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  Bilateral gluteal and proximal hamstring tendinopathy, mild right trochanteric  bursa apathy, mild strain bilateral adductor muscles, contusion of right gluteal soft tissues. Able to achieve pain affective pain control with po medications and patient able to work with therapies in the hospital.   - Pain control with Robaxin, acetaminophen, and oxycodone, will discharge with some of all these medications.   - Patient evaluated by physical therapy, who recommends home with physical therapy    # Hx T2DM  Diagnosed in 2016 has been diet controlled A1C 6.0% 10/2024.     # Hypertension  - Continue PTA olmesartan and amlodipine     # Depression and anxiety  - Continue Paxil and Wellbutrin    # Rash on back  PTA on triamcinolone, continued     # Breast intertrigo   - Continued Mycostatin external cream     Consultations This Hospital Stay   CARE MANAGEMENT / SOCIAL WORK IP CONSULT  PHYSICAL THERAPY ADULT IP CONSULT  OCCUPATIONAL THERAPY ADULT IP CONSULT    Code Status   No CPR- Do NOT Intubate    Time Spent on this Encounter   I, Gustabo Nava MD, personally saw the patient today and spent greater than 30 minutes discharging this patient.       Gustabo Nava MD  [facility]  [address] [address]  Phone: [phone]  Fax: [phone]  ______________________________________________________________________    Physical Exam   Vital Signs: Temp: 97.8  F (36.6  C) Temp src: Oral BP: 133/56 Pulse: 83   Resp: 16 SpO2: 96 % O2 Device: None (Room air)    Weight: 142 lbs 10.2 oz  General Appearance: Awake and alert, sitting up in a chair in no acute distress   Respiratory: Breathing easily on room air, lungs clear to auscultation bilaterally   Cardiovascular: Regular rate and rhythm, soft systolic murmur appreciate   GI: Abdomens soft, non-tender, and non-distended   Skin: No rashes or lesions   Other: Appropriate mood and affect         Primary Care Physician   Renay Hannah    Discharge Orders      Primary Care - Care Coordination Referral       Home Care Referral      Reason for your hospital stay    You were admitted after a fall with back pain. Your pain was controlled and you are being discharged back to home with some additional pain medications and a plan for home services.     Follow-up and recommended labs and tests     Follow up with primary care provider, Renay Hannah, within 7 days to evaluate medication change and for hospital follow- up.     Activity    Your activity upon discharge: activity as tolerated     Diet    Follow this diet upon discharge: Regular adult diet       Significant Results and Procedures   Results for orders placed or performed during the hospital encounter of 02/04/25   Pelvis XR w/ unilateral hip right    Narrative    EXAM: XR PELVIS AND HIP RIGHT 1 VIEW  LOCATION: [facility]  DATE: 2/4/2025    INDICATION: Right hip and SI joint pain. Fall a week prior. Advanced age. Abnormal CT lumbar spine and CTA P on 1/31/2025  T12 and L1 subacute Fx. Evaluate for acute bony process.  COMPARISON: 01/31/2025 CT.      Impression    IMPRESSION: Bones are markedly demineralized which limits fracture detection. Moderate right hip arthrosis. No acute displaced fracture identified. No dislocation.    If there is high clinical concern for an occult fracture, recommend MRI.   MR Pelvis Bone wo Contrast    Narrative    EXAM: MR PELVIC BONES W/O CONTRAST  LOCATION: [facility]  DATE: 2/4/2025    INDICATION: Right SI and hip pain after fall a week prior. Abnormal CT lumbar spine and abdomen pelvis on 1 31 25.  Persistent low back and hip pain.  Advanced age. MR advised with bony demineralization on x ray. Evaluate for acute bony process  COMPARISON: Same day radiographs and prior CT abdomen pelvis and lumbar spine dated 1/31/2025.   TECHNIQUE: Unenhanced.    FINDINGS:     No acute fracture or confluent marrow replacing process. Moderate degenerative arthrosis of both hips with small  bilateral hip joint effusions, likely degenerative in etiology. Mild degenerative arthrosis of the pubic symphysis and both SI joints. No SI   joint effusion. Mild subchondral edema-like marrow signal of the sacral and ilial sides of both SI joints, slightly more pronounced on the right. Lower lumbar spondylosis, better characterized on the dedicated CT lumbar spine of 1/31/2025.    Bilateral gluteal and proximal hamstring tendinopathy. No tendon tear. Mildly distended and fluid-filled right trochanteric bursa.    Mild diffuse fatty atrophy of the pelvic girdle and proximal thigh musculature. Mild patchy T2 hyperintensity within the bilateral adductor muscles (series 103 image 9 and series 105 image 32).    No soft tissue mass or fluid collection. Mild subcutaneous soft tissue edema involving the right gluteal subcutaneous soft tissues (series 105 image 23).    Hysterectomy. Visualized intrapelvic contents are otherwise negative.       Impression    IMPRESSION:  1.  No acute fracture.  2.  Degenerative arthrosis of both hips, both SI joints, the lumbar spine, and pubic symphysis.  3.  Bilateral gluteal and proximal hamstring tendinopathy.  4.  Mild right trochanteric bursopathy.   5.  Mild strain of the bilateral adductor muscles.  6.  Contusion of the right gluteal soft tissues.     *Note: Due to a large number of results and/or encounters for the requested time period, some results have not been displayed. A complete set of results can be found in Results Review.       Discharge Medications   Current Discharge Medication List        START taking these medications    Details   methocarbamol (ROBAXIN) 500 MG tablet Take 1 tablet (500 mg) by mouth 4 times daily as needed for muscle spasms.  Qty: 60 tablet, Refills: 0    Associated Diagnoses: Right-sided low back pain with right-sided sciatica, unspecified chronicity; Chronic left-sided low back pain with left-sided sciatica; Chronic pain of left knee      oxyCODONE  (ROXICODONE) 5 MG tablet Take 1 tablet (5 mg) by mouth every 4 hours as needed for severe pain (IF pain not managed with non-pharmacological and non-opioid interventions).  Qty: 12 tablet, Refills: 0    Associated Diagnoses: Right-sided low back pain with right-sided sciatica, unspecified chronicity; Chronic left-sided low back pain with left-sided sciatica; Chronic pain of left knee           CONTINUE these medications which have NOT CHANGED    Details   acetaminophen (TYLENOL) 500 MG tablet Take 500-1,000 mg by mouth every 6 hours as needed for mild pain or pain      amLODIPine (NORVASC) 5 MG tablet Take 1 tablet (5 mg) by mouth daily.  Qty: 90 tablet, Refills: 3    Associated Diagnoses: Benign essential hypertension      blood glucose (NO BRAND SPECIFIED) test strip Use to test blood sugar 1 times daily or as directed. To accompany: Blood Glucose Monitor Brands: per insurance.  Qty: 100 strip, Refills: 6    Associated Diagnoses: Type 2 diabetes mellitus with diabetic polyneuropathy, without long-term current use of insulin (H)      blood glucose monitoring (NO BRAND SPECIFIED) meter device kit Use to test blood sugar 1 times daily or as directed. Preferred blood glucose meter OR supplies to accompany: Blood Glucose Monitor Brands: per insurance.  Qty: 1 kit, Refills: 0    Associated Diagnoses: Type 2 diabetes mellitus with diabetic polyneuropathy, without long-term current use of insulin (H)      buPROPion (WELLBUTRIN XL) 150 MG 24 hr tablet Take 1 tablet (150 mg) by mouth every morning.  Qty: 90 tablet, Refills: 3    Associated Diagnoses: ARABELLA (generalized anxiety disorder); Moderate major depression (H)      loperamide (IMODIUM A-D) 2 MG tablet Take 1 tablet (2 mg) by mouth daily as needed for diarrhea.  Qty: 30 tablet, Refills: 2    Associated Diagnoses: Irritable bowel syndrome with diarrhea      Multiple Vitamins-Minerals (THERAPEUTIC MULTIVIT/MINERAL) TABS Take 1 tablet by mouth every evening       nystatin  (MYCOSTATIN) 428558 UNIT/GM external cream Apply topically 2 times daily. Apply to area under bilateral breasts  Qty: 30 g, Refills: 11    Associated Diagnoses: Intertrigo      olmesartan (BENICAR) 20 MG tablet Take 1 tablet (20 mg) by mouth daily.  Qty: 90 tablet, Refills: 3    Associated Diagnoses: Benign essential hypertension      PARoxetine (PAXIL) 20 MG tablet Take 1 tablet (20 mg) by mouth every morning  Qty: 90 tablet, Refills: 3    Associated Diagnoses: ARABELLA (generalized anxiety disorder)      thin (NO BRAND SPECIFIED) lancets Use with lanceting device. To accompany: Blood Glucose Monitor Brands: per insurance.  Qty: 100 each, Refills: 6    Associated Diagnoses: Type 2 diabetes mellitus with diabetic polyneuropathy, without long-term current use of insulin (H)      triamcinolone (KENALOG) 0.1 % external cream Apply topically 2 times daily. To rash on back for up to 2 weeks.  Qty: 45 g, Refills: 0    Associated Diagnoses: Itching           Allergies   Allergies   Allergen Reactions    Metoprolol Itching and Rash    Cephalexin Rash    Erythromycin Rash    Atarax [Hydroxyzine] Other (See Comments)     Syncope and collapse    Hydrocodone     Shellfish Allergy     Acetaminophen Itching     Patient reported - only when used scheduled in high doses      Actonel [Bisphosphonates] Itching    Alendronate Rash    Azithromycin Hives    Celebrex [Ricci-2 Inhibitors (Sulfonamide)] Rash    Celecoxib Rash    Cipro [Ciprofloxacin] Rash    Contrast Dye Hives    Diatrizoate Hives    Erythromycin Rash    Escitalopram Diarrhea     Gets very sick and mad feelings    Fosamax Itching and Rash    Keflex [Cephalexin Monohydrate] Rash    Lisinopril Cough    Penicillins Rash    Risedronate Itching    Rofecoxib Rash    Seasonal Allergies Other (See Comments)     Seasonal rhinitis    Shellfish-Derived Products Hives    Sulfa Antibiotics Itching and Rash    Sulfasalazine Itching and Rash    Tetanus Toxoid, Adsorbed Swelling     Tetanus-Diphtheria Toxoids Td Swelling    Vicodin [Acetaminophen] Itching     Patient reported - only when used scheduled in high doses.       Vioxx Rash

## 2025-02-10 NOTE — PROGRESS NOTES
[facility] DISCHARGE NOTE    Patient discharged to home at 10:58 AM via wheel chair. Accompanied by transport staff. Discharge instructions reviewed with patient, opportunity offered to ask questions. Prescriptions filled and sent with patient upon discharge. All belongings sent with patient.    Teri Phillips RN

## 2025-02-10 NOTE — TELEPHONE ENCOUNTER
Home Health Care    Reason for call:  Verbal orders     Orders are needed for this patient.  Skilled Nursing: Start of care     Pt Provider: Renay Hannah    Requesting last face to face visit be faxed over. Has to be within the last 90 days. They are wanting the appt notes from 1/29/25 with Iris Amaro. Fax number [phone].    Phone Number Homecare Nurse can be reached at: [facility] [phone] opt 2      Okay to leave a detailed message?: Yes     Marcella Peña on 2/10/2025 at 3:14 PM

## 2025-02-10 NOTE — PROGRESS NOTES
Care Management Discharge Note    Discharge Date: 02/10/2025       Discharge Disposition: Home Care    Discharge Services: Transportation Services    Discharge DME: None    Discharge Transportation: agency    Private pay costs discussed: transportation costs    Does the patient's insurance plan have a 3 day qualifying hospital stay waiver?  yes     Patient/family educated on Medicare website which has current facility and service quality ratings: yes    Education Provided on the Discharge Plan: Yes  Persons Notified of Discharge Plans: patient  Patient/Family in Agreement with the Plan: yes    Handoff Referral Completed: Yes, [facility] PCP: Internal handoff referral completed    Additional Information:    Patient is medically ready for discharge today.     Met with patient at bedside for discharge planning. Patient is in agreement with discharging home with home care services. Patient request that CM call her sister, Helen, and update her on discharge today.     CM spoke with Helen and provided discharge update. Per Helen, she has been trying to get the pt to move in with her for 5 years now. Helen lives in [place] about 2hrs away. Helen calls and checks in daily. Patient also has lifeline, grocery delivery, and neighbors that shovel, mow, and collect her mail. Helen shared that the patient has had meals on wheels, home care, and other various services in the past but continues to cancel services. Helen feels that patient is able to return home safely with home care services.     Plan: Home with Home Health Care [facility] Phone: [phone] Fax: [phone] PT/OT/RN/GARDENIA  Transport:  [employer] w/c between [phone]    CORKY JOHNSON RN

## 2025-02-10 NOTE — PLAN OF CARE
Physical Therapy Discharge Summary    Reason for therapy discharge:    Discharged to home with home therapy.    Progress towards therapy goal(s). See goals on Care Plan in [facility] electronic health record for goal details.  Goals partially met.  Barriers to achieving goals:   discharge from facility.    Therapy recommendation(s):    Continued therapy is recommended.  Rationale/Recommendations:  Recommend continued home care PT to increase indep and ease in own environment.

## 2025-02-10 NOTE — PROGRESS NOTES
Patient was ready for discharge home yesterday but unable to get a ride home; she lives alone. Ride lined up for today.   Patient is alert and oriented but forgetful. Up with assist of one and walker. Wearing brief for episodes of incontinence. No PIV. On scheduled Robaxin, Lidocaine patch right lower back, PRN oxycodone for pain. Oxycodone 2.5 mg given at 2344 last night and patient stated that this really helped to relieve her pain.

## 2025-02-11 ENCOUNTER — PATIENT OUTREACH (OUTPATIENT)
Dept: CARE COORDINATION | Facility: CLINIC | Age: OVER 89
End: 2025-02-11
Payer: COMMERCIAL

## 2025-02-11 ASSESSMENT — ACTIVITIES OF DAILY LIVING (ADL): DEPENDENT_IADLS:: SHOPPING;TRANSPORTATION

## 2025-02-11 NOTE — LETTER
[facility]  Patient Centered Plan of Care  About Me:        Patient Name:  Daniela Wetzel    YOB: 1933  Age:         89 year old   [place] MRN:    [id number] Telephone Information:  Home Phone [phone]   Mobile [phone]       Address:  [address]  [place] [place] [facility] [place] [address] Email address:  No e-mail address on record      Emergency Contact(s)    Name Relationship Lgl Grd Work Phone Home Phone Mobile Phone   1. EDEN WETZEL Son No   [phone]   2. TACO JURADO Friend   [phone]            Primary language:  English     needed? No   [place] [facility]:  [phone] op. 1  Other communication barriers:Glasses    Preferred Method of Communication:  Mail  Current living arrangement: I live in a private home    Mobility Status/ Medical Equipment: Independent w/Device        Health Maintenance  Health Maintenance Reviewed: Not assessed      My Access Plan  Medical Emergency 911   Primary Clinic Line [facility] - [phone]   24 Hour Appointment Line [phone] or  [phone]) (toll-free)   24 Hour Nurse Line [phone] (toll-free)   Preferred Urgent Care [facility], [phone]     Preferred Hospital [place], [place]  [phone]     Preferred Pharmacy [facility] [address] [facility]     Behavioral Health Crisis Line The National Suicide Prevention Lifeline at 1-537.253.4328 or Text/Call 448           My Care Team Members  Patient Care Team         Relationship Specialty Notifications Start End    Renay Hannah, APRN CNP PCP - General Nurse Practitioner Primary Care  7/26/24     Phone: [phone] Fax: [phone] [address] [facility] [address]    Cynthia Maddox,  Assigned PCP   10/21/18     Phone: [phone] Pager: Use Vocera Fax: [phone] [address] [facility] [address]    Tho Newman,  DO Assigned Musculoskeletal Provider   3/13/22     Phone: [phone] Fax: [phone]         [address] [facility] [address]    Gunjan Rowe PA-C Physician Assistant Dermatology  1/17/23     Phone: [phone] Fax: [phone]         [address] [facility] [address]    Theodora Pierre MA Audiologist Audiology  4/25/24     Phone: [phone] Fax: [phone]         [address] [facility] [address]    Axel Webb DPM Assigned Surgical Provider   8/23/24     Phone: [phone] Pager: [phone] Fax: [phone]        [address] [address]    Leandro Posada MD MD Dermatology  1/7/25     Phone: [phone] Pager: [phone] Fax: [phone]        [address] [facility] [address]    Leatha Davis, RN Lead Care Coordinator Primary Care -  Admissions 2/11/25     Phone: [phone]                     My Care Plans  Self Management and Treatment Plan    Care Plan  Care Plan: Housing Instability       Problem: SDOH LACK OF STABLE HOUSING       Goal: Establish Stable/safe  Housing       Start Date: 2/13/2025 Expected End Date: 4/13/2025    This Visit's Progress: 30%    Priority: High    Note:     Barriers: Deconditioned   Strengths: agrees with plan discussed   Patient expressed understanding of goal: Yes   Action steps to achieve this goal:  1.Care coordinator call [facility] for a [place] assessment Completed 2/13/2025  2..Care Coordinator will call [facility] for possible  home visit Completed 2/13/2025  3. Patient gives verbal permission to speak to Wally/son  Completed 2/13/2025  4. Patient will use her cane/walker for safety                               Action Plans on File:                       Advance Care Plans/Directives:   Advanced Care Plan/Directives on file: Yes    Status of Document(s): On File and Validated    Advanced Care Plan/Directives Type: POLST; Advanced Directive - On File           My Medical  and Care Information  Problem List   Patient Active Problem List   Diagnosis    Degeneration of lumbar or lumbosacral intervertebral disc    Esophageal reflux    Memory loss    Irritable bowel syndrome with diarrhea    Osteopenia of multiple sites    RESTLESS LEG SYNDROME    Primary osteoarthritis involving multiple joints    Diverticulosis of large intestine    SENSONRL HEAR LOSS,BILAT    Urticaria    Subjective tinnitus    Vitamin D deficiency    Right foot pain    Urge incontinence    Hyperlipidemia LDL goal <100    Allergic rhinitis    Pronation of foot    Peripheral neuropathy    Benign essential hypertension    Carpal tunnel syndrome of left wrist    Diastolic dysfunction    Type 2 diabetes mellitus with diabetic polyneuropathy, without long-term current use of insulin (H)    History of recurrent urinary tract infection    CKD (chronic kidney disease) stage 3, GFR 30-59 ml/min (H)    Bilateral wrist pain    Protrusion of lumbar intervertebral disc    Coronary artery disease involving native coronary artery of native heart with angina pectoris    Chronic left-sided low back pain with left-sided sciatica    Generalized weakness    Diarrhea    Abnormal CT of the abdomen    Cystocele, midline    B12 deficiency    Moderate major depression (H)    ARABELLA (generalized anxiety disorder)    Polyneuropathy associated with underlying disease    Closed head injury, initial encounter    Fall, initial encounter    Non-traumatic rhabdomyolysis    Impaired mobility and activities of daily living    Complex care coordination    Examination of eyes and vision    Left foot pain    Encounter for Medicare annual wellness exam    Chronic pain of left knee    Intertrigo    No-show for appointment    Right-sided low back pain with right-sided sciatica, unspecified chronicity      Current Medications:    Current Outpatient Medications   Medication Sig Dispense Refill    acetaminophen (TYLENOL) 500 MG tablet Take 500-1,000 mg by mouth  every 6 hours as needed for mild pain or pain      amLODIPine (NORVASC) 5 MG tablet Take 1 tablet (5 mg) by mouth daily. 90 tablet 3    blood glucose (NO BRAND SPECIFIED) test strip Use to test blood sugar 1 times daily or as directed. To accompany: Blood Glucose Monitor Brands: per insurance. 100 strip 6    blood glucose monitoring (NO BRAND SPECIFIED) meter device kit Use to test blood sugar 1 times daily or as directed. Preferred blood glucose meter OR supplies to accompany: Blood Glucose Monitor Brands: per insurance. 1 kit 0    buPROPion (WELLBUTRIN XL) 150 MG 24 hr tablet Take 1 tablet (150 mg) by mouth every morning. 90 tablet 3    loperamide (IMODIUM A-D) 2 MG tablet Take 1 tablet (2 mg) by mouth daily as needed for diarrhea. 30 tablet 2    methocarbamol (ROBAXIN) 500 MG tablet Take 1 tablet (500 mg) by mouth 4 times daily as needed for muscle spasms. 60 tablet 0    Multiple Vitamins-Minerals (THERAPEUTIC MULTIVIT/MINERAL) TABS Take 1 tablet by mouth every evening       nystatin (MYCOSTATIN) 773278 UNIT/GM external cream Apply topically 2 times daily. Apply to area under bilateral breasts 30 g 11    olmesartan (BENICAR) 20 MG tablet Take 1 tablet (20 mg) by mouth daily. 90 tablet 3    oxyCODONE (ROXICODONE) 5 MG tablet Take 1 tablet (5 mg) by mouth every 4 hours as needed for severe pain (IF pain not managed with non-pharmacological and non-opioid interventions). 12 tablet 0    PARoxetine (PAXIL) 20 MG tablet Take 1 tablet (20 mg) by mouth every morning 90 tablet 3    thin (NO BRAND SPECIFIED) lancets Use with lanceting device. To accompany: Blood Glucose Monitor Brands: per insurance. 100 each 6    triamcinolone (KENALOG) 0.1 % external cream Apply topically 2 times daily. To rash on back for up to 2 weeks. 45 g 0     No current facility-administered medications for this visit.        Care Coordination Start Date: 2/11/2025   Frequency of Care Coordination: weekly, more frequently as needed     Form Last  Updated: 02/13/2025

## 2025-02-11 NOTE — LETTER
[facility] CARE COORDINATION  [address] [address]     February 13, 2025    Daniela [place] Kevin  [address] [address]  [facility] [place], [place] [address]      Dear Sivan,    I am a clinic care coordinator who works with CYN Delaney CNP with the [facility]. I wanted to thank you for spending the time to talk with me.  Below is a description of clinic care coordination and how I can further assist you.       The clinic care coordination team is made up of a registered nurse, , financial resource worker and community health worker who understand the health care system. The goal of clinic care coordination is to help you manage your health and improve access to the health care system. Our team works alongside your provider to assist you in determining your health and social needs. We can help you obtain health care and community resources, providing you with necessary information and education. We can work with you through any barriers and develop a care plan that helps coordinate and strengthen the communication between you and your care team.  Our services are voluntary and are offered without charge to you personally.    Please feel free to contact me with any questions or concerns regarding care coordination and what we can offer.      We are focused on providing you with the highest-quality healthcare experience possible.    Sincerely,     [facility]   Leatha Davis RN, Care Coordinator   [facility]'s   E-mail [email or web address]   [phone]     Enclosed: I have enclosed a copy of the Patient Centered Plan of Care. This has helpful information and goals that we have talked about. Please keep this in an easy to access place to use as needed.

## 2025-02-11 NOTE — PROGRESS NOTES
Clinic Care Coordination Contact  [facility]/Voicemail    Clinical Data: Care Coordinator Outreach    Outreach Documentation Number of Outreach Attempt   1/31/2025  11:20 AM 1   2/11/2025  10:21 AM 1     Home # disconnects after 2 rings.  Mobile # not in service       Plan: . Care Coordinator will try to reach patient again in 1-2 business days.    [facility]   Leatha Davis RN, Care Coordinator   [facility]'s   E-mail [email or web address]   [phone]

## 2025-02-12 ENCOUNTER — NURSE TRIAGE (OUTPATIENT)
Dept: FAMILY MEDICINE | Facility: CLINIC | Age: OVER 89
End: 2025-02-12

## 2025-02-12 ENCOUNTER — MEDICAL CORRESPONDENCE (OUTPATIENT)
Dept: HEALTH INFORMATION MANAGEMENT | Facility: CLINIC | Age: OVER 89
End: 2025-02-12

## 2025-02-12 ENCOUNTER — HOSPITAL ENCOUNTER (EMERGENCY)
Facility: CLINIC | Age: OVER 89
Discharge: HOME | End: 2025-02-12
Attending: FAMILY MEDICINE | Admitting: FAMILY MEDICINE
Payer: COMMERCIAL

## 2025-02-12 VITALS
BODY MASS INDEX: 24.37 KG/M2 | OXYGEN SATURATION: 95 % | SYSTOLIC BLOOD PRESSURE: 167 MMHG | TEMPERATURE: 97.9 F | DIASTOLIC BLOOD PRESSURE: 70 MMHG | WEIGHT: 142 LBS | HEART RATE: 72 BPM | RESPIRATION RATE: 12 BRPM

## 2025-02-12 DIAGNOSIS — R53.1 GENERALIZED WEAKNESS: ICD-10-CM

## 2025-02-12 DIAGNOSIS — S32.010A CLOSED COMPRESSION FRACTURE OF BODY OF L1 VERTEBRA (H): ICD-10-CM

## 2025-02-12 LAB
ALBUMIN SERPL BCG-MCNC: 4.1 G/DL (ref 3.5–5.2)
ALBUMIN UR-MCNC: NEGATIVE MG/DL
ALP SERPL-CCNC: 95 U/L (ref 40–150)
ALT SERPL W P-5'-P-CCNC: 18 U/L (ref 0–50)
ANION GAP SERPL CALCULATED.3IONS-SCNC: 16 MMOL/L (ref 7–15)
APPEARANCE UR: CLEAR
AST SERPL W P-5'-P-CCNC: 29 U/L (ref 0–45)
BASOPHILS # BLD AUTO: 0.1 10E3/UL (ref 0–0.2)
BASOPHILS NFR BLD AUTO: 1 %
BILIRUB SERPL-MCNC: 0.4 MG/DL
BILIRUB UR QL STRIP: NEGATIVE
BUN SERPL-MCNC: 22.8 MG/DL (ref 8–23)
CALCIUM SERPL-MCNC: 9.7 MG/DL (ref 8.8–10.4)
CHLORIDE SERPL-SCNC: 101 MMOL/L (ref 98–107)
CK SERPL-CCNC: 123 U/L (ref 26–192)
COLOR UR AUTO: ABNORMAL
CREAT SERPL-MCNC: 0.93 MG/DL (ref 0.51–0.95)
EGFRCR SERPLBLD CKD-EPI 2021: 57 ML/MIN/1.73M2
EOSINOPHIL # BLD AUTO: 0.1 10E3/UL (ref 0–0.7)
EOSINOPHIL NFR BLD AUTO: 1 %
ERYTHROCYTE [DISTWIDTH] IN BLOOD BY AUTOMATED COUNT: 12.5 % (ref 10–15)
FLUAV RNA SPEC QL NAA+PROBE: NEGATIVE
FLUBV RNA RESP QL NAA+PROBE: NEGATIVE
GLUCOSE SERPL-MCNC: 122 MG/DL (ref 70–99)
GLUCOSE UR STRIP-MCNC: NEGATIVE MG/DL
HCO3 SERPL-SCNC: 20 MMOL/L (ref 22–29)
HCT VFR BLD AUTO: 34.8 % (ref 35–47)
HGB BLD-MCNC: 12 G/DL (ref 11.7–15.7)
HGB UR QL STRIP: ABNORMAL
IMM GRANULOCYTES # BLD: 0 10E3/UL
IMM GRANULOCYTES NFR BLD: 0 %
KETONES UR STRIP-MCNC: 10 MG/DL
LACTATE SERPL-SCNC: 1.2 MMOL/L (ref 0.7–2)
LEUKOCYTE ESTERASE UR QL STRIP: NEGATIVE
LYMPHOCYTES # BLD AUTO: 1.6 10E3/UL (ref 0.8–5.3)
LYMPHOCYTES NFR BLD AUTO: 16 %
MCH RBC QN AUTO: 29.3 PG (ref 26.5–33)
MCHC RBC AUTO-ENTMCNC: 34.5 G/DL (ref 31.5–36.5)
MCV RBC AUTO: 85 FL (ref 78–100)
MONOCYTES # BLD AUTO: 0.7 10E3/UL (ref 0–1.3)
MONOCYTES NFR BLD AUTO: 7 %
MUCOUS THREADS #/AREA URNS LPF: PRESENT /LPF
NEUTROPHILS # BLD AUTO: 7.4 10E3/UL (ref 1.6–8.3)
NEUTROPHILS NFR BLD AUTO: 75 %
NITRATE UR QL: NEGATIVE
NRBC # BLD AUTO: 0 10E3/UL
NRBC BLD AUTO-RTO: 0 /100
PH UR STRIP: 6 [PH] (ref 5–7)
PLATELET # BLD AUTO: 265 10E3/UL (ref 150–450)
POTASSIUM SERPL-SCNC: 3.9 MMOL/L (ref 3.4–5.3)
PROT SERPL-MCNC: 7.1 G/DL (ref 6.4–8.3)
RBC # BLD AUTO: 4.1 10E6/UL (ref 3.8–5.2)
RBC URINE: 2 /HPF
RSV RNA SPEC NAA+PROBE: NEGATIVE
SARS-COV-2 RNA RESP QL NAA+PROBE: NEGATIVE
SODIUM SERPL-SCNC: 137 MMOL/L (ref 135–145)
SP GR UR STRIP: 1.01 (ref 1–1.03)
UROBILINOGEN UR STRIP-MCNC: NORMAL MG/DL
WBC # BLD AUTO: 10 10E3/UL (ref 4–11)
WBC URINE: 3 /HPF

## 2025-02-12 PROCEDURE — 85004 AUTOMATED DIFF WBC COUNT: CPT | Performed by: FAMILY MEDICINE

## 2025-02-12 PROCEDURE — 82550 ASSAY OF CK (CPK): CPT | Performed by: FAMILY MEDICINE

## 2025-02-12 PROCEDURE — 83605 ASSAY OF LACTIC ACID: CPT | Performed by: FAMILY MEDICINE

## 2025-02-12 PROCEDURE — 99285 EMERGENCY DEPT VISIT HI MDM: CPT | Mod: 25

## 2025-02-12 PROCEDURE — 81003 URINALYSIS AUTO W/O SCOPE: CPT | Performed by: FAMILY MEDICINE

## 2025-02-12 PROCEDURE — 99284 EMERGENCY DEPT VISIT MOD MDM: CPT | Performed by: FAMILY MEDICINE

## 2025-02-12 PROCEDURE — 93010 ELECTROCARDIOGRAM REPORT: CPT | Performed by: FAMILY MEDICINE

## 2025-02-12 PROCEDURE — 80053 COMPREHEN METABOLIC PANEL: CPT | Performed by: FAMILY MEDICINE

## 2025-02-12 PROCEDURE — 93005 ELECTROCARDIOGRAM TRACING: CPT

## 2025-02-12 PROCEDURE — 36415 COLL VENOUS BLD VENIPUNCTURE: CPT | Performed by: FAMILY MEDICINE

## 2025-02-12 PROCEDURE — 87637 SARSCOV2&INF A&B&RSV AMP PRB: CPT | Performed by: FAMILY MEDICINE

## 2025-02-12 PROCEDURE — 250N000013 HC RX MED GY IP 250 OP 250 PS 637: Performed by: FAMILY MEDICINE

## 2025-02-12 RX ORDER — OXYCODONE HYDROCHLORIDE 5 MG/1
5 TABLET ORAL EVERY 4 HOURS PRN
Status: DISCONTINUED | OUTPATIENT
Start: 2025-02-12 | End: 2025-02-12 | Stop reason: HOSPADM

## 2025-02-12 RX ADMIN — OXYCODONE 5 MG: 5 TABLET ORAL at 16:38

## 2025-02-12 ASSESSMENT — ACTIVITIES OF DAILY LIVING (ADL)
ADLS_ACUITY_SCORE: 65

## 2025-02-12 ASSESSMENT — COLUMBIA-SUICIDE SEVERITY RATING SCALE - C-SSRS
6. HAVE YOU EVER DONE ANYTHING, STARTED TO DO ANYTHING, OR PREPARED TO DO ANYTHING TO END YOUR LIFE?: NO
1. IN THE PAST MONTH, HAVE YOU WISHED YOU WERE DEAD OR WISHED YOU COULD GO TO SLEEP AND NOT WAKE UP?: NO
2. HAVE YOU ACTUALLY HAD ANY THOUGHTS OF KILLING YOURSELF IN THE PAST MONTH?: NO

## 2025-02-12 NOTE — TELEPHONE ENCOUNTER
"I'm reallly sick and hard to breathe and walk, I'm really weak.     S-(situation): Trouble breathing and severe weakness.     B-(background): 89 year old. Feel like is so weak will fall if gets up and says very hard to breathe.     A-(assessment): 911 called by writer for Sivan. ([facility]) Paramedics on way to evaluate and transport to ED.     R-(recommendations): 911 medic called, patient lives alone in single family home by self, no one to help and needs medical eval and transport assistance. Medics and first responders sent.     Patient was calm and able to speak in full sentences. I did not hear any gasping. Patient felt okay to await until respnders arrive. I had offered to stay on line with her. Medics and first responders familiar with Sivan and should be arriving shortly.     Joselyn Rojas R.N.      Reason for Disposition   SEVERE difficulty breathing (e.g., struggling for each breath, speaks in single words, pulse > 120)   MODERATE difficulty breathing (e.g., speaks in phrases, SOB even at rest, pulse 100-120) of new-onset or worse than normal    Answer Assessment - Initial Assessment Questions  1. RESPIRATORY STATUS: \"Describe your breathing?\" (e.g., wheezing, shortness of breath, unable to speak, severe coughing)       Hard to breath.     2. ONSET: \"When did this breathing problem begin?\"       30 min ago.     3. PATTERN \"Does the difficult breathing come and go, or has it been constant since it started?\"       Constant    4. SEVERITY: \"How bad is your breathing?\" (e.g., mild, moderate, severe)       Patient described as severe    5. RECURRENT SYMPTOM: \"Have you had difficulty breathing before?\" If Yes, ask: \"When was the last time?\" and \"What happened that time?\"       Yes--was in hospital but she is unsure why it's happening.     6. CARDIAC HISTORY: \"Do you have any history of heart disease?\" (e.g., heart attack, angina, bypass surgery, angioplasty)       No    7. LUNG HISTORY: \"Do you have any " "history of lung disease?\"  (e.g., pulmonary embolus, asthma, emphysema)      Says is normal     8. CAUSE: \"What do you think is causing the breathing problem?\"       Unsure    9. OTHER SYMPTOMS: \"Do you have any other symptoms?\" (e.g., chest pain, cough, dizziness, fever, runny nose)      Very weak feels like can't walk.   10. O2 SATURATION MONITOR:  \"Do you use an oxygen saturation monitor (pulse oximeter) at home?\" If Yes, ask: \"What is your reading (oxygen level) today?\" \"What is your usual oxygen saturation reading?\" (e.g., 95%)        NA.   11. PREGNANCY: \"Is there any chance you are pregnant?\" \"When was your last menstrual period?\"        No  12. TRAVEL: \"Have you traveled out of the country in the last month?\" (e.g., travel history, exposures)        No    Protocols used: Breathing Difficulty-A-[place]    "

## 2025-02-12 NOTE — ED PROVIDER NOTES
History     Chief Complaint   Patient presents with    Generalized Weakness     HPI  Daniela Brown is a 89 year old female, past medical history significant for right-sided low back pain with right-sided sciatica, chronic left knee pain, impaired mobility with ADLs, frequent falls, nontraumatic rhabdomyolysis, polyneuropathy, depression, generalized anxiety disorder, generalized weakness, ASCVD, hypertension, type 2 diabetes, CKD stage III, osteopenia, OA, diverticulosis, presents to the emergency department concerns of generalized weakness by EMS.  History is obtained from the patient who states that she had an episode this afternoon in the context of right low back pain felt to be related to recent L1 compression fracture going on for about 3 weeks, of feeling short of breath and shaky all over.  More unsteady on her feet despite the use of walker or cane.  States that she has not been taking any pain medication but then changes her mind and tells me she is taken oxycodone at some point but cannot remember when.  The patient lives independently in the community, all family are in [place].  The patient herself expressed concern that she probably should not be living alone to me.  Per nursing notes EMS has been called every couple of days as the patient has episodes of anxiety.  Recently treated UTI.  Currently denies URI type symptoms or UTI type symptoms.  No fever.  States she has been eating and drinking normally.  She reports no fall in the last 3 weeks since the time of diagnosis of the compression fracture.      Narrative & Impression   EXAM: CT LUMBAR SPINE W/O CONTRAST  LOCATION: [facility]  DATE: 1/31/2025     INDICATION: Lower back pain, status post fall.  COMPARISON: CT abdomen 10/9/2019. Lumbar spine MRI 7/17/2018.  TECHNIQUE: Routine CT Lumbar Spine without IV contrast. Multiplanar reformats. Dose reduction techniques were used.      FINDINGS:  Nomenclature:  Five lumbar-type vertebral bodies.     Alignment: Redemonstrated grade I anterolisthesis at L4-L5. Slight L3-L4 and L2-L3 retrolisthesis. Mildly accentuated kyphosis at T11-T12, related to new T12 fracture described below.     Bones: Acute to subacute T12 superior endplate fracture with height loss of approximately 20%. The remaining vertebral bodies are unremarkable in height. The visualized portions of the sacrum and emmett appear intact. The bones are osteopenic. There are   degenerative changes of the sacroiliac joints. Moderate lower lumbar facet arthropathy.     Discs: Mild to moderate multilevel spondylosis, most conspicuous at L4-L5 and L2-L3, where there is broad-based disc bulging.     Spinal Canal: No findings specific for epidural hematoma. Moderate L4-L5 and mild-to-moderate L3-L4 spinal canal stenosis.     Neural Foramina: Moderate right L4-L5 neural foraminal stenosis.     Paraspinal Soft Tissues: Unremarkable.     Abdominal Cavity: Reported separately.                                                                      IMPRESSION:  1.  Acute to subacute L1 superior endplate fracture with mild height loss.  2.  No evidence of traumatic subluxation.  3.  Multilevel degenerative change.  4.  Diffuse osteopenia.         Allergies:  Allergies   Allergen Reactions    Metoprolol Itching and Rash    Cephalexin Rash    Erythromycin Rash    Atarax [Hydroxyzine] Other (See Comments)     Syncope and collapse    Hydrocodone     Shellfish Allergy     Acetaminophen Itching     Patient reported - only when used scheduled in high doses      Actonel [Bisphosphonates] Itching    Alendronate Rash    Azithromycin Hives    Celebrex [Ricci-2 Inhibitors (Sulfonamide)] Rash    Celecoxib Rash    Cipro [Ciprofloxacin] Rash    Contrast Dye Hives    Diatrizoate Hives    Erythromycin Rash    Escitalopram Diarrhea     Gets very sick and mad feelings    Fosamax Itching and Rash    Keflex [Cephalexin Monohydrate] Rash    Lisinopril  "Cough    Penicillins Rash    Risedronate Itching    Rofecoxib Rash    Seasonal Allergies Other (See Comments)     Seasonal rhinitis    Shellfish-Derived Products Hives    Sulfa Antibiotics Itching and Rash    Sulfasalazine Itching and Rash    Tetanus Toxoid, Adsorbed Swelling    Tetanus-Diphtheria Toxoids Td Swelling    Vicodin [Acetaminophen] Itching     Patient reported - only when used scheduled in high doses.       Vioxx Rash       Problem List:    Patient Active Problem List    Diagnosis Date Noted    Right-sided low back pain with right-sided sciatica, unspecified chronicity 02/07/2025     Priority: Medium    No-show for appointment 01/13/2025     Priority: Medium    Encounter for Medicare annual wellness exam 10/24/2024     Priority: Medium    Chronic pain of left knee 10/24/2024     Priority: Medium    Intertrigo 10/24/2024     Priority: Medium    Complex care coordination 07/26/2024     Priority: Medium    Examination of eyes and vision 07/26/2024     Priority: Medium    Left foot pain 07/26/2024     Priority: Medium    Impaired mobility and activities of daily living 08/21/2023     Priority: Medium    Closed head injury, initial encounter 08/15/2023     Priority: Medium    Fall, initial encounter 08/15/2023     Priority: Medium    Non-traumatic rhabdomyolysis 08/15/2023     Priority: Medium    Polyneuropathy associated with underlying disease 05/17/2023     Priority: Medium    Moderate major depression (H) 12/31/2020     Priority: Medium    ARABELLA (generalized anxiety disorder) 12/31/2020     Priority: Medium     Has been on celexa (not effective but no side effects), amitriptyline, cymbalta (dizziness), lexapro (listed as allergy - 'gets very sick\"), zoloft  paxil helping somewhat 6/23      Abnormal CT of the abdomen 10/10/2019     Priority: Medium     CT 10/9/2019- cystic lesion along the anterior aspect of the pancreatic body/tail (series 2 image 27) measures 2 cm, and is new since the previous exam 2011. " Pancreatic MRI with MRCP should be considered for further evaluation.   MRI 10/10/2019- There are 2 cystic lesions in the pancreatic body/tail, with the largest measuring 2 cm. No enhancing septations or solid masslike components are identified, although evaluation is limited related to patient motion artifact. These lesions have appearances suggestive of IPMN, but remain technically indeterminate. A follow-up MRI in one year should be considered to confirm stability.      Cystocele, midline      Priority: Medium     grade III      Generalized weakness 10/09/2019     Priority: Medium    Diarrhea 10/09/2019     Priority: Medium    Chronic left-sided low back pain with left-sided sciatica 04/15/2019     Priority: Medium    Coronary artery disease involving native coronary artery of native heart with angina pectoris 02/19/2019     Priority: Medium     coronary angiogram 2/2019 showed mild coronary artery disease.  The most significant of these was the 40% lesion in the mid RCA.      Benign essential hypertension 11/14/2018     Priority: Medium    Type 2 diabetes mellitus with diabetic polyneuropathy, without long-term current use of insulin (H) 11/14/2018     Priority: Medium     Diet controlled.  Diagnosed 6/2016, has never been on medications.      History of recurrent urinary tract infection 11/14/2018     Priority: Medium     Recommend referral to Urology to discuss resuming daily suppressive therapy but she declines 8/22 and 6/23      CKD (chronic kidney disease) stage 3, GFR 30-59 ml/min (H) 11/14/2018     Priority: Medium    Peripheral neuropathy 09/10/2018     Priority: Medium    Bilateral wrist pain 04/11/2018     Priority: Medium    Protrusion of lumbar intervertebral disc 04/11/2018     Priority: Medium    Carpal tunnel syndrome of left wrist 10/21/2014     Priority: Medium    Diastolic dysfunction 03/31/2014     Priority: Medium    Pronation of foot 05/05/2011     Priority: Medium     Bilateral defomity       Allergic rhinitis 01/19/2011     Priority: Medium    Hyperlipidemia LDL goal <100 10/31/2010     Priority: Medium    Urge incontinence 10/20/2009     Priority: Medium    Right foot pain 10/16/2009     Priority: Medium     Xray showed djd -follows with podiatry. -arch supports placed may need cam walker       Vitamin D deficiency 09/09/2008     Priority: Medium    Subjective tinnitus 04/22/2008     Priority: Medium    Urticaria 08/22/2006     Priority: Medium    SENSONRL HEAR LOSS,BILAT 05/03/2006     Priority: Medium    Esophageal reflux      Priority: Medium    Memory loss      Priority: Medium     Early cognitive decline. MMSE 27/30, Neno 4.7/ 6.0 2004. B12, TSH, RPR normal 6214-2363.      Degeneration of lumbar or lumbosacral intervertebral disc      Priority: Medium     MRI 5/04- DDD, L2-3 annular bulge with protrusion into left caudal infra-foraminal area  MRI 2017 with L4-5 loss disc height with spondylolisthesis.      B12 deficiency 10/10/2019     Priority: Low    Irritable bowel syndrome with diarrhea      Priority: Low     diahrrea predominant      Osteopenia of multiple sites      Priority: Low     DEXA 2007 -1.4 hip. Dexa 2004 -1 hip and -1 spine      RESTLESS LEG SYNDROME      Priority: Low    Primary osteoarthritis involving multiple joints      Priority: Low     C-spine, left hip      Diverticulosis of large intestine      Priority: Low     flexible sigmoidoscopy with diverticulosis otherwise normal 2001, admit diverticulitis 2009          Past Medical History:    Past Medical History:   Diagnosis Date    Abnormal cardiovascular stress test 2/3/2019    Adhesive capsulitis of shoulder     Adjustment disorder with anxiety 3/18/2016    B12 deficiency anemia 6/20/2012    Chest pain 2004    Colitis, Clostridium difficile 4/18/2012    Diverticulitis 2009    Headache(784.0) 2001    Impaired fasting glucose 2005    PERS HX ALLERGY OTHER FOODS 8/22/2006    PERS HX ALLERGY TO MILK PRODUCTS 8/22/2006    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    Status post coronary angiogram 2/27/2019    Syncope and collapse 9/10/2018       Past Surgical History:    Past Surgical History:   Procedure Laterality Date    ARTHROPLASTY KNEE  3/26/2012    Procedure:ARTHROPLASTY KNEE; Right Total Knee Arthroplasty; Surgeon:BERNADINE ROSAS; Location:[facility] OR    CHOLECYSTECTOMY      CV HEART CATHETERIZATION WITH POSSIBLE INTERVENTION N/A 2/27/2019    Procedure: Coronary Angiogram with Left ventriculogram;  Surgeon: Leandro Carpio MD;  Location:  HE[facility] CARD[facility] CATH LAB    HERNIA REPAIR      Hernia Repair    HYSTERECTOMY, PAP NO LONGER INDICATED  1983    TVH/BSO    SURGICAL HISTORY OF -   10/08    A & P Repair, Dr. Hannah    ZZC APPENDECTOMY  1953       Family History:    Family History   Problem Relation Age of Onset    C.A.D. Mother     Diabetes Mother     Cancer - colorectal Mother     Arthritis Mother     Heart Disease Mother     Lipids Brother     Obesity Brother     Hypertension Brother     Allergies Son     Eye Disorder Son         cataract    Thyroid Disease Sister         graves dz    Eye Disorder Son     Leukemia Son     Alcohol/Drug Father        Social History:  Marital Status:   [5]  Social History     Tobacco Use    Smoking status: Never    Smokeless tobacco: Never   Vaping Use    Vaping status: Never Used   Substance Use Topics    Alcohol use: No    Drug use: No        Medications:    acetaminophen (TYLENOL) 500 MG tablet  amLODIPine (NORVASC) 5 MG tablet  blood glucose (NO BRAND SPECIFIED) test strip  blood glucose monitoring (NO BRAND SPECIFIED) meter device kit  buPROPion (WELLBUTRIN XL) 150 MG 24 hr tablet  loperamide (IMODIUM A-D) 2 MG tablet  methocarbamol (ROBAXIN) 500 MG tablet  Multiple Vitamins-Minerals (THERAPEUTIC MULTIVIT/MINERAL) TABS  nystatin (MYCOSTATIN) 392631 UNIT/GM external cream  olmesartan (BENICAR) 20 MG tablet  oxyCODONE (ROXICODONE) 5 MG tablet  PARoxetine (PAXIL) 20 MG tablet  thin (NO  BRAND SPECIFIED) lancets  triamcinolone (KENALOG) 0.1 % external cream          Review of Systems   All other systems reviewed and are negative.      Physical Exam   BP: (!) 164/71  Pulse: 86  Temp: 97.9  F (36.6  C)  Resp: 12  Weight: 64.4 kg (142 lb)  SpO2: 97 %      Physical Exam  Vitals and nursing note reviewed.   Constitutional:       General: She is not in acute distress.     Appearance: Normal appearance. She is normal weight. She is not ill-appearing.      Comments: Pleasant, no acute distress, looks younger than her stated age.   HENT:      Head: Normocephalic and atraumatic.      Right Ear: Tympanic membrane normal.      Left Ear: Tympanic membrane normal.      Nose: Nose normal.      Mouth/Throat:      Mouth: Mucous membranes are dry.      Pharynx: Oropharynx is clear.   Eyes:      Extraocular Movements: Extraocular movements intact.      Conjunctiva/sclera: Conjunctivae normal.      Pupils: Pupils are equal, round, and reactive to light.   Cardiovascular:      Rate and Rhythm: Normal rate and regular rhythm.      Pulses: Normal pulses.      Heart sounds: Normal heart sounds.   Pulmonary:      Effort: Pulmonary effort is normal.      Breath sounds: Normal breath sounds.   Abdominal:      General: Bowel sounds are normal.      Palpations: Abdomen is soft.   Musculoskeletal:         General: Normal range of motion.      Cervical back: Normal range of motion and neck supple.   Skin:     General: Skin is warm and dry.      Capillary Refill: Capillary refill takes less than 2 seconds.   Neurological:      General: No focal deficit present.      Mental Status: She is alert and oriented to person, place, and time.   Psychiatric:         Mood and Affect: Mood normal.         Behavior: Behavior normal.         ED Course        Procedures           Results for orders placed or performed during the hospital encounter of 02/12/25 (from the past 24 hours)   Influenza A/B, RSV and SARS-CoV2 PCR (COVID-19) Nose     Specimen: Nose; Swab   Result Value Ref Range    Influenza A PCR Negative Negative    Influenza B PCR Negative Negative    RSV PCR Negative Negative    SARS CoV2 PCR Negative Negative    Narrative    Testing was performed using the Xpert Xpress CoV2/Flu/RSV Assay on the Cepheid GeneXpert Instrument. This test should be ordered for the detection of SARS-CoV2, influenza, and RSV viruses in individuals with signs and symptoms of respiratory tract infection. This test is for in vitro diagnostic use under the [place] FDA for laboratories certified under CLIA to perform high or moderate complexity testing. This test has been [place] FDA cleared. A negative result does not rule out the presence of PCR inhibitors in the specimen or target RNA in concentration below the limit of detection for the assay. If only one viral target is positive but coinfection with multiple targets is suspected, the sample should be re-tested with another FDA cleared, approved, or authorized test, if coninfection would change clinical management. This test was validated by the [facility]. These laboratories are certified under the Clinical Laboratory Improvement Amendments of 1988 (CLIA-88) as qualified to perfom high complexity laboratory testing.   CBC with platelets, differential    Narrative    The following orders were created for panel order CBC with platelets, differential.  Procedure                               Abnormality         Status                     ---------                               -----------         ------                     CBC with platelets and d...[[id number]]  Abnormal            Final result                 Please view results for these tests on the individual orders.   Comprehensive metabolic panel   Result Value Ref Range    Sodium 137 135 - 145 mmol/L    Potassium 3.9 3.4 - 5.3 mmol/L    Carbon Dioxide (CO2) 20 (L) 22 - 29 mmol/L    Anion Gap 16 (H) 7 - 15 mmol/L    Urea Nitrogen 22.8 8.0 - 23.0 mg/dL     Creatinine 0.93 0.51 - 0.95 mg/dL    GFR Estimate 57 (L) >60 mL/min/1.73m2    Calcium 9.7 8.8 - 10.4 mg/dL    Chloride 101 98 - 107 mmol/L    Glucose 122 (H) 70 - 99 mg/dL    Alkaline Phosphatase 95 40 - 150 U/L    AST 29 0 - 45 U/L    ALT 18 0 - 50 U/L    Protein Total 7.1 6.4 - 8.3 g/dL    Albumin 4.1 3.5 - 5.2 g/dL    Bilirubin Total 0.4 <=1.2 mg/dL   CK total   Result Value Ref Range     26 - 192 U/L   Lactic Acid Whole Blood with 1X Repeat in 2 HR when >2   Result Value Ref Range    Lactic Acid, Initial 1.2 0.7 - 2.0 mmol/L   CBC with platelets and differential   Result Value Ref Range    WBC Count 10.0 4.0 - 11.0 10e3/uL    RBC Count 4.10 3.80 - 5.20 10e6/uL    Hemoglobin 12.0 11.7 - 15.7 g/dL    Hematocrit 34.8 (L) 35.0 - 47.0 %    MCV 85 78 - 100 fL    MCH 29.3 26.5 - 33.0 pg    MCHC 34.5 31.5 - 36.5 g/dL    RDW 12.5 10.0 - 15.0 %    Platelet Count 265 150 - 450 10e3/uL    % Neutrophils 75 %    % Lymphocytes 16 %    % Monocytes 7 %    % Eosinophils 1 %    % Basophils 1 %    % Immature Granulocytes 0 %    NRBCs per 100 WBC 0 <1 /100    Absolute Neutrophils 7.4 1.6 - 8.3 10e3/uL    Absolute Lymphocytes 1.6 0.8 - 5.3 10e3/uL    Absolute Monocytes 0.7 0.0 - 1.3 10e3/uL    Absolute Eosinophils 0.1 0.0 - 0.7 10e3/uL    Absolute Basophils 0.1 0.0 - 0.2 10e3/uL    Absolute Immature Granulocytes 0.0 <=0.4 10e3/uL    Absolute NRBCs 0.0 10e3/uL   EKG 12 lead   Result Value Ref Range    Systolic Blood Pressure  mmHg    Diastolic Blood Pressure  mmHg    Ventricular Rate 91 BPM    Atrial Rate 91 BPM    MS Interval 166 ms    QRS Duration 102 ms     ms    QTc 482 ms    P Axis 48 degrees    R AXIS -20 degrees    T Axis 57 degrees    Interpretation ECG       Sinus rhythm  Moderate voltage criteria for LVH, may be normal variant ( R in aVL , [place] product )  QTcB >= 480 msec  Abnormal ECG  When compared with ECG of 03-Jun-2023 03:37,  No significant change was found     UA Macroscopic with reflex to  Microscopic and Culture    Specimen: Urine, Clean Catch   Result Value Ref Range    Color Urine Light Yellow Colorless, Straw, Light Yellow, Yellow    Appearance Urine Clear Clear    Glucose Urine Negative Negative mg/dL    Bilirubin Urine Negative Negative    Ketones Urine 10 (A) Negative mg/dL    Specific Gravity Urine 1.009 1.003 - 1.035    Blood Urine Trace (A) Negative    pH Urine 6.0 5.0 - 7.0    Protein Albumin Urine Negative Negative mg/dL    Urobilinogen Urine Normal Normal, 2.0 mg/dL    Nitrite Urine Negative Negative    Leukocyte Esterase Urine Negative Negative    Mucus Urine Present (A) None Seen /LPF    RBC Urine 2 <=2 /HPF    WBC Urine 3 <=5 /HPF    Narrative    Urine Culture not indicated   XR Chest 2 Views    Narrative    EXAM: XR CHEST 2 VIEWS  LOCATION: [facility]  DATE: 2/12/2025    INDICATION: cough, weakness  COMPARISON: None.      Impression    IMPRESSION: Heart is normal in size. Lungs are clear.     *Note: Due to a large number of results and/or encounters for the requested time period, some results have not been displayed. A complete set of results can be found in Results Review.   7:16 PM  Patient with generalized weakness with negative COVID flu/RSV, CMP reveals no significant abnormals of, lactate is negative at 1.2, CK negative at 123, hemogram is entirely within normal limits, no findings of concern on EKG acutely today.  Urinalysis is negative and without evidence of recurrent urinary tract infection after the most recent 1.  Chest x-ray is clear.  The patient is vitally stable if not hypertensive throughout the course of her emergency department visit.  She was ambulated by her nurse and actually did very well both with transfers and with the short ambulation bearing in mind the patient normally uses a walker or cane at home.  The patient does have back pain related to the compression fracture noted on CT and reproduced in the body of this note above.  She  does have oxycodone and when she remembers to use it it works effectively for her.  I discussed the patient with the on-call hospitalist W[place].  Unfortunately we agree that we do not have a viable reason to admit the patient to hospital at the present time.  I spoke with the patient at length regarding her evaluation here, her concerns, her current living situation.  We discussed the option of long term care and the potential cost that that would incur directly to her.  She does not want to do long term care and wants to go home tonight.  I see this is a short-term solution only and I talked with her about having involving social work/OT/PT at home.  The patient herself is very comfortable with the idea that she really should not be living in her own home and that she should be living around others for her own safety.  She would be very open to the idea of assisted living or an alternate disposition of similar nature.  I have placed an internal care coordination referral for this patient.      Medications   oxyCODONE (ROXICODONE) tablet 5 mg (5 mg Oral $Given 2/12/25 8613)       Assessments & Plan (with Medical Decision Making)   Assessment and plan with medical decision making at the time stamp above.      Disclaimer: This note consists of symbols derived from keyboarding, dictation and/or voice recognition software. As a result, there may be errors in the script that have gone undetected. Please consider this when interpreting information found in this chart.      I have reviewed the nursing notes.    I have reviewed the findings, diagnosis, plan and need for follow up with the patient.        New Prescriptions    No medications on file       Final diagnoses:   Generalized weakness   Closed compression fracture of body of L1 vertebra (H)       2/12/2025   [facility] EMERGENCY DEPT       Americo Donaldson MD  02/12/25 [id number]

## 2025-02-12 NOTE — PROGRESS NOTES
Telephone encounter today.  Patient spoke to Triage RN and reports SOB and weakness 911 called and in the ED    CC RN will follow up once discharged from the hospital     [facility]   Leatha Davis RN, Care Coordinator   [facility]'s   E-mail [email or web address]   [phone]

## 2025-02-13 ENCOUNTER — PATIENT OUTREACH (OUTPATIENT)
Dept: CARE COORDINATION | Facility: CLINIC | Age: OVER 89
End: 2025-02-13
Payer: COMMERCIAL

## 2025-02-13 LAB
ATRIAL RATE - MUSE: 91 BPM
DIASTOLIC BLOOD PRESSURE - MUSE: NORMAL MMHG
INTERPRETATION ECG - MUSE: NORMAL
P AXIS - MUSE: 48 DEGREES
PR INTERVAL - MUSE: 166 MS
QRS DURATION - MUSE: 102 MS
QT - MUSE: 392 MS
QTC - MUSE: 482 MS
R AXIS - MUSE: -20 DEGREES
SYSTOLIC BLOOD PRESSURE - MUSE: NORMAL MMHG
T AXIS - MUSE: 57 DEGREES
VENTRICULAR RATE- MUSE: 91 BPM

## 2025-02-13 SDOH — HEALTH STABILITY: PHYSICAL HEALTH: ON AVERAGE, HOW MANY MINUTES DO YOU ENGAGE IN EXERCISE AT THIS LEVEL?: 0 MIN

## 2025-02-13 SDOH — HEALTH STABILITY: PHYSICAL HEALTH: ON AVERAGE, HOW MANY DAYS PER WEEK DO YOU ENGAGE IN MODERATE TO STRENUOUS EXERCISE (LIKE A BRISK WALK)?: 0 DAYS

## 2025-02-13 ASSESSMENT — ACTIVITIES OF DAILY LIVING (ADL): DEPENDENT_IADLS:: SHOPPING;TRANSPORTATION

## 2025-02-13 NOTE — PROGRESS NOTES
Sindy Lyle called and left a message that the [facility] has [facility]   CC RN spoke to Maria Del Carmen at [facility] and she states they have RN HHA and a  order for this patient     [facility]   Leatha Davis RN, Care Coordinator   [facility]'s   E-mail [email or web address]   [phone]
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
## 2025-02-13 NOTE — DISCHARGE INSTRUCTIONS
I have placed a referral to social work for you.  You should expect a call shortly.  Please continue your current medications at home including your pain medication as needed for your compression fracture.  Return to the emergency department if worse or changes.

## 2025-02-13 NOTE — PROGRESS NOTES
[facility] Choice Assessment calling back.  States patient is recently been open to [facility] services in the home     [facility]   Leatha Davis RN, Care Coordinator   [facility]'s   E-mail [email or web address]   [phone]

## 2025-02-13 NOTE — ED NOTES
Ambulated with pt without issue. Pt states she uses a walker at home and that works better than when she uses her cane. Times in the past when she has fallen she has been walking with her cane.  Encouraged pt to ambulate with walker only.

## 2025-02-26 ENCOUNTER — PATIENT OUTREACH (OUTPATIENT)
Dept: CARE COORDINATION | Facility: CLINIC | Age: OVER 89
End: 2025-02-26
Payer: COMMERCIAL

## 2025-02-26 ASSESSMENT — ACTIVITIES OF DAILY LIVING (ADL): DEPENDENT_IADLS:: SHOPPING;TRANSPORTATION

## 2025-02-26 NOTE — PROGRESS NOTES
Clinic Care Coordination Contact    2/12/2025 (5 hours)  [facility] Emergency Dept     Americo Donaldson MD  Last attending  Treatment team Generalized weakness +1 more  Clinical impression Generalized Weakness  Chief complaint     Follow Up Progress Note      Assessment: Brief conversation with patient due to the [facility] RN is in the home doing a visit  Patient reminded to make a future hospital follow up appointment with PCP   Patient agrees with this plan  Patient is working with [facility]  to get extra services        Care Gaps:    Health Maintenance Due   Topic Date Due    DIABETIC FOOT EXAM  09/02/2022    MICROALBUMIN  04/19/2024    COVID-19 Vaccine (7 - 2024-25 season) 09/01/2024       Not discussed     Care Plans  Care Plan: Housing Instability       Problem: SDOH LACK OF STABLE HOUSING       Goal: Establish Stable/safe  Housing       Start Date: 2/13/2025 Expected End Date: 4/13/2025    This Visit's Progress: 30%    Priority: High    Note:     Barriers: Deconditioned   Strengths: agrees with plan discussed   Patient expressed understanding of goal: Yes   Action steps to achieve this goal:  1.Care coordinator call [facility] for a [place] assessment Completed 2/13/2025  2..Care Coordinator will call [facility] for possible  home visit Completed 2/13/2025  3. Patient gives verbal permission to speak to Wally/son  Completed 2/13/2025  4. Patient will use her cane/walker for safety                               Intervention/Education provided during outreach: Encouraged hospital follow up appointment with PCP               Plan:     Care Coordinator will follow up in 1-2 weeks       [facility]   Leatha Davis RN, Care Coordinator   [facility]'s   E-mail [email or web address]   [phone]

## 2025-03-10 ENCOUNTER — PATIENT OUTREACH (OUTPATIENT)
Dept: CARE COORDINATION | Facility: CLINIC | Age: OVER 89
End: 2025-03-10
Payer: COMMERCIAL

## 2025-03-10 ASSESSMENT — ACTIVITIES OF DAILY LIVING (ADL): DEPENDENT_IADLS:: SHOPPING;TRANSPORTATION

## 2025-03-10 NOTE — PROGRESS NOTES
Clinic Care Coordination RN :      Clinic triage requests writer assistance for transportation to the ED  CC RN spoke to patient and she can't afford a taxi for a ride today.  Patient doesn't feel it is an emergency due to she has had symptoms for 1 week .   Patient  prefers to call [employer] transportation [phone]  to arrange a ride to ED tomorrow     [facility]   Leatha Davis RN, Care Coordinator   [facility]'s   E-mail [email or web address]   [phone]

## 2025-03-11 ENCOUNTER — OFFICE VISIT (OUTPATIENT)
Dept: ORTHOPEDICS | Facility: CLINIC | Age: OVER 89
End: 2025-03-11
Payer: COMMERCIAL

## 2025-03-11 DIAGNOSIS — G89.29 CHRONIC PAIN OF LEFT KNEE: ICD-10-CM

## 2025-03-11 DIAGNOSIS — M17.12 PRIMARY OSTEOARTHRITIS OF LEFT KNEE: Primary | ICD-10-CM

## 2025-03-11 DIAGNOSIS — R26.89 IMPAIRED GAIT AND MOBILITY: ICD-10-CM

## 2025-03-11 DIAGNOSIS — M25.462 EFFUSION OF LEFT KNEE: ICD-10-CM

## 2025-03-11 DIAGNOSIS — M25.562 CHRONIC PAIN OF LEFT KNEE: ICD-10-CM

## 2025-03-11 PROCEDURE — 20611 DRAIN/INJ JOINT/BURSA W/US: CPT | Mod: LT | Performed by: FAMILY MEDICINE

## 2025-03-11 PROCEDURE — 99213 OFFICE O/P EST LOW 20 MIN: CPT | Mod: 25 | Performed by: FAMILY MEDICINE

## 2025-03-11 RX ORDER — TRIAMCINOLONE ACETONIDE 40 MG/ML
40 INJECTION, SUSPENSION INTRA-ARTICULAR; INTRAMUSCULAR
Status: COMPLETED | OUTPATIENT
Start: 2025-03-11 | End: 2025-03-11

## 2025-03-11 RX ORDER — ROPIVACAINE HYDROCHLORIDE 5 MG/ML
3 INJECTION, SOLUTION EPIDURAL; INFILTRATION; PERINEURAL
Status: COMPLETED | OUTPATIENT
Start: 2025-03-11 | End: 2025-03-11

## 2025-03-11 RX ADMIN — ROPIVACAINE HYDROCHLORIDE 3 ML: 5 INJECTION, SOLUTION EPIDURAL; INFILTRATION; PERINEURAL at 14:07

## 2025-03-11 RX ADMIN — TRIAMCINOLONE ACETONIDE 40 MG: 40 INJECTION, SUSPENSION INTRA-ARTICULAR; INTRAMUSCULAR at 14:07

## 2025-03-11 NOTE — PROGRESS NOTES
"Daniela Brown  :  1933  DOS: 25  MRN: [id number]    Sports Medicine Clinic Visit    PCP: Cynthia Maddox    Daniela Brown is a 89 year old female who is seen in follow-up presenting with acute on chronic left knee pain.    Interim History - 2025  Since last visit on 12/10/2024 patient has moderate-severe left knee pain with intermittent weakness over the past 6+ weeks.  Left knee steroid injection completed on 2024 provided relief for ~ 4 - 6 weeks.  Patient does note falling on 2025 injuring her lumbar spine & pelvis.  Patient was initially seen in [place] ED.  Lumbar compression fracture was noted on xray.  Patient notes overall this pain is improving & manageable with tylenol.      Review of Systems  Musculoskeletal: as above  Remainder of review of systems is negative including constitutional, CV, pulmonary, GI, Skin and Neurologic except as noted in HPI or medical history.    Past Medical History:   Diagnosis Date    Abnormal cardiovascular stress test 2/3/2019    9/10/2018 [employer]an: \"Myocardial perfusion imaging using single isotope technique demonstrated a small perfusion defect of mild severity involving the basal inferior wall which is mostly reversible and may be consistent with mild ischemia in the right coronary artery distribution. In addition, transient ischemic dilatation is noted with a TID ratio of 1.3. \"    Adhesive capsulitis of shoulder     left     Adjustment disorder with anxiety 3/18/2016    B12 deficiency anemia 2012    Chest pain 2004    Chronic right sided/axillary discomfort (musculoskeletal) Atypical substernal (possibly GERD). Negative cardiolite .    Colitis, Clostridium difficile 2012    Diverticulitis 2009    Headache(784.0) 2001    Tension type. MRI  normal.    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    Status post coronary angiogram 2/27/2019    Syncope and collapse 9/10/2018     Past Surgical History:   Procedure Laterality Date    ARTHROPLASTY KNEE  3/26/2012    Procedure:ARTHROPLASTY KNEE; Right Total Knee Arthroplasty; Surgeon:BERNADINE ROSAS; Location:[place] OR    CHOLECYSTECTOMY      CV HEART CATHETERIZATION WITH POSSIBLE INTERVENTION N/A 2/27/2019    Procedure: Coronary Angiogram with Left ventriculogram;  Surgeon: Leandro Carpio MD;  Location:  HEART CARDIAC CATH LAB    HERNIA REPAIR      Hernia Repair    HYSTERECTOMY, PAP NO LONGER INDICATED  1983    TVH/BSO    SURGICAL HISTORY OF -   10/08    A & P Repair, Dr. Hannah    ZZC APPENDECTOMY  1953     Family History   Problem Relation Age of Onset    C.A.D. Mother     Diabetes Mother     Cancer - colorectal Mother     Arthritis Mother     Heart Disease Mother     Lipids Brother     Obesity Brother     Hypertension Brother     Allergies Son     Eye Disorder Son         cataract    Thyroid Disease Sister         graves dz    Eye Disorder Son     Leukemia Son     Alcohol/Drug Father      Objective    General: healthy, alert and in no distress    HEENT: no scleral icterus or conjunctival erythema   Skin: no suspicious lesions or rash. No jaundice.   CV: regular rhythm by palpation, 2+ distal pulses, no pedal edema    Resp: normal respiratory effort without conversational dyspnea   Psych: normal mood and affect    Gait: antalgic, appropriate coordination and balance   Neuro: normal light touch sensory exam of the extremities. Motor strength as noted below     Left Knee exam     ROM:        Flexion lacks 20 degrees       Extension lacks 5 degrees    Inspection:       no visible ecchymosis        effusion noted moderate by palpation    Skin:       no visible deformities       well perfused       capillary refill brisk    Patellar Motion:        Normal patellar tracking noted through range of motion       Crepitus  noted in the patellofemoral joint    Tender:        lateral patellar border       medial joint line       lateral joint line    Non Tender:         remainder of knee area    Special Tests:        neg (-) varus at 0 deg and 30 deg       neg (-) valgus at 0 deg and 30 deg      Radiology  No indication for updated imaging today.  Prior imaging reviewed.    Narrative & Impression   EXAM: XR KNEE PORT LEFT 1/2 VIEWS  LOCATION: [facility]  DATE: 8/17/2023     INDICATION: Left knee pain after a fall.  COMPARISON: None.                                                                      IMPRESSION:   1.  No fracture or joint malalignment.  2.  Advanced left knee degenerative arthrosis with medial compartment narrowing.  3.  Small joint effusion.  4.  Atherosclerotic calcification.        Large Joint Injection/Arthocentesis: L knee joint    Date/Time: 3/11/2025 2:07 PM    Performed by: Tho Newman DO  Authorized by: Tho Newman DO    Indications:  Pain, osteoarthritis and joint swelling  Needle Size:  21 G  Guidance: ultrasound    Approach:  Superolateral  Location:  Knee      Medications:  40 mg triamcinolone 40 MG/ML; 3 mL ROPivacaine 5 MG/ML  Aspirate amount (mL):  33  Aspirate:  Serous and yellow  Outcome:  Tolerated well, no immediate complications  Procedure discussed: discussed risks, benefits, and alternatives    Consent Given by:  Patient  Timeout: timeout called immediately prior to procedure    Prep: patient was prepped and draped in usual sterile fashion     Ultrasound images of procedure were permanently stored.       Assessment:  1. Primary osteoarthritis of left knee    2. Chronic pain of left knee    3. Effusion of left knee    4. Impaired gait and mobility          Plan:  Discussed the assessment with the patient.  Follow up: in 3 weeks if not improved  Recurrent exacerbation of underlying OA, no concerning clinical or radiographic signs of fracture  She  did have a fall recently but that mostly gave her back pain, signs of possible acute L1 compression fracture reviewed, doing well clinically with well-controlled pain  XR images independently visualized and reviewed with patient today in clinic  Assistive device options reviewed  PT options reviewed  Oral Tylenol and topical Voltaren gel reviewed as safe OTC options, reviewed safe dosing strategies  Again reviewed the option today for consulting with orthopedic surgery for consideration of TKA based on clinical progress, she is hopeful to avoid which is reasonable, and may not be a viable surgical candidate, referral placed as she inquired again today  Assistive devices, bracing, low impact strengthening reviewed today  After review of relative risks, goals and limitations, US guided to the left knee performed today, with aspiration prior  Expectations and goals of CSI reviewed  Often 2-3 days for steroid effect, and can take up to two weeks for maximum effect  We discussed modified progressive pain-free activity as tolerated  Do not overuse in first two weeks if feeling better due to concern for vulnerability while steroid is working  Supportive care reviewed  All questions were answered today  Contact us with additional questions or concerns  Signs and sx of concern reviewed      Tho Newman DO, OZZIE  Sports Medicine Physician  [facility]              Disclaimer: This note consists of symbols derived from keyboarding, dictation and/or voice recognition software. As a result, there may be errors in the script that have gone undetected. Please consider this when interpreting information found in this chart.

## 2025-03-11 NOTE — LETTER
"3/11/2025      Daniela Brown  [address] [facility] [address]      Dear Colleague,    Thank you for referring your patient, Daniela Brown, to the [facility] [place]. Please see a copy of my visit note below.    Daniela Brown  :  1933  DOS: 25  MRN: [id number]    Sports Medicine Clinic Visit    PCP: Cynthia Maddox    Daniela Brown is a 89 year old female who is seen in follow-up presenting with acute on chronic left knee pain.    Interim History - 2025  Since last visit on 12/10/2024 patient has moderate-severe left knee pain with intermittent weakness over the past 6+ weeks.  Left knee steroid injection completed on 2024 provided relief for ~ 4 - 6 weeks.  Patient does note falling on 2025 injuring her lumbar spine & pelvis.  Patient was initially seen in [place] ED.  Lumbar compression fracture was noted on xray.  Patient notes overall this pain is improving & manageable with tylenol.      Review of Systems  Musculoskeletal: as above  Remainder of review of systems is negative including constitutional, CV, pulmonary, GI, Skin and Neurologic except as noted in HPI or medical history.    Past Medical History:   Diagnosis Date     Abnormal cardiovascular stress test 2/3/2019    9/10/2018 Lexiscan: \"Myocardial perfusion imaging using single isotope technique demonstrated a small perfusion defect of mild severity involving the basal inferior wall which is mostly reversible and may be consistent with mild ischemia in the right coronary artery distribution. In addition, transient ischemic dilatation is noted with a TID ratio of 1.3. \"     Adhesive capsulitis of shoulder     left      Adjustment disorder with anxiety 3/18/2016     B12 deficiency anemia 2012     Chest pain     Chronic right sided/axillary discomfort (musculoskeletal) Atypical substernal (possibly GERD). Negative cardiolite .     Colitis, " Clostridium difficile 4/18/2012     Diverticulitis 2009     Headache(784.0) 2001    Tension type. MRI 2001 normal.     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     Status post coronary angiogram 2/27/2019     Syncope and collapse 9/10/2018     Past Surgical History:   Procedure Laterality Date     ARTHROPLASTY KNEE  3/26/2012    Procedure:ARTHROPLASTY KNEE; Right Total Knee Arthroplasty; Surgeon:BERNADINE ROSAS; Location:[place] OR     CHOLECYSTECTOMY       CV HEART CATHETERIZATION WITH POSSIBLE INTERVENTION N/A 2/27/2019    Procedure: Coronary Angiogram with Left ventriculogram;  Surgeon: Leandro Carpio MD;  Location:  HEART CARDIAC CATH LAB     HERNIA REPAIR      Hernia Repair     HYSTERECTOMY, PAP NO LONGER INDICATED  1983    TVH/BSO     SURGICAL HISTORY OF -   10/08    A & P Repair, Dr. Hannah     ZZC APPENDECTOMY  1953     Family History   Problem Relation Age of Onset     C.A.D. Mother      Diabetes Mother      Cancer - colorectal Mother      Arthritis Mother      Heart Disease Mother      Lipids Brother      Obesity Brother      Hypertension Brother      Allergies Son      Eye Disorder Son         cataract     Thyroid Disease Sister         graves dz     Eye Disorder Son      Leukemia Son      Alcohol/Drug Father      Objective    General: healthy, alert and in no distress    HEENT: no scleral icterus or conjunctival erythema   Skin: no suspicious lesions or rash. No jaundice.   CV: regular rhythm by palpation, 2+ distal pulses, no pedal edema    Resp: normal respiratory effort without conversational dyspnea   Psych: normal mood and affect    Gait: antalgic, appropriate coordination and balance   Neuro: normal light touch sensory exam of the extremities. Motor strength as noted below     Left Knee exam     ROM:        Flexion lacks 20 degrees       Extension  lacks 5 degrees    Inspection:       no visible ecchymosis        effusion noted moderate by palpation    Skin:       no visible deformities       well perfused       capillary refill brisk    Patellar Motion:        Normal patellar tracking noted through range of motion       Crepitus noted in the patellofemoral joint    Tender:        lateral patellar border       medial joint line       lateral joint line    Non Tender:         remainder of knee area    Special Tests:        neg (-) varus at 0 deg and 30 deg       neg (-) valgus at 0 deg and 30 deg      Radiology  No indication for updated imaging today.  Prior imaging reviewed.    Narrative & Impression   EXAM: XR KNEE PORT LEFT 1/2 VIEWS  LOCATION: [facility]  DATE: 8/17/2023     INDICATION: Left knee pain after a fall.  COMPARISON: None.                                                                      IMPRESSION:   1.  No fracture or joint malalignment.  2.  Advanced left knee degenerative arthrosis with medial compartment narrowing.  3.  Small joint effusion.  4.  Atherosclerotic calcification.        Large Joint Injection/Arthocentesis: L knee joint    Date/Time: 3/11/2025 2:07 PM    Performed by: Tho Newman DO  Authorized by: Tho Newman DO    Indications:  Pain, osteoarthritis and joint swelling  Needle Size:  21 G  Guidance: ultrasound    Approach:  Superolateral  Location:  Knee      Medications:  40 mg triamcinolone 40 MG/ML; 3 mL ROPivacaine 5 MG/ML  Aspirate amount (mL):  33  Aspirate:  Serous and yellow  Outcome:  Tolerated well, no immediate complications  Procedure discussed: discussed risks, benefits, and alternatives    Consent Given by:  Patient  Timeout: timeout called immediately prior to procedure    Prep: patient was prepped and draped in usual sterile fashion     Ultrasound images of procedure were permanently stored.       Assessment:  1. Primary osteoarthritis of left knee    2.  Chronic pain of left knee    3. Effusion of left knee    4. Impaired gait and mobility          Plan:  Discussed the assessment with the patient.  Follow up: in 3 weeks if not improved  Recurrent exacerbation of underlying OA, no concerning clinical or radiographic signs of fracture  She did have a fall recently but that mostly gave her back pain, signs of possible acute L1 compression fracture reviewed, doing well clinically with well-controlled pain  XR images independently visualized and reviewed with patient today in clinic  Assistive device options reviewed  PT options reviewed  Oral Tylenol and topical Voltaren gel reviewed as safe OTC options, reviewed safe dosing strategies  Again reviewed the option today for consulting with orthopedic surgery for consideration of TKA based on clinical progress, she is hopeful to avoid which is reasonable, and may not be a viable surgical candidate, referral placed as she inquired again today  Assistive devices, bracing, low impact strengthening reviewed today  After review of relative risks, goals and limitations, US guided to the left knee performed today, with aspiration prior  Expectations and goals of CSI reviewed  Often 2-3 days for steroid effect, and can take up to two weeks for maximum effect  We discussed modified progressive pain-free activity as tolerated  Do not overuse in first two weeks if feeling better due to concern for vulnerability while steroid is working  Supportive care reviewed  All questions were answered today  Contact us with additional questions or concerns  Signs and sx of concern reviewed      Tho Newman DO, OZZIE  Sports Medicine Physician  [facility]              Disclaimer: This note consists of symbols derived from keyboarding, dictation and/or voice recognition software. As a result, there may be errors in the script that have gone undetected. Please consider this when interpreting information found in  this chart.      Again, thank you for allowing me to participate in the care of your patient.        Sincerely,        Tho Newman, DO    Electronically signed

## 2025-03-13 ENCOUNTER — PATIENT OUTREACH (OUTPATIENT)
Dept: CARE COORDINATION | Facility: CLINIC | Age: OVER 89
End: 2025-03-13
Payer: COMMERCIAL

## 2025-03-13 ASSESSMENT — ACTIVITIES OF DAILY LIVING (ADL): DEPENDENT_IADLS:: SHOPPING;TRANSPORTATION

## 2025-03-13 NOTE — PROGRESS NOTES
Clinic Care Coordination Contact  Follow Up Progress Note      Assessment:   CC RN follow up due to nothing noted on chart she went to ED for left eye problem  Patient states her left eye problem has resolved.  Patient agrees to go to ED if there is a reoccurrence of symptoms  Patient had a knee injection 3/11 per sports medicine provider and she reports she drove to that appointment     Care Gaps:    Health Maintenance Due   Topic Date Due    DIABETIC FOOT EXAM  09/02/2022    MICROALBUMIN  04/19/2024    COVID-19 Vaccine (7 - 2024-25 season) 09/01/2024       Intervention/Education provided during outreach: CC RN available as needed in the future               Plan:   No unmet needs, no further care coordination is needed at this time   [facility]   Leatha Davis RN, Care Coordinator   [facility]   E-mail [email or web address]   [phone] U

## 2025-05-22 ENCOUNTER — OFFICE VISIT (OUTPATIENT)
Dept: FAMILY MEDICINE | Facility: CLINIC | Age: OVER 89
End: 2025-05-22
Payer: COMMERCIAL

## 2025-05-22 ENCOUNTER — RESULTS FOLLOW-UP (OUTPATIENT)
Dept: FAMILY MEDICINE | Facility: CLINIC | Age: OVER 89
End: 2025-05-22

## 2025-05-22 VITALS
WEIGHT: 142.1 LBS | BODY MASS INDEX: 26.15 KG/M2 | RESPIRATION RATE: 14 BRPM | TEMPERATURE: 97.7 F | SYSTOLIC BLOOD PRESSURE: 142 MMHG | OXYGEN SATURATION: 97 % | HEART RATE: 71 BPM | DIASTOLIC BLOOD PRESSURE: 60 MMHG | HEIGHT: 62 IN

## 2025-05-22 DIAGNOSIS — M25.562 CHRONIC PAIN OF LEFT KNEE: ICD-10-CM

## 2025-05-22 DIAGNOSIS — E11.42 TYPE 2 DIABETES MELLITUS WITH DIABETIC POLYNEUROPATHY, WITHOUT LONG-TERM CURRENT USE OF INSULIN (H): ICD-10-CM

## 2025-05-22 DIAGNOSIS — E78.5 HYPERLIPIDEMIA LDL GOAL <100: Chronic | ICD-10-CM

## 2025-05-22 DIAGNOSIS — N18.31 STAGE 3A CHRONIC KIDNEY DISEASE (H): Primary | ICD-10-CM

## 2025-05-22 DIAGNOSIS — G89.29 CHRONIC PAIN OF LEFT KNEE: ICD-10-CM

## 2025-05-22 DIAGNOSIS — F41.1 GAD (GENERALIZED ANXIETY DISORDER): ICD-10-CM

## 2025-05-22 PROBLEM — Z91.199 NO-SHOW FOR APPOINTMENT: Status: RESOLVED | Noted: 2025-01-13 | Resolved: 2025-05-22

## 2025-05-22 LAB
ANION GAP SERPL CALCULATED.3IONS-SCNC: 15 MMOL/L (ref 7–15)
BUN SERPL-MCNC: 20.9 MG/DL (ref 8–23)
CALCIUM SERPL-MCNC: 9.6 MG/DL (ref 8.8–10.4)
CHLORIDE SERPL-SCNC: 102 MMOL/L (ref 98–107)
CHOLEST SERPL-MCNC: 196 MG/DL
CREAT SERPL-MCNC: 1.01 MG/DL (ref 0.51–0.95)
EGFRCR SERPLBLD CKD-EPI 2021: 52 ML/MIN/1.73M2
EST. AVERAGE GLUCOSE BLD GHB EST-MCNC: 114 MG/DL
FASTING STATUS PATIENT QL REPORTED: ABNORMAL
FASTING STATUS PATIENT QL REPORTED: ABNORMAL
GLUCOSE SERPL-MCNC: 115 MG/DL (ref 70–99)
HBA1C MFR BLD: 5.6 % (ref 0–5.6)
HCO3 SERPL-SCNC: 25 MMOL/L (ref 22–29)
HDLC SERPL-MCNC: 74 MG/DL
LDLC SERPL CALC-MCNC: 104 MG/DL
NONHDLC SERPL-MCNC: 122 MG/DL
POTASSIUM SERPL-SCNC: 4.6 MMOL/L (ref 3.4–5.3)
SODIUM SERPL-SCNC: 142 MMOL/L (ref 135–145)
TRIGL SERPL-MCNC: 89 MG/DL

## 2025-05-22 RX ORDER — OLMESARTAN MEDOXOMIL 20 MG/1
20 TABLET ORAL DAILY
Qty: 90 TABLET | Refills: 3 | Status: SHIPPED | OUTPATIENT
Start: 2025-05-22

## 2025-05-22 RX ORDER — MELOXICAM 7.5 MG/1
7.5 TABLET ORAL DAILY
Qty: 90 TABLET | Refills: 1 | Status: SHIPPED | OUTPATIENT
Start: 2025-05-22

## 2025-05-22 RX ORDER — PAROXETINE 20 MG/1
20 TABLET, FILM COATED ORAL EVERY MORNING
Qty: 90 TABLET | Refills: 3 | Status: SHIPPED | OUTPATIENT
Start: 2025-05-22

## 2025-05-22 ASSESSMENT — ANXIETY QUESTIONNAIRES
4. TROUBLE RELAXING: SEVERAL DAYS
5. BEING SO RESTLESS THAT IT IS HARD TO SIT STILL: MORE THAN HALF THE DAYS
1. FEELING NERVOUS, ANXIOUS, OR ON EDGE: NEARLY EVERY DAY
6. BECOMING EASILY ANNOYED OR IRRITABLE: NOT AT ALL
GAD7 TOTAL SCORE: 10
7. FEELING AFRAID AS IF SOMETHING AWFUL MIGHT HAPPEN: NOT AT ALL
GAD7 TOTAL SCORE: 10
GAD7 TOTAL SCORE: 10
7. FEELING AFRAID AS IF SOMETHING AWFUL MIGHT HAPPEN: NOT AT ALL
8. IF YOU CHECKED OFF ANY PROBLEMS, HOW DIFFICULT HAVE THESE MADE IT FOR YOU TO DO YOUR WORK, TAKE CARE OF THINGS AT HOME, OR GET ALONG WITH OTHER PEOPLE?: SOMEWHAT DIFFICULT
3. WORRYING TOO MUCH ABOUT DIFFERENT THINGS: NEARLY EVERY DAY
2. NOT BEING ABLE TO STOP OR CONTROL WORRYING: SEVERAL DAYS
IF YOU CHECKED OFF ANY PROBLEMS ON THIS QUESTIONNAIRE, HOW DIFFICULT HAVE THESE PROBLEMS MADE IT FOR YOU TO DO YOUR WORK, TAKE CARE OF THINGS AT HOME, OR GET ALONG WITH OTHER PEOPLE: SOMEWHAT DIFFICULT

## 2025-05-22 ASSESSMENT — PAIN SCALES - GENERAL: PAINLEVEL_OUTOF10: MODERATE PAIN (5)

## 2025-05-22 ASSESSMENT — PATIENT HEALTH QUESTIONNAIRE - PHQ9
10. IF YOU CHECKED OFF ANY PROBLEMS, HOW DIFFICULT HAVE THESE PROBLEMS MADE IT FOR YOU TO DO YOUR WORK, TAKE CARE OF THINGS AT HOME, OR GET ALONG WITH OTHER PEOPLE: SOMEWHAT DIFFICULT
SUM OF ALL RESPONSES TO PHQ QUESTIONS 1-9: 13
SUM OF ALL RESPONSES TO PHQ QUESTIONS 1-9: 13

## 2025-05-22 ASSESSMENT — PAIN SCALES - PAIN ENJOYMENT GENERAL ACTIVITY SCALE (PEG)
PEG_TOTALSCORE: 7.67
PEG_TOTALSCORE: 7.67
INTERFERED_GENERAL_ACTIVITY: 10
INTERFERED_GENERAL_ACTIVITY: 10 - COMPLETELY INTERFERES
AVG_PAIN_PASTWEEK: 7
INTERFERED_ENJOYMENT_LIFE: 6
INTERFERED_ENJOYMENT_LIFE: 6
AVG_PAIN_PASTWEEK: 7

## 2025-05-22 NOTE — PROGRESS NOTES
Assessment & Plan     Stage 3a chronic kidney disease (H)  Checking kidney function. Patient denies urinary concerns or abdominal tenderness.     - Albumin Random Urine Quantitative with Creat Ratio; Future  - Basic metabolic panel  (Ca, Cl, CO2, Creat, Gluc, K, Na, BUN); Future  - Basic metabolic panel  (Ca, Cl, CO2, Creat, Gluc, K, Na, BUN)    Type 2 diabetes mellitus with diabetic polyneuropathy, without long-term current use of insulin (H)  Patient does not currently take any diabetic medications, however, is inconsistent with blood sugar readings.     - HEMOGLOBIN A1C; Future  - HEMOGLOBIN A1C    ARABELLA (generalized anxiety disorder)  Patient reports she struggles with anxiety and Paxil helps. Patient denies diarrhea concerns. Refilled medication.     - PARoxetine (PAXIL) 20 MG tablet; Take 1 tablet (20 mg) by mouth every morning.    Hyperlipidemia LDL goal <100  Checking lipids. Patient was started on Olmasartan 20 mg by another provider. Added medication to patients medication list. Considering age, diabetes, and hypertension, lowers risk and checking lipids as they have not been checked in many years.     - Lipid panel reflex to direct LDL Fasting; Future  - olmesartan (BENICAR) 20 MG tablet; Take 1 tablet (20 mg) by mouth daily.  - Lipid panel reflex to direct LDL Fasting    Chronic pain of left knee  Patient was wearing a knee brace during visit. Patient reports she has chronic left knee pain. Patient is currently taking Meloxicam 7.5 mg daily at noon as needed for knee pain. Patient denies any rash or itching concerns while taking this medication. Referring patient to Orthopedic as she may benefit from cortisone injections. No imaging ordered as Orthopedic may request their own specifically and suspect of arthritis.     - meloxicam (MOBIC) 7.5 MG tablet; Take 1 tablet (7.5 mg) by mouth daily.  - Orthopedic  Referral; Future    BMI  Estimated body mass index is 25.99 kg/m  as calculated from the  "following:    Height as of this encounter: 1.575 m (5' 2\").    Weight as of this encounter: 64.5 kg (142 lb 1.6 oz).       Shruti Finnegan is a 89 year old, presenting for the following health issues:  nightmares and Recheck Medication        5/22/2025     9:55 AM   Additional Questions   Roomed by Naye Arce   Accompanied by Marisa WINN         5/22/2025     9:55 AM   Patient Reported Additional Medications   Patient reports taking the following new medications Meloxicam 7.5mg 1 tablet daily at noon, Vitamin B12     Patient was in overall general good health for age and comorbidities. Patient was accompanied by CNA who visits once a week. Patient has RN visit once a week and has a . Patient discussion regarding assisted living considerations for improved quality of life. Patient diabetic foot exam was positive for all sensation. Patient reports she has her toe nails trimmed by diabetic foot service that visit her home. Patient reports she stopped taking one medication and is not sure which one and now she does not have nightmares anymore. Patient reports she feels her nightmares were correlation with a distressed relationship with her son. Checking kidney function. Patient denies urinary concerns or abdominal tenderness. Patient does not currently take any diabetic medications, however, is inconsistent with blood sugar readings. Patient does not currently take any diabetic medications, however, is inconsistent with blood sugar readings. Patient reports she struggles with anxiety and Paxil helps. Patient denies diarrhea concerns. Refilled medication. Checking lipids. Patient was started on Olmasartan 20 mg by another provider. Added medication to patients medication list. Considering age, diabetes, and hypertension, lowers risk and checking lipids as they have not been checked in many years. Patient blood pressure slightly elevated, patient reports being upset regarding relationship with son and " her nerves. Patient was wearing a knee brace during visit. Patient reports she has chronic left knee pain. Patient is currently taking Meloxicam 7.5 mg daily at noon as needed for knee pain. Patient denies any rash or itching concerns while taking this medication. Referring patient to Orthopedic as she may benefit from cortisone injections. Patient denies headache, pain, GI concerns, and nausea.             History of Present Illness       Mental Health Follow-up:  Patient presents to follow-up on Depression & Anxiety.Patient's depression since last visit has been:  Better  The patient is having other symptoms associated with depression.  Patient's anxiety since last visit has been:  Better  The patient is having other symptoms associated with anxiety.  Any significant life events: relationship concerns  Patient is feeling anxious or having panic attacks.  Patient has no concerns about alcohol or drug use.    She eats 2-3 servings of fruits and vegetables daily.She consumes 2 sweetened beverage(s) daily.She exercises with enough effort to increase her heart rate 10 to 19 minutes per day.  She exercises with enough effort to increase her heart rate 3 or less days per week.   She is taking medications regularly.        Concern - Nightmares  Onset: Started a few months ago  Description: Nightmares were happening about every single night  Intensity: mild  Progression of Symptoms:  improving and Stopped a medication. Patient unsrue of which medication that is.   Accompanying Signs & Symptoms: N/A  Previous history of similar problem: No  Precipitating factors:        Worsened by: Medication  Alleviating factors:        Improved by: Patient believes medication caused issue as it is now resolving since stopping unknown medicationl  Therapies tried and outcome: None      Pain History:  When did you first notice your pain? Months ago   Have you seen this provider for your pain in the past? No   Where in your body do your  have pain? Left Knee  Are you seeing anyone else for your pain? No  What makes your pain better? Brace helps, Tylenol helps a little bit  What makes your pain worse? When standing or walking  How has pain affected your ability to work? Not currently working - unrelated to pain  Who lives in your household? Self        1/6/2025    11:13 AM 1/6/2025    11:20 AM 5/22/2025    10:05 AM   PHQ-9 SCORE   PHQ-9 Total Score MyChart   13 (Moderate depression)   PHQ-9 Total Score 4 2 13        Proxy-reported           6/7/2023     1:16 PM 6/29/2023     2:14 PM 5/22/2025    10:07 AM   ARABELLA-7 SCORE   Total Score  11 (moderate anxiety) 10 (moderate anxiety)   Total Score 12 11 10        Proxy-reported               1/6/2025    11:13 AM 1/6/2025    11:20 AM 5/22/2025    10:05 AM   PHQ-9 SCORE   PHQ-9 Total Score MyChart   13 (Moderate depression)   PHQ-9 Total Score 4 2 13        Proxy-reported           6/7/2023     1:16 PM 6/29/2023     2:14 PM 5/22/2025    10:07 AM   ARABELLA-7 SCORE   Total Score  11 (moderate anxiety) 10 (moderate anxiety)   Total Score 12 11 10        Proxy-reported            No data to display                Chronic Pain - Initial Assessment:    How would you describe your pain? Achy  Have you had any recent changes to the severity or character of your pain? No chronic and daily  Is there an underlying cause that has been identified? No, maybe arthritis  Has your ability to work or do daily activities changed recently because of your pain? Daily activities can be difficult  Which of these pain treatments have you tried? Other: Tylenol  Previous medication treatments:        Review of Systems  Constitutional, neuro, ENT, endocrine, pulmonary, cardiac, gastrointestinal, genitourinary, musculoskeletal, integument and psychiatric systems are negative, except as otherwise noted.      Objective    BP (!) 148/76 (BP Location: Right arm, Patient Position: Sitting, Cuff Size: Adult Regular)   Pulse 71   Temp 97.7  F  "(36.5  C) (Tympanic)   Resp 14   Ht 1.575 m (5' 2\")   Wt 64.5 kg (142 lb 1.6 oz)   LMP  (LMP Unknown)   SpO2 97%   BMI 25.99 kg/m    Body mass index is 25.99 kg/m .    Physical Exam   GENERAL: alert and no distress  EYES: Eyes grossly normal to inspection, PERRL and conjunctivae and sclerae normal  HENT: ear canals and TM's normal, nose and mouth without ulcers or lesions  NECK: no adenopathy, no asymmetry, masses, or scars  RESP: lungs clear to auscultation - no rales, rhonchi or wheezes  CV: regular rate and rhythm, normal S1 S2, no S3 or S4, no murmur, click or rub, no peripheral edema  ABDOMEN: soft, nontender, no hepatosplenomegaly, no masses and bowel sounds normal  MS: no gross musculoskeletal defects noted, no edema  SKIN: no suspicious lesions or rashes  NEURO: Normal strength and tone, mentation intact and speech normal  PSYCH: mentation appears normal, affect normal/bright        Signed Electronically by: CYN Delaney CNP    "

## 2025-05-22 NOTE — RESULT ENCOUNTER NOTE
Please notify patient of most recent results as patient larson snot have MyChart.     Kitty Sivan,    Here are my comments about your recent results:    -Lipids or Cholesterol results are good. Please continue to take medication, no need to change medication at this time.    -Kidney function is slightly decreased from 3 months ago, however, blood sugars or glucose has improved. Please stay well hydrated.     -A1C has improved. Great Work.    Thank you for choosing us to be part of your health care team. Nice to see you today Miss Finnegan.     For additional lab test information, [email or web address] is an excellent reference.    Please let us know if you have any questions or concerns.        Regards,  CYN Delaney CNP

## 2025-05-22 NOTE — PROGRESS NOTES
{PROVIDER CHARTING PREFERENCE:494317}    Shruti Finnegan is a 89 year old, presenting for the following health issues:  nightmares and Recheck Medication      5/22/2025     9:55 AM   Additional Questions   Roomed by Naye Arce   Accompanied by CNA     HPI        Depression and Anxiety   How are you doing with your depression since your last visit? { :611257}  How are you doing with your anxiety since your last visit?  { :059653}  Are you having other symptoms that might be associated with depression or anxiety? { :456196}  Have you had a significant life event? { :557924}   Do you have any concerns with your use of alcohol or other drugs? { :178554}    Social History     Tobacco Use    Smoking status: Never    Smokeless tobacco: Never   Vaping Use    Vaping status: Never Used   Substance Use Topics    Alcohol use: No    Drug use: No         1/6/2025    11:10 AM 1/6/2025    11:13 AM 1/6/2025    11:20 AM   PHQ   PHQ-9 Total Score 6  4 2   Q9: Thoughts of better off dead/self-harm past 2 weeks Not at all  Not at all Not at all       Proxy-reported         3/16/2022     2:40 PM 6/7/2023     1:16 PM 6/29/2023     2:14 PM   ARABELLA-7 SCORE   Total Score   11 (moderate anxiety)   Total Score 2 12 11     {Last PHQ9 or GAD7 Responses (Optional):530374}    Suicide Assessment Five-step Evaluation and Treatment (SAFE-T)  {Provider  Link to Depression Care Package SmartSet :192816}  How many servings of fruits and vegetables do you eat daily?  { :839107}  On average, how many sweetened beverages do you drink each day (Examples: soda, juice, sweet tea, etc.  Do NOT count diet or artificially sweetened beverages)?   { 1-11:058265}  How many days per week do you exercise enough to make your heart beat faster? { :563262}  How many minutes a day do you exercise enough to make your heart beat faster? { :685658}  How many days per week do you miss taking your medication? {0-7 :283638}    {ACUTE SUPERLIST - extended  history:618727}  Pain History:  When did you first notice your pain? {Duration of pain :230312}  {additonal problems for provider to add (Optional):685721}    {ROS Picklists (Optional):278044}      Objective    LMP  (LMP Unknown)   There is no height or weight on file to calculate BMI.  Physical Exam   {Exam List (Optional):029708}    {Diagnostic Test Results (Optional):904301}        Signed Electronically by: CYN Delaney CNP  {Email feedback regarding this note to [email or web address]   :566044}

## 2025-06-02 ENCOUNTER — HOSPITAL ENCOUNTER (OUTPATIENT)
Facility: CLINIC | Age: OVER 89
Setting detail: OBSERVATION
Discharge: HOME | End: 2025-06-03
Attending: FAMILY MEDICINE | Admitting: FAMILY MEDICINE
Payer: COMMERCIAL

## 2025-06-02 DIAGNOSIS — R53.1 GENERALIZED WEAKNESS: ICD-10-CM

## 2025-06-02 DIAGNOSIS — N39.0 URINARY TRACT INFECTION WITHOUT HEMATURIA, SITE UNSPECIFIED: Primary | ICD-10-CM

## 2025-06-02 DIAGNOSIS — R10.9 ABDOMINAL PAIN, UNSPECIFIED ABDOMINAL LOCATION: ICD-10-CM

## 2025-06-02 DIAGNOSIS — Z79.899 MEDICATION MANAGEMENT: ICD-10-CM

## 2025-06-02 DIAGNOSIS — R82.71 BACTERIURIA, ASYMPTOMATIC: ICD-10-CM

## 2025-06-02 LAB
ALBUMIN SERPL BCG-MCNC: 4 G/DL (ref 3.5–5.2)
ALBUMIN UR-MCNC: NEGATIVE MG/DL
ALP SERPL-CCNC: 72 U/L (ref 40–150)
ALT SERPL W P-5'-P-CCNC: 12 U/L (ref 0–50)
ANION GAP SERPL CALCULATED.3IONS-SCNC: 12 MMOL/L (ref 7–15)
APPEARANCE UR: CLEAR
AST SERPL W P-5'-P-CCNC: 20 U/L (ref 0–45)
BACTERIA #/AREA URNS HPF: ABNORMAL /HPF
BASOPHILS # BLD AUTO: 0 10E3/UL (ref 0–0.2)
BASOPHILS NFR BLD AUTO: 0 %
BILIRUB SERPL-MCNC: 0.3 MG/DL
BILIRUB UR QL STRIP: NEGATIVE
BUN SERPL-MCNC: 26.5 MG/DL (ref 8–23)
CALCIUM SERPL-MCNC: 9.1 MG/DL (ref 8.8–10.4)
CHLORIDE SERPL-SCNC: 102 MMOL/L (ref 98–107)
COLOR UR AUTO: ABNORMAL
CREAT SERPL-MCNC: 1.11 MG/DL (ref 0.51–0.95)
EGFRCR SERPLBLD CKD-EPI 2021: 46 ML/MIN/1.73M2
EOSINOPHIL # BLD AUTO: 0.3 10E3/UL (ref 0–0.7)
EOSINOPHIL NFR BLD AUTO: 3 %
ERYTHROCYTE [DISTWIDTH] IN BLOOD BY AUTOMATED COUNT: 12.3 % (ref 10–15)
GLUCOSE BLDC GLUCOMTR-MCNC: 123 MG/DL (ref 70–99)
GLUCOSE SERPL-MCNC: 182 MG/DL (ref 70–99)
GLUCOSE UR STRIP-MCNC: NEGATIVE MG/DL
HCO3 SERPL-SCNC: 21 MMOL/L (ref 22–29)
HCT VFR BLD AUTO: 33.1 % (ref 35–47)
HGB BLD-MCNC: 11 G/DL (ref 11.7–15.7)
HGB UR QL STRIP: NEGATIVE
IMM GRANULOCYTES # BLD: 0 10E3/UL
IMM GRANULOCYTES NFR BLD: 0 %
KETONES UR STRIP-MCNC: NEGATIVE MG/DL
LACTATE SERPL-SCNC: 1.7 MMOL/L (ref 0.7–2)
LACTATE SERPL-SCNC: 2.7 MMOL/L (ref 0.7–2)
LEUKOCYTE ESTERASE UR QL STRIP: ABNORMAL
LIPASE SERPL-CCNC: 53 U/L (ref 13–60)
LYMPHOCYTES # BLD AUTO: 2.1 10E3/UL (ref 0.8–5.3)
LYMPHOCYTES NFR BLD AUTO: 25 %
MCH RBC QN AUTO: 28.6 PG (ref 26.5–33)
MCHC RBC AUTO-ENTMCNC: 33.2 G/DL (ref 31.5–36.5)
MCV RBC AUTO: 86 FL (ref 78–100)
MONOCYTES # BLD AUTO: 0.7 10E3/UL (ref 0–1.3)
MONOCYTES NFR BLD AUTO: 8 %
MUCOUS THREADS #/AREA URNS LPF: PRESENT /LPF
NEUTROPHILS # BLD AUTO: 5.4 10E3/UL (ref 1.6–8.3)
NEUTROPHILS NFR BLD AUTO: 64 %
NITRATE UR QL: POSITIVE
NRBC # BLD AUTO: 0 10E3/UL
NRBC BLD AUTO-RTO: 0 /100
PH UR STRIP: 5.5 [PH] (ref 5–7)
PLATELET # BLD AUTO: 269 10E3/UL (ref 150–450)
POTASSIUM SERPL-SCNC: 3.9 MMOL/L (ref 3.4–5.3)
PROT SERPL-MCNC: 6.7 G/DL (ref 6.4–8.3)
RBC # BLD AUTO: 3.84 10E6/UL (ref 3.8–5.2)
RBC URINE: 3 /HPF
SODIUM SERPL-SCNC: 135 MMOL/L (ref 135–145)
SP GR UR STRIP: 1.02 (ref 1–1.03)
SQUAMOUS EPITHELIAL: <1 /HPF
UROBILINOGEN UR STRIP-MCNC: NORMAL MG/DL
WBC # BLD AUTO: 8.5 10E3/UL (ref 4–11)
WBC URINE: 83 /HPF

## 2025-06-02 PROCEDURE — 80053 COMPREHEN METABOLIC PANEL: CPT | Performed by: FAMILY MEDICINE

## 2025-06-02 PROCEDURE — G0378 HOSPITAL OBSERVATION PER HR: HCPCS

## 2025-06-02 PROCEDURE — 96361 HYDRATE IV INFUSION ADD-ON: CPT

## 2025-06-02 PROCEDURE — 83690 ASSAY OF LIPASE: CPT | Performed by: FAMILY MEDICINE

## 2025-06-02 PROCEDURE — 99285 EMERGENCY DEPT VISIT HI MDM: CPT | Mod: 25 | Performed by: FAMILY MEDICINE

## 2025-06-02 PROCEDURE — 36415 COLL VENOUS BLD VENIPUNCTURE: CPT | Performed by: FAMILY MEDICINE

## 2025-06-02 PROCEDURE — 83605 ASSAY OF LACTIC ACID: CPT | Performed by: FAMILY MEDICINE

## 2025-06-02 PROCEDURE — 81001 URINALYSIS AUTO W/SCOPE: CPT | Performed by: FAMILY MEDICINE

## 2025-06-02 PROCEDURE — 258N000003 HC RX IP 258 OP 636: Performed by: FAMILY MEDICINE

## 2025-06-02 PROCEDURE — 85018 HEMOGLOBIN: CPT | Performed by: FAMILY MEDICINE

## 2025-06-02 PROCEDURE — 87086 URINE CULTURE/COLONY COUNT: CPT | Performed by: FAMILY MEDICINE

## 2025-06-02 PROCEDURE — 85025 COMPLETE CBC W/AUTO DIFF WBC: CPT | Performed by: FAMILY MEDICINE

## 2025-06-02 PROCEDURE — 99285 EMERGENCY DEPT VISIT HI MDM: CPT | Mod: 25

## 2025-06-02 PROCEDURE — 96360 HYDRATION IV INFUSION INIT: CPT

## 2025-06-02 PROCEDURE — 82962 GLUCOSE BLOOD TEST: CPT

## 2025-06-02 RX ORDER — ONDANSETRON 2 MG/ML
4 INJECTION INTRAMUSCULAR; INTRAVENOUS EVERY 6 HOURS PRN
Status: DISCONTINUED | OUTPATIENT
Start: 2025-06-02 | End: 2025-06-03 | Stop reason: HOSPADM

## 2025-06-02 RX ORDER — AMOXICILLIN 250 MG
1 CAPSULE ORAL 2 TIMES DAILY PRN
Status: DISCONTINUED | OUTPATIENT
Start: 2025-06-02 | End: 2025-06-03 | Stop reason: HOSPADM

## 2025-06-02 RX ORDER — AMLODIPINE BESYLATE 5 MG/1
5 TABLET ORAL DAILY
Status: DISCONTINUED | OUTPATIENT
Start: 2025-06-03 | End: 2025-06-03 | Stop reason: HOSPADM

## 2025-06-02 RX ORDER — AMOXICILLIN 250 MG
2 CAPSULE ORAL 2 TIMES DAILY PRN
Status: DISCONTINUED | OUTPATIENT
Start: 2025-06-02 | End: 2025-06-03 | Stop reason: HOSPADM

## 2025-06-02 RX ORDER — BUPROPION HYDROCHLORIDE 150 MG/1
150 TABLET ORAL EVERY MORNING
Status: DISCONTINUED | OUTPATIENT
Start: 2025-06-03 | End: 2025-06-03 | Stop reason: HOSPADM

## 2025-06-02 RX ORDER — MELOXICAM 7.5 MG/1
7.5 TABLET ORAL DAILY
Status: DISCONTINUED | OUTPATIENT
Start: 2025-06-03 | End: 2025-06-03 | Stop reason: HOSPADM

## 2025-06-02 RX ORDER — PAROXETINE 20 MG/1
20 TABLET, FILM COATED ORAL EVERY MORNING
Status: DISCONTINUED | OUTPATIENT
Start: 2025-06-03 | End: 2025-06-03 | Stop reason: HOSPADM

## 2025-06-02 RX ORDER — ONDANSETRON 4 MG/1
4 TABLET, ORALLY DISINTEGRATING ORAL EVERY 6 HOURS PRN
Status: DISCONTINUED | OUTPATIENT
Start: 2025-06-02 | End: 2025-06-03 | Stop reason: HOSPADM

## 2025-06-02 RX ORDER — SODIUM CHLORIDE 9 MG/ML
INJECTION, SOLUTION INTRAVENOUS CONTINUOUS
Status: ACTIVE | OUTPATIENT
Start: 2025-06-02 | End: 2025-06-03

## 2025-06-02 RX ORDER — LOSARTAN POTASSIUM 50 MG/1
50 TABLET ORAL DAILY
Status: DISCONTINUED | OUTPATIENT
Start: 2025-06-03 | End: 2025-06-03 | Stop reason: HOSPADM

## 2025-06-02 RX ADMIN — SODIUM CHLORIDE 1000 ML: 0.9 INJECTION, SOLUTION INTRAVENOUS at 18:49

## 2025-06-02 RX ADMIN — SODIUM CHLORIDE: 0.9 INJECTION, SOLUTION INTRAVENOUS at 21:49

## 2025-06-02 ASSESSMENT — COLUMBIA-SUICIDE SEVERITY RATING SCALE - C-SSRS
IS THE PATIENT NOT ABLE TO COMPLETE C-SSRS: REFUSES TO ANSWER
1. IN THE PAST MONTH, HAVE YOU WISHED YOU WERE DEAD OR WISHED YOU COULD GO TO SLEEP AND NOT WAKE UP?: YES
2. HAVE YOU ACTUALLY HAD ANY THOUGHTS OF KILLING YOURSELF IN THE PAST MONTH?: NO
6. HAVE YOU EVER DONE ANYTHING, STARTED TO DO ANYTHING, OR PREPARED TO DO ANYTHING TO END YOUR LIFE?: NO
1. IN THE PAST MONTH, HAVE YOU WISHED YOU WERE DEAD OR WISHED YOU COULD GO TO SLEEP AND NOT WAKE UP?: NO

## 2025-06-02 ASSESSMENT — ACTIVITIES OF DAILY LIVING (ADL)
ADLS_ACUITY_SCORE: 65
ADLS_ACUITY_SCORE: 50
ADLS_ACUITY_SCORE: 65
ADLS_ACUITY_SCORE: 50

## 2025-06-02 NOTE — ED TRIAGE NOTES
Per patient and EMS patient lives home alone, ate chicken noodle about 1.5 hours ago and developed nausea and abdominal pain, no emesis.  250 mL NS bolus, 4 mg Zofran given by EMS     Triage Assessment (Adult)       Row Name 06/02/25 1738          Triage Assessment    Airway WDL WDL        Respiratory WDL    Respiratory WDL WDL        Skin Circulation/Temperature WDL    Skin Circulation/Temperature WDL WDL        Cardiac WDL    Cardiac WDL WDL        Peripheral/Neurovascular WDL    Peripheral Neurovascular WDL WDL        Cognitive/Neuro/Behavioral WDL    Cognitive/Neuro/Behavioral WDL WDL

## 2025-06-03 ENCOUNTER — APPOINTMENT (OUTPATIENT)
Dept: OCCUPATIONAL THERAPY | Facility: CLINIC | Age: OVER 89
End: 2025-06-03
Payer: COMMERCIAL

## 2025-06-03 VITALS
DIASTOLIC BLOOD PRESSURE: 57 MMHG | OXYGEN SATURATION: 98 % | HEIGHT: 64 IN | BODY MASS INDEX: 24.28 KG/M2 | TEMPERATURE: 98.2 F | RESPIRATION RATE: 16 BRPM | HEART RATE: 75 BPM | WEIGHT: 142.2 LBS | SYSTOLIC BLOOD PRESSURE: 146 MMHG

## 2025-06-03 PROBLEM — N39.0 URINARY TRACT INFECTION: Status: ACTIVE | Noted: 2025-06-03

## 2025-06-03 LAB
ANION GAP SERPL CALCULATED.3IONS-SCNC: 11 MMOL/L (ref 7–15)
BASOPHILS # BLD AUTO: 0 10E3/UL (ref 0–0.2)
BASOPHILS NFR BLD AUTO: 0 %
BUN SERPL-MCNC: 17.2 MG/DL (ref 8–23)
CALCIUM SERPL-MCNC: 8.9 MG/DL (ref 8.8–10.4)
CHLORIDE SERPL-SCNC: 109 MMOL/L (ref 98–107)
CREAT SERPL-MCNC: 0.9 MG/DL (ref 0.51–0.95)
EGFRCR SERPLBLD CKD-EPI 2021: 60 ML/MIN/1.73M2
EOSINOPHIL # BLD AUTO: 0.2 10E3/UL (ref 0–0.7)
EOSINOPHIL NFR BLD AUTO: 2 %
ERYTHROCYTE [DISTWIDTH] IN BLOOD BY AUTOMATED COUNT: 12.4 % (ref 10–15)
GLUCOSE SERPL-MCNC: 105 MG/DL (ref 70–99)
HCO3 SERPL-SCNC: 21 MMOL/L (ref 22–29)
HCT VFR BLD AUTO: 32.8 % (ref 35–47)
HGB BLD-MCNC: 11 G/DL (ref 11.7–15.7)
IMM GRANULOCYTES # BLD: 0 10E3/UL
IMM GRANULOCYTES NFR BLD: 0 %
LYMPHOCYTES # BLD AUTO: 1.8 10E3/UL (ref 0.8–5.3)
LYMPHOCYTES NFR BLD AUTO: 19 %
MCH RBC QN AUTO: 28.9 PG (ref 26.5–33)
MCHC RBC AUTO-ENTMCNC: 33.5 G/DL (ref 31.5–36.5)
MCV RBC AUTO: 86 FL (ref 78–100)
MONOCYTES # BLD AUTO: 0.7 10E3/UL (ref 0–1.3)
MONOCYTES NFR BLD AUTO: 7 %
NEUTROPHILS # BLD AUTO: 7 10E3/UL (ref 1.6–8.3)
NEUTROPHILS NFR BLD AUTO: 71 %
NRBC # BLD AUTO: 0 10E3/UL
NRBC BLD AUTO-RTO: 0 /100
PLATELET # BLD AUTO: 236 10E3/UL (ref 150–450)
POTASSIUM SERPL-SCNC: 4 MMOL/L (ref 3.4–5.3)
RBC # BLD AUTO: 3.8 10E6/UL (ref 3.8–5.2)
SODIUM SERPL-SCNC: 141 MMOL/L (ref 135–145)
WBC # BLD AUTO: 9.8 10E3/UL (ref 4–11)

## 2025-06-03 PROCEDURE — 99222 1ST HOSP IP/OBS MODERATE 55: CPT | Performed by: INTERNAL MEDICINE

## 2025-06-03 PROCEDURE — 97535 SELF CARE MNGMENT TRAINING: CPT | Mod: GO

## 2025-06-03 PROCEDURE — 97165 OT EVAL LOW COMPLEX 30 MIN: CPT | Mod: GO

## 2025-06-03 PROCEDURE — 250N000011 HC RX IP 250 OP 636: Performed by: FAMILY MEDICINE

## 2025-06-03 PROCEDURE — 999N000147 HC STATISTIC PT IP EVAL DEFER

## 2025-06-03 PROCEDURE — 258N000003 HC RX IP 258 OP 636: Performed by: INTERNAL MEDICINE

## 2025-06-03 PROCEDURE — 85025 COMPLETE CBC W/AUTO DIFF WBC: CPT

## 2025-06-03 PROCEDURE — G0378 HOSPITAL OBSERVATION PER HR: HCPCS

## 2025-06-03 PROCEDURE — 99207 PR NO BILLABLE SERVICE THIS VISIT: CPT

## 2025-06-03 PROCEDURE — 85004 AUTOMATED DIFF WBC COUNT: CPT

## 2025-06-03 PROCEDURE — 250N000013 HC RX MED GY IP 250 OP 250 PS 637

## 2025-06-03 PROCEDURE — 250N000013 HC RX MED GY IP 250 OP 250 PS 637: Performed by: INTERNAL MEDICINE

## 2025-06-03 PROCEDURE — 80048 BASIC METABOLIC PNL TOTAL CA: CPT

## 2025-06-03 PROCEDURE — 36415 COLL VENOUS BLD VENIPUNCTURE: CPT

## 2025-06-03 RX ORDER — NITROFURANTOIN 25; 75 MG/1; MG/1
100 CAPSULE ORAL EVERY 12 HOURS SCHEDULED
Status: DISCONTINUED | OUTPATIENT
Start: 2025-06-03 | End: 2025-06-03 | Stop reason: HOSPADM

## 2025-06-03 RX ORDER — SODIUM CHLORIDE 9 MG/ML
INJECTION, SOLUTION INTRAVENOUS CONTINUOUS
Status: DISCONTINUED | OUTPATIENT
Start: 2025-06-03 | End: 2025-06-03

## 2025-06-03 RX ORDER — NITROFURANTOIN 25; 75 MG/1; MG/1
100 CAPSULE ORAL EVERY 12 HOURS
Qty: 9 CAPSULE | Refills: 0 | Status: SHIPPED | OUTPATIENT
Start: 2025-06-03

## 2025-06-03 RX ADMIN — BUPROPION HYDROCHLORIDE 150 MG: 150 TABLET, EXTENDED RELEASE ORAL at 08:04

## 2025-06-03 RX ADMIN — ONDANSETRON 4 MG: 4 TABLET, ORALLY DISINTEGRATING ORAL at 08:03

## 2025-06-03 RX ADMIN — MELOXICAM 7.5 MG: 7.5 TABLET ORAL at 08:33

## 2025-06-03 RX ADMIN — NITROFURANTOIN (MONOHYDRATE/MACROCRYSTALS) 100 MG: 75; 25 CAPSULE ORAL at 04:08

## 2025-06-03 RX ADMIN — LOSARTAN POTASSIUM 50 MG: 50 TABLET, FILM COATED ORAL at 08:04

## 2025-06-03 RX ADMIN — PAROXETINE 20 MG: 20 TABLET, FILM COATED ORAL at 08:03

## 2025-06-03 RX ADMIN — SODIUM CHLORIDE: 0.9 INJECTION, SOLUTION INTRAVENOUS at 07:22

## 2025-06-03 RX ADMIN — AMLODIPINE BESYLATE 5 MG: 5 TABLET ORAL at 08:04

## 2025-06-03 ASSESSMENT — ACTIVITIES OF DAILY LIVING (ADL)
ADLS_ACUITY_SCORE: 50
DEPENDENT_IADLS:: TRANSPORTATION
ADLS_ACUITY_SCORE: 50

## 2025-06-03 NOTE — PLAN OF CARE
Physical Therapy: PT order received and acknowledged. Per discussion with OT, no identified IP PT needs. Refer to OT for discharge recommendations. Will discontinue order.

## 2025-06-03 NOTE — PROGRESS NOTES
"[facility] ADMISSION NOTE    Patient admitted to room 2305 at approximately 2130 via wheel chair from emergency room. Patient was accompanied by transport tech.     Verbal SBAR report received from MICHEL Briscoe prior to patient arrival.     Patient ambulated to bed with one assist. Patient alert and oriented X 3. The patient is not having any pain.  . Admission vital signs: Blood pressure (!) 144/57, pulse 70, temperature 97.8  F (36.6  C), resp. rate 16, height 1.626 m (5' 4\"), weight 64.5 kg (142 lb 3.2 oz), SpO2 97%, not currently breastfeeding. Patient was oriented to plan of care, call light, bed controls, tv, telephone, bathroom, and visiting hours.     Risk Assessment    The following safety risks were identified during admission: fall. Yellow risk band applied: YES.     Skin Initial Assessment    This writer admitted this patient and completed a full skin assessment and Serge score in the Adult PCS flowsheet.   Photo documentation of skin problem and/or wound competed via [employer] application (located under Media):  No    Appropriate interventions initiated as needed.     Secondary skin check completed by MICHEL Marks.    Serge Risk Assessment  Sensory Perception: 4-->no impairment  Moisture: 3-->occasionally moist  Activity: 3-->walks occasionally  Mobility: 3-->slightly limited  Nutrition: 3-->adequate  Friction and Shear: 3-->no apparent problem  Serge Score: 19  Moisture Interventions: Incontinence pad, Urinary collection device  Mattress: Standard gel/foam mattress (IsoFlex, Atmos Air, etc.)  Bed Frame: Standard width and length    Education    Patient has a [place] to Observation order: Yes  Observation education completed and documented: Yes      Danya Beard RN      "

## 2025-06-03 NOTE — MEDICATION SCRIBE - ADMISSION MEDICATION HISTORY
Medication Scribe Admission Medication History    Admission medication history is complete. The information provided in this note is only as accurate as the sources available at the time of the update.    Information Source(s): Patient and CareEverywhere/SureScripts via in-person    Pertinent Information: PTA med list reviewed with patient in room and finished at desk.  A new script was written for Olmesartan in May, but she has refills left on the script that was written on 10/24/24.    Changes made to PTA medication list:  Added: Pepto bismol liquid  Deleted: None  Changed: None    Allergies reviewed with patient and updates made in EHR: yes, no change.    Medication History Completed By: Ami Huerta 6/2/2025 8:52 PM    PTA Med List   Medication Sig Last Dose/Taking    acetaminophen (TYLENOL) 500 MG tablet Take 500-1,000 mg by mouth every 6 hours as needed for mild pain or pain Past Week    amLODIPine (NORVASC) 5 MG tablet Take 1 tablet (5 mg) by mouth daily. 6/2/2025 Morning    bismuth subsalicylate (PEPTO BISMOL) 262 MG/15ML suspension Take 15 mLs by mouth every 6 hours as needed for indigestion. 6/2/2025 Morning    blood glucose (NO BRAND SPECIFIED) test strip Use to test blood sugar 1 times daily or as directed. To accompany: Blood Glucose Monitor Brands: per insurance. More than a month    blood glucose monitoring (NO BRAND SPECIFIED) meter device kit Use to test blood sugar 1 times daily or as directed. Preferred blood glucose meter OR supplies to accompany: Blood Glucose Monitor Brands: per insurance. More than a month    buPROPion (WELLBUTRIN XL) 150 MG 24 hr tablet Take 1 tablet (150 mg) by mouth every morning. 6/2/2025 Morning    meloxicam (MOBIC) 7.5 MG tablet Take 1 tablet (7.5 mg) by mouth daily. 6/2/2025 Morning    Multiple Vitamins-Minerals (THERAPEUTIC MULTIVIT/MINERAL) TABS Take 1 tablet by mouth every evening  6/1/2025 Evening    olmesartan (BENICAR) 20 MG tablet Take 1 tablet (20 mg) by mouth  daily. 6/2/2025 Morning    PARoxetine (PAXIL) 20 MG tablet Take 1 tablet (20 mg) by mouth every morning. 6/2/2025 Morning    thin (NO BRAND SPECIFIED) lancets Use with lanceting device. To accompany: Blood Glucose Monitor Brands: per insurance. More than a month

## 2025-06-03 NOTE — H&P
"HOSPITALIST TELEMED ADMISSION H&P    Service Date : 6/3/2025  Dr. Ceasar HEARD, am located in [place]  Daniela Brown is located in [place] at [facility].   The RN on the floor is assisting me today with this patient.    chief complaint:  nausea, abdominal pain diarrhea    History of Present Illness:  Daniela Brown is a 89 year old female with a h/o  type 2 diabetes, neuropathy, CKD, anxiety, hyperlipidemia, arthritis, hypertension, referred for admission for UTI and nausea and abdominal pain and diarrhea.      She c/o nausea that started last night, associated with diarrhea, abd pain and chest pain. Sx started after having some chicken noodle soup last night from a can. States she has never felt so sick in her life.   States she felt like she was going to die.  She felt fine with no complaints prior to eating the soup  Other than urinary frequency.      She c/o urinary frequency for a long time about a month. NO fever. NO dysuria. +urgency.     She lives alone. She has a son but states he won't talk to her.     Emergency Room Course -  in the ER, patient was afebrile with stable signs.  She has been having abdominal exam.  CMP was unremarkable.  UA was positive for UTI.  Lactic acid was elevated at 2.7 , did come down to 1.7 after fluids in the ER..  CBC showed white count and hemoglobin 11.  In the ER, patient was given 1 L of normal saline.  She was not given any antibiotics yet for some reason.   They attempted to discharge her home but she was feeling too weak and did not feel she could go home.  She lives alone. She was referred for observation.    Past Medical History  Past Medical History:   Diagnosis Date    Abnormal cardiovascular stress test 2/3/2019    9/10/2018 Lexiscan: \"Myocardial perfusion imaging using single isotope technique demonstrated a small perfusion defect of mild severity involving the basal inferior wall which is mostly reversible and may be consistent with mild ischemia in the right " "coronary artery distribution. In addition, transient ischemic dilatation is noted with a TID ratio of 1.3. \"    Adhesive capsulitis of shoulder     left     Adjustment disorder with anxiety 3/18/2016    B12 deficiency anemia 6/20/2012    Chest pain 2004    Chronic right sided/axillary discomfort (musculoskeletal) Atypical substernal (possibly GERD). Negative cardiolite 2004.    Colitis, Clostridium difficile 4/18/2012    Diverticulitis 2009    Headache(784.0) 2001    Tension type. MRI 2001 normal.    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    Status post coronary angiogram 2/27/2019    Syncope and collapse 9/10/2018      Patient Active Problem List   Diagnosis    Degeneration of lumbar or lumbosacral intervertebral disc    Esophageal reflux    Memory loss    Irritable bowel syndrome with diarrhea    Osteopenia of multiple sites    RESTLESS LEG SYNDROME    Primary osteoarthritis involving multiple joints    Diverticulosis of large intestine    SENSONRL HEAR LOSS,BILAT    Urticaria    Subjective tinnitus    Vitamin D deficiency    Right foot pain    Urge incontinence    Hyperlipidemia LDL goal <100    Allergic rhinitis    Pronation of foot    Peripheral neuropathy    Benign essential hypertension    Carpal tunnel syndrome of left wrist    Diastolic dysfunction    Type 2 diabetes mellitus with diabetic polyneuropathy, without long-term current use of insulin (H)    History of recurrent urinary tract infection    CKD (chronic kidney disease) stage 3, GFR 30-59 ml/min (H)    Bilateral wrist pain    Protrusion of lumbar intervertebral disc    Coronary artery disease involving native coronary artery of native heart with angina pectoris    Chronic left-sided low back pain with left-sided sciatica    Generalized weakness    Diarrhea    Abnormal CT of the abdomen    Cystocele, midline    B12 " deficiency    Moderate major depression (H)    ARABELLA (generalized anxiety disorder)    Polyneuropathy associated with underlying disease    Closed head injury, initial encounter    Fall, initial encounter    Non-traumatic rhabdomyolysis    Impaired mobility and activities of daily living    Complex care coordination    Examination of eyes and vision    Left foot pain    Encounter for Medicare annual wellness exam    Chronic pain of left knee    Intertrigo    Right-sided low back pain with right-sided sciatica, unspecified chronicity    Bacteriuria, asymptomatic    Abdominal pain, unspecified abdominal location         Past Surgical History  Past Surgical History:   Procedure Laterality Date    ARTHROPLASTY KNEE  3/26/2012    Procedure:ARTHROPLASTY KNEE; Right Total Knee Arthroplasty; Surgeon:BERNADINE ROSAS; Location:[place] OR    CHOLECYSTECTOMY      CV HEART CATHETERIZATION WITH POSSIBLE INTERVENTION N/A 2/27/2019    Procedure: Coronary Angiogram with Left ventriculogram;  Surgeon: Leandro Carpio MD;  Location: [facility] CARDIAC CATH LAB    HERNIA REPAIR      Hernia Repair    HYSTERECTOMY, PAP NO LONGER INDICATED  1983    TVH/BSO    SURGICAL HISTORY OF -   10/08    [facility] & P Repair, Dr. Hannah    [facility] APPENDECTOMY  1953      Social History  Daniela  reports that she has never smoked. She has never used smokeless tobacco. She reports that she does not drink alcohol and does not use drugs.    Family History  Daniela's family history includes Alcohol/Drug in her father; Allergies in her son; Arthritis in her mother; C.A.D. in her mother; Cancer - colorectal in her mother; Diabetes in her mother; Eye Disorder in her son and son; Heart Disease in her mother; Hypertension in her brother; Leukemia in her son; Lipids in her brother; Obesity in her brother; Thyroid Disease in her sister.    Home Medications  Current Outpatient Medications   Medication Instructions    acetaminophen (TYLENOL) 500-1,000 mg, EVERY 6  HOURS PRN    amLODIPine (NORVASC) 5 mg, Oral, DAILY    bismuth subsalicylate (PEPTO BISMOL) 262 MG/15ML suspension 15 mLs, EVERY 6 HOURS PRN    blood glucose (NO BRAND SPECIFIED) test strip Use to test blood sugar 1 times daily or as directed. To accompany: Blood Glucose Monitor Brands: per insurance.    blood glucose monitoring (NO BRAND SPECIFIED) meter device kit Use to test blood sugar 1 times daily or as directed. Preferred blood glucose meter OR supplies to accompany: Blood Glucose Monitor Brands: per insurance.    buPROPion (WELLBUTRIN XL) 150 mg, Oral, EVERY MORNING    meloxicam (MOBIC) 7.5 mg, Oral, DAILY    Multiple Vitamins-Minerals (THERAPEUTIC MULTIVIT/MINERAL) TABS 1 tablet, EVERY EVENING    olmesartan (BENICAR) 20 mg, Oral, DAILY    olmesartan (BENICAR) 20 mg, Oral, DAILY    PARoxetine (PAXIL) 20 mg, Oral, EVERY MORNING    thin (NO BRAND SPECIFIED) lancets Use with lanceting device. To accompany: Blood Glucose Monitor Brands: per insurance.       Allergies  Allergies   Allergen Reactions    Metoprolol Itching and Rash    Cephalexin Rash    Erythromycin Rash    Atarax [Hydroxyzine] Other (See Comments)     Syncope and collapse    Hydrocodone Itching    Shellfish Allergy     Acetaminophen Itching     Patient reported - only when used scheduled in high doses      Actonel [Bisphosphonates] Itching    Alendronate Rash    Azithromycin Hives    Celebrex [Ricci-2 Inhibitors (Sulfonamide)] Rash    Celecoxib Rash    Cipro [Ciprofloxacin] Rash    Contrast Dye Hives    Diatrizoate Hives    Erythromycin Rash    Escitalopram Diarrhea     Gets very sick and mad feelings    Fosamax Itching and Rash    Keflex [Cephalexin Monohydrate] Rash    Lisinopril Cough    Penicillins Rash    Risedronate Itching    Rofecoxib Rash    Seasonal Allergies Other (See Comments)     Seasonal rhinitis    Shellfish-Derived Products Hives    Sulfa Antibiotics Itching and Rash    Sulfasalazine Itching and Rash    Tetanus Toxoid, Adsorbed  "Swelling    Tetanus-Diphtheria Toxoids Td Swelling    Vicodin [Acetaminophen] Itching     Patient reported - only when used scheduled in high doses.       Vioxx Rash        10 pt ROS neg except as noted in HPI    Physical Exam:  I performed all aspects of the physical examination via Telemedicine associated with two way audio and video communication.    Vital Signs: Blood pressure (!) 149/61, pulse 77, temperature 97.6  F (36.4  C), temperature source Oral, resp. rate 16, height 1.626 m (5' 4\"), weight 64.5 kg (142 lb 3.2 oz), SpO2 96%, not currently breastfeeding.    Physical Exam    Gen:  Well-developed, well-nourished, in no acute distress, lying semi-supine in hospital stretcher  HEENT:  Anicteric sclera, PER, hearing intact to voice  Resp:  No accessory muscle use, breath sounds clear; no wheezes no rales no rhonchi  Card:  No murmur, normal S1, S2   Abd:  Soft per RN exam, no TTP, non-distended, normoactive bowel sounds are present  Musc:  Normal strength and movement of the major muscle groups without obvious deformity  Psych:  Good insight, oriented to person, place and time, not anxious, not agitated    DATA  Labs:  I have personally reviewed the following studies:  Recent Labs   Lab 06/02/25  1746   POTASSIUM 3.9   CHLORIDE 102   CO2 21*   BUN 26.5*   ALBUMIN 4.0   ALKPHOS 72   AST 20   ALT 12   LIPASE 53      Recent Labs   Lab 06/02/25  1746   WBC 8.5   HGB 11.0*   HCT 33.1*          Imaging: ER imaging reviewed    ASSESSMENT/PLAN:       1. UTI:  -  Admit to observation  status  - start  Macrobid given her plethora of drug allergies  - follow up urine culture     2. Nausea a,bdominal pain, diarrhea:  - possibly food poisoning  - improved already  - continue IV fluids  - clear liquid diet, advanced as tolerated  - p.r.n. Zofran     3. Chest pain:  -  Get troponins to rule out ACS  - monitor on telemetry     4.  Hypertension:  -  Continue Norvasc, Arb per home regimen    5. Depression anxiety:  -  " Continue Wellbutrin, Paxil per home regimen      Clinically Significant Risk Factors Present on Admission                   # Hypertension: Noted on problem list               # Financial/Environmental Concerns:             Misc  DVT prophylaxis: SCDs  Code Status: DNR as per my d/w pt     The plan was discussed with - Patient  Time: 30 min

## 2025-06-03 NOTE — PLAN OF CARE
Goal Outcome Evaluation:    PT/OT: Patient will have PT/OT to make sure that she doesn't need any new assistive devices, as well as make sure she can safely discharge.    Labs: Patient will have AM labs and as needed    Pain Control: Patients pain will be controlled per the MAR    IV Fluids: Patient currently has NS running at 125ml/hr.

## 2025-06-03 NOTE — PROGRESS NOTES
SOO DUFFY DISCHARGE NOTE    Patient discharged to home at-------via wheel chair.  Accompanied by other. Discharge instructions reviewed with patient, teach back done. Prescriptions sent to patients preferred pharmacy. All belongings sent with patient.    Patricia Weiner RN

## 2025-06-03 NOTE — ED NOTES
"[facility]   Admission Handoff    The patient is Daniela Brown, 89 year old who arrived in the ED by AMBULANCE from home with a complaint of Abdominal Pain (Per patient and EMS patient lives home alone, ate chicken noodle about 1.5 hours ago and developed nausea and abdominal pain, no emesis.  250 mL NS bolus, 4 mg Zofran given by EMS)  . The patient's current symptoms are new and during this time the symptoms have decreased. In the ED, patient was diagnosed with   Final diagnoses:   Abdominal pain, unspecified abdominal location - Resolved   Bacteriuria, asymptomatic         Needed?: No    Allergies:    Allergies   Allergen Reactions    Metoprolol Itching and Rash    Cephalexin Rash    Erythromycin Rash    Atarax [Hydroxyzine] Other (See Comments)     Syncope and collapse    Hydrocodone     Shellfish Allergy     Acetaminophen Itching     Patient reported - only when used scheduled in high doses      Actonel [Bisphosphonates] Itching    Alendronate Rash    Azithromycin Hives    Celebrex [Ricci-2 Inhibitors (Sulfonamide)] Rash    Celecoxib Rash    Cipro [Ciprofloxacin] Rash    Contrast Dye Hives    Diatrizoate Hives    Erythromycin Rash    Escitalopram Diarrhea     Gets very sick and mad feelings    Fosamax Itching and Rash    Keflex [Cephalexin Monohydrate] Rash    Lisinopril Cough    Penicillins Rash    Risedronate Itching    Rofecoxib Rash    Seasonal Allergies Other (See Comments)     Seasonal rhinitis    Shellfish-Derived Products Hives    Sulfa Antibiotics Itching and Rash    Sulfasalazine Itching and Rash    Tetanus Toxoid, Adsorbed Swelling    Tetanus-Diphtheria Toxoids Td Swelling    Vicodin [Acetaminophen] Itching     Patient reported - only when used scheduled in high doses.       Vioxx Rash       Past Medical Hx:   Past Medical History:   Diagnosis Date    Abnormal cardiovascular stress test 2/3/2019    9/10/2018 Lexiscan: \"Myocardial perfusion imaging using single " "isotope technique demonstrated a small perfusion defect of mild severity involving the basal inferior wall which is mostly reversible and may be consistent with mild ischemia in the right coronary artery distribution. In addition, transient ischemic dilatation is noted with a TID ratio of 1.3. \"    Adhesive capsulitis of shoulder     left     Adjustment disorder with anxiety 3/18/2016    B12 deficiency anemia 6/20/2012    Chest pain 2004    Chronic right sided/axillary discomfort (musculoskeletal) Atypical substernal (possibly GERD). Negative cardiolite 2004.    Colitis, Clostridium difficile 4/18/2012    Diverticulitis 2009    Headache(784.0) 2001    Tension type. MRI 2001 normal.    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    Status post coronary angiogram 2/27/2019    Syncope and collapse 9/10/2018       Initial vitals were: BP: (!) 148/86  Pulse: 75  Temp: 97.6  F (36.4  C)  Resp: 18  Height: 162.6 cm (5' 4\")  Weight: 65.8 kg (145 lb)  SpO2: 98 %   Recent vital Signs: BP (!) 141/55   Pulse 77   Temp 97.6  F (36.4  C) (Oral)   Resp 18   Ht 1.626 m (5' 4\")   Wt 65.8 kg (145 lb)   LMP  (LMP Unknown)   SpO2 97%   BMI 24.89 kg/m      Elimination Status: Continent: wears briefs     Activity Level: SBA w/ walker    Fall Status: Reason for falls risk:  Mobility, Reason for falls risk: Elimination, Reason for falls risk: Cognition, and Reason for falls risk: High Risk Medications  bed/chair alarm on, nonskid shoes/slippers when out of bed, arm band in place, patient and family education, assistive device/personal items within reach, and activity supervised    Baseline Mental status: WDL  Current Mental Status changes: at basesline    Infection present or suspected this encounter: yes urinary  Sepsis suspected: No    Isolation type: NA    Bariatric equipment needed?: No    In the " ED these meds were given:   Medications   sodium chloride 0.9% BOLUS 1,000 mL (1,000 mLs Intravenous $New Bag 6/2/25 3374)       Drips running?  No    Home pump  No    Current LDAs: Peripheral IV: Site RAC; Gauge 18  normal saline     Results:   Labs/Imaging  Ordered and Resulted from Time of ED Arrival Up to the Time of Departure from the ED  Results for orders placed or performed during the hospital encounter of 06/02/25 (from the past 24 hours)   CBC with platelets, differential    Narrative    The following orders were created for panel order CBC with platelets, differential.  Procedure                               Abnormality         Status                     ---------                               -----------         ------                     CBC with platelets and ...[[id number]]  Abnormal            Final result                 Please view results for these tests on the individual orders.   Comprehensive metabolic panel   Result Value Ref Range    Sodium 135 135 - 145 mmol/L    Potassium 3.9 3.4 - 5.3 mmol/L    Carbon Dioxide (CO2) 21 (L) 22 - 29 mmol/L    Anion Gap 12 7 - 15 mmol/L    Urea Nitrogen 26.5 (H) 8.0 - 23.0 mg/dL    Creatinine 1.11 (H) 0.51 - 0.95 mg/dL    GFR Estimate 46 (L) >60 mL/min/1.73m2    Calcium 9.1 8.8 - 10.4 mg/dL    Chloride 102 98 - 107 mmol/L    Glucose 182 (H) 70 - 99 mg/dL    Alkaline Phosphatase 72 40 - 150 U/L    AST 20 0 - 45 U/L    ALT 12 0 - 50 U/L    Protein Total 6.7 6.4 - 8.3 g/dL    Albumin 4.0 3.5 - 5.2 g/dL    Bilirubin Total 0.3 <=1.2 mg/dL   Lipase   Result Value Ref Range    Lipase 53 13 - 60 U/L   Lactic Acid Whole Blood with 1X Repeat in 2 HR when >2   Result Value Ref Range    Lactic Acid, Initial 2.7 (H) 0.7 - 2.0 mmol/L   CBC with platelets and differential   Result Value Ref Range    WBC Count 8.5 4.0 - 11.0 10e3/uL    RBC Count 3.84 3.80 - 5.20 10e6/uL    Hemoglobin 11.0 (L) 11.7 - 15.7 g/dL    Hematocrit 33.1 (L) 35.0 - 47.0 %    MCV 86 78 - 100 fL    MCH  28.6 26.5 - 33.0 pg    MCHC 33.2 31.5 - 36.5 g/dL    RDW 12.3 10.0 - 15.0 %    Platelet Count 269 150 - 450 10e3/uL    % Neutrophils 64 %    % Lymphocytes 25 %    % Monocytes 8 %    % Eosinophils 3 %    % Basophils 0 %    % Immature Granulocytes 0 %    NRBCs per 100 WBC 0 <1 /100    Absolute Neutrophils 5.4 1.6 - 8.3 10e3/uL    Absolute Lymphocytes 2.1 0.8 - 5.3 10e3/uL    Absolute Monocytes 0.7 0.0 - 1.3 10e3/uL    Absolute Eosinophils 0.3 0.0 - 0.7 10e3/uL    Absolute Basophils 0.0 0.0 - 0.2 10e3/uL    Absolute Immature Granulocytes 0.0 <=0.4 10e3/uL    Absolute NRBCs 0.0 10e3/uL   UA with Microscopic reflex to Culture    Specimen: Urine, Clean Catch   Result Value Ref Range    Color Urine Light Yellow Colorless, Straw, Light Yellow, Yellow    Appearance Urine Clear Clear    Glucose Urine Negative Negative mg/dL    Bilirubin Urine Negative Negative    Ketones Urine Negative Negative mg/dL    Specific Gravity Urine 1.016 1.003 - 1.035    Blood Urine Negative Negative    pH Urine 5.5 5.0 - 7.0    Protein Albumin Urine Negative Negative mg/dL    Urobilinogen Urine Normal Normal mg/dL    Nitrite Urine Positive (A) Negative    Leukocyte Esterase Urine Large (A) Negative    Bacteria Urine Few (A) None Seen /HPF    Mucus Urine Present (A) None Seen /LPF    RBC Urine 3 (H) <=2 /HPF    WBC Urine 83 (H) <=5 /HPF    Squamous Epithelials Urine <1 <=1 /HPF    Narrative    Urine Culture ordered based on laboratory criteria   Lactic acid whole blood   Result Value Ref Range    Lactic Acid 1.7 0.7 - 2.0 mmol/L     *Note: Due to a large number of results and/or encounters for the requested time period, some results have not been displayed. A complete set of results can be found in Results Review.       For the majority of the shift this patient's behavior was Green     Cardiac Rhythm: N/A  Pt needs tele? No  Skin/wound Issues: None    Code Status: DNR / DNI    Pain control: good    Nausea control: good    Abnormal  labs/tests/findings requiring intervention: UA    Patient tested for COVID 19 prior to admission: NO     OBS brochure/video discussed/provided to patient/family: Yes     Family present during ED course? No     Family Comments/Social Situation comments: lives home alone, uses walker and or cane at baseline    Tasks needing completion: None    Zenia Saini RN

## 2025-06-03 NOTE — PROGRESS NOTES
"  End Of Shift Note          Neuro: Aox4. Recalling events with some forgetfulness.     Up with walker and belt. Steady on her feet.      Cardiac: This morning denies chest pain. Afebrile- T.max 98.2 denies feeling feverish or chilled.  BP (!) 146/57 (BP Location: Right arm)   Pulse 75   Temp 98.2  F (36.8  C) (Oral)   Resp 16   Ht 1.626 m (5' 4\")   Wt 64.5 kg (142 lb 3.2 oz)   LMP  (LMP Unknown)   SpO2 98%   BMI 24.41 kg/m         Resp: Lungs clear. RRR satting well on room air with spot checks. No cough or cold/allergy sx.      GI/: Abd soft, non tender. Bowel sounds present. Prior upper abdominal pain and nausea. This morning preventatively took a Zofran with breakfast and meds but has been denying nausea, abdominal pain or cramps. Reports diarrhea that she reports having quite often- last was NOC- watery- loose. None this morning.      Walking with 1 assist and walker to the BR. Frequency. Denies burning or pain.     MSK: Knee pain. Generalized weakness. Up in the chair for breakfast and has been up for most of the morning.      Skin: WNL.      LDAs: PIV CDI.           "

## 2025-06-03 NOTE — DISCHARGE SUMMARY
[facility]    Discharge Summary  Hospital Medicine    Date of Admission:  6/2/2025  Date of Discharge:  6/3/2025   Discharging Provider: NELLY ROONEY PA-C  Date of Service: 6/3/2025      Primary Care     Renay Hannah  [address] [facility] [address]      Identification and Chief Compaint: Daniela Brown is a 89 year old female who presented on 6/2/2025 with complaint of abdominal pain, nausea, and diarrhea.    Discharge Diagnoses   Gastroenteritis  Urinary tract infection (UTI)  Renal insufficiency    Discharge Disposition   Discharged to home    Discharge Orders   No discharge procedures on file.     Discharge Medications   Current Discharge Medication List        CONTINUE these medications which have NOT CHANGED    Details   acetaminophen (TYLENOL) 500 MG tablet Take 500-1,000 mg by mouth every 6 hours as needed for mild pain or pain      amLODIPine (NORVASC) 5 MG tablet Take 1 tablet (5 mg) by mouth daily.  Qty: 90 tablet, Refills: 3    Associated Diagnoses: Benign essential hypertension      bismuth subsalicylate (PEPTO BISMOL) 262 MG/15ML suspension Take 15 mLs by mouth every 6 hours as needed for indigestion.      blood glucose (NO BRAND SPECIFIED) test strip Use to test blood sugar 1 times daily or as directed. To accompany: Blood Glucose Monitor Brands: per insurance.  Qty: 100 strip, Refills: 6    Associated Diagnoses: Type 2 diabetes mellitus with diabetic polyneuropathy, without long-term current use of insulin (H)      blood glucose monitoring (NO BRAND SPECIFIED) meter device kit Use to test blood sugar 1 times daily or as directed. Preferred blood glucose meter OR supplies to accompany: Blood Glucose Monitor Brands: per insurance.  Qty: 1 kit, Refills: 0    Associated Diagnoses: Type 2 diabetes mellitus with diabetic polyneuropathy, without long-term current use of insulin (H)      buPROPion (WELLBUTRIN XL) 150 MG 24 hr tablet Take 1 tablet (150 mg) by mouth  every morning.  Qty: 90 tablet, Refills: 3    Associated Diagnoses: ARABELLA (generalized anxiety disorder); Moderate major depression (H)      meloxicam (MOBIC) 7.5 MG tablet Take 1 tablet (7.5 mg) by mouth daily.  Qty: 90 tablet, Refills: 1    Associated Diagnoses: Chronic pain of left knee      Multiple Vitamins-Minerals (THERAPEUTIC MULTIVIT/MINERAL) TABS Take 1 tablet by mouth every evening       !! olmesartan (BENICAR) 20 MG tablet Take 1 tablet (20 mg) by mouth daily.  Qty: 90 tablet, Refills: 3    Associated Diagnoses: Benign essential hypertension      PARoxetine (PAXIL) 20 MG tablet Take 1 tablet (20 mg) by mouth every morning.  Qty: 90 tablet, Refills: 3    Associated Diagnoses: ARABELLA (generalized anxiety disorder)      thin (NO BRAND SPECIFIED) lancets Use with lanceting device. To accompany: Blood Glucose Monitor Brands: per insurance.  Qty: 100 each, Refills: 6    Associated Diagnoses: Type 2 diabetes mellitus with diabetic polyneuropathy, without long-term current use of insulin (H)      !! olmesartan (BENICAR) 20 MG tablet Take 1 tablet (20 mg) by mouth daily.  Qty: 90 tablet, Refills: 3    Associated Diagnoses: Hyperlipidemia LDL goal <100       !! - Potential duplicate medications found. Please discuss with provider.        Allergies   Allergies   Allergen Reactions    Metoprolol Itching and Rash    Cephalexin Rash    Erythromycin Rash    Atarax [Hydroxyzine] Other (See Comments)     Syncope and collapse    Hydrocodone Itching    Shellfish Allergy     Acetaminophen Itching     Patient reported - only when used scheduled in high doses      Actonel [Bisphosphonates] Itching    Alendronate Rash    Azithromycin Hives    Celebrex [Ricci-2 Inhibitors (Sulfonamide)] Rash    Celecoxib Rash    Cipro [Ciprofloxacin] Rash    Contrast Dye Hives    Diatrizoate Hives    Erythromycin Rash    Escitalopram Diarrhea     Gets very sick and mad feelings    Fosamax Itching and Rash    Keflex [Cephalexin Monohydrate] Rash     Lisinopril Cough    Penicillins Rash    Risedronate Itching    Rofecoxib Rash    Seasonal Allergies Other (See Comments)     Seasonal rhinitis    Shellfish-Derived Products Hives    Sulfa Antibiotics Itching and Rash    Sulfasalazine Itching and Rash    Tetanus Toxoid, Adsorbed Swelling    Tetanus-Diphtheria Toxoids Td Swelling    Vicodin [Acetaminophen] Itching     Patient reported - only when used scheduled in high doses.       Vioxx Rash     Consultations This Hospital Stay  CARE MANAGEMENT / SOCIAL WORK IP CONSULT  PHYSICAL THERAPY ADULT IP CONSULT  OCCUPATIONAL THERAPY ADULT IP CONSULT  SPIRITUAL HEALTH SERVICES IP CONSULT    Significant Results and Procedures   Procedures  None    Data   Results for orders placed or performed during the hospital encounter of 02/12/25   XR Chest 2 Views    Narrative    EXAM: XR CHEST 2 VIEWS  LOCATION: [facility]  DATE: 2/12/2025    INDICATION: cough, weakness  COMPARISON: None.      Impression    IMPRESSION: Heart is normal in size. Lungs are clear.     *Note: Due to a large number of results and/or encounters for the requested time period, some results have not been displayed. A complete set of results can be found in Results Review.     History of Present Illness   Daniela Brown is a 89 year old female with a h/o  type 2 diabetes, neuropathy, CKD, anxiety, hyperlipidemia, arthritis, hypertension, referred for admission for UTI and nausea and abdominal pain and diarrhea.     She c/o nausea that started last night, associated with diarrhea, abd pain and chest pain. Sx started after having some chicken noodle soup last night from a can. States she has never felt so sick in her life.   States she felt like she was going to die.  She felt fine with no complaints prior to eating the soup  Other than urinary frequency. She c/o urinary frequency for a long time about a month. NO fever. NO dysuria. +urgency. She lives alone. She has a son but states he  won't talk to her.      Emergency Room Course -  in the ER, patient was afebrile with stable signs.  She has been having abdominal exam.  CMP was unremarkable.  UA was positive for UTI.  Lactic acid was elevated at 2.7 , did come down to 1.7 after fluids in the ER..  CBC showed white count and hemoglobin 11.  In the ER, patient was given 1 L of normal saline.  She was not given any antibiotics yet for some reason.   They attempted to discharge her home but she was feeling too weak and did not feel she could go home.  She lives alone. She was referred for observation.    Hospital Course   Daniela Brown is a 89 year old female with PMHx of type 2 diabetes, chronic kidney disease, hypertension, hyperlipidemia, arthritis, neuropathy, and anxiety who presented on 6/2 from home residence with abdominal pain, nausea, and diarrhea. On evaluation she was found to have urinary tract infection, likely gastroenteritis, renal insufficiency, and generalized weakness. Patient was admitted to observation for therapies as was viewed too weak to return home. Patient was treated for UTI with oral antibiotics and cleared to return home by therapies. Patient was provided home health referral at discharge.    Gastroenteritis, resolved    Developed epigastric abdominal pain, nausea, and diarrhea shortly after eating. Afebrile without leukocytosis. Patient mentions having chronic diarrhea. She has not had any recurrence of diarrhea since presentation. No recurrent abdominal pains.    Urinary tract infection (UTI)    Patient with urinary frequency x 2 weeks or so. UA infected as evidenced by pyuria (WBC 83), bacteruria (few), large LE, and positive nitrite. H/o frequent UTI with resistance. Prior cultures have grown E. Coli with resistance (ciprofloxacin and levofloxacin) and klebsiella with resistance (ampicillin). Patient has multiple antibiotic allergies, including ceftriaxone. -Initiated on nitrofurantoin oral BID x 5  days.    Generalized weakness    She was not viewed safe to return home from emergency department and admitted. Unclear if having true weakness as the patient is reluctant to discharge home as she is anxious being home alone at night. Does have chronic left knee pain in which she wears a soft brace for. Evaluated by physical therapy and ambulating independently. Cleared to to return home safely.    Renal insufficiency superimposed on CKD stage 3a    Admit creatine 1.11 (baseline 0.90) and . Does not meet criteria for CHER. Normalized overnight after fluid bolus followed by infusion.    Essential hypertension    Chronic. Blood pressures reviewed and well controlled. Managed PTA with amlodipine 5 mg and olmesartan 20 mg. Antihypertensives were continued during hospitalization.    Anxiety    Chronic. Managed PTA with paroxetine 20 mg and bupropion 150 mg. Medications were continued during hospitalization.     Pending Results   Unresulted Labs Ordered in the Past 30 Days of this Admission       Date and Time Order Name Status Description    6/2/2025  7:37 PM Urine Culture In process           Physical Exam   Temp:  [97.6  F (36.4  C)-98.2  F (36.8  C)] 98.2  F (36.8  C)  Pulse:  [67-90] 75  Resp:  [16-18] 16  BP: (119-163)/(44-86) 146/57  SpO2:  [94 %-99 %] 98 %  Vitals:    06/02/25 1736 06/02/25 2133   Weight: 65.8 kg (145 lb) 64.5 kg (142 lb 3.2 oz)     Constitutional: Elderly female, resting comfortably in chair, WDWN, alert, oriented, polite, pleasant, cooperative, no acute distress, non-toxic appearing.  CV: Regular rate and rhythm. Normal S1 and S2 heart sound. Mild murmur. No lower extremity edema bilaterally.  Respiratory: CTA bilaterally. Normal respiratory effort on RA. Speaking in full sentences.  GI: Soft, non-distended, non-tender to palpation throughout.  Skin: Warm and dry    The discharge plan was discussed with the patient and Dr. Ronan M.D.    Total time on this discharge was 45  minutes.    NELLY ROONEY PA-C

## 2025-06-03 NOTE — PROGRESS NOTES
"   06/03/25 0900   Appointment Info   Signing Clinician's Name / Credentials (OT) Sarai Mclean, OTR/L   Quick Adds   Quick Adds Certification   Living Environment   People in Home alone   Current Living Arrangements house   Home Accessibility no concerns   Living Environment Comments ramp to enter home. No stairs to complete within home. All needs met on main floor.   Self-Care   Equipment Currently Used at Home cane, straight;walker, rolling;shower chair   Fall history within last six months no   Activity/Exercise/Self-Care Comment at baseline: independent with ADLs and related mobility either using FWW or cane.   Instrumental Activities of Daily Living (IADL)   IADL Comments walks to mailbox daily. independent with laundry and does light cleaning. States she has a lady come in to help with IADLs at times \"Doesn't come as often anymore\".   General Information   Onset of Illness/Injury or Date of Surgery 06/02/25   Referring Physician Analilia Cerrato PA-C   Patient/Family Therapy Goal Statement (OT) open to working with therapy and agreeable to home therapy upon discharge.   Additional Occupational Profile Info/Pertinent History of Current Problem Daniela Brown is a 89 year old female with a h/o  type 2 diabetes, neuropathy, CKD, anxiety, hyperlipidemia, arthritis, hypertension, referred for admission for UTI and nausea and abdominal pain and diarrhea   Cognitive Status Examination   Orientation Status orientation to person, place and time   Pain Assessment   Patient Currently in Pain No   Strength Comprehensive (MMT)   Comment, General Manual Muscle Testing (MMT) Assessment feels weaker than baseline. B UE strength: WNL   Transfers   Transfer Comments SBA   Balance   Balance Comments Ambulates with SBA and FWW   Activities of Daily Living   BADL Assessment/Intervention upper body dressing;lower body dressing   Upper Body Dressing Assessment/Training   [place] Level (Upper Body Dressing) independent "   Lower Body Dressing Assessment/Training   [place] Level (Lower Body Dressing) independent   Comment, (Lower Body Dressing) wears right knee brace. States she removes brace at night.   Clinical Impression   Criteria for Skilled Therapeutic Interventions Met (OT) Yes, treatment indicated   OT Diagnosis decreased independence with ADLs   Influenced by the following impairments generalized weakness   OT Problem List-Impairments impacting ADL problems related to;strength   Assessment of Occupational Performance 1-3 Performance Deficits   Identified Performance Deficits generalized weakness   Planned Therapy Interventions (OT) ADL retraining   Clinical Decision Making Complexity (OT) problem focused assessment/low complexity   Risk & Benefits of therapy have been explained patient;participants included;participants voiced agreement with care plan;current/potential barriers reviewed;risks/benefits reviewed;care plan/treatment goals reviewed;evaluation/treatment results reviewed   OT Total Evaluation Time   OT Eval, Low Complexity Minutes (83875) 10   Therapy Certification   Start of Care Date 06/03/25   Certification date from 06/03/25   Certification date to 06/03/25   Medical Diagnosis UTI, weakness   OT Goals   Therapy Frequency (OT) One time eval and treatment   OT Predicted Duration/Target Date for Goal Attainment 06/10/25   OT Goals Hygiene/Grooming;Aerobic Activity   OT: Perform aerobic activity with stable cardiovascular response 5 minutes;continuous activity;Goal Met   Interventions   Interventions Quick Adds Self-Care/Home Management;Therapeutic Activity   Self-Care/Home Management   Self-Care/Home Mgmt/ADL, Compensatory, Meal Prep Minutes (43604) 10   Symptoms Noted During/After Treatment (Meal Preparation/Planning Training) none   Treatment Detail/Skilled Intervention Pt normally does not utilize walker within bathroom at home. Does complete toileting with SBA and no AD. Educated pt on benefits of  utilizing AD within bathroom to decrease risk for falls and provide added support d/t L knee pain. Educated pt on other fall precautions (ie adaquate lightin at night during bathroom use, removing throw rugs). Educated pt on recommendation of home therapy and planning for MILLIE in the near future. Pt verbalizes understanding to all education. Pt left seated with all needs wihtin reach and chair alarm on.   Therapeutic Activities   Therapeutic Activity Minutes (39880) 8   Treatment Detail/Skilled Intervention following evaluation pt participated in functional mobility to increase strength/activity tolerance for ADLs. Ambulates out in hallway ~70ft with SBA and FWW.   OT Discharge Planning   OT Plan 1x treatment   OT Discharge Recommendation (DC Rec) home with home care occupational therapy   OT Rationale for DC Rec Home with home PT/OT/HHA to assist with showers. Would benefit from meal services (meals on wheels, MOMs meals). Would benefit from transitioning to skilled nursing in near future d/t struggles with IADLs.   OT Brief overview of current status SBA-modified independence using FWW   OT Total Distance Amb During Session (feet) 90   Total Session Time   Timed Code Treatment Minutes 18   Total Session Time (sum of timed and untimed services) 28       [facility]                                                                                   OUTPATIENT OCCUPATIONAL THERAPY    PLAN OF TREATMENT FOR OUTPATIENT REHABILITATION   Patient's Last Name, First Name, Daniela Hairston YOB: 1933   Provider's Name   [facility]   Medical Record No.  [id number]     Onset Date: 06/02/25 Start of Care Date: 06/03/25     Medical Diagnosis:  UTI, weakness               OT Diagnosis:  decreased independence with ADLs Certification Dates:  From: 06/03/25  To: 06/03/25     See note for plan of treatment, functional goals, and certification details.    I CERTIFY  THE NEED FOR THESE SERVICES FURNISHED UNDER        THIS PLAN OF TREATMENT AND WHILE UNDER MY CARE (Physician co-signature of this document indicates review and certification of the therapy plan).

## 2025-06-03 NOTE — CONSULTS
Care Management Initial Consult    General Information  Assessment completed with: Patient, Children, Sivan & son-Wally via phoen  Type of CM/SW Visit: Initial Assessment    Primary Care Provider verified and updated as needed: Yes   Readmission within the last 30 days: no previous admission in last 30 days      Reason for Consult: discharge planning  Advance Care Planning: Advance Care Planning Reviewed: present on chart          Communication Assessment  Patient's communication style: spoken language (English or Bilingual)    Hearing Difficulty or Deaf: yes   Wear Glasses or Blind: yes    Cognitive  Cognitive/Neuro/Behavioral: WDL                      Living Environment:   People in home: alone     Current living Arrangements: house      Able to return to prior arrangements: yes       Family/Social Support:  Care provided by: self  Provides care for: no one  Marital Status:   Support system: Children, Sibling(s), Friend          Description of Support System: Other (see comments) (Pt's sonWally and sister, Helen, have offered for years to assist the pt, but she deflects their cares.)    Support Assessment: Caregiver difficulty providing emotional support, Limited social contact and support, Complicated family dynamics    Current Resources:   Patient receiving home care services: No        Community Resources: [employer]  Equipment currently used at home: cane, straight, walker, rolling, shower chair  Supplies currently used at home: None    Employment/Financial:  Employment Status: retired        Financial Concerns: none   Referral to Financial Worker: No       Does the patient's insurance plan have a 3 day qualifying hospital stay waiver?  Yes     Which insurance plan 3 day waiver is available? Alternative insurance waiver    Will the waiver be used for post-acute placement? No    Lifestyle & Psychosocial Needs:  Social Drivers of Health     Food Insecurity: Low Risk  (6/2/2025)    Food Insecurity      Within the past 12 months, did you worry that your food would run out before you got money to buy more?: No     Within the past 12 months, did the food you bought just not last and you didn t have money to get more?: No   Depression: At risk (5/22/2025)    PHQ-2     PHQ-2 Score: 4   Housing Stability: Low Risk  (6/2/2025)    Housing Stability     Do you have housing? : Yes     Are you worried about losing your housing?: No   Tobacco Use: Low Risk  (5/22/2025)    Patient History     Smoking Tobacco Use: Never     Smokeless Tobacco Use: Never     Passive Exposure: Not on file   Financial Resource Strain: Low Risk  (6/2/2025)    Financial Resource Strain     Within the past 12 months, have you or your family members you live with been unable to get utilities (heat, electricity) when it was really needed?: No   Alcohol Use: Not on file   Transportation Needs: Low Risk  (6/2/2025)    Transportation Needs     Within the past 12 months, has lack of transportation kept you from medical appointments, getting your medicines, non-medical meetings or appointments, work, or from getting things that you need?: No   Physical Activity: Inactive (2/13/2025)    Exercise Vital Sign     Days of Exercise per Week: 0 days     Minutes of Exercise per Session: 0 min   Interpersonal Safety: Low Risk  (6/2/2025)    Interpersonal Safety     Do you feel physically and emotionally safe where you currently live?: Yes     Within the past 12 months, have you been hit, slapped, kicked or otherwise physically hurt by someone?: No     Within the past 12 months, have you been humiliated or emotionally abused in other ways by your partner or ex-partner?: No   Stress: No Stress Concern Present (10/24/2024)    [place] [employer] - Occupational Stress Questionnaire     Feeling of Stress : Only a little   Social Connections: Unknown (10/24/2024)    Social Connection and Isolation Panel [NHANES]     Frequency of Communication with  Friends and Family: Not on file     Frequency of Social Gatherings with Friends and Family: Never     Attends Buddhism Services: Not on file     Active Member of Clubs or Organizations: Not on file     Attends Club or Organization Meetings: Not on file     Marital Status: Not on file   Health Literacy: Not on file     Functional Status:  Prior to admission patient needed assistance:   Dependent ADLs:: Ambulation-walker  Dependent IADLs:: Transportation     Mental Health Status:  Mental Health Status: Current Concern  Mental Health Management: Medication    Chemical Dependency Status:  Chemical Dependency Status: No Current Concerns           Values/Beliefs:  Spiritual, Cultural Beliefs, Buddhism Practices, Values that affect care: no               Discussed  Partnership in Safe Discharge Planning  document with patient/family: Yes    Care Management Discharge Note    Discharge Date: 06/03/2025     Discharge Disposition: Home Care    Discharge Services: Home Care    Discharge DME: None    Discharge Transportation: agency    Private pay costs discussed: transportation costs    Does the patient's insurance plan have a 3 day qualifying hospital stay waiver?  Yes     Which insurance plan 3 day waiver is available? Alternative insurance waiver    Will the waiver be used for post-acute placement? No    PAS Confirmation Code:  NA  Patient/family educated on Medicare website which has current facility and service quality ratings: yes    Education Provided on the Discharge Plan: Yes  Persons Notified of Discharge Plans: Patient, son-Wally  Patient/Family in Agreement with the Plan: yes    Handoff Referral Completed: Yes, BIA PCP: Internal handoff referral completed    Additional Information:  Referral received for discharge planning.     SW reviewed EMR, attended IDT rounds, and then met with pt at bedside to introduce self title, role, and perform assessment.     Pt shared that prior to hospitalization, pt lives in a  private 2-story home alone.  Pt reports to have a stair lift, lift reclining chair, ramp, grab bars and typically uses a walker or cane for ambulation, and is IND with I/ADLS.    PT/OT recommending Home Care for safe discharge from hospital, and pt in agreement with cares.     SW educated on Medicare.gov, how insurance pays for services post hospitalization, and discussed that referrals will be sent to home care hub.  Pt is accepted for cares with [facility] (Phone: [phone])    SW and pt discussing transportation home and pt requested writer call her son, Wally.  SW, using pt's bedside phone, called Wally, who stated he was in [place] & unable to transport pt home today & unaware of anyone to call for her.  BRUCE and Wally discussed that the pt will discharge today with home care services of RN, PT/OT, HHA and SW services.  SW also discussed that OT recommends that pt begin looking into [facility] services for long term assistance.  SW informed that Home Care SW to assist with long term care planning and writer also provided printed Senior Resource booklet and flyer for [facility] opening.    Wally shared with writer that both he/his wife and pt's sister, Helen, have been asking the pt to move into either of their homes since 2017, but pt has deflected their offers for years.      Transportation discussed & pt/family requesting assistance, as family is out of state.  Pt has no one who can transport.  [employer] called.      MANJIT Gurrola

## 2025-06-03 NOTE — PROGRESS NOTES
SPIRITUAL HEALTH SERVICES  [facility] - Med Surg    Referral Source: Patient Request    - Patient was busy with other disciplines when I attempted to visit.  I shared my history with staff during I/D Rounds as it related to providing support for Sivan during the time that her son was going through chemo and then ended up passing.    Plan: SH will remain available for any ongoing support needs during LOS.    Adrian Butterfield M.A., [facility]  Staff Chaplain [facility]  Office: [phone]

## 2025-06-03 NOTE — PLAN OF CARE
Goal Outcome Evaluation:      Plan of Care Reviewed With: patient, child          Outcome Evaluation: Home with home care

## 2025-06-04 ENCOUNTER — PATIENT OUTREACH (OUTPATIENT)
Dept: CARE COORDINATION | Facility: CLINIC | Age: OVER 89
End: 2025-06-04
Payer: COMMERCIAL

## 2025-06-04 LAB — BACTERIA UR CULT: NORMAL

## 2025-06-04 ASSESSMENT — ACTIVITIES OF DAILY LIVING (ADL): DEPENDENT_IADLS:: TRANSPORTATION

## 2025-06-04 NOTE — PROGRESS NOTES
Clinic Care Coordination Contact    [facility]     Discharge Summary  Hospital Medicine     Date of Admission:  6/2/2025  Date of Discharge:  6/3/2025   Discharging Provider: NELLY ROONEY PA-C  Date of Service: 6/3/2025       Primary Care                            Renay Hannah  [address] [address]      Identification and Chief Compaint: Daniela Brown is a 89 year old female who presented on 6/2/2025 with complaint of abdominal pain, nausea, and diarrhea.     Discharge Diagnoses  Gastroenteritis  Urinary tract infection (UTI)  Renal insufficiency     UTC/Voicemail    Clinical Data: Care Coordinator Outreach    Outreach Documentation Number of Outreach Attempt   6/4/2025  10:57 AM 1     No answer on home number and Mobile# not in service      Plan: . Care Coordinator will try to reach patient again in 1-2 business days.    [facility]   Leatha Davis RN, Care Coordinator   [facility]'s   E-mail [email or web address]   [phone]

## 2025-06-05 ASSESSMENT — ACTIVITIES OF DAILY LIVING (ADL): DEPENDENT_IADLS:: TRANSPORTATION

## 2025-06-05 NOTE — PROGRESS NOTES
Clinic Care Coordination Contact  Clinic Care Coordination Contact  OUTREACH    Referral Information:  Referral Source: IP Handoff    Primary Diagnosis: Genitourinary Disorders    Chief Complaint   Patient presents with    Clinic Care Coordination - Post Hospital     Clinic Care Coordination RN         Universal Utilization:   [facility]     Discharge Summary  Hospital Medicine     Date of Admission:  6/2/2025  Date of Discharge:  6/3/2025   Discharging Provider: NELLY ROONEY PA-C  Date of Service: 6/3/2025       Primary Care                            Renay Hannah  [address] [place] [address]      Identification and Chief Compaint: Daniela Brown is a 89 year old female who presented on 6/2/2025 with complaint of abdominal pain, nausea, and diarrhea.     Discharge Diagnoses  Gastroenteritis  Urinary tract infection (UTI)  Renal insufficiency  Clinic Utilization  Difficulty keeping appointments:: No  Compliance Concerns: No  No PCP office visit in Past Year: No  Utilization      No Show Count (past year)  8             ED Visits  5             Hospital Admissions  2                    Current as of: 6/5/2025 10:10 AM                Clinical Concerns:  Current Medical Concerns:  Patient reports she continues to feel weak.  Getting around the house with a walker or cane. Denies any further vomiting ,nausea or diarrhea.  Denies any burning or frequency upon urination   Patient awaits a call from the home care agency  Patient reminded to call the clinic and make a hospital follow up with PCP   Patient agrees with this plan     Current Behavioral Concerns: No    Education Provided to patient: CC role introduced    Pain  Pain (GOAL):: No  Health Maintenance Reviewed: Not assessed  Clinical Pathway: None    Medication Management:  Medication review status: Medications reviewed.  Changes noted per patient report.       Functional Status:  Dependent ADLs::  Ambulation-walker  Dependent IADLs:: Transportation  Bed or wheelchair confined:: No  Mobility Status: Independent w/Device  Fallen 2 or more times in the past year?: (!) Yes  Any fall with injury in the past year?: Yes    Living Situation:  Current living arrangement:: I live in a private home  Type of residence:: Private home - stairs    Lifestyle & Psychosocial Needs:    Social Drivers of Health     Food Insecurity: Low Risk  (6/2/2025)    Food Insecurity     Within the past 12 months, did you worry that your food would run out before you got money to buy more?: No     Within the past 12 months, did the food you bought just not last and you didn t have money to get more?: No   Depression: At risk (5/22/2025)    PHQ-2     PHQ-2 Score: 4   Housing Stability: Low Risk  (6/2/2025)    Housing Stability     Do you have housing? : Yes     Are you worried about losing your housing?: No   Tobacco Use: Low Risk  (5/22/2025)    Patient History     Smoking Tobacco Use: Never     Smokeless Tobacco Use: Never     Passive Exposure: Not on file   Financial Resource Strain: Low Risk  (6/2/2025)    Financial Resource Strain     Within the past 12 months, have you or your family members you live with been unable to get utilities (heat, electricity) when it was really needed?: No   Alcohol Use: Not on file   Transportation Needs: Low Risk  (6/2/2025)    Transportation Needs     Within the past 12 months, has lack of transportation kept you from medical appointments, getting your medicines, non-medical meetings or appointments, work, or from getting things that you need?: No   Physical Activity: Inactive (2/13/2025)    Exercise Vital Sign     Days of Exercise per Week: 0 days     Minutes of Exercise per Session: 0 min   Interpersonal Safety: Low Risk  (6/2/2025)    Interpersonal Safety     Do you feel physically and emotionally safe where you currently live?: Yes     Within the past 12 months, have you been hit, slapped, kicked or  otherwise physically hurt by someone?: No     Within the past 12 months, have you been humiliated or emotionally abused in other ways by your partner or ex-partner?: No   Stress: No Stress Concern Present (10/24/2024)    [employer] - Occupational Stress Questionnaire     Feeling of Stress : Only a little   Social Connections: Unknown (10/24/2024)    Social Connection and Isolation Panel [NHANES]     Frequency of Communication with Friends and Family: Not on file     Frequency of Social Gatherings with Friends and Family: Never     Attends Zoroastrianism Services: Not on file     Active Member of Clubs or Organizations: Not on file     Attends Club or Organization Meetings: Not on file     Marital Status: Not on file   Health Literacy: Not on file     Diet:: Diabetic diet  Inadequate nutrition (GOAL):: No  Tube Feeding: No  Inadequate activity/exercise (GOAL):: No  Significant changes in sleep pattern (GOAL): No  Transportation means:: Family     Zoroastrianism or spiritual beliefs that impact treatment:: No  Mental health DX:: Yes  Mental health DX how managed:: Medication  Mental health management concern (GOAL):: No  Chemical Dependency Status: No Current Concerns  Informal Support system:: Family, Children           Resources and Interventions:  Current Resources:      Community Resources: Lifeline  Supplies Currently Used at Home: None  Equipment Currently Used at Home: cane, straight, walker, rolling, shower chair  Employment Status: retired         Advance Care Plan/Directive  Advanced Care Plans/Directives on file:: Yes  Status of record:: On File and Validated  Type Advanced Care Plans/Directives: POLST, Advanced Directive - On File    Referrals Placed: Transportation, Community Resources, County Resources, Meals on Wheels, Other       Patient/Caregiver understanding: Expresses good understanding of discharge instructions     Outreach Frequency: weekly, more frequently as needed  Future  Appointments                In 1 week Tho Newman DO [facility]            Plan:   Patient will start home care services RN and PT  Patient will make a future hospital follow up with PCP  No unmet needs, no further care coordination is needed at this time     [facility]   Leatha Davis RN, Care Coordinator   [facility]'s   E-mail [email or web address]   [phone]

## 2025-06-08 ENCOUNTER — APPOINTMENT (OUTPATIENT)
Dept: CT IMAGING | Facility: CLINIC | Age: OVER 89
End: 2025-06-08
Attending: FAMILY MEDICINE
Payer: COMMERCIAL

## 2025-06-08 ENCOUNTER — HOSPITAL ENCOUNTER (OUTPATIENT)
Facility: CLINIC | Age: OVER 89
Setting detail: OBSERVATION
Discharge: NURSING, REHAB OR LONG-TERM CARE | End: 2025-06-10
Attending: FAMILY MEDICINE | Admitting: INTERNAL MEDICINE
Payer: COMMERCIAL

## 2025-06-08 DIAGNOSIS — M62.82 NON-TRAUMATIC RHABDOMYOLYSIS: ICD-10-CM

## 2025-06-08 DIAGNOSIS — R82.81 PYURIA: ICD-10-CM
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
## 2025-06-08 DIAGNOSIS — S22.080A T12 COMPRESSION FRACTURE, INITIAL ENCOUNTER (H): ICD-10-CM

## 2025-06-08 DIAGNOSIS — S09.90XA CLOSED HEAD INJURY, INITIAL ENCOUNTER: ICD-10-CM

## 2025-06-08 DIAGNOSIS — K58.0 IRRITABLE BOWEL SYNDROME WITH DIARRHEA: Primary | Chronic | ICD-10-CM

## 2025-06-08 DIAGNOSIS — N30.00 ACUTE CYSTITIS WITHOUT HEMATURIA: ICD-10-CM

## 2025-06-08 DIAGNOSIS — Z71.89 OTHER SPECIFIED COUNSELING: Chronic | ICD-10-CM

## 2025-06-08 LAB
ALBUMIN SERPL BCG-MCNC: 4.1 G/DL (ref 3.5–5.2)
ALBUMIN UR-MCNC: 10 MG/DL
ALP SERPL-CCNC: 87 U/L (ref 40–150)
ALT SERPL W P-5'-P-CCNC: 36 U/L (ref 0–50)
ANION GAP SERPL CALCULATED.3IONS-SCNC: 16 MMOL/L (ref 7–15)
APPEARANCE UR: ABNORMAL
AST SERPL W P-5'-P-CCNC: 87 U/L (ref 0–45)
BACTERIA #/AREA URNS HPF: ABNORMAL /HPF
BASE EXCESS BLDV CALC-SCNC: -1 MMOL/L (ref -3–3)
BASOPHILS # BLD AUTO: 0 10E3/UL (ref 0–0.2)
BASOPHILS NFR BLD AUTO: 0 %
BILIRUB SERPL-MCNC: 1 MG/DL
BILIRUB UR QL STRIP: NEGATIVE
BUN SERPL-MCNC: 28.9 MG/DL (ref 8–23)
CALCIUM SERPL-MCNC: 9.8 MG/DL (ref 8.8–10.4)
CHLORIDE SERPL-SCNC: 100 MMOL/L (ref 98–107)
CK SERPL-CCNC: 1493 U/L (ref 26–192)
COLOR UR AUTO: YELLOW
CREAT SERPL-MCNC: 1.1 MG/DL (ref 0.51–0.95)
EGFRCR SERPLBLD CKD-EPI 2021: 47 ML/MIN/1.73M2
EOSINOPHIL # BLD AUTO: 0 10E3/UL (ref 0–0.7)
EOSINOPHIL NFR BLD AUTO: 0 %
ERYTHROCYTE [DISTWIDTH] IN BLOOD BY AUTOMATED COUNT: 12.3 % (ref 10–15)
GLUCOSE SERPL-MCNC: 122 MG/DL (ref 70–99)
GLUCOSE UR STRIP-MCNC: NEGATIVE MG/DL
HCO3 BLDV-SCNC: 24 MMOL/L (ref 21–28)
HCO3 SERPL-SCNC: 22 MMOL/L (ref 22–29)
HCT VFR BLD AUTO: 42.2 % (ref 35–47)
HGB BLD-MCNC: 14.2 G/DL (ref 11.7–15.7)
HGB UR QL STRIP: ABNORMAL
HOLD SPECIMEN: NORMAL
IMM GRANULOCYTES # BLD: 0.1 10E3/UL
IMM GRANULOCYTES NFR BLD: 0 %
KETONES UR STRIP-MCNC: 20 MG/DL
LEUKOCYTE ESTERASE UR QL STRIP: ABNORMAL
LYMPHOCYTES # BLD AUTO: 1.5 10E3/UL (ref 0.8–5.3)
LYMPHOCYTES NFR BLD AUTO: 7 %
MCH RBC QN AUTO: 28.8 PG (ref 26.5–33)
MCHC RBC AUTO-ENTMCNC: 33.6 G/DL (ref 31.5–36.5)
MCV RBC AUTO: 86 FL (ref 78–100)
MONOCYTES # BLD AUTO: 0.9 10E3/UL (ref 0–1.3)
MONOCYTES NFR BLD AUTO: 4 %
NEUTROPHILS # BLD AUTO: 17.8 10E3/UL (ref 1.6–8.3)
NEUTROPHILS NFR BLD AUTO: 88 %
NITRATE UR QL: NEGATIVE
NRBC # BLD AUTO: 0 10E3/UL
NRBC BLD AUTO-RTO: 0 /100
O2/TOTAL GAS SETTING VFR VENT: 0 %
OXYHGB MFR BLDV: 26 % (ref 70–75)
PCO2 BLDV: 40 MM HG (ref 40–50)
PH BLDV: 7.39 [PH] (ref 7.32–7.43)
PH UR STRIP: 6.5 [PH] (ref 5–7)
PLATELET # BLD AUTO: 336 10E3/UL (ref 150–450)
PO2 BLDV: 18 MM HG (ref 25–47)
POTASSIUM SERPL-SCNC: 3.7 MMOL/L (ref 3.4–5.3)
PROT SERPL-MCNC: 7.3 G/DL (ref 6.4–8.3)
RBC # BLD AUTO: 4.93 10E6/UL (ref 3.8–5.2)
RBC URINE: 1 /HPF
SAO2 % BLDV: 26.4 % (ref 70–75)
SODIUM SERPL-SCNC: 138 MMOL/L (ref 135–145)
SP GR UR STRIP: 1.02 (ref 1–1.03)
UROBILINOGEN UR STRIP-MCNC: NORMAL MG/DL
WBC # BLD AUTO: 20.2 10E3/UL (ref 4–11)
WBC URINE: 168 /HPF

## 2025-06-08 PROCEDURE — 82805 BLOOD GASES W/O2 SATURATION: CPT | Performed by: FAMILY MEDICINE

## 2025-06-08 PROCEDURE — G0378 HOSPITAL OBSERVATION PER HR: HCPCS

## 2025-06-08 PROCEDURE — 74176 CT ABD & PELVIS W/O CONTRAST: CPT

## 2025-06-08 PROCEDURE — 82550 ASSAY OF CK (CPK): CPT | Performed by: FAMILY MEDICINE

## 2025-06-08 PROCEDURE — 36415 COLL VENOUS BLD VENIPUNCTURE: CPT | Performed by: FAMILY MEDICINE

## 2025-06-08 PROCEDURE — 96361 HYDRATE IV INFUSION ADD-ON: CPT

## 2025-06-08 PROCEDURE — 81001 URINALYSIS AUTO W/SCOPE: CPT | Performed by: FAMILY MEDICINE

## 2025-06-08 PROCEDURE — 72192 CT PELVIS W/O DYE: CPT

## 2025-06-08 PROCEDURE — 258N000003 HC RX IP 258 OP 636: Performed by: STUDENT IN AN ORGANIZED HEALTH CARE EDUCATION/TRAINING PROGRAM

## 2025-06-08 PROCEDURE — 99285 EMERGENCY DEPT VISIT HI MDM: CPT | Mod: 25

## 2025-06-08 PROCEDURE — 250N000011 HC RX IP 250 OP 636: Performed by: FAMILY MEDICINE

## 2025-06-08 PROCEDURE — 70450 CT HEAD/BRAIN W/O DYE: CPT

## 2025-06-08 PROCEDURE — 96374 THER/PROPH/DIAG INJ IV PUSH: CPT

## 2025-06-08 PROCEDURE — 99285 EMERGENCY DEPT VISIT HI MDM: CPT | Mod: 25 | Performed by: FAMILY MEDICINE

## 2025-06-08 PROCEDURE — 250N000011 HC RX IP 250 OP 636: Performed by: STUDENT IN AN ORGANIZED HEALTH CARE EDUCATION/TRAINING PROGRAM

## 2025-06-08 PROCEDURE — 93010 ELECTROCARDIOGRAM REPORT: CPT | Performed by: FAMILY MEDICINE

## 2025-06-08 PROCEDURE — 72125 CT NECK SPINE W/O DYE: CPT

## 2025-06-08 PROCEDURE — 93005 ELECTROCARDIOGRAM TRACING: CPT

## 2025-06-08 PROCEDURE — 72128 CT CHEST SPINE W/O DYE: CPT

## 2025-06-08 PROCEDURE — 87186 SC STD MICRODIL/AGAR DIL: CPT | Performed by: FAMILY MEDICINE

## 2025-06-08 PROCEDURE — 250N000013 HC RX MED GY IP 250 OP 250 PS 637: Performed by: STUDENT IN AN ORGANIZED HEALTH CARE EDUCATION/TRAINING PROGRAM

## 2025-06-08 PROCEDURE — 96372 THER/PROPH/DIAG INJ SC/IM: CPT | Mod: XU | Performed by: STUDENT IN AN ORGANIZED HEALTH CARE EDUCATION/TRAINING PROGRAM

## 2025-06-08 PROCEDURE — 72131 CT LUMBAR SPINE W/O DYE: CPT

## 2025-06-08 PROCEDURE — 73700 CT LOWER EXTREMITY W/O DYE: CPT | Mod: LT

## 2025-06-08 PROCEDURE — 84450 TRANSFERASE (AST) (SGOT): CPT | Performed by: FAMILY MEDICINE

## 2025-06-08 PROCEDURE — 85025 COMPLETE CBC W/AUTO DIFF WBC: CPT | Performed by: FAMILY MEDICINE

## 2025-06-08 PROCEDURE — 99222 1ST HOSP IP/OBS MODERATE 55: CPT | Performed by: STUDENT IN AN ORGANIZED HEALTH CARE EDUCATION/TRAINING PROGRAM

## 2025-06-08 RX ORDER — ONDANSETRON 2 MG/ML
4 INJECTION INTRAMUSCULAR; INTRAVENOUS EVERY 6 HOURS PRN
Status: DISCONTINUED | OUTPATIENT
Start: 2025-06-08 | End: 2025-06-10 | Stop reason: HOSPADM

## 2025-06-08 RX ORDER — AMOXICILLIN 250 MG
1 CAPSULE ORAL 2 TIMES DAILY PRN
Status: DISCONTINUED | OUTPATIENT
Start: 2025-06-08 | End: 2025-06-10 | Stop reason: HOSPADM

## 2025-06-08 RX ORDER — HYDRALAZINE HYDROCHLORIDE 20 MG/ML
10 INJECTION INTRAMUSCULAR; INTRAVENOUS EVERY 4 HOURS PRN
Status: DISCONTINUED | OUTPATIENT
Start: 2025-06-08 | End: 2025-06-10 | Stop reason: HOSPADM

## 2025-06-08 RX ORDER — AMOXICILLIN 250 MG
2 CAPSULE ORAL 2 TIMES DAILY PRN
Status: DISCONTINUED | OUTPATIENT
Start: 2025-06-08 | End: 2025-06-10 | Stop reason: HOSPADM

## 2025-06-08 RX ORDER — BUPROPION HYDROCHLORIDE 150 MG/1
150 TABLET ORAL EVERY MORNING
Status: DISCONTINUED | OUTPATIENT
Start: 2025-06-09 | End: 2025-06-10 | Stop reason: HOSPADM

## 2025-06-08 RX ORDER — ACETAMINOPHEN 325 MG/1
650 TABLET ORAL EVERY 4 HOURS PRN
Status: DISCONTINUED | OUTPATIENT
Start: 2025-06-08 | End: 2025-06-10 | Stop reason: HOSPADM

## 2025-06-08 RX ORDER — SODIUM CHLORIDE 9 MG/ML
INJECTION, SOLUTION INTRAVENOUS CONTINUOUS
Status: DISCONTINUED | OUTPATIENT
Start: 2025-06-08 | End: 2025-06-09

## 2025-06-08 RX ORDER — PROCHLORPERAZINE MALEATE 5 MG/1
5 TABLET ORAL EVERY 6 HOURS PRN
Status: DISCONTINUED | OUTPATIENT
Start: 2025-06-08 | End: 2025-06-10 | Stop reason: HOSPADM

## 2025-06-08 RX ORDER — LOSARTAN POTASSIUM 50 MG/1
50 TABLET ORAL DAILY
Status: DISCONTINUED | OUTPATIENT
Start: 2025-06-08 | End: 2025-06-10 | Stop reason: HOSPADM

## 2025-06-08 RX ORDER — AMLODIPINE BESYLATE 5 MG/1
5 TABLET ORAL DAILY
Status: DISCONTINUED | OUTPATIENT
Start: 2025-06-08 | End: 2025-06-10 | Stop reason: HOSPADM

## 2025-06-08 RX ORDER — CEFTRIAXONE 1 G/1
1 INJECTION, POWDER, FOR SOLUTION INTRAMUSCULAR; INTRAVENOUS EVERY 24 HOURS
Status: DISCONTINUED | OUTPATIENT
Start: 2025-06-08 | End: 2025-06-08

## 2025-06-08 RX ORDER — HYDRALAZINE HYDROCHLORIDE 10 MG/1
10 TABLET, FILM COATED ORAL EVERY 4 HOURS PRN
Status: DISCONTINUED | OUTPATIENT
Start: 2025-06-08 | End: 2025-06-10 | Stop reason: HOSPADM

## 2025-06-08 RX ORDER — MELOXICAM 7.5 MG/1
7.5 TABLET ORAL DAILY
Status: DISCONTINUED | OUTPATIENT
Start: 2025-06-08 | End: 2025-06-10 | Stop reason: HOSPADM

## 2025-06-08 RX ORDER — OXYCODONE HYDROCHLORIDE 5 MG/1
5 TABLET ORAL EVERY 4 HOURS PRN
Refills: 0 | Status: DISCONTINUED | OUTPATIENT
Start: 2025-06-08 | End: 2025-06-10 | Stop reason: HOSPADM

## 2025-06-08 RX ORDER — ACETAMINOPHEN 325 MG/1
325 TABLET ORAL 3 TIMES DAILY
Status: DISCONTINUED | OUTPATIENT
Start: 2025-06-08 | End: 2025-06-10 | Stop reason: HOSPADM

## 2025-06-08 RX ORDER — ENOXAPARIN SODIUM 100 MG/ML
40 INJECTION SUBCUTANEOUS EVERY 24 HOURS
Status: DISCONTINUED | OUTPATIENT
Start: 2025-06-08 | End: 2025-06-10 | Stop reason: HOSPADM

## 2025-06-08 RX ORDER — CEFTRIAXONE 1 G/1
1 INJECTION, POWDER, FOR SOLUTION INTRAMUSCULAR; INTRAVENOUS EVERY 24 HOURS
Status: DISCONTINUED | OUTPATIENT
Start: 2025-06-08 | End: 2025-06-10 | Stop reason: HOSPADM

## 2025-06-08 RX ORDER — ONDANSETRON 4 MG/1
4 TABLET, ORALLY DISINTEGRATING ORAL EVERY 6 HOURS PRN
Status: DISCONTINUED | OUTPATIENT
Start: 2025-06-08 | End: 2025-06-10 | Stop reason: HOSPADM

## 2025-06-08 RX ADMIN — SODIUM CHLORIDE: 0.9 INJECTION, SOLUTION INTRAVENOUS at 15:18

## 2025-06-08 RX ADMIN — AMLODIPINE BESYLATE 5 MG: 5 TABLET ORAL at 17:13

## 2025-06-08 RX ADMIN — ACETAMINOPHEN 325 MG: 325 TABLET ORAL at 17:12

## 2025-06-08 RX ADMIN — CEFTRIAXONE SODIUM 1 G: 1 INJECTION, POWDER, FOR SOLUTION INTRAMUSCULAR; INTRAVENOUS at 15:20

## 2025-06-08 RX ADMIN — ENOXAPARIN SODIUM 40 MG: 40 INJECTION SUBCUTANEOUS at 17:13

## 2025-06-08 RX ADMIN — LOSARTAN POTASSIUM 50 MG: 50 TABLET, FILM COATED ORAL at 17:13

## 2025-06-08 ASSESSMENT — ACTIVITIES OF DAILY LIVING (ADL)
ADLS_ACUITY_SCORE: 65
ADLS_ACUITY_SCORE: 75
ADLS_ACUITY_SCORE: 65
ADLS_ACUITY_SCORE: 65
ADLS_ACUITY_SCORE: 73
ADLS_ACUITY_SCORE: 65
ADLS_ACUITY_SCORE: 65
ADLS_ACUITY_SCORE: 73

## 2025-06-08 NOTE — PROGRESS NOTES
"[facility]   Admission Handoff    The patient is Daniela Brown, 89 year old who arrived in the ED by AMBULANCE from emergency room with a complaint of Fall (Fall in shower/tub unknown down time, hit head, not on thinners, in c collar.  VSS. )  . The patient's current symptoms are new and during this time the symptoms have increased. In the ED, patient was diagnosed with   Final diagnoses:   Fall, initial encounter   Closed head injury, initial encounter   Pyuria   Non-traumatic rhabdomyolysis   T12 compression fracture, initial encounter (H)         Needed?: No    Allergies:    Allergies   Allergen Reactions    Metoprolol Itching and Rash    Cephalexin Rash    Erythromycin Rash    Atarax [Hydroxyzine] Other (See Comments)     Syncope and collapse    Hydrocodone Itching    Shellfish Allergy     Acetaminophen Itching     Patient reported - only when used scheduled in high doses      Actonel [Bisphosphonates] Itching    Alendronate Rash    Azithromycin Hives    Celebrex [Ricci-2 Inhibitors (Sulfonamide)] Rash    Celecoxib Rash    Cipro [Ciprofloxacin] Rash    Contrast Dye Hives    Diatrizoate Hives    Erythromycin Rash    Escitalopram Diarrhea     Gets very sick and mad feelings    Fosamax Itching and Rash    Keflex [Cephalexin Monohydrate] Rash    Lisinopril Cough    Penicillins Rash    Risedronate Itching    Rofecoxib Rash    Seasonal Allergies Other (See Comments)     Seasonal rhinitis    Shellfish-Derived Products Hives    Sulfa Antibiotics Itching and Rash    Sulfasalazine Itching and Rash    Tetanus Toxoid, Adsorbed Swelling    Tetanus-Diphtheria Toxoids Td Swelling    Vicodin [Acetaminophen] Itching     Patient reported - only when used scheduled in high doses.       Vioxx Rash       Past Medical Hx:   Past Medical History:   Diagnosis Date    Abnormal cardiovascular stress test 2/3/2019    9/10/2018 Lexiscan: \"Myocardial perfusion imaging using single isotope technique " "demonstrated a small perfusion defect of mild severity involving the basal inferior wall which is mostly reversible and may be consistent with mild ischemia in the right coronary artery distribution. In addition, transient ischemic dilatation is noted with a TID ratio of 1.3. \"    Adhesive capsulitis of shoulder     left     Adjustment disorder with anxiety 3/18/2016    B12 deficiency anemia 6/20/2012    Chest pain 2004    Chronic right sided/axillary discomfort (musculoskeletal) Atypical substernal (possibly GERD). Negative cardiolite 2004.    Colitis, Clostridium difficile 4/18/2012    Diverticulitis 2009    Headache(784.0) 2001    Tension type. MRI 2001 normal.    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    Status post coronary angiogram 2/27/2019    Syncope and collapse 9/10/2018       Initial vitals were: BP: (!) 163/74  Pulse: 82  Temp: 98.1  F (36.7  C)  Resp: 16  SpO2: 98 %   Recent vital Signs: /59   Pulse 87   Temp 98.1  F (36.7  C) (Oral)   Resp 16   LMP  (LMP Unknown)   SpO2 96%     Elimination Status: Continent: bowel and bladder program     Activity Level: SBA w/ walker utilizes walker at home.     Fall Status: Reason for falls risk:  Mobility, Reason for falls risk: Elimination, Reason for falls risk: Cognition, and Reason for falls risk: High Risk Medications  bed/chair alarm on, nonskid shoes/slippers when out of bed, arm band in place, patient and family education, assistive device/personal items within reach, activity supervised, mobility aid in reach, and room door open    Baseline Mental status: cognitively impaired and other hard of hearing  Current Mental Status changes: at basesline    Infection present or suspected this encounter: yes urinary  Sepsis suspected: No    Isolation type: n/a    Bariatric equipment needed?: No    In the ED these meds " were given:   Medications   cefTRIAXone (ROCEPHIN) 1 g vial to attach to  mL bag for ADULTS or NS 50 mL bag for PEDS (1 g Intravenous $New Bag 6/8/25 1520)   senna-docusate (SENOKOT-S/PERICOLACE) 8.6-50 MG per tablet 1 tablet (has no administration in time range)     Or   senna-docusate (SENOKOT-S/PERICOLACE) 8.6-50 MG per tablet 2 tablet (has no administration in time range)   ondansetron (ZOFRAN ODT) ODT tab 4 mg (has no administration in time range)     Or   ondansetron (ZOFRAN) injection 4 mg (has no administration in time range)   prochlorperazine (COMPAZINE) injection 5 mg (has no administration in time range)     Or   prochlorperazine (COMPAZINE) tablet 5 mg (has no administration in time range)   enoxaparin ANTICOAGULANT (LOVENOX) injection 40 mg (has no administration in time range)   hydrALAZINE (APRESOLINE) tablet 10 mg (has no administration in time range)     Or   hydrALAZINE (APRESOLINE) injection 10 mg (has no administration in time range)   acetaminophen (TYLENOL) tablet 650 mg (has no administration in time range)     Or   acetaminophen (TYLENOL) Suppository 650 mg (has no administration in time range)   melatonin tablet 1 mg (has no administration in time range)   sodium chloride 0.9 % infusion ( Intravenous $New Bag 6/8/25 1518)   oxyCODONE (ROXICODONE) tablet 5 mg (has no administration in time range)   oxyCODONE IR (ROXICODONE) half-tab 2.5 mg (has no administration in time range)   acetaminophen (TYLENOL) tablet 325 mg (has no administration in time range)   amLODIPine (NORVASC) tablet 5 mg (has no administration in time range)   buPROPion (WELLBUTRIN XL) 24 hr tablet 150 mg (has no administration in time range)   meloxicam (MOBIC) tablet 7.5 mg (has no administration in time range)   losartan (COZAAR) tablet 50 mg (has no administration in time range)       Drips running?  No    Home pump  No    Current LDAs: Peripheral IV: Site ac; Gauge 20  none     Results:   Labs/Imaging  Ordered and  Resulted from Time of ED Arrival Up to the Time of Departure from the ED  Results for orders placed or performed during the hospital encounter of 06/08/25 (from the past 24 hours)   [place] Draw    Narrative    The following orders were created for panel order [place] Draw.  Procedure                               Abnormality         Status                     ---------                               -----------         ------                     Extra Blue Top Tube[[id number]]                             Final result                 Please view results for these tests on the individual orders.   CBC with Platelets & Differential    Narrative    The following orders were created for panel order CBC with Platelets & Differential.  Procedure                               Abnormality         Status                     ---------                               -----------         ------                     CBC with platelets and ...[[id number]]  Abnormal            Final result                 Please view results for these tests on the individual orders.   Comprehensive metabolic panel   Result Value Ref Range    Sodium 138 135 - 145 mmol/L    Potassium 3.7 3.4 - 5.3 mmol/L    Carbon Dioxide (CO2) 22 22 - 29 mmol/L    Anion Gap 16 (H) 7 - 15 mmol/L    Urea Nitrogen 28.9 (H) 8.0 - 23.0 mg/dL    Creatinine 1.10 (H) 0.51 - 0.95 mg/dL    GFR Estimate 47 (L) >60 mL/min/1.73m2    Calcium 9.8 8.8 - 10.4 mg/dL    Chloride 100 98 - 107 mmol/L    Glucose 122 (H) 70 - 99 mg/dL    Alkaline Phosphatase 87 40 - 150 U/L    AST 87 (H) 0 - 45 U/L    ALT 36 0 - 50 U/L    Protein Total 7.3 6.4 - 8.3 g/dL    Albumin 4.1 3.5 - 5.2 g/dL    Bilirubin Total 1.0 <=1.2 mg/dL   Extra Blue Top Tube   Result Value Ref Range    Hold Specimen [facility]    CBC with platelets and differential   Result Value Ref Range    WBC Count 20.2 (H) 4.0 - 11.0 10e3/uL    RBC Count 4.93 3.80 - 5.20 10e6/uL    Hemoglobin 14.2 11.7 - 15.7 g/dL    Hematocrit 42.2 35.0 - 47.0  %    MCV 86 78 - 100 fL    MCH 28.8 26.5 - 33.0 pg    MCHC 33.6 31.5 - 36.5 g/dL    RDW 12.3 10.0 - 15.0 %    Platelet Count 336 150 - 450 10e3/uL    % Neutrophils 88 %    % Lymphocytes 7 %    % Monocytes 4 %    % Eosinophils 0 %    % Basophils 0 %    % Immature Granulocytes 0 %    NRBCs per 100 WBC 0 <1 /100    Absolute Neutrophils 17.8 (H) 1.6 - 8.3 10e3/uL    Absolute Lymphocytes 1.5 0.8 - 5.3 10e3/uL    Absolute Monocytes 0.9 0.0 - 1.3 10e3/uL    Absolute Eosinophils 0.0 0.0 - 0.7 10e3/uL    Absolute Basophils 0.0 0.0 - 0.2 10e3/uL    Absolute Immature Granulocytes 0.1 <=0.4 10e3/uL    Absolute NRBCs 0.0 10e3/uL   CK total   Result Value Ref Range    CK 1,493 (HH) 26 - 192 U/L   Blood gas venous   Result Value Ref Range    pH Venous 7.39 7.32 - 7.43    pCO2 Venous 40 40 - 50 mm Hg    pO2 Venous 18 (L) 25 - 47 mm Hg    Bicarbonate Venous 24 21 - 28 mmol/L    Base Excess/Deficit Venous -1.0 -3.0 - 3.0 mmol/L    FIO2 0     Oxyhemoglobin Venous 26 (L) 70 - 75 %    O2 Sat, Venous 26.4 (L) 70.0 - 75.0 %    Narrative    In healthy individuals, oxyhemoglobin (O2Hb) and oxygen saturation (SO2) are approximately equal. In the presence of dyshemoglobins, oxyhemoglobin can be considerably lower than oxygen saturation.   EKG 12-lead, tracing only   Result Value Ref Range    Systolic Blood Pressure  mmHg    Diastolic Blood Pressure  mmHg    Ventricular Rate 78 BPM    Atrial Rate 78 BPM    MI Interval 150 ms    QRS Duration 86 ms     ms    QTc 481 ms    P Axis 62 degrees    R AXIS -20 degrees    T Axis 62 degrees    Interpretation ECG       Sinus rhythm with Premature supraventricular complexes  QTcB >= 480 msec  Abnormal ECG  When compared with ECG of 12-Feb-2025 15:45,  Premature supraventricular complexes are now Present     CT Chest Abdomen Pelvis w/o Contrast    Narrative    EXAM: CT CHEST ABDOMEN PELVIS W/O CONTRAST  LOCATION: [facility]  DATE: 6/8/2025    INDICATION: fall; chest  pain  COMPARISON: None.  TECHNIQUE: CT scan of the chest, abdomen, and pelvis was performed without IV contrast. Multiplanar reformats were obtained. Dose reduction techniques were used.   CONTRAST: None.    FINDINGS:   LUNGS AND PLEURA: Normal.    MEDIASTINUM/AXILLAE: Normal.    CORONARY ARTERY CALCIFICATION: Severe.    HEPATOBILIARY: Cholecystectomy.    PANCREAS: Unchanged 2.1 cm cystic lesion in the proximal body and 1.0 cm cystic lesion in the head (7/145, 153). No main duct dilation.    SPLEEN: Normal.    ADRENAL GLANDS: Normal.    KIDNEYS/BLADDER: Normal.    BOWEL: Diverticulosis of the colon. No acute inflammatory change. No obstruction. Large amount of accumulated stool in the rectum.    LYMPH NODES: Normal.    VASCULATURE: No abdominal aortic aneurysm. Moderate-severe atherosclerotic calcification of the aorta and iliofemoral arteries.    PELVIC ORGANS: Absent.    MUSCULOSKELETAL: Tiny fat-containing paraumbilical hernia. Mild body wall edema. T12 compression deformity, mildly increased compared to 1/31/2025.      Impression    IMPRESSION:  1.  No acute traumatic findings in the chest, abdomen, or pelvis.  2.  T12 compression deformity, mildly increased compared to 1/31/2025.  3.  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     CT Pelvis Bone wo Contrast    Narrative    EXAM: CT PELVIS BONE WO CONTRAST  LOCATION: [facility]  DATE: 6/8/2025    INDICATION: Fall; left hip pain  COMPARISON: None.  TECHNIQUE: CT scan of the pelvis was performed without IV contrast. Multiplanar reformats were obtained. Dose reduction techniques were used.  CONTRAST: None.    FINDINGS:    The left hip is negative for fracture or CT evidence of avascular necrosis. Degenerative change at the left hip joint.    The right hip is negative for fracture.    Degenerative change at the SI joints bilaterally. No evidence for pubic rami or sacral alar fracture.    No significant effusion on  either side. No evidence for fluid collection external to the hip joint on either side. The interval contents are negative for abnormal mass or free fluid.      Impression    IMPRESSION:  1.  Both hips are negative for fracture or CT evidence of avascular necrosis.  2.  Pelvis negative for fracture.  3.  Degenerative change at both hip joints, moderately severe.  4.  Degenerative change at the SI joints bilaterally.  5.  No significant effusion.  6.  Intrapelvic contents negative.   CT Lumbar Spine w/o Contrast    Narrative    EXAM: CT THORACIC SPINE W/O CONTRAST, CT LUMBAR SPINE W/O CONTRAST  LOCATION: [facility]  DATE: 6/8/2025    INDICATION: fall; back pain  COMPARISON: Lumbar spine CT 01/31/2025.  TECHNIQUE:  1) Routine CT Thoracic Spine without IV contrast. Multiplanar reformats. Dose reduction techniques were used.   2) Routine CT Lumbar Spine without IV contrast. Multiplanar reformats. Dose reduction techniques were used.     FINDINGS:    THORACIC SPINE CT:  VERTEBRA: Compression fracture of the T12 vertebral body, demonstrating increased height loss since the prior CT from 01/31/2025. This now demonstrates up to 60% height loss anteriorly and mild retropulsion of the posterior vertebral body. Remaining   thoracic vertebral body heights are maintained. Trace anterolisthesis of T2 on T3. No additional acute thoracic fracture identified.     CANAL/FORAMINA: No high-grade spinal canal or neural foraminal stenosis.    PARASPINAL: Please refer to separately dictated chest CT for soft tissue findings.    LUMBAR SPINE CT:  VERTEBRA: 5 lumbar type vertebral bodies. Grade 1 anterolisthesis of L4 on L5, similar to the prior exam. Lumbar vertebral body heights are maintained. No evidence of acute lumbar fracture.     CANAL/FORAMINA: Multilevel lumbar spondylosis, most pronounced at L4-L5, where there is moderate spinal canal stenosis and moderate bilateral neural foraminal  stenosis.    PARASPINAL: Please refer to separately dictated abdomen pelvis CT for soft tissue findings.      Impression    IMPRESSION:  THORACIC SPINE CT:  1.  Compression fracture of the T12 vertebral body, demonstrating increased height loss since the prior CT from 01/31/2025.  2.  Otherwise no evidence of acute thoracic fracture.    LUMBAR SPINE CT:  1.  No evidence of acute lumbar fracture.  2.  Multilevel lumbar spondylosis, most pronounced at L4-L5.     CT Thoracic Spine w/o Contrast    Narrative    EXAM: CT THORACIC SPINE W/O CONTRAST, CT LUMBAR SPINE W/O CONTRAST  LOCATION: [facility]  DATE: 6/8/2025    INDICATION: fall; back pain  COMPARISON: Lumbar spine CT 01/31/2025.  TECHNIQUE:  1) Routine CT Thoracic Spine without IV contrast. Multiplanar reformats. Dose reduction techniques were used.   2) Routine CT Lumbar Spine without IV contrast. Multiplanar reformats. Dose reduction techniques were used.     FINDINGS:    THORACIC SPINE CT:  VERTEBRA: Compression fracture of the T12 vertebral body, demonstrating increased height loss since the prior CT from 01/31/2025. This now demonstrates up to 60% height loss anteriorly and mild retropulsion of the posterior vertebral body. Remaining   thoracic vertebral body heights are maintained. Trace anterolisthesis of T2 on T3. No additional acute thoracic fracture identified.     CANAL/FORAMINA: No high-grade spinal canal or neural foraminal stenosis.    PARASPINAL: Please refer to separately dictated chest CT for soft tissue findings.    LUMBAR SPINE CT:  VERTEBRA: 5 lumbar type vertebral bodies. Grade 1 anterolisthesis of L4 on L5, similar to the prior exam. Lumbar vertebral body heights are maintained. No evidence of acute lumbar fracture.     CANAL/FORAMINA: Multilevel lumbar spondylosis, most pronounced at L4-L5, where there is moderate spinal canal stenosis and moderate bilateral neural foraminal stenosis.    PARASPINAL: Please refer to  separately dictated abdomen pelvis CT for soft tissue findings.      Impression    IMPRESSION:  THORACIC SPINE CT:  1.  Compression fracture of the T12 vertebral body, demonstrating increased height loss since the prior CT from 01/31/2025.  2.  Otherwise no evidence of acute thoracic fracture.    LUMBAR SPINE CT:  1.  No evidence of acute lumbar fracture.  2.  Multilevel lumbar spondylosis, most pronounced at L4-L5.     CT Cervical Spine w/o Contrast    Narrative    EXAM: CT HEAD W/O CONTRAST, CT CERVICAL SPINE W/O CONTRAST  LOCATION: [facility]  DATE: 6/8/2025    INDICATION: fall; headache  COMPARISON: Head and cervical spine CT 08/15/2023.  TECHNIQUE:   1) Routine CT Head without IV contrast. Multiplanar reformats. Dose reduction techniques were used.  2) Routine CT Cervical Spine without IV contrast. Multiplanar reformats. Dose reduction techniques were used.    FINDINGS:   HEAD CT:   INTRACRANIAL CONTENTS: No intracranial hemorrhage, extraaxial collection, or mass effect.  No CT evidence of acute infarct. Mild presumed chronic small vessel ischemic changes. Mild generalized volume loss. No hydrocephalus.     VISUALIZED ORBITS/SINUSES/MASTOIDS: Prior bilateral cataract surgery. Visualized portions of the orbits are otherwise unremarkable. Secretions in the right sphenoid sinus. No middle ear or mastoid effusion.    BONES/SOFT TISSUES: Right lateral scalp swelling. No skull fracture.    CERVICAL SPINE CT:   VERTEBRA: Mild anterolisthesis of C4 on C5. Cervical vertebral body heights are maintained. No evidence of acute cervical fracture.     CANAL/FORAMINA: Multilevel cervical spondylosis, without high-grade spinal canal or neural foraminal stenosis.    PARASPINAL: Sclerosis of the cervical carotid arteries. Subcentimeter thyroid nodules.       Impression    IMPRESSION:  HEAD CT:  1.  No acute intracranial findings.  2.  Mild age-related changes.  3.  Right lateral scalp  swelling.    CERVICAL SPINE CT:  1.  No evidence of acute cervical fracture.  2.  Multilevel cervical spondylosis.   Head CT w/o contrast    Narrative    EXAM: CT HEAD W/O CONTRAST, CT CERVICAL SPINE W/O CONTRAST  LOCATION: [facility]  DATE: 6/8/2025    INDICATION: fall; headache  COMPARISON: Head and cervical spine CT 08/15/2023.  TECHNIQUE:   1) Routine CT Head without IV contrast. Multiplanar reformats. Dose reduction techniques were used.  2) Routine CT Cervical Spine without IV contrast. Multiplanar reformats. Dose reduction techniques were used.    FINDINGS:   HEAD CT:   INTRACRANIAL CONTENTS: No intracranial hemorrhage, extraaxial collection, or mass effect.  No CT evidence of acute infarct. Mild presumed chronic small vessel ischemic changes. Mild generalized volume loss. No hydrocephalus.     VISUALIZED ORBITS/SINUSES/MASTOIDS: Prior bilateral cataract surgery. Visualized portions of the orbits are otherwise unremarkable. Secretions in the right sphenoid sinus. No middle ear or mastoid effusion.    BONES/SOFT TISSUES: Right lateral scalp swelling. No skull fracture.    CERVICAL SPINE CT:   VERTEBRA: Mild anterolisthesis of C4 on C5. Cervical vertebral body heights are maintained. No evidence of acute cervical fracture.     CANAL/FORAMINA: Multilevel cervical spondylosis, without high-grade spinal canal or neural foraminal stenosis.    PARASPINAL: Sclerosis of the cervical carotid arteries. Subcentimeter thyroid nodules.       Impression    IMPRESSION:  HEAD CT:  1.  No acute intracranial findings.  2.  Mild age-related changes.  3.  Right lateral scalp swelling.    CERVICAL SPINE CT:  1.  No evidence of acute cervical fracture.  2.  Multilevel cervical spondylosis.   Urine Macroscopic with reflex to Microscopic   Result Value Ref Range    Color Urine Yellow Colorless, Straw, Light Yellow, Yellow    Appearance Urine Slightly Cloudy (A) Clear    Glucose Urine Negative Negative  mg/dL    Bilirubin Urine Negative Negative    Ketones Urine 20 (A) Negative mg/dL    Specific Gravity Urine 1.018 1.003 - 1.035    Blood Urine Trace (A) Negative    pH Urine 6.5 5.0 - 7.0    Protein Albumin Urine 10 (A) Negative mg/dL    Urobilinogen Urine Normal Normal mg/dL    Nitrite Urine Negative Negative    Leukocyte Esterase Urine Large (A) Negative    Bacteria Urine Few (A) None Seen /HPF    RBC Urine 1 <=2 /HPF    WBC Urine 168 (H) <=5 /HPF     *Note: Due to a large number of results and/or encounters for the requested time period, some results have not been displayed. A complete set of results can be found in Results Review.       For the majority of the shift this patient's behavior was Green     Sinus rhythm, HR: 78.    Skin/wound Issues: pre-existing pressure ulcer to sacrum, abrasion to right elbow, right shoulder, right back. Excoriated perineum.     Code Status: DNR / DNI    Pain control: good    Nausea control: pt had none    Abnormal labs/tests/findings requiring intervention: CK, UA pending.    Patient tested for COVID 19 prior to admission: NO     OBS brochure/video discussed/provided to patient/family: Yes     Family present during ED course? No     Family Comments/Social Situation comments: patient reports estranged from son.     Tasks needing completion: None    Milana Umana RN

## 2025-06-08 NOTE — H&P
[facility]    History and Physical - Hospitalist Service       Date of Admission:  6/8/2025    Assessment & Plan      Danilea Brown is a 89F presented after being found down in bathtub during welfare check; pmhx includes generalised weakness, MDD/ARABELLA, neuropathy, chronic lower back pain with sciatica, CAD, HTN/HLD, DM2, CKD3a; admitted to observation for failure to thrive, generalised weakness, and T12 compression fracture.    Generalised weakness  Failure to thrive  Notes that she has been having increased weakness for a long period of time, was looking into transitioning to assisted living. Lives alone.  -CM consultation for disposition planning    Urinary tract infection  Urinary urgency/frequency increase in the past 3-7 days. Recent hospitalised here at [facility] and discharged on macrobid.  -Ceftriaxone 1g IV every day  -urine culture pending    Non-traumatic rhabdomyolysis  Present on admission. Suggestive of immobilisation for extended period of time.  -NS @ 100mL/hr for 1 day  -CK trend    T12 compression fracture  Found on trauma evaluation. Otherwise asymptomatic by patient concerns.  -tylenol 325mg PO TID  -oxycodone PO q4h/PRN  -PT evaluation    CAD  Hyperlipidemia  Hypertension  -amlodipine 5mg PO every day  -losartan 50mg PO every day (replaces olmesartan 20mg)    Diabetes mellitus, type 2, with polyneuropathy  -BMP trend    Chronic pain  -meloxicam 7.5mg PO every day    CKD3a  -BMP trend    Code status  Has capacity to make medical decisions at time of admission evaluation. Confirms her preference to DNR/DNI.       Observation Goals: -diagnostic tests and consults completed and resulted, -vital signs normal or at patient baseline, Nurse to notify provider when observation goals have been met and patient is ready for discharge.  Diet: Regular Diet Adult  DVT Prophylaxis: Enoxaparin (Lovenox) SQ  Collazo Catheter: Not present  Lines: None     Cardiac Monitoring: None  Code  "Status: No CPR- Do NOT Intubate    Clinically Significant Risk Factors Present on Admission                   # Hypertension: Noted on problem list               # Financial/Environmental Concerns:           Disposition Plan     Medically Ready for Discharge: Anticipated Tomorrow           Prudencio Short MD  Hospitalist Service  [facility]  Securely message with [employer] (more info)  Text page via [employer] Paging/Directory     ______________________________________________________________________    Chief Complaint   fall    History is obtained from the patient, electronic health record, and emergency department physician    History of Present Illness   Daniela Brown is a 89F presented after being found down in bathtub during welfare check; pmhx includes generalised weakness, MDD/ARABELLA, neuropathy, chronic lower back pain with sciatica, CAD, HTN/HLD, DM2, CKD3a; admitted to observation for failure to thrive, generalised weakness, and T12 compression fracture.      Past Medical History    Past Medical History:   Diagnosis Date    Abnormal cardiovascular stress test 2/3/2019    9/10/2018 Lexiscan: \"Myocardial perfusion imaging using single isotope technique demonstrated a small perfusion defect of mild severity involving the basal inferior wall which is mostly reversible and may be consistent with mild ischemia in the right coronary artery distribution. In addition, transient ischemic dilatation is noted with a TID ratio of 1.3. \"    Adhesive capsulitis of shoulder     left     Adjustment disorder with anxiety 3/18/2016    B12 deficiency anemia 6/20/2012    Chest pain 2004    Chronic right sided/axillary discomfort (musculoskeletal) Atypical substernal (possibly GERD). Negative cardiolite 2004.    Colitis, Clostridium difficile 4/18/2012    Diverticulitis 2009    Headache(784.0) 2001    Tension type. MRI 2001 normal.    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    Status post coronary angiogram 2/27/2019    Syncope and collapse 9/10/2018       Past Surgical History   Past Surgical History:   Procedure Laterality Date    ARTHROPLASTY KNEE  3/26/2012    Procedure:ARTHROPLASTY KNEE; Right Total Knee Arthroplasty; Surgeon:BERNADINE ROSAS; Location:[place] OR    CHOLECYSTECTOMY      CV HEART CATHETERIZATION WITH POSSIBLE INTERVENTION N/A 2/27/2019    Procedure: Coronary Angiogram with Left ventriculogram;  Surgeon: Leandro Carpio MD;  Location:  HEART CARDIAC CATH LAB    HERNIA REPAIR      Hernia Repair    HYSTERECTOMY, PAP NO LONGER INDICATED  1983    TVH/BSO    SURGICAL HISTORY OF -   10/08    A & P Repair, Dr. Hannah    [facility] APPENDECTOMY  1953       Prior to Admission Medications   Prior to Admission Medications   Prescriptions Last Dose Informant Patient Reported? Taking?   Multiple Vitamins-Minerals (THERAPEUTIC MULTIVIT/MINERAL) TABS  Self Yes No   Sig: Take 1 tablet by mouth every evening    PARoxetine (PAXIL) 20 MG tablet  Self No No   Sig: Take 1 tablet (20 mg) by mouth every morning.   acetaminophen (TYLENOL) 500 MG tablet  Self Yes No   Sig: Take 500-1,000 mg by mouth every 6 hours as needed for mild pain or pain   amLODIPine (NORVASC) 5 MG tablet  Self No No   Sig: Take 1 tablet (5 mg) by mouth daily.   bismuth subsalicylate (PEPTO BISMOL) 262 MG/15ML suspension  Self Yes No   Sig: Take 15 mLs by mouth every 6 hours as needed for indigestion.   blood glucose (NO BRAND SPECIFIED) test strip  Self No No   Sig: Use to test blood sugar 1 times daily or as directed. To accompany: Blood Glucose Monitor Brands: per insurance.   blood glucose monitoring (NO BRAND SPECIFIED) meter device kit  Self No No   Sig: Use to test blood sugar 1 times daily or as directed. Preferred blood glucose meter OR supplies to accompany: Blood Glucose Monitor Brands: per  insurance.   buPROPion (WELLBUTRIN XL) 150 MG 24 hr tablet  Self No No   Sig: Take 1 tablet (150 mg) by mouth every morning.   meloxicam (MOBIC) 7.5 MG tablet  Self No No   Sig: Take 1 tablet (7.5 mg) by mouth daily.   nitroFURantoin macrocrystal-monohydrate (MACROBID) 100 MG capsule   No No   Sig: Take 1 capsule (100 mg) by mouth every 12 hours.   olmesartan (BENICAR) 20 MG tablet  Self No No   Sig: Take 1 tablet (20 mg) by mouth daily.   thin (NO BRAND SPECIFIED) lancets  Self No No   Sig: Use with lanceting device. To accompany: Blood Glucose Monitor Brands: per insurance.      Facility-Administered Medications: None        Review of Systems    The 10 point Review of Systems is negative other than noted in the HPI or here.      Physical Exam   Vital Signs: Temp: 98.1  F (36.7  C) Temp src: Oral BP: (!) 163/66 Pulse: 87   Resp: 16 SpO2: 97 %      Weight: 0 lbs 0 oz    Constitutional: AOx3, no acute distress  Respiratory: CTAB  Cardiovascular: RRR no m/g/r  GI: soft, nontender, nondistended  Musculoskeletal: shoulder/elbow flexion/extension 4+/5 bilaterally and symmetric  Neurologic: AOx3, CN II-XII intact    Medical Decision Making       55 MINUTES SPENT BY ME on the date of service doing chart review, history, exam, documentation & further activities per the note.  MANAGEMENT DISCUSSED with the following over the past 24 hours: patient   NOTE(S)/MEDICAL RECORDS REVIEWED over the past 24 hours: RN notes, EDP notes, recent discharge summary  Tests ORDERED & REVIEWED in the past 24 hours:  - See lab/imaging results included in the data section of the note      Data     I have personally reviewed the following data over the past 24 hrs:    20.2 (H)  \   14.2   / 336     138 100 28.9 (H) /  122 (H)   3.7 22 1.10 (H) \     ALT: 36 AST: 87 (H) AP: 87 TBILI: 1.0   ALB: 4.1 TOT PROTEIN: 7.3 LIPASE: N/A       Imaging results reviewed over the past 24 hrs:   Recent Results (from the past 24 hours)   CT Chest Abdomen Pelvis  w/o Contrast    Narrative    EXAM: CT CHEST ABDOMEN PELVIS W/O CONTRAST  LOCATION: [facility]  DATE: 6/8/2025    INDICATION: fall; chest pain  COMPARISON: None.  TECHNIQUE: CT scan of the chest, abdomen, and pelvis was performed without IV contrast. Multiplanar reformats were obtained. Dose reduction techniques were used.   CONTRAST: None.    FINDINGS:   LUNGS AND PLEURA: Normal.    MEDIASTINUM/AXILLAE: Normal.    CORONARY ARTERY CALCIFICATION: Severe.    HEPATOBILIARY: Cholecystectomy.    PANCREAS: Unchanged 2.1 cm cystic lesion in the proximal body and 1.0 cm cystic lesion in the head (7/145, 153). No main duct dilation.    SPLEEN: Normal.    ADRENAL GLANDS: Normal.    KIDNEYS/BLADDER: Normal.    BOWEL: Diverticulosis of the colon. No acute inflammatory change. No obstruction. Large amount of accumulated stool in the rectum.    LYMPH NODES: Normal.    VASCULATURE: No abdominal aortic aneurysm. Moderate-severe atherosclerotic calcification of the aorta and iliofemoral arteries.    PELVIC ORGANS: Absent.    MUSCULOSKELETAL: Tiny fat-containing paraumbilical hernia. Mild body wall edema. T12 compression deformity, mildly increased compared to 1/31/2025.      Impression    IMPRESSION:  1.  No acute traumatic findings in the chest, abdomen, or pelvis.  2.  T12 compression deformity, mildly increased compared to 1/31/2025.  3.  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     CT Pelvis Bone wo Contrast    Narrative    EXAM: CT PELVIS BONE WO CONTRAST  LOCATION: [facility]  DATE: 6/8/2025    INDICATION: Fall; left hip pain  COMPARISON: None.  TECHNIQUE: CT scan of the pelvis was performed without IV contrast. Multiplanar reformats were obtained. Dose reduction techniques were used.  CONTRAST: None.    FINDINGS:    The left hip is negative for fracture or CT evidence of avascular necrosis. Degenerative change at the left hip joint.    The right  hip is negative for fracture.    Degenerative change at the SI joints bilaterally. No evidence for pubic rami or sacral alar fracture.    No significant effusion on either side. No evidence for fluid collection external to the hip joint on either side. The interval contents are negative for abnormal mass or free fluid.      Impression    IMPRESSION:  1.  Both hips are negative for fracture or CT evidence of avascular necrosis.  2.  Pelvis negative for fracture.  3.  Degenerative change at both hip joints, moderately severe.  4.  Degenerative change at the SI joints bilaterally.  5.  No significant effusion.  6.  Intrapelvic contents negative.   CT Lumbar Spine w/o Contrast    Narrative    EXAM: CT THORACIC SPINE W/O CONTRAST, CT LUMBAR SPINE W/O CONTRAST  LOCATION: [facility]  DATE: 6/8/2025    INDICATION: fall; back pain  COMPARISON: Lumbar spine CT 01/31/2025.  TECHNIQUE:  1) Routine CT Thoracic Spine without IV contrast. Multiplanar reformats. Dose reduction techniques were used.   2) Routine CT Lumbar Spine without IV contrast. Multiplanar reformats. Dose reduction techniques were used.     FINDINGS:    THORACIC SPINE CT:  VERTEBRA: Compression fracture of the T12 vertebral body, demonstrating increased height loss since the prior CT from 01/31/2025. This now demonstrates up to 60% height loss anteriorly and mild retropulsion of the posterior vertebral body. Remaining   thoracic vertebral body heights are maintained. Trace anterolisthesis of T2 on T3. No additional acute thoracic fracture identified.     CANAL/FORAMINA: No high-grade spinal canal or neural foraminal stenosis.    PARASPINAL: Please refer to separately dictated chest CT for soft tissue findings.    LUMBAR SPINE CT:  VERTEBRA: 5 lumbar type vertebral bodies. Grade 1 anterolisthesis of L4 on L5, similar to the prior exam. Lumbar vertebral body heights are maintained. No evidence of acute lumbar fracture.      CANAL/FORAMINA: Multilevel lumbar spondylosis, most pronounced at L4-L5, where there is moderate spinal canal stenosis and moderate bilateral neural foraminal stenosis.    PARASPINAL: Please refer to separately dictated abdomen pelvis CT for soft tissue findings.      Impression    IMPRESSION:  THORACIC SPINE CT:  1.  Compression fracture of the T12 vertebral body, demonstrating increased height loss since the prior CT from 01/31/2025.  2.  Otherwise no evidence of acute thoracic fracture.    LUMBAR SPINE CT:  1.  No evidence of acute lumbar fracture.  2.  Multilevel lumbar spondylosis, most pronounced at L4-L5.     CT Thoracic Spine w/o Contrast    Narrative    EXAM: CT THORACIC SPINE W/O CONTRAST, CT LUMBAR SPINE W/O CONTRAST  LOCATION: [facility]  DATE: 6/8/2025    INDICATION: fall; back pain  COMPARISON: Lumbar spine CT 01/31/2025.  TECHNIQUE:  1) Routine CT Thoracic Spine without IV contrast. Multiplanar reformats. Dose reduction techniques were used.   2) Routine CT Lumbar Spine without IV contrast. Multiplanar reformats. Dose reduction techniques were used.     FINDINGS:    THORACIC SPINE CT:  VERTEBRA: Compression fracture of the T12 vertebral body, demonstrating increased height loss since the prior CT from 01/31/2025. This now demonstrates up to 60% height loss anteriorly and mild retropulsion of the posterior vertebral body. Remaining   thoracic vertebral body heights are maintained. Trace anterolisthesis of T2 on T3. No additional acute thoracic fracture identified.     CANAL/FORAMINA: No high-grade spinal canal or neural foraminal stenosis.    PARASPINAL: Please refer to separately dictated chest CT for soft tissue findings.    LUMBAR SPINE CT:  VERTEBRA: 5 lumbar type vertebral bodies. Grade 1 anterolisthesis of L4 on L5, similar to the prior exam. Lumbar vertebral body heights are maintained. No evidence of acute lumbar fracture.     CANAL/FORAMINA: Multilevel lumbar  spondylosis, most pronounced at L4-L5, where there is moderate spinal canal stenosis and moderate bilateral neural foraminal stenosis.    PARASPINAL: Please refer to separately dictated abdomen pelvis CT for soft tissue findings.      Impression    IMPRESSION:  THORACIC SPINE CT:  1.  Compression fracture of the T12 vertebral body, demonstrating increased height loss since the prior CT from 01/31/2025.  2.  Otherwise no evidence of acute thoracic fracture.    LUMBAR SPINE CT:  1.  No evidence of acute lumbar fracture.  2.  Multilevel lumbar spondylosis, most pronounced at L4-L5.     CT Cervical Spine w/o Contrast    Narrative    EXAM: CT HEAD W/O CONTRAST, CT CERVICAL SPINE W/O CONTRAST  LOCATION: [facility]  DATE: 6/8/2025    INDICATION: fall; headache  COMPARISON: Head and cervical spine CT 08/15/2023.  TECHNIQUE:   1) Routine CT Head without IV contrast. Multiplanar reformats. Dose reduction techniques were used.  2) Routine CT Cervical Spine without IV contrast. Multiplanar reformats. Dose reduction techniques were used.    FINDINGS:   HEAD CT:   INTRACRANIAL CONTENTS: No intracranial hemorrhage, extraaxial collection, or mass effect.  No CT evidence of acute infarct. Mild presumed chronic small vessel ischemic changes. Mild generalized volume loss. No hydrocephalus.     VISUALIZED ORBITS/SINUSES/MASTOIDS: Prior bilateral cataract surgery. Visualized portions of the orbits are otherwise unremarkable. Secretions in the right sphenoid sinus. No middle ear or mastoid effusion.    BONES/SOFT TISSUES: Right lateral scalp swelling. No skull fracture.    CERVICAL SPINE CT:   VERTEBRA: Mild anterolisthesis of C4 on C5. Cervical vertebral body heights are maintained. No evidence of acute cervical fracture.     CANAL/FORAMINA: Multilevel cervical spondylosis, without high-grade spinal canal or neural foraminal stenosis.    PARASPINAL: Sclerosis of the cervical carotid arteries. Subcentimeter  thyroid nodules.       Impression    IMPRESSION:  HEAD CT:  1.  No acute intracranial findings.  2.  Mild age-related changes.  3.  Right lateral scalp swelling.    CERVICAL SPINE CT:  1.  No evidence of acute cervical fracture.  2.  Multilevel cervical spondylosis.   Head CT w/o contrast    Narrative    EXAM: CT HEAD W/O CONTRAST, CT CERVICAL SPINE W/O CONTRAST  LOCATION: [facility]  DATE: 6/8/2025    INDICATION: fall; headache  COMPARISON: Head and cervical spine CT 08/15/2023.  TECHNIQUE:   1) Routine CT Head without IV contrast. Multiplanar reformats. Dose reduction techniques were used.  2) Routine CT Cervical Spine without IV contrast. Multiplanar reformats. Dose reduction techniques were used.    FINDINGS:   HEAD CT:   INTRACRANIAL CONTENTS: No intracranial hemorrhage, extraaxial collection, or mass effect.  No CT evidence of acute infarct. Mild presumed chronic small vessel ischemic changes. Mild generalized volume loss. No hydrocephalus.     VISUALIZED ORBITS/SINUSES/MASTOIDS: Prior bilateral cataract surgery. Visualized portions of the orbits are otherwise unremarkable. Secretions in the right sphenoid sinus. No middle ear or mastoid effusion.    BONES/SOFT TISSUES: Right lateral scalp swelling. No skull fracture.    CERVICAL SPINE CT:   VERTEBRA: Mild anterolisthesis of C4 on C5. Cervical vertebral body heights are maintained. No evidence of acute cervical fracture.     CANAL/FORAMINA: Multilevel cervical spondylosis, without high-grade spinal canal or neural foraminal stenosis.    PARASPINAL: Sclerosis of the cervical carotid arteries. Subcentimeter thyroid nodules.       Impression    IMPRESSION:  HEAD CT:  1.  No acute intracranial findings.  2.  Mild age-related changes.  3.  Right lateral scalp swelling.    CERVICAL SPINE CT:  1.  No evidence of acute cervical fracture.  2.  Multilevel cervical spondylosis.

## 2025-06-08 NOTE — ED TRIAGE NOTES
Fall at home found down in the tub head first legs out of tub, covered in stool. Pre-existing pressure wound to sacrum and gluteal crease, mepilex applied to sacrum, barrier cream applied to buttock skin due to excoriation from stool exposure.  Abrasion right elbow, right shoulder and back. Time down unknown. Patient very hard of hearing.      Triage Assessment (Adult)       Row Name 06/08/25 1053          Respiratory WDL    Respiratory WDL WDL        Skin Circulation/Temperature WDL    Skin Circulation/Temperature WDL X     Skin Temperature cool        Cardiac WDL    Cardiac WDL WDL        Peripheral/Neurovascular WDL    Peripheral Neurovascular WDL WDL        Cognitive/Neuro/Behavioral WDL    Cognitive/Neuro/Behavioral WDL X;orientation     Level of Consciousness intermittent confusion

## 2025-06-08 NOTE — PROGRESS NOTES
[facility]    Patient admitted to room 2405 at approximately 1700 via cart from emergency room. Patient was accompanied by transport tech.     Verbal SBAR report received from Milana prior to patient arrival.     Patient trasferred to bed via air brii. Patient alert and oriented X 2. The patient is not having any pain.  . Admission vital signs: Blood pressure 138/59, pulse 87, temperature 98.1  F (36.7  C), temperature source Oral, resp. rate 16, SpO2 96%, not currently breastfeeding. Patient was oriented to plan of care, call light, bed controls, tv, telephone, bathroom, and visiting hours.     Risk Assessment    The following safety risks were identified during admission: fall and skin. Yellow risk band applied: YES.     Skin Initial Assessment    This writer admitted this patient and completed a full skin assessment and Serge score in the Adult PCS flowsheet.   Photo documentation of skin problem and/or wound competed via [employer] application (located under Media):  No    Appropriate interventions initiated as needed.     Secondary skin check completed by Katya Linares    Patient has a [place] to Observation order: Yes  Observation education completed and documented: Yes      Harjit Canchola RN

## 2025-06-08 NOTE — ED PROVIDER NOTES
History     Chief Complaint   Patient presents with    Fall     Fall in shower/tub unknown down time, hit head, not on thinners, in c collar.  VSS.      HPI    Daniela Brown is a 89 year old female who comes in via EMS from home.  She was found at home in the bathtub with her head in the tub on a welfare check.  She is estranged from her only son.  She is .  She lives alone.  She was hospitalized on second Hermila 3, 2025 for urinary tract infection.  He was not able to give any useful history.  She does not remember falling.  She currently complains of pain in the back of her head at the base of her skull on the left hip.  She also complains of left parasternal chest pain.  She does not remember falling.  She knows she is in the hospital.  She does not know how she got here.  She is not oriented to date but is oriented to herself.  She denies any symptoms of recent illness but is an unreliable historian.  She has been considering moving into the [facility] because of difficulty caring for herself at home.  She had been receiving some home care services but said that recently they have not been coming.  She denies difficulty breathing, abdominal pain, nausea, vomiting, fever, diarrhea, dysuria.    Allergies:  Allergies   Allergen Reactions    Metoprolol Itching and Rash    Cephalexin Rash    Erythromycin Rash    Atarax [Hydroxyzine] Other (See Comments)     Syncope and collapse    Hydrocodone Itching    Shellfish Allergy     Acetaminophen Itching     Patient reported - only when used scheduled in high doses      Actonel [Bisphosphonates] Itching    Alendronate Rash    Azithromycin Hives    Celebrex [Ricci-2 Inhibitors (Sulfonamide)] Rash    Celecoxib Rash    Cipro [Ciprofloxacin] Rash    Contrast Dye Hives    Diatrizoate Hives    Erythromycin Rash    Escitalopram Diarrhea     Gets very sick and mad feelings    Fosamax Itching and Rash    Keflex [Cephalexin Monohydrate] Rash    Lisinopril Cough     "Penicillins Rash    Risedronate Itching    Rofecoxib Rash    Seasonal Allergies Other (See Comments)     Seasonal rhinitis    Shellfish-Derived Products Hives    Sulfa Antibiotics Itching and Rash    Sulfasalazine Itching and Rash    Tetanus Toxoid, Adsorbed Swelling    Tetanus-Diphtheria Toxoids Td Swelling    Vicodin [Acetaminophen] Itching     Patient reported - only when used scheduled in high doses.       Vioxx Rash       Problem List:    Patient Active Problem List    Diagnosis Date Noted    Pyuria 06/08/2025     Priority: Medium    T12 compression fracture, initial encounter (H) 06/08/2025     Priority: Medium    Urinary tract infection 06/03/2025     Priority: Medium    Bacteriuria, asymptomatic 06/02/2025     Priority: Medium    Abdominal pain, unspecified abdominal location 06/02/2025     Priority: Medium    Right-sided low back pain with right-sided sciatica, unspecified chronicity 02/07/2025     Priority: Medium    Encounter for Medicare annual wellness exam 10/24/2024     Priority: Medium    Chronic pain of left knee 10/24/2024     Priority: Medium    Intertrigo 10/24/2024     Priority: Medium    Complex care coordination 07/26/2024     Priority: Medium    Examination of eyes and vision 07/26/2024     Priority: Medium    Left foot pain 07/26/2024     Priority: Medium    Impaired mobility and activities of daily living 08/21/2023     Priority: Medium    Closed head injury, initial encounter 08/15/2023     Priority: Medium    Fall, initial encounter 08/15/2023     Priority: Medium    Non-traumatic rhabdomyolysis 08/15/2023     Priority: Medium    Polyneuropathy associated with underlying disease 05/17/2023     Priority: Medium    Moderate major depression (H) 12/31/2020     Priority: Medium    ARABELLA (generalized anxiety disorder) 12/31/2020     Priority: Medium     Has been on celexa (not effective but no side effects), amitriptyline, cymbalta (dizziness), lexapro (listed as allergy - 'gets very sick\"), " zoloft  paxil helping somewhat 6/23      Abnormal CT of the abdomen 10/10/2019     Priority: Medium     CT 10/9/2019- cystic lesion along the anterior aspect of the pancreatic body/tail (series 2 image 27) measures 2 cm, and is new since the previous exam 2011. Pancreatic MRI with MRCP should be considered for further evaluation.   MRI 10/10/2019- There are 2 cystic lesions in the pancreatic body/tail, with the largest measuring 2 cm. No enhancing septations or solid masslike components are identified, although evaluation is limited related to patient motion artifact. These lesions have appearances suggestive of IPMN, but remain technically indeterminate. A follow-up MRI in one year should be considered to confirm stability.      Cystocele, midline      Priority: Medium     grade III      Generalized weakness 10/09/2019     Priority: Medium    Diarrhea 10/09/2019     Priority: Medium    Chronic left-sided low back pain with left-sided sciatica 04/15/2019     Priority: Medium    Coronary artery disease involving native coronary artery of native heart with angina pectoris 02/19/2019     Priority: Medium     coronary angiogram 2/2019 showed mild coronary artery disease.  The most significant of these was the 40% lesion in the mid RCA.      Benign essential hypertension 11/14/2018     Priority: Medium    Type 2 diabetes mellitus with diabetic polyneuropathy, without long-term current use of insulin (H) 11/14/2018     Priority: Medium     Diet controlled.  Diagnosed 6/2016, has never been on medications.      History of recurrent urinary tract infection 11/14/2018     Priority: Medium     Recommend referral to Urology to discuss resuming daily suppressive therapy but she declines 8/22 and 6/23      CKD (chronic kidney disease) stage 3, GFR 30-59 ml/min (H) 11/14/2018     Priority: Medium    Peripheral neuropathy 09/10/2018     Priority: Medium    Bilateral wrist pain 04/11/2018     Priority: Medium    Protrusion of  lumbar intervertebral disc 04/11/2018     Priority: Medium    Carpal tunnel syndrome of left wrist 10/21/2014     Priority: Medium    Diastolic dysfunction 03/31/2014     Priority: Medium    Pronation of foot 05/05/2011     Priority: Medium     Bilateral defomity      Allergic rhinitis 01/19/2011     Priority: Medium    Hyperlipidemia LDL goal <100 10/31/2010     Priority: Medium    Urge incontinence 10/20/2009     Priority: Medium    Right foot pain 10/16/2009     Priority: Medium     Xray showed djd -follows with podiatry. -arch supports placed may need cam walker       Vitamin D deficiency 09/09/2008     Priority: Medium    Subjective tinnitus 04/22/2008     Priority: Medium    Urticaria 08/22/2006     Priority: Medium    SENSONRL HEAR LOSS,BILAT 05/03/2006     Priority: Medium    Esophageal reflux      Priority: Medium    Memory loss      Priority: Medium     Early cognitive decline. MMSE 27/30, Neno 4.7/ 6.0 2004. B12, TSH, RPR normal 5521-9623.      Degeneration of lumbar or lumbosacral intervertebral disc      Priority: Medium     MRI 5/04- DDD, L2-3 annular bulge with protrusion into left caudal infra-foraminal area  MRI 2017 with L4-5 loss disc height with spondylolisthesis.      B12 deficiency 10/10/2019     Priority: Low    Irritable bowel syndrome with diarrhea      Priority: Low     diahrrea predominant      Osteopenia of multiple sites      Priority: Low     DEXA 2007 -1.4 hip. Dexa 2004 -1 hip and -1 spine      RESTLESS LEG SYNDROME      Priority: Low    Primary osteoarthritis involving multiple joints      Priority: Low     C-spine, left hip      Diverticulosis of large intestine      Priority: Low     flexible sigmoidoscopy with diverticulosis otherwise normal 2001, admit diverticulitis 2009          Past Medical History:    Past Medical History:   Diagnosis Date    Abnormal cardiovascular stress test 2/3/2019    Adhesive capsulitis of shoulder     Adjustment disorder with anxiety 3/18/2016    B12  deficiency anemia 6/20/2012    Chest pain 2004    Colitis, Clostridium difficile 4/18/2012    Diverticulitis 2009    Headache(784.0) 2001    Impaired fasting glucose 2005    PERS HX ALLERGY OTHER FOODS 8/22/2006    PERS HX ALLERGY TO MILK PRODUCTS 8/22/2006    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    Status post coronary angiogram 2/27/2019    Syncope and collapse 9/10/2018       Past Surgical History:    Past Surgical History:   Procedure Laterality Date    ARTHROPLASTY KNEE  3/26/2012    Procedure:ARTHROPLASTY KNEE; Right Total Knee Arthroplasty; Surgeon:BERNADINE ROSAS; Location:[facility]    CHOLECYSTECTOMY      CV HEART CATHETERIZATION WITH POSSIBLE INTERVENTION N/A 2/27/2019    Procedure: Coronary Angiogram with Left ventriculogram;  Surgeon: Leandro Carpio MD;  Location:  [facility]    HERNIA REPAIR      Hernia Repair    HYSTERECTOMY, PAP NO LONGER INDICATED  1983    TVH/BSO    SURGICAL HISTORY OF -   10/08    A & P Repair, Dr. Hannah    ZZC APPENDECTOMY  1953       Family History:    Family History   Problem Relation Age of Onset    C.A.D. Mother     Diabetes Mother     Cancer - colorectal Mother     Arthritis Mother     Heart Disease Mother     Lipids Brother     Obesity Brother     Hypertension Brother     Allergies Son     Eye Disorder Son         cataract    Thyroid Disease Sister         graves dz    Eye Disorder Son     Leukemia Son     Alcohol/Drug Father        Social History:  Marital Status:   [5]  Social History     Tobacco Use    Smoking status: Never    Smokeless tobacco: Never   Vaping Use    Vaping status: Never Used   Substance Use Topics    Alcohol use: No    Drug use: No        Medications:    acetaminophen (TYLENOL) 500 MG tablet  amLODIPine (NORVASC) 5 MG tablet  bismuth subsalicylate (PEPTO BISMOL) 262 MG/15ML suspension  blood glucose (NO BRAND SPECIFIED) test strip  blood glucose monitoring (NO BRAND SPECIFIED) meter device kit  buPROPion (WELLBUTRIN XL)  150 MG 24 hr tablet  meloxicam (MOBIC) 7.5 MG tablet  Multiple Vitamins-Minerals (THERAPEUTIC MULTIVIT/MINERAL) TABS  nitroFURantoin macrocrystal-monohydrate (MACROBID) 100 MG capsule  olmesartan (BENICAR) 20 MG tablet  PARoxetine (PAXIL) 20 MG tablet  thin (NO BRAND SPECIFIED) lancets      Review of Systems  All other systems are reviewed and are negative    Physical Exam   BP: (!) 163/74  Pulse: 82  Temp: 98.1  F (36.7  C)  Resp: 16  SpO2: 98 %      Physical Exam    Nursing note and vitals were reviewed.  Constitutional: Awake and alert, adequately nourished and developed appearing 89-year-old in mild discomfort, who does not appear acutely ill, and who answers questions with difficulty and uncertain reliability and cooperates with examination.  HEENT: Atraumatic and normocephalic.  PERRL.  EOMI. no Osborn sign.  No raccoon eyes.  No CSF otorrhea or rhinorrhea.  Nose hematomas no depressed skull fractures  Neck: Patient is in a hard cervical collar.  Range of motion was not tested.  Cardiovascular: Cardiac examination reveals normal heart rate and regular rhythm without murmur.  Pulmonary/Chest: Breathing is unlabored.  Breath sounds are clear and equal bilaterally.  There no retractions, tachypnea, rales, wheezes, or rhonchi.  Chest wall is not tender to palpation.  Abdomen: Soft, nontender, no HSM or masses rebound or guarding.  Musculoskeletal: Extremities are warm and well-perfused.  She reports pain with flexion and internal and external rotation of the left hip that she feels in her lower back and in her left greater trochanter   Neurological: Alert, oriented x1, thought content logical, coherent.  Motor strength intact in the upper and lower extremities on manual muscle testing.  Motor tone normal.  Cranial nerve testing normal.  Skin: Warm, dry, no rashes.  Psychiatric: Affect broad and appropriate.    ED Course        Procedures              Critical Care time:  none         Results for orders placed or  performed during the hospital encounter of 06/08/25 (from the past 24 hours)   [place] Draw    Narrative    The following orders were created for panel order [place] Draw.  Procedure                               Abnormality         Status                     ---------                               -----------         ------                     Extra Blue Top Tube[[id number]]                             Final result                 Please view results for these tests on the individual orders.   CBC with Platelets & Differential    Narrative    The following orders were created for panel order CBC with Platelets & Differential.  Procedure                               Abnormality         Status                     ---------                               -----------         ------                     CBC with platelets and ...[[id number]]  Abnormal            Final result                 Please view results for these tests on the individual orders.   Comprehensive metabolic panel   Result Value Ref Range    Sodium 138 135 - 145 mmol/L    Potassium 3.7 3.4 - 5.3 mmol/L    Carbon Dioxide (CO2) 22 22 - 29 mmol/L    Anion Gap 16 (H) 7 - 15 mmol/L    Urea Nitrogen 28.9 (H) 8.0 - 23.0 mg/dL    Creatinine 1.10 (H) 0.51 - 0.95 mg/dL    GFR Estimate 47 (L) >60 mL/min/1.73m2    Calcium 9.8 8.8 - 10.4 mg/dL    Chloride 100 98 - 107 mmol/L    Glucose 122 (H) 70 - 99 mg/dL    Alkaline Phosphatase 87 40 - 150 U/L    AST 87 (H) 0 - 45 U/L    ALT 36 0 - 50 U/L    Protein Total 7.3 6.4 - 8.3 g/dL    Albumin 4.1 3.5 - 5.2 g/dL    Bilirubin Total 1.0 <=1.2 mg/dL   Extra Blue Top Tube   Result Value Ref Range    Hold Specimen [facility]    CBC with platelets and differential   Result Value Ref Range    WBC Count 20.2 (H) 4.0 - 11.0 10e3/uL    RBC Count 4.93 3.80 - 5.20 10e6/uL    Hemoglobin 14.2 11.7 - 15.7 g/dL    Hematocrit 42.2 35.0 - 47.0 %    MCV 86 78 - 100 fL    MCH 28.8 26.5 - 33.0 pg    MCHC 33.6 31.5 - 36.5 g/dL    RDW 12.3 10.0 -  15.0 %    Platelet Count 336 150 - 450 10e3/uL    % Neutrophils 88 %    % Lymphocytes 7 %    % Monocytes 4 %    % Eosinophils 0 %    % Basophils 0 %    % Immature Granulocytes 0 %    NRBCs per 100 WBC 0 <1 /100    Absolute Neutrophils 17.8 (H) 1.6 - 8.3 10e3/uL    Absolute Lymphocytes 1.5 0.8 - 5.3 10e3/uL    Absolute Monocytes 0.9 0.0 - 1.3 10e3/uL    Absolute Eosinophils 0.0 0.0 - 0.7 10e3/uL    Absolute Basophils 0.0 0.0 - 0.2 10e3/uL    Absolute Immature Granulocytes 0.1 <=0.4 10e3/uL    Absolute NRBCs 0.0 10e3/uL   CK total   Result Value Ref Range    CK 1,493 (HH) 26 - 192 U/L   Blood gas venous   Result Value Ref Range    pH Venous 7.39 7.32 - 7.43    pCO2 Venous 40 40 - 50 mm Hg    pO2 Venous 18 (L) 25 - 47 mm Hg    Bicarbonate Venous 24 21 - 28 mmol/L    Base Excess/Deficit Venous -1.0 -3.0 - 3.0 mmol/L    FIO2 0     Oxyhemoglobin Venous 26 (L) 70 - 75 %    O2 Sat, Venous 26.4 (L) 70.0 - 75.0 %    Narrative    In healthy individuals, oxyhemoglobin (O2Hb) and oxygen saturation (SO2) are approximately equal. In the presence of dyshemoglobins, oxyhemoglobin can be considerably lower than oxygen saturation.   EKG 12-lead, tracing only   Result Value Ref Range    Systolic Blood Pressure  mmHg    Diastolic Blood Pressure  mmHg    Ventricular Rate 78 BPM    Atrial Rate 78 BPM    AR Interval 150 ms    QRS Duration 86 ms     ms    QTc 481 ms    P Axis 62 degrees    R AXIS -20 degrees    T Axis 62 degrees    Interpretation ECG       Sinus rhythm with Premature supraventricular complexes  QTcB >= 480 msec  Abnormal ECG  When compared with ECG of 12-Feb-2025 15:45,  Premature supraventricular complexes are now Present     CT Chest Abdomen Pelvis w/o Contrast    Narrative    EXAM: CT CHEST ABDOMEN PELVIS W/O CONTRAST  LOCATION: [facility]  DATE: 6/8/2025    INDICATION: fall; chest pain  COMPARISON: None.  TECHNIQUE: CT scan of the chest, abdomen, and pelvis was performed without IV  contrast. Multiplanar reformats were obtained. Dose reduction techniques were used.   CONTRAST: None.    FINDINGS:   LUNGS AND PLEURA: Normal.    MEDIASTINUM/AXILLAE: Normal.    CORONARY ARTERY CALCIFICATION: Severe.    HEPATOBILIARY: Cholecystectomy.    PANCREAS: Unchanged 2.1 cm cystic lesion in the proximal body and 1.0 cm cystic lesion in the head (7/145, 153). No main duct dilation.    SPLEEN: Normal.    ADRENAL GLANDS: Normal.    KIDNEYS/BLADDER: Normal.    BOWEL: Diverticulosis of the colon. No acute inflammatory change. No obstruction. Large amount of accumulated stool in the rectum.    LYMPH NODES: Normal.    VASCULATURE: No abdominal aortic aneurysm. Moderate-severe atherosclerotic calcification of the aorta and iliofemoral arteries.    PELVIC ORGANS: Absent.    MUSCULOSKELETAL: Tiny fat-containing paraumbilical hernia. Mild body wall edema. T12 compression deformity, mildly increased compared to 1/31/2025.      Impression    IMPRESSION:  1.  No acute traumatic findings in the chest, abdomen, or pelvis.  2.  T12 compression deformity, mildly increased compared to 1/31/2025.  3.  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     CT Pelvis Bone wo Contrast    Narrative    EXAM: CT PELVIS BONE WO CONTRAST  LOCATION: [facility]  DATE: 6/8/2025    INDICATION: Fall; left hip pain  COMPARISON: None.  TECHNIQUE: CT scan of the pelvis was performed without IV contrast. Multiplanar reformats were obtained. Dose reduction techniques were used.  CONTRAST: None.    FINDINGS:    The left hip is negative for fracture or CT evidence of avascular necrosis. Degenerative change at the left hip joint.    The right hip is negative for fracture.    Degenerative change at the SI joints bilaterally. No evidence for pubic rami or sacral alar fracture.    No significant effusion on either side. No evidence for fluid collection external to the hip joint on either side. The interval  contents are negative for abnormal mass or free fluid.      Impression    IMPRESSION:  1.  Both hips are negative for fracture or CT evidence of avascular necrosis.  2.  Pelvis negative for fracture.  3.  Degenerative change at both hip joints, moderately severe.  4.  Degenerative change at the SI joints bilaterally.  5.  No significant effusion.  6.  Intrapelvic contents negative.   CT Lumbar Spine w/o Contrast    Narrative    EXAM: CT THORACIC SPINE W/O CONTRAST, CT LUMBAR SPINE W/O CONTRAST  LOCATION: [facility]  DATE: 6/8/2025    INDICATION: fall; back pain  COMPARISON: Lumbar spine CT 01/31/2025.  TECHNIQUE:  1) Routine CT Thoracic Spine without IV contrast. Multiplanar reformats. Dose reduction techniques were used.   2) Routine CT Lumbar Spine without IV contrast. Multiplanar reformats. Dose reduction techniques were used.     FINDINGS:    THORACIC SPINE CT:  VERTEBRA: Compression fracture of the T12 vertebral body, demonstrating increased height loss since the prior CT from 01/31/2025. This now demonstrates up to 60% height loss anteriorly and mild retropulsion of the posterior vertebral body. Remaining   thoracic vertebral body heights are maintained. Trace anterolisthesis of T2 on T3. No additional acute thoracic fracture identified.     CANAL/FORAMINA: No high-grade spinal canal or neural foraminal stenosis.    PARASPINAL: Please refer to separately dictated chest CT for soft tissue findings.    LUMBAR SPINE CT:  VERTEBRA: 5 lumbar type vertebral bodies. Grade 1 anterolisthesis of L4 on L5, similar to the prior exam. Lumbar vertebral body heights are maintained. No evidence of acute lumbar fracture.     CANAL/FORAMINA: Multilevel lumbar spondylosis, most pronounced at L4-L5, where there is moderate spinal canal stenosis and moderate bilateral neural foraminal stenosis.    PARASPINAL: Please refer to separately dictated abdomen pelvis CT for soft tissue findings.      Impression     IMPRESSION:  THORACIC SPINE CT:  1.  Compression fracture of the T12 vertebral body, demonstrating increased height loss since the prior CT from 01/31/2025.  2.  Otherwise no evidence of acute thoracic fracture.    LUMBAR SPINE CT:  1.  No evidence of acute lumbar fracture.  2.  Multilevel lumbar spondylosis, most pronounced at L4-L5.     CT Thoracic Spine w/o Contrast    Narrative    EXAM: CT THORACIC SPINE W/O CONTRAST, CT LUMBAR SPINE W/O CONTRAST  LOCATION: [facility]  DATE: 6/8/2025    INDICATION: fall; back pain  COMPARISON: Lumbar spine CT 01/31/2025.  TECHNIQUE:  1) Routine CT Thoracic Spine without IV contrast. Multiplanar reformats. Dose reduction techniques were used.   2) Routine CT Lumbar Spine without IV contrast. Multiplanar reformats. Dose reduction techniques were used.     FINDINGS:    THORACIC SPINE CT:  VERTEBRA: Compression fracture of the T12 vertebral body, demonstrating increased height loss since the prior CT from 01/31/2025. This now demonstrates up to 60% height loss anteriorly and mild retropulsion of the posterior vertebral body. Remaining   thoracic vertebral body heights are maintained. Trace anterolisthesis of T2 on T3. No additional acute thoracic fracture identified.     CANAL/FORAMINA: No high-grade spinal canal or neural foraminal stenosis.    PARASPINAL: Please refer to separately dictated chest CT for soft tissue findings.    LUMBAR SPINE CT:  VERTEBRA: 5 lumbar type vertebral bodies. Grade 1 anterolisthesis of L4 on L5, similar to the prior exam. Lumbar vertebral body heights are maintained. No evidence of acute lumbar fracture.     CANAL/FORAMINA: Multilevel lumbar spondylosis, most pronounced at L4-L5, where there is moderate spinal canal stenosis and moderate bilateral neural foraminal stenosis.    PARASPINAL: Please refer to separately dictated abdomen pelvis CT for soft tissue findings.      Impression    IMPRESSION:  THORACIC SPINE CT:  1.   Compression fracture of the T12 vertebral body, demonstrating increased height loss since the prior CT from 01/31/2025.  2.  Otherwise no evidence of acute thoracic fracture.    LUMBAR SPINE CT:  1.  No evidence of acute lumbar fracture.  2.  Multilevel lumbar spondylosis, most pronounced at L4-L5.     CT Cervical Spine w/o Contrast    Narrative    EXAM: CT HEAD W/O CONTRAST, CT CERVICAL SPINE W/O CONTRAST  LOCATION: [facility]  DATE: 6/8/2025    INDICATION: fall; headache  COMPARISON: Head and cervical spine CT 08/15/2023.  TECHNIQUE:   1) Routine CT Head without IV contrast. Multiplanar reformats. Dose reduction techniques were used.  2) Routine CT Cervical Spine without IV contrast. Multiplanar reformats. Dose reduction techniques were used.    FINDINGS:   HEAD CT:   INTRACRANIAL CONTENTS: No intracranial hemorrhage, extraaxial collection, or mass effect.  No CT evidence of acute infarct. Mild presumed chronic small vessel ischemic changes. Mild generalized volume loss. No hydrocephalus.     VISUALIZED ORBITS/SINUSES/MASTOIDS: Prior bilateral cataract surgery. Visualized portions of the orbits are otherwise unremarkable. Secretions in the right sphenoid sinus. No middle ear or mastoid effusion.    BONES/SOFT TISSUES: Right lateral scalp swelling. No skull fracture.    CERVICAL SPINE CT:   VERTEBRA: Mild anterolisthesis of C4 on C5. Cervical vertebral body heights are maintained. No evidence of acute cervical fracture.     CANAL/FORAMINA: Multilevel cervical spondylosis, without high-grade spinal canal or neural foraminal stenosis.    PARASPINAL: Sclerosis of the cervical carotid arteries. Subcentimeter thyroid nodules.       Impression    IMPRESSION:  HEAD CT:  1.  No acute intracranial findings.  2.  Mild age-related changes.  3.  Right lateral scalp swelling.    CERVICAL SPINE CT:  1.  No evidence of acute cervical fracture.  2.  Multilevel cervical spondylosis.   Head CT w/o  contrast    Narrative    EXAM: CT HEAD W/O CONTRAST, CT CERVICAL SPINE W/O CONTRAST  LOCATION: [facility]  DATE: 6/8/2025    INDICATION: fall; headache  COMPARISON: Head and cervical spine CT 08/15/2023.  TECHNIQUE:   1) Routine CT Head without IV contrast. Multiplanar reformats. Dose reduction techniques were used.  2) Routine CT Cervical Spine without IV contrast. Multiplanar reformats. Dose reduction techniques were used.    FINDINGS:   HEAD CT:   INTRACRANIAL CONTENTS: No intracranial hemorrhage, extraaxial collection, or mass effect.  No CT evidence of acute infarct. Mild presumed chronic small vessel ischemic changes. Mild generalized volume loss. No hydrocephalus.     VISUALIZED ORBITS/SINUSES/MASTOIDS: Prior bilateral cataract surgery. Visualized portions of the orbits are otherwise unremarkable. Secretions in the right sphenoid sinus. No middle ear or mastoid effusion.    BONES/SOFT TISSUES: Right lateral scalp swelling. No skull fracture.    CERVICAL SPINE CT:   VERTEBRA: Mild anterolisthesis of C4 on C5. Cervical vertebral body heights are maintained. No evidence of acute cervical fracture.     CANAL/FORAMINA: Multilevel cervical spondylosis, without high-grade spinal canal or neural foraminal stenosis.    PARASPINAL: Sclerosis of the cervical carotid arteries. Subcentimeter thyroid nodules.       Impression    IMPRESSION:  HEAD CT:  1.  No acute intracranial findings.  2.  Mild age-related changes.  3.  Right lateral scalp swelling.    CERVICAL SPINE CT:  1.  No evidence of acute cervical fracture.  2.  Multilevel cervical spondylosis.   Urine Macroscopic with reflex to Microscopic   Result Value Ref Range    Color Urine Yellow Colorless, Straw, Light Yellow, Yellow    Appearance Urine Slightly Cloudy (A) Clear    Glucose Urine Negative Negative mg/dL    Bilirubin Urine Negative Negative    Ketones Urine 20 (A) Negative mg/dL    Specific Gravity Urine 1.018 1.003 - 1.035     Blood Urine Trace (A) Negative    pH Urine 6.5 5.0 - 7.0    Protein Albumin Urine 10 (A) Negative mg/dL    Urobilinogen Urine Normal Normal mg/dL    Nitrite Urine Negative Negative    Leukocyte Esterase Urine Large (A) Negative    Bacteria Urine Few (A) None Seen /HPF    RBC Urine 1 <=2 /HPF    WBC Urine 168 (H) <=5 /HPF     *Note: Due to a large number of results and/or encounters for the requested time period, some results have not been displayed. A complete set of results can be found in Results Review.       Medications   cefTRIAXone (ROCEPHIN) 1 g vial to attach to  mL bag for ADULTS or NS 50 mL bag for PEDS (has no administration in time range)   senna-docusate (SENOKOT-S/PERICOLACE) 8.6-50 MG per tablet 1 tablet (has no administration in time range)     Or   senna-docusate (SENOKOT-S/PERICOLACE) 8.6-50 MG per tablet 2 tablet (has no administration in time range)   ondansetron (ZOFRAN ODT) ODT tab 4 mg (has no administration in time range)     Or   ondansetron (ZOFRAN) injection 4 mg (has no administration in time range)   prochlorperazine (COMPAZINE) injection 5 mg (has no administration in time range)     Or   prochlorperazine (COMPAZINE) tablet 5 mg (has no administration in time range)   enoxaparin ANTICOAGULANT (LOVENOX) injection 40 mg (has no administration in time range)   hydrALAZINE (APRESOLINE) tablet 10 mg (has no administration in time range)     Or   hydrALAZINE (APRESOLINE) injection 10 mg (has no administration in time range)   acetaminophen (TYLENOL) tablet 650 mg (has no administration in time range)     Or   acetaminophen (TYLENOL) Suppository 650 mg (has no administration in time range)   melatonin tablet 1 mg (has no administration in time range)   cefTRIAXone (ROCEPHIN) 1 g vial to attach to  mL bag for ADULTS or NS 50 mL bag for PEDS (has no administration in time range)   sodium chloride 0.9 % infusion (has no administration in time range)       Assessments & Plan (with  "Medical Decision Making)     89-year-old female brought in by EMS after someone asked for a welfare check and she was found in her home with her head in the bathtub and her legs hanging out.  She is not able to provide any useful history and does not recall these events and denies any fall or injury.  She was complaining of pain in the base of her skull in the midline.  She was in a hard cervical collar on arrival and this was left in place until imaging was obtained.  She had no focal neurologic signs or symptoms.  She had pain with range of motion of the left hip and pain that she felt in her upper lumbar spine with hip motion.  Did not have other obvious signs of trauma.  However given the evidence of trauma she underwent an expanded evaluation.  She did complain of left parasternal pain as well.  A CT scan of the head and CT scans of the spine showed a new T12 compression fracture and a scalp swelling on the right side consistent with recent contusion but no other more ominous findings.  CK was elevated at 1400.  Likely this was due to having laying in this position for many hours.  A catheterized urine specimen showed significant leukocyte esterase and white blood cells.  Culture is pending.  Antibiotic therapy was initiated.  She was in the hospital overnight June 2 to 3 for weakness and \"UTI.\"  However urine culture grew mixed bacterial melvi.  She was discharged with treatment with Macrodantin.  It is uncertain if she is taken this because she does not know clearly does not have the cognitive ability to self manage.  She has no supports and is unsafe living independently.  She seems to understand this and has been trying to get into the [place].  Blood chemistries are reassuring except for anion gap of 16 with normal bicarb and BUN of 28 likely due to dehydration from not eating or drinking during the time that she was lying in the bathtub.  Venous blood gas is normal.  Hemoglobin is normal white blood cell " count is elevated 20,000.  I suspect this is due to stress demargination.  Could be due to UTI.  She does not however have a fever or significant acidosis and has normal blood pressure and pulse with no signs of sepsis.  CT of the chest abdomen pelvis did not show a source for her fever.  Also did not show evidence of a rib fracture or other traumatic injury. she will require hospitalization for management of her rhabdomyolysis, possibly UTI, closed head injury, T12 compression fracture.  I discussed her case with Mc Valdes MD, the hospitalist service and they will assume care on admission.    I have reviewed the nursing notes.    I have reviewed the findings, diagnosis, plan and need for follow up with the patient.           Medical Decision Making  The patient's presentation was of high complexity (an acute health issue posing potential threat to life or bodily function).    The patient's evaluation involved:  review of 2 test result(s) ordered prior to this encounter (urine analysis and culture from June 2)  ordering and/or review of 3+ test(s) in this encounter (see separate area of note for details)  discussion of management or test interpretation with another health professional (see separate area of note for details)    The patient's management necessitated high risk (a decision regarding hospitalization).        New Prescriptions    No medications on file       Final diagnoses:   Fall, initial encounter   Closed head injury, initial encounter   Pyuria   Non-traumatic rhabdomyolysis   T12 compression fracture, initial encounter (H)       6/8/2025   [facility] EMERGENCY DEPT       Adan Pop MD  06/08/25 3062

## 2025-06-09 LAB
ADV 40+41 DNA STL QL NAA+NON-PROBE: NEGATIVE
ALBUMIN SERPL BCG-MCNC: 3.2 G/DL (ref 3.5–5.2)
ALP SERPL-CCNC: 74 U/L (ref 40–150)
ALT SERPL W P-5'-P-CCNC: 28 U/L (ref 0–50)
ANION GAP SERPL CALCULATED.3IONS-SCNC: 16 MMOL/L (ref 7–15)
AST SERPL W P-5'-P-CCNC: 51 U/L (ref 0–45)
ASTRO TYP 1-8 RNA STL QL NAA+NON-PROBE: NEGATIVE
BILIRUB SERPL-MCNC: 0.5 MG/DL
BUN SERPL-MCNC: 35.6 MG/DL (ref 8–23)
C CAYETANENSIS DNA STL QL NAA+NON-PROBE: NEGATIVE
C DIFF TOX B STL QL: NEGATIVE
CALCIUM SERPL-MCNC: 8.3 MG/DL (ref 8.8–10.4)
CAMPYLOBACTER DNA SPEC NAA+PROBE: NEGATIVE
CHLORIDE SERPL-SCNC: 104 MMOL/L (ref 98–107)
CK SERPL-CCNC: 612 U/L (ref 26–192)
CREAT SERPL-MCNC: 1.16 MG/DL (ref 0.51–0.95)
CRYPTOSP DNA STL QL NAA+NON-PROBE: NEGATIVE
E COLI O157 DNA STL QL NAA+NON-PROBE: NORMAL
E HISTOLYT DNA STL QL NAA+NON-PROBE: NEGATIVE
EAEC ASTA GENE ISLT QL NAA+PROBE: NEGATIVE
EC STX1+STX2 GENES STL QL NAA+NON-PROBE: NEGATIVE
EGFRCR SERPLBLD CKD-EPI 2021: 44 ML/MIN/1.73M2
EPEC EAE GENE STL QL NAA+NON-PROBE: NEGATIVE
ERYTHROCYTE [DISTWIDTH] IN BLOOD BY AUTOMATED COUNT: 12.6 % (ref 10–15)
ETEC LTA+ST1A+ST1B TOX ST NAA+NON-PROBE: NEGATIVE
G LAMBLIA DNA STL QL NAA+NON-PROBE: NEGATIVE
GLUCOSE SERPL-MCNC: 98 MG/DL (ref 70–99)
HCO3 SERPL-SCNC: 16 MMOL/L (ref 22–29)
HCT VFR BLD AUTO: 31.7 % (ref 35–47)
HGB BLD-MCNC: 10.8 G/DL (ref 11.7–15.7)
MCH RBC QN AUTO: 29.3 PG (ref 26.5–33)
MCHC RBC AUTO-ENTMCNC: 34.1 G/DL (ref 31.5–36.5)
MCV RBC AUTO: 86 FL (ref 78–100)
NOROVIRUS GI+II RNA STL QL NAA+NON-PROBE: NEGATIVE
P SHIGELLOIDES DNA STL QL NAA+NON-PROBE: NEGATIVE
PLATELET # BLD AUTO: 266 10E3/UL (ref 150–450)
POTASSIUM SERPL-SCNC: 3.3 MMOL/L (ref 3.4–5.3)
POTASSIUM SERPL-SCNC: 3.8 MMOL/L (ref 3.4–5.3)
PROT SERPL-MCNC: 5.5 G/DL (ref 6.4–8.3)
RBC # BLD AUTO: 3.68 10E6/UL (ref 3.8–5.2)
RVA RNA STL QL NAA+NON-PROBE: NEGATIVE
SALMONELLA SP RPOD STL QL NAA+PROBE: NEGATIVE
SAPO I+II+IV+V RNA STL QL NAA+NON-PROBE: NEGATIVE
SHIGELLA SP+EIEC IPAH ST NAA+NON-PROBE: NEGATIVE
SODIUM SERPL-SCNC: 136 MMOL/L (ref 135–145)
V CHOLERAE DNA SPEC QL NAA+PROBE: NEGATIVE
VIBRIO DNA SPEC NAA+PROBE: NEGATIVE
WBC # BLD AUTO: 17.1 10E3/UL (ref 4–11)
Y ENTEROCOL DNA STL QL NAA+PROBE: NEGATIVE

## 2025-06-09 PROCEDURE — 97530 THERAPEUTIC ACTIVITIES: CPT | Mod: GP

## 2025-06-09 PROCEDURE — G0378 HOSPITAL OBSERVATION PER HR: HCPCS

## 2025-06-09 PROCEDURE — 250N000013 HC RX MED GY IP 250 OP 250 PS 637: Performed by: STUDENT IN AN ORGANIZED HEALTH CARE EDUCATION/TRAINING PROGRAM

## 2025-06-09 PROCEDURE — 96376 TX/PRO/DX INJ SAME DRUG ADON: CPT

## 2025-06-09 PROCEDURE — 96372 THER/PROPH/DIAG INJ SC/IM: CPT | Performed by: STUDENT IN AN ORGANIZED HEALTH CARE EDUCATION/TRAINING PROGRAM

## 2025-06-09 PROCEDURE — 97535 SELF CARE MNGMENT TRAINING: CPT | Mod: GO

## 2025-06-09 PROCEDURE — 250N000013 HC RX MED GY IP 250 OP 250 PS 637: Performed by: INTERNAL MEDICINE

## 2025-06-09 PROCEDURE — 82550 ASSAY OF CK (CPK): CPT | Performed by: STUDENT IN AN ORGANIZED HEALTH CARE EDUCATION/TRAINING PROGRAM

## 2025-06-09 PROCEDURE — 97165 OT EVAL LOW COMPLEX 30 MIN: CPT | Mod: GO

## 2025-06-09 PROCEDURE — 250N000011 HC RX IP 250 OP 636: Performed by: FAMILY MEDICINE

## 2025-06-09 PROCEDURE — 84295 ASSAY OF SERUM SODIUM: CPT | Performed by: STUDENT IN AN ORGANIZED HEALTH CARE EDUCATION/TRAINING PROGRAM

## 2025-06-09 PROCEDURE — 36415 COLL VENOUS BLD VENIPUNCTURE: CPT | Performed by: STUDENT IN AN ORGANIZED HEALTH CARE EDUCATION/TRAINING PROGRAM

## 2025-06-09 PROCEDURE — 97161 PT EVAL LOW COMPLEX 20 MIN: CPT | Mod: GP

## 2025-06-09 PROCEDURE — 87493 C DIFF AMPLIFIED PROBE: CPT | Performed by: INTERNAL MEDICINE

## 2025-06-09 PROCEDURE — 99232 SBSQ HOSP IP/OBS MODERATE 35: CPT | Performed by: INTERNAL MEDICINE

## 2025-06-09 PROCEDURE — 96361 HYDRATE IV INFUSION ADD-ON: CPT

## 2025-06-09 PROCEDURE — 36415 COLL VENOUS BLD VENIPUNCTURE: CPT | Performed by: INTERNAL MEDICINE

## 2025-06-09 PROCEDURE — 87507 IADNA-DNA/RNA PROBE TQ 12-25: CPT | Performed by: INTERNAL MEDICINE

## 2025-06-09 PROCEDURE — 99207 PR NO BILLABLE SERVICE THIS VISIT: CPT | Performed by: STUDENT IN AN ORGANIZED HEALTH CARE EDUCATION/TRAINING PROGRAM

## 2025-06-09 PROCEDURE — 85041 AUTOMATED RBC COUNT: CPT | Performed by: STUDENT IN AN ORGANIZED HEALTH CARE EDUCATION/TRAINING PROGRAM

## 2025-06-09 PROCEDURE — 84132 ASSAY OF SERUM POTASSIUM: CPT | Performed by: INTERNAL MEDICINE

## 2025-06-09 PROCEDURE — 250N000011 HC RX IP 250 OP 636: Performed by: STUDENT IN AN ORGANIZED HEALTH CARE EDUCATION/TRAINING PROGRAM

## 2025-06-09 RX ORDER — LOPERAMIDE HYDROCHLORIDE 2 MG/1
2 CAPSULE ORAL 4 TIMES DAILY PRN
Status: DISCONTINUED | OUTPATIENT
Start: 2025-06-09 | End: 2025-06-10 | Stop reason: HOSPADM

## 2025-06-09 RX ORDER — NALOXONE HYDROCHLORIDE 0.4 MG/ML
0.2 INJECTION, SOLUTION INTRAMUSCULAR; INTRAVENOUS; SUBCUTANEOUS
Status: DISCONTINUED | OUTPATIENT
Start: 2025-06-09 | End: 2025-06-10 | Stop reason: HOSPADM

## 2025-06-09 RX ORDER — POTASSIUM CHLORIDE 1500 MG/1
40 TABLET, EXTENDED RELEASE ORAL ONCE
Status: COMPLETED | OUTPATIENT
Start: 2025-06-09 | End: 2025-06-09

## 2025-06-09 RX ORDER — NALOXONE HYDROCHLORIDE 0.4 MG/ML
0.4 INJECTION, SOLUTION INTRAMUSCULAR; INTRAVENOUS; SUBCUTANEOUS
Status: DISCONTINUED | OUTPATIENT
Start: 2025-06-09 | End: 2025-06-10 | Stop reason: HOSPADM

## 2025-06-09 RX ADMIN — ACETAMINOPHEN 325 MG: 325 TABLET ORAL at 08:03

## 2025-06-09 RX ADMIN — ACETAMINOPHEN 325 MG: 325 TABLET ORAL at 17:00

## 2025-06-09 RX ADMIN — ACETAMINOPHEN 650 MG: 325 TABLET ORAL at 03:07

## 2025-06-09 RX ADMIN — CEFTRIAXONE SODIUM 1 G: 1 INJECTION, POWDER, FOR SOLUTION INTRAMUSCULAR; INTRAVENOUS at 14:55

## 2025-06-09 RX ADMIN — MELOXICAM 7.5 MG: 7.5 TABLET ORAL at 08:03

## 2025-06-09 RX ADMIN — Medication 1 MG: at 21:39

## 2025-06-09 RX ADMIN — AMLODIPINE BESYLATE 5 MG: 5 TABLET ORAL at 08:03

## 2025-06-09 RX ADMIN — ACETAMINOPHEN 650 MG: 325 TABLET ORAL at 21:38

## 2025-06-09 RX ADMIN — ENOXAPARIN SODIUM 40 MG: 40 INJECTION SUBCUTANEOUS at 14:55

## 2025-06-09 RX ADMIN — LOSARTAN POTASSIUM 50 MG: 50 TABLET, FILM COATED ORAL at 08:03

## 2025-06-09 RX ADMIN — POTASSIUM CHLORIDE 40 MEQ: 1500 TABLET, EXTENDED RELEASE ORAL at 12:57

## 2025-06-09 RX ADMIN — ACETAMINOPHEN 325 MG: 325 TABLET ORAL at 11:42

## 2025-06-09 RX ADMIN — BUPROPION HYDROCHLORIDE 150 MG: 150 TABLET, EXTENDED RELEASE ORAL at 08:03

## 2025-06-09 ASSESSMENT — ACTIVITIES OF DAILY LIVING (ADL)
ADLS_ACUITY_SCORE: 78
ADLS_ACUITY_SCORE: 75
ADLS_ACUITY_SCORE: 78
ADLS_ACUITY_SCORE: 78
ADLS_ACUITY_SCORE: 73
ADLS_ACUITY_SCORE: 78
ADLS_ACUITY_SCORE: 73
ADLS_ACUITY_SCORE: 73
ADLS_ACUITY_SCORE: 75
ADLS_ACUITY_SCORE: 73
DEPENDENT_IADLS:: TRANSPORTATION;MEAL PREPARATION
ADLS_ACUITY_SCORE: 78
ADLS_ACUITY_SCORE: 73
ADLS_ACUITY_SCORE: 78
ADLS_ACUITY_SCORE: 73
ADLS_ACUITY_SCORE: 73
ADLS_ACUITY_SCORE: 78
ADLS_ACUITY_SCORE: 78
ADLS_ACUITY_SCORE: 73
ADLS_ACUITY_SCORE: 78
ADLS_ACUITY_SCORE: 78
ADLS_ACUITY_SCORE: 73
ADLS_ACUITY_SCORE: 78
ADLS_ACUITY_SCORE: 78

## 2025-06-09 NOTE — CONSULTS
Care Management Initial Consult    General Information  Assessment completed with: Patient   Type of CM/SW Visit: Initial Assessment    Primary Care Provider verified and updated as needed: Yes   Readmission within the last 30 days: unable to assess      Reason for Consult: discharge planning  Advance Care Planning: Advance Care Planning Reviewed: present on chart        Communication Assessment  Patient's communication style: spoken language (English or Bilingual)        Cognitive  Cognitive/Neuro/Behavioral: .WDL except, orientation  Level of Consciousness: intermittent confusion  Arousal Level: opens eyes spontaneously  Orientation: situation, time             Living Environment:   People in home: alone     Current living Arrangements: house      Able to return to prior arrangements: yes     Family/Social Support:  Care provided by: self  Provides care for: no one  Marital Status:   Support system: Children, Friend          Description of Support System: Supportive, Involved    Support Assessment: Adequate family and caregiver support, Adequate social supports    Current Resources:   Patient receiving home care services: Yes  Skilled Home Care Services: Skilled Nursing     Community Resources: None  Equipment currently used at home: cane, straight, walker, rolling, shower chair  Supplies currently used at home:      Employment/Financial:  Employment Status: retired        Financial Concerns: none           Does the patient's insurance plan have a 3 day qualifying hospital stay waiver?  Yes     Which insurance plan 3 day waiver is available? Alternative insurance waiver    Will the waiver be used for post-acute placement? Yes    Lifestyle & Psychosocial Needs:  Social Drivers of Health     Food Insecurity: Low Risk  (6/2/2025)    Food Insecurity     Within the past 12 months, did you worry that your food would run out before you got money to buy more?: No     Within the past 12 months, did the food you  bought just not last and you didn t have money to get more?: No   Depression: At risk (5/22/2025)    PHQ-2     PHQ-2 Score: 4   Housing Stability: Low Risk  (6/2/2025)    Housing Stability     Do you have housing? : Yes     Are you worried about losing your housing?: No   Tobacco Use: Low Risk  (5/22/2025)    Patient History     Smoking Tobacco Use: Never     Smokeless Tobacco Use: Never     Passive Exposure: Not on file   Financial Resource Strain: Low Risk  (6/2/2025)    Financial Resource Strain     Within the past 12 months, have you or your family members you live with been unable to get utilities (heat, electricity) when it was really needed?: No   Alcohol Use: Not on file   Transportation Needs: Low Risk  (6/2/2025)    Transportation Needs     Within the past 12 months, has lack of transportation kept you from medical appointments, getting your medicines, non-medical meetings or appointments, work, or from getting things that you need?: No   Physical Activity: Inactive (2/13/2025)    Exercise Vital Sign     Days of Exercise per Week: 0 days     Minutes of Exercise per Session: 0 min   Interpersonal Safety: Low Risk  (6/2/2025)    Interpersonal Safety     Do you feel physically and emotionally safe where you currently live?: Yes     Within the past 12 months, have you been hit, slapped, kicked or otherwise physically hurt by someone?: No     Within the past 12 months, have you been humiliated or emotionally abused in other ways by your partner or ex-partner?: No   Stress: No Stress Concern Present (10/24/2024)    [employer] - Occupational Stress Questionnaire     Feeling of Stress : Only a little   Social Connections: Unknown (10/24/2024)    Social Connection and Isolation Panel [NHANES]     Frequency of Communication with Friends and Family: Not on file     Frequency of Social Gatherings with Friends and Family: Never     Attends Anabaptism Services: Not on file     Active Member  of Clubs or Organizations: Not on file     Attends Club or Organization Meetings: Not on file     Marital Status: Not on file   Health Literacy: Not on file     Functional Status:  Prior to admission patient needed assistance:   Dependent ADLs:: Ambulation-walker  Dependent IADLs:: Transportation, Meal Preparation     Mental Health Status:  Mental Health Status: Current Concern  Mental Health Management: Medication    Chemical Dependency Status:  Chemical Dependency Status: No Current Concerns           Values/Beliefs:  Spiritual, Cultural Beliefs, Confucianism Practices, Values that affect care: no             Discussed  Partnership in Safe Discharge Planning  document with patient/family: No      Additional Information:  Care Management received referral to assist with discharge planning. CM met with patient at bedside to complete initial assessment. CM also spoke with patient's son Wally via phone.     Patient lives in a home in [place] alone. Patient is current with [facility] Phone: [phone] Fax: [phone] RN at home. Per patient, she is independent with ADLs using walker at home. Patient states doing IADLs is very difficult for her and she does not eat much because she is too weak to cook. CM discussed TCU and future longterm placement. Patient is in agreement at this time.     CM also spoke with patient's son Wally. Per Wally, he has tried to convince patient for years to move to longterm or into a family members home including his own and patient has declined. Wally is currently in [place] so he cannot assist patient and states he is frustrated with his mother for not accepting help.     CM sent TCU referrals to the below:  Destination    Coordination complete.  Service Provider Request Status Services Address Phone Fax Patient Preferred   [facility] ()  Selected Skilled Nursing [address] [address] [phone] --   Current Capacity last updated by  "Ángel Gutiérrez RN on 3/7/2025  7:51 AM    Admissions [place] ([facility]) [phone]; \"No contract with [facility]\".            [facility] - REFERRAL ONLY (SNF) Declined  Memory Care, No safe post-TCU discharge plan (return to home alone?) -- [id number] [facility] [id number] [phone]    Current Capacity last updated by Betty Ndiaye MA on 12/9/2024 10:43 AM    Admissions ph. [phone].            [facility] (NF) Declined  Alternative agency selected -- [address] [facility] [id number] [phone] --   Current Capacity last updated by Ángel Gutiérrez RN on 12/6/2024  2:34 PM    [facility] Admissions - [phone]            [facility](TC) Declined  Alternative agency selected -- [address] [facility] [id number] [phone] --   Current Capacity last updated by Noa Stearns MA on 5/22/2025  7:54 AM    Always send full referral            Home Medical Care    Service Provider Request Status Services Address Phone Fax Patient Preferred   [facility] (HH) Pending - No Request Sent -- [facility] [id number] [phone] --   Current Capacity last updated by Ángel Gutiérrez RN on 1/10/2025 11:40 AM    See also: [facility]               Patient selected [facility] Phone (Main Phone:[phone] Admissions Phone:[phone] Fax: [phone]) TCU for likely admit tomorrow.     CM started [employer] prior auth with [employer] - Case# [id number].      CM arranged wheelchair ride for tomorrow 6/10/25 12:25-1:10 PM. Patient is aware of private pay costs associated with transportation.       Deepti Zayas [facility]  Inpatient Care Coordinator   [facility] [phone]  [facility] [phone]   "

## 2025-06-09 NOTE — PROGRESS NOTES
[facility]    Medicine Progress Note - Hospitalist Service      Daniela Brown is a 89F presented after being found down in bathtub during welfare check; pmhx includes generalised weakness, MDD/ARABELLA, neuropathy, chronic lower back pain with sciatica, CAD, HTN/HLD, DM2, CKD3a; admitted to observation for failure to thrive, generalised weakness, and T12 compression fracture.     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 difficile    Plan she needs placement into a safe environment some type of [facility] care will need to review with family she is a bit confused to make any decisions on her own prior outpatient medications are being continued we will check C. difficile for her loose stools-         Observation Goals: -diagnostic tests and consults completed and resulted, -vital signs normal or at patient baseline, Nurse to notify provider when observation goals have been met and patient is ready for discharge.  Diet: Regular Diet Adult    DVT Prophylaxis:   [place] Catheter: Not present  Lines: None     Cardiac Monitoring: None  Code Status: No CPR- Do NOT Intubate      Clinically Significant Risk Factors Present on Admission        # Hypokalemia: Lowest K = 3.3 mmol/L in last 2 days, will replace as needed    # Hypocalcemia: Lowest Ca = 8.3 mg/dL in last 2 days, will monitor and replace as appropriate     # Hypoalbuminemia: Lowest albumin = 3.2 g/dL at 6/9/2025  5:15 AM, will monitor as appropriate     # Hypertension: Noted on problem list      # Anemia: based on hgb <11           # Financial/Environmental Concerns: none         Social Drivers of Health    Depression: At risk (5/22/2025)    PHQ-2     PHQ-2 Score: 4   Physical Activity: Inactive (2/13/2025)     Exercise Vital Sign     Days of Exercise per Week: 0 days     Minutes of Exercise per Session: 0 min   Social Connections: Unknown (10/24/2024)    Social Connection and Isolation Panel [NHANES]     Frequency of Social Gatherings with Friends and Family: Never          Disposition Plan     Medically Ready for Discharge: Anticipated Tomorrow             Axel Soriano MD  Hospitalist Service  [facility]  Securely message with [employer] (more info)  Text page via [employer] Paging/Directory   ______________________________________________________________________    Interval History   {Pertinent overnight events  ; no change confused lots of aches and pains noted    Physical Exam   Vital Signs: Temp: 98.1  F (36.7  C) Temp src: Axillary BP: (!) 138/91 Pulse: 86   Resp: 18 SpO2: 98 % O2 Device: None (Room air)    Weight: 0 lbs 0 oz    ----------------------------------------------------------------------------------------    Medical Decision Making       35 MINUTES SPENT BY ME on the date of service doing chart review, history, exam, documentation & further activities per the note.      Data     I have personally reviewed the following data over the past 24 hrs:    17.1 (H)  \   10.8 (L)   / 266     136 104 35.6 (H) /  98   3.3 (L) 16 (L) 1.16 (H) \     ALT: 28 AST: 51 (H) AP: 74 TBILI: 0.5   ALB: 3.2 (L) TOT PROTEIN: 5.5 (L) LIPASE: N/A       Imaging results reviewed over the past 24 hrs:   No results found for this or any previous visit (from the past 24 hours).

## 2025-06-09 NOTE — PROGRESS NOTES
06/09/25 0852   Appointment Info   Signing Clinician's Name / Credentials (PT) Nohemy Sosa, PT   Quick Adds   Quick Adds Certification   Living Environment   People in Home alone   Current Living Arrangements house   Home Accessibility no concerns   Living Environment Comments ramp to enter, no stairs inside home   Self-Care   Equipment Currently Used at Home cane, straight;walker, rolling;shower chair   Fall history within last six months yes   Number of times patient has fallen within last six months 2   Activity/Exercise/Self-Care Comment indep with mobility with 2WW, ADL's.   General Information   Onset of Illness/Injury or Date of Surgery 06/08/25   Referring Physician Prudencio Short MD   Patient/Family Therapy Goals Statement (PT) agreeable to PT, states she feels very weak   Pertinent History of Current Problem (include personal factors and/or comorbidities that impact the POC) Daniela Brown is a 89F presented after being found down in bathtub during welfare check; pmhx includes generalised weakness, MDD/ARABELLA, neuropathy, chronic lower back pain with sciatica, CAD, HTN/HLD, DM2, CKD3a; admitted to observation for failure to thrive, generalised weakness, and T12 compression fracture.   Existing Precautions/Restrictions fall   Cognition   Follows Commands (Cognition) WFL   Pain Assessment   Patient Currently in Pain No  (no pain complaints/behaviors during session)   Range of Motion (ROM)   Range of Motion ROM is WFL   Strength (Manual Muscle Testing)   Strength (Manual Muscle Testing) Deficits observed during functional mobility   Strength Comments general LE weakness from reduced mobility   Bed Mobility   Comment, (Bed Mobility) supine>sit with SBA with HOB elevated   Transfers   Comment, (Transfers) sit>stand from EOB with 2WW and CGA   Gait/Stairs (Locomotion)   Comment, (Gait/Stairs) amb few steps to chair with 2WW and CGA, fatigued with short distance   Balance   Balance Comments hx of 2 falls    Clinical Impression   Criteria for Skilled Therapeutic Intervention Yes, treatment indicated   PT Diagnosis (PT) impaired functional mobility   Influenced by the following impairments LE weakness, reduced activity tolerance, impaired balance   Functional limitations due to impairments impaired bed mobility, transfers, gait   Clinical Presentation (PT Evaluation Complexity) stable   Clinical Presentation Rationale clinical reasoning   Clinical Decision Making (Complexity) low complexity   Planned Therapy Interventions (PT) balance training;bed mobility training;gait training;home exercise program;patient/family education;strengthening;transfer training;progressive activity/exercise;risk factor education;home program guidelines   Risk & Benefits of therapy have been explained evaluation/treatment results reviewed;care plan/treatment goals reviewed;risks/benefits reviewed;current/potential barriers reviewed;participants voiced agreement with care plan;participants included;patient   PT Total Evaluation Time   PT Eval, Low Complexity Minutes (59449) 13   Therapy Certification   Start of care date 06/09/25   Certification date from 06/09/25   Certification date to 06/16/25   Medical Diagnosis fall   Physical Therapy Goals   PT Frequency 5x/week   PT Predicted Duration/Target Date for Goal Attainment 06/16/25   PT Goals Bed Mobility;Transfers;Gait   PT: Bed Mobility Independent;Supine to/from sit   PT: Transfers Supervision/stand-by assist;Bed to/from chair;Assistive device   PT: Gait Supervision/stand-by assist;Standard walker;100 feet   Interventions   Interventions Quick Adds Therapeutic Activity   Therapeutic Activity   Therapeutic Activities: dynamic activities to improve functional performance Minutes (64744) 10   Symptoms Noted During/After Treatment Fatigue   Treatment Detail/Skilled Intervention edu on role of IP PT to address safe mobility and assist in safe discharge plan. cues for sequencing of bed mobility  to reduce assist, needs rest break with sitting at EOB due to fatigue and weakness. cues for hand placement with sit>stand from EOB, amb few steps to chair. pt declines further activity due to fatigue and weakness. discussed rec for TCU given this weakness, falls, and recent admission. pt appears in agreement, asking when her weakness will get better, edu on importance of increased activity as she tolerates. remains in chair, chair alarm on and call light in reach.   PT Discharge Planning   PT Plan Mon 1/5; transfers, gait with 2WW, LE strengthening   PT Discharge Recommendation (DC Rec) Transitional Care Facility   PT Rationale for DC Rec rec TCU as pt requires Ax1 for pivot transfer to chair, reports is too fatigued to walk further, unsafe to return home due to falls and lives alone   PT Brief overview of current status supine>sit with SBA, sit>stand with CGA, amb few steps to chair with 2WW and CGA   PT Total Distance Amb During Session (feet) 3   PT Equipment Needed at Discharge   (has all equipment)   Physical Therapy Time and Intention   Timed Code Treatment Minutes 10   Total Session Time (sum of timed and untimed services) 23     [facility]                                                                                   OUTPATIENT PHYSICAL THERAPY    PLAN OF TREATMENT FOR OUTPATIENT REHABILITATION   Patient's Last Name, First Name, Daniela Hairston YOB: 1933   Provider's Name   [facility]   Medical Record No.  [id number]     Onset Date: 06/08/25 Start of Care Date: 06/09/25     Medical Diagnosis:  fall               PT Diagnosis:  impaired functional mobility Certification Dates:  From: 06/09/25  To: 06/16/25       See note for plan of treatment, functional goals, and certification details.    I CERTIFY THE NEED FOR THESE SERVICES FURNISHED UNDER        THIS PLAN OF TREATMENT AND WHILE UNDER MY CARE (Physician co-signature  of this document indicates review and certification of the therapy plan).

## 2025-06-09 NOTE — PLAN OF CARE
"  Problem: Adult Inpatient Plan of Care  Goal: Patient-Specific Goal (Individualized)  Description: You can add care plan individualizations to a care plan. Examples of Individualization might be:  \"Parent requests to be called daily at 9am for status\", \"I have a hard time hearing out of my right ear\", or \"Do not touch me to wake me up as it startles  me\".  Outcome: Progressing  Goal: Absence of Hospital-Acquired Illness or Injury  Outcome: Progressing  Goal: Optimal Comfort and Wellbeing  Outcome: Progressing  Intervention: Monitor Pain and Promote Comfort  Recent Flowsheet Documentation  Taken 6/9/2025 1142 by Teri Hannah, RN  Pain Management Interventions: medication (see MAR)  Goal: Readiness for Transition of Care  Outcome: Progressing     Problem: Delirium  Goal: Optimal Coping  Outcome: Progressing  Goal: Improved Behavioral Control  Outcome: Progressing  Goal: Improved Attention and Thought Clarity  Outcome: Progressing  Goal: Improved Sleep  Outcome: Progressing   Goal Outcome Evaluation:       Patient is A&O but forgetful at times. Is up assist of 1 with walker and gait belt. C/o lower back pain- scheduled tylenol and meloxicam given with some relief. Pressure injury noted on sacrum/coccyx- LDA placed and mepilex dressing applied. A couple loose stools noted, MD was paged about adding Imodium to MAR (awaiting response). Receiving IV antibiotics for UTI.   Plan: TCU (referrals pending)  BP (!) 138/91 (BP Location: Right arm)   Pulse 86   Temp 98.1  F (36.7  C) (Axillary)   Resp 18   LMP  (LMP Unknown)   SpO2 98%   Carolina Hannah RN BSN                       "

## 2025-06-09 NOTE — PLAN OF CARE
Problem: Delirium  Goal: Improved Attention and Thought Clarity  Intervention: Maximize Cognitive Function  Recent Flowsheet Documentation  Taken 6/9/2025 0000 by Melonie Field RN  Sensory Stimulation Regulation:   care clustered   lighting decreased   quiet environment promoted  Reorientation Measures:   calendar in view   clock in view   glasses use encouraged   hearing device use encouraged   reorientation provided   Goal Outcome Evaluation:      Patient was alert to self and was aware she was in the hospital, but had difficulty with time. Having complaints of right shoulder pain. Patient requested tylenol. Was bladder scanned at 0100 for 189 ml. Iv infusing with normal saline at 100 ml/hr. Bladder scanned patient for 266ml. Patient has no urge to urinate. Patient states she would like to drink a little more and try urinating later.

## 2025-06-09 NOTE — PLAN OF CARE
"Goal Outcome Evaluation:      Plan of Care Reviewed With: patient        Pt seems a bit more alert after napping. Requested juice and ad to use bathroom. Was able to ambulate with assist of 1 and a walker to have a BM Some urine noted,   Denies any abd pain, but has c/o of body aches likely r/t fall.Just bladder scanned pt for 189ml, [place] in place incase incontinent, Pt has insight to the fact she is a \"little foggy on things\" Is requesting an appointment to have her eyes checked when she leaves here feels her glasses need to be updated.. She did ask if her son called or if he knows she is here, It was unclear at this time if he does.No othr new concerns at this time.             "

## 2025-06-09 NOTE — PROGRESS NOTES
Patient has had 4 loose stools now. Was placed on Enteric precautions and ordered stool specimens- MD aware, will hold on Imodium until results come back.  Carolina Hannah RN BSN

## 2025-06-09 NOTE — PROGRESS NOTES
06/09/25 1200   Appointment Info   Signing Clinician's Name / Credentials (OT) Sarai Mclean, OTR/L   Quick Adds   Quick Adds Certification   Living Environment   People in Home alone   Current Living Arrangements house   Home Accessibility no concerns   Self-Care   Equipment Currently Used at Home cane, straight;walker, rolling;shower chair   Fall history within last six months yes   Number of times patient has fallen within last six months 2   Activity/Exercise/Self-Care Comment at baseline: independent with ADLs and related mobility using FWW. Just discharged on 6/3/25 with recommendation of home with home therapy and HHA to assist with showers. Pt present this admission with fall while showering.   General Information   Onset of Illness/Injury or Date of Surgery 06/08/25   Referring Physician Prudencio Short MD   Patient/Family Therapy Goal Statement (OT) open to going to TCU and is hopeful to transition to [facility]   Additional Occupational Profile Info/Pertinent History of Current Problem Daniela Brown is a 89F presented after being found down in bathtub during welfare check; pmhx includes generalised weakness, MDD/ARABELLA, neuropathy, chronic lower back pain with sciatica, CAD, HTN/HLD, DM2, CKD3a; admitted to observation for failure to thrive, generalised weakness, and T12 compression fracture.   Existing Precautions/Restrictions fall;spinal  (T12 compression fx.)   Cognitive Status Examination   Orientation Status orientation to person, place and time   Pain Assessment   Patient Currently in Pain Yes, see Vital Sign flowsheet  (back pain)   Strength Comprehensive (MMT)   Comment, General Manual Muscle Testing (MMT) Assessment feels weaker than baseline.   Bed Mobility   Comment (Bed Mobility) Min A for sit to supine- difficulty bringing legs up d/t back pain   Transfers   Transfer Comments CGA and FWW   Activities of Daily Living   BADL Assessment/Intervention lower body dressing;upper body  dressing;toileting   Upper Body Dressing Assessment/Training   [place] Level (Upper Body Dressing) set up   Lower Body Dressing Assessment/Training   [place] Level (Lower Body Dressing) moderate assist (50% patient effort)   Clinical Impression   Criteria for Skilled Therapeutic Interventions Met (OT) Yes, treatment indicated   OT Diagnosis decreased independence with ADLs and related mobility   OT Problem List-Impairments impacting ADL problems related to;strength;pain   Assessment of Occupational Performance 3-5 Performance Deficits   Identified Performance Deficits dressing, toileting, showering, functional mobility   Planned Therapy Interventions (OT) ADL retraining;strengthening   Clinical Decision Making Complexity (OT) problem focused assessment/low complexity   Risk & Benefits of therapy have been explained patient;participants included;participants voiced agreement with care plan;current/potential barriers reviewed;risks/benefits reviewed;care plan/treatment goals reviewed;evaluation/treatment results reviewed   OT Total Evaluation Time   OT Eval, Low Complexity Minutes (87035) 10   Therapy Certification   Start of Care Date 06/09/25   Certification date from 06/09/25   Certification date to 06/16/25   Medical Diagnosis fall   OT Goals   Therapy Frequency (OT) 5 times/week   OT Predicted Duration/Target Date for Goal Attainment 06/16/25   OT Goals Lower Body Dressing;Toilet Transfer/Toileting   OT: Hygiene/Grooming supervision/stand-by assist;while standing   OT: Lower Body Dressing Supervision/stand-by assist   OT: Toilet Transfer/Toileting Supervision/stand-by assist   Self-Care/Home Management   Self-Care/Home Mgmt/ADL, Compensatory, Meal Prep Minutes (35141) 10   Symptoms Noted During/After Treatment (Meal Preparation/Planning Training) fatigue;increased pain   Treatment Detail/Skilled Intervention Education on spinal precautions and log roll technique for sit to supine. Pt needing Min A with  increased pain during transfer. Educated pt on IP OT role, POC and current discharge rec of TCU- pt in agreement. Cues to increase independence with boosting in bed, however needs Ax2 to boost. Pt left supine with all needs wihtin reach and bed alarm on. Pt just finished with shower with NST so wanting to rest.   OT Discharge Planning   OT Plan POC Monday 1/5; standing g/h, toilet transfer   OT Discharge Recommendation (DC Rec) Transitional Care Facility   OT Rationale for DC Rec TCU to increase independence/safety with ADLs and related mobility. Would benefit from transitioning to detention.   OT Brief overview of current status CGA and FWW. Mod A for LB dressing and Min A for sit to supine.   Total Session Time   Timed Code Treatment Minutes 10   Total Session Time (sum of timed and untimed services) 20       [facility]                                                                                   OUTPATIENT OCCUPATIONAL THERAPY    PLAN OF TREATMENT FOR OUTPATIENT REHABILITATION   Patient's Last Name, First Name, Daniela Hairston YOB: 1933   Provider's Name   [facility]   Medical Record No.  [id number]     Onset Date: 06/08/25 Start of Care Date: 06/09/25     Medical Diagnosis:  fall               OT Diagnosis:  decreased independence with ADLs and related mobility Certification Dates:  From: 06/09/25  To: 06/16/25     See note for plan of treatment, functional goals, and certification details.    I CERTIFY THE NEED FOR THESE SERVICES FURNISHED UNDER        THIS PLAN OF TREATMENT AND WHILE UNDER MY CARE (Physician co-signature of this document indicates review and certification of the therapy plan).

## 2025-06-10 ENCOUNTER — DOCUMENTATION ONLY (OUTPATIENT)
Dept: GERIATRICS | Facility: CLINIC | Age: OVER 89
End: 2025-06-10
Payer: COMMERCIAL

## 2025-06-10 VITALS
DIASTOLIC BLOOD PRESSURE: 42 MMHG | TEMPERATURE: 98.2 F | OXYGEN SATURATION: 96 % | SYSTOLIC BLOOD PRESSURE: 117 MMHG | HEART RATE: 75 BPM | RESPIRATION RATE: 22 BRPM

## 2025-06-10 LAB
ANION GAP SERPL CALCULATED.3IONS-SCNC: 14 MMOL/L (ref 7–15)
ATRIAL RATE - MUSE: 78 BPM
BUN SERPL-MCNC: 25.1 MG/DL (ref 8–23)
CALCIUM SERPL-MCNC: 8.6 MG/DL (ref 8.8–10.4)
CHLORIDE SERPL-SCNC: 108 MMOL/L (ref 98–107)
CK SERPL-CCNC: 354 U/L (ref 26–192)
CREAT SERPL-MCNC: 1.03 MG/DL (ref 0.51–0.95)
DIASTOLIC BLOOD PRESSURE - MUSE: NORMAL MMHG
EGFRCR SERPLBLD CKD-EPI 2021: 51 ML/MIN/1.73M2
ERYTHROCYTE [DISTWIDTH] IN BLOOD BY AUTOMATED COUNT: 12.9 % (ref 10–15)
GLUCOSE SERPL-MCNC: 95 MG/DL (ref 70–99)
HCO3 SERPL-SCNC: 17 MMOL/L (ref 22–29)
HCT VFR BLD AUTO: 30.5 % (ref 35–47)
HGB BLD-MCNC: 10.2 G/DL (ref 11.7–15.7)
HOLD SPECIMEN: NORMAL
INTERPRETATION ECG - MUSE: NORMAL
MCH RBC QN AUTO: 29.4 PG (ref 26.5–33)
MCHC RBC AUTO-ENTMCNC: 33.4 G/DL (ref 31.5–36.5)
MCV RBC AUTO: 88 FL (ref 78–100)
P AXIS - MUSE: 62 DEGREES
PLATELET # BLD AUTO: 234 10E3/UL (ref 150–450)
POTASSIUM SERPL-SCNC: 3.8 MMOL/L (ref 3.4–5.3)
POTASSIUM SERPL-SCNC: 3.9 MMOL/L (ref 3.4–5.3)
PR INTERVAL - MUSE: 150 MS
QRS DURATION - MUSE: 86 MS
QT - MUSE: 422 MS
QTC - MUSE: 481 MS
R AXIS - MUSE: -20 DEGREES
RBC # BLD AUTO: 3.47 10E6/UL (ref 3.8–5.2)
SODIUM SERPL-SCNC: 139 MMOL/L (ref 135–145)
SYSTOLIC BLOOD PRESSURE - MUSE: NORMAL MMHG
T AXIS - MUSE: 62 DEGREES
VENTRICULAR RATE- MUSE: 78 BPM
WBC # BLD AUTO: 11.7 10E3/UL (ref 4–11)

## 2025-06-10 PROCEDURE — 250N000013 HC RX MED GY IP 250 OP 250 PS 637: Performed by: PHYSICIAN ASSISTANT

## 2025-06-10 PROCEDURE — 82435 ASSAY OF BLOOD CHLORIDE: CPT | Performed by: STUDENT IN AN ORGANIZED HEALTH CARE EDUCATION/TRAINING PROGRAM

## 2025-06-10 PROCEDURE — 36415 COLL VENOUS BLD VENIPUNCTURE: CPT | Performed by: INTERNAL MEDICINE

## 2025-06-10 PROCEDURE — 250N000013 HC RX MED GY IP 250 OP 250 PS 637: Performed by: STUDENT IN AN ORGANIZED HEALTH CARE EDUCATION/TRAINING PROGRAM

## 2025-06-10 PROCEDURE — 85041 AUTOMATED RBC COUNT: CPT | Performed by: STUDENT IN AN ORGANIZED HEALTH CARE EDUCATION/TRAINING PROGRAM

## 2025-06-10 PROCEDURE — 82550 ASSAY OF CK (CPK): CPT | Performed by: STUDENT IN AN ORGANIZED HEALTH CARE EDUCATION/TRAINING PROGRAM

## 2025-06-10 PROCEDURE — G0378 HOSPITAL OBSERVATION PER HR: HCPCS

## 2025-06-10 PROCEDURE — 99239 HOSP IP/OBS DSCHRG MGMT >30: CPT | Performed by: STUDENT IN AN ORGANIZED HEALTH CARE EDUCATION/TRAINING PROGRAM

## 2025-06-10 PROCEDURE — 84132 ASSAY OF SERUM POTASSIUM: CPT | Performed by: INTERNAL MEDICINE

## 2025-06-10 RX ORDER — LOPERAMIDE HYDROCHLORIDE 2 MG/1
2 CAPSULE ORAL 4 TIMES DAILY PRN
DISCHARGE
Start: 2025-06-10

## 2025-06-10 RX ORDER — LOPERAMIDE HYDROCHLORIDE 2 MG/1
2 CAPSULE ORAL 4 TIMES DAILY PRN
Qty: 30 CAPSULE | Refills: 0 | Status: SHIPPED | OUTPATIENT
Start: 2025-06-10 | End: 2025-06-10

## 2025-06-10 RX ORDER — CEFPODOXIME PROXETIL 200 MG/1
200 TABLET, FILM COATED ORAL 2 TIMES DAILY
DISCHARGE
Start: 2025-06-10 | End: 2025-06-11

## 2025-06-10 RX ORDER — CEFPODOXIME PROXETIL 200 MG/1
200 TABLET, FILM COATED ORAL 2 TIMES DAILY
DISCHARGE
Start: 2025-06-10 | End: 2025-06-10

## 2025-06-10 RX ORDER — PAROXETINE 20 MG/1
20 TABLET, FILM COATED ORAL EVERY MORNING
Status: DISCONTINUED | OUTPATIENT
Start: 2025-06-10 | End: 2025-06-10 | Stop reason: HOSPADM

## 2025-06-10 RX ADMIN — PAROXETINE 20 MG: 20 TABLET, FILM COATED ORAL at 10:22

## 2025-06-10 RX ADMIN — ACETAMINOPHEN 325 MG: 325 TABLET ORAL at 07:32

## 2025-06-10 RX ADMIN — MELOXICAM 7.5 MG: 7.5 TABLET ORAL at 07:33

## 2025-06-10 RX ADMIN — LOPERAMIDE HYDROCHLORIDE 2 MG: 2 CAPSULE ORAL at 09:50

## 2025-06-10 RX ADMIN — ACETAMINOPHEN 325 MG: 325 TABLET ORAL at 11:42

## 2025-06-10 RX ADMIN — LOSARTAN POTASSIUM 50 MG: 50 TABLET, FILM COATED ORAL at 07:33

## 2025-06-10 RX ADMIN — OXYCODONE 2.5 MG: 5 TABLET ORAL at 03:02

## 2025-06-10 RX ADMIN — AMLODIPINE BESYLATE 5 MG: 5 TABLET ORAL at 07:32

## 2025-06-10 RX ADMIN — BUPROPION HYDROCHLORIDE 150 MG: 150 TABLET, EXTENDED RELEASE ORAL at 07:33

## 2025-06-10 ASSESSMENT — ACTIVITIES OF DAILY LIVING (ADL)
ADLS_ACUITY_SCORE: 76
ADLS_ACUITY_SCORE: 76
ADLS_ACUITY_SCORE: 64
ADLS_ACUITY_SCORE: 76
ADLS_ACUITY_SCORE: 76
ADLS_ACUITY_SCORE: 64

## 2025-06-10 NOTE — PROGRESS NOTES
Occupational Therapy Discharge Summary    Reason for therapy discharge:    Discharged to transitional care facility.    Progress towards therapy goal(s). See goals on Care Plan in [facility] electronic health record for goal details.  Goals partially met.  Barriers to achieving goals:   discharge from facility.    Therapy recommendation(s):    Continued therapy is recommended.  Rationale/Recommendations:  TCU to increase independence with ADLs and related mobility. Would benefit from transition into [facility].

## 2025-06-10 NOTE — PLAN OF CARE
Physical Therapy Discharge Summary    Reason for therapy discharge:    Discharged to transitional care facility.    Progress towards therapy goal(s). See goals on Care Plan in [facility] electronic health record for goal details.  Goals partially met.  Barriers to achieving goals:   discharge from facility.    Therapy recommendation(s):    Continued therapy is recommended.  Rationale/Recommendations:  Recommend continued PT at TCU to maximize safe and indep mobility prior to return home.

## 2025-06-10 NOTE — PROGRESS NOTES
[facility]    Hospital Medicine   Cross Cover Note  Date of Service: 6/9/2025     I was called due to diarrhea, requesting Imodium.   C.diff and enteric panel negative.    - Imodium ordered as infectious panels negative.      Senia Hitchcock PA-C  [facility] Hospitalist Service

## 2025-06-10 NOTE — MEDICATION SCRIBE - ADMISSION MEDICATION HISTORY
Medication Scribe Admission Medication History    Admission medication history is complete. The information provided in this note is only as accurate as the sources available at the time of the update.    Information Source(s): Patient and CareEverywhere/SureScripts via in-person    Pertinent Information: PTA med list reviewed with patient in room and finished at desk.    Changes made to PTA medication list:  Added: None  Deleted: None  Changed: MVI from taking in evening to taking daily.    Allergies reviewed with patient and updates made in EHR: yes, no change.    Medication History Completed By: Ami Huerta 6/9/2025 7:56 PM    PTA Med List   Medication Sig Note Last Dose/Taking    acetaminophen (TYLENOL) 500 MG tablet Take 500-1,000 mg by mouth every 6 hours as needed for mild pain or pain  Past Week    amLODIPine (NORVASC) 5 MG tablet Take 1 tablet (5 mg) by mouth daily.  6/7/2025 Morning    bismuth subsalicylate (PEPTO BISMOL) 262 MG/15ML suspension Take 15 mLs by mouth every 6 hours as needed for indigestion.  6/4/2025    blood glucose (NO BRAND SPECIFIED) test strip Use to test blood sugar 1 times daily or as directed. To accompany: Blood Glucose Monitor Brands: per insurance. 6/9/2025: She does not check her blood glucose at home. More than a month    blood glucose monitoring (NO BRAND SPECIFIED) meter device kit Use to test blood sugar 1 times daily or as directed. Preferred blood glucose meter OR supplies to accompany: Blood Glucose Monitor Brands: per insurance.  More than a month    buPROPion (WELLBUTRIN XL) 150 MG 24 hr tablet Take 1 tablet (150 mg) by mouth every morning.  6/7/2025 Morning    meloxicam (MOBIC) 7.5 MG tablet Take 1 tablet (7.5 mg) by mouth daily. 6/8/2025: Last filled 5/22/2025, 90 day supply, with 1 refill 6/7/2025 Morning    Multiple Vitamins-Minerals (THERAPEUTIC MULTIVIT/MINERAL) TABS Take 1 tablet by mouth daily.  6/7/2025 Noon    nitroFURantoin macrocrystal-monohydrate  (MACROBID) 100 MG capsule Take 1 capsule (100 mg) by mouth every 12 hours. 6/8/2025: Last filled 6/3/2025, 5 day supply, completed today?   6/7/2025 Morning    olmesartan (BENICAR) 20 MG tablet Take 1 tablet (20 mg) by mouth daily. 6/8/2025: Last filled 5/22/2025, 90 day supply, with 3 refills   6/7/2025 Noon    PARoxetine (PAXIL) 20 MG tablet Take 1 tablet (20 mg) by mouth every morning. 6/8/2025: Last filled 5/22/2025, 90 day supply, with 3 refills   6/7/2025 Morning    thin (NO BRAND SPECIFIED) lancets Use with lanceting device. To accompany: Blood Glucose Monitor Brands: per insurance.  More than a month

## 2025-06-10 NOTE — PROGRESS NOTES
Care Management Discharge Note    Discharge Date: 06/10/2025       Discharge Disposition: Transitional Care    Discharge Services: None    Discharge DME: None    Discharge Transportation: agency    Private pay costs discussed: transportation costs    Does the patient's insurance plan have a 3 day qualifying hospital stay waiver?  Yes     Which insurance plan 3 day waiver is available? Alternative insurance waiver    Will the waiver be used for post-acute placement? Yes    PAS Confirmation Code: [id number]  Patient/family educated on Medicare website which has current facility and service quality ratings: yes    Education Provided on the Discharge Plan: Yes  Persons Notified of Discharge Plans: Patient, son Wally  Patient/Family in Agreement with the Plan: yes    Handoff Referral Completed: Yes, [facility] PCP: Internal handoff referral completed      Additional Information:  Per IDT rounds, patient is medically stable for discharge today.     Patient has been accepted and elected to go to [facility] Phone (Main Phone:[phone] Admissions Phone:[phone] Fax: [phone]) TCU today. CM updated patient's son Wally via phone regarding discharge plan.     Ride via [facility] wheelchair between 12:25-1:10 PM. Patient is aware of private pay costs associated with transport.     PAS: [id number]    BC[employer] Prior Auth received: [id number]      MANJIT Contreras  Inpatient Care Coordinator   [facility] [phone]  [facility] [phone]

## 2025-06-10 NOTE — DISCHARGE SUMMARY
[facility]  Hospitalist Discharge Summary      Date of Admission:  6/8/2025  Date of Discharge:  6/10/2025  Discharging Provider: Aaron Talbot DO  Discharge Service: Hospitalist Service    Discharge Diagnoses   Generalised weakness  Failure to thrive  Urinary tract infection  Non-traumatic rhabdomyolysis  T12 compression fracture  CAD  Hyperlipidemia  Hypertension  Diabetes mellitus, type 2, with polyneuropathy  Chronic pain   CKD3a     Clinically Significant Risk Factors          Follow-ups Needed After Discharge   Follow-up Appointments       Follow Up and recommended labs and tests      Follow up with Nursing home physician.  No follow up labs or test are needed.              Unresulted Labs Ordered in the Past 30 Days of this Admission       Date and Time Order Name Status Description    6/8/2025  2:38 PM Urine Culture Preliminary         These results will be followed up by PCP.    Discharge Disposition   Discharged to rehabilitation facility  Condition at discharge: Good    Hospital Course   Daniela Brown is a 89F presented after being found down in bathtub during welfare check; pmhx includes generalised weakness, MDD/ARABELLA, neuropathy, chronic lower back pain with sciatica, CAD, HTN/HLD, DM2, CKD3a; admitted to observation for failure to thrive, generalised weakness, and T12 compression fracture.     Generalised weakness  Failure to thrive  Notes that she has been having increased weakness for a long period of time, was looking into transitioning to assisted living. Lives alone. Previous discussions with youngest son about placement vs living with him. Likely will need to transition to more long-term options following TCU stay.   - [facility]     Urinary tract infection  Urinary urgency/frequency increase in the past 3-7 days. Recent hospitalised here at [facility] and discharged on macrobid. Urine culture with gram negative bacilli without  speciation, final culture pending.   - Cefpodoxime 200 mg BID for a total of 5 days of treatment Ceftriaxone      Non-traumatic rhabdomyolysis  Present on admission. Suggestive of immobilisation for extended period of time.  - No evidence of CHER      T12 compression fracture  Found on trauma evaluation. Otherwise asymptomatic by patient concerns.  - Tylenol 325mg PO TID and meloxicam 7.5 mg daily      CAD  Hyperlipidemia  Hypertension  - Amlodipine 5mg PO every day  - Resume olmesartan 20mg daily      Diabetes mellitus, type 2, with polyneuropathy  HBA1c 5.6%.   - Diet controlled      Chronic pain  - Meloxicam 7.5mg PO every day     CKD3a  - BMP as an outpatient     Consultations This Hospital Stay   CARE MANAGEMENT / SOCIAL WORK IP CONSULT  PHYSICAL THERAPY ADULT IP CONSULT  OCCUPATIONAL THERAPY ADULT IP CONSULT  PHYSICAL THERAPY ADULT IP CONSULT  OCCUPATIONAL THERAPY ADULT IP CONSULT    Code Status   Full Code    Time Spent on this Encounter   IAaron DO, personally saw the patient today and spent greater than 30 minutes discharging this patient.       Aaron Talbot DO  [facility] SURGICAL  [address] [address]  Phone: [phone]  Fax: [phone]  ______________________________________________________________________    Physical Exam   Vital Signs: Temp: 98.5  F (36.9  C) Temp src: Oral BP: 134/57 Pulse: 86   Resp: 18 SpO2: 96 % O2 Device: None (Room air)    Weight: 0 lbs 0 oz  Constitutional: awake, alert, cooperative, no apparent distress, and appears stated age  Respiratory: No increased work of breathing, good air exchange, clear to auscultation bilaterally, no crackles or wheezing  Cardiovascular: Normal apical impulse, regular rate and rhythm, normal S1 and S2, no S3 or S4, and no murmur noted  GI: No scars, normal bowel sounds, soft, non-distended, non-tender, no masses palpated, no hepatosplenomegally  Skin: normal skin color, texture,  turgor  Musculoskeletal: There is no redness, warmth, or swelling of the joints.  Full range of motion noted.  Motor strength is 5 out of 5 all extremities bilaterally.  Tone is normal.       Primary Care Physician   Renay Hannah    Discharge Orders      Primary Care - Care Coordination Referral      General info for SNF    Length of Stay Estimate: Short Term Care: Estimated # of Days <30  Condition at Discharge: Stable  Level of care:skilled   Rehabilitation Potential: Excellent  Admission H&P remains valid and up-to-date: Yes  Recent Chemotherapy: N/A  Use Nursing Home Standing Orders: Yes     Follow Up and recommended labs and tests    Follow up with Nursing home physician.  No follow up labs or test are needed.     Reason for your hospital stay    Ground level fall, UTI, failure to thrive     Activity - Up with nursing assistance     Full Code     Physical Therapy Adult Consult    Evaluate and treat as clinically indicated.    Reason:  Functional weakness, ground level fall     Occupational Therapy Adult Consult    Evaluate and treat as clinically indicated.    Reason:  Functional weakness, ground level fall     Fall precautions     Diet    Follow this diet upon discharge:   Regular Diet Adult       Significant Results and Procedures   Most Recent 3 CBC's:  Recent Labs   Lab Test 06/10/25  0515 06/09/25  0515 06/08/25  1114   WBC 11.7* 17.1* 20.2*   HGB 10.2* 10.8* 14.2   MCV 88 86 86    266 336     Most Recent 3 BMP's:  Recent Labs   Lab Test 06/10/25  0515 06/09/25  1716 06/09/25  0515 06/08/25  1114 06/03/25  0539   NA  --   --  136 138 141   POTASSIUM 3.8 3.8 3.3* 3.7 4.0   CHLORIDE  --   --  104 100 109*   CO2  --   --  16* 22 21*   BUN  --   --  35.6* 28.9* 17.2   CR  --   --  1.16* 1.10* 0.90   ANIONGAP  --   --  16* 16* 11   NARESH  --   --  8.3* 9.8 8.9   GLC  --   --  98 122* 105*   ,   Results for orders placed or performed during the hospital encounter of 06/08/25   Head CT w/o contrast     Narrative    EXAM: CT HEAD W/O CONTRAST, CT CERVICAL SPINE W/O CONTRAST  LOCATION: [facility]  DATE: 6/8/2025    INDICATION: fall; headache  COMPARISON: Head and cervical spine CT 08/15/2023.  TECHNIQUE:   1) Routine CT Head without IV contrast. Multiplanar reformats. Dose reduction techniques were used.  2) Routine CT Cervical Spine without IV contrast. Multiplanar reformats. Dose reduction techniques were used.    FINDINGS:   HEAD CT:   INTRACRANIAL CONTENTS: No intracranial hemorrhage, extraaxial collection, or mass effect.  No CT evidence of acute infarct. Mild presumed chronic small vessel ischemic changes. Mild generalized volume loss. No hydrocephalus.     VISUALIZED ORBITS/SINUSES/MASTOIDS: Prior bilateral cataract surgery. Visualized portions of the orbits are otherwise unremarkable. Secretions in the right sphenoid sinus. No middle ear or mastoid effusion.    BONES/SOFT TISSUES: Right lateral scalp swelling. No skull fracture.    CERVICAL SPINE CT:   VERTEBRA: Mild anterolisthesis of C4 on C5. Cervical vertebral body heights are maintained. No evidence of acute cervical fracture.     CANAL/FORAMINA: Multilevel cervical spondylosis, without high-grade spinal canal or neural foraminal stenosis.    PARASPINAL: Sclerosis of the cervical carotid arteries. Subcentimeter thyroid nodules.       Impression    IMPRESSION:  HEAD CT:  1.  No acute intracranial findings.  2.  Mild age-related changes.  3.  Right lateral scalp swelling.    CERVICAL SPINE CT:  1.  No evidence of acute cervical fracture.  2.  Multilevel cervical spondylosis.   CT Cervical Spine w/o Contrast    Narrative    EXAM: CT HEAD W/O CONTRAST, CT CERVICAL SPINE W/O CONTRAST  LOCATION: [facility]  DATE: 6/8/2025    INDICATION: fall; headache  COMPARISON: Head and cervical spine CT 08/15/2023.  TECHNIQUE:   1) Routine CT Head without IV contrast. Multiplanar reformats. Dose reduction techniques  were used.  2) Routine CT Cervical Spine without IV contrast. Multiplanar reformats. Dose reduction techniques were used.    FINDINGS:   HEAD CT:   INTRACRANIAL CONTENTS: No intracranial hemorrhage, extraaxial collection, or mass effect.  No CT evidence of acute infarct. Mild presumed chronic small vessel ischemic changes. Mild generalized volume loss. No hydrocephalus.     VISUALIZED ORBITS/SINUSES/MASTOIDS: Prior bilateral cataract surgery. Visualized portions of the orbits are otherwise unremarkable. Secretions in the right sphenoid sinus. No middle ear or mastoid effusion.    BONES/SOFT TISSUES: Right lateral scalp swelling. No skull fracture.    CERVICAL SPINE CT:   VERTEBRA: Mild anterolisthesis of C4 on C5. Cervical vertebral body heights are maintained. No evidence of acute cervical fracture.     CANAL/FORAMINA: Multilevel cervical spondylosis, without high-grade spinal canal or neural foraminal stenosis.    PARASPINAL: Sclerosis of the cervical carotid arteries. Subcentimeter thyroid nodules.       Impression    IMPRESSION:  HEAD CT:  1.  No acute intracranial findings.  2.  Mild age-related changes.  3.  Right lateral scalp swelling.    CERVICAL SPINE CT:  1.  No evidence of acute cervical fracture.  2.  Multilevel cervical spondylosis.   CT Chest Abdomen Pelvis w/o Contrast    Narrative    EXAM: CT CHEST ABDOMEN PELVIS W/O CONTRAST  LOCATION: [facility]  DATE: 6/8/2025    INDICATION: fall; chest pain  COMPARISON: None.  TECHNIQUE: CT scan of the chest, abdomen, and pelvis was performed without IV contrast. Multiplanar reformats were obtained. Dose reduction techniques were used.   CONTRAST: None.    FINDINGS:   LUNGS AND PLEURA: Normal.    MEDIASTINUM/AXILLAE: Normal.    CORONARY ARTERY CALCIFICATION: Severe.    HEPATOBILIARY: Cholecystectomy.    PANCREAS: Unchanged 2.1 cm cystic lesion in the proximal body and 1.0 cm cystic lesion in the head (7/145, 153). No main duct  dilation.    SPLEEN: Normal.    ADRENAL GLANDS: Normal.    KIDNEYS/BLADDER: Normal.    BOWEL: Diverticulosis of the colon. No acute inflammatory change. No obstruction. Large amount of accumulated stool in the rectum.    LYMPH NODES: Normal.    VASCULATURE: No abdominal aortic aneurysm. Moderate-severe atherosclerotic calcification of the aorta and iliofemoral arteries.    PELVIC ORGANS: Absent.    MUSCULOSKELETAL: Tiny fat-containing paraumbilical hernia. Mild body wall edema. T12 compression deformity, mildly increased compared to 1/31/2025.      Impression    IMPRESSION:  1.  No acute traumatic findings in the chest, abdomen, or pelvis.  2.  T12 compression deformity, mildly increased compared to 1/31/2025.  3.  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     CT Thoracic Spine w/o Contrast    Narrative    EXAM: CT THORACIC SPINE W/O CONTRAST, CT LUMBAR SPINE W/O CONTRAST  LOCATION: [facility]  DATE: 6/8/2025    INDICATION: fall; back pain  COMPARISON: Lumbar spine CT 01/31/2025.  TECHNIQUE:  1) Routine CT Thoracic Spine without IV contrast. Multiplanar reformats. Dose reduction techniques were used.   2) Routine CT Lumbar Spine without IV contrast. Multiplanar reformats. Dose reduction techniques were used.     FINDINGS:    THORACIC SPINE CT:  VERTEBRA: Compression fracture of the T12 vertebral body, demonstrating increased height loss since the prior CT from 01/31/2025. This now demonstrates up to 60% height loss anteriorly and mild retropulsion of the posterior vertebral body. Remaining   thoracic vertebral body heights are maintained. Trace anterolisthesis of T2 on T3. No additional acute thoracic fracture identified.     CANAL/FORAMINA: No high-grade spinal canal or neural foraminal stenosis.    PARASPINAL: Please refer to separately dictated chest CT for soft tissue findings.    LUMBAR SPINE CT:  VERTEBRA: 5 lumbar type vertebral bodies. Grade 1  anterolisthesis of L4 on L5, similar to the prior exam. Lumbar vertebral body heights are maintained. No evidence of acute lumbar fracture.     CANAL/FORAMINA: Multilevel lumbar spondylosis, most pronounced at L4-L5, where there is moderate spinal canal stenosis and moderate bilateral neural foraminal stenosis.    PARASPINAL: Please refer to separately dictated abdomen pelvis CT for soft tissue findings.      Impression    IMPRESSION:  THORACIC SPINE CT:  1.  Compression fracture of the T12 vertebral body, demonstrating increased height loss since the prior CT from 01/31/2025.  2.  Otherwise no evidence of acute thoracic fracture.    LUMBAR SPINE CT:  1.  No evidence of acute lumbar fracture.  2.  Multilevel lumbar spondylosis, most pronounced at L4-L5.     CT Lumbar Spine w/o Contrast    Narrative    EXAM: CT THORACIC SPINE W/O CONTRAST, CT LUMBAR SPINE W/O CONTRAST  LOCATION: [facility]  DATE: 6/8/2025    INDICATION: fall; back pain  COMPARISON: Lumbar spine CT 01/31/2025.  TECHNIQUE:  1) Routine CT Thoracic Spine without IV contrast. Multiplanar reformats. Dose reduction techniques were used.   2) Routine CT Lumbar Spine without IV contrast. Multiplanar reformats. Dose reduction techniques were used.     FINDINGS:    THORACIC SPINE CT:  VERTEBRA: Compression fracture of the T12 vertebral body, demonstrating increased height loss since the prior CT from 01/31/2025. This now demonstrates up to 60% height loss anteriorly and mild retropulsion of the posterior vertebral body. Remaining   thoracic vertebral body heights are maintained. Trace anterolisthesis of T2 on T3. No additional acute thoracic fracture identified.     CANAL/FORAMINA: No high-grade spinal canal or neural foraminal stenosis.    PARASPINAL: Please refer to separately dictated chest CT for soft tissue findings.    LUMBAR SPINE CT:  VERTEBRA: 5 lumbar type vertebral bodies. Grade 1 anterolisthesis of L4 on L5, similar to  the prior exam. Lumbar vertebral body heights are maintained. No evidence of acute lumbar fracture.     CANAL/FORAMINA: Multilevel lumbar spondylosis, most pronounced at L4-L5, where there is moderate spinal canal stenosis and moderate bilateral neural foraminal stenosis.    PARASPINAL: Please refer to separately dictated abdomen pelvis CT for soft tissue findings.      Impression    IMPRESSION:  THORACIC SPINE CT:  1.  Compression fracture of the T12 vertebral body, demonstrating increased height loss since the prior CT from 01/31/2025.  2.  Otherwise no evidence of acute thoracic fracture.    LUMBAR SPINE CT:  1.  No evidence of acute lumbar fracture.  2.  Multilevel lumbar spondylosis, most pronounced at L4-L5.     CT Pelvis Bone wo Contrast    Narrative    EXAM: CT PELVIS BONE WO CONTRAST  LOCATION: [facility]  DATE: 6/8/2025    INDICATION: Fall; left hip pain  COMPARISON: None.  TECHNIQUE: CT scan of the pelvis was performed without IV contrast. Multiplanar reformats were obtained. Dose reduction techniques were used.  CONTRAST: None.    FINDINGS:    The left hip is negative for fracture or CT evidence of avascular necrosis. Degenerative change at the left hip joint.    The right hip is negative for fracture.    Degenerative change at the SI joints bilaterally. No evidence for pubic rami or sacral alar fracture.    No significant effusion on either side. No evidence for fluid collection external to the hip joint on either side. The interval contents are negative for abnormal mass or free fluid.      Impression    IMPRESSION:  1.  Both hips are negative for fracture or CT evidence of avascular necrosis.  2.  Pelvis negative for fracture.  3.  Degenerative change at both hip joints, moderately severe.  4.  Degenerative change at the SI joints bilaterally.  5.  No significant effusion.  6.  Intrapelvic contents negative.     *Note: Due to a large number of results and/or encounters for  the requested time period, some results have not been displayed. A complete set of results can be found in Results Review.       Discharge Medications   Current Discharge Medication List        START taking these medications    Details   cefpodoxime (VANTIN) 200 MG tablet Take 1 tablet (200 mg) by mouth 2 times daily.    Associated Diagnoses: Acute cystitis without hematuria      loperamide (IMODIUM) 2 MG capsule Take 1 capsule (2 mg) by mouth 4 times daily as needed for diarrhea.  Qty: 30 capsule, Refills: 0    Associated Diagnoses: Irritable bowel syndrome with diarrhea           CONTINUE these medications which have NOT CHANGED    Details   acetaminophen (TYLENOL) 500 MG tablet Take 500-1,000 mg by mouth every 6 hours as needed for mild pain or pain      amLODIPine (NORVASC) 5 MG tablet Take 1 tablet (5 mg) by mouth daily.  Qty: 90 tablet, Refills: 3    Associated Diagnoses: Benign essential hypertension      bismuth subsalicylate (PEPTO BISMOL) 262 MG/15ML suspension Take 15 mLs by mouth every 6 hours as needed for indigestion.      blood glucose (NO BRAND SPECIFIED) test strip Use to test blood sugar 1 times daily or as directed. To accompany: Blood Glucose Monitor Brands: per insurance.  Qty: 100 strip, Refills: 6    Associated Diagnoses: Type 2 diabetes mellitus with diabetic polyneuropathy, without long-term current use of insulin (H)      blood glucose monitoring (NO BRAND SPECIFIED) meter device kit Use to test blood sugar 1 times daily or as directed. Preferred blood glucose meter OR supplies to accompany: Blood Glucose Monitor Brands: per insurance.  Qty: 1 kit, Refills: 0    Associated Diagnoses: Type 2 diabetes mellitus with diabetic polyneuropathy, without long-term current use of insulin (H)      buPROPion (WELLBUTRIN XL) 150 MG 24 hr tablet Take 1 tablet (150 mg) by mouth every morning.  Qty: 90 tablet, Refills: 3    Associated Diagnoses: ARABELLA (generalized anxiety disorder); Moderate major depression  (H)      meloxicam (MOBIC) 7.5 MG tablet Take 1 tablet (7.5 mg) by mouth daily.  Qty: 90 tablet, Refills: 1    Associated Diagnoses: Chronic pain of left knee      Multiple Vitamins-Minerals (THERAPEUTIC MULTIVIT/MINERAL) TABS Take 1 tablet by mouth daily.      olmesartan (BENICAR) 20 MG tablet Take 1 tablet (20 mg) by mouth daily.  Qty: 90 tablet, Refills: 3    Associated Diagnoses: Benign essential hypertension      PARoxetine (PAXIL) 20 MG tablet Take 1 tablet (20 mg) by mouth every morning.  Qty: 90 tablet, Refills: 3    Associated Diagnoses: ARABELLA (generalized anxiety disorder)      thin (NO BRAND SPECIFIED) lancets Use with lanceting device. To accompany: Blood Glucose Monitor Brands: per insurance.  Qty: 100 each, Refills: 6    Associated Diagnoses: Type 2 diabetes mellitus with diabetic polyneuropathy, without long-term current use of insulin (H)           STOP taking these medications       nitroFURantoin macrocrystal-monohydrate (MACROBID) 100 MG capsule Comments:   Reason for Stopping:             Allergies   Allergies   Allergen Reactions    Metoprolol Itching and Rash    Cephalexin Rash    Erythromycin Rash    Atarax [Hydroxyzine] Other (See Comments)     Syncope and collapse    Hydrocodone Itching    Shellfish Allergy     Acetaminophen Itching     Patient reported - only when used scheduled in high doses      Actonel [Bisphosphonates] Itching    Alendronate Rash    Azithromycin Hives    Celebrex [Ricci-2 Inhibitors (Sulfonamide)] Rash    Celecoxib Rash    Cipro [Ciprofloxacin] Rash    Contrast Dye Hives    Diatrizoate Hives    Erythromycin Rash    Escitalopram Diarrhea     Gets very sick and mad feelings    Fosamax Itching and Rash    Keflex [Cephalexin Monohydrate] Rash    Lisinopril Cough    Penicillins Rash    Risedronate Itching    Rofecoxib Rash    Seasonal Allergies Other (See Comments)     Seasonal rhinitis    Shellfish-Derived Products Hives    Sulfa Antibiotics Itching and Rash    Sulfasalazine  Itching and Rash    Tetanus Toxoid, Adsorbed Swelling    Tetanus-Diphtheria Toxoids Td Swelling    Vicodin [Acetaminophen] Itching     Patient reported - only when used scheduled in high doses.       Vioxx Rash

## 2025-06-10 NOTE — PLAN OF CARE
"Goal Outcome Evaluation:      Activity:  Up Astx1 with gait belt & walker. Ambulating to bathroom.     Neuro: A&O to self & place.         Resp: Room air. No cough or SOB    GI/: Loose stools x2 this shift. PRN imodium ordered - patient has declined at this time.      Skin: Mepilex to coccyx - patient declined this writer to take a look. She is worried that \"it will get worse when people continue to look at it.\" Education & reasoning given - pt still fearful. Right shoulder sore - open to air.     Diet: Regular.     IV Access/Drains: Left AC - SL. Old blood drainage note.     Vitals: /44 (BP Location: Right arm)   Pulse 77   Temp 98.6  F (37  C) (Oral)   Resp 18   LMP  (LMP Unknown)   SpO2 95%     Pain:  Pt complained of pain in the coccyx. PRN tylenol & oxycodone given. Turn & repositioned when pt allowed.     Plan: Discharge to [facility].                        "

## 2025-06-10 NOTE — PROGRESS NOTES
[facility] DISCHARGE NOTE    Patient discharged to transitional care unit at 12:48 PM via wheel chair. Accompanied by transport staff. Discharge instructions reviewed with other, opportunity offered to ask questions. Prescriptions sent to patients preferred pharmacy. All belongings sent with patient.    No clothing, Report given to Chad banerjee [place]    Harjit Canchola RN

## 2025-06-11 ENCOUNTER — PATIENT OUTREACH (OUTPATIENT)
Dept: CARE COORDINATION | Facility: CLINIC | Age: OVER 89
End: 2025-06-11
Payer: COMMERCIAL

## 2025-06-11 ENCOUNTER — RESULTS FOLLOW-UP (OUTPATIENT)
Dept: GERIATRICS | Facility: CLINIC | Age: OVER 89
End: 2025-06-11
Payer: COMMERCIAL

## 2025-06-11 LAB
BACTERIA UR CULT: ABNORMAL

## 2025-06-11 RX ORDER — SULFAMETHOXAZOLE AND TRIMETHOPRIM 800; 160 MG/1; MG/1
1 TABLET ORAL 2 TIMES DAILY
COMMUNITY
Start: 2025-06-11 | End: 2025-06-16

## 2025-06-11 RX ORDER — LEVOFLOXACIN 500 MG/1
500 TABLET, FILM COATED ORAL DAILY
DISCHARGE
Start: 2025-06-11 | End: 2025-06-11

## 2025-06-11 NOTE — INTERIM SUMMARY
Called [facility] to discuss about antibiotic changes made after final culture results. Changed Cefpodoxime to Levofloxacin 500 mg daily for 3 days. No other changes at this time.

## 2025-06-11 NOTE — LETTER
[facility]   To:   [facility]          Please give to facility    From:   Bharti Riley SEAMUS Care Coordinator with [facility]     Patient Name:  Daniela Brown YOB: 1933   Admit date: 6/10/25      *Information Needed:  Please contact me when the patient will discharge (or if they will move to long term care)- include the discharge date, disposition, and main diagnosis. We work with patients after discharge from their primary care clinic setting.   If the patient is discharged with home care services, please provide the name of the agency    Ok to Phone or Email with information       Thank You,   MANJIT rAroyo   Social Work Primary Care Clinic Care Coordinator   [facility]  [phone]  [email or web address]

## 2025-06-11 NOTE — PROGRESS NOTES
"[facility] GERIATRICS    PRIMARY CARE PROVIDER AND CLINIC:  CYN Delaney CNP, [address] [facility] [address]  Chief Complaint   Patient presents with    Hospital F/U     [facility] 6/8/2025 - 6/10/2025      [place] Medical Record Number: [id number]  Place of Service where encounter took place: [facility] (TCU/SNF) [4000]    Daniela Brown is a 89 year old  (1/26/1933), admitted to the above facility from  [facility]. Hospital stay 6/8/25 through 6/10/25.  HPI:    ***    CODE STATUS/ADVANCE DIRECTIVES DISCUSSION: Full Code - CPR/Full code   ALLERGIES:   Allergies   Allergen Reactions    Metoprolol Itching and Rash    Cephalexin Rash    Erythromycin Rash    Acetaminophen Itching     Patient reported - only when used scheduled in high doses      Actonel [Bisphosphonates] Itching    Alendronate Rash    Atarax [Hydroxyzine] Other (See Comments)     Syncope and collapse    Azithromycin Hives    Celebrex [Ricci-2 Inhibitors (Sulfonamide)] Rash    Celecoxib Rash    Cipro [Ciprofloxacin] Rash    Contrast Dye Hives    Diatrizoate Hives    Erythromycin Rash    Escitalopram Diarrhea     Gets very sick and mad feelings    Fosamax Itching and Rash    Hydrocodone Itching    Keflex [Cephalexin Monohydrate] Rash    Lisinopril Cough    Penicillins Rash    Risedronate Itching    Rofecoxib Rash    Seasonal Allergies Other (See Comments)     Seasonal rhinitis    Shellfish Allergy Hives    Shellfish-Derived Products Hives    Sulfasalazine Itching and Rash    Tetanus Toxoid, Adsorbed Swelling    Tetanus-Diphtheria Toxoids Td Swelling    Vicodin [Acetaminophen] Itching     Patient reported - only when used scheduled in high doses.       Vioxx Rash      PAST MEDICAL HISTORY:   Past Medical History:   Diagnosis Date    Abnormal cardiovascular stress test 2/3/2019    9/10/2018 Lexiscan: \"Myocardial perfusion imaging using single isotope technique demonstrated a small " "perfusion defect of mild severity involving the basal inferior wall which is mostly reversible and may be consistent with mild ischemia in the right coronary artery distribution. In addition, transient ischemic dilatation is noted with a TID ratio of 1.3. \"    Adhesive capsulitis of shoulder     left     Adjustment disorder with anxiety 3/18/2016    B12 deficiency anemia 6/20/2012    Chest pain 2004    Chronic right sided/axillary discomfort (musculoskeletal) Atypical substernal (possibly GERD). Negative cardiolite 2004.    Colitis, Clostridium difficile 4/18/2012    Diverticulitis 2009    Headache(784.0) 2001    Tension type. MRI 2001 normal.    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  FAMILY HISTORY: family history includes Alcohol/Drug in her father; Allergies in her son; Arthritis in her mother; C.A.D. in her mother; Cancer - colorectal in her mother; Diabetes in her mother; Eye Disorder in her son and son; Heart Disease in her mother; Hypertension in her brother; Leukemia in her son; Lipids in her brother; Obesity in her brother; Thyroid Disease in her sister.  SOCIAL HISTORY:   reports that she has never smoked. She has never used smokeless tobacco. She reports that she does not drink alcohol and does not use drugs.  Patient's living condition: lives alone    Post Discharge Medication Reconciliation Status:   MED REC REQUIRED{TIP  Click the link below to document or use med rec list, use list to " "pull in response :409709}  Post Medication Reconciliation Status: {MED REC LIST:430461}       Current Outpatient Medications   Medication Sig Dispense Refill    acetaminophen (TYLENOL) 500 MG tablet Take 500-1,000 mg by mouth every 6 hours as needed for mild pain or pain      amLODIPine (NORVASC) 5 MG tablet Take 1 tablet (5 mg) by mouth daily. 90 tablet 3    bismuth subsalicylate (PEPTO BISMOL) 262 MG/15ML suspension Take 15 mLs by mouth every 6 hours as needed for indigestion.      blood glucose (NO BRAND SPECIFIED) test strip Use to test blood sugar 1 times daily or as directed. To accompany: Blood Glucose Monitor Brands: per insurance. 100 strip 6    blood glucose monitoring (NO BRAND SPECIFIED) meter device kit Use to test blood sugar 1 times daily or as directed. Preferred blood glucose meter OR supplies to accompany: Blood Glucose Monitor Brands: per insurance. 1 kit 0    buPROPion (WELLBUTRIN XL) 150 MG 24 hr tablet Take 1 tablet (150 mg) by mouth every morning. 90 tablet 3    loperamide (IMODIUM) 2 MG capsule Take 1 capsule (2 mg) by mouth 4 times daily as needed for diarrhea.      meloxicam (MOBIC) 7.5 MG tablet Take 1 tablet (7.5 mg) by mouth daily. 90 tablet 1    Multiple Vitamins-Minerals (THERAPEUTIC MULTIVIT/MINERAL) TABS Take 1 tablet by mouth daily.      olmesartan (BENICAR) 20 MG tablet Take 1 tablet (20 mg) by mouth daily. 90 tablet 3    PARoxetine (PAXIL) 20 MG tablet Take 1 tablet (20 mg) by mouth every morning. 90 tablet 3    sulfamethoxazole-trimethoprim (BACTRIM DS) 800-160 MG tablet Take 1 tablet by mouth 2 times daily. (For 5 days for UTI)      thin (NO BRAND SPECIFIED) lancets Use with lanceting device. To accompany: Blood Glucose Monitor Brands: per insurance. 100 each 6     No current facility-administered medications for this visit.       ROS:  {ROS FGS:492051}    Vitals:  BP (!) 147/59   Pulse 80   Temp 98.2  F (36.8  C)   Resp 16   Ht 1.626 m (5' 4\")   Wt 64.5 kg (142 lb 4.8 oz)  "  LMP  (LMP Unknown)   SpO2 96%   BMI 24.43 kg/m    Exam:  {Nursing home physical exam :067359}    Lab/Diagnostic data:  {fgslab:715908}    ASSESSMENT/PLAN:    {[facility] DX2:528825}    Orders:  {fgsorders:590487}  ***    Electronically signed by:  Scarlett Eaton ***

## 2025-06-11 NOTE — PROGRESS NOTES
Clinic Care Coordination Contact  Care Coordination Transition Communication    Clinical Data: Patient was hospitalized at [facility] from 6/8/25 to 6/10/25 with diagnosis of ground level fall, UTI, failure to thrive.     Assessment: Patient has transitioned to TCU for short term rehabilitation:    Facility Name: [facility]  Transition Communication:  Notified facility of Primary Care- Care Coordination support via Epic fax.    Plan: Care Coordinator will await notification from facility staff informing of patient's discharge plans/needs. Care Coordinator will review chart and outreach to facility staff every 2-4 weeks and as needed.     MANJIT Arroyo   Social Work Primary Care Clinic Care Coordinator   [facility]  [phone]  [email or web address]

## 2025-06-12 ENCOUNTER — TRANSITIONAL CARE UNIT VISIT (OUTPATIENT)
Dept: GERIATRICS | Facility: CLINIC | Age: OVER 89
End: 2025-06-12
Payer: COMMERCIAL

## 2025-06-12 VITALS
WEIGHT: 142.3 LBS | DIASTOLIC BLOOD PRESSURE: 59 MMHG | HEIGHT: 64 IN | BODY MASS INDEX: 24.3 KG/M2 | TEMPERATURE: 98.2 F | HEART RATE: 80 BPM | RESPIRATION RATE: 16 BRPM | OXYGEN SATURATION: 96 % | SYSTOLIC BLOOD PRESSURE: 147 MMHG

## 2025-06-12 NOTE — LETTER
6/12/2025      Daniela Brown  [address] [place] [address]        [facility] GERIATRICS    PRIMARY CARE PROVIDER AND CLINIC:  CYN Delaney CNP, [address] [address]  Chief Complaint   Patient presents with    Hospital F/U     [facility] 6/8/2025 - 6/10/2025      [place] Medical Record Number: [id number]  Place of Service where encounter took place: [facility] (U/SNF) [4000]    Daniela Brown is a 89 year old  (1/26/1933), admitted to the above facility from  [facility]. Hospital stay 6/8/25 through 6/10/25.  HPI:    ***    CODE STATUS/ADVANCE DIRECTIVES DISCUSSION: Full Code - {CODE STATUS:066673}  ALLERGIES:   Allergies   Allergen Reactions    Metoprolol Itching and Rash    Cephalexin Rash    Erythromycin Rash    Acetaminophen Itching     Patient reported - only when used scheduled in high doses      Actonel [Bisphosphonates] Itching    Alendronate Rash    Atarax [Hydroxyzine] Other (See Comments)     Syncope and collapse    Azithromycin Hives    Celebrex [Ricci-2 Inhibitors (Sulfonamide)] Rash    Celecoxib Rash    Cipro [Ciprofloxacin] Rash    Contrast Dye Hives    Diatrizoate Hives    Erythromycin Rash    Escitalopram Diarrhea     Gets very sick and mad feelings    Fosamax Itching and Rash    Hydrocodone Itching    Keflex [Cephalexin Monohydrate] Rash    Lisinopril Cough    Penicillins Rash    Risedronate Itching    Rofecoxib Rash    Seasonal Allergies Other (See Comments)     Seasonal rhinitis    Shellfish Allergy Hives    Shellfish-Derived Products Hives    Sulfa Antibiotics Itching and Rash    Sulfasalazine Itching and Rash    Tetanus Toxoid, Adsorbed Swelling    Tetanus-Diphtheria Toxoids Td Swelling    Vicodin [Acetaminophen] Itching     Patient reported - only when used scheduled in high doses.       Vioxx Rash      PAST MEDICAL HISTORY:   Past Medical History:   Diagnosis Date    Abnormal cardiovascular  "stress test 2/3/2019    9/10/2018 Lexiscan: \"Myocardial perfusion imaging using single isotope technique demonstrated a small perfusion defect of mild severity involving the basal inferior wall which is mostly reversible and may be consistent with mild ischemia in the right coronary artery distribution. In addition, transient ischemic dilatation is noted with a TID ratio of 1.3. \"    Adhesive capsulitis of shoulder     left     Adjustment disorder with anxiety 3/18/2016    B12 deficiency anemia 6/20/2012    Chest pain 2004    Chronic right sided/axillary discomfort (musculoskeletal) Atypical substernal (possibly GERD). Negative cardiolite 2004.    Colitis, Clostridium difficile 4/18/2012    Diverticulitis 2009    Headache(784.0) 2001    Tension type. MRI 2001 normal.    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  FAMILY HISTORY: family history includes Alcohol/Drug in her father; Allergies in her son; Arthritis in her mother; C.A.D. in her mother; Cancer - colorectal in her mother; Diabetes in her mother; Eye Disorder in her son and son; Heart Disease in her mother; Hypertension in her brother; Leukemia in her son; Lipids in her brother; Obesity in her brother; Thyroid Disease in her sister.  SOCIAL HISTORY:   reports that she has never smoked. She has never used smokeless tobacco. She reports that she does not drink alcohol and does not use drugs.  Patient's living condition: lives alone    Post Discharge " Medication Reconciliation Status:   MED REC REQUIRED{TIP  Click the link below to document or use med rec list, use list to pull in response :287010}  Post Medication Reconciliation Status: {MED REC LIST:034892}       Current Outpatient Medications   Medication Sig Dispense Refill    acetaminophen (TYLENOL) 500 MG tablet Take 500-1,000 mg by mouth every 6 hours as needed for mild pain or pain      amLODIPine (NORVASC) 5 MG tablet Take 1 tablet (5 mg) by mouth daily. 90 tablet 3    bismuth subsalicylate (PEPTO BISMOL) 262 MG/15ML suspension Take 15 mLs by mouth every 6 hours as needed for indigestion.      blood glucose (NO BRAND SPECIFIED) test strip Use to test blood sugar 1 times daily or as directed. To accompany: Blood Glucose Monitor Brands: per insurance. 100 strip 6    blood glucose monitoring (NO BRAND SPECIFIED) meter device kit Use to test blood sugar 1 times daily or as directed. Preferred blood glucose meter OR supplies to accompany: Blood Glucose Monitor Brands: per insurance. 1 kit 0    buPROPion (WELLBUTRIN XL) 150 MG 24 hr tablet Take 1 tablet (150 mg) by mouth every morning. 90 tablet 3    levofloxacin (LEVAQUIN) 500 MG tablet Take 1 tablet (500 mg) by mouth daily for 3 days.      loperamide (IMODIUM) 2 MG capsule Take 1 capsule (2 mg) by mouth 4 times daily as needed for diarrhea.      meloxicam (MOBIC) 7.5 MG tablet Take 1 tablet (7.5 mg) by mouth daily. 90 tablet 1    Multiple Vitamins-Minerals (THERAPEUTIC MULTIVIT/MINERAL) TABS Take 1 tablet by mouth daily.      olmesartan (BENICAR) 20 MG tablet Take 1 tablet (20 mg) by mouth daily. 90 tablet 3    PARoxetine (PAXIL) 20 MG tablet Take 1 tablet (20 mg) by mouth every morning. 90 tablet 3    thin (NO BRAND SPECIFIED) lancets Use with lanceting device. To accompany: Blood Glucose Monitor Brands: per insurance. 100 each 6     No current facility-administered medications for this visit.       ROS:  {ROS FGS:641625}    Vitals:  LMP  (LMP Unknown)    Exam:  {Nursing home physical exam :606923}    Lab/Diagnostic data:  {fgslab:947902}    ASSESSMENT/PLAN:    {S DX2:233441}    Orders:  {fgsorders:563944}  ***    Electronically signed by:  Scarlett Eaton ***      Sincerely,        CYN Ramos CNP    Electronically signed

## 2025-06-13 PROBLEM — F32.1 MODERATE MAJOR DEPRESSION (H): Status: RESOLVED | Noted: 2020-12-31 | Resolved: 2025-06-13

## 2025-06-17 ENCOUNTER — OFFICE VISIT (OUTPATIENT)
Dept: ORTHOPEDICS | Facility: CLINIC | Age: OVER 89
End: 2025-06-17
Payer: COMMERCIAL

## 2025-06-17 DIAGNOSIS — M25.562 CHRONIC PAIN OF LEFT KNEE: ICD-10-CM

## 2025-06-17 DIAGNOSIS — M25.462 EFFUSION OF LEFT KNEE: ICD-10-CM

## 2025-06-17 DIAGNOSIS — G89.29 CHRONIC PAIN OF LEFT KNEE: ICD-10-CM

## 2025-06-17 DIAGNOSIS — M17.12 PRIMARY OSTEOARTHRITIS OF LEFT KNEE: Primary | ICD-10-CM

## 2025-06-17 PROCEDURE — 20611 DRAIN/INJ JOINT/BURSA W/US: CPT | Mod: LT | Performed by: FAMILY MEDICINE

## 2025-06-17 RX ORDER — ROPIVACAINE HYDROCHLORIDE 5 MG/ML
3 INJECTION, SOLUTION EPIDURAL; INFILTRATION; PERINEURAL
Status: COMPLETED | OUTPATIENT
Start: 2025-06-17 | End: 2025-06-17

## 2025-06-17 RX ORDER — TRIAMCINOLONE ACETONIDE 40 MG/ML
40 INJECTION, SUSPENSION INTRA-ARTICULAR; INTRAMUSCULAR
Status: COMPLETED | OUTPATIENT
Start: 2025-06-17 | End: 2025-06-17

## 2025-06-17 RX ADMIN — TRIAMCINOLONE ACETONIDE 40 MG: 40 INJECTION, SUSPENSION INTRA-ARTICULAR; INTRAMUSCULAR at 14:00

## 2025-06-17 RX ADMIN — ROPIVACAINE HYDROCHLORIDE 3 ML: 5 INJECTION, SOLUTION EPIDURAL; INFILTRATION; PERINEURAL at 14:00

## 2025-06-17 NOTE — LETTER
2025      Daniela Brown  [address] [address]      Dear Colleague,    Thank you for referring your patient, Daniela Brown, to the [facility] [place]. Please see a copy of my visit note below.    Daniela Brwon  :  1933  DOS: 2025  MRN: [id number]    Sports Medicine Clinic Procedure    Ultrasound Guided Left Intra-Articular Knee Injection, +/- Aspiration    Clinical History: Interim History - 2025  Since last visit on 3/11/2025 patient has moderate-severe left knee pain & swelling.  Left knee steroid injection completed on 3/11/2025 provided relief for ~6 weeks.  Patient notes that she has transitioned into a transitional care facility - [place], walking more which seems to help her knee strength.  No new injury in the interim.      Diagnosis:   1. Primary osteoarthritis of left knee    2. Chronic pain of left knee    3. Effusion of left knee      Large Joint Injection/Arthocentesis: L knee joint    Date/Time: 2025 2:00 PM    Performed by: Tho Newman DO  Authorized by: Tho Newman DO    Indications:  Pain, osteoarthritis and joint swelling  Needle Size:  20 G  Guidance: ultrasound    Approach:  Superolateral  Location:  Knee      Medications:  40 mg triamcinolone 40 MG/ML; 3 mL ROPivacaine 5 MG/ML  Aspirate amount (mL):  20  Aspirate:  Serous and yellow  Outcome:  Tolerated well, no immediate complications  Procedure discussed: discussed risks, benefits, and alternatives    Consent Given by:  Patient  Timeout: timeout called immediately prior to procedure    Prep: patient was prepped and draped in usual sterile fashion     Ultrasound images of procedure were permanently stored.       Impression:  Successful Left intra-articular knee US guided corticosteroid injection and aspiration.    Plan:  Follow up 3 weeks if not improved  Has finished abx for recent UTI, not feeling ill, improving conditioning  with PT at her [facility]  Expectations and goals of CSI reviewed  Often 2-3 days for steroid effect, and can take up to two weeks for maximum effect  We discussed modified progressive pain-free activity as tolerated  Do not overuse in first two weeks if feeling better due to concern for vulnerability while steroid is working  Supportive care reviewed  All questions were answered today  Contact us with additional questions or concerns  Signs and sx of concern reviewed      Tho Newman DO, CAQ  Primary Care Sports Medicine  [facility]       Again, thank you for allowing me to participate in the care of your patient.        Sincerely,        Tho Newman DO    Electronically signed

## 2025-06-17 NOTE — PROGRESS NOTES
Daniela Brown  :  1933  DOS: 2025  MRN: [id number]    Sports Medicine Clinic Procedure    Ultrasound Guided Left Intra-Articular Knee Injection, +/- Aspiration    Clinical History: Interim History - 2025  Since last visit on 3/11/2025 patient has moderate-severe left knee pain & swelling.  Left knee steroid injection completed on 3/11/2025 provided relief for ~6 weeks.  Patient notes that she has transitioned into a transitional care facility - [place], walking more which seems to help her knee strength.  No new injury in the interim.      Diagnosis:   1. Primary osteoarthritis of left knee    2. Chronic pain of left knee    3. Effusion of left knee      Large Joint Injection/Arthocentesis: L knee joint    Date/Time: 2025 2:00 PM    Performed by: Tho Newman DO  Authorized by: Tho Newman DO    Indications:  Pain, osteoarthritis and joint swelling  Needle Size:  20 G  Guidance: ultrasound    Approach:  Superolateral  Location:  Knee      Medications:  40 mg triamcinolone 40 MG/ML; 3 mL ROPivacaine 5 MG/ML  Aspirate amount (mL):  20  Aspirate:  Serous and yellow  Outcome:  Tolerated well, no immediate complications  Procedure discussed: discussed risks, benefits, and alternatives    Consent Given by:  Patient  Timeout: timeout called immediately prior to procedure    Prep: patient was prepped and draped in usual sterile fashion     Ultrasound images of procedure were permanently stored.       Impression:  Successful Left intra-articular knee US guided corticosteroid injection and aspiration.    Plan:  Follow up 3 weeks if not improved  Has finished abx for recent UTI, not feeling ill, improving conditioning with PT at her [facility]  Expectations and goals of CSI reviewed  Often 2-3 days for steroid effect, and can take up to two weeks for maximum effect  We discussed modified progressive pain-free activity as tolerated  Do not overuse in first two weeks if feeling  better due to concern for vulnerability while steroid is working  Supportive care reviewed  All questions were answered today  Contact us with additional questions or concerns  Signs and sx of concern reviewed      Tho Newman DO, CASAMY  Primary Care Sports Medicine  [facility]

## 2025-07-02 NOTE — ED TRIAGE NOTES
"2025     GEE Mullen  [address]  [address]  [place] [address]    Patient: Jazzy Negrete   YOB: 1986   Date of Visit: 2025     Dear GEE Mullen:       Thank you for referring Jazzy Negrete to me for evaluation. Below are the relevant portions of my assessment and plan of care.    If you have questions, please do not hesitate to call me. I look forward to following Jazzy along with you.         Sincerely,        Douglas A. Milligan, MD        CC: No Recipients    Milligan, Douglas A, MD  [phone]  Sign when Signing Visit  Documentation of the ultrasound findings, images, and interpretations will be available in the patient's Viewpoint report which is located in the imaging tab in chart [email or web address]    Maternal/Fetal Medicine Follow Up  Note     Name: Jazzy Negrete    : 1986     MRN: [id number]     Referring Provider: GEE Mullen    Chief Complaint  AMA- multip; hx IUFD; CF carrier; hx GHTN    Subjective     History of Present Illness:  Jazzy Negrete is a 38 y.o.  30w4d who presents today for AMA    KETAN: Estimated Date of Delivery: 25     ROS:   As noted in HPI.     Objective     Vital Signs  /78   Wt 87.1 kg (192 lb)   LMP 2024 (Exact Date)   Estimated body mass index is 31.95 kg/m² as calculated from the following:    Height as of 24: 165.1 cm (65\").    Weight as of this encounter: 87.1 kg (192 lb).    Physical Exam    Ultrasound Impression:   See VIewpoint    Assessment and Plan     Jazzy Negrete is a 38 y.o.  30w4d who presents today for AMA    Diagnoses and all orders for this visit:    1. Multigravida of advanced maternal age in third trimester (Primary)  Assessment & Plan:  Patient returns today for follow-up for pregnancy complicated by advanced maternal age.  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  Patient reports " Comes from own home and lives alone, per EMS family is all in [place], uses a walker or cane when at home to get around, has had increasing weakness, C/O back pain, recent fall with lumbar compression fracture, unsure if took pain medication today, states her memory is not too good, C/O anxiety that took home medication for prior to coming, EMS states has been calling them every couple days due to anxiety, recent home care visit but only has someone come once a week, recently treated for UTI     Triage Assessment (Adult)       Row Name 02/12/25 1509          Triage Assessment    Airway WDL WDL        Respiratory WDL    Respiratory WDL WDL        Peripheral/Neurovascular WDL    Peripheral Neurovascular WDL WDL        Cognitive/Neuro/Behavioral WDL    Cognitive/Neuro/Behavioral WDL WDL                      no complications since her last visit and notes good fetal movement.    Ultrasound today demonstrates a normally grown fetus, albeit with a abdominal circumference at nearly the 90th percentile.  Amniotic fluid volume and umbilical artery Dopplers are normal.  BPP is 8 out of 8.  Anterior placenta is 2.1 cm from the internal os.  There are no fetal markers for trisomy.    Fetus appears to be growing well.  Patient reports that she has her diabetes screen today and it will be interesting to see the results of that test.  We will rescan the patient again in 5 weeks time to assess fetal growth as well as to ensure that the placenta has away from the internal os.  Different physicians have different cutoffs for low-lying placenta is allowing vaginal delivery, with the shortest distance generally being 2 cm.  The placenta currently is 2.1 cm from the internal os.    Patient was counseled extensively regarding fetal movement will contact her provider immediately if she notices any decreased fetal movement.    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 Compound heterozygous MTHFR mutation [id number]  -     [facility]; Future    4. History of IUFD  -     [facility]; Future    5. Pregnancy, unspecified gestational age  -     [facility]; Future    6. Low-lying placenta without hemorrhage, third trimester  -     [facility]; Future         Follow Up  Return in about 5 weeks (around 2025).    I spent 20 minutes caring for the patient on the day of service. This included: obtaining or reviewing a separately obtained medical history, reviewing patient records, performing a medically appropriate exam and/or evaluation, counseling or educating the patient/family/caregiver, ordering medications, labs, and/or procedures and documenting  such in the medical record. This does not include time spent on review and interpretation of other tests such as fetal ultrasound or the performance of other procedures such as amniocentesis or CVS.      Douglas A. Milligan, MD  Maternal Fetal Medicine, [facility]     2025

## 2025-07-03 NOTE — PROGRESS NOTES
"[facility] GERIATRICS    PRIMARY CARE PROVIDER AND CLINIC:  CYN Delaney CNP, [address] / [place] [address]***  Chief Complaint   Patient presents with    Hospital F/U      [place] Medical Record Number:  [id number]  Place of Service where encounter took place:  [facility] (U/SNF) [4000]    Daniela Brown  is a 89 year old  (1/26/1933), {fgsinitialoption:352369}.   HPI:    ***    CODE STATUS/ADVANCE DIRECTIVES DISCUSSION:  No CPR- Do NOT Intubate  {CODE STATUS:467176}  ALLERGIES:   Allergies   Allergen Reactions    Metoprolol Itching and Rash    Cephalexin Rash    Erythromycin Rash    Acetaminophen Itching     Patient reported - only when used scheduled in high doses      Actonel [Bisphosphonates] Itching    Alendronate Rash    Atarax [Hydroxyzine] Other (See Comments)     Syncope and collapse    Azithromycin Hives    Celebrex [Ricci-2 Inhibitors (Sulfonamide)] Rash    Celecoxib Rash    Cipro [Ciprofloxacin] Rash    Contrast Dye Hives    Diatrizoate Hives    Erythromycin Rash    Escitalopram Diarrhea     Gets very sick and mad feelings    Fosamax Itching and Rash    Hydrocodone Itching    Keflex [Cephalexin Monohydrate] Rash    Lisinopril Cough    Penicillins Rash    Risedronate Itching    Rofecoxib Rash    Seasonal Allergies Other (See Comments)     Seasonal rhinitis    Shellfish Allergy Hives    Shellfish-Derived Products Hives    Sulfasalazine Itching and Rash    Tetanus Toxoid, Adsorbed Swelling    Tetanus-Diphtheria Toxoids Td Swelling    Vicodin [Acetaminophen] Itching     Patient reported - only when used scheduled in high doses.       Vioxx Rash      PAST MEDICAL HISTORY:   Past Medical History:   Diagnosis Date    Abnormal cardiovascular stress test 2/3/2019    9/10/2018 Lexiscan: \"Myocardial perfusion imaging using single isotope technique demonstrated a small perfusion defect of mild severity involving the basal inferior wall which is mostly reversible and " "may be consistent with mild ischemia in the right coronary artery distribution. In addition, transient ischemic dilatation is noted with a TID ratio of 1.3. \"    Adhesive capsulitis of shoulder     left     Adjustment disorder with anxiety 3/18/2016    B12 deficiency anemia 6/20/2012    Chest pain 2004    Chronic right sided/axillary discomfort (musculoskeletal) Atypical substernal (possibly GERD). Negative cardiolite 2004.    Colitis, Clostridium difficile 4/18/2012    Diverticulitis 2009    Headache(784.0) 2001    Tension type. MRI 2001 normal.    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  FAMILY HISTORY: family history includes Alcohol/Drug in her father; Allergies in her son; Arthritis in her mother; C.A.D. in her mother; Cancer - colorectal in her mother; Diabetes in her mother; Eye Disorder in her son and son; Heart Disease in her mother; Hypertension in her brother; Leukemia in her son; Lipids in her brother; Obesity in her brother; Thyroid Disease in her sister.  SOCIAL HISTORY:   reports that she has never smoked. She has never used smokeless tobacco. She reports that she does not drink alcohol and does not use drugs.  Patient's living condition: {LIVES WITH (NURSING HOME):141707}    Post Discharge Medication Reconciliation Status:   MED REC REQUIRED{TIP  Click the link below to document or use med rec list, use list to pull in response :671454}  Post Medication Reconciliation Status: {MED REC " LIST:799408}       Current Outpatient Medications   Medication Sig Dispense Refill    acetaminophen (TYLENOL) 500 MG tablet Take 500-1,000 mg by mouth every 6 hours as needed for mild pain or pain      amLODIPine (NORVASC) 5 MG tablet Take 1 tablet (5 mg) by mouth daily. 90 tablet 3    bismuth subsalicylate (PEPTO BISMOL) 262 MG/15ML suspension Take 15 mLs by mouth every 6 hours as needed for indigestion.      blood glucose (NO BRAND SPECIFIED) test strip Use to test blood sugar 1 times daily or as directed. To accompany: Blood Glucose Monitor Brands: per insurance. 100 strip 6    blood glucose monitoring (NO BRAND SPECIFIED) meter device kit Use to test blood sugar 1 times daily or as directed. Preferred blood glucose meter OR supplies to accompany: Blood Glucose Monitor Brands: per insurance. 1 kit 0    buPROPion (WELLBUTRIN XL) 150 MG 24 hr tablet Take 1 tablet (150 mg) by mouth every morning. 90 tablet 3    loperamide (IMODIUM) 2 MG capsule Take 1 capsule (2 mg) by mouth 4 times daily as needed for diarrhea.      meloxicam (MOBIC) 7.5 MG tablet Take 1 tablet (7.5 mg) by mouth daily. 90 tablet 1    Multiple Vitamins-Minerals (THERAPEUTIC MULTIVIT/MINERAL) TABS Take 1 tablet by mouth daily.      olmesartan (BENICAR) 20 MG tablet Take 1 tablet (20 mg) by mouth daily. 90 tablet 3    PARoxetine (PAXIL) 20 MG tablet Take 1 tablet (20 mg) by mouth every morning. 90 tablet 3    thin (NO BRAND SPECIFIED) lancets Use with lanceting device. To accompany: Blood Glucose Monitor Brands: per insurance. 100 each 6     No current facility-administered medications for this visit.       ROS:  {ROS FGS:502429}    Vitals:  LMP  (LMP Unknown)   Exam:  {Nursing home physical exam :749254}    Lab/Diagnostic data:  {fgslab:810096}    ASSESSMENT/PLAN:    {FGS DX2:991563}    Orders:  {fgsorders:580384}  ***    Electronically signed by:  Scarlett Eaton ***               "PARoxetine (PAXIL) 20 MG tablet Take 1 tablet (20 mg) by mouth every morning. 90 tablet 3    thin (NO BRAND SPECIFIED) lancets Use with lanceting device. To accompany: Blood Glucose Monitor Brands: per insurance. 100 each 6     No current facility-administered medications for this visit.       ROS:  10 point ROS of systems including Constitutional, Eyes, Respiratory, Cardiovascular, Gastroenterology, Genitourinary, Integumentary, Musculoskeletal, Psychiatric were all negative except for pertinent positives noted in my HPI.    Vitals:  /62   Pulse 76   Temp 97.6  F (36.4  C)   Resp 16   Ht 1.626 m (5' 4\")   Wt 61.9 kg (136 lb 6.4 oz)   LMP  (LMP Unknown)   SpO2 96%   BMI 23.41 kg/m    Exam:  GENERAL APPEARANCE:  in no distress,   RESP:  Unlabored breathing. CTA b/l.   CV:  S1S2 audible, regular HR, no murmur appreciated.   ABDOMEN:  soft, NT/ND, BS audible.   M/S:   no joint deformity noted on observation.   SKIN:  No rash noted on observation  NEURO:   No NFD appreciated on observation.   PSYCH:  affect and mood normal.  Alert and oriented times person, place thing is hospital, knows the year but missed the month think of June and missed the day saying it is Sunday instead of Monday.  Speech is a..    Lab/Diagnostic data:Reviewed in the chart and EHR.        ASSESSMENT/PLAN:  ------------------------------------  T12 compression fracture, with routine healing, subsequent encounter  Failure to thrive in adult  Generalized muscle weakness  Chronic left-sided low back pain with left-sided sciatica  - hx nontraumatic rhabdomyolysis  -Analgesia optimal with the current regimen. Continue.  -started rehab program, making a progress, continue until desired goal is achieved.       Moderate major depression (H)  - Adjusting well.  Patient is on Paxil that have the highest anticholinergic properties.  Consider changing to newer SSRI drug as feasible    Stage 3a chronic kidney disease (H)  - Avoid nephrotoxic  " medications. Renally dose medications. Monitor electrolytes, and dehydration status     Benign essential hypertension  -No concern today. Continue current plan of care.     Normocytic anemia  - stable. No concern today. Continue current plan of care.       Irritable bowel syndrome with diarrhea  - at baseline.     Orders:  nno    Electronically signed by:  Peter Feldman MD

## 2025-07-05 PROBLEM — F33.1 MODERATE RECURRENT MAJOR DEPRESSION (H): Status: ACTIVE | Noted: 2020-12-31

## 2025-07-07 ENCOUNTER — TRANSITIONAL CARE UNIT VISIT (OUTPATIENT)
Dept: GERIATRICS | Facility: CLINIC | Age: OVER 89
End: 2025-07-07
Payer: COMMERCIAL

## 2025-07-07 VITALS
HEART RATE: 76 BPM | HEIGHT: 64 IN | RESPIRATION RATE: 16 BRPM | OXYGEN SATURATION: 96 % | SYSTOLIC BLOOD PRESSURE: 136 MMHG | DIASTOLIC BLOOD PRESSURE: 62 MMHG | BODY MASS INDEX: 23.29 KG/M2 | TEMPERATURE: 97.6 F | WEIGHT: 136.4 LBS

## 2025-07-07 DIAGNOSIS — R41.3 MEMORY LOSS: ICD-10-CM

## 2025-07-07 DIAGNOSIS — K58.0 IRRITABLE BOWEL SYNDROME WITH DIARRHEA: Chronic | ICD-10-CM

## 2025-07-07 DIAGNOSIS — R62.7 FAILURE TO THRIVE IN ADULT: ICD-10-CM

## 2025-07-07 DIAGNOSIS — M62.81 GENERALIZED MUSCLE WEAKNESS: ICD-10-CM

## 2025-07-07 DIAGNOSIS — D64.9 NORMOCYTIC ANEMIA: ICD-10-CM

## 2025-07-07 DIAGNOSIS — F32.1 MODERATE MAJOR DEPRESSION (H): ICD-10-CM

## 2025-07-07 DIAGNOSIS — G89.29 CHRONIC LEFT-SIDED LOW BACK PAIN WITH LEFT-SIDED SCIATICA: ICD-10-CM

## 2025-07-07 DIAGNOSIS — M54.42 CHRONIC LEFT-SIDED LOW BACK PAIN WITH LEFT-SIDED SCIATICA: ICD-10-CM

## 2025-07-07 DIAGNOSIS — N18.31 STAGE 3A CHRONIC KIDNEY DISEASE (H): ICD-10-CM

## 2025-07-07 DIAGNOSIS — S22.080D T12 COMPRESSION FRACTURE, WITH ROUTINE HEALING, SUBSEQUENT ENCOUNTER: Primary | ICD-10-CM

## 2025-07-07 DIAGNOSIS — I10 BENIGN ESSENTIAL HYPERTENSION: ICD-10-CM

## 2025-07-07 NOTE — LETTER
" 7/7/2025      Daniela Brown  [address] [address] [id number]        [facility] GERIATRICS    PRIMARY CARE PROVIDER AND CLINIC:  CYN Delaney CNP, [address] [facility] [address]***  Chief Complaint   Patient presents with    Hospital F/U      [place] Medical Record Number:  [id number]  Place of Service where encounter took place:  [facility] (U/SNF) [4000]    Daniela Brown  is a 89 year old  (1/26/1933), {fgsinitialoption:225442}.   HPI:    ***    CODE STATUS/ADVANCE DIRECTIVES DISCUSSION:  No CPR- Do NOT Intubate  {CODE STATUS:138516}  ALLERGIES:   Allergies   Allergen Reactions    Metoprolol Itching and Rash    Cephalexin Rash    Erythromycin Rash    Acetaminophen Itching     Patient reported - only when used scheduled in high doses      Actonel [Bisphosphonates] Itching    Alendronate Rash    Atarax [Hydroxyzine] Other (See Comments)     Syncope and collapse    Azithromycin Hives    Celebrex [Ricci-2 Inhibitors (Sulfonamide)] Rash    Celecoxib Rash    Cipro [Ciprofloxacin] Rash    Contrast Dye Hives    Diatrizoate Hives    Erythromycin Rash    Escitalopram Diarrhea     Gets very sick and mad feelings    Fosamax Itching and Rash    Hydrocodone Itching    Keflex [Cephalexin Monohydrate] Rash    Lisinopril Cough    Penicillins Rash    Risedronate Itching    Rofecoxib Rash    Seasonal Allergies Other (See Comments)     Seasonal rhinitis    Shellfish Allergy Hives    Shellfish-Derived Products Hives    Sulfasalazine Itching and Rash    Tetanus Toxoid, Adsorbed Swelling    Tetanus-Diphtheria Toxoids Td Swelling    Vicodin [Acetaminophen] Itching     Patient reported - only when used scheduled in high doses.       Vioxx Rash      PAST MEDICAL HISTORY:   Past Medical History:   Diagnosis Date    Abnormal cardiovascular stress test 2/3/2019    9/10/2018 Lexiscan: \"Myocardial perfusion imaging using single isotope technique demonstrated a small perfusion defect of " "mild severity involving the basal inferior wall which is mostly reversible and may be consistent with mild ischemia in the right coronary artery distribution. In addition, transient ischemic dilatation is noted with a TID ratio of 1.3. \"    Adhesive capsulitis of shoulder     left     Adjustment disorder with anxiety 3/18/2016    B12 deficiency anemia 6/20/2012    Chest pain 2004    Chronic right sided/axillary discomfort (musculoskeletal) Atypical substernal (possibly GERD). Negative cardiolite 2004.    Colitis, Clostridium difficile 4/18/2012    Diverticulitis 2009    Headache(784.0) 2001    Tension type. MRI 2001 normal.    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  FAMILY HISTORY: family history includes Alcohol/Drug in her father; Allergies in her son; Arthritis in her mother; C.A.D. in her mother; Cancer - colorectal in her mother; Diabetes in her mother; Eye Disorder in her son and son; Heart Disease in her mother; Hypertension in her brother; Leukemia in her son; Lipids in her brother; Obesity in her brother; Thyroid Disease in her sister.  SOCIAL HISTORY:   reports that she has never smoked. She has never used smokeless tobacco. She reports that she does not drink alcohol and does not use drugs.  Patient's living condition: {LIVES WITH (NURSING HOME):720997}    Post Discharge Medication Reconciliation Status:   MED REC REQUIRED{TIP  Click the link below to document or use med rec list, use list to " pull in response :335332}  Post Medication Reconciliation Status: {MED REC LIST:815835}       Current Outpatient Medications   Medication Sig Dispense Refill    acetaminophen (TYLENOL) 500 MG tablet Take 500-1,000 mg by mouth every 6 hours as needed for mild pain or pain      amLODIPine (NORVASC) 5 MG tablet Take 1 tablet (5 mg) by mouth daily. 90 tablet 3    bismuth subsalicylate (PEPTO BISMOL) 262 MG/15ML suspension Take 15 mLs by mouth every 6 hours as needed for indigestion.      blood glucose (NO BRAND SPECIFIED) test strip Use to test blood sugar 1 times daily or as directed. To accompany: Blood Glucose Monitor Brands: per insurance. 100 strip 6    blood glucose monitoring (NO BRAND SPECIFIED) meter device kit Use to test blood sugar 1 times daily or as directed. Preferred blood glucose meter OR supplies to accompany: Blood Glucose Monitor Brands: per insurance. 1 kit 0    buPROPion (WELLBUTRIN XL) 150 MG 24 hr tablet Take 1 tablet (150 mg) by mouth every morning. 90 tablet 3    loperamide (IMODIUM) 2 MG capsule Take 1 capsule (2 mg) by mouth 4 times daily as needed for diarrhea.      meloxicam (MOBIC) 7.5 MG tablet Take 1 tablet (7.5 mg) by mouth daily. 90 tablet 1    Multiple Vitamins-Minerals (THERAPEUTIC MULTIVIT/MINERAL) TABS Take 1 tablet by mouth daily.      olmesartan (BENICAR) 20 MG tablet Take 1 tablet (20 mg) by mouth daily. 90 tablet 3    PARoxetine (PAXIL) 20 MG tablet Take 1 tablet (20 mg) by mouth every morning. 90 tablet 3    thin (NO BRAND SPECIFIED) lancets Use with lanceting device. To accompany: Blood Glucose Monitor Brands: per insurance. 100 each 6     No current facility-administered medications for this visit.       ROS:  {ROS FGS:445781}    Vitals:  LMP  (LMP Unknown)   Exam:  {Nursing home physical exam :958229}    Lab/Diagnostic data:  {fgslab:844508}    ASSESSMENT/PLAN:    {FGS DX2:593193}    Orders:  {fgsorders:720897}  ***    Electronically signed by:  Scarlett Eaton  ***                  Sincerely,        Peter Feldman MD    Electronically signed   highest anticholinergic properties.  Consider changing to newer SSRI drug as feasible    Stage 3a chronic kidney disease (H)  - Avoid nephrotoxic  medications. Renally dose medications. Monitor electrolytes, and dehydration status     Benign essential hypertension  -No concern today. Continue current plan of care.     Normocytic anemia  - stable. No concern today. Continue current plan of care.       Irritable bowel syndrome with diarrhea  - at baseline.     Orders:  nno    Electronically signed by:  Peter Feldman MD                   Sincerely,        Peter Feldman MD    Electronically signed

## 2025-07-08 NOTE — TELEPHONE ENCOUNTER
Is mometasone on her formulary  
[facility] Call Center    Phone Message    May a detailed message be left on voicemail: yes     Reason for Call: Medication Question or concern regarding medication   Prescription Clarification  Name of Medication: fluocinonide (LIDEX) 0.05 % external cream  Prescribing Provider: Dr. Posada   Pharmacy: [place] [facility] [place] [address] [facility]   What on the order needs clarification? Pt states the fluocinonide (LIDEX) 0.05 % external cream is too expensive and would like a call back to discuss a less expensive option.           Action Taken: Other: [place] [facility]    Travel Screening: Not Applicable                                                                      
6 (moderate pain)

## 2025-07-14 ENCOUNTER — PATIENT OUTREACH (OUTPATIENT)
Dept: CARE COORDINATION | Facility: CLINIC | Age: OVER 89
End: 2025-07-14
Payer: COMMERCIAL

## 2025-07-14 NOTE — PROGRESS NOTES
Clinic Care Coordination Contact    No discharge summary in chart. Appears pt is still in TCU.     MANJIT Arroyo   Social Work Primary Care Clinic Care Coordinator   [facility]  [phone]  [email or web address]

## 2025-07-17 ENCOUNTER — ALLIED HEALTH/NURSE VISIT (OUTPATIENT)
Dept: AUDIOLOGY | Facility: CLINIC | Age: OVER 89
End: 2025-07-17
Payer: COMMERCIAL

## 2025-07-17 DIAGNOSIS — H90.3 SENSORINEURAL HEARING LOSS, BILATERAL: Primary | ICD-10-CM

## 2025-07-17 NOTE — PROGRESS NOTES
HEARING AID DROP-OFF    Patient Name:  Daniela Brown    Patient Age:   89 year old    :  1933    Background:   Patient dropped off their hearing aid with the report of dropped.      SIDE: Left    : Phonak    TYPE: Cassandra L50PR BTE    S/N: [id number]     WARRANTY: 2027    Procedures:   General cleaning of the hearing aid and earmold was performed. The earmold tubing was replaced. Biologic listening check found the hearing aid sounding crisp and clear.     Plan:   Patient was contacted in regards to status of hearing aid dropped off today. I spoke to Wally and let him know the hearing aid was ready for .     NO CHARGE VISIT    Cedric Butler CCC-A  Licensed Audiologist  2025

## 2025-08-07 ENCOUNTER — DOCUMENTATION ONLY (OUTPATIENT)
Dept: GERIATRICS | Facility: CLINIC | Age: OVER 89
End: 2025-08-07
Payer: COMMERCIAL

## 2025-08-21 ENCOUNTER — TELEPHONE (OUTPATIENT)
Dept: GERIATRICS | Facility: CLINIC | Age: OVER 89
End: 2025-08-21
Payer: COMMERCIAL

## 2025-08-28 ENCOUNTER — NURSING HOME VISIT (OUTPATIENT)
Dept: GERIATRICS | Facility: CLINIC | Age: OVER 89
End: 2025-08-28
Payer: COMMERCIAL

## 2025-08-28 VITALS
RESPIRATION RATE: 16 BRPM | TEMPERATURE: 98.2 F | DIASTOLIC BLOOD PRESSURE: 62 MMHG | HEIGHT: 64 IN | SYSTOLIC BLOOD PRESSURE: 138 MMHG | HEART RATE: 78 BPM | BODY MASS INDEX: 23.25 KG/M2 | WEIGHT: 136.2 LBS | OXYGEN SATURATION: 95 %

## 2025-08-28 DIAGNOSIS — G89.29 CHRONIC LEFT-SIDED LOW BACK PAIN WITH LEFT-SIDED SCIATICA: Primary | ICD-10-CM

## 2025-08-28 DIAGNOSIS — M54.42 CHRONIC LEFT-SIDED LOW BACK PAIN WITH LEFT-SIDED SCIATICA: Primary | ICD-10-CM

## 2025-08-28 RX ORDER — LIDOCAINE 4 G/G
1 PATCH TOPICAL EVERY 24 HOURS
COMMUNITY

## 2025-08-30 PROBLEM — F33.1 MODERATE RECURRENT MAJOR DEPRESSION (H): Status: RESOLVED | Noted: 2020-12-31 | Resolved: 2025-08-30

## 2025-09-01 ENCOUNTER — NURSING HOME VISIT (OUTPATIENT)
Dept: GERIATRICS | Facility: CLINIC | Age: OVER 89
End: 2025-09-01
Payer: COMMERCIAL

## 2025-09-01 VITALS
WEIGHT: 128 LBS | DIASTOLIC BLOOD PRESSURE: 62 MMHG | HEART RATE: 78 BPM | BODY MASS INDEX: 21.85 KG/M2 | TEMPERATURE: 98.2 F | OXYGEN SATURATION: 95 % | RESPIRATION RATE: 16 BRPM | HEIGHT: 64 IN | SYSTOLIC BLOOD PRESSURE: 138 MMHG

## 2025-09-01 DIAGNOSIS — K58.0 IRRITABLE BOWEL SYNDROME WITH DIARRHEA: ICD-10-CM

## 2025-09-01 DIAGNOSIS — G89.29 CHRONIC LEFT-SIDED LOW BACK PAIN WITH LEFT-SIDED SCIATICA: ICD-10-CM

## 2025-09-01 DIAGNOSIS — I10 BENIGN ESSENTIAL HYPERTENSION: ICD-10-CM

## 2025-09-01 DIAGNOSIS — R62.7 FAILURE TO THRIVE IN ADULT: ICD-10-CM

## 2025-09-01 DIAGNOSIS — S22.080S COMPRESSION FRACTURE OF T12 VERTEBRA, SEQUELA: Primary | ICD-10-CM

## 2025-09-01 DIAGNOSIS — M62.81 GENERALIZED MUSCLE WEAKNESS: ICD-10-CM

## 2025-09-01 DIAGNOSIS — D64.9 NORMOCYTIC ANEMIA: ICD-10-CM

## 2025-09-01 DIAGNOSIS — M54.42 CHRONIC LEFT-SIDED LOW BACK PAIN WITH LEFT-SIDED SCIATICA: ICD-10-CM

## 2025-09-01 DIAGNOSIS — R54 FRAIL ELDERLY: ICD-10-CM

## 2025-09-01 DIAGNOSIS — N18.31 STAGE 3A CHRONIC KIDNEY DISEASE (H): ICD-10-CM

## 2025-09-01 DIAGNOSIS — F32.1 MODERATE MAJOR DEPRESSION (H): ICD-10-CM

## 2025-09-01 PROCEDURE — 99309 SBSQ NF CARE MODERATE MDM 30: CPT | Performed by: FAMILY MEDICINE

## 2025-09-02 ENCOUNTER — TELEPHONE (OUTPATIENT)
Dept: GERIATRICS | Facility: CLINIC | Age: OVER 89
End: 2025-09-02
Payer: COMMERCIAL

## 2025-09-02 ENCOUNTER — TRANSFERRED RECORDS (OUTPATIENT)
Dept: HEALTH INFORMATION MANAGEMENT | Facility: CLINIC | Age: OVER 89
End: 2025-09-02
Payer: COMMERCIAL

## (undated) DEVICE — TOTE ANGIO CORP PC15AT SAN32CC83O

## (undated) DEVICE — DEFIB PRO-PADZ LVP LQD GEL ADULT 8900-2105-01

## (undated) DEVICE — MANIFOLD KIT ANGIO AUTOMATED 014613

## (undated) DEVICE — SLEEVE TR BAND RADIAL COMPRESSION DEVICE 29CM XX-RF06L

## (undated) DEVICE — Device

## (undated) DEVICE — CATH JACKY 5FR 3.5 CURVE 40-5023

## (undated) DEVICE — CATH ANGIO INFINITI PIGTAIL 145 6 SH 6FRX110CM  534-652S

## (undated) DEVICE — WIRE GUIDE 0.035"X260CM SAFE-T-J EXCHANGE G00517

## (undated) DEVICE — KIT HAND CONTROL ANGIOTOUCH ACIST 65CM AT-P65

## (undated) DEVICE — INTRO GLIDESHEATH SLENDER 6FR 10X45CM 60-1060

## (undated) RX ORDER — BUPIVACAINE HYDROCHLORIDE 5 MG/ML
INJECTION, SOLUTION EPIDURAL; INTRACAUDAL
Status: DISPENSED
Start: 2019-09-03

## (undated) RX ORDER — VERAPAMIL HYDROCHLORIDE 2.5 MG/ML
INJECTION, SOLUTION INTRAVENOUS
Status: DISPENSED
Start: 2019-02-27

## (undated) RX ORDER — LIDOCAINE HYDROCHLORIDE 10 MG/ML
INJECTION, SOLUTION EPIDURAL; INFILTRATION; INTRACAUDAL; PERINEURAL
Status: DISPENSED
Start: 2019-02-27

## (undated) RX ORDER — BUPIVACAINE HYDROCHLORIDE 5 MG/ML
INJECTION, SOLUTION EPIDURAL; INTRACAUDAL
Status: DISPENSED
Start: 2019-08-08

## (undated) RX ORDER — BUPIVACAINE HYDROCHLORIDE 5 MG/ML
INJECTION, SOLUTION EPIDURAL; INTRACAUDAL
Status: DISPENSED
Start: 2019-06-11

## (undated) RX ORDER — NITROGLYCERIN 5 MG/ML
VIAL (ML) INTRAVENOUS
Status: DISPENSED
Start: 2019-02-27

## (undated) RX ORDER — BUPIVACAINE HYDROCHLORIDE 5 MG/ML
INJECTION, SOLUTION EPIDURAL; INTRACAUDAL
Status: DISPENSED
Start: 2019-05-15

## (undated) RX ORDER — FENTANYL CITRATE 50 UG/ML
INJECTION, SOLUTION INTRAMUSCULAR; INTRAVENOUS
Status: DISPENSED
Start: 2019-02-27

## (undated) RX ORDER — HEPARIN SODIUM 1000 [USP'U]/ML
INJECTION, SOLUTION INTRAVENOUS; SUBCUTANEOUS
Status: DISPENSED
Start: 2019-02-27